# Patient Record
Sex: FEMALE | Race: WHITE | NOT HISPANIC OR LATINO | Employment: FULL TIME | URBAN - METROPOLITAN AREA
[De-identification: names, ages, dates, MRNs, and addresses within clinical notes are randomized per-mention and may not be internally consistent; named-entity substitution may affect disease eponyms.]

---

## 2017-02-08 ENCOUNTER — GENERIC CONVERSION - ENCOUNTER (OUTPATIENT)
Dept: OTHER | Facility: OTHER | Age: 77
End: 2017-02-08

## 2017-02-08 ENCOUNTER — LAB CONVERSION - ENCOUNTER (OUTPATIENT)
Dept: OTHER | Facility: OTHER | Age: 77
End: 2017-02-08

## 2017-02-08 LAB
A/G RATIO (HISTORICAL): 1.4 (CALC) (ref 1–2.5)
ALBUMIN SERPL BCP-MCNC: 4 G/DL (ref 3.6–5.1)
ALP SERPL-CCNC: 64 U/L (ref 33–130)
ALT SERPL W P-5'-P-CCNC: 16 U/L (ref 6–29)
AST SERPL W P-5'-P-CCNC: 16 U/L (ref 10–35)
BASOPHILS # BLD AUTO: 0.3 %
BASOPHILS # BLD AUTO: 22 CELLS/UL (ref 0–200)
BILIRUB SERPL-MCNC: 0.4 MG/DL (ref 0.2–1.2)
BUN SERPL-MCNC: 12 MG/DL (ref 7–25)
BUN/CREA RATIO (HISTORICAL): ABNORMAL (CALC) (ref 6–22)
CALCIUM SERPL-MCNC: 9.3 MG/DL (ref 8.6–10.4)
CHLORIDE SERPL-SCNC: 100 MMOL/L (ref 98–110)
CHOLEST SERPL-MCNC: 169 MG/DL (ref 125–200)
CHOLEST/HDLC SERPL: 2.7 (CALC)
CO2 SERPL-SCNC: 31 MMOL/L (ref 20–31)
CREAT SERPL-MCNC: 0.67 MG/DL (ref 0.6–0.93)
CREATININE, RANDOM URINE (HISTORICAL): 184 MG/DL (ref 20–320)
DEPRECATED RDW RBC AUTO: 13.3 % (ref 11–15)
EGFR AFRICAN AMERICAN (HISTORICAL): 99 ML/MIN/1.73M2
EGFR-AMERICAN CALC (HISTORICAL): 85 ML/MIN/1.73M2
EOSINOPHIL # BLD AUTO: 281 CELLS/UL (ref 15–500)
EOSINOPHIL # BLD AUTO: 3.8 %
GAMMA GLOBULIN (HISTORICAL): 2.8 G/DL (CALC) (ref 1.9–3.7)
GLUCOSE (HISTORICAL): 126 MG/DL (ref 65–99)
HBA1C MFR BLD HPLC: 6.4 % OF TOTAL HGB
HCT VFR BLD AUTO: 39.2 % (ref 35–45)
HDLC SERPL-MCNC: 63 MG/DL
HGB BLD-MCNC: 13.1 G/DL (ref 11.7–15.5)
LDL CHOLESTEROL (HISTORICAL): 85 MG/DL (CALC)
LYMPHOCYTES # BLD AUTO: 2368 CELLS/UL (ref 850–3900)
LYMPHOCYTES # BLD AUTO: 32 %
MAGNESIUM, UR (HISTORICAL): 1.7 MG/DL
MCH RBC QN AUTO: 31 PG (ref 27–33)
MCHC RBC AUTO-ENTMCNC: 33.3 G/DL (ref 32–36)
MCV RBC AUTO: 93.2 FL (ref 80–100)
MICROALBUMIN/CREATININE RATIO (HISTORICAL): 9 MCG/MG CREAT
MONOCYTES # BLD AUTO: 340 CELLS/UL (ref 200–950)
MONOCYTES (HISTORICAL): 4.6 %
NEUTROPHILS # BLD AUTO: 4388 CELLS/UL (ref 1500–7800)
NEUTROPHILS # BLD AUTO: 59.3 %
NON-HDL-CHOL (CHOL-HDL) (HISTORICAL): 106 MG/DL (CALC)
PLATELET # BLD AUTO: 259 THOUSAND/UL (ref 140–400)
PMV BLD AUTO: 8.4 FL (ref 7.5–12.5)
POTASSIUM SERPL-SCNC: 4.1 MMOL/L (ref 3.5–5.3)
RBC # BLD AUTO: 4.2 MILLION/UL (ref 3.8–5.1)
SODIUM SERPL-SCNC: 139 MMOL/L (ref 135–146)
T4 FREE SERPL-MCNC: 1.3 NG/DL (ref 0.8–1.8)
TOTAL PROTEIN (HISTORICAL): 6.8 G/DL (ref 6.1–8.1)
TRIGL SERPL-MCNC: 106 MG/DL
TSH SERPL DL<=0.05 MIU/L-ACNC: 0.07 MIU/L (ref 0.4–4.5)
WBC # BLD AUTO: 7.4 THOUSAND/UL (ref 3.8–10.8)

## 2017-02-21 ENCOUNTER — ALLSCRIPTS OFFICE VISIT (OUTPATIENT)
Dept: OTHER | Facility: OTHER | Age: 77
End: 2017-02-21

## 2017-02-21 DIAGNOSIS — Z12.39 ENCOUNTER FOR OTHER SCREENING FOR MALIGNANT NEOPLASM OF BREAST: ICD-10-CM

## 2017-04-26 ENCOUNTER — ALLSCRIPTS OFFICE VISIT (OUTPATIENT)
Dept: OTHER | Facility: OTHER | Age: 77
End: 2017-04-26

## 2017-08-23 ENCOUNTER — ALLSCRIPTS OFFICE VISIT (OUTPATIENT)
Dept: OTHER | Facility: OTHER | Age: 77
End: 2017-08-23

## 2017-12-20 ENCOUNTER — ALLSCRIPTS OFFICE VISIT (OUTPATIENT)
Dept: OTHER | Facility: OTHER | Age: 77
End: 2017-12-20

## 2017-12-21 NOTE — PROGRESS NOTES
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)    Plan   Acute URI    · Drink at least 6 glasses of water or juice a day ; Status:Complete;   Done: 46JKO7759   · Good hand washing is one of the best ways to control the spread of germs ;    Status:Complete;   Done: 46NQO8827   · The following home treatments may soothe a sore throat ; Status:Complete;   Done:    87XNA3764   · Use a cough medicine to help you get adequate rest ; Status:Complete;   Done:    61NHE9188    Discussion/Summary      Likely viral at this time improving currently  take OTC Mucinex prn PO fluids and rest nasal spray or Flonase if needed  Delysm prn for cough if symptoms worsen or persist    to keep routine f/u appointment as scheduled 1/3/18  Possible side effects of new medications were reviewed with the patient/guardian today  The treatment plan was reviewed with the patient/guardian  The patient/guardian understands and agrees with the treatment plan       Self Referrals: No      Chief Complaint   Patient has has a cough and nasal congestion since Friday and she is now starting to feel better      Advance Directives   Advance Directive [facility]:      NO - Patient does not have an advance health care directive  History of Present Illness   HPI: Pt presents by herself today for an acute visit    nasal and chest congestion for the past 5 days wheezing, mostly at night but no SOB feeling better today  cough- clear sputum  fevers or chills N/V/D around sick friends smoker  H/O asthma    type 2- currently diet controlled, doesn't check blood sugars at home, most recent A1C 2/2017 at 6 4%      Review of Systems        Constitutional: feeling tired, but-- no fever,-- not feeling poorly-- and-- no chills  ENT: as noted in HPI  Cardiovascular: no chest pain,-- no intermittent leg claudication,-- no palpitations-- and-- no lower extremity edema        Respiratory: cough-- and-- wheezing, but-- as noted in HPI,-- no shortness of breath-- and-- no shortness of breath during exertion  Gastrointestinal: no abdominal pain,-- no nausea,-- no constipation-- and-- no diarrhea  Musculoskeletal: no myalgias  Integumentary: no rashes  Neurological: no headache-- and-- no dizziness  ROS reviewed  Active Problems   1  Allergic rhinitis (477 9) (J30 9)   2  Cataract, right (366 9) (H26 9)   3  Colon cancer screening (V76 51) (Z12 11)   4  Depression with anxiety (300 4) (F41 8)   5  Encounter for other screening for malignant neoplasm of breast (V76 19) (Z12 39)   6  Encounter for screening colonoscopy (V76 51) (Z12 11)   7  Hyperlipidemia (272 4) (E78 5)   8  Hypertension (401 9) (I10)   9  Hypothyroidism (244 9) (E03 9)   10  Infected insect bite of forearm, right, initial encounter (913 5,E906 4)      ([id number]  XXXA)   11  Obesity (278 00) (E66 9)   12  Osteoarthritis (715 90) (M19 90)   13  Preoperative cardiovascular examination (V72 81) (Z01 810)   14  Screening for breast cancer (V76 10) (Z12 31)   15  Screening for diabetic retinopathy (V80 2) (Z13 5)   16  Screening for genitourinary condition (V81 6) (Z13 89)   17  Screening for neurological condition (V80 09) (Z13 89)   18  Skin rash (782 1) (R21)   19  Type 2 diabetes mellitus (250 00) (E11 9)   20  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,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)    Past Medical History   1  History of Abnormal Skin Sensitivity To Sunlight (Photosensitivity)   2  History of Acute serous otitis media, unspecified laterality   3  History of Bitten By Mites Or Midges   4  History of Fall at home (X259 4,E950 7) ([address]  XXXA,Y92 099)   5  History of Furuncle In The Axilla (680 3)   6  History of acute bronchitis (V12 69) (Z87 09)   7  History of bronchitis (V12 69) (Z87 09)   8  History of chest pain (V13 89) (Z87 898)   9  History of contact dermatitis (V13 3) (Z87 2)   10   History of eczema (V13 3) (Z87 2)   11  History of upper respiratory infection (V12 09) (Z87 09)   12  History of Impaired fasting glucose (790 21) (R73 01)   13  History of Shoulder pain (719 41) (M25 519)   14  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)  Active Problems And Past Medical History Reviewed: The active problems and past medical history were reviewed and updated today  Family History   Mother    1  Family history of Alzheimer Disease   2  Family history of Coronary Artery Disease (V17 49)   3  Family history of Dementia   4  Family history of Diabetes Mellitus (V18 0)  Father    5  Family history of Acute Myocardial Infarction (V17 3)   6  Family history of Sudden / Instantaneous Death  Sister    7  Family history of Coronary Artery Disease (V17 49)  Maternal Grandfather    8  Family history of Laryngeal Cancer (V16 2)  Maternal Aunt    9  Family history of Dementia   10  Family history of Diabetes Mellitus (V18 0)    Social History    · Denied: History of Alcohol   · Never A Smoker  The social history was reviewed and updated today  The social history was reviewed and is unchanged  Surgical History   1  History of Hemorrhoidectomy   2  History of Oophorectomy   3  History of Total Abdominal Hysterectomy    Current Meds    1  Levothyroxine Sodium 137 MCG Oral Tablet; take (1) tablet daily; Therapy: 43IRW1359 to (Last Jeff Morena)  Requested for: 47Cec7276 Ordered   2  Losartan Potassium-HCTZ 100-12 5 MG Oral Tablet; take 1 tablet by mouth daily; Therapy: 69VSE7290 to (Evaluate:14Crn0995)  Requested for: 63Dhe8977; Last     Rx:90Tmp9828 Ordered   3  Simvastatin 20 MG Oral Tablet; Take 1 tablet daily; Therapy: 49BJE5104 to ([id number])  Requested for: 08Itn9322; Last     Rx:93Fnt5678 Ordered   4  Venlafaxine HCl - 75 MG Oral Tablet; take 1 tablet every twelve hours;      Therapy: 82TLD8547 to (Evaluate:91Lqv0862)  Requested for: [phone]; Last     Jeff Morena; Status: ACTIVE - Renewal Denied Ordered     The medication list was reviewed and updated today  Allergies   1  Wellbutrin SR TBCR   2  Penicillins    Vitals    Recorded: 84Iag0818 01:54PM   Temperature 98 5 F   Heart Rate 76   Respiration 16   Systolic 581   Diastolic 68   Height 5 ft    Weight 181 lb    BMI Calculated 35 35   BSA Calculated 1 79   O2 Saturation 98     Physical Exam        Constitutional      General appearance: No acute distress, well appearing and well nourished  obese  Eyes      Conjunctiva and lids: No swelling, erythema or discharge  Pupils and irises: Equal, round and reactive to light  Ears, Nose, Mouth, and Throat      External inspection of ears and nose: Normal        Otoscopic examination: Tympanic membranes translucent with normal light reflex  Canals patent without erythema  Nasal mucosa, septum, and turbinates: Abnormal   The bilateral nasal mucosa was red  Oropharynx: Normal with no erythema, edema, exudate or lesions  Pulmonary      Respiratory effort: Abnormal   Respiratory Findings: dry cough  Auscultation of lungs: Clear to auscultation  Cardiovascular      Auscultation of heart: Abnormal   A grade 2 systolic murmur was heard at the RUSB  A grade 2 systolic murmur was heard at the LUSB  Examination of extremities for edema and/or varicosities: Normal        Abdomen      Abdomen: Non-tender, no masses  Liver and spleen: No hepatomegaly or splenomegaly  Lymphatic      Palpation of lymph nodes in neck: No lymphadenopathy  Musculoskeletal      Gait and station: Normal        Skin      Skin and subcutaneous tissue: Normal without rashes or lesions  Psychiatric      Orientation to person, place, and time: Normal        Mood and affect: Normal        Additional Exam:  Sinuses NTTP           Attending Note   Collaborating Physician Note: Collaborating Note: I did not interview and examine the patient-- and-- I agree with the Advanced Practitioner note  Future Appointments      Date/Time Provider Specialty Site   01/31/2018 01:00 PM [facility]     Signatures    Electronically signed by : Patrick Desir; Dec 20 2017  2:30PM EST                       (Author)     Electronically signed by :  Kat Luz MD; Dec 20 2017  3:09PM EST                       (Author)

## 2018-01-12 NOTE — RESULT NOTES
Message   ZHENG Hobart Cooks on 2/9/2017 OV  Verified Results  (1) LIPID PANEL, FASTING [id number] 09:29AM [facility]     Test Name Result Flag Reference   CHOLESTEROL, TOTAL 169 mg/dL  125-200   HDL CHOLESTEROL 63 mg/dL  > OR = 46   TRIGLICERIDES 593 mg/dL  <150   LDL-CHOLESTEROL 85 mg/dL (calc)  <130   Desirable range <100 mg/dL for patients with CHD or  diabetes and <70 mg/dL for diabetic patients with  known heart disease  CHOL/HDLC RATIO 2 7 (calc)  < OR = 5 0   NON HDL CHOLESTEROL 106 mg/dL (calc)     Target for non-HDL cholesterol is 30 mg/dL higher than   LDL cholesterol target  (Q) MICROALBUMIN, RANDOM URINE (W/CREATININE) 81OHM8812 09:29AM [facility]     Test Name Result Flag Reference   CREATININE, RANDOM URINE 184 mg/dL     MICROALBUMIN 1 7 mg/dL     Reference Range  Not established   MICROALBUMIN/CREATININE$RATIO, RANDOM URINE 9 mcg/mg creat  <30   The ADA defines abnormalities in albumin  excretion as follows:     Category         Result (mcg/mg creatinine)     Normal                    <30  Microalbuminuria            Clinical albuminuria   > OR = 300     The ADA recommends that at least two of three  specimens collected within a 3-6 month period be  abnormal before considering a patient to be  within a diagnostic category  (1) COMPREHENSIVE METABOLIC PANEL 70DPV1825 94:35DW [facility]     Test Name Result Flag Reference   GLUCOSE 126 mg/dL H 65-99   Fasting reference interval   UREA NITROGEN (BUN) 12 mg/dL  7-25   CREATININE 0 67 mg/dL  0 60-0 93   For patients >52years of age, the reference limit  for Creatinine is approximately 13% higher for people  identified as -American  eGFR NON-AFR   AMERICAN 85 mL/min/1 73m2  > OR = 60   eGFR AFRICAN AMERICAN 99 mL/min/1 73m2  > OR = 60   BUN/CREATININE RATIO   3-87   NOT APPLICABLE (calc)   SODIUM 139 mmol/L  135-146   POTASSIUM 4 1 mmol/L  3 5-5 3   CHLORIDE 100 mmol/L     CARBON DIOXIDE 31 mmol/L  20-31   CALCIUM 9 3 mg/dL  8 6-10 4   PROTEIN, TOTAL 6 8 g/dL  6 1-8 1   ALBUMIN 4 0 g/dL  3 6-5 1   GLOBULIN 2 8 g/dL (calc)  1 9-3 7   ALBUMIN/GLOBULIN RATIO 1 4 (calc)  1 0-2 5   BILIRUBIN, TOTAL 0 4 mg/dL  0 2-1 2   ALKALINE PHOSPHATASE 64 U/L     AST 16 U/L  10-35   ALT 16 U/L  6-29     (1) CBC/PLT/DIFF 34MGK7155 09:29AM [facility]     Test Name Result Flag Reference   WHITE BLOOD CELL COUNT 7 4 Thousand/uL  3 8-10 8   RED BLOOD CELL COUNT 4 20 Million/uL  3 80-5 10   HEMOGLOBIN 13 1 g/dL  11 7-15 5   HEMATOCRIT 39 2 %  35 0-45 0   MCV 93 2 fL  80 0-100 0   MCH 31 0 pg  27 0-33 0   MCHC 33 3 g/dL  32 0-36 0   RDW 13 3 %  11 0-15 0   PLATELET COUNT 033 Thousand/uL  140-400   MPV 8 4 fL  7 5-12 5   ABSOLUTE NEUTROPHILS 4388 cells/uL  6909-5834   ABSOLUTE LYMPHOCYTES 2368 cells/uL  850-3900   ABSOLUTE MONOCYTES 340 cells/uL  200-950   ABSOLUTE EOSINOPHILS 281 cells/uL     ABSOLUTE BASOPHILS 22 cells/uL  0-200   NEUTROPHILS 59 3 %     LYMPHOCYTES 32 0 %     MONOCYTES 4 6 %     EOSINOPHILS 3 8 %     BASOPHILS 0 3 %       (Q) TSH, 3RD GENERATION W/REFLEX TO FT4 [id number] 09:29AM [facility]     Test Name Result Flag Reference   T4, FREE 1 3 ng/dL  0 8-1 8   TSH W/REFLEX TO FT4 0 07 mIU/L L 0 40-4 50     (Q) HEMOGLOBIN A1c [id number] 09:29AM [facility]   REPORT COMMENT:  FASTING:YES     Test Name Result Flag Reference   HEMOGLOBIN A1c 6 4 % of total Hgb H <5 7   According to ADA guidelines, hemoglobin A1c <7 0%  represents optimal control in non-pregnant diabetic  patients  Different metrics may apply to specific  patient populations  Standards of Medical Care in  [phone]  Diabetes Care  2013;36:s11-s66     For the purpose of screening for the presence of  diabetes  <5 7%       Consistent with the absence of diabetes  5 7-6 4%    Consistent with increased risk for diabetes              (prediabetes)  >or=6 5%    Consistent with diabetes     This assay result is consistent with a higher risk  of diabetes       Currently, no consensus exists for use of hemoglobin  A1c for diagnosis of diabetes for children

## 2018-01-13 VITALS
BODY MASS INDEX: 36.32 KG/M2 | HEART RATE: 86 BPM | SYSTOLIC BLOOD PRESSURE: 144 MMHG | DIASTOLIC BLOOD PRESSURE: 72 MMHG | HEIGHT: 60 IN | WEIGHT: 185 LBS | RESPIRATION RATE: 16 BRPM | OXYGEN SATURATION: 96 % | TEMPERATURE: 98.5 F

## 2018-01-13 VITALS
HEIGHT: 60 IN | HEART RATE: 80 BPM | RESPIRATION RATE: 16 BRPM | BODY MASS INDEX: 36.57 KG/M2 | SYSTOLIC BLOOD PRESSURE: 150 MMHG | DIASTOLIC BLOOD PRESSURE: 82 MMHG | WEIGHT: 186.25 LBS | TEMPERATURE: 98.1 F

## 2018-01-14 VITALS
RESPIRATION RATE: 12 BRPM | WEIGHT: 187.97 LBS | HEART RATE: 72 BPM | TEMPERATURE: 98.1 F | BODY MASS INDEX: 36.9 KG/M2 | SYSTOLIC BLOOD PRESSURE: 142 MMHG | DIASTOLIC BLOOD PRESSURE: 76 MMHG | HEIGHT: 60 IN

## 2018-01-22 VITALS
BODY MASS INDEX: 35.53 KG/M2 | WEIGHT: 181 LBS | TEMPERATURE: 98.5 F | SYSTOLIC BLOOD PRESSURE: 140 MMHG | RESPIRATION RATE: 16 BRPM | DIASTOLIC BLOOD PRESSURE: 68 MMHG | HEIGHT: 60 IN | HEART RATE: 76 BPM | OXYGEN SATURATION: 98 %

## 2018-02-05 RX ORDER — LOSARTAN POTASSIUM AND HYDROCHLOROTHIAZIDE 12.5; 1 MG/1; MG/1
1 TABLET ORAL DAILY
COMMUNITY
Start: 2011-06-22 | End: 2018-02-06 | Stop reason: SDUPTHER

## 2018-02-05 RX ORDER — SIMVASTATIN 20 MG
1 TABLET ORAL DAILY
COMMUNITY
Start: 2012-03-13 | End: 2018-02-06 | Stop reason: SDUPTHER

## 2018-02-05 RX ORDER — VENLAFAXINE 75 MG/1
1 TABLET ORAL EVERY 12 HOURS
COMMUNITY
Start: 2011-05-12 | End: 2018-02-06 | Stop reason: SDUPTHER

## 2018-02-05 RX ORDER — LEVOTHYROXINE SODIUM 137 UG/1
1 TABLET ORAL DAILY
COMMUNITY
Start: 2011-06-22 | End: 2018-02-06 | Stop reason: SDUPTHER

## 2018-02-06 ENCOUNTER — OFFICE VISIT (OUTPATIENT)
Dept: FAMILY MEDICINE CLINIC | Facility: CLINIC | Age: 78
End: 2018-02-06
Payer: COMMERCIAL

## 2018-02-06 VITALS
TEMPERATURE: 97.1 F | OXYGEN SATURATION: 98 % | SYSTOLIC BLOOD PRESSURE: 142 MMHG | BODY MASS INDEX: 36.67 KG/M2 | WEIGHT: 186.8 LBS | HEART RATE: 67 BPM | RESPIRATION RATE: 16 BRPM | HEIGHT: 60 IN | DIASTOLIC BLOOD PRESSURE: 84 MMHG

## 2018-02-06 DIAGNOSIS — Z23 NEED FOR TD VACCINE: Primary | ICD-10-CM

## 2018-02-06 DIAGNOSIS — E11.36 TYPE 2 DIABETES MELLITUS WITH DIABETIC CATARACT, WITHOUT LONG-TERM CURRENT USE OF INSULIN (HCC): ICD-10-CM

## 2018-02-06 DIAGNOSIS — E78.2 MIXED HYPERLIPIDEMIA: ICD-10-CM

## 2018-02-06 DIAGNOSIS — F41.8 DEPRESSION WITH ANXIETY: ICD-10-CM

## 2018-02-06 DIAGNOSIS — I10 ESSENTIAL HYPERTENSION: ICD-10-CM

## 2018-02-06 DIAGNOSIS — E03.9 HYPOTHYROIDISM, UNSPECIFIED TYPE: ICD-10-CM

## 2018-02-06 DIAGNOSIS — Z23 NEED FOR PNEUMOCOCCAL VACCINATION: ICD-10-CM

## 2018-02-06 PROCEDURE — 99214 OFFICE O/P EST MOD 30 MIN: CPT | Performed by: NURSE PRACTITIONER

## 2018-02-06 PROCEDURE — 1036F TOBACCO NON-USER: CPT | Performed by: NURSE PRACTITIONER

## 2018-02-06 RX ORDER — LEVOTHYROXINE SODIUM 137 UG/1
137 TABLET ORAL DAILY
Qty: 30 TABLET | Refills: 5 | Status: SHIPPED | OUTPATIENT
Start: 2018-02-06 | End: 2018-10-22 | Stop reason: SDUPTHER

## 2018-02-06 RX ORDER — VENLAFAXINE 75 MG/1
75 TABLET ORAL EVERY 12 HOURS
Qty: 30 TABLET | Refills: 5 | Status: SHIPPED | OUTPATIENT
Start: 2018-02-06 | End: 2018-11-20 | Stop reason: SDUPTHER

## 2018-02-06 RX ORDER — SIMVASTATIN 20 MG
20 TABLET ORAL DAILY
Qty: 30 TABLET | Refills: 5 | Status: SHIPPED | OUTPATIENT
Start: 2018-02-06 | End: 2018-09-21 | Stop reason: SDUPTHER

## 2018-02-06 RX ORDER — LOSARTAN POTASSIUM AND HYDROCHLOROTHIAZIDE 12.5; 1 MG/1; MG/1
1 TABLET ORAL DAILY
Qty: 30 TABLET | Refills: 5 | Status: SHIPPED | OUTPATIENT
Start: 2018-02-06 | End: 2018-08-23 | Stop reason: SDUPTHER

## 2018-02-06 NOTE — PROGRESS NOTES
Chief Complaint   Patient presents with    Well Check     follow up visit     Vitals:    02/06/18 1610   BP: 142/84   Pulse: 67   Resp: 16   Temp: (!) 97 1 °F (36 2 °C)   SpO2: 98%       Assessment/Plan:    1  DM type 2- labs reordered and printed today  She is aware that she is overdue for an annual eye exam  She will contact [facility] to schedule an appointment  Follow a low carb diet, watch sweets     2  HTN- BP is acceptable today  Continue Losartan - HCTZ to 100- 12 5 daily  I asked that she start checking her BP daily and to drop off log in 2 weeks for review  Follow a low sodium diet     3  Hypothyroidism- labs reprinted to check TSH/ Free T4 as these have not been checked since dosage change with Levothyroxine     4  Hyperlipidemia- lipid panel as ordered  Continue Simvastatin  Follow a low fat/ low cholesterol diet  5  Depression with anxiety- stable  Continue Venlafaxine 75mg one tab BID     6  Obesity- encouraged to work on regular exercise, weight loss    Refuses Mammogram and Colonoscopy  Refuses Pneumonia vaccines, Zostavax, Adacel, flu vaccines     Labs to be done now- we will call with results  RTO 6 months      No problem-specific Assessment & Plan notes found for this encounter  Diagnoses and all orders for this visit:    Need for TD vaccine  -     Td vaccine greater than or equal to 8yo IM; Future    Need for pneumococcal vaccination  -     PNEUMOCOCCAL CONJUGATE VACCINE 13-VALENT GREATER THAN 6 MONTHS; Future          Subjective:      Patient ID: Carolyn Partida is a 68 y o  female  HPI     Pt presents by herself today for a 6 month follow up of chronic medical conditions    DM type 2- no current medications, diet controlled  Diet could be better, eats pasta frequently because it is cheap and easy  Unfortunately, she did not get blood work done for her appointment today  Last A1C at 6 4% in 2/2017   Last eye exam 8/2015, goes to [facility], she reports that she will make an eye exam appointment soon  Denies numbness or tingling in feet     HTN- taking Losartan- HCTZ 50-12 5mg daily  She does not check her BP at home but does have a cuff  Denies CP, palpitations, edema, SOB, tinnitus, facial flushing, HAs    Hyperlipidemia- last lipid panel 2/2017 with total cholesterol 169/ LDL 85/ / HDL 63  Taking Simvastatin 20mg daily     Obesity- no regular exercise currently, still working part time    Depression with Anxiety- taking Venlafaxine 75mg one tab in the morning and one tab in the evening  Has been on for several years and mood is stable     Hypothyroidism- Last TSH was at 0 07 with Free T4- we decreased her dosage of Levothyroxine to 137mcg but she has not gotten repeat labs done to recheck TSH     Feels well today, offers no acute complaints       The following portions of the patient's history were reviewed and updated as appropriate: allergies, current medications, past medical history, past social history and problem list     Review of Systems   Constitutional: Negative for chills, fatigue and fever  Respiratory: Negative for cough, shortness of breath and wheezing  Cardiovascular: Negative for chest pain, palpitations and leg swelling  Gastrointestinal: Negative for abdominal pain, blood in stool, constipation, diarrhea and nausea  Genitourinary: Negative for dysuria  No nocturia   Musculoskeletal: Negative for arthralgias and myalgias  Skin: Negative for rash and wound  Neurological: Negative for dizziness, weakness, numbness and headaches  Psychiatric/Behavioral: Negative for sleep disturbance and suicidal ideas  Anxiety and depression           Objective:     Physical Exam   Constitutional: She is oriented to person, place, and time  She appears well-developed and well-nourished  HENT:   Head: Normocephalic and atraumatic  Eyes: Pupils are equal, round, and reactive to light     Wearing eyeglasses   Neck: Normal range of motion  Cardiovascular: Normal rate and regular rhythm  Murmur heard  Systolic murmur is present with a grade of 2/6   Pulses:       Dorsalis pedis pulses are 2+ on the right side, and 2+ on the left side  No B/L LE edema    Pulmonary/Chest: Effort normal and breath sounds normal    Abdominal: Soft  Bowel sounds are normal  There is no tenderness  Musculoskeletal: Normal range of motion  Feet:   Right Foot:   Skin Integrity: Negative for ulcer, skin breakdown, erythema, warmth, callus or dry skin  Left Foot:   Skin Integrity: Negative for ulcer, skin breakdown, erythema, warmth, callus or dry skin  Neurological: She is alert and oriented to person, place, and time  Skin: Skin is warm and dry  Psychiatric: She has a normal mood and affect  Patient's shoes and socks removed  Right Foot/Ankle   Right Foot Inspection  Skin Exam: skin normal and skin intact no dry skin, no warmth, no callus, no erythema, no maceration, no abnormal color, no pre-ulcer, no ulcer and no callus                            Sensory       Monofilament testing: intact  Vascular  Capillary refills: < 3 seconds  The right DP pulse is 2+  Left Foot/Ankle  Left Foot Inspection  Skin Exam: skin normal and skin intactno dry skin, no warmth, no erythema, no maceration, normal color, no pre-ulcer, no ulcer and no callus                                         Sensory       Monofilament: intact  Vascular  Capillary refills: < 3 seconds  The left DP pulse is 2+

## 2018-02-06 NOTE — PROGRESS NOTES
I have reviewed the notes, assessments, and/or procedures performed by Dione Willams  , I concur with her/his documentation of Li Cox

## 2018-02-06 NOTE — PATIENT INSTRUCTIONS
Diabetes in the Older Adult   WHAT YOU NEED TO KNOW:   Older adults with diabetes are at risk for heart disease, stroke, kidney disease, blindness, and nerve damage  You may also be at risk for any of the following:  · Poor nutrition or low blood sugar levels    · Confusion or problems with memory, attention, or learning new things    · Trouble controlling urination or frequent urinary tract infections    · Trouble with coordination or balance    · Falls and injuries    · Pain    · Depression    · Open sores on your legs or feet  DISCHARGE INSTRUCTIONS:   Call 911 for any of the following:   · You have any of the following signs of a stroke:      ¨ Numbness or drooping on one side of your face     ¨ Weakness in an arm or leg    ¨ Confusion or difficulty speaking    ¨ Dizziness, a severe headache, or vision loss    · You have any of the following signs of a heart attack:      ¨ Squeezing, pressure, or pain in your chest that lasts longer than 5 minutes or returns    ¨ Discomfort or pain in your back, neck, jaw, stomach, or arm     ¨ Trouble breathing    ¨ Nausea or vomiting    ¨ Lightheadedness or a sudden cold sweat, especially with chest pain or trouble breathing  Return to the emergency department if:   · You have severe abdominal pain, or the pain spreads to your back  You may also be vomiting  · You have trouble staying awake or focusing  · You are shaking or sweating  · You have blurred or double vision  · Your breath has a fruity, sweet smell  · Your breathing is deep and labored, or rapid and shallow  · Your heartbeat is fast and weak  · You fall and get hurt  Contact your healthcare provider if:   · You are vomiting or have diarrhea  · You have an upset stomach and cannot eat the foods on your meal plan  · You feel weak or more tired than usual      · You feel dizzy, have headaches, or are easily irritated  · Your skin is red, warm, dry, or swollen       · You have a wound that does not heal      · You have numbness in your arms or legs  · You have trouble coping with your illness, or you feel anxious or depressed  · You have problems with your memory  · You have changes in your vision  · You have questions or concerns about your condition or care  Medicines  may be given to decrease the amount of sugar in your blood  You may also need medicine to lower your blood pressure or cholesterol, or medicine to prevent blood clots  Manage the ABCs and prevent problems caused by diabetes:   · Check your blood sugar levels as directed  Your healthcare provider will tell you when and how often to check during the day  Your healthcare provider will also tell you what your blood sugar levels should be before and after a meal  You may need to check for ketones in your urine or blood if your level is higher than directed  Write down your results and show them to your healthcare provider  Your provider may use the results to make changes to your medicine, food, or exercise schedules  Ask your healthcare provider for more information about how to treat a high or low blood sugar level  · Follow your meal plan as directed  A dietitian will help you make a meal plan to keep your blood sugar level steady and make sure you get enough nutrition  Do not skip meals  Your blood sugar level may drop too low if you have taken diabetes medicine and do not eat  Ask your healthcare provider about programs in your community that can deliver the meals to your home  · Try to be active for 30 to 60 minutes most days of the week  Exercise can help keep your blood sugar level steady, decrease your risk of heart disease, and help you lose weight  It can also help improve your balance and decrease your risk for falls  Work with your healthcare provider to create an exercise plan  Ask a family member or friend to exercise with you   Start slow and exercise for 5 to 10 minutes at a time  Examples of activities include walking or swimming  Include muscle strengthening activities 2 to 3 days each week  Include balance training 2 to 3 times each week  Activities that help increase balance include yoga and rigoberto chi      · Maintain a healthy weight  Ask your healthcare provider how much you should weigh  A healthy weight can help you control your diabetes and prevent heart disease  Ask your provider to help you create a weight loss plan if you are overweight  Together you can set manageable weight loss goals  · Do not smoke  Ask your healthcare provider for information if you currently smoke and need help to quit  Do not use e-cigarettes or smokeless tobacco in place of cigarettes or to help you quit  They still contain nicotine  · Manage stress  Stress may increase your blood sugar level  Deep breathing, muscle relaxation, and music may help you relax  Ask your healthcare provider for more information about these practices  Other ways to manage your diabetes:   · Check your feet every day for sores  Look at your whole foot, including the bottom, and between and under your toes  Check for wounds, corns, and calluses  Use a mirror to see the bottom of your feet  The skin on your feet may be shiny, tight, dry, or darker than normal  Your feet may also be cold and pale  Feel your feet by running your hands along the tops, bottoms, sides, and between your toes  Redness, swelling, and warmth are signs of blood flow problems that can lead to a foot ulcer  Do not try to remove corns or calluses yourself  · Wear medical alert identification  Wear medical alert jewelry or carry a card that says you have diabetes  Ask your healthcare provider where to get these items  · Ask about vaccines  You have a higher risk for serious illness if you get the flu, pneumonia, or hepatitis   Ask your healthcare provider if you should get a flu, pneumonia, shingles, or hepatitis B vaccine, and when to get the vaccine  · Keep all appointments  You may need to return to have your A1c checked every 3 months  You will need to return at least once each year to have your feet checked  You will need an eye exam once a year to check for retinopathy  You will also need urine tests every year to check for kidney problems  You may need tests to monitor for heart disease  Write down your questions so you remember to ask them during your visits  · Get help from family and friends  You may need help checking your blood sugar level, giving insulin injections, or preparing your meals  Ask your family and friends to help you with these tasks  Talk to your healthcare provider if you do not have someone at home to help you  A healthcare provider can come to your home to help you with these tasks  Follow up with your healthcare provider as directed: You may need to return to have your A1c checked every 3 months  You will need to return at least once each year to have your feet checked  You will need an eye exam once a year to check for retinopathy  You will also need urine tests every year to check for kidney problems  You may need tests to monitor for heart disease  Write down your questions so you remember to ask them during your visits  © 2017 [employer]  Information is for End User's use only and may not be sold, redistributed or otherwise used for commercial purposes  All illustrations and images included in CareNotes® are the copyrighted property of [employer]  or [employer]  The above information is an  only  It is not intended as medical advice for individual conditions or treatments  Talk to your doctor, nurse or pharmacist before following any medical regimen to see if it is safe and effective for you  Chronic Hypertension   WHAT YOU NEED TO KNOW:   Hypertension is high blood pressure (BP)   Your BP is the force of your blood moving against the walls of your arteries  Normal BP is less than 120/80  Prehypertension is between 120/80 and 139/89  Hypertension is 140/90 or higher  Hypertension causes your BP to get so high that your heart has to work much harder than normal  This can damage your heart  Chronic hypertension is a long-term condition that you can control with a healthy lifestyle or medicines  A controlled blood pressure helps protect your organs, such as your heart, lungs, brain, and kidneys  DISCHARGE INSTRUCTIONS:   Call 911 for any of the following:   · You have discomfort in your chest that feels like squeezing, pressure, fullness, or pain  · You become confused or have difficulty speaking  · You suddenly feel lightheaded or have trouble breathing  · You have pain or discomfort in your back, neck, jaw, stomach, or arm  Return to the emergency department if:   · You have a severe headache or vision loss  · You have weakness in an arm or leg  Contact your healthcare provider if:   · You feel faint, dizzy, confused, or drowsy  · You have been taking your BP medicine and your BP is still higher than your healthcare provider says it should be  · You have questions or concerns about your condition or care  Medicines: You may need any of the following:  · Medicine  may be used to help lower your BP  You may need more than one type of medicine  Take the medicine exactly as directed  · Diuretics  help decrease extra fluid that collects in your body  This will help lower your BP  You may urinate more often while you take this medicine  · Cholesterol medicine  helps lower your cholesterol level  A low cholesterol level helps prevent heart disease and makes it easier to control your blood pressure  · Take your medicine as directed  Contact your healthcare provider if you think your medicine is not helping or if you have side effects  Tell him or her if you are allergic to any medicine   Keep a list of the medicines, vitamins, and herbs you take  Include the amounts, and when and why you take them  Bring the list or the pill bottles to follow-up visits  Carry your medicine list with you in case of an emergency  Follow up with your healthcare provider as directed: You will need to return to have your blood pressure checked and to have other lab tests done  Write down your questions so you remember to ask them during your visits  Manage chronic hypertension:  Talk with your healthcare provider about these and other ways to manage hypertension:  · Take your BP at home  Sit and rest for 5 minutes before you take your BP  Extend your arm and support it on a flat surface  Your arm should be at the same level as your heart  Follow the directions that came with your BP monitor  If possible, take at least 2 BP readings each time  Take your BP at least twice a day at the same times each day, such as morning and evening  Keep a record of your BP readings and bring it to your follow-up visits  Ask your healthcare provider what your blood pressure should be  · Limit sodium (salt) as directed  Too much sodium can affect your fluid balance  Check labels to find low-sodium or no-salt-added foods  Some low-sodium foods use potassium salts for flavor  Too much potassium can also cause health problems  Your healthcare provider will tell you how much sodium and potassium are safe for you to have in a day  He or she may recommend that you limit sodium to 2,300 mg a day  · Follow the meal plan recommended by your healthcare provider  A dietitian or your provider can give you more information on low-sodium plans or the DASH (Dietary Approaches to Stop Hypertension) eating plan  The DASH plan is low in sodium, unhealthy fats, and total fat  It is high in potassium, calcium, and fiber  · Exercise to maintain a healthy weight  Exercise at least 30 minutes per day, on most days of the week   This will help decrease your blood pressure  Ask about the best exercise plan for you  · Decrease stress  This may help lower your BP  Learn ways to relax, such as deep breathing or listening to music  · Limit alcohol  Women should limit alcohol to 1 drink a day  Men should limit alcohol to 2 drinks a day  A drink of alcohol is 12 ounces of beer, 5 ounces of wine, or 1½ ounces of liquor  · Do not smoke  Nicotine and other chemicals in cigarettes and cigars can increase your BP and also cause lung damage  Ask your healthcare provider for information if you currently smoke and need help to quit  E-cigarettes or smokeless tobacco still contain nicotine  Talk to your healthcare provider before you use these products  © 2017 [employer]  Information is for End User's use only and may not be sold, redistributed or otherwise used for commercial purposes  All illustrations and images included in CareNotes® are the copyrighted property of [employer]  or [employer]  The above information is an  only  It is not intended as medical advice for individual conditions or treatments  Talk to your doctor, nurse or pharmacist before following any medical regimen to see if it is safe and effective for you

## 2018-08-23 DIAGNOSIS — I10 ESSENTIAL HYPERTENSION: ICD-10-CM

## 2018-08-24 RX ORDER — LOSARTAN POTASSIUM AND HYDROCHLOROTHIAZIDE 12.5; 1 MG/1; MG/1
1 TABLET ORAL DAILY
Qty: 30 TABLET | Refills: 6 | Status: SHIPPED | OUTPATIENT
Start: 2018-08-24 | End: 2019-03-20 | Stop reason: SDUPTHER

## 2018-09-12 ENCOUNTER — TELEPHONE (OUTPATIENT)
Dept: FAMILY MEDICINE CLINIC | Facility: CLINIC | Age: 78
End: 2018-09-12

## 2018-09-12 NOTE — TELEPHONE ENCOUNTER
Patient has bloodwork from February wants to know if they are still valid  If not she will need new slips   Can be reached at [phone]

## 2018-09-18 ENCOUNTER — OFFICE VISIT (OUTPATIENT)
Dept: FAMILY MEDICINE CLINIC | Facility: CLINIC | Age: 78
End: 2018-09-18
Payer: COMMERCIAL

## 2018-09-18 VITALS
SYSTOLIC BLOOD PRESSURE: 148 MMHG | OXYGEN SATURATION: 97 % | DIASTOLIC BLOOD PRESSURE: 70 MMHG | TEMPERATURE: 98.8 F | HEART RATE: 68 BPM | BODY MASS INDEX: 36.01 KG/M2 | RESPIRATION RATE: 16 BRPM | HEIGHT: 60 IN | WEIGHT: 183.4 LBS

## 2018-09-18 DIAGNOSIS — I10 ESSENTIAL HYPERTENSION: ICD-10-CM

## 2018-09-18 DIAGNOSIS — J20.9 ACUTE BRONCHITIS, UNSPECIFIED ORGANISM: Primary | ICD-10-CM

## 2018-09-18 PROCEDURE — 99214 OFFICE O/P EST MOD 30 MIN: CPT | Performed by: NURSE PRACTITIONER

## 2018-09-18 PROCEDURE — 3008F BODY MASS INDEX DOCD: CPT | Performed by: NURSE PRACTITIONER

## 2018-09-18 PROCEDURE — 1160F RVW MEDS BY RX/DR IN RCRD: CPT | Performed by: NURSE PRACTITIONER

## 2018-09-18 RX ORDER — AZITHROMYCIN 250 MG/1
TABLET, FILM COATED ORAL
Qty: 6 TABLET | Refills: 0 | Status: SHIPPED | OUTPATIENT
Start: 2018-09-18 | End: 2018-09-22

## 2018-09-18 RX ORDER — DIPHENOXYLATE HYDROCHLORIDE AND ATROPINE SULFATE 2.5; .025 MG/1; MG/1
1 TABLET ORAL DAILY
COMMUNITY
End: 2020-10-05

## 2018-09-18 RX ORDER — PREDNISONE 10 MG/1
TABLET ORAL
Qty: 20 TABLET | Refills: 0 | Status: SHIPPED | OUTPATIENT
Start: 2018-09-18 | End: 2019-03-20

## 2018-09-18 RX ORDER — MAGNESIUM 30 MG
30 TABLET ORAL 2 TIMES DAILY
COMMUNITY
End: 2019-07-11 | Stop reason: HOSPADM

## 2018-09-18 RX ORDER — MULTIVIT WITH MINERALS/LUTEIN
1000 TABLET ORAL 2 TIMES DAILY
COMMUNITY

## 2018-09-18 RX ORDER — DEXTROMETHORPHAN HYDROBROMIDE AND PROMETHAZINE HYDROCHLORIDE 15; 6.25 MG/5ML; MG/5ML
5 SYRUP ORAL 4 TIMES DAILY PRN
Qty: 118 ML | Refills: 0 | Status: SHIPPED | OUTPATIENT
Start: 2018-09-18 | End: 2019-03-20

## 2018-09-18 RX ORDER — PYRIDOXINE HCL (VITAMIN B6) 100 MG
100 TABLET ORAL DAILY
COMMUNITY
End: 2019-08-21 | Stop reason: ALTCHOICE

## 2018-09-18 RX ORDER — MELATONIN
1000 DAILY
COMMUNITY

## 2018-09-18 NOTE — ASSESSMENT & PLAN NOTE
BP better on retake  I did ask her to check her BP a few times per week and keep a log, goal is closer to 130/80  Continue Losartan-HCTZ 100-12 5 mg QD  Follow a low sodium diet, work on weight loss

## 2018-09-18 NOTE — PROGRESS NOTES
Chief Complaint   Patient presents with    Cough     patient thinks bronchitis      Assessment/Plan:    Acute bronchitis- to start PO Prednisone taper and Azithromycin  May take Promethazine DM prn for dry cough  Increase PO fluids and rest   Warm salt water gargles, throat lozenges  Declined a script for a rescue inhaler today  Call office if symptoms worsen or persist    Hypertension  BP better on retake  I did ask her to check her BP a few times per week and keep a log, goal is closer to 130/80  Continue Losartan-HCTZ 100-12 5 mg QD  Follow a low sodium diet, work on weight loss     Pt is overdue for her 6 month follow up with our office  I reprinted her labs for and she should have these done at least one week after completing the above medications for acute bronchitis  Schedule OV after lab work is complete      Diagnoses and all orders for this visit:    Acute bronchitis, unspecified organism  -     azithromycin (ZITHROMAX) 250 mg tablet; Take 2 tablets today then 1 tablet daily x 4 days  -     promethazine-dextromethorphan (PHENERGAN-DM) 6 25-15 mg/5 mL oral syrup; Take 5 mL by mouth 4 (four) times a day as needed for cough  -     predniSONE 10 mg tablet; Take 4 tabs for 2 days, 3 tabs for 2 days, 2 tabs for 2 days and 1 tab for 2 days    Essential hypertension    Other orders  -     multivitamin (THERAGRAN) TABS; Take 1 tablet by mouth daily  -     cyanocobalamin 100 MCG tablet; Take 100 mcg by mouth daily  -     pyridoxine (B-6) 100 MG tablet; Take 100 mg by mouth daily  -     Ascorbic Acid (VITAMIN C) 100 MG tablet; Take 100 mg by mouth daily  -     cholecalciferol (VITAMIN D3) 1,000 units tablet; Take 1,000 Units by mouth daily  -     magnesium 30 MG tablet; Take 30 mg by mouth 2 (two) times a day  -     fexofenadine (ALLEGRA) 30 MG tablet; Take 30 mg by mouth 2 (two) times a day          Subjective:      Patient ID: Vega Guzman is a 66 y o  female      HPI   Pt presents by herself today for an acute visit   C/o a dry cough for the past 2 weeks  Some wheezing but no SOB  Throat hurts intermittently   No nasal congestion, rhinorrhea, sinus pressure  No fevers, chills, malaise   No N/V/D, appetite is normal     No recent sick contacts   No recent travel     Non smoker   No H/O asthma or COPD     Pt currently taking Losartan- HCTZ 100-12 5 mg QD for HTN  Reports being complaint with this medication  She does not check her BP at home  Denies chest pain, palpitations, edema, dizziness, headaches, tinnitus     The following portions of the patient's history were reviewed and updated as appropriate: allergies, current medications, past medical history, past social history and problem list     Review of Systems   Constitutional: Positive for fatigue  Negative for activity change, appetite change, chills, diaphoresis, fever and unexpected weight change  HENT: Positive for sore throat  Negative for congestion, ear discharge, ear pain, hearing loss, postnasal drip, rhinorrhea, sinus pain, sinus pressure, tinnitus, trouble swallowing and voice change  Eyes: Negative for discharge, itching and visual disturbance  Respiratory: Positive for cough and wheezing  Negative for chest tightness and shortness of breath  Cardiovascular: Negative for chest pain, palpitations and leg swelling  Gastrointestinal: Positive for abdominal pain  Negative for constipation, diarrhea and nausea  Genitourinary: Negative for dysuria  Musculoskeletal: Negative for arthralgias and myalgias  Skin: Negative for rash  Neurological: Negative for dizziness and headaches  Hematological: Negative for adenopathy  Objective:      /70 (BP Location: Left arm, Patient Position: Sitting)   Pulse 68   Temp 98 8 °F (37 1 °C) (Tympanic)   Resp 16   Ht 5' (1 524 m)   Wt 83 2 kg (183 lb 6 4 oz)   SpO2 97%   BMI 35 82 kg/m²          Physical Exam   Constitutional: She is oriented to person, place, and time   She appears well-developed and well-nourished  No distress  HENT:   Head: Normocephalic and atraumatic  Right Ear: Hearing, tympanic membrane, external ear and ear canal normal    Left Ear: Hearing, tympanic membrane, external ear and ear canal normal    Nose: No mucosal edema or rhinorrhea  Right sinus exhibits no maxillary sinus tenderness and no frontal sinus tenderness  Left sinus exhibits no maxillary sinus tenderness and no frontal sinus tenderness  Mouth/Throat: Mucous membranes are normal  Posterior oropharyngeal erythema present  No oropharyngeal exudate  Eyes: Conjunctivae are normal    Neck: Normal range of motion  Neck supple  Cardiovascular: Normal rate, regular rhythm and normal heart sounds  No murmur heard  No LE edema   Pulmonary/Chest: Effort normal  No accessory muscle usage  No respiratory distress  She has no decreased breath sounds  She has wheezes (faint, scattered wheezes)  She has no rhonchi  She has no rales  She exhibits no tenderness  Abdominal: Soft  Bowel sounds are normal  She exhibits no distension  There is no tenderness  Musculoskeletal: Normal range of motion  Lymphadenopathy:     She has no cervical adenopathy  Neurological: She is alert and oriented to person, place, and time  Skin: Skin is warm  No rash noted  She is not diaphoretic  Psychiatric: She has a normal mood and affect

## 2018-09-21 DIAGNOSIS — E78.2 MIXED HYPERLIPIDEMIA: ICD-10-CM

## 2018-09-21 RX ORDER — SIMVASTATIN 20 MG
20 TABLET ORAL DAILY
Qty: 30 TABLET | Refills: 5 | Status: SHIPPED | OUTPATIENT
Start: 2018-09-21 | End: 2019-03-20 | Stop reason: SDUPTHER

## 2018-09-21 NOTE — TELEPHONE ENCOUNTER
Requesting  Simvastatin  20mg tablet,  Qty 30,  Refills 5      Take 1 tablet by mouth daily   Send to [address]

## 2018-10-22 DIAGNOSIS — E03.9 HYPOTHYROIDISM, UNSPECIFIED TYPE: ICD-10-CM

## 2018-10-22 RX ORDER — LEVOTHYROXINE SODIUM 137 UG/1
TABLET ORAL
Qty: 30 TABLET | Refills: 5 | Status: SHIPPED | OUTPATIENT
Start: 2018-10-22 | End: 2019-03-20 | Stop reason: SDUPTHER

## 2018-11-20 DIAGNOSIS — F41.8 DEPRESSION WITH ANXIETY: ICD-10-CM

## 2018-11-20 RX ORDER — VENLAFAXINE 75 MG/1
TABLET ORAL
Qty: 60 TABLET | Refills: 5 | Status: SHIPPED | OUTPATIENT
Start: 2018-11-20 | End: 2019-03-20 | Stop reason: SDUPTHER

## 2019-03-19 LAB
ALBUMIN SERPL-MCNC: 4.2 G/DL (ref 3.6–5.1)
ALBUMIN/CREAT UR: 9 MCG/MG CREAT
ALBUMIN/GLOB SERPL: 1.4 (CALC) (ref 1–2.5)
ALP SERPL-CCNC: 61 U/L (ref 33–130)
ALT SERPL-CCNC: 16 U/L (ref 6–29)
AST SERPL-CCNC: 17 U/L (ref 10–35)
BASOPHILS # BLD AUTO: 28 CELLS/UL (ref 0–200)
BASOPHILS NFR BLD AUTO: 0.5 %
BILIRUB SERPL-MCNC: 0.4 MG/DL (ref 0.2–1.2)
BUN SERPL-MCNC: 20 MG/DL (ref 7–25)
BUN/CREAT SERPL: ABNORMAL (CALC) (ref 6–22)
CALCIUM SERPL-MCNC: 9.2 MG/DL (ref 8.6–10.4)
CHLORIDE SERPL-SCNC: 103 MMOL/L (ref 98–110)
CHOLEST SERPL-MCNC: 177 MG/DL
CHOLEST/HDLC SERPL: 2.7 (CALC)
CO2 SERPL-SCNC: 31 MMOL/L (ref 20–32)
CREAT SERPL-MCNC: 0.78 MG/DL (ref 0.6–0.93)
CREAT UR-MCNC: 126 MG/DL (ref 20–275)
EOSINOPHIL # BLD AUTO: 269 CELLS/UL (ref 15–500)
EOSINOPHIL NFR BLD AUTO: 4.8 %
ERYTHROCYTE [DISTWIDTH] IN BLOOD BY AUTOMATED COUNT: 12.4 % (ref 11–15)
GLOBULIN SER CALC-MCNC: 2.9 G/DL (CALC) (ref 1.9–3.7)
GLUCOSE SERPL-MCNC: 123 MG/DL (ref 65–99)
HBA1C MFR BLD: 5.9 % OF TOTAL HGB
HCT VFR BLD AUTO: 39.2 % (ref 35–45)
HDLC SERPL-MCNC: 66 MG/DL
HGB BLD-MCNC: 13.1 G/DL (ref 11.7–15.5)
LDLC SERPL CALC-MCNC: 94 MG/DL (CALC)
LYMPHOCYTES # BLD AUTO: 1837 CELLS/UL (ref 850–3900)
LYMPHOCYTES NFR BLD AUTO: 32.8 %
MCH RBC QN AUTO: 31.8 PG (ref 27–33)
MCHC RBC AUTO-ENTMCNC: 33.4 G/DL (ref 32–36)
MCV RBC AUTO: 95.1 FL (ref 80–100)
MICROALBUMIN UR-MCNC: 1.1 MG/DL
MONOCYTES # BLD AUTO: 381 CELLS/UL (ref 200–950)
MONOCYTES NFR BLD AUTO: 6.8 %
NEUTROPHILS # BLD AUTO: 3086 CELLS/UL (ref 1500–7800)
NEUTROPHILS NFR BLD AUTO: 55.1 %
NONHDLC SERPL-MCNC: 111 MG/DL (CALC)
PLATELET # BLD AUTO: 283 THOUSAND/UL (ref 140–400)
PMV BLD REES-ECKER: 10.5 FL (ref 7.5–12.5)
POTASSIUM SERPL-SCNC: 4 MMOL/L (ref 3.5–5.3)
PROT SERPL-MCNC: 7.1 G/DL (ref 6.1–8.1)
RBC # BLD AUTO: 4.12 MILLION/UL (ref 3.8–5.1)
SL AMB EGFR AFRICAN AMERICAN: 84 ML/MIN/1.73M2
SL AMB EGFR NON AFRICAN AMERICAN: 73 ML/MIN/1.73M2
SODIUM SERPL-SCNC: 140 MMOL/L (ref 135–146)
TRIGL SERPL-MCNC: 82 MG/DL
TSH SERPL-ACNC: 0.62 MIU/L (ref 0.4–4.5)
WBC # BLD AUTO: 5.6 THOUSAND/UL (ref 3.8–10.8)

## 2019-03-20 ENCOUNTER — OFFICE VISIT (OUTPATIENT)
Dept: FAMILY MEDICINE CLINIC | Facility: CLINIC | Age: 79
End: 2019-03-20
Payer: COMMERCIAL

## 2019-03-20 VITALS
HEART RATE: 60 BPM | BODY MASS INDEX: 34.67 KG/M2 | WEIGHT: 176.6 LBS | SYSTOLIC BLOOD PRESSURE: 142 MMHG | HEIGHT: 60 IN | RESPIRATION RATE: 16 BRPM | TEMPERATURE: 98.5 F | DIASTOLIC BLOOD PRESSURE: 68 MMHG

## 2019-03-20 DIAGNOSIS — F41.8 DEPRESSION WITH ANXIETY: ICD-10-CM

## 2019-03-20 DIAGNOSIS — I10 ESSENTIAL HYPERTENSION: ICD-10-CM

## 2019-03-20 DIAGNOSIS — E78.2 MIXED HYPERLIPIDEMIA: ICD-10-CM

## 2019-03-20 DIAGNOSIS — H26.9 CATARACT OF LEFT EYE, UNSPECIFIED CATARACT TYPE: ICD-10-CM

## 2019-03-20 DIAGNOSIS — Z01.818 PREOPERATIVE CLEARANCE: Primary | ICD-10-CM

## 2019-03-20 DIAGNOSIS — E03.9 HYPOTHYROIDISM, UNSPECIFIED TYPE: ICD-10-CM

## 2019-03-20 PROCEDURE — 3725F SCREEN DEPRESSION PERFORMED: CPT | Performed by: NURSE PRACTITIONER

## 2019-03-20 PROCEDURE — 1160F RVW MEDS BY RX/DR IN RCRD: CPT | Performed by: NURSE PRACTITIONER

## 2019-03-20 PROCEDURE — 1036F TOBACCO NON-USER: CPT | Performed by: NURSE PRACTITIONER

## 2019-03-20 PROCEDURE — 99215 OFFICE O/P EST HI 40 MIN: CPT | Performed by: NURSE PRACTITIONER

## 2019-03-20 PROCEDURE — 1101F PT FALLS ASSESS-DOCD LE1/YR: CPT | Performed by: NURSE PRACTITIONER

## 2019-03-20 RX ORDER — CHLORAL HYDRATE 500 MG
1000 CAPSULE ORAL DAILY
COMMUNITY
End: 2019-08-21 | Stop reason: ALTCHOICE

## 2019-03-20 RX ORDER — MULTIVIT WITH MINERALS/LUTEIN
1000 TABLET ORAL DAILY
COMMUNITY
End: 2019-08-21 | Stop reason: ALTCHOICE

## 2019-03-20 RX ORDER — LEVOTHYROXINE SODIUM 137 UG/1
137 TABLET ORAL DAILY
Qty: 30 TABLET | Refills: 5 | Status: SHIPPED | OUTPATIENT
Start: 2019-03-20 | End: 2019-10-15 | Stop reason: SDUPTHER

## 2019-03-20 RX ORDER — SIMVASTATIN 20 MG
20 TABLET ORAL DAILY
Qty: 30 TABLET | Refills: 5 | Status: SHIPPED | OUTPATIENT
Start: 2019-03-20 | End: 2019-07-11 | Stop reason: HOSPADM

## 2019-03-20 RX ORDER — LOSARTAN POTASSIUM AND HYDROCHLOROTHIAZIDE 12.5; 1 MG/1; MG/1
1 TABLET ORAL DAILY
Qty: 30 TABLET | Refills: 5 | Status: SHIPPED | OUTPATIENT
Start: 2019-03-20 | End: 2019-07-11 | Stop reason: HOSPADM

## 2019-03-20 RX ORDER — VENLAFAXINE 75 MG/1
75 TABLET ORAL EVERY 12 HOURS
Qty: 60 TABLET | Refills: 5 | Status: SHIPPED | OUTPATIENT
Start: 2019-03-20 | End: 2019-10-21 | Stop reason: SDUPTHER

## 2019-03-20 NOTE — PROGRESS NOTES
Chief Complaint   Patient presents with    Pre-op Exam     Left eye surgery done by Sharad Rosales [facility] Maintenance   Topic Date Due    Depression Screening PHQ  1940    Medicare Annual Wellness Visit (AWV)  1940    CRC Screening: Colonoscopy  1940    BMI: Followup Plan  08/20/1958    HEPATITIS B VACCINES (1 of 3 - Risk 3-dose series) 08/20/1959    DTaP,Tdap,and Td Vaccines (1 - Tdap) 01/02/2000    Fall Risk  08/20/2005    Urinary Incontinence Screening  08/20/2005    Pneumococcal PPSV23/PCV13 65+ Years / High and Highest Risk (1 of 2 - PCV13) 08/20/2005    DM Eye Exam  08/12/2016    Diabetic Foot Exam  02/21/2018    INFLUENZA VACCINE  07/01/2018    BMI: Adult  09/18/2019    HEMOGLOBIN A1C  09/18/2019    URINE MICROALBUMIN  03/18/2020       Subjective: Jaskaran Welch is a 66 y o  female who presents to the office today for a preoperative consultation at the request of surgeon Dr Khurram Mina who plans on performing a Left Eye Cataract on April 12       DM type 2- Recent A1C at 5 9%, currently diet controlled  BUN 20, Cr 0 78    Hyperlipidemia- taking Simvastatin daily  Recent lipid panel with / TG 82/HDL 66/ LDL 94    HTN- taking Losartan-HCTZ 100- 25 mg daily   She does not check her BP at home  Denies chest pain, palpitations, edema, SOB, dizziness, facial flushing, change in vision, headaches, tinnitus    Recent TSH at 0 62, taking Levothyroxine 137 mcg daily     Feels well today, offers no acute complaints    The following portions of the patient's history were reviewed and updated as appropriate: allergies, current medications, past family history, past medical history, past social history, past surgical history and problem list     Review of Systems  Constitutional: negative  Eyes: negative  Ears, nose, mouth, throat, and face: negative  Respiratory: negative  Cardiovascular: negative  Gastrointestinal: negative  Genitourinary:negative  Integument/breast: negative  Hematologic/lymphatic: negative  Musculoskeletal:negative  Neurological: negative  Behavioral/Psych: negative  Endocrine: negative  Allergic/Immunologic: negative     Objective:     /68   Pulse 60   Temp 98 5 °F (36 9 °C) (Tympanic)   Resp 16   Ht 5' (1 524 m)   Wt 80 1 kg (176 lb 9 6 oz)   BMI 34 49 kg/m²   General appearance: alert and oriented, in no acute distress  Head: Normocephalic, without obvious abnormality, atraumatic  Eyes: conjunctivae/corneas clear  PERRL, EOM's intact  Fundi benign  Ears: normal TM's and external ear canals both ears  Nose: Nares normal  Septum midline  Mucosa normal  No drainage or sinus tenderness  Throat: lips, mucosa, and tongue normal; teeth and gums normal  Neck: no adenopathy, no carotid bruit, no JVD, supple, symmetrical, trachea midline and thyroid not enlarged, symmetric, no tenderness/mass/nodules  Back: symmetric, no curvature  ROM normal  No CVA tenderness  Lungs: clear to auscultation bilaterally  Heart: regular rate and rhythm, S1, S2 normal, no murmur, click, rub or gallop  Abdomen: soft, non-tender; bowel sounds normal; no masses,  no organomegaly  Extremities: extremities normal, warm and well-perfused; no cyanosis, clubbing, or edema  Pulses: 2+ and symmetric  Skin: Skin color, texture, turgor normal  No rashes or lesions  Lymph nodes: Cervical, supraclavicular, and axillary nodes normal   Neurologic: Grossly normal    Patient's shoes and socks removed  Right Foot/Ankle   Right Foot Inspection  Skin Exam: skin normal and skin intact no dry skin, no warmth, no callus, no erythema, no maceration, no abnormal color, no pre-ulcer, no ulcer and no callus                          Toe Exam: ROM and strength within normal limits  Sensory       Monofilament testing: intact  Vascular  Capillary refills: < 3 seconds  The right DP pulse is 2+       Left Foot/Ankle  Left Foot Inspection                           Toe Exam: ROM and strength within normal limits                   Sensory       Monofilament: intact  Vascular  Capillary refills: < 3 seconds  The left DP pulse is 2+  Assign Risk Category:  No deformity present; No loss of protective sensation; No weak pulses       Risk: 0      Predictors of intubation difficulty:   Morbid obesity? no   Anatomically abnormal facies? No    Lab Review   Orders Only on 03/18/2019   Component Date Value    Total Cholesterol 03/18/2019 177     HDL 03/18/2019 66     Triglycerides 03/18/2019 82     LDL Direct 03/18/2019 94     Chol HDLC Ratio 03/18/2019 2 7     Non-HDL Cholesterol 03/18/2019 111     Glucose, Random 03/18/2019 123*    BUN 03/18/2019 20     Creatinine 03/18/2019 0 78     eGFR Non  03/18/2019 73     eGFR  03/18/2019 84     SL AMB BUN/CREATININE RA* 11/90/0145 NOT APPLICABLE     Sodium 28/46/2683 140     Potassium 03/18/2019 4 0     Chloride 03/18/2019 103     CO2 03/18/2019 31     SL AMB CALCIUM 03/18/2019 9 2     Protein, Total 03/18/2019 7 1     Albumin 03/18/2019 4 2     Globulin 03/18/2019 2 9     Albumin/Globulin Ratio 03/18/2019 1 4     TOTAL BILIRUBIN 03/18/2019 0 4     Alkaline Phosphatase 03/18/2019 61     AST 03/18/2019 17     ALT 03/18/2019 16     Creatinine, Urine 03/18/2019 126     Microalbum  ,U,Random 03/18/2019 1 1     Microalb/Creat Ratio 03/18/2019 9     White Blood Cell Count 03/18/2019 5 6     Red Blood Cell Count 03/18/2019 4 12     Hemoglobin 03/18/2019 13 1     HCT 03/18/2019 39 2     MCV 03/18/2019 95 1     MCH 03/18/2019 31 8     MCHC 03/18/2019 33 4     RDW 03/18/2019 12 4     Platelet Count 65/17/3893 283     SL AMB MPV 03/18/2019 10 5     Neutrophils (Absolute) 03/18/2019 3,086     Lymphocytes (Absolute) 03/18/2019 1,837     Monocytes (Absolute) 03/18/2019 381     Eosinophils (Absolute) 03/18/2019 269     Basophils ABS 03/18/2019 28     Neutrophils 03/18/2019 55 1     Lymphocytes 03/18/2019 32 8     Monocytes 03/18/2019 6 8     Eosinophils 03/18/2019 4 8     Basophils PCT 03/18/2019 0 5     TSH W/RFX TO FREE T4 03/18/2019 0 62     Hemoglobin A1C 03/18/2019 5 9*        Assessment:     66 y o  female with is cleared for a LEFT EYE CATARACT with Dr Susan Brandon on 4/12/19   She is at a low risk for this procedure     Plan:     1  No PATs required per preop forms today  I did complete these forms and we will fax them back to Dr Susan Brandon    2  Recent lab results reviewed today and are acceptable for upcoming surgery     3  BP is okay today, better on retake at 142/68  Continue Losartan-HCTZ  I did ask her to either start checking her BP at home on a daily basis or go to a local pharmacy to have it checked via an automatic BP cuff  Follow a low sodium diet    4  DM type 2- A1C at 5 9%, diet controlled    5  Hyperlipidemia- lipids well controlled, continue Simvastatin  Diet modifications reviewed today    6  Hypothyroidism- TSH WNL, continue Levothyroxine 137 mcg daily     Pt continues to refuse all vaccinations, CRC screening, Mammogram      BMI Counseling: Body mass index is 34 49 kg/m²  Discussed the patient's BMI with her  The BMI is above average  BMI counseling and education was provided to the patient  Nutrition recommendations include reducing portion sizes, decreasing overall calorie intake, 3-5 servings of fruits/vegetables daily, reducing fast food intake, consuming healthier snacks, decreasing soda and/or juice intake, moderation in carbohydrate intake, increasing intake of lean protein and reducing intake of saturated fat and trans fat  Exercise recommendations include exercising 3-5 times per week

## 2019-06-18 ENCOUNTER — HOSPITAL ENCOUNTER (INPATIENT)
Facility: HOSPITAL | Age: 79
LOS: 23 days | Discharge: NURSING, REHAB OR LONG-TERM CARE | DRG: 291 | End: 2019-07-11
Attending: EMERGENCY MEDICINE | Admitting: INTERNAL MEDICINE
Payer: COMMERCIAL

## 2019-06-18 ENCOUNTER — APPOINTMENT (EMERGENCY)
Dept: RADIOLOGY | Facility: HOSPITAL | Age: 79
DRG: 291 | End: 2019-06-18
Payer: COMMERCIAL

## 2019-06-18 ENCOUNTER — OFFICE VISIT (OUTPATIENT)
Dept: FAMILY MEDICINE CLINIC | Facility: CLINIC | Age: 79
End: 2019-06-18
Payer: COMMERCIAL

## 2019-06-18 VITALS
WEIGHT: 178.6 LBS | OXYGEN SATURATION: 97 % | RESPIRATION RATE: 20 BRPM | SYSTOLIC BLOOD PRESSURE: 144 MMHG | DIASTOLIC BLOOD PRESSURE: 90 MMHG | HEIGHT: 60 IN | TEMPERATURE: 97.8 F | BODY MASS INDEX: 35.06 KG/M2 | HEART RATE: 136 BPM

## 2019-06-18 DIAGNOSIS — R57.0 CARDIOGENIC SHOCK (HCC): ICD-10-CM

## 2019-06-18 DIAGNOSIS — I50.9 CHF (CONGESTIVE HEART FAILURE) (HCC): ICD-10-CM

## 2019-06-18 DIAGNOSIS — I50.9 ACUTE CONGESTIVE HEART FAILURE, UNSPECIFIED HEART FAILURE TYPE (HCC): ICD-10-CM

## 2019-06-18 DIAGNOSIS — I48.91 NEW ONSET ATRIAL FIBRILLATION (HCC): ICD-10-CM

## 2019-06-18 DIAGNOSIS — R60.0 BILATERAL LOWER EXTREMITY EDEMA: ICD-10-CM

## 2019-06-18 DIAGNOSIS — I48.91 ATRIAL FIBRILLATION WITH RVR (HCC): ICD-10-CM

## 2019-06-18 DIAGNOSIS — E11.36 TYPE 2 DIABETES MELLITUS WITH DIABETIC CATARACT, WITHOUT LONG-TERM CURRENT USE OF INSULIN (HCC): ICD-10-CM

## 2019-06-18 DIAGNOSIS — R06.02 SHORTNESS OF BREATH: ICD-10-CM

## 2019-06-18 DIAGNOSIS — R60.0 BILATERAL LEG EDEMA: ICD-10-CM

## 2019-06-18 DIAGNOSIS — R06.02 SOB (SHORTNESS OF BREATH): ICD-10-CM

## 2019-06-18 DIAGNOSIS — R06.01 ORTHOPNEA: ICD-10-CM

## 2019-06-18 DIAGNOSIS — I49.9 IRREGULAR HEART RHYTHM: Primary | ICD-10-CM

## 2019-06-18 DIAGNOSIS — R11.10 DRY HEAVES: ICD-10-CM

## 2019-06-18 DIAGNOSIS — N17.9 ACUTE KIDNEY INJURY (HCC): ICD-10-CM

## 2019-06-18 DIAGNOSIS — I48.91 ATRIAL FIBRILLATION WITH RAPID VENTRICULAR RESPONSE (HCC): Primary | ICD-10-CM

## 2019-06-18 DIAGNOSIS — K59.00 CONSTIPATION: ICD-10-CM

## 2019-06-18 DIAGNOSIS — I50.43 ACUTE ON CHRONIC COMBINED SYSTOLIC AND DIASTOLIC CHF (CONGESTIVE HEART FAILURE) (HCC): ICD-10-CM

## 2019-06-18 DIAGNOSIS — G47.00 INSOMNIA: ICD-10-CM

## 2019-06-18 DIAGNOSIS — E87.2 LACTIC ACIDOSIS: ICD-10-CM

## 2019-06-18 DIAGNOSIS — E87.2 HIGH ANION GAP METABOLIC ACIDOSIS: ICD-10-CM

## 2019-06-18 DIAGNOSIS — I10 ESSENTIAL HYPERTENSION: ICD-10-CM

## 2019-06-18 DIAGNOSIS — E87.1 HYPONATREMIA: ICD-10-CM

## 2019-06-18 DIAGNOSIS — L30.4 INTERTRIGO: ICD-10-CM

## 2019-06-18 DIAGNOSIS — E87.5 HYPERKALEMIA: ICD-10-CM

## 2019-06-18 DIAGNOSIS — K72.00 SHOCK LIVER: ICD-10-CM

## 2019-06-18 LAB
ALBUMIN SERPL BCP-MCNC: 3.9 G/DL (ref 3.5–5)
ALP SERPL-CCNC: 72 U/L (ref 46–116)
ALT SERPL W P-5'-P-CCNC: 37 U/L (ref 12–78)
ANION GAP SERPL CALCULATED.3IONS-SCNC: 8 MMOL/L (ref 4–13)
APTT PPP: 26 SECONDS (ref 26–38)
AST SERPL W P-5'-P-CCNC: 30 U/L (ref 5–45)
ATRIAL RATE: 375 BPM
BASOPHILS # BLD AUTO: 0.05 THOUSANDS/ΜL (ref 0–0.1)
BASOPHILS NFR BLD AUTO: 1 % (ref 0–1)
BILIRUB SERPL-MCNC: 0.59 MG/DL (ref 0.2–1)
BUN SERPL-MCNC: 6 MG/DL (ref 5–25)
CALCIUM SERPL-MCNC: 9.2 MG/DL (ref 8.3–10.1)
CHLORIDE SERPL-SCNC: 100 MMOL/L (ref 100–108)
CO2 SERPL-SCNC: 28 MMOL/L (ref 21–32)
CREAT SERPL-MCNC: 0.81 MG/DL (ref 0.6–1.3)
DEPRECATED D DIMER PPP: 1206 NG/ML (FEU)
EOSINOPHIL # BLD AUTO: 0.09 THOUSAND/ΜL (ref 0–0.61)
EOSINOPHIL NFR BLD AUTO: 1 % (ref 0–6)
ERYTHROCYTE [DISTWIDTH] IN BLOOD BY AUTOMATED COUNT: 13.1 % (ref 11.6–15.1)
GFR SERPL CREATININE-BSD FRML MDRD: 70 ML/MIN/1.73SQ M
GLUCOSE SERPL-MCNC: 137 MG/DL (ref 65–140)
GLUCOSE SERPL-MCNC: 143 MG/DL (ref 65–140)
HCT VFR BLD AUTO: 42 % (ref 34.8–46.1)
HGB BLD-MCNC: 13.7 G/DL (ref 11.5–15.4)
IMM GRANULOCYTES # BLD AUTO: 0.01 THOUSAND/UL (ref 0–0.2)
IMM GRANULOCYTES NFR BLD AUTO: 0 % (ref 0–2)
INR PPP: 1.14 (ref 0.86–1.17)
LYMPHOCYTES # BLD AUTO: 2.6 THOUSANDS/ΜL (ref 0.6–4.47)
LYMPHOCYTES NFR BLD AUTO: 33 % (ref 14–44)
MAGNESIUM SERPL-MCNC: 2.1 MG/DL (ref 1.6–2.6)
MCH RBC QN AUTO: 32.3 PG (ref 26.8–34.3)
MCHC RBC AUTO-ENTMCNC: 32.6 G/DL (ref 31.4–37.4)
MCV RBC AUTO: 99 FL (ref 82–98)
MONOCYTES # BLD AUTO: 0.49 THOUSAND/ΜL (ref 0.17–1.22)
MONOCYTES NFR BLD AUTO: 6 % (ref 4–12)
NEUTROPHILS # BLD AUTO: 4.62 THOUSANDS/ΜL (ref 1.85–7.62)
NEUTS SEG NFR BLD AUTO: 59 % (ref 43–75)
NRBC BLD AUTO-RTO: 0 /100 WBCS
NT-PROBNP SERPL-MCNC: 1823 PG/ML
PLATELET # BLD AUTO: 245 THOUSANDS/UL (ref 149–390)
PMV BLD AUTO: 10.4 FL (ref 8.9–12.7)
POTASSIUM SERPL-SCNC: 3.5 MMOL/L (ref 3.5–5.3)
PROT SERPL-MCNC: 7.7 G/DL (ref 6.4–8.2)
PROTHROMBIN TIME: 14.7 SECONDS (ref 11.8–14.2)
QRS AXIS: 111 DEGREES
QRSD INTERVAL: 68 MS
QT INTERVAL: 252 MS
QTC INTERVAL: 417 MS
RBC # BLD AUTO: 4.24 MILLION/UL (ref 3.81–5.12)
SODIUM SERPL-SCNC: 136 MMOL/L (ref 136–145)
T WAVE AXIS: 197 DEGREES
T4 FREE SERPL-MCNC: 1.23 NG/DL (ref 0.76–1.46)
TROPONIN I SERPL-MCNC: 0.03 NG/ML
TSH SERPL DL<=0.05 MIU/L-ACNC: 9.17 UIU/ML (ref 0.36–3.74)
VENTRICULAR RATE: 165 BPM
WBC # BLD AUTO: 7.86 THOUSAND/UL (ref 4.31–10.16)

## 2019-06-18 PROCEDURE — 99285 EMERGENCY DEPT VISIT HI MDM: CPT

## 2019-06-18 PROCEDURE — 85379 FIBRIN DEGRADATION QUANT: CPT | Performed by: EMERGENCY MEDICINE

## 2019-06-18 PROCEDURE — 85025 COMPLETE CBC W/AUTO DIFF WBC: CPT | Performed by: EMERGENCY MEDICINE

## 2019-06-18 PROCEDURE — 71045 X-RAY EXAM CHEST 1 VIEW: CPT

## 2019-06-18 PROCEDURE — 83880 ASSAY OF NATRIURETIC PEPTIDE: CPT | Performed by: EMERGENCY MEDICINE

## 2019-06-18 PROCEDURE — 84443 ASSAY THYROID STIM HORMONE: CPT | Performed by: EMERGENCY MEDICINE

## 2019-06-18 PROCEDURE — 93000 ELECTROCARDIOGRAM COMPLETE: CPT | Performed by: NURSE PRACTITIONER

## 2019-06-18 PROCEDURE — 71275 CT ANGIOGRAPHY CHEST: CPT

## 2019-06-18 PROCEDURE — 96376 TX/PRO/DX INJ SAME DRUG ADON: CPT

## 2019-06-18 PROCEDURE — 93010 ELECTROCARDIOGRAM REPORT: CPT | Performed by: INTERNAL MEDICINE

## 2019-06-18 PROCEDURE — 85730 THROMBOPLASTIN TIME PARTIAL: CPT | Performed by: EMERGENCY MEDICINE

## 2019-06-18 PROCEDURE — 96365 THER/PROPH/DIAG IV INF INIT: CPT

## 2019-06-18 PROCEDURE — 84439 ASSAY OF FREE THYROXINE: CPT | Performed by: EMERGENCY MEDICINE

## 2019-06-18 PROCEDURE — 84484 ASSAY OF TROPONIN QUANT: CPT | Performed by: EMERGENCY MEDICINE

## 2019-06-18 PROCEDURE — 82948 REAGENT STRIP/BLOOD GLUCOSE: CPT

## 2019-06-18 PROCEDURE — 80053 COMPREHEN METABOLIC PANEL: CPT | Performed by: EMERGENCY MEDICINE

## 2019-06-18 PROCEDURE — 36415 COLL VENOUS BLD VENIPUNCTURE: CPT | Performed by: EMERGENCY MEDICINE

## 2019-06-18 PROCEDURE — 83735 ASSAY OF MAGNESIUM: CPT | Performed by: EMERGENCY MEDICINE

## 2019-06-18 PROCEDURE — 1160F RVW MEDS BY RX/DR IN RCRD: CPT | Performed by: NURSE PRACTITIONER

## 2019-06-18 PROCEDURE — 96374 THER/PROPH/DIAG INJ IV PUSH: CPT

## 2019-06-18 PROCEDURE — 85610 PROTHROMBIN TIME: CPT | Performed by: EMERGENCY MEDICINE

## 2019-06-18 PROCEDURE — 99215 OFFICE O/P EST HI 40 MIN: CPT | Performed by: NURSE PRACTITIONER

## 2019-06-18 PROCEDURE — 99223 1ST HOSP IP/OBS HIGH 75: CPT | Performed by: INTERNAL MEDICINE

## 2019-06-18 PROCEDURE — 93005 ELECTROCARDIOGRAM TRACING: CPT

## 2019-06-18 PROCEDURE — 96366 THER/PROPH/DIAG IV INF ADDON: CPT

## 2019-06-18 PROCEDURE — 99285 EMERGENCY DEPT VISIT HI MDM: CPT | Performed by: EMERGENCY MEDICINE

## 2019-06-18 PROCEDURE — 96361 HYDRATE IV INFUSION ADD-ON: CPT

## 2019-06-18 RX ORDER — LOSARTAN POTASSIUM 50 MG/1
100 TABLET ORAL DAILY
Status: DISCONTINUED | OUTPATIENT
Start: 2019-06-19 | End: 2019-06-26

## 2019-06-18 RX ORDER — ONDANSETRON 2 MG/ML
4 INJECTION INTRAMUSCULAR; INTRAVENOUS EVERY 6 HOURS PRN
Status: DISCONTINUED | OUTPATIENT
Start: 2019-06-18 | End: 2019-07-11 | Stop reason: HOSPADM

## 2019-06-18 RX ORDER — FUROSEMIDE 10 MG/ML
40 INJECTION INTRAMUSCULAR; INTRAVENOUS ONCE
Status: COMPLETED | OUTPATIENT
Start: 2019-06-18 | End: 2019-06-18

## 2019-06-18 RX ORDER — LORATADINE 10 MG/1
10 TABLET ORAL DAILY
Status: DISCONTINUED | OUTPATIENT
Start: 2019-06-19 | End: 2019-07-11 | Stop reason: HOSPADM

## 2019-06-18 RX ORDER — DILTIAZEM HYDROCHLORIDE 5 MG/ML
15 INJECTION INTRAVENOUS ONCE
Status: COMPLETED | OUTPATIENT
Start: 2019-06-18 | End: 2019-06-18

## 2019-06-18 RX ORDER — ALBUTEROL SULFATE 90 UG/1
2 AEROSOL, METERED RESPIRATORY (INHALATION) EVERY 4 HOURS PRN
Status: DISCONTINUED | OUTPATIENT
Start: 2019-06-18 | End: 2019-07-11 | Stop reason: HOSPADM

## 2019-06-18 RX ORDER — DILTIAZEM HYDROCHLORIDE 5 MG/ML
10 INJECTION INTRAVENOUS ONCE
Status: COMPLETED | OUTPATIENT
Start: 2019-06-18 | End: 2019-06-18

## 2019-06-18 RX ORDER — VENLAFAXINE 37.5 MG/1
75 TABLET ORAL EVERY 12 HOURS
Status: DISCONTINUED | OUTPATIENT
Start: 2019-06-18 | End: 2019-06-28

## 2019-06-18 RX ORDER — CHLORAL HYDRATE 500 MG
1000 CAPSULE ORAL DAILY
Status: DISCONTINUED | OUTPATIENT
Start: 2019-06-19 | End: 2019-06-24

## 2019-06-18 RX ORDER — PRAVASTATIN SODIUM 40 MG
40 TABLET ORAL
Status: DISCONTINUED | OUTPATIENT
Start: 2019-06-19 | End: 2019-06-28

## 2019-06-18 RX ORDER — FUROSEMIDE 10 MG/ML
40 INJECTION INTRAMUSCULAR; INTRAVENOUS 2 TIMES DAILY
Status: COMPLETED | OUTPATIENT
Start: 2019-06-19 | End: 2019-06-19

## 2019-06-18 RX ORDER — MULTIVIT WITH MINERALS/LUTEIN
125 TABLET ORAL DAILY
Status: DISCONTINUED | OUTPATIENT
Start: 2019-06-19 | End: 2019-06-26

## 2019-06-18 RX ORDER — UBIDECARENONE 75 MG
100 CAPSULE ORAL DAILY
Status: DISCONTINUED | OUTPATIENT
Start: 2019-06-19 | End: 2019-06-26

## 2019-06-18 RX ORDER — POTASSIUM CHLORIDE 20 MEQ/1
40 TABLET, EXTENDED RELEASE ORAL ONCE
Status: COMPLETED | OUTPATIENT
Start: 2019-06-18 | End: 2019-06-18

## 2019-06-18 RX ORDER — MULTIVITAMIN WITH IRON
100 TABLET ORAL DAILY
Status: DISCONTINUED | OUTPATIENT
Start: 2019-06-19 | End: 2019-06-26

## 2019-06-18 RX ORDER — MELATONIN
1000 DAILY
Status: DISCONTINUED | OUTPATIENT
Start: 2019-06-19 | End: 2019-06-26

## 2019-06-18 RX ORDER — ACETAMINOPHEN 325 MG/1
650 TABLET ORAL EVERY 4 HOURS PRN
Status: DISCONTINUED | OUTPATIENT
Start: 2019-06-18 | End: 2019-07-11 | Stop reason: HOSPADM

## 2019-06-18 RX ADMIN — DILTIAZEM HYDROCHLORIDE 2.5 MG/HR: 5 INJECTION INTRAVENOUS at 18:13

## 2019-06-18 RX ADMIN — FUROSEMIDE 40 MG: 10 INJECTION, SOLUTION INTRAMUSCULAR; INTRAVENOUS at 20:52

## 2019-06-18 RX ADMIN — ENOXAPARIN SODIUM 90 MG: 100 INJECTION SUBCUTANEOUS at 22:32

## 2019-06-18 RX ADMIN — DILTIAZEM HYDROCHLORIDE 15 MG: 5 INJECTION INTRAVENOUS at 19:41

## 2019-06-18 RX ADMIN — DILTIAZEM HYDROCHLORIDE 10 MG: 5 INJECTION INTRAVENOUS at 20:20

## 2019-06-18 RX ADMIN — SODIUM CHLORIDE 500 ML: 0.9 INJECTION, SOLUTION INTRAVENOUS at 17:16

## 2019-06-18 RX ADMIN — VENLAFAXINE 75 MG: 37.5 TABLET ORAL at 22:31

## 2019-06-18 RX ADMIN — DILTIAZEM HYDROCHLORIDE 10 MG: 5 INJECTION INTRAVENOUS at 17:11

## 2019-06-18 RX ADMIN — IOHEXOL 78 ML: 350 INJECTION, SOLUTION INTRAVENOUS at 19:04

## 2019-06-18 RX ADMIN — POTASSIUM CHLORIDE 40 MEQ: 1500 TABLET, EXTENDED RELEASE ORAL at 22:30

## 2019-06-18 NOTE — ED NOTES
Spoke to pharmacy who states that they will work on the medication now        Subha Segura RN  06/18/19 0605

## 2019-06-18 NOTE — ED PROVIDER NOTES
History  Chief Complaint   Patient presents with    Weakness - Generalized     Patient comes to ER with increase tiredness, SOB, leg swelling and weakness  Went to PCP which sent her to the ER  Patient is a 66year old female with a past medical history significant for hypertension, hyperlipidemia, hypothyroidism who presents with dyspnea, orthopnea, bilateral lower extremity edema, who presented to the primary care provider today for the aforementioned reasons and was found to be in new onset atrial fibrillation with RVR and was then sent to the emergency department  On Saturday, went to a party, ate a lot of "unhealthy" food, then noticed increased bilateral lower extremity edema, and since Saturday has been requiring more pillows to sleep, feeling more short of breath and tired  Denies palpitations, chest pain, nausea, vomiting, diarrhea, abdominal pain, blood in stool  No recent travel or immobility or surgery  No history of atrial fibrillation/CHF  Prior to Admission Medications   Prescriptions Last Dose Informant Patient Reported? Taking?    Ascorbic Acid (VITAMIN C) 100 MG tablet  Self Yes No   Sig: Take 100 mg by mouth daily   Garlic 10 MG CAPS  Self Yes No   Sig: Take by mouth   Misc Natural Products (TURMERIC CURCUMIN) CAPS  Self Yes No   Sig: Take by mouth   Omega-3 Fatty Acids (FISH OIL) 1,000 mg  Self Yes No   Sig: Take 1,000 mg by mouth daily   SUPER B COMPLEX/C CAPS  Self Yes No   Sig: Take by mouth   albuterol (VENTOLIN HFA) 90 mcg/act inhaler  Self Yes No   Sig: Inhale 1-2 puffs   cholecalciferol (VITAMIN D3) 1,000 units tablet  Self Yes No   Sig: Take 1,000 Units by mouth daily   cyanocobalamin 100 MCG tablet  Self Yes No   Sig: Take 100 mcg by mouth daily   fexofenadine (ALLEGRA) 30 MG tablet  Self Yes No   Sig: Take 30 mg by mouth 2 (two) times a day   levothyroxine 137 mcg tablet  Self No No   Sig: Take 1 tablet (137 mcg total) by mouth daily   losartan-hydrochlorothiazide (HYZAAR) 100-12 5 MG per tablet  Self No No   Sig: Take 1 tablet by mouth daily   magnesium 30 MG tablet  Self Yes No   Sig: Take 30 mg by mouth 2 (two) times a day   multivitamin (THERAGRAN) TABS  Self Yes No   Sig: Take 1 tablet by mouth daily   pyridoxine (B-6) 100 MG tablet  Self Yes No   Sig: Take 100 mg by mouth daily   simvastatin (ZOCOR) 20 mg tablet  Self No No   Sig: Take 1 tablet (20 mg total) by mouth daily   venlafaxine (EFFEXOR) 75 mg tablet  Self No No   Sig: Take 1 tablet (75 mg total) by mouth every 12 (twelve) hours   vitamin E, tocopherol, 1,000 units capsule  Self Yes No   Sig: Take 1,000 Units by mouth daily      Facility-Administered Medications: None       Past Medical History:   Diagnosis Date    Eczema     Photosensitivity     abnormal skin sensitivity to sunlight    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HCC)     staging       Past Surgical History:   Procedure Laterality Date    HEMORRHOID SURGERY      OOPHORECTOMY      unilateral    TOTAL ABDOMINAL HYSTERECTOMY         Family History   Problem Relation Age of Onset    Alzheimer's disease Mother     Coronary artery disease Mother     Dementia Mother     Diabetes Mother         mellitus    Other Father         acute myocardial infarction    Coronary artery disease Sister     Other Maternal Grandfather         laryngeal cancer    Dementia Maternal Aunt     Diabetes Maternal Aunt      I have reviewed and agree with the history as documented  Social History     Tobacco Use    Smoking status: Never Smoker    Smokeless tobacco: Never Used   Substance Use Topics    Alcohol use: No    Drug use: Never        Review of Systems   Constitutional: Positive for fatigue  Negative for chills and fever  HENT: Negative for congestion and rhinorrhea  Eyes: Negative for photophobia and visual disturbance  Respiratory: Positive for chest tightness and shortness of breath  Negative for wheezing and stridor  Cardiovascular: Positive for leg swelling   Negative for chest pain and palpitations  Gastrointestinal: Negative for abdominal pain, constipation, diarrhea, nausea and vomiting  Genitourinary: Negative for dysuria and hematuria  Musculoskeletal: Negative for back pain and neck pain  Skin: Negative for pallor and rash  Neurological: Negative for dizziness, syncope, weakness, light-headedness, numbness and headaches  Physical Exam  ED Triage Vitals [06/18/19 1644]   Temperature Pulse Respirations Blood Pressure SpO2   97 6 °F (36 4 °C) (!) 155 20 (!) 138/101 99 %      Temp Source Heart Rate Source Patient Position - Orthostatic VS BP Location FiO2 (%)   Oral Monitor Lying Left arm --      Pain Score       No Pain             Orthostatic Vital Signs  Vitals:    06/18/19 2023 06/18/19 2030 06/18/19 2052 06/18/19 2100   BP: 118/66 124/54 133/75 133/75   Pulse: (!) 133 (!) 136 (!) 150 (!) 142   Patient Position - Orthostatic VS: Lying Lying Lying Lying       Physical Exam   Constitutional: She is oriented to person, place, and time  She appears well-developed and well-nourished  No distress  HENT:   Head: Normocephalic and atraumatic  Right Ear: External ear normal    Left Ear: External ear normal    Nose: Nose normal    Mouth/Throat: Oropharynx is clear and moist    Eyes: Pupils are equal, round, and reactive to light  Conjunctivae and EOM are normal    Neck: Normal range of motion  Neck supple  Cardiovascular: Normal heart sounds and intact distal pulses  Exam reveals no gallop and no friction rub  No murmur heard  Tachycardic, irregularly irregular    Pulmonary/Chest: Effort normal and breath sounds normal  No stridor  No respiratory distress  She has no wheezes  She has no rales  She exhibits no tenderness  Abdominal: Soft  Bowel sounds are normal  She exhibits no distension  There is no tenderness  There is no rebound and no guarding  Musculoskeletal: Normal range of motion  She exhibits edema (2+ BL LE edema)     Neurological: She is alert and oriented to person, place, and time  Skin: Skin is warm and dry  No rash noted  She is not diaphoretic  No erythema  No pallor  Psychiatric: She has a normal mood and affect  Her behavior is normal    Nursing note and vitals reviewed        ED Medications  Medications   sodium chloride 0 9 % bolus 500 mL (0 mL Intravenous Stopped 6/18/19 1850)   diltiazem (CARDIZEM) injection 10 mg (10 mg Intravenous Given 6/18/19 1711)   diltiazem (CARDIZEM) 125 mg in sodium chloride 0 9 % 125 mL infusion (15 mg/hr Intravenous Rate/Dose Change 6/18/19 2023)   iohexol (OMNIPAQUE) 350 MG/ML injection (MULTI-DOSE) 78 mL (78 mL Intravenous Given 6/18/19 1904)   diltiazem (CARDIZEM) injection 15 mg (15 mg Intravenous Given 6/18/19 1941)   diltiazem (CARDIZEM) injection 10 mg (10 mg Intravenous Given 6/18/19 2020)   furosemide (LASIX) injection 40 mg (40 mg Intravenous Given 6/18/19 2052)       Diagnostic Studies  Results Reviewed     Procedure Component Value Units Date/Time    T4, free [[id number]]  (Normal) Collected:  06/18/19 1652    Lab Status:  Final result Specimen:  Blood from Arm, Left Updated:  06/18/19 1750     Free T4 1 23 ng/dL     Comprehensive metabolic panel [[id number]]  (Abnormal) Collected:  06/18/19 1652    Lab Status:  Final result Specimen:  Blood from Arm, Left Updated:  06/18/19 1733     Sodium 136 mmol/L      Potassium 3 5 mmol/L      Chloride 100 mmol/L      CO2 28 mmol/L      ANION GAP 8 mmol/L      BUN 6 mg/dL      Creatinine 0 81 mg/dL      Glucose 143 mg/dL      Calcium 9 2 mg/dL      AST 30 U/L      ALT 37 U/L      Alkaline Phosphatase 72 U/L      Total Protein 7 7 g/dL      Albumin 3 9 g/dL      Total Bilirubin 0 59 mg/dL      eGFR 70 ml/min/1 73sq m     Narrative:       Jayson guidelines for Chronic Kidney Disease (CKD):     Stage 1 with normal or high GFR (GFR > 90 mL/min/1 73 square meters)    Stage 2 Mild CKD (GFR = 60-89 mL/min/1 73 square meters)    Stage 3A Moderate CKD (GFR = 45-59 mL/min/1 73 square meters)    Stage 3B Moderate CKD (GFR = 30-44 mL/min/1 73 square meters)    Stage 4 Severe CKD (GFR = 15-29 mL/min/1 73 square meters)    Stage 5 End Stage CKD (GFR <15 mL/min/1 73 square meters)  Note: GFR calculation is accurate only with a steady state creatinine    TSH, 3rd generation with Free T4 reflex [[id number]]  (Abnormal) Collected:  06/18/19 1652    Lab Status:  Final result Specimen:  Blood from Arm, Left Updated:  06/18/19 1733     TSH 3RD Sharla Fitzpatrick 9 170 uIU/mL     Narrative:       Patients undergoing fluorescein dye angiography may retain small amounts of fluorescein in the body for 48-72 hours post procedure  Samples containing fluorescein can produce falsely depressed TSH values  If the patient had this procedure,a specimen should be resubmitted post fluorescein clearance        B-type natriuretic peptide [[id number]]  (Abnormal) Collected:  06/18/19 1652    Lab Status:  Final result Specimen:  Blood from Arm, Left Updated:  06/18/19 1733     NT-proBNP 1,823 pg/mL     Protime-INR [[id number]]  (Abnormal) Collected:  06/18/19 1652    Lab Status:  Final result Specimen:  Blood from Arm, Left Updated:  06/18/19 1728     Protime 14 7 seconds      INR 1 14    APTT [[id number]]  (Normal) Collected:  06/18/19 1652    Lab Status:  Final result Specimen:  Blood from Arm, Left Updated:  06/18/19 1728     PTT 26 seconds     D-Dimer [[id number]]  (Abnormal) Collected:  06/18/19 1652    Lab Status:  Final result Specimen:  Blood from Arm, Left Updated:  06/18/19 1728     D-Dimer, Quant 1,206 ng/ml (FEU)     Troponin I [[id number]]  (Normal) Collected:  06/18/19 1652    Lab Status:  Final result Specimen:  Blood from Arm, Left Updated:  06/18/19 1726     Troponin I 0 03 ng/mL     Magnesium [[id number]]  (Normal) Collected:  06/18/19 1652    Lab Status:  Final result Specimen:  Blood from Arm, Left Updated:  06/18/19 1726     Magnesium 2 1 mg/dL     CBC and differential [[id number]]  (Abnormal) Collected:  06/18/19 1652    Lab Status:  Final result Specimen:  Blood from Arm, Left Updated:  06/18/19 1708     WBC 7 86 Thousand/uL      RBC 4 24 Million/uL      Hemoglobin 13 7 g/dL      Hematocrit 42 0 %      MCV 99 fL      MCH 32 3 pg      MCHC 32 6 g/dL      RDW 13 1 %      MPV 10 4 fL      Platelets 321 Thousands/uL      nRBC 0 /100 WBCs      Neutrophils Relative 59 %      Immat GRANS % 0 %      Lymphocytes Relative 33 %      Monocytes Relative 6 %      Eosinophils Relative 1 %      Basophils Relative 1 %      Neutrophils Absolute 4 62 Thousands/µL      Immature Grans Absolute 0 01 Thousand/uL      Lymphocytes Absolute 2 60 Thousands/µL      Monocytes Absolute 0 49 Thousand/µL      Eosinophils Absolute 0 09 Thousand/µL      Basophils Absolute 0 05 Thousands/µL     POCT urinalysis dipstick [[id number]]     Lab Status:  No result Specimen:  Urine                  CTA ED chest PE Study   Final Result by Devang Fernandez MD (06/18 1920)         1  No evidence of acute pulmonary embolus or thoracic aortic aneurysm  2   Small bilateral pleural effusions and pulmonary vascular congestion without pulmonary interstitial edema, possibly indicating early or mild congestive heart failure                    Workstation performed: [id number]         XR chest 1 view portable   ED Interpretation by Maulik Mchugh DO (06/18 1831)   Abnormal   Moderate pulmonary vascular edema as interpreted by me independently             Procedures  ECG 12 Lead Documentation  Date/Time: 6/18/2019 5:25 PM  Performed by: Maulik Mchugh DO  Authorized by: Maulik Mchugh DO     Indications / Diagnosis:  Tachycardic   Patient location:  ED  Interpretation:     Interpretation: non-specific    Rate:     ECG rate:  165    ECG rate assessment: tachycardic    Rhythm:     Rhythm: atrial fibrillation    Ectopy:     Ectopy: none    QRS:     QRS axis:  Right    QRS intervals:  Normal  Conduction:     Conduction: normal ST segments:     ST segments:  Non-specific  T waves:     T waves: non-specific              ED Course  ED Course as of Jun 18 2122   Tue Jun 18, 2019   1733 Will get CTA PE study   D-DIMER QUANTITATIVE(!): 1,206 1733 Troponin I: 0 03   1733 Patient still tachycardic in the 150s to 160s  Will start Cardizem drip at this point  Troponin I: 0 03   1745 NT-proBNP(!): 1,823   1746 TSH 3RD GENERATON(!): 9 170   1955 Given another bolus of 15mg cardizem, infusion is at 7 5mg at this time  2004 Now HR ranging 110-130s- improved  Will continue infusion now at 15      2030 Given a third cardizem bolus of 10mg as based on patient's weight, initial 2 boluses were underdosed              Identification of Seniors at Risk      Most Recent Value   (ISAR) Identification of Seniors at Risk   Before the illness or injury that brought you to the Emergency, did you need someone to help you on a regular basis? 0 Filed at: 06/18/2019 1645   In the last 24 hours, have you needed more help than usual?  0 Filed at: 06/18/2019 1645   Have you been hospitalized for one or more nights during the past 6 months? 0 Filed at: 06/18/2019 1645   In general, do you see well?  0 Filed at: 06/18/2019 1645   In general, do you have serious problems with your memory? 0 Filed at: 06/18/2019 1645   Do you take more than three different medications every day? 1 Filed at: 06/18/2019 1645   ISAR Score  1 Filed at: 06/18/2019 1645                          MDM  Number of Diagnoses or Management Options  Atrial fibrillation with rapid ventricular response [facility]):   Bilateral lower extremity edema:   CHF (congestive heart failure) ([facility] [address] ):   New onset atrial fibrillation ([facility] [address] ):   Orthopnea:   Shortness of breath:   Diagnosis management comments: Assessment and plan:  New onset atrial fibrillation with rapid ventricular response in a patient clinically appearing to have new onset CHF as well    Patient has an irregularly irregular heart rate in the 170s upon arrival   Will check labs to evaluate for anemia, leukocytosis, electrolyte abnormalities, TSH as patient has hypothyroidism which could be contributing to tachycardia, BMP for CHF, chest x-ray to assess for pulmonary edema  Will treat with 10 mg IV Cardizem to start  Patient appears to have new onset CHF as well as new onset atrial fibrillation with rapid ventricular response  Patient will require admission for further evaluation and treatment of these conditions  Disposition  Final diagnoses:   Atrial fibrillation with rapid ventricular response ([address] )   New onset atrial fibrillation (HCC)   CHF (congestive heart failure) (HCC)   Bilateral lower extremity edema   Orthopnea   Shortness of breath     Time reflects when diagnosis was documented in both MDM as applicable and the Disposition within this note     Time User Action Codes Description Comment    6/18/2019  8:05 PM Tanya Bake [I48 91] Atrial fibrillation with rapid ventricular response ([facility] [address] )     6/18/2019  8:05 PM Migdalia Squibb Add [I48 91] New onset atrial fibrillation ([facility] [address] )     6/18/2019  8:06 PM Migdalia Squibb Add [I50 9] CHF (congestive heart failure) ([facility] [address] )     6/18/2019  8:06 PM Migdalia Squibb Add [R60 0] Bilateral lower extremity edema     6/18/2019  8:06 PM Migdalia Squibb Add [R06 01] Orthopnea     6/18/2019  8:06 PM Migdalia Squibb Add [R06 02] Shortness of breath       ED Disposition     ED Disposition Condition Date/Time Comment    Admit Stable Tue Jun 18, 2019  8:31 PM Case was discussed with Dr Frandy Long and the patient's admission status was agreed to be Admission Status: inpatient status to the service of Dr Frandy Long   Follow-up Information    None         Patient's Medications   Discharge Prescriptions    No medications on file     No discharge procedures on file  ED Provider  Attending physically available and evaluated [facility] [place]   I managed the patient along with the ED Attending      Electronically Signed by         Kate Luu DO  06/18/19 6162

## 2019-06-19 ENCOUNTER — APPOINTMENT (INPATIENT)
Dept: NON INVASIVE DIAGNOSTICS | Facility: HOSPITAL | Age: 79
DRG: 291 | End: 2019-06-19
Payer: COMMERCIAL

## 2019-06-19 LAB
ANION GAP SERPL CALCULATED.3IONS-SCNC: 7 MMOL/L (ref 4–13)
BASOPHILS # BLD AUTO: 0.05 THOUSANDS/ΜL (ref 0–0.1)
BASOPHILS NFR BLD AUTO: 1 % (ref 0–1)
BUN SERPL-MCNC: 6 MG/DL (ref 5–25)
CALCIUM SERPL-MCNC: 8.9 MG/DL (ref 8.3–10.1)
CHLORIDE SERPL-SCNC: 104 MMOL/L (ref 100–108)
CO2 SERPL-SCNC: 30 MMOL/L (ref 21–32)
CREAT SERPL-MCNC: 0.83 MG/DL (ref 0.6–1.3)
EOSINOPHIL # BLD AUTO: 0.07 THOUSAND/ΜL (ref 0–0.61)
EOSINOPHIL NFR BLD AUTO: 1 % (ref 0–6)
ERYTHROCYTE [DISTWIDTH] IN BLOOD BY AUTOMATED COUNT: 12.9 % (ref 11.6–15.1)
GFR SERPL CREATININE-BSD FRML MDRD: 68 ML/MIN/1.73SQ M
GLUCOSE SERPL-MCNC: 121 MG/DL (ref 65–140)
GLUCOSE SERPL-MCNC: 124 MG/DL (ref 65–140)
GLUCOSE SERPL-MCNC: 134 MG/DL (ref 65–140)
GLUCOSE SERPL-MCNC: 137 MG/DL (ref 65–140)
GLUCOSE SERPL-MCNC: 246 MG/DL (ref 65–140)
HCT VFR BLD AUTO: 40.1 % (ref 34.8–46.1)
HGB BLD-MCNC: 13.3 G/DL (ref 11.5–15.4)
IMM GRANULOCYTES # BLD AUTO: 0.02 THOUSAND/UL (ref 0–0.2)
IMM GRANULOCYTES NFR BLD AUTO: 0 % (ref 0–2)
LYMPHOCYTES # BLD AUTO: 2.6 THOUSANDS/ΜL (ref 0.6–4.47)
LYMPHOCYTES NFR BLD AUTO: 26 % (ref 14–44)
MAGNESIUM SERPL-MCNC: 2 MG/DL (ref 1.6–2.6)
MCH RBC QN AUTO: 32.5 PG (ref 26.8–34.3)
MCHC RBC AUTO-ENTMCNC: 33.2 G/DL (ref 31.4–37.4)
MCV RBC AUTO: 98 FL (ref 82–98)
MONOCYTES # BLD AUTO: 0.67 THOUSAND/ΜL (ref 0.17–1.22)
MONOCYTES NFR BLD AUTO: 7 % (ref 4–12)
NEUTROPHILS # BLD AUTO: 6.66 THOUSANDS/ΜL (ref 1.85–7.62)
NEUTS SEG NFR BLD AUTO: 65 % (ref 43–75)
NRBC BLD AUTO-RTO: 0 /100 WBCS
PLATELET # BLD AUTO: 251 THOUSANDS/UL (ref 149–390)
PMV BLD AUTO: 10.3 FL (ref 8.9–12.7)
POTASSIUM SERPL-SCNC: 4 MMOL/L (ref 3.5–5.3)
RBC # BLD AUTO: 4.09 MILLION/UL (ref 3.81–5.12)
SODIUM SERPL-SCNC: 141 MMOL/L (ref 136–145)
WBC # BLD AUTO: 10.07 THOUSAND/UL (ref 4.31–10.16)

## 2019-06-19 PROCEDURE — 99222 1ST HOSP IP/OBS MODERATE 55: CPT | Performed by: INTERNAL MEDICINE

## 2019-06-19 PROCEDURE — 85025 COMPLETE CBC W/AUTO DIFF WBC: CPT | Performed by: INTERNAL MEDICINE

## 2019-06-19 PROCEDURE — 82948 REAGENT STRIP/BLOOD GLUCOSE: CPT

## 2019-06-19 PROCEDURE — 80048 BASIC METABOLIC PNL TOTAL CA: CPT | Performed by: INTERNAL MEDICINE

## 2019-06-19 PROCEDURE — 83735 ASSAY OF MAGNESIUM: CPT | Performed by: INTERNAL MEDICINE

## 2019-06-19 PROCEDURE — 99232 SBSQ HOSP IP/OBS MODERATE 35: CPT | Performed by: INTERNAL MEDICINE

## 2019-06-19 RX ORDER — METOPROLOL TARTRATE 50 MG/1
50 TABLET, FILM COATED ORAL 3 TIMES DAILY
Status: DISCONTINUED | OUTPATIENT
Start: 2019-06-19 | End: 2019-06-21

## 2019-06-19 RX ORDER — METOPROLOL TARTRATE 5 MG/5ML
5 INJECTION INTRAVENOUS ONCE
Status: COMPLETED | OUTPATIENT
Start: 2019-06-19 | End: 2019-06-19

## 2019-06-19 RX ADMIN — METOPROLOL TARTRATE 50 MG: 50 TABLET, FILM COATED ORAL at 09:04

## 2019-06-19 RX ADMIN — VITAMIN D, TAB 1000IU (100/BT) 1000 UNITS: 25 TAB at 08:36

## 2019-06-19 RX ADMIN — ASCORBIC ACID TAB 250 MG 125 MG: 250 TAB at 08:40

## 2019-06-19 RX ADMIN — Medication 1000 UNITS: at 08:39

## 2019-06-19 RX ADMIN — FUROSEMIDE 40 MG: 10 INJECTION, SOLUTION INTRAMUSCULAR; INTRAVENOUS at 17:22

## 2019-06-19 RX ADMIN — LORATADINE 10 MG: 10 TABLET ORAL at 08:36

## 2019-06-19 RX ADMIN — VITAM B12 100 MCG: 100 TAB at 08:36

## 2019-06-19 RX ADMIN — DILTIAZEM HYDROCHLORIDE 5 MG/HR: 5 INJECTION INTRAVENOUS at 15:25

## 2019-06-19 RX ADMIN — METOPROLOL TARTRATE 5 MG: 5 INJECTION INTRAVENOUS at 02:40

## 2019-06-19 RX ADMIN — PRAVASTATIN SODIUM 40 MG: 40 TABLET ORAL at 16:11

## 2019-06-19 RX ADMIN — LEVOTHYROXINE SODIUM 137 MCG: 112 TABLET ORAL at 05:19

## 2019-06-19 RX ADMIN — Medication 100 MG: at 08:38

## 2019-06-19 RX ADMIN — Medication 1000 MG: at 08:36

## 2019-06-19 RX ADMIN — ONDANSETRON 4 MG: 2 INJECTION INTRAMUSCULAR; INTRAVENOUS at 10:53

## 2019-06-19 RX ADMIN — DILTIAZEM HYDROCHLORIDE 15 MG/HR: 5 INJECTION INTRAVENOUS at 03:27

## 2019-06-19 RX ADMIN — VENLAFAXINE 75 MG: 37.5 TABLET ORAL at 21:17

## 2019-06-19 RX ADMIN — METOPROLOL TARTRATE 50 MG: 50 TABLET, FILM COATED ORAL at 15:10

## 2019-06-19 RX ADMIN — FUROSEMIDE 40 MG: 10 INJECTION, SOLUTION INTRAMUSCULAR; INTRAVENOUS at 08:36

## 2019-06-19 RX ADMIN — ENOXAPARIN SODIUM 90 MG: 100 INJECTION SUBCUTANEOUS at 21:17

## 2019-06-19 RX ADMIN — VENLAFAXINE 75 MG: 37.5 TABLET ORAL at 09:12

## 2019-06-19 RX ADMIN — INSULIN LISPRO 3 UNITS: 100 INJECTION, SOLUTION INTRAVENOUS; SUBCUTANEOUS at 12:04

## 2019-06-19 RX ADMIN — ENOXAPARIN SODIUM 90 MG: 100 INJECTION SUBCUTANEOUS at 08:38

## 2019-06-19 RX ADMIN — Medication 1 TABLET: at 08:36

## 2019-06-19 RX ADMIN — ACETAMINOPHEN 650 MG: 325 TABLET ORAL at 07:31

## 2019-06-19 RX ADMIN — METOPROLOL TARTRATE 50 MG: 50 TABLET, FILM COATED ORAL at 20:08

## 2019-06-19 NOTE — H&P
H&P- Marie So 1940, 66 y o  female MRN: [id number]    Unit/Bed#: [facility] 839-95 Encounter: [id number]    Primary Care Provider: Brenton Christian MD   Date and time admitted to hospital: 6/18/2019  4:35 PM        * Atrial fibrillation with RVR ([facility] [address] )  Assessment & Plan  · Atrial fibrillation appears new onset  · Unfortunately remains in RVR despite multiple Cardizem boluses, Cardizem GTT  · Continue cardizem gtt, may need to consider amiodarone if persists  · Telemetry monitoring  · CHADS2-VASc 6 and would benefit from anticoagulation  Weight-based lovenox for now, to work with CM to determine anticoagulant on discharge  · Check Echocardiogram  · Cardiology consultation    Acute CHF (congestive heart failure) ([facility] )  Assessment & Plan  Wt Readings from Last 3 Encounters:   06/18/19 83 5 kg (184 lb 1 4 oz)   06/18/19 81 kg (178 lb 9 6 oz)   03/20/19 80 1 kg (176 lb 9 6 oz)     · Appears volume overloaded on examination, CXR and CTA PE study with pulmonary vascular congestion and elevated BNP noted  Suspect AFib with RVR is also playing role  · Give IV Lasix 40 mg x1 now, then 40 mg IV b i d    · Check echocardiogram (no prior echocardiogram available for review)  · Daily weights, I/O  · 2gm sodium diet, 1500cc fluid restriction        Type 2 diabetes mellitus with diabetic cataract [facility])  Assessment & Plan  Lab Results   Component Value Date    HGBA1C 5 9 (H) 03/18/2019       Recent Labs     06/18/19  2134   POCGLU 137       Blood Sugar Average: Last 72 hrs:  (P) 137   · Diet controlled at home  · Add SSI with Accu-Cheks while inpatient  · Carb controlled diet  · Avoid hypoglycemia    Hyperlipidemia  Assessment & Plan  · Continue statin therapy    Hypothyroidism  Assessment & Plan  · TSH and free T4 as below  · Continue Synthroid therapy    Hypertension  Assessment & Plan  · Continue losartan, hold HCTZ for now  · Blood pressure monitoring per protocol, avoid hypotension      VTE Prophylaxis: Enoxaparin (Lovenox)  / sequential compression device   Code Status: Level 1 - Full Code  POLST: POLST form is not discussed and not completed at this time  Anticipated Length of Stay:  Patient will be admitted on an Inpatient basis with an anticipated length of stay of  greater than 2 midnights  Justification for Hospital Stay: Please see detailed plans noted above  Chief Complaint:     Rapid heart rate, leg swelling  History of Present Illness:  Queta Beasley is a 66 y o  female who presents with a 3 day history of worsening dyspnea on exertion and bilateral lower extremity edema, found by PCP today to have AFib with RVR  Has a past medical history significant for hypertension, hyperlipidemia, type 2 diabetes, and hypothyroidism  Patient reports she was in her normal state of health until 06/15/2019 when she went to a party and a lot of unhealthy food including foods with high salt content  The next day, patient began to notice development of worsening lower extremity edema to where patient could not wear her shoes as normal, also with some orthopnea requiring additional pillow to sleep  Also with mild nonproductive cough  She is unsure if she gained any weight  Denied any fevers/chills, chest pain/pressure, palpitations, nausea/vomiting/diarrhea  States she has never been diagnosed with CHF or AFib in the past   Today, patient was seen by her primary care provider, who noted rapid, irregular rhythm on cardiovascular examination  Subsequent EKG revealed AFib with RVR, and patient advised to present to ED for evaluation  During ED course, AFib with RVR was confirmed, not responsive to multiple Cardizem bolus seize, and thus patient started on Cardizem GTT  Continues to deny palpitations or chest pain/pressure at this time      Review of Systems:    Constitutional:  Denies fever or chills   Eyes:  Denies change in visual acuity   HENT:  Denies nasal congestion or sore throat   Respiratory:  Endorsed cough and shortness of breath  Cardiovascular:  Denies chest pain but endorses edema  GI:  Denies abdominal pain, nausea, vomiting, bloody stools or diarrhea   :  Denies dysuria   Musculoskeletal:  Denies back pain or joint pain   Integument:  Denies rash   Neurologic:  Denies headache, focal weakness or sensory changes   Endocrine:  Denies polyuria or polydipsia   Lymphatic:  Denies swollen glands   Psychiatric:  Denies depression or anxiety     Past Medical and Surgical History:   Past Medical History:   Diagnosis Date    Eczema     Photosensitivity     abnormal skin sensitivity to sunlight    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HCC)     staging     Past Surgical History:   Procedure Laterality Date    HEMORRHOID SURGERY      OOPHORECTOMY      unilateral    TOTAL ABDOMINAL HYSTERECTOMY         Meds/Allergies:  Medications Prior to Admission   Medication    albuterol (VENTOLIN HFA) 90 mcg/act inhaler    Ascorbic Acid (VITAMIN C) 100 MG tablet    cholecalciferol (VITAMIN D3) 1,000 units tablet    cyanocobalamin 100 MCG tablet    fexofenadine (ALLEGRA) 30 MG tablet    Garlic 10 MG CAPS    levothyroxine 137 mcg tablet    losartan-hydrochlorothiazide (HYZAAR) 100-12 5 MG per tablet    magnesium 30 MG tablet    Misc Natural Products (TURMERIC CURCUMIN) CAPS    multivitamin (THERAGRAN) TABS    Omega-3 Fatty Acids (FISH OIL) 1,000 mg    pyridoxine (B-6) 100 MG tablet    simvastatin (ZOCOR) 20 mg tablet    SUPER B COMPLEX/C CAPS    venlafaxine (EFFEXOR) 75 mg tablet    vitamin E, tocopherol, 1,000 units capsule       Allergies: Allergies   Allergen Reactions    Penicillins     Wellbutrin Sr  [Bupropion]      Other reaction(s): Suicidal ideations  Category: Adverse Reaction;       History:  Marital Status: Single     Substance Use History:   Social History     Substance and Sexual Activity   Alcohol Use No     Social History     Tobacco Use   Smoking Status Never Smoker   Smokeless Tobacco Never Used     Social History     Substance and Sexual Activity   Drug Use Never       Family History:  Family History   Problem Relation Age of Onset    Alzheimer's disease Mother     Coronary artery disease Mother     Dementia Mother     Diabetes Mother         mellitus    Other Father         acute myocardial infarction    Coronary artery disease Sister     Other Maternal Grandfather         laryngeal cancer    Dementia Maternal Aunt     Diabetes Maternal Aunt        Physical Exam:     Vitals:   Blood Pressure: 137/90 (06/18/19 2125)  Pulse: (!) 150 (06/18/19 2125)  Temperature: 97 6 °F (36 4 °C) (06/18/19 1644)  Temp Source: Oral (06/18/19 1644)  Respirations: 20 (06/18/19 2125)  Height: 5' 1" (154 9 cm) (06/18/19 2125)  Weight - Scale: 83 5 kg (184 lb 1 4 oz) (06/18/19 2125)  SpO2: 94 % (06/18/19 2100)    Constitutional:  Well developed, well nourished, no acute distress, non-toxic appearance   Eyes:  PERRL, conjunctiva normal   HENT:  Atraumatic, external ears normal, nose normal, oropharynx moist, no pharyngeal exudates  Neck- normal range of motion, no tenderness, supple   Respiratory:  No respiratory distress, decreased breath sounds bilaterally with bilateral bibasilar rales no wheezing   Cardiovascular:  Tachycardic, irregularly irregular rhythm, no murmurs, no gallops, no rubs   GI:  Soft, nondistended, normal bowel sounds, nontender, no organomegaly, no mass, no rebound, no guarding   :  No costovertebral angle tenderness   Musculoskeletal:  1+ pitting LE edema, no tenderness, no deformities  Back- no tenderness  Integument:  Well hydrated, no rash   Lymphatic:  No lymphadenopathy noted   Neurologic:  Alert &awake, communicative, CN 2-12 normal, normal motor function, normal sensory function, no focal deficits noted   Psychiatric:  Speech and behavior appropriate       Lab Results: I have personally reviewed pertinent reports        Results from last 7 days   Lab Units 06/18/19  1652   WBC Thousand/uL 7 86 HEMOGLOBIN g/dL 13 7   HEMATOCRIT % 42 0   PLATELETS Thousands/uL 245   NEUTROS PCT % 59   LYMPHS PCT % 33   MONOS PCT % 6   EOS PCT % 1     Results from last 7 days   Lab Units 06/18/19  1652   POTASSIUM mmol/L 3 5   CHLORIDE mmol/L 100   CO2 mmol/L 28   BUN mg/dL 6   CREATININE mg/dL 0 81   CALCIUM mg/dL 9 2   ALK PHOS U/L 72   ALT U/L 37   AST U/L 30     Results from last 7 days   Lab Units 06/18/19  1652   INR  1 14       EKG: Afib with RVR    Imaging: I have personally reviewed pertinent reports  and I have personally reviewed pertinent films in PACS    Cta Ed Chest Pe Study    Result Date: 6/18/2019  Narrative: CTA - CHEST WITH IV CONTRAST - PULMONARY ANGIOGRAM INDICATION:   Weakness, shortness of breath and leg swelling  COMPARISON: 6/18/2019  TECHNIQUE: CTA examination of the chest was performed using angiographic technique according to a protocol specifically tailored to evaluate for pulmonary embolism  Axial, sagittal, and coronal 2D reformatted images were created from the source data and  submitted for interpretation  In addition, coronal 3D MIP postprocessing was performed on the acquisition scanner  Radiation dose length product (DLP) for this visit:  749 22 mGy-cm   This examination, like all CT scans performed in the [facility], was performed utilizing techniques to minimize radiation dose exposure, including the use of iterative  reconstruction and automated exposure control  IV Contrast:  78 mL of iohexol (OMNIPAQUE)  FINDINGS: PULMONARY ARTERIAL TREE:  No filling defect in the visualized pulmonary arteries to suggest acute embolus  LUNGS:  Pulmonary vascular congestion without interstitial edema  Mild relaxation atelectasis at the lung bases  There is no tracheal or endobronchial lesion  PLEURA:  Small bilateral pleural effusions  HEART/GREAT VESSELS:  Cardiomegaly  MEDIASTINUM AND OSMEL:  Unremarkable  CHEST WALL AND LOWER NECK:   Unremarkable   VISUALIZED STRUCTURES IN THE UPPER ABDOMEN:  Unremarkable  OSSEOUS STRUCTURES:  No acute fracture or destructive osseous lesion  Impression: 1  No evidence of acute pulmonary embolus or thoracic aortic aneurysm  2   Small bilateral pleural effusions and pulmonary vascular congestion without pulmonary interstitial edema, possibly indicating early or mild congestive heart failure  Workstation performed: [id number]     CXR: personally reviewed, pulmonary vascular congestion visualized  Await formal radiologist read  ** Please Note: Dragon 360 Dictation voice to text software was used in the creation of this document   **

## 2019-06-19 NOTE — PROGRESS NOTES
Progress Note Freddie Sos 1940, 66 y o  female MRN: [id number]    Unit/Bed#: [facility] 397-30 Encounter: [id number]    Primary Care Provider: Gutierrez Chakraborty MD   Date and time admitted to hospital: 6/18/2019  4:35 PM        * Atrial fibrillation with RVR ([facility] [address] )  Assessment & Plan  · Atrial fibrillation appears new onset  · Unfortunately remains in RVR despite multiple Cardizem boluses, Cardizem GTT  · Continue cardizem gtt, may need to consider amiodarone if persists  · Telemetry monitoring  · CHADS2-VASc 6 and would benefit from anticoagulation  Weight-based lovenox for now, to work with CM to determine anticoagulant on discharge  · Check Echocardiogram  · Cardiology consultation    Acute CHF (congestive heart failure) ([facility] )  Assessment & Plan  Wt Readings from Last 3 Encounters:   06/19/19 82 4 kg (181 lb 10 5 oz)   06/18/19 81 kg (178 lb 9 6 oz)   03/20/19 80 1 kg (176 lb 9 6 oz)     · Appears volume overloaded on examination, CXR and CTA PE study with pulmonary vascular congestion and elevated BNP noted    Suspect AFib with RVR is also playing role  · IV diuretics        Type 2 diabetes mellitus with diabetic cataract [facility])  Assessment & Plan  Lab Results   Component Value Date    HGBA1C 5 9 (H) 03/18/2019       Recent Labs     06/18/19  2134 06/19/19  0608   POCGLU 137 137       Blood Sugar Average: Last 72 hrs:  (P) 137   · Diet controlled at home  · Add SSI with Accu-Cheks while inpatient  · Carb controlled diet  · Avoid hypoglycemia    Hypothyroidism  Assessment & Plan  · TSH and free T4 as below  · Continue Synthroid therapy    Hypertension  Assessment & Plan  · Continue losartan, hold HCTZ for now  · Blood pressure monitoring per protocol, avoid hypotension    Hyperlipidemia  Assessment & Plan  · Continue statin therapy        VTE Pharmacologic Prophylaxis:   Pharmacologic: Enoxaparin (Lovenox)  Mechanical VTE Prophylaxis in Place: No    Patient Centered Rounds: I have performed bedside rounds with nursing staff today  Time Spent for Care: 15 minutes  More than 50% of total time spent on counseling and coordination of care as described above  Current Length of Stay: 1 day(s)    Current Patient Status: Inpatient   Certification Statement: The patient will continue to require additional inpatient hospital stay due to Continue monitor heart rate and volume status  Discharge Plan:  Need to monitor symptoms    Code Status: Level 1 - Full Code      Subjective:   Shortness of breath    Objective:     Vitals:   Temp (24hrs), Av 2 °F (36 8 °C), Min:97 6 °F (36 4 °C), Max:98 8 °F (37 1 °C)    Temp:  [97 6 °F (36 4 °C)-98 8 °F (37 1 °C)] 98 4 °F (36 9 °C)  HR:  [] 111  Resp:  [15-21] 20  BP: (110-151)/() 128/84  SpO2:  [91 %-99 %] 94 %  Body mass index is 34 32 kg/m²  Input and Output Summary (last 24 hours): Intake/Output Summary (Last 24 hours) at 2019 1031  Last data filed at 2019 0938  Gross per 24 hour   Intake 1380 ml   Output 1550 ml   Net -170 ml       Physical Exam:     Physical Exam    Additional Data:     Labs:    Results from last 7 days   Lab Units 19  0220   WBC Thousand/uL 10 07   HEMOGLOBIN g/dL 13 3   HEMATOCRIT % 40 1   PLATELETS Thousands/uL 251   NEUTROS PCT % 65   LYMPHS PCT % 26   MONOS PCT % 7   EOS PCT % 1     Results from last 7 days   Lab Units 19  0220 19  1652   POTASSIUM mmol/L 4 0 3 5   CHLORIDE mmol/L 104 100   CO2 mmol/L 30 28   BUN mg/dL 6 6   CREATININE mg/dL 0 83 0 81   CALCIUM mg/dL 8 9 9 2   ALK PHOS U/L  --  72   ALT U/L  --  37   AST U/L  --  30     Results from last 7 days   Lab Units 19  1652   INR  1 14       * I Have Reviewed All Lab Data Listed Above  * Additional Pertinent Lab Tests Reviewed:  All Labs Within Last 24 Hours Reviewed        Recent Cultures (last 7 days):           Last 24 Hours Medication List:     Current Facility-Administered Medications:  acetaminophen 650 mg Oral Q4H PRN Adelene Columbus Mason Pacheco MD    albuterol 2 puff Inhalation Q4H PRN Norm Nicely, MD    ascorbic acid 125 mg Oral Daily Norm Nicely, MD    cholecalciferol 1,000 Units Oral Daily Norm Nicely, MD    cyanocobalamin 100 mcg Oral Daily Norm Nicely, MD    diltiazem 1-15 mg/hr Intravenous Titrated Norm Nicely, MD Last Rate: 15 mg/hr (06/19/19 0327)   enoxaparin 1 mg/kg Subcutaneous Q12H [address] Norm Nicely, MD    fish oil 1,000 mg Oral Daily Norm Nicely, MD    furosemide 40 mg Intravenous BID Miguel Medrano MD    insulin lispro 1-5 Units Subcutaneous HS Norm Nicely, MD    insulin lispro 1-6 Units Subcutaneous TID AC Norm Nicely, MD    levothyroxine 137 mcg Oral Daily Norm Nicely, MD    loratadine 10 mg Oral Daily Norm Nicely, MD    losartan 100 mg Oral Daily Norm Nicely, MD    metoprolol tartrate 50 mg Oral TID Miguel Medrano MD    multivitamin-minerals 1 tablet Oral Daily Norm Nicely, MD    ondansetron 4 mg Intravenous Q6H PRN Norm Nicely, MD    pravastatin 40 mg Oral Daily With Drew Matt MD    pyridoxine 100 mg Oral Daily Norm Nicely, MD    venlafaxine 75 mg Oral Q12H Norm Nicely, MD    vitamin E (tocopherol) 1,000 Units Oral Daily Norm Nicely, MD         Today, Patient Was Seen By: Devora Shaw DO    ** Please Note: Dictation voice to text software may have been used in the creation of this document   **

## 2019-06-19 NOTE — PLAN OF CARE
Problem: PAIN - ADULT  Goal: Verbalizes/displays adequate comfort level or baseline comfort level  Description  Interventions:  - Encourage patient to monitor pain and request assistance  - Assess pain using appropriate pain scale  - Administer analgesics based on type and severity of pain and evaluate response  - Implement non-pharmacological measures as appropriate and evaluate response  - Consider cultural and social influences on pain and pain management  - Notify physician/advanced practitioner if interventions unsuccessful or patient reports new pain  Outcome: Progressing     Problem: INFECTION - ADULT  Goal: Absence or prevention of progression during hospitalization  Description  INTERVENTIONS:  - Assess and monitor for signs and symptoms of infection  - Monitor lab/diagnostic results  - Monitor all insertion sites, i e  indwelling lines, tubes, and drains  - Monitor endotracheal (as able) and nasal secretions for changes in amount and color  - [place] appropriate cooling/warming therapies per order  - Administer medications as ordered  - Instruct and encourage patient and family to use good hand hygiene technique  - Identify and instruct in appropriate isolation precautions for identified infection/condition  Outcome: Progressing  Goal: Absence of fever/infection during neutropenic period  Description  INTERVENTIONS:  - Monitor WBC  - Implement neutropenic guidelines  Outcome: Progressing     Problem: SAFETY ADULT  Goal: Patient will remain free of falls  Description  INTERVENTIONS:  - Assess patient frequently for physical needs  -  Identify cognitive and physical deficits and behaviors that affect risk of falls    -  [place] fall precautions as indicated by assessment   - Educate patient/family on patient safety including physical limitations  - Instruct patient to call for assistance with activity based on assessment  - Modify environment to reduce risk of injury  - Consider OT/PT consult to assist with strengthening/mobility  Outcome: Progressing  Goal: Maintain or return to baseline ADL function  Description  INTERVENTIONS:  -  Assess patient's ability to carry out ADLs; assess patient's baseline for ADL function and identify physical deficits which impact ability to perform ADLs (bathing, care of mouth/teeth, toileting, grooming, dressing, etc )  - Assess/evaluate cause of self-care deficits   - Assess range of motion  - Assess patient's mobility; develop plan if impaired  - Assess patient's need for assistive devices and provide as appropriate  - Encourage maximum independence but intervene and supervise when necessary  ¯ Involve family in performance of ADLs  ¯ Assess for home care needs following discharge   ¯ Request OT consult to assist with ADL evaluation and planning for discharge  ¯ Provide patient education as appropriate  Outcome: Progressing  Goal: Maintain or return mobility status to optimal level  Description  INTERVENTIONS:  - Assess patient's baseline mobility status (ambulation, transfers, stairs, etc )    - Identify cognitive and physical deficits and behaviors that affect mobility  - Identify mobility aids required to assist with transfers and/or ambulation (gait belt, sit-to-stand, lift, walker, cane, etc )  - [place] fall precautions as indicated by assessment  - Record patient progress and toleration of activity level on Mobility SBAR; progress patient to next Phase/Stage  - Instruct patient to call for assistance with activity based on assessment  - Request Rehabilitation consult to assist with strengthening/weightbearing, etc   Outcome: Progressing     Problem: DISCHARGE PLANNING  Goal: Discharge to home or other facility with appropriate resources  Description  INTERVENTIONS:  - Identify barriers to discharge w/patient and caregiver  - Arrange for needed discharge resources and transportation as appropriate  - Identify discharge learning needs (meds, wound care, etc )  - Arrange for interpretive services to assist at discharge as needed  - Refer to Case Management Department for coordinating discharge planning if the patient needs post-hospital services based on physician/advanced practitioner order or complex needs related to functional status, cognitive ability, or social support system  Outcome: Progressing     Problem: Knowledge Deficit  Goal: Patient/family/caregiver demonstrates understanding of disease process, treatment plan, medications, and discharge instructions  Description  Complete learning assessment and assess knowledge base    Interventions:  - Provide teaching at level of understanding  - Provide teaching via preferred learning methods  Outcome: Progressing

## 2019-06-19 NOTE — CONSULTS
Cardiology Consult  Temo Ba 66 y o  female MRN: [id number]  Unit/Bed#: [facility] 519-01 Encounter: [id number]      Reason for Consult / Principal Problem: AF RVR    Physician Requesting Consult: Rafiq Swartz DO    OP Cardiologist: n/a    Assessment:  # Newly-diagnosed AF with RVR  -duration unclear as patient is not overtly symptomatic while in AF; she did develop clinic HF from it and then experienced symptoms  -[id number] 5 currently on SC lovenox  -rates somewhat better on cardizem gtt but still 130s  # Acute CHF 2/2 above, improving  # HTN, controlled  # HLD  # Hypothyroidism, stable  # Prediabetes      Plan:  1  Start metoprolol 50mg TID for rate control  Overlap with Cardizem gtt and slowly wean off today  If rates remain uncontrolled with tenuous BP then will load with digoxin  Avoid antiarrhythmics such as amiodarone as this can convert the patient to sinus and increase risk of thromboembolism as she was not previously anticoagulated  2  Continue IV lasix for today  Will convert to PO regimen tomorrow morning  3  Transition Lovenox to Eliquis 5mg BID or Xarelto 20mg daily once CM evaluates for coverage  4  Agree with 2D echo  5  Monitor on telemetry while admitted  HPI: Temo Ba 66y o  year old female with a history of HTN, HLD, hypothyroidism who presented to the ED yesterday after being referred from PCP for new onset AF, dyspnea, orthopnea  She was evaluated by her PCP yesterday for generalized fatigue, tightness in her neck, dyspnea, and orthopnea for 2-3 days  She found herself using several pillows to sleep the previous 2 nights and also noticed LE edema which she has not had before  Denies dizziness, syncope, palpitations, CP, pleurisy, diaphoresis  On presentation she was AF with RVR 150s  She was started on cardizem gtt and received multiple doses, now at 15mg/hr with rates 130s  She was started on lasix 40mg IV BID with ~1400cc UOP recorded   Her dyspnea is significantly better  She was started on tx dose lovenox  No hx of MI, CMP, arrhythmia  Family History: non-contributory  Historical Information   Past Medical History:   Diagnosis Date    Eczema     Photosensitivity     abnormal skin sensitivity to sunlight    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HCC)     staging     Past Surgical History:   Procedure Laterality Date    HEMORRHOID SURGERY      OOPHORECTOMY      unilateral    TOTAL ABDOMINAL HYSTERECTOMY       Social History   Social History     Substance and Sexual Activity   Alcohol Use Never    Frequency: Never     Social History     Substance and Sexual Activity   Drug Use Never     Social History     Tobacco Use   Smoking Status Never Smoker   Smokeless Tobacco Never Used     Family History:   Family History   Problem Relation Age of Onset    Alzheimer's disease Mother     Coronary artery disease Mother     Dementia Mother     Diabetes Mother         mellitus    Other Father         acute myocardial infarction    Coronary artery disease Sister     Other Maternal Grandfather         laryngeal cancer    Dementia Maternal Aunt     Diabetes Maternal Aunt        Review of Systems:  Review of Systems  Except as noted in the HPI and above, a comprehensive 14 point review of systems was negative      Scheduled Meds:  Current Facility-Administered Medications:  acetaminophen 650 mg Oral Q4H PRN Anival Sung MD    albuterol 2 puff Inhalation Q4H PRN Anival Sung MD    ascorbic acid 125 mg Oral Daily Anival Sung MD    cholecalciferol 1,000 Units Oral Daily Anival Sung MD    cyanocobalamin 100 mcg Oral Daily Anival Sung MD    diltiazem 1-15 mg/hr Intravenous Titrated Anival Sung MD Last Rate: 15 mg/hr (06/19/19 0327)   enoxaparin 1 mg/kg Subcutaneous Q12H [address] Anival Sung MD    fish oil 1,000 mg Oral Daily Anival Sugn MD    furosemide 40 mg Intravenous BID Anival Sung MD    insulin lispro 1-5 Units Subcutaneous HS Anival Sung MD insulin lispro 1-6 Units Subcutaneous TID AC Efren Camilo MD    levothyroxine 137 mcg Oral Daily Efren Camilo MD    loratadine 10 mg Oral Daily Efren Camilo MD    losartan 100 mg Oral Daily Efren Camilo MD    multivitamin-minerals 1 tablet Oral Daily Efren Camilo MD    ondansetron 4 mg Intravenous Q6H PRN Efren Camilo MD    pravastatin 40 mg Oral Daily With Jose Guadalupe Chatman, MD    pyridoxine 100 mg Oral Daily Efren Camilo MD    venlafaxine 75 mg Oral Q12H Efren Camilo MD    vitamin E (tocopherol) 1,000 Units Oral Daily Efren Camilo MD      Continuous Infusions:  diltiazem 1-15 mg/hr Last Rate: 15 mg/hr (06/19/19 0327)     PRN Meds:   acetaminophen    albuterol    ondansetron  all current active meds have been reviewed    Allergies   Allergen Reactions    Penicillins     Wellbutrin Sr  [Bupropion]      Other reaction(s): Suicidal ideations  Category: Adverse Reaction;        Objective   Vitals: Blood pressure 128/84, pulse (!) 111, temperature 98 4 °F (36 9 °C), temperature source Oral, resp  rate 20, height 5' 1" (1 549 m), weight 83 5 kg (184 lb 1 4 oz), SpO2 94 %  , Body mass index is 34 78 kg/m² , Orthostatic Blood Pressures      Most Recent Value   Blood Pressure  128/84 filed at 06/19/2019 0731   Patient Position - Orthostatic VS  Sitting filed at 06/19/2019 0731            Intake/Output Summary (Last 24 hours) at 6/19/2019 0986  Last data filed at 6/19/2019 0327  Gross per 24 hour   Intake 1000 ml   Output 1400 ml   Net -400 ml       Invasive Devices     Peripheral Intravenous Line            Peripheral IV 06/18/19 Left Antecubital less than 1 day    Peripheral IV 06/18/19 Right Antecubital less than 1 day                Physical Exam:  Physical Exam   Constitutional: She is oriented to person, place, and time  She appears well-developed and well-nourished  No distress  HENT:   Head: Normocephalic and atraumatic  Eyes: Pupils are equal, round, and reactive to light  Conjunctivae and EOM are normal    Neck: Normal range of motion  Neck supple  No JVD present  Cardiovascular: Intact distal pulses  Exam reveals no gallop and no friction rub  No murmur heard  Irregularly irregular   Pulmonary/Chest: Effort normal  No respiratory distress  She has no wheezes  She has rales (bibasilar)  Abdominal: Soft  She exhibits no distension  There is no tenderness  There is no guarding  Musculoskeletal: Normal range of motion  Edema: 1+   Neurological: She is alert and oriented to person, place, and time  Skin: Skin is warm and dry  Capillary refill takes less than 2 seconds  She is not diaphoretic  No pallor  Psychiatric: She has a normal mood and affect  Lab Results: I have personally reviewed pertinent lab results  Imaging: I have personally reviewed pertinent reports  EKG: Personally reviewed    ECHO: n/a  Previous Stress/Cath/PCI: n/a  Code Status: Level 1 - Full Code  Advance Directive and Living Will:      Power of :    POLST:        Jesi Solomon MD  Cardiology Fellow

## 2019-06-19 NOTE — UTILIZATION REVIEW
Initial Clinical Review    Admission: Date/Time/Statement: 6/18/19 @ 2032 Inpatient Written     Orders Placed This Encounter   Procedures    Inpatient Admission     Standing Status:   Standing     Number of Occurrences:   1     Order Specific Question:   Admitting Physician     Answer:   Adolfo Best [[id number]]     Order Specific Question:   Level of Care     Answer:   Level 2 Stepdown / HOT [14]     Order Specific Question:   Estimated length of stay     Answer:   More than 2 Midnights     Order Specific Question:   Certification     Answer:   I certify that inpatient services are medically necessary for this patient for a duration of greater than two midnights  See H&P and MD Progress Notes for additional information about the patient's course of treatment  ED Arrival Information     Expected Arrival Acuity Means of Arrival Escorted By Service Admission Type    6/18/2019 16:08 6/18/2019 16:21 Emergent Walk-In Self Hospitalist Emergency    Arrival Complaint    -        Chief Complaint   Patient presents with    Weakness - Generalized     Patient comes to ER with increase tiredness, SOB, leg swelling and weakness  Went to PCP which sent her to the ER  Assessment/Plan: 65 y/o female to ED from home admitted as Inpatient due to AFib with RVR  Presented to PCP today due to worsening dyspnea on exertion and BL LE edema and nonproductive cough and found to be in Afib with RVR, new onset  Pt not responsive to multiple Cardizem boluses, Cardizem drip initiated  Continue Cardizem drip but may need to consider Amiodarone if Afib persists, Echo, cards consult, IV Lasix BID, low Na diet with fluid restriction, SSI, continue home meds    ED Triage Vitals [06/18/19 1644]   Temperature Pulse Respirations Blood Pressure SpO2   97 6 °F (36 4 °C) (!) 155 20 (!) 138/101 99 %      Temp Source Heart Rate Source Patient Position - Orthostatic VS BP Location FiO2 (%)   Oral Monitor Lying Left arm --      Pain Score       No Pain        Wt Readings from Last 1 Encounters:   06/18/19 83 5 kg (184 lb 1 4 oz)     Additional Vital Signs:  Date/Time  Temp  Pulse  Resp  BP  SpO2  O2 Device  Patient Position - Orthostatic VS   06/19/19 0454    118Abnormal     110/81         06/19/19 0251  98 8 °F (37 1 °C)  117Abnormal   20  121/81  91 %       06/18/19 2342    126Abnormal     138/78         06/18/19 2125    150Abnormal   20  137/90    None (Room air)     06/18/19 2100    142Abnormal   21  133/75  94 %  None (Room air)  Lying   06/18/19 2052    150Abnormal   16  133/75  95 %  None (Room air)  Lying   06/18/19 2030    136Abnormal   16  124/54  94 %  None (Room air)  Lying   06/18/19 2023    133Abnormal   18  118/66  94 %  None (Room air)  Lying   06/18/19 2008    129Abnormal   18  110/53  95 %  None (Room air)  Lying   06/18/19 1945    131Abnormal   16  134/73  97 %  None (Room air)  Lying   06/18/19 1930    150Abnormal   15  134/70  97 %  None (Room air)  Lying   06/18/19 1915    154Abnormal   16  151/82  98 %  None (Room air)  Lying   06/18/19 1830    152Abnormal   18  130/58  98 %  None (Room air)  Sitting   06/18/19 1815    160Abnormal   19  138/90  98 %  None (Room air)  Sitting   06/18/19 1715    160Abnormal   20  133/105Abnormal   95 %  None (Room air)  Lying   06/18/19 1644  97 6 °F (36 4 °C)  155Abnormal   20  138/101Abnormal   99 %  None (Room air)  Lying     Pertinent Labs/Diagnostic Test Results:   Results from last 7 days   Lab Units 06/19/19 0220 06/18/19  1652   WBC Thousand/uL 10 07 7 86   HEMOGLOBIN g/dL 13 3 13 7   HEMATOCRIT % 40 1 42 0   PLATELETS Thousands/uL 251 245   NEUTROS ABS Thousands/µL 6 66 4 62     Results from last 7 days   Lab Units 06/19/19 0220 06/18/19  1652   SODIUM mmol/L 141 136   POTASSIUM mmol/L 4 0 3 5   CHLORIDE mmol/L 104 100   CO2 mmol/L 30 28   ANION GAP mmol/L 7 8   BUN mg/dL 6 6   CREATININE mg/dL 0 83 0 81   EGFR ml/min/1 73sq m 68 70   CALCIUM mg/dL 8 9 9 2   MAGNESIUM mg/dL 2 0 2 1     Results from last 7 days   Lab Units 06/18/19  1652   AST U/L 30   ALT U/L 37   ALK PHOS U/L 72   TOTAL PROTEIN g/dL 7 7   ALBUMIN g/dL 3 9   TOTAL BILIRUBIN mg/dL 0 59     Results from last 7 days   Lab Units 06/19/19  0608 06/18/19  2134   POC GLUCOSE mg/dl 137 137     Results from last 7 days   Lab Units 06/19/19  0220 06/18/19  1652   GLUCOSE RANDOM mg/dL 121 143*     Results from last 7 days   Lab Units 06/18/19  1652   TROPONIN I ng/mL 0 03     Results from last 7 days   Lab Units 06/18/19  1652   D DIMER QUANT ng/ml (FEU) 1,206*     Results from last 7 days   Lab Units 06/18/19  1652   PROTIME seconds 14 7*   INR  1 14   PTT seconds 26     Results from last 7 days   Lab Units 06/18/19  1652   NT-PRO BNP pg/mL 1,823*     6/18 CXR:  Cardiomegaly and mild CHF  Possible small pleural effusions  6/18 CTA CHEST PE STUDY: 1  No evidence of acute pulmonary embolus or thoracic aortic aneurysm  2   Small bilateral pleural effusions and pulmonary vascular congestion without pulmonary interstitial edema, possibly indicating early or mild congestive heart failure    6/18 EKG:  AFIB WITH RVR, RATE 165  ED Treatment:   Medication Administration from 06/18/2019 1607 to 06/18/2019 2124       Date/Time Order Dose Route Action     06/18/2019 1716 sodium chloride 0 9 % bolus 500 mL 500 mL Intravenous New Bag     06/18/2019 1711 diltiazem (CARDIZEM) injection 10 mg 10 mg Intravenous Given     06/18/2019 1813 diltiazem (CARDIZEM) 125 mg in sodium chloride 0 9 % 125 mL infusion 2 5 mg/hr Intravenous New Bag     06/18/2019 1904 iohexol (OMNIPAQUE) 350 MG/ML injection (MULTI-DOSE) 78 mL 78 mL Intravenous Given     06/18/2019 1941 diltiazem (CARDIZEM) injection 15 mg 15 mg Intravenous Given     06/18/2019 2020 diltiazem (CARDIZEM) injection 10 mg 10 mg Intravenous Given     06/18/2019 2052 furosemide (LASIX) injection 40 mg 40 mg Intravenous Given        Past Medical History:   Diagnosis Date    Eczema     Photosensitivity     abnormal skin sensitivity to sunlight    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HCC)     staging     Present on Admission:   Type 2 diabetes mellitus with diabetic cataract ([facility] [address] )   Hypothyroidism   Hypertension   Hyperlipidemia   Atrial fibrillation with RVR (HCC)   Acute CHF (congestive heart failure) (HCC)      Admitting Diagnosis: Orthopnea [R06 01]  Shortness of breath [R06 02]  CHF (congestive heart failure) (HCC) [I50 9]  New onset atrial fibrillation (HCC) [I48 91]  Atrial fibrillation with rapid ventricular response (HCC) [I48 91]  Irregular heart rhythm [I49 9]  Bilateral lower extremity edema [R60 0]  Age/Sex: 66 y o  female  Admission Orders:  Telemetry   2gm Na diet, fluid restriction  Accuchecks QAC and QHS with coverage  Daily weights, I/O  Echo  SCD  Current Facility-Administered Medications:  acetaminophen 650 mg Oral Q4H PRN   albuterol 2 puff Inhalation Q4H PRN   ascorbic acid 125 mg Oral Daily   cholecalciferol 1,000 Units Oral Daily   cyanocobalamin 100 mcg Oral Daily   diltiazem 1-15 mg/hr Intravenous Titrated   enoxaparin 1 mg/kg Subcutaneous Q12H [facility]   fish oil 1,000 mg Oral Daily   furosemide 40 mg Intravenous BID   insulin lispro 1-5 Units Subcutaneous HS   insulin lispro 1-6 Units Subcutaneous TID AC   levothyroxine 137 mcg Oral Daily   loratadine 10 mg Oral Daily   losartan 100 mg Oral Daily   multivitamin-minerals 1 tablet Oral Daily   ondansetron 4 mg Intravenous Q6H PRN   pravastatin 40 mg Oral Daily With Dinner   pyridoxine 100 mg Oral Daily   venlafaxine 75 mg Oral Q12H   vitamin E (tocopherol) 1,000 Units Oral Daily       IP CONSULT TO CARDIOLOGY  IP CONSULT TO CASE MANAGEMENT    Network Utilization Review Department  Phone: [phone]; Fax [phone]  [email or web address]  ATTENTION: Please call with any questions or concerns to [phone]  and carefully listen to the prompts so that you are directed to the right person     Send all requests for admission clinical reviews, approved or denied determinations and any other requests to fax [phone]   All voicemails are confidential

## 2019-06-19 NOTE — ASSESSMENT & PLAN NOTE
· Atrial fibrillation appears new onset  · Unfortunately remains in RVR despite multiple Cardizem boluses, Cardizem GTT  · Continue cardizem gtt, may need to consider amiodarone if persists  · Telemetry monitoring  · CHADS2-VASc 6 and would benefit from anticoagulation    Weight-based lovenox for now, to work with CM to determine anticoagulant on discharge  · Check Echocardiogram  · Cardiology consultation

## 2019-06-19 NOTE — ASSESSMENT & PLAN NOTE
Wt Readings from Last 3 Encounters:   06/19/19 82 4 kg (181 lb 10 5 oz)   06/18/19 81 kg (178 lb 9 6 oz)   03/20/19 80 1 kg (176 lb 9 6 oz)     · Appears volume overloaded on examination, CXR and CTA PE study with pulmonary vascular congestion and elevated BNP noted    Suspect AFib with RVR is also playing role  · IV diuretics

## 2019-06-19 NOTE — ASSESSMENT & PLAN NOTE
Lab Results   Component Value Date    HGBA1C 5 9 (H) 03/18/2019       Recent Labs     06/18/19  2134   POCGLU 137       Blood Sugar Average: Last 72 hrs:  (P) 137   · Diet controlled at home  · Add SSI with Accu-Cheks while inpatient  · Carb controlled diet  · Avoid hypoglycemia

## 2019-06-19 NOTE — ASSESSMENT & PLAN NOTE
Lab Results   Component Value Date    HGBA1C 5 9 (H) 03/18/2019       Recent Labs     06/18/19  2134 06/19/19  0608   POCGLU 137 137       Blood Sugar Average: Last 72 hrs:  (P) 137   · Diet controlled at home  · Add SSI with Accu-Cheks while inpatient  · Carb controlled diet  · Avoid hypoglycemia

## 2019-06-19 NOTE — PROGRESS NOTES
Spoke with Dr Bertram Flores from [address] [place] [address] regarding 6 beat run of V-tach  Also discussed the pt rapid a-fib and HR of 130-155 while on Cardizem gtt  Morning Labs drawn  Metoprolol given  HR down between   Per slim continue Cardizem gtt

## 2019-06-19 NOTE — SOCIAL WORK
Met with the patient to complete assessment and explain role of CM  She lives alone in a one floor home with no VADIM  Prior to admission the patient was independent, drives and is employed PT 35 hours/week as a   She has no HHC or DME  Patient denies MH or D&A treatment  She has no Advance Directive and her primary contact is her sister, Sydnie Johnson, [phone]  PCP is Dr Jenniffer Ramirez  Patient has prescription coverage and uses [address]  CM reviewed d/c planning process including the following: identifying help at home, patient preference for d/c planning needs, Discharge Lounge, [facility] Meds to Bed program, availability of treatment team to discuss questions or concerns patient and/or family may have regarding understanding medications and recognizing signs and symptoms once discharged  CM also encouraged patient to follow up with all recommended appointments after discharge  Patient advised of importance for patient and family to participate in managing patients medical well being  Patient/caregiver received discharge checklist  Content reviewed  Patient/caregiver encouraged to participate in discharge plan of care prior to discharge home

## 2019-06-19 NOTE — ASSESSMENT & PLAN NOTE
Wt Readings from Last 3 Encounters:   06/18/19 83 5 kg (184 lb 1 4 oz)   06/18/19 81 kg (178 lb 9 6 oz)   03/20/19 80 1 kg (176 lb 9 6 oz)     · Appears volume overloaded on examination, CXR and CTA PE study with pulmonary vascular congestion and elevated BNP noted  Suspect AFib with RVR is also playing role  · Give IV Lasix 40 mg x1 now, then 40 mg IV b i d    · Check echocardiogram (no prior echocardiogram available for review)  · Daily weights, I/O  · 2gm sodium diet, 1500cc fluid restriction

## 2019-06-20 ENCOUNTER — APPOINTMENT (INPATIENT)
Dept: NON INVASIVE DIAGNOSTICS | Facility: HOSPITAL | Age: 79
DRG: 291 | End: 2019-06-20
Payer: COMMERCIAL

## 2019-06-20 DIAGNOSIS — Z71.89 COMPLEX CARE COORDINATION: Primary | ICD-10-CM

## 2019-06-20 LAB
ANION GAP SERPL CALCULATED.3IONS-SCNC: 3 MMOL/L (ref 4–13)
BUN SERPL-MCNC: 11 MG/DL (ref 5–25)
CALCIUM SERPL-MCNC: 8.9 MG/DL (ref 8.3–10.1)
CHLORIDE SERPL-SCNC: 104 MMOL/L (ref 100–108)
CO2 SERPL-SCNC: 31 MMOL/L (ref 21–32)
CREAT SERPL-MCNC: 0.9 MG/DL (ref 0.6–1.3)
GFR SERPL CREATININE-BSD FRML MDRD: 61 ML/MIN/1.73SQ M
GLUCOSE SERPL-MCNC: 118 MG/DL (ref 65–140)
GLUCOSE SERPL-MCNC: 119 MG/DL (ref 65–140)
GLUCOSE SERPL-MCNC: 137 MG/DL (ref 65–140)
GLUCOSE SERPL-MCNC: 151 MG/DL (ref 65–140)
GLUCOSE SERPL-MCNC: 159 MG/DL (ref 65–140)
MAGNESIUM SERPL-MCNC: 2.2 MG/DL (ref 1.6–2.6)
POTASSIUM SERPL-SCNC: 4.1 MMOL/L (ref 3.5–5.3)
SODIUM SERPL-SCNC: 138 MMOL/L (ref 136–145)

## 2019-06-20 PROCEDURE — C8929 TTE W OR WO FOL WCON,DOPPLER: HCPCS

## 2019-06-20 PROCEDURE — 80048 BASIC METABOLIC PNL TOTAL CA: CPT | Performed by: INTERNAL MEDICINE

## 2019-06-20 PROCEDURE — 82948 REAGENT STRIP/BLOOD GLUCOSE: CPT

## 2019-06-20 PROCEDURE — 99232 SBSQ HOSP IP/OBS MODERATE 35: CPT | Performed by: INTERNAL MEDICINE

## 2019-06-20 PROCEDURE — 83735 ASSAY OF MAGNESIUM: CPT | Performed by: INTERNAL MEDICINE

## 2019-06-20 PROCEDURE — 93306 TTE W/DOPPLER COMPLETE: CPT | Performed by: INTERNAL MEDICINE

## 2019-06-20 RX ADMIN — Medication 100 MG: at 08:51

## 2019-06-20 RX ADMIN — ASCORBIC ACID TAB 250 MG 125 MG: 250 TAB at 08:51

## 2019-06-20 RX ADMIN — ENOXAPARIN SODIUM 90 MG: 100 INJECTION SUBCUTANEOUS at 21:34

## 2019-06-20 RX ADMIN — ENOXAPARIN SODIUM 90 MG: 100 INJECTION SUBCUTANEOUS at 08:52

## 2019-06-20 RX ADMIN — Medication 1000 UNITS: at 08:51

## 2019-06-20 RX ADMIN — DILTIAZEM HYDROCHLORIDE 15 MG/HR: 5 INJECTION INTRAVENOUS at 03:45

## 2019-06-20 RX ADMIN — METOPROLOL TARTRATE 50 MG: 50 TABLET, FILM COATED ORAL at 08:51

## 2019-06-20 RX ADMIN — Medication 1 TABLET: at 08:45

## 2019-06-20 RX ADMIN — VENLAFAXINE 75 MG: 37.5 TABLET ORAL at 21:34

## 2019-06-20 RX ADMIN — VITAM B12 100 MCG: 100 TAB at 08:45

## 2019-06-20 RX ADMIN — LORATADINE 10 MG: 10 TABLET ORAL at 08:45

## 2019-06-20 RX ADMIN — METOPROLOL TARTRATE 50 MG: 50 TABLET, FILM COATED ORAL at 21:34

## 2019-06-20 RX ADMIN — Medication 1000 MG: at 08:45

## 2019-06-20 RX ADMIN — VENLAFAXINE 75 MG: 37.5 TABLET ORAL at 11:38

## 2019-06-20 RX ADMIN — PERFLUTREN 0.8 ML/MIN: 6.52 INJECTION, SUSPENSION INTRAVENOUS at 15:29

## 2019-06-20 RX ADMIN — PRAVASTATIN SODIUM 40 MG: 40 TABLET ORAL at 16:00

## 2019-06-20 RX ADMIN — VITAMIN D, TAB 1000IU (100/BT) 1000 UNITS: 25 TAB at 08:45

## 2019-06-20 RX ADMIN — INSULIN LISPRO 1 UNITS: 100 INJECTION, SOLUTION INTRAVENOUS; SUBCUTANEOUS at 11:40

## 2019-06-20 RX ADMIN — METOPROLOL TARTRATE 50 MG: 50 TABLET, FILM COATED ORAL at 15:59

## 2019-06-20 RX ADMIN — DILTIAZEM HYDROCHLORIDE 12.5 MG/HR: 5 INJECTION INTRAVENOUS at 20:35

## 2019-06-20 RX ADMIN — LEVOTHYROXINE SODIUM 137 MCG: 112 TABLET ORAL at 05:23

## 2019-06-20 NOTE — PROGRESS NOTES
S/w Cardiology [facility] [place] regarding patient's cardizem gtt  Cardizem gtt has been on hold since 855 due to hypotension-- BP-98/62  Per cardizem gtt order, titrate for HR <110  Patient's HR in 110-120s  Per [place], titrate gtt for HR <130  BP-110/73  No other orders at this time

## 2019-06-20 NOTE — PLAN OF CARE
Problem: PAIN - ADULT  Goal: Verbalizes/displays adequate comfort level or baseline comfort level  Description  Interventions:  - Encourage patient to monitor pain and request assistance  - Assess pain using appropriate pain scale  - Administer analgesics based on type and severity of pain and evaluate response  - Implement non-pharmacological measures as appropriate and evaluate response  - Consider cultural and social influences on pain and pain management  - Notify physician/advanced practitioner if interventions unsuccessful or patient reports new pain  Outcome: Progressing     Problem: INFECTION - ADULT  Goal: Absence or prevention of progression during hospitalization  Description  INTERVENTIONS:  - Assess and monitor for signs and symptoms of infection  - Monitor lab/diagnostic results  - Monitor all insertion sites, i e  indwelling lines, tubes, and drains  - Monitor endotracheal (as able) and nasal secretions for changes in amount and color  - [place] appropriate cooling/warming therapies per order  - Administer medications as ordered  - Instruct and encourage patient and family to use good hand hygiene technique  - Identify and instruct in appropriate isolation precautions for identified infection/condition  Outcome: Progressing  Goal: Absence of fever/infection during neutropenic period  Description  INTERVENTIONS:  - Monitor WBC  - Implement neutropenic guidelines  Outcome: Progressing     Problem: SAFETY ADULT  Goal: Patient will remain free of falls  Description  INTERVENTIONS:  - Assess patient frequently for physical needs  -  Identify cognitive and physical deficits and behaviors that affect risk of falls    -  [place] fall precautions as indicated by assessment   - Educate patient/family on patient safety including physical limitations  - Instruct patient to call for assistance with activity based on assessment  - Modify environment to reduce risk of injury  - Consider OT/PT consult to assist with strengthening/mobility  Outcome: Progressing  Goal: Maintain or return to baseline ADL function  Description  INTERVENTIONS:  -  Assess patient's ability to carry out ADLs; assess patient's baseline for ADL function and identify physical deficits which impact ability to perform ADLs (bathing, care of mouth/teeth, toileting, grooming, dressing, etc )  - Assess/evaluate cause of self-care deficits   - Assess range of motion  - Assess patient's mobility; develop plan if impaired  - Assess patient's need for assistive devices and provide as appropriate  - Encourage maximum independence but intervene and supervise when necessary  ¯ Involve family in performance of ADLs  ¯ Assess for home care needs following discharge   ¯ Request OT consult to assist with ADL evaluation and planning for discharge  ¯ Provide patient education as appropriate  Outcome: Progressing  Goal: Maintain or return mobility status to optimal level  Description  INTERVENTIONS:  - Assess patient's baseline mobility status (ambulation, transfers, stairs, etc )    - Identify cognitive and physical deficits and behaviors that affect mobility  - Identify mobility aids required to assist with transfers and/or ambulation (gait belt, sit-to-stand, lift, walker, cane, etc )  - [place] fall precautions as indicated by assessment  - Record patient progress and toleration of activity level on Mobility SBAR; progress patient to next Phase/Stage  - Instruct patient to call for assistance with activity based on assessment  - Request Rehabilitation consult to assist with strengthening/weightbearing, etc   Outcome: Progressing     Problem: DISCHARGE PLANNING  Goal: Discharge to home or other facility with appropriate resources  Description  INTERVENTIONS:  - Identify barriers to discharge w/patient and caregiver  - Arrange for needed discharge resources and transportation as appropriate  - Identify discharge learning needs (meds, wound care, etc )  - Arrange for interpretive services to assist at discharge as needed  - Refer to Case Management Department for coordinating discharge planning if the patient needs post-hospital services based on physician/advanced practitioner order or complex needs related to functional status, cognitive ability, or social support system  Outcome: Progressing     Problem: Knowledge Deficit  Goal: Patient/family/caregiver demonstrates understanding of disease process, treatment plan, medications, and discharge instructions  Description  Complete learning assessment and assess knowledge base    Interventions:  - Provide teaching at level of understanding  - Provide teaching via preferred learning methods  Outcome: Progressing

## 2019-06-20 NOTE — PROGRESS NOTES
Progress Note Sly Mendoza 1940, 66 y o  female MRN: [id number]    Unit/Bed#: [facility] 505-49 Encounter: [id number]    Primary Care Provider: Artemio Valdez MD   Date and time admitted to hospital: 6/18/2019  4:35 PM        * Atrial fibrillation with RVR ([address] )  Assessment & Plan  · Atrial fibrillation appears new onset  · Unfortunately remains in RVR despite multiple Cardizem boluses, Cardizem GTT  · Continue cardizem gtt, may need to consider amiodarone if persists  · Telemetry monitoring  · CHADS2-VASc 6 and would benefit from anticoagulation  Weight-based lovenox for now, to work with CM to determine anticoagulant on discharge  Will discuss options  · Check Echocardiogram  · Cardiology consultation noted  For intervention tomorrow  Acute CHF (congestive heart failure) (HCC)  Assessment & Plan  Wt Readings from Last 3 Encounters:   06/20/19 82 5 kg (181 lb 14 1 oz)   06/18/19 81 kg (178 lb 9 6 oz)   03/20/19 80 1 kg (176 lb 9 6 oz)     · Appears volume overloaded on examination, CXR and CTA PE study with pulmonary vascular congestion and elevated BNP noted    Suspect AFib with RVR is also playing role  · IV diuretics        Type 2 diabetes mellitus with diabetic cataract [facility])  Assessment & Plan  Lab Results   Component Value Date    HGBA1C 5 9 (H) 03/18/2019       Recent Labs     06/19/19  1139 06/19/19  1608 06/19/19  2112 06/20/19  0719   POCGLU 246* 134 124 119       Blood Sugar Average: Last 72 hrs:  (P) 149 5   · Diet controlled at home  · Add SSI with Accu-Cheks while inpatient  · Carb controlled diet  · Avoid hypoglycemia    Hypothyroidism  Assessment & Plan  · TSH and free T4 as below  · Continue Synthroid therapy    Hypertension  Assessment & Plan  · Continue losartan, hold HCTZ for now  · Blood pressure monitoring per protocol, avoid hypotension    Hyperlipidemia  Assessment & Plan  · Continue statin therapy      VTE Pharmacologic Prophylaxis:   Pharmacologic: Enoxaparin (Lovenox)  Mechanical VTE Prophylaxis in Place: No    Patient Centered Rounds: I have performed bedside rounds with nursing staff today  Time Spent for Care: 15 minutes  More than 50% of total time spent on counseling and coordination of care as described above  Current Length of Stay: 2 day(s)    Current Patient Status: Inpatient   Certification Statement: The patient will continue to require additional inpatient hospital stay due to Need to monitor symptoms        Code Status: Level 1 - Full Code      Subjective:   Patient comfortable    Objective:     Vitals:   Temp (24hrs), Av 7 °F (36 5 °C), Min:97 4 °F (36 3 °C), Max:97 9 °F (36 6 °C)    Temp:  [97 4 °F (36 3 °C)-97 9 °F (36 6 °C)] 97 4 °F (36 3 °C)  HR:  [109-142] 142  Resp:  [18-25] 25  BP: ()/(62-78) 98/62  SpO2:  [90 %-98 %] 97 %  Body mass index is 34 37 kg/m²  Input and Output Summary (last 24 hours): Intake/Output Summary (Last 24 hours) at 2019 1033  Last data filed at 2019 0836  Gross per 24 hour   Intake 691 77 ml   Output 825 ml   Net -133 23 ml       Physical Exam:     Physical Exam   Constitutional: She is oriented to person, place, and time  HENT:   Head: Normocephalic and atraumatic  Eyes: Pupils are equal, round, and reactive to light  Pulmonary/Chest: Effort normal    Abdominal: Soft  Bowel sounds are normal    Neurological: She is alert and oriented to person, place, and time  Skin: Skin is warm and dry         Additional Data:     Labs:    Results from last 7 days   Lab Units 19  0220   WBC Thousand/uL 10 07   HEMOGLOBIN g/dL 13 3   HEMATOCRIT % 40 1   PLATELETS Thousands/uL 251   NEUTROS PCT % 65   LYMPHS PCT % 26   MONOS PCT % 7   EOS PCT % 1     Results from last 7 days   Lab Units 19  1000  19  1652   POTASSIUM mmol/L 4 1   < > 3 5   CHLORIDE mmol/L 104   < > 100   CO2 mmol/L 31   < > 28   BUN mg/dL 11   < > 6   CREATININE mg/dL 0 90   < > 0 81   CALCIUM mg/dL 8 9   < > 9  2   ALK PHOS U/L  --   --  72   ALT U/L  --   --  37   AST U/L  --   --  30    < > = values in this interval not displayed  Results from last 7 days   Lab Units 06/18/19  1652   INR  1 14       * I Have Reviewed All Lab Data Listed Above  * Additional Pertinent Lab Tests Reviewed: All Labs Within Last 24 Hours Reviewed      Recent Cultures (last 7 days):           Last 24 Hours Medication List:     Current Facility-Administered Medications:  acetaminophen 650 mg Oral Q4H PRN Sung Riddhi, MD    albuterol 2 puff Inhalation Q4H PRN Sung Lis, MD    ascorbic acid 125 mg Oral Daily Sung Lis, MD    cholecalciferol 1,000 Units Oral Daily Sung Lis, MD    cyanocobalamin 100 mcg Oral Daily Sung Lis, MD    diltiazem 1-15 mg/hr Intravenous Titrated Chevy Hannon, MD Last Rate: Stopped (06/20/19 0855)   enoxaparin 1 mg/kg Subcutaneous Q12H [address] Sung Lis, MD    fish oil 1,000 mg Oral Daily Sung Lis, MD    insulin lispro 1-5 Units Subcutaneous HS Sung Lis, MD    insulin lispro 1-6 Units Subcutaneous TID AC Chevy Hannon, MD    levothyroxine 137 mcg Oral Daily Sung Lis, MD    loratadine 10 mg Oral Daily Sung Lis, MD    losartan 100 mg Oral Daily Sung Lis, MD    metoprolol tartrate 50 mg Oral TID Pham Wilder MD    multivitamin-minerals 1 tablet Oral Daily Sung Lis, MD    ondansetron 4 mg Intravenous Q6H PRN Sung Lis, MD    pravastatin 40 mg Oral Daily With Vaishali Farrar MD    pyridoxine 100 mg Oral Daily Sung Lis, MD    venlafaxine 75 mg Oral Q12H Sung Lis, MD    vitamin E (tocopherol) 1,000 Units Oral Daily Sung Riddhi, MD         Today, Patient Was Seen By: Ena Saunders DO    ** Please Note: Dictation voice to text software may have been used in the creation of this document   **

## 2019-06-20 NOTE — ASSESSMENT & PLAN NOTE
· Atrial fibrillation appears new onset  · Unfortunately remains in RVR despite multiple Cardizem boluses, Cardizem GTT  · Continue cardizem gtt, may need to consider amiodarone if persists  · Telemetry monitoring  · CHADS2-VASc 6 and would benefit from anticoagulation  Weight-based lovenox for now, to work with CM to determine anticoagulant on discharge  Will discuss options  · Check Echocardiogram  · Cardiology consultation noted  For intervention tomorrow

## 2019-06-20 NOTE — PROGRESS NOTES
Cardiology Progress Note - Vega Guzman 66 y o  female MRN: [id number]    Unit/Bed#: [facility] 755-41 Encounter: [id number]    Assessment and plan  1  Atrial fibrillation with RVR  2  Acute CHF suspect diastolic awaiting echo  3  Hypertension  4  Hyperlipidemia  5  Hypothyroidism  6  Prediabetes    Recommendations:  Continue metoprolol and Cardizem limited by blood pressure unable to up titrate  Continue IV Lasix for now, need to review blood work once drawn today  Will plan for a EBEN guided cardioversion tomorrow  Continue oral metoprolol  Hold losartan  Continue Lovenox once case management to terms on affordability of Eliquis versus Xarelto can transition to 1 of those medications  Subjective:    No significant events overnight  Feels palpitations this morning  Shortness of breath has improved  Lower extremity edema has improved  Remains in rapid atrial fibrillation on telemetry  ROS    Objective:   Vitals: Blood pressure 98/62, pulse (!) 142, temperature (!) 97 4 °F (36 3 °C), temperature source Oral, resp  rate (!) 25, height 5' 1" (1 549 m), weight 82 5 kg (181 lb 14 1 oz), SpO2 98 %  , Body mass index is 34 37 kg/m² , Orthostatic Blood Pressures      Most Recent Value   Blood Pressure  98/62 filed at 06/20/2019 0851   Patient Position - Orthostatic VS  Lying filed at 06/20/2019 5922         Systolic (65CLP), OQS:090 , Min:98 , MUB:238     Diastolic (17FCV), SKX:47, Min:62, Max:78      Intake/Output Summary (Last 24 hours) at 6/20/2019 0915  Last data filed at 6/20/2019 0836  Gross per 24 hour   Intake 891 77 ml   Output 775 ml   Net 116 77 ml     Weight (last 2 days)     Date/Time   Weight    06/20/19 0547   82 5 (181 88)    06/19/19 0938   82 4 (181 66)    06/18/19 2125   83 5 (184 08)    06/18/19 1644   85 5 (188 49)                Telemetry Review: No significant arrhythmias seen on telemetry review  EKG personally reviewed by Angie Hickey, DO       Physical Exam   Constitutional: She is oriented to person, place, and time  She appears well-nourished  No distress  HENT:   Head: Atraumatic  Eyes: Pupils are equal, round, and reactive to light  Conjunctivae are normal    Neck: Neck supple  Cardiovascular: Normal rate and normal heart sounds  An irregularly irregular rhythm present  Exam reveals no friction rub  No murmur heard  Pulmonary/Chest: Effort normal and breath sounds normal  No respiratory distress  She has no wheezes  She has no rales  Abdominal: Bowel sounds are normal  She exhibits no distension  There is no tenderness  There is no rebound  Musculoskeletal: She exhibits edema  Neurological: She is alert and oriented to person, place, and time  No cranial nerve deficit  Skin: Skin is warm and dry  No erythema  Nursing note and vitals reviewed  Laboratory Results:  Results from last 7 days   Lab Units 06/18/19  1652   TROPONIN I ng/mL 0 03       CBC with diff: Results from last 7 days   Lab Units 06/19/19 0220 06/18/19  1652   WBC Thousand/uL 10 07 7 86   HEMOGLOBIN g/dL 13 3 13 7   HEMATOCRIT % 40 1 42 0   MCV fL 98 99*   PLATELETS Thousands/uL 251 245   MCH pg 32 5 32 3   MCHC g/dL 33 2 32 6   RDW % 12 9 13 1   MPV fL 10 3 10 4   NRBC AUTO /100 WBCs 0 0         CMP:  Results from last 7 days   Lab Units 06/19/19 0220 06/18/19  1652   POTASSIUM mmol/L 4 0 3 5   CHLORIDE mmol/L 104 100   CO2 mmol/L 30 28   BUN mg/dL 6 6   CREATININE mg/dL 0 83 0 81   CALCIUM mg/dL 8 9 9 2   AST U/L  --  30   ALT U/L  --  37   ALK PHOS U/L  --  72   EGFR ml/min/1 73sq m 68 70         BMP:  Results from last 7 days   Lab Units 06/19/19 0220 06/18/19  1652   POTASSIUM mmol/L 4 0 3 5   CHLORIDE mmol/L 104 100   CO2 mmol/L 30 28   BUN mg/dL 6 6   CREATININE mg/dL 0 83 0 81   CALCIUM mg/dL 8 9 9 2       BNP: No results for input(s): BNP in the last 72 hours      Magnesium:   Results from last 7 days   Lab Units 06/19/19 0220 06/18/19  1652   MAGNESIUM mg/dL 2 0 2 1       Coags: Results from last 7 days   Lab Units 06/18/19  1652   PTT seconds 26   INR  1 14       TSH:        Hemoglobin A1C       Lipid Profile:       Cardiac testing:   No results found for this or any previous visit  No results found for this or any previous visit  No results found for this or any previous visit  No results found for this or any previous visit      Meds/Allergies   all current active meds have been reviewed  Medications Prior to Admission   Medication    albuterol (VENTOLIN HFA) 90 mcg/act inhaler    Ascorbic Acid (VITAMIN C) 100 MG tablet    cholecalciferol (VITAMIN D3) 1,000 units tablet    cyanocobalamin 100 MCG tablet    fexofenadine (ALLEGRA) 30 MG tablet    Garlic 10 MG CAPS    levothyroxine 137 mcg tablet    losartan-hydrochlorothiazide (HYZAAR) 100-12 5 MG per tablet    magnesium 30 MG tablet    Misc Natural Products (TURMERIC CURCUMIN) CAPS    multivitamin (THERAGRAN) TABS    Omega-3 Fatty Acids (FISH OIL) 1,000 mg    pyridoxine (B-6) 100 MG tablet    simvastatin (ZOCOR) 20 mg tablet    SUPER B COMPLEX/C CAPS    venlafaxine (EFFEXOR) 75 mg tablet    vitamin E, tocopherol, 1,000 units capsule         diltiazem 1-15 mg/hr Last Rate: Stopped (06/20/19 0855)     Assessment:  Principal Problem:    Atrial fibrillation with RVR ([facility])  Active Problems:    Hyperlipidemia    Hypertension    Hypothyroidism    Type 2 diabetes mellitus with diabetic cataract ([facility])    Acute CHF (congestive heart failure) ([facility] )

## 2019-06-20 NOTE — ASSESSMENT & PLAN NOTE
Wt Readings from Last 3 Encounters:   06/20/19 82 5 kg (181 lb 14 1 oz)   06/18/19 81 kg (178 lb 9 6 oz)   03/20/19 80 1 kg (176 lb 9 6 oz)     · Appears volume overloaded on examination, CXR and CTA PE study with pulmonary vascular congestion and elevated BNP noted    Suspect AFib with RVR is also playing role  · IV diuretics

## 2019-06-20 NOTE — ASSESSMENT & PLAN NOTE
Lab Results   Component Value Date    HGBA1C 5 9 (H) 03/18/2019       Recent Labs     06/19/19  1139 06/19/19  1608 06/19/19  2112 06/20/19  0719   POCGLU 246* 134 124 119       Blood Sugar Average: Last 72 hrs:  (P) 149 5   · Diet controlled at home  · Add SSI with Accu-Cheks while inpatient  · Carb controlled diet  · Avoid hypoglycemia

## 2019-06-21 ENCOUNTER — APPOINTMENT (INPATIENT)
Dept: NON INVASIVE DIAGNOSTICS | Facility: HOSPITAL | Age: 79
DRG: 291 | End: 2019-06-21
Payer: COMMERCIAL

## 2019-06-21 ENCOUNTER — ANESTHESIA (INPATIENT)
Dept: ANESTHESIOLOGY | Facility: HOSPITAL | Age: 79
DRG: 291 | End: 2019-06-21
Payer: COMMERCIAL

## 2019-06-21 ENCOUNTER — ANESTHESIA EVENT (INPATIENT)
Dept: ANESTHESIOLOGY | Facility: HOSPITAL | Age: 79
DRG: 291 | End: 2019-06-21
Payer: COMMERCIAL

## 2019-06-21 LAB
ANION GAP SERPL CALCULATED.3IONS-SCNC: 9 MMOL/L (ref 4–13)
ATRIAL RATE: 43 BPM
ATRIAL RATE: 68 BPM
BUN SERPL-MCNC: 17 MG/DL (ref 5–25)
CALCIUM SERPL-MCNC: 8.6 MG/DL (ref 8.3–10.1)
CHLORIDE SERPL-SCNC: 106 MMOL/L (ref 100–108)
CO2 SERPL-SCNC: 29 MMOL/L (ref 21–32)
CREAT SERPL-MCNC: 0.95 MG/DL (ref 0.6–1.3)
GFR SERPL CREATININE-BSD FRML MDRD: 58 ML/MIN/1.73SQ M
GLUCOSE SERPL-MCNC: 137 MG/DL (ref 65–140)
GLUCOSE SERPL-MCNC: 150 MG/DL (ref 65–140)
GLUCOSE SERPL-MCNC: 154 MG/DL (ref 65–140)
GLUCOSE SERPL-MCNC: 163 MG/DL (ref 65–140)
GLUCOSE SERPL-MCNC: 163 MG/DL (ref 65–140)
MAGNESIUM SERPL-MCNC: 2.3 MG/DL (ref 1.6–2.6)
POTASSIUM SERPL-SCNC: 3.6 MMOL/L (ref 3.5–5.3)
QRS AXIS: 102 DEGREES
QRS AXIS: 104 DEGREES
QRSD INTERVAL: 88 MS
QRSD INTERVAL: 90 MS
QT INTERVAL: 406 MS
QT INTERVAL: 426 MS
QTC INTERVAL: 429 MS
QTC INTERVAL: 450 MS
SODIUM SERPL-SCNC: 144 MMOL/L (ref 136–145)
T WAVE AXIS: 115 DEGREES
T WAVE AXIS: 164 DEGREES
VENTRICULAR RATE: 67 BPM
VENTRICULAR RATE: 67 BPM

## 2019-06-21 PROCEDURE — 93005 ELECTROCARDIOGRAM TRACING: CPT

## 2019-06-21 PROCEDURE — 93312 ECHO TRANSESOPHAGEAL: CPT | Performed by: INTERNAL MEDICINE

## 2019-06-21 PROCEDURE — 93325 DOPPLER ECHO COLOR FLOW MAPG: CPT | Performed by: INTERNAL MEDICINE

## 2019-06-21 PROCEDURE — 80048 BASIC METABOLIC PNL TOTAL CA: CPT | Performed by: INTERNAL MEDICINE

## 2019-06-21 PROCEDURE — 93320 DOPPLER ECHO COMPLETE: CPT | Performed by: INTERNAL MEDICINE

## 2019-06-21 PROCEDURE — 83735 ASSAY OF MAGNESIUM: CPT | Performed by: INTERNAL MEDICINE

## 2019-06-21 PROCEDURE — 92960 CARDIOVERSION ELECTRIC EXT: CPT | Performed by: INTERNAL MEDICINE

## 2019-06-21 PROCEDURE — 93010 ELECTROCARDIOGRAM REPORT: CPT | Performed by: INTERNAL MEDICINE

## 2019-06-21 PROCEDURE — 92960 CARDIOVERSION ELECTRIC EXT: CPT

## 2019-06-21 PROCEDURE — 5A2204Z RESTORATION OF CARDIAC RHYTHM, SINGLE: ICD-10-PCS | Performed by: INTERNAL MEDICINE

## 2019-06-21 PROCEDURE — 82948 REAGENT STRIP/BLOOD GLUCOSE: CPT

## 2019-06-21 PROCEDURE — 93312 ECHO TRANSESOPHAGEAL: CPT

## 2019-06-21 PROCEDURE — 99232 SBSQ HOSP IP/OBS MODERATE 35: CPT | Performed by: INTERNAL MEDICINE

## 2019-06-21 RX ORDER — PROPOFOL 10 MG/ML
INJECTION, EMULSION INTRAVENOUS CONTINUOUS PRN
Status: DISCONTINUED | OUTPATIENT
Start: 2019-06-21 | End: 2019-06-21 | Stop reason: SURG

## 2019-06-21 RX ORDER — PROPOFOL 10 MG/ML
INJECTION, EMULSION INTRAVENOUS AS NEEDED
Status: DISCONTINUED | OUTPATIENT
Start: 2019-06-21 | End: 2019-06-21 | Stop reason: SURG

## 2019-06-21 RX ORDER — KETAMINE HCL IN NACL, ISO-OSM 100MG/10ML
SYRINGE (ML) INJECTION AS NEEDED
Status: DISCONTINUED | OUTPATIENT
Start: 2019-06-21 | End: 2019-06-21 | Stop reason: SURG

## 2019-06-21 RX ORDER — METOPROLOL TARTRATE 50 MG/1
100 TABLET, FILM COATED ORAL EVERY 12 HOURS SCHEDULED
Status: DISCONTINUED | OUTPATIENT
Start: 2019-06-21 | End: 2019-06-21

## 2019-06-21 RX ORDER — POTASSIUM CHLORIDE 20 MEQ/1
40 TABLET, EXTENDED RELEASE ORAL ONCE
Status: COMPLETED | OUTPATIENT
Start: 2019-06-21 | End: 2019-06-21

## 2019-06-21 RX ORDER — EPHEDRINE SULFATE 50 MG/ML
INJECTION INTRAVENOUS AS NEEDED
Status: DISCONTINUED | OUTPATIENT
Start: 2019-06-21 | End: 2019-06-21 | Stop reason: SURG

## 2019-06-21 RX ORDER — SODIUM CHLORIDE 9 MG/ML
INJECTION, SOLUTION INTRAVENOUS CONTINUOUS PRN
Status: DISCONTINUED | OUTPATIENT
Start: 2019-06-21 | End: 2019-06-21 | Stop reason: SURG

## 2019-06-21 RX ORDER — LIDOCAINE HYDROCHLORIDE 10 MG/ML
INJECTION, SOLUTION INFILTRATION; PERINEURAL AS NEEDED
Status: DISCONTINUED | OUTPATIENT
Start: 2019-06-21 | End: 2019-06-21 | Stop reason: SURG

## 2019-06-21 RX ORDER — FUROSEMIDE 10 MG/ML
40 INJECTION INTRAMUSCULAR; INTRAVENOUS
Status: DISCONTINUED | OUTPATIENT
Start: 2019-06-21 | End: 2019-06-23

## 2019-06-21 RX ADMIN — PHENYLEPHRINE HYDROCHLORIDE 100 MCG: 10 INJECTION INTRAVENOUS at 13:35

## 2019-06-21 RX ADMIN — Medication 1000 MG: at 10:00

## 2019-06-21 RX ADMIN — EPHEDRINE SULFATE 5 MG: 50 INJECTION, SOLUTION INTRAVENOUS at 13:38

## 2019-06-21 RX ADMIN — POTASSIUM CHLORIDE 40 MEQ: 1500 TABLET, EXTENDED RELEASE ORAL at 17:08

## 2019-06-21 RX ADMIN — LEVOTHYROXINE SODIUM 137 MCG: 112 TABLET ORAL at 05:47

## 2019-06-21 RX ADMIN — PRAVASTATIN SODIUM 40 MG: 40 TABLET ORAL at 17:08

## 2019-06-21 RX ADMIN — LIDOCAINE HYDROCHLORIDE 50 MG: 10 INJECTION, SOLUTION INFILTRATION; PERINEURAL at 13:22

## 2019-06-21 RX ADMIN — VENLAFAXINE 75 MG: 37.5 TABLET ORAL at 21:10

## 2019-06-21 RX ADMIN — INSULIN LISPRO 1 UNITS: 100 INJECTION, SOLUTION INTRAVENOUS; SUBCUTANEOUS at 18:14

## 2019-06-21 RX ADMIN — SODIUM CHLORIDE: 9 INJECTION, SOLUTION INTRAVENOUS at 13:06

## 2019-06-21 RX ADMIN — METOPROLOL TARTRATE 50 MG: 50 TABLET, FILM COATED ORAL at 10:00

## 2019-06-21 RX ADMIN — Medication 20 MG: at 13:23

## 2019-06-21 RX ADMIN — Medication 20 MG: at 13:22

## 2019-06-21 RX ADMIN — ENOXAPARIN SODIUM 90 MG: 100 INJECTION SUBCUTANEOUS at 10:14

## 2019-06-21 RX ADMIN — PROPOFOL 50 MG: 10 INJECTION, EMULSION INTRAVENOUS at 13:22

## 2019-06-21 RX ADMIN — VENLAFAXINE 75 MG: 37.5 TABLET ORAL at 10:00

## 2019-06-21 RX ADMIN — INSULIN LISPRO 1 UNITS: 100 INJECTION, SOLUTION INTRAVENOUS; SUBCUTANEOUS at 21:10

## 2019-06-21 RX ADMIN — METOPROLOL TARTRATE 25 MG: 25 TABLET, FILM COATED ORAL at 21:10

## 2019-06-21 RX ADMIN — DILTIAZEM HYDROCHLORIDE 12.5 MG/HR: 5 INJECTION INTRAVENOUS at 07:21

## 2019-06-21 RX ADMIN — LORATADINE 10 MG: 10 TABLET ORAL at 10:00

## 2019-06-21 RX ADMIN — APIXABAN 5 MG: 5 TABLET, FILM COATED ORAL at 21:11

## 2019-06-21 RX ADMIN — PROPOFOL 80 MCG/KG/MIN: 10 INJECTION, EMULSION INTRAVENOUS at 13:37

## 2019-06-21 RX ADMIN — FUROSEMIDE 40 MG: 10 INJECTION, SOLUTION INTRAMUSCULAR; INTRAVENOUS at 17:08

## 2019-06-21 RX ADMIN — PROPOFOL 80 MCG/KG/MIN: 10 INJECTION, EMULSION INTRAVENOUS at 13:22

## 2019-06-21 RX ADMIN — EPHEDRINE SULFATE 5 MG: 50 INJECTION, SOLUTION INTRAVENOUS at 13:39

## 2019-06-21 NOTE — SOCIAL WORK
Scripts for [employer]	[place] and eliquis sent to [facility] for price check  Received call from Adam Lugo and co-pay for either medication would be $42/mth pt eligible for free 30 days  Pt made aware and agreeable to co-pay, Dr Loyd Stoner made aware

## 2019-06-21 NOTE — PROGRESS NOTES
Progress Note - Cardiology   Arben Helms 66 y o  female MRN: [id number]  Unit/Bed#: [facility] 519-01 Encounter: [id number]    Assessment:  Principal Problem:    Atrial fibrillation with RVR ([place] [address] )  Active Problems:    Hyperlipidemia    Hypertension    Hypothyroidism    Type 2 diabetes mellitus with diabetic cataract ([place] [address] )    Acute CHF (congestive heart failure) ([place] [address] )    # Newly-diagnosed AF with RVR s/p DCCV 6/21  -duration unclear as patient is not overtly symptomatic while in AF; she did develop clinic HF from it and then experienced symptoms  -[id number] 5 currently; on Eliquis  # Acute HFrEF ~30%, improving  -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; patient does have risk factors for CAD  -RV dilated with reduced function  # Moderate MR, moderate TR  # HTN, controlled  # HLD  # Hypothyroidism, stable  # Prediabetes        Plan:  1  Change metoprolol to 100mg BID (will be converted to Toprol XL before discharge)  Start Eliquis 5mg BID, stop Lovenox  2  Patient will need ischemic work up with either stress testing or LHC  Timing to be determined after achievement of euvolemia  3  She will be considered for a LifeVest before discharge  4  Continue IV diuresis with lasix 40mg BID for 1-2 more days before conversion to PO regimen  5  Continue Losartan 100mg, statin  Subjective/Objective     Subjective: No events overnight  Pt underwent EBEN/DCCV x3 times today  She is still a bit drowsy from anaesthesia  Denies CP, palpitations, dyspnea        Objective:  Vitals: /83   Pulse (!) 138   Temp 97 8 °F (36 6 °C) (Oral)   Resp 18   Ht 5' 1" (1 549 m)   Wt 82 8 kg (182 lb 8 7 oz)   SpO2 95%   BMI 34 49 kg/m²   Vitals:    06/20/19 0547 06/21/19 0552   Weight: 82 5 kg (181 lb 14 1 oz) 82 8 kg (182 lb 8 7 oz)     Orthostatic Blood Pressures      Most Recent Value   Blood Pressure  125/83 filed at 06/21/2019 1315   Patient Position - Orthostatic VS  Lying filed at 06/21/2019 1058            Intake/Output Summary (Last 24 hours) at 6/21/2019 1416  Last data filed at 6/21/2019 1349  Gross per 24 hour   Intake 1259 33 ml   Output 200 ml   Net 1059 33 ml       Physical Exam:   General appearance: alert and in no acute distress  Head: Normocephalic, without obvious abnormality, atraumatic  Neck: +ve JVD and supple, symmetrical, trachea midline  Lungs: globally diminished, +ve bibasilar rales  Heart: S1, S2 normal and no S3 or S4, No murmur, No gallop, No rub  Abdomen: soft, non-tender; no masses,  no organomegaly  Extremities: extremities normal, atraumatic, no cyanosis, 1+ edema  Pulses: 2+ and symmetric bilaterally  Skin: Skin color, texture, turgor normal; No rashes or lesions  Neurologic: Grossly normal, Alert and oriented      Medications:    Current Facility-Administered Medications:     acetaminophen (TYLENOL) tablet 650 mg, 650 mg, Oral, Q4H PRN, Crissy Reza MD, 650 mg at 06/19/19 0731    albuterol (PROVENTIL HFA,VENTOLIN HFA) inhaler 2 puff, 2 puff, Inhalation, Q4H PRN, Crissy Reza MD    ascorbic acid (VITAMIN C) tablet 125 mg, 125 mg, Oral, Daily, Crissy Reza MD, 125 mg at 06/20/19 8412    cholecalciferol (VITAMIN D3) tablet 1,000 Units, 1,000 Units, Oral, Daily, Crissy Reza MD, 1,000 Units at 06/20/19 0845    cyanocobalamin (VITAMIN B-12) tablet 100 mcg, 100 mcg, Oral, Daily, Crissy Reza MD, 100 mcg at 06/20/19 0845    diltiazem (CARDIZEM) 125 mg in sodium chloride 0 9 % 125 mL infusion, 1-15 mg/hr, Intravenous, Titrated, Crissy Reza MD, Last Rate: 12 5 mL/hr at 06/21/19 0721, 12 5 mg/hr at 06/21/19 0721    enoxaparin (LOVENOX) subcutaneous injection 90 mg, 1 mg/kg, Subcutaneous, Q12H [address], Crissy Reza MD, 90 mg at 06/21/19 1014    fish oil capsule 1,000 mg, 1,000 mg, Oral, Daily, Crissy Reza MD, 1,000 mg at 06/21/19 1000    insulin lispro (HumaLOG) 100 units/mL subcutaneous injection 1-5 Units, 1-5 Units, Subcutaneous, HS, Crissy Reza MD    insulin lispro (HumaLOG) 100 units/mL subcutaneous injection 1-6 Units, 1-6 Units, Subcutaneous, TID AC, 1 Units at 06/20/19 1140 **AND** Fingerstick Glucose (POCT), , , TID AC, Taylor Mendoza MD    levothyroxine tablet 137 mcg, 137 mcg, Oral, Daily, Taylor Mendoza MD, 137 mcg at 06/21/19 0547    loratadine (CLARITIN) tablet 10 mg, 10 mg, Oral, Daily, Taylor Mendoza MD, 10 mg at 06/21/19 1000    losartan (COZAAR) tablet 100 mg, 100 mg, Oral, Daily, Taylor Mendoza MD    metoprolol tartrate (LOPRESSOR) tablet 50 mg, 50 mg, Oral, TID, Natalya Hemphill MD, 50 mg at 06/21/19 1000    multivitamin-minerals (CENTRUM) tablet 1 tablet, 1 tablet, Oral, Daily, Taylor Mendoza MD, 1 tablet at 06/20/19 0845    ondansetron (ZOFRAN) injection 4 mg, 4 mg, Intravenous, Q6H PRN, Taylor Mendoza MD, 4 mg at 06/19/19 1053    pravastatin (PRAVACHOL) tablet 40 mg, 40 mg, Oral, Daily With Demian Avila MD, 40 mg at 06/20/19 1600    pyridoxine (VITAMIN B6) tablet 100 mg, 100 mg, Oral, Daily, Taylor Mendoza MD, 100 mg at 06/20/19 0851    venlafaxine (EFFEXOR) tablet 75 mg, 75 mg, Oral, Q12H, Taylor Mendoza MD, 75 mg at 06/21/19 1000    vitamin E (tocopherol) capsule 1,000 Units, 1,000 Units, Oral, Daily, Taylor Mendoza MD, 1,000 Units at 06/20/19 5934    Facility-Administered Medications Ordered in Other Encounters:     ePHEDrine injection, , , PRN, Chet Terrazas CRNA, 5 mg at 06/21/19 1339    Ketamine HCl, , Intravenous, PRN, Mau Terrazas CRNA, 20 mg at 06/21/19 1323    lidocaine (XYLOCAINE) 1 % injection, , , PRN, Chet Terrazas CRNA, 50 mg at 06/21/19 1322    phenylephrine bolus from bag, , , PRN, Chet Terrazas CRNA, 100 mcg at 06/21/19 1335    propofol (DIPRIVAN) 1000 mg in 100 mL infusion (premix), , , Continuous PRN, Mau Terrazas CRNA, Stopped at 06/21/19 1342    propofol (DIPRIVAN) 200 MG/20ML bolus injection, , Intravenous, PRN, Mau Terrazas CRNA, 50 mg at 06/21/19 1322    sodium chloride 0 9 % infusion, , Intravenous, Continuous PRN, Tigist Terrazas CRNA, Stopped at 06/21/19 1349    Lab Results:  Results from last 7 days   Lab Units 06/18/19  1652   TROPONIN I ng/mL 0 03     Results from last 7 days   Lab Units 06/19/19  0220 06/18/19  1652   WBC Thousand/uL 10 07 7 86   HEMOGLOBIN g/dL 13 3 13 7   HEMATOCRIT % 40 1 42 0   PLATELETS Thousands/uL 251 245         Results from last 7 days   Lab Units 06/20/19  1000 06/19/19  0220 06/18/19  1652   POTASSIUM mmol/L 4 1 4 0 3 5   CHLORIDE mmol/L 104 104 100   CO2 mmol/L 31 30 28   BUN mg/dL 11 6 6   CREATININE mg/dL 0 90 0 83 0 81   CALCIUM mg/dL 8 9 8 9 9 2   ALK PHOS U/L  --   --  72   ALT U/L  --   --  37   AST U/L  --   --  30     Results from last 7 days   Lab Units 06/18/19  1652   INR  1 14   PTT seconds 26     Results from last 7 days   Lab Units 06/20/19  1000 06/19/19  0220 06/18/19  1652   MAGNESIUM mg/dL 2 2 2 0 2 1       Telemetry: Personally reviewed  Regular narrow complex rhythm, 1 lead available only with baseline artifact, likely SR  Imaging: Personally reviewed  EKG: Personally reviewed       Yolande Saez MD  Cardiology Fellow

## 2019-06-21 NOTE — ASSESSMENT & PLAN NOTE
Lab Results   Component Value Date    HGBA1C 5 9 (H) 03/18/2019       Recent Labs     06/20/19  1122 06/20/19  1649 06/20/19 2048 06/21/19  0622   POCGLU 151* 118 137 137       Blood Sugar Average: Last 72 hrs:  (P) 144   · Diet controlled at home  · Add SSI with Accu-Cheks while inpatient  · Carb controlled diet  · Avoid hypoglycemia

## 2019-06-21 NOTE — ANESTHESIA PREPROCEDURE EVALUATION
Review of Systems/Medical History  Patient summary reviewed  Chart reviewed  No history of anesthetic complications     Cardiovascular  EKG reviewed, Hyperlipidemia, Hypertension , CHF ,   Comment: Atrial fibrillation with rapid ventricular response  Right axis deviation  Low voltage QRS  Septal infarct , age undetermined  Abnormal ECG  No previous ECGs available  Confirmed by Joo Herbert ([id number]) on 6/18/2019 5:09:20 PM      LEFT VENTRICLE: Size was normal  Systolic function was difficult to assess due to tachycardia during exam, overall appears severely reduced while tachycardic  Ejection fraction was estimated to be 25 - 30 %  There was severe diffuse  hypokinesis  Wall thickness was normal  DOPPLER: Normal sinus rhythm was absent      RIGHT VENTRICLE: The ventricle was dilated  Systolic function was reduced      LEFT ATRIUM: The atrium was dilated      RIGHT ATRIUM: The atrium was dilated      MITRAL VALVE: There was mild to moderate annular calcification  Valve structure was normal  There was normal leaflet separation  DOPPLER: There was no evidence for stenosis  There was moderate regurgitation      AORTIC VALVE: The valve was probably trileaflet  Leaflets exhibited normal cuspal separation  The valve was not well visualized  DOPPLER: There was no evidence for stenosis  There was no significant regurgitation      TRICUSPID VALVE: The valve structure was normal  There was normal leaflet separation  DOPPLER: There was no evidence for stenosis  There was moderate regurgitation  Estimated peak PA pressure was 35 mmHg      PULMONIC VALVE: Not well visualized  DOPPLER: There was no evidence for stenosis  There was no significant regurgitation      PERICARDIUM: There was no pericardial effusion  The pericardium was normal in appearance      AORTA: The root exhibited normal size      SYSTEMIC VEINS: IVC: The inferior vena cava was normal in size  The respirophasic change in diameter was less than 50%     , Pulmonary hypertension Pulmonary       GI/Hepatic            Endo/Other  Diabetes poorly controlled type 2 , History of thyroid disease , hypothyroidism,      GYN       Hematology   Musculoskeletal    Arthritis     Neurology   Psychology   Anxiety,              Physical Exam    Airway    Mallampati score: III  TM Distance: <3 FB  Neck ROM: full     Dental   No notable dental hx     Cardiovascular  Cardiovascular exam normal    Pulmonary  Pulmonary exam normal Breath sounds clear to auscultation,     Other Findings        Anesthesia Plan  ASA Score- 4     Anesthesia Type- IV sedation with anesthesia with ASA Monitors  Additional Monitors:   Airway Plan:         Plan Factors- Patient instructed to abstain from smoking on day of procedure       Induction- intravenous  Postoperative Plan-     Informed Consent- Anesthetic plan and risks discussed with patient  I personally reviewed this patient with the CRNA  Discussed and agreed on the Anesthesia Plan with the CRNA             Lab Results   Component Value Date    WBC 10 07 06/19/2019    HGB 13 3 06/19/2019    HCT 40 1 06/19/2019    MCV 98 06/19/2019     06/19/2019     Lab Results   Component Value Date    CALCIUM 8 9 06/20/2019     02/07/2017    K 4 1 06/20/2019    CO2 31 06/20/2019     06/20/2019    BUN 11 06/20/2019    CREATININE 0 90 06/20/2019     Lab Results   Component Value Date    INR 1 14 06/18/2019    PROTIME 14 7 (H) 06/18/2019     Lab Results   Component Value Date    PTT 26 06/18/2019     Type and Screen:        Discussed with pt the benefits/alternatives and risks or General Anesthesia including breathing tube remaining in place if not strong enough, PONV, damage to lips and teeth, sore throat, eye injury or blindness    I, Dr Rancho Gordon, the attending physician, have personally seen and evaluated the patient prior to anesthetic care   I have reviewed the pre-anesthetic record, and other medical records if appropriate to the anesthetic care  If a CRNA is involved in the case, I have reviewed the CRNA assessment, if present, and agree  The patient is in a suitable condition to proceed with my formulated anesthetic plan      Previous Surgeries      HEMORRHOID SURGERY OOPHORECTOMY   TOTAL ABDOMINAL HYSTERECTOMY

## 2019-06-21 NOTE — PROGRESS NOTES
Progress Note Sarwat Romero 1940, 66 y o  female MRN: [id number]    Unit/Bed#: [facility] 382-39 Encounter: [id number]    Primary Care Provider: Queenie Landrum MD   Date and time admitted to hospital: 6/18/2019  4:35 PM        * Atrial fibrillation with RVR ([address] )  Assessment & Plan  · EBEN cardioversion as per Cardiology  · Cardizem drip as per Cardiology  · Monitor heart rate closely  Acute CHF (congestive heart failure) ([facility])  Assessment & Plan  Wt Readings from Last 3 Encounters:   06/21/19 82 8 kg (182 lb 8 7 oz)   06/18/19 81 kg (178 lb 9 6 oz)   03/20/19 80 1 kg (176 lb 9 6 oz)     · Appears volume overloaded on examination, CXR and CTA PE study with pulmonary vascular congestion and elevated BNP noted  Suspect AFib with RVR is also playing role  · IV diuretics        Type 2 diabetes mellitus with diabetic cataract [facility])  Assessment & Plan  Lab Results   Component Value Date    HGBA1C 5 9 (H) 03/18/2019       Recent Labs     06/20/19  1122 06/20/19  1649 06/20/19  2048 06/21/19  0622   POCGLU 151* 118 137 137       Blood Sugar Average: Last 72 hrs:  (P) 144   · Diet controlled at home  · Add SSI with Accu-Cheks while inpatient  · Carb controlled diet  · Avoid hypoglycemia    Hypothyroidism  Assessment & Plan  · TSH and free T4 as below  · Continue Synthroid therapy    Hypertension  Assessment & Plan  · Continue losartan, hold HCTZ for now  · Blood pressure monitoring per protocol, avoid hypotension    Hyperlipidemia  Assessment & Plan  · Continue statin therapy      VTE Pharmacologic Prophylaxis:   Pharmacologic: Enoxaparin (Lovenox)  Mechanical VTE Prophylaxis in Place: No    Patient Centered Rounds: I have performed bedside rounds with nursing staff today  Time Spent for Care: 15 minutes  More than 50% of total time spent on counseling and coordination of care as described above      Current Length of Stay: 3 day(s)    Current Patient Status: Inpatient   Certification Statement: The patient will continue to require additional inpatient hospital stay due to Need to monitor symptoms        Code Status: Level 1 - Full Code      Subjective:   nad    Objective:     Vitals:   Temp (24hrs), Av 1 °F (36 7 °C), Min:97 8 °F (36 6 °C), Max:98 6 °F (37 °C)    Temp:  [97 8 °F (36 6 °C)-98 6 °F (37 °C)] 98 1 °F (36 7 °C)  HR:  [] 131  Resp:  [18-22] 18  BP: (110-130)/(67-98) 118/67  SpO2:  [94 %-98 %] 97 %  Body mass index is 34 49 kg/m²  Input and Output Summary (last 24 hours): Intake/Output Summary (Last 24 hours) at 2019 3480  Last data filed at 2019 0703  Gross per 24 hour   Intake 1059 33 ml   Output 200 ml   Net 859 33 ml       Physical Exam:     Physical Exam   Constitutional: She is oriented to person, place, and time  HENT:   Head: Normocephalic and atraumatic  Eyes: Pupils are equal, round, and reactive to light  EOM are normal    Neck: Normal range of motion  Neck supple  Cardiovascular: Exam reveals no friction rub  No murmur heard  Pulmonary/Chest: Effort normal  No respiratory distress  Abdominal: Soft  Bowel sounds are normal  She exhibits no distension  Musculoskeletal: Normal range of motion  She exhibits no edema  Neurological: She is alert and oriented to person, place, and time  Skin: Skin is warm and dry  Additional Data:     Labs:    Results from last 7 days   Lab Units 19  0220   WBC Thousand/uL 10 07   HEMOGLOBIN g/dL 13 3   HEMATOCRIT % 40 1   PLATELETS Thousands/uL 251   NEUTROS PCT % 65   LYMPHS PCT % 26   MONOS PCT % 7   EOS PCT % 1     Results from last 7 days   Lab Units 19  1000  19  1652   POTASSIUM mmol/L 4 1   < > 3 5   CHLORIDE mmol/L 104   < > 100   CO2 mmol/L 31   < > 28   BUN mg/dL 11   < > 6   CREATININE mg/dL 0 90   < > 0 81   CALCIUM mg/dL 8 9   < > 9 2   ALK PHOS U/L  --   --  72   ALT U/L  --   --  37   AST U/L  --   --  30    < > = values in this interval not displayed       Results from last 7 days Lab Units 06/18/19  1652   INR  1 14       * I Have Reviewed All Lab Data Listed Above  * Additional Pertinent Lab Tests Reviewed: All Labs Within Last 24 Hours Reviewed        Recent Cultures (last 7 days):           Last 24 Hours Medication List:     Current Facility-Administered Medications:  acetaminophen 650 mg Oral Q4H PRN Gayle Little MD    albuterol 2 puff Inhalation Q4H PRN Gayle Little MD    ascorbic acid 125 mg Oral Daily Gayle Little MD    cholecalciferol 1,000 Units Oral Daily Gayle Little MD    cyanocobalamin 100 mcg Oral Daily Gayle Little MD    diltiazem 1-15 mg/hr Intravenous Titrated Gayle Little MD Last Rate: 12 5 mg/hr (06/21/19 0721)   enoxaparin 1 mg/kg Subcutaneous Q12H [address] Gayle Little MD    fish oil 1,000 mg Oral Daily Gayle Little MD    insulin lispro 1-5 Units Subcutaneous HS Gayle Little MD    insulin lispro 1-6 Units Subcutaneous TID AC Gayle Little MD    levothyroxine 137 mcg Oral Daily Gayle Little MD    loratadine 10 mg Oral Daily Gayle Little MD    losartan 100 mg Oral Daily aGyle Little MD    metoprolol tartrate 50 mg Oral TID Hailey Davis MD    multivitamin-minerals 1 tablet Oral Daily Gayle Little MD    ondansetron 4 mg Intravenous Q6H PRN Gayle Little MD    pravastatin 40 mg Oral Daily With Jessa Bennett MD    pyridoxine 100 mg Oral Daily Gayle Little MD    venlafaxine 75 mg Oral Q12H Gayle Little MD    vitamin E (tocopherol) 1,000 Units Oral Daily Gayle Little MD         Today, Patient Was Seen By: Iron Krishna DO    ** Please Note: Dictation voice to text software may have been used in the creation of this document   **

## 2019-06-21 NOTE — ASSESSMENT & PLAN NOTE
Wt Readings from Last 3 Encounters:   06/21/19 82 8 kg (182 lb 8 7 oz)   06/18/19 81 kg (178 lb 9 6 oz)   03/20/19 80 1 kg (176 lb 9 6 oz)     · Appears volume overloaded on examination, CXR and CTA PE study with pulmonary vascular congestion and elevated BNP noted    Suspect AFib with RVR is also playing role  · IV diuretics

## 2019-06-21 NOTE — UTILIZATION REVIEW
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 HAS BEEN UNRESPONSIVE TO CARDIZEM DRIP TO CONTROL THE A FIB WITH RVR  SHE IS GOING TO HAVE A EBEN W/ CARDIOVERSION TODAY  SHE HAS GAINED 1 8 LB SINCE ADMISSION AND CXR AND PE STUDY SHOW PULMONARY VASCULAR CONGESTION  SUSPICION IS THAT RVR WITH A FIB MAY BE A CONTRIBUTING FACTOR  CONTINUE POC GLUCOSE TESTING WITH SSI COVER  HOLDING HCTZ FOR NOW BUT CONTINUING LOSARTAN  PT WILL D/C ON EITHER ELIQUIS OR Rachael Severe  CONTINUE CARDIZEM UNTIL RATE AND RHYTHM ARE UNDER CONTROL  Pertinent Labs/Diagnostic Results:     6/21 EBEN - PENDING     6/20 CARIDAC ECHO  LEFT VENTRICLE:   Systolic function was difficult to assess due to tachycardia during exam, overall appears severely reduced while tachycardic  Ejection fraction was estimated to be 25 - 30 %  There was severe diffuse hypokinesis  RIGHT VENTRICLE:  The ventricle was dilated  Systolic function was reduced  MITRAL VALVE:  There was moderate regurgitation  TRICUSPID VALVE:  There was moderate regurgitation  Estimated peak PA pressure was 35 mmHg      Results from last 7 days   Lab Units 06/19/19  0220 06/18/19  1652   WBC Thousand/uL 10 07 7 86   HEMOGLOBIN g/dL 13 3 13 7   HEMATOCRIT % 40 1 42 0   PLATELETS Thousands/uL 251 245   NEUTROS ABS Thousands/µL 6 66 4 62     Results from last 7 days   Lab Units 06/20/19  1000 06/19/19  0220 06/18/19  1652   SODIUM mmol/L 138 141 136   POTASSIUM mmol/L 4 1 4 0 3 5   CHLORIDE mmol/L 104 104 100   CO2 mmol/L 31 30 28   ANION GAP mmol/L 3* 7 8   BUN mg/dL 11 6 6   CREATININE mg/dL 0 90 0 83 0 81   EGFR ml/min/1 73sq m 61 68 70   CALCIUM mg/dL 8 9 8 9 9 2   MAGNESIUM mg/dL 2 2 2 0 2 1     Results from last 7 days   Lab Units 06/18/19  1652   AST U/L 30   ALT U/L 37   ALK PHOS U/L 72   TOTAL PROTEIN g/dL 7 7   ALBUMIN g/dL 3 9   TOTAL BILIRUBIN mg/dL 0 59     Results from last 7 days   Lab Units 06/21/19  1056 06/21/19  0622 06/20/19  2048 06/20/19  1649 06/20/19  1122 06/20/19  0719 06/19/19  2112 06/19/19  1608 06/19/19  1139 06/19/19  0608   POC GLUCOSE mg/dl 150* 137 137 118 151* 119 124 134 246* 137     Results from last 7 days   Lab Units 06/20/19  1000 06/19/19  0220 06/18/19  1652   GLUCOSE RANDOM mg/dL 159* 121 143*     Results from last 7 days   Lab Units 06/18/19  1652   TROPONIN I ng/mL 0 03     Results from last 7 days   Lab Units 06/18/19  1652   D DIMER QUANT ng/ml (FEU) 1,206*     Results from last 7 days   Lab Units 06/18/19  1652   PROTIME seconds 14 7*   INR  1 14   PTT seconds 26     Results from last 7 days   Lab Units 06/18/19  1652   NT-PRO BNP pg/mL 1,823*     Vital Signs:   06/21/19 1315    138Abnormal   18  125/83  95 %  None (Room air)   06/21/19 1058  97 8 °F (36 6 °C)  113Abnormal   18  146/74  95 %     06/21/19 1022          95 %  None (Room air)   06/21/19 0800            Nasal cannula   06/21/19 0703  98 1 °F (36 7 °C)  131Abnormal   18  118/67  97 %     06/21/19 0259        130/67       06/21/19 0258  98 °F (36 7 °C)  96  18    96 %     06/20/19 2302  97 9 °F (36 6 °C)  130Abnormal   18  115/98  94 %     06/20/19 2134    122Abnormal     118/85       06/20/19 2002  97 8 °F (36 6 °C)  116Abnormal   18  113/74  97 %  None (Room air)     Medications:   Scheduled Meds:   Current Facility-Administered Medications:  acetaminophen 650 mg Oral Q4H PRN    albuterol 2 puff Inhalation Q4H PRN    ascorbic acid 125 mg Oral Daily    cholecalciferol 1,000 Units Oral Daily    cyanocobalamin 100 mcg Oral Daily    diltiazem 1-15 mg/hr Intravenous Titrated Last Rate: 12 5 mg/hr (06/21/19 0721)   enoxaparin 1 mg/kg Subcutaneous Q12H [address]    fish oil 1,000 mg Oral Daily    insulin lispro 1-5 Units Subcutaneous HS    insulin lispro 1-6 Units Subcutaneous TID AC    levothyroxine 137 mcg Oral Daily    loratadine 10 mg Oral Daily    losartan 100 mg Oral Daily    metoprolol tartrate 50 mg Oral TID    multivitamin-minerals 1 tablet Oral Daily    ondansetron 4 mg Intravenous Q6H PRN    pravastatin 40 mg Oral Daily With Dinner    pyridoxine 100 mg Oral Daily    venlafaxine 75 mg Oral Q12H    vitamin E (tocopherol) 1,000 Units Oral Daily      Facility-Administered Medications Ordered in Other Encounters:  ePHEDrine   PRN    Ketamine HCl  Intravenous PRN    lidocaine   PRN    phenylephrine   PRN    propofol   Continuous PRN Last Rate: Stopped (06/21/19 1342)   propofol  Intravenous PRN    sodium chloride  Intravenous Continuous PRN Last Rate: Stopped (06/21/19 1349)     Discharge Plan: [address] [place] [address] Utilization Review Department  Phone: [phone]; Fax [phone]  [email or web address]  ATTENTION: Please call with any questions or concerns to [phone]  and carefully listen to the prompts so that you are directed to the right person  Send all requests for admission clinical reviews, approved or denied determinations and any other requests to fax [phone]   All voicemails are confidential

## 2019-06-21 NOTE — ASSESSMENT & PLAN NOTE
· EBEN cardioversion as per Cardiology  · Cardizem drip as per Cardiology  · Monitor heart rate closely

## 2019-06-22 LAB
ANION GAP SERPL CALCULATED.3IONS-SCNC: 6 MMOL/L (ref 4–13)
ANION GAP SERPL CALCULATED.3IONS-SCNC: 6 MMOL/L (ref 4–13)
ATRIAL RATE: 113 BPM
BUN SERPL-MCNC: 18 MG/DL (ref 5–25)
BUN SERPL-MCNC: 18 MG/DL (ref 5–25)
CALCIUM SERPL-MCNC: 8.3 MG/DL (ref 8.3–10.1)
CALCIUM SERPL-MCNC: 8.5 MG/DL (ref 8.3–10.1)
CHLORIDE SERPL-SCNC: 105 MMOL/L (ref 100–108)
CHLORIDE SERPL-SCNC: 106 MMOL/L (ref 100–108)
CO2 SERPL-SCNC: 29 MMOL/L (ref 21–32)
CO2 SERPL-SCNC: 31 MMOL/L (ref 21–32)
CREAT SERPL-MCNC: 0.77 MG/DL (ref 0.6–1.3)
CREAT SERPL-MCNC: 0.89 MG/DL (ref 0.6–1.3)
GFR SERPL CREATININE-BSD FRML MDRD: 62 ML/MIN/1.73SQ M
GFR SERPL CREATININE-BSD FRML MDRD: 74 ML/MIN/1.73SQ M
GLUCOSE SERPL-MCNC: 129 MG/DL (ref 65–140)
GLUCOSE SERPL-MCNC: 136 MG/DL (ref 65–140)
GLUCOSE SERPL-MCNC: 138 MG/DL (ref 65–140)
GLUCOSE SERPL-MCNC: 143 MG/DL (ref 65–140)
GLUCOSE SERPL-MCNC: 156 MG/DL (ref 65–140)
GLUCOSE SERPL-MCNC: 160 MG/DL (ref 65–140)
MAGNESIUM SERPL-MCNC: 2.2 MG/DL (ref 1.6–2.6)
MAGNESIUM SERPL-MCNC: 2.2 MG/DL (ref 1.6–2.6)
MAGNESIUM SERPL-MCNC: 2.3 MG/DL (ref 1.6–2.6)
POTASSIUM SERPL-SCNC: 3.3 MMOL/L (ref 3.5–5.3)
POTASSIUM SERPL-SCNC: 4 MMOL/L (ref 3.5–5.3)
QRS AXIS: 93 DEGREES
QRSD INTERVAL: 86 MS
QT INTERVAL: 266 MS
QTC INTERVAL: 402 MS
SODIUM SERPL-SCNC: 141 MMOL/L (ref 136–145)
SODIUM SERPL-SCNC: 142 MMOL/L (ref 136–145)
T WAVE AXIS: 210 DEGREES
VENTRICULAR RATE: 138 BPM

## 2019-06-22 PROCEDURE — 93005 ELECTROCARDIOGRAM TRACING: CPT

## 2019-06-22 PROCEDURE — 93010 ELECTROCARDIOGRAM REPORT: CPT | Performed by: INTERNAL MEDICINE

## 2019-06-22 PROCEDURE — 99232 SBSQ HOSP IP/OBS MODERATE 35: CPT | Performed by: INTERNAL MEDICINE

## 2019-06-22 PROCEDURE — 83735 ASSAY OF MAGNESIUM: CPT | Performed by: INTERNAL MEDICINE

## 2019-06-22 PROCEDURE — 80048 BASIC METABOLIC PNL TOTAL CA: CPT | Performed by: PHYSICIAN ASSISTANT

## 2019-06-22 PROCEDURE — 82948 REAGENT STRIP/BLOOD GLUCOSE: CPT

## 2019-06-22 PROCEDURE — 83735 ASSAY OF MAGNESIUM: CPT | Performed by: PHYSICIAN ASSISTANT

## 2019-06-22 PROCEDURE — 80048 BASIC METABOLIC PNL TOTAL CA: CPT | Performed by: INTERNAL MEDICINE

## 2019-06-22 RX ORDER — POTASSIUM CHLORIDE 20 MEQ/1
40 TABLET, EXTENDED RELEASE ORAL
Status: COMPLETED | OUTPATIENT
Start: 2019-06-22 | End: 2019-06-22

## 2019-06-22 RX ADMIN — INSULIN LISPRO 1 UNITS: 100 INJECTION, SOLUTION INTRAVENOUS; SUBCUTANEOUS at 12:13

## 2019-06-22 RX ADMIN — VITAMIN D, TAB 1000IU (100/BT) 1000 UNITS: 25 TAB at 08:10

## 2019-06-22 RX ADMIN — PRAVASTATIN SODIUM 40 MG: 40 TABLET ORAL at 16:19

## 2019-06-22 RX ADMIN — VENLAFAXINE 75 MG: 37.5 TABLET ORAL at 09:06

## 2019-06-22 RX ADMIN — Medication 100 MG: at 08:15

## 2019-06-22 RX ADMIN — LOSARTAN POTASSIUM 100 MG: 50 TABLET, FILM COATED ORAL at 08:09

## 2019-06-22 RX ADMIN — VENLAFAXINE 75 MG: 37.5 TABLET ORAL at 21:50

## 2019-06-22 RX ADMIN — AMIODARONE HYDROCHLORIDE 1 MG/MIN: 50 INJECTION, SOLUTION INTRAVENOUS at 12:21

## 2019-06-22 RX ADMIN — INSULIN LISPRO 1 UNITS: 100 INJECTION, SOLUTION INTRAVENOUS; SUBCUTANEOUS at 23:34

## 2019-06-22 RX ADMIN — Medication 1000 MG: at 08:10

## 2019-06-22 RX ADMIN — METOPROLOL TARTRATE 25 MG: 25 TABLET, FILM COATED ORAL at 08:10

## 2019-06-22 RX ADMIN — APIXABAN 5 MG: 5 TABLET, FILM COATED ORAL at 17:02

## 2019-06-22 RX ADMIN — POTASSIUM CHLORIDE 40 MEQ: 1500 TABLET, EXTENDED RELEASE ORAL at 09:06

## 2019-06-22 RX ADMIN — LORATADINE 10 MG: 10 TABLET ORAL at 08:10

## 2019-06-22 RX ADMIN — LEVOTHYROXINE SODIUM 137 MCG: 112 TABLET ORAL at 05:27

## 2019-06-22 RX ADMIN — AMIODARONE HYDROCHLORIDE 150 MG: 900 INJECTION, SOLUTION INTRAVENOUS at 11:56

## 2019-06-22 RX ADMIN — POTASSIUM CHLORIDE 40 MEQ: 1500 TABLET, EXTENDED RELEASE ORAL at 12:04

## 2019-06-22 RX ADMIN — POTASSIUM CHLORIDE 40 MEQ: 1500 TABLET, EXTENDED RELEASE ORAL at 16:19

## 2019-06-22 RX ADMIN — Medication 1000 UNITS: at 08:15

## 2019-06-22 RX ADMIN — METOPROLOL TARTRATE 25 MG: 25 TABLET, FILM COATED ORAL at 21:48

## 2019-06-22 RX ADMIN — APIXABAN 5 MG: 5 TABLET, FILM COATED ORAL at 08:10

## 2019-06-22 RX ADMIN — VITAM B12 100 MCG: 100 TAB at 08:10

## 2019-06-22 RX ADMIN — Medication 1 TABLET: at 08:09

## 2019-06-22 RX ADMIN — ASCORBIC ACID TAB 250 MG 125 MG: 250 TAB at 08:15

## 2019-06-22 RX ADMIN — FUROSEMIDE 40 MG: 10 INJECTION, SOLUTION INTRAMUSCULAR; INTRAVENOUS at 16:19

## 2019-06-22 RX ADMIN — FUROSEMIDE 40 MG: 10 INJECTION, SOLUTION INTRAMUSCULAR; INTRAVENOUS at 09:48

## 2019-06-22 NOTE — PROGRESS NOTES
Pt had two episodes of a tachyarrhythmia, pt asymptomatic and resting  SLIM PA-C [facility] notified no new orders at this time

## 2019-06-22 NOTE — ASSESSMENT & PLAN NOTE
Lab Results   Component Value Date    HGBA1C 5 9 (H) 03/18/2019       Recent Labs     06/21/19  1056 06/21/19  1603 06/21/19  2109 06/22/19  0601   POCGLU 150* 163* 154* 143*       Blood Sugar Average: Last 72 hrs:  (P) 807 [id number]   · Diet controlled at home  · Add SSI with Accu-Cheks while inpatient  · Carb controlled diet  · Avoid hypoglycemia

## 2019-06-22 NOTE — PROGRESS NOTES
Cardiology Progress Note - Luanne Kearney 66 y o  female MRN: [id number]    Unit/Bed#: [facility] 941-60 Encounter: [id number]    Assessment and plan  1  Atrial fibrillation with RVR  2  Acute CHF suspect diastolic awaiting echo  3  Hypertension  4  Hyperlipidemia  5  Hypothyroidism  6  Prediabetes  7  Cardiomyopathy suspect tachycardia induced ejection fraction 25-30%  Recommendations:  Patient underwent EBEN guided cardioversion yesterday  We found significant depressed LV systolic function likely tachycardia induced  She maintained sinus rhythm through the evening reverted back to rapid atrial fibrillation this morning  Will start IV amiodarone in attempt to convert  She had bradycardia yesterday metoprolol dose was decreased  She was junctional post conversion now showing signs of tachy-oniel syndrome will likely need pacing backup as we go forward  Given LV dysfunction would consider ICD  Continue with Eliquis  Continue IV Lasix  Subjective:    No significant events overnight  Patient reverted back to rapid atrial fibrillation this morning  Feels palpitations  Mild dyspnea  Was junctional post conversion yesterday  Ejection fraction around 25-30%  ROS    Objective:   Vitals: Blood pressure 134/73, pulse (!) 137, temperature 98 2 °F (36 8 °C), temperature source Oral, resp  rate 18, height 5' 1" (1 549 m), weight 82 3 kg (181 lb 7 oz), SpO2 98 %  , Body mass index is 34 28 kg/m² ,   Orthostatic Blood Pressures      Most Recent Value   Blood Pressure  134/73 filed at 06/22/2019 1128   Patient Position - Orthostatic VS  Lying filed at 06/22/2019 8023         Systolic (25BWS), JMH:809 , Min:114 , GRR:379     Diastolic (86BDQ), QZJ:78, Min:63, Max:90      Intake/Output Summary (Last 24 hours) at 6/22/2019 1150  Last data filed at 6/22/2019 1128  Gross per 24 hour   Intake 1300 ml   Output 1993 ml   Net -693 ml     Weight (last 2 days)     Date/Time   Weight    06/22/19 0530   82 3 (181 44)    06/21/19 0552   82 8 (182 54)    06/20/19 0547   82 5 (181 88)                Telemetry Review: No significant arrhythmias seen on telemetry review  EKG personally reviewed by Giles Calix DO  Physical Exam   Constitutional: She is oriented to person, place, and time  She appears well-nourished  No distress  HENT:   Head: Atraumatic  Eyes: Pupils are equal, round, and reactive to light  Conjunctivae are normal    Neck: Neck supple  Cardiovascular: Normal rate and normal heart sounds  An irregularly irregular rhythm present  Exam reveals no friction rub  No murmur heard  Pulmonary/Chest: Effort normal and breath sounds normal  No respiratory distress  She has no wheezes  She has no rales  Abdominal: Bowel sounds are normal  She exhibits no distension  There is no tenderness  There is no rebound  Musculoskeletal: She exhibits edema  Neurological: She is alert and oriented to person, place, and time  No cranial nerve deficit  Skin: Skin is warm and dry  No erythema  Nursing note and vitals reviewed          Laboratory Results:  Results from last 7 days   Lab Units 06/18/19  1652   TROPONIN I ng/mL 0 03       CBC with diff:   Results from last 7 days   Lab Units 06/19/19  0220 06/18/19  1652   WBC Thousand/uL 10 07 7 86   HEMOGLOBIN g/dL 13 3 13 7   HEMATOCRIT % 40 1 42 0   MCV fL 98 99*   PLATELETS Thousands/uL 251 245   MCH pg 32 5 32 3   MCHC g/dL 33 2 32 6   RDW % 12 9 13 1   MPV fL 10 3 10 4   NRBC AUTO /100 WBCs 0 0         CMP:  Results from last 7 days   Lab Units 06/22/19  0451 06/21/19  1519 06/20/19  1000 06/19/19  0220 06/18/19  1652   POTASSIUM mmol/L 3 3* 3 6 4 1 4 0 3 5   CHLORIDE mmol/L 105 106 104 104 100   CO2 mmol/L 31 29 31 30 28   BUN mg/dL 18 17 11 6 6   CREATININE mg/dL 0 77 0 95 0 90 0 83 0 81   CALCIUM mg/dL 8 3 8 6 8 9 8 9 9 2   AST U/L  --   --   --   --  30   ALT U/L  --   --   --   --  37   ALK PHOS U/L  --   --   --   --  72   EGFR ml/min/1 73sq m 74 58 61 68 70         BMP:  Results from last 7 days   Lab Units 06/22/19  0451 06/21/19  1519 06/20/19  1000 06/19/19  0220 06/18/19  1652   POTASSIUM mmol/L 3 3* 3 6 4 1 4 0 3 5   CHLORIDE mmol/L 105 106 104 104 100   CO2 mmol/L 31 29 31 30 28   BUN mg/dL 18 17 11 6 6   CREATININE mg/dL 0 77 0 95 0 90 0 83 0 81   CALCIUM mg/dL 8 3 8 6 8 9 8 9 9 2       BNP: No results for input(s): BNP in the last 72 hours  Magnesium:   Results from last 7 days   Lab Units 06/22/19  0944 06/22/19  0451 06/21/19  1519 06/20/19  1000 06/19/19  0220 06/18/19  1652   MAGNESIUM mg/dL 2 2 2 3 2 3 2 2 2 0 2 1       Coags:   Results from last 7 days   Lab Units 06/18/19  1652   PTT seconds 26   INR  1 14       TSH:        Hemoglobin A1C       Lipid Profile:       Cardiac testing:   No results found for this or any previous visit  No results found for this or any previous visit  No results found for this or any previous visit  No results found for this or any previous visit      Meds/Allergies   all current active meds have been reviewed  Medications Prior to Admission   Medication    albuterol (VENTOLIN HFA) 90 mcg/act inhaler    Ascorbic Acid (VITAMIN C) 100 MG tablet    cholecalciferol (VITAMIN D3) 1,000 units tablet    cyanocobalamin 100 MCG tablet    fexofenadine (ALLEGRA) 30 MG tablet    Garlic 10 MG CAPS    levothyroxine 137 mcg tablet    losartan-hydrochlorothiazide (HYZAAR) 100-12 5 MG per tablet    magnesium 30 MG tablet    Misc Natural Products (TURMERIC CURCUMIN) CAPS    multivitamin (THERAGRAN) TABS    Omega-3 Fatty Acids (FISH OIL) 1,000 mg    pyridoxine (B-6) 100 MG tablet    simvastatin (ZOCOR) 20 mg tablet    SUPER B COMPLEX/C CAPS    venlafaxine (EFFEXOR) 75 mg tablet    vitamin E, tocopherol, 1,000 units capsule         amiodarone 1 mg/min   Followed by    amiodarone 0 5 mg/min     Assessment:  Principal Problem:    Atrial fibrillation with RVR (HCC)  Active Problems:    Hyperlipidemia Hypertension    Hypothyroidism    Type 2 diabetes mellitus with diabetic cataract ([facility] )    Acute CHF (congestive heart failure) ([facility] )

## 2019-06-22 NOTE — ASSESSMENT & PLAN NOTE
Wt Readings from Last 3 Encounters:   06/22/19 82 3 kg (181 lb 7 oz)   06/18/19 81 kg (178 lb 9 6 oz)   03/20/19 80 1 kg (176 lb 9 6 oz)     · Appears volume overloaded on examination, CXR and CTA PE study with pulmonary vascular congestion and elevated BNP noted    Suspect AFib with RVR is also playing role  · IV diuretics

## 2019-06-22 NOTE — PROGRESS NOTES
Progress Note Tenzin Morelos 1940, 66 y o  female MRN: [id number]    Unit/Bed#: [facility] 726-85 Encounter: [id number]    Primary Care Provider: Gisele Casey MD   Date and time admitted to hospital: 6/18/2019  4:35 PM        * Atrial fibrillation with RVR ([address] )  Assessment & Plan  · EBEN cardioversion as per Cardiology  · Metoprolol  · Monitor heart rate closely  Acute CHF (congestive heart failure) ([facility])  Assessment & Plan  Wt Readings from Last 3 Encounters:   06/22/19 82 3 kg (181 lb 7 oz)   06/18/19 81 kg (178 lb 9 6 oz)   03/20/19 80 1 kg (176 lb 9 6 oz)     · Appears volume overloaded on examination, CXR and CTA PE study with pulmonary vascular congestion and elevated BNP noted  Suspect AFib with RVR is also playing role  · IV diuretics        Type 2 diabetes mellitus with diabetic cataract [facility])  Assessment & Plan  Lab Results   Component Value Date    HGBA1C 5 9 (H) 03/18/2019       Recent Labs     06/21/19  1056 06/21/19  1603 06/21/19  2109 06/22/19  0601   POCGLU 150* 163* 154* 143*       Blood Sugar Average: Last 72 hrs:  (P) 020 [id number]   · Diet controlled at home  · Add SSI with Accu-Cheks while inpatient  · Carb controlled diet  · Avoid hypoglycemia    Hypothyroidism  Assessment & Plan  · TSH and free T4 as below  · Continue Synthroid therapy    Hyperlipidemia  Assessment & Plan  · Continue statin therapy      VTE Pharmacologic Prophylaxis:   Pharmacologic: Apixaban (Eliquis)  Mechanical VTE Prophylaxis in Place: No    Patient Centered Rounds: I have performed bedside rounds with nursing staff today  Time Spent for Care: 15 minutes  More than 50% of total time spent on counseling and coordination of care as described above  Current Length of Stay: 4 day(s)    Current Patient Status: Inpatient   Certification Statement: The patient will continue to require additional inpatient hospital stay due to Need to monitor symptoms    Discharge Plan when cardiac status stable  Patient with atrial fibrillation with ventricular response today  Amiodarone drip started  Code Status: Level 1 - Full Code      Subjective:   No acute distress  Objective:     Vitals:   Temp (24hrs), Av 9 °F (36 6 °C), Min:97 4 °F (36 3 °C), Max:98 2 °F (36 8 °C)    Temp:  [97 4 °F (36 3 °C)-98 2 °F (36 8 °C)] 97 4 °F (36 3 °C)  HR:  [] 72  Resp:  [18] 18  BP: (114-147)/(63-90) 135/63  SpO2:  [92 %-97 %] 97 %  Body mass index is 34 28 kg/m²  Input and Output Summary (last 24 hours): Intake/Output Summary (Last 24 hours) at 2019 0814  Last data filed at 2019 0928  Gross per 24 hour   Intake 880 ml   Output 1093 ml   Net -213 ml       Physical Exam:     Physical Exam   HENT:   Head: Normocephalic and atraumatic  Eyes: Pupils are equal, round, and reactive to light  EOM are normal    Neck: Normal range of motion  Neck supple  Cardiovascular: Normal rate  Pulmonary/Chest: Effort normal  No stridor  No respiratory distress  Abdominal: Soft  She exhibits no distension  There is no tenderness  Musculoskeletal: She exhibits no edema  Neurological: No cranial nerve deficit  Skin: Skin is warm and dry  Additional Data:     Labs:    Results from last 7 days   Lab Units 19  0220   WBC Thousand/uL 10 07   HEMOGLOBIN g/dL 13 3   HEMATOCRIT % 40 1   PLATELETS Thousands/uL 251   NEUTROS PCT % 65   LYMPHS PCT % 26   MONOS PCT % 7   EOS PCT % 1     Results from last 7 days   Lab Units 19  0451  19  1652   POTASSIUM mmol/L 3 3*   < > 3 5   CHLORIDE mmol/L 105   < > 100   CO2 mmol/L 31   < > 28   BUN mg/dL 18   < > 6   CREATININE mg/dL 0 77   < > 0 81   CALCIUM mg/dL 8 3   < > 9 2   ALK PHOS U/L  --   --  72   ALT U/L  --   --  37   AST U/L  --   --  30    < > = values in this interval not displayed  Results from last 7 days   Lab Units 19  1652   INR  1 14       * I Have Reviewed All Lab Data Listed Above    * Additional Pertinent Lab Tests Reviewed: All Labs Within Last 24 Hours Reviewed      Recent Cultures (last 7 days):           Last 24 Hours Medication List:     Current Facility-Administered Medications:  acetaminophen 650 mg Oral Q4H PRN Genaro Miranda MD   albuterol 2 puff Inhalation Q4H PRN Genaro Miranda MD   apixaban 5 mg Oral BID Ken Heard MD   ascorbic acid 125 mg Oral Daily Genaro Miranda MD   cholecalciferol 1,000 Units Oral Daily Genaro Miranda MD   cyanocobalamin 100 mcg Oral Daily Genaro Miranda MD   fish oil 1,000 mg Oral Daily Genaro Miranad MD   furosemide 40 mg Intravenous BID (diuretic) Ken Heard MD   insulin lispro 1-5 Units Subcutaneous HS Genaro Miranda MD   insulin lispro 1-6 Units Subcutaneous TID AC Genaro Miranda MD   levothyroxine 137 mcg Oral Daily Genaro Miranda MD   loratadine 10 mg Oral Daily Genaro Miranda MD   losartan 100 mg Oral Daily Genaro Miranda MD   metoprolol tartrate 25 mg Oral Q12H Phoenix Crowell MD   multivitamin-minerals 1 tablet Oral Daily Genaro Miranda MD   ondansetron 4 mg Intravenous Q6H PRN Genaro Miranda MD   potassium chloride 40 mEq Oral TID With Meals Vasquez De Santiago DO   pravastatin 40 mg Oral Daily With MD Dang   pyridoxine 100 mg Oral Daily Genaro Miranda MD   venlafaxine 75 mg Oral Q12H Genaro Miranda MD   vitamin E (tocopherol) 1,000 Units Oral Daily Genaro Miranda MD        Today, Patient Was Seen By: Sincere Lorenzo DO    ** Please Note: Dictation voice to text software may have been used in the creation of this document   **

## 2019-06-22 NOTE — PLAN OF CARE
Problem: PAIN - ADULT  Goal: Verbalizes/displays adequate comfort level or baseline comfort level  Description  Interventions:  - Encourage patient to monitor pain and request assistance  - Assess pain using appropriate pain scale  - Administer analgesics based on type and severity of pain and evaluate response  - Implement non-pharmacological measures as appropriate and evaluate response  - Consider cultural and social influences on pain and pain management  - Notify physician/advanced practitioner if interventions unsuccessful or patient reports new pain  Outcome: Progressing     Problem: INFECTION - ADULT  Goal: Absence or prevention of progression during hospitalization  Description  INTERVENTIONS:  - Assess and monitor for signs and symptoms of infection  - Monitor lab/diagnostic results  - Monitor all insertion sites, i e  indwelling lines, tubes, and drains  - Monitor endotracheal (as able) and nasal secretions for changes in amount and color  - [place] appropriate cooling/warming therapies per order  - Administer medications as ordered  - Instruct and encourage patient and family to use good hand hygiene technique  - Identify and instruct in appropriate isolation precautions for identified infection/condition  Outcome: Progressing  Goal: Absence of fever/infection during neutropenic period  Description  INTERVENTIONS:  - Monitor WBC  - Implement neutropenic guidelines  Outcome: Progressing     Problem: SAFETY ADULT  Goal: Patient will remain free of falls  Description  INTERVENTIONS:  - Assess patient frequently for physical needs  -  Identify cognitive and physical deficits and behaviors that affect risk of falls    -  [place] fall precautions as indicated by assessment   - Educate patient/family on patient safety including physical limitations  - Instruct patient to call for assistance with activity based on assessment  - Modify environment to reduce risk of injury  - Consider OT/PT consult to assist with strengthening/mobility  Outcome: Progressing  Goal: Maintain or return to baseline ADL function  Description  INTERVENTIONS:  -  Assess patient's ability to carry out ADLs; assess patient's baseline for ADL function and identify physical deficits which impact ability to perform ADLs (bathing, care of mouth/teeth, toileting, grooming, dressing, etc )  - Assess/evaluate cause of self-care deficits   - Assess range of motion  - Assess patient's mobility; develop plan if impaired  - Assess patient's need for assistive devices and provide as appropriate  - Encourage maximum independence but intervene and supervise when necessary  ¯ Involve family in performance of ADLs  ¯ Assess for home care needs following discharge   ¯ Request OT consult to assist with ADL evaluation and planning for discharge  ¯ Provide patient education as appropriate  Outcome: Progressing  Goal: Maintain or return mobility status to optimal level  Description  INTERVENTIONS:  - Assess patient's baseline mobility status (ambulation, transfers, stairs, etc )    - Identify cognitive and physical deficits and behaviors that affect mobility  - Identify mobility aids required to assist with transfers and/or ambulation (gait belt, sit-to-stand, lift, walker, cane, etc )  - [place] fall precautions as indicated by assessment  - Record patient progress and toleration of activity level on Mobility SBAR; progress patient to next Phase/Stage  - Instruct patient to call for assistance with activity based on assessment  - Request Rehabilitation consult to assist with strengthening/weightbearing, etc   Outcome: Progressing     Problem: DISCHARGE PLANNING  Goal: Discharge to home or other facility with appropriate resources  Description  INTERVENTIONS:  - Identify barriers to discharge w/patient and caregiver  - Arrange for needed discharge resources and transportation as appropriate  - Identify discharge learning needs (meds, wound care, etc )  - Arrange for interpretive services to assist at discharge as needed  - Refer to Case Management Department for coordinating discharge planning if the patient needs post-hospital services based on physician/advanced practitioner order or complex needs related to functional status, cognitive ability, or social support system  Outcome: Progressing     Problem: Knowledge Deficit  Goal: Patient/family/caregiver demonstrates understanding of disease process, treatment plan, medications, and discharge instructions  Description  Complete learning assessment and assess knowledge base    Interventions:  - Provide teaching at level of understanding  - Provide teaching via preferred learning methods  Outcome: Progressing

## 2019-06-23 LAB
ANION GAP SERPL CALCULATED.3IONS-SCNC: 1 MMOL/L (ref 4–13)
BUN SERPL-MCNC: 17 MG/DL (ref 5–25)
CALCIUM SERPL-MCNC: 8.7 MG/DL (ref 8.3–10.1)
CHLORIDE SERPL-SCNC: 105 MMOL/L (ref 100–108)
CO2 SERPL-SCNC: 35 MMOL/L (ref 21–32)
CREAT SERPL-MCNC: 0.86 MG/DL (ref 0.6–1.3)
GFR SERPL CREATININE-BSD FRML MDRD: 65 ML/MIN/1.73SQ M
GLUCOSE SERPL-MCNC: 123 MG/DL (ref 65–140)
GLUCOSE SERPL-MCNC: 147 MG/DL (ref 65–140)
GLUCOSE SERPL-MCNC: 160 MG/DL (ref 65–140)
GLUCOSE SERPL-MCNC: 165 MG/DL (ref 65–140)
GLUCOSE SERPL-MCNC: 230 MG/DL (ref 65–140)
MAGNESIUM SERPL-MCNC: 2.1 MG/DL (ref 1.6–2.6)
POTASSIUM SERPL-SCNC: 4 MMOL/L (ref 3.5–5.3)
SODIUM SERPL-SCNC: 141 MMOL/L (ref 136–145)

## 2019-06-23 PROCEDURE — 99232 SBSQ HOSP IP/OBS MODERATE 35: CPT | Performed by: INTERNAL MEDICINE

## 2019-06-23 PROCEDURE — 82948 REAGENT STRIP/BLOOD GLUCOSE: CPT

## 2019-06-23 PROCEDURE — 80048 BASIC METABOLIC PNL TOTAL CA: CPT | Performed by: INTERNAL MEDICINE

## 2019-06-23 PROCEDURE — 83735 ASSAY OF MAGNESIUM: CPT | Performed by: INTERNAL MEDICINE

## 2019-06-23 RX ORDER — BENZONATATE 100 MG/1
200 CAPSULE ORAL 3 TIMES DAILY PRN
Status: DISCONTINUED | OUTPATIENT
Start: 2019-06-23 | End: 2019-06-28

## 2019-06-23 RX ORDER — BENZONATATE 100 MG/1
100 CAPSULE ORAL 3 TIMES DAILY PRN
Status: DISCONTINUED | OUTPATIENT
Start: 2019-06-23 | End: 2019-06-23

## 2019-06-23 RX ORDER — METOPROLOL TARTRATE 50 MG/1
50 TABLET, FILM COATED ORAL EVERY 12 HOURS SCHEDULED
Status: DISCONTINUED | OUTPATIENT
Start: 2019-06-23 | End: 2019-06-26

## 2019-06-23 RX ORDER — GUAIFENESIN 600 MG
600 TABLET, EXTENDED RELEASE 12 HR ORAL EVERY 12 HOURS SCHEDULED
Status: DISCONTINUED | OUTPATIENT
Start: 2019-06-23 | End: 2019-06-28

## 2019-06-23 RX ORDER — FUROSEMIDE 10 MG/ML
40 INJECTION INTRAMUSCULAR; INTRAVENOUS DAILY
Status: DISCONTINUED | OUTPATIENT
Start: 2019-06-24 | End: 2019-06-25

## 2019-06-23 RX ORDER — METOPROLOL TARTRATE 5 MG/5ML
5 INJECTION INTRAVENOUS EVERY 6 HOURS PRN
Status: DISCONTINUED | OUTPATIENT
Start: 2019-06-23 | End: 2019-06-26

## 2019-06-23 RX ADMIN — ASCORBIC ACID TAB 250 MG 125 MG: 250 TAB at 09:32

## 2019-06-23 RX ADMIN — APIXABAN 5 MG: 5 TABLET, FILM COATED ORAL at 17:03

## 2019-06-23 RX ADMIN — Medication 1000 MG: at 08:01

## 2019-06-23 RX ADMIN — METOPROLOL TARTRATE 5 MG: 5 INJECTION INTRAVENOUS at 13:34

## 2019-06-23 RX ADMIN — Medication 100 MG: at 09:32

## 2019-06-23 RX ADMIN — GUAIFENESIN 600 MG: 600 TABLET, EXTENDED RELEASE ORAL at 21:55

## 2019-06-23 RX ADMIN — LOSARTAN POTASSIUM 100 MG: 50 TABLET, FILM COATED ORAL at 08:01

## 2019-06-23 RX ADMIN — METOPROLOL TARTRATE 25 MG: 25 TABLET, FILM COATED ORAL at 12:35

## 2019-06-23 RX ADMIN — PRAVASTATIN SODIUM 40 MG: 40 TABLET ORAL at 17:03

## 2019-06-23 RX ADMIN — INSULIN LISPRO 3 UNITS: 100 INJECTION, SOLUTION INTRAVENOUS; SUBCUTANEOUS at 11:03

## 2019-06-23 RX ADMIN — VITAM B12 100 MCG: 100 TAB at 08:01

## 2019-06-23 RX ADMIN — BENZONATATE 100 MG: 100 CAPSULE ORAL at 12:35

## 2019-06-23 RX ADMIN — VENLAFAXINE 75 MG: 37.5 TABLET ORAL at 09:33

## 2019-06-23 RX ADMIN — Medication 1000 UNITS: at 09:32

## 2019-06-23 RX ADMIN — GUAIFENESIN 600 MG: 600 TABLET, EXTENDED RELEASE ORAL at 12:35

## 2019-06-23 RX ADMIN — Medication 1 TABLET: at 08:01

## 2019-06-23 RX ADMIN — METOPROLOL TARTRATE 25 MG: 25 TABLET, FILM COATED ORAL at 08:01

## 2019-06-23 RX ADMIN — INSULIN LISPRO 1 UNITS: 100 INJECTION, SOLUTION INTRAVENOUS; SUBCUTANEOUS at 21:55

## 2019-06-23 RX ADMIN — METOPROLOL TARTRATE 50 MG: 50 TABLET, FILM COATED ORAL at 21:55

## 2019-06-23 RX ADMIN — APIXABAN 5 MG: 5 TABLET, FILM COATED ORAL at 08:01

## 2019-06-23 RX ADMIN — LORATADINE 10 MG: 10 TABLET ORAL at 08:01

## 2019-06-23 RX ADMIN — VENLAFAXINE 75 MG: 37.5 TABLET ORAL at 21:55

## 2019-06-23 RX ADMIN — VITAMIN D, TAB 1000IU (100/BT) 1000 UNITS: 25 TAB at 08:01

## 2019-06-23 RX ADMIN — INSULIN LISPRO 1 UNITS: 100 INJECTION, SOLUTION INTRAVENOUS; SUBCUTANEOUS at 07:59

## 2019-06-23 RX ADMIN — METOPROLOL TARTRATE 5 MG: 5 INJECTION INTRAVENOUS at 22:38

## 2019-06-23 RX ADMIN — LEVOTHYROXINE SODIUM 137 MCG: 112 TABLET ORAL at 05:46

## 2019-06-23 RX ADMIN — BENZONATATE 200 MG: 100 CAPSULE ORAL at 22:38

## 2019-06-23 RX ADMIN — AMIODARONE HYDROCHLORIDE 0.5 MG/MIN: 50 INJECTION, SOLUTION INTRAVENOUS at 17:07

## 2019-06-23 RX ADMIN — FUROSEMIDE 40 MG: 10 INJECTION, SOLUTION INTRAMUSCULAR; INTRAVENOUS at 08:01

## 2019-06-23 NOTE — ASSESSMENT & PLAN NOTE
Wt Readings from Last 3 Encounters:   06/23/19 80 7 kg (177 lb 14 6 oz)   06/18/19 81 kg (178 lb 9 6 oz)   03/20/19 80 1 kg (176 lb 9 6 oz)     · Appears volume overloaded on examination, CXR and CTA PE study with pulmonary vascular congestion and elevated BNP noted    Suspect AFib with RVR is also playing role  · IV diuretics

## 2019-06-23 NOTE — ASSESSMENT & PLAN NOTE
Lab Results   Component Value Date    HGBA1C 5 9 (H) 03/18/2019       Recent Labs     06/22/19  1125 06/22/19  1615 06/22/19  2157 06/23/19  0625   POCGLU 156* 136 160* 165*       Blood Sugar Average: Last 72 hrs:  (P) 057 [id number]   · Diet controlled at home  · Add SSI with Accu-Cheks while inpatient  · Carb controlled diet  · Avoid hypoglycemia

## 2019-06-23 NOTE — PROGRESS NOTES
Patient persist in afib RVR in the 160's prn 5mg of IV lopressor given at 1334hr with little effect 's-150's  Next dose not due until 1934hr  Amiodarone 0 5mg/min running as ordered  VSS  Patient asymptomatic  Dr Rosanne Lui made aware, no new orders  Will continue to monitor  Call bell within reach

## 2019-06-23 NOTE — PROGRESS NOTES
Progress Note Haim Akins 1940, 66 y o  female MRN: [id number]    Unit/Bed#: [facility] 626-15 Encounter: [id number]    Primary Care Provider: Mohan Herrera MD   Date and time admitted to hospital: 6/18/2019  4:35 PM        * Atrial fibrillation with RVR ([address] )  Assessment & Plan  · EBEN cardioversion as per Cardiology  · Metoprolol  · Monitor heart rate closely  · Amiodarone drip   Patient still very tachycardic  Will discuss with Cardiology may need increase oral metoprolol  Note history of bradyarrhythmias  Acute CHF (congestive heart failure) ([facility])  Assessment & Plan  Wt Readings from Last 3 Encounters:   06/23/19 80 7 kg (177 lb 14 6 oz)   06/18/19 81 kg (178 lb 9 6 oz)   03/20/19 80 1 kg (176 lb 9 6 oz)     · Appears volume overloaded on examination, CXR and CTA PE study with pulmonary vascular congestion and elevated BNP noted  Suspect AFib with RVR is also playing role  · IV diuretics        Type 2 diabetes mellitus with diabetic cataract [facility])  Assessment & Plan  Lab Results   Component Value Date    HGBA1C 5 9 (H) 03/18/2019       Recent Labs     06/22/19  1125 06/22/19  1615 06/22/19  2157 06/23/19  0625   POCGLU 156* 136 160* 165*       Blood Sugar Average: Last 72 hrs:  (P) 856 [id number]   · Diet controlled at home  · Add SSI with Accu-Cheks while inpatient  · Carb controlled diet  · Avoid hypoglycemia    Hypothyroidism  Assessment & Plan  · TSH and free T4 as below  · Continue Synthroid therapy    Hypertension  Assessment & Plan  · Continue losartan, hold HCTZ for now  · Blood pressure monitoring per protocol, avoid hypotension    Hyperlipidemia  Assessment & Plan  · Continue statin therapy        VTE Pharmacologic Prophylaxis:   Pharmacologic: Apixaban (Eliquis)  Mechanical VTE Prophylaxis in Place: No    Patient Centered Rounds: I have performed bedside rounds with nursing staff today  Time Spent for Care: 15 minutes    More than 50% of total time spent on counseling and coordination of care as described above  Current Length of Stay: 5 day(s)    Current Patient Status: Inpatient   Certification Statement: The patient will continue to require additional inpatient hospital stay due to Need to monitor symptoms      Code Status: Level 1 - Full Code      Subjective:   No acute distress    Objective:     Vitals:   Temp (24hrs), Av 2 °F (36 8 °C), Min:97 8 °F (36 6 °C), Max:98 5 °F (36 9 °C)    Temp:  [97 8 °F (36 6 °C)-98 5 °F (36 9 °C)] 98 3 °F (36 8 °C)  HR:  [131-194] 146  Resp:  [18-22] 18  BP: (110-143)/(66-98) 129/98  SpO2:  [92 %-99 %] 96 %  Body mass index is 33 62 kg/m²  Input and Output Summary (last 24 hours): Intake/Output Summary (Last 24 hours) at 2019 2457  Last data filed at 2019 0830  Gross per 24 hour   Intake 1045 8 ml   Output 4150 ml   Net -3104 2 ml       Physical Exam:     Physical Exam   Constitutional: She appears well-developed and well-nourished  HENT:   Head: Normocephalic and atraumatic  Cardiovascular: Normal rate  Exam reveals no friction rub  No murmur heard  Pulmonary/Chest: Effort normal and breath sounds normal  No stridor  No respiratory distress  Abdominal: Soft  Bowel sounds are normal    Musculoskeletal: Normal range of motion  She exhibits no edema  Skin: Skin is warm and dry  Additional Data:     Labs:    Results from last 7 days   Lab Units 19  0220   WBC Thousand/uL 10 07   HEMOGLOBIN g/dL 13 3   HEMATOCRIT % 40 1   PLATELETS Thousands/uL 251   NEUTROS PCT % 65   LYMPHS PCT % 26   MONOS PCT % 7   EOS PCT % 1     Results from last 7 days   Lab Units 19  0442  19  1652   POTASSIUM mmol/L 4 0   < > 3 5   CHLORIDE mmol/L 105   < > 100   CO2 mmol/L 35*   < > 28   BUN mg/dL 17   < > 6   CREATININE mg/dL 0 86   < > 0 81   CALCIUM mg/dL 8 7   < > 9 2   ALK PHOS U/L  --   --  72   ALT U/L  --   --  37   AST U/L  --   --  30    < > = values in this interval not displayed       Results from last 7 days   Lab Units 06/18/19  1652   INR  1 14       * I Have Reviewed All Lab Data Listed Above  * Additional Pertinent Lab Tests Reviewed: All Labs Within Last 24 Hours Reviewed      Recent Cultures (last 7 days):           Last 24 Hours Medication List:     Current Facility-Administered Medications:  acetaminophen 650 mg Oral Q4H PRN Krissy Varghese MD    albuterol 2 puff Inhalation Q4H PRN Krissy Varghese MD    amiodarone 0 5 mg/min Intravenous Continuous Vasquez De Santiago DO Last Rate: 0 5 mg/min (06/22/19 1805)   apixaban 5 mg Oral BID Elliott Willingham MD    ascorbic acid 125 mg Oral Daily Krissy Varghese MD    cholecalciferol 1,000 Units Oral Daily Krissy Varghese MD    cyanocobalamin 100 mcg Oral Daily Krissy Varghese MD    fish oil 1,000 mg Oral Daily Krissy Varghese MD    furosemide 40 mg Intravenous BID (diuretic) Elliott Willingham MD    insulin lispro 1-5 Units Subcutaneous HS Krissy Varghese MD    insulin lispro 1-6 Units Subcutaneous TID AC Krissy Varghese MD    levothyroxine 137 mcg Oral Daily Krissy Varghese MD    loratadine 10 mg Oral Daily Krissy Varghese MD    losartan 100 mg Oral Daily Krissy Varghese MD    metoprolol tartrate 25 mg Oral Q12H [address] Elliott Willingham MD    multivitamin-minerals 1 tablet Oral Daily Krissy Varghese MD    ondansetron 4 mg Intravenous Q6H PRN Krissy Varghese MD    pravastatin 40 mg Oral Daily With Sonya Beyer MD    pyridoxine 100 mg Oral Daily Krissy Varghese MD    venlafaxine 75 mg Oral Q12H Krissy Varghese MD    vitamin E (tocopherol) 1,000 Units Oral Daily Krissy Varghese MD         Today, Patient Was Seen By: Liliana Caldera DO    ** Please Note: Dictation voice to text software may have been used in the creation of this document   **

## 2019-06-23 NOTE — PROGRESS NOTES
Cardiology Progress Note - Ritika Hash 66 y o  female MRN: [id number]    [facility] Encounter: [id number]    Assessment and plan  1  Atrial fibrillation with RVR  2  Acute CHF suspect diastolic awaiting echo  3  Hypertension  4  Hyperlipidemia  5  Hypothyroidism  6  Prediabetes  7  Cardiomyopathy suspect tachycardia induced ejection fraction 25-30%  Recommendations:  Patient underwent EBEN guided cardioversion Friday  We found significant depressed LV systolic function likely tachycardia induced  Amiodarone was started yesterday will continue loading and then will need another cardioversion  Will have electrophysiology see on Monday  Given her tachy-oniel episodes and junctional rhythm post cardioversion I suspect she will likely need pacemaker backup to allow amiodarone and AV arabella blocking agents  From a heart failure standpoint continue Lasix, however decrease IV dose to daily     Increase metoprolol dose  Continue anticoagulation with Eliquis  Subjective:    No significant events overnight  Shortness of breath has significantly improved  Remains tachycardic  Denies chest pain, lightheadedness, dizziness  ROS    Objective:   Vitals: Blood pressure 129/98, pulse (!) 128, temperature 98 3 °F (36 8 °C), temperature source Oral, resp  rate 18, height 5' 1" (1 549 m), weight 80 7 kg (177 lb 14 6 oz), SpO2 96 %  , Body mass index is 33 62 kg/m² ,   Orthostatic Blood Pressures      Most Recent Value   Blood Pressure  129/98 filed at 06/23/2019 0802   Patient Position - Orthostatic VS  Lying filed at 06/23/2019 5232         Systolic (91QWM), TSW:857 , Min:110 , VHY:700     Diastolic (37SSK), CGF:66, Min:66, Max:98      Intake/Output Summary (Last 24 hours) at 6/23/2019 1144  Last data filed at 6/23/2019 1123  Gross per 24 hour   Intake 999 28 ml   Output 3850 ml   Net -2850 72 ml     Weight (last 2 days)     Date/Time   Weight    06/23/19 0752   80 7 (177 91)    06/22/19 0530   82 3 (181 44)    06/21/19 0552   82 8 (182 54)                Telemetry Review: No significant arrhythmias seen on telemetry review  EKG personally reviewed by Nicole Queen DO  Physical Exam   Constitutional: She is oriented to person, place, and time  She appears well-nourished  No distress  HENT:   Head: Atraumatic  Eyes: Pupils are equal, round, and reactive to light  Conjunctivae are normal    Neck: Neck supple  Cardiovascular: Normal rate and normal heart sounds  An irregularly irregular rhythm present  Exam reveals no gallop and no friction rub  No murmur heard  Pulmonary/Chest: Effort normal and breath sounds normal  No respiratory distress  She has no wheezes  She has no rales  Abdominal: Bowel sounds are normal  She exhibits no distension  There is no tenderness  There is no rebound  Musculoskeletal: She exhibits edema  Neurological: She is alert and oriented to person, place, and time  No cranial nerve deficit  Skin: Skin is warm and dry  No erythema  Nursing note and vitals reviewed          Laboratory Results:  Results from last 7 days   Lab Units 06/18/19  1652   TROPONIN I ng/mL 0 03       CBC with diff:   Results from last 7 days   Lab Units 06/19/19  0220 06/18/19  1652   WBC Thousand/uL 10 07 7 86   HEMOGLOBIN g/dL 13 3 13 7   HEMATOCRIT % 40 1 42 0   MCV fL 98 99*   PLATELETS Thousands/uL 251 245   MCH pg 32 5 32 3   MCHC g/dL 33 2 32 6   RDW % 12 9 13 1   MPV fL 10 3 10 4   NRBC AUTO /100 WBCs 0 0         CMP:  Results from last 7 days   Lab Units 06/23/19  0442 06/22/19  2158 06/22/19  0451 06/21/19  1519 06/20/19  1000 06/19/19  0220 06/18/19  1652   POTASSIUM mmol/L 4 0 4 0 3 3* 3 6 4 1 4 0 3 5   CHLORIDE mmol/L 105 106 105 106 104 104 100   CO2 mmol/L 35* 29 31 29 31 30 28   BUN mg/dL 17 18 18 17 11 6 6   CREATININE mg/dL 0 86 0 89 0 77 0 95 0 90 0 83 0 81   CALCIUM mg/dL 8 7 8 5 8 3 8 6 8 9 8 9 9 2   AST U/L  --   --   --   --   --   --  30   ALT U/L  -- --   --   --   --   --  37   ALK PHOS U/L  --   --   --   --   --   --  72   EGFR ml/min/1 73sq m 65 62 74 58 61 68 70         BMP:  Results from last 7 days   Lab Units 06/23/19  0442 06/22/19  2158 06/22/19  0451 06/21/19  1519 06/20/19  1000 06/19/19  0220 06/18/19  1652   POTASSIUM mmol/L 4 0 4 0 3 3* 3 6 4 1 4 0 3 5   CHLORIDE mmol/L 105 106 105 106 104 104 100   CO2 mmol/L 35* 29 31 29 31 30 28   BUN mg/dL 17 18 18 17 11 6 6   CREATININE mg/dL 0 86 0 89 0 77 0 95 0 90 0 83 0 81   CALCIUM mg/dL 8 7 8 5 8 3 8 6 8 9 8 9 9 2       BNP: No results for input(s): BNP in the last 72 hours  Magnesium:   Results from last 7 days   Lab Units 06/23/19 0442 06/22/19 2158 06/22/19  0944 06/22/19  0451 06/21/19  1519 06/20/19  1000 06/19/19  0220   MAGNESIUM mg/dL 2 1 2 2 2 2 2 3 2 3 2 2 2 0       Coags:   Results from last 7 days   Lab Units 06/18/19  1652   PTT seconds 26   INR  1 14       TSH:        Hemoglobin A1C       Lipid Profile:       Cardiac testing:   No results found for this or any previous visit  No results found for this or any previous visit  No results found for this or any previous visit  No results found for this or any previous visit      Meds/Allergies   all current active meds have been reviewed  Medications Prior to Admission   Medication    albuterol (VENTOLIN HFA) 90 mcg/act inhaler    Ascorbic Acid (VITAMIN C) 100 MG tablet    cholecalciferol (VITAMIN D3) 1,000 units tablet    cyanocobalamin 100 MCG tablet    fexofenadine (ALLEGRA) 30 MG tablet    Garlic 10 MG CAPS    levothyroxine 137 mcg tablet    losartan-hydrochlorothiazide (HYZAAR) 100-12 5 MG per tablet    magnesium 30 MG tablet    Misc Natural Products (TURMERIC CURCUMIN) CAPS    multivitamin (THERAGRAN) TABS    Omega-3 Fatty Acids (FISH OIL) 1,000 mg    pyridoxine (B-6) 100 MG tablet    simvastatin (ZOCOR) 20 mg tablet    SUPER B COMPLEX/C CAPS    venlafaxine (EFFEXOR) 75 mg tablet    vitamin E, tocopherol, 1,000 units capsule         amiodarone 0 5 mg/min Last Rate: 0 5 mg/min (06/22/19 0179)     Assessment:  Principal Problem:    Atrial fibrillation with RVR ([facility])  Active Problems:    Hyperlipidemia    Hypertension    Hypothyroidism    Type 2 diabetes mellitus with diabetic cataract ([facility])    Acute CHF (congestive heart failure) ([facility] )

## 2019-06-24 LAB
ANION GAP SERPL CALCULATED.3IONS-SCNC: 6 MMOL/L (ref 4–13)
APTT PPP: 23 SECONDS (ref 23–37)
APTT PPP: 59 SECONDS (ref 23–37)
ATRIAL RATE: 133 BPM
ATRIAL RATE: 52 BPM
ATRIAL RATE: 52 BPM
ATRIAL RATE: 57 BPM
ATRIAL RATE: 67 BPM
ATRIAL RATE: 84 BPM
BUN SERPL-MCNC: 16 MG/DL (ref 5–25)
CALCIUM SERPL-MCNC: 8.4 MG/DL (ref 8.3–10.1)
CHLORIDE SERPL-SCNC: 101 MMOL/L (ref 100–108)
CO2 SERPL-SCNC: 30 MMOL/L (ref 21–32)
CREAT SERPL-MCNC: 0.82 MG/DL (ref 0.6–1.3)
ERYTHROCYTE [DISTWIDTH] IN BLOOD BY AUTOMATED COUNT: 12.9 % (ref 11.6–15.1)
GFR SERPL CREATININE-BSD FRML MDRD: 69 ML/MIN/1.73SQ M
GLUCOSE SERPL-MCNC: 113 MG/DL (ref 65–140)
GLUCOSE SERPL-MCNC: 142 MG/DL (ref 65–140)
GLUCOSE SERPL-MCNC: 173 MG/DL (ref 65–140)
GLUCOSE SERPL-MCNC: 179 MG/DL (ref 65–140)
GLUCOSE SERPL-MCNC: 219 MG/DL (ref 65–140)
HCT VFR BLD AUTO: 42.8 % (ref 34.8–46.1)
HGB BLD-MCNC: 14 G/DL (ref 11.5–15.4)
INR PPP: 1.54 (ref 0.84–1.19)
MCH RBC QN AUTO: 32.9 PG (ref 26.8–34.3)
MCHC RBC AUTO-ENTMCNC: 32.7 G/DL (ref 31.4–37.4)
MCV RBC AUTO: 101 FL (ref 82–98)
PLATELET # BLD AUTO: 229 THOUSANDS/UL (ref 149–390)
PMV BLD AUTO: 10.6 FL (ref 8.9–12.7)
POTASSIUM SERPL-SCNC: 3.8 MMOL/L (ref 3.5–5.3)
PROTHROMBIN TIME: 18 SECONDS (ref 11.6–14.5)
QRS AXIS: 104 DEGREES
QRS AXIS: 105 DEGREES
QRS AXIS: 105 DEGREES
QRS AXIS: 106 DEGREES
QRS AXIS: 115 DEGREES
QRS AXIS: 116 DEGREES
QRSD INTERVAL: 78 MS
QRSD INTERVAL: 80 MS
QRSD INTERVAL: 84 MS
QRSD INTERVAL: 86 MS
QRSD INTERVAL: 88 MS
QRSD INTERVAL: 88 MS
QT INTERVAL: 344 MS
QT INTERVAL: 346 MS
QT INTERVAL: 366 MS
QT INTERVAL: 368 MS
QT INTERVAL: 408 MS
QT INTERVAL: 414 MS
QTC INTERVAL: 375 MS
QTC INTERVAL: 403 MS
QTC INTERVAL: 406 MS
QTC INTERVAL: 406 MS
QTC INTERVAL: 420 MS
QTC INTERVAL: 429 MS
RBC # BLD AUTO: 4.26 MILLION/UL (ref 3.81–5.12)
SODIUM SERPL-SCNC: 137 MMOL/L (ref 136–145)
T WAVE AXIS: 122 DEGREES
T WAVE AXIS: 132 DEGREES
T WAVE AXIS: 138 DEGREES
T WAVE AXIS: 145 DEGREES
T WAVE AXIS: 149 DEGREES
T WAVE AXIS: 174 DEGREES
VENTRICULAR RATE: 59 BPM
VENTRICULAR RATE: 62 BPM
VENTRICULAR RATE: 71 BPM
VENTRICULAR RATE: 74 BPM
VENTRICULAR RATE: 82 BPM
VENTRICULAR RATE: 84 BPM
WBC # BLD AUTO: 11.31 THOUSAND/UL (ref 4.31–10.16)

## 2019-06-24 PROCEDURE — 93010 ELECTROCARDIOGRAM REPORT: CPT | Performed by: INTERNAL MEDICINE

## 2019-06-24 PROCEDURE — 85730 THROMBOPLASTIN TIME PARTIAL: CPT | Performed by: PHYSICIAN ASSISTANT

## 2019-06-24 PROCEDURE — 85730 THROMBOPLASTIN TIME PARTIAL: CPT | Performed by: INTERNAL MEDICINE

## 2019-06-24 PROCEDURE — 99232 SBSQ HOSP IP/OBS MODERATE 35: CPT | Performed by: INTERNAL MEDICINE

## 2019-06-24 PROCEDURE — 99223 1ST HOSP IP/OBS HIGH 75: CPT | Performed by: INTERNAL MEDICINE

## 2019-06-24 PROCEDURE — 85610 PROTHROMBIN TIME: CPT | Performed by: PHYSICIAN ASSISTANT

## 2019-06-24 PROCEDURE — 85027 COMPLETE CBC AUTOMATED: CPT | Performed by: PHYSICIAN ASSISTANT

## 2019-06-24 PROCEDURE — 80048 BASIC METABOLIC PNL TOTAL CA: CPT | Performed by: INTERNAL MEDICINE

## 2019-06-24 PROCEDURE — 82948 REAGENT STRIP/BLOOD GLUCOSE: CPT

## 2019-06-24 RX ORDER — AMIODARONE HYDROCHLORIDE 200 MG/1
200 TABLET ORAL
Status: DISCONTINUED | OUTPATIENT
Start: 2019-06-24 | End: 2019-06-28

## 2019-06-24 RX ORDER — VANCOMYCIN HYDROCHLORIDE 1 G/200ML
1000 INJECTION, SOLUTION INTRAVENOUS ONCE
Status: DISCONTINUED | OUTPATIENT
Start: 2019-06-25 | End: 2019-06-25

## 2019-06-24 RX ORDER — HEPARIN SODIUM 10000 [USP'U]/100ML
3-20 INJECTION, SOLUTION INTRAVENOUS
Status: DISPENSED | OUTPATIENT
Start: 2019-06-24 | End: 2019-06-25

## 2019-06-24 RX ORDER — HEPARIN SODIUM 1000 [USP'U]/ML
2000 INJECTION, SOLUTION INTRAVENOUS; SUBCUTANEOUS AS NEEDED
Status: DISCONTINUED | OUTPATIENT
Start: 2019-06-24 | End: 2019-06-25

## 2019-06-24 RX ORDER — HEPARIN SODIUM 1000 [USP'U]/ML
4000 INJECTION, SOLUTION INTRAVENOUS; SUBCUTANEOUS AS NEEDED
Status: DISCONTINUED | OUTPATIENT
Start: 2019-06-24 | End: 2019-06-25

## 2019-06-24 RX ADMIN — INSULIN LISPRO 1 UNITS: 100 INJECTION, SOLUTION INTRAVENOUS; SUBCUTANEOUS at 12:02

## 2019-06-24 RX ADMIN — GUAIFENESIN 600 MG: 600 TABLET, EXTENDED RELEASE ORAL at 20:23

## 2019-06-24 RX ADMIN — METOPROLOL TARTRATE 50 MG: 50 TABLET, FILM COATED ORAL at 09:40

## 2019-06-24 RX ADMIN — PRAVASTATIN SODIUM 40 MG: 40 TABLET ORAL at 16:31

## 2019-06-24 RX ADMIN — Medication 1000 UNITS: at 09:43

## 2019-06-24 RX ADMIN — VENLAFAXINE 75 MG: 37.5 TABLET ORAL at 21:23

## 2019-06-24 RX ADMIN — Medication 1000 MG: at 09:39

## 2019-06-24 RX ADMIN — LEVOTHYROXINE SODIUM 137 MCG: 112 TABLET ORAL at 05:04

## 2019-06-24 RX ADMIN — BENZONATATE 200 MG: 100 CAPSULE ORAL at 19:14

## 2019-06-24 RX ADMIN — APIXABAN 5 MG: 5 TABLET, FILM COATED ORAL at 09:39

## 2019-06-24 RX ADMIN — INSULIN LISPRO 1 UNITS: 100 INJECTION, SOLUTION INTRAVENOUS; SUBCUTANEOUS at 21:23

## 2019-06-24 RX ADMIN — HEPARIN SODIUM AND DEXTROSE 12 UNITS/KG/HR: 10000; 5 INJECTION INTRAVENOUS at 16:06

## 2019-06-24 RX ADMIN — AMIODARONE HYDROCHLORIDE 1 MG/MIN: 50 INJECTION, SOLUTION INTRAVENOUS at 19:09

## 2019-06-24 RX ADMIN — LORATADINE 10 MG: 10 TABLET ORAL at 09:39

## 2019-06-24 RX ADMIN — HEPARIN SODIUM 2000 UNITS: 1000 INJECTION INTRAVENOUS; SUBCUTANEOUS at 22:56

## 2019-06-24 RX ADMIN — Medication 100 MG: at 09:43

## 2019-06-24 RX ADMIN — GUAIFENESIN 600 MG: 600 TABLET, EXTENDED RELEASE ORAL at 09:40

## 2019-06-24 RX ADMIN — VENLAFAXINE 75 MG: 37.5 TABLET ORAL at 09:42

## 2019-06-24 RX ADMIN — LOSARTAN POTASSIUM 100 MG: 50 TABLET, FILM COATED ORAL at 09:39

## 2019-06-24 RX ADMIN — VITAM B12 100 MCG: 100 TAB at 09:39

## 2019-06-24 RX ADMIN — ASCORBIC ACID TAB 250 MG 125 MG: 250 TAB at 09:44

## 2019-06-24 RX ADMIN — METOPROLOL TARTRATE 5 MG: 5 INJECTION INTRAVENOUS at 06:34

## 2019-06-24 RX ADMIN — Medication 1 TABLET: at 09:39

## 2019-06-24 RX ADMIN — FUROSEMIDE 40 MG: 10 INJECTION, SOLUTION INTRAMUSCULAR; INTRAVENOUS at 09:40

## 2019-06-24 RX ADMIN — METOPROLOL TARTRATE 50 MG: 50 TABLET, FILM COATED ORAL at 20:23

## 2019-06-24 RX ADMIN — AMIODARONE HYDROCHLORIDE 200 MG: 200 TABLET ORAL at 16:31

## 2019-06-24 RX ADMIN — VITAMIN D, TAB 1000IU (100/BT) 1000 UNITS: 25 TAB at 09:38

## 2019-06-24 RX ADMIN — BENZONATATE 200 MG: 100 CAPSULE ORAL at 09:53

## 2019-06-24 NOTE — PROGRESS NOTES
Progress Note Delfina Dumont 1940, 66 y o  female MRN: [id number]    Unit/Bed#: [facility] 986-94 Encounter: [id number]    Primary Care Provider: Martha Yip MD   Date and time admitted to hospital: 6/18/2019  4:35 PM        * Atrial fibrillation with RVR ([address] )  Assessment & Plan  · EBEN cardioversion as per Cardiology  · Metoprolol  · Monitor heart rate closely  · Amiodarone drip   Patient still very tachycardic  Will discuss with Cardiology may need increase oral metoprolol  Note history of bradyarrhythmias  Acute CHF (congestive heart failure) ([facility])  Assessment & Plan  Wt Readings from Last 3 Encounters:   06/24/19 79 kg (174 lb 2 6 oz)   06/18/19 81 kg (178 lb 9 6 oz)   03/20/19 80 1 kg (176 lb 9 6 oz)     · Appears volume overloaded on examination, CXR and CTA PE study with pulmonary vascular congestion and elevated BNP noted  Suspect AFib with RVR is also playing role  · IV diuretics        Type 2 diabetes mellitus with diabetic cataract [facility])  Assessment & Plan  Lab Results   Component Value Date    HGBA1C 5 9 (H) 03/18/2019       Recent Labs     06/23/19  1101 06/23/19  1610 06/23/19  2103 06/24/19  0635   POCGLU 230* 123 160* 142*       Blood Sugar Average: Last 72 hrs:  (P) 191 [id number]   · Diet controlled at home  · Add SSI with Accu-Cheks while inpatient  · Carb controlled diet  · Avoid hypoglycemia    Hypothyroidism  Assessment & Plan  · TSH and free T4 as below  · Continue Synthroid therapy    Hypertension  Assessment & Plan  · Continue losartan, hold HCTZ for now  · Blood pressure monitoring per protocol, avoid hypotension    Hyperlipidemia  Assessment & Plan  · Continue statin therapy        VTE Pharmacologic Prophylaxis:   Pharmacologic: Apixaban (Eliquis)  Mechanical VTE Prophylaxis in Place: No    Patient Centered Rounds: I have performed bedside rounds with nursing staff today  Time Spent for Care: 15 minutes    More than 50% of total time spent on counseling and coordination of care as described above  Current Length of Stay: 6 day(s)    Current Patient Status: Inpatient   Certification Statement: The patient will continue to require additional inpatient hospital stay due to Need to monitor symptoms        Code Status: Level 1 - Full Code      Subjective:   Patient comfortable but still significantly tachycardic  EP evaluation pending  Objective:     Vitals:   Temp (24hrs), Av 1 °F (36 7 °C), Min:97 5 °F (36 4 °C), Max:98 5 °F (36 9 °C)    Temp:  [97 5 °F (36 4 °C)-98 5 °F (36 9 °C)] 98 °F (36 7 °C)  HR:  [125-162] 150  Resp:  [18-20] 18  BP: (101-137)/(64-90) 128/64  SpO2:  [95 %-98 %] 98 %  Body mass index is 32 91 kg/m²  Input and Output Summary (last 24 hours): Intake/Output Summary (Last 24 hours) at 2019 0842  Last data filed at 2019 0500  Gross per 24 hour   Intake 1090 98 ml   Output 1200 ml   Net -109 02 ml       Physical Exam:     Physical Exam   Constitutional: She is oriented to person, place, and time  HENT:   Head: Normocephalic and atraumatic  Eyes: Pupils are equal, round, and reactive to light  EOM are normal    Neck: Normal range of motion  Neck supple  Cardiovascular: Normal rate  No murmur heard  Pulmonary/Chest: Effort normal and breath sounds normal  No stridor  No respiratory distress  Abdominal: Soft  Bowel sounds are normal  She exhibits no distension  There is no tenderness  Musculoskeletal: Normal range of motion  She exhibits no edema  Neurological: She is alert and oriented to person, place, and time  Skin: Skin is warm and dry         Additional Data:     Labs:    Results from last 7 days   Lab Units 19  0220   WBC Thousand/uL 10 07   HEMOGLOBIN g/dL 13 3   HEMATOCRIT % 40 1   PLATELETS Thousands/uL 251   NEUTROS PCT % 65   LYMPHS PCT % 26   MONOS PCT % 7   EOS PCT % 1     Results from last 7 days   Lab Units 19  0442  19  1652   POTASSIUM mmol/L 4 0   < > 3 5   CHLORIDE mmol/L 105   < > 100   CO2 mmol/L 35*   < > 28   BUN mg/dL 17   < > 6   CREATININE mg/dL 0 86   < > 0 81   CALCIUM mg/dL 8 7   < > 9 2   ALK PHOS U/L  --   --  72   ALT U/L  --   --  37   AST U/L  --   --  30    < > = values in this interval not displayed  Results from last 7 days   Lab Units 06/18/19  1652   INR  1 14       * I Have Reviewed All Lab Data Listed Above  * Additional Pertinent Lab Tests Reviewed:  All Labs Within Last 24 Hours Reviewed        Recent Cultures (last 7 days):           Last 24 Hours Medication List:     Current Facility-Administered Medications:  acetaminophen 650 mg Oral Q4H PRN Kizzy Rodriguez MD    albuterol 2 puff Inhalation Q4H PRN Kizzy Rodriguez MD    amiodarone 0 5 mg/min Intravenous Continuous Hetul De SantiagoDO ashkan Last Rate: 0 5 mg/min (06/23/19 1707)   apixaban 5 mg Oral BID Carlyle Paula MD    ascorbic acid 125 mg Oral Daily Kizzy Rodriguez MD    benzonatate 200 mg Oral TID PRN Tiburcio Jaramillo PA-C    cholecalciferol 1,000 Units Oral Daily Kizzy Rodriguez MD    cyanocobalamin 100 mcg Oral Daily Kizzy Rodriguez MD    fish oil 1,000 mg Oral Daily Kizzy Rodriguez MD    furosemide 40 mg Intravenous Daily Gordon Moreno DO    guaiFENesin 600 mg Oral Q12H [address] Hetul De Santiago, DO    insulin lispro 1-5 Units Subcutaneous HS Kizzy Rodriguez MD    insulin lispro 1-6 Units Subcutaneous TID AC Kizzy Rodriguez MD    levothyroxine 137 mcg Oral Daily Kizzy Rodriguez MD    loratadine 10 mg Oral Daily Kizzy Rodriguez MD    losartan 100 mg Oral Daily Kizzy Rodriguez MD    metoprolol 5 mg Intravenous Q6H PRN Gordon Moreno DO    metoprolol tartrate 50 mg Oral Q12H [address] Hetul Patricia Macias, DO    multivitamin-minerals 1 tablet Oral Daily Kizzy Rodriguez MD    ondansetron 4 mg Intravenous Q6H PRN Kizzy Rodriguez MD    pravastatin 40 mg Oral Daily With Radha Murray MD    pyridoxine 100 mg Oral Daily MD aldo Carranzaxine 75 mg Oral Q12H Kizzy Rodriguez MD    vitamin E (tocopherol) 1,000 Units Oral Daily Crissy Reza MD         Today, Patient Was Seen By: Anne Christianson DO    ** Please Note: Dictation voice to text software may have been used in the creation of this document   **

## 2019-06-24 NOTE — ASSESSMENT & PLAN NOTE
· EBEN cardioversion as per Cardiology  · Metoprolol  · Monitor heart rate closely  · Amiodarone drip   Patient still very tachycardic  Will discuss with Cardiology may need increase oral metoprolol  Note history of bradyarrhythmias

## 2019-06-24 NOTE — CONSULTS
Consultation - Electrophysiology - Cardiology  Temo Deadimitri 66 y o  female MRN: [id number]  Unit/Bed#: [facility] 769-99 Encounter: [id number]      Inpatient consult to Electrophysiology  Consult performed by: Vijay Valverde PA-C  Consult ordered by: Kandy Lewis DO          History of Present Illness   Physician Requesting Consult: Mona Patel DO  Reason for Consult / Principal Problem: Atrial fibrillation with RVR    Assessment/Plan   Assessment:  1  New onset atrial fibrillation with episodes of RVR  - 1st diagnosed 6/2019  - antiarrhythmic therapy of amiodarone  - rate control of metoprolol currently  - anticoagulated with Eliquis   - status post unsuccessful DC cardioversion on 6/21/2019 - conversion from atrial fibrillation to junctional rhythm transiently  2  Newly diagnosed cardiomyopathy  - EF of 25-30% per echo 06/20/2019  - severe diffuse hypokinesis with dilation of atria and dilated right ventricle  3  Essential hypertension   - maintained on losartan-HCTZ at home  4  Hyperlipidemia  5  Pre diabetes  6  Hypothyroidism  - maintained on levothyroxine TSH of 9 170^/T4 of 1 23  7  CORAZON    Plan:  1  Device - First recommendation for this patient would be for pacemaker implantation  Given the fact that when we do not suspect her to always stay in atrial fibrillation, dual-chamber would be recommended  Due to the fact that her cardiomyopathy is most likely tachy mediated, this is a reversible cause which we will be treating  Therefore we would like to proceed with pacemaker rather than defibrillator even though her EF is depressed at this time  Patient has not had any radiation or surgery to her left upper chest   She is allergic to penicillins and we will therefore give her surgical prophylaxis with vancomycin  She has no allergy to contrast dye  She will be made NPO at midnight for possible device implantation tomorrow        2  Medication - Amiodarone would be medication of choice currently for her  However, I believe that this is more of a short-term medication given its long-term toxicity  We will check LFTs and TSH has already been checked along with chest x-ray done for this year  We will also add oral amiodarone 200 mg 3 times daily to help with loading to 10 g  If she still requires this to where the end of the week, we can cardiovert with full loading complete  We did discuss with the patient that we could use this for 3 months and at that time assess her ejection fraction to see if it is improved as we suspect it will with device implantation and up titration of medications  At that point, given the fact that she has no CKD, we could attempt to use either Tikosyn or sotalol to further manage her atrial fibrillation  For rate control, will continue with metoprolol at this time  However, tomorrow if her rates are still not well controlled, we will consider adding digoxin as well  3  Anticoagulation - With recent cardioversion, we would not like to just stop anticoagulation  Therefore, we will place patient on heparin drip until 8 hours prior to procedure tomorrow  HPI: Gilma Velasquez is a 66y o  year old female with a history of hypertension, hyperlipidemia, prediabetes, and hypothyroidism  Patient is visited her family Medicine office on 06/18/2019 after feeling short of breath and fatigue  It was found that she was in AFib with RVR and she was therefore sent to the emergency room  She was found to still have atrial fibrillation with RVR but also acute CHF exacerbation due to this rhythm issue  She was started on IV Lasix and was given Cardizem  However, atrial fibrillation did not respond to boluses or drip  Seen in consultation by Cardiology, metoprolol was also added with Cardizem for more rate control on this was unable to be up titrated due to blood pressure issues  She was also transition from Lovenox to Eliquis      Patient underwent cardioversion on 06/21 which was unfortunately unsuccessful  During that time she converted into a junctional rhythm  Please see strips below  She was started on amiodarone and was being given this intravenously, having already received 3 g so far  She is being diuresed with IV Lasix  Patient denies having ever seen a cardiologist before  Her family does have a cardiac history though with her brother had needing a pacemaker and mother having heart issues as well - patient did not know specifics  Patient did not smoke but reports secondhand smoking from her  for many years  She has also obtained from alcohol for greater than 20 years  She lives independently on her own with a 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   EKG:       TELE:               Review of Systems  ROS as noted above, otherwise 12 point review of systems was performed and is negative       Historical Information   Past Medical History:   Diagnosis Date    Eczema     Photosensitivity     abnormal skin sensitivity to sunlight    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HCC)     staging     Past Surgical History:   Procedure Laterality Date    HEMORRHOID SURGERY      OOPHORECTOMY      unilateral    TOTAL ABDOMINAL HYSTERECTOMY       Social History     Substance and Sexual Activity   Alcohol Use Never    Frequency: Never     Social History     Substance and Sexual Activity   Drug Use Never     Social History     Tobacco Use   Smoking Status Never Smoker   Smokeless Tobacco Never Used     Family History:   Family History   Problem Relation Age of Onset    Alzheimer's disease Mother     Coronary artery disease Mother     Dementia Mother     Diabetes Mother         mellitus    Other Father         acute myocardial infarction    Coronary artery disease Sister     Other Maternal Grandfather         laryngeal cancer    Dementia Maternal Aunt     Diabetes Maternal Aunt        Meds/Allergies   Hospital Medications:   Current Facility-Administered Medications   Medication Dose Route Frequency    acetaminophen (TYLENOL) tablet 650 mg  650 mg Oral Q4H PRN    albuterol (PROVENTIL HFA,VENTOLIN HFA) inhaler 2 puff  2 puff Inhalation Q4H PRN    amiodarone (CORDARONE) 900 mg in dextrose 5 % 500 mL infusion  0 5 mg/min Intravenous Continuous    apixaban (ELIQUIS) tablet 5 mg  5 mg Oral BID    ascorbic acid (VITAMIN C) tablet 125 mg  125 mg Oral Daily    benzonatate (TESSALON PERLES) capsule 200 mg  200 mg Oral TID PRN    cholecalciferol (VITAMIN D3) tablet 1,000 Units  1,000 Units Oral Daily    cyanocobalamin (VITAMIN B-12) tablet 100 mcg  100 mcg Oral Daily    fish oil capsule 1,000 mg  1,000 mg Oral Daily    furosemide (LASIX) injection 40 mg  40 mg Intravenous Daily    guaiFENesin (MUCINEX) 12 hr tablet 600 mg  600 mg Oral Q12H DAVE    insulin lispro (HumaLOG) 100 units/mL subcutaneous injection 1-5 Units  1-5 Units Subcutaneous HS    insulin lispro (HumaLOG) 100 units/mL subcutaneous injection 1-6 Units  1-6 Units Subcutaneous TID AC    levothyroxine tablet 137 mcg  137 mcg Oral Daily    loratadine (CLARITIN) tablet 10 mg  10 mg Oral Daily    losartan (COZAAR) tablet 100 mg  100 mg Oral Daily    metoprolol (LOPRESSOR) injection 5 mg  5 mg Intravenous Q6H PRN    metoprolol tartrate (LOPRESSOR) tablet 50 mg  50 mg Oral Q12H [address]    multivitamin-minerals (CENTRUM) tablet 1 tablet  1 tablet Oral Daily    ondansetron (ZOFRAN) injection 4 mg  4 mg Intravenous Q6H PRN    pravastatin (PRAVACHOL) tablet 40 mg  40 mg Oral Daily With Dinner    pyridoxine (VITAMIN B6) tablet 100 mg  100 mg Oral Daily    venlafaxine (EFFEXOR) tablet 75 mg  75 mg Oral Q12H    vitamin E (tocopherol) capsule 1,000 Units  1,000 Units Oral Daily     Home Medications:   Medications Prior to Admission   Medication    albuterol (VENTOLIN HFA) 90 mcg/act inhaler    Ascorbic Acid (VITAMIN C) 100 MG tablet    cholecalciferol (VITAMIN D3) 1,000 units tablet    cyanocobalamin 100 MCG tablet    fexofenadine (ALLEGRA) 30 MG tablet    Garlic 10 MG CAPS    levothyroxine 137 mcg tablet    losartan-hydrochlorothiazide (HYZAAR) 100-12 5 MG per tablet    magnesium 30 MG tablet    Misc Natural Products (TURMERIC CURCUMIN) CAPS    multivitamin (THERAGRAN) TABS    Omega-3 Fatty Acids (FISH OIL) 1,000 mg    pyridoxine (B-6) 100 MG tablet    simvastatin (ZOCOR) 20 mg tablet    SUPER B COMPLEX/C CAPS    venlafaxine (EFFEXOR) 75 mg tablet    vitamin E, tocopherol, 1,000 units capsule       Allergies   Allergen Reactions    Penicillins     Wellbutrin Sr  [Bupropion]      Other reaction(s): Suicidal ideations  Category: Adverse Reaction;        Objective   Vitals: Blood pressure 143/72, pulse (!) 122, temperature 98 °F (36 7 °C), resp  rate 16, height 5' 1" (1 549 m), weight 79 kg (174 lb 2 6 oz), SpO2 98 %  Orthostatic Blood Pressures      Most Recent Value   Blood Pressure  143/72 filed at 06/24/2019 1102   Patient Position - Orthostatic VS  Lying filed at 06/24/2019 0229            Intake/Output Summary (Last 24 hours) at 6/24/2019 1256  Last data filed at 6/24/2019 1200  Gross per 24 hour   Intake 1284 3 ml   Output 1237 ml   Net 47 3 ml       Invasive Devices     Peripheral Intravenous Line            Peripheral IV 06/22/19 Left Forearm 2 days    Peripheral IV 06/24/19 Right Antecubital less than 1 day          Drain            External Urinary Catheter 1 day                Physical Exam   GEN: NAD, alert and oriented, well appearing  SKIN: dry without significant lesions or rashes  HEENT: NCAT, PERRL, EOMs intact  CARDIOVASCULAR:  Tachycardic with regular rhythm, no murmurs noted  LUNGS:  Rales auscultated bilaterally  ABDOMEN: Soft, nontender, nondistended  EXTREMITIES/VASCULAR: perfused with nonpitting edema  PSYCH: Normal mood and affect  NEURO: CN ll-Xll grossly intact        Lab Results: I have personally reviewed pertinent lab results      Results from last 7 days   Lab Units 19  0220 19  1652   WBC Thousand/uL 10 07 7 86   HEMOGLOBIN g/dL 13 3 13 7   HEMATOCRIT % 40 1 42 0   PLATELETS Thousands/uL 251 245     Results from last 7 days   Lab Units 19  0851 19  0442 19  2158   POTASSIUM mmol/L 3 8 4 0 4 0   CHLORIDE mmol/L 101 105 106   CO2 mmol/L 30 35* 29   BUN mg/dL 16 17 18   CREATININE mg/dL 0 82 0 86 0 89   CALCIUM mg/dL 8 4 8 7 8 5     Results from last 7 days   Lab Units 19  1652   INR  1 14   PTT seconds 26     Results from last 7 days   Lab Units 19  0442 19  2158 19  0944   MAGNESIUM mg/dL 2 1 2 2 2 2       Imaging: I have personally reviewed pertinent reports  ECHO:   Results for orders placed during the hospital encounter of 19   Echo complete with contrast if indicated    Narrative [address]  [phone]    Transthoracic Echocardiogram  2D, M-mode, Doppler, and Color Doppler    Study date:  2019    Patient: Sarah Henriquez  MR number: [id number]  Account number: [de-identified]  : 1940  Age: 66 years  Gender: Female  Status: Inpatient  Location: Bedside  Height: 61 in  Weight: 184 lb  BP: 110/ 73 mmHg    Indications: Atrial fibrillation  Diagnoses: I48 0 - Atrial fibrillation    Sonographer:  Bina Mays RDCS  Primary Physician:  Vianey Wiley MD  Referring Physician:  Nathan Leon DO  Group:  [id number]  Cardiology Fellow:  Reji Israel MD  Interpreting Physician:  Mohit Arceo MD    SUMMARY    LEFT VENTRICLE:  Systolic function was difficult to assess due to tachycardia during exam, overall appears severely reduced while tachycardic  Ejection fraction was estimated to be 25 - 30 %  There was severe diffuse hypokinesis  RIGHT VENTRICLE:  The ventricle was dilated  Systolic function was reduced  MITRAL VALVE:  There was moderate regurgitation      TRICUSPID VALVE:  There was moderate regurgitation  Estimated peak PA pressure was 35 mmHg  HISTORY: PRIOR HISTORY: CHF  Atrial fibrillation  Hyperlipidemia  Hypertension  Hypothyroidism  Diabetes    PROCEDURE: The procedure was performed at the bedside  This was a routine study  The transthoracic approach was used  The study included complete 2D imaging, M-mode, complete spectral Doppler, and color Doppler  The heart rate was 110 bpm,  at the start of the study  Images were obtained from the parasternal, apical, subcostal, and suprasternal notch acoustic windows  Intravenous contrast ( 0 8 ml Definity in NSS) was administered to opacify the left ventricle  Echocardiographic views were limited due to decreased penetration  This was a technically difficult study  LEFT VENTRICLE: Size was normal  Systolic function was difficult to assess due to tachycardia during exam, overall appears severely reduced while tachycardic  Ejection fraction was estimated to be 25 - 30 %  There was severe diffuse  hypokinesis  Wall thickness was normal  DOPPLER: Normal sinus rhythm was absent  RIGHT VENTRICLE: The ventricle was dilated  Systolic function was reduced  LEFT ATRIUM: The atrium was dilated  RIGHT ATRIUM: The atrium was dilated  MITRAL VALVE: There was mild to moderate annular calcification  Valve structure was normal  There was normal leaflet separation  DOPPLER: There was no evidence for stenosis  There was moderate regurgitation  AORTIC VALVE: The valve was probably trileaflet  Leaflets exhibited normal cuspal separation  The valve was not well visualized  DOPPLER: There was no evidence for stenosis  There was no significant regurgitation  TRICUSPID VALVE: The valve structure was normal  There was normal leaflet separation  DOPPLER: There was no evidence for stenosis  There was moderate regurgitation  Estimated peak PA pressure was 35 mmHg  PULMONIC VALVE: Not well visualized  DOPPLER: There was no evidence for stenosis   There was no significant regurgitation  PERICARDIUM: There was no pericardial effusion  The pericardium was normal in appearance  AORTA: The root exhibited normal size  SYSTEMIC VEINS: IVC: The inferior vena cava was normal in size  The respirophasic change in diameter was less than 50%  SYSTEM MEASUREMENT TABLES    2D  %FS: 17 83 %  Ao Diam: 2 57 cm  EDV(Teich): 96 91 ml  EF(Teich): 37 16 %  ESV(Teich): 60 9 ml  IVSd: 0 91 cm  LA Area: 15 96 cm2  LA Diam: 4 71 cm  LVEDV MOD A4C: 88 54 ml  LVEF MOD A4C: 39 44 %  LVESV MOD A4C: 53 62 ml  LVIDd: 4 59 cm  LVIDs: 3 77 cm  LVLd A4C: 7 14 cm  LVLs A4C: 6 13 cm  LVPWd: 0 91 cm  RA Area: 19 65 cm2  RVIDd: 3 81 cm  SV MOD A4C: 34 92 ml  SV(Teich): 36 01 ml    CW  MR VTI: 116 86 cm  MR Vmax: 4 77 m/s  MR Vmean: 3 78 m/s  MR maxP 84 mmHg  MR meanP 94 mmHg  TR Vmax: 2 79 m/s  TR maxP 08 mmHg    MM  TAPSE: 0 87 cm    [facility] Accredited Echocardiography Laboratory    Prepared and electronically signed by    Muna Dozier MD  Signed 2019 16:47:51         ECHO (2019):   LEFT VENTRICLE:  Systolic function was difficult to assess due to tachycardia during exam, overall appears severely reduced while tachycardic  Ejection fraction was estimated to be 25 - 30 %  There was severe diffuse hypokinesis      RIGHT VENTRICLE:  The ventricle was dilated  Systolic function was reduced      MITRAL VALVE:  There was moderate regurgitation      TRICUSPID VALVE:  There was moderate regurgitation  Estimated peak PA pressure was 35 mmHg      HISTORY: PRIOR HISTORY: CHF  Atrial fibrillation  Hyperlipidemia  Hypertension  Hypothyroidism   Diabetes    VTE Prophylaxis: Heparin

## 2019-06-24 NOTE — PROGRESS NOTES
Cardiology Progress Note - Mi Cornell 66 y o  female MRN: [id number]    Unit/Bed#: [facility] 849-13 Encounter: [id number]      Assessment:  Principal Problem:    Atrial fibrillation with RVR ([place] [address] )  Active Problems:    Hyperlipidemia    Hypertension    Hypothyroidism    Type 2 diabetes mellitus with diabetic cataract ([place] [address] )    Acute CHF (congestive heart failure) ([place] [address] )      Plan:  Patient is comfortable this morning  She has no chest pain or significant dyspnea  She continues in atrial fibrillation with rapid ventricular response  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  In reference to atrial fibrillation patient is scheduled to see electrophysiology service today for advice in reference to management  She continues on anticoagulation with Eliquis  In reference to cardiomyopathy would continue present medical regimen  Continues on intravenous furosemide which we will likely change to oral in the next day or two  In reference to essential hypertension would continue present medical regimen  Subjective:   Patient seen and examined  No significant events overnight   negative  Objective:     Vitals: Blood pressure 128/64, pulse (!) 150, temperature 98 °F (36 7 °C), resp  rate 18, height 5' 1" (1 549 m), weight 79 kg (174 lb 2 6 oz), SpO2 98 %  , Body mass index is 32 91 kg/m² ,   Orthostatic Blood Pressures      Most Recent Value   Blood Pressure  128/64 filed at 06/24/2019 0700   Patient Position - Orthostatic VS  Lying filed at 06/24/2019 0229      ,      Intake/Output Summary (Last 24 hours) at 6/24/2019 0858  Last data filed at 6/24/2019 0500  Gross per 24 hour   Intake 1090 98 ml   Output 1200 ml   Net -109 02 ml       No significant arrhythmias seen on telemetry review    Atrial fibrillation with a rapid ventricular response      Physical Exam:    GEN: Mi Cornell appears well, alert and oriented x 3, pleasant and cooperative   NECK: supple, no carotid bruits, no JVD or HJR  HEART: normal rate, irregular rhythm, normal S1 and S2, no murmurs, clicks, gallops or rubs   LUNGS: clear to auscultation bilaterally; no wheezes, rales, or rhonchi   ABDOMEN: normal bowel sounds, soft, no tenderness, no distention  EXTREMITIES: peripheral pulses normal; no clubbing, cyanosis, or edema  SKIN: warm and well perfused, no suspicious lesions on exposed skin    Labs & Results:    Admission on 06/18/2019   Component Date Value    WBC 06/18/2019 7 86     RBC 06/18/2019 4 24     Hemoglobin 06/18/2019 13 7     Hematocrit 06/18/2019 42 0     MCV 06/18/2019 99*    MCH 06/18/2019 32 3     MCHC 06/18/2019 32 6     RDW 06/18/2019 13 1     MPV 06/18/2019 10 4     Platelets 89/12/1380 245     nRBC 06/18/2019 0     Neutrophils Relative 06/18/2019 59     Immat GRANS % 06/18/2019 0     Lymphocytes Relative 06/18/2019 33     Monocytes Relative 06/18/2019 6     Eosinophils Relative 06/18/2019 1     Basophils Relative 06/18/2019 1     Neutrophils Absolute 06/18/2019 4 62     Immature Grans Absolute 06/18/2019 0 01     Lymphocytes Absolute 06/18/2019 2 60     Monocytes Absolute 06/18/2019 0 49     Eosinophils Absolute 06/18/2019 0 09     Basophils Absolute 06/18/2019 0 05     Protime 06/18/2019 14 7*    INR 06/18/2019 1 14     PTT 06/18/2019 26     Sodium 06/18/2019 136     Potassium 06/18/2019 3 5     Chloride 06/18/2019 100     CO2 06/18/2019 28     ANION GAP 06/18/2019 8     BUN 06/18/2019 6     Creatinine 06/18/2019 0 81     Glucose 06/18/2019 143*    Calcium 06/18/2019 9 2     AST 06/18/2019 30     ALT 06/18/2019 37     Alkaline Phosphatase 06/18/2019 72     Total Protein 06/18/2019 7 7     Albumin 06/18/2019 3 9     Total Bilirubin 06/18/2019 0 59     eGFR 06/18/2019 70     TSH 3RD GENERATON 06/18/2019 9 170*    Troponin I 06/18/2019 0 03     NT-proBNP 06/18/2019 1,823*    D-Dimer, Quant 06/18/2019 1,206*    Magnesium 06/18/2019 2 1     Ventricular Rate 06/18/2019 165     Atrial Rate 06/18/2019 375     QRSD Interval 06/18/2019 68     QT Interval 06/18/2019 252     QTC Interval 06/18/2019 417     QRS Axis 06/18/2019 111     T Wave Axis 06/18/2019 197     Free T4 06/18/2019 1 23     POC Glucose 06/18/2019 137     Sodium 06/19/2019 141     Potassium 06/19/2019 4 0     Chloride 06/19/2019 104     CO2 06/19/2019 30     ANION GAP 06/19/2019 7     BUN 06/19/2019 6     Creatinine 06/19/2019 0 83     Glucose 06/19/2019 121     Calcium 06/19/2019 8 9     eGFR 06/19/2019 68     WBC 06/19/2019 10 07     RBC 06/19/2019 4 09     Hemoglobin 06/19/2019 13 3     Hematocrit 06/19/2019 40 1     MCV 06/19/2019 98     MCH 06/19/2019 32 5     MCHC 06/19/2019 33 2     RDW 06/19/2019 12 9     MPV 06/19/2019 10 3     Platelets 56/91/7984 251     nRBC 06/19/2019 0     Neutrophils Relative 06/19/2019 65     Immat GRANS % 06/19/2019 0     Lymphocytes Relative 06/19/2019 26     Monocytes Relative 06/19/2019 7     Eosinophils Relative 06/19/2019 1     Basophils Relative 06/19/2019 1     Neutrophils Absolute 06/19/2019 6 66     Immature Grans Absolute 06/19/2019 0 02     Lymphocytes Absolute 06/19/2019 2 60     Monocytes Absolute 06/19/2019 0 67     Eosinophils Absolute 06/19/2019 0 07     Basophils Absolute 06/19/2019 0 05     Magnesium 06/19/2019 2 0     POC Glucose 06/19/2019 137     POC Glucose 06/19/2019 246*    POC Glucose 06/19/2019 134     POC Glucose 06/19/2019 124     POC Glucose 06/20/2019 119     Sodium 06/20/2019 138     Potassium 06/20/2019 4 1     Chloride 06/20/2019 104     CO2 06/20/2019 31     ANION GAP 06/20/2019 3*    BUN 06/20/2019 11     Creatinine 06/20/2019 0 90     Glucose 06/20/2019 159*    Calcium 06/20/2019 8 9     eGFR 06/20/2019 61     Magnesium 06/20/2019 2 2     POC Glucose 06/20/2019 151*    POC Glucose 06/20/2019 118     POC Glucose 06/20/2019 137     POC Glucose 06/21/2019 137     POC Glucose 06/21/2019 150*    Sodium 06/21/2019 144     Potassium 06/21/2019 3 6     Chloride 06/21/2019 106     CO2 06/21/2019 29     ANION GAP 06/21/2019 9     BUN 06/21/2019 17     Creatinine 06/21/2019 0 95     Glucose 06/21/2019 163*    Calcium 06/21/2019 8 6     eGFR 06/21/2019 58     Magnesium 06/21/2019 2 3     POC Glucose 06/21/2019 163*    Ventricular Rate 06/21/2019 67     Atrial Rate 06/21/2019 43     QRSD Interval 06/21/2019 88     QT Interval 06/21/2019 426     QTC Interval 06/21/2019 450     QRS Axis 06/21/2019 102     T Wave Axis 06/21/2019 115     Ventricular Rate 06/21/2019 67     Atrial Rate 06/21/2019 68     QRSD Interval 06/21/2019 90     QT Interval 06/21/2019 406     QTC Interval 06/21/2019 429     QRS Axis 06/21/2019 104     T Wave Axis 06/21/2019 164     POC Glucose 06/21/2019 154*    Sodium 06/22/2019 142     Potassium 06/22/2019 3 3*    Chloride 06/22/2019 105     CO2 06/22/2019 31     ANION GAP 06/22/2019 6     BUN 06/22/2019 18     Creatinine 06/22/2019 0 77     Glucose 06/22/2019 129     Calcium 06/22/2019 8 3     eGFR 06/22/2019 74     Magnesium 06/22/2019 2 3     POC Glucose 06/22/2019 143*    Magnesium 06/22/2019 2 2     POC Glucose 06/22/2019 156*    Ventricular Rate 06/22/2019 138     Atrial Rate 06/22/2019 113     QRSD Interval 06/22/2019 86     QT Interval 06/22/2019 266     QTC Interval 06/22/2019 402     QRS Axis 06/22/2019 93     T Wave Axis 06/22/2019 210     POC Glucose 06/22/2019 136     Sodium 06/22/2019 141     Potassium 06/22/2019 4 0     Chloride 06/22/2019 106     CO2 06/22/2019 29     ANION GAP 06/22/2019 6     BUN 06/22/2019 18     Creatinine 06/22/2019 0 89     Glucose 06/22/2019 138     Calcium 06/22/2019 8 5     eGFR 06/22/2019 62     Magnesium 06/22/2019 2 2     POC Glucose 06/22/2019 160*    Sodium 06/23/2019 141     Potassium 06/23/2019 4 0     Chloride 06/23/2019 105     CO2 06/23/2019 35*    ANION GAP 06/23/2019 1*    BUN 06/23/2019 17     Creatinine 06/23/2019 0 86     Glucose 06/23/2019 147*    Calcium 06/23/2019 8 7     eGFR 06/23/2019 65     Magnesium 06/23/2019 2 1     POC Glucose 06/23/2019 165*    POC Glucose 06/23/2019 230*    POC Glucose 06/23/2019 123     POC Glucose 06/23/2019 160*    POC Glucose 06/24/2019 142*       Xr Chest 1 View Portable    Result Date: 6/19/2019  Narrative: CHEST INDICATION:   AFib w/ rvr  COMPARISON:  None EXAM PERFORMED/VIEWS:  XR CHEST PORTABLE One image FINDINGS: Cardiomediastinal silhouette appears enlarged  Patient is in mild CHF  The lungs are clear  No pneumothorax   The costophrenic angles are blunted which may represent small pleural effusions  Osseous structures appear within normal limits for patient age  Impression: Cardiomegaly and mild CHF  Possible small pleural effusions  Workstation performed: [id number]     Cta Ed Chest Pe Study    Result Date: 6/18/2019  Narrative: CTA - CHEST WITH IV CONTRAST - PULMONARY ANGIOGRAM INDICATION:   Weakness, shortness of breath and leg swelling  COMPARISON: 6/18/2019  TECHNIQUE: CTA examination of the chest was performed using angiographic technique according to a protocol specifically tailored to evaluate for pulmonary embolism  Axial, sagittal, and coronal 2D reformatted images were created from the source data and  submitted for interpretation  In addition, coronal 3D MIP postprocessing was performed on the acquisition scanner  Radiation dose length product (DLP) for this visit:  749 22 mGy-cm   This examination, like all CT scans performed in the [facility], was performed utilizing techniques to minimize radiation dose exposure, including the use of iterative  reconstruction and automated exposure control  IV Contrast:  78 mL of iohexol (OMNIPAQUE)  FINDINGS: PULMONARY ARTERIAL TREE:  No filling defect in the visualized pulmonary arteries to suggest acute embolus   LUNGS:  Pulmonary vascular congestion without interstitial edema  Mild relaxation atelectasis at the lung bases  There is no tracheal or endobronchial lesion  PLEURA:  Small bilateral pleural effusions  HEART/GREAT VESSELS:  Cardiomegaly  MEDIASTINUM AND OSMEL:  Unremarkable  CHEST WALL AND LOWER NECK:   Unremarkable  VISUALIZED STRUCTURES IN THE UPPER ABDOMEN:  Unremarkable  OSSEOUS STRUCTURES:  No acute fracture or destructive osseous lesion  Impression: 1  No evidence of acute pulmonary embolus or thoracic aortic aneurysm  2   Small bilateral pleural effusions and pulmonary vascular congestion without pulmonary interstitial edema, possibly indicating early or mild congestive heart failure  Workstation performed: [id number]       EKG personally reviewed by Sanjana Velasco MD      Counseling / Coordination of Care  Total floor / unit time spent today 30 minutes  Greater than 50% of total time was spent with the patient and / or family counseling and / or coordination of care

## 2019-06-24 NOTE — ASSESSMENT & PLAN NOTE
Wt Readings from Last 3 Encounters:   06/24/19 79 kg (174 lb 2 6 oz)   06/18/19 81 kg (178 lb 9 6 oz)   03/20/19 80 1 kg (176 lb 9 6 oz)     · Appears volume overloaded on examination, CXR and CTA PE study with pulmonary vascular congestion and elevated BNP noted    Suspect AFib with RVR is also playing role  · IV diuretics

## 2019-06-24 NOTE — PLAN OF CARE
Problem: PAIN - ADULT  Goal: Verbalizes/displays adequate comfort level or baseline comfort level  Description  Interventions:  - Encourage patient to monitor pain and request assistance  - Assess pain using appropriate pain scale  - Administer analgesics based on type and severity of pain and evaluate response  - Implement non-pharmacological measures as appropriate and evaluate response  - Consider cultural and social influences on pain and pain management  - Notify physician/advanced practitioner if interventions unsuccessful or patient reports new pain  Outcome: Progressing     Problem: INFECTION - ADULT  Goal: Absence or prevention of progression during hospitalization  Description  INTERVENTIONS:  - Assess and monitor for signs and symptoms of infection  - Monitor lab/diagnostic results  - Monitor all insertion sites, i e  indwelling lines, tubes, and drains  - Monitor endotracheal (as able) and nasal secretions for changes in amount and color  - [place] appropriate cooling/warming therapies per order  - Administer medications as ordered  - Instruct and encourage patient and family to use good hand hygiene technique  - Identify and instruct in appropriate isolation precautions for identified infection/condition  Outcome: Progressing  Goal: Absence of fever/infection during neutropenic period  Description  INTERVENTIONS:  - Monitor WBC  - Implement neutropenic guidelines  Outcome: Progressing     Problem: SAFETY ADULT  Goal: Patient will remain free of falls  Description  INTERVENTIONS:  - Assess patient frequently for physical needs  -  Identify cognitive and physical deficits and behaviors that affect risk of falls    -  [place] fall precautions as indicated by assessment   - Educate patient/family on patient safety including physical limitations  - Instruct patient to call for assistance with activity based on assessment  - Modify environment to reduce risk of injury  - Consider OT/PT consult to assist with strengthening/mobility  Outcome: Progressing  Goal: Maintain or return to baseline ADL function  Description  INTERVENTIONS:  -  Assess patient's ability to carry out ADLs; assess patient's baseline for ADL function and identify physical deficits which impact ability to perform ADLs (bathing, care of mouth/teeth, toileting, grooming, dressing, etc )  - Assess/evaluate cause of self-care deficits   - Assess range of motion  - Assess patient's mobility; develop plan if impaired  - Assess patient's need for assistive devices and provide as appropriate  - Encourage maximum independence but intervene and supervise when necessary  ¯ Involve family in performance of ADLs  ¯ Assess for home care needs following discharge   ¯ Request OT consult to assist with ADL evaluation and planning for discharge  ¯ Provide patient education as appropriate  Outcome: Progressing  Goal: Maintain or return mobility status to optimal level  Description  INTERVENTIONS:  - Assess patient's baseline mobility status (ambulation, transfers, stairs, etc )    - Identify cognitive and physical deficits and behaviors that affect mobility  - Identify mobility aids required to assist with transfers and/or ambulation (gait belt, sit-to-stand, lift, walker, cane, etc )  - [place] fall precautions as indicated by assessment  - Record patient progress and toleration of activity level on Mobility SBAR; progress patient to next Phase/Stage  - Instruct patient to call for assistance with activity based on assessment  - Request Rehabilitation consult to assist with strengthening/weightbearing, etc   Outcome: Progressing     Problem: DISCHARGE PLANNING  Goal: Discharge to home or other facility with appropriate resources  Description  INTERVENTIONS:  - Identify barriers to discharge w/patient and caregiver  - Arrange for needed discharge resources and transportation as appropriate  - Identify discharge learning needs (meds, wound care, etc )  - Arrange for interpretive services to assist at discharge as needed  - Refer to Case Management Department for coordinating discharge planning if the patient needs post-hospital services based on physician/advanced practitioner order or complex needs related to functional status, cognitive ability, or social support system  Outcome: Progressing     Problem: Knowledge Deficit  Goal: Patient/family/caregiver demonstrates understanding of disease process, treatment plan, medications, and discharge instructions  Description  Complete learning assessment and assess knowledge base  Interventions:  - Provide teaching at level of understanding  - Provide teaching via preferred learning methods  Outcome: Progressing     Problem: Potential for Falls  Goal: Patient will remain free of falls  Description  INTERVENTIONS:  - Assess patient frequently for physical needs  -  Identify cognitive and physical deficits and behaviors that affect risk of falls  -  [place] fall precautions as indicated by assessment   - Educate patient/family on patient safety including physical limitations  - Instruct patient to call for assistance with activity based on assessment  - Modify environment to reduce risk of injury  - Consider OT/PT consult to assist with strengthening/mobility  Outcome: Progressing     Problem: CARDIOVASCULAR - ADULT  Goal: Maintains optimal cardiac output and hemodynamic stability  Description  INTERVENTIONS:  - Monitor I/O, vital signs and rhythm  - Monitor for S/S and trends of decreased cardiac output i e  bleeding, hypotension  - Administer and titrate ordered vasoactive medications to optimize hemodynamic stability  - Assess quality of pulses, skin color and temperature  - Assess for signs of decreased coronary artery perfusion - ex   Angina  - Instruct patient to report change in severity of symptoms  Outcome: Progressing  Goal: Absence of cardiac dysrhythmias or at baseline rhythm  Description  INTERVENTIONS:  - Continuous cardiac monitoring, monitor vital signs, obtain 12 lead EKG if indicated  - Administer antiarrhythmic and heart rate control medications as ordered  - Monitor electrolytes and administer replacement therapy as ordered  Outcome: Progressing

## 2019-06-24 NOTE — SOCIAL WORK
Heart Failure Care Coordinator Note  Met with patient to provide HF education and identify barriers for HF management at home    HF Booklet: given and reviewed  Patient/Caregiver engagement: Good  Patient has Scale yes  Scale given:no

## 2019-06-24 NOTE — ASSESSMENT & PLAN NOTE
Lab Results   Component Value Date    HGBA1C 5 9 (H) 03/18/2019       Recent Labs     06/23/19  1101 06/23/19  1610 06/23/19  2103 06/24/19  0635   POCGLU 230* 123 160* 142*       Blood Sugar Average: Last 72 hrs:  (P) 418 [id number]   · Diet controlled at home  · Add SSI with Accu-Cheks while inpatient  · Carb controlled diet  · Avoid hypoglycemia

## 2019-06-24 NOTE — PROGRESS NOTES
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  -Scheduled to see EP today  -EBEN cardioversion as per cardiology  -Continue Metoprolol 50 mg Q12  -Anticoagulation with Eliquis 5 mg BID    Acute CHF:  -Continue IV furosemide 40 mg daily ->change to PO in next day or two  -Monitors I's and O's and daily weights    Type 2 DM:  -At home diet controlled  -Hba1c 5 9 on 3/18/2019  -Continue SSI with Accu-Cheks while inpatient    Hypothyroidism:  -Free T4 normal (6/18)  -Continue synthroid    Hyperlipidemia:  -Continue Pravastatin 40 mg daily    Hypertension:  -Continue Losartan 100 mg daily  -HCTZ on hold  -Continue to monitor BP    Subjective: Today the patient feels well  He only complaint is that she had been experiencing a mild nonproductive cough for the past week, which was improved by tessalon perles  She currently denies any chest pain, shortness of breath, palpitations, leg swelling, fevers or headaches  This morning she is tachycardic (pulse 140-150) but denies any symptoms  She has no other complaints  Objective:     Vitals: Blood pressure 128/64, pulse (!) 150, temperature 98 °F (36 7 °C), resp  rate 18, height 5' 1" (1 549 m), weight 79 kg (174 lb 2 6 oz), SpO2 98 %  ,Body mass index is 32 91 kg/m²  Intake/Output Summary (Last 24 hours) at 6/24/2019 1647  Last data filed at 6/24/2019 0500  Gross per 24 hour   Intake 1090 98 ml   Output 1200 ml   Net -109 02 ml       Physical Exam:     Physical Exam   Constitutional: She is oriented to person, place, and time  She appears well-developed and well-nourished  HENT:   Head: Normocephalic and atraumatic  Mouth/Throat: Oropharynx is clear and moist    Eyes: Conjunctivae and EOM are normal    Neck: No JVD present  Cardiovascular:  An irregularly irregular rhythm present  Tachycardia present  Pulmonary/Chest: Effort normal and breath sounds normal  No respiratory distress  She has no wheezes  She has no rales  She exhibits no tenderness  Abdominal: Soft  Bowel sounds are normal    Musculoskeletal: She exhibits no edema  Neurological: She is alert and oriented to person, place, and time  Skin: Skin is warm and dry  Psychiatric: She has a normal mood and affect   Her behavior is normal  Judgment and thought content normal        Invasive Devices     Peripheral Intravenous Line            Peripheral IV 06/22/19 Left Forearm 1 day    Peripheral IV 06/24/19 Right Antecubital less than 1 day          Drain            External Urinary Catheter 1 day                Lab, Imaging and other studies:     Results Reviewed     Procedure Component Value Units Date/Time    T4, free [[id number]]  (Normal) Collected:  06/18/19 1652    Lab Status:  Final result Specimen:  Blood from Arm, Left Updated:  06/18/19 1750     Free T4 1 23 ng/dL     Comprehensive metabolic panel [[id number]]  (Abnormal) Collected:  06/18/19 1652    Lab Status:  Final result Specimen:  Blood from Arm, Left Updated:  06/18/19 1733     Sodium 136 mmol/L      Potassium 3 5 mmol/L      Chloride 100 mmol/L      CO2 28 mmol/L      ANION GAP 8 mmol/L      BUN 6 mg/dL      Creatinine 0 81 mg/dL      Glucose 143 mg/dL      Calcium 9 2 mg/dL      AST 30 U/L      ALT 37 U/L      Alkaline Phosphatase 72 U/L      Total Protein 7 7 g/dL      Albumin 3 9 g/dL      Total Bilirubin 0 59 mg/dL      eGFR 70 ml/min/1 73sq m     Narrative:       [place] guidelines for Chronic Kidney Disease (CKD):     Stage 1 with normal or high GFR (GFR > 90 mL/min/1 73 square meters)    Stage 2 Mild CKD (GFR = 60-89 mL/min/1 73 square meters)    Stage 3A Moderate CKD (GFR = 45-59 mL/min/1 73 square meters)    Stage 3B Moderate CKD (GFR = 30-44 mL/min/1 73 square meters)    Stage 4 Severe CKD (GFR = 15-29 mL/min/1 73 square meters)    Stage 5 End Stage CKD (GFR <15 mL/min/1 73 square meters)  Note: GFR calculation is accurate only with a steady state creatinine    TSH, 3rd generation with Free T4 reflex [[id number]]  (Abnormal) Collected:  06/18/19 1652    Lab Status:  Final result Specimen:  Blood from Arm, Left Updated:  06/18/19 1733     TSH 3RD Gutierrez Epp 9 170 uIU/mL     Narrative:       Patients undergoing fluorescein dye angiography may retain small amounts of fluorescein in the body for 48-72 hours post procedure  Samples containing fluorescein can produce falsely depressed TSH values  If the patient had this procedure,a specimen should be resubmitted post fluorescein clearance        B-type natriuretic peptide [[id number]]  (Abnormal) Collected:  06/18/19 1652    Lab Status:  Final result Specimen:  Blood from Arm, Left Updated:  06/18/19 1733     NT-proBNP 1,823 pg/mL     Protime-INR [[id number]]  (Abnormal) Collected:  06/18/19 1652    Lab Status:  Final result Specimen:  Blood from Arm, Left Updated:  06/18/19 1728     Protime 14 7 seconds      INR 1 14    APTT [[id number]]  (Normal) Collected:  06/18/19 1652    Lab Status:  Final result Specimen:  Blood from Arm, Left Updated:  06/18/19 1728     PTT 26 seconds     D-Dimer [[id number]]  (Abnormal) Collected:  06/18/19 1652    Lab Status:  Final result Specimen:  Blood from Arm, Left Updated:  06/18/19 1728     D-Dimer, Quant 1,206 ng/ml (FEU)     Troponin I [[id number]]  (Normal) Collected:  06/18/19 1652    Lab Status:  Final result Specimen:  Blood from Arm, Left Updated:  06/18/19 1726     Troponin I 0 03 ng/mL     Magnesium [[id number]]  (Normal) Collected:  06/18/19 1652    Lab Status:  Final result Specimen:  Blood from Arm, Left Updated:  06/18/19 1726     Magnesium 2 1 mg/dL     CBC and differential [[id number]]  (Abnormal) Collected:  06/18/19 1652    Lab Status:  Final result Specimen:  Blood from Arm, Left Updated:  06/18/19 1708     WBC 7 86 Thousand/uL      RBC 4 24 Million/uL      Hemoglobin 13 7 g/dL      Hematocrit 42 0 %      MCV 99 fL      MCH 32 3 pg      MCHC 32 6 g/dL      RDW 13 1 %      MPV 10 4 fL      Platelets 867 Thousands/uL      nRBC 0 /100 WBCs      Neutrophils Relative 59 %      Immat GRANS % 0 %      Lymphocytes Relative 33 %      Monocytes Relative 6 %      Eosinophils Relative 1 %      Basophils Relative 1 %      Neutrophils Absolute 4 62 Thousands/µL      Immature Grans Absolute 0 01 Thousand/uL      Lymphocytes Absolute 2 60 Thousands/µL      Monocytes Absolute 0 49 Thousand/µL      Eosinophils Absolute 0 09 Thousand/µL      Basophils Absolute 0 05 Thousands/µL         CTA ED chest PE Study   Final Result by Olegario Homans, MD (06/18 1920)         1  No evidence of acute pulmonary embolus or thoracic aortic aneurysm  2   Small bilateral pleural effusions and pulmonary vascular congestion without pulmonary interstitial edema, possibly indicating early or mild congestive heart failure  Workstation performed: [id number]         XR chest 1 view portable   ED Interpretation by Ozzy Harris DO (06/18 1831)   Abnormal   Moderate pulmonary vascular edema as interpreted by me independently       Final Result by Anais Go MD (06/19 2902)      Cardiomegaly and mild CHF  Possible small pleural effusions  Workstation performed: [id number]           ECG (6/23):   Afib with RVR  Right axis deviation  Low voltage QRS  Septal infarct, age undetermined    Scheduled Meds:  Current Facility-Administered Medications:  acetaminophen 650 mg Oral Q4H PRN Davina Morning, MD    albuterol 2 puff Inhalation Q4H PRN Davina Morning, MD    amiodarone 0 5 mg/min Intravenous Continuous Vasquez De Santiago DO Last Rate: 0 5 mg/min (06/23/19 1707)   apixaban 5 mg Oral BID Amanda Sena MD    ascorbic acid 125 mg Oral Daily Davina Morning, MD    benzonatate 200 mg Oral TID PRN Patric Marc PA-C    cholecalciferol 1,000 Units Oral Daily Crissy Reza MD    cyanocobalamin 100 mcg Oral Daily Crissy Reza MD    fish oil 1,000 mg Oral Daily Crissy Reza MD    furosemide 40 mg Intravenous Daily Gordon Moreno, DO    guaiFENesin 600 mg Oral Q12H [facility] Vasquez De Santiago,     insulin lispro 1-5 Units Subcutaneous HS Crissy Reza MD    insulin lispro 1-6 Units Subcutaneous TID AC Crissy Reza MD    levothyroxine 137 mcg Oral Daily Crissy Reza MD    loratadine 10 mg Oral Daily Crissy Reza MD    losartan 100 mg Oral Daily Crissy Reza MD    metoprolol 5 mg Intravenous Q6H PRN Gordon Moreno, DO    metoprolol tartrate 50 mg Oral Q12H [facility] Rosanne Lui, DO    multivitamin-minerals 1 tablet Oral Daily Crissy Reza MD    ondansetron 4 mg Intravenous Q6H PRN Crissy Reza MD    pravastatin 40 mg Oral Daily With Candice Hutton MD    pyridoxine 100 mg Oral Daily Crissy Reza MD    venlafaxine 75 mg Oral Q12H Crissy Reza MD    vitamin E (tocopherol) 1,000 Units Oral Daily Crissy Reza MD      Continuous Infusions:  amiodarone 0 5 mg/min Last Rate: 0 5 mg/min (06/23/19 3948)     PRN Meds:   acetaminophen    albuterol    benzonatate    metoprolol    ondansetron    VTE Pharmacologic Prophylaxis: Eliquis

## 2019-06-25 ENCOUNTER — APPOINTMENT (INPATIENT)
Dept: NON INVASIVE DIAGNOSTICS | Facility: HOSPITAL | Age: 79
DRG: 291 | End: 2019-06-25
Payer: COMMERCIAL

## 2019-06-25 ENCOUNTER — APPOINTMENT (INPATIENT)
Dept: RADIOLOGY | Facility: HOSPITAL | Age: 79
DRG: 291 | End: 2019-06-25
Payer: COMMERCIAL

## 2019-06-25 LAB
ANION GAP SERPL CALCULATED.3IONS-SCNC: 6 MMOL/L (ref 4–13)
APTT PPP: 128 SECONDS (ref 23–37)
APTT PPP: 33 SECONDS (ref 23–37)
ATRIAL RATE: 108 BPM
BUN SERPL-MCNC: 16 MG/DL (ref 5–25)
CALCIUM SERPL-MCNC: 8.6 MG/DL (ref 8.3–10.1)
CHLORIDE SERPL-SCNC: 100 MMOL/L (ref 100–108)
CO2 SERPL-SCNC: 29 MMOL/L (ref 21–32)
CREAT SERPL-MCNC: 0.69 MG/DL (ref 0.6–1.3)
GFR SERPL CREATININE-BSD FRML MDRD: 84 ML/MIN/1.73SQ M
GLUCOSE SERPL-MCNC: 144 MG/DL (ref 65–140)
GLUCOSE SERPL-MCNC: 147 MG/DL (ref 65–140)
GLUCOSE SERPL-MCNC: 152 MG/DL (ref 65–140)
GLUCOSE SERPL-MCNC: 158 MG/DL (ref 65–140)
GLUCOSE SERPL-MCNC: 187 MG/DL (ref 65–140)
GLUCOSE SERPL-MCNC: 193 MG/DL (ref 65–140)
POTASSIUM SERPL-SCNC: 3.4 MMOL/L (ref 3.5–5.3)
QRS AXIS: 104 DEGREES
QRSD INTERVAL: 94 MS
QT INTERVAL: 286 MS
QTC INTERVAL: 394 MS
SODIUM SERPL-SCNC: 135 MMOL/L (ref 136–145)
T WAVE AXIS: 97 DEGREES
VENTRICULAR RATE: 114 BPM

## 2019-06-25 PROCEDURE — 99232 SBSQ HOSP IP/OBS MODERATE 35: CPT | Performed by: INTERNAL MEDICINE

## 2019-06-25 PROCEDURE — 71045 X-RAY EXAM CHEST 1 VIEW: CPT

## 2019-06-25 PROCEDURE — NC001 PR NO CHARGE: Performed by: INTERNAL MEDICINE

## 2019-06-25 PROCEDURE — 93010 ELECTROCARDIOGRAM REPORT: CPT | Performed by: INTERNAL MEDICINE

## 2019-06-25 PROCEDURE — 93005 ELECTROCARDIOGRAM TRACING: CPT

## 2019-06-25 PROCEDURE — 82948 REAGENT STRIP/BLOOD GLUCOSE: CPT

## 2019-06-25 PROCEDURE — 85730 THROMBOPLASTIN TIME PARTIAL: CPT | Performed by: INTERNAL MEDICINE

## 2019-06-25 PROCEDURE — 80048 BASIC METABOLIC PNL TOTAL CA: CPT | Performed by: INTERNAL MEDICINE

## 2019-06-25 PROCEDURE — 85730 THROMBOPLASTIN TIME PARTIAL: CPT | Performed by: NURSE PRACTITIONER

## 2019-06-25 PROCEDURE — 99233 SBSQ HOSP IP/OBS HIGH 50: CPT | Performed by: EMERGENCY MEDICINE

## 2019-06-25 RX ORDER — HEPARIN SODIUM 10000 [USP'U]/100ML
3-20 INJECTION, SOLUTION INTRAVENOUS
Status: DISCONTINUED | OUTPATIENT
Start: 2019-06-25 | End: 2019-06-25

## 2019-06-25 RX ORDER — FUROSEMIDE 10 MG/ML
40 INJECTION INTRAMUSCULAR; INTRAVENOUS EVERY 12 HOURS
Status: DISCONTINUED | OUTPATIENT
Start: 2019-06-25 | End: 2019-06-25

## 2019-06-25 RX ORDER — KETAMINE HYDROCHLORIDE 50 MG/ML
INJECTION, SOLUTION, CONCENTRATE INTRAMUSCULAR; INTRAVENOUS AS NEEDED
Status: DISCONTINUED | OUTPATIENT
Start: 2019-06-25 | End: 2019-06-25 | Stop reason: SURG

## 2019-06-25 RX ORDER — PROPOFOL 10 MG/ML
INJECTION, EMULSION INTRAVENOUS CONTINUOUS PRN
Status: DISCONTINUED | OUTPATIENT
Start: 2019-06-25 | End: 2019-06-25 | Stop reason: SURG

## 2019-06-25 RX ORDER — FUROSEMIDE 40 MG/1
40 TABLET ORAL DAILY
Status: DISCONTINUED | OUTPATIENT
Start: 2019-06-25 | End: 2019-06-25

## 2019-06-25 RX ORDER — MIDAZOLAM HYDROCHLORIDE 1 MG/ML
INJECTION INTRAMUSCULAR; INTRAVENOUS AS NEEDED
Status: DISCONTINUED | OUTPATIENT
Start: 2019-06-25 | End: 2019-06-25 | Stop reason: SURG

## 2019-06-25 RX ORDER — POTASSIUM CHLORIDE 20 MEQ/1
40 TABLET, EXTENDED RELEASE ORAL DAILY
Status: DISCONTINUED | OUTPATIENT
Start: 2019-06-25 | End: 2019-06-26

## 2019-06-25 RX ORDER — GLYCOPYRROLATE 0.2 MG/ML
INJECTION INTRAMUSCULAR; INTRAVENOUS AS NEEDED
Status: DISCONTINUED | OUTPATIENT
Start: 2019-06-25 | End: 2019-06-25 | Stop reason: SURG

## 2019-06-25 RX ORDER — FUROSEMIDE 10 MG/ML
20 INJECTION INTRAMUSCULAR; INTRAVENOUS EVERY 12 HOURS
Status: DISCONTINUED | OUTPATIENT
Start: 2019-06-25 | End: 2019-06-26

## 2019-06-25 RX ORDER — EPHEDRINE SULFATE 50 MG/ML
INJECTION INTRAVENOUS AS NEEDED
Status: DISCONTINUED | OUTPATIENT
Start: 2019-06-25 | End: 2019-06-25 | Stop reason: SURG

## 2019-06-25 RX ORDER — HEPARIN SODIUM 10000 [USP'U]/100ML
3-30 INJECTION, SOLUTION INTRAVENOUS
Status: DISCONTINUED | OUTPATIENT
Start: 2019-06-25 | End: 2019-06-28

## 2019-06-25 RX ORDER — SODIUM CHLORIDE 9 MG/ML
INJECTION, SOLUTION INTRAVENOUS CONTINUOUS PRN
Status: DISCONTINUED | OUTPATIENT
Start: 2019-06-25 | End: 2019-06-25 | Stop reason: SURG

## 2019-06-25 RX ADMIN — AMIODARONE HYDROCHLORIDE 200 MG: 200 TABLET ORAL at 11:35

## 2019-06-25 RX ADMIN — ASCORBIC ACID TAB 250 MG 125 MG: 250 TAB at 08:53

## 2019-06-25 RX ADMIN — GUAIFENESIN 600 MG: 600 TABLET, EXTENDED RELEASE ORAL at 21:16

## 2019-06-25 RX ADMIN — VENLAFAXINE 75 MG: 37.5 TABLET ORAL at 10:39

## 2019-06-25 RX ADMIN — LEVOTHYROXINE SODIUM 137 MCG: 112 TABLET ORAL at 06:02

## 2019-06-25 RX ADMIN — LOSARTAN POTASSIUM 100 MG: 50 TABLET, FILM COATED ORAL at 08:55

## 2019-06-25 RX ADMIN — PHENYLEPHRINE HYDROCHLORIDE 20 MCG/MIN: 10 INJECTION INTRAVENOUS at 14:55

## 2019-06-25 RX ADMIN — PHENYLEPHRINE HYDROCHLORIDE 200 MCG: 10 INJECTION INTRAVENOUS at 15:10

## 2019-06-25 RX ADMIN — Medication 1000 UNITS: at 08:54

## 2019-06-25 RX ADMIN — SODIUM CHLORIDE: 0.9 INJECTION, SOLUTION INTRAVENOUS at 14:33

## 2019-06-25 RX ADMIN — Medication 100 MG: at 08:54

## 2019-06-25 RX ADMIN — INSULIN LISPRO 1 UNITS: 100 INJECTION, SOLUTION INTRAVENOUS; SUBCUTANEOUS at 21:16

## 2019-06-25 RX ADMIN — EPHEDRINE SULFATE 10 MG: 50 INJECTION, SOLUTION INTRAVENOUS at 15:23

## 2019-06-25 RX ADMIN — POTASSIUM CHLORIDE 40 MEQ: 1500 TABLET, EXTENDED RELEASE ORAL at 08:54

## 2019-06-25 RX ADMIN — FUROSEMIDE 20 MG: 10 INJECTION, SOLUTION INTRAMUSCULAR; INTRAVENOUS at 19:25

## 2019-06-25 RX ADMIN — METOPROLOL TARTRATE 50 MG: 50 TABLET, FILM COATED ORAL at 21:16

## 2019-06-25 RX ADMIN — EPHEDRINE SULFATE 5 MG: 50 INJECTION, SOLUTION INTRAVENOUS at 15:04

## 2019-06-25 RX ADMIN — AMIODARONE HYDROCHLORIDE 1 MG/MIN: 50 INJECTION, SOLUTION INTRAVENOUS at 10:39

## 2019-06-25 RX ADMIN — VITAM B12 100 MCG: 100 TAB at 08:55

## 2019-06-25 RX ADMIN — PHENYLEPHRINE HYDROCHLORIDE 200 MCG: 10 INJECTION INTRAVENOUS at 15:04

## 2019-06-25 RX ADMIN — LIDOCAINE HYDROCHLORIDE 100 MG: 20 INJECTION, SOLUTION INTRAVENOUS at 14:50

## 2019-06-25 RX ADMIN — Medication 1 TABLET: at 08:55

## 2019-06-25 RX ADMIN — HEPARIN SODIUM AND DEXTROSE 18 UNITS/KG/HR: 10000; 5 INJECTION INTRAVENOUS at 19:44

## 2019-06-25 RX ADMIN — GLYCOPYRROLATE 0.1 MG: 0.2 INJECTION, SOLUTION INTRAMUSCULAR; INTRAVENOUS at 14:56

## 2019-06-25 RX ADMIN — KETAMINE HYDROCHLORIDE 20 MG: 50 INJECTION, SOLUTION INTRAMUSCULAR; INTRAVENOUS at 15:16

## 2019-06-25 RX ADMIN — GUAIFENESIN 600 MG: 600 TABLET, EXTENDED RELEASE ORAL at 08:55

## 2019-06-25 RX ADMIN — MIDAZOLAM 1 MG: 1 INJECTION INTRAMUSCULAR; INTRAVENOUS at 14:50

## 2019-06-25 RX ADMIN — BENZONATATE 200 MG: 100 CAPSULE ORAL at 02:45

## 2019-06-25 RX ADMIN — METOPROLOL TARTRATE 50 MG: 50 TABLET, FILM COATED ORAL at 08:55

## 2019-06-25 RX ADMIN — BENZONATATE 200 MG: 100 CAPSULE ORAL at 23:27

## 2019-06-25 RX ADMIN — VITAMIN D, TAB 1000IU (100/BT) 1000 UNITS: 25 TAB at 08:54

## 2019-06-25 RX ADMIN — EPHEDRINE SULFATE 10 MG: 50 INJECTION, SOLUTION INTRAVENOUS at 15:10

## 2019-06-25 RX ADMIN — LORATADINE 10 MG: 10 TABLET ORAL at 08:54

## 2019-06-25 RX ADMIN — PROPOFOL 20 MCG/KG/MIN: 10 INJECTION, EMULSION INTRAVENOUS at 14:50

## 2019-06-25 RX ADMIN — FUROSEMIDE 40 MG: 40 TABLET ORAL at 08:56

## 2019-06-25 RX ADMIN — AMIODARONE HYDROCHLORIDE 200 MG: 200 TABLET ORAL at 08:56

## 2019-06-25 RX ADMIN — VENLAFAXINE 75 MG: 37.5 TABLET ORAL at 21:16

## 2019-06-25 NOTE — PROGRESS NOTES
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)    Acute CHF (congestive heart failure) ([address] )  Resolved Problems:    * No resolved hospital problems   *    Plan:   · Neuro: AAOx4  · Analgesia: PRN tylenol  · Sedation: none   · Delirium PPX: CAM-ICU, sleep hygiene   · Depression: home effexor  · CV:   · Acute Decompensated Heart Failure: EF 25-30%, severe diffuse hypokinesis   · BiV ICD when able  · Lasix 20 BID  · BB as below  · Atrial Fibrillation with RVR: with tachy/oniel like features   · PO amio, amio infusion  · Lopressor 50 BID  · Heparin drip for AC  · Plan for BiV when able, BB down and pace  · HPTN: continue losartan, lopressor   · HLD: statin   · Lung:   · B/L pleural effusion R>L: repeat CXR in AM   · satting 100% on RA  · Continue BID lasix  · Pulmonary toillete  · Seasonal Allergies: continue home Claritin, Mucinex  · GI:   · Cardiac diet  · GI PPX: not indicated  · Bowel Reg: colace/senna  · FEN:   · Fluids: no maintenance  · Electrolytes - Monitor/trend - replete based on deficiencies   · Nutrition: as above   · :   · Cr stable @ baseline  · Making adequate urine  · Maintain purewick catheter  · ID:   · Afebrile  · No leukocytosis   · Heme:   · Hgb stable   · DVT PPX: heparin drip, SCDs  · Endo:   · DM: SSI  · Hypothyroidism: home levothyroxine  · Msk/Skin:   · OOB as able   · Disposition: SD2     ______________________________________________________________________    HPI/24hr events: 67 y/o female with PMHx significant for HPTN, HLD, and DM who presents with new acute decompensated heart failure and A-fib with RVR  The patient initially presented to her PCP with complaints of SOB and KAMARA and was found to be in A-fib with RVR  She was sent to the ED where she was placed on cardiazem with improvement in HR  She was placed on a beta blocker, anticoagulation and aggressively diuresed  She underwent EBEN guided cardioversion on 6/21 and was found to have significantly depressed LV function of 25-30%  Unfortunately this patient converted back to a-fib with RVR and was placed on an amiodarone infusion  She demonstrated signs of tachy-oniel syndrome and as a result was scheduled for BiV ICD placement  This AM the patient went to the cath lab for attempted BiV ICD placement, however; with anesthesia induction the patient became mottled and hypotensive  As a result, the procedure was abandoned and the patient was transferred to the ICU for further monitoring  Lines  PIVx3  Purewick    Infusions  Amiodarone  Heparin  ______________________________________________________________________  Physical Exam:     Physical Exam   Constitutional: She is oriented to person, place, and time  She appears well-developed and well-nourished  No distress  HENT:   Head: Normocephalic and atraumatic  Mouth/Throat: No oropharyngeal exudate  Eyes: Pupils are equal, round, and reactive to light  EOM are normal  No scleral icterus  Neck: Normal range of motion  Neck supple  JVD present  No tracheal deviation present  Cardiovascular:   No murmur heard  Irregularly irregular     Pulmonary/Chest: Effort normal and breath sounds normal  No stridor  No respiratory distress  She has no wheezes  Abdominal: Soft  Bowel sounds are normal  She exhibits distension  There is no tenderness  Musculoskeletal: Normal range of motion  She exhibits edema (1+)  Neurological: She is alert and oriented to person, place, and time  No cranial nerve deficit  Skin: Skin is warm and dry  Capillary refill takes less than 2 seconds  ______________________________________________________________________  Temperature:   Temp (24hrs), Av 7 °F (36 5 °C), Min:97 °F (36 1 °C), Max:98 3 °F (36 8 °C)    Current Temperature: 98 °F (36 7 °C)    Vitals:    19 1730 19 1753 19 1800 19 1900   BP: 92/65 112/66 102/68    BP Location:  Right arm     Pulse: 104 (!) 110 (!) 110    Resp: 20 (!) 27 (!) 32    Temp:  97 8 °F (36 6 °C)  98 °F (36 7 °C)   TempSrc:  Oral  Oral   SpO2: 97% 98% 99%    Weight:  79 8 kg (175 lb 14 8 oz)     Height:                 Weights:   IBW: 47 8 kg    Body mass index is 33 24 kg/m²  Weight (last 2 days)     Date/Time   Weight    19 1753   79 8 (175 93)    19 0605   81 2 (179 01)    19 0600   79 (174 16)    19 0503   79 (174 2)    19 0752   80 7 (177 91)            Height: 5' 1" (154 9 cm)  Hemodynamic Monitoring:  N/A       Ventilator Settings:                         No results found for: PHART, JPF1CXV, PO2ART, UKM2YCN, B8XVHQCV, BEART, SOURCE    Intake and Outputs:  I/O        07 -  0700  07 -  0700    P  O  380 0    I V  (mL/kg) 789 2 (9 7) 466 6 (5 8)    Total Intake(mL/kg) 1169 2 (14 4) 466 6 (5 8)    Urine (mL/kg/hr) 940 (0 5)     Total Output 940     Net +229 2 +466 6          Unmeasured Urine Occurrence 1 x         UOP: 0 6cc/kg/hour   Nutrition:        Diet Orders   (From admission, onward)            Start     Ordered    19 1753  Diet Cardiovascular; Sodium 2 GM; Consistent Carbohydrate Diet Level 2 (5 carb servings/75 grams CHO/meal)  Diet effective midnight     Question Answer Comment   Diet Type Cardiovascular    Cardiac Sodium 2 GM    Other Restriction(s): Consistent Carbohydrate Diet Level 2 (5 carb servings/75 grams CHO/meal)    RD to adjust diet per protocol?  No        19 175          Labs:   Results from last 7 days   Lab Units 19  1606 19  0220   WBC Thousand/uL 11 31* 10 07   HEMOGLOBIN g/dL 14 0 13 3 HEMATOCRIT % 42 8 40 1   PLATELETS Thousands/uL 229 251   NEUTROS PCT %  --  65   MONOS PCT %  --  7    Results from last 7 days   Lab Units 06/25/19  0511 06/24/19  0851 06/23/19  0442   POTASSIUM mmol/L 3 4* 3 8 4 0   CHLORIDE mmol/L 100 101 105   CO2 mmol/L 29 30 35*   BUN mg/dL 16 16 17   CREATININE mg/dL 0 69 0 82 0 86   CALCIUM mg/dL 8 6 8 4 8 7     Results from last 7 days   Lab Units 06/23/19  0442 06/22/19  2158 06/22/19  0944   MAGNESIUM mg/dL 2 1 2 2 2 2          Results from last 7 days   Lab Units 06/25/19  0511 06/24/19 2158 06/24/19  1625   INR   --   --  1 54*   PTT seconds 128* 59* 23         0   Lab Value Date/Time    TROPONINI 0 03 06/18/2019 1652     Imaging:   CXR: B/L pleural effusion R>L   I have personally reviewed pertinent reports  and I have personally reviewed pertinent films in PACS  EKG: A-fib  Micro:  Blood Culture: No results found for: BLOODCX  Urine Culture: No results found for: URINECX  Sputum Culture: No components found for: SPUTUMCX  Wound Culure: No results found for: WOUNDCULT    No results found for: HONORIO Painter  Allergies: Allergies   Allergen Reactions    Penicillins     Wellbutrin Sr  [Bupropion]      Other reaction(s): Suicidal ideations  Category:  Adverse Reaction;      Medications:   Scheduled Meds:  Current Facility-Administered Medications:  acetaminophen 650 mg Oral Q4H PRN Yeni Spironello V, CRNP    albuterol 2 puff Inhalation Q4H PRN Yeni Spironello V, CRNP    amiodarone 1 mg/min Intravenous Continuous Yeni Spironello V, CRNP Last Rate: 1 mg/min (06/25/19 1433)   amiodarone 200 mg Oral TID With Meals Yeni Spironello V, CRNP    ascorbic acid 125 mg Oral Daily Yeni Spironello V, CRNP    benzonatate 200 mg Oral TID PRN Alcus Villarreal V, CRNP    cholecalciferol 1,000 Units Oral Daily Yeni Spironello V, CRNP    cyanocobalamin 100 mcg Oral Daily Yeni Spironello V, CRNP    furosemide 20 mg Intravenous Q12H Erin Hess MD    guaiFENesin 600 mg Oral Q12H [facility] Yeni Spironello V, CRNP    heparin (porcine) 3-30 Units/kg/hr (Order-Specific) Intravenous Titrated Yeni Spironello V, CRNP    insulin lispro 1-5 Units Subcutaneous HS Yeni Spironello V, CRNP    insulin lispro 1-6 Units Subcutaneous TID AC Yeni Spironello V, CRNP    levothyroxine 137 mcg Oral Daily Yeni Spironello V, CRNP    loratadine 10 mg Oral Daily Yeni Spironello V, CRNP    losartan 100 mg Oral Daily Yeni Spironello V, CRNP    metoprolol 5 mg Intravenous Q6H PRN Yeni Spironello V, CRNP    metoprolol tartrate 50 mg Oral Q12H [facility] Yeni Spironello V, CRNP    multivitamin-minerals 1 tablet Oral Daily Yeni Spironello V, CRNP    ondansetron 4 mg Intravenous Q6H PRN Yeni Spironello V, CRNP    potassium chloride 40 mEq Oral Daily Yeni Spironello V, CRNP    pravastatin 40 mg Oral Daily With [employer] V, CRNP    pyridoxine 100 mg Oral Daily Yeni Spironello V, CRNP    venlafaxine 75 mg Oral Q12H Yeni Spironello V, CRNP    vitamin E (tocopherol) 1,000 Units Oral Daily Yeni Spironello V, CRNP      Continuous Infusions:  amiodarone 1 mg/min Last Rate: 1 mg/min (06/25/19 1433)   heparin (porcine) 3-30 Units/kg/hr (Order-Specific)      PRN Meds:    acetaminophen 650 mg Q4H PRN   albuterol 2 puff Q4H PRN   benzonatate 200 mg TID PRN   metoprolol 5 mg Q6H PRN   ondansetron 4 mg Q6H PRN     VTE Pharmacologic Prophylaxis: Heparin  VTE Mechanical Prophylaxis: sequential compression device  Invasive lines and devices:   Invasive Devices     Peripheral Intravenous Line            Peripheral IV 06/22/19 Left Forearm 3 days    Peripheral IV 06/24/19 Right Antecubital 1 day    Peripheral IV 06/25/19 Left Antecubital less than 1 day          Drain            External Urinary Catheter 2 days                   Code Status: Level 1 - Full Code    Portions of the record may have been created with voice recognition software  Occasional wrong word or "sound a like" substitutions may have occurred due to the inherent limitations of voice recognition software  Read the chart carefully and recognize, using context, where substitutions have occurred      Anival Graf PA-C

## 2019-06-25 NOTE — ASSESSMENT & PLAN NOTE
Lab Results   Component Value Date    HGBA1C 5 9 (H) 03/18/2019       Recent Labs     06/25/19  0657 06/25/19  1026 06/25/19  1407 06/25/19  1647   POCGLU 147* 152* 193* 158*       Blood Sugar Average: Last 72 hrs:  (P) 158 125   · Diet controlled at home  · Glucose is acceptable  · Continue SSI with Accu-Cheks while inpatient  · Carb controlled diet  · Avoid hypoglycemia

## 2019-06-25 NOTE — ASSESSMENT & PLAN NOTE
Wt Readings from Last 3 Encounters:   06/25/19 81 2 kg (179 lb 0 2 oz)   06/18/19 81 kg (178 lb 9 6 oz)   03/20/19 80 1 kg (176 lb 9 6 oz)     · Shortness of breath is improved  · Continue IV diuretics  · Low-sodium diet  · Fluid restriction  · Monitor intake and output

## 2019-06-25 NOTE — PROGRESS NOTES
Progress Note Delfina Dumont 1940, 66 y o  female MRN: [id number]    Unit/Bed#: 3372 [address] [address]le Encounter: [id number]    Primary Care Provider: Martha Yip MD   Date and time admitted to hospital: 6/18/2019  4:35 PM        * Atrial fibrillation with RVR ([address] )  Assessment & Plan  · For pacemaker placement today  · Continue Metoprolol  · Continue Amiodarone drip   Acute CHF (congestive heart failure) ([facility])  Assessment & Plan  Wt Readings from Last 3 Encounters:   06/25/19 81 2 kg (179 lb 0 2 oz)   06/18/19 81 kg (178 lb 9 6 oz)   03/20/19 80 1 kg (176 lb 9 6 oz)     · Shortness of breath is improved  · Continue IV diuretics  · Low-sodium diet  · Fluid restriction  · Monitor intake and output        Hyperlipidemia  Assessment & Plan  · Continue statin therapy    Hypertension  Assessment & Plan  · Continue losartan, hold HCTZ for now  · Blood pressure monitoring per protocol, avoid hypotension    Hypothyroidism  Assessment & Plan  · TSH and free T4 as below  · Continue Synthroid therapy    Type 2 diabetes mellitus with diabetic cataract [facility])  Assessment & Plan  Lab Results   Component Value Date    HGBA1C 5 9 (H) 03/18/2019       Recent Labs     06/25/19  0657 06/25/19  1026 06/25/19  1407 06/25/19  1647   POCGLU 147* 152* 193* 158*       Blood Sugar Average: Last 72 hrs:  (P) 158 125   · Diet controlled at home  · Glucose is acceptable  · Continue SSI with Accu-Cheks while inpatient  · Carb controlled diet  · Avoid hypoglycemia        VTE Pharmacologic Prophylaxis:   Pharmacologic: Heparin Drip  Mechanical VTE Prophylaxis in Place: Yes    Patient Centered Rounds: I have performed bedside rounds with nursing staff today  Education and Discussions with Family / Patient:  Patient, plan of care    Time Spent for Care: 20 minutes  More than 50% of total time spent on counseling and coordination of care as described above      Current Length of Stay: 7 day(s)    Current Patient Status: Inpatient Certification Statement: The patient will continue to require additional inpatient hospital stay due to Uncontrolled AFib    Discharge Plan: To be determined    Code Status: Level 1 - Full Code      Subjective:   Reports that shortness of breath is improved  Denies any palpitations or chest pain       Objective:     Vitals:   Temp (24hrs), Av 7 °F (36 5 °C), Min:97 °F (36 1 °C), Max:98 3 °F (36 8 °C)    Temp:  [97 °F (36 1 °C)-98 3 °F (36 8 °C)] 97 °F (36 1 °C)  HR:  [] 115  Resp:  [17-30] 20  BP: (113-136)/(56-91) 114/56  SpO2:  [96 %-100 %] 98 %  Body mass index is 33 82 kg/m²  Input and Output Summary (last 24 hours): Intake/Output Summary (Last 24 hours) at 2019 1652  Last data filed at 2019 1525  Gross per 24 hour   Intake 1055 38 ml   Output 253 ml   Net 802 38 ml       Physical Exam:     Physical Exam   Constitutional: She is oriented to person, place, and time  She appears well-developed and well-nourished  HENT:   Head: Normocephalic and atraumatic  Eyes: Pupils are equal, round, and reactive to light  EOM are normal  No scleral icterus  Neck: Neck supple  Cardiovascular:   Irregular irregular, tachycardic   Pulmonary/Chest: Effort normal  She has no wheezes  She has no rales  Abdominal: Soft  Neurological: She is alert and oriented to person, place, and time  Skin: Skin is warm and dry           Additional Data:     Labs:    Results from last 7 days   Lab Units 19  1606 19  0220   WBC Thousand/uL 11 31* 10 07   HEMOGLOBIN g/dL 14 0 13 3   HEMATOCRIT % 42 8 40 1   PLATELETS Thousands/uL 229 251   NEUTROS PCT %  --  65   LYMPHS PCT %  --  26   MONOS PCT %  --  7   EOS PCT %  --  1     Results from last 7 days   Lab Units 19  0511   SODIUM mmol/L 135*   POTASSIUM mmol/L 3 4*   CHLORIDE mmol/L 100   CO2 mmol/L 29   BUN mg/dL 16   CREATININE mg/dL 0 69   ANION GAP mmol/L 6   CALCIUM mg/dL 8 6   GLUCOSE RANDOM mg/dL 144*     Results from last 7 days Lab Units 06/24/19  1625   INR  1 54*     Results from last 7 days   Lab Units 06/25/19  1647 06/25/19  1407 06/25/19  1026 06/25/19  0657 06/24/19  2104 06/24/19  1605 06/24/19  1126 06/24/19  0635 06/23/19  2103 06/23/19  1610 06/23/19  1101 06/23/19  0625   POC GLUCOSE mg/dl 158* 193* 152* 147* 179* 113 173* 142* 160* 123 230* 165*                   * I Have Reviewed All Lab Data Listed Above  * Additional Pertinent Lab Tests Reviewed:  All Labs Within Last 24 Hours Reviewed        Recent Cultures (last 7 days):           Last 24 Hours Medication List:     Current Facility-Administered Medications:  [MAR Hold] acetaminophen 650 mg Oral Q4H PRN Rosanne Hidalgo MD    [MAR Hold] albuterol 2 puff Inhalation Q4H PRN Rosanne Hidalgo MD    amiodarone 1 mg/min Intravenous Continuous Cheryl Eugene PA-C Last Rate: 1 mg/min (06/25/19 1433)   [MAR Hold] amiodarone 200 mg Oral TID With Meals Cheryl Eugene PA-C    [MAR Hold] ascorbic acid 125 mg Oral Daily Rosanne Hidalgo MD    [facility] Hold] benzonatate 200 mg Oral TID PRN Bradley Wayne PA-C    [facility] Hold] cholecalciferol 1,000 Units Oral Daily Rosanne Hidalgo MD    [facility] Hold] cyanocobalamin 100 mcg Oral Daily Rosanne Hidalgo MD    [facility] Hold] furosemide 40 mg Oral Daily Jerrell Lara MD    [facility] Hold] guaiFENesin 600 mg Oral Q12H [address] Hetul DO Anyi    [facility] Hold] heparin (porcine) 2,000 Units Intravenous PRN Cheryl Sodrubin PA-C    [MAR Hold] heparin (porcine) 4,000 Units Intravenous PRN CODY HernandezC    [MAR Hold] insulin lispro 1-5 Units Subcutaneous HS Rosanne Hidalgo MD    [facility] Hold] insulin lispro 1-6 Units Subcutaneous TID [facility] Rosanne Hidalgo MD    [facility] Hold] levothyroxine 137 mcg Oral Daily Rosanne Hidalgo MD    [facility] Hold] loratadine 10 mg Oral Daily Rosanne Hidalgo MD    [facility] Hold] losartan 100 mg Oral Daily Rosanne Hidalgo MD    [facility] Hold] metoprolol 5 mg Intravenous Q6H PRN Gordon Moreno DO    [MAR Hold] metoprolol tartrate 50 mg Oral Q12H [address] Hetul Abraham Burns, DO    [MAR Hold] multivitamin-minerals 1 tablet Oral Daily Chevy Hannon MD    [facility] Hold] ondansetron 4 mg Intravenous Q6H PRN Chevy Hannon MD    [facility] Hold] potassium chloride 40 mEq Oral Daily Efra Ovalles MD    [facility] Hold] pravastatin 40 mg Oral Daily With Vaishali Farrar MD    [facility] Hold] pyridoxine 100 mg Oral Daily Chevy Hannon MD    [facility] Hold] vancomycin 1,000 mg Intravenous Once Brittany Rhodes PA-C    [MAR Hold] venlafaxine 75 mg Oral Q12H Chevy Hannon MD    [facility] Hold] vitamin E (tocopherol) 1,000 Units Oral Daily Chevy Hannon MD         Today, Patient Was Seen By: Marilyn Salas MD    ** Please Note: Dictation voice to text software may have been used in the creation of this document   **

## 2019-06-25 NOTE — UTILIZATION REVIEW
Continued Stay Review    Date: 06/25/2019                        Current Patient Class: INPATIENT  Current Level of Care: level 2 Stepdown    HPI:78 y o  female initially admitted on 06/18/2019    Assessment/Plan:   06/25/2019  Cardio Progress Note: atrial fibrillation:   with recurrence after biphasic cardioversion patient is now on amiodarone and the electrophysiology note is appreciated  Patient to have a device placed and thereafter undergo cardioversion in the setting of amiodarone antiarrhythmic therapy  cardiomyopathy:   will continue current medical regimen except discontinuing intravenous furosemide and starting oral therapy with a potassium supplement  06/24/2019  Consult EP:  Device - First recommendation for this patient would be for pacemaker implantation  Given the fact that when we do not suspect her to always stay in atrial fibrillation, dual-chamber would be recommended  Due to the fact that her cardiomyopathy is most likely tachy mediated, this is a reversible cause which we will be treating  Therefore we would like to proceed with pacemaker rather than defibrillator even though her EF is depressed at this time    Patient has not had any radiation or surgery to her left upper chest   She is allergic to penicillins and we will therefore give her surgical prophylaxis with vancomycin  She has no allergy to contrast dye  She will be made NPO at midnight for possible device implantation tomorrow  Medication - Amiodarone would be medication of choice currently for her  However, I believe that this is more of a short-term medication given its long-term toxicity  We will check LFTs and TSH has already been checked along with chest x-ray done for this year  We will also add oral amiodarone 200 mg 3 times daily to help with loading to 10 g   If she still requires this to where the end of the week, we can cardiovert with full loading complete      We did discuss with the patient that we could use this for 3 months and at that time assess her ejection fraction to see if it is improved as we suspect it will with device implantation and up titration of medications    At that point, given the fact that she has no CKD, we could attempt to use either Tikosyn or sotalol to further manage her atrial fibrillation    For rate control, will continue with metoprolol at this time  However, tomorrow if her rates are still not well controlled, we will consider adding digoxin as well    Anticoagulation - With recent cardioversion, we would not like to just stop anticoagulation  Therefore, we will place patient on heparin drip until 8 hours prior to procedure tomorrow      Pertinent Labs/Diagnostic Results:   Results from last 7 days   Lab Units 06/24/19  1606 06/19/19  0220 06/18/19  1652   WBC Thousand/uL 11 31* 10 07 7 86   HEMOGLOBIN g/dL 14 0 13 3 13 7   HEMATOCRIT % 42 8 40 1 42 0   PLATELETS Thousands/uL 229 251 245   NEUTROS ABS Thousands/µL  --  6 66 4 62     Results from last 7 days   Lab Units 06/25/19  0511 06/24/19  0851 06/23/19  0442 06/22/19  2158 06/22/19  0944 06/22/19  0451   SODIUM mmol/L 135* 137 141 141  --  142   POTASSIUM mmol/L 3 4* 3 8 4 0 4 0  --  3 3*   CHLORIDE mmol/L 100 101 105 106  --  105   CO2 mmol/L 29 30 35* 29  --  31   ANION GAP mmol/L 6 6 1* 6  --  6   BUN mg/dL 16 16 17 18  --  18   CREATININE mg/dL 0 69 0 82 0 86 0 89  --  0 77   EGFR ml/min/1 73sq m 84 69 65 62  --  74   CALCIUM mg/dL 8 6 8 4 8 7 8 5  --  8 3   MAGNESIUM mg/dL  --   --  2 1 2 2 2 2 2 3     Results from last 7 days   Lab Units 06/18/19  1652   AST U/L 30   ALT U/L 37   ALK PHOS U/L 72   TOTAL PROTEIN g/dL 7 7   ALBUMIN g/dL 3 9   TOTAL BILIRUBIN mg/dL 0 59     Results from last 7 days   Lab Units 06/25/19  1026 06/25/19  0657 06/24/19  2104 06/24/19  1605 06/24/19  1126 06/24/19  0635 06/23/19  2103 06/23/19  1610 06/23/19  1101 06/23/19  0625   POC GLUCOSE mg/dl 152* 147* 179* 113 173* 142* 160* 123 230* 165* Results from last 7 days   Lab Units 06/25/19  0511 06/24/19  0851 06/23/19  0442 06/22/19  2158 06/22/19  0451 06/21/19  1519 06/20/19  1000 06/19/19  0220 06/18/19  1652   GLUCOSE RANDOM mg/dL 144* 219* 147* 138 129 163* 159* 121 143*     Results from last 7 days   Lab Units 06/18/19  1652   TROPONIN I ng/mL 0 03     Results from last 7 days   Lab Units 06/18/19  1652   D DIMER QUANT ng/ml (FEU) 1,206*     Results from last 7 days   Lab Units 06/25/19  0511 06/24/19  2158 06/24/19  1625 06/18/19  1652   PROTIME seconds  --   --  18 0* 14 7*   INR   --   --  1 54* 1 14   PTT seconds 128* 59* 23 26     Results from last 7 days   Lab Units 06/18/19  1652   NT-PRO BNP pg/mL 1,823*     Vital Signs: /81 (BP Location: Right arm)   Pulse 101   Temp (!) 97 4 °F (36 3 °C) (Oral)   Resp 18   Ht 5' 1" (1 549 m)   Wt 81 2 kg (179 lb 0 2 oz)   SpO2 97%   BMI 33 82 kg/m²     Medications:   Scheduled Meds:   Current Facility-Administered Medications:  acetaminophen 650 mg Oral Q4H PRN   albuterol 2 puff Inhalation Q4H PRN   amiodarone 1 mg/min Intravenous Continuous   amiodarone 200 mg Oral TID With Meals   ascorbic acid 125 mg Oral Daily   benzonatate 200 mg Oral TID PRN   cholecalciferol 1,000 Units Oral Daily   cyanocobalamin 100 mcg Oral Daily   furosemide 40 mg Oral Daily   guaiFENesin 600 mg Oral Q12H [address]   heparin (porcine) 2,000 Units Intravenous PRN   heparin (porcine) 4,000 Units Intravenous PRN   insulin lispro 1-5 Units Subcutaneous HS   insulin lispro 1-6 Units Subcutaneous TID AC   levothyroxine 137 mcg Oral Daily   loratadine 10 mg Oral Daily   losartan 100 mg Oral Daily   metoprolol 5 mg Intravenous Q6H PRN   metoprolol tartrate 50 mg Oral Q12H DAVE   multivitamin-minerals 1 tablet Oral Daily   ondansetron 4 mg Intravenous Q6H PRN   potassium chloride 40 mEq Oral Daily   pravastatin 40 mg Oral Daily With Dinner   pyridoxine 100 mg Oral Daily   vancomycin 1,000 mg Intravenous Once   venlafaxine 75 mg Oral Q12H   vitamin E (tocopherol) 1,000 Units Oral Daily       Discharge Plan: TBD    Network Utilization Review Department  Phone: [phone]; Fax [phone]  [email or web address]  ATTENTION: Please call with any questions or concerns to [phone]  and carefully listen to the prompts so that you are directed to the right person  Send all requests for admission clinical reviews, approved or denied determinations and any other requests to fax [phone]   All voicemails are confidential

## 2019-06-25 NOTE — PROGRESS NOTES
Attempted to place dual-chamber defibrillator today but patient was too sick to undergo procedure  With attempted anesthesia induction she became hypotensive and appeared very short of breath  A decision was made to transfer the patient to ICU for management of her active heart failure and atrial fibrillation with rapid ventricular response  Consideration for EBEN cardioversion with placement of dual-chamber AICD in 2-3 days when she is more stable  Of note she did appear stable for attempt of procedure however she did not tolerate anesthesia induction likely secondary to her cardiomyopathy and rapid heart rate  Hopefully with diuresis and additional rate control she will tolerate anesthesia

## 2019-06-25 NOTE — PROGRESS NOTES
Cardiology Progress Note - Laura Hernandez 66 y o  female MRN: [id number]    Unit/Bed#: [facility] 621-89 Encounter: [id number]      Assessment:  Principal Problem:    Atrial fibrillation with RVR ([facility] )  Active Problems:    Hyperlipidemia    Hypertension    Hypothyroidism    Type 2 diabetes mellitus with diabetic cataract ([facility] )    Acute CHF (congestive heart failure) ([place] [address] )      Plan:  Patient is comfortable this morning  She has no chest pain or significant dyspnea  In reference to atrial fibrillation with recurrence after biphasic cardioversion patient is now on amiodarone and the electrophysiology note is appreciated  Patient to have a device placed and thereafter undergo cardioversion in the setting of amiodarone antiarrhythmic therapy  In reference to essential hypertension will continue present medical regimen  In reference to hyperlipidemia will continue statin  BMP today shows a potassium of 3 4 and I will supplement this  Her creatinine is 0 69  PTT is therapeutic on intravenous heparin  In reference to cardiomyopathy will continue current medical regimen except discontinuing intravenous furosemide and starting oral therapy with a potassium supplement  Subjective:   Patient seen and examined  No significant events overnight   negative  Objective:     Vitals: Blood pressure 135/91, pulse (!) 118, temperature 98 °F (36 7 °C), temperature source Oral, resp  rate 19, height 5' 1" (1 549 m), weight 81 2 kg (179 lb 0 2 oz), SpO2 96 %  , Body mass index is 33 82 kg/m² ,   Orthostatic Blood Pressures      Most Recent Value   Blood Pressure  135/91 filed at 06/25/2019 0700   Patient Position - Orthostatic VS  Lying filed at 06/24/2019 1931      ,      Intake/Output Summary (Last 24 hours) at 6/25/2019 0833  Last data filed at 6/25/2019 0800  Gross per 24 hour   Intake 922 38 ml   Output 940 ml   Net -17 62 ml       No significant arrhythmias seen on telemetry review    Atrial fibrillation with a rapid ventricular response      Physical Exam:    GEN: Antwan Stahl appears well, alert and oriented x 3, pleasant and cooperative   NECK: supple, no carotid bruits, no JVD or HJR  HEART: normal rate, irregular rhythm, normal S1 and S2, no murmurs, clicks, gallops or rubs   LUNGS: clear to auscultation bilaterally; no wheezes, rales, or rhonchi   ABDOMEN: normal bowel sounds, soft, no tenderness, no distention  EXTREMITIES: peripheral pulses normal; no clubbing, cyanosis, or edema  SKIN: warm and well perfused, no suspicious lesions on exposed skin    Labs & Results:    No results displayed because visit has over 200 results  Xr Chest 1 View Portable    Result Date: 6/19/2019  Narrative: CHEST INDICATION:   AFib w/ rvr  COMPARISON:  None EXAM PERFORMED/VIEWS:  XR CHEST PORTABLE One image FINDINGS: Cardiomediastinal silhouette appears enlarged  Patient is in mild CHF  The lungs are clear  No pneumothorax   The costophrenic angles are blunted which may represent small pleural effusions  Osseous structures appear within normal limits for patient age  Impression: Cardiomegaly and mild CHF  Possible small pleural effusions  Workstation performed: [id number]     Cta Ed Chest Pe Study    Result Date: 6/18/2019  Narrative: CTA - CHEST WITH IV CONTRAST - PULMONARY ANGIOGRAM INDICATION:   Weakness, shortness of breath and leg swelling  COMPARISON: 6/18/2019  TECHNIQUE: CTA examination of the chest was performed using angiographic technique according to a protocol specifically tailored to evaluate for pulmonary embolism  Axial, sagittal, and coronal 2D reformatted images were created from the source data and  submitted for interpretation  In addition, coronal 3D MIP postprocessing was performed on the acquisition scanner  Radiation dose length product (DLP) for this visit:  749 22 mGy-cm     This examination, like all CT scans performed in the [facility], was performed utilizing techniques to minimize radiation dose exposure, including the use of iterative  reconstruction and automated exposure control  IV Contrast:  78 mL of iohexol (OMNIPAQUE)  FINDINGS: PULMONARY ARTERIAL TREE:  No filling defect in the visualized pulmonary arteries to suggest acute embolus  LUNGS:  Pulmonary vascular congestion without interstitial edema  Mild relaxation atelectasis at the lung bases  There is no tracheal or endobronchial lesion  PLEURA:  Small bilateral pleural effusions  HEART/GREAT VESSELS:  Cardiomegaly  MEDIASTINUM AND OSMEL:  Unremarkable  CHEST WALL AND LOWER NECK:   Unremarkable  VISUALIZED STRUCTURES IN THE UPPER ABDOMEN:  Unremarkable  OSSEOUS STRUCTURES:  No acute fracture or destructive osseous lesion  Impression: 1  No evidence of acute pulmonary embolus or thoracic aortic aneurysm  2   Small bilateral pleural effusions and pulmonary vascular congestion without pulmonary interstitial edema, possibly indicating early or mild congestive heart failure  Workstation performed: [id number]       EKG personally reviewed by Bren Roach MD      Counseling / Coordination of Care  Total floor / unit time spent today 30 minutes  Greater than 50% of total time was spent with the patient and / or family counseling and / or coordination of care

## 2019-06-26 ENCOUNTER — APPOINTMENT (INPATIENT)
Dept: RADIOLOGY | Facility: HOSPITAL | Age: 79
DRG: 291 | End: 2019-06-26
Payer: COMMERCIAL

## 2019-06-26 ENCOUNTER — APPOINTMENT (INPATIENT)
Dept: NON INVASIVE DIAGNOSTICS | Facility: HOSPITAL | Age: 79
DRG: 291 | End: 2019-06-26
Payer: COMMERCIAL

## 2019-06-26 PROBLEM — E87.2 METABOLIC ACIDOSIS: Status: ACTIVE | Noted: 2019-06-26

## 2019-06-26 PROBLEM — E87.29 HIGH ANION GAP METABOLIC ACIDOSIS: Status: ACTIVE | Noted: 2019-06-26

## 2019-06-26 PROBLEM — R57.0 CARDIOGENIC SHOCK (HCC): Status: ACTIVE | Noted: 2019-06-26

## 2019-06-26 PROBLEM — E87.5 HYPERKALEMIA: Status: ACTIVE | Noted: 2019-06-26

## 2019-06-26 PROBLEM — E83.39 HYPERPHOSPHATEMIA: Status: ACTIVE | Noted: 2019-06-26

## 2019-06-26 PROBLEM — N17.9 ACUTE KIDNEY INJURY (HCC): Status: ACTIVE | Noted: 2019-06-26

## 2019-06-26 PROBLEM — E87.2 LACTIC ACIDOSIS: Status: ACTIVE | Noted: 2019-06-26

## 2019-06-26 PROBLEM — E87.1 HYPONATREMIA: Status: ACTIVE | Noted: 2019-06-26

## 2019-06-26 PROBLEM — E87.20 METABOLIC ACIDOSIS: Status: ACTIVE | Noted: 2019-06-26

## 2019-06-26 PROBLEM — E87.20 LACTIC ACIDOSIS: Status: ACTIVE | Noted: 2019-06-26

## 2019-06-26 PROBLEM — K72.00 SHOCK LIVER: Status: ACTIVE | Noted: 2019-06-26

## 2019-06-26 LAB
ALBUMIN SERPL BCP-MCNC: 2.9 G/DL (ref 3.5–5)
ALP SERPL-CCNC: 78 U/L (ref 46–116)
ALT SERPL W P-5'-P-CCNC: 476 U/L (ref 12–78)
ANION GAP SERPL CALCULATED.3IONS-SCNC: 15 MMOL/L (ref 4–13)
ANION GAP SERPL CALCULATED.3IONS-SCNC: 17 MMOL/L (ref 4–13)
ANION GAP SERPL CALCULATED.3IONS-SCNC: 17 MMOL/L (ref 4–13)
ANION GAP SERPL CALCULATED.3IONS-SCNC: 19 MMOL/L (ref 4–13)
APTT PPP: 107 SECONDS (ref 23–37)
APTT PPP: 77 SECONDS (ref 23–37)
APTT PPP: 93 SECONDS (ref 23–37)
ARTERIAL PATENCY WRIST A: NO
AST SERPL W P-5'-P-CCNC: 885 U/L (ref 5–45)
ATRIAL RATE: 90 BPM
BASE EX.OXY STD BLDV CALC-SCNC: 32.7 % (ref 60–80)
BASE EX.OXY STD BLDV CALC-SCNC: 68.3 % (ref 60–80)
BASE EX.OXY STD BLDV CALC-SCNC: 79.9 % (ref 60–80)
BASE EXCESS BLDA CALC-SCNC: -7.4 MMOL/L
BASE EXCESS BLDV CALC-SCNC: -4.6 MMOL/L
BASE EXCESS BLDV CALC-SCNC: -5 MMOL/L
BASE EXCESS BLDV CALC-SCNC: -6.1 MMOL/L
BASOPHILS # BLD AUTO: 0.04 THOUSANDS/ΜL (ref 0–0.1)
BASOPHILS NFR BLD AUTO: 0 % (ref 0–1)
BILIRUB SERPL-MCNC: 2 MG/DL (ref 0.2–1)
BODY TEMPERATURE: 97.9 DEGREES FEHRENHEIT
BODY TEMPERATURE: ABNORMAL DEGREES FEHRENHEIT
BUN SERPL-MCNC: 24 MG/DL (ref 5–25)
BUN SERPL-MCNC: 25 MG/DL (ref 5–25)
BUN SERPL-MCNC: 37 MG/DL (ref 5–25)
BUN SERPL-MCNC: 39 MG/DL (ref 5–25)
CALCIUM SERPL-MCNC: 7.9 MG/DL (ref 8.3–10.1)
CALCIUM SERPL-MCNC: 8 MG/DL (ref 8.3–10.1)
CALCIUM SERPL-MCNC: 8 MG/DL (ref 8.3–10.1)
CALCIUM SERPL-MCNC: 8.6 MG/DL (ref 8.3–10.1)
CHLORIDE SERPL-SCNC: 100 MMOL/L (ref 100–108)
CHLORIDE SERPL-SCNC: 94 MMOL/L (ref 100–108)
CHLORIDE SERPL-SCNC: 95 MMOL/L (ref 100–108)
CHLORIDE SERPL-SCNC: 98 MMOL/L (ref 100–108)
CO2 SERPL-SCNC: 16 MMOL/L (ref 21–32)
CO2 SERPL-SCNC: 17 MMOL/L (ref 21–32)
CO2 SERPL-SCNC: 20 MMOL/L (ref 21–32)
CO2 SERPL-SCNC: 21 MMOL/L (ref 21–32)
CREAT SERPL-MCNC: 1.64 MG/DL (ref 0.6–1.3)
CREAT SERPL-MCNC: 1.67 MG/DL (ref 0.6–1.3)
CREAT SERPL-MCNC: 2.39 MG/DL (ref 0.6–1.3)
CREAT SERPL-MCNC: 2.49 MG/DL (ref 0.6–1.3)
EOSINOPHIL # BLD AUTO: 0 THOUSAND/ΜL (ref 0–0.61)
EOSINOPHIL NFR BLD AUTO: 0 % (ref 0–6)
ERYTHROCYTE [DISTWIDTH] IN BLOOD BY AUTOMATED COUNT: 12.8 % (ref 11.6–15.1)
ERYTHROCYTE [DISTWIDTH] IN BLOOD BY AUTOMATED COUNT: 12.8 % (ref 11.6–15.1)
GFR SERPL CREATININE-BSD FRML MDRD: 18 ML/MIN/1.73SQ M
GFR SERPL CREATININE-BSD FRML MDRD: 19 ML/MIN/1.73SQ M
GFR SERPL CREATININE-BSD FRML MDRD: 29 ML/MIN/1.73SQ M
GFR SERPL CREATININE-BSD FRML MDRD: 30 ML/MIN/1.73SQ M
GLUCOSE SERPL-MCNC: 110 MG/DL (ref 65–140)
GLUCOSE SERPL-MCNC: 126 MG/DL (ref 65–140)
GLUCOSE SERPL-MCNC: 129 MG/DL (ref 65–140)
GLUCOSE SERPL-MCNC: 135 MG/DL (ref 65–140)
GLUCOSE SERPL-MCNC: 201 MG/DL (ref 65–140)
GLUCOSE SERPL-MCNC: 202 MG/DL (ref 65–140)
GLUCOSE SERPL-MCNC: 240 MG/DL (ref 65–140)
GLUCOSE SERPL-MCNC: 259 MG/DL (ref 65–140)
GLUCOSE SERPL-MCNC: 260 MG/DL (ref 65–140)
GLUCOSE SERPL-MCNC: 283 MG/DL (ref 65–140)
GLUCOSE SERPL-MCNC: 301 MG/DL (ref 65–140)
HCO3 BLDA-SCNC: 14.5 MMOL/L (ref 22–28)
HCO3 BLDV-SCNC: 18.9 MMOL/L (ref 24–30)
HCO3 BLDV-SCNC: 20.3 MMOL/L (ref 24–30)
HCO3 BLDV-SCNC: 21.2 MMOL/L (ref 24–30)
HCT VFR BLD AUTO: 43.2 % (ref 34.8–46.1)
HCT VFR BLD AUTO: 47.6 % (ref 34.8–46.1)
HGB BLD-MCNC: 14.6 G/DL (ref 11.5–15.4)
HGB BLD-MCNC: 15.4 G/DL (ref 11.5–15.4)
IMM GRANULOCYTES # BLD AUTO: 0.08 THOUSAND/UL (ref 0–0.2)
IMM GRANULOCYTES NFR BLD AUTO: 1 % (ref 0–2)
LACTATE SERPL-SCNC: 10.6 MMOL/L (ref 0.5–2)
LACTATE SERPL-SCNC: 3.7 MMOL/L (ref 0.5–2)
LACTATE SERPL-SCNC: 7.8 MMOL/L (ref 0.5–2)
LACTATE SERPL-SCNC: 8.2 MMOL/L (ref 0.5–2)
LACTATE SERPL-SCNC: 9.1 MMOL/L (ref 0.5–2)
LACTATE SERPL-SCNC: 9.3 MMOL/L (ref 0.5–2)
LYMPHOCYTES # BLD AUTO: 1.22 THOUSANDS/ΜL (ref 0.6–4.47)
LYMPHOCYTES NFR BLD AUTO: 12 % (ref 14–44)
MAGNESIUM SERPL-MCNC: 2.3 MG/DL (ref 1.6–2.6)
MAGNESIUM SERPL-MCNC: 2.5 MG/DL (ref 1.6–2.6)
MCH RBC QN AUTO: 32.1 PG (ref 26.8–34.3)
MCH RBC QN AUTO: 33.3 PG (ref 26.8–34.3)
MCHC RBC AUTO-ENTMCNC: 32.4 G/DL (ref 31.4–37.4)
MCHC RBC AUTO-ENTMCNC: 33.8 G/DL (ref 31.4–37.4)
MCV RBC AUTO: 99 FL (ref 82–98)
MCV RBC AUTO: 99 FL (ref 82–98)
MONOCYTES # BLD AUTO: 0.9 THOUSAND/ΜL (ref 0.17–1.22)
MONOCYTES NFR BLD AUTO: 9 % (ref 4–12)
NASAL CANNULA: 5
NEUTROPHILS # BLD AUTO: 8.16 THOUSANDS/ΜL (ref 1.85–7.62)
NEUTS SEG NFR BLD AUTO: 78 % (ref 43–75)
NON VENT ROOM AIR: ABNORMAL %
NRBC BLD AUTO-RTO: 0 /100 WBCS
O2 CT BLDA-SCNC: 22.2 ML/DL (ref 16–23)
O2 CT BLDV-SCNC: 14.2 ML/DL
O2 CT BLDV-SCNC: 14.8 ML/DL
O2 CT BLDV-SCNC: 7.4 ML/DL
OXYHGB MFR BLDA: 96.7 % (ref 94–97)
PCO2 BLD: 36.6 MM HG (ref 42–50)
PCO2 BLDA: 22.8 MM HG (ref 36–44)
PCO2 BLDV: 36 MM HG (ref 42–50)
PCO2 BLDV: 37.2 MM HG (ref 42–50)
PCO2 BLDV: 43.2 MM HG (ref 42–50)
PH BLD: 7.36 [PH] (ref 7.3–7.4)
PH BLDA: 7.42 [PH] (ref 7.35–7.45)
PH BLDV: 7.31 [PH] (ref 7.3–7.4)
PH BLDV: 7.34 [PH] (ref 7.3–7.4)
PH BLDV: 7.36 [PH] (ref 7.3–7.4)
PHOSPHATE SERPL-MCNC: 6.6 MG/DL (ref 2.3–4.1)
PHOSPHATE SERPL-MCNC: 7 MG/DL (ref 2.3–4.1)
PLATELET # BLD AUTO: 228 THOUSANDS/UL (ref 149–390)
PLATELET # BLD AUTO: 233 THOUSANDS/UL (ref 149–390)
PMV BLD AUTO: 10.9 FL (ref 8.9–12.7)
PMV BLD AUTO: 11.3 FL (ref 8.9–12.7)
PO2 BLDA: 104.7 MM HG (ref 75–129)
PO2 BLDV: 25.7 MM HG (ref 35–45)
PO2 BLDV: 41.9 MM HG (ref 35–45)
PO2 BLDV: 52.4 MM HG (ref 35–45)
PO2 VENOUS TEMP CORRECTED: 40.7 MM HG (ref 35–45)
POTASSIUM SERPL-SCNC: 4.1 MMOL/L (ref 3.5–5.3)
POTASSIUM SERPL-SCNC: 4.3 MMOL/L (ref 3.5–5.3)
POTASSIUM SERPL-SCNC: 5.7 MMOL/L (ref 3.5–5.3)
POTASSIUM SERPL-SCNC: 6.1 MMOL/L (ref 3.5–5.3)
PROT SERPL-MCNC: 6.6 G/DL (ref 6.4–8.2)
QRS AXIS: 140 DEGREES
QRSD INTERVAL: 92 MS
QT INTERVAL: 396 MS
QTC INTERVAL: 454 MS
RBC # BLD AUTO: 4.38 MILLION/UL (ref 3.81–5.12)
RBC # BLD AUTO: 4.8 MILLION/UL (ref 3.81–5.12)
SODIUM SERPL-SCNC: 130 MMOL/L (ref 136–145)
SODIUM SERPL-SCNC: 131 MMOL/L (ref 136–145)
SODIUM SERPL-SCNC: 133 MMOL/L (ref 136–145)
SODIUM SERPL-SCNC: 135 MMOL/L (ref 136–145)
SPECIMEN SOURCE: ABNORMAL
T WAVE AXIS: 94 DEGREES
TROPONIN I SERPL-MCNC: 0.03 NG/ML
VENTRICULAR RATE: 79 BPM
WBC # BLD AUTO: 10.4 THOUSAND/UL (ref 4.31–10.16)
WBC # BLD AUTO: 12.42 THOUSAND/UL (ref 4.31–10.16)

## 2019-06-26 PROCEDURE — 83605 ASSAY OF LACTIC ACID: CPT | Performed by: PHYSICIAN ASSISTANT

## 2019-06-26 PROCEDURE — 80048 BASIC METABOLIC PNL TOTAL CA: CPT | Performed by: PHYSICIAN ASSISTANT

## 2019-06-26 PROCEDURE — 03HY32Z INSERTION OF MONITORING DEVICE INTO UPPER ARTERY, PERCUTANEOUS APPROACH: ICD-10-PCS | Performed by: INTERNAL MEDICINE

## 2019-06-26 PROCEDURE — 4A133B1 MONITORING OF ARTERIAL PRESSURE, PERIPHERAL, PERCUTANEOUS APPROACH: ICD-10-PCS | Performed by: INTERNAL MEDICINE

## 2019-06-26 PROCEDURE — 93306 TTE W/DOPPLER COMPLETE: CPT

## 2019-06-26 PROCEDURE — 82948 REAGENT STRIP/BLOOD GLUCOSE: CPT

## 2019-06-26 PROCEDURE — 80053 COMPREHEN METABOLIC PANEL: CPT | Performed by: PHYSICIAN ASSISTANT

## 2019-06-26 PROCEDURE — 71045 X-RAY EXAM CHEST 1 VIEW: CPT

## 2019-06-26 PROCEDURE — 84100 ASSAY OF PHOSPHORUS: CPT | Performed by: PHYSICIAN ASSISTANT

## 2019-06-26 PROCEDURE — NC001 PR NO CHARGE: Performed by: PHYSICIAN ASSISTANT

## 2019-06-26 PROCEDURE — 83735 ASSAY OF MAGNESIUM: CPT | Performed by: PHYSICIAN ASSISTANT

## 2019-06-26 PROCEDURE — 85027 COMPLETE CBC AUTOMATED: CPT | Performed by: PHYSICIAN ASSISTANT

## 2019-06-26 PROCEDURE — 99292 CRITICAL CARE ADDL 30 MIN: CPT | Performed by: PHYSICIAN ASSISTANT

## 2019-06-26 PROCEDURE — 99222 1ST HOSP IP/OBS MODERATE 55: CPT | Performed by: INTERNAL MEDICINE

## 2019-06-26 PROCEDURE — 99291 CRITICAL CARE FIRST HOUR: CPT | Performed by: EMERGENCY MEDICINE

## 2019-06-26 PROCEDURE — NC001 PR NO CHARGE: Performed by: INTERNAL MEDICINE

## 2019-06-26 PROCEDURE — 80048 BASIC METABOLIC PNL TOTAL CA: CPT | Performed by: EMERGENCY MEDICINE

## 2019-06-26 PROCEDURE — 83605 ASSAY OF LACTIC ACID: CPT | Performed by: EMERGENCY MEDICINE

## 2019-06-26 PROCEDURE — 85730 THROMBOPLASTIN TIME PARTIAL: CPT | Performed by: INTERNAL MEDICINE

## 2019-06-26 PROCEDURE — 99232 SBSQ HOSP IP/OBS MODERATE 35: CPT | Performed by: INTERNAL MEDICINE

## 2019-06-26 PROCEDURE — 85025 COMPLETE CBC W/AUTO DIFF WBC: CPT | Performed by: EMERGENCY MEDICINE

## 2019-06-26 PROCEDURE — 93005 ELECTROCARDIOGRAM TRACING: CPT

## 2019-06-26 PROCEDURE — 83735 ASSAY OF MAGNESIUM: CPT | Performed by: EMERGENCY MEDICINE

## 2019-06-26 PROCEDURE — 82805 BLOOD GASES W/O2 SATURATION: CPT | Performed by: NURSE PRACTITIONER

## 2019-06-26 PROCEDURE — 82805 BLOOD GASES W/O2 SATURATION: CPT | Performed by: EMERGENCY MEDICINE

## 2019-06-26 PROCEDURE — 4A133J1 MONITORING OF ARTERIAL PULSE, PERIPHERAL, PERCUTANEOUS APPROACH: ICD-10-PCS | Performed by: INTERNAL MEDICINE

## 2019-06-26 PROCEDURE — 93010 ELECTROCARDIOGRAM REPORT: CPT | Performed by: INTERNAL MEDICINE

## 2019-06-26 PROCEDURE — 36620 INSERTION CATHETER ARTERY: CPT | Performed by: EMERGENCY MEDICINE

## 2019-06-26 PROCEDURE — 84100 ASSAY OF PHOSPHORUS: CPT | Performed by: EMERGENCY MEDICINE

## 2019-06-26 PROCEDURE — 84484 ASSAY OF TROPONIN QUANT: CPT | Performed by: EMERGENCY MEDICINE

## 2019-06-26 PROCEDURE — 82805 BLOOD GASES W/O2 SATURATION: CPT | Performed by: PHYSICIAN ASSISTANT

## 2019-06-26 PROCEDURE — 36556 INSERT NON-TUNNEL CV CATH: CPT | Performed by: PHYSICIAN ASSISTANT

## 2019-06-26 RX ORDER — MILRINONE LACTATE 0.2 MG/ML
0.12 INJECTION, SOLUTION INTRAVENOUS CONTINUOUS
Status: DISCONTINUED | OUTPATIENT
Start: 2019-06-26 | End: 2019-07-01

## 2019-06-26 RX ORDER — DIGOXIN 250 MCG
250 TABLET ORAL DAILY
Status: DISCONTINUED | OUTPATIENT
Start: 2019-06-26 | End: 2019-06-27

## 2019-06-26 RX ORDER — FUROSEMIDE 10 MG/ML
80 INJECTION INTRAMUSCULAR; INTRAVENOUS
Status: DISCONTINUED | OUTPATIENT
Start: 2019-06-26 | End: 2019-06-27

## 2019-06-26 RX ORDER — FUROSEMIDE 10 MG/ML
80 INJECTION INTRAMUSCULAR; INTRAVENOUS ONCE
Status: COMPLETED | OUTPATIENT
Start: 2019-06-26 | End: 2019-06-26

## 2019-06-26 RX ORDER — LIDOCAINE HYDROCHLORIDE 10 MG/ML
INJECTION, SOLUTION EPIDURAL; INFILTRATION; INTRACAUDAL; PERINEURAL
Status: COMPLETED
Start: 2019-06-26 | End: 2019-06-26

## 2019-06-26 RX ORDER — FUROSEMIDE 10 MG/ML
40 INJECTION INTRAMUSCULAR; INTRAVENOUS ONCE
Status: COMPLETED | OUTPATIENT
Start: 2019-06-26 | End: 2019-06-26

## 2019-06-26 RX ORDER — NOREPINEPHRINE BITARTRATE 1 MG/ML
INJECTION, SOLUTION INTRAVENOUS
Status: COMPLETED
Start: 2019-06-26 | End: 2019-06-26

## 2019-06-26 RX ADMIN — MILRINONE LACTATE IN DEXTROSE 0.25 MCG/KG/MIN: 200 INJECTION, SOLUTION INTRAVENOUS at 15:53

## 2019-06-26 RX ADMIN — AMIODARONE HYDROCHLORIDE 200 MG: 200 TABLET ORAL at 08:31

## 2019-06-26 RX ADMIN — Medication 1000 UNITS: at 08:33

## 2019-06-26 RX ADMIN — LIDOCAINE HYDROCHLORIDE 50 MG: 10 INJECTION, SOLUTION EPIDURAL; INFILTRATION; INTRACAUDAL; PERINEURAL at 02:00

## 2019-06-26 RX ADMIN — SODIUM BICARBONATE 50 MEQ: 84 INJECTION, SOLUTION INTRAVENOUS at 03:22

## 2019-06-26 RX ADMIN — AMIODARONE HYDROCHLORIDE 1 MG/MIN: 50 INJECTION, SOLUTION INTRAVENOUS at 03:15

## 2019-06-26 RX ADMIN — VENLAFAXINE 75 MG: 37.5 TABLET ORAL at 09:05

## 2019-06-26 RX ADMIN — SODIUM CHLORIDE 4 UNITS/HR: 9 INJECTION, SOLUTION INTRAVENOUS at 20:05

## 2019-06-26 RX ADMIN — DIGOXIN 250 MCG: 250 TABLET ORAL at 11:50

## 2019-06-26 RX ADMIN — GUAIFENESIN 600 MG: 600 TABLET, EXTENDED RELEASE ORAL at 21:26

## 2019-06-26 RX ADMIN — ASCORBIC ACID TAB 250 MG 125 MG: 250 TAB at 08:32

## 2019-06-26 RX ADMIN — Medication 100 MG: at 08:34

## 2019-06-26 RX ADMIN — HEPARIN SODIUM AND DEXTROSE 18 UNITS/KG/HR: 10000; 5 INJECTION INTRAVENOUS at 02:58

## 2019-06-26 RX ADMIN — NOREPINEPHRINE BITARTRATE 14 MCG/MIN: 1 INJECTION INTRAVENOUS at 18:51

## 2019-06-26 RX ADMIN — PRAVASTATIN SODIUM 40 MG: 40 TABLET ORAL at 16:03

## 2019-06-26 RX ADMIN — NOREPINEPHRINE BITARTRATE 16 MCG/MIN: 1 INJECTION INTRAVENOUS at 14:11

## 2019-06-26 RX ADMIN — FUROSEMIDE 80 MG: 10 INJECTION, SOLUTION INTRAMUSCULAR; INTRAVENOUS at 18:58

## 2019-06-26 RX ADMIN — VENLAFAXINE 75 MG: 37.5 TABLET ORAL at 21:26

## 2019-06-26 RX ADMIN — CALCIUM GLUCONATE 2 G: 98 INJECTION, SOLUTION INTRAVENOUS at 03:17

## 2019-06-26 RX ADMIN — INSULIN LISPRO 4 UNITS: 100 INJECTION, SOLUTION INTRAVENOUS; SUBCUTANEOUS at 17:17

## 2019-06-26 RX ADMIN — AMIODARONE HYDROCHLORIDE 200 MG: 200 TABLET ORAL at 16:03

## 2019-06-26 RX ADMIN — LORATADINE 10 MG: 10 TABLET ORAL at 08:31

## 2019-06-26 RX ADMIN — VASOPRESSIN 0.02 UNITS/MIN: 20 INJECTION INTRAVENOUS at 14:11

## 2019-06-26 RX ADMIN — NOREPINEPHRINE BITARTRATE 9 MCG/MIN: 1 INJECTION INTRAVENOUS at 23:41

## 2019-06-26 RX ADMIN — MILRINONE LACTATE IN DEXTROSE 0.25 MCG/KG/MIN: 200 INJECTION, SOLUTION INTRAVENOUS at 02:58

## 2019-06-26 RX ADMIN — Medication 1 TABLET: at 08:31

## 2019-06-26 RX ADMIN — HEPARIN SODIUM AND DEXTROSE 16 UNITS/KG/HR: 10000; 5 INJECTION INTRAVENOUS at 18:47

## 2019-06-26 RX ADMIN — NOREPINEPHRINE BITARTRATE 10 MCG/MIN: 1 INJECTION INTRAVENOUS at 08:28

## 2019-06-26 RX ADMIN — AMIODARONE HYDROCHLORIDE 1 MG/MIN: 50 INJECTION, SOLUTION INTRAVENOUS at 19:36

## 2019-06-26 RX ADMIN — VASOPRESSIN 0.04 UNITS/MIN: 20 INJECTION INTRAVENOUS at 06:21

## 2019-06-26 RX ADMIN — NOREPINEPHRINE BITARTRATE 4000 MCG: 1 INJECTION INTRAVENOUS at 01:46

## 2019-06-26 RX ADMIN — LEVOTHYROXINE SODIUM 137 MCG: 112 TABLET ORAL at 05:12

## 2019-06-26 RX ADMIN — FUROSEMIDE 40 MG: 10 INJECTION, SOLUTION INTRAMUSCULAR; INTRAVENOUS at 05:13

## 2019-06-26 RX ADMIN — VITAMIN D, TAB 1000IU (100/BT) 1000 UNITS: 25 TAB at 08:36

## 2019-06-26 RX ADMIN — FUROSEMIDE 80 MG: 10 INJECTION, SOLUTION INTRAMUSCULAR; INTRAVENOUS at 11:00

## 2019-06-26 RX ADMIN — NOREPINEPHRINE BITARTRATE 5 MCG/MIN: 1 INJECTION INTRAVENOUS at 01:46

## 2019-06-26 RX ADMIN — NOREPINEPHRINE BITARTRATE 10 MCG/MIN: 1 INJECTION INTRAVENOUS at 02:59

## 2019-06-26 RX ADMIN — VITAM B12 100 MCG: 100 TAB at 08:31

## 2019-06-26 RX ADMIN — AMIODARONE HYDROCHLORIDE 200 MG: 200 TABLET ORAL at 11:41

## 2019-06-26 RX ADMIN — INSULIN LISPRO 2 UNITS: 100 INJECTION, SOLUTION INTRAVENOUS; SUBCUTANEOUS at 11:51

## 2019-06-26 RX ADMIN — GUAIFENESIN 600 MG: 600 TABLET, EXTENDED RELEASE ORAL at 08:31

## 2019-06-26 NOTE — PROCEDURES
Central Line Insertion  Date/Time: 6/26/2019 2:48 AM  Performed by: Cj Shook PA-C  Authorized by: Cj Shook PA-C     Patient location:  Bedside  Consent:     Consent obtained:  Emergent situation    Consent given by:  Patient    Risks discussed:  Arterial puncture, incorrect placement, infection, bleeding and pneumothorax    Alternatives discussed:  No treatment and delayed treatment  Universal protocol:     Procedure explained and questions answered to patient or proxy's satisfaction: yes      Relevant documents present and verified: yes      Test results available and properly labeled: yes      Radiology Images displayed and confirmed  If images not available, report reviewed: yes      Required blood products, implants, devices, and special equipment available: yes      Site/side marked: yes      Immediately prior to procedure, a time out was called: yes      Patient identity confirmed:  Arm band, hospital-assigned identification number and verbally with patient  Pre-procedure details:     Hand hygiene: Hand hygiene performed prior to insertion      Sterile barrier technique: All elements of maximal sterile technique followed      Skin preparation:  ChloraPrep    Skin preparation agent: Skin preparation agent completely dried prior to procedure    Indications:     Central line indications: medications requiring central line and hemodynamic monitoring    Anesthesia (see MAR for exact dosages):      Anesthesia method:  Local infiltration    Local anesthetic:  Lidocaine 1% w/o epi  Procedure details:     Location:  Right internal jugular    Vessel type: vein      Laterality:  Right    Approach: percutaneous endoscopic technique used      Patient position:  Flat    Catheter type:  Triple lumen 16cm    Catheter size:  7 5 Fr    Landmarks identified: yes      Ultrasound guidance: yes      Sterile ultrasound techniques: Sterile gel and sterile probe covers were used      Number of attempts:  1 Successful placement: yes    Post-procedure details:     Post-procedure:  Line sutured and dressing applied    Assessment:  Blood return through all ports, free fluid flow, no pneumothorax on x-ray and placement verified by x-ray    Post-procedure complications: none      Patient tolerance of procedure:   Tolerated well, no immediate complications

## 2019-06-26 NOTE — PROGRESS NOTES
Patient evaluated and c/o dry heaves and not feeling well  HR 70 bpm, unable to obtain BP with cuff  Patient extremities cool and clammy  Placed on peripheral levophed  Plan to place a line and cvl  Reviewing patietn history ablation procedure aborted today due to hypoxia and hypotension with sedation  EBEN 6/21/19 with small pericardial effusion  Plan check bedside US, labs and ekg       Cc time 32min, cc time does not include procedures

## 2019-06-26 NOTE — PROGRESS NOTES
INTERVAL Progress Note - Critical Care    Antwan Stahl 66 y o  female MRN: [id number]  [address] [facility]   Unit/Bed#: [place] 691-91 Encounter: [id number]    Impression:  Principal Problem:    Atrial fibrillation with RVR ([facility] )  Active Problems:    Hyperlipidemia    Hypertension    Hypothyroidism    Type 2 diabetes mellitus with diabetic cataract ([facility] )    Acute CHF (congestive heart failure) ([facility] )    Acute kidney injury ([facility] )    Lactic acidosis    Hyperkalemia    Hyponatremia    Hyperphosphatemia    Metabolic acidosis      Plan:  1  Acute Decompensated Heart Failure: continue milrinone 0 25, uptitrate as necessary   -bedside ECHO w/o pericardial effusion    -Will trend ScVO2, 32%    -Levophed as needed to maintain MAP >65   -Hold AM lasix  2  Lactic Acidosis: trend Q4hrs to resolution, 2/2 above  3  Acute Kidney Injury: 2/2 decompensated heart failure   -Low flow state, continue primacor   -Place sims for accurate I/O  4  Hyperkalemia: no EKG changes   -2g calcium gluconate, 1 amp bicarb   -trend and treat as necessary   5  Atrial Fibrillation with moderate ventricular response: continue amio infusion, heparin drip   -hold PO lopressor   6  Pulmonary insufficiency: requiring 4L by NC  7  Hx HPTN: hold lopressor/losartan  8  Right Pleural Effusion   9  Hypothyroidism: home levothyroxine    ______________________________________________________________________    Recent Events / Nursing Concern: Called to the bedside by nursing as patient feeling dry heaving, feeling unwell, unable to obtain NIBP  Upon examination, patient found to be cool and clammy  Patient noted to be in atrial fibrillation with moderate ventricular response (70-80s had been 110s)  Bedside ECHO significant for diffuse hypokinesis consistent with prior exam, no pericardial effusion, no tamponade physiology  Peripheral norephinephrine started, arterial line placed  Central line placed   Labwork revealing for new acute kidney injury, lactic acidosis, low ScVo2  Placed on milrinone drip for acute decompensated heart failure  No ischemic changes on EKG, troponin negative  Vitals:   Vitals:    19 2300 19 2330 19 0130 19 0222   BP: (!) 89/59 110/70     BP Location:       Pulse: 102 96 76 80   Resp: 17  (!) 26    Temp:       TempSrc:       SpO2: 99%  99%    Weight:       Height:         Arterial Line BP: 76/54  Arterial Line MAP (mmHg): 62 mmHg    Tele Rhythm: Atrial Fibrillation     Temperature: Temp (24hrs), Av 6 °F (36 4 °C), Min:97 °F (36 1 °C), Max:98 3 °F (36 8 °C)  Current: Temperature: 98 3 °F (36 8 °C)    Hemodynamic Monitoring:  N/A       Respiratory:  SpO2: SpO2: 99 %  O2 Flow Rate (L/min): 2 L/min    Physical Exam:  Constitutional: Pale, cool, diaphoretic  HENT: Atraumatic  Eyes: No scleral icterus  Neck: Neck supple  JVD  Cardiovascular: irregularly irregular  no murmur heard  Pulmonary/Chest: Breath sounds clear  Abdominal: Soft  no distension  non-tender  Musculoskeletal: 1+ edema  Neurological: Patient is AAOx4  Skin: Skin is cool, diaphoretic   Allergies: Allergies   Allergen Reactions    Penicillins     Wellbutrin Sr  [Bupropion]      Other reaction(s): Suicidal ideations  Category:  Adverse Reaction;        Medications:   Scheduled Meds:  Current Facility-Administered Medications:  acetaminophen 650 mg Oral Q4H PRN Yeni Spironello V, CRNP    albuterol 2 puff Inhalation Q4H PRN Yeni Spironello V, CRNP    amiodarone 1 mg/min Intravenous Continuous Yeni Spironello V, CRNP Last Rate: 1 mg/min (19 1433)   amiodarone 200 mg Oral TID With Meals Yeni Spironello V, CRNP    ascorbic acid 125 mg Oral Daily Yeni Spironello V, CRNP    benzonatate 200 mg Oral TID PRN Yeni Spironello V, CRNP    calcium gluconate 2 g Intravenous Once Derryl Spore, PA-C    cholecalciferol 1,000 Units Oral Daily Yeni Spironello V, CRNP    cyanocobalamin 100 mcg Oral Daily Yeni Spironello V, CRNP    furosemide 20 mg Intravenous Q12H Bernie Waggoner MD    guaiFENesin 600 mg Oral Q12H [address] Yeni Spironello V, CRNP    heparin (porcine) 3-30 Units/kg/hr (Order-Specific) Intravenous Titrated Yeni Spironello V, CRNP Last Rate: 18 Units/kg/hr (06/26/19 0258)   insulin lispro 1-5 Units Subcutaneous HS Yeni Spironello V, CRNP    insulin lispro 1-6 Units Subcutaneous TID AC Yeni Spironello V, CRNP    levothyroxine 137 mcg Oral Daily Yeni Spironello V, CRNP    lidocaine (PF)        loratadine 10 mg Oral Daily Yeni Spironello V, CRNP    metoprolol 5 mg Intravenous Q6H PRN Yeni Spironello V, CRNP    milrinone (PRIMACOR) infusion 0 25 mcg/kg/min Intravenous Continuous Dong Dyson PA-C Last Rate: 0 25 mcg/kg/min (06/26/19 0258)   multivitamin-minerals 1 tablet Oral Daily Yeni Spironello V, CRNP    norepinephrine 1-30 mcg/min Intravenous Titrated Subha Licea DO Last Rate: 10 mcg/min (06/26/19 0259)   ondansetron 4 mg Intravenous Q6H PRN Yeni Spironello V, CRNP    potassium chloride 40 mEq Oral Daily Yeni Spironello V, CRNP    pravastatin 40 mg Oral Daily With [employer] V, CRNP    pyridoxine 100 mg Oral Daily Yeni Spironello V, CRNP    venlafaxine 75 mg Oral Q12H Yeni Spironello V, CRNP    vitamin E (tocopherol) 1,000 Units Oral Daily Yeni Spironello V, CRNP      Continuous Infusions:  amiodarone 1 mg/min Last Rate: 1 mg/min (06/25/19 1433)   heparin (porcine) 3-30 Units/kg/hr (Order-Specific) Last Rate: 18 Units/kg/hr (06/26/19 0258)   milrinone (PRIMACOR) infusion 0 25 mcg/kg/min Last Rate: 0 25 mcg/kg/min (06/26/19 0258)   norepinephrine 1-30 mcg/min Last Rate: 10 mcg/min (06/26/19 0259)     PRN Meds:    acetaminophen 650 mg Q4H PRN   albuterol 2 puff Q4H PRN   benzonatate 200 mg TID PRN   metoprolol 5 mg Q6H PRN   ondansetron 4 mg Q6H PRN       Labs:   Results from last 7 days   Lab Units 19  0216 19  1606   WBC Thousand/uL 10 40* 11 31*   HEMOGLOBIN g/dL 15 4 14 0   HEMATOCRIT % 47 6* 42 8   PLATELETS Thousands/uL 233 229   NEUTROS PCT % 78*  --    MONOS PCT % 9  --      Results from last 7 days   Lab Units 19  0216 19  0511 19  0851   SODIUM mmol/L 133* 135* 137   POTASSIUM mmol/L 6 1* 3 4* 3 8   CHLORIDE mmol/L 100 100 101   CO2 mmol/L 16* 29 30   BUN mg/dL 24 16 16   CREATININE mg/dL 1 64* 0 69 0 82   CALCIUM mg/dL 8 0* 8 6 8 4     Results from last 7 days   Lab Units 19  0216 19  0442 19  2158   MAGNESIUM mg/dL 2 5 2 1 2 2     Results from last 7 days   Lab Units 19  0216   PHOSPHORUS mg/dL 6 6*      Results from last 7 days   Lab Units 19  0216 19  1900 19  0511  19  1625   INR   --   --   --   --  1 54*   PTT seconds 107* 33 128*   < > 23    < > = values in this interval not displayed  Results from last 7 days   Lab Units 19  0216   LACTIC ACID mmol/L 9 1*     0   Lab Value Date/Time    TROPONINI 0 03 2019 0216    TROPONINI 0 03 2019 1652     Results from last 7 days   Lab Units 19  0216   PH ART  7 422   PCO2 ART mm Hg 22 8*   PO2 ART mm Hg 104 7   HCO3 ART mmol/L 14 5*   BASE EXC ART mmol/L -7 4   ABG SOURCE  Line, Arterial       Imagin19 CXR: RIJ CVC in place, R pleural effuison    I have personally reviewed pertinent reports  and I have personally reviewed pertinent films in PACS    Code Status: Level 1 - Full Code    Counseling / Coordination of Care: Total Critical Care time spent 85 minutes excluding procedures, teaching and family updates  Critical care time provided between 0150 and 0315      SIGNATURE: Kate Jeffrey PA-C  DATE: 2019  TIME: 3:00 AM

## 2019-06-26 NOTE — PROGRESS NOTES
Cardiology Progress Note - Bob Canas 66 y o  female MRN: [id number]    Unit/Bed#: [facility] 513-01 Encounter: [id number]      Assessment:  Principal Problem:    Cardiogenic shock ([place] [address] )  Active Problems:    Hyperlipidemia    Hypertension    Hypothyroidism    Type 2 diabetes mellitus with diabetic cataract ([place] [address] )    Atrial fibrillation with RVR ([place] [address] )    Acute CHF (congestive heart failure) ([facility])    Acute kidney injury ([place] [address] )    Lactic acidosis    Hyperkalemia    Hyponatremia    Hyperphosphatemia    High anion gap metabolic acidosis    Shock liver      Plan:  Events noted  Patient currently in the intensive care unit on intravenous pressor and Milrinone  Continues in atrial fibrillation  In reference to paroxysmal atrial fibrillation when stabilized patient will need a permanent pacemaker and cardioversion in the setting of amiodarone  In reference to cardiomyopathy I have asked the heart failure team to assess and follow the patient  In reference to hypotension will continue present medical regimen and hopefully be able to wean pressors  BMP this morning shows a potassium of 5 7 with a creatinine of 1 67  Would consider nephrology consultation as patient likely has ATN as a consequence of hypotension  Subjective:   Patient seen and examined  No significant events overnight   negative  Objective:     Vitals: Blood pressure (!) 95/46, pulse (!) 108, temperature 98 °F (36 7 °C), temperature source Oral, resp  rate (!) 35, height 5' 1" (1 549 m), weight 79 8 kg (175 lb 14 8 oz), SpO2 99 %  , Body mass index is 33 24 kg/m² ,   Orthostatic Blood Pressures      Most Recent Value   Blood Pressure  (!) 95/46 filed at 06/26/2019 0500   Patient Position - Orthostatic VS  Lying filed at 06/25/2019 1753      ,      Intake/Output Summary (Last 24 hours) at 6/26/2019 0916  Last data filed at 6/26/2019 0600  Gross per 24 hour   Intake 1799 16 ml   Output 400 ml   Net 1399 16 ml       No significant arrhythmias seen on telemetry review  Atrial fibrillation      Physical Exam:    GEN: Juliano Aguilar appears well, alert and oriented x 3, pleasant and cooperative   NECK: supple, no carotid bruits, no JVD or HJR  HEART: normal rate, irregular rhythm, normal S1 and S2, no murmurs, clicks, gallops or rubs   LUNGS: clear to auscultation bilaterally; no wheezes, rales, or rhonchi   ABDOMEN: normal bowel sounds, soft, no tenderness, no distention  EXTREMITIES:  edema  SKIN: warm and well perfused, no suspicious lesions on exposed skin    Labs & Results:    No results displayed because visit has over 200 results  Xr Chest Portable    Result Date: 6/26/2019  Narrative: CHEST INDICATION:   cvl  COMPARISON:  06/25/2019 EXAM PERFORMED/VIEWS:  XR CHEST PORTABLE Images: 2 FINDINGS: Cardiomediastinal silhouette appears enlarged  Pulmonary vessels are normal  A central line enters via the right internal jugular vein  The tip extends to the SVC  Persistent opacity at the right lung base may represent infiltrate and/or pleural fluid  The left lung is clear  No pneumothorax or pleural effusion  Osseous structures appear within normal limits for patient age  Impression: 1  Central line entering via the right internal jugular vein with its tip overlying the SVC  There is no pneumothorax  2   Persistent opacity at the right lung base which may represent a combination of infiltrate and pleural fluid  Workstation performed: [id number]     Xr Chest Portable    Result Date: 6/25/2019  Narrative: CHEST INDICATION:   hypoxic resp failure  COMPARISON:  June 18, 2019 EXAM PERFORMED/VIEWS:  XR CHEST PORTABLE FINDINGS: Mild cardiomegaly, stable  Calcified plaque within the aortic arch  Worsening opacity at the right lung base  Which favors increasing right pleural fluid  Lungs otherwise appear clear  No pneumothorax  Osseous structures appear within normal limits for patient age  Impression: Cardiomegaly   Increasing right basilar opacities suggesting pleural fluid  Workstation performed: [id number]     Xr Chest 1 View Portable    Result Date: 6/19/2019  Narrative: CHEST INDICATION:   AFib w/ rvr  COMPARISON:  None EXAM PERFORMED/VIEWS:  XR CHEST PORTABLE One image FINDINGS: Cardiomediastinal silhouette appears enlarged  Patient is in mild CHF  The lungs are clear  No pneumothorax   The costophrenic angles are blunted which may represent small pleural effusions  Osseous structures appear within normal limits for patient age  Impression: Cardiomegaly and mild CHF  Possible small pleural effusions  Workstation performed: [id number]     Cta Ed Chest Pe Study    Result Date: 6/18/2019  Narrative: CTA - CHEST WITH IV CONTRAST - PULMONARY ANGIOGRAM INDICATION:   Weakness, shortness of breath and leg swelling  COMPARISON: 6/18/2019  TECHNIQUE: CTA examination of the chest was performed using angiographic technique according to a protocol specifically tailored to evaluate for pulmonary embolism  Axial, sagittal, and coronal 2D reformatted images were created from the source data and  submitted for interpretation  In addition, coronal 3D MIP postprocessing was performed on the acquisition scanner  Radiation dose length product (DLP) for this visit:  749 22 mGy-cm   This examination, like all CT scans performed in the [facility], was performed utilizing techniques to minimize radiation dose exposure, including the use of iterative  reconstruction and automated exposure control  IV Contrast:  78 mL of iohexol (OMNIPAQUE)  FINDINGS: PULMONARY ARTERIAL TREE:  No filling defect in the visualized pulmonary arteries to suggest acute embolus  LUNGS:  Pulmonary vascular congestion without interstitial edema  Mild relaxation atelectasis at the lung bases  There is no tracheal or endobronchial lesion  PLEURA:  Small bilateral pleural effusions  HEART/GREAT VESSELS:  Cardiomegaly  MEDIASTINUM AND OSMEL:  Unremarkable   CHEST WALL AND LOWER NECK:   Unremarkable  VISUALIZED STRUCTURES IN THE UPPER ABDOMEN:  Unremarkable  OSSEOUS STRUCTURES:  No acute fracture or destructive osseous lesion  Impression: 1  No evidence of acute pulmonary embolus or thoracic aortic aneurysm  2   Small bilateral pleural effusions and pulmonary vascular congestion without pulmonary interstitial edema, possibly indicating early or mild congestive heart failure  Workstation performed: [id number]       EKG personally reviewed by Sal Kidd MD      Counseling / Coordination of Care  Total floor / unit time spent today 30 minutes  Greater than 50% of total time was spent with the patient and / or family counseling and / or coordination of care

## 2019-06-26 NOTE — CONSULTS
Consultation - Nephrology   Mi Cornell 66 y o  female MRN: [id number]  Unit/Bed#: [facility] 590-60 Encounter: [id number]      ASSESSMENT & PLAN:    60-year-old female with a past medical history hypertension, hypothyroidism hyperlipidemia, presented with new onset atrial fibrillation now with a cardiomyopathy EF of 73-76% complicated now by hypotension and ATN    1  Acute kidney injury likely secondary to hypotensive ischemic ATN  2  Hyperkalemia now improved to 4 3  3  Severe lactic metabolic acidosis  4  Hypotension  5  Cardiomyopathy new onset with EF 25-30%  6  New onset atrial fibrillation  7  Hyponatremia in the setting of hyperglycemia volume overload and SSRI use    -the acute kidney injury worsened overnight as the patient became hypotensive and likely has ATN  -potassium was in the 6 is this is now improved to 4 7  -lactic acid is now 10, bicarb is 17 anion gap is 19 pH is 7 4  -urine output slowly improving with 500 cc total after 80 cc of Lasix giving  -would keep on Lasix 80 mg IV b i d  To t i d   -given new onset of symptoms we did discuss renal replacement therapy, we discussed the potential for needing dialysis given hyperkalemia and acidosis the risks and benefits were explained she currently states she does not want to sign consent for dialysis and would not want these advanced therapies if needed long-term but she would like to think about this  -at this time will continue maximal conservative management  -continue to keep mean arterial pressure greater than 65  -goals of care discussion ongoing  -moderate glycemic control  -hold vitamin D for now, and consider medication reviewed hold all nonessential meds    HISTORY OF PRESENT ILLNESS:  Requesting Physician: Zia Bradshaw MD  Reason for Consult:  Acute kidney injury    Mi Cornell is a 66y o  year old female who was admitted to  [place] after presenting with new onset AFib   A renal consultation is requested today for assistance in the management of acute kidney injury  Patient was in her usual state held was feeling poor and was found to be tachycardic and had new onset atrial fibrillation with a cardiomyopathy and an EF of 30-35%  She was scheduled to have an ICD defibrillator placed by this was not done because patient BKA for her hypotensive overnight she was started on high inotropic support her creatinine did increase to 2 point she currently feels okay denies any chest pain or shortness of Breath fevers or chills no nausea vomiting diarrhea or constipation she had a urine output with Lasix 80 mg IV x1, her lactated increased to 10 her PTH was acceptable she currently has an A-line in her mean arterial pressures have been around 65 she denies any foamy urine or any bloody urine    PAST MEDICAL HISTORY:  Past Medical History:   Diagnosis Date    Eczema     Photosensitivity     abnormal skin sensitivity to sunlight    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       PAST SURGICAL HISTORY:  Past Surgical History:   Procedure Laterality Date    HEMORRHOID SURGERY      OOPHORECTOMY      unilateral    TOTAL ABDOMINAL HYSTERECTOMY         ALLERGIES:  Allergies   Allergen Reactions    Penicillins     Wellbutrin Sr  [Bupropion]      Other reaction(s): Suicidal ideations  Category:  Adverse Reaction;        SOCIAL HISTORY:  Social History     Substance and Sexual Activity   Alcohol Use Never    Frequency: Never     Social History     Substance and Sexual Activity   Drug Use Never     Social History     Tobacco Use   Smoking Status Never Smoker   Smokeless Tobacco Never Used       FAMILY HISTORY:  Family History   Problem Relation Age of Onset    Alzheimer's disease Mother     Coronary artery disease Mother     Dementia Mother     Diabetes Mother         mellitus    Other Father         acute myocardial infarction    Coronary artery disease Sister     Other Maternal Grandfather         laryngeal cancer    Dementia Maternal Aunt     Diabetes Maternal Aunt        MEDICATIONS:    Current Facility-Administered Medications:     acetaminophen (TYLENOL) tablet 650 mg, 650 mg, Oral, Q4H PRN, Yeni Spironello V, CRNP, 650 mg at 19 0731    albuterol (PROVENTIL HFA,VENTOLIN HFA) inhaler 2 puff, 2 puff, Inhalation, Q4H PRN, Yeni Spironello V, CRNP    [COMPLETED] amiodarone 150 mg in dextrose 5 % 100 mL IV bolus, 150 mg, Intravenous, Once, Stopped at 19 1220 **FOLLOWED BY** [] amiodarone (CORDARONE) 900 mg in dextrose 5 % 500 mL infusion, 1 mg/min, Intravenous, Continuous, Stopped at 19 1804 **FOLLOWED BY** amiodarone (CORDARONE) 900 mg in dextrose 5 % 500 mL infusion, 1 mg/min, Intravenous, Continuous, Yeni Spironello V, CRNP, Last Rate: 33 3 mL/hr at 19 0315, 1 mg/min at 19 0315    amiodarone tablet 200 mg, 200 mg, Oral, TID With Meals, Yeni Spironello V, CRNP, 200 mg at 19 1141    ascorbic acid (VITAMIN C) tablet 125 mg, 125 mg, Oral, Daily, Yeni Spironello V, CRNP, 125 mg at 19 7813    benzonatate (TESSALON PERLES) capsule 200 mg, 200 mg, Oral, TID PRN, Yeni Spironello V, CRNP, 200 mg at 19 2327    cholecalciferol (VITAMIN D3) tablet 1,000 Units, 1,000 Units, Oral, Daily, Yeni Spironello V, CRNP, 1,000 Units at 19 0836    cyanocobalamin (VITAMIN B-12) tablet 100 mcg, 100 mcg, Oral, Daily, Yeni Spironello V, CRNP, 100 mcg at 19 0831    digoxin (LANOXIN) tablet 250 mcg, 250 mcg, Oral, Daily, SOTO Junior, 250 mcg at 19 1150    guaiFENesin (MUCINEX) 12 hr tablet 600 mg, 600 mg, Oral, Q12H DAVE, Yeni Spironello V, CRNP, 600 mg at 19 0831    heparin (porcine) 25,000 units in 250 mL infusion (premix), 3-30 Units/kg/hr (Order-Specific), Intravenous, Titrated, Yeni Spironello V, CRNP, Last Rate: 12 8 mL/hr at 19 0300, 16 Units/kg/hr at 19 0300    insulin lispro (HumaLOG) 100 units/mL subcutaneous injection 1-5 Units, 1-5 Units, Subcutaneous, HS, Yeni Spironello V, CRNP, 1 Units at 06/25/19 2116    insulin lispro (HumaLOG) 100 units/mL subcutaneous injection 1-6 Units, 1-6 Units, Subcutaneous, TID AC, 2 Units at 06/26/19 1151 **AND** Fingerstick Glucose (POCT), , , TID AC, Yeni Spironello V, CRNP    levothyroxine tablet 137 mcg, 137 mcg, Oral, Daily, Yeni Spironello V, CRNP, 137 mcg at 06/26/19 0512    loratadine (CLARITIN) tablet 10 mg, 10 mg, Oral, Daily, Yeni Spironello V, CRNP, 10 mg at 06/26/19 0831    Milrinone Lactate in Dextrose (PRIMACOR) 20 MG/100 ML infusion (premix), 0 25 mcg/kg/min, Intravenous, Continuous, Kaleb Collins PA-C, Last Rate: 6 mL/hr at 06/26/19 0258, 0 25 mcg/kg/min at 06/26/19 0258    multivitamin-minerals (CENTRUM) tablet 1 tablet, 1 tablet, Oral, Daily, Yeni Spironello V, CRNP, 1 tablet at 06/26/19 0831    norepinephrine (LEVOPHED) 4 mg (STANDARD CONCENTRATION) IV in sodium chloride 0 9% 250 mL, 1-30 mcg/min, Intravenous, Titrated, Subha Licea DO, Last Rate: 52 5 mL/hr at 06/26/19 1449, 14 mcg/min at 06/26/19 1449    ondansetron (ZOFRAN) injection 4 mg, 4 mg, Intravenous, Q6H PRN, Yeni Spironello V, CRNP, 4 mg at 06/19/19 1053    pravastatin (PRAVACHOL) tablet 40 mg, 40 mg, Oral, Daily With Dinner, Pierce Partida Spironello V, CRNP, 40 mg at 06/24/19 1631    pyridoxine (VITAMIN B6) tablet 100 mg, 100 mg, Oral, Daily, Yeni Spironello V, CRNP, 100 mg at 06/26/19 0834    vasopressin (PITRESSIN) 20 Units in sodium chloride 0 9 % 100 mL infusion, 0 02 Units/min, Intravenous, Continuous, SOTO Degroot, Last Rate: 6 mL/hr at 06/26/19 1411, 0 02 Units/min at 06/26/19 1411    venlafaxine (EFFEXOR) tablet 75 mg, 75 mg, Oral, Q12H, Yeni Carranza V, BAYRONNP, 75 mg at 06/26/19 0905    vitamin E (tocopherol) capsule 1,000 Units, 1,000 Units, Oral, Daily, SOTO Soriano, 1,000 Units at 06/26/19 4006    REVIEW OF SYSTEMS:  All the systems were reviewed and were negative except as documented on the HPI        PHYSICAL EXAM:  Current Weight: Weight - Scale: 79 8 kg (175 lb 14 8 oz)  First Weight: Weight - Scale: 85 5 kg (188 lb 7 9 oz)  Vitals:    06/26/19 1200 06/26/19 1300 06/26/19 1400 06/26/19 1500   BP:       BP Location:       Pulse: (!) 108 (!) 112 (!) 106 (!) 108   Resp: (!) 36 (!) 32 (!) 25 (!) 28   Temp:    98 1 °F (36 7 °C)   TempSrc:    Oral   SpO2: 100% 100% 100% 99%   Weight:       Height:           Intake/Output Summary (Last 24 hours) at 6/26/2019 1553  Last data filed at 6/26/2019 1449  Gross per 24 hour   Intake 2518 25 ml   Output 900 ml   Net 1618 25 ml     General: conscious, cooperative, in not acute distress  Eyes: conjunctivae pink, anicteric sclerae  ENT: lips and mucous membranes moist  Neck: supple, no JVD  Chest:  Decreased breath sounds on the right side  CVS: Tachycardia  Abdomen: soft, non-tender, non-distended, normoactive bowel sounds  Extremities:  Positive lower extremity edema  Skin: no rash  Neuro: awake, alert, oriented       Lab Results:   Results from last 7 days   Lab Units 06/26/19  1300 06/26/19  0416 06/26/19  0415 06/26/19  0216 06/25/19  0511 06/24/19  1606 06/24/19  0851 06/23/19  0442 06/22/19  2158 06/22/19  0944   WBC Thousand/uL  --  12 42*  --  10 40*  --  11 31*  --   --   --   --    HEMOGLOBIN g/dL  --  14 6  --  15 4  --  14 0  --   --   --   --    HEMATOCRIT %  --  43 2  --  47 6*  --  42 8  --   --   --   --    PLATELETS Thousands/uL  --  228  --  233  --  229  --   --   --   --    POTASSIUM mmol/L 4 3  --  5 7* 6 1* 3 4*  --  3 8 4 0 4 0  --    CHLORIDE mmol/L 95*  --  98* 100 100  --  101 105 106  --    CO2 mmol/L 17*  --  20* 16* 29  --  30 35* 29  --    BUN mg/dL 37*  --  25 24 16  --  16 17 18  --    CREATININE mg/dL 2 39*  --  1 67* 1 64* 0 69  --  0 82 0 86 0 89  --    CALCIUM mg/dL 8 0*  --  8 6 8 0* 8 6  --  8 4 8 7 8 5  --    MAGNESIUM mg/dL  --   --  2 3 2 5 --   --   --  2 1 2 2 2 2   PHOSPHORUS mg/dL  --   --  7 0* 6 6*  --   --   --   --   --   --    ALK PHOS U/L  --   --  78  --   --   --   --   --   --   --    ALT U/L  --   --  476*  --   --   --   --   --   --   --    AST U/L  --   --  885*  --   --   --   --   --   --   --        Other Studies:  Chest x-ray shows right basilar opacity suggesting pleural fluid

## 2019-06-26 NOTE — CONSULTS
Advanced Heart Failure/Pulmonary Hypertension Consult Note - Tato Richey 66 y o  female MRN: [id number]    Unit/Bed#: [facility] 581-34 Encounter: [id number]      Assessment:    Principal Problem:    Cardiogenic shock ([facility] )  Active Problems:    Hyperlipidemia    Hypertension    Hypothyroidism    Type 2 diabetes mellitus with diabetic cataract ([facility] )    Atrial fibrillation with RVR ([facility] )    Acute CHF (congestive heart failure) (HCC)    Acute kidney injury ([facility] )    Lactic acidosis    Hyperkalemia    Hyponatremia    Hyperphosphatemia    High anion gap metabolic acidosis    Shock liver      Plan: 1  Acute on chronic systolic CHF, LVEF 35-91%, LVIDd 4 6 cm, NYHA Class III, ACC/AHA Stage C  Etiology: NICM and likely tachy-mediated with new onset AF with RVR of unknown duration, +/- ischemia given patient's risk factors for CAD and strong family history, less likely viral, toxin induced or infiltrative  Thyroid levels may have been a bit erratic in the past  TSH at present >9 but with normal FT4  Currently in low output state 2/2 to arrhythmia  May have been worsened in the setting of attempted cardioversion, use of Cardizem, anesthesia  Mildly volume overloaded on exam with CVP around 10  Would favor IV diuresis, had one time dose of lasix 40mg IV this AM  Continue to wean pressors first as MAPs allow,  followed by Milrinone with close monitoring of SVO2  Gtts :  Vaso- 0 04 u/min  Amio 1 mg/min  Heparin gtt  Norepi 10 mcg/min  Milrinone 0 25 mcg/kg/min    VBG 6/26/19 0400:  SVO2 68 3%  CO/CI 2 76/1 5    2  Atrial Fib with RVR  Currently with rates in the 110-120 range  On Amio gtt  3 HTN  Currently hypotensive on pressor support    4  HLD  On statin therapy  5 Hypothyroidism  History of radioablation in the past now on replacement therapy  TSH 9 with normal free T4  6 Type II DM       7 CORAZON  Noncompliant with CPAP  Will need repeat outpatient sleep study       HPI: 66year old female admitted to the ED on 6/18/19 with new onset Atrial fib with RVR and decompensated congestive heart failure  Has no prior cardiac history and incidentally found to have an irregular, rapid heart rate in her PCPs office  Patient also reporting new onset lower extremity edema, orthopnea, and nonproductive cough x 3 days  Initially was given several boluses of Cardizem and then started on Cardizem gtt but with minimal response  She was seen by the general cardiology team and had  echocardiogram which   demonstrated LVEf of 25-30% with LVIDd of 4 6 cm  Then on 6/22 underwent EBEN with guided cardioversion which was initially successful but then quickly reverted back to a rapid Afib the next morning  Amiodarone was then initiated  Did have junctional rhythm immediate  post conversion and showing signs of tachy oniel syndrome  Given these findings, PPM with possible AICD was suggested with cardioversion thereafter  Yesterday, 6/25 patient was taken to the cath lab for planned dual chamber AICD but apparently became hypotensive and markedly SOB upon induction  She was subsequently transferred to the ICU for further management of her heart failure  She continued to be in AF with RVR upon transfer  Through the night, patient's condition began to worsen and she became cool, clammy with N/V and loss of obtainable BP  Started on pressor support and lined  SVO2 at that point 32% with signs of lactic acidosis, WANG  Then started on Milrinone gtt at 0 25 mcg/kg/min  On my evaluation this morning, patient's condition appears to be improving on inotropic/pressor support  She is warm and perfusing  with MAP of 90  Lactic acid improved, SVO2 up to 68% from 33%  CVP around 10  Mild volume overload  In review of history, patient recalls having SOB with mild exertion up to 3 months ago, which she though seemed unusual  She denies any chest pain, palpitations, dizziness, or syncopal episodes   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  Never used any illicit drugs  Nonsmoker  No recent viral history  Has strong family history of CAD in her mother, father, and sister  Suffered with uterine cancer at age 28 but received no chemotherapy or radiation treatment  Had radioablation of her thyroid years ago and is currently on replacement therapy  Most recent TSH elevated over 9 but FT4 normal        Past Medical History:   Diagnosis Date    Eczema     Photosensitivity     abnormal skin sensitivity to sunlight    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HCC)     staging       12 point ROS negative other than that stated in HPI    Allergies   Allergen Reactions    Penicillins     Wellbutrin Sr  [Bupropion]      Other reaction(s): Suicidal ideations  Category:  Adverse Reaction;          Current Facility-Administered Medications:     acetaminophen (TYLENOL) tablet 650 mg, 650 mg, Oral, Q4H PRN, Yeni Spironello V, CRNP, 650 mg at 19 0731    albuterol (PROVENTIL HFA,VENTOLIN HFA) inhaler 2 puff, 2 puff, Inhalation, Q4H PRN, Yeni Spironello V, CRNP    [COMPLETED] amiodarone 150 mg in dextrose 5 % 100 mL IV bolus, 150 mg, Intravenous, Once, Stopped at 19 1220 **FOLLOWED BY** [] amiodarone (CORDARONE) 900 mg in dextrose 5 % 500 mL infusion, 1 mg/min, Intravenous, Continuous, Stopped at 19 1804 **FOLLOWED BY** amiodarone (CORDARONE) 900 mg in dextrose 5 % 500 mL infusion, 1 mg/min, Intravenous, Continuous, Yeni Spironello V, CRNP, Last Rate: 33 3 mL/hr at 19 0315, 1 mg/min at 19 0315    amiodarone tablet 200 mg, 200 mg, Oral, TID With Meals, Yeni Spironello V, CRNP, 200 mg at 19 0831    ascorbic acid (VITAMIN C) tablet 125 mg, 125 mg, Oral, Daily, Yeni Spironello V, CRNP, 125 mg at 19 6475    benzonatate (TESSALON PERLES) capsule 200 mg, 200 mg, Oral, TID PRN, Yeni Spironello V, CRNP, 200 mg at 19 0947    cholecalciferol (VITAMIN D3) tablet 1,000 Units, 1,000 Units, Oral, Daily, Ayesha Haines Spironello V, CRNP, 1,000 Units at 06/25/19 0854    cyanocobalamin (VITAMIN B-12) tablet 100 mcg, 100 mcg, Oral, Daily, Yeni Spironello V, CRNP, 100 mcg at 06/26/19 0831    guaiFENesin (MUCINEX) 12 hr tablet 600 mg, 600 mg, Oral, Q12H [facility], Yeni Spironello V, CRNP, 600 mg at 06/26/19 0831    heparin (porcine) 25,000 units in 250 mL infusion (premix), 3-30 Units/kg/hr (Order-Specific), Intravenous, Titrated, Yeni Spironello V, CRNP, Last Rate: 12 8 mL/hr at 06/26/19 0300, 16 Units/kg/hr at 06/26/19 0300    insulin lispro (HumaLOG) 100 units/mL subcutaneous injection 1-5 Units, 1-5 Units, Subcutaneous, HS, Yeni Spironello V, CRNP, 1 Units at 06/25/19 2116    insulin lispro (HumaLOG) 100 units/mL subcutaneous injection 1-6 Units, 1-6 Units, Subcutaneous, TID AC, 1 Units at 06/24/19 1202 **AND** Fingerstick Glucose (POCT), , , TID AC, Yeni Spironello V, CRNP    levothyroxine tablet 137 mcg, 137 mcg, Oral, Daily, Yeni Spironello V, CRNP, 137 mcg at 06/26/19 0512    loratadine (CLARITIN) tablet 10 mg, 10 mg, Oral, Daily, Yeni Spironello V, CRNP, 10 mg at 06/25/19 0854    Milrinone Lactate in Dextrose (PRIMACOR) 20 MG/100 ML infusion (premix), 0 25 mcg/kg/min, Intravenous, Continuous, Samia Collins PA-C, Last Rate: 6 mL/hr at 06/26/19 0258, 0 25 mcg/kg/min at 06/26/19 0258    multivitamin-minerals (CENTRUM) tablet 1 tablet, 1 tablet, Oral, Daily, Yeni Spironello V, CRNP, 1 tablet at 06/26/19 0831    norepinephrine (LEVOPHED) 4 mg (STANDARD CONCENTRATION) IV in sodium chloride 0 9% 250 mL, 1-30 mcg/min, Intravenous, Titrated, Subha Licea, DO, Last Rate: 37 5 mL/hr at 06/26/19 0828, 10 mcg/min at 06/26/19 0828    ondansetron (ZOFRAN) injection 4 mg, 4 mg, Intravenous, Q6H PRN, Yeni Spironello V, CRNP, 4 mg at 06/19/19 1053    potassium chloride (K-DUR,KLOR-CON) CR tablet 40 mEq, 40 mEq, Oral, Daily, Yeni Spironello V, CRNP, 40 mEq at 06/25/19 0854   pravastatin (PRAVACHOL) tablet 40 mg, 40 mg, Oral, Daily With Dinner, Ulrich Ulises Spironello V, CRNP, 40 mg at 06/24/19 1631    pyridoxine (VITAMIN B6) tablet 100 mg, 100 mg, Oral, Daily, Yeni Spironello V, CRNP, 100 mg at 06/25/19 0854    vasopressin (PITRESSIN) 20 Units in sodium chloride 0 9 % 100 mL infusion, 0 04 Units/min, Intravenous, Continuous, Destiny Lucas PA-C, Last Rate: 12 mL/hr at 06/26/19 0621, 0 04 Units/min at 06/26/19 0621    venlafaxine (EFFEXOR) tablet 75 mg, 75 mg, Oral, Q12H, Yeni Spironello V, CRNP, 75 mg at 06/25/19 2116    vitamin E (tocopherol) capsule 1,000 Units, 1,000 Units, Oral, Daily, Yeni Spironello V, CRNP, 1,000 Units at 06/25/19 4486    Social History     Socioeconomic History    Marital status: Single     Spouse name: Not on file    Number of children: Not on file    Years of education: Not on file    Highest education level: Not on file   Occupational History    Not on file   Social Needs    Financial resource strain: Not on file    Food insecurity:     Worry: Not on file     Inability: Not on file    Transportation needs:     Medical: Not on file     Non-medical: Not on file   Tobacco Use    Smoking status: Never Smoker    Smokeless tobacco: Never Used   Substance and Sexual Activity    Alcohol use: Never     Frequency: Never    Drug use: Never    Sexual activity: Not on file   Lifestyle    Physical activity:     Days per week: Not on file     Minutes per session: Not on file    Stress: Not on file   Relationships    Social connections:     Talks on phone: Not on file     Gets together: Not on file     Attends Taoist service: Not on file     Active member of club or organization: Not on file     Attends meetings of clubs or organizations: Not on file     Relationship status: Not on file    Intimate partner violence:     Fear of current or ex partner: Not on file     Emotionally abused: Not on file     Physically abused: Not on file Forced sexual activity: Not on file   Other Topics Concern    Not on file   Social History Narrative    Not on file       Family History   Problem Relation Age of Onset    Alzheimer's disease Mother     Coronary artery disease Mother     Dementia Mother     Diabetes Mother         mellitus    Other Father         acute myocardial infarction    Coronary artery disease Sister     Other Maternal Grandfather         laryngeal cancer    Dementia Maternal Aunt     Diabetes Maternal Aunt        Physical Exam:  [employer] (day, reason): Murray catheter (day, reason):    Vitals: Blood pressure (!) 95/46, pulse (!) 108, temperature 98 °F (36 7 °C), temperature source Oral, resp  rate (!) 35, height 5' 1" (1 549 m), weight 79 8 kg (175 lb 14 8 oz), SpO2 99 %  , Body mass index is 33 24 kg/m² , I/O last 3 completed shifts: In: 2454 5 [P O :360; I V :2094 5]  Out: 653 [Urine:653]  No intake/output data recorded  Wt Readings from Last 3 Encounters:   06/25/19 79 8 kg (175 lb 14 8 oz)   06/18/19 81 kg (178 lb 9 6 oz)   03/20/19 80 1 kg (176 lb 9 6 oz)       Intake/Output Summary (Last 24 hours) at 6/26/2019 0831  Last data filed at 6/26/2019 0600  Gross per 24 hour   Intake 1799 16 ml   Output 400 ml   Net 1399 16 ml     I/O last 3 completed shifts:   In: 2454 5 [P O :360; I V :2094 5]  Out: 653 [Urine:653]    12 point ROS negative other than that stated in HPI    Vitals:    06/26/19 0400 06/26/19 0500 06/26/19 0600 06/26/19 0700   BP:  (!) 95/46     BP Location:       Pulse: 80 94 104 (!) 108   Resp: (!) 40 18 (!) 33 (!) 35   Temp: 97 9 °F (36 6 °C)   98 °F (36 7 °C)   TempSrc: Oral   Oral   SpO2: 100% 99% 96% 99%   Weight:       Height:           GEN: Mayra Meadows appears well, alert and oriented x 3, pleasant and cooperative   HEENT: pupils equal, round, and reactive to light; extraocular muscles intact  NECK: supple, no carotid bruits   HEART: regular rhythm, normal S1 and S2, no murmurs, clicks, gallops or rubs, JVP is mildly elevated   LUNGS: coarse to auscultation bilaterally; no wheezes, mild bibasilar rales, or rhonchi   ABDOMEN: normal bowel sounds, soft, no tenderness, no distention  EXTREMITIES: peripheral pulses normal; no clubbing, cyanosis, trace BLLE edema  NEURO: no focal findings   SKIN: normal without suspicious lesions on exposed skin    Labs & Results:    Results from last 7 days   Lab Units 06/26/19  0216   TROPONIN I ng/mL 0 03     Results from last 7 days   Lab Units 06/26/19  0416 06/26/19  0216 06/24/19  1606   WBC Thousand/uL 12 42* 10 40* 11 31*   HEMOGLOBIN g/dL 14 6 15 4 14 0   HEMATOCRIT % 43 2 47 6* 42 8   PLATELETS Thousands/uL 228 233 229         Results from last 7 days   Lab Units 06/26/19  0415 06/26/19  0216 06/25/19  0511   POTASSIUM mmol/L 5 7* 6 1* 3 4*   CHLORIDE mmol/L 98* 100 100   CO2 mmol/L 20* 16* 29   BUN mg/dL 25 24 16   CREATININE mg/dL 1 67* 1 64* 0 69   CALCIUM mg/dL 8 6 8 0* 8 6   ALK PHOS U/L 78  --   --    ALT U/L 476*  --   --    AST U/L 885*  --   --      Results from last 7 days   Lab Units 06/24/19  1625   INR  1 54*       Chest X-Ray is obtained; result - Reviewed  EKG personally reviewed by SOTO Alvarado  Counseling / Coordination of Care  Total floor / unit time spent today 40 minutes  Greater than 50% of total time was spent with the patient and / or family counseling and / or coordination of care  A description of the counseling / coordination of care: 20  Thank you for the opportunity to participate in the care of this patient  Manisha Cutler

## 2019-06-26 NOTE — PROGRESS NOTES
Progress Note - Critical Care   Gurjit Lara 66 y o  female MRN: [id number]  Unit/Bed#: [facility] 531-08 Encounter: [id number]    Attending Physician: Alpa Barroso MD      ______________________________________________________________________  Assessment and Plan:   Principal Problem:    Atrial fibrillation with RVR ([place] [address] )  Active Problems:    Acute CHF (congestive heart failure) ([place] [address] )    Hyperlipidemia    Hypertension    Hypothyroidism    Type 2 diabetes mellitus with diabetic cataract ([place] [address] )    Acute kidney injury ([place] [address] )    Lactic acidosis    Hyperkalemia    Hyponatremia    Hyperphosphatemia    Metabolic acidosis  Resolved Problems:    * No resolved hospital problems  *        Neuro:   · Pain controlled with: PRN Tylenol  · Regulate sleep/wake cycle  · Sleep hygiene  · Delirium precautions  · CAM-ICU daily  · Trend neuro exam  · Continue home Effexor  CV:   · Cardiac infusions: Primacor, 0 25 mcg/kg/min, Levophed, 13 mcg/min, Vasopressin, 0 04 Units/min, Amiodarone 1 0 mg/min  · Wean levo, vaso, as able  · MAP goal > 65  · Holding home Hyzaar 100/12 5 QD  · Rhythm: Atrial Fibrillation  · Follow rhythm on telemetry  · Continue heparin gtt  · Cardiology following  · AICD placement aborted yesterday due to hypotension  Plan per cards to attempt in 2-3 days  · Lasix 40mg X1 overnight  No output at this time  Purewick in place  Will bladder scan   This am  May need additional Lasix  · Daily weight  · Continue home pravachol, PO amiodarone  · Transduce CVP for fluid balance monitoring  · Heart Failure consulted  Lung:   · Wean O2 as able  · SpO2 goal >90%  · Pulmonary toileting with Resp protocol, IS  · Continue PRN tesslon perles, albuterol, home  Claritin  GI:   · Stress ulcer prophylaxis: No prophylaxis needed  · Bowel regimen: PRN  · Zofran PRN for nausea  FEN:   · Fluid/Diuretic plan: had Lasix 40 x 1 overnighht  · Goal 24 hour fluid balance: net negative  · May need higher dose of lasix this am   · Nutrition/diet plan: Cardiac diet  · Replete electrolytes with goals: K >4 0, Mag >2 0, and Phos >3 0  · q6 BMPs  · Elevated Liver test, likely related to hypoperfusion  Will trend, repeat in am    :   · Indwelling Sims present: no   · Pt refused sims, purewick in place  · Bladder scan this am    · Trend UOP and BUN/creat  · Creatinine 1 67 this am  Baseline appears around 0 8  · Consider Nephrology consult given WANG, hyperphos and hyperkalemia  · Strict I and O  · Had Lasix 40mg this am with no response  · May need higher dose vs  Bumex  ID:   · No concerns  · Elevated Lactic likely due to cardiogenic shock/ hypoperfusion  · Continue to trend q4  · Trend temps and WBC count  · Maintain normothermia  Heme:   · Trend hgb and plts  · Transfuse as needed for goal hgb >7 0  · No issues  Endo:   · Glycemic control plan: No issues  · Continue SSI  · Continue home Sythroid  MSK/Skin:  · Mobility goal: Increase mobility to OOB  · PT consult: when medically stable  · OT consult: when medically stable  · Frequent turning and pressure off-loading  · Local wound care as needed  Lines:  · Central venous access: Assessed  Continued for the following reasons Hemodynamic medications  · Arterial line: Assessed  Continued for the following reasons Hemodynamic monitoring on multiple medications  VTE Prophylaxis:  · Pharmacologic Prophylaxis: Heparin Drip  · Mechanical Prophylaxis: sequential compression device    Code Status: Level 1 - Full Code    Counseling / Coordination of Care  Total time spent today 45 minutes  Greater than 50% of total time was spent with the patient and / or family counseling and / or coordination of care  A description of the counseling / coordination of care:    ______________________________________________________________________    Date of admission: 6/18/2019    HPI/24hr events: Aborted AICD placement due to hypotension and hypoxia  Upgraded to ICU   On heparin infusion, continued Afib w controlled rate  Overnight HR down to 70's Pt symptomatic w nausea/dry heaves, not feeling well  BP unobtainable  Central access obtained  Bedside echo showed diffuse hypokineses, no effusion or tamponade  Started on Milrinone gtt  Continued w hypotension, levo and vaso gtts were started  Lactic up to 9 3  WANG w hyperkalemia, treated w calcium, bicarb with improvement on am labs  Review of Systems   Constitutional: Positive for appetite change  Negative for chills and diaphoresis  HENT: Negative for congestion and sore throat  Respiratory: Negative for cough, chest tightness and shortness of breath  SOB considerably improved   Cardiovascular: Negative for chest pain and leg swelling  Swelling of BLE improved from admission   Gastrointestinal: Positive for nausea  Negative for abdominal pain and vomiting  Genitourinary: Negative for difficulty urinating  Neurological: Negative for dizziness, light-headedness, numbness and headaches      ______________________________________________________________________    Physical Exam:     Physical Exam   Constitutional: She is oriented to person, place, and time  She appears well-developed and well-nourished  No distress  HENT:   Head: Normocephalic and atraumatic  Nose: Nose normal    Mouth/Throat: Oropharynx is clear and moist    Eyes: Pupils are equal, round, and reactive to light  Conjunctivae and EOM are normal  Right eye exhibits no discharge  Left eye exhibits no discharge  No scleral icterus  Neck: Normal range of motion  Neck supple  No JVD present  Right CVC in place   Cardiovascular: Normal heart sounds and intact distal pulses  Exam reveals no friction rub  No murmur heard  Feet/hands cool but temp improves going proximally  Irregular rhythm  Rate controlled   Pulmonary/Chest: Effort normal and breath sounds normal  No respiratory distress  She has no wheezes  She has no rales  Abdominal: Soft   Bowel sounds are normal  She exhibits no distension  There is no tenderness  There is no guarding  Musculoskeletal: Normal range of motion  She exhibits no edema  Lymphadenopathy:     She has no cervical adenopathy  Neurological: She is alert and oriented to person, place, and time  No cranial nerve deficit  Skin: Skin is warm and dry  Capillary refill takes less than 2 seconds  She is not diaphoretic  Hands/feet cooler   Psychiatric: She has a normal mood and affect          ______________________________________________________________________  Vitals:    19 0400 19 0500 19 0600 19 0700   BP:  (!) 95/46     BP Location:       Pulse: 80 94 104 (!) 108   Resp: (!) 40 18 (!) 33 (!) 35   Temp: 97 9 °F (36 6 °C)   98 °F (36 7 °C)   TempSrc: Oral   Oral   SpO2: 100% 99% 96% 99%   Weight:       Height:           Temperature:   Temp (24hrs), Av 7 °F (36 5 °C), Min:97 °F (36 1 °C), Max:98 3 °F (36 8 °C)    Current Temperature: 98 °F (36 7 °C)    Weights:   IBW: 47 8 kg    Body mass index is 33 24 kg/m²  Weight (last 2 days)     Date/Time   Weight    19 1753   79 8 (175 93)    19 0605   81 2 (179 01)    19 0600   79 (174 16)    19 0503   79 (174 2)              Hemodynamic Monitoring:  N/A       Non-Invasive/Invasive Ventilation Settings:  Respiratory    Lab Data (Last 4 hours)    None         O2/Vent Data (Last 4 hours)    None              Lab Results   Component Value Date    PHART 7 422 2019    UQF4EHB 22 8 (LL) 2019    PO2ART 104 7 2019    CJP8PRM 14 5 (L) 2019    BEART -7 4 2019    SOURCE Line, Arterial 2019     SpO2: SpO2: 99 %, SpO2 Device: O2 Device: Nasal cannula    Invasive lines and devices:   Invasive Devices     Central Venous Catheter Line            CVC Central Lines 19 Triple 16cm less than 1 day          Peripheral Intravenous Line            Peripheral IV 19 Left Forearm 3 days    Peripheral IV 19 Right Antecubital 1 day    Peripheral IV 06/25/19 Left Antecubital less than 1 day          Arterial Line            Arterial Line 06/26/19 Radial less than 1 day          Drain            External Urinary Catheter 2 days                     Intake and Outputs:    Intake/Output Summary (Last 24 hours) at 6/26/2019 0714  Last data filed at 6/26/2019 0400  Gross per 24 hour   Intake 1679 9 ml   Output 400 ml   Net 1279 9 ml     I/O last 24 hours: In: 1679 9 [P O :360; I V :1319 9]  Out: 400 [Urine:400]    UOP: purewick in place  No void since 0300    Nutrition:        Diet Orders   (From admission, onward)            Start     Ordered    06/25/19 1753  Diet Cardiovascular; Sodium 2 GM; Consistent Carbohydrate Diet Level 2 (5 carb servings/75 grams CHO/meal)  Diet effective midnight     Question Answer Comment   Diet Type Cardiovascular    Cardiac Sodium 2 GM    Other Restriction(s): Consistent Carbohydrate Diet Level 2 (5 carb servings/75 grams CHO/meal)    RD to adjust diet per protocol?  No        06/25/19 1752            Labs:   Results from last 7 days   Lab Units 06/26/19  0416 06/26/19  0216 06/24/19  1606   WBC Thousand/uL 12 42* 10 40* 11 31*   HEMOGLOBIN g/dL 14 6 15 4 14 0   HEMATOCRIT % 43 2 47 6* 42 8   PLATELETS Thousands/uL 228 233 229   NEUTROS PCT %  --  78*  --    MONOS PCT %  --  9  --      Results from last 7 days   Lab Units 06/26/19  0415 06/26/19  0216 06/25/19  0511   SODIUM mmol/L 135* 133* 135*   POTASSIUM mmol/L 5 7* 6 1* 3 4*   CHLORIDE mmol/L 98* 100 100   CO2 mmol/L 20* 16* 29   BUN mg/dL 25 24 16   CREATININE mg/dL 1 67* 1 64* 0 69   CALCIUM mg/dL 8 6 8 0* 8 6   ALK PHOS U/L 78  --   --    ALT U/L 476*  --   --    AST U/L 885*  --   --      Results from last 7 days   Lab Units 06/26/19  0415 06/26/19  0216 06/23/19  0442 06/22/19  2158 06/22/19  0944 06/22/19  0451 06/21/19  1519   MAGNESIUM mg/dL 2 3 2 5 2 1 2 2 2 2 2 3 2 3   PHOSPHORUS mg/dL 7 0* 6 6*  --   --   --   --   --       Results from last 7 days   Lab Units 06/26/19  0216 06/25/19  1900 06/25/19  0511  06/24/19  1625   INR   --   --   --   --  1 54*   PTT seconds 107* 33 128*   < > 23    < > = values in this interval not displayed  Results from last 7 days   Lab Units 06/26/19  0415 06/26/19 0216   LACTIC ACID mmol/L 9 3* 9 1*     Results from last 7 days   Lab Units 06/26/19 0216   TROPONIN I ng/mL 0 03     Results from last 7 days   Lab Units 06/26/19 0216   PH ART  7 422   PCO2 ART mm Hg 22 8*   PO2 ART mm Hg 104 7   HCO3 ART mmol/L 14 5*   BASE EXC ART mmol/L -7 4   ABG SOURCE  Line, Arterial     VBG:  Results from last 7 days   Lab Units 06/26/19 0416  06/26/19 0216   PH JOSE  7 355   < >  --    PCO2 JOSE mm Hg 37 2*   < >  --    PO2 JOSE mm Hg 41 9   < >  --    HCO3 JOSE mmol/L 20 3*   < >  --    BASE EXC JOSE mmol/L -4 6   < >  --    ABG SOURCE   --   --  Line, Arterial    < > = values in this interval not displayed  No results found for: [facility]             Imaging: CXR Right pleural effusion, no PTX  I have personally reviewed pertinent reports  and I have personally reviewed pertinent films in PACS    EKG: Afib, rate more controlled     Micro: Allergies: Allergies   Allergen Reactions    Penicillins     Wellbutrin Sr  [Bupropion]      Other reaction(s): Suicidal ideations  Category:  Adverse Reaction;        Medications:   Scheduled Meds:  Current Facility-Administered Medications:  acetaminophen 650 mg Oral Q4H PRN Yeni Spironello V, CRNP    albuterol 2 puff Inhalation Q4H PRN Yeni Spironello V, CRNP    amiodarone 1 mg/min Intravenous Continuous Yeni Spironello V, CRNP Last Rate: 1 mg/min (06/26/19 0315)   amiodarone 200 mg Oral TID With Meals Yeni Spironello V, CRNP    ascorbic acid 125 mg Oral Daily Yeni Spironello V, CRNP    benzonatate 200 mg Oral TID PRN Ferol Pollen V, CRNP    cholecalciferol 1,000 Units Oral Daily Yeni Spironello V, CRNP    cyanocobalamin 100 mcg Oral Daily Yeni Spironello V, CRNP    guaiFENesin 600 mg Oral Q12H [address] Yeni Spironello V, CRNP    heparin (porcine) 3-30 Units/kg/hr (Order-Specific) Intravenous Titrated Yeni Spironello V, CRNP Last Rate: 16 Units/kg/hr (06/26/19 0300)   insulin lispro 1-5 Units Subcutaneous HS Yeni Spironello V, CRNP    insulin lispro 1-6 Units Subcutaneous TID AC Yeni Spironello V, CRNP    levothyroxine 137 mcg Oral Daily Yeni Spironello V, CRNP    loratadine 10 mg Oral Daily Yeni Spironello V, CRNP    milrinone (PRIMACOR) infusion 0 25 mcg/kg/min Intravenous Continuous Kathy Matt PA-C Last Rate: 0 25 mcg/kg/min (06/26/19 0258)   multivitamin-minerals 1 tablet Oral Daily Yeni Spironello V, CRNP    norepinephrine 1-30 mcg/min Intravenous Titrated Subha Licea DO Last Rate: 11 mcg/min (06/26/19 0711)   ondansetron 4 mg Intravenous Q6H PRN Yeni Spironello V, CRNP    potassium chloride 40 mEq Oral Daily Yeni Spironello V, CRNP    pravastatin 40 mg Oral Daily With [employer] V, CRNP    pyridoxine 100 mg Oral Daily Yeni Spironello V, CRNP    vasopressin (PITRESSIN) in 0 9 % sodium chloride 100 mL 0 04 Units/min Intravenous Continuous Kathy Matt PA-C Last Rate: 0 04 Units/min (06/26/19 4897)   venlafaxine 75 mg Oral Q12H Yeni Spironello V, CRNP    vitamin E (tocopherol) 1,000 Units Oral Daily Yeni Spironello V, CRNP      Continuous Infusions:  amiodarone 1 mg/min Last Rate: 1 mg/min (06/26/19 0315)   heparin (porcine) 3-30 Units/kg/hr (Order-Specific) Last Rate: 16 Units/kg/hr (06/26/19 0300)   milrinone (PRIMACOR) infusion 0 25 mcg/kg/min Last Rate: 0 25 mcg/kg/min (06/26/19 0258)   norepinephrine 1-30 mcg/min Last Rate: 11 mcg/min (06/26/19 0711)   vasopressin (PITRESSIN) in 0 9 % sodium chloride 100 mL 0 04 Units/min Last Rate: 0 04 Units/min (06/26/19 0621)     PRN Meds:    acetaminophen 650 mg Q4H PRN   albuterol 2 puff Q4H PRN   benzonatate 200 mg TID PRN   ondansetron 4 mg Q6H PRN             Portions of the record may have been created with voice recognition software  Occasional wrong word or "sound a like" substitutions may have occurred due to the inherent limitations of voice recognition software  Read the chart carefully and recognize, using context, where substitutions have occurred      [id number] [facility] Roberto Trevino

## 2019-06-26 NOTE — PROCEDURES
Arterial Line Insertion  Date/Time: 6/26/2019 5:20 AM  Performed by: Julissa Mccoy MD  Authorized by: Julissa Mccoy MD     Patient location:  Bedside  Consent:     Consent obtained:  Verbal    Consent given by:  Patient  Universal protocol:     Patient identity confirmed:  Verbally with patient and arm band  Indications:     Indications: hemodynamic monitoring    Pre-procedure details:     Skin preparation:  Chlorhexidine  Anesthesia (see MAR for exact dosages): Anesthesia method:  Local infiltration    Local anesthetic:  Lidocaine 1% w/o epi  Procedure details:     Location / Tip of Catheter:  Radial    Laterality:  Right    Needle gauge:  18 G    Placement technique:  Seldinger and ultrasound guided    Number of attempts:  2    Successful placement: yes      Transducer: waveform confirmed and dampened amplitude    Post-procedure details:     Post-procedure:  Sterile dressing applied, sutured and secured with tape    CMS:  Unchanged    Patient tolerance of procedure:   Tolerated well, no immediate complications

## 2019-06-26 NOTE — ACP (ADVANCE CARE PLANNING)
At bedside to discuss care, plan and code status with patient  She has declined sims cath and after discussion with Nephrology, was not willing to consent for CRRT at this time  She has verbalized w nursing staff that she would "not want to be a burden" and long term dialysis, ventilator would not be something she would want  I discussed at length her current status, including current infusions and their need, how the function of the heart, hypoperfusion is affecting kidneys and liver  She continues to decline the sims despite numerous discussion today about closer monitoring of her urine output in the setting of ATN/WANG and use of diuretics  Her urine output has not improved despite increasing doses of Lasix  I discussed that there is concern for worsening pulmonary status in relation to the worsening kidney function and persistent cardiac shock requiring multiple medications  She asked many questions regarding her current medication regimen (specifically infusions) and when these could be taken off  I discussed the medications and the reasons for use for each  She stated multiple times that she has heard many stories of people who started dialysis, ventilators and these interventions were long term  She would not want these interventions long term  She specifically stated that she would want to have CPR, including medications and defibrillation/ cardioversion if needed  However, she would not want intubation for any reason  She would agree to other supportive methods for her resp status, including Bipap/ high flow  I discussed that if she were in a resuscitation situation, intubation would be necessary to adequately resuscitate her  She did state that she has support system in place and her sister and niece would be decision makers in the event that she could not make her own decisions   I reiterated that intubation could be temporary while being stabilized but could also be removed if the decision was made to stop that treatment  She wants to consider all of her options as far as her care, but was adamant she wants everything except intubation, although she is aware that this would be challenging during a resuscitation scenario and make overall efforts suboptimal      She will consider all of her options  Her sister is planning to visit this evening and she will update her on her current state of health   have answered all of her questions and will have [facility] overnight follow up with her this evening  I will make her DNR #3 with her current wishes       Myah Greenwood

## 2019-06-26 NOTE — NURSING NOTE
Patient expressing that she probably would not want a pacemaker or any HD if "it comes to that"  Soren Forte notified

## 2019-06-27 PROBLEM — E87.5 HYPERKALEMIA: Status: RESOLVED | Noted: 2019-06-26 | Resolved: 2019-06-27

## 2019-06-27 PROBLEM — E87.2 LACTIC ACIDOSIS: Status: RESOLVED | Noted: 2019-06-26 | Resolved: 2019-06-27

## 2019-06-27 PROBLEM — E87.20 LACTIC ACIDOSIS: Status: RESOLVED | Noted: 2019-06-26 | Resolved: 2019-06-27

## 2019-06-27 LAB
ALBUMIN SERPL BCP-MCNC: 2.9 G/DL (ref 3.5–5)
ALP SERPL-CCNC: 93 U/L (ref 46–116)
ALT SERPL W P-5'-P-CCNC: 6164 U/L (ref 12–78)
AMMONIA PLAS-SCNC: 75 UMOL/L (ref 11–35)
ANION GAP SERPL CALCULATED.3IONS-SCNC: 10 MMOL/L (ref 4–13)
ANION GAP SERPL CALCULATED.3IONS-SCNC: 11 MMOL/L (ref 4–13)
ANION GAP SERPL CALCULATED.3IONS-SCNC: 11 MMOL/L (ref 4–13)
ANION GAP SERPL CALCULATED.3IONS-SCNC: 12 MMOL/L (ref 4–13)
APTT PPP: 84 SECONDS (ref 23–37)
APTT PPP: 92 SECONDS (ref 23–37)
APTT PPP: 98 SECONDS (ref 23–37)
ARTERIAL PATENCY WRIST A: NO
AST SERPL W P-5'-P-CCNC: ABNORMAL U/L (ref 5–45)
BASE EX.OXY STD BLDV CALC-SCNC: 77.1 % (ref 60–80)
BASE EXCESS BLDV CALC-SCNC: 0.5 MMOL/L
BILIRUB SERPL-MCNC: 1.72 MG/DL (ref 0.2–1)
BODY TEMPERATURE: 97.7 DEGREES FEHRENHEIT
BUN SERPL-MCNC: 45 MG/DL (ref 5–25)
BUN SERPL-MCNC: 53 MG/DL (ref 5–25)
BUN SERPL-MCNC: 57 MG/DL (ref 5–25)
BUN SERPL-MCNC: 61 MG/DL (ref 5–25)
CALCIUM SERPL-MCNC: 7.3 MG/DL (ref 8.3–10.1)
CALCIUM SERPL-MCNC: 7.4 MG/DL (ref 8.3–10.1)
CALCIUM SERPL-MCNC: 7.8 MG/DL (ref 8.3–10.1)
CALCIUM SERPL-MCNC: 7.8 MG/DL (ref 8.3–10.1)
CHLORIDE SERPL-SCNC: 91 MMOL/L (ref 100–108)
CHLORIDE SERPL-SCNC: 91 MMOL/L (ref 100–108)
CHLORIDE SERPL-SCNC: 93 MMOL/L (ref 100–108)
CHLORIDE SERPL-SCNC: 94 MMOL/L (ref 100–108)
CO2 SERPL-SCNC: 25 MMOL/L (ref 21–32)
CO2 SERPL-SCNC: 26 MMOL/L (ref 21–32)
CO2 SERPL-SCNC: 27 MMOL/L (ref 21–32)
CO2 SERPL-SCNC: 27 MMOL/L (ref 21–32)
CREAT SERPL-MCNC: 2.61 MG/DL (ref 0.6–1.3)
CREAT SERPL-MCNC: 2.83 MG/DL (ref 0.6–1.3)
CREAT SERPL-MCNC: 3.23 MG/DL (ref 0.6–1.3)
CREAT SERPL-MCNC: 3.48 MG/DL (ref 0.6–1.3)
ERYTHROCYTE [DISTWIDTH] IN BLOOD BY AUTOMATED COUNT: 12 % (ref 11.6–15.1)
GFR SERPL CREATININE-BSD FRML MDRD: 12 ML/MIN/1.73SQ M
GFR SERPL CREATININE-BSD FRML MDRD: 13 ML/MIN/1.73SQ M
GFR SERPL CREATININE-BSD FRML MDRD: 15 ML/MIN/1.73SQ M
GFR SERPL CREATININE-BSD FRML MDRD: 17 ML/MIN/1.73SQ M
GLUCOSE SERPL-MCNC: 114 MG/DL (ref 65–140)
GLUCOSE SERPL-MCNC: 119 MG/DL (ref 65–140)
GLUCOSE SERPL-MCNC: 120 MG/DL (ref 65–140)
GLUCOSE SERPL-MCNC: 120 MG/DL (ref 65–140)
GLUCOSE SERPL-MCNC: 129 MG/DL (ref 65–140)
GLUCOSE SERPL-MCNC: 131 MG/DL (ref 65–140)
GLUCOSE SERPL-MCNC: 138 MG/DL (ref 65–140)
GLUCOSE SERPL-MCNC: 145 MG/DL (ref 65–140)
GLUCOSE SERPL-MCNC: 149 MG/DL (ref 65–140)
GLUCOSE SERPL-MCNC: 150 MG/DL (ref 65–140)
GLUCOSE SERPL-MCNC: 153 MG/DL (ref 65–140)
GLUCOSE SERPL-MCNC: 156 MG/DL (ref 65–140)
GLUCOSE SERPL-MCNC: 163 MG/DL (ref 65–140)
GLUCOSE SERPL-MCNC: 168 MG/DL (ref 65–140)
GLUCOSE SERPL-MCNC: 178 MG/DL (ref 65–140)
HCO3 BLDV-SCNC: 25.6 MMOL/L (ref 24–30)
HCT VFR BLD AUTO: 32.6 % (ref 34.8–46.1)
HGB BLD-MCNC: 11.2 G/DL (ref 11.5–15.4)
LACTATE SERPL-SCNC: 2 MMOL/L (ref 0.5–2)
LACTATE SERPL-SCNC: 3.4 MMOL/L (ref 0.5–2)
MAGNESIUM SERPL-MCNC: 2 MG/DL (ref 1.6–2.6)
MCH RBC QN AUTO: 32.4 PG (ref 26.8–34.3)
MCHC RBC AUTO-ENTMCNC: 34.4 G/DL (ref 31.4–37.4)
MCV RBC AUTO: 94 FL (ref 82–98)
NASAL CANNULA: 4
O2 CT BLDV-SCNC: 13.1 ML/DL
PCO2 BLD: 42.3 MM HG (ref 42–50)
PCO2 BLDV: 43.2 MM HG (ref 42–50)
PH BLD: 7.4 [PH] (ref 7.3–7.4)
PH BLDV: 7.39 [PH] (ref 7.3–7.4)
PLATELET # BLD AUTO: 159 THOUSANDS/UL (ref 149–390)
PMV BLD AUTO: 11.4 FL (ref 8.9–12.7)
PO2 BLDV: 47 MM HG (ref 35–45)
PO2 VENOUS TEMP CORRECTED: 45.4 MM HG (ref 35–45)
POTASSIUM SERPL-SCNC: 3.8 MMOL/L (ref 3.5–5.3)
POTASSIUM SERPL-SCNC: 4.1 MMOL/L (ref 3.5–5.3)
POTASSIUM SERPL-SCNC: 4.2 MMOL/L (ref 3.5–5.3)
POTASSIUM SERPL-SCNC: 4.2 MMOL/L (ref 3.5–5.3)
PROT SERPL-MCNC: 5.8 G/DL (ref 6.4–8.2)
RBC # BLD AUTO: 3.46 MILLION/UL (ref 3.81–5.12)
SODIUM SERPL-SCNC: 128 MMOL/L (ref 136–145)
SODIUM SERPL-SCNC: 129 MMOL/L (ref 136–145)
SODIUM SERPL-SCNC: 129 MMOL/L (ref 136–145)
SODIUM SERPL-SCNC: 132 MMOL/L (ref 136–145)
WBC # BLD AUTO: 12.19 THOUSAND/UL (ref 4.31–10.16)

## 2019-06-27 PROCEDURE — 93306 TTE W/DOPPLER COMPLETE: CPT | Performed by: INTERNAL MEDICINE

## 2019-06-27 PROCEDURE — 82140 ASSAY OF AMMONIA: CPT | Performed by: NURSE PRACTITIONER

## 2019-06-27 PROCEDURE — 82948 REAGENT STRIP/BLOOD GLUCOSE: CPT

## 2019-06-27 PROCEDURE — NC001 PR NO CHARGE: Performed by: INTERNAL MEDICINE

## 2019-06-27 PROCEDURE — 85730 THROMBOPLASTIN TIME PARTIAL: CPT | Performed by: INTERNAL MEDICINE

## 2019-06-27 PROCEDURE — 83605 ASSAY OF LACTIC ACID: CPT | Performed by: PHYSICIAN ASSISTANT

## 2019-06-27 PROCEDURE — 80053 COMPREHEN METABOLIC PANEL: CPT | Performed by: PHYSICIAN ASSISTANT

## 2019-06-27 PROCEDURE — 99233 SBSQ HOSP IP/OBS HIGH 50: CPT | Performed by: INTERNAL MEDICINE

## 2019-06-27 PROCEDURE — 85027 COMPLETE CBC AUTOMATED: CPT | Performed by: PHYSICIAN ASSISTANT

## 2019-06-27 PROCEDURE — 85730 THROMBOPLASTIN TIME PARTIAL: CPT | Performed by: NURSE PRACTITIONER

## 2019-06-27 PROCEDURE — 80048 BASIC METABOLIC PNL TOTAL CA: CPT | Performed by: PHYSICIAN ASSISTANT

## 2019-06-27 PROCEDURE — 99231 SBSQ HOSP IP/OBS SF/LOW 25: CPT | Performed by: INTERNAL MEDICINE

## 2019-06-27 PROCEDURE — 80048 BASIC METABOLIC PNL TOTAL CA: CPT | Performed by: NURSE PRACTITIONER

## 2019-06-27 PROCEDURE — 83735 ASSAY OF MAGNESIUM: CPT | Performed by: PHYSICIAN ASSISTANT

## 2019-06-27 PROCEDURE — 82805 BLOOD GASES W/O2 SATURATION: CPT | Performed by: PHYSICIAN ASSISTANT

## 2019-06-27 RX ORDER — METOPROLOL SUCCINATE 25 MG/1
12.5 TABLET, EXTENDED RELEASE ORAL 2 TIMES DAILY
Status: DISCONTINUED | OUTPATIENT
Start: 2019-06-27 | End: 2019-06-27

## 2019-06-27 RX ORDER — FUROSEMIDE 10 MG/ML
INJECTION INTRAMUSCULAR; INTRAVENOUS
Status: COMPLETED
Start: 2019-06-27 | End: 2019-06-27

## 2019-06-27 RX ORDER — LANOLIN ALCOHOL/MO/W.PET/CERES
6 CREAM (GRAM) TOPICAL
Status: DISCONTINUED | OUTPATIENT
Start: 2019-06-27 | End: 2019-07-11 | Stop reason: HOSPADM

## 2019-06-27 RX ORDER — METOLAZONE 5 MG/1
10 TABLET ORAL ONCE
Status: COMPLETED | OUTPATIENT
Start: 2019-06-27 | End: 2019-06-27

## 2019-06-27 RX ORDER — FUROSEMIDE 10 MG/ML
10 SYRINGE (ML) INJECTION CONTINUOUS
Status: DISCONTINUED | OUTPATIENT
Start: 2019-06-27 | End: 2019-07-01

## 2019-06-27 RX ORDER — FUROSEMIDE 10 MG/ML
80 INJECTION INTRAMUSCULAR; INTRAVENOUS ONCE
Status: COMPLETED | OUTPATIENT
Start: 2019-06-27 | End: 2019-06-27

## 2019-06-27 RX ORDER — SENNOSIDES 8.6 MG
1 TABLET ORAL
Status: DISCONTINUED | OUTPATIENT
Start: 2019-06-27 | End: 2019-07-11 | Stop reason: HOSPADM

## 2019-06-27 RX ORDER — METOLAZONE 5 MG/1
10 TABLET ORAL DAILY
Status: DISCONTINUED | OUTPATIENT
Start: 2019-06-27 | End: 2019-06-27

## 2019-06-27 RX ADMIN — BENZONATATE 200 MG: 100 CAPSULE ORAL at 14:33

## 2019-06-27 RX ADMIN — PRAVASTATIN SODIUM 40 MG: 40 TABLET ORAL at 16:03

## 2019-06-27 RX ADMIN — SENNOSIDES 8.6 MG: 8.6 TABLET, FILM COATED ORAL at 21:25

## 2019-06-27 RX ADMIN — ONDANSETRON 4 MG: 2 INJECTION INTRAMUSCULAR; INTRAVENOUS at 21:24

## 2019-06-27 RX ADMIN — FUROSEMIDE 80 MG: 10 INJECTION INTRAMUSCULAR; INTRAVENOUS at 00:57

## 2019-06-27 RX ADMIN — ACETAMINOPHEN 650 MG: 325 TABLET ORAL at 21:24

## 2019-06-27 RX ADMIN — LORATADINE 10 MG: 10 TABLET ORAL at 08:32

## 2019-06-27 RX ADMIN — Medication 40 MG/HR: at 19:04

## 2019-06-27 RX ADMIN — SODIUM CHLORIDE 0.5 UNITS/HR: 9 INJECTION, SOLUTION INTRAVENOUS at 23:00

## 2019-06-27 RX ADMIN — METOLAZONE 10 MG: 5 TABLET ORAL at 04:48

## 2019-06-27 RX ADMIN — Medication 40 MG/HR: at 07:43

## 2019-06-27 RX ADMIN — AMIODARONE HYDROCHLORIDE 200 MG: 200 TABLET ORAL at 08:32

## 2019-06-27 RX ADMIN — Medication 1 SPRAY: at 00:28

## 2019-06-27 RX ADMIN — Medication 1 SPRAY: at 21:24

## 2019-06-27 RX ADMIN — LEVOTHYROXINE SODIUM 137 MCG: 112 TABLET ORAL at 05:50

## 2019-06-27 RX ADMIN — AMIODARONE HYDROCHLORIDE 200 MG: 200 TABLET ORAL at 16:03

## 2019-06-27 RX ADMIN — FUROSEMIDE 80 MG: 10 INJECTION, SOLUTION INTRAMUSCULAR; INTRAVENOUS at 00:57

## 2019-06-27 RX ADMIN — BENZONATATE 200 MG: 100 CAPSULE ORAL at 21:24

## 2019-06-27 RX ADMIN — GUAIFENESIN 600 MG: 600 TABLET, EXTENDED RELEASE ORAL at 08:32

## 2019-06-27 RX ADMIN — MELATONIN 6 MG: at 21:24

## 2019-06-27 RX ADMIN — METOPROLOL TARTRATE 12.5 MG: 25 TABLET ORAL at 09:31

## 2019-06-27 RX ADMIN — AMIODARONE HYDROCHLORIDE 200 MG: 200 TABLET ORAL at 11:35

## 2019-06-27 RX ADMIN — VENLAFAXINE 75 MG: 37.5 TABLET ORAL at 09:35

## 2019-06-27 RX ADMIN — MILRINONE LACTATE IN DEXTROSE 0.12 MCG/KG/MIN: 200 INJECTION, SOLUTION INTRAVENOUS at 23:08

## 2019-06-27 RX ADMIN — HEPARIN SODIUM AND DEXTROSE 10 UNITS/KG/HR: 10000; 5 INJECTION INTRAVENOUS at 18:38

## 2019-06-27 RX ADMIN — AMIODARONE HYDROCHLORIDE 1 MG/MIN: 50 INJECTION, SOLUTION INTRAVENOUS at 11:36

## 2019-06-27 RX ADMIN — VENLAFAXINE 75 MG: 37.5 TABLET ORAL at 21:23

## 2019-06-27 RX ADMIN — Medication 40 MG/HR: at 01:10

## 2019-06-27 RX ADMIN — GUAIFENESIN 600 MG: 600 TABLET, EXTENDED RELEASE ORAL at 21:25

## 2019-06-27 NOTE — QUICK NOTE
Called niece, Ryan Lea, to give update on patient  Aware of current infusions, reviewed current labs and plan for overnight/ next morning  Answered all of her questions       [address]

## 2019-06-27 NOTE — PROGRESS NOTES
Progress note- Nephrology   Lacy Hogan 66 y o  female MRN: [id number]  Unit/Bed#: [facility] 644-30 Encounter: [id number]      ASSESSMENT & PLAN:    72-year-old female with a past medical history hypertension, hypothyroidism hyperlipidemia, presented with new onset atrial fibrillation now with a cardiomyopathy EF of 04-18% complicated now by hypotension and ATN    1  Acute kidney injury likely secondary to hypotensive ischemic ATN  -electrolytes are stable  -lactate improved  -creatinine increasing-now on a furosemide drip with improved urine output  -obtained consent for renal replacement therapy if needed  -updated saravanan garcia on the phone  -continue furosemide drip as ordered, given a dose of metolazone  -net negative fluid balance  -I inotropic support    2  Hyperkalemia now improved to 4 3  -potassium now remained stable continue to monitor with urine output    3  Severe lactic metabolic acidosis  -lactate was up to 10 likely related to hypotension and now down to 2  -blood pressure acceptable    4  Hypotension  -currently on norepinephrine, milrinone  -trying to down titrate norepinephrine    5  Cardiomyopathy new onset with EF 25-30%  -Heart failure team monitoring  -on inotropes  -diurese with net negative fluid balance    6  New onset atrial fibrillation  -for pacemaker and ICD once hemodynamically stable    7   Hyponatremia in the setting of hyperglycemia volume overload and SSRI use  -slightly improved continue diuresis    Discussed with ICU team, updated saravanan Garcia on the phone      SUBJECTIVE:    Patient was seen today complains of increased fatigue unable to sleep while in the ICU urine a increased with Lasix drip currently denies any chest pain or shortness of breath    MEDICATIONS:    Current Facility-Administered Medications:     acetaminophen (TYLENOL) tablet 650 mg, 650 mg, Oral, Q4H PRN, Yeni Carranza V, BAYRONNP, 650 mg at 06/19/19 0731    albuterol (PROVENTIL HFA,VENTOLIN HFA) inhaler 2 puff, 2 puff, Inhalation, Q4H PRN, Yeni Spironello V, CRNP    [COMPLETED] amiodarone 150 mg in dextrose 5 % 100 mL IV bolus, 150 mg, Intravenous, Once, Stopped at 19 1220 **FOLLOWED BY** [] amiodarone (CORDARONE) 900 mg in dextrose 5 % 500 mL infusion, 1 mg/min, Intravenous, Continuous, Stopped at 19 1804 **FOLLOWED BY** amiodarone (CORDARONE) 900 mg in dextrose 5 % 500 mL infusion, 1 mg/min, Intravenous, Continuous, Yeni Spironello V, CRNP, Last Rate: 33 3 mL/hr at 19 1936, 1 mg/min at 19 1936    amiodarone tablet 200 mg, 200 mg, Oral, TID With Meals, Yeni Spironello V, CRNP, 200 mg at 19 4058    benzonatate (TESSALON PERLES) capsule 200 mg, 200 mg, Oral, TID PRN, Yeni Spironello V, CRNP, 200 mg at 19 2327    furosemide (LASIX) 500 mg infusion 50 mL, 40 mg/hr, Intravenous, Continuous, Samia Collins PA-C, Last Rate: 4 mL/hr at 19 0743, 40 mg/hr at 19 0743    guaiFENesin (MUCINEX) 12 hr tablet 600 mg, 600 mg, Oral, Q12H [facility], Yeni Elliotto V, CRNP, 600 mg at 19 0832    heparin (porcine) 25,000 units in 250 mL infusion (premix), 3-30 Units/kg/hr (Order-Specific), Intravenous, Titrated, Yeni Carranza V CRNP, Last Rate: 9 6 mL/hr at 19 0358, 12 Units/kg/hr at 19 0358    insulin regular (HumuLIN R,NovoLIN R) 1 Units/mL in sodium chloride 0 9 % 100 mL infusion, 0 3-21 Units/hr, Intravenous, Titrated, SOTO Degroot, Last Rate: 1 mL/hr at 19 0801, 1 Units/hr at 19 0801    levothyroxine tablet 137 mcg, 137 mcg, Oral, Daily, Yeni Spironello V, CRNP, 137 mcg at 19 0550    loratadine (CLARITIN) tablet 10 mg, 10 mg, Oral, Daily, Yeni Spironello V, CRNP, 10 mg at 19 3483    metoprolol tartrate (LOPRESSOR) partial tablet 12 5 mg, 12 5 mg, Oral, Q8H, Manisha Hdz, CRNP, 12 5 mg at 19 0931    Milrinone Lactate in Dextrose (PRIMACOR) 20 MG/100 ML infusion (premix), 0 125 mcg/kg/min, Intravenous, Continuous, SOTO Degroot, Last Rate: 3 mL/hr at 06/26/19 1717, 0 125 mcg/kg/min at 06/26/19 1717    ondansetron (ZOFRAN) injection 4 mg, 4 mg, Intravenous, Q6H PRN, Yeni Spironello V, CRNP, 4 mg at 06/19/19 1053    phenol (CHLORASEPTIC) 1 4 % mucosal liquid 1 spray, 1 spray, Mouth/Throat, Q2H PRN, Atul Collins PA-C, 1 spray at 06/27/19 0028    pravastatin (PRAVACHOL) tablet 40 mg, 40 mg, Oral, Daily With Dinner, Rinku Sheets Spironello V, CRNP, 40 mg at 06/26/19 1603    senna (SENOKOT) tablet 8 6 mg, 1 tablet, Oral, HS, Prisca Nicholas CRNP    venlafaxine [facility]) tablet 75 mg, 75 mg, Oral, Q12H, Yeni Spironello V, CRNP, 75 mg at 06/27/19 0935    REVIEW OF SYSTEMS:  All the systems were reviewed and were negative except as documented on the HPI        PHYSICAL EXAM:  Current Weight: Weight - Scale: 79 8 kg (175 lb 14 8 oz)  First Weight: Weight - Scale: 85 5 kg (188 lb 7 9 oz)  Vitals:    06/27/19 0800 06/27/19 0900 06/27/19 0931 06/27/19 1000   BP:   120/51    BP Location:       Pulse: (!) 116 (!) 116 (!) 114 (!) 114   Resp: 22 (!) 33  (!) 24   Temp: 97 9 °F (36 6 °C) 97 9 °F (36 6 °C)  97 9 °F (36 6 °C)   TempSrc:       SpO2: 100% 100%  100%   Weight:       Height:           Intake/Output Summary (Last 24 hours) at 6/27/2019 1016  Last data filed at 6/27/2019 1000  Gross per 24 hour   Intake 2820 3 ml   Output 1170 ml   Net 1650 3 ml     General:  Critically ill  Eyes:  Pallor  ENT:  Oral mucosa dry  Neck: supple, no JVD  Chest:  Decreased breath sounds  CVS: distinct S1 & S2, normal rate, regular rhythm  Abdomen: soft, non-tender, non-distended, normoactive bowel sounds  Extremities:  Positive edema  Skin: no rash  Neuro: awake, alert, oriented      Lab Results:   Results from last 7 days   Lab Units 06/27/19  0440 06/26/19  2332 06/26/19  1722 06/26/19  1300 06/26/19  0416 06/26/19  0415 06/26/19  0216  06/24/19  1606  06/23/19  0442 06/22/19  2158   WBC Thousand/uL 12 19*  --   --   --  12 42*  --  10 40*  --  11 31*  --   --   --    HEMOGLOBIN g/dL 11 2*  --   --   --  14 6  --  15 4  --  14 0  --   --   --    HEMATOCRIT % 32 6*  --   --   --  43 2  --  47 6*  --  42 8  --   --   --    PLATELETS Thousands/uL 159  --   --   --  228  --  233  --  229  --   --   --    POTASSIUM mmol/L 4 1 3 8 4 1 4 3  --  5 7* 6 1*   < >  --    < > 4 0 4 0   CHLORIDE mmol/L 93* 94* 94* 95*  --  98* 100   < >  --    < > 105 106   CO2 mmol/L 27 25 21 17*  --  20* 16*   < >  --    < > 35* 29   BUN mg/dL 53* 45* 39* 37*  --  25 24   < >  --    < > 17 18   CREATININE mg/dL 2 83* 2 61* 2 49* 2 39*  --  1 67* 1 64*   < >  --    < > 0 86 0 89   CALCIUM mg/dL 7 8* 7 8* 7 9* 8 0*  --  8 6 8 0*   < >  --    < > 8 7 8 5   MAGNESIUM mg/dL 2 0  --   --   --   --  2 3 2 5  --   --   --  2 1 2 2   PHOSPHORUS mg/dL  --   --   --   --   --  7 0* 6 6*  --   --   --   --   --    ALK PHOS U/L 93  --   --   --   --  78  --   --   --   --   --   --    ALT U/L 6,164*  --   --   --   --  476*  --   --   --   --   --   --    AST U/L 15,182*  --   --   --   --  885*  --   --   --   --   --   --     < > = values in this interval not displayed         Other Studies:  Chest x-ray shows right basilar opacity suggesting pleural fluid

## 2019-06-27 NOTE — PROGRESS NOTES
Progress Note - Critical Care   Verenice Grossman 66 y o  female MRN: [id number]  Unit/Bed#: [facility] 609-54 Encounter: [id number]    Attending Physician: Erin Hess MD      ______________________________________________________________________  Assessment and Plan:   Principal Problem:    Cardiogenic shock [facility])  Active Problems:    Acute CHF (congestive heart failure) ([facility] 75 )    Atrial fibrillation with RVR (HCC)    High anion gap metabolic acidosis    Acute kidney injury ([place] [address] )    Lactic acidosis    Hyperkalemia    Shock liver    Hyponatremia    Hyperphosphatemia    Type 2 diabetes mellitus with diabetic cataract ([place] [address] )    Hypothyroidism    Hyperlipidemia    Hypertension  Resolved Problems:    * No resolved hospital problems  *        Neuro:   · Pain controlled with: PRN Tylenol  · Regulate sleep/wake cycle  ? Sleep hygiene  ? Will start Melatonin 6mg @ hs tonight  · Delirium precautions  ? CAM-ICU daily  · Trend neuro exam  · Continue home Effexor  CV:   · Cardiac infusions: Primacor, 0 25 mcg/kg/min, Amiodarone 1 0 mg/min  ? Off levo and vaso  ? MAP goal > 65  ? Holding home Hyzaar 100/12 5 QD  · Rhythm: Atrial Fibrillation  ? Follow rhythm on telemetry  ?  Started on Metoprolol per Heart failure  · Continue heparin gtt  · Cardiology  And Heart failure team following, appreciate input  · Continue lasix infusion  · Daily weight  · Continue home pravachol, PO amiodarone  Lung:   · Wean O2 as able  · SpO2 goal >90%  · Pulmonary toileting with Resp protocol, IS  · Continue PRN tesslon perles, albuterol, home  Claritin, Chlorospetic spray  GI:   · Stress ulcer prophylaxis: No prophylaxis needed  · Bowel regimen: PRN  · Zofran PRN for nausea  · Nausea/ dry heaves improved  FEN:   · Fluid/Diuretic plan: Continue lasix infusion  · Nutrition/diet plan: Cardiac diabetic diet  · Replete electrolytes with goals: K >4 0, Mag >2 0, and Phos >3 0  · q6 BMPs, Potassium improved  · Elevated Liver test, likely related to hypoperfusion  Continue to trend; repeat in am    :   · Indwelling Sims present: yes  Continue for close monitoring  ? Urine output increased since starting lasix and zaroxolyn 10mg x1  · Trend UOP and BUN/creat  ? Creatinine continues to be elevated  ? Nephrology consulted  · Strict I and O  · Daily weight  ID:   · No concerns  ? Lactic cleared  Will stop trending  · Trend temps and WBC count  · Maintain normothermia  Heme:   · Trend hgb and plts  ? Transfuse as needed for goal hgb >7 0  · Continue heparin infusion  · Check coags in am due to shock liver  Endo:   · Glycemic control plan: hyperglycemic yesterday  · Continue insulin infusion  · Continue home Sythroid  MSK/Skin:  · Mobility goal: Increase mobility to OOB  ? PT consult: will consult  ? OT consult: will consult  · Frequent turning and pressure off-loading  · Local wound care as needed  Lines:  · Central venous access: Assessed  Continued for the following reasons Hemodynamic medications  · Arterial line: Assessed  Continued for the following reasons Hemodynamic monitoring on multiple medications  VTE Prophylaxis:  · Pharmacologic Prophylaxis: Heparin Drip  · Mechanical Prophylaxis: sequential compression device     Code Status: Level 1 - Full Code    Counseling / Coordination of Care  Total time spent today 46 minutes  Greater than 50% of total time was spent with the patient and / or family counseling and / or coordination of care  A description of the counseling / coordination of care:    ______________________________________________________________________    Date of admission: 6/18/2019      HPI/24hr events: Able to be weaned off vaso and levo  Improved SVO2 and Milrinone was decreased  After discussion, was agreeable to sims  Lactic cleared  Urine output improved after receiving lasix gtt and additional dose of zaroxolyn  Review of Systems   Constitutional: Positive for fatigue          Stated did not sleep well overnight and is tired this am   Eyes: Positive for discharge  Eye discharge per baseline  Pt states occurs daily   Respiratory: Positive for cough  Negative for chest tightness and shortness of breath  Dry cough, c/o sore throat and hoarse voice this am     Cardiovascular: Negative for chest pain and palpitations  Gastrointestinal: Negative for abdominal pain, nausea and vomiting  Neurological: Negative for dizziness, light-headedness, numbness and headaches      ______________________________________________________________________    Physical Exam   Constitutional: She is oriented to person, place, and time  She appears well-nourished  No distress  HENT:   Head: Normocephalic and atraumatic  Mouth/Throat: Oropharynx is clear and moist  No oropharyngeal exudate  Eyes: Pupils are equal, round, and reactive to light  EOM are normal  Right eye exhibits discharge  Left eye exhibits discharge  No scleral icterus  Thick yellow discharge from both eyes  Pt states this occurs on a daily basis for her  Does not use drop or warm compresses  Neck: Normal range of motion  Neck supple  No tracheal deviation present  Cardiovascular: Normal heart sounds and intact distal pulses  Exam reveals no friction rub  No murmur heard  Afib, rate controlled 's   Pulmonary/Chest: Effort normal and breath sounds normal  No stridor  No respiratory distress  She has no wheezes  She has no rales  Diminished in bases but clear  Voice more hoarse sounding today   Abdominal: Soft  Bowel sounds are normal  She exhibits no distension  There is no tenderness  Genitourinary:   Genitourinary Comments: Terri, ana maria clear yellow   Musculoskeletal:   No pedal edema  Mild BUE edema  Moves all extremities with ease  No isolated weakness to any extremity   Lymphadenopathy:     She has no cervical adenopathy  Neurological: She is alert and oriented to person, place, and time     PERRL  Speech clear and coherent, but hoarse voice  Face symmetric, tongue midline  SPENCE, age appropriate strength   Skin: Skin is warm and dry  Capillary refill takes less than 2 seconds  She is not diaphoretic  Hands and feet with increased warmth compared to yesterday's exam   Psychiatric: She has a normal mood and affect            ______________________________________________________________________  Vitals:    19 0400 19 0435 19 0500 19 0600   BP:       BP Location:       Pulse: (!) 110 (!) 108 (!) 112 (!) 108   Resp: 22  22 (!) 24   Temp: 97 5 °F (36 4 °C)  97 9 °F (36 6 °C) 97 9 °F (36 6 °C)   TempSrc:       SpO2: 100%  100% 100%   Weight:       Height:           Temperature:   Temp (24hrs), Av 9 °F (36 6 °C), Min:97 5 °F (36 4 °C), Max:98 5 °F (36 9 °C)    Current Temperature: 97 9 °F (36 6 °C)    Weights:   IBW: 47 8 kg    Body mass index is 33 24 kg/m²  Weight (last 2 days)     Date/Time   Weight    19 1753   79 8 (175 93)    19 0605   81 2 (179 01)              Hemodynamic Monitoring:  CVP: CVP (mean): 8 mmHg       Non-Invasive/Invasive Ventilation Settings:  Respiratory    Lab Data (Last 4 hours)    None         O2/Vent Data (Last 4 hours)    None              No results found for: PHART, UNJ4XMS, PO2ART, GEO4JEU, Y3UFAOPC, BEART, SOURCE  SpO2: SpO2: 99 %, SpO2 Activity: SpO2 Activity: At Rest, SpO2 Device: O2 Device: Nasal cannula    Invasive lines and devices:   Invasive Devices     Central Venous Catheter Line            CVC Central Lines 19 Triple 16cm 1 day          Peripheral Intravenous Line            Peripheral IV 19 Left Forearm 4 days    Peripheral IV 19 Right Antecubital 2 days    Peripheral IV 19 Left Antecubital 1 day          Arterial Line            Arterial Line 19 Radial 1 day          Drain            Urethral Catheter Temperature probe 16 Fr  less than 1 day                     Intake and Outputs:    Intake/Output Summary (Last 24 hours) at 2019 6165  Last data filed at 6/27/2019 0400  Gross per 24 hour   Intake 2473 23 ml   Output 860 ml   Net 1613 23 ml     I/O last 24 hours: In: 3872 4 [P O :600; I V :3272 4]  Out: 1260 [Urine:1260]    UOP: 75-95/ 2hour   BM: none over last 24 hours    Nutrition:        Diet Orders   (From admission, onward)            Start     Ordered    06/25/19 1753  Diet Cardiovascular; Sodium 2 GM; Consistent Carbohydrate Diet Level 2 (5 carb servings/75 grams CHO/meal)  Diet effective midnight     Question Answer Comment   Diet Type Cardiovascular    Cardiac Sodium 2 GM    Other Restriction(s): Consistent Carbohydrate Diet Level 2 (5 carb servings/75 grams CHO/meal)    RD to adjust diet per protocol? No        06/25/19 1752            Labs:   Results from last 7 days   Lab Units 06/27/19  0440 06/26/19  0416 06/26/19  0216   WBC Thousand/uL 12 19* 12 42* 10 40*   HEMOGLOBIN g/dL 11 2* 14 6 15 4   HEMATOCRIT % 32 6* 43 2 47 6*   PLATELETS Thousands/uL 159 228 233   NEUTROS PCT %  --   --  78*   MONOS PCT %  --   --  9     Results from last 7 days   Lab Units 06/27/19  0440 06/26/19  2332 06/26/19  1722  06/26/19  0415   SODIUM mmol/L 132* 129* 130*   < > 135*   POTASSIUM mmol/L 4 1 3 8 4 1   < > 5 7*   CHLORIDE mmol/L 93* 94* 94*   < > 98*   CO2 mmol/L 27 25 21   < > 20*   BUN mg/dL 53* 45* 39*   < > 25   CREATININE mg/dL 2 83* 2 61* 2 49*   < > 1 67*   CALCIUM mg/dL 7 8* 7 8* 7 9*   < > 8 6   ALK PHOS U/L 93  --   --   --  78   ALT U/L 6,164*  --   --   --  476*   AST U/L 15,182*  --   --   --  885*    < > = values in this interval not displayed       Results from last 7 days   Lab Units 06/27/19  0440 06/26/19  0415 06/26/19  0216 06/23/19  0442 06/22/19  2158 06/22/19  0944 06/22/19  0451   MAGNESIUM mg/dL 2 0 2 3 2 5 2 1 2 2 2 2 2 3   PHOSPHORUS mg/dL  --  7 0* 6 6*  --   --   --   --       Results from last 7 days   Lab Units 06/27/19  0347 06/26/19  1722 06/26/19  0823  06/24/19  1625   INR   --   --   --   --  1 54*   PTT seconds 98* 93* 77*   < > 23    < > = values in this interval not displayed  Results from last 7 days   Lab Units 06/27/19  0439 06/26/19  2332 06/26/19  2132 06/26/19  1720 06/26/19  1300   LACTIC ACID mmol/L 2 0 3 4* 3 7* 7 8* 10 6*     Results from last 7 days   Lab Units 06/26/19  0216   TROPONIN I ng/mL 0 03     Results from last 7 days   Lab Units 06/26/19  0216   PH ART  7 422   PCO2 ART mm Hg 22 8*   PO2 ART mm Hg 104 7   HCO3 ART mmol/L 14 5*   BASE EXC ART mmol/L -7 4   ABG SOURCE  Line, Arterial     VBG:  Results from last 7 days   Lab Units 06/27/19  0632  06/26/19  0216   PH JOSE  7 391   < >  --    PCO2 JOSE mm Hg 43 2   < >  --    PO2 JOSE mm Hg 47 0*   < >  --    HCO3 JOSE mmol/L 25 6   < >  --    BASE EXC JOSE mmol/L 0 5   < >  --    ABG SOURCE   --   --  Line, Arterial    < > = values in this interval not displayed  No results found for: [facility]             Imaging: No new imaging today    EKG: Afib, rate 's on tele    Micro: Allergies: Allergies   Allergen Reactions    Penicillins     Wellbutrin Sr  [Bupropion]      Other reaction(s): Suicidal ideations  Category:  Adverse Reaction;        Medications:   Scheduled Meds:  Current Facility-Administered Medications:  acetaminophen 650 mg Oral Q4H PRN Yeni Spironello V, CRNP    albuterol 2 puff Inhalation Q4H PRN Yeni Spironello V, CRNP    amiodarone 1 mg/min Intravenous Continuous Yeni Spironello V, CRNP Last Rate: 1 mg/min (06/26/19 1936)   amiodarone 200 mg Oral TID With Meals Yeni Spironello V, CRNP    benzonatate 200 mg Oral TID PRN Yeni Spironello V, CRNP    digoxin 250 mcg Oral Daily SOTO Junior    furosemide 40 mg/hr Intravenous Continuous [place] Saleem, PA-C Last Rate: 40 mg/hr (06/27/19 0110)   guaiFENesin 600 mg Oral Q12H [facility] SOTO Soriano    heparin (porcine) 3-30 Units/kg/hr (Order-Specific) Intravenous Titrated SOTO Soriano Last Rate: 12 Units/kg/hr (06/27/19 0358)   insulin regular (HumuLIN R,NovoLIN R) infusion 0 3-21 Units/hr Intravenous Titrated Vesta Challenger, CRNP Last Rate: 2 Units/hr (06/26/19 2331)   levothyroxine 137 mcg Oral Daily Yeni Spironello V, CRNP    loratadine 10 mg Oral Daily Yeni Spironello V, CRNP    milrinone (PRIMACOR) infusion 0 125 mcg/kg/min Intravenous Continuous Vesta Challenger, CRNP Last Rate: 0 125 mcg/kg/min (06/26/19 1717)   norepinephrine 1-30 mcg/min Intravenous Titrated Subha R Axhenrryband, DO Last Rate: Stopped (06/27/19 0435)   ondansetron 4 mg Intravenous Q6H PRN Yeni Spironello V, CRNP    phenol 1 spray Mouth/Throat Q2H PRN Robert Parrish PA-C    pravastatin 40 mg Oral Daily With localbacon V, CRNP    vasopressin (PITRESSIN) in 0 9 % sodium chloride 100 mL 0 02 Units/min Intravenous Continuous Vesta Challenger, CRNP Last Rate: Stopped (06/27/19 0550)   venlafaxine 75 mg Oral Q12H Yeni Spironello V, CRNP      Continuous Infusions:  amiodarone 1 mg/min Last Rate: 1 mg/min (06/26/19 1936)   furosemide 40 mg/hr Last Rate: 40 mg/hr (06/27/19 0110)   heparin (porcine) 3-30 Units/kg/hr (Order-Specific) Last Rate: 12 Units/kg/hr (06/27/19 0358)   insulin regular (HumuLIN R,NovoLIN R) infusion 0 3-21 Units/hr Last Rate: 2 Units/hr (06/26/19 2331)   milrinone (PRIMACOR) infusion 0 125 mcg/kg/min Last Rate: 0 125 mcg/kg/min (06/26/19 1717)   norepinephrine 1-30 mcg/min Last Rate: Stopped (06/27/19 0435)   vasopressin (PITRESSIN) in 0 9 % sodium chloride 100 mL 0 02 Units/min Last Rate: Stopped (06/27/19 0550)     PRN Meds:    acetaminophen 650 mg Q4H PRN   albuterol 2 puff Q4H PRN   benzonatate 200 mg TID PRN   ondansetron 4 mg Q6H PRN   phenol 1 spray Q2H PRN             Portions of the record may have been created with voice recognition software  Occasional wrong word or "sound a like" substitutions may have occurred due to the inherent limitations of voice recognition software  Read the chart carefully and recognize, using context, where substitutions have occurred      [address] [facility] Shabana Saleem

## 2019-06-27 NOTE — PROGRESS NOTES
Heart Failure Service Progress Note - George Stephenson 66 y o  female MRN: [id number]    Unit/Bed#: [facility] 513-01 Encounter: [id number]      Assessment:    Principal Problem:    Cardiogenic shock ([facility] 75 )  Active Problems:    Hyperlipidemia    Hypertension    Hypothyroidism    Type 2 diabetes mellitus with diabetic cataract ([facility] 75 )    Atrial fibrillation with RVR ([facility] 75 )    Acute CHF (congestive heart failure) (HCC)    Acute kidney injury ([facility] 75 )    Lactic acidosis    Hyperkalemia    Hyponatremia    Hyperphosphatemia    High anion gap metabolic acidosis    Shock liver      Subjective:   Patient seen and examined  No significant events overnight  Had rather involved discussion regarding treatment goals with critical care team yesterday and after much discussion has decided to remain a full code  Wishes to pursue all needed interventions necessary to achieve meaningful recovery  Hemodynamics significantly improved overnight  No longer requiring pressor support  Milrinone dose reduced around 1700 yesterday  with stable SVO2 this AM  Still net +2L fluid balance but urine output appears to be improving somewhat, though her creatinine is rising  CVPs running mid teens  Objective: Intake/ Output:3070/935/+2L  Weight: 175 lbs  Tele: Atrial fib with RVR  1 Acute on chronic systolic CHF, LVEF 48-32%, LVIDd 4 6 cm, NYHA Class III, ACC/AHA Stage C  Etiology: NICM and likely tachy-mediated with new onset AF with RVR of unknown duration, +/- ischemia given patient's risk factors for CAD and strong family history, less likely viral, toxin induced or infiltrative  Thyroid levels may have been a bit erratic in the past  TSH at present >9 but with normal FT4  Currently in low output state 2/2 to arrhythmia  May have been worsened in the setting of attempted cardioversion, use of Cardizem, anesthesia  Mildly volume overloaded on exam with CVP around mid teens  Continue IV diuresis for a goal CVP of 8-10  Lynita Ped MAPS stable off pressors  Continue to wean Milrinone as able  with close monitoring of SVO2       Gtts :  Vaso- off  Amio 1 mg/min  Heparin gtt  Norepi off  Milrinone 0 13 mcg/kg/min     VBG 6/27/19 at 0630:  SVO2 77 1%  CO/CI 5 6/3    VBG 6/26/19 0400:  SVO2 68 3%  CO/CI 2 76/1 5      Neurohormonal Blockade:  --Beta-Blocker: metoprolol tartrate 12 5 mg Q8 hours  --ACEi, ARB or ARNi: Likely start iso/hydral if MAPS remain stable  (or SVR reduction)  --Aldosterone Receptor Blocker:  --Diuretic: Lasix 80 mg IV with Metolazone 10 mg PO    Sudden Cardiac Death Risk Reduction:  --ICD: EF 35%   Interrogation:     Electrical Resynchronization:  --narrow QRS  Interrogation:     Advanced Therapies (If appropriate): --Inotrope: Milrinone 0 13 mcg/kg/min  --LVAD/Transplant Candidacy: 2  Atrial Fib with RVR  Currently with rates in the 110-120 range  On Amio gtt  Stopping dig  Starting low dose BB       3  HTN  Off pressor support  Starting low dose BB     4  HLD  On statin therapy      5  Hypothyroidism  History of radioablation in the past now on replacement therapy  TSH 9 with normal free T4      6  Type II DM        7  CORAZON  Noncompliant with CPAP  Will need repeat outpatient sleep study  8  WANG  Likely 2/2 to ATN from hypotension  2 8 today up from 2 6  Dig stopped  Appreciate nephro recs  9 Transaminitis  Values c/w shock liver  AST/ALT 15K/6K up from 885 and 476  [facility] (day, reason): Murray catheter (day, reason):    Vitals: Blood pressure 120/72, pulse (!) 116, temperature 97 9 °F (36 6 °C), resp  rate 22, height 5' 1" (1 549 m), weight 79 8 kg (175 lb 14 8 oz), SpO2 100 %  , Body mass index is 33 24 kg/m² , I/O last 3 completed shifts:   In: 4469 4 [P O :1080; I V :3389 4]  Out: 1335 [XOZKK:4166]  I/O this shift:  In: 103 7 [I V :103 7]  Out: 135 [Urine:135]  Wt Readings from Last 3 Encounters:   06/25/19 79 8 kg (175 lb 14 8 oz)   06/18/19 81 kg (178 lb 9 6 oz)   03/20/19 80 1 kg (176 lb 9 6 oz)       Intake/Output Summary (Last 24 hours) at 2019 0839  Last data filed at 2019 0800  Gross per 24 hour   Intake 2718 5 ml   Output 1070 ml   Net 1648 5 ml     I/O last 3 completed shifts:   In: 4469 4 [P O :1080; I V :3389 4]  Out: 1335 [Urine:1335]    Afib with RVR       Physical Exam:  Vitals:    19 0500 19 0600 19 0700 19 0800   BP:       BP Location:       Pulse: (!) 112 (!) 108 (!) 110 (!) 116   Resp: 22 (!) 24 (!) 35 22   Temp: 97 9 °F (36 6 °C) 97 9 °F (36 6 °C) 97 9 °F (36 6 °C) 97 9 °F (36 6 °C)   TempSrc:       SpO2: 100% 100% 100% 100%   Weight:       Height:           GEN: Mayra Meadows appears well, alert and oriented x 3, pleasant and cooperative   HEENT: pupils equal, round, and reactive to light; extraocular muscles intact  NECK: supple, no carotid bruits   HEART: regular rhythm, normal S1 and S2, no murmurs, clicks, gallops or rubs, JVP is    LUNGS: clear to auscultation bilaterally; no wheezes, rales, or rhonchi   ABDOMEN: normal bowel sounds, soft, no tenderness, no distention  EXTREMITIES: peripheral pulses normal; no clubbing, cyanosis, or edema  NEURO: no focal findings   SKIN: normal without suspicious lesions on exposed skin      Current Facility-Administered Medications:     acetaminophen (TYLENOL) tablet 650 mg, 650 mg, Oral, Q4H PRN, Yeni Spironello V, CRNP, 650 mg at 19 0731    albuterol (PROVENTIL HFA,VENTOLIN HFA) inhaler 2 puff, 2 puff, Inhalation, Q4H PRN, Yeni Spironello V, CRNP    [COMPLETED] amiodarone 150 mg in dextrose 5 % 100 mL IV bolus, 150 mg, Intravenous, Once, Stopped at 19 1220 **FOLLOWED BY** [] amiodarone (CORDARONE) 900 mg in dextrose 5 % 500 mL infusion, 1 mg/min, Intravenous, Continuous, Stopped at 19 180 **FOLLOWED BY** amiodarone (CORDARONE) 900 mg in dextrose 5 % 500 mL infusion, 1 mg/min, Intravenous, Continuous, SOTO Soriano, Last Rate: 33 3 mL/hr at 19 1936, 1 mg/min at 19 1936    amiodarone tablet 200 mg, 200 mg, Oral, TID With Meals, Yeni Spironello V, CRNP, 200 mg at 06/27/19 4422    benzonatate (TESSALON PERLES) capsule 200 mg, 200 mg, Oral, TID PRN, Yeni Spironello V, CRNP, 200 mg at 06/25/19 2327    digoxin (LANOXIN) tablet 250 mcg, 250 mcg, Oral, Daily, BAYRON JuniorNP, 250 mcg at 06/26/19 1150    furosemide (LASIX) 500 mg infusion 50 mL, 40 mg/hr, Intravenous, Continuous, Samia PRANAV Collins PA-C, Last Rate: 4 mL/hr at 06/27/19 0743, 40 mg/hr at 06/27/19 0743    guaiFENesin (MUCINEX) 12 hr tablet 600 mg, 600 mg, Oral, Q12H [address], Yeni Spironello V, CRNP, 600 mg at 06/27/19 0832    heparin (porcine) 25,000 units in 250 mL infusion (premix), 3-30 Units/kg/hr (Order-Specific), Intravenous, Titrated, Yeni Spironello V, CRNP, Last Rate: 9 6 mL/hr at 06/27/19 0358, 12 Units/kg/hr at 06/27/19 0358    insulin regular (HumuLIN R,NovoLIN R) 1 Units/mL in sodium chloride 0 9 % 100 mL infusion, 0 3-21 Units/hr, Intravenous, Titrated, SOTO Degroot, Last Rate: 1 mL/hr at 06/27/19 0801, 1 Units/hr at 06/27/19 0801    levothyroxine tablet 137 mcg, 137 mcg, Oral, Daily, Yeni Spironello V, CRNP, 137 mcg at 06/27/19 0550    loratadine (CLARITIN) tablet 10 mg, 10 mg, Oral, Daily, Yeni Spironello V, CRNP, 10 mg at 06/27/19 1629    Milrinone Lactate in Dextrose (PRIMACOR) 20 MG/100 ML infusion (premix), 0 125 mcg/kg/min, Intravenous, Continuous, SOTO Degroot, Last Rate: 3 mL/hr at 06/26/19 1717, 0 125 mcg/kg/min at 06/26/19 1717    ondansetron (ZOFRAN) injection 4 mg, 4 mg, Intravenous, Q6H PRN, Yeni Spironello V, CRNP, 4 mg at 06/19/19 1053    phenol (CHLORASEPTIC) 1 4 % mucosal liquid 1 spray, 1 spray, Mouth/Throat, Q2H PRN, Ryann Collins PA-C, 1 spray at 06/27/19 0028    pravastatin (PRAVACHOL) tablet 40 mg, 40 mg, Oral, Daily With Dinner, Yeni Spironello V, CRNP, 40 mg at 06/26/19 1603    venlafaxine (EFFEXOR) tablet 75 mg, 75 mg, Oral, Q12H, Yeni Figueroanello V, CRNP, 75 mg at 06/26/19 2126      Labs & Results:    Results from last 7 days   Lab Units 06/26/19  0216   TROPONIN I ng/mL 0 03     Results from last 7 days   Lab Units 06/27/19  0440 06/26/19  0416 06/26/19  0216   WBC Thousand/uL 12 19* 12 42* 10 40*   HEMOGLOBIN g/dL 11 2* 14 6 15 4   HEMATOCRIT % 32 6* 43 2 47 6*   PLATELETS Thousands/uL 159 228 233         Results from last 7 days   Lab Units 06/27/19  0440 06/26/19  2332 06/26/19  1722  06/26/19  0415   POTASSIUM mmol/L 4 1 3 8 4 1   < > 5 7*   CHLORIDE mmol/L 93* 94* 94*   < > 98*   CO2 mmol/L 27 25 21   < > 20*   BUN mg/dL 53* 45* 39*   < > 25   CREATININE mg/dL 2 83* 2 61* 2 49*   < > 1 67*   CALCIUM mg/dL 7 8* 7 8* 7 9*   < > 8 6   ALK PHOS U/L 93  --   --   --  78   ALT U/L 6,164*  --   --   --  476*   AST U/L 15,182*  --   --   --  885*    < > = values in this interval not displayed  Results from last 7 days   Lab Units 06/24/19  1625   INR  1 54*       Chest X-Ray is obtained; result - reviewed  Counseling / Coordination of Care  Total floor / unit time spent today 20 minutes  Greater than 50% of total time was spent with the patient and / or family counseling and / or coordination of care  A description of the counseling / coordination of care: 20  Manisha Bermudez    Thank you for the opportunity to participate in the care of this patient  Lidia ALDRIDGE    Director Heart Failure/ Medical Director [address] [facility]

## 2019-06-27 NOTE — ACP (ADVANCE CARE PLANNING)
Family Meeting/Goals of Care  Met with patient and family support system including sister and nieces at bedside and explained course of treatment thus far  Patient admits that she is hesitant to pursue more aggressive interventions as she would not want to be maintained on life support in the long term  Upon further investigation the patient expresses anxiety surrounding her acute decompensation and overall illness, although she is reassured by her family support  She wishes to proceed with all indicated aggressive medical interventions including sims catheter placement and dialysis if needed  She would also like to be a level 1 full code  She has expressed to her next of kin (her sister) that she would not want a tracheostomy if she were unable to be extubated; however would accept intubation if necessary  All patient's and family member's questions were expressed to the best of my ability  The family has elected the patient's niece Jennifer Mcgarry ([phone]) to be the spokesperson for her family and should be notified first in the event of an emergency or change in the patient's status

## 2019-06-27 NOTE — PROGRESS NOTES
Cardiology Progress Note - Geeta Ni 66 y o  female MRN: [id number]    Unit/Bed#: [facility] 513-01 Encounter: [id number]      Assessment:  Principal Problem:    Cardiogenic shock ([place] [address] )  Active Problems:    Hyperlipidemia    Hypertension    Hypothyroidism    Type 2 diabetes mellitus with diabetic cataract ([place] [address] )    Atrial fibrillation with RVR ([place] [address] )    Acute CHF (congestive heart failure) ([facility])    Acute kidney injury ([place] [address] )    Lactic acidosis    Hyperkalemia    Hyponatremia    Hyperphosphatemia    High anion gap metabolic acidosis    Shock liver      Plan:   Patient continues on intravenous  Milrinone  Continues in atrial fibrillation with moderate to rapid ventricular response     In reference to paroxysmal atrial fibrillation when stabilized patient will need a permanent pacemaker and cardioversion in the setting of IV amiodarone  Patient continues on intravenous heparin  In reference to cardiomyopathy I have asked the heart failure team to assess and follow the patient and their consultation yesterday is appreciated  BMP this morning shows a potassium of 4 1 with a creatinine of 2 83  In reference to acute on chronic renal insufficiency nephrology notes are appreciated  Patient is on diuretic therapy  Subjective:   Patient seen and examined  No significant events overnight   negative  Objective:     Vitals: Blood pressure 120/72, pulse (!) 116, temperature 97 9 °F (36 6 °C), resp  rate 22, height 5' 1" (1 549 m), weight 79 8 kg (175 lb 14 8 oz), SpO2 100 %  , Body mass index is 33 24 kg/m² ,   Orthostatic Blood Pressures      Most Recent Value   Blood Pressure  120/72 filed at 06/26/2019 2142   Patient Position - Orthostatic VS  Lying filed at 06/25/2019 1753      ,      Intake/Output Summary (Last 24 hours) at 6/27/2019 0905  Last data filed at 6/27/2019 0800  Gross per 24 hour   Intake 2718 5 ml   Output 1070 ml   Net 1648 5 ml       No significant arrhythmias seen on telemetry review    Atrial fibrillation      Physical Exam:    GEN: Marei Kuhn appears well, alert and oriented x 3, pleasant and cooperative   NECK: supple, no carotid bruits, no JVD or HJR  HEART: normal rate, regular rhythm, normal S1 and S2, no murmurs, clicks, gallops or rubs   LUNGS: clear to auscultation bilaterally; no wheezes, rales, or rhonchi   ABDOMEN: normal bowel sounds, soft, no tenderness, no distention  EXTREMITIES: peripheral pulses normal; no clubbing, cyanosis, or edema  SKIN: warm and well perfused, no suspicious lesions on exposed skin    Labs & Results:    No results displayed because visit has over 200 results  Xr Chest Portable    Result Date: 6/26/2019  Narrative: CHEST INDICATION:   cvl  COMPARISON:  06/25/2019 EXAM PERFORMED/VIEWS:  XR CHEST PORTABLE Images: 2 FINDINGS: Cardiomediastinal silhouette appears enlarged  Pulmonary vessels are normal  A central line enters via the right internal jugular vein  The tip extends to the SVC  Persistent opacity at the right lung base may represent infiltrate and/or pleural fluid  The left lung is clear  No pneumothorax or pleural effusion  Osseous structures appear within normal limits for patient age  Impression: 1  Central line entering via the right internal jugular vein with its tip overlying the SVC  There is no pneumothorax  2   Persistent opacity at the right lung base which may represent a combination of infiltrate and pleural fluid  Workstation performed: [id number]     Xr Chest Portable    Result Date: 6/25/2019  Narrative: CHEST INDICATION:   hypoxic resp failure  COMPARISON:  June 18, 2019 EXAM PERFORMED/VIEWS:  XR CHEST PORTABLE FINDINGS: Mild cardiomegaly, stable  Calcified plaque within the aortic arch  Worsening opacity at the right lung base  Which favors increasing right pleural fluid  Lungs otherwise appear clear  No pneumothorax  Osseous structures appear within normal limits for patient age  Impression: Cardiomegaly  Increasing right basilar opacities suggesting pleural fluid  Workstation performed: [id number]     Xr Chest 1 View Portable    Result Date: 6/19/2019  Narrative: CHEST INDICATION:   AFib w/ rvr  COMPARISON:  None EXAM PERFORMED/VIEWS:  XR CHEST PORTABLE One image FINDINGS: Cardiomediastinal silhouette appears enlarged  Patient is in mild CHF  The lungs are clear  No pneumothorax   The costophrenic angles are blunted which may represent small pleural effusions  Osseous structures appear within normal limits for patient age  Impression: Cardiomegaly and mild CHF  Possible small pleural effusions  Workstation performed: [id number]     Cta Ed Chest Pe Study    Result Date: 6/18/2019  Narrative: CTA - CHEST WITH IV CONTRAST - PULMONARY ANGIOGRAM INDICATION:   Weakness, shortness of breath and leg swelling  COMPARISON: 6/18/2019  TECHNIQUE: CTA examination of the chest was performed using angiographic technique according to a protocol specifically tailored to evaluate for pulmonary embolism  Axial, sagittal, and coronal 2D reformatted images were created from the source data and  submitted for interpretation  In addition, coronal 3D MIP postprocessing was performed on the acquisition scanner  Radiation dose length product (DLP) for this visit:  749 22 mGy-cm   This examination, like all CT scans performed in the [facility], was performed utilizing techniques to minimize radiation dose exposure, including the use of iterative  reconstruction and automated exposure control  IV Contrast:  78 mL of iohexol (OMNIPAQUE)  FINDINGS: PULMONARY ARTERIAL TREE:  No filling defect in the visualized pulmonary arteries to suggest acute embolus  LUNGS:  Pulmonary vascular congestion without interstitial edema  Mild relaxation atelectasis at the lung bases  There is no tracheal or endobronchial lesion  PLEURA:  Small bilateral pleural effusions  HEART/GREAT VESSELS:  Cardiomegaly   MEDIASTINUM AND OSMEL: Unremarkable  CHEST WALL AND LOWER NECK:   Unremarkable  VISUALIZED STRUCTURES IN THE UPPER ABDOMEN:  Unremarkable  OSSEOUS STRUCTURES:  No acute fracture or destructive osseous lesion  Impression: 1  No evidence of acute pulmonary embolus or thoracic aortic aneurysm  2   Small bilateral pleural effusions and pulmonary vascular congestion without pulmonary interstitial edema, possibly indicating early or mild congestive heart failure  Workstation performed: [id number]       EKG personally reviewed by Beau Porter MD      Counseling / Coordination of Care  Total floor / unit time spent today 30 minutes  Greater than 50% of total time was spent with the patient and / or family counseling and / or coordination of care

## 2019-06-28 ENCOUNTER — APPOINTMENT (INPATIENT)
Dept: RADIOLOGY | Facility: HOSPITAL | Age: 79
DRG: 291 | End: 2019-06-28
Payer: COMMERCIAL

## 2019-06-28 PROBLEM — H10.33 ACUTE BACTERIAL CONJUNCTIVITIS OF BOTH EYES: Status: ACTIVE | Noted: 2019-06-28

## 2019-06-28 PROBLEM — E87.29 HIGH ANION GAP METABOLIC ACIDOSIS: Status: RESOLVED | Noted: 2019-06-26 | Resolved: 2019-06-28

## 2019-06-28 PROBLEM — E87.2 HIGH ANION GAP METABOLIC ACIDOSIS: Status: RESOLVED | Noted: 2019-06-26 | Resolved: 2019-06-28

## 2019-06-28 LAB
ABO GROUP BLD: NORMAL
ALBUMIN SERPL BCP-MCNC: 2.5 G/DL (ref 3.5–5)
ALP SERPL-CCNC: 109 U/L (ref 46–116)
ALT SERPL W P-5'-P-CCNC: 4595 U/L (ref 12–78)
AMMONIA PLAS-SCNC: 57 UMOL/L (ref 11–35)
ANION GAP SERPL CALCULATED.3IONS-SCNC: 11 MMOL/L (ref 4–13)
ANION GAP SERPL CALCULATED.3IONS-SCNC: 12 MMOL/L (ref 4–13)
ANION GAP SERPL CALCULATED.3IONS-SCNC: 13 MMOL/L (ref 4–13)
ANION GAP SERPL CALCULATED.3IONS-SCNC: 9 MMOL/L (ref 4–13)
ANISOCYTOSIS BLD QL SMEAR: PRESENT
APTT PPP: 85 SECONDS (ref 23–37)
AST SERPL W P-5'-P-CCNC: 5482 U/L (ref 5–45)
BASE EX.OXY STD BLDV CALC-SCNC: 77.1 % (ref 60–80)
BASE EXCESS BLDV CALC-SCNC: -0.7 MMOL/L
BASOPHILS # BLD MANUAL: 0 THOUSAND/UL (ref 0–0.1)
BASOPHILS NFR MAR MANUAL: 0 % (ref 0–1)
BILIRUB SERPL-MCNC: 1.81 MG/DL (ref 0.2–1)
BLD GP AB SCN SERPL QL: NEGATIVE
BUN SERPL-MCNC: 64 MG/DL (ref 5–25)
BUN SERPL-MCNC: 65 MG/DL (ref 5–25)
BUN SERPL-MCNC: 71 MG/DL (ref 5–25)
BUN SERPL-MCNC: 71 MG/DL (ref 5–25)
CALCIUM SERPL-MCNC: 7.2 MG/DL (ref 8.3–10.1)
CALCIUM SERPL-MCNC: 7.3 MG/DL (ref 8.3–10.1)
CALCIUM SERPL-MCNC: 7.4 MG/DL (ref 8.3–10.1)
CALCIUM SERPL-MCNC: 7.5 MG/DL (ref 8.3–10.1)
CHLORIDE SERPL-SCNC: 86 MMOL/L (ref 100–108)
CHLORIDE SERPL-SCNC: 87 MMOL/L (ref 100–108)
CHLORIDE SERPL-SCNC: 87 MMOL/L (ref 100–108)
CHLORIDE SERPL-SCNC: 89 MMOL/L (ref 100–108)
CO2 SERPL-SCNC: 24 MMOL/L (ref 21–32)
CO2 SERPL-SCNC: 25 MMOL/L (ref 21–32)
CO2 SERPL-SCNC: 27 MMOL/L (ref 21–32)
CO2 SERPL-SCNC: 27 MMOL/L (ref 21–32)
CREAT SERPL-MCNC: 3.74 MG/DL (ref 0.6–1.3)
CREAT SERPL-MCNC: 3.94 MG/DL (ref 0.6–1.3)
CREAT SERPL-MCNC: 4.21 MG/DL (ref 0.6–1.3)
CREAT SERPL-MCNC: 4.35 MG/DL (ref 0.6–1.3)
EOSINOPHIL # BLD MANUAL: 0 THOUSAND/UL (ref 0–0.4)
EOSINOPHIL NFR BLD MANUAL: 0 % (ref 0–6)
ERYTHROCYTE [DISTWIDTH] IN BLOOD BY AUTOMATED COUNT: 11.7 % (ref 11.6–15.1)
GFR SERPL CREATININE-BSD FRML MDRD: 10 ML/MIN/1.73SQ M
GFR SERPL CREATININE-BSD FRML MDRD: 10 ML/MIN/1.73SQ M
GFR SERPL CREATININE-BSD FRML MDRD: 11 ML/MIN/1.73SQ M
GFR SERPL CREATININE-BSD FRML MDRD: 9 ML/MIN/1.73SQ M
GLUCOSE SERPL-MCNC: 102 MG/DL (ref 65–140)
GLUCOSE SERPL-MCNC: 112 MG/DL (ref 65–140)
GLUCOSE SERPL-MCNC: 113 MG/DL (ref 65–140)
GLUCOSE SERPL-MCNC: 117 MG/DL (ref 65–140)
GLUCOSE SERPL-MCNC: 120 MG/DL (ref 65–140)
GLUCOSE SERPL-MCNC: 121 MG/DL (ref 65–140)
GLUCOSE SERPL-MCNC: 126 MG/DL (ref 65–140)
GLUCOSE SERPL-MCNC: 132 MG/DL (ref 65–140)
GLUCOSE SERPL-MCNC: 135 MG/DL (ref 65–140)
GLUCOSE SERPL-MCNC: 141 MG/DL (ref 65–140)
GLUCOSE SERPL-MCNC: 147 MG/DL (ref 65–140)
GLUCOSE SERPL-MCNC: 147 MG/DL (ref 65–140)
GLUCOSE SERPL-MCNC: 153 MG/DL (ref 65–140)
HCO3 BLDV-SCNC: 23.5 MMOL/L (ref 24–30)
HCT VFR BLD AUTO: 33 % (ref 34.8–46.1)
HGB BLD-MCNC: 11.6 G/DL (ref 11.5–15.4)
INR PPP: 7.23 (ref 0.84–1.19)
LYMPHOCYTES # BLD AUTO: 0.79 THOUSAND/UL (ref 0.6–4.47)
LYMPHOCYTES # BLD AUTO: 7 % (ref 14–44)
MAGNESIUM SERPL-MCNC: 1.7 MG/DL (ref 1.6–2.6)
MCH RBC QN AUTO: 32.5 PG (ref 26.8–34.3)
MCHC RBC AUTO-ENTMCNC: 35.2 G/DL (ref 31.4–37.4)
MCV RBC AUTO: 92 FL (ref 82–98)
MONOCYTES # BLD AUTO: 0.45 THOUSAND/UL (ref 0–1.22)
MONOCYTES NFR BLD: 4 % (ref 4–12)
MYELOCYTES NFR BLD MANUAL: 2 % (ref 0–1)
NASAL CANNULA: 2
NEUTROPHILS # BLD MANUAL: 9.86 THOUSAND/UL (ref 1.85–7.62)
NEUTS BAND NFR BLD MANUAL: 6 % (ref 0–8)
NEUTS SEG NFR BLD AUTO: 81 % (ref 43–75)
NRBC BLD AUTO-RTO: 1 /100 WBCS
NRBC BLD AUTO-RTO: 2 /100 WBC (ref 0–2)
O2 CT BLDV-SCNC: 14.6 ML/DL
OSMOLALITY UR/SERPL-RTO: 284 MMOL/KG (ref 282–298)
OSMOLALITY UR: 237 MMOL/KG
PCO2 BLDV: 37.1 MM HG (ref 42–50)
PH BLDV: 7.42 [PH] (ref 7.3–7.4)
PHOSPHATE SERPL-MCNC: 4.2 MG/DL (ref 2.3–4.1)
PLATELET # BLD AUTO: 173 THOUSANDS/UL (ref 149–390)
PLATELET BLD QL SMEAR: ADEQUATE
PMV BLD AUTO: 11.6 FL (ref 8.9–12.7)
PO2 BLDV: 45.7 MM HG (ref 35–45)
POIKILOCYTOSIS BLD QL SMEAR: PRESENT
POTASSIUM SERPL-SCNC: 3.9 MMOL/L (ref 3.5–5.3)
POTASSIUM SERPL-SCNC: 4.1 MMOL/L (ref 3.5–5.3)
POTASSIUM SERPL-SCNC: 4.1 MMOL/L (ref 3.5–5.3)
POTASSIUM SERPL-SCNC: 4.3 MMOL/L (ref 3.5–5.3)
PROT SERPL-MCNC: 5.6 G/DL (ref 6.4–8.2)
PROTHROMBIN TIME: 61.5 SECONDS (ref 11.6–14.5)
RBC # BLD AUTO: 3.57 MILLION/UL (ref 3.81–5.12)
RBC MORPH BLD: PRESENT
RH BLD: POSITIVE
SODIUM 24H UR-SCNC: 88 MOL/L
SODIUM SERPL-SCNC: 122 MMOL/L (ref 136–145)
SODIUM SERPL-SCNC: 123 MMOL/L (ref 136–145)
SODIUM SERPL-SCNC: 125 MMOL/L (ref 136–145)
SODIUM SERPL-SCNC: 127 MMOL/L (ref 136–145)
SPECIMEN EXPIRATION DATE: NORMAL
URATE SERPL-MCNC: 10.5 MG/DL (ref 2–6.8)
URATE UR-MCNC: 16.6 MG/DL
WBC # BLD AUTO: 11.33 THOUSAND/UL (ref 4.31–10.16)

## 2019-06-28 PROCEDURE — G8988 SELF CARE GOAL STATUS: HCPCS

## 2019-06-28 PROCEDURE — G8987 SELF CARE CURRENT STATUS: HCPCS

## 2019-06-28 PROCEDURE — 82140 ASSAY OF AMMONIA: CPT | Performed by: NURSE PRACTITIONER

## 2019-06-28 PROCEDURE — 84100 ASSAY OF PHOSPHORUS: CPT | Performed by: NURSE PRACTITIONER

## 2019-06-28 PROCEDURE — 84300 ASSAY OF URINE SODIUM: CPT | Performed by: NURSE PRACTITIONER

## 2019-06-28 PROCEDURE — 85730 THROMBOPLASTIN TIME PARTIAL: CPT | Performed by: INTERNAL MEDICINE

## 2019-06-28 PROCEDURE — 82805 BLOOD GASES W/O2 SATURATION: CPT | Performed by: NURSE PRACTITIONER

## 2019-06-28 PROCEDURE — 99233 SBSQ HOSP IP/OBS HIGH 50: CPT | Performed by: INTERNAL MEDICINE

## 2019-06-28 PROCEDURE — 94640 AIRWAY INHALATION TREATMENT: CPT

## 2019-06-28 PROCEDURE — 80048 BASIC METABOLIC PNL TOTAL CA: CPT | Performed by: PHYSICIAN ASSISTANT

## 2019-06-28 PROCEDURE — 85027 COMPLETE CBC AUTOMATED: CPT | Performed by: NURSE PRACTITIONER

## 2019-06-28 PROCEDURE — 83935 ASSAY OF URINE OSMOLALITY: CPT | Performed by: NURSE PRACTITIONER

## 2019-06-28 PROCEDURE — 83735 ASSAY OF MAGNESIUM: CPT | Performed by: NURSE PRACTITIONER

## 2019-06-28 PROCEDURE — 86901 BLOOD TYPING SEROLOGIC RH(D): CPT | Performed by: NURSE PRACTITIONER

## 2019-06-28 PROCEDURE — 97167 OT EVAL HIGH COMPLEX 60 MIN: CPT

## 2019-06-28 PROCEDURE — 99232 SBSQ HOSP IP/OBS MODERATE 35: CPT | Performed by: INTERNAL MEDICINE

## 2019-06-28 PROCEDURE — 85007 BL SMEAR W/DIFF WBC COUNT: CPT | Performed by: NURSE PRACTITIONER

## 2019-06-28 PROCEDURE — 71045 X-RAY EXAM CHEST 1 VIEW: CPT

## 2019-06-28 PROCEDURE — 86850 RBC ANTIBODY SCREEN: CPT | Performed by: NURSE PRACTITIONER

## 2019-06-28 PROCEDURE — 83930 ASSAY OF BLOOD OSMOLALITY: CPT | Performed by: NURSE PRACTITIONER

## 2019-06-28 PROCEDURE — 84560 ASSAY OF URINE/URIC ACID: CPT | Performed by: NURSE PRACTITIONER

## 2019-06-28 PROCEDURE — 99291 CRITICAL CARE FIRST HOUR: CPT | Performed by: INTERNAL MEDICINE

## 2019-06-28 PROCEDURE — NC001 PR NO CHARGE: Performed by: INTERNAL MEDICINE

## 2019-06-28 PROCEDURE — 94668 MNPJ CHEST WALL SBSQ: CPT

## 2019-06-28 PROCEDURE — 84550 ASSAY OF BLOOD/URIC ACID: CPT | Performed by: INTERNAL MEDICINE

## 2019-06-28 PROCEDURE — 80053 COMPREHEN METABOLIC PANEL: CPT | Performed by: NURSE PRACTITIONER

## 2019-06-28 PROCEDURE — 85610 PROTHROMBIN TIME: CPT | Performed by: NURSE PRACTITIONER

## 2019-06-28 PROCEDURE — 94760 N-INVAS EAR/PLS OXIMETRY 1: CPT

## 2019-06-28 PROCEDURE — 86900 BLOOD TYPING SEROLOGIC ABO: CPT | Performed by: NURSE PRACTITIONER

## 2019-06-28 PROCEDURE — 82948 REAGENT STRIP/BLOOD GLUCOSE: CPT

## 2019-06-28 RX ORDER — OFLOXACIN 3 MG/ML
1 SOLUTION/ DROPS OPHTHALMIC 4 TIMES DAILY
Status: DISCONTINUED | OUTPATIENT
Start: 2019-06-28 | End: 2019-07-03

## 2019-06-28 RX ORDER — MAGNESIUM SULFATE HEPTAHYDRATE 40 MG/ML
2 INJECTION, SOLUTION INTRAVENOUS ONCE
Status: COMPLETED | OUTPATIENT
Start: 2019-06-28 | End: 2019-06-28

## 2019-06-28 RX ORDER — ACETYLCYSTEINE 200 MG/ML
3 SOLUTION ORAL; RESPIRATORY (INHALATION) ONCE
Status: COMPLETED | OUTPATIENT
Start: 2019-06-28 | End: 2019-06-28

## 2019-06-28 RX ORDER — LEVALBUTEROL 1.25 MG/.5ML
1.25 SOLUTION, CONCENTRATE RESPIRATORY (INHALATION) ONCE
Status: COMPLETED | OUTPATIENT
Start: 2019-06-28 | End: 2019-06-28

## 2019-06-28 RX ORDER — VENLAFAXINE 50 MG/1
50 TABLET ORAL EVERY 12 HOURS
Status: DISCONTINUED | OUTPATIENT
Start: 2019-06-28 | End: 2019-07-11 | Stop reason: HOSPADM

## 2019-06-28 RX ADMIN — OFLOXACIN 1 DROP: 3 SOLUTION/ DROPS OPHTHALMIC at 12:10

## 2019-06-28 RX ADMIN — LEVOTHYROXINE SODIUM 137 MCG: 112 TABLET ORAL at 05:01

## 2019-06-28 RX ADMIN — OFLOXACIN 1 DROP: 3 SOLUTION/ DROPS OPHTHALMIC at 22:10

## 2019-06-28 RX ADMIN — Medication 40 MG/HR: at 17:12

## 2019-06-28 RX ADMIN — MELATONIN 6 MG: at 22:10

## 2019-06-28 RX ADMIN — SENNOSIDES 8.6 MG: 8.6 TABLET, FILM COATED ORAL at 22:10

## 2019-06-28 RX ADMIN — NOREPINEPHRINE BITARTRATE 2 MCG/MIN: 1 INJECTION INTRAVENOUS at 05:20

## 2019-06-28 RX ADMIN — ACETYLCYSTEINE 600 MG: 200 SOLUTION ORAL; RESPIRATORY (INHALATION) at 16:12

## 2019-06-28 RX ADMIN — INSULIN LISPRO 1 UNITS: 100 INJECTION, SOLUTION INTRAVENOUS; SUBCUTANEOUS at 17:18

## 2019-06-28 RX ADMIN — TOPICAL ANESTHETIC 2 SPRAY: 200 SPRAY DENTAL; PERIODONTAL at 22:22

## 2019-06-28 RX ADMIN — VENLAFAXINE 50 MG: 50 TABLET ORAL at 22:09

## 2019-06-28 RX ADMIN — LEVALBUTEROL 1.25 MG: 1.25 SOLUTION, CONCENTRATE RESPIRATORY (INHALATION) at 16:12

## 2019-06-28 RX ADMIN — LORATADINE 10 MG: 10 TABLET ORAL at 08:44

## 2019-06-28 RX ADMIN — ACETAMINOPHEN 650 MG: 325 TABLET ORAL at 03:26

## 2019-06-28 RX ADMIN — BENZONATATE 200 MG: 100 CAPSULE ORAL at 03:26

## 2019-06-28 RX ADMIN — OFLOXACIN 1 DROP: 3 SOLUTION/ DROPS OPHTHALMIC at 18:02

## 2019-06-28 RX ADMIN — AMIODARONE HYDROCHLORIDE 200 MG: 200 TABLET ORAL at 08:44

## 2019-06-28 RX ADMIN — NOREPINEPHRINE BITARTRATE 4 MCG/MIN: 1 INJECTION INTRAVENOUS at 22:10

## 2019-06-28 RX ADMIN — MAGNESIUM SULFATE HEPTAHYDRATE 2 G: 40 INJECTION, SOLUTION INTRAVENOUS at 11:24

## 2019-06-28 RX ADMIN — BENZONATATE 200 MG: 100 CAPSULE ORAL at 12:10

## 2019-06-28 RX ADMIN — Medication 40 MG/HR: at 05:01

## 2019-06-28 RX ADMIN — AMIODARONE HYDROCHLORIDE 1 MG/MIN: 50 INJECTION, SOLUTION INTRAVENOUS at 05:07

## 2019-06-28 RX ADMIN — GUAIFENESIN 600 MG: 600 TABLET, EXTENDED RELEASE ORAL at 08:44

## 2019-06-28 RX ADMIN — ACETAMINOPHEN 650 MG: 325 TABLET ORAL at 12:10

## 2019-06-28 NOTE — PROGRESS NOTES
Cardiology Progress Note - Georeg Stephenson 66 y o  female MRN: [id number]    Unit/Bed#: [facility] 513-01 Encounter: [id number]      Assessment:  Principal Problem:    Cardiogenic shock ([facility] 75 )  Active Problems:    Hyperlipidemia    Hypertension    Hypothyroidism    Type 2 diabetes mellitus with diabetic cataract ([place] [address] )    Atrial fibrillation with RVR ([facility] )    Acute CHF (congestive heart failure) ([place] [address] )    Acute kidney injury ([place] [address] )    Hyponatremia    Hyperphosphatemia    High anion gap metabolic acidosis    Shock liver      Plan:  Patient continues on intravenous pressor and Milrinone  As well she is on intravenous furosemide and amiodarone   Continues in atrial fibrillation with moderate to rapid ventricular response  Norvel Comment reference to paroxysmal atrial fibrillation when stabilized patient will need a permanent pacemaker and cardioversion in the setting of IV amiodarone  Will DC oral amiodarone well patient on intravenous therapy  Patient continues on intravenous heparin    In reference to cardiomyopathy I have asked the heart failure team to assess and follow the patient and their consultation yesterday is appreciated  BMP this morning shows a potassium of 4 1 with a creatinine of 3 94  In reference to acute on chronic renal insufficiency nephrology notes are appreciated  Patient is on furosemide infusion  Liver enzymes grossly elevated in the setting of probable shock liver syndrome  Heart failure Service notes are also appreciated  Subjective:   Patient seen and examined  No significant events overnight   negative  Objective:     Vitals: Blood pressure 91/54, pulse (!) 112, temperature (!) 97 2 °F (36 2 °C), resp  rate 21, height 5' 1" (1 549 m), weight 79 8 kg (175 lb 14 8 oz), SpO2 98 %  , Body mass index is 33 24 kg/m² ,   Orthostatic Blood Pressures      Most Recent Value   Blood Pressure  91/54 filed at 06/28/2019 0125   Patient Position - Orthostatic VS  Lying filed at 06/25/2019 1755 ,      Intake/Output Summary (Last 24 hours) at 6/28/2019 0902  Last data filed at 6/28/2019 0800  Gross per 24 hour   Intake 1367 2 ml   Output 1545 ml   Net -177 8 ml       No significant arrhythmias seen on telemetry review  Atrial fibrillation      Physical Exam:    GEN: Randel Curling appears well, alert and oriented x 3, pleasant and cooperative   NECK: supple, no carotid bruits, no JVD or HJR  HEART: normal rate, irregular rhythm, normal S1 and S2, no murmurs, clicks, gallops or rubs   LUNGS: clear to auscultation bilaterally; no wheezes, rales, or rhonchi   ABDOMEN: normal bowel sounds, soft, no tenderness, no distention  EXTREMITIES:  edema      Labs & Results:    No results displayed because visit has over 200 results  Xr Chest Portable    Result Date: 6/26/2019  Narrative: CHEST INDICATION:   cvl  COMPARISON:  06/25/2019 EXAM PERFORMED/VIEWS:  XR CHEST PORTABLE Images: 2 FINDINGS: Cardiomediastinal silhouette appears enlarged  Pulmonary vessels are normal  A central line enters via the right internal jugular vein  The tip extends to the SVC  Persistent opacity at the right lung base may represent infiltrate and/or pleural fluid  The left lung is clear  No pneumothorax or pleural effusion  Osseous structures appear within normal limits for patient age  Impression: 1  Central line entering via the right internal jugular vein with its tip overlying the SVC  There is no pneumothorax  2   Persistent opacity at the right lung base which may represent a combination of infiltrate and pleural fluid  Workstation performed: [id number]     Xr Chest Portable    Result Date: 6/25/2019  Narrative: CHEST INDICATION:   hypoxic resp failure  COMPARISON:  June 18, 2019 EXAM PERFORMED/VIEWS:  XR CHEST PORTABLE FINDINGS: Mild cardiomegaly, stable  Calcified plaque within the aortic arch  Worsening opacity at the right lung base  Which favors increasing right pleural fluid    Lungs otherwise appear clear  No pneumothorax  Osseous structures appear within normal limits for patient age  Impression: Cardiomegaly  Increasing right basilar opacities suggesting pleural fluid  Workstation performed: [id number]     Xr Chest 1 View Portable    Result Date: 6/19/2019  Narrative: CHEST INDICATION:   AFib w/ rvr  COMPARISON:  None EXAM PERFORMED/VIEWS:  XR CHEST PORTABLE One image FINDINGS: Cardiomediastinal silhouette appears enlarged  Patient is in mild CHF  The lungs are clear  No pneumothorax   The costophrenic angles are blunted which may represent small pleural effusions  Osseous structures appear within normal limits for patient age  Impression: Cardiomegaly and mild CHF  Possible small pleural effusions  Workstation performed: [id number]     Cta Ed Chest Pe Study    Result Date: 6/18/2019  Narrative: CTA - CHEST WITH IV CONTRAST - PULMONARY ANGIOGRAM INDICATION:   Weakness, shortness of breath and leg swelling  COMPARISON: 6/18/2019  TECHNIQUE: CTA examination of the chest was performed using angiographic technique according to a protocol specifically tailored to evaluate for pulmonary embolism  Axial, sagittal, and coronal 2D reformatted images were created from the source data and  submitted for interpretation  In addition, coronal 3D MIP postprocessing was performed on the acquisition scanner  Radiation dose length product (DLP) for this visit:  749 22 mGy-cm   This examination, like all CT scans performed in the [facility], was performed utilizing techniques to minimize radiation dose exposure, including the use of iterative  reconstruction and automated exposure control  IV Contrast:  78 mL of iohexol (OMNIPAQUE)  FINDINGS: PULMONARY ARTERIAL TREE:  No filling defect in the visualized pulmonary arteries to suggest acute embolus  LUNGS:  Pulmonary vascular congestion without interstitial edema  Mild relaxation atelectasis at the lung bases    There is no tracheal or endobronchial lesion  PLEURA:  Small bilateral pleural effusions  HEART/GREAT VESSELS:  Cardiomegaly  MEDIASTINUM AND OSMEL:  Unremarkable  CHEST WALL AND LOWER NECK:   Unremarkable  VISUALIZED STRUCTURES IN THE UPPER ABDOMEN:  Unremarkable  OSSEOUS STRUCTURES:  No acute fracture or destructive osseous lesion  Impression: 1  No evidence of acute pulmonary embolus or thoracic aortic aneurysm  2   Small bilateral pleural effusions and pulmonary vascular congestion without pulmonary interstitial edema, possibly indicating early or mild congestive heart failure  Workstation performed: [id number]       EKG personally reviewed by Sal Kidd MD      Counseling / Coordination of Care  Total floor / unit time spent today 30 minutes  Greater than 50% of total time was spent with the patient and / or family counseling and / or coordination of care

## 2019-06-28 NOTE — OCCUPATIONAL THERAPY NOTE
Occupational Therapy Evaluation      Ag Damon    6/28/2019    Patient Active Problem List   Diagnosis    Depression with anxiety    Hyperlipidemia    Hypertension    Hypothyroidism    Obesity    Osteoarthritis    Type 2 diabetes mellitus with diabetic cataract ([facility] [address] [address] )    Atrial fibrillation with RVR (HCC)    Acute CHF (congestive heart failure) (HCC)    Acute kidney injury ([facility] )    Hyponatremia    Hyperphosphatemia    Cardiogenic shock (HCC)    Shock liver    Acute bacterial conjunctivitis of both eyes       Past Medical History:   Diagnosis Date    Eczema     Photosensitivity     abnormal skin sensitivity to sunlight    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HCC)     staging       Past Surgical History:   Procedure Laterality Date    HEMORRHOID SURGERY      OOPHORECTOMY      unilateral    TOTAL ABDOMINAL HYSTERECTOMY          06/28/19 1155   Note Type   Note type Eval/Treat   Restrictions/Precautions   Weight Bearing Precautions Per Order No   Other Precautions Multiple lines;Telemetry;O2;Fall Risk;Pain   Pain Assessment   Pain Assessment No/denies pain   Pain Score No Pain   Home Living   Type of [address] [place] [address] One level;Performs ADLs on one level  ([facility]; [facility])   Bathroom Shower/Tub Tub/shower unit   Bathroom Toilet Standard   Bathroom Equipment Other (Comment)  (Denies)   [address] Other (Comment)  (Denies)   Prior Function   Level of [place] Independent with ADLs and functional mobility   Lives With Alone   Receives Help From Family   ADL Assistance Independent   IADLs Independent   Falls in the last 6 months 0   Vocational Part time employment   Lifestyle   Autonomy Pt reports being I w/ all ADLS and IADLS and (+) drives PTA   Reciprocal Relationships Pt reports living alone  Has supportive friends and family that are able to A if needed      Service to Others Pt reports working PT as a    Intrinsic Gratification Pt reports enjoying playing with her cat   Psychosocial   Psychosocial (WDL) WDL   ADL   Where Assessed Chair   Eating Assistance 5  Supervision/Setup   Grooming Assistance 4  Minimal Assistance   UB Bathing Assistance 3  Moderate Assistance   LB Bathing Assistance 2  Maximal [address] 3  Moderate Assistance   LB Dressing Assistance 2  Maximal Assistance   Toileting Assistance  2  Maximal Assistance   Bed Mobility   Supine to Sit Unable to assess   Sit to Supine Unable to assess   Additional Comments Pt sitting OOB in chair upon OT arrival  Pt seated in chair w/ all needs in reach s/p OT session  Transfers   Sit to Stand 3  Moderate assistance   Additional items Assist x 1; Increased time required;Verbal cues;Armrests   Stand to Sit 3  Moderate assistance   Additional items Assist x 1; Increased time required;Verbal cues;Armrests   Additional Comments Transfers completed w/ RW  VC and TC required for safe/proper hand placement  Functional Mobility   Functional Mobility 3  Moderate assistance   Additional Comments Pt demonstrated ability to take ~5 steps forward/backward to chair w/ RW at Mod A level  Additional items Rolling walker   Balance   Static Sitting Fair   Dynamic Sitting Fair -   Static Standing Poor +   Dynamic Standing Poor   Ambulatory Poor   Activity Tolerance   Activity Tolerance Patient limited by fatigue;Patient limited by pain   Medical Staff [address]   Nurse Made Aware RN cleared Pt for OT eval   RUE Assessment   RUE Assessment X  (NT 2' fatigue)   LUE Assessment   LUE Assessment X  (NT 2' fatigue)   Hand Function   Gross Motor Coordination Functional   Sensation   Light Touch No apparent deficits   Vision-Basic Assessment   Current Vision Wears glasses all the time   Cognition   Overall Cognitive Status [facility]   Arousal/Participation Alert; Cooperative;Lethargic   Attention Within functional limits   Orientation Level Oriented X4   Memory Within functional limits   Following Commands Follows one step commands with increased time or repetition   Comments Pt is pleasant and cooperative to participate in therapy this day  Assessment   Limitation Decreased ADL status; Decreased UE strength;Decreased Safe judgement during ADL;Decreased endurance;Decreased high-level ADLs; Decreased fine motor control   Prognosis Good   Assessment Pt is a 67 yo female seen for OT eval s/p transfer from [facility] adm to [facility] w/ x3 days worsening dyspnea on exertion and B/L LE edema and A-fib w/ RVR dx'd w/ cardiogenic shock  Comorbidities include a h/o HLD, HTN, hypothyroidism, DM 2, A-fib w/ RVR, acute CHF, WANG, hyponatremia, hyperphosphatemia, shock liver, acute B/L conjunctivitis eye, depression w/ anxiety, obesity, OA  Pt with active OT orders and up with assistance  orders  Pt works PT and resides alone in [facility] w/ 0STE  Pt reports having family and friends that are able to A if needed  Pt was I w/  ADLS and IADLS, drove, & required no use of DME PTA  Pt is currently demonstrating the following occupational deficits: Mod A UB bathing/dressing, Max A LB bathing/dressing, Mod A transfers w/ RW  These deficits that are impacting pt's baseline areas of occupation are a result of the following impairments: pain, endurance, activity tolerance, functional mobility, balance, functional standing tolerance, unsupportive home environment, decreased I w/ ADLS/IADLS and strength  The following Occupational Performance Areas to address include: grooming, bathing/shower, toilet hygiene, dressing, health maintenance, functional mobility and clothing management  Pt scored overall 25/100 on the Barthel Index  Based on the aforementioned OT evaluation, functional performance deficits, and assessments, pt has been identified as a high complexity evaluation  Recommend STR upon D/C   Pt to continue to benefit from acute immediate OT services to address the following goals 3-5x/week to  w/in 7-10 days:    Goals   Patient Goals To return home   LTG Time Frame 7-10   Long Term Goal #1 Refer to goals below   Plan   Treatment Interventions ADL retraining;Functional transfer training;UE strengthening/ROM; Endurance training;Cognitive reorientation;Patient/family training;Equipment evaluation/education; Compensatory technique education; Activityengagement; Energy conservation   Goal Expiration Date 07/08/19   OT Frequency 3-5x/wk   Recommendation   OT Discharge Recommendation Short Term Rehab   OT - OK to Discharge Yes  (when medically cleared)   Barthel Index   Feeding 5   Bathing 0   Grooming Score 0   Dressing Score 0   Bladder Score 0   Bowels Score 10   Toilet Use Score 5   Transfers (Bed/Chair) Score 5   Mobility (Level Surface) Score 0   Stairs Score 0   Barthel Index Score 25   Modified [place] Scale   Modified Luigi Scale 4       GOALS    1) Pt will improve activity tolerance to G for min 30 min txment sessions for increase engagement in functional tasks    2) Pt will complete UB/LB dressing/self care w/ mod I using adaptive device and DME as needed    3) Pt will complete bathing w/ Mod I w/ use of AE and DME as needed    4) Pt will complete toileting w/ mod I w/ G hygiene/thoroughness using DME as needed    5) Pt will improve functional transfers to Mod I on/off all surfaces using DME as needed w/ G balance/safety     6) Pt will improve functional mobility during ADL/IADL/leisure tasks to Mod I using DME as needed w/ G balance/safety     7) Pt will participate in simulated IADL management task to increase independence to Mod I w/ G safety and endurance    8) Pt will be attentive 100% of the time during ongoing cognitive assessment w/ G participation to assist w/ safe d/c planning/recommendations    9) Pt will demonstrate G carryover of pt/caregiver education and training as appropriate w/o cues w/ good tolerance to increase safety during functional tasks    10) Pt will demonstrate 100% carryover of energy conservation techniques t/o functional I/ADL/leisure tasks w/o cues s/p skilled education to increase endurance during functional tasks         Steve Perez MS, OTR/L

## 2019-06-28 NOTE — PROGRESS NOTES
Advanced Heart Failure / Pulmonary Hypertension Progress Note    Tomas Pugh 66 y o  female   MRN: [id number]  Unit/Bed#: [facility] 513-01; Encounter: [id number]    Assessment:  Principal Problem:    Cardiogenic shock ([facility] )  Active Problems:    Hyperlipidemia    Hypertension    Hypothyroidism    Type 2 diabetes mellitus with diabetic cataract ([place] [address] )    Atrial fibrillation with RVR ([place] [address] )    Acute CHF (congestive heart failure) (HCC)    Acute kidney injury ([place] [address] )    Hyponatremia    Hyperphosphatemia    High anion gap metabolic acidosis    Shock liver    Subjective:   Patient seen and examined  MAPs down in high 40s overnight and was started back on Levophed  Patient only complains of sore throat and eye discharge  Denies fever, chills, lightheadedness, dizziness, chest pain, palpitations, SOB, PND, orthopnea, n/v/d  Objective: Intake/ Output: 1464 9 mL / 1460 mL (net positive 4 9 mL)  Weight: 175 lbs (down from 179 lbs)  Telemetry: Afib, rates in 100-110s  Plan:    Cardiogenic shock / 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; LVEF 25-30%, LVIDd 4 6 cm; NYHA III; ACC/AHA Stage C   Etiology: shock induced by cardizem and profolol being given while in borderline low output state  BiV HF likely tachy-induced, though will need ischemic evaluation in future  Also has history of radioablative iodine  Remains on milrinone 0 125 mcg/kg/min  VBG to be drawn at 1200  VBG from 6/27/19: SVO2 77 1%  CO/CI 5 6/3  Also on IV Lasix drip at 4 mL/hr  Received metolazone 10 mg yesterday  Placed on Levophed overnight, now on 3 mcg/min  Atrial fibrillation with rapid ventricular response   [id number] = 6 (age, sex, CHF, HTN, DM)  Anticoagulation held  INR of 7 23 this AM     Remains on amiodarone drip 0 5 mg/min (decreased from 1 mg/min)  PO amiodarone discontinued this AM    Amiodarone load total as of this AM is 9 47 mg     Will add on beta-blocker once no longer requiring Levophed      EP recommending EBEN cardioversion with dual-chamber AICD implantation once more stable  Acute kidney injury   Creatinine of 3 94 this AM (up from 3 74 on 06/28)  Remains on IV Lasix drip at 4 mL/hr  Repeat labs to be sent  Shock liver, improving   Due to hypotension / cardiogenic shock  Neurohormonal Blockade:  --Beta Blocker: metoprolol tartrate 12 5 mg Q8 hours  --ACEi, ARB or ARNi: N/A  --Aldosterone Receptor Blocker: N/A  --Diuretic: IV Lasix drip 4 mL/hr  Sudden Cardiac Death Risk Reduction:  --ICD: LVEF 35%    Electrical Resynchronization:  --Interrogation: Narrow QRS  Advanced Therapies (if appropriate): --Inotrope: milrinone 0 125 mcg/kg/min  --LVAD/Transplant Candidacy: Will continue to monitor  Diagnostic Testing:  Echocardiogram from 06/20/2019:  LVEF: 25%  LVIDd: 4 6 cm  RV: Dilated and hypokinetic  MR: Moderate  PASP: 35 mmHg  Vitals:   Blood pressure 91/54, pulse (!) 112, temperature (!) 97 2 °F (36 2 °C), resp  rate 21, height 5' 1" (1 549 m), weight 79 8 kg (175 lb 14 8 oz), SpO2 98 %  Body mass index is 33 24 kg/m²  I/O last 3 completed shifts: In: 3167 4 [P O :750; I V :2417 4]  Out: 1695 [Urine:1695]    Wt Readings from Last 3 Encounters:   06/25/19 79 8 kg (175 lb 14 8 oz)   06/18/19 81 kg (178 lb 9 6 oz)   03/20/19 80 1 kg (176 lb 9 6 oz)     Vitals:    06/28/19 0500 06/28/19 0600 06/28/19 0700 06/28/19 0800   BP:       Pulse: 102 102 (!) 108 (!) 112   Resp: (!) 24 21 20 21   Temp: 97 9 °F (36 6 °C)  (!) 97 2 °F (36 2 °C) (!) 97 2 °F (36 2 °C)   TempSrc:       SpO2: 99% 99% 99% 98%   Weight:       Height:         Physical Exam   Constitutional: She is oriented to person, place, and time  She appears well-developed and well-nourished  HENT:   Head: Normocephalic and atraumatic  Eyes: Pupils are equal, round, and reactive to light  Right eye exhibits discharge  Left eye exhibits discharge  Neck: Neck supple  No tracheal deviation present     Right IJ central line present  Cardiovascular: Intact distal pulses  An irregularly irregular rhythm present  Murmur heard  Pulmonary/Chest: Effort normal  She has rales  Abdominal: Soft  Bowel sounds are normal  She exhibits no distension  Genitourinary:   Genitourinary Comments: Murray catheter with clear yellow urine  Neurological: She is alert and oriented to person, place, and time  Skin: Skin is dry  Psychiatric: She has a normal mood and affect  Her behavior is normal      Central Line: right internal jugular (placed 2019)  Arterial line: right radial (placed 2019)  Murray catheter: placed on 2019      Current Facility-Administered Medications:     acetaminophen (TYLENOL) tablet 650 mg, 650 mg, Oral, Q4H PRN, Yeni Spironello V, CRNP, 650 mg at 19 0326    albuterol (PROVENTIL HFA,VENTOLIN HFA) inhaler 2 puff, 2 puff, Inhalation, Q4H PRN, Yeni Spironello V, CRNP    [COMPLETED] amiodarone 150 mg in dextrose 5 % 100 mL IV bolus, 150 mg, Intravenous, Once, Stopped at 19 1220 **FOLLOWED BY** [] amiodarone (CORDARONE) 900 mg in dextrose 5 % 500 mL infusion, 1 mg/min, Intravenous, Continuous, Stopped at 19 1804 **FOLLOWED BY** amiodarone (CORDARONE) 900 mg in dextrose 5 % 500 mL infusion, 1 mg/min, Intravenous, Continuous, Yeni Spironello V, CRNP, Last Rate: 33 3 mL/hr at 19 0507, 1 mg/min at 19 0507    benzonatate (TESSALON PERLES) capsule 200 mg, 200 mg, Oral, TID PRN, Yeni Spironello V, CRNP, 200 mg at 19 0326    furosemide (LASIX) 500 mg infusion 50 mL, 40 mg/hr, Intravenous, Continuous, Samia Collins PA-C, Last Rate: 4 mL/hr at 19 0501, 40 mg/hr at 19 0501    guaiFENesin (MUCINEX) 12 hr tablet 600 mg, 600 mg, Oral, Q12H [facility], SOTO Soriano, 600 mg at 19 0844    insulin regular (HumuLIN R,NovoLIN R) 1 Units/mL in sodium chloride 0 9 % 100 mL infusion, 0 3-21 Units/hr, Intravenous, Titrated, Prisca ESPINAL Monica Ann, Stopped at 06/28/19 1007    levothyroxine tablet 137 mcg, 137 mcg, Oral, Daily, Yeni Spironello V, CRNP, 137 mcg at 06/28/19 0501    loratadine (CLARITIN) tablet 10 mg, 10 mg, Oral, Daily, Yeni Spironello V, CRNP, 10 mg at 06/28/19 0844    melatonin tablet 6 mg, 6 mg, Oral, HS, SOTO Degroot, 6 mg at 06/27/19 2124    metoprolol tartrate (LOPRESSOR) partial tablet 12 5 mg, 12 5 mg, Oral, Q8H, SOTO Junior, 12 5 mg at 06/27/19 0931    Milrinone Lactate in Dextrose (PRIMACOR) 20 MG/100 ML infusion (premix), 0 125 mcg/kg/min, Intravenous, Continuous, SOTO Degroot, Last Rate: 3 mL/hr at 06/27/19 2308, 0 125 mcg/kg/min at 06/27/19 2308    norepinephrine (LEVOPHED) 4 mg (STANDARD CONCENTRATION) IV in sodium chloride 0 9% 250 mL, 1-30 mcg/min, Intravenous, Titrated, Suzanne Queen PA-C, Last Rate: 15 mL/hr at 06/28/19 1000, 4 mcg/min at 06/28/19 1000    ondansetron (ZOFRAN) injection 4 mg, 4 mg, Intravenous, Q6H PRN, Yeni Figueroanello V, CRNP, 4 mg at 06/27/19 2124    phenol (CHLORASEPTIC) 1 4 % mucosal liquid 1 spray, 1 spray, Mouth/Throat, Q2H PRN, Palmer Collins PA-C, 1 spray at 06/27/19 2124    pravastatin (PRAVACHOL) tablet 40 mg, 40 mg, Oral, Daily With Dinner, Yeni Carranza V, CRNP, 40 mg at 06/27/19 1603    senna (SENOKOT) tablet 8 6 mg, 1 tablet, Oral, HS, SOTO Degroot, 8 6 mg at 06/27/19 2125    venlafaxine (EFFEXOR) tablet 75 mg, 75 mg, Oral, Q12H, Yenireese Carranza V, CRNP, 75 mg at 06/27/19 2123    Labs & Results:  Results from last 7 days   Lab Units 06/26/19  0216   TROPONIN I ng/mL 0 03     Results from last 7 days   Lab Units 06/28/19  0500 06/27/19  0440 06/26/19  0416   WBC Thousand/uL 11 33* 12 19* 12 42*   HEMOGLOBIN g/dL 11 6 11 2* 14 6   HEMATOCRIT % 33 0* 32 6* 43 2   PLATELETS Thousands/uL 173 159 228         Results from last 7 days   Lab Units 06/28/19  0500 06/28/19  0003 06/27/19  1758  06/27/19  0440 06/26/19  0415   POTASSIUM mmol/L 4 1 4 3 4 2   < > 4 1   < > 5 7*   CHLORIDE mmol/L 87* 89* 91*   < > 93*   < > 98*   CO2 mmol/L 24 27 26   < > 27   < > 20*   BUN mg/dL 64* 65* 61*   < > 53*   < > 25   CREATININE mg/dL 3 94* 3 74* 3 48*   < > 2 83*   < > 1 67*   CALCIUM mg/dL 7 3* 7 2* 7 3*   < > 7 8*   < > 8 6   ALK PHOS U/L 109  --   --   --  93  --  78   ALT U/L 4,595*  --   --   --  6,164*  --  476*   AST U/L 5,482*  --   --   --  15,182*  --  885*    < > = values in this interval not displayed  Results from last 7 days   Lab Units 06/28/19  0500 06/24/19  1625   INR  7 23* 1 54*     Counseling / Coordination of Care: Total floor / unit time spent today 25 minutes  Greater than 50% of total time was spent with the patient and / or family counseling and / or coordination of care  A description of the counseling / coordination of care: 20  Thank you for the opportunity to participate in the care of this patient      Yazmin Hartman PA-C

## 2019-06-28 NOTE — RESPIRATORY THERAPY NOTE
RT Protocol Note  Wesley Alvares 66 y o  female MRN: [id number]  Unit/Bed#: [facility] 513-01 Encounter: [id number]    Assessment    Principal Problem:    Cardiogenic shock ([facility] )  Active Problems:    Hyperlipidemia    Hypertension    Hypothyroidism    Type 2 diabetes mellitus with diabetic cataract ([facility] )    Atrial fibrillation with RVR (HCC)    Acute CHF (congestive heart failure) (HCC)    Acute kidney injury ([facility] )    Hyponatremia    Hyperphosphatemia    Shock liver    Acute bacterial conjunctivitis of both eyes      Home Pulmonary Medications:         Past Medical History:   Diagnosis Date    Eczema     Photosensitivity     abnormal skin sensitivity to sunlight    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HCC)     staging     Social History     Socioeconomic History    Marital status: Single     Spouse name: None    Number of children: None    Years of education: None    Highest education level: None   Occupational History    None   Social Needs    Financial resource strain: None    Food insecurity:     Worry: None     Inability: None    Transportation needs:     Medical: None     Non-medical: None   Tobacco Use    Smoking status: Never Smoker    Smokeless tobacco: Never Used   Substance and Sexual Activity    Alcohol use: Never     Frequency: Never    Drug use: Never    Sexual activity: None   Lifestyle    Physical activity:     Days per week: None     Minutes per session: None    Stress: None   Relationships    Social connections:     Talks on phone: None     Gets together: None     Attends Yarsani service: None     Active member of club or organization: None     Attends meetings of clubs or organizations: None     Relationship status: None    Intimate partner violence:     Fear of current or ex partner: None     Emotionally abused: None     Physically abused: None     Forced sexual activity: None   Other Topics Concern    None   Social History Narrative    None       Subjective         Objective    Physical Exam: Assessment Type: Assess only  General Appearance: Awake  Respiratory Pattern: Normal  Chest Assessment: Chest expansion symmetrical  Bilateral Breath Sounds: Clear, Diminished  Cough: Dry    Vitals:  Blood pressure 110/55, pulse (!) 106, temperature (!) 97 2 °F (36 2 °C), resp  rate 20, height 5' 1" (1 549 m), weight 79 8 kg (175 lb 14 8 oz), SpO2 97 %  Results from last 7 days   Lab Units 06/27/19  0632 06/26/19  1458  06/26/19  0216   PH ART   --   --   --  7 422   PCO2 ART mm Hg  --   --   --  22 8*   PO2 ART mm Hg  --   --   --  104 7   HCO3 ART mmol/L  --   --   --  14 5*   BASE EXC ART mmol/L  --   --   --  -7 4   O2 CONTENT ART mL/dL  --   --   --  22 2   O2 HGB, ARTERIAL %  --   --   --  96 7   ABG SOURCE   --   --   --  Line, Arterial   JANIS TEST  No No   < >  --    NON VENT ROOM AIR %  --  RA 99%  --   --     < > = values in this interval not displayed  Imaging and other studies: I have personally reviewed pertinent reports  Plan    Respiratory Plan: Discontinue Protocol  Airway Clearance Plan: Discontinue Protocol     Resp Comments: Pt w/o hx COPD  Admit for DX:CHF, cardiogenic shock  Pt now with persisten coughin (moist)  BS clear/diminished  HR/RR/SvV9=631/16/98%  CXR (3/28/19)= worsening CHF  Pt to be given mucomyst UDN to mobilize any upper airway secretions  Plan to add Flutter valve Rx

## 2019-06-28 NOTE — PROGRESS NOTES
Progress Note - Critical Care   Queta Beasley 66 y o  female MRN: [id number]  Unit/Bed#: [facility] 957-03 Encounter: [id number]    Attending Physician: Immanuel Robertson MD      ______________________________________________________________________  Assessment and Plan:   Principal Problem:    Cardiogenic shock [facility])  Active Problems:    Acute CHF (congestive heart failure) ([facility] 75 )    Atrial fibrillation with RVR ([place] [address] )    Acute kidney injury ([place] [address] )    Shock liver    Hyponatremia    Hyperphosphatemia    Type 2 diabetes mellitus with diabetic cataract ([place] [address] )    Hypothyroidism    Hyperlipidemia    Hypertension    Acute bacterial conjunctivitis of both eyes  Resolved Problems:    High anion gap metabolic acidosis    Lactic acidosis    Hyperkalemia    Neuro:   · Pain controlled with: PRN Tylenol  · Regulate sleep/wake cycle  ? Sleep hygiene  ? Continue melatonin at HS  · Delirium precautions  ? CAM-ICU daily  · Trend neuro exam  · Decrease home dose of Effexor due to hyponatremia  CV:   · Cardiac infusions: Primacor, 0 125 mcg/kg/min, Amiodarone 1 0 mg/min, Levo at 1  ? MAP goal > 65  ? Wean Levo off  · Rhythm: Atrial Fibrillation  ? Follow rhythm on telemetry  ? Started on Metoprolol yesterday 12 5 Q 8  Last 2 doses held due to hypotension  · Continue heparin gtt  · Cardiology  And Heart failure team following, appreciate input  · Continue lasix infusion  · Daily weight  · Pravachol and amio discontinued this morning  · Daily VBG while on Milrinone  Lung:   · JUIM F9 CL able  · SpO2 goal >90%  · Add humidity due to continued throat soreness  · Pulmonary toileting with Resp protocol, IS  · Continue PRN tesslon perles, albuterol, home  Claritin, Chlorospetic spray  GI:   · Stress ulcer prophylaxis: No prophylaxis needed  · Bowel regimen: PRN  · Zofran PRN for nausea  ?  Nausea/ dry heaves improved, occurs with coughing spells  · Elevated Liver test  · Improved today  · Ammonia improved from last evening without treating  FEN:   · Fluid/Diuretic plan: Continue lasix infusion  · Nutrition/diet plan: Cardiac diabetic diet  · Will check urine and serum osmo, sodium due to hyponatremia  · Replete electrolytes with goals: K >4 0, Mag >2 0, and Phos >3 0  · Continue to trend q6 BMPs  :   · Indwelling Murray present: yes  Continue for close monitoring  ? Urine output increased since starting lasix and zaroxolyn 10mg x1  · Trend UOP and BUN/creat  ? Creatinine continues to be elevated  ? Nephrology consulted  · Strict I and O  · Daily weight  ID:   · No concerns  ? Lactic cleared  · Trend temps and WBC count  · Maintain normothermia  · Now concern for bilat conjunctivitis, appears more reddened than yesterday  ? Cont Claritin  ? Add Ofloxin gtt both eyes QID  Heme:   · Trend hgb and plts  ? Transfuse as needed for goal hgb >7 0  · Continue heparin infusion  · Check coags in am due to shock liver  Endo:   · Glycemic control plan: hypoglycemic, infusion held  ? D/c infusion  SSI  · Continue home Sythroid  MSK/Skin:  · Mobility goal: Increase mobility to OOB  ? PT consult: will consult  ? OT consult: will consult  · Frequent turning and pressure off-loading  · Local wound care as needed  Lines:  · Central venous access: Assessed  Continued for the following reasons Hemodynamic medications    · Arterial line: Assessed  Continued for the following reasons Hemodynamic monitoring on multiple medications    VTE Prophylaxis:  · Pharmacologic Prophylaxis: Heparin Drip  · Mechanical Prophylaxis: sequential compression device     Code Status: Level 1 - Full Code      Counseling / Coordination of Care  Total time spent today 32 minutes  Greater than 50% of total time was spent with the patient and / or family counseling and / or coordination of care  A description of the counseling / coordination of care:        ______________________________________________________________________    Date of admission: 6/18/2019    HPI/24hr events:  Started on Lasix drip yesterday with improvement in her urine output  Continues on Q 6 BMP with trending upward of creatinine  Became hypotensive overnight on to a counts and Levophed was restarted at 5:30 a m  This a m  Otherwise, no acute changes  Review of Systems   HENT: Positive for ear discharge, sore throat and voice change  Negative for postnasal drip, sinus pressure, sinus pain and trouble swallowing  Pain w swalloing   Eyes: Positive for discharge  Negative for pain, itching and visual disturbance  Respiratory: Positive for cough  Negative for chest tightness and shortness of breath  Cardiovascular: Negative for chest pain and palpitations  Gastrointestinal: Negative for abdominal pain, diarrhea, nausea and vomiting  Dry heaves only when she is coughing   Neurological: Negative for dizziness, light-headedness, numbness and headaches      ______________________________________________________________________    Physical Exam   Constitutional: She is oriented to person, place, and time  She appears well-developed and well-nourished  No distress  HENT:   Head: Normocephalic and atraumatic  Mouth/Throat: No oropharyngeal exudate  Throat slightly reddened   Eyes: Pupils are equal, round, and reactive to light  EOM are normal  Right eye exhibits discharge  Left eye exhibits discharge  No scleral icterus  Conjunctiva reddened  Thick yellow bilat drainage   Neck: Normal range of motion  Neck supple  No tracheal deviation present  Cardiovascular: Normal heart sounds and intact distal pulses  Exam reveals no friction rub  No murmur heard  afib rate 's   Pulmonary/Chest: Effort normal and breath sounds normal  No respiratory distress  She has no wheezes  Scattered faint rales bilat bases   Abdominal: Soft  Bowel sounds are normal  She exhibits no distension  There is no tenderness     Genitourinary:   Genitourinary Comments: Murray, draining clear yellow   Musculoskeletal: Moves all extremities equally  Mild generalized edema   Neurological: She is alert and oriented to person, place, and time  Skin: Skin is warm and dry  Capillary refill takes less than 2 seconds  She is not diaphoretic  No erythema  Psychiatric: She has a normal mood and affect          ______________________________________________________________________  Vitals:    19 0400 19 0500 19 0600 19 0700   BP:       Pulse: 100 102 102 (!) 108   Resp: 20 (!) 24 21 20   Temp: 97 9 °F (36 6 °C) 97 9 °F (36 6 °C)  (!) 97 2 °F (36 2 °C)   TempSrc:       SpO2: 98% 99% 99% 99%   Weight:       Height:           Temperature:   Temp (24hrs), Av 9 °F (36 6 °C), Min:97 2 °F (36 2 °C), Max:98 2 °F (36 8 °C)    Current Temperature: (!) 97 2 °F (36 2 °C)    Weights:   IBW: 47 8 kg    Body mass index is 33 24 kg/m²  Weight (last 2 days)     None          Hemodynamic Monitoring:  CVP: CVP (mean): 6 mmHg       Non-Invasive/Invasive Ventilation Settings:  Respiratory    Lab Data (Last 4 hours)    None         O2/Vent Data (Last 4 hours)    None              No results found for: PHART, IEN4VXB, PO2ART, KOQ1LXK, U4EJMVNW, BEART, SOURCE  SpO2: SpO2: 98 %, SpO2 Activity: SpO2 Activity: At Rest, SpO2 Device: O2 Device: Nasal cannula    Invasive lines and devices: Invasive Devices     Central Venous Catheter Line            CVC Central Lines 19 Triple 16cm 2 days          Peripheral Intravenous Line            Peripheral IV 19 Left Antecubital 2 days          Arterial Line            Arterial Line 19 Radial 2 days          Drain            Urethral Catheter Temperature probe 16 Fr  1 day                     Intake and Outputs:    Intake/Output Summary (Last 24 hours) at 2019 0720  Last data filed at 2019 0600  Gross per 24 hour   Intake 1464 93 ml   Output 1460 ml   Net 4 93 ml     I/O last 24 hours: In: 1464 9 [P O :270;  I V :1194 9]  Out: 1460 [Urine:1460]    UOP: 75/hour   BM: 0 over last 24 hours    Nutrition:        Diet Orders   (From admission, onward)            Start     Ordered    06/25/19 1753  Diet Cardiovascular; Sodium 2 GM; Consistent Carbohydrate Diet Level 2 (5 carb servings/75 grams CHO/meal)  Diet effective midnight     Question Answer Comment   Diet Type Cardiovascular    Cardiac Sodium 2 GM    Other Restriction(s): Consistent Carbohydrate Diet Level 2 (5 carb servings/75 grams CHO/meal)    RD to adjust diet per protocol? No        06/25/19 1752            Labs:   Results from last 7 days   Lab Units 06/28/19  0500 06/27/19  0440 06/26/19  0416 06/26/19  0216   WBC Thousand/uL 11 33* 12 19* 12 42* 10 40*   HEMOGLOBIN g/dL 11 6 11 2* 14 6 15 4   HEMATOCRIT % 33 0* 32 6* 43 2 47 6*   PLATELETS Thousands/uL 173 159 228 233   NEUTROS PCT %  --   --   --  78*   MONOS PCT %  --   --   --  9     Results from last 7 days   Lab Units 06/28/19  0003 06/27/19  1758 06/27/19  1135 06/27/19  0440  06/26/19  0415   SODIUM mmol/L 127* 128* 129* 132*   < > 135*   POTASSIUM mmol/L 4 3 4 2 4 2 4 1   < > 5 7*   CHLORIDE mmol/L 89* 91* 91* 93*   < > 98*   CO2 mmol/L 27 26 27 27   < > 20*   BUN mg/dL 65* 61* 57* 53*   < > 25   CREATININE mg/dL 3 74* 3 48* 3 23* 2 83*   < > 1 67*   CALCIUM mg/dL 7 2* 7 3* 7 4* 7 8*   < > 8 6   ALK PHOS U/L  --   --   --  93  --  78   ALT U/L  --   --   --  6,164*  --  476*   AST U/L  --   --   --  15,182*  --  885*    < > = values in this interval not displayed  Results from last 7 days   Lab Units 06/28/19  0500 06/27/19  0440 06/26/19  0415 06/26/19  0216 06/23/19  0442 06/22/19  2158 06/22/19  0944   MAGNESIUM mg/dL 1 7 2 0 2 3 2 5 2 1 2 2 2 2   PHOSPHORUS mg/dL  --   --  7 0* 6 6*  --   --   --       Results from last 7 days   Lab Units 06/28/19  0500 06/28/19  0003 06/27/19  1758 06/27/19  1005  06/24/19  1625   INR  7 23*  --   --   --   --  1 54*   PTT seconds  --  85* 84* 92*   < > 23    < > = values in this interval not displayed       Results from last 7 days   Lab Units 06/27/19  0439 06/26/19  2332 06/26/19  2132 06/26/19  1720 06/26/19  1300   LACTIC ACID mmol/L 2 0 3 4* 3 7* 7 8* 10 6*     Results from last 7 days   Lab Units 06/26/19  0216   TROPONIN I ng/mL 0 03     Results from last 7 days   Lab Units 06/26/19  0216   PH ART  7 422   PCO2 ART mm Hg 22 8*   PO2 ART mm Hg 104 7   HCO3 ART mmol/L 14 5*   BASE EXC ART mmol/L -7 4   ABG SOURCE  Line, Arterial     VBG:  Results from last 7 days   Lab Units 06/27/19  0632  06/26/19  0216   PH JOSE  7 391   < >  --    PCO2 JOSE mm Hg 43 2   < >  --    PO2 JOSE mm Hg 47 0*   < >  --    HCO3 JOSE mmol/L 25 6   < >  --    BASE EXC JOSE mmol/L 0 5   < >  --    ABG SOURCE   --   --  Line, Arterial    < > = values in this interval not displayed  No results found for: [facility]             Imaging: No new imaging this am     EKG: Afib, rate90's to 110's    Micro: Allergies: Allergies   Allergen Reactions    Penicillins     Wellbutrin Sr  [Bupropion]      Other reaction(s): Suicidal ideations  Category:  Adverse Reaction;        Medications:   Scheduled Meds:  Current Facility-Administered Medications:  acetaminophen 650 mg Oral Q4H PRN Yeni Spironello V, CRNP    albuterol 2 puff Inhalation Q4H PRN Yeni Spironello V, CRNP    amiodarone 1 mg/min Intravenous Continuous Yeni Spironello V, CRNP Last Rate: 1 mg/min (06/28/19 0507)   amiodarone 200 mg Oral TID With Meals Yeni Spironello V, CRNP    benzonatate 200 mg Oral TID PRN Yeni Spironello V, CRNP    furosemide 40 mg/hr Intravenous Continuous Tamela Wilson PA-C Last Rate: 40 mg/hr (06/28/19 0501)   guaiFENesin 600 mg Oral Q12H [address] Yeni Spironello V, CRNP    heparin (porcine) 3-30 Units/kg/hr (Order-Specific) Intravenous Titrated Yeni Spironello V, CRNP Last Rate: Stopped (06/28/19 0555)   insulin regular (HumuLIN R,NovoLIN R) infusion 0 3-21 Units/hr Intravenous Titrated SOTO Lim Last Rate: 0 5 Units/hr (06/28/19 3266)   levothyroxine 137 mcg Oral Daily Yeni Spiroferno SOTO HEAD    loratadine 10 mg Oral Daily Yeni SpiroSOTO Cha    melatonin 6 mg Oral HS ShashaSOTO Zimmer    metoprolol tartrate 12 5 mg Oral Q8H SOTO Junior    milrinone (PRIMACOR) infusion 0 125 mcg/kg/min Intravenous Continuous SOTO Su Last Rate: 0 125 mcg/kg/min (06/27/19 2308)   norepinephrine 1-30 mcg/min Intravenous Titrated Jamal Agustin PA-C Last Rate: 6 mcg/min (06/28/19 5592)   ondansetron 4 mg Intravenous Q6H PRN SOTO Soriano    phenol 1 spray Mouth/Throat Q2H PRN Alexandra Hobson PA-C    pravastatin 40 mg Oral Daily [facility] SOTO HEAD    senna 1 tablet Oral HS SOTO Su    venlafaxine 75 mg Oral Q12H SOTO Soriano      Continuous Infusions:  amiodarone 1 mg/min Last Rate: 1 mg/min (06/28/19 0507)   furosemide 40 mg/hr Last Rate: 40 mg/hr (06/28/19 0501)   heparin (porcine) 3-30 Units/kg/hr (Order-Specific) Last Rate: Stopped (06/28/19 0555)   insulin regular (HumuLIN R,NovoLIN R) infusion 0 3-21 Units/hr Last Rate: 0 5 Units/hr (06/28/19 0609)   milrinone (PRIMACOR) infusion 0 125 mcg/kg/min Last Rate: 0 125 mcg/kg/min (06/27/19 2308)   norepinephrine 1-30 mcg/min Last Rate: 6 mcg/min (06/28/19 0611)     PRN Meds:    acetaminophen 650 mg Q4H PRN   albuterol 2 puff Q4H PRN   benzonatate 200 mg TID PRN   ondansetron 4 mg Q6H PRN   phenol 1 spray Q2H PRN             Portions of the record may have been created with voice recognition software  Occasional wrong word or "sound a like" substitutions may have occurred due to the inherent limitations of voice recognition software  Read the chart carefully and recognize, using context, where substitutions have occurred      [id number] [facility] Kayla Liu

## 2019-06-28 NOTE — PLAN OF CARE
Problem: OCCUPATIONAL THERAPY ADULT  Goal: Performs self-care activities at highest level of function for planned discharge setting  See evaluation for individualized goals  Description  Treatment Interventions: ADL retraining, Functional transfer training, UE strengthening/ROM, Endurance training, Cognitive reorientation, Patient/family training, Equipment evaluation/education, Compensatory technique education, Activityengagement, Energy conservation          See flowsheet documentation for full assessment, interventions and recommendations  Outcome: Progressing  Note:   Limitation: Decreased ADL status, Decreased UE strength, Decreased Safe judgement during ADL, Decreased endurance, Decreased high-level ADLs, Decreased fine motor control  Prognosis: Good  Assessment: Pt is a 65 yo female seen for OT eval s/p transfer from PCP adm to B w/ x3 days worsening dyspnea on exertion and B/L LE edema and A-fib w/ RVR dx'd w/ cardiogenic shock  Comorbidities include a h/o HLD, HTN, hypothyroidism, DM 2, A-fib w/ RVR, acute CHF, WANG, hyponatremia, hyperphosphatemia, shock liver, acute B/L conjunctivitis eye, depression w/ anxiety, obesity, OA  Pt with active OT orders and up with assistance  orders  Pt works PT and resides alone in [facility] w/ 0STE  Pt reports having family and friends that are able to A if needed  Pt was I w/  ADLS and IADLS, drove, & required no use of DME PTA  Pt is currently demonstrating the following occupational deficits: Mod A UB bathing/dressing, Max A LB bathing/dressing, Mod A transfers w/ RW  These deficits that are impacting pt's baseline areas of occupation are a result of the following impairments: pain, endurance, activity tolerance, functional mobility, balance, functional standing tolerance, unsupportive home environment, decreased I w/ ADLS/IADLS and strength   The following Occupational Performance Areas to address include: grooming, bathing/shower, toilet hygiene, dressing, health maintenance, functional mobility and clothing management  Pt scored overall 25/100 on the Barthel Index  Based on the aforementioned OT evaluation, functional performance deficits, and assessments, pt has been identified as a high complexity evaluation  Recommend STR upon D/C   Pt to continue to benefit from acute immediate OT services to address the following goals 3-5x/week to  w/in 7-10 days:      OT Discharge Recommendation: Short Term Rehab  OT - OK to Discharge: Yes(when medically cleared)

## 2019-06-28 NOTE — PROGRESS NOTES
06/28/19 1500   Clinical Encounter Type   Visited With Patient   Routine Visit Follow-up   Continue Visiting Yes

## 2019-06-28 NOTE — PROGRESS NOTES
06/28/19 1500   Spiritual Beliefs/Perceptions   Support Systems Family members   Plan of Care   Comments Pt  doing well, provided a caring presence     Assessment Completed by: Unit visit

## 2019-06-28 NOTE — PROGRESS NOTES
Progress note- Nephrology   Ritika Hash 66 y o  female MRN: [id number]  Unit/Bed#: [facility] 597-17 Encounter: [id number]      ASSESSMENT & PLAN:    35-year-old female with a past medical history hypertension, hypothyroidism hyperlipidemia, presented with new onset atrial fibrillation now with a cardiomyopathy EF of 77-17% complicated now by hypotension and ATN    1  Acute kidney injury likely secondary to hypotensive ischemic ATN  -electrolytes are stable  -lactate improved  -creatinine increasing-urine output stable  -obtained consent for renal replacement therapy if needed  -continue furosemide drip as ordered, given a dose of metolazone  -net negative fluid balance  -I inotropic support    2  Hyperkalemia now improved to 4 3  -potassium now remained stable continue to monitor with urine output    3  Severe lactic metabolic acidosis  -lactate was up to 10 likely related to hypotension and now down to 2  -blood pressure acceptable    4  Hypotension  -currently on norepinephrine, milrinone  -trying to down titrate norepinephrine    5  Cardiomyopathy new onset with EF 25-30%  -Heart failure team monitoring  -on inotropes  -diurese with net negative fluid balance    6  New onset atrial fibrillation  -for pacemaker and ICD once hemodynamically stable    7   Hyponatremia in the setting of hyperglycemia volume overload and SSRI use  -now lower at 123  -ongoing workup ordered will check urine osmolality serum osmolality urine sodium  -decrease Effexor does  -net negative fluid balance    Discussed with ICU and heart failure      SUBJECTIVE:    Patient seen today denies any chest pain or shortness of Breath fevers chills nausea vomiting    MEDICATIONS:    Current Facility-Administered Medications:     acetaminophen (TYLENOL) tablet 650 mg, 650 mg, Oral, Q4H PRN, Yeni Spironello V, CRNP, 650 mg at 06/28/19 1210    albuterol (PROVENTIL HFA,VENTOLIN HFA) inhaler 2 puff, 2 puff, Inhalation, Q4H PRN, Leonarda Galeazzi SOTO HEAD    [COMPLETED] amiodarone 150 mg in dextrose 5 % 100 mL IV bolus, 150 mg, Intravenous, Once, Stopped at 19 1220 **FOLLOWED BY** [] amiodarone (CORDARONE) 900 mg in dextrose 5 % 500 mL infusion, 1 mg/min, Intravenous, Continuous, Stopped at 19 1804 **FOLLOWED BY** amiodarone (CORDARONE) 900 mg in dextrose 5 % 500 mL infusion, 0 5 mg/min, Intravenous, Continuous, SOTO Alarcon, Last Rate: 16 7 mL/hr at 19 1022, 0 5 mg/min at 19 1022    benzonatate (TESSALON PERLES) capsule 200 mg, 200 mg, Oral, TID PRN, SOTO Soriano, 200 mg at 19 1210    furosemide (LASIX) 500 mg infusion 50 mL, 40 mg/hr, Intravenous, Continuous, Samia Collins PA-C, Last Rate: 4 mL/hr at 19 0501, 40 mg/hr at 19 0501    insulin lispro (HumaLOG) 100 units/mL subcutaneous injection 1-5 Units, 1-5 Units, Subcutaneous, HS, SOTO Alarcon    insulin lispro (HumaLOG) 100 units/mL subcutaneous injection 1-6 Units, 1-6 Units, Subcutaneous, TID AC **AND** Fingerstick Glucose (POCT), , , TID AC, SOOT Alarcon    levothyroxine tablet 137 mcg, 137 mcg, Oral, Daily, SOTO Soriano, 137 mcg at 19 0501    loratadine (CLARITIN) tablet 10 mg, 10 mg, Oral, Daily, SOTO Soriano, 10 mg at 19 0844    magnesium sulfate 2 g/50 mL IVPB (premix) 2 g, 2 g, Intravenous, Once, SOTO Alarcon, 2 g at 19 1124    melatonin tablet 6 mg, 6 mg, Oral, HS, SOTO Degroot, 6 mg at 19 2124    Milrinone Lactate in Dextrose (PRIMACOR) 20 MG/100 ML infusion (premix), 0 125 mcg/kg/min, Intravenous, Continuous, SOTO Degroot, Last Rate: 3 mL/hr at 19 2308, 0 125 mcg/kg/min at 198    norepinephrine (LEVOPHED) 4 mg (STANDARD CONCENTRATION) IV in sodium chloride 0 9% 250 mL, 1-30 mcg/min, Intravenous, Titrated, Suzanne Queen PA-C, Last Rate: 7 5 mL/hr at 19 1206, 2 mcg/min at 19 1206   ofloxacin (OCUFLOX) 0 3 % ophthalmic solution 1 drop, 1 drop, Both Eyes, 4x Daily, SOTO Leon, 1 drop at 06/28/19 1210    ondansetron (ZOFRAN) injection 4 mg, 4 mg, Intravenous, Q6H PRN, SOTO Soriano, 4 mg at 06/27/19 2124    phenol (CHLORASEPTIC) 1 4 % mucosal liquid 1 spray, 1 spray, Mouth/Throat, Q2H PRN, Car Dailey PA-C, 1 spray at 06/27/19 2124    senna (SENOKOT) tablet 8 6 mg, 1 tablet, Oral, HS, SOTO Degroot, 8 6 mg at 06/27/19 2125    venlafaxine (EFFEXOR) tablet 50 mg, 50 mg, Oral, Q12H, SOTO Leon    REVIEW OF SYSTEMS:  All the systems were reviewed and were negative except as documented on the HPI        PHYSICAL EXAM:  Current Weight: Weight - Scale: 79 8 kg (175 lb 14 8 oz)  First Weight: Weight - Scale: 85 5 kg (188 lb 7 9 oz)  Vitals:    06/28/19 1000 06/28/19 1100 06/28/19 1200 06/28/19 1300   BP:       Pulse: (!) 106 (!) 114 (!) 108 102   Resp: 19 20 (!) 23 20   Temp: 97 5 °F (36 4 °C) 97 5 °F (36 4 °C) 97 5 °F (36 4 °C) (!) 97 2 °F (36 2 °C)   TempSrc:       SpO2: 98% 98% 98% 97%   Weight:       Height:           Intake/Output Summary (Last 24 hours) at 6/28/2019 1321  Last data filed at 6/28/2019 1200  Gross per 24 hour   Intake 1570 41 ml   Output 1865 ml   Net -294 59 ml     General: conscious, cooperative, in not acute distress  Eyes:  Now with conjunctivitis  ENT: lips and mucous membranes moist  Neck: supple, no JVD  Chest:  Coarse breath sounds  CVS: distinct S1 & S2, normal rate, regular rhythm  Abdomen: soft, non-tender, non-distended, normoactive bowel sounds  Extremities:  Trace edema  Skin: no rash  Neuro: awake, alert, oriented    Lab Results:   Results from last 7 days   Lab Units 06/28/19  0500 06/28/19  0003 06/27/19  1758 06/27/19  1135 06/27/19  0440  06/26/19  0416 06/26/19  0415 06/26/19  0216  06/24/19  1606  06/23/19  0442   WBC Thousand/uL 11 33*  --   --   --  12 19*  --  12 42*  --  10 40*  --  11 31*  --   -- HEMOGLOBIN g/dL 11 6  --   --   --  11 2*  --  14 6  --  15 4  --  14 0  --   --    HEMATOCRIT % 33 0*  --   --   --  32 6*  --  43 2  --  47 6*  --  42 8  --   --    PLATELETS Thousands/uL 173  --   --   --  159  --  228  --  233  --  229  --   --    POTASSIUM mmol/L 4 1 4 3 4 2 4 2 4 1   < >  --  5 7* 6 1*   < >  --    < > 4 0   CHLORIDE mmol/L 87* 89* 91* 91* 93*   < >  --  98* 100   < >  --    < > 105   CO2 mmol/L 24 27 26 27 27   < >  --  20* 16*   < >  --    < > 35*   BUN mg/dL 64* 65* 61* 57* 53*   < >  --  25 24   < >  --    < > 17   CREATININE mg/dL 3 94* 3 74* 3 48* 3 23* 2 83*   < >  --  1 67* 1 64*   < >  --    < > 0 86   CALCIUM mg/dL 7 3* 7 2* 7 3* 7 4* 7 8*   < >  --  8 6 8 0*   < >  --    < > 8 7   MAGNESIUM mg/dL 1 7  --   --   --  2 0  --   --  2 3 2 5  --   --   --  2 1   PHOSPHORUS mg/dL 4 2*  --   --   --   --   --   --  7 0* 6 6*  --   --   --   --    ALK PHOS U/L 109  --   --   --  93  --   --  78  --   --   --   --   --    ALT U/L 4,595*  --   --   --  1,044*  --   --  476*  --   --   --   --   --    AST U/L 5,482*  --   --   --  15,182*  --   --  885*  --   --   --   --   --     < > = values in this interval not displayed         Other Studies:  Chest x-ray shows right basilar opacity suggesting pleural fluid

## 2019-06-29 LAB
ALBUMIN SERPL BCP-MCNC: 2.6 G/DL (ref 3.5–5)
ALP SERPL-CCNC: 135 U/L (ref 46–116)
ALT SERPL W P-5'-P-CCNC: 3638 U/L (ref 12–78)
ANION GAP SERPL CALCULATED.3IONS-SCNC: 9 MMOL/L (ref 4–13)
APTT PPP: 42 SECONDS (ref 23–37)
ARTERIAL PATENCY WRIST A: NO
AST SERPL W P-5'-P-CCNC: 1989 U/L (ref 5–45)
BASE EX.OXY STD BLDV CALC-SCNC: 77.7 % (ref 60–80)
BASE EXCESS BLDV CALC-SCNC: 0.3 MMOL/L
BILIRUB DIRECT SERPL-MCNC: 1.53 MG/DL (ref 0–0.2)
BILIRUB SERPL-MCNC: 2.3 MG/DL (ref 0.2–1)
BODY TEMPERATURE: 97.5 DEGREES FEHRENHEIT
BUN SERPL-MCNC: 80 MG/DL (ref 5–25)
BUN SERPL-MCNC: 80 MG/DL (ref 5–25)
BUN SERPL-MCNC: 81 MG/DL (ref 5–25)
BUN SERPL-MCNC: 84 MG/DL (ref 5–25)
CALCIUM SERPL-MCNC: 7.4 MG/DL (ref 8.3–10.1)
CALCIUM SERPL-MCNC: 7.7 MG/DL (ref 8.3–10.1)
CALCIUM SERPL-MCNC: 8 MG/DL (ref 8.3–10.1)
CALCIUM SERPL-MCNC: 8 MG/DL (ref 8.3–10.1)
CHLORIDE SERPL-SCNC: 85 MMOL/L (ref 100–108)
CHLORIDE SERPL-SCNC: 86 MMOL/L (ref 100–108)
CHLORIDE SERPL-SCNC: 86 MMOL/L (ref 100–108)
CHLORIDE SERPL-SCNC: 87 MMOL/L (ref 100–108)
CO2 SERPL-SCNC: 28 MMOL/L (ref 21–32)
CO2 SERPL-SCNC: 29 MMOL/L (ref 21–32)
CO2 SERPL-SCNC: 30 MMOL/L (ref 21–32)
CO2 SERPL-SCNC: 30 MMOL/L (ref 21–32)
CORTIS SERPL-MCNC: 38.9 UG/DL
CREAT SERPL-MCNC: 4.5 MG/DL (ref 0.6–1.3)
CREAT SERPL-MCNC: 4.68 MG/DL (ref 0.6–1.3)
CREAT SERPL-MCNC: 4.7 MG/DL (ref 0.6–1.3)
CREAT SERPL-MCNC: 4.74 MG/DL (ref 0.6–1.3)
ERYTHROCYTE [DISTWIDTH] IN BLOOD BY AUTOMATED COUNT: 11.6 % (ref 11.6–15.1)
GFR SERPL CREATININE-BSD FRML MDRD: 8 ML/MIN/1.73SQ M
GFR SERPL CREATININE-BSD FRML MDRD: 9 ML/MIN/1.73SQ M
GLUCOSE SERPL-MCNC: 105 MG/DL (ref 65–140)
GLUCOSE SERPL-MCNC: 117 MG/DL (ref 65–140)
GLUCOSE SERPL-MCNC: 126 MG/DL (ref 65–140)
GLUCOSE SERPL-MCNC: 127 MG/DL (ref 65–140)
GLUCOSE SERPL-MCNC: 148 MG/DL (ref 65–140)
GLUCOSE SERPL-MCNC: 172 MG/DL (ref 65–140)
GLUCOSE SERPL-MCNC: 178 MG/DL (ref 65–140)
GLUCOSE SERPL-MCNC: 96 MG/DL (ref 65–140)
HCO3 BLDV-SCNC: 24.6 MMOL/L (ref 24–30)
HCT VFR BLD AUTO: 34.1 % (ref 34.8–46.1)
HGB BLD-MCNC: 12.2 G/DL (ref 11.5–15.4)
INR PPP: 5.34 (ref 0.84–1.19)
MCH RBC QN AUTO: 32.1 PG (ref 26.8–34.3)
MCHC RBC AUTO-ENTMCNC: 35.8 G/DL (ref 31.4–37.4)
MCV RBC AUTO: 90 FL (ref 82–98)
NASAL CANNULA: 2
O2 CT BLDV-SCNC: 14.4 ML/DL
PCO2 BLD: 37.6 MM HG (ref 42–50)
PCO2 BLDV: 38.6 MM HG (ref 42–50)
PH BLD: 7.43 [PH] (ref 7.3–7.4)
PH BLDV: 7.42 [PH] (ref 7.3–7.4)
PLATELET # BLD AUTO: 188 THOUSANDS/UL (ref 149–390)
PMV BLD AUTO: 11.4 FL (ref 8.9–12.7)
PO2 BLDV: 44.3 MM HG (ref 35–45)
PO2 VENOUS TEMP CORRECTED: 42.5 MM HG (ref 35–45)
POTASSIUM SERPL-SCNC: 3.7 MMOL/L (ref 3.5–5.3)
POTASSIUM SERPL-SCNC: 3.9 MMOL/L (ref 3.5–5.3)
POTASSIUM SERPL-SCNC: 3.9 MMOL/L (ref 3.5–5.3)
POTASSIUM SERPL-SCNC: 4 MMOL/L (ref 3.5–5.3)
PROT SERPL-MCNC: 5.5 G/DL (ref 6.4–8.2)
PROTHROMBIN TIME: 48.4 SECONDS (ref 11.6–14.5)
RBC # BLD AUTO: 3.8 MILLION/UL (ref 3.81–5.12)
SODIUM SERPL-SCNC: 123 MMOL/L (ref 136–145)
SODIUM SERPL-SCNC: 123 MMOL/L (ref 136–145)
SODIUM SERPL-SCNC: 125 MMOL/L (ref 136–145)
SODIUM SERPL-SCNC: 126 MMOL/L (ref 136–145)
WBC # BLD AUTO: 10.5 THOUSAND/UL (ref 4.31–10.16)

## 2019-06-29 PROCEDURE — 94760 N-INVAS EAR/PLS OXIMETRY 1: CPT

## 2019-06-29 PROCEDURE — 80048 BASIC METABOLIC PNL TOTAL CA: CPT | Performed by: PHYSICIAN ASSISTANT

## 2019-06-29 PROCEDURE — 85027 COMPLETE CBC AUTOMATED: CPT | Performed by: NURSE PRACTITIONER

## 2019-06-29 PROCEDURE — 82805 BLOOD GASES W/O2 SATURATION: CPT | Performed by: NURSE PRACTITIONER

## 2019-06-29 PROCEDURE — 82533 TOTAL CORTISOL: CPT | Performed by: NURSE PRACTITIONER

## 2019-06-29 PROCEDURE — 99233 SBSQ HOSP IP/OBS HIGH 50: CPT | Performed by: INTERNAL MEDICINE

## 2019-06-29 PROCEDURE — 82948 REAGENT STRIP/BLOOD GLUCOSE: CPT

## 2019-06-29 PROCEDURE — 80076 HEPATIC FUNCTION PANEL: CPT | Performed by: NURSE PRACTITIONER

## 2019-06-29 PROCEDURE — 85730 THROMBOPLASTIN TIME PARTIAL: CPT | Performed by: INTERNAL MEDICINE

## 2019-06-29 PROCEDURE — 94668 MNPJ CHEST WALL SBSQ: CPT

## 2019-06-29 PROCEDURE — 85610 PROTHROMBIN TIME: CPT | Performed by: NURSE PRACTITIONER

## 2019-06-29 PROCEDURE — 99291 CRITICAL CARE FIRST HOUR: CPT | Performed by: INTERNAL MEDICINE

## 2019-06-29 PROCEDURE — 99231 SBSQ HOSP IP/OBS SF/LOW 25: CPT | Performed by: INTERNAL MEDICINE

## 2019-06-29 RX ORDER — ALBUMIN, HUMAN INJ 5% 5 %
SOLUTION INTRAVENOUS
Status: COMPLETED
Start: 2019-06-29 | End: 2019-06-29

## 2019-06-29 RX ORDER — BISACODYL 10 MG
10 SUPPOSITORY, RECTAL RECTAL DAILY PRN
Status: DISCONTINUED | OUTPATIENT
Start: 2019-06-29 | End: 2019-07-11 | Stop reason: HOSPADM

## 2019-06-29 RX ORDER — POLYETHYLENE GLYCOL 3350 17 G/17G
17 POWDER, FOR SOLUTION ORAL DAILY
Status: DISCONTINUED | OUTPATIENT
Start: 2019-06-29 | End: 2019-07-11 | Stop reason: HOSPADM

## 2019-06-29 RX ORDER — ECHINACEA PURPUREA EXTRACT 125 MG
2 TABLET ORAL AS NEEDED
Status: DISCONTINUED | OUTPATIENT
Start: 2019-06-29 | End: 2019-07-11 | Stop reason: HOSPADM

## 2019-06-29 RX ORDER — DOXYCYCLINE HYCLATE 100 MG/1
100 CAPSULE ORAL EVERY 12 HOURS SCHEDULED
Status: COMPLETED | OUTPATIENT
Start: 2019-06-29 | End: 2019-07-03

## 2019-06-29 RX ORDER — ALBUMIN, HUMAN INJ 5% 5 %
25 SOLUTION INTRAVENOUS ONCE
Status: COMPLETED | OUTPATIENT
Start: 2019-06-29 | End: 2019-06-29

## 2019-06-29 RX ADMIN — AMIODARONE HYDROCHLORIDE 0.5 MG/MIN: 50 INJECTION, SOLUTION INTRAVENOUS at 03:00

## 2019-06-29 RX ADMIN — Medication 20 MG/HR: at 19:53

## 2019-06-29 RX ADMIN — VENLAFAXINE 50 MG: 50 TABLET ORAL at 10:57

## 2019-06-29 RX ADMIN — VENLAFAXINE 50 MG: 50 TABLET ORAL at 21:24

## 2019-06-29 RX ADMIN — LORATADINE 10 MG: 10 TABLET ORAL at 08:29

## 2019-06-29 RX ADMIN — INSULIN LISPRO 1 UNITS: 100 INJECTION, SOLUTION INTRAVENOUS; SUBCUTANEOUS at 18:18

## 2019-06-29 RX ADMIN — TOPICAL ANESTHETIC 2 SPRAY: 200 SPRAY DENTAL; PERIODONTAL at 10:54

## 2019-06-29 RX ADMIN — DOXYCYCLINE 100 MG: 100 CAPSULE ORAL at 21:24

## 2019-06-29 RX ADMIN — NOREPINEPHRINE BITARTRATE 10 MCG/MIN: 1 INJECTION INTRAVENOUS at 15:40

## 2019-06-29 RX ADMIN — POLYETHYLENE GLYCOL 3350 17 G: 17 POWDER, FOR SOLUTION ORAL at 12:28

## 2019-06-29 RX ADMIN — TOPICAL ANESTHETIC 2 SPRAY: 200 SPRAY DENTAL; PERIODONTAL at 21:25

## 2019-06-29 RX ADMIN — SENNOSIDES 8.6 MG: 8.6 TABLET, FILM COATED ORAL at 21:24

## 2019-06-29 RX ADMIN — ALBUMIN (HUMAN) 25 G: 12.5 SOLUTION INTRAVENOUS at 15:28

## 2019-06-29 RX ADMIN — TOPICAL ANESTHETIC 2 SPRAY: 200 SPRAY DENTAL; PERIODONTAL at 14:27

## 2019-06-29 RX ADMIN — MELATONIN 6 MG: at 21:24

## 2019-06-29 RX ADMIN — Medication 40 MG/HR: at 04:51

## 2019-06-29 RX ADMIN — ALBUMIN, HUMAN INJ 5% 25 G: 5 SOLUTION at 15:28

## 2019-06-29 RX ADMIN — DOXYCYCLINE 100 MG: 100 CAPSULE ORAL at 11:05

## 2019-06-29 RX ADMIN — OFLOXACIN 1 DROP: 3 SOLUTION/ DROPS OPHTHALMIC at 12:28

## 2019-06-29 RX ADMIN — OFLOXACIN 1 DROP: 3 SOLUTION/ DROPS OPHTHALMIC at 18:17

## 2019-06-29 RX ADMIN — OFLOXACIN 1 DROP: 3 SOLUTION/ DROPS OPHTHALMIC at 08:29

## 2019-06-29 RX ADMIN — MILRINONE LACTATE IN DEXTROSE 0.12 MCG/KG/MIN: 200 INJECTION, SOLUTION INTRAVENOUS at 04:53

## 2019-06-29 RX ADMIN — OFLOXACIN 1 DROP: 3 SOLUTION/ DROPS OPHTHALMIC at 21:25

## 2019-06-29 RX ADMIN — LEVOTHYROXINE SODIUM 137 MCG: 112 TABLET ORAL at 06:25

## 2019-06-29 NOTE — PROGRESS NOTES
Cardiology Progress Note - Gurjit Lara 66 y o  female MRN: [id number]    Unit/Bed#: [facility] 513-01 Encounter: [id number]      Assessment:  Principal Problem:    Cardiogenic shock ([facility] )  Active Problems:    Hyperlipidemia    Hypertension    Hypothyroidism    Type 2 diabetes mellitus with diabetic cataract ([facility] )    Atrial fibrillation with RVR ([facility] )    Acute CHF (congestive heart failure) ([facility] )    Acute kidney injury ([facility] )    Hyponatremia    Hyperphosphatemia    Shock liver    Acute bacterial conjunctivitis of both eyes      Plan:  Patient continues on epinephrine and Milrinone  She is well continues on intravenous amiodarone  She is in atrial fibrillation with a moderate ventricular response  Creatinine this morning is 4 68  Sodium is 125 with a potassium of 3 9  In reference to acute on chronic renal insufficiency nephrology notes are appreciated  Patient continues on intravenous furosemide drip  In reference to atrial fibrillation will continue present medical regimen  In reference to cardiomyopathy will continue present medical regimen  Will hopefully be able to eventually wean off pressor  Subjective:   Patient seen and examined  No significant events overnight   negative  Objective:     Vitals: Blood pressure 133/61, pulse (!) 108, temperature (!) 97 2 °F (36 2 °C), resp  rate (!) 25, height 5' 1" (1 549 m), weight 80 8 kg (178 lb 2 1 oz), SpO2 98 %  , Body mass index is 33 66 kg/m² ,   Orthostatic Blood Pressures      Most Recent Value   Blood Pressure  133/61 filed at 06/29/2019 0800   Patient Position - Orthostatic VS  Lying filed at 06/29/2019 0300      ,      Intake/Output Summary (Last 24 hours) at 6/29/2019 0851  Last data filed at 6/29/2019 0800  Gross per 24 hour   Intake 1646 53 ml   Output 3420 ml   Net -1773 47 ml       No significant arrhythmias seen on telemetry review    Atrial fibrillation      Physical Exam:    GEN: Gurjit Lara appears well, alert and oriented x 3, pleasant and cooperative   NECK: supple, no carotid bruits, no JVD or HJR  HEART: normal rate, regular rhythm, normal S1 and S2, no murmurs, clicks, gallops or rubs   LUNGS: clear to auscultation bilaterally; no wheezes, rales, or rhonchi   ABDOMEN: normal bowel sounds, soft, no tenderness, no distention  EXTREMITIES: peripheral pulses normal; no clubbing, cyanosis, or edema  SKIN: warm and well perfused, no suspicious lesions on exposed skin    Labs & Results:    No results displayed because visit has over 200 results  Xr Chest Portable    Result Date: 6/26/2019  Narrative: CHEST INDICATION:   cvl  COMPARISON:  06/25/2019 EXAM PERFORMED/VIEWS:  XR CHEST PORTABLE Images: 2 FINDINGS: Cardiomediastinal silhouette appears enlarged  Pulmonary vessels are normal  A central line enters via the right internal jugular vein  The tip extends to the SVC  Persistent opacity at the right lung base may represent infiltrate and/or pleural fluid  The left lung is clear  No pneumothorax or pleural effusion  Osseous structures appear within normal limits for patient age  Impression: 1  Central line entering via the right internal jugular vein with its tip overlying the SVC  There is no pneumothorax  2   Persistent opacity at the right lung base which may represent a combination of infiltrate and pleural fluid  Workstation performed: [id number]     Xr Chest Portable    Result Date: 6/25/2019  Narrative: CHEST INDICATION:   hypoxic resp failure  COMPARISON:  June 18, 2019 EXAM PERFORMED/VIEWS:  XR CHEST PORTABLE FINDINGS: Mild cardiomegaly, stable  Calcified plaque within the aortic arch  Worsening opacity at the right lung base  Which favors increasing right pleural fluid  Lungs otherwise appear clear  No pneumothorax  Osseous structures appear within normal limits for patient age  Impression: Cardiomegaly  Increasing right basilar opacities suggesting pleural fluid   Workstation performed: [id number] Xr Chest 1 View Portable    Result Date: 6/19/2019  Narrative: CHEST INDICATION:   AFib w/ rvr  COMPARISON:  None EXAM PERFORMED/VIEWS:  XR CHEST PORTABLE One image FINDINGS: Cardiomediastinal silhouette appears enlarged  Patient is in mild CHF  The lungs are clear  No pneumothorax   The costophrenic angles are blunted which may represent small pleural effusions  Osseous structures appear within normal limits for patient age  Impression: Cardiomegaly and mild CHF  Possible small pleural effusions  Workstation performed: [id number]     Xr Chest Portable Icu    Result Date: 6/28/2019  Narrative: CHEST INDICATION:   dyspnea  COMPARISON:  Chest radiographs June 26, 2019 EXAM PERFORMED/VIEWS:  XR CHEST PORTABLE ICU  AP semierect Images: 1 FINDINGS: The heart is enlarged  Atherosclerotic changes in the aorta  Right internal jugular central line with tip above cavoatrial junction  Lung volumes diminished  [place] and pulmonary vessels diffusely enlarged  Obscuration of the hemidiaphragms bilaterally as well as bilateral heart borders, secondary to worsening fluid and/or infiltrates  Osseous structures appear within normal limits for patient age  Impression: Worsening congestive heart failure  Workstation performed: [id number]     Cta Ed Chest Pe Study    Result Date: 6/18/2019  Narrative: CTA - CHEST WITH IV CONTRAST - PULMONARY ANGIOGRAM INDICATION:   Weakness, shortness of breath and leg swelling  COMPARISON: 6/18/2019  TECHNIQUE: CTA examination of the chest was performed using angiographic technique according to a protocol specifically tailored to evaluate for pulmonary embolism  Axial, sagittal, and coronal 2D reformatted images were created from the source data and  submitted for interpretation  In addition, coronal 3D MIP postprocessing was performed on the acquisition scanner  Radiation dose length product (DLP) for this visit:  749 22 mGy-cm     This examination, like all CT scans performed in the [facility], was performed utilizing techniques to minimize radiation dose exposure, including the use of iterative  reconstruction and automated exposure control  IV Contrast:  78 mL of iohexol (OMNIPAQUE)  FINDINGS: PULMONARY ARTERIAL TREE:  No filling defect in the visualized pulmonary arteries to suggest acute embolus  LUNGS:  Pulmonary vascular congestion without interstitial edema  Mild relaxation atelectasis at the lung bases  There is no tracheal or endobronchial lesion  PLEURA:  Small bilateral pleural effusions  HEART/GREAT VESSELS:  Cardiomegaly  MEDIASTINUM AND OSMEL:  Unremarkable  CHEST WALL AND LOWER NECK:   Unremarkable  VISUALIZED STRUCTURES IN THE UPPER ABDOMEN:  Unremarkable  OSSEOUS STRUCTURES:  No acute fracture or destructive osseous lesion  Impression: 1  No evidence of acute pulmonary embolus or thoracic aortic aneurysm  2   Small bilateral pleural effusions and pulmonary vascular congestion without pulmonary interstitial edema, possibly indicating early or mild congestive heart failure  Workstation performed: [id number]       EKG personally reviewed by Isac Alpers, MD      Counseling / Coordination of Care  Total floor / unit time spent today 30 minutes  Greater than 50% of total time was spent with the patient and / or family counseling and / or coordination of care

## 2019-06-29 NOTE — PROGRESS NOTES
06/29/19 1400   Clinical Encounter Type   Visited With Patient and family together   Referral From Nurse   Referral To    Patient Spiritual Encounters   Spiritual Assessment 3   Coping 3   Social Interaction 100% of the time   Spiritual Encounter Notes Cultivated a relationship of care and support to patient and her sister  Listened empathically  Facilitated story telling about hospital stay, family, support systems  Explored emotional needs and resources  Provided prayer     Family Spiritual Encounters   Family Coping Fearful;Open/discussion   Family Participation in Care 5   Family Support During Treatment 5

## 2019-06-29 NOTE — PLAN OF CARE
Problem: PAIN - ADULT  Goal: Verbalizes/displays adequate comfort level or baseline comfort level  Description  Interventions:  - Encourage patient to monitor pain and request assistance  - Assess pain using appropriate pain scale  - Administer analgesics based on type and severity of pain and evaluate response  - Implement non-pharmacological measures as appropriate and evaluate response  - Consider cultural and social influences on pain and pain management  - Notify physician/advanced practitioner if interventions unsuccessful or patient reports new pain  Outcome: Progressing     Problem: INFECTION - ADULT  Goal: Absence or prevention of progression during hospitalization  Description  INTERVENTIONS:  - Assess and monitor for signs and symptoms of infection  - Monitor lab/diagnostic results  - Monitor all insertion sites, i e  indwelling lines, tubes, and drains  - Monitor endotracheal (as able) and nasal secretions for changes in amount and color  - [place] appropriate cooling/warming therapies per order  - Administer medications as ordered  - Instruct and encourage patient and family to use good hand hygiene technique  - Identify and instruct in appropriate isolation precautions for identified infection/condition  Outcome: Progressing  Goal: Absence of fever/infection during neutropenic period  Description  INTERVENTIONS:  - Monitor WBC  - Implement neutropenic guidelines  Outcome: Progressing     Problem: SAFETY ADULT  Goal: Patient will remain free of falls  Description  INTERVENTIONS:  - Assess patient frequently for physical needs  -  Identify cognitive and physical deficits and behaviors that affect risk of falls    -  [place] fall precautions as indicated by assessment   - Educate patient/family on patient safety including physical limitations  - Instruct patient to call for assistance with activity based on assessment  - Modify environment to reduce risk of injury  - Consider OT/PT consult to assist with strengthening/mobility  Outcome: Progressing  Goal: Maintain or return to baseline ADL function  Description  INTERVENTIONS:  -  Assess patient's ability to carry out ADLs; assess patient's baseline for ADL function and identify physical deficits which impact ability to perform ADLs (bathing, care of mouth/teeth, toileting, grooming, dressing, etc )  - Assess/evaluate cause of self-care deficits   - Assess range of motion  - Assess patient's mobility; develop plan if impaired  - Assess patient's need for assistive devices and provide as appropriate  - Encourage maximum independence but intervene and supervise when necessary  ¯ Involve family in performance of ADLs  ¯ Assess for home care needs following discharge   ¯ Request OT consult to assist with ADL evaluation and planning for discharge  ¯ Provide patient education as appropriate  Outcome: Progressing  Goal: Maintain or return mobility status to optimal level  Description  INTERVENTIONS:  - Assess patient's baseline mobility status (ambulation, transfers, stairs, etc )    - Identify cognitive and physical deficits and behaviors that affect mobility  - Identify mobility aids required to assist with transfers and/or ambulation (gait belt, sit-to-stand, lift, walker, cane, etc )  - [place] fall precautions as indicated by assessment  - Record patient progress and toleration of activity level on Mobility SBAR; progress patient to next Phase/Stage  - Instruct patient to call for assistance with activity based on assessment  - Request Rehabilitation consult to assist with strengthening/weightbearing, etc   Outcome: Progressing     Problem: DISCHARGE PLANNING  Goal: Discharge to home or other facility with appropriate resources  Description  INTERVENTIONS:  - Identify barriers to discharge w/patient and caregiver  - Arrange for needed discharge resources and transportation as appropriate  - Identify discharge learning needs (meds, wound care, etc )  - Arrange for interpretive services to assist at discharge as needed  - Refer to Case Management Department for coordinating discharge planning if the patient needs post-hospital services based on physician/advanced practitioner order or complex needs related to functional status, cognitive ability, or social support system  Outcome: Progressing     Problem: Knowledge Deficit  Goal: Patient/family/caregiver demonstrates understanding of disease process, treatment plan, medications, and discharge instructions  Description  Complete learning assessment and assess knowledge base  Interventions:  - Provide teaching at level of understanding  - Provide teaching via preferred learning methods  Outcome: Progressing     Problem: Potential for Falls  Goal: Patient will remain free of falls  Description  INTERVENTIONS:  - Assess patient frequently for physical needs  -  Identify cognitive and physical deficits and behaviors that affect risk of falls  -  [place] fall precautions as indicated by assessment   - Educate patient/family on patient safety including physical limitations  - Instruct patient to call for assistance with activity based on assessment  - Modify environment to reduce risk of injury  - Consider OT/PT consult to assist with strengthening/mobility  Outcome: Progressing     Problem: CARDIOVASCULAR - ADULT  Goal: Maintains optimal cardiac output and hemodynamic stability  Description  INTERVENTIONS:  - Monitor I/O, vital signs and rhythm  - Monitor for S/S and trends of decreased cardiac output i e  bleeding, hypotension  - Administer and titrate ordered vasoactive medications to optimize hemodynamic stability  - Assess quality of pulses, skin color and temperature  - Assess for signs of decreased coronary artery perfusion - ex   Angina  - Instruct patient to report change in severity of symptoms  Outcome: Progressing  Goal: Absence of cardiac dysrhythmias or at baseline rhythm  Description  INTERVENTIONS:  - Continuous cardiac monitoring, monitor vital signs, obtain 12 lead EKG if indicated  - Administer antiarrhythmic and heart rate control medications as ordered  - Monitor electrolytes and administer replacement therapy as ordered  Outcome: Progressing     Problem: Prexisting or High Potential for Compromised Skin Integrity  Goal: Skin integrity is maintained or improved  Description  INTERVENTIONS:  - Identify patients at risk for skin breakdown  - Assess and monitor skin integrity  - Assess and monitor nutrition and hydration status  - Monitor labs (i e  albumin)  - Assess for incontinence   - Turn and reposition patient  - Assist with mobility/ambulation  - Relieve pressure over bony prominences  - Avoid friction and shearing  - Provide appropriate hygiene as needed including keeping skin clean and dry  - Evaluate need for skin moisturizer/barrier cream  - Collaborate with interdisciplinary team (i e  Nutrition, Rehabilitation, etc )   - Patient/family teaching  Outcome: Progressing     Problem: Nutrition/Hydration-ADULT  Goal: Nutrient/Hydration intake appropriate for improving, restoring or maintaining nutritional needs  Description  Monitor and assess patient's nutrition/hydration status for malnutrition (ex- brittle hair, bruises, dry skin, pale skin and conjunctiva, muscle wasting, smooth red tongue, and disorientation)  Collaborate with interdisciplinary team and initiate plan and interventions as ordered  Monitor patient's weight and dietary intake as ordered or per policy  Utilize nutrition screening tool and intervene per policy  Determine patient's food preferences and provide high-protein, high-caloric foods as appropriate       INTERVENTIONS:  - Monitor oral intake, urinary output, labs, and treatment plans  - Assess nutrition and hydration status and recommend course of action  - Evaluate amount of meals eaten  - Assist patient with eating if necessary   - Allow adequate time for meals  - Recommend/ encourage appropriate diets, oral nutritional supplements, and vitamin/mineral supplements  - Order, calculate, and assess calorie counts as needed  - Recommend, monitor, and adjust tube feedings and TPN/PPN based on assessed needs  - Assess need for intravenous fluids  - Provide specific nutrition/hydration education as appropriate  - Include patient/family/caregiver in decisions related to nutrition  Outcome: Progressing

## 2019-06-29 NOTE — PROGRESS NOTES
PT's MAP 43 with cuff pressure  CC medicine at bedside, decreased lasix to 20 mg/hr, 500 albumin given

## 2019-06-29 NOTE — PROGRESS NOTES
06/29/19 590 Kristine Terry Affiliation [facility] in [facility] Involvement Patient active with [employer]   Spiritual Leader [id number] [place] Leader Aware/Contacted Leader/[employer] aware of patient's status   Spiritual Beliefs/Perceptions   Concept of God Accepting   Relationship with God Acceptance of status   Support Systems Family members   Stress Factors   Patient Stress Factors Health changes   Family Stress Factors Health changes   Coping Responses   Patient Coping Open/discussion; Sadness   Family Coping Fearful;Open/discussion; Sadness   Patient Spiritual Assessment   Feelings of Discouragement Yes, because of long hospitalization     Plan of Care   Assessment Completed by: Unit visit

## 2019-06-29 NOTE — PROGRESS NOTES
Progress note- Nephrology   Sirisha Alaniz 66 y o  female MRN: [id number]  Unit/Bed#: [facility] 697-34 Encounter: [id number]      ASSESSMENT & PLAN:    71-year-old female with a past medical history hypertension, hypothyroidism hyperlipidemia, presented with new onset atrial fibrillation now with a cardiomyopathy EF of 09-30% complicated now by hypotension and ATN    1  Acute kidney injury likely secondary to hypotensive ischemic ATN  -electrolytes are stable  -lactate improved  -creatinine increasing-urine output stable  -obtained consent for renal replacement therapy if needed  -continue furosemide drip as ordered, given a dose of metolazone  -net negative fluid balance  -I inotropic support  -overall net negative fluid balance even though creatinine is worsening will monitor off dialysis    2  Hyperkalemia now improved  -potassium now remained stable continue to monitor with urine output    3  Severe lactic metabolic acidosis  -lactate was up to 10 likely related to hypotension and now down to 2  -blood pressure acceptable    4  Hypotension  -currently on norepinephrine, milrinone  -trying to down titrate norepinephrine    5  Cardiomyopathy new onset with EF 25-30%  -Heart failure team monitoring  -on inotropes  -diurese with net negative fluid balance    6  New onset atrial fibrillation  -for pacemaker and ICD once hemodynamically stable    7   Hyponatremia in the setting of hyperglycemia volume overload and SSRI use  -now at 125  -urine sodium 88, urine osmolality 237, not high, fractional excretion of uric acid calculated at 5 87 percent which it could indicate patient slightly pre renal/decreased effective arterial blood volume  -continue to decrease Effexor does  -difficult situation as chest x-ray shows worsening congestive heart failure-monitor serum sodium in the setting of net negative fluid balance when overall volume status has improved can consider giving isotonic fluid to improve serum sodium    Discussed with ICU       SUBJECTIVE:    Patient seen today states she is feeling somewhat better denies any chest pain or shortness of Breath still with a cough    MEDICATIONS:    Current Facility-Administered Medications:     acetaminophen (TYLENOL) tablet 650 mg, 650 mg, Oral, Q4H PRN, SOTO Soriano, 650 mg at 19 1210    albuterol (PROVENTIL HFA,VENTOLIN HFA) inhaler 2 puff, 2 puff, Inhalation, Q4H PRN, SOTO Soriano    [COMPLETED] amiodarone 150 mg in dextrose 5 % 100 mL IV bolus, 150 mg, Intravenous, Once, Stopped at 19 1220 **FOLLOWED BY** [] amiodarone (CORDARONE) 900 mg in dextrose 5 % 500 mL infusion, 1 mg/min, Intravenous, Continuous, Stopped at 19 1804 **FOLLOWED BY** amiodarone (CORDARONE) 900 mg in dextrose 5 % 500 mL infusion, 0 5 mg/min, Intravenous, Continuous, SOTO Garcia, Last Rate: 16 7 mL/hr at 19 1022, 0 5 mg/min at 19 1022    benzocaine (HURRICAINE) 20 % mucosal spray 2 spray, 2 spray, Mucosal, TID PRN, SOTO Nix, 2 spray at 19 2222    furosemide (LASIX) 500 mg infusion 50 mL, 40 mg/hr, Intravenous, Continuous, Samia Collins PA-C, Last Rate: 4 mL/hr at 19 0451, 40 mg/hr at 19 0451    insulin lispro (HumaLOG) 100 units/mL subcutaneous injection 1-5 Units, 1-5 Units, Subcutaneous, HS, SOTO Garcia    insulin lispro (HumaLOG) 100 units/mL subcutaneous injection 1-6 Units, 1-6 Units, Subcutaneous, TID AC, 1 Units at 19 1718 **AND** Fingerstick Glucose (POCT), , , TID AC, SOTO Garcia    levothyroxine tablet 137 mcg, 137 mcg, Oral, Daily, BAYRON SorianoNP, 137 mcg at 19 3595    loratadine (CLARITIN) tablet 10 mg, 10 mg, Oral, Daily, Yeni Carranza V, CRNP, 10 mg at 19 0829    melatonin tablet 6 mg, 6 mg, Oral, HS, Prisca Nicholas, CRNP, 6 mg at 19 2210    Milrinone Lactate in Dextrose (PRIMACOR) 20 MG/100 ML infusion (premix), 0 125 mcg/kg/min, Intravenous, Continuous, SOTO Degroot, Last Rate: 3 mL/hr at 06/29/19 0453, 0 125 mcg/kg/min at 06/29/19 0453    norepinephrine (LEVOPHED) 4 mg (STANDARD CONCENTRATION) IV in sodium chloride 0 9% 250 mL, 1-30 mcg/min, Intravenous, Titrated, Suzanne Queen PA-C, Last Rate: 11 3 mL/hr at 06/29/19 0251, 3 mcg/min at 06/29/19 0251    ofloxacin (OCUFLOX) 0 3 % ophthalmic solution 1 drop, 1 drop, Both Eyes, 4x Daily, SOTO Alarcon, 1 drop at 06/29/19 0829    ondansetron (ZOFRAN) injection 4 mg, 4 mg, Intravenous, Q6H PRN, BAYRON SorianoNP, 4 mg at 06/27/19 2124    senna (SENOKOT) tablet 8 6 mg, 1 tablet, Oral, HS, SOTO Degroot, 8 6 mg at 06/28/19 2210    venlafaxine (EFFEXOR) tablet 50 mg, 50 mg, Oral, Q12H, SOTO Alarcon, 50 mg at 06/28/19 2209    REVIEW OF SYSTEMS:  All the systems were reviewed and were negative except as documented on the HPI        PHYSICAL EXAM:  Current Weight: Weight - Scale: 80 8 kg (178 lb 2 1 oz)  First Weight: Weight - Scale: 85 5 kg (188 lb 7 9 oz)  Vitals:    06/29/19 0300 06/29/19 0400 06/29/19 0500 06/29/19 0600   BP: 106/57   (!) 129/48   BP Location: Left arm      Pulse: (!) 108 (!) 118 (!) 114 (!) 116   Resp: 16 20 18 17   Temp: (!) 97 2 °F (36 2 °C) 97 5 °F (36 4 °C) 97 5 °F (36 4 °C) 97 5 °F (36 4 °C)   TempSrc: Probe Probe Probe Probe   SpO2: 98% 97% 98% 97%   Weight:    80 8 kg (178 lb 2 1 oz)   Height:           Intake/Output Summary (Last 24 hours) at 6/29/2019 0831  Last data filed at 6/29/2019 0600  Gross per 24 hour   Intake 1545 93 ml   Output 3070 ml   Net -1524 07 ml     General: conscious, cooperative, in not acute distress  Eyes:  Conjunctivae  ENT:  Oral mucosa moist  Neck: supple, no JVD  Chest:  Coarse breath sounds  CVS: distinct S1 & S2, normal rate, regular rhythm  Abdomen: soft, non-tender, non-distended, normoactive bowel sounds  Extremities:  Trace edema  Skin: no rash  Neuro: awake, alert, oriented    Lab Results:   Results from last 7 days   Lab Units 06/29/19  0445 06/29/19  0031 06/28/19  1822 06/28/19  1201 06/28/19  0500  06/27/19  0440  06/26/19  0416 06/26/19 0415 06/26/19 0216 06/23/19 0442   WBC Thousand/uL 10 50*  --   --   --  11 33*  --  12 19*  --  12 42*  --  10 40*   < >  --    HEMOGLOBIN g/dL 12 2  --   --   --  11 6  --  11 2*  --  14 6  --  15 4   < >  --    HEMATOCRIT % 34 1*  --   --   --  33 0*  --  32 6*  --  43 2  --  47 6*   < >  --    PLATELETS Thousands/uL 188  --   --   --  173  --  159  --  228  --  233   < >  --    POTASSIUM mmol/L 3 9 3 9 3 9 4 1 4 1   < > 4 1   < >  --  5 7* 6 1*   < > 4 0   CHLORIDE mmol/L 86* 87* 86* 87* 87*   < > 93*   < >  --  98* 100   < > 105   CO2 mmol/L 30 30 27 25 24   < > 27   < >  --  20* 16*   < > 35*   BUN mg/dL 80* 81* 71* 71* 64*   < > 53*   < >  --  25 24   < > 17   CREATININE mg/dL 4 68* 4 50* 4 35* 4 21* 3 94*   < > 2 83*   < >  --  1 67* 1 64*   < > 0 86   CALCIUM mg/dL 7 7* 7 4* 7 4* 7 5* 7 3*   < > 7 8*   < >  --  8 6 8 0*   < > 8 7   MAGNESIUM mg/dL  --   --   --   --  1 7  --  2 0  --   --  2 3 2 5  --  2 1   PHOSPHORUS mg/dL  --   --   --   --  4 2*  --   --   --   --  7 0* 6 6*  --   --    ALK PHOS U/L 135*  --   --   --  109  --  93  --   --  78  --   --   --    ALT U/L 3,638*  --   --   --  4,595*  --  6,164*  --   --  476*  --   --   --    AST U/L 1,989*  --   --   --  5,482*  --  15,182*  --   --  885*  --   --   --     < > = values in this interval not displayed         Other Studies:  Chest x-ray shows right basilar opacity suggesting pleural fluid

## 2019-06-29 NOTE — PROGRESS NOTES
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)    Atrial fibrillation with RVR (HCC)    Acute CHF (congestive heart failure) (HCC)    Acute kidney injury (HCC)    Hyponatremia    Hyperphosphatemia    Shock liver    Acute bacterial conjunctivitis of both eyes  Resolved Problems:    Lactic acidosis    Hyperkalemia    High anion gap metabolic acidosis    Plan:   · Neuro: AAOx4  · Analgesia: PRN tylenol  · Sedation: none  · Delirium PPX: CAM-ICU, sleep hygiene   · Depression: continue effexor  · CV:   · Cardiogenic Shock: continue primacor 0 125  · AM Scvo2  · Wean levo to off as able  · Hold betablocker  · Acute Decompensated Heart Failure: as above  · Continue lasix infusion for goal negative 1L  · Atrial Fibrillation: continue amiodarone infusion    · Appreciate EP recs  · Possible ablation when more stable  · HLD: statin  · Lung:   · Pulmonary insufficiency: currently on room air  · IS/flutter valve  · GI:   · Regular diet, ensure supplementation  · Bowel Reg: colace/senna  · FI PPX: not indicated  · Constipation: will start daily miralax  · FEN:   · Fluids: no maintenance  · Electrolytes - Monitor/trend - replete based on deficiencies   · Nutrition: regular diet  · :   · WANG: cr continues to rise, appreciate nephrology, consented for dialysis   · Maintain sims, making adequate urine   · Continue lasix infusion, goal negative 1L  · ID:   · Bronchitis/Pharyngitis: as exhibited by cough/tonsilar exudate x7days  · Doxycycline 100 BID x5 days  · Conjunctivitis: ofloxacin  · Heme:   · Hgb stable  · Supratherapeutic INR: INR 5 4 this AM hold heparin drip  · DVT PPX: SCDs  · Endo:   · DM: SSI  · Hypothyroidism: levothyroxine  · Msk/Skin:   · OOB to chair  · PT/OT  · Disposition: ICU level care    ______________________________________________________________________    HPI/24hr events: Net negative with lasix infusion  No acute events overnight     Lines  [facility] CV  Murray    Infusions  Lasix 40  Primacor 0 125  Levo 4  ______________________________________________________________________  Physical Exam:     Physical Exam   Constitutional: She is oriented to person, place, and time  She appears well-developed and well-nourished  HENT:   Head: Normocephalic and atraumatic  Mouth/Throat: Oropharyngeal exudate present  Eyes: Pupils are equal, round, and reactive to light  EOM are normal  Right eye exhibits discharge  Left eye exhibits discharge  B/L scleral erythema     Neck: Normal range of motion  Neck supple  JVD present  [facility] site c/d/i     Cardiovascular:   No murmur heard  Irregularly irregular     Pulmonary/Chest: Effort normal and breath sounds normal  No respiratory distress  Abdominal: Soft  Bowel sounds are normal  She exhibits distension  There is no tenderness  There is no guarding  Musculoskeletal: Normal range of motion  She exhibits edema  Neurological: She is alert and oriented to person, place, and time  Skin: Skin is warm and dry  Capillary refill takes less than 2 seconds       ______________________________________________________________________  Temperature:   Temp (24hrs), Av 2 °F (36 2 °C), Min:96 8 °F (36 °C), Max:97 5 °F (36 4 °C)    Current Temperature: 97 5 °F (36 4 °C)    Vitals:    19 0700 19 0800 19 0804 19 1100   BP: 104/61 133/61     BP Location:       Pulse: (!) 110 (!) 116 (!) 108    Resp: 21 20 (!) 25    Temp: (!) 97 2 °F (36 2 °C) (!) 97 2 °F (36 2 °C) (!) 97 2 °F (36 2 °C) 97 5 °F (36 4 °C)   TempSrc:       SpO2: 98% 98% 98%    Weight:       Height:         Arterial Line BP: 156/48       Weights:   IBW: 47 8 kg    Body mass index is 33 66 kg/m²  Weight (last 2 days)     Date/Time   Weight    06/29/19 0600   80 8 (178 13)    06/28/19 1442   79 8 (175 93)            Height: 5' 1" (154 9 cm)  Hemodynamic Monitoring:  N/A       Ventilator Settings:                         No results found for: PHART, RXE5OFE, PO2ART, GVX3STW, N7FXRBBF, BEART, SOURCE    Intake and Outputs:  I/O       06/27 0701 - 06/28 0700 06/28 0701 - 06/29 0700 06/29 0701 - 06/30 0700    P  O  270 680     I V  (mL/kg) 1194 9 (15) 941 9 (11 7) 100 6 (1 2)    IV Piggyback  50     Total Intake(mL/kg) 1464 9 (18 4) 1671 9 (20 7) 100 6 (1 2)    Urine (mL/kg/hr) 1460 (0 8) 3290 (1 7) 510 (1 3)    Emesis/NG output 0      Stool       Total Output 1460 3290 510    Net +4 9 -1618 1 -409 4               UOP: 150cc/hour   Nutrition:        Diet Orders   (From admission, onward)            Start     Ordered    06/28/19 1327  Dietary nutrition supplements  Once     Question Answer Comment   Select Supplement: Glucerna-Chocolate    Frequency Breakfast, Lunch, Dinner        06/28/19 1326    06/25/19 1753  Diet Cardiovascular; Sodium 2 GM; Consistent Carbohydrate Diet Level 2 (5 carb servings/75 grams CHO/meal)  Diet effective midnight     Question Answer Comment   Diet Type Cardiovascular    Cardiac Sodium 2 GM    Other Restriction(s): Consistent Carbohydrate Diet Level 2 (5 carb servings/75 grams CHO/meal)    RD to adjust diet per protocol? No        06/25/19 1752          Labs:   Results from last 7 days   Lab Units 06/29/19  0445 06/28/19  0500 06/27/19  0440  06/26/19  0216   WBC Thousand/uL 10 50* 11 33* 12 19*   < > 10 40*   HEMOGLOBIN g/dL 12 2 11 6 11 2*   < > 15 4   HEMATOCRIT % 34 1* 33 0* 32 6*   < > 47 6*   PLATELETS Thousands/uL 188 173 159   < > 233   NEUTROS PCT %  --   --   --   --  78*   MONOS PCT %  --   --   --   --  9   MONO PCT %  --  4  --   --   --     < > = values in this interval not displayed      Results from last 7 days   Lab Units 06/29/19  0445 06/29/19  0031 06/28/19  1822  06/28/19  0500  06/27/19  0440   POTASSIUM mmol/L 3 9 3 9 3 9   < > 4 1   < > 4 1   CHLORIDE mmol/L 86* 87* 86*   < > 87*   < > 93*   CO2 mmol/L 30 30 27   < > 24   < > 27   BUN mg/dL 80* 81* 71*   < > 64*   < > 53*   CREATININE mg/dL 4 68* 4 50* 4 35*   < > 3 94*   < > 2 83*   CALCIUM mg/dL 7 7* 7 4* 7 4*   < > 7 3*   < > 7 8*   ALK PHOS U/L 135*  --   --   --  109  --  93   ALT U/L 3,638*  --   --   --  4,595*  --  6,164*   AST U/L 1,989*  --   --   --  5,482*  --  15,182*    < > = values in this interval not displayed  Results from last 7 days   Lab Units 06/28/19  0500 06/27/19  0440 06/26/19  0415   MAGNESIUM mg/dL 1 7 2 0 2 3     Results from last 7 days   Lab Units 06/28/19  0500 06/26/19  0415 06/26/19  0216   PHOSPHORUS mg/dL 4 2* 7 0* 6 6*      Results from last 7 days   Lab Units 06/29/19  0629 06/28/19  0500 06/28/19  0003 06/27/19  1758  06/24/19  1625   INR  5 34* 7 23*  --   --   --  1 54*   PTT seconds 42*  --  85* 84*   < > 23    < > = values in this interval not displayed  Results from last 7 days   Lab Units 06/27/19  0439   LACTIC ACID mmol/L 2 0     0   Lab Value Date/Time    TROPONINI 0 03 06/26/2019 0216    TROPONINI 0 03 06/18/2019 1652     Imaging:   CXR: Worsening congestive heart failure     I have personally reviewed pertinent reports  and I have personally reviewed pertinent films in PACS  EKG: Atrial Fibrillation  Micro:  Blood Culture: No results found for: BLOODCX  Urine Culture: No results found for: URINECX  Sputum Culture: No components found for: SPUTUMCX  Wound Culure: No results found for: WOUNDCULT    No results found for: Bright Caller, SPUTUMCULTUR  Allergies: Allergies   Allergen Reactions    Penicillins     Wellbutrin Sr  [Bupropion]      Other reaction(s): Suicidal ideations  Category:  Adverse Reaction;      Medications:   Scheduled Meds:  Current Facility-Administered Medications:  acetaminophen 650 mg Oral Q4H PRN Yeni Spironello V, SOTO    albuterol 2 puff Inhalation Q4H PRN Yeni Spironello V, SOTO    amiodarone 0 5 mg/min Intravenous Continuous Isabella Liter, CRNP Last Rate: 0 5 mg/min (06/28/19 1022)   benzocaine 2 spray Mucosal TID PRN Napolean SOTO Graves    doxycycline hyclate 100 mg Oral Q12H [facility] Skye Collins PA-C    furosemide 40 mg/hr Intravenous Continuous Oliver Almaraz PA-C Last Rate: 40 mg/hr (06/29/19 0451)   insulin lispro 1-5 Units Subcutaneous HS Isabella Liter, CRNP    insulin lispro 1-6 Units Subcutaneous TID AC Isabella Liter, SOTO    levothyroxine 137 mcg Oral Daily Yeni Spironello V, CRCHUCK    loratadine 10 mg Oral Daily Yeni Spironello V, CRNP    melatonin 6 mg Oral HS Napolean Ely, SOTO    milrinone [facility]) infusion 0 125 mcg/kg/min Intravenous Continuous Napolean SOTO Graves Last Rate: 0 125 mcg/kg/min (06/29/19 0453)   norepinephrine 1-30 mcg/min Intravenous Titrated Gelacio Mayes PA-C Last Rate: 3 mcg/min (06/29/19 1015)   ofloxacin 1 drop Both Eyes 4x Daily Isabella Liter, SOTO    ondansetron 4 mg Intravenous Q6H PRN Yeni Spiroferno V, SOTO    senna 1 tablet Oral HS Napolean SOTO Graves    sodium chloride 2 spray Each Nare PRN Tilmarilee Almaraz PA-C    venlafaxine 50 mg Oral Q12H Isabella LiterBAYRONNP      Continuous Infusions:  amiodarone 0 5 mg/min Last Rate: 0 5 mg/min (06/28/19 1022)   furosemide 40 mg/hr Last Rate: 40 mg/hr (06/29/19 0451)   milrinone (PRIMACOR) infusion 0 125 mcg/kg/min Last Rate: 0 125 mcg/kg/min (06/29/19 0453)   norepinephrine 1-30 mcg/min Last Rate: 3 mcg/min (06/29/19 1015)     PRN Meds:    acetaminophen 650 mg Q4H PRN   albuterol 2 puff Q4H PRN   benzocaine 2 spray TID PRN   ondansetron 4 mg Q6H PRN   sodium chloride 2 spray PRN     VTE Pharmacologic Prophylaxis: Reason for no pharmacologic prophylaxis supratherapeutic INR  VTE Mechanical Prophylaxis: sequential compression device  Invasive lines and devices: Invasive Devices     Central Venous Catheter Line            CVC Central Lines 06/26/19 Triple 16cm 3 days          Peripheral Intravenous Line            Peripheral IV 06/29/19 Left;Upper Arm less than 1 day          Arterial Line            Arterial Line 06/26/19 Radial 3 days          Drain            Urethral Catheter Temperature probe 16 Fr  2 days                   Code Status: Level 1 - Full Code    Portions of the record may have been created with voice recognition software  Occasional wrong word or "sound a like" substitutions may have occurred due to the inherent limitations of voice recognition software  Read the chart carefully and recognize, using context, where substitutions have occurred      Rob Hernández PA-C

## 2019-06-29 NOTE — PROGRESS NOTES
PT encouraged to use incentive spirometer/flutter valve, Showed techniques to use devices  PT demonstrated correct use of flutter valve, Left at bedside for patient use, when inquring if patient had been using the equipment Pt states "Can't I just rest " Unsure if patient is using incentive spirometer /flutter valve on her own  Will continue to encourage use of devices while in room

## 2019-06-30 ENCOUNTER — APPOINTMENT (INPATIENT)
Dept: RADIOLOGY | Facility: HOSPITAL | Age: 79
DRG: 291 | End: 2019-06-30
Payer: COMMERCIAL

## 2019-06-30 LAB
ALBUMIN SERPL BCP-MCNC: 3.1 G/DL (ref 3.5–5)
ALP SERPL-CCNC: 147 U/L (ref 46–116)
ALT SERPL W P-5'-P-CCNC: 2432 U/L (ref 12–78)
ANION GAP SERPL CALCULATED.3IONS-SCNC: 10 MMOL/L (ref 4–13)
ANION GAP SERPL CALCULATED.3IONS-SCNC: 10 MMOL/L (ref 4–13)
ANION GAP SERPL CALCULATED.3IONS-SCNC: 12 MMOL/L (ref 4–13)
ANION GAP SERPL CALCULATED.3IONS-SCNC: 8 MMOL/L (ref 4–13)
AST SERPL W P-5'-P-CCNC: 689 U/L (ref 5–45)
BASE EX.OXY STD BLDV CALC-SCNC: 77.1 % (ref 60–80)
BASE EXCESS BLDV CALC-SCNC: 2.6 MMOL/L
BILIRUB DIRECT SERPL-MCNC: 2 MG/DL (ref 0–0.2)
BILIRUB SERPL-MCNC: 2.83 MG/DL (ref 0.2–1)
BUN SERPL-MCNC: 85 MG/DL (ref 5–25)
BUN SERPL-MCNC: 86 MG/DL (ref 5–25)
BUN SERPL-MCNC: 86 MG/DL (ref 5–25)
BUN SERPL-MCNC: 87 MG/DL (ref 5–25)
CALCIUM SERPL-MCNC: 7.9 MG/DL (ref 8.3–10.1)
CALCIUM SERPL-MCNC: 8.2 MG/DL (ref 8.3–10.1)
CHLORIDE SERPL-SCNC: 84 MMOL/L (ref 100–108)
CHLORIDE SERPL-SCNC: 84 MMOL/L (ref 100–108)
CHLORIDE SERPL-SCNC: 86 MMOL/L (ref 100–108)
CHLORIDE SERPL-SCNC: 86 MMOL/L (ref 100–108)
CO2 SERPL-SCNC: 29 MMOL/L (ref 21–32)
CO2 SERPL-SCNC: 31 MMOL/L (ref 21–32)
CO2 SERPL-SCNC: 31 MMOL/L (ref 21–32)
CO2 SERPL-SCNC: 33 MMOL/L (ref 21–32)
CREAT SERPL-MCNC: 4.78 MG/DL (ref 0.6–1.3)
CREAT SERPL-MCNC: 4.84 MG/DL (ref 0.6–1.3)
CREAT SERPL-MCNC: 4.85 MG/DL (ref 0.6–1.3)
CREAT SERPL-MCNC: 4.86 MG/DL (ref 0.6–1.3)
ERYTHROCYTE [DISTWIDTH] IN BLOOD BY AUTOMATED COUNT: 12.3 % (ref 11.6–15.1)
GFR SERPL CREATININE-BSD FRML MDRD: 8 ML/MIN/1.73SQ M
GLUCOSE SERPL-MCNC: 100 MG/DL (ref 65–140)
GLUCOSE SERPL-MCNC: 111 MG/DL (ref 65–140)
GLUCOSE SERPL-MCNC: 115 MG/DL (ref 65–140)
GLUCOSE SERPL-MCNC: 126 MG/DL (ref 65–140)
GLUCOSE SERPL-MCNC: 138 MG/DL (ref 65–140)
GLUCOSE SERPL-MCNC: 138 MG/DL (ref 65–140)
GLUCOSE SERPL-MCNC: 159 MG/DL (ref 65–140)
GLUCOSE SERPL-MCNC: 97 MG/DL (ref 65–140)
HCO3 BLDV-SCNC: 27.6 MMOL/L (ref 24–30)
HCT VFR BLD AUTO: 34.4 % (ref 34.8–46.1)
HGB BLD-MCNC: 12.1 G/DL (ref 11.5–15.4)
INR PPP: 2.79 (ref 0.84–1.19)
MAGNESIUM SERPL-MCNC: 2.5 MG/DL (ref 1.6–2.6)
MCH RBC QN AUTO: 31.9 PG (ref 26.8–34.3)
MCHC RBC AUTO-ENTMCNC: 35.2 G/DL (ref 31.4–37.4)
MCV RBC AUTO: 91 FL (ref 82–98)
NON VENT ROOM AIR: 97 %
O2 CT BLDV-SCNC: 14.3 ML/DL
PCO2 BLDV: 44.1 MM HG (ref 42–50)
PH BLDV: 7.41 [PH] (ref 7.3–7.4)
PLATELET # BLD AUTO: 191 THOUSANDS/UL (ref 149–390)
PMV BLD AUTO: 11.1 FL (ref 8.9–12.7)
PO2 BLDV: 43 MM HG (ref 35–45)
POTASSIUM SERPL-SCNC: 3.5 MMOL/L (ref 3.5–5.3)
POTASSIUM SERPL-SCNC: 3.6 MMOL/L (ref 3.5–5.3)
POTASSIUM SERPL-SCNC: 3.7 MMOL/L (ref 3.5–5.3)
POTASSIUM SERPL-SCNC: 3.8 MMOL/L (ref 3.5–5.3)
PROT SERPL-MCNC: 6 G/DL (ref 6.4–8.2)
PROTHROMBIN TIME: 28.9 SECONDS (ref 11.6–14.5)
RBC # BLD AUTO: 3.79 MILLION/UL (ref 3.81–5.12)
SODIUM SERPL-SCNC: 123 MMOL/L (ref 136–145)
SODIUM SERPL-SCNC: 125 MMOL/L (ref 136–145)
SODIUM SERPL-SCNC: 127 MMOL/L (ref 136–145)
SODIUM SERPL-SCNC: 129 MMOL/L (ref 136–145)
WBC # BLD AUTO: 13.26 THOUSAND/UL (ref 4.31–10.16)

## 2019-06-30 PROCEDURE — 85027 COMPLETE CBC AUTOMATED: CPT | Performed by: PHYSICIAN ASSISTANT

## 2019-06-30 PROCEDURE — 82948 REAGENT STRIP/BLOOD GLUCOSE: CPT

## 2019-06-30 PROCEDURE — 82805 BLOOD GASES W/O2 SATURATION: CPT | Performed by: NURSE PRACTITIONER

## 2019-06-30 PROCEDURE — 80048 BASIC METABOLIC PNL TOTAL CA: CPT | Performed by: NURSE PRACTITIONER

## 2019-06-30 PROCEDURE — 94760 N-INVAS EAR/PLS OXIMETRY 1: CPT

## 2019-06-30 PROCEDURE — 99231 SBSQ HOSP IP/OBS SF/LOW 25: CPT | Performed by: INTERNAL MEDICINE

## 2019-06-30 PROCEDURE — 99233 SBSQ HOSP IP/OBS HIGH 50: CPT | Performed by: INTERNAL MEDICINE

## 2019-06-30 PROCEDURE — 85610 PROTHROMBIN TIME: CPT | Performed by: PHYSICIAN ASSISTANT

## 2019-06-30 PROCEDURE — 80048 BASIC METABOLIC PNL TOTAL CA: CPT | Performed by: PHYSICIAN ASSISTANT

## 2019-06-30 PROCEDURE — 94668 MNPJ CHEST WALL SBSQ: CPT

## 2019-06-30 PROCEDURE — 83735 ASSAY OF MAGNESIUM: CPT | Performed by: PHYSICIAN ASSISTANT

## 2019-06-30 PROCEDURE — 80076 HEPATIC FUNCTION PANEL: CPT | Performed by: PHYSICIAN ASSISTANT

## 2019-06-30 PROCEDURE — 71045 X-RAY EXAM CHEST 1 VIEW: CPT

## 2019-06-30 RX ORDER — AMIODARONE HYDROCHLORIDE 200 MG/1
200 TABLET ORAL
Status: DISCONTINUED | OUTPATIENT
Start: 2019-06-30 | End: 2019-07-11

## 2019-06-30 RX ORDER — POTASSIUM CHLORIDE 14.9 MG/ML
20 INJECTION INTRAVENOUS ONCE
Status: COMPLETED | OUTPATIENT
Start: 2019-06-30 | End: 2019-06-30

## 2019-06-30 RX ADMIN — AMIODARONE HYDROCHLORIDE 0.5 MG/MIN: 50 INJECTION, SOLUTION INTRAVENOUS at 06:15

## 2019-06-30 RX ADMIN — POTASSIUM CHLORIDE 20 MEQ: 200 INJECTION, SOLUTION INTRAVENOUS at 17:57

## 2019-06-30 RX ADMIN — DOXYCYCLINE 100 MG: 100 CAPSULE ORAL at 21:11

## 2019-06-30 RX ADMIN — VENLAFAXINE 50 MG: 50 TABLET ORAL at 21:11

## 2019-06-30 RX ADMIN — POTASSIUM CHLORIDE 20 MEQ: 200 INJECTION, SOLUTION INTRAVENOUS at 12:17

## 2019-06-30 RX ADMIN — POTASSIUM CHLORIDE 20 MEQ: 200 INJECTION, SOLUTION INTRAVENOUS at 01:32

## 2019-06-30 RX ADMIN — OFLOXACIN 1 DROP: 3 SOLUTION/ DROPS OPHTHALMIC at 17:17

## 2019-06-30 RX ADMIN — VENLAFAXINE 50 MG: 50 TABLET ORAL at 09:38

## 2019-06-30 RX ADMIN — MELATONIN 6 MG: at 21:11

## 2019-06-30 RX ADMIN — DOXYCYCLINE 100 MG: 100 CAPSULE ORAL at 08:19

## 2019-06-30 RX ADMIN — SODIUM CHLORIDE 250 ML: 0.9 INJECTION, SOLUTION INTRAVENOUS at 10:27

## 2019-06-30 RX ADMIN — LORATADINE 10 MG: 10 TABLET ORAL at 08:19

## 2019-06-30 RX ADMIN — Medication 10 MG/HR: at 20:40

## 2019-06-30 RX ADMIN — INSULIN LISPRO 1 UNITS: 100 INJECTION, SOLUTION INTRAVENOUS; SUBCUTANEOUS at 12:18

## 2019-06-30 RX ADMIN — AMIODARONE HYDROCHLORIDE 200 MG: 200 TABLET ORAL at 16:18

## 2019-06-30 RX ADMIN — AMIODARONE HYDROCHLORIDE 200 MG: 200 TABLET ORAL at 12:17

## 2019-06-30 RX ADMIN — OFLOXACIN 1 DROP: 3 SOLUTION/ DROPS OPHTHALMIC at 21:12

## 2019-06-30 RX ADMIN — OFLOXACIN 1 DROP: 3 SOLUTION/ DROPS OPHTHALMIC at 08:19

## 2019-06-30 RX ADMIN — SENNOSIDES 8.6 MG: 8.6 TABLET, FILM COATED ORAL at 21:11

## 2019-06-30 RX ADMIN — Medication 2 SPRAY: at 12:18

## 2019-06-30 RX ADMIN — OFLOXACIN 1 DROP: 3 SOLUTION/ DROPS OPHTHALMIC at 12:18

## 2019-06-30 RX ADMIN — MILRINONE LACTATE IN DEXTROSE 0.12 MCG/KG/MIN: 200 INJECTION, SOLUTION INTRAVENOUS at 06:18

## 2019-06-30 RX ADMIN — LEVOTHYROXINE SODIUM 137 MCG: 112 TABLET ORAL at 06:23

## 2019-06-30 RX ADMIN — POLYETHYLENE GLYCOL 3350 17 G: 17 POWDER, FOR SOLUTION ORAL at 08:19

## 2019-06-30 NOTE — PROGRESS NOTES
Progress Note - Critical Care   Randel Curling 66 y o  female MRN: [id number]  Unit/Bed#: [facility] 513-01 Encounter: [id number]    Assessment:   Principal Problem:    Cardiogenic shock ([facility] 75 )  Active Problems:    Acute CHF (congestive heart failure) ([facility] 75 )    Atrial fibrillation with RVR ([place] [address] )    Acute kidney injury ([place] [address] ) secondary to ischemic ATN    Shock liver secondary to low output state    Hyponatremia related to volume overload and medication induced    Hyperphosphatemia    Type 2 diabetes mellitus with diabetic cataract ([place] [address] )    Hypothyroidism    Hyperlipidemia    Hypertension    Acute bacterial conjunctivitis of both eyes  Odynophagia  Resolved Problems:    High anion gap metabolic acidosis    Lactic acidosis    Hyperkalemia      Plan  Neuro:   · PAD  · Pain controlled with PRN Tylenol  · Delirium Precautions  · CAM ICU per protocol  · Regulate sleep/wake cycle+  · Melatonin 6 mg HS  · Trend neuro exam  · Continue home Effexor dose  CV:   · Hemodynamic infusions: Primacor,0 13 mcg/kg/min, amio at 0 5 mg/min  · VBG pending  · MAP goal > 65  · Rhythm: Atrial Fibrillation  · Follow rhythm on telemetry  · Heart Failure following  · Check VBG this AM  · Start Heparin gtt when INR less than 2 5  Lung:   · Pt on RA  · Pulmonary hygiene  GI:   · Stress ulcer prophylaxis: Not Indicated  · Bowel regimen: Miralax and senna  FEN:   · Goal 24 hour fluid balance: net negative   Fluid/Diuretic plan: Lasix gtt at 20 mg/ hr, diureses as hemodynamics tolerated  · Nutrition/diet plan: PO diet  · Replete electrolytes with goals: K >4 0, Mag >2 0, and Phos >3 0  :   · Indwelling Murray present: yes   · Trend UOP and BUN/creat  · Strict I and O  ID:   · Doxycycline 100 mg Q12 hours x 5 days total  · Ofloxacin Opth  · Trend temps and WBC count  Heme:   · Trend hgb and plts  · Transfuse as needed for goal hgb >7  Endo:   · SSI, continue  · Levothyroxine  MSK/Skin:  · Mobility goal: OOB to chair  · PT consult: yes  · OT consult: yes  · Frequent turning and pressure off-loading  VTE Prophylaxis:  · Pharmacologic Prophylaxis:Pharmacologic VTE Prophylaxis contraindicated due to elevated INR  · Mechanical Prophylaxis: sequential compression device    Disposition: Continue ICU care      ______________________________________________________________________  Chief Complaint:   Pt states breathing, throat and eyes are all improved today      HPI/24hr events:   Off NE  No acute events    Review of Systems   Constitutional: Negative for chills  HENT: Positive for postnasal drip and sore throat  Eyes: Positive for discharge  Respiratory: Negative for shortness of breath  Cardiovascular: Negative for chest pain  Gastrointestinal: Negative for abdominal distention and abdominal pain  Endocrine: Negative  Genitourinary: Negative  Musculoskeletal: Negative  Skin: Negative  Allergic/Immunologic: Negative  Neurological: Negative  Hematological: Negative  Psychiatric/Behavioral: Negative  All other systems reviewed and are negative     ______________________________________________________________________  Temperature:   Temp (24hrs), Av 4 °F (36 3 °C), Min:97 °F (36 1 °C), Max:97 9 °F (36 6 °C)    Current Temperature: (!) 97 2 °F (36 2 °C)    Vitals:    19 0600 19 0730 19 0800 19 0811   BP: 122/60 104/59  114/55   BP Location: Right arm      Pulse: 104   (!) 106   Resp: 22   (!) 29   Temp: (!) 97 1 °F (36 2 °C)  (!) 97 2 °F (36 2 °C)    TempSrc: Probe      SpO2: 99%  98% 94%   Weight: 82 kg (180 lb 12 4 oz)      Height:         Arterial Line BP: 116/46  Arterial Line MAP (mmHg): 68 mmHg     Weights:   IBW: 47 8 kg    Body mass index is 34 16 kg/m²    Weight (last 2 days)     Date/Time   Weight    19 0600   82 (180 78)    19 0600   80 8 (178 13)    19 1442   79 8 (175 93)            Height: 5' 1" (154 9 cm)  SpO2: SpO2: 94 % on RA  ______________________________________________________________________  Physical Exam:     Physical Exam   Constitutional: She is oriented to person, place, and time  She appears well-developed and well-nourished  She is cooperative  No distress  HENT:   Head: Normocephalic and atraumatic  Eyes: Pupils are equal, round, and reactive to light  Right eye exhibits no discharge  Left eye exhibits no discharge  Right conjunctiva is injected  Left conjunctiva is injected  Cardiovascular: Normal heart sounds  An irregularly irregular rhythm present  Tachycardia present  Pulses:       Dorsalis pedis pulses are 1+ on the right side, and 1+ on the left side  Pulmonary/Chest: Effort normal  She has no decreased breath sounds  She has no wheezes  Abdominal: Soft  Bowel sounds are normal  There is no tenderness  Genitourinary:   Genitourinary Comments: Murray to gravity     Neurological: She is oriented to person, place, and time  Skin: Skin is warm and dry  Capillary refill takes less than 2 seconds  No cyanosis  Nails show no clubbing  1+ pitting LE     ______________________________________________________________________  Intake and Outputs:  I/O       06/28 0701 - 06/29 0700 06/29 0701 - 06/30 0700 06/30 0701 - 07/01 0700    P  O  680 340     I V  (mL/kg) 941 9 (11 7) 884 5 (10 8) 43 1 (0 5)    IV Piggyback 50 630     Total Intake(mL/kg) 1671 9 (20 7) 1854 5 (22 6) 43 1 (0 5)    Urine (mL/kg/hr) 3290 (1 7) 4180 (2 1) 325 (1 4)    Emesis/NG output       Total Output 3290 4180 325    Net -1618 1 -2325 5 -281 9               UOP: 2 1 ml/kg/hr  Nutrition:        Diet Orders   (From admission, onward)            Start     Ordered    06/28/19 1327  Dietary nutrition supplements  Once     Question Answer Comment   Select Supplement: Glucerna-Chocolate    Frequency Breakfast, Lunch, Dinner        06/28/19 1326    06/25/19 1753  Diet Cardiovascular;  Sodium 2 GM; Consistent Carbohydrate Diet Level 2 (5 carb servings/75 grams CHO/meal)  Diet effective midnight     Question Answer Comment   Diet Type Cardiovascular    Cardiac Sodium 2 GM    Other Restriction(s): Consistent Carbohydrate Diet Level 2 (5 carb servings/75 grams CHO/meal)    RD to adjust diet per protocol? No        06/25/19 2722          Labs:   Results from last 7 days   Lab Units 06/30/19  0516 06/29/19  0445 06/28/19  0500  06/26/19  0216   WBC Thousand/uL 13 26* 10 50* 11 33*   < > 10 40*   HEMOGLOBIN g/dL 12 1 12 2 11 6   < > 15 4   HEMATOCRIT % 34 4* 34 1* 33 0*   < > 47 6*   PLATELETS Thousands/uL 191 188 173   < > 233   NEUTROS PCT %  --   --   --   --  78*   MONOS PCT %  --   --   --   --  9   MONO PCT %  --   --  4  --   --     < > = values in this interval not displayed  Results from last 7 days   Lab Units 06/30/19  0516 06/30/19  0003 06/29/19  1839  06/29/19 0445  06/28/19  0500   SODIUM mmol/L 123* 127* 123*   < > 125*   < > 123*   POTASSIUM mmol/L 3 7 3 5 3 7   < > 3 9   < > 4 1   CHLORIDE mmol/L 84* 84* 85*   < > 86*   < > 87*   CO2 mmol/L 29 31 29   < > 30   < > 24   BUN mg/dL 85* 87* 84*   < > 80*   < > 64*   CREATININE mg/dL 4 78* 4 85* 4 74*   < > 4 68*   < > 3 94*   CALCIUM mg/dL 8 2* 7 9* 8 0*   < > 7 7*   < > 7 3*   ALK PHOS U/L 147*  --   --   --  135*  --  109   ALT U/L 2,432*  --   --   --  3,638*  --  4,595*   AST U/L 689*  --   --   --  1,989*  --  5,482*   GLUCOSE RANDOM mg/dL 115 126 172*   < > 105   < > 141*    < > = values in this interval not displayed       Results from last 7 days   Lab Units 06/30/19  0516 06/28/19  0500 06/27/19  0440   MAGNESIUM mg/dL 2 5 1 7 2 0     Results from last 7 days   Lab Units 06/30/19  0516 06/29/19  0629 06/28/19  0500 06/28/19  0003 06/27/19  1758   INR  2 79* 5 34* 7 23*  --   --    PTT seconds  --  42*  --  85* 84*     Imaging:  I have personally reviewed pertinent films in PACS, vascular congestion improved from prior xray  EKG: none today  Micro:  No results found for: BLOODCX, Mian Acosta, SPUTUMCULTUR  Allergies: Allergies   Allergen Reactions    Penicillins     Wellbutrin Sr  [Bupropion]      Other reaction(s): Suicidal ideations  Category:  Adverse Reaction;      Medications:   Scheduled Meds:  Current Facility-Administered Medications:  acetaminophen 650 mg Oral Q4H PRN Yeni Spironello VSOTO    albuterol 2 puff Inhalation Q4H PRN Yeni Spironello V, SOTO    amiodarone 0 5 mg/min Intravenous Continuous OSTO Eagle Last Rate: 0 5 mg/min (06/30/19 0615)   benzocaine 2 spray Mucosal TID PRN SOTO Lopez    bisacodyl 10 mg Rectal Daily PRN Maco Edwards PA-C    doxycycline hyclate 100 mg Oral Q12H [address] Steffany Shanna Collins PA-C    furosemide 20 mg/hr Intravenous Continuous Maco Edwards PA-C Last Rate: 20 mg/hr (06/29/19 1953)   insulin lispro 1-5 Units Subcutaneous HS SOTO Eagle    insulin lispro 1-6 Units Subcutaneous TID AC SOTO Eagle    levothyroxine 137 mcg Oral Daily Yeni Spironello VSOTO    loratadine 10 mg Oral Daily Yeni Spironello VSOTO    melatonin 6 mg Oral HS SOTO Lopez    milrinone [facility]) infusion 0 125 mcg/kg/min Intravenous Continuous SOTO Lopez Last Rate: 0 125 mcg/kg/min (06/30/19 0618)   norepinephrine 1-30 mcg/min Intravenous Titrated Ishan Herbert PA-C Last Rate: Stopped (06/30/19 2880)   ofloxacin 1 drop Both Eyes 4x Daily SOTO Eagle    ondansetron 4 mg Intravenous Q6H PRN SOTO Soriano    polyethylene glycol 17 g Oral Daily Steffany Shanna Collins PA-C    senna 1 tablet Oral HS SOTO Lopez    sodium chloride 2 spray Each Nare PRN Maco Edwards PA-C    venlafaxine 50 mg Oral Q12H SOTO Eagle      Continuous Infusions:  amiodarone 0 5 mg/min Last Rate: 0 5 mg/min (06/30/19 0615)   furosemide 20 mg/hr Last Rate: 20 mg/hr (06/29/19 1953)   milrinone (PRIMACOR) infusion 0 125 mcg/kg/min Last Rate: 0 125 mcg/kg/min (06/30/19 7830)   norepinephrine 1-30 mcg/min Last Rate: Stopped (06/30/19 0212)     PRN Meds:    acetaminophen 650 mg Q4H PRN   albuterol 2 puff Q4H PRN   benzocaine 2 spray TID PRN   bisacodyl 10 mg Daily PRN   ondansetron 4 mg Q6H PRN   sodium chloride 2 spray PRN       Invasive lines and devices: Invasive Devices     Central Venous Catheter Line            CVC Central Lines 06/26/19 Triple 16cm 4 days          Peripheral Intravenous Line            Peripheral IV 06/29/19 Left;Upper Arm 1 day          Arterial Line            Arterial Line 06/26/19 Radial 4 days          Drain            Urethral Catheter Temperature probe 16 Fr  3 days                   Counseling / Coordination of Care  0 mins of critical care time    Code Status: Level 1 - Full Code    Portions of the record may have been created with voice recognition software  Occasional wrong word or "sound a like" substitutions may have occurred due to the inherent limitations of voice recognition software  Read the chart carefully and recognize, using context, where substitutions have occurred      SOTO Paiz

## 2019-06-30 NOTE — PLAN OF CARE
Problem: PAIN - ADULT  Goal: Verbalizes/displays adequate comfort level or baseline comfort level  Description  Interventions:  - Encourage patient to monitor pain and request assistance  - Assess pain using appropriate pain scale  - Administer analgesics based on type and severity of pain and evaluate response  - Implement non-pharmacological measures as appropriate and evaluate response  - Consider cultural and social influences on pain and pain management  - Notify physician/advanced practitioner if interventions unsuccessful or patient reports new pain  Outcome: Progressing     Problem: INFECTION - ADULT  Goal: Absence or prevention of progression during hospitalization  Description  INTERVENTIONS:  - Assess and monitor for signs and symptoms of infection  - Monitor lab/diagnostic results  - Monitor all insertion sites, i e  indwelling lines, tubes, and drains  - Monitor endotracheal (as able) and nasal secretions for changes in amount and color  - [place] appropriate cooling/warming therapies per order  - Administer medications as ordered  - Instruct and encourage patient and family to use good hand hygiene technique  - Identify and instruct in appropriate isolation precautions for identified infection/condition  Outcome: Progressing  Goal: Absence of fever/infection during neutropenic period  Description  INTERVENTIONS:  - Monitor WBC  - Implement neutropenic guidelines  Outcome: Progressing     Problem: SAFETY ADULT  Goal: Patient will remain free of falls  Description  INTERVENTIONS:  - Assess patient frequently for physical needs  -  Identify cognitive and physical deficits and behaviors that affect risk of falls    -  [place] fall precautions as indicated by assessment   - Educate patient/family on patient safety including physical limitations  - Instruct patient to call for assistance with activity based on assessment  - Modify environment to reduce risk of injury  - Consider OT/PT consult to assist with strengthening/mobility  Outcome: Progressing  Goal: Maintain or return to baseline ADL function  Description  INTERVENTIONS:  -  Assess patient's ability to carry out ADLs; assess patient's baseline for ADL function and identify physical deficits which impact ability to perform ADLs (bathing, care of mouth/teeth, toileting, grooming, dressing, etc )  - Assess/evaluate cause of self-care deficits   - Assess range of motion  - Assess patient's mobility; develop plan if impaired  - Assess patient's need for assistive devices and provide as appropriate  - Encourage maximum independence but intervene and supervise when necessary  ¯ Involve family in performance of ADLs  ¯ Assess for home care needs following discharge   ¯ Request OT consult to assist with ADL evaluation and planning for discharge  ¯ Provide patient education as appropriate  Outcome: Progressing  Goal: Maintain or return mobility status to optimal level  Description  INTERVENTIONS:  - Assess patient's baseline mobility status (ambulation, transfers, stairs, etc )    - Identify cognitive and physical deficits and behaviors that affect mobility  - Identify mobility aids required to assist with transfers and/or ambulation (gait belt, sit-to-stand, lift, walker, cane, etc )  - [place] fall precautions as indicated by assessment  - Record patient progress and toleration of activity level on Mobility SBAR; progress patient to next Phase/Stage  - Instruct patient to call for assistance with activity based on assessment  - Request Rehabilitation consult to assist with strengthening/weightbearing, etc   Outcome: Progressing     Problem: DISCHARGE PLANNING  Goal: Discharge to home or other facility with appropriate resources  Description  INTERVENTIONS:  - Identify barriers to discharge w/patient and caregiver  - Arrange for needed discharge resources and transportation as appropriate  - Identify discharge learning needs (meds, wound care, etc )  - Arrange for interpretive services to assist at discharge as needed  - Refer to Case Management Department for coordinating discharge planning if the patient needs post-hospital services based on physician/advanced practitioner order or complex needs related to functional status, cognitive ability, or social support system  Outcome: Progressing     Problem: Knowledge Deficit  Goal: Patient/family/caregiver demonstrates understanding of disease process, treatment plan, medications, and discharge instructions  Description  Complete learning assessment and assess knowledge base  Interventions:  - Provide teaching at level of understanding  - Provide teaching via preferred learning methods  Outcome: Progressing     Problem: Potential for Falls  Goal: Patient will remain free of falls  Description  INTERVENTIONS:  - Assess patient frequently for physical needs  -  Identify cognitive and physical deficits and behaviors that affect risk of falls  -  [place] fall precautions as indicated by assessment   - Educate patient/family on patient safety including physical limitations  - Instruct patient to call for assistance with activity based on assessment  - Modify environment to reduce risk of injury  - Consider OT/PT consult to assist with strengthening/mobility  Outcome: Progressing     Problem: CARDIOVASCULAR - ADULT  Goal: Maintains optimal cardiac output and hemodynamic stability  Description  INTERVENTIONS:  - Monitor I/O, vital signs and rhythm  - Monitor for S/S and trends of decreased cardiac output i e  bleeding, hypotension  - Administer and titrate ordered vasoactive medications to optimize hemodynamic stability  - Assess quality of pulses, skin color and temperature  - Assess for signs of decreased coronary artery perfusion - ex   Angina  - Instruct patient to report change in severity of symptoms  Outcome: Progressing  Goal: Absence of cardiac dysrhythmias or at baseline rhythm  Description  INTERVENTIONS:  - Continuous cardiac monitoring, monitor vital signs, obtain 12 lead EKG if indicated  - Administer antiarrhythmic and heart rate control medications as ordered  - Monitor electrolytes and administer replacement therapy as ordered  Outcome: Progressing     Problem: Prexisting or High Potential for Compromised Skin Integrity  Goal: Skin integrity is maintained or improved  Description  INTERVENTIONS:  - Identify patients at risk for skin breakdown  - Assess and monitor skin integrity  - Assess and monitor nutrition and hydration status  - Monitor labs (i e  albumin)  - Assess for incontinence   - Turn and reposition patient  - Assist with mobility/ambulation  - Relieve pressure over bony prominences  - Avoid friction and shearing  - Provide appropriate hygiene as needed including keeping skin clean and dry  - Evaluate need for skin moisturizer/barrier cream  - Collaborate with interdisciplinary team (i e  Nutrition, Rehabilitation, etc )   - Patient/family teaching  Outcome: Progressing     Problem: Nutrition/Hydration-ADULT  Goal: Nutrient/Hydration intake appropriate for improving, restoring or maintaining nutritional needs  Description  Monitor and assess patient's nutrition/hydration status for malnutrition (ex- brittle hair, bruises, dry skin, pale skin and conjunctiva, muscle wasting, smooth red tongue, and disorientation)  Collaborate with interdisciplinary team and initiate plan and interventions as ordered  Monitor patient's weight and dietary intake as ordered or per policy  Utilize nutrition screening tool and intervene per policy  Determine patient's food preferences and provide high-protein, high-caloric foods as appropriate       INTERVENTIONS:  - Monitor oral intake, urinary output, labs, and treatment plans  - Assess nutrition and hydration status and recommend course of action  - Evaluate amount of meals eaten  - Assist patient with eating if necessary   - Allow adequate time for meals  - Recommend/ encourage appropriate diets, oral nutritional supplements, and vitamin/mineral supplements  - Order, calculate, and assess calorie counts as needed  - Recommend, monitor, and adjust tube feedings and TPN/PPN based on assessed needs  - Assess need for intravenous fluids  - Provide specific nutrition/hydration education as appropriate  - Include patient/family/caregiver in decisions related to nutrition  Outcome: Progressing

## 2019-06-30 NOTE — PROGRESS NOTES
Cardiology Progress Note - Tosin Nava 66 y o  female MRN: [id number]    Unit/Bed#: [facility] 513-01 Encounter: [id number]      Assessment:  Principal Problem:    Cardiogenic shock ([facility] )  Active Problems:    Hyperlipidemia    Hypertension    Hypothyroidism    Type 2 diabetes mellitus with diabetic cataract ([facility] )    Atrial fibrillation with RVR ([facility] )    Acute CHF (congestive heart failure) ([facility] )    Acute kidney injury ([facility] )    Hyponatremia    Hyperphosphatemia    Shock liver    Acute bacterial conjunctivitis of both eyes      Plan:  Patient continues on Milrinone  She as well continues on intravenous amiodarone and furosemide  She is off intravenous pressor  She is in atrial fibrillation with a moderate ventricular response  Creatinine this morning is 4 78  Sodium is 123 with a potassium of 3 7  In reference to acute on chronic renal insufficiency nephrology notes are appreciated  Patient continues on intravenous furosemide drip  In reference to atrial fibrillation will continue present medical regimen  In reference to cardiomyopathy will continue present medical regimen  Heart failure team is following  She continues on supplemental oxygen  Subjective:   Patient seen and examined  No significant events overnight   negative  Objective:     Vitals: Blood pressure 122/60, pulse 104, temperature (!) 97 1 °F (36 2 °C), temperature source Probe, resp  rate 22, height 5' 1" (1 549 m), weight 82 kg (180 lb 12 4 oz), SpO2 99 %  , Body mass index is 34 16 kg/m² ,   Orthostatic Blood Pressures      Most Recent Value   Blood Pressure  122/60 filed at 06/30/2019 0600   Patient Position - Orthostatic VS  Lying filed at 06/30/2019 0600      ,      Intake/Output Summary (Last 24 hours) at 6/30/2019 0800  Last data filed at 6/30/2019 0600  Gross per 24 hour   Intake 1753 9 ml   Output 3830 ml   Net -2076 1 ml       No significant arrhythmias seen on telemetry review    Atrial fibrillation with a moderate ventricular response      Physical Exam:    GEN: Mayra Fenstermacher  NECK: supple, no carotid bruits, no JVD or HJR  HEART: normal rate, regular rhythm, normal S1 and S2, no murmurs, clicks, gallops or rubs   LUNGS: clear to auscultation bilaterally; no wheezes, rales, or rhonchi   ABDOMEN: normal bowel sounds, soft, no tenderness, no distention  EXTREMITIES: peripheral pulses normal; no clubbing, cyanosis, or edema    Labs & Results:    No results displayed because visit has over 200 results  Xr Chest Portable    Result Date: 6/26/2019  Narrative: CHEST INDICATION:   cvl  COMPARISON:  06/25/2019 EXAM PERFORMED/VIEWS:  XR CHEST PORTABLE Images: 2 FINDINGS: Cardiomediastinal silhouette appears enlarged  Pulmonary vessels are normal  A central line enters via the right internal jugular vein  The tip extends to the SVC  Persistent opacity at the right lung base may represent infiltrate and/or pleural fluid  The left lung is clear  No pneumothorax or pleural effusion  Osseous structures appear within normal limits for patient age  Impression: 1  Central line entering via the right internal jugular vein with its tip overlying the SVC  There is no pneumothorax  2   Persistent opacity at the right lung base which may represent a combination of infiltrate and pleural fluid  Workstation performed: [id number]     Xr Chest Portable    Result Date: 6/25/2019  Narrative: CHEST INDICATION:   hypoxic resp failure  COMPARISON:  June 18, 2019 EXAM PERFORMED/VIEWS:  XR CHEST PORTABLE FINDINGS: Mild cardiomegaly, stable  Calcified plaque within the aortic arch  Worsening opacity at the right lung base  Which favors increasing right pleural fluid  Lungs otherwise appear clear  No pneumothorax  Osseous structures appear within normal limits for patient age  Impression: Cardiomegaly  Increasing right basilar opacities suggesting pleural fluid   Workstation performed: [id number]     Xr Chest 1 View Portable    Result Date: 6/19/2019  Narrative: CHEST INDICATION:   AFib w/ rvr  COMPARISON:  None EXAM PERFORMED/VIEWS:  XR CHEST PORTABLE One image FINDINGS: Cardiomediastinal silhouette appears enlarged  Patient is in mild CHF  The lungs are clear  No pneumothorax   The costophrenic angles are blunted which may represent small pleural effusions  Osseous structures appear within normal limits for patient age  Impression: Cardiomegaly and mild CHF  Possible small pleural effusions  Workstation performed: [id number]     Xr Chest Portable Icu    Result Date: 6/28/2019  Narrative: CHEST INDICATION:   dyspnea  COMPARISON:  Chest radiographs June 26, 2019 EXAM PERFORMED/VIEWS:  XR CHEST PORTABLE ICU  AP semierect Images: 1 FINDINGS: The heart is enlarged  Atherosclerotic changes in the aorta  Right internal jugular central line with tip above cavoatrial junction  Lung volumes diminished  Liliana and pulmonary vessels diffusely enlarged  Obscuration of the hemidiaphragms bilaterally as well as bilateral heart borders, secondary to worsening fluid and/or infiltrates  Osseous structures appear within normal limits for patient age  Impression: Worsening congestive heart failure  Workstation performed: [id number]     Cta Ed Chest Pe Study    Result Date: 6/18/2019  Narrative: CTA - CHEST WITH IV CONTRAST - PULMONARY ANGIOGRAM INDICATION:   Weakness, shortness of breath and leg swelling  COMPARISON: 6/18/2019  TECHNIQUE: CTA examination of the chest was performed using angiographic technique according to a protocol specifically tailored to evaluate for pulmonary embolism  Axial, sagittal, and coronal 2D reformatted images were created from the source data and  submitted for interpretation  In addition, coronal 3D MIP postprocessing was performed on the acquisition scanner  Radiation dose length product (DLP) for this visit:  749 22 mGy-cm     This examination, like all CT scans performed in the [facility], was performed utilizing techniques to minimize radiation dose exposure, including the use of iterative  reconstruction and automated exposure control  IV Contrast:  78 mL of iohexol (OMNIPAQUE)  FINDINGS: PULMONARY ARTERIAL TREE:  No filling defect in the visualized pulmonary arteries to suggest acute embolus  LUNGS:  Pulmonary vascular congestion without interstitial edema  Mild relaxation atelectasis at the lung bases  There is no tracheal or endobronchial lesion  PLEURA:  Small bilateral pleural effusions  HEART/GREAT VESSELS:  Cardiomegaly  MEDIASTINUM AND OSMEL:  Unremarkable  CHEST WALL AND LOWER NECK:   Unremarkable  VISUALIZED STRUCTURES IN THE UPPER ABDOMEN:  Unremarkable  OSSEOUS STRUCTURES:  No acute fracture or destructive osseous lesion  Impression: 1  No evidence of acute pulmonary embolus or thoracic aortic aneurysm  2   Small bilateral pleural effusions and pulmonary vascular congestion without pulmonary interstitial edema, possibly indicating early or mild congestive heart failure  Workstation performed: [id number]       EKG personally reviewed by Claudia Clements MD      Counseling / Coordination of Care  Total floor / unit time spent today 30 minutes  Greater than 50% of total time was spent with the patient and / or family counseling and / or coordination of care

## 2019-06-30 NOTE — PROGRESS NOTES
Progress note- Nephrology   Krupa Masters 66 y o  female MRN: [id number]  Unit/Bed#: [facility] 920-41 Encounter: [id number]      ASSESSMENT & PLAN:    43-year-old female with a past medical history hypertension, hypothyroidism hyperlipidemia, presented with new onset atrial fibrillation now with a cardiomyopathy EF of 31-99% complicated now by hypotension and ATN    1  Acute kidney injury likely secondary to hypotensive ischemic ATN  -electrolytes are stable  -lactate improved  -creatinine increasing-urine output stable  -obtained consent for renal replacement therapy if needed  -down titrate furosemide drip as tolerated if oxygenating improved  -net negative fluid balance  -I inotropic support  -creatinine now improving    2  Hyperkalemia now improved  -potassium now remained stable continue to monitor with urine output    3  Severe lactic metabolic acidosis  -lactate was up to 10 likely related to hypotension and now down to 2  -blood pressure acceptable    4  Hypotension  -currently on norepinephrine, milrinone  -trying to down titrate norepinephrine    5  Cardiomyopathy new onset with EF 25-30%  -Heart failure team monitoring  -on inotropes    6  New onset atrial fibrillation  -for pacemaker and ICD once hemodynamically stable    7   Hyponatremia in the setting of hyperglycemia volume overload and SSRI use  -now at 123  -urine sodium 88, urine osmolality 237, not high, fractional excretion of uric acid calculated at 5 87 percent which it could indicate patient slightly pre renal/decreased effective arterial blood volume  -continue to decrease Effexor does  -serum sodium now down to 123, fractional excretion of uric acid at around 5 7% which could indicate a pre renal cause of hyponatremia  -if oxygenating better, hemodynamics better consider down titration of furosemide drip and 250-500 cc of normal saline  -would 1st treat heart failure symptomatology however prior to correction of serum sodium  Discussed with ICU       SUBJECTIVE:    Patient states she is feeling somewhat better denies any acute chest pain or shortness of breath    MEDICATIONS:    Current Facility-Administered Medications:     acetaminophen (TYLENOL) tablet 650 mg, 650 mg, Oral, Q4H PRN, SOTO Soriano, 650 mg at 19 1210    albuterol (PROVENTIL HFA,VENTOLIN HFA) inhaler 2 puff, 2 puff, Inhalation, Q4H PRN, SOTO Soriano    [COMPLETED] amiodarone 150 mg in dextrose 5 % 100 mL IV bolus, 150 mg, Intravenous, Once, Stopped at 19 1220 **FOLLOWED BY** [] amiodarone (CORDARONE) 900 mg in dextrose 5 % 500 mL infusion, 1 mg/min, Intravenous, Continuous, Stopped at 19 1804 **FOLLOWED BY** amiodarone (CORDARONE) 900 mg in dextrose 5 % 500 mL infusion, 0 5 mg/min, Intravenous, Continuous, SOTO Ayers, Last Rate: 16 7 mL/hr at 19, 0 5 mg/min at 19    benzocaine (HURRICAINE) 20 % mucosal spray 2 spray, 2 spray, Mucosal, TID PRN, SOTO Falcon, 2 spray at 19    bisacodyl (DULCOLAX) rectal suppository 10 mg, 10 mg, Rectal, Daily PRN, Chip Najjar Rasmuson, PA-C    doxycycline hyclate (VIBRAMYCIN) capsule 100 mg, 100 mg, Oral, Q12H [address], Samia R STEPHEN Collins, 100 mg at 19 0819    furosemide (LASIX) 500 mg infusion 50 mL, 20 mg/hr, Intravenous, Continuous, Chip Najjar Rasmuson, PA-C, Last Rate: 2 mL/hr at 19, 20 mg/hr at 19    insulin lispro (HumaLOG) 100 units/mL subcutaneous injection 1-5 Units, 1-5 Units, Subcutaneous, HS, SOTO Ayers    insulin lispro (HumaLOG) 100 units/mL subcutaneous injection 1-6 Units, 1-6 Units, Subcutaneous, TID AC, 1 Units at 19 1818 **AND** Fingerstick Glucose (POCT), , , TID DELILAH, SOTO Ayers    levothyroxine tablet 137 mcg, 137 mcg, Oral, Daily, SOTO Soriano, 137 mcg at 19 0928    loratadine (CLARITIN) tablet 10 mg, 10 mg, Oral, Daily, SOTO Soriano, 10 mg at 06/30/19 0819    melatonin tablet 6 mg, 6 mg, Oral, HS, BAYRON DegrootNP, 6 mg at 06/29/19 2124    Milrinone Lactate in Dextrose (PRIMACOR) 20 MG/100 ML infusion (premix), 0 125 mcg/kg/min, Intravenous, Continuous, SOTO Degroot, Last Rate: 3 mL/hr at 06/30/19 0618, 0 125 mcg/kg/min at 06/30/19 0618    norepinephrine (LEVOPHED) 4 mg (STANDARD CONCENTRATION) IV in sodium chloride 0 9% 250 mL, 1-30 mcg/min, Intravenous, Titrated, Renetta Jimenez PA-C, Stopped at 06/30/19 0212    ofloxacin (OCUFLOX) 0 3 % ophthalmic solution 1 drop, 1 drop, Both Eyes, 4x Daily, Littie Leidy, CRNP, 1 drop at 06/30/19 0819    ondansetron (ZOFRAN) injection 4 mg, 4 mg, Intravenous, Q6H PRN, SOTO Soriano, 4 mg at 06/27/19 2124    polyethylene glycol (MIRALAX) packet 17 g, 17 g, Oral, Daily, Alexis Collins PA-C, 17 g at 06/30/19 6446    senna (SENOKOT) tablet 8 6 mg, 1 tablet, Oral, HS, BAYRON DegrootNP, 8 6 mg at 06/29/19 2124    sodium chloride (OCEAN) 0 65 % nasal spray 2 spray, 2 spray, Each Nare, PRN, Alexis Collins PA-C    venlafaxine [facility]) tablet 50 mg, 50 mg, Oral, Q12H, Littie Leidy, CRNP, 50 mg at 06/29/19 2124    REVIEW OF SYSTEMS:  All the systems were reviewed and were negative except as documented on the HPI        PHYSICAL EXAM:  Current Weight: Weight - Scale: 82 kg (180 lb 12 4 oz)  First Weight: Weight - Scale: 85 5 kg (188 lb 7 9 oz)  Vitals:    06/30/19 0600 06/30/19 0730 06/30/19 0800 06/30/19 0811   BP: 122/60 104/59  114/55   BP Location: Right arm      Pulse: 104   (!) 106   Resp: 22   (!) 29   Temp: (!) 97 1 °F (36 2 °C)  (!) 97 2 °F (36 2 °C)    TempSrc: Probe      SpO2: 99%  98% 94%   Weight: 82 kg (180 lb 12 4 oz)      Height:           Intake/Output Summary (Last 24 hours) at 6/30/2019 0912  Last data filed at 6/30/2019 0800  Gross per 24 hour   Intake 1796 97 ml   Output 4155 ml   Net -2358 03 ml     General:  No acute distress  Eyes: Conjunctivitis improving  ENT: lips and mucous membranes moist  Neck: supple, no JVD  Chest:  Decreased breath sounds at the bases  CVS: distinct S1 & S2, normal rate, regular rhythm  Abdomen: soft, non-tender, non-distended, normoactive bowel sounds  Extremities:  1+ edema  Skin: no rash  Neuro: awake, alert, oriented    Lab Results:   Results from last 7 days   Lab Units 06/30/19  0516 06/30/19  0003 06/29/19  1839 06/29/19  1231 06/29/19  0445  06/28/19  0500  06/27/19  0440  06/26/19  0416 06/26/19  0415 06/26/19  0216   WBC Thousand/uL 13 26*  --   --   --  10 50*  --  11 33*  --  12 19*  --  12 42*  --  10 40*   HEMOGLOBIN g/dL 12 1  --   --   --  12 2  --  11 6  --  11 2*  --  14 6  --  15 4   HEMATOCRIT % 34 4*  --   --   --  34 1*  --  33 0*  --  32 6*  --  43 2  --  47 6*   PLATELETS Thousands/uL 191  --   --   --  188  --  173  --  159  --  228  --  233   POTASSIUM mmol/L 3 7 3 5 3 7 4 0 3 9   < > 4 1   < > 4 1   < >  --  5 7* 6 1*   CHLORIDE mmol/L 84* 84* 85* 86* 86*   < > 87*   < > 93*   < >  --  98* 100   CO2 mmol/L 29 31 29 28 30   < > 24   < > 27   < >  --  20* 16*   BUN mg/dL 85* 87* 84* 80* 80*   < > 64*   < > 53*   < >  --  25 24   CREATININE mg/dL 4 78* 4 85* 4 74* 4 70* 4 68*   < > 3 94*   < > 2 83*   < >  --  1 67* 1 64*   CALCIUM mg/dL 8 2* 7 9* 8 0* 8 0* 7 7*   < > 7 3*   < > 7 8*   < >  --  8 6 8 0*   MAGNESIUM mg/dL 2 5  --   --   --   --   --  1 7  --  2 0  --   --  2 3 2 5   PHOSPHORUS mg/dL  --   --   --   --   --   --  4 2*  --   --   --   --  7 0* 6 6*   ALK PHOS U/L 147*  --   --   --  135*  --  109  --  93  --   --  78  --    ALT U/L 2,432*  --   --   --  3,638*  --  4,595*  --  6,164*  --   --  476*  --    AST U/L 689*  --   --   --  1,989*  --  5,482*  --  15,182*  --   --  885*  --     < > = values in this interval not displayed         Other Studies:  Chest x-ray shows right basilar opacity suggesting pleural fluid

## 2019-07-01 LAB
ALBUMIN SERPL BCP-MCNC: 2.8 G/DL (ref 3.5–5)
ALP SERPL-CCNC: 146 U/L (ref 46–116)
ALT SERPL W P-5'-P-CCNC: 1723 U/L (ref 12–78)
ANION GAP SERPL CALCULATED.3IONS-SCNC: 10 MMOL/L (ref 4–13)
ANION GAP SERPL CALCULATED.3IONS-SCNC: 12 MMOL/L (ref 4–13)
ANION GAP SERPL CALCULATED.3IONS-SCNC: 8 MMOL/L (ref 4–13)
APTT PPP: 102 SECONDS (ref 23–37)
APTT PPP: 35 SECONDS (ref 23–37)
ARTERIAL PATENCY WRIST A: YES
AST SERPL W P-5'-P-CCNC: 345 U/L (ref 5–45)
BASE EX.OXY STD BLDV CALC-SCNC: 70.5 % (ref 60–80)
BASE EX.OXY STD BLDV CALC-SCNC: 72.5 % (ref 60–80)
BASE EXCESS BLDV CALC-SCNC: 4.4 MMOL/L
BASE EXCESS BLDV CALC-SCNC: 7.2 MMOL/L
BILIRUB SERPL-MCNC: 2.08 MG/DL (ref 0.2–1)
BODY TEMPERATURE: 97.8 DEGREES FEHRENHEIT
BODY TEMPERATURE: 98.4 DEGREES FEHRENHEIT
BUN SERPL-MCNC: 88 MG/DL (ref 5–25)
BUN SERPL-MCNC: 93 MG/DL (ref 5–25)
BUN SERPL-MCNC: 97 MG/DL (ref 5–25)
CALCIUM SERPL-MCNC: 8.4 MG/DL (ref 8.3–10.1)
CALCIUM SERPL-MCNC: 8.4 MG/DL (ref 8.3–10.1)
CALCIUM SERPL-MCNC: 8.5 MG/DL (ref 8.3–10.1)
CHLORIDE SERPL-SCNC: 84 MMOL/L (ref 100–108)
CHLORIDE SERPL-SCNC: 84 MMOL/L (ref 100–108)
CHLORIDE SERPL-SCNC: 85 MMOL/L (ref 100–108)
CO2 SERPL-SCNC: 33 MMOL/L (ref 21–32)
CO2 SERPL-SCNC: 34 MMOL/L (ref 21–32)
CO2 SERPL-SCNC: 34 MMOL/L (ref 21–32)
CREAT SERPL-MCNC: 4.87 MG/DL (ref 0.6–1.3)
CREAT SERPL-MCNC: 4.94 MG/DL (ref 0.6–1.3)
CREAT SERPL-MCNC: 4.95 MG/DL (ref 0.6–1.3)
ERYTHROCYTE [DISTWIDTH] IN BLOOD BY AUTOMATED COUNT: 12.3 % (ref 11.6–15.1)
GFR SERPL CREATININE-BSD FRML MDRD: 8 ML/MIN/1.73SQ M
GLUCOSE SERPL-MCNC: 100 MG/DL (ref 65–140)
GLUCOSE SERPL-MCNC: 116 MG/DL (ref 65–140)
GLUCOSE SERPL-MCNC: 124 MG/DL (ref 65–140)
GLUCOSE SERPL-MCNC: 83 MG/DL (ref 65–140)
GLUCOSE SERPL-MCNC: 93 MG/DL (ref 65–140)
GLUCOSE SERPL-MCNC: 94 MG/DL (ref 65–140)
GLUCOSE SERPL-MCNC: 99 MG/DL (ref 65–140)
HCO3 BLDV-SCNC: 29 MMOL/L (ref 24–30)
HCO3 BLDV-SCNC: 32.1 MMOL/L (ref 24–30)
HCT VFR BLD AUTO: 34.9 % (ref 34.8–46.1)
HGB BLD-MCNC: 12.3 G/DL (ref 11.5–15.4)
INR PPP: 2.16 (ref 0.84–1.19)
MCH RBC QN AUTO: 32.2 PG (ref 26.8–34.3)
MCHC RBC AUTO-ENTMCNC: 35.2 G/DL (ref 31.4–37.4)
MCV RBC AUTO: 91 FL (ref 82–98)
O2 CT BLDV-SCNC: 13.4 ML/DL
O2 CT BLDV-SCNC: 13.7 ML/DL
PCO2 BLD: 45.6 MM HG (ref 42–50)
PCO2 BLDV: 43.2 MM HG (ref 42–50)
PCO2 BLDV: 46.4 MM HG (ref 42–50)
PH BLD: 7.46 [PH] (ref 7.3–7.4)
PH BLDV: 7.45 [PH] (ref 7.3–7.4)
PH BLDV: 7.46 [PH] (ref 7.3–7.4)
PLATELET # BLD AUTO: 178 THOUSANDS/UL (ref 149–390)
PMV BLD AUTO: 11.2 FL (ref 8.9–12.7)
PO2 BLDV: 38.9 MM HG (ref 35–45)
PO2 BLDV: 39.2 MM HG (ref 35–45)
PO2 VENOUS TEMP CORRECTED: 38.1 MM HG (ref 35–45)
POTASSIUM SERPL-SCNC: 3.5 MMOL/L (ref 3.5–5.3)
POTASSIUM SERPL-SCNC: 3.8 MMOL/L (ref 3.5–5.3)
POTASSIUM SERPL-SCNC: 3.8 MMOL/L (ref 3.5–5.3)
PROT SERPL-MCNC: 6.1 G/DL (ref 6.4–8.2)
PROTHROMBIN TIME: 23.6 SECONDS (ref 11.6–14.5)
RBC # BLD AUTO: 3.82 MILLION/UL (ref 3.81–5.12)
SODIUM SERPL-SCNC: 127 MMOL/L (ref 136–145)
SODIUM SERPL-SCNC: 127 MMOL/L (ref 136–145)
SODIUM SERPL-SCNC: 130 MMOL/L (ref 136–145)
WBC # BLD AUTO: 13.33 THOUSAND/UL (ref 4.31–10.16)

## 2019-07-01 PROCEDURE — G0515 COGNITIVE SKILLS DEVELOPMENT: HCPCS

## 2019-07-01 PROCEDURE — 82805 BLOOD GASES W/O2 SATURATION: CPT | Performed by: NURSE PRACTITIONER

## 2019-07-01 PROCEDURE — 99233 SBSQ HOSP IP/OBS HIGH 50: CPT | Performed by: INTERNAL MEDICINE

## 2019-07-01 PROCEDURE — 99233 SBSQ HOSP IP/OBS HIGH 50: CPT | Performed by: EMERGENCY MEDICINE

## 2019-07-01 PROCEDURE — 99232 SBSQ HOSP IP/OBS MODERATE 35: CPT | Performed by: INTERNAL MEDICINE

## 2019-07-01 PROCEDURE — 85730 THROMBOPLASTIN TIME PARTIAL: CPT | Performed by: PHYSICIAN ASSISTANT

## 2019-07-01 PROCEDURE — 97530 THERAPEUTIC ACTIVITIES: CPT

## 2019-07-01 PROCEDURE — 80048 BASIC METABOLIC PNL TOTAL CA: CPT | Performed by: NURSE PRACTITIONER

## 2019-07-01 PROCEDURE — 82948 REAGENT STRIP/BLOOD GLUCOSE: CPT

## 2019-07-01 PROCEDURE — 85730 THROMBOPLASTIN TIME PARTIAL: CPT | Performed by: INTERNAL MEDICINE

## 2019-07-01 PROCEDURE — 85610 PROTHROMBIN TIME: CPT | Performed by: NURSE PRACTITIONER

## 2019-07-01 PROCEDURE — 85027 COMPLETE CBC AUTOMATED: CPT | Performed by: NURSE PRACTITIONER

## 2019-07-01 PROCEDURE — 80053 COMPREHEN METABOLIC PANEL: CPT | Performed by: NURSE PRACTITIONER

## 2019-07-01 PROCEDURE — 94668 MNPJ CHEST WALL SBSQ: CPT

## 2019-07-01 RX ORDER — HYDRALAZINE HYDROCHLORIDE 10 MG/1
5 TABLET, FILM COATED ORAL EVERY 8 HOURS SCHEDULED
Status: DISCONTINUED | OUTPATIENT
Start: 2019-07-01 | End: 2019-07-02

## 2019-07-01 RX ORDER — ISOSORBIDE DINITRATE 5 MG/1
5 TABLET ORAL
Status: DISCONTINUED | OUTPATIENT
Start: 2019-07-01 | End: 2019-07-02

## 2019-07-01 RX ORDER — HEPARIN SODIUM 10000 [USP'U]/100ML
3-20 INJECTION, SOLUTION INTRAVENOUS
Status: DISCONTINUED | OUTPATIENT
Start: 2019-07-01 | End: 2019-07-04

## 2019-07-01 RX ORDER — POTASSIUM CHLORIDE 20 MEQ/1
20 TABLET, EXTENDED RELEASE ORAL ONCE
Status: COMPLETED | OUTPATIENT
Start: 2019-07-01 | End: 2019-07-01

## 2019-07-01 RX ADMIN — POLYETHYLENE GLYCOL 3350 17 G: 17 POWDER, FOR SOLUTION ORAL at 08:09

## 2019-07-01 RX ADMIN — MELATONIN 6 MG: at 21:05

## 2019-07-01 RX ADMIN — HYDRALAZINE HYDROCHLORIDE 5 MG: 10 TABLET, FILM COATED ORAL at 09:46

## 2019-07-01 RX ADMIN — SENNOSIDES 8.6 MG: 8.6 TABLET, FILM COATED ORAL at 21:05

## 2019-07-01 RX ADMIN — LORATADINE 10 MG: 10 TABLET ORAL at 08:09

## 2019-07-01 RX ADMIN — ISOSORBIDE DINITRATE 5 MG: 5 TABLET ORAL at 11:43

## 2019-07-01 RX ADMIN — DOXYCYCLINE 100 MG: 100 CAPSULE ORAL at 21:05

## 2019-07-01 RX ADMIN — OFLOXACIN 1 DROP: 3 SOLUTION/ DROPS OPHTHALMIC at 17:45

## 2019-07-01 RX ADMIN — HEPARIN SODIUM AND DEXTROSE 12 UNITS/KG/HR: 10000; 5 INJECTION INTRAVENOUS at 09:57

## 2019-07-01 RX ADMIN — LEVOTHYROXINE SODIUM 137 MCG: 112 TABLET ORAL at 05:30

## 2019-07-01 RX ADMIN — OFLOXACIN 1 DROP: 3 SOLUTION/ DROPS OPHTHALMIC at 08:10

## 2019-07-01 RX ADMIN — MILRINONE LACTATE IN DEXTROSE 0.12 MCG/KG/MIN: 200 INJECTION, SOLUTION INTRAVENOUS at 05:38

## 2019-07-01 RX ADMIN — HYDRALAZINE HYDROCHLORIDE 5 MG: 10 TABLET, FILM COATED ORAL at 21:06

## 2019-07-01 RX ADMIN — AMIODARONE HYDROCHLORIDE 200 MG: 200 TABLET ORAL at 11:42

## 2019-07-01 RX ADMIN — DOXYCYCLINE 100 MG: 100 CAPSULE ORAL at 08:09

## 2019-07-01 RX ADMIN — ISOSORBIDE DINITRATE 5 MG: 5 TABLET ORAL at 17:45

## 2019-07-01 RX ADMIN — VENLAFAXINE 50 MG: 50 TABLET ORAL at 21:06

## 2019-07-01 RX ADMIN — OFLOXACIN 1 DROP: 3 SOLUTION/ DROPS OPHTHALMIC at 21:06

## 2019-07-01 RX ADMIN — HYDRALAZINE HYDROCHLORIDE 5 MG: 10 TABLET, FILM COATED ORAL at 15:14

## 2019-07-01 RX ADMIN — POTASSIUM CHLORIDE 20 MEQ: 1500 TABLET, EXTENDED RELEASE ORAL at 17:44

## 2019-07-01 RX ADMIN — VENLAFAXINE 50 MG: 50 TABLET ORAL at 09:46

## 2019-07-01 RX ADMIN — OFLOXACIN 1 DROP: 3 SOLUTION/ DROPS OPHTHALMIC at 11:43

## 2019-07-01 RX ADMIN — AMIODARONE HYDROCHLORIDE 200 MG: 200 TABLET ORAL at 08:09

## 2019-07-01 RX ADMIN — AMIODARONE HYDROCHLORIDE 200 MG: 200 TABLET ORAL at 17:44

## 2019-07-01 NOTE — PROGRESS NOTES
Heart Failure Service Progress Note - Gail Nascimento 66 y o  female MRN: [id number]    Unit/Bed#: [facility] 513-01 Encounter: [id number]      Assessment:    Principal Problem:    Cardiogenic shock ([facility] 75 )  Active Problems:    Hyperlipidemia    Hypertension    Hypothyroidism    Type 2 diabetes mellitus with diabetic cataract ([facility] )    Atrial fibrillation with RVR ([facility] )    Acute CHF (congestive heart failure) ([facility] )    Acute kidney injury ([place] [address] )    Hyponatremia    Hyperphosphatemia    Shock liver    Acute bacterial conjunctivitis of both eyes      Subjective:   Patient seen and examined  No significant events overnight  Had rather involved discussion regarding treatment goals with critical care team and after much discussion has decided to remain a full code  Wishes to pursue all needed interventions necessary to achieve meaningful recovery  Hemodynamics improving  MAPs around 80 at present  No longer requiring pressor support but still remains poorly rate controlled    Milrinone dose at 0 13 mcg/kg/min with stable SVO2   had been on Lasix gtt over weekend with brisk U O  Gtt now d/c  Creat still around 4-5  Patient resistant to get OOB  Has sore throat, cough and not sleeping  Would prefer to hold off on any invasive procedures/interventions until tomorrow  AM      Objective: Intake/ Output:699/5050/-4 3L  Weight: 175 lbs  Tele: Atrial fib with RVR  1  Acute on chronic systolic CHF, LVEF 46-70%, LVIDd 4 6 cm, NYHA Class III, ACC/AHA Stage C with cardiogenic shock  Etiology: NICM and likely tachy-mediated with new onset AF with RVR of unknown duration, +/- ischemia given patient's risk factors for CAD and strong family history, less likely viral, toxin induced or infiltrative  Thyroid levels may have been a bit erratic in the past  TSH at present >9 but with normal FT4  Currently in mildly low output state 2/2 to arrhythmia   Cardiogenic occurring in the setting of attempted cardioversion, use of Cardizem and Propofol for induction pre procedure  [place] Appears euvolemic to dry on exam with CVP in the 6-7 range  Autodiuresing, IV Lasix on hold  [place] MAPS stable off pressors  D/C Milrinone, repeat VBG in 2 hours       Gtts :  Vaso- off  Amio-off, now on oral    Heparin gtt-on hold, INR of 2 today  Norepi off  Milrinone 0 13 mcg/kg/min, discontinue now  VBG 7/1/19: REPEAT in 2 HOURS OFF MILRINONE  SVO2 73%  CO/CI -5 7/3 05     VBG 6/27/19 at 0630:  SVO2 77 1%  CO/CI 5 6/3    VBG 6/26/19 0400:  SVO2 68 3%  CO/CI 2 76/1 5      Neurohormonal Blockade:  --Beta-Blocker: Could not tolerate Metoprolol 2/2 hypotension  --ACEi, ARB or ARNi: Iso/Hydral 5mg/5mg q 8 now    (or SVR reduction)  --Aldosterone Receptor Blocker:  --Diuretic: Lasix 80 mg IV with Metolazone 10 mg PO-on hold    Sudden Cardiac Death Risk Reduction:  --ICD: EF 35%   Interrogation:     Electrical Resynchronization:  --narrow QRS  Interrogation:     Advanced Therapies (If appropriate): --Inotrope: Milrinone 0 13 mcg/kg/min-D/C now  --LVAD/Transplant Candidacy: 2  Atrial Fib with RVR  Currently with rates in the 110-120 range  [place] No Dig given renal function  Became hypotensive on BB  Continue Amio load  Will re-attempt D/C cardioversion tomorrow AM with possible device implantation as originally planned  Will discuss with EP today        3  HTN  Off pressor support       4  HLD  On statin therapy      5  Hypothyroidism  History of radioablation in the past now on replacement therapy  TSH 9 with normal free T4      6  Type II DM        7  CORAZON  Noncompliant with CPAP  Will need repeat outpatient sleep study  8  WANG  Likely 2/2 to ATN from hypotension, low output state  Creat presently in 4-5 range  Appreciate nephro recs  9 Transaminitis  Values c/w shock liver  Improving  AST/ALT 15K/6K now down to 345/1723,    [employer] (day, reason):   Murray catheter (day, reason):    Vitals: Blood pressure 105/63, pulse (!) 114, temperature 97 5 °F (36 4 °C), temperature source Probe, resp  rate 17, height 5' 1" (1 549 m), weight 79 4 kg (175 lb 0 7 oz), SpO2 94 %  , Body mass index is 33 07 kg/m² , I/O last 3 completed shifts: In: 1353 [P O :400; I V :565; IV Piggyback:330]  Out: 9601 [Urine:7250]  I/O this shift:  In: 6 [I V :6]  Out: 350 [Urine:350]  Wt Readings from Last 3 Encounters:   07/01/19 79 4 kg (175 lb 0 7 oz)   06/18/19 81 kg (178 lb 9 6 oz)   03/20/19 80 1 kg (176 lb 9 6 oz)       Intake/Output Summary (Last 24 hours) at 7/1/2019 0904  Last data filed at 7/1/2019 0800  Gross per 24 hour   Intake 421 95 ml   Output 5075 ml   Net -4653 05 ml     I/O last 3 completed shifts: In: 6056 [P O :400;  I V :565; IV Piggyback:330]  Out: 3620 [Urine:7250]    Afib with RVR       Physical Exam:  Vitals:    07/01/19 0600 07/01/19 0615 07/01/19 0700 07/01/19 0800   BP:   (!) 94/48 105/63   BP Location:    Right arm   Pulse:  (!) 108 (!) 120 (!) 114   Resp:  17 17 17   Temp:  97 5 °F (36 4 °C) 97 5 °F (36 4 °C) 97 5 °F (36 4 °C)   TempSrc:    Probe   SpO2:  94% 94% 94%   Weight: 79 4 kg (175 lb 0 7 oz)      Height:           GEN: Mayra Meadows appears well, alert and oriented x 3, pleasant and cooperative   HEENT: pupils equal, round, and reactive to light; extraocular muscles intact  NECK: supple, no carotid bruits   HEART: regular rhythm, normal S1 and S2, no murmurs, clicks, gallops or rubs, JVP is  flat  LUNGS: diminished to auscultation bilaterally; no wheezes, rales, +BL rhonchi   ABDOMEN: normal bowel sounds, soft, no tenderness, no distention  EXTREMITIES: peripheral pulses normal; no clubbing, cyanosis, trace LLE edema  NEURO: no focal findings   SKIN: normal without suspicious lesions on exposed skin      Current Facility-Administered Medications:     acetaminophen (TYLENOL) tablet 650 mg, 650 mg, Oral, Q4H PRN, Yeni Spironello V, CRNP, 650 mg at 06/28/19 1210    albuterol (PROVENTIL HFA,VENTOLIN HFA) inhaler 2 puff, 2 puff, Inhalation, Q4H PRN, Caryl Vasquez Spironello V, CRNP    amiodarone tablet 200 mg, 200 mg, Oral, TID With Meals, Ty Ackerman, SOTO, 200 mg at 07/01/19 0809    bisacodyl (DULCOLAX) rectal suppository 10 mg, 10 mg, Rectal, Daily PRN, Mila Frias PA-C    doxycycline hyclate (VIBRAMYCIN) capsule 100 mg, 100 mg, Oral, Q12H [facility], Samia Collins PA-C, 100 mg at 07/01/19 0809    furosemide (LASIX) 500 mg infusion 50 mL, 10 mg/hr, Intravenous, Continuous, SOTO Monte, Stopped at 06/30/19 2345    insulin lispro (HumaLOG) 100 units/mL subcutaneous injection 1-5 Units, 1-5 Units, Subcutaneous, HS, BAYRON MonteNP    insulin lispro (HumaLOG) 100 units/mL subcutaneous injection 1-6 Units, 1-6 Units, Subcutaneous, TID AC, 1 Units at 06/30/19 1218 **AND** Fingerstick Glucose (POCT), , , TID AC, Ty Ackerman, SOTO    levothyroxine tablet 137 mcg, 137 mcg, Oral, Daily, Yeni Spironello V, CRNP, 137 mcg at 07/01/19 0530    loratadine (CLARITIN) tablet 10 mg, 10 mg, Oral, Daily, Yeni Spironello V, CRNP, 10 mg at 07/01/19 0809    melatonin tablet 6 mg, 6 mg, Oral, HS, SOTO Degroot, 6 mg at 06/30/19 2111    Milrinone Lactate in Dextrose (PRIMACOR) 20 MG/100 ML infusion (premix), 0 125 mcg/kg/min, Intravenous, Continuous, SOTO Degroot, Last Rate: 3 mL/hr at 07/01/19 0538, 0 125 mcg/kg/min at 07/01/19 0538    norepinephrine (LEVOPHED) 4 mg (STANDARD CONCENTRATION) IV in sodium chloride 0 9% 250 mL, 1-30 mcg/min, Intravenous, Titrated, Clem Suarez PA-C, Stopped at 06/30/19 0212    ofloxacin (OCUFLOX) 0 3 % ophthalmic solution 1 drop, 1 drop, Both Eyes, 4x Daily, SOTO Monte, 1 drop at 07/01/19 0810    ondansetron (ZOFRAN) injection 4 mg, 4 mg, Intravenous, Q6H PRN, Yeni Carranza V, BAYRONNP, 4 mg at 06/27/19 2124    phenol (CHLORASEPTIC) 1 4 % mucosal liquid 2 spray, 2 spray, Mouth/Throat, Q2H PRN, Chris Stewart PA-C    polyethylene glycol (MIRALAX) packet 17 g, 17 g, Oral, Daily, [facility] STEPHEN Collins, 17 g at 07/01/19 0809    senna (SENOKOT) tablet 8 6 mg, 1 tablet, Oral, HS, SOTO Degroot, 8 6 mg at 06/30/19 2111    sodium chloride (OCEAN) 0 65 % nasal spray 2 spray, 2 spray, Each Nare, PRN, Camella Kamala Collins PA-C, 2 spray at 06/30/19 1218    venlafaxine [facility]) tablet 50 mg, 50 mg, Oral, Q12H, Tawana SOTO Trevino, 50 mg at 06/30/19 2111      Labs & Results:    Results from last 7 days   Lab Units 06/26/19  0216   TROPONIN I ng/mL 0 03     Results from last 7 days   Lab Units 07/01/19  0524 06/30/19  0516 06/29/19  0445   WBC Thousand/uL 13 33* 13 26* 10 50*   HEMOGLOBIN g/dL 12 3 12 1 12 2   HEMATOCRIT % 34 9 34 4* 34 1*   PLATELETS Thousands/uL 178 191 188         Results from last 7 days   Lab Units 07/01/19  0524 06/30/19  2315 06/30/19  1617 06/30/19  0516  06/29/19  0445   POTASSIUM mmol/L 3 8 3 8 3 6 3 7   < > 3 9   CHLORIDE mmol/L 85* 86* 86* 84*   < > 86*   CO2 mmol/L 34* 33* 31 29   < > 30   BUN mg/dL 88* 86* 86* 85*   < > 80*   CREATININE mg/dL 4 95* 4 84* 4 86* 4 78*   < > 4 68*   CALCIUM mg/dL 8 4 7 9* 7 9* 8 2*   < > 7 7*   ALK PHOS U/L 146*  --   --  147*  --  135*   ALT U/L 1,723*  --   --  2,432*  --  3,638*   AST U/L 345*  --   --  689*  --  1,989*    < > = values in this interval not displayed  Results from last 7 days   Lab Units 07/01/19  0524 06/30/19  0516 06/29/19  0629   INR  2 16* 2 79* 5 34*       Chest X-Ray is obtained; result - reviewed  Counseling / Coordination of Care  Total floor / unit time spent today 20 minutes  Greater than 50% of total time was spent with the patient and / or family counseling and / or coordination of care  A description of the counseling / coordination of care: 20  Manisha Ramos

## 2019-07-01 NOTE — UTILIZATION REVIEW
Continued Stay Review    Date: 6/30/19                          Current Patient Class: IP  Current Level of Care: CRITICAL CARE     HPI:78 y o  female initially admitted on 6/18/19 WITH A FIB WITH RVR  Assessment/Plan: ATRIAL FIB WITH RVR WITH ASSOCIATED KAMARA, BLE EDEMA, PRODUCTIVE COUTH  PLACED ON CARDIZEM DRIP  HAD EBEN WITH CARDIOVERSION ON 6/21 AND REVERTED BACK INTO A FIB HARPAL 6/22  WAS PUT ON IV AMIODARONE DRIP  DECIDED TO PLACE AICD BUT HAVE NOT BEEN ABLE TO DO THAT DUE TO OVERALL MEDICAL INSTABILITY  WAS PLACED ON MILRINONE DRIP AND FUROSEMIDE DRIP  6/29 -  SHE HAS WANG R/T HYPOTENSIVE ISCHEMIC ATN  CONSIDERING CVVH IF NEEDED   6/30 AMIODARONE, FUROSEMIDE AND PRESSORS OFF TODAY  SHE REMAINS CRITICALLY ILL AT THIS TIME       Pertinent Labs/Diagnostic Results:   Results from last 7 days   Lab Units 06/30/19  0516 06/29/19 0445 06/28/19  0500   WBC Thousand/uL 13 26* 10 50* 11 33*   HEMOGLOBIN g/dL 12 1 12 2 11 6   HEMATOCRIT % 34 4* 34 1* 33 0*   PLATELETS Thousands/uL 191 188 173   NEUTROS ABS Thousands/µL  --   --   --    BANDS PCT %  --   --  6     Lab Units 06/30/19  2315 06/30/19  1617 06/30/19  0516   SODIUM mmol/L 129* 125* 123*   POTASSIUM mmol/L 3 8 3 6 3 7   CHLORIDE mmol/L 86* 86* 84*   CO2 mmol/L 33* 31 29   ANION GAP mmol/L 10 8 10   BUN mg/dL 86* 86* 85*   CREATININE mg/dL 4 84* 4 86* 4 78*   EGFR ml/min/1 73sq m 8 8 8   CALCIUM mg/dL 7 9* 7 9* 8 2*   MAGNESIUM mg/dL  --   --  2 5   PHOSPHORUS mg/dL  --   --   --      Results from last 7 days   Lab Units 06/30/19  0516 06/29/19  0445 06/28/19  0500   AST U/L 689* 1,989* 5,482*   ALT U/L 2,432* 3,638* 4,595*   ALK PHOS U/L 147* 135* 109   TOTAL PROTEIN g/dL 6 0* 5 5* 5 6*   ALBUMIN g/dL 3 1* 2 6* 2 5*   TOTAL BILIRUBIN mg/dL 2 83* 2 30* 1 81*   BILIRUBIN DIRECT mg/dL 2 00* 1 53*  --      Results from last 7 days   Lab Units 06/30/19 2122 06/30/19  1617 06/30/19  1207 06/30/19  0630 06/29/19 2121 06/29/19  1619 06/29/19  1132 06/29/19  0656   POC GLUCOSE mg/dl 138 97 159* 138 148* 178* 127 96     Results from last 7 days   Lab Units 06/30/19  2315 06/30/19  1617 06/30/19  0516 06/30/19  0003 06/29/19  1839 06/29/19  1231 06/29/19  0445 06/29/19  0031   GLUCOSE RANDOM mg/dL 111 100 115 126 172* 126 105 117     Results from last 7 days   Lab Units 06/30/19  1029 06/29/19  0447   PH JOSE  7 414* 7 422*   PCO2 JOSE mm Hg 44 1 38 6*   PO2 JOSE mm Hg 43 0 44 3   HCO3 JOSE mmol/L 27 6 24 6   BASE EXC JOSE mmol/L 2 6 0 3   O2 CONTENT JOSE ml/dL 14 3 14 4   O2 HGB, VENOUS % 77 1 77 7     Results from last 7 days   Lab Units 06/26/19  0216   TROPONIN I ng/mL 0 03     Results from last 7 days   Lab Units 06/30/19  0516 06/29/19  0629 06/28/19  0500 06/28/19  0003 06/27/19  1758   PROTIME seconds 28 9* 48 4* 61 5*  --   --    INR  2 79* 5 34* 7 23*  --   --    PTT seconds  --  42*  --  85* 84*     Results from last 7 days   Lab Units 06/27/19  0439 06/26/19  2332 06/26/19  2132 06/26/19  1720   LACTIC ACID mmol/L 2 0 3 4* 3 7* 7 8*     Vital Signs:   06/30/19 2300  97 5 °F (36 4 °C)  104  22  98/55  77  86/38  54 mmHg  94 %         06/30/19 2200  97 5 °F (36 4 °C)  104  13          94 %  None (Room air)       06/30/19 2100  97 5 °F (36 4 °C)  106Abnormal   16  96/48Abnormal   63  118/36  60 mmHg  97 %         06/30/19 2000  97 2 °F (36 2 °C)Abnormal   104  18      104/42  62 mmHg  97 %         06/30/19 1900    102  17      114/40  62 mmHg  96 %         06/30/19 1803        93/55  67               06/30/19 1723  96 8 °F (36 °C)Abnormal   104  17  115/63  94  122/46  70 mmHg  96 %      0 mmHg   06/30/19 1700  96 8 °F (36 °C)Abnormal   104  15      120/42  66 mmHg  95 %      1 mmHg   06/30/19 1600  97 2 °F (36 2 °C)Abnormal       104/55  68               06/30/19 1523        106/59  76               06/30/19 1508    104  20      102/48  68 mmHg  96 %      4 mmHg   06/30/19 1423        92/56  65              06/30/19 1400    104  19      120/50  72 mmHg  98 %      5 mmHg   06/30/19 1323        101/58  75               06/30/19 1300    102  17      108/46  66 mmHg  97 %         06/30/19 1223        85/57Abnormal   73               06/30/19 1200    102  15      132/48  74 mmHg  96 %      4 mmHg   06/30/19 1100  96 8 °F (36 °C)Abnormal   108Abnormal   18      98/36  54 mmHg  97 %      4 mmHg   06/30/19 1000  97 2 °F (36 2 °C)Abnormal   108Abnormal   16  100/38Abnormal   57  106/46  64 mmHg  97 %      8 mmHg   06/30/19 0811    106Abnormal   29Abnormal   114/55  68  116/46  68 mmHg  94 %      6 mmHg   06/30/19 0730        104/59  85               06/30/19 0600  97 1 °F (36 2 °C)Abnormal   104  22  122/60  78  122/44  68 mmHg  99 %  Nasal cannula  Lying  5 mmHg   06/30/19 0500  97 °F (36 1 °C)Abnormal   104  19  135/73  90  112/56  74 mmHg  98 %  Nasal cannula  Lying  12 mmHg   06/30/19 0400  97 1 °F (36 2 °C)Abnormal   104  16  103/55  76  120/46  68 mmHg  98 %  Nasal cannula  Lying  8 mmHg   06/30/19 0300  97 °F (36 1 °C)Abnormal   104  14  104/52  66  108/40  60 mmHg  99 %  Nasal cannula  Lying  5 mmHg   06/30/19 0200  97 2 °F (36 2 °C)Abnormal   108Abnormal   16  101/74  90  122/44  66 mmHg  99 %  Nasal cannula  Lying  5 mmHg   06/30/19 0100  97 2 °F (36 2 °C)Abnormal   110Abnormal   18  104/47Abnormal   60  100/40  58 mmHg  99 %  Nasal cannula  Lying  5 mmHg   06/30/19 0030        101/50  71               06/30/19 0000  97 5 °F (36 4 °C)  114Abnormal   20  94/55  69  104/40  58 mmHg  97 %  Nasal cannula  Lying  5 mmHg     Medications:   Scheduled Meds:   Current Facility-Administered Medications:  acetaminophen 650 mg Oral Q4H PRN     albuterol 2 puff Inhalation Q4H PRN     amiodarone 200 mg Oral TID With Meals     bisacodyl 10 mg Rectal Daily PRN     doxycycline hyclate 100 mg Oral Q12H DAVE     heparin (porcine) 3-20 Units/kg/hr (Order-Specific) Intravenous Titrated Last Rate: 12 Units/kg/hr (07/01/19 0957)    hydrALAZINE 5 mg Oral Q8H [address]     insulin lispro 1-5 Units Subcutaneous HS     insulin lispro 1-6 Units Subcutaneous TID AC     isosorbide dinitrate 5 mg Oral TID after meals     levothyroxine 137 mcg Oral Daily     loratadine 10 mg Oral Daily     MILRINONE DRIP 0 125 MCG / KG/ MIN IV  INFUSING 6/30/19    melatonin 6 mg Oral HS     ofloxacin 1 drop Both Eyes 4x Daily     ondansetron 4 mg Intravenous Q6H PRN     phenol 2 spray Mouth/Throat Q2H PRN     polyethylene glycol 17 g Oral Daily     senna 1 tablet Oral HS     sodium chloride 2 spray Each Nare PRN  X1 6/30   venlafaxine 50 mg Oral Q12H       NOREPINEPHRINE DRIP D/C 6/30   FUROSEMIDE DRIP D/C 6/30  AMIODARONE DRIP D/C 6/30    Discharge Plan: D     Network Utilization Review Department  Phone: [phone]; Fax [phone]  [email or web address]  ATTENTION: Please call with any questions or concerns to [phone]  and carefully listen to the prompts so that you are directed to the right person  Send all requests for admission clinical reviews, approved or denied determinations and any other requests to fax [phone]   All voicemails are confidential

## 2019-07-01 NOTE — PROGRESS NOTES
07/01/19 [address] Involvement Patient active with Denominational   Spiritual Beliefs/Perceptions   Concept of God Accepting   God's Role in Disease God's will   Support Systems Family members   Stress Factors   Patient Stress Factors Health changes   Coping Responses   Patient Coping Open/discussion   Family Coping Anxiety;Open/discussion   Patient Spiritual Assessment   Feelings of Discouragement discouraged with bad turn of health   Plan of Care   Comments Talked with pt  about discouragement with health changes, listened empathetically, offered prayer, pt  verbally and sometimes tearfully processed emotions, cultivated relationship of care and support   Assessment Completed by: Unit visit

## 2019-07-01 NOTE — PROGRESS NOTES
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 with new onset EF 25-30%, new onset AFib, heart failure team on board, milrinone stopped, tentative plan for DCCV tomorrow  3  Hyponatremia in the setting of volume overload, cardiogenic shock, acute renal failure inability to excrete free water  Will follow closely for now  Continue with fluid restriction  Diuretics on hold due to previous brisk diuresis  4  Severe lactic acidosis, resolved  5  Transaminitis secondary to shock liver in the setting of cardiogenic shock, LFTs trending down  Discussed with critical care team   Discussed with Heart failure team    SUBJECTIVE:  Patient seen and examined, complaining of same left foot pain, denies significant shortness of breath or chest pain, complaining of cough and sore throat  Lasix drip was stopped after brisk diuresis  OBJECTIVE:  Current Weight: Weight - Scale: 79 4 kg (175 lb 0 7 oz)  Vitals:    07/01/19 1000   BP: 123/57   Pulse: (!) 110   Resp: 16   Temp: 98 2 °F (36 8 °C)   SpO2: 94%       Intake/Output Summary (Last 24 hours) at 7/1/2019 1054  Last data filed at 7/1/2019 1000  Gross per 24 hour   Intake 667 65 ml   Output 5225 ml   Net -4557 35 ml     General: conscious, cooperative, in not acute distress  Eyes: conjunctivae pink, anicteric sclerae  ENT: lips and mucous membranes moist  Neck: supple, no JVD  Chest:  Decreased breath sounds at bases    CVS:  Tachycardic  Abdomen: soft, non-tender, non-distended, normoactive bowel sounds  Extremities:  1+ edema of both legs  Skin: no rash  Neuro: awake, alert, oriented        Medications:    Current Facility-Administered Medications:     acetaminophen (TYLENOL) tablet 650 mg, 650 mg, Oral, Q4H PRN, Yeni Elliotto V, CRNP, 650 mg at 06/28/19 1210    albuterol (PROVENTIL HFA,VENTOLIN HFA) inhaler 2 puff, 2 puff, Inhalation, Q4H PRN, Yeni Carranza V, SOTO    amiodarone tablet 200 mg, 200 mg, Oral, TID With Meals, SOTO Lion, 200 mg at 07/01/19 0809    bisacodyl (DULCOLAX) rectal suppository 10 mg, 10 mg, Rectal, Daily PRN, Atul Collins PA-C    doxycycline hyclate (VIBRAMYCIN) capsule 100 mg, 100 mg, Oral, Q12H [facility], Samia Collins PA-C, 100 mg at 07/01/19 0809    heparin (porcine) 25,000 units in 250 mL infusion (premix), 3-20 Units/kg/hr (Order-Specific), Intravenous, Titrated, Riley Aranda Jr, PA-C, Last Rate: 9 mL/hr at 07/01/19 0957, 12 Units/kg/hr at 07/01/19 0957    hydrALAZINE (APRESOLINE) tablet 5 mg, 5 mg, Oral, Q8H [facility], SOTO Junior, 5 mg at 07/01/19 0946    insulin lispro (HumaLOG) 100 units/mL subcutaneous injection 1-5 Units, 1-5 Units, Subcutaneous, HS, SOTO Lion    insulin lispro (HumaLOG) 100 units/mL subcutaneous injection 1-6 Units, 1-6 Units, Subcutaneous, TID AC, 1 Units at 06/30/19 1218 **AND** Fingerstick Glucose (POCT), , , TID AC, SOTO Lion    isosorbide dinitrate (ISORDIL) tablet 5 mg, 5 mg, Oral, TID after meals, SOTO Junior    levothyroxine tablet 137 mcg, 137 mcg, Oral, Daily, STOO Soriano, 137 mcg at 07/01/19 0530    loratadine (CLARITIN) tablet 10 mg, 10 mg, Oral, Daily, SOTO Soriano, 10 mg at 07/01/19 0809    melatonin tablet 6 mg, 6 mg, Oral, HS, SOTO Degroot, 6 mg at 06/30/19 2111    ofloxacin (OCUFLOX) 0 3 % ophthalmic solution 1 drop, 1 drop, Both Eyes, 4x Daily, SOTO Lion, 1 drop at 07/01/19 0810   ondansetron (ZOFRAN) injection 4 mg, 4 mg, Intravenous, Q6H PRN, SOTO Soriano, 4 mg at 06/27/19 2124    phenol (CHLORASEPTIC) 1 4 % mucosal liquid 2 spray, 2 spray, Mouth/Throat, Q2H PRN, Radha Diehl PA-C    polyethylene glycol (MIRALAX) packet 17 g, 17 g, Oral, Daily, Nataliia Susan Collins PA-C, 17 g at 07/01/19 0809    senna (SENOKOT) tablet 8 6 mg, 1 tablet, Oral, HS, SOTO Degroot, 8 6 mg at 06/30/19 2111    sodium chloride (OCEAN) 0 65 % nasal spray 2 spray, 2 spray, Each Nare, PRN, Nataliia Collins PA-C, 2 spray at 06/30/19 1218    venlafaxine (EFFEXOR) tablet 50 mg, 50 mg, Oral, Q12H, SOTO Arndt, 50 mg at 07/01/19 0946    Invasive Devices:   Urethral Catheter Temperature probe 16 Fr   (Active)   Amt returned on insertion(mL) 10 mL 6/27/2019 12:00 AM   Reasons to continue Urinary Catheter  Accurate I&O assessment in critically ill patients (48 hr  max) 6/30/2019 10:44 AM   Goal for Removal No longer needed- Will place order to discontinue 6/30/2019 10:44 AM   Site Assessment Clean;Skin intact 7/1/2019  8:00 AM   Collection Container Standard drainage bag 7/1/2019  8:00 AM   Securement Method Securing device (Describe) 7/1/2019  8:00 AM   Output (mL) 500 mL 7/1/2019  9:57 AM       Lab Results:   Results from last 7 days   Lab Units 07/01/19  0901 07/01/19  0524 06/30/19  2315  06/30/19  0516  06/29/19  0445  06/28/19  0500  06/27/19  0440  06/26/19  0415  06/26/19  0216   WBC Thousand/uL  --  13 33*  --   --  13 26*  --  10 50*  --  11 33*  --  12 19*   < >  --   --  10 40*   HEMOGLOBIN g/dL  --  12 3  --   --  12 1  --  12 2  --  11 6  --  11 2*   < >  --   --  15 4   HEMATOCRIT %  --  34 9  --   --  34 4*  --  34 1*  --  33 0*  --  32 6*   < >  --   --  47 6*   PLATELETS Thousands/uL  --  178  --   --  191  --  188  --  173  --  159   < >  --   --  233   SODIUM mmol/L 127* 127* 129*   < > 123*   < > 125*   < > 123*   < > 132*   < > 135*  --  133*   POTASSIUM mmol/L 3 8 3 8 3 8   < > 3 7   < > 3 9   < > 4 1   < > 4 1   < > 5 7*  --  6 1*   CHLORIDE mmol/L 84* 85* 86*   < > 84*   < > 86*   < > 87*   < > 93*   < > 98*  --  100   CO2 mmol/L 33* 34* 33*   < > 29   < > 30   < > 24   < > 27   < > 20*  --  16*   BUN mg/dL 93* 88* 86*   < > 85*   < > 80*   < > 64*   < > 53*   < > 25  --  24   CREATININE mg/dL 4 87* 4 95* 4 84*   < > 4 78*   < > 4 68*   < > 3 94*   < > 2 83*   < > 1 67*  --  1 64*   CALCIUM mg/dL 8 5 8 4 7 9*   < > 8 2*   < > 7 7*   < > 7 3*   < > 7 8*   < > 8 6  --  8 0*   MAGNESIUM mg/dL  --   --   --   --  2 5  --   --   --  1 7  --  2 0  --  2 3  --  2 5   PHOSPHORUS mg/dL  --   --   --   --   --   --   --   --  4 2*  --   --   --  7 0*  --  6 6*   ALK PHOS U/L  --  146*  --   --  147*  --  135*  --  109  --  93  --  78   < >  --    ALT U/L  --  1,723*  --   --  2,432*  --  3,638*  --  4,595*  --  6,164*  --  476*   < >  --    AST U/L  --  345*  --   --  689*  --  1,989*  --  5,482*  --  15,182*  --  885*   < >  --     < > = values in this interval not displayed  Previous work up:  Please see previous notes      Portions of the record may have been created with voice recognition software  Occasional wrong word or "sound a like" substitutions may have occurred due to the inherent limitations of voice recognition software  Read the chart carefully and recognize, using context, where substitutions have occurred  If you have any questions, please contact the dictating provider

## 2019-07-01 NOTE — PLAN OF CARE
Problem: OCCUPATIONAL THERAPY ADULT  Goal: Performs self-care activities at highest level of function for planned discharge setting  See evaluation for individualized goals  Description  Treatment Interventions: ADL retraining, Functional transfer training, UE strengthening/ROM, Endurance training, Cognitive reorientation, Patient/family training, Equipment evaluation/education, Compensatory technique education, Activityengagement, Energy conservation          See flowsheet documentation for full assessment, interventions and recommendations  Outcome: Progressing  Note:   Limitation: Decreased ADL status, Decreased UE strength, Decreased Safe judgement during ADL, Decreased endurance, Decreased high-level ADLs, Decreased fine motor control  Prognosis: Good  Assessment: Patient agreeable to engage in limited session of OT  Patient presents supine in bed demonstrating functional motion/activity with BUE's  Patient performed activity bringing item to mouth with right UE and able to reach to bedside table on left side to replace item  Patient grossly oriented to her immediate enviroment and verbalizes that she "just wants to get a good night's sleep"  Education provided on continued goals of OT and techniques for application of adaptive techniques for bathing and dressing  Patient would benefit from [address] with focus on increasing functional strength and endurance, increasing functional transfer and mobility skills, increase independence with self care/adl skillls for carryover into her daily routine       OT Discharge Recommendation: Short Term Rehab  OT - OK to Discharge: (when medically cleared)  Hayden Felix

## 2019-07-01 NOTE — PROGRESS NOTES
Progress Note - Critical Care   Marie Kuhn 66 y o  female MRN: [id number]  Unit/Bed#: [facility] 513-01 Encounter: [id number]    Attending Physician: Demetrius Xiong MD      ______________________________________________________________________  Assessment and Plan:   Principal Problem:    Cardiogenic shock [facility])  Active Problems:    Acute CHF (congestive heart failure) ([facility] )    Atrial fibrillation with RVR ([place] [address] )    Acute kidney injury ([place] [address] )    Shock liver    Hyponatremia    Hyperphosphatemia    Type 2 diabetes mellitus with diabetic cataract ([place] [address] )    Hypothyroidism    Hyperlipidemia    Hypertension    Acute bacterial conjunctivitis of both eyes  Resolved Problems:    High anion gap metabolic acidosis    Lactic acidosis    Hyperkalemia        Neuro:   · CAM ICU per protocol  · Melatonin 6 mg HS as sleep aid  Regulate sleep/wake cycle  · Neuro exams per protocol  · Depression - Effexor as home medication re-started  CV:   · Pressors held for 24 hours and discontinued today  · MAP goal > 65  · Telemetry for Afib  Heparin gt started  INR 2 16 this morning  Cardioversion planned for 7/2/2019 with possible device implantation  · Continue PO amiodarone  · Stop Milrinone  Start Iso/Hydral per Cardiology  · Holding diuretics  Autodiuresing - 350 over 2 hours  Pulm:  · SPO2 92-98% on room air  · Pulmonary hygiene  GI:   · GI prophylaxis not indicated at this time  · No BM since admission  Continue bowel regimen  :   · Indwelling sims catheter - keep in place until after planned cardioversion and possible device implantation  Re-eval for removal post procedure  · Continue to monitor I/O and trend renal function  · Nephrology following  Patient diuresing without diuretics at this time  Consent for HD obtained if needed  F/E/N:   · No IV fluids at this time  Holding diureses  Autodiuresing     · Monitor electrolytes and replete as necessary to maintain goals of: K >4 0, Mag >2 0, and Phos >3 0  ID:   · Doxycycline 100 mg Q12 hours x5 days  Day #3  · Ofloxacin ophthalmic  · Monitor WBC and temperatures  Heme:   · Transfuse as needed for Hgb less than 7   · Full anticoagulation in affect - heparin gtt in addition to SCDs  Endo:   · Continue SSI/SNRI - Effexor  · Continue Levothyroxine  Msk/Skin:   · Mobility goal: OOB to chair  · PT/OT consults: Yes    Disposition: Continue ICU care  Code Status: Level 1 - Full Code    Counseling / Coordination of Care  Total time spent today 30 minutes  Greater than 50% of total time was spent with the patient and / or family counseling and / or coordination of care  A description of the counseling / coordination of care: Coordination with Cardiology and discussed with patient the plan of care for today and tomorrow morning    ______________________________________________________________________    Chief Complaint: "I'm tired"      24 Hour Events:   Lasix gtt held due low blood pressures overnight  No other significant events reported  Review of Systems   Constitutional: Positive for appetite change  Negative for chills, diaphoresis and fever  HENT: Positive for postnasal drip and sore throat  Negative for drooling, ear pain and voice change  Eyes: Negative  Respiratory: Positive for cough  Negative for chest tightness, shortness of breath and stridor  Cardiovascular: Negative for chest pain and palpitations  Gastrointestinal: Negative  Endocrine: Negative  Genitourinary: Negative  Musculoskeletal: Negative  Skin: Negative  Allergic/Immunologic: Negative  Neurological: Negative  Hematological: Negative  Psychiatric/Behavioral: Negative for agitation, behavioral problems and self-injury  Depression     ______________________________________________________________________    Physical Exam:     Physical Exam   Constitutional: She is oriented to person, place, and time   She appears well-developed and well-nourished  No distress  HENT:   Head: Normocephalic and atraumatic  Right Ear: External ear normal    Left Ear: External ear normal    Nose: Nose normal    Eyes: Pupils are equal, round, and reactive to light  Conjunctivae and EOM are normal  Right eye exhibits discharge  Left eye exhibits discharge  No scleral icterus  Neck: Normal range of motion  Neck supple  No JVD present  No tracheal deviation present  Cardiovascular: Regular rhythm and intact distal pulses  Tachycardia present  Exam reveals no gallop and no friction rub  Pulmonary/Chest: Effort normal and breath sounds normal  No stridor  No respiratory distress  She has no wheezes  She exhibits no tenderness  Abdominal: Soft  Bowel sounds are normal  She exhibits no distension and no mass  There is no tenderness  There is no rebound and no guarding  Musculoskeletal: Normal range of motion  She exhibits no tenderness or deformity  Neurological: She is alert and oriented to person, place, and time  No cranial nerve deficit  Coordination normal    Skin: Skin is warm and dry  No rash noted  She is not diaphoretic  No erythema  No pallor  Psychiatric: Judgment and thought content normal    Depressed mood with flat affect     ______________________________________________________________________  Vitals:    19 0600 19 0615 19 0700 19 0800   BP:   (!) 94/48 105/63   BP Location:    Right arm   Pulse:  (!) 108 (!) 120 (!) 114   Resp:  17 17 17   Temp:  97 5 °F (36 4 °C) 97 5 °F (36 4 °C) 97 5 °F (36 4 °C)   TempSrc:    Probe   SpO2:  94% 94% 94%   Weight: 79 4 kg (175 lb 0 7 oz)      Height:           Temperature:   Temp (24hrs), Av 3 °F (36 3 °C), Min:96 8 °F (36 °C), Max:97 8 °F (36 6 °C)    Current Temperature: 97 5 °F (36 4 °C)  Weights:   IBW: 47 8 kg    Body mass index is 33 07 kg/m²    Weight (last 2 days)     Date/Time   Weight    19   79 4 (175 05)    19   82 (180 78)    06/29/19 0600   80 8 (178 13)            Hemodynamic Monitoring:       Non-Invasive/Invasive Ventilation Settings:  Respiratory    Lab Data (Last 4 hours)    None         O2/Vent Data (Last 4 hours)    None              No results found for: PHART, YDU5RSR, PO2ART, BFW8KQD, Z3ZMJDNM, BEART, SOURCE  SpO2: SpO2: 94 %  Intake and Outputs:  I/O       06/29 0701 - 06/30 0700 06/30 0701 - 07/01 0700 07/01 0701 - 07/02 0700    P  O  340 300     I V  (mL/kg) 884 5 (10 8) 199 (2 5)     IV Piggyback 630 200     Total Intake(mL/kg) 1854 5 (22 6) 699 (8 8)     Urine (mL/kg/hr) 4180 (2 1) 5050 (2 7)     Total Output 4180 5050     Net -2325 5 -4351                UOP: 350 ml/hr   Nutrition:        Diet Orders   (From admission, onward)            Start     Ordered    06/25/19 1753  Diet Cardiovascular; Sodium 2 GM; Consistent Carbohydrate Diet Level 2 (5 carb servings/75 grams CHO/meal)  Diet effective midnight     Question Answer Comment   Diet Type Cardiovascular    Cardiac Sodium 2 GM    Other Restriction(s): Consistent Carbohydrate Diet Level 2 (5 carb servings/75 grams CHO/meal)    RD to adjust diet per protocol?  No        06/25/19 1752          Labs:   Results from last 7 days   Lab Units 07/01/19  0524 06/30/19  0516 06/29/19  0445 06/28/19  0500 06/27/19  0440 06/26/19  0416 06/26/19  0216   WBC Thousand/uL 13 33* 13 26* 10 50* 11 33* 12 19* 12 42* 10 40*   HEMOGLOBIN g/dL 12 3 12 1 12 2 11 6 11 2* 14 6 15 4   HEMATOCRIT % 34 9 34 4* 34 1* 33 0* 32 6* 43 2 47 6*   PLATELETS Thousands/uL 178 191 188 173 159 228 233   NEUTROS PCT %  --   --   --   --   --   --  78*   BANDS PCT %  --   --   --  6  --   --   --    MONOS PCT %  --   --   --   --   --   --  9   MONO PCT %  --   --   --  4  --   --   --      Results from last 7 days   Lab Units 07/01/19  0901 07/01/19  0524 06/30/19  2315 06/30/19  1617 06/30/19  0516 06/30/19  0003 06/29/19  1839  06/29/19  0445  06/28/19  0500  06/27/19  0440  06/26/19  6047 SODIUM mmol/L 127* 127* 129* 125* 123* 127* 123*   < > 125*   < > 123*   < > 132*   < > 135*   POTASSIUM mmol/L 3 8 3 8 3 8 3 6 3 7 3 5 3 7   < > 3 9   < > 4 1   < > 4 1   < > 5 7*   CHLORIDE mmol/L 84* 85* 86* 86* 84* 84* 85*   < > 86*   < > 87*   < > 93*   < > 98*   CO2 mmol/L 33* 34* 33* 31 29 31 29   < > 30   < > 24   < > 27   < > 20*   ANION GAP mmol/L 10 8 10 8 10 12 9   < > 9   < > 12   < > 12   < > 17*   BUN mg/dL 93* 88* 86* 86* 85* 87* 84*   < > 80*   < > 64*   < > 53*   < > 25   CREATININE mg/dL 4 87* 4 95* 4 84* 4 86* 4 78* 4 85* 4 74*   < > 4 68*   < > 3 94*   < > 2 83*   < > 1 67*   CALCIUM mg/dL 8 5 8 4 7 9* 7 9* 8 2* 7 9* 8 0*   < > 7 7*   < > 7 3*   < > 7 8*   < > 8 6   ALT U/L  --  1,723*  --   --  2,432*  --   --   --  3,638*  --  4,595*  --  6,164*  --  476*   AST U/L  --  345*  --   --  689*  --   --   --  1,989*  --  5,482*  --  15,182*  --  885*   ALK PHOS U/L  --  146*  --   --  147*  --   --   --  135*  --  109  --  93  --  78   ALBUMIN g/dL  --  2 8*  --   --  3 1*  --   --   --  2 6*  --  2 5*  --  2 9*  --  2 9*   TOTAL BILIRUBIN mg/dL  --  2 08*  --   --  2 83*  --   --   --  2 30*  --  1 81*  --  1 72*  --  2 00*    < > = values in this interval not displayed  Results from last 7 days   Lab Units 06/30/19  0516 06/28/19  0500 06/27/19  0440 06/26/19  0415 06/26/19  0216   MAGNESIUM mg/dL 2 5 1 7 2 0 2 3 2 5   PHOSPHORUS mg/dL  --  4 2*  --  7 0* 6 6*      Results from last 7 days   Lab Units 07/01/19  0524 06/30/19  0516 06/29/19  0629 06/28/19  0500 06/28/19  0003 06/27/19  1758 06/27/19  1005 06/27/19  0347 06/26/19  1722 06/26/19  0823  06/24/19  1625   INR  2 16* 2 79* 5 34* 7 23*  --   --   --   --   --   --   --  1 54*   PTT seconds  --   --  42*  --  85* 84* 92* 98* 93* 77*   < > 23    < > = values in this interval not displayed       Results from last 7 days   Lab Units 06/26/19  0216   TROPONIN I ng/mL 0 03     Results from last 7 days   Lab Units 06/27/19  0439 06/26/19  2332 06/26/19  2132 06/26/19  1720 06/26/19  1300 06/26/19  0755 06/26/19  0415   LACTIC ACID mmol/L 2 0 3 4* 3 7* 7 8* 10 6* 8 2* 9 3*     ABG:  Lab Results   Component Value Date    PHART 7 422 06/26/2019    OQO0IQE 22 8 (LL) 06/26/2019    PO2ART 104 7 06/26/2019    YJC1VQT 14 5 (L) 06/26/2019    BEART -7 4 06/26/2019    SOURCE Line, Arterial 06/26/2019     VBG:  Results from last 7 days   Lab Units 07/01/19  0524  06/26/19  0216   PH JOSE  7 458*   < >  --    PCO2 JOSE mm Hg 46 4   < >  --    PO2 JOSE mm Hg 39 2   < >  --    HCO3 JOSE mmol/L 32 1*   < >  --    BASE EXC JOSE mmol/L 7 2   < >  --    ABG SOURCE   --   --  Line, Arterial    < > = values in this interval not displayed  No results found for: Hannah Mena   Imaging:  I have personally reviewed pertinent reports  Allergies: Allergies   Allergen Reactions    Penicillins     Wellbutrin Sr  [Bupropion]      Other reaction(s): Suicidal ideations  Category:  Adverse Reaction;      Medications:   Scheduled Meds:    Current Facility-Administered Medications:  acetaminophen 650 mg Oral Q4H PRN Yeni Spironello V, CRNP   albuterol 2 puff Inhalation Q4H PRN Yeni Spironello V, CRNP   amiodarone 200 mg Oral TID With Meals SOTO Pierre   bisacodyl 10 mg Rectal Daily PRN Kavitha Meek PA-C   doxycycline hyclate 100 mg Oral Q12H [facility] Samia Collins PA-C   heparin (porcine) 3-20 Units/kg/hr (Order-Specific) Intravenous Titrated Ale Aranda Jr, PA-C   hydrALAZINE 5 mg Oral Q8H [facility] SOTO Junior   insulin lispro 1-5 Units Subcutaneous HS Jose CroonSOTO   insulin lispro 1-6 Units Subcutaneous TID AC Jose CroonSOTO   isosorbide dinitrate 5 mg Oral TID after meals SOTO Junior   levothyroxine 137 mcg Oral Daily SOTO Soriano   loratadine 10 mg Oral Daily SOTO Soriano   melatonin 6 mg Oral HS SOTO Dahl   ofloxacin 1 drop Both Eyes 4x Daily [facility] SOTO Conteh   ondansetron 4 mg Intravenous Q6H PRN SOTO Soriano   phenol 2 spray Mouth/Throat Q2H PRN Ronnie Thomason PA-C   polyethylene glycol 17 g Oral Daily Kalyani Collins PA-C   senna 1 tablet Oral HS Verónica China, SOTO   sodium chloride 2 spray Each Nare PRN Cj Shook PA-C   venlafaxine 50 mg Oral Q12H SOTO Sutton     Continuous Infusions:    heparin (porcine) 3-20 Units/kg/hr (Order-Specific)     PRN Meds:    acetaminophen 650 mg Q4H PRN   albuterol 2 puff Q4H PRN   bisacodyl 10 mg Daily PRN   ondansetron 4 mg Q6H PRN   phenol 2 spray Q2H PRN   sodium chloride 2 spray PRN     VTE Pharmacologic Prophylaxis: Heparin Drip  VTE Mechanical Prophylaxis: sequential compression device  Invasive lines and devices: Invasive Devices     Central Venous Catheter Line            CVC Central Lines 06/26/19 Triple 16cm 5 days          Peripheral Intravenous Line            Peripheral IV 06/29/19 Left;Upper Arm 2 days          Arterial Line            Arterial Line 06/26/19 Radial 5 days          Drain            Urethral Catheter Temperature probe 16 Fr  4 days                     Portions of the record may have been created with voice recognition software  Occasional wrong word or "sound a like" substitutions may have occurred due to the inherent limitations of voice recognition software  Read the chart carefully and recognize, using context, where substitutions have occurred      Shameka Perez PA-C

## 2019-07-01 NOTE — PHYSICAL THERAPY NOTE
Physical Therapy Cancellation Note    PT attempted to see pt for PT evaluation  Pt refusing to get OOB and participate with PT at this time  Pt states "Im comfortable in bed and I am not moving!" Pt encouraged and educated on the importance of OOB mobility during hospital stay however continues to refuse  Nsg aware  PT will continue to follow      Raymundo Kamara PT

## 2019-07-01 NOTE — OCCUPATIONAL THERAPY NOTE
Occupational Therapy Treatment Note:         07/01/19 1614   Restrictions/Precautions   Other Precautions Multiple lines;Telemetry;O2;Fall Risk;Pain   Pain Assessment   Pain Assessment No/denies pain   Pain Score No Pain   ADL   Where Assessed Supine, bed   Grooming Assistance 3  Moderate Assistance   UB Bathing Assistance 3  Moderate Assistance   LB Bathing Assistance 2  Maximal Assistance   Assessment   Assessment Patient agreeable to engage in limited session of OT  Patient presents supine in bed demonstrating functional motion/activity with BUE's  Patient performed activity bringing item to mouth with right UE and able to reach to bedside table on left side to replace item  Patient grossly oriented to her immediate enviroment and verbalizes that she "just wants to get a good night's sleep"  Education provided on continued goals of OT and techniques for application of adaptive techniques for bathing and dressing  Patient would benefit from [address] with focus on increasing functional strength and endurance, increasing functional transfer and mobility skills, increase independence with self care/adl skillls for carryover into her daily routine     Plan   Treatment Interventions ADL retraining;Cognitive reorientation   Goal Expiration Date 07/08/19   Treatment Day 1   OT Frequency 3-5x/wk   Recommendation   OT Discharge Recommendation Short Term Rehab   OT - OK to Discharge   (when medically cleared)   Uyen Salazar

## 2019-07-02 PROBLEM — R57.0 CARDIOGENIC SHOCK (HCC): Status: RESOLVED | Noted: 2019-06-26 | Resolved: 2019-07-02

## 2019-07-02 PROBLEM — E83.39 HYPERPHOSPHATEMIA: Status: RESOLVED | Noted: 2019-06-26 | Resolved: 2019-07-02

## 2019-07-02 LAB
ANION GAP SERPL CALCULATED.3IONS-SCNC: 11 MMOL/L (ref 4–13)
APTT PPP: 108 SECONDS (ref 23–37)
APTT PPP: 50 SECONDS (ref 23–37)
APTT PPP: 52 SECONDS (ref 23–37)
APTT PPP: 95 SECONDS (ref 23–37)
ARTERIAL PATENCY WRIST A: YES
BASE EX.OXY STD BLDV CALC-SCNC: 70.6 % (ref 60–80)
BASE EXCESS BLDV CALC-SCNC: 11.7 MMOL/L
BODY TEMPERATURE: 97.9 DEGREES FEHRENHEIT
BUN SERPL-MCNC: 110 MG/DL (ref 5–25)
CALCIUM SERPL-MCNC: 8.8 MG/DL (ref 8.3–10.1)
CHLORIDE SERPL-SCNC: 83 MMOL/L (ref 100–108)
CO2 SERPL-SCNC: 36 MMOL/L (ref 21–32)
CREAT SERPL-MCNC: 4.82 MG/DL (ref 0.6–1.3)
ERYTHROCYTE [DISTWIDTH] IN BLOOD BY AUTOMATED COUNT: 12.9 % (ref 11.6–15.1)
GFR SERPL CREATININE-BSD FRML MDRD: 8 ML/MIN/1.73SQ M
GLUCOSE SERPL-MCNC: 101 MG/DL (ref 65–140)
GLUCOSE SERPL-MCNC: 120 MG/DL (ref 65–140)
GLUCOSE SERPL-MCNC: 141 MG/DL (ref 65–140)
GLUCOSE SERPL-MCNC: 274 MG/DL (ref 65–140)
GLUCOSE SERPL-MCNC: 99 MG/DL (ref 65–140)
HCO3 BLDV-SCNC: 37.6 MMOL/L (ref 24–30)
HCT VFR BLD AUTO: 39.3 % (ref 34.8–46.1)
HGB BLD-MCNC: 13.8 G/DL (ref 11.5–15.4)
MCH RBC QN AUTO: 32.4 PG (ref 26.8–34.3)
MCHC RBC AUTO-ENTMCNC: 35.1 G/DL (ref 31.4–37.4)
MCV RBC AUTO: 92 FL (ref 82–98)
O2 CT BLDV-SCNC: 15 ML/DL
PCO2 BLD: 51.7 MM HG (ref 42–50)
PCO2 BLDV: 52.6 MM HG (ref 42–50)
PH BLD: 7.48 [PH] (ref 7.3–7.4)
PH BLDV: 7.47 [PH] (ref 7.3–7.4)
PLATELET # BLD AUTO: 188 THOUSANDS/UL (ref 149–390)
PMV BLD AUTO: 11.2 FL (ref 8.9–12.7)
PO2 BLDV: 38.5 MM HG (ref 35–45)
PO2 VENOUS TEMP CORRECTED: 37.4 MM HG (ref 35–45)
POTASSIUM SERPL-SCNC: 3.4 MMOL/L (ref 3.5–5.3)
RBC # BLD AUTO: 4.26 MILLION/UL (ref 3.81–5.12)
SODIUM SERPL-SCNC: 130 MMOL/L (ref 136–145)
WBC # BLD AUTO: 13.67 THOUSAND/UL (ref 4.31–10.16)

## 2019-07-02 PROCEDURE — 97535 SELF CARE MNGMENT TRAINING: CPT

## 2019-07-02 PROCEDURE — 85730 THROMBOPLASTIN TIME PARTIAL: CPT | Performed by: INTERNAL MEDICINE

## 2019-07-02 PROCEDURE — 99233 SBSQ HOSP IP/OBS HIGH 50: CPT | Performed by: EMERGENCY MEDICINE

## 2019-07-02 PROCEDURE — 94760 N-INVAS EAR/PLS OXIMETRY 1: CPT | Performed by: SOCIAL WORKER

## 2019-07-02 PROCEDURE — 94668 MNPJ CHEST WALL SBSQ: CPT | Performed by: SOCIAL WORKER

## 2019-07-02 PROCEDURE — 97530 THERAPEUTIC ACTIVITIES: CPT

## 2019-07-02 PROCEDURE — 85027 COMPLETE CBC AUTOMATED: CPT | Performed by: PHYSICIAN ASSISTANT

## 2019-07-02 PROCEDURE — 80048 BASIC METABOLIC PNL TOTAL CA: CPT | Performed by: PHYSICIAN ASSISTANT

## 2019-07-02 PROCEDURE — 82805 BLOOD GASES W/O2 SATURATION: CPT | Performed by: PHYSICIAN ASSISTANT

## 2019-07-02 PROCEDURE — 99231 SBSQ HOSP IP/OBS SF/LOW 25: CPT | Performed by: INTERNAL MEDICINE

## 2019-07-02 PROCEDURE — 82948 REAGENT STRIP/BLOOD GLUCOSE: CPT

## 2019-07-02 PROCEDURE — 99233 SBSQ HOSP IP/OBS HIGH 50: CPT | Performed by: PHYSICIAN ASSISTANT

## 2019-07-02 PROCEDURE — NC001 PR NO CHARGE: Performed by: EMERGENCY MEDICINE

## 2019-07-02 PROCEDURE — 85730 THROMBOPLASTIN TIME PARTIAL: CPT | Performed by: PHYSICIAN ASSISTANT

## 2019-07-02 RX ORDER — SODIUM CHLORIDE 9 MG/ML
125 INJECTION, SOLUTION INTRAVENOUS CONTINUOUS
Status: DISCONTINUED | OUTPATIENT
Start: 2019-07-02 | End: 2019-07-03

## 2019-07-02 RX ORDER — POTASSIUM CHLORIDE 20 MEQ/1
40 TABLET, EXTENDED RELEASE ORAL 2 TIMES DAILY
Status: COMPLETED | OUTPATIENT
Start: 2019-07-02 | End: 2019-07-02

## 2019-07-02 RX ADMIN — AMIODARONE HYDROCHLORIDE 200 MG: 200 TABLET ORAL at 16:50

## 2019-07-02 RX ADMIN — MELATONIN 6 MG: at 21:40

## 2019-07-02 RX ADMIN — INSULIN LISPRO 2 UNITS: 100 INJECTION, SOLUTION INTRAVENOUS; SUBCUTANEOUS at 21:42

## 2019-07-02 RX ADMIN — VENLAFAXINE 50 MG: 50 TABLET ORAL at 22:44

## 2019-07-02 RX ADMIN — OFLOXACIN 1 DROP: 3 SOLUTION/ DROPS OPHTHALMIC at 08:42

## 2019-07-02 RX ADMIN — LEVOTHYROXINE SODIUM 137 MCG: 112 TABLET ORAL at 06:12

## 2019-07-02 RX ADMIN — HYDRALAZINE HYDROCHLORIDE 5 MG: 10 TABLET, FILM COATED ORAL at 06:12

## 2019-07-02 RX ADMIN — DOXYCYCLINE 100 MG: 100 CAPSULE ORAL at 08:41

## 2019-07-02 RX ADMIN — OFLOXACIN 1 DROP: 3 SOLUTION/ DROPS OPHTHALMIC at 22:44

## 2019-07-02 RX ADMIN — OFLOXACIN 1 DROP: 3 SOLUTION/ DROPS OPHTHALMIC at 17:17

## 2019-07-02 RX ADMIN — DOXYCYCLINE 100 MG: 100 CAPSULE ORAL at 21:40

## 2019-07-02 RX ADMIN — VENLAFAXINE 50 MG: 50 TABLET ORAL at 10:30

## 2019-07-02 RX ADMIN — HEPARIN SODIUM AND DEXTROSE 8 UNITS/KG/HR: 10000; 5 INJECTION INTRAVENOUS at 16:54

## 2019-07-02 RX ADMIN — ISOSORBIDE DINITRATE 5 MG: 5 TABLET ORAL at 08:41

## 2019-07-02 RX ADMIN — SODIUM CHLORIDE 250 ML: 0.9 INJECTION, SOLUTION INTRAVENOUS at 11:02

## 2019-07-02 RX ADMIN — OFLOXACIN 1 DROP: 3 SOLUTION/ DROPS OPHTHALMIC at 12:40

## 2019-07-02 RX ADMIN — AMIODARONE HYDROCHLORIDE 200 MG: 200 TABLET ORAL at 12:40

## 2019-07-02 RX ADMIN — POTASSIUM CHLORIDE 40 MEQ: 1500 TABLET, EXTENDED RELEASE ORAL at 17:16

## 2019-07-02 RX ADMIN — AMIODARONE HYDROCHLORIDE 200 MG: 200 TABLET ORAL at 08:41

## 2019-07-02 RX ADMIN — LORATADINE 10 MG: 10 TABLET ORAL at 08:41

## 2019-07-02 RX ADMIN — POTASSIUM CHLORIDE 40 MEQ: 1500 TABLET, EXTENDED RELEASE ORAL at 10:28

## 2019-07-02 RX ADMIN — SENNOSIDES 8.6 MG: 8.6 TABLET, FILM COATED ORAL at 21:41

## 2019-07-02 NOTE — ASSESSMENT & PLAN NOTE
· Pacemaker and cardioversion on hold due WANG  · Continue heparin drip  Monitor PTT per protocol  · Continue Amiodarone PO

## 2019-07-02 NOTE — PROGRESS NOTES
Progress Note - Electrophysiology-Cardiology (EP)   Marie Kuhn 66 y o  female MRN: [id number]  Unit/Bed#: [facility] 513-01 Encounter: [id number]      Assessment:  1  Persistent atrial fibrillation with rapid ventricular response, currently being loaded with amiodarone   A ) on heparin drip   B ) became hypotensive on beta-blocker, CCB unable to be used given recent cardiogenic shock, no digoxin given renal dysfunction   C ) moderate biatrial enlargement   2  Acute on chronic systolic congestive heart failure with recent cardiogenic shock, improving   A ) currently off pressors, Milrinone discontinued yesterday   B ) shock likely secondary to Cardizem and propofol use in the setting of already reduced ejection fraction/borderline low output state  3  Junctional bradycardia noted at the time of prior cardioversion attempt 6/21/2019   A ) device implantation recommended so that a more aggressive rhythm control strategy could be utilized   B ) initially attempted 6/25/2019, however after the administration of propofol she went into cardiogenic shock as noted above  4  Cardiomyopathy with LVEF 25-30% per echo 6/20/2019, improved to 42% per echo 6/26/2019   A ) suspect tachy mediated given rapid AFib, no prior ischemic eval  5  WANG likely secondary to ischemic ATN/cardiorenal syndrome, creatinine today 4 82  6  Transaminitis likely secondary to #2, LFTs improving  7  Hypertension  8  Hyperlipidemia  9  Diabetes mellitus type 2      Plan:  1  She continues to be in atrial fibrillation with rapid ventricular response  We thoroughly reviewed prior records, including EKGs and telemetry  While she did have junctional bradycardia in the immediate post cardioversion setting, at this time we would recommend the least invasive and lowest risk procedure possible given her renal dysfunction and other ongoing issues    Thus would recommend undergoing repeat cardioversion tomorrow now that she has been loaded with amiodarone - this should be done in the cath lab, so if she does have bradycardia she can receive a temporary pacing wire  2  This was discussed with the heart failure team, and they are in agreement with this plan  NPO at midnight  3  Otherwise, continue current regimen including amiodarone and IV heparin  4  Will continue to monitor her rhythm following her cardioversion to determine whether she needs a pacemaker versus ICD moving forward  Subjective/Objective   Chief Complaint: no acute complaints    Subjective:  She has been weaned off of all pressors, Milrinone was discontinued yesterday  MAPs have been stable  Her creatinine is still elevated, however it seems to have plateaued      Objective:     Vitals: /67   Pulse (!) 112   Temp 97 9 °F (36 6 °C)   Resp 16   Ht 5' 1" (1 549 m)   Wt 71 9 kg (158 lb 8 2 oz)   SpO2 93%   BMI 29 95 kg/m²   Vitals:    07/01/19 0600 07/02/19 0600   Weight: 79 4 kg (175 lb 0 7 oz) 71 9 kg (158 lb 8 2 oz)     Orthostatic Blood Pressures      Most Recent Value   Blood Pressure  114/67 filed at 07/02/2019 1100   Patient Position - Orthostatic VS  Lying filed at 07/02/2019 1000            Intake/Output Summary (Last 24 hours) at 7/2/2019 1208  Last data filed at 7/2/2019 1000  Gross per 24 hour   Intake 515 03 ml   Output 4435 ml   Net -3919 97 ml       Invasive Devices     Central Venous Catheter Line            CVC Central Lines 06/26/19 Triple 16cm 6 days          Peripheral Intravenous Line            Peripheral IV 06/29/19 Left;Upper Arm 3 days          Arterial Line            Arterial Line 06/26/19 Radial 6 days          Drain            Urethral Catheter Temperature probe 16 Fr  5 days                            Scheduled Meds:  Current Facility-Administered Medications:  acetaminophen 650 mg Oral Q4H PRN Yeni Spironello V, CRNP    albuterol 2 puff Inhalation Q4H PRN Yeni Spironello V, CRNP    amiodarone 200 mg Oral TID With Meals SOTO Rosas bisacodyl 10 mg Rectal Daily PRN Anival Graf PA-C    doxycycline hyclate 100 mg Oral Q12H [address] Samia R STEPHEN Collins    heparin (porcine) 3-20 Units/kg/hr (Order-Specific) Intravenous Titrated Donel Minus Rosi Handy PA-C Last Rate: 6 Units/kg/hr (07/02/19 0741)   insulin lispro 1-5 Units Subcutaneous HS Chata Shan, CRNP    insulin lispro 1-6 Units Subcutaneous TID AC Chata Shan, CRNP    levothyroxine 137 mcg Oral Daily Yeni Spironello V, CRNP    loratadine 10 mg Oral Daily Yeni Spironello V, CRNP    melatonin 6 mg Oral HS Pat Bellow, SOTO    ofloxacin 1 drop Both Eyes 4x Daily Chata Ruiz, SOTO    ondansetron 4 mg Intravenous Q6H PRN Yeni Spironello V, SOTO    phenol 2 spray Mouth/Throat Q2H PRN Keysha Zapien PA-C    polyethylene glycol 17 g Oral Daily Diana Collins PA-C    potassium chloride 40 mEq Oral BID Kalyan Minus Rosi Handy PA-C    senna 1 tablet Oral HS Pat SOTO Rosario    sodium chloride 2 spray Each Nare PRN Diana Collins PA-C    sodium chloride 250 mL Intravenous Once [employer] STEPHEN Last Rate: 250 mL (07/02/19 1102)   venlafaxine 50 mg Oral Q12H SOTO Westbrook      Continuous Infusions:  heparin (porcine) 3-20 Units/kg/hr (Order-Specific) Last Rate: 6 Units/kg/hr (07/02/19 0741)     PRN Meds:   acetaminophen    albuterol    bisacodyl    ondansetron    phenol    sodium chloride      Physical Exam:   GEN: NAD, alert and oriented  SKIN: dry without significant lesions or rashes  HEENT: NCAT, PERRL, EOMs intact  NECK: No JVD appreciated  CARDIOVASCULAR: RRR, normal S1, S2 without murmurs, rubs, or gallops appreciated  LUNGS:  Decreased breath sounds throughout, bilateral rhonchi, no wheezes or rales noted  ABDOMEN: Soft, nontender, nondistended  EXTREMITIES/VASCULAR: perfused without clubbing, cyanosis; mild edema b/l  PSYCH: Normal mood and affect  NEURO: CN ll-Xll grossly intact                Lab Results: I have personally reviewed pertinent lab results  Results from last 7 days   Lab Units 19  0600 19  0524 19  0516   WBC Thousand/uL 13 67* 13 33* 13 26*   HEMOGLOBIN g/dL 13 8 12 3 12 1   HEMATOCRIT % 39 3 34 9 34 4*   PLATELETS Thousands/uL 188 178 191     Results from last 7 days   Lab Units 19  0600 19  1609 19  0901   POTASSIUM mmol/L 3 4* 3 5 3 8   CHLORIDE mmol/L 83* 84* 84*   CO2 mmol/L 36* 34* 33*   BUN mg/dL 110* 97* 93*   CREATININE mg/dL 4 82* 4 94* 4 87*   CALCIUM mg/dL 8 8 8 4 8 5     Results from last 7 days   Lab Units 19  0600 19  2307 19  1609  19  0524 19  0516 19  0629   INR   --   --   --   --  2 16* 2 79* 5 34*   PTT seconds 95* 108* 102*   < >  --   --  42*    < > = values in this interval not displayed  Results from last 7 days   Lab Units 19  0516 19  0500 19  0440   MAGNESIUM mg/dL 2 5 1 7 2 0         Imaging: I have personally reviewed pertinent reports  Results for orders placed during the hospital encounter of 19   Echo complete with contrast if indicated    Narrative [facility] [address]  [phone]    Transthoracic Echocardiogram  2D, M-mode, Doppler, and Color Doppler    Study date:  2019    Patient: Justin Corado  MR number: [id number]  Account number: [de-identified]  : 1940  Age: 66 years  Gender: Female  Status: Inpatient  Location: Bedside  Height: 61 in  Weight: 175 lb  BP: 92/ 44 mmHg    Indications: Hypotension    Diagnoses: I95 9 - Hypotension, unspecified    Sonographer:  PEEWEE Thomas  Primary Physician:  Lila Michelle MD  Referring Physician:  SOTO Huffman  Group:  [facility]  Interpreting Physician:  Melissa Faith MD    SUMMARY    LEFT VENTRICLE:  Size was at the upper limits of normal   Systolic function was moderately reduced   Ejection fraction was estimated to be 42 %   There was moderate diffuse hypokinesis  There was no evidence of concentric hypertrophy  VENTRICULAR SEPTUM:  There was dyssynergic motion  RIGHT VENTRICLE:  The ventricle was mildly to moderately dilated  Systolic function was mildly to moderately reduced  Wall thickness was increased  LEFT ATRIUM:  The atrium was moderately dilated  RIGHT ATRIUM:  The atrium was moderately dilated  MITRAL VALVE:  There was mild annular calcification  There was moderate regurgitation  TRICUSPID VALVE:  There was moderate regurgitation  PULMONIC VALVE:  There was trace regurgitation  HISTORY: PRIOR HISTORY: DM2, HTN, HLD, Afib, CHF    PROCEDURE: The procedure was performed at the bedside  This was an urgent study  The transthoracic approach was used  The study included complete 2D imaging, M-mode, complete spectral Doppler, and color Doppler  The heart rate was 117 bpm,  at the start of the study  Images were obtained from the parasternal, apical, subcostal, and suprasternal notch acoustic windows  Echocardiographic views were limited due to restricted patient mobility  This was a technically difficult  study  LEFT VENTRICLE: Size was at the upper limits of normal  Systolic function was moderately reduced  Ejection fraction was estimated to be 42 %  There was moderate diffuse hypokinesis  Wall thickness was normal  There was no evidence of  concentric hypertrophy  DOPPLER: The study was not technically sufficient to allow evaluation of LV diastolic function  VENTRICULAR SEPTUM: There was dyssynergic motion  RIGHT VENTRICLE: The ventricle was mildly to moderately dilated  Systolic function was mildly to moderately reduced  Wall thickness was increased  LEFT ATRIUM: The atrium was moderately dilated  RIGHT ATRIUM: The atrium was moderately dilated  MITRAL VALVE: There was mild annular calcification  Valve structure was normal  There was normal leaflet separation   DOPPLER: The transmitral velocity was within the normal range  There was no evidence for stenosis  There was moderate  regurgitation  AORTIC VALVE: The valve was trileaflet  Leaflets exhibited normal thickness and normal cuspal separation  DOPPLER: Transaortic velocity was within the normal range  There was no evidence for stenosis  There was no regurgitation  TRICUSPID VALVE: The valve structure was normal  There was normal leaflet separation  DOPPLER: The transtricuspid velocity was within the normal range  There was no evidence for stenosis  There was moderate regurgitation  Pulmonary artery  systolic pressure was mildly increased  PULMONIC VALVE: Leaflets exhibited normal thickness, no calcification, and normal cuspal separation  DOPPLER: The transpulmonic velocity was within the normal range  There was trace regurgitation  PERICARDIUM: There was no pericardial effusion  The pericardium was normal in appearance  AORTA: The root exhibited normal size      SYSTEM MEASUREMENT TABLES    2D  LVEDV MOD A4C: 81 79 ml  LVEF MOD A4C: 64 57 %  LVESV MOD A4C: 28 98 ml  LVLd A4C: 7 08 cm  LVLs A4C: 5 89 cm  SV MOD A4C: 52 81 ml    CW  TR Vmax: 2 65 m/s  TR maxP 14 mmHg    [facility] Accredited Echocardiography Laboratory    Prepared and electronically signed by    Sohail Pierce MD  Signed 2019 07:10:27         EKG/telemetry:  Atrial fibrillation with rapid ventricular response    VTE Pharmacologic Prophylaxis: Heparin gtt

## 2019-07-02 NOTE — ASSESSMENT & PLAN NOTE
Wt Readings from Last 3 Encounters:   07/02/19 71 9 kg (158 lb 8 2 oz)   06/18/19 81 kg (178 lb 9 6 oz)   03/20/19 80 1 kg (176 lb 9 6 oz)     Acute on chronic systolic CHF, LVEF 97-51%, atrial fibrillation  · Auto diuresing  Hold diuretics  · Fluid restriction, low sodium, renal diet  · Strictly monitor I/O

## 2019-07-02 NOTE — OCCUPATIONAL THERAPY NOTE
Occupational Therapy Treatment Note:       07/02/19 1500   Restrictions/Precautions   Weight Bearing Precautions Per Order No   Other Precautions Multiple lines;Telemetry; Fall Risk   Pain Assessment   Pain Score No Pain   ADL   Where Assessed Chair   Grooming Assistance 5  Supervision/Setup   Grooming Deficit Brushing hair   Grooming Comments seated, increased time, VC   LB Dressing Assistance 2  Maximal Assistance   LB Dressing Deficit Don/doff R sock; Don/doff L sock   LB Dressing Comments seated   Functional Standing Tolerance   Time ~ 2 mins   Activity x2 seated rest   Comments static and dynamic standing   Bed Mobility   Additional Comments Seated OOB upon presentation and at end of session   Transfers   Sit to Stand 4  Minimal assistance   Additional items Assist x 1;Verbal cues   Stand to Sit 4  Minimal assistance   Additional items Assist x 1;Verbal cues; Increased time required   Additional Comments RW level and min A throughout static standing   Cognition   Overall Cognitive Status WFL   Arousal/Participation Alert; Cooperative;Lethargic   Attention Within functional limits   Orientation Level Oriented X4   Memory Within functional limits   Following Commands Follows one step commands with increased time or repetition   Comments Limited by lethargy this date requires increased vc trhgouhout for arousal and direction   Activity Tolerance   Activity Tolerance Patient limited by fatigue   Medical Staff Made Aware NSG aware   Assessment   Assessment Pt was seen this date for OT tx session focusing on self care, sit to stnad progression, stanidng tolerance and balance, weight shift, energy conservation, and overall activity toelrance  PT presents seated reclined in chair   Continutes to require assist for sit to stand progressions however with notable improvements since last session, see documneted asssit levels for same, pt continues t obe limited by fatigue, compeltes weight shifting in stance no LOB, support from RW  Pt c o   fatigue and reqests seated rest  completes standing activity x 2 with lengthy seated rest  Contnues to require asssit for self care tasks, Pt seated in upright position at end of sessio nwith all needs in reach and familiy present  WOuld benefit form continued OT tx to improve overall funcoitnal abilities      Plan   Treatment Interventions ADL retraining   Goal Expiration Date 07/08/19   Treatment Day 2   OT Frequency 3-5x/wk   Recommendation   OT Discharge Recommendation Short Term [facility], [place]

## 2019-07-02 NOTE — PLAN OF CARE
Problem: OCCUPATIONAL THERAPY ADULT  Goal: Performs self-care activities at highest level of function for planned discharge setting  See evaluation for individualized goals  Description  Treatment Interventions: ADL retraining, Functional transfer training, UE strengthening/ROM, Endurance training, Cognitive reorientation, Patient/family training, Equipment evaluation/education, Compensatory technique education, Activityengagement, Energy conservation          See flowsheet documentation for full assessment, interventions and recommendations  Outcome: Progressing  Note:   Limitation: Decreased ADL status, Decreased UE strength, Decreased Safe judgement during ADL, Decreased endurance, Decreased high-level ADLs, Decreased fine motor control  Prognosis: Good  Assessment: Pt was seen this date for OT tx session focusing on self care, sit to stnad progression, stanidng tolerance and balance, weight shift, energy conservation, and overall activity toelrance  PT presents seated reclined in chair  Continutes to require assist for sit to stand progressions however with notable improvements since last session, see documneted asssit levels for same, pt continues t obe limited by fatigue, compeltes weight shifting in stance no LOB, support from RW  Pt c o   fatigue and reqests seated rest  completes standing activity x 2 with lengthy seated rest  Contnues to require asssit for self care tasks, Pt seated in upright position at end of sessio nwith all needs in reach and familiy present  WOuld benefit form continued OT tx to improve overall funcoitnal abilities        OT Discharge Recommendation: Short Term Rehab  OT - OK to Discharge: (when medically cleared)

## 2019-07-02 NOTE — PROGRESS NOTES
07/02/19 1400   Clinical Encounter Type   Visited With Patient   Routine Visit Follow-up   Continue Visiting Yes

## 2019-07-02 NOTE — ASSESSMENT & PLAN NOTE
· Blood pressure lowering medications on hold at this time  Blood pressure is have been low  Continue blood pressure lowering medications when able    · Blood pressure monitoring per protocol, avoid hypotension

## 2019-07-02 NOTE — PROGRESS NOTES
07/02/19 1400   Spiritual Beliefs/Perceptions   Support Systems Family members   Stress Factors   Patient Stress Factors Health changes   Coping Responses   Patient Coping Sadness   Plan of Care   Comments Visited with pt  talked about health changes, nurse able to be with pt , short visit  Provided a listenting support and a caring presence     Assessment Completed by: Unit visit

## 2019-07-02 NOTE — PROGRESS NOTES
NEPHROLOGY PROGRESS NOTE   Sofia Bobo 66 y o  female MRN: [id number]  Unit/Bed#: [facility] 513-01 Encounter: [id number]      ASSESSMENT/PLAN:  1  Acute kidney injury:  Due to ischemic ATN/cardiorenal syndrome in the setting of cardiogenic shock  · Baseline creatinine is normal at 0 6-0 9  · Creatinine was normal on admission and then started to worsen has now peaked around 4 8-4 9  · BUN slightly worse today up to 110  · Suspect she may be a little intravascularly dry as she autodiureses about 4 L a day,  CVPs are low, BUN and bicarb worsening   · Discussed with heart failure team who is going to consider 500cc saline at 50cc/h   · Trend BMP   2  Cardiogenic shock with acute on chronic systolic heart failure with new onset ejection fraction 25-30% and new onset AFib:  Heart failure team is following  · Off milrinone  · Diuretics on hold but remains -4L a day   · Planning for possible device implantation but on hold for now   3  Hyponatremia:  Due to renal failure and CHF  · Sodium improving up to 130 today with negative balance and fluid restriction  · Currently NPO when diet is resumed would start fluid restriction again  4  Lactic acidosis:  Resolved  5  Transaminitis due to shock liver  6  Hypokalemia: K 3 4 today and being replaced     SUBJECTIVE:  No chest pain and shortness of breath a little better  Poor appetite      OBJECTIVE:  Current Weight: Weight - Scale: 71 9 kg (158 lb 8 2 oz)  Vitals:    07/02/19 0900   BP: (!) 83/47   Pulse: (!) 114   Resp: (!) 27   Temp: 97 5 °F (36 4 °C)   SpO2: 93%       Intake/Output Summary (Last 24 hours) at 7/2/2019 1008  Last data filed at 7/2/2019 0800  Gross per 24 hour   Intake 524 03 ml   Output 4435 ml   Net -3910 97 ml     General:  No acute distress  Skin:  No rash  Eyes:  Sclerae anicteric  ENT:  Dry oral mucosa  Neck:  Supple, symmetric  Chest:  Decreased breath sounds   CVS:  Tachycardic   Abdomen:  Soft, nontender, nondistended  Extremities:  No significant edema  Neuro:  Awake and alert  Psych:  Appropriate affect     Medications:  Scheduled Meds:  Current Facility-Administered Medications:  acetaminophen 650 mg Oral Q4H PRN Yeni Spironello V, CRNP    albuterol 2 puff Inhalation Q4H PRN Yeni Spironello V, CRNP    amiodarone 200 mg Oral TID With Meals Festus Cholo, CRCHUCK    bisacodyl 10 mg Rectal Daily PRN Marvin Dang PA-C    doxycycline hyclate 100 mg Oral Q12H [address] Samia Collins PA-C    heparin (porcine) 3-20 Units/kg/hr (Order-Specific) Intravenous Titrated Baljinder Aranda Jr, PA-C Last Rate: 6 Units/kg/hr (07/02/19 0741)   hydrALAZINE 5 mg Oral Q8H [address] SOTO Junior    insulin lispro 1-5 Units Subcutaneous HS Festus Cholo, BAYRONNP    insulin lispro 1-6 Units Subcutaneous TID AC Festus Cholo, SOTO    isosorbide dinitrate 5 mg Oral TID after meals SOTO Junior    levothyroxine 137 mcg Oral Daily Yeni Spironello V, CRNP    loratadine 10 mg Oral Daily Yeni Spironello V, CRNP    melatonin 6 mg Oral HS Rajan Gurrola, SOTO    ofloxacin 1 drop Both Eyes 4x Daily Festus Cholo, BAYRONNP    ondansetron 4 mg Intravenous Q6H PRN Yeni Spironello V, SOTO    phenol 2 spray Mouth/Throat Q2H PRN Marei Acosta PA-C    polyethylene glycol 17 g Oral Daily Angelica Collins PA-C    potassium chloride 40 mEq Oral BID Baljinder Aranda Jr, PA-C    senna 1 tablet Oral HS SOTO Qiu    sodium chloride 2 spray Each Nare PRN Marvin Dang PA-C    venlafaxine 50 mg Oral Q12H Festus Cholo, BAYRONNP        PRN Meds:   acetaminophen    albuterol    bisacodyl    ondansetron    phenol    sodium chloride    Continuous Infusions:  heparin (porcine) 3-20 Units/kg/hr (Order-Specific) Last Rate: 6 Units/kg/hr (07/02/19 0741)       Laboratory Results:  Results from last 7 days   Lab Units 07/02/19  0600 07/01/19  1609 07/01/19  0901 07/01/19  0524 06/30/19  2315 06/30/19  1617 06/30/19  0516  06/29/19  0445  06/28/19  0500 06/27/19 0440 06/26/19 0416 06/26/19 0415 06/26/19  0216   WBC Thousand/uL 13 67*  --   --  13 33*  --   --  13 26*  --  10 50*  --  11 33*  --  12 19*  --  12 42*  --  10 40*   HEMOGLOBIN g/dL 13 8  --   --  12 3  --   --  12 1  --  12 2  --  11 6  --  11 2*  --  14 6  --  15 4   HEMATOCRIT % 39 3  --   --  34 9  --   --  34 4*  --  34 1*  --  33 0*  --  32 6*  --  43 2  --  47 6*   PLATELETS Thousands/uL 188  --   --  178  --   --  191  --  188  --  173  --  159  --  228  --  233   SODIUM mmol/L 130* 130* 127* 127* 129* 125* 123*   < > 125*   < > 123*   < > 132*   < >  --  135* 133*   POTASSIUM mmol/L 3 4* 3 5 3 8 3 8 3 8 3 6 3 7   < > 3 9   < > 4 1   < > 4 1   < >  --  5 7* 6 1*   CHLORIDE mmol/L 83* 84* 84* 85* 86* 86* 84*   < > 86*   < > 87*   < > 93*   < >  --  98* 100   CO2 mmol/L 36* 34* 33* 34* 33* 31 29   < > 30   < > 24   < > 27   < >  --  20* 16*   BUN mg/dL 110* 97* 93* 88* 86* 86* 85*   < > 80*   < > 64*   < > 53*   < >  --  25 24   CREATININE mg/dL 4 82* 4 94* 4 87* 4 95* 4 84* 4 86* 4 78*   < > 4 68*   < > 3 94*   < > 2 83*   < >  --  1 67* 1 64*   CALCIUM mg/dL 8 8 8 4 8 5 8 4 7 9* 7 9* 8 2*   < > 7 7*   < > 7 3*   < > 7 8*   < >  --  8 6 8 0*   MAGNESIUM mg/dL  --   --   --   --   --   --  2 5  --   --   --  1 7  --  2 0  --   --  2 3 2 5   PHOSPHORUS mg/dL  --   --   --   --   --   --   --   --   --   --  4 2*  --   --   --   --  7 0* 6 6*    < > = values in this interval not displayed

## 2019-07-02 NOTE — RESPIRATORY THERAPY NOTE
resp care      07/02/19 0753   Respiratory Assessment   Cough None   Resp Comments pt offers no resp c/o  States she has not been coughing  ACP /flutter ordered for persistant cough  Pt has no secretions at this time,  CXR shows vascular congestion/pleural effusions, chf  Flutter not indicated at this time  Will d/c flutter/acp  Cont prn mdi for wheezing

## 2019-07-02 NOTE — PROGRESS NOTES
Progress Note Clementine Hernández 1940, 66 y o  female MRN: [id number]    Unit/Bed#: [address] 513-01 Encounter: [id number]    Primary Care Provider: Casandra Khan MD   Date and time admitted to hospital: 6/18/2019  4:35 PM        * Acute CHF (congestive heart failure) ([facility] )  Assessment & Plan  Wt Readings from Last 3 Encounters:   07/02/19 71 9 kg (158 lb 8 2 oz)   06/18/19 81 kg (178 lb 9 6 oz)   03/20/19 80 1 kg (176 lb 9 6 oz)     Acute on chronic systolic CHF, LVEF 87-50%, atrial fibrillation  · Auto diuresing  Hold diuretics  · Fluid restriction, low sodium, renal diet  · Strictly monitor I/O  Atrial fibrillation with RVR (formerly [facility])  Assessment & Plan  · Pacemaker and cardioversion on hold due WANG  · Continue heparin drip  Monitor PTT per protocol  · Continue Amiodarone PO  Acute kidney injury ([facility] )  Assessment & Plan  · Hyponatremia:  Gradually improving with diuresis  · hypokalemia, hypochloremia:  40 mEq K-Dur given on the morning of 07/02/2019  Additional dose will be given in the evening  Follow-up labs in the morning  · Indwelling Murray present:   discontinued  Urinary retention protocol  · Trend UOP and BUN/creat  · Strict I and O  · 250 cc normal saline given on 07/02/2019 for possible intracellular depletion per Nephrology  Shock liver  Assessment & Plan  · Transaminitis:  Labs slowly improving  · Continue to monitor  Hyponatremia  Assessment & Plan  · Improving with auto diuresis  · Continue to monitor on daily labs  Acute bacterial conjunctivitis of both eyes  Assessment & Plan  · Ofloxacin ophthalmic  Drainage from eyes improving      Type 2 diabetes mellitus with diabetic cataract [facility])  Assessment & Plan  Lab Results   Component Value Date    HGBA1C 5 9 (H) 03/18/2019       Recent Labs     07/01/19  1626 07/01/19  2111 07/02/19  0630 07/02/19  1106   POCGLU 99 100 101 120       Blood Sugar Average: Last 72 hrs:  (P) 122 [id number]   · Diet controlled at home  · Glucose is acceptable:Glycemic control plan - Blood glucose controlled on current regimen  · Continue SSI with Accu-Cheks while inpatient  · Carb controlled diet  · Avoid hypoglycemia    Hypothyroidism  Assessment & Plan  · Continue Synthroid therapy    Hypertension  Assessment & Plan  · Blood pressure lowering medications on hold at this time  Blood pressure is have been low  Continue blood pressure lowering medications when able  · Blood pressure monitoring per protocol, avoid hypotension        [place] [place]   Tomas Pugh 66 y o  female MRN: [id number]  [address] [facility]   [facility] Encounter: [id number]    Code Status: Level 1 - Full Code    Reason for ICU admission:  Cardiogenic shock, shock liver, acute CHF, atrial fibrillation with RVR    Active problems:   Principal Problem:    Acute CHF (congestive heart failure) ([facility] [address] )  Active Problems:    Atrial fibrillation with RVR ([facility] )    Acute kidney injury ([facility] )    Shock liver    Hyponatremia    Acute bacterial conjunctivitis of both eyes    Type 2 diabetes mellitus with diabetic cataract ([facility] )    Hypothyroidism    Hypertension  Resolved Problems:    Hyperkalemia      Consultants:   Heart failure, nephrology, cardiology, electrophysiology, PT/OT    History of Present Illness:  80-year-old female with a past medical history significant for hypertension, hyperlipidemia and diabetes mellitus who presented with new onset decompensated heart failure and AFib with RVR  The patient initially presented to her primary care physician with complaints of shortness of breath and dyspnea on exertion  She was found to be in AFib with RVR at that time  She was sent to the emergency department where she was placed on Cardizem with improvement in her rate  She was also placed on a beta-blocker, anticoagulation and aggressive diuresis        Summary of clinical course:   She underwent EBEN guided cardioversion on 06/21 and was found to have significantly depressed left ventricular function at 25-30%  She converted back to atrial fibrillation with RVR was placed on an amiodarone drip  On 06/25/2019 and a temp was made to place a Bi V ICD  With anesthesia induction the patient became mottled and hypotensive  The procedure was abandoned and the patient was transferred to intensive care unit for further monitoring  On 06/26 digoxin was started  An echocardiogram revealed an ejection fraction of 42%, diffuse hypokinesis, no valvular stenosis and dilated ventricles  On 06/27/2019 her AST was 15,182  Digoxin was stopped and metoprolol was started  On 06/28/2019 and metoprolol was stopped and the patient was placed on Levophed for hypotension  She was also on a Lasix drip  On 06/30/2019 lists extra was held due to marginal blood pressures  On 07/01/2019 the Lasix drip was discontinued due to auto diuresis (4-5 L per day)  Milrinone was also stopped  Heparin drip was started on 07/01/2019 once the INR was below 2 5  In coordination with Cardiology there is a plan to re-attempt cardioversion on the 07/02/2019  Cardiology coordinated with electrophysiology and the decision was made for the patient to have device implantation prior to cardioversion  Device implantation will not occur until kidney function improves  Nephrology recommended 250 cc of normal saline be given for suspected intracellular depletion  Recent or scheduled procedures:   Device implantation and cardioversion when acute kidney injury improved  Outstanding/pending diagnostics:   None  Cultures:   None at this time  Mobilization Plan:   Early mobilization protocol  Out of bed to chair  PT/OT following  Nutrition Plan:   Renal fluid restriction diet, low-sodium      VTE Pharmacologic Prophylaxis: Heparin Drip  VTE Mechanical Prophylaxis: sequential compression device    Discharge Plan:   Patient should be ready for discharge after device implantation and cardioversion  Initial Physical Therapy Recommendations: Following  Initial Occupational Therapy Recommendations: Following  Initial /Plan:  Following    Home medications that are not reordered and reason why:   Blood pressure lowering medications held due to marginal blood pressures  Simvastatin held due to transaminitis  Specific Diagnosis Plan:    Heart Failure:  Cardiology consult placed  Diuresis plan:  Hold diuretics  The patient also diuresing 4-5 L                         per day  Hyperglycemia:  Subcutaneous insulin:  Per correcting scale  Need for Endocrine consult:  Glucose control at this time  Spoke with Dr Irish Dubon regarding transfer  Please call [phone] with any questions or concerns  Portions of the record may have been created with voice recognition software  Occasional wrong word or "sound a like" substitutions may have occurred due to the inherent limitations of voice recognition software  Read the chart carefully and recognize, using context, where substitutions have occurred      Mendez Fuentes PA-C

## 2019-07-02 NOTE — PROGRESS NOTES
Heart Failure Service Progress Note - Verenice Grossman 66 y o  female MRN: [id number]    Unit/Bed#: [facility] 513-01 Encounter: [id number]      Assessment:    Principal Problem:    Cardiogenic shock ([facility] )  Active Problems:    Hyperlipidemia    Hypertension    Hypothyroidism    Type 2 diabetes mellitus with diabetic cataract ([facility] )    Atrial fibrillation with RVR ([facility] )    Acute CHF (congestive heart failure) ([facility] )    Acute kidney injury ([facility] )    Hyponatremia    Hyperphosphatemia    Shock liver    Acute bacterial conjunctivitis of both eyes      Subjective:   Patient seen and examined  No significant events overnight  Hemodynamics improving  MAPs around 80 at present  No longer requiring pressor support  Rates improved today    Milrinone discontinued yesterday with stable SVO2 two hours post Still auto-diuresing with net -4L overnight  Creat still around 4-5  Patient resistant to get OOB  Has sore throat, cough and not sleeping  Discussed possibility of device placement followed by cardioversion with EP  Recommended holding off for now until renal function improves  Objective: Intake/ Output:764/4860/-4L  Weight:   Tele: Atrial fib with RVR  1  Acute on chronic systolic CHF, LVEF 66-11%, LVIDd 4 6 cm, NYHA Class III, ACC/AHA Stage C with cardiogenic shock  Etiology: NICM and likely tachy-mediated with new onset AF with RVR of unknown duration, +/- ischemia given patient's risk factors for CAD and strong family history, less likely viral, toxin induced or infiltrative  Thyroid levels may have been a bit erratic in the past  TSH at present >9 but with normal FT4    Cardiogenic shock occurring in the setting of attempted cardioversion, use of Cardizem and Propofol for induction pre procedure  Rosella Goldmann Appears euvolemic to dry on exam with CVP in the  4-6     range  Autodiuresing, IV Lasix on hold  Leah Huimann MAPS stable off pressors   Milrinone d/c yesterday with stable SVO2       Gtts :  Vaso- off  Amio-off, now on oral  Heparin gtt-on hold, INR 2  Norepi off  Milrinone D/C     VBG 7/1/19 1200: Off Milrinone  SVO2 71%    VBG 7/1/19:  SVO2 73%  CO/CI -5 7/3 05     VBG 6/27/19 at 0630:  SVO2 77 1%  CO/CI 5 6/3    VBG 6/26/19 0400:  SVO2 68 3%  CO/CI 2 76/1 5      Neurohormonal Blockade:  --Beta-Blocker: Could not tolerate Metoprolol 2/2 hypotension  --ACEi, ARB or ARNi: Iso/Hydral 5mg/5mg q 8 now    (or SVR reduction)  --Aldosterone Receptor Blocker:  --Diuretic: Lasix 80 mg IV with Metolazone 10 mg PO-on hold    Sudden Cardiac Death Risk Reduction:  --ICD: EF 35%   Interrogation:     Electrical Resynchronization:  --narrow QRS  Interrogation:     Advanced Therapies (If appropriate): --Inotrope: Milrinone 0 13 mcg/kg/min-D/C now  --LVAD/Transplant Candidacy: 2  Atrial Fib with RVR  Rates improving, <110 bpm     No Dig given renal function  Became hypotensive on BB  Continue Amio  Discussed reattempting device implantation/cardioversion with EP but not recommended at this time given poor renal function      3  HTN  Off pressor support       4  HLD  On statin therapy      5  Hypothyroidism  History of radioablation in the past now on replacement therapy  TSH 9 with normal free T4      6  Type II DM        7  CORAZON  Noncompliant with CPAP  Will need repeat outpatient sleep study  8  WANG  Likely 2/2 to ATN from hypotension, low output state  Creat presently in 4-5 range  Appreciate nephro recs  9 Transaminitis  Values c/w shock liver  Improving  AST/ALT 15K/6K now down to 345/1723,    [employer] (day, reason): Murray catheter (day, reason):    Vitals: Blood pressure 97/60, pulse (!) 108, temperature 97 5 °F (36 4 °C), resp  rate 16, height 5' 1" (1 549 m), weight 71 9 kg (158 lb 8 2 oz), SpO2 93 %  , Body mass index is 29 95 kg/m² , I/O last 3 completed shifts: In: 905 9 [P O :600; I V :205 9; IV Piggyback:100]  Out: 7885 [Urine:7885]  No intake/output data recorded    Wt Readings from Last 3 Encounters:   07/02/19 71 9 kg (158 lb 8 2 oz)   06/18/19 81 kg (178 lb 9 6 oz)   03/20/19 80 1 kg (176 lb 9 6 oz)       Intake/Output Summary (Last 24 hours) at 7/2/2019 0853  Last data filed at 7/2/2019 0600  Gross per 24 hour   Intake 758 2 ml   Output 4510 ml   Net -3751 8 ml     I/O last 3 completed shifts: In: 905 9 [P O :600;  I V :205 9; IV Piggyback:100]  Out: 7885 [Urine:7885]    Afib with RVR       Physical Exam:  Vitals:    07/02/19 0400 07/02/19 0500 07/02/19 0600 07/02/19 0612   BP: 92/58 117/61 97/60 97/60   Pulse: (!) 106 100 (!) 108    Resp: 19 18 16    Temp: 97 9 °F (36 6 °C) 97 5 °F (36 4 °C) 97 5 °F (36 4 °C)    TempSrc:       SpO2: 91% (!) 87% 93%    Weight:   71 9 kg (158 lb 8 2 oz)    Height:           GEN: Mayra Meadows appears well, alert and oriented x 3, pleasant and cooperative   HEENT: pupils equal, round, and reactive to light; extraocular muscles intact  NECK: supple, no carotid bruits   HEART: regular rhythm, normal S1 and S2, no murmurs, clicks, gallops or rubs, JVP is  flat  LUNGS: diminished to auscultation bilaterally; no wheezes, rales, +BL rhonchi   ABDOMEN: normal bowel sounds, soft, no tenderness, no distention  EXTREMITIES: peripheral pulses normal; no clubbing, cyanosis, trace LLE edema  NEURO: no focal findings   SKIN: normal without suspicious lesions on exposed skin      Current Facility-Administered Medications:     acetaminophen (TYLENOL) tablet 650 mg, 650 mg, Oral, Q4H PRN, Yeni Spironello V, CRNP, 650 mg at 06/28/19 1210    albuterol (PROVENTIL HFA,VENTOLIN HFA) inhaler 2 puff, 2 puff, Inhalation, Q4H PRN, Yeni Spironello V, CRNP    amiodarone tablet 200 mg, 200 mg, Oral, TID With Meals, Chata Shan, CRNP, 200 mg at 07/02/19 0841    bisacodyl (DULCOLAX) rectal suppository 10 mg, 10 mg, Rectal, Daily PRN, Diana Collins PA-C    doxycycline hyclate (VIBRAMYCIN) capsule 100 mg, 100 mg, Oral, Q12H DAVE, Samia Collins PA-C, 100 mg at 07/02/19 0841    heparin (porcine) 25,000 units in 250 mL infusion (premix), 3-20 Units/kg/hr (Order-Specific), Intravenous, Titrated, Ari Aranda Jr, PA-C, Last Rate: 4 5 mL/hr at 07/02/19 0741, 6 Units/kg/hr at 07/02/19 0741    hydrALAZINE (APRESOLINE) tablet 5 mg, 5 mg, Oral, Q8H [facility], SOTO Junior, 5 mg at 07/02/19 0612    insulin lispro (HumaLOG) 100 units/mL subcutaneous injection 1-5 Units, 1-5 Units, Subcutaneous, HS, SOTO Alarcon    insulin lispro (HumaLOG) 100 units/mL subcutaneous injection 1-6 Units, 1-6 Units, Subcutaneous, TID AC, 1 Units at 06/30/19 1218 **AND** Fingerstick Glucose (POCT), , , TID AC, SOTO Alarcon    isosorbide dinitrate (ISORDIL) tablet 5 mg, 5 mg, Oral, TID after meals, SOTO Junior, 5 mg at 07/02/19 0841    levothyroxine tablet 137 mcg, 137 mcg, Oral, Daily, SOTO Soriano, 137 mcg at 07/02/19 0612    loratadine (CLARITIN) tablet 10 mg, 10 mg, Oral, Daily, SOTO Soriano, 10 mg at 07/02/19 0841    melatonin tablet 6 mg, 6 mg, Oral, HS, SOTO Degroot, 6 mg at 07/01/19 2105    ofloxacin (OCUFLOX) 0 3 % ophthalmic solution 1 drop, 1 drop, Both Eyes, 4x Daily, OSTO Alarcon, 1 drop at 07/02/19 0842    ondansetron (ZOFRAN) injection 4 mg, 4 mg, Intravenous, Q6H PRN, SOTO Soriano, 4 mg at 06/27/19 2124    phenol (CHLORASEPTIC) 1 4 % mucosal liquid 2 spray, 2 spray, Mouth/Throat, Q2H PRN, Juanjo Gram, PA-C    polyethylene glycol (MIRALAX) packet 17 g, 17 g, Oral, Daily, Paul Collins PA-C, 17 g at 07/01/19 0809    senna (SENOKOT) tablet 8 6 mg, 1 tablet, Oral, HS, SOTO Degroot, 8 6 mg at 07/01/19 2105    sodium chloride (OCEAN) 0 65 % nasal spray 2 spray, 2 spray, Each Nare, PRN, Paul Collins PA-C, 2 spray at 06/30/19 1218    venlafaxine (EFFEXOR) tablet 50 mg, 50 mg, Oral, Q12H, SOTO Alarcon, 50 mg at 07/01/19 2106      Labs & Results:    Results from last 7 days   Lab Units 06/26/19  0216   TROPONIN I ng/mL 0 03     Results from last 7 days   Lab Units 07/02/19  0600 07/01/19  0524 06/30/19  0516   WBC Thousand/uL 13 67* 13 33* 13 26*   HEMOGLOBIN g/dL 13 8 12 3 12 1   HEMATOCRIT % 39 3 34 9 34 4*   PLATELETS Thousands/uL 188 178 191         Results from last 7 days   Lab Units 07/02/19  0600 07/01/19  1609 07/01/19  0901 07/01/19  0524  06/30/19  0516  06/29/19  0445   POTASSIUM mmol/L 3 4* 3 5 3 8 3 8   < > 3 7   < > 3 9   CHLORIDE mmol/L 83* 84* 84* 85*   < > 84*   < > 86*   CO2 mmol/L 36* 34* 33* 34*   < > 29   < > 30   BUN mg/dL 110* 97* 93* 88*   < > 85*   < > 80*   CREATININE mg/dL 4 82* 4 94* 4 87* 4 95*   < > 4 78*   < > 4 68*   CALCIUM mg/dL 8 8 8 4 8 5 8 4   < > 8 2*   < > 7 7*   ALK PHOS U/L  --   --   --  146*  --  147*  --  135*   ALT U/L  --   --   --  1,723*  --  2,432*  --  3,638*   AST U/L  --   --   --  345*  --  689*  --  1,989*    < > = values in this interval not displayed  Results from last 7 days   Lab Units 07/01/19 0524 06/30/19  0516 06/29/19  0629   INR  2 16* 2 79* 5 34*       Chest X-Ray is obtained; result - reviewed  Counseling / Coordination of Care  Total floor / unit time spent today 20 minutes  Greater than 50% of total time was spent with the patient and / or family counseling and / or coordination of care  A description of the counseling / coordination of care: 20  Manisha Schwartz

## 2019-07-02 NOTE — ASSESSMENT & PLAN NOTE
Lab Results   Component Value Date    HGBA1C 5 9 (H) 03/18/2019       Recent Labs     07/01/19  1626 07/01/19  2111 07/02/19  0630 07/02/19  1106   POCGLU 99 100 101 120       Blood Sugar Average: Last 72 hrs:  (P) 122 [id number]   · Diet controlled at home  · Glucose is acceptable:Glycemic control plan - Blood glucose controlled on current regimen    · Continue SSI with Accu-Cheks while inpatient  · Carb controlled diet  · Avoid hypoglycemia

## 2019-07-02 NOTE — PROGRESS NOTES
Progress Note - Critical Care   Luanne Sat 66 y o  female MRN: [id number]  Unit/Bed#: [facility] 513-01 Encounter: [id number]    Assessment:   Principal Problem:    Cardiogenic shock ([facility] 75 )  Active Problems:    Acute CHF (congestive heart failure) ([facility] )    Atrial fibrillation with RVR ([facility] )    Acute kidney injury ([facility] )    Shock liver    Hyponatremia    Hyperphosphatemia    Acute bacterial conjunctivitis of both eyes    Type 2 diabetes mellitus with diabetic cataract ([place] [address] )    Hypothyroidism    Hyperlipidemia    Hypertension  Resolved Problems:    High anion gap metabolic acidosis    Lactic acidosis    Hyperkalemia      Plan  Neuro:   · PAD  · Pain controlled with:  Tylenol as needed  · Sedation plan/Daily sedation holiday: None at this time  · RASS goal: 0 Alert and Calm  · Delirium Precautions  · CAM ICU per protocol  · Regulate sleep/wake cycle+  · Continue melatonin 6 mg at bedtime  · Trend neuro exam  · Chronic depression:  Continue Effexor  CV:  Acute on chronic systolic CHF, LVEF 53-49%, atrial fibrillation  · Hemodynamic infusions: None  · VBG pending  · MAP goal > 65  · Rhythm: Atrial Fibrillation:  Rate controlled  · Follow rhythm on telemetry  · Continue heparin drip  Monitor PTT per protocol  Lung:   · Pulmonary hygiene  · Apparently on room air, oxygen saturation at 93%  GI:   · Stress ulcer prophylaxis: Not Indicated  · Bowel regimen: Sennakot and Miralax    FEN:   · Goal 24 hour fluid balance:  Net negative   Fluid/Diuretic plan:  Auto diuresing  Diuretics on hold  · Nutrition/diet plan:  Renal diet with fluid restriction  · Replete electrolytes with goals: K >4 0, Mag >2 0, and Phos >3 0    :  Acute kidney injury, hyponatremia, hypokalemia, hypochloremia  · Indwelling Murray present: yes   · Trend UOP and BUN/creat  · Strict I and O  · Replete potassium and chloride  Hyponatremia slowly improving with diuresis  · Considering fluids    Per nephrologist's recommendation, 500 cc of sodium chloride at 50 cc/hour  ID:   · Abx ordered:  Doxycycline 100 mg every 12 hours x5 days  Day 4    · Ofloxacillin ophthalmic  · Trend temps and WBC count    Heme:   · Trend hgb and plts  · Transfuse as needed for goal hgb > 7  · Full anticoagulation affect with heparin drip  Endo:   · Glycemic control plan: Blood glucose controlled on current regimen  · Continue levothyroxine  MSK/Skin:  · Mobility goal:  Early mobility protocol  Out of bed to chair  · PT consult: yes  · OT consult: yes  · Frequent turning and pressure off-loading    VTE Prophylaxis:  · Pharmacologic Prophylaxis:Heparin Drip  · Mechanical Prophylaxis: sequential compression device    Disposition: Continue ICU care      ______________________________________________________________________  Chief Complaint:  Feeling less tired than yesterday        HPI/24hr events:  Overnight, her heart rhythm remained in atrial fibrillation with rates   No additional medication or intervention was needed at that time  Currently pending Cardiology and Nephrology is input  This morning's labs showed hypokalemia and hypochloremia with improved hyponatremia with diuresis  Replacement electrolytes were ordered  Review of Systems   Constitutional: Positive for activity change, appetite change and fatigue  HENT: Positive for sore throat  Eyes: Negative  Respiratory: Positive for cough  Cardiovascular: Negative  Gastrointestinal: Negative  Genitourinary: Positive for frequency  Musculoskeletal: Negative  Neurological: Negative  Psychiatric/Behavioral:        Patient remains depressed     All other systems reviewed and are negative     ______________________________________________________________________  Temperature:   Temp (24hrs), Av °F (36 7 °C), Min:97 5 °F (36 4 °C), Max:98 6 °F (37 °C)    Current Temperature: 97 9 °F (36 6 °C)    Vitals:    19 0612 19 0800 19 0900 19 1000   BP: 97/60 107/56 (!) 83/47 98/59   BP Location:  Right arm     Pulse:  (!) 108 (!) 114 (!) 110   Resp:  16 (!) 27 21   Temp:  97 5 °F (36 4 °C) 97 5 °F (36 4 °C) 97 9 °F (36 6 °C)   TempSrc:  Probe     SpO2:  94% 93% 94%   Weight:       Height:         Arterial Line BP: 90/42  Arterial Line MAP (mmHg): 58 mmHg     Weights:   IBW: 47 8 kg    Body mass index is 29 95 kg/m²  Weight (last 2 days)     Date/Time   Weight    07/02/19 0600   71 9 (158 51)    07/01/19 0600   79 4 (175 05)    06/30/19 0600   82 (180 78)            Height: 5' 1" (154 9 cm)                       No results found for: PHART, AMR2EMH, PO2ART, NXF5YFB, H9ASEEMD, BEART, SOURCE  SpO2: SpO2: 94 %  ______________________________________________________________________  Physical Exam:     Physical Exam   Constitutional: She is oriented to person, place, and time  She appears well-developed and well-nourished  No distress  HENT:   Head: Normocephalic and atraumatic  Right Ear: External ear normal    Left Ear: External ear normal    Nose: Nose normal    Eyes: Pupils are equal, round, and reactive to light  Conjunctivae and EOM are normal  Right eye exhibits no discharge  Left eye exhibits no discharge  No scleral icterus  Neck: Normal range of motion  Neck supple  No JVD present  No tracheal deviation present  Cardiovascular: Intact distal pulses  An irregular rhythm present  Tachycardia present  Exam reveals no gallop and no friction rub  Pulmonary/Chest: Effort normal  No respiratory distress  She exhibits no tenderness  Abdominal: Soft  Bowel sounds are normal  She exhibits no distension and no mass  There is no tenderness  There is no guarding  Musculoskeletal: Normal range of motion  She exhibits no tenderness or deformity  Neurological: She is alert and oriented to person, place, and time  No cranial nerve deficit  Skin: Skin is warm and dry  Capillary refill takes less than 2 seconds  No rash noted  She is not diaphoretic   No erythema  No pallor  Psychiatric:   Mood and affect are flat  Patient seems depressed  No behavioral issues  ______________________________________________________________________  Intake and Outputs:  I/O       06/30 0701 - 07/01 0700 07/01 0701 - 07/02 0700 07/02 0701 - 07/03 0700    P  O  300 600     I V  (mL/kg) 199 (2 5) 164 2 (2 3) 11 5 (0 2)    IV Piggyback 200      Total Intake(mL/kg) 699 (8 8) 764 2 (10 6) 11 5 (0 2)    Urine (mL/kg/hr) 5050 (2 7) 4860 (2 8) 425 (1 9)    Stool  0     Total Output 5050 4860 425    Net -6949 -6588 8 -413 5           Unmeasured Stool Occurrence  3 x         UOP: 175 mL/hour   Nutrition:        Diet Orders   (From admission, onward)            Start     Ordered    07/02/19 1029  Diet Renal; Renal Restrictive; Yes; Fluid Restriction 1200 ML; No  Diet effective midnight     Question Answer Comment   Diet Type Renal    Renal Renal Restrictive    Should patient have a fluid restriction? Yes    Fluid Restriction Fluid Restriction 1200 ML    Should patient have a protein modifier? No    RD to adjust diet per protocol? Yes        07/02/19 1030          Labs:   Results from last 7 days   Lab Units 07/02/19  0600 07/01/19  0524 06/30/19  0516  06/28/19  0500  06/26/19  0216   WBC Thousand/uL 13 67* 13 33* 13 26*   < > 11 33*   < > 10 40*   HEMOGLOBIN g/dL 13 8 12 3 12 1   < > 11 6   < > 15 4   HEMATOCRIT % 39 3 34 9 34 4*   < > 33 0*   < > 47 6*   PLATELETS Thousands/uL 188 178 191   < > 173   < > 233   NEUTROS PCT %  --   --   --   --   --   --  78*   MONOS PCT %  --   --   --   --   --   --  9   MONO PCT %  --   --   --   --  4  --   --     < > = values in this interval not displayed        Results from last 7 days   Lab Units 07/02/19  0600 07/01/19  1609 07/01/19  0901 07/01/19  0524  06/30/19  0516  06/29/19  0445   SODIUM mmol/L 130* 130* 127* 127*   < > 123*   < > 125*   POTASSIUM mmol/L 3 4* 3 5 3 8 3 8   < > 3 7   < > 3 9   CHLORIDE mmol/L 83* 84* 84* 85*   < > 84*   < > 86*   CO2 mmol/L 36* 34* 33* 34*   < > 29   < > 30   BUN mg/dL 110* 97* 93* 88*   < > 85*   < > 80*   CREATININE mg/dL 4 82* 4 94* 4 87* 4 95*   < > 4 78*   < > 4 68*   CALCIUM mg/dL 8 8 8 4 8 5 8 4   < > 8 2*   < > 7 7*   ALK PHOS U/L  --   --   --  146*  --  147*  --  135*   ALT U/L  --   --   --  1,723*  --  2,432*  --  3,638*   AST U/L  --   --   --  345*  --  689*  --  1,989*   GLUCOSE RANDOM mg/dL 99 93 116 83   < > 115   < > 105    < > = values in this interval not displayed  Results from last 7 days   Lab Units 06/30/19  0516 06/28/19  0500 06/27/19  0440   MAGNESIUM mg/dL 2 5 1 7 2 0     Results from last 7 days   Lab Units 06/28/19  0500 06/26/19  0415 06/26/19  0216   PHOSPHORUS mg/dL 4 2* 7 0* 6 6*      Results from last 7 days   Lab Units 07/02/19  0600 07/01/19  2307 07/01/19  1609  07/01/19  0524 06/30/19  0516 06/29/19  0629   INR   --   --   --   --  2 16* 2 79* 5 34*   PTT seconds 95* 108* 102*   < >  --   --  42*    < > = values in this interval not displayed  Results from last 7 days   Lab Units 06/27/19  0439   LACTIC ACID mmol/L 2 0     0   Lab Value Date/Time    TROPONINI 0 03 06/26/2019 0216    TROPONINI 0 03 06/18/2019 1652     Imaging:  I have personally reviewed pertinent reports  Allergies: Allergies   Allergen Reactions    Penicillins     Wellbutrin Sr  [Bupropion]      Other reaction(s): Suicidal ideations  Category:  Adverse Reaction;      Medications:   Scheduled Meds:    Current Facility-Administered Medications:  acetaminophen 650 mg Oral Q4H PRN Yeni Spironello V, CRNP    albuterol 2 puff Inhalation Q4H PRN Yeni Spironello V, CRNP    amiodarone 200 mg Oral TID With Meals Tevin Massy, CRNP    bisacodyl 10 mg Rectal Daily PRN Isaias Petty PA-C    doxycycline hyclate 100 mg Oral Q12H [address] Samia R STEPHEN Collins    heparin (porcine) 3-20 Units/kg/hr (Order-Specific) Intravenous Titrated Jackieelydaylin Aranda Jr, PA-C Last Rate: 6 Units/kg/hr (07/02/19 0741)   hydrALAZINE 5 mg Oral Q8H [address] Manishaamy Hdz, CRNP    insulin lispro 1-5 Units Subcutaneous HS Elinore Julio, CRNP    insulin lispro 1-6 Units Subcutaneous TID AC Elinore Julio, CRNP    isosorbide dinitrate 5 mg Oral TID after meals Manisha Hdz, CRNP    levothyroxine 137 mcg Oral Daily Yeni Spironello V, CRNP    loratadine 10 mg Oral Daily Yeni Spironello V, CRNP    melatonin 6 mg Oral HS Christinia Lunch, CRNP    ofloxacin 1 drop Both Eyes 4x Daily Eliolindare Julio, CRNP    ondansetron 4 mg Intravenous Q6H PRN Yeni Spironello V, CRNP    phenol 2 spray Mouth/Throat Q2H PRN Bert Smith PA-C    polyethylene glycol 17 g Oral Daily Noble David STEPHEN Collins    potassium chloride 40 mEq Oral BID ucflorina Aranda Jr, PA-C    senna 1 tablet Oral HS Christinia Lunch, SOTO    sodium chloride 2 spray Each Nare PRN Alida Summers PA-C    venlafaxine 50 mg Oral Q12H SOTO Russo      Continuous Infusions:    heparin (porcine) 3-20 Units/kg/hr (Order-Specific) Last Rate: 6 Units/kg/hr (07/02/19 0741)     PRN Meds:    acetaminophen 650 mg Q4H PRN   albuterol 2 puff Q4H PRN   bisacodyl 10 mg Daily PRN   ondansetron 4 mg Q6H PRN   phenol 2 spray Q2H PRN   sodium chloride 2 spray PRN       Invasive lines and devices: Invasive Devices     Central Venous Catheter Line            CVC Central Lines 06/26/19 Triple 16cm 6 days          Peripheral Intravenous Line            Peripheral IV 06/29/19 Left;Upper Arm 3 days          Arterial Line            Arterial Line 06/26/19 Radial 6 days          Drain            Urethral Catheter Temperature probe 16 Fr  5 days                   Counseling / Coordination of Care  Total Critical Care time spent 0 minutes excluding procedures, teaching and family updates  Code Status: Level 1 - Full Code    Portions of the record may have been created with voice recognition software    Occasional wrong word or "sound a like" substitutions may have occurred due to the inherent limitations of voice recognition software  Read the chart carefully and recognize, using context, where substitutions have occurred      Selina Gonzalez PA-C

## 2019-07-02 NOTE — ASSESSMENT & PLAN NOTE
· Hyponatremia:  Gradually improving with diuresis  · hypokalemia, hypochloremia:  40 mEq K-Dur given on the morning of 07/02/2019  Additional dose will be given in the evening  Follow-up labs in the morning  · Indwelling Murray present:  discontinued  Urinary retention protocol  · Trend UOP and BUN/creat  · Strict I and O  · 250 cc normal saline given on 07/02/2019 for possible intracellular depletion per Nephrology

## 2019-07-03 ENCOUNTER — APPOINTMENT (INPATIENT)
Dept: NON INVASIVE DIAGNOSTICS | Facility: HOSPITAL | Age: 79
DRG: 291 | End: 2019-07-03
Payer: COMMERCIAL

## 2019-07-03 PROBLEM — I50.43 ACUTE ON CHRONIC COMBINED SYSTOLIC AND DIASTOLIC CHF (CONGESTIVE HEART FAILURE) (HCC): Status: ACTIVE | Noted: 2019-07-03

## 2019-07-03 PROBLEM — I50.43 ACUTE ON CHRONIC COMBINED SYSTOLIC AND DIASTOLIC CHF (CONGESTIVE HEART FAILURE) (HCC): Status: RESOLVED | Noted: 2019-07-03 | Resolved: 2019-07-03

## 2019-07-03 LAB
ANION GAP SERPL CALCULATED.3IONS-SCNC: 11 MMOL/L (ref 4–13)
ANION GAP SERPL CALCULATED.3IONS-SCNC: 8 MMOL/L (ref 4–13)
APTT PPP: 109 SECONDS (ref 23–37)
APTT PPP: 69 SECONDS (ref 23–37)
APTT PPP: 82 SECONDS (ref 23–37)
ATRIAL RATE: 65 BPM
BASE EX.OXY STD BLDV CALC-SCNC: 69.1 % (ref 60–80)
BASE EXCESS BLDV CALC-SCNC: 11.2 MMOL/L
BUN SERPL-MCNC: 108 MG/DL (ref 5–25)
BUN SERPL-MCNC: 98 MG/DL (ref 5–25)
CALCIUM SERPL-MCNC: 8.9 MG/DL (ref 8.3–10.1)
CALCIUM SERPL-MCNC: 9.2 MG/DL (ref 8.3–10.1)
CHLORIDE SERPL-SCNC: 87 MMOL/L (ref 100–108)
CHLORIDE SERPL-SCNC: 90 MMOL/L (ref 100–108)
CO2 SERPL-SCNC: 35 MMOL/L (ref 21–32)
CO2 SERPL-SCNC: 37 MMOL/L (ref 21–32)
CREAT SERPL-MCNC: 3.99 MG/DL (ref 0.6–1.3)
CREAT SERPL-MCNC: 4.48 MG/DL (ref 0.6–1.3)
GFR SERPL CREATININE-BSD FRML MDRD: 10 ML/MIN/1.73SQ M
GFR SERPL CREATININE-BSD FRML MDRD: 9 ML/MIN/1.73SQ M
GLUCOSE SERPL-MCNC: 102 MG/DL (ref 65–140)
GLUCOSE SERPL-MCNC: 113 MG/DL (ref 65–140)
GLUCOSE SERPL-MCNC: 133 MG/DL (ref 65–140)
GLUCOSE SERPL-MCNC: 139 MG/DL (ref 65–140)
GLUCOSE SERPL-MCNC: 160 MG/DL (ref 65–140)
GLUCOSE SERPL-MCNC: 239 MG/DL (ref 65–140)
GLUCOSE SERPL-MCNC: 88 MG/DL (ref 65–140)
HCO3 BLDV-SCNC: 37.4 MMOL/L (ref 24–30)
MAGNESIUM SERPL-MCNC: 1.9 MG/DL (ref 1.6–2.6)
O2 CT BLDV-SCNC: 15.6 ML/DL
P AXIS: 95 DEGREES
PCO2 BLDV: 53.4 MM HG (ref 42–50)
PH BLDV: 7.46 [PH] (ref 7.3–7.4)
PO2 BLDV: 38.5 MM HG (ref 35–45)
POTASSIUM SERPL-SCNC: 3.7 MMOL/L (ref 3.5–5.3)
POTASSIUM SERPL-SCNC: 4.1 MMOL/L (ref 3.5–5.3)
PR INTERVAL: 194 MS
QRS AXIS: 89 DEGREES
QRSD INTERVAL: 98 MS
QT INTERVAL: 354 MS
QTC INTERVAL: 368 MS
SODIUM SERPL-SCNC: 133 MMOL/L (ref 136–145)
SODIUM SERPL-SCNC: 135 MMOL/L (ref 136–145)
T WAVE AXIS: 102 DEGREES
VENTRICULAR RATE: 65 BPM

## 2019-07-03 PROCEDURE — 93005 ELECTROCARDIOGRAM TRACING: CPT

## 2019-07-03 PROCEDURE — 93010 ELECTROCARDIOGRAM REPORT: CPT | Performed by: INTERNAL MEDICINE

## 2019-07-03 PROCEDURE — 99233 SBSQ HOSP IP/OBS HIGH 50: CPT | Performed by: PHYSICIAN ASSISTANT

## 2019-07-03 PROCEDURE — 83735 ASSAY OF MAGNESIUM: CPT | Performed by: PHYSICIAN ASSISTANT

## 2019-07-03 PROCEDURE — 92960 CARDIOVERSION ELECTRIC EXT: CPT | Performed by: INTERNAL MEDICINE

## 2019-07-03 PROCEDURE — 80048 BASIC METABOLIC PNL TOTAL CA: CPT | Performed by: FAMILY MEDICINE

## 2019-07-03 PROCEDURE — 85730 THROMBOPLASTIN TIME PARTIAL: CPT | Performed by: FAMILY MEDICINE

## 2019-07-03 PROCEDURE — 82948 REAGENT STRIP/BLOOD GLUCOSE: CPT

## 2019-07-03 PROCEDURE — 5A2204Z RESTORATION OF CARDIAC RHYTHM, SINGLE: ICD-10-PCS | Performed by: INTERNAL MEDICINE

## 2019-07-03 PROCEDURE — 92960 CARDIOVERSION ELECTRIC EXT: CPT | Performed by: PHYSICIAN ASSISTANT

## 2019-07-03 PROCEDURE — 82805 BLOOD GASES W/O2 SATURATION: CPT | Performed by: NURSE PRACTITIONER

## 2019-07-03 PROCEDURE — 99232 SBSQ HOSP IP/OBS MODERATE 35: CPT | Performed by: INTERNAL MEDICINE

## 2019-07-03 PROCEDURE — 85730 THROMBOPLASTIN TIME PARTIAL: CPT | Performed by: INTERNAL MEDICINE

## 2019-07-03 PROCEDURE — 80048 BASIC METABOLIC PNL TOTAL CA: CPT | Performed by: PHYSICIAN ASSISTANT

## 2019-07-03 PROCEDURE — 85610 PROTHROMBIN TIME: CPT | Performed by: PHYSICIAN ASSISTANT

## 2019-07-03 RX ORDER — PROPOFOL 10 MG/ML
INJECTION, EMULSION INTRAVENOUS AS NEEDED
Status: DISCONTINUED | OUTPATIENT
Start: 2019-07-03 | End: 2019-07-03 | Stop reason: SURG

## 2019-07-03 RX ORDER — OFLOXACIN 3 MG/ML
1 SOLUTION/ DROPS OPHTHALMIC 4 TIMES DAILY
Status: COMPLETED | OUTPATIENT
Start: 2019-07-03 | End: 2019-07-04

## 2019-07-03 RX ORDER — SODIUM CHLORIDE 9 MG/ML
100 INJECTION, SOLUTION INTRAVENOUS CONTINUOUS
Status: DISPENSED | OUTPATIENT
Start: 2019-07-03 | End: 2019-07-03

## 2019-07-03 RX ORDER — HYDRALAZINE HYDROCHLORIDE 10 MG/1
5 TABLET, FILM COATED ORAL EVERY 8 HOURS SCHEDULED
Status: DISCONTINUED | OUTPATIENT
Start: 2019-07-03 | End: 2019-07-05

## 2019-07-03 RX ORDER — POTASSIUM CHLORIDE 20 MEQ/1
40 TABLET, EXTENDED RELEASE ORAL ONCE
Status: COMPLETED | OUTPATIENT
Start: 2019-07-03 | End: 2019-07-03

## 2019-07-03 RX ORDER — ISOSORBIDE DINITRATE 5 MG/1
5 TABLET ORAL
Status: DISCONTINUED | OUTPATIENT
Start: 2019-07-03 | End: 2019-07-05

## 2019-07-03 RX ADMIN — VENLAFAXINE 50 MG: 50 TABLET ORAL at 09:43

## 2019-07-03 RX ADMIN — DOXYCYCLINE 100 MG: 100 CAPSULE ORAL at 21:43

## 2019-07-03 RX ADMIN — INSULIN LISPRO 2 UNITS: 100 INJECTION, SOLUTION INTRAVENOUS; SUBCUTANEOUS at 21:44

## 2019-07-03 RX ADMIN — AMIODARONE HYDROCHLORIDE 200 MG: 200 TABLET ORAL at 08:04

## 2019-07-03 RX ADMIN — HYDRALAZINE HYDROCHLORIDE 5 MG: 10 TABLET, FILM COATED ORAL at 14:12

## 2019-07-03 RX ADMIN — LEVOTHYROXINE SODIUM 137 MCG: 112 TABLET ORAL at 05:10

## 2019-07-03 RX ADMIN — PROPOFOL 50 MG: 10 INJECTION, EMULSION INTRAVENOUS at 16:50

## 2019-07-03 RX ADMIN — OFLOXACIN 1 DROP: 3 SOLUTION/ DROPS OPHTHALMIC at 21:52

## 2019-07-03 RX ADMIN — PROPOFOL 40 MG: 10 INJECTION, EMULSION INTRAVENOUS at 16:51

## 2019-07-03 RX ADMIN — ISOSORBIDE DINITRATE 5 MG: 5 TABLET ORAL at 12:00

## 2019-07-03 RX ADMIN — LORATADINE 10 MG: 10 TABLET ORAL at 08:04

## 2019-07-03 RX ADMIN — SENNOSIDES 8.6 MG: 8.6 TABLET, FILM COATED ORAL at 21:44

## 2019-07-03 RX ADMIN — MELATONIN 6 MG: at 21:44

## 2019-07-03 RX ADMIN — AMIODARONE HYDROCHLORIDE 200 MG: 200 TABLET ORAL at 11:59

## 2019-07-03 RX ADMIN — DOXYCYCLINE 100 MG: 100 CAPSULE ORAL at 08:04

## 2019-07-03 RX ADMIN — HYDRALAZINE HYDROCHLORIDE 5 MG: 10 TABLET, FILM COATED ORAL at 21:44

## 2019-07-03 RX ADMIN — AMIODARONE HYDROCHLORIDE 200 MG: 200 TABLET ORAL at 17:30

## 2019-07-03 RX ADMIN — OFLOXACIN 1 DROP: 3 SOLUTION/ DROPS OPHTHALMIC at 17:30

## 2019-07-03 RX ADMIN — OFLOXACIN 1 DROP: 3 SOLUTION/ DROPS OPHTHALMIC at 12:07

## 2019-07-03 RX ADMIN — ISOSORBIDE DINITRATE 5 MG: 5 TABLET ORAL at 17:30

## 2019-07-03 RX ADMIN — POTASSIUM CHLORIDE 40 MEQ: 1500 TABLET, EXTENDED RELEASE ORAL at 23:34

## 2019-07-03 RX ADMIN — OFLOXACIN 1 DROP: 3 SOLUTION/ DROPS OPHTHALMIC at 08:05

## 2019-07-03 RX ADMIN — SODIUM CHLORIDE 100 ML/HR: 0.9 INJECTION, SOLUTION INTRAVENOUS at 09:48

## 2019-07-03 RX ADMIN — VENLAFAXINE 50 MG: 50 TABLET ORAL at 21:46

## 2019-07-03 RX ADMIN — HYDRALAZINE HYDROCHLORIDE 5 MG: 10 TABLET, FILM COATED ORAL at 09:43

## 2019-07-03 RX ADMIN — SODIUM CHLORIDE: 0.9 INJECTION, SOLUTION INTRAVENOUS at 16:39

## 2019-07-03 NOTE — ASSESSMENT & PLAN NOTE
· Secondary to ischemic ATN/cardiogenic syndrome /shock  · Diuretics on hold  · Nephrology is following  · IV fluid bolus today for intravascular depletion

## 2019-07-03 NOTE — PROGRESS NOTES
NEPHROLOGY PROGRESS NOTE   Sirisha Alaniz 66 y o  female MRN: [id number]  Unit/Bed#: [facility] 508-01 Encounter: [id number]      ASSESSMENT/PLAN:  1  Acute kidney injury:  Due to ischemic ATN + cardiorenal syndrome in the setting of cardiogenic shock  · Baseline creatinine is normal at 0 6-0 9  · Yesterday she was given 250 cc normal saline and today BUN is down to 108 and creatinine down to 4 4  · Still seems slightly dry and agree with another 500 cc IV fluid as ordered per Cardiology  · Trend BMP   2  Cardiogenic shock with acute on chronic systolic heart failure with new onset ejection fraction 25-30% and new onset AFib:  Heart failure team is following  · Off milrinone  · Diuretics on hold but continues to auto diurese  · Getting cardioversion today  3  Hyponatremia:  Due to renal failure and CHF  · Sodium improved to 135 today after fluids yesterday  · Currently NPO when diet is resumed would start fluid restriction again  4  Lactic acidosis:  Resolved  5  Transaminitis due to shock liver  6  Hypokalemia:  Resolved with replacement    SUBJECTIVE:  Weak and tired with poor appetite  No chest pain or shortness of Breath  OBJECTIVE:  Current Weight: Weight - Scale: 71 9 kg (158 lb 8 2 oz)  Vitals:    07/03/19 0941   BP: 140/84   Pulse:    Resp:    Temp:    SpO2:        Intake/Output Summary (Last 24 hours) at 7/3/2019 0946  Last data filed at 7/3/2019 0801  Gross per 24 hour   Intake 552 68 ml   Output 3075 ml   Net -2522 32 ml     Physical Exam   Constitutional: She is oriented to person, place, and time  She appears well-developed and well-nourished  HENT:   Head: Normocephalic and atraumatic  Eyes: Conjunctivae are normal    Neck: Neck supple  Cardiovascular:   Irregularly irregular    Pulmonary/Chest: Effort normal and breath sounds normal  She has no wheezes  She has no rales  Abdominal: Soft  She exhibits no distension  There is no tenderness  Musculoskeletal: She exhibits no edema  Neurological: She is alert and oriented to person, place, and time  Skin: Skin is warm and dry  No rash noted  Psychiatric: She has a normal mood and affect  Vitals reviewed          Medications:  Scheduled Meds:    Current Facility-Administered Medications:  acetaminophen 650 mg Oral Q4H PRN Prisca Aranda Jr, PA-C    albuterol 2 puff Inhalation Q4H PRN Prisca Aranda Jr, PA-C    amiodarone 200 mg Oral TID With Meals Prisca Tyalor PA-C    bisacodyl 10 mg Rectal Daily PRN Prisca Soni [facility] STEPHEN Handy    doxycycline hyclate 100 mg Oral Q12H [address] Prisca Aranda Jr, PA-C    heparin (porcine) 3-20 Units/kg/hr (Order-Specific) Intravenous Titrated Prisca Taylor PA-C Last Rate: 8 Units/kg/hr (07/03/19 0701)   hydrALAZINE 5 mg Oral Q8H [address] SOTO Junior    insulin lispro 1-5 Units Subcutaneous HS Prisca Aranda Jr, PA-C    insulin lispro 1-6 Units Subcutaneous TID AC Prisca Aranda Jr, PA-C    isosorbide dinitrate 5 mg Oral TID after meals SOTO Junior    levothyroxine 137 mcg Oral Daily Boni Jernigan PA-C    loratadine 10 mg Oral Daily Prisca Aranda Jr, PA-C    melatonin 6 mg Oral HS Prisca Aranda Jr, PA-C    ofloxacin 1 drop Both Eyes 4x Daily Prisca Soni [facility] STEPHEN Handy    ondansetron 4 mg Intravenous Q6H PRN Prisca Aranda Jr, PA-C    phenol 2 spray Mouth/Throat Q2H PRN Prisca Aranda Jr, PA-C    polyethylene glycol 17 g Oral Daily Prisca Taylor PA-C    senna 1 tablet Oral HS Prisca Aranda Jr, PA-C    sodium chloride 2 spray Each Nare PRN Prisca Aranda Jr, PA-C    sodium chloride 100 mL/hr Intravenous Continuous SOTO Junior    venlafaxine 50 mg Oral Q12H Prisca Aranda Jr, PA-C        PRN Meds:   acetaminophen    albuterol    bisacodyl    ondansetron    phenol    sodium chloride    Continuous Infusions:    heparin (porcine) 3-20 Units/kg/hr (Order-Specific) Last Rate: 8 Units/kg/hr (07/03/19 0701)   sodium chloride 100 mL/hr        Laboratory Results:  Results from last 7 days   Lab Units 07/03/19  0312 07/02/19  0600 07/01/19  1609 07/01/19  0901 07/01/19  0524 06/30/19  2315 06/30/19  1617 06/30/19  0516  06/29/19  0445  06/28/19  0500  06/27/19  0440   WBC Thousand/uL  --  13 67*  --   --  13 33*  --   --  13 26*  --  10 50*  --  11 33*  --  12 19*   HEMOGLOBIN g/dL  --  13 8  --   --  12 3  --   --  12 1  --  12 2  --  11 6  --  11 2*   HEMATOCRIT %  --  39 3  --   --  34 9  --   --  34 4*  --  34 1*  --  33 0*  --  32 6*   PLATELETS Thousands/uL  --  188  --   --  178  --   --  191  --  188  --  173  --  159   SODIUM mmol/L 135* 130* 130* 127* 127* 129* 125* 123*   < > 125*   < > 123*   < > 132*   POTASSIUM mmol/L 4 1 3 4* 3 5 3 8 3 8 3 8 3 6 3 7   < > 3 9   < > 4 1   < > 4 1   CHLORIDE mmol/L 87* 83* 84* 84* 85* 86* 86* 84*   < > 86*   < > 87*   < > 93*   CO2 mmol/L 37* 36* 34* 33* 34* 33* 31 29   < > 30   < > 24   < > 27   BUN mg/dL 108* 110* 97* 93* 88* 86* 86* 85*   < > 80*   < > 64*   < > 53*   CREATININE mg/dL 4 48* 4 82* 4 94* 4 87* 4 95* 4 84* 4 86* 4 78*   < > 4 68*   < > 3 94*   < > 2 83*   CALCIUM mg/dL 9 2 8 8 8 4 8 5 8 4 7 9* 7 9* 8 2*   < > 7 7*   < > 7 3*   < > 7 8*   MAGNESIUM mg/dL  --   --   --   --   --   --   --  2 5  --   --   --  1 7  --  2 0   PHOSPHORUS mg/dL  --   --   --   --   --   --   --   --   --   --   --  4 2*  --   --     < > = values in this interval not displayed

## 2019-07-03 NOTE — ANESTHESIA PREPROCEDURE EVALUATION
Review of Systems/Medical History  Patient summary reviewed  Chart reviewed  No history of anesthetic complications     Cardiovascular  Hyperlipidemia, Hypertension , Dysrhythmias (w/ RVR) , atrial fibrillation, CHF (Acute on chronic systolic HF, current LVEF 42%) ,    Pulmonary  Negative pulmonary ROS        GI/Hepatic    Liver disease (Shock liver) ,        Kidney disease ([place]) ,        Endo/Other  Diabetes well controlled , History of thyroid disease , hypothyroidism,      GYN       Hematology   Musculoskeletal    Arthritis     Neurology   Psychology   Anxiety,             SUMMARY     LEFT VENTRICLE:  Size was at the upper limits of normal   Systolic function was moderately reduced  Ejection fraction was estimated to be 42 %  There was moderate diffuse hypokinesis  There was no evidence of concentric hypertrophy      VENTRICULAR SEPTUM:  There was dyssynergic motion      RIGHT VENTRICLE:  The ventricle was mildly to moderately dilated  Systolic function was mildly to moderately reduced  Wall thickness was increased      LEFT ATRIUM:  The atrium was moderately dilated      RIGHT ATRIUM:  The atrium was moderately dilated      MITRAL VALVE:  There was mild annular calcification    There was moderate regurgitation      TRICUSPID VALVE:  There was moderate regurgitation      PULMONIC VALVE:  There was trace regurgitation      Lab Results   Component Value Date    WBC 13 67 (H) 07/02/2019    HGB 13 8 07/02/2019    HCT 39 3 07/02/2019    MCV 92 07/02/2019     07/02/2019     Lab Results   Component Value Date     02/07/2017    K 4 1 07/03/2019    CO2 37 (H) 07/03/2019    CL 87 (L) 07/03/2019     (H) 07/03/2019    CREATININE 4 48 (H) 07/03/2019     Lab Results   Component Value Date    INR 2 16 (H) 07/01/2019    INR 2 79 (H) 06/30/2019    INR 5 34 (HH) 06/29/2019    PROTIME 23 6 (H) 07/01/2019    PROTIME 28 9 (H) 06/30/2019    PROTIME 48 4 (H) 06/29/2019     Lab Results   Component Value Date PTT 82 (H) 07/03/2019       No results found for: GLUCOSE    Lab Results   Component Value Date    HGBA1C 5 9 (H) 03/18/2019       Type and Screen:  B      Physical Exam    Airway    Mallampati score: II  TM Distance: <3 FB  Neck ROM: full     Dental   upper dentures and lower dentures,     Cardiovascular  Rhythm: irregular, Rate: abnormal,     Pulmonary  Breath sounds clear to auscultation,     Other Findings        Anesthesia Plan  ASA Score- 3     Anesthesia Type- IV sedation with anesthesia with ASA Monitors  Additional Monitors:   Airway Plan:         Plan Factors-    Induction- intravenous  Postoperative Plan-     Informed Consent- Anesthetic plan and risks discussed with patient  I personally reviewed this patient with the CRNA  Discussed and agreed on the Anesthesia Plan with the CRNA  Amparo Apodaca

## 2019-07-03 NOTE — PROGRESS NOTES
Patient back from EP lab, R wrist noted to be oozing blood from previous A-line that was pulled 7/2  Pea-sized swollen area noted at site along with ecchymosis of R wrist  Pressure held and pressure dsg applied  VSS  SLIM Dia aware  No new orders at this time

## 2019-07-03 NOTE — ASSESSMENT & PLAN NOTE
Lab Results   Component Value Date    HGBA1C 5 9 (H) 03/18/2019       Recent Labs     07/02/19  1622 07/02/19  2141 07/03/19  0649 07/03/19  1042   POCGLU 141* 274* 102 113       Blood Sugar Average: Last 72 hrs:  (P) 128 [id number]   · Diet controlled at home  · Glucose is acceptable  · Continue insulin sliding scale

## 2019-07-03 NOTE — PROGRESS NOTES
[facility] Progress Note  Patient: Ag Damon 66 y o  female   MRN: [id number]  PCP: Aure Ledbetter MD  Unit/Bed#: [facility] 991-08 Encounter: [id number]  Date Of Visit: 07/03/19    Assessment/Plan:  1  Acute kidney injury  - Secondary to ischemic ATN and cardiorenal syndrome   - Baseline Cr: 0 6 - 0 9   - BUN: 108, Cr: 4 48 (improved today)  - S/p 250 mL IVF bolus (7/2), 500 mL IVF today (100 mL x 5 hr)  - Hyponatremia improved with IV fluids  - Hypokalemia improved (K: 3 5 today) with supplementation  - Hold diuretics due to auto-diuresis   - Continue to monitor I/Os    2  Atrial fibrillation with RVR  - Plan for DCCV today (with possible PPM implantation)  - Continue PO amiodarone 200 mg TID  - Continue telemetry      3  Acute CHF  - ECHO (6/24): EF 42%, moderate diffuse hypokinesis    - EF improved from ECHO on 6/20  - Hold diuretics due to auto-diuresis  - Restart isosorbide dinitrate 5 mg TID and hydralazine 5 mg q8h (MAP improved and stable)  - Continue to monitor I/Os, daily weight    4  Hypertension  - BP stable overnight  - Continue to hold anti-hypertensives due to hypotension    5  Type 2 diabetes mellitus  - HbA1c (3/18/19): 5 9  - Continue SSI and accu-checks     6  Hypothyroidism  - Continue levothyroxine 137 mcg daily     7  Transaminitis  - Secondary to shock liver  - LFT peak on 6/27 (AST: 23092, ALT: 6164), with subsequent down trend  - Continue to monitor     VTE Pharmacologic Prophylaxis:   Pharmacologic: Heparin Drip  Mechanical VTE Prophylaxis in Place: Yes    Patient Centered Rounds: I have performed bedside rounds with nursing staff today  Discussions with Specialists or Other Care Team Provider: assessment/plan discussed with Dr Estrada Both     Education and Discussions with Family / Patient: reviewed current symptoms with patient    Time Spent for Care: 20 minutes  More than 50% of total time spent on counseling and coordination of care as described above      Current Length of Stay: 15 day(s)    Current Patient Status: Inpatient   Certification Statement: The patient will continue to require additional inpatient hospital stay due to DCCV, continued CHF management    Discharge Plan / Estimated Discharge Date: pending clinical course    Code Status: Level 1 - Full Code      Subjective: The patient had no acute events overnight  This morning, she reports significant fatigue but otherwise has no complaints  She currently denies chest pain, shortness of breath, dizziness, headache, fever, chills, abdominal pain, nausea, vomiting, diarrhea, or lower extremity swelling  Objective:     Vitals:   Temp (24hrs), Av 1 °F (36 7 °C), Min:97 5 °F (36 4 °C), Max:98 6 °F (37 °C)    Temp:  [97 5 °F (36 4 °C)-98 6 °F (37 °C)] 98 3 °F (36 8 °C)  HR:  [104-136] 110  Resp:  [15-18] 16  BP: (103-162)/(56-89) 142/89  SpO2:  [93 %-97 %] 96 %  Body mass index is 29 95 kg/m²  Input and Output Summary (last 24 hours): Intake/Output Summary (Last 24 hours) at 7/3/2019 1137  Last data filed at 7/3/2019 1001  Gross per 24 hour   Intake 543 68 ml   Output 2950 ml   Net -2406 32 ml       Physical Exam:     Physical Exam   Constitutional: She is oriented to person, place, and time  She appears well-developed and well-nourished  No distress  HENT:   Head: Normocephalic and atraumatic  Mouth/Throat: Oropharynx is clear and moist  No oropharyngeal exudate  Eyes: Pupils are equal, round, and reactive to light  Conjunctivae and EOM are normal  Right eye exhibits no discharge  Left eye exhibits no discharge  Neck: Normal range of motion  Neck supple  Cardiovascular: Intact distal pulses  Tachycardic, irregular rhythm   Pulmonary/Chest: Effort normal and breath sounds normal  No respiratory distress  Abdominal: Soft  Bowel sounds are normal  She exhibits no distension  There is no tenderness  Musculoskeletal: Normal range of motion  She exhibits no edema or deformity     Lymphadenopathy: She has no cervical adenopathy  Neurological: She is alert and oriented to person, place, and time  Skin: Skin is warm and dry  Psychiatric: She has a normal mood and affect  Her behavior is normal        Additional Data:     Labs:    Results from last 7 days   Lab Units 07/02/19  0600  06/28/19  0500   WBC Thousand/uL 13 67*   < > 11 33*   HEMOGLOBIN g/dL 13 8   < > 11 6   HEMATOCRIT % 39 3   < > 33 0*   PLATELETS Thousands/uL 188   < > 173   LYMPHO PCT %  --   --  7*   MONO PCT %  --   --  4   EOS PCT %  --   --  0    < > = values in this interval not displayed  Results from last 7 days   Lab Units 07/03/19  0312  07/01/19  0524   POTASSIUM mmol/L 4 1   < > 3 8   CHLORIDE mmol/L 87*   < > 85*   CO2 mmol/L 37*   < > 34*   BUN mg/dL 108*   < > 88*   CREATININE mg/dL 4 48*   < > 4 95*   CALCIUM mg/dL 9 2   < > 8 4   ALK PHOS U/L  --   --  146*   ALT U/L  --   --  1,723*   AST U/L  --   --  345*    < > = values in this interval not displayed  Results from last 7 days   Lab Units 07/01/19  0524   INR  2 16*       * I Have Reviewed All Lab Data Listed Above  * Additional Pertinent Lab Tests Reviewed:  All Labs Within Last 24 Hours Reviewed    Imaging:    Imaging Reports Reviewed Today Include: -  Imaging Personally Reviewed by Myself Includes:  -    Recent Cultures (last 7 days):           Last 24 Hours Medication List:     Current Facility-Administered Medications:  acetaminophen 650 mg Oral Q4H PRN Vanessa Arnada Jr, PA-C    albuterol 2 puff Inhalation Q4H PRN Vanessa Aranda Jr, PA-C    amiodarone 200 mg Oral TID With Meals Vanessa Taylor PA-C    bisacodyl 10 mg Rectal Daily PRN Vanessa Aranda Jr, PA-C    doxycycline hyclate 100 mg Oral Q12H 1131 No  [place] , PA-C    heparin (porcine) 3-20 Units/kg/hr (Order-Specific) Intravenous Titrated Vanessa Aranda Jr, PA-C Last Rate: 8 Units/kg/hr (07/03/19 0701)   hydrALAZINE 5 mg Oral Q8H [address] SOTO Junior    insulin lispro 1-5 Units Subcutaneous HS Cathren Penpardeep Aranda Jr, PA-C    insulin lispro 1-6 Units Subcutaneous TID AC Sarah Aranda Jr, PA-C    isosorbide dinitrate 5 mg Oral TID after meals SOTO Junior    levothyroxine 137 mcg Oral Daily Cataren Penpardeep [place] STEPHEN Chan    loratadine 10 mg Oral Daily Cataren Pencil Eliazar Star STEPHEN    melatonin 6 mg Oral HS Cataren Penpardeep Chan PA-C    ofloxacin 1 drop Both Eyes 4x Daily Sarah Aranda Jr, PA-C    ondansetron 4 mg Intravenous Q6H PRN Sarah Aranda Jr, PA-C    phenol 2 spray Mouth/Throat Q2H PRN Sarah Aranda Jr, PA-C    polyethylene glycol 17 g Oral Daily Sarah Chan PA-C    senna 1 tablet Oral HS Sarah Aranda Jr, PA-C    sodium chloride 2 spray Each Nare PRN Imelda Lee PA-C    sodium chloride 100 mL/hr Intravenous Continuous Cherry Phlegm SOTO Hdz Last Rate: 100 mL/hr (07/03/19 0948)   venlafaxine 50 mg Oral Q12H Imelda Lee PA-C         Today, Patient Was Seen By: Philip Saravia    ** Please Note: This note has been constructed using a voice recognition system   **

## 2019-07-03 NOTE — ASSESSMENT & PLAN NOTE
Wt Readings from Last 3 Encounters:   07/03/19 71 9 kg (158 lb 8 2 oz)   06/18/19 81 kg (178 lb 9 6 oz)   03/20/19 80 1 kg (176 lb 9 6 oz)     Cardiogenic shock, currently off Milrinone drip  Initial EF 25 30% improving to 42%  Negative fluid balance  Auto diuresing  Diuretics on hold due to WANG  On hydralazine and Imdur  Continue Fluid restriction, low sodium, renal diet     Heart failure service managing

## 2019-07-03 NOTE — ANESTHESIA POSTPROCEDURE EVALUATION
Post-Op Assessment Note    CV Status:  Stable  Pain Score: 0    Pain management: adequate     Mental Status:  Alert and awake   Hydration Status:  Euvolemic and stable   PONV Controlled:  Controlled   Airway Patency:  Patent   Post Op Vitals Reviewed: Yes      Staff: Anesthesiologist, CRNA           /72 (07/03/19 1707)    Temp     Pulse 65 (07/03/19 1707)   Resp 16 (07/03/19 1707)    SpO2 97 % (07/03/19 1707)

## 2019-07-03 NOTE — PROGRESS NOTES
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)    Atrial fibrillation with RVR ([address] )    Acute kidney injury ([facility] [address] )    Hyponatremia    Shock liver    Acute bacterial conjunctivitis of both eyes      Subjective:   Patient seen and examined  No significant events overnight  Transferred to the floor yesterday  Seen by EP yesterday who is recommending DCCV with possible implantation of PPM if needed  Her clinical status is improving  MAPs are elevated today  Heart rates are still up  Off Milrinone almost 24 hours with repeat VBG pending  Still auto-diuresing with net -2 6L overnight  Appeared dry on exam yesterday with CVP in the 4-6 range, mildly hypotensive  SVR reducers placed on hold and given IVF yesterday  Creat still >4 but improving daily     Objective: Intake/ Output:564/3200/-2 6 L  Weight: 158 lbs  Tele: Atrial fib with RVR  1  Acute on chronic systolic CHF, LVEF 61-32%, LVIDd 4 6 cm, NYHA Class III, ACC/AHA Stage C with cardiogenic shock  Etiology: NICM and likely tachy-mediated with new onset AF with RVR of unknown duration, +/- ischemia given patient's risk factors for CAD and strong family history, less likely viral, toxin induced or infiltrative  Thyroid levels may have been a bit erratic in the past  TSH at present >9 but with normal FT4    Cardiogenic shock occurring in the setting of attempted cardioversion, use of Cardizem and Propofol for induction pre procedure    Still Autodiuresing, IV Lasix on hold  Ferol Christopher MAPS now elevated  Will restart Iso/Hydral 5/5 mg Q8  Still appears dry on exam  Will restart IVF at 100 cc/hr x 5 hours and reassess   Discussed with nephro who is in agreement       Gtts :  Vaso- off  Amio-off, now on oral load  Heparin gtt-on hold, INR 2  Norepi off  Milrinone D/C     Repeat VBG off Milrinone x 24 hours:      VBG 7/1/19 1200: Off Milrinone  SVO2 71%    VBG 7/1/19:  SVO2 73%  CO/CI -5 7/3 05     VBG 6/27/19 at 0630:  SVO2 77 1%  CO/CI 5 6/3    VBG 6/26/19 0400:  SVO2 68 3%  CO/CI 2 76/1 5      Neurohormonal Blockade:  --Beta-Blocker: Could not tolerate Metoprolol 2/2 hypotension  --ACEi, ARB or ARNi: Restart Iso/Hydral 5mg/5mg q 8 now    (or SVR reduction)  --Aldosterone Receptor Blocker:  --Diuretic: Lasix 80 mg IV with Metolazone 10 mg PO-on hold    Sudden Cardiac Death Risk Reduction:  --ICD: EF 35%   Interrogation:     Electrical Resynchronization:  --narrow QRS  Interrogation:     Advanced Therapies (If appropriate): --Inotrope: Milrinone 0 13 mcg/kg/min-D/C now  --LVAD/Transplant Candidacy: 2  Atrial Fib with RVR  Still poorly rate controlled  Anthony Castano No Dig given renal function  Became hypotensive on BB  Continue Amio load  NPO for DCCV today with possible PPM implantation if needed  May need ablation at some point       3  HTN  Off pressor support       4  HLD  On statin therapy      5  Hypothyroidism  History of radioablation in the past now on replacement therapy  TSH 9 with normal free T4      6  Type II DM        7  CORAZON  Noncompliant with CPAP  Will need repeat outpatient sleep study  8  WANG  Likely 2/2 to ATN from hypotension, low output state  Creat presently in 4-5 range but improving  Appreciate nephro recs  Gentle IV hydration x 5 hours pre procedure    9  Transaminitis  Values c/w shock liver  Improving  AST/ALT 15K/6K now down to 345/1723,    [employer] (day, reason): Murray catheter (day, reason):    Vitals: Blood pressure 162/89, pulse (!) 136, temperature 97 8 °F (36 6 °C), temperature source Oral, resp  rate 18, height 5' 1" (1 549 m), weight 71 9 kg (158 lb 8 2 oz), SpO2 97 %  , Body mass index is 29 95 kg/m² , I/O last 3 completed shifts: In: 645 6 [P O :220;  I V :175 6; IV Piggyback:250]  Out: 5800 [Urine:5800]  No intake/output data recorded  Wt Readings from Last 3 Encounters:   07/03/19 71 9 kg (158 lb 8 2 oz)   06/18/19 81 kg (178 lb 9 6 oz)   03/20/19 80 1 kg (176 lb 9 6 oz)       Intake/Output Summary (Last 24 hours) at 7/3/2019 0828  Last data filed at 7/3/2019 0700  Gross per 24 hour   Intake 552 68 ml   Output 2775 ml   Net -2222 32 ml     I/O last 3 completed shifts: In: 645 6 [P O :220;  I V :175 6; IV Piggyback:250]  Out: 5800 [Urine:5800]    Afib with RVR       Physical Exam:  Vitals:    07/02/19 1900 07/02/19 2301 07/03/19 0500 07/03/19 0700   BP: 136/65 137/86  162/89   BP Location: Right arm      Pulse: (!) 110 (!) 110  (!) 136   Resp: 18 18 18   Temp: 98 1 °F (36 7 °C) 98 6 °F (37 °C)  97 8 °F (36 6 °C)   TempSrc: Oral   Oral   SpO2: 97% 93%  97%   Weight:   71 9 kg (158 lb 8 2 oz)    Height:           GEN: Mayra Meadows appears well, alert and oriented x 3, pleasant and cooperative   HEENT: pupils equal, round, and reactive to light; extraocular muscles intact  NECK: supple, no carotid bruits   HEART: regular rhythm, normal S1 and S2, no murmurs, clicks, gallops or rubs, JVP is  flat  LUNGS: diminished to auscultation bilaterally; no wheezes, rales, +BL rhonchi   ABDOMEN: normal bowel sounds, soft, no tenderness, no distention  EXTREMITIES: peripheral pulses normal; no clubbing, cyanosis, trace LLE edema  NEURO: no focal findings   SKIN: normal without suspicious lesions on exposed skin      Current Facility-Administered Medications:     acetaminophen (TYLENOL) tablet 650 mg, 650 mg, Oral, Q4H PRN, Tiffany Aranda Jr, PA-C, 650 mg at 06/28/19 1210    albuterol (PROVENTIL HFA,VENTOLIN HFA) inhaler 2 puff, 2 puff, Inhalation, Q4H PRN, Bryant Oshea PA-C    amiodarone tablet 200 mg, 200 mg, Oral, TID With Meals, Tiffany Garcia [facility] STEPHEN Handy, 200 mg at 07/03/19 0804    bisacodyl (DULCOLAX) rectal suppository 10 mg, 10 mg, Rectal, Daily PRN, Tiffany Garcia Nara Crawford PA-C    doxycycline hyclate (VIBRAMYCIN) capsule 100 mg, 100 mg, Oral, Q12H [facility], Lucero Aranda Jr, PA-C, 100 mg at 07/03/19 0804    heparin (porcine) 25,000 units in 250 mL infusion (premix), 3-20 Units/kg/hr (Order-Specific), Intravenous, Titrated, Lucero Aranda Jr, PA-C, Last Rate: 6 mL/hr at 07/03/19 0701, 8 Units/kg/hr at 07/03/19 0701    insulin lispro (HumaLOG) 100 units/mL subcutaneous injection 1-5 Units, 1-5 Units, Subcutaneous, HS, Lucero Aranda Jr, PA-C, 2 Units at 07/02/19 2142    insulin lispro (HumaLOG) 100 units/mL subcutaneous injection 1-6 Units, 1-6 Units, Subcutaneous, TID AC, 1 Units at 06/30/19 1218 **AND** Fingerstick Glucose (POCT), , , TID AC, Gatito Mejia PA-C    levothyroxine tablet 137 mcg, 137 mcg, Oral, Daily, Lucero Crawford PA-C, 137 mcg at 07/03/19 0510    loratadine (CLARITIN) tablet 10 mg, 10 mg, Oral, Daily, Lucero Aranda Jr, PA-C, 10 mg at 07/03/19 0804    melatonin tablet 6 mg, 6 mg, Oral, HS, Lucero Aranda Jr, PA-C, 6 mg at 07/02/19 2140    ofloxacin (OCUFLOX) 0 3 % ophthalmic solution 1 drop, 1 drop, Both Eyes, 4x Daily, Lucero Aranda Jr, PA-C, 1 drop at 07/03/19 0805    ondansetron (ZOFRAN) injection 4 mg, 4 mg, Intravenous, Q6H PRN, Lucero Aranda Jr, PA-C, 4 mg at 06/27/19 2124    phenol (CHLORASEPTIC) 1 4 % mucosal liquid 2 spray, 2 spray, Mouth/Throat, Q2H PRN, Gatito Mejia PA-C    polyethylene glycol (MIRALAX) packet 17 g, 17 g, Oral, Daily, Lucero Aranda Jr, PA-C, Stopped at 07/03/19 0804    senna (SENOKOT) tablet 8 6 mg, 1 tablet, Oral, HS, Lucero Crawford PA-C, 8 6 mg at 07/02/19 2141    sodium chloride (OCEAN) 0 65 % nasal spray 2 spray, 2 spray, Each Nare, PRN, Gatito Mejia PA-C, 2 spray at 06/30/19 1218    sodium chloride 0 9 % infusion, 125 mL/hr, Intravenous, Continuous, Rahel Hernandez MD    venlafaxine [facility]) tablet 50 mg, 50 mg, Oral, Q12H, Gatito Mejia , PA-C, 50 mg at 07/02/19 2244      Labs & Results:        Results from last 7 days   Lab Units 07/02/19  0600 07/01/19  0524 06/30/19  0516   WBC Thousand/uL 13 67* 13 33* 13 26*   HEMOGLOBIN g/dL 13 8 12 3 12 1   HEMATOCRIT % 39 3 34 9 34 4*   PLATELETS Thousands/uL 188 178 191         Results from last 7 days   Lab Units 07/03/19  0312 07/02/19  0600 07/01/19  1609  07/01/19  0524  06/30/19  0516  06/29/19  0445   POTASSIUM mmol/L 4 1 3 4* 3 5   < > 3 8   < > 3 7   < > 3 9   CHLORIDE mmol/L 87* 83* 84*   < > 85*   < > 84*   < > 86*   CO2 mmol/L 37* 36* 34*   < > 34*   < > 29   < > 30   BUN mg/dL 108* 110* 97*   < > 88*   < > 85*   < > 80*   CREATININE mg/dL 4 48* 4 82* 4 94*   < > 4 95*   < > 4 78*   < > 4 68*   CALCIUM mg/dL 9 2 8 8 8 4   < > 8 4   < > 8 2*   < > 7 7*   ALK PHOS U/L  --   --   --   --  146*  --  147*  --  135*   ALT U/L  --   --   --   --  1,723*  --  2,432*  --  3,638*   AST U/L  --   --   --   --  345*  --  689*  --  1,989*    < > = values in this interval not displayed  Results from last 7 days   Lab Units 07/01/19  0524 06/30/19  0516 06/29/19  0629   INR  2 16* 2 79* 5 34*       Chest X-Ray is obtained; result - reviewed  Counseling / Coordination of Care  Total floor / unit time spent today 20 minutes  Greater than 50% of total time was spent with the patient and / or family counseling and / or coordination of care  A description of the counseling / coordination of care: 20  Manisha Garcia

## 2019-07-04 ENCOUNTER — APPOINTMENT (INPATIENT)
Dept: NON INVASIVE DIAGNOSTICS | Facility: HOSPITAL | Age: 79
DRG: 291 | End: 2019-07-04
Payer: COMMERCIAL

## 2019-07-04 LAB
ALBUMIN SERPL BCP-MCNC: 3.1 G/DL (ref 3.5–5)
ALP SERPL-CCNC: 136 U/L (ref 46–116)
ALT SERPL W P-5'-P-CCNC: 782 U/L (ref 12–78)
ANION GAP SERPL CALCULATED.3IONS-SCNC: 9 MMOL/L (ref 4–13)
APTT PPP: 63 SECONDS (ref 23–37)
AST SERPL W P-5'-P-CCNC: 87 U/L (ref 5–45)
BILIRUB SERPL-MCNC: 1.86 MG/DL (ref 0.2–1)
BUN SERPL-MCNC: 100 MG/DL (ref 5–25)
CALCIUM SERPL-MCNC: 8.9 MG/DL (ref 8.3–10.1)
CHLORIDE SERPL-SCNC: 91 MMOL/L (ref 100–108)
CO2 SERPL-SCNC: 34 MMOL/L (ref 21–32)
CREAT SERPL-MCNC: 3.85 MG/DL (ref 0.6–1.3)
GFR SERPL CREATININE-BSD FRML MDRD: 11 ML/MIN/1.73SQ M
GLUCOSE SERPL-MCNC: 102 MG/DL (ref 65–140)
GLUCOSE SERPL-MCNC: 104 MG/DL (ref 65–140)
GLUCOSE SERPL-MCNC: 112 MG/DL (ref 65–140)
GLUCOSE SERPL-MCNC: 138 MG/DL (ref 65–140)
GLUCOSE SERPL-MCNC: 198 MG/DL (ref 65–140)
INR PPP: 1.6 (ref 0.84–1.19)
MAGNESIUM SERPL-MCNC: 2 MG/DL (ref 1.6–2.6)
POTASSIUM SERPL-SCNC: 4 MMOL/L (ref 3.5–5.3)
PROT SERPL-MCNC: 7.9 G/DL (ref 6.4–8.2)
PROTHROMBIN TIME: 18.6 SECONDS (ref 11.6–14.5)
SODIUM SERPL-SCNC: 134 MMOL/L (ref 136–145)

## 2019-07-04 PROCEDURE — 93325 DOPPLER ECHO COLOR FLOW MAPG: CPT | Performed by: INTERNAL MEDICINE

## 2019-07-04 PROCEDURE — 99232 SBSQ HOSP IP/OBS MODERATE 35: CPT | Performed by: INTERNAL MEDICINE

## 2019-07-04 PROCEDURE — 93308 TTE F-UP OR LMTD: CPT | Performed by: INTERNAL MEDICINE

## 2019-07-04 PROCEDURE — 85730 THROMBOPLASTIN TIME PARTIAL: CPT | Performed by: INTERNAL MEDICINE

## 2019-07-04 PROCEDURE — 99233 SBSQ HOSP IP/OBS HIGH 50: CPT | Performed by: INTERNAL MEDICINE

## 2019-07-04 PROCEDURE — 82948 REAGENT STRIP/BLOOD GLUCOSE: CPT

## 2019-07-04 PROCEDURE — 93308 TTE F-UP OR LMTD: CPT

## 2019-07-04 PROCEDURE — 83735 ASSAY OF MAGNESIUM: CPT | Performed by: INTERNAL MEDICINE

## 2019-07-04 PROCEDURE — 80053 COMPREHEN METABOLIC PANEL: CPT | Performed by: INTERNAL MEDICINE

## 2019-07-04 PROCEDURE — 93321 DOPPLER ECHO F-UP/LMTD STD: CPT | Performed by: INTERNAL MEDICINE

## 2019-07-04 RX ORDER — CARVEDILOL 3.12 MG/1
3.12 TABLET ORAL 2 TIMES DAILY WITH MEALS
Status: DISCONTINUED | OUTPATIENT
Start: 2019-07-04 | End: 2019-07-08

## 2019-07-04 RX ADMIN — HYDRALAZINE HYDROCHLORIDE 5 MG: 10 TABLET, FILM COATED ORAL at 05:04

## 2019-07-04 RX ADMIN — ISOSORBIDE DINITRATE 5 MG: 5 TABLET ORAL at 08:07

## 2019-07-04 RX ADMIN — SENNOSIDES 8.6 MG: 8.6 TABLET, FILM COATED ORAL at 21:23

## 2019-07-04 RX ADMIN — ISOSORBIDE DINITRATE 5 MG: 5 TABLET ORAL at 17:12

## 2019-07-04 RX ADMIN — ISOSORBIDE DINITRATE 5 MG: 5 TABLET ORAL at 13:11

## 2019-07-04 RX ADMIN — APIXABAN 2.5 MG: 2.5 TABLET, FILM COATED ORAL at 11:54

## 2019-07-04 RX ADMIN — LORATADINE 10 MG: 10 TABLET ORAL at 08:06

## 2019-07-04 RX ADMIN — OFLOXACIN 1 DROP: 3 SOLUTION/ DROPS OPHTHALMIC at 08:06

## 2019-07-04 RX ADMIN — AMIODARONE HYDROCHLORIDE 200 MG: 200 TABLET ORAL at 13:09

## 2019-07-04 RX ADMIN — AMIODARONE HYDROCHLORIDE 200 MG: 200 TABLET ORAL at 08:06

## 2019-07-04 RX ADMIN — LEVOTHYROXINE SODIUM 137 MCG: 112 TABLET ORAL at 05:04

## 2019-07-04 RX ADMIN — APIXABAN 2.5 MG: 2.5 TABLET, FILM COATED ORAL at 17:12

## 2019-07-04 RX ADMIN — VENLAFAXINE 50 MG: 50 TABLET ORAL at 11:54

## 2019-07-04 RX ADMIN — HYDRALAZINE HYDROCHLORIDE 5 MG: 10 TABLET, FILM COATED ORAL at 21:24

## 2019-07-04 RX ADMIN — AMIODARONE HYDROCHLORIDE 200 MG: 200 TABLET ORAL at 17:12

## 2019-07-04 RX ADMIN — MELATONIN 6 MG: at 21:23

## 2019-07-04 RX ADMIN — HYDRALAZINE HYDROCHLORIDE 5 MG: 10 TABLET, FILM COATED ORAL at 14:29

## 2019-07-04 RX ADMIN — INSULIN LISPRO 2 UNITS: 100 INJECTION, SOLUTION INTRAVENOUS; SUBCUTANEOUS at 11:54

## 2019-07-04 RX ADMIN — VENLAFAXINE 50 MG: 50 TABLET ORAL at 21:25

## 2019-07-04 RX ADMIN — HEPARIN SODIUM AND DEXTROSE 8 UNITS/KG/HR: 10000; 5 INJECTION INTRAVENOUS at 05:06

## 2019-07-04 RX ADMIN — CARVEDILOL 3.12 MG: 3.12 TABLET, FILM COATED ORAL at 17:12

## 2019-07-04 RX ADMIN — CARVEDILOL 3.12 MG: 3.12 TABLET, FILM COATED ORAL at 11:54

## 2019-07-04 RX ADMIN — POLYETHYLENE GLYCOL 3350 17 G: 17 POWDER, FOR SOLUTION ORAL at 08:06

## 2019-07-04 NOTE — ASSESSMENT & PLAN NOTE
· Status post cardioversion 7/3, remain in NSR  · On amiodarone, started on Coreg and Eliquis  · Off heparin drip

## 2019-07-04 NOTE — PROGRESS NOTES
Cardiology Progress Note - Jomar Huber 66 y o  female MRN: [id number]    Unit/Bed#: [facility] 508-01 Encounter: [id number]        Subjective:   Feeling better  Fatigued  Review of Systems   Constitution: Positive for malaise/fatigue  Cardiovascular: Negative for chest pain, leg swelling and palpitations  Respiratory: Negative for shortness of breath  Objective:   Vitals: Blood pressure 149/71, pulse 69, temperature 98 1 °F (36 7 °C), temperature source Oral, resp  rate 18, height 5' 1" (1 549 m), weight 71 9 kg (158 lb 8 2 oz), SpO2 95 %  , Body mass index is 29 95 kg/m² , Orthostatic Blood Pressures      Most Recent Value   Blood Pressure  149/71 filed at 07/04/2019 0709   Patient Position - Orthostatic VS  Lying filed at 07/03/2019 8960         Systolic (16XWQ), AMX:159 , Min:117 , ACV:081     Diastolic (70ZAC), MUP:76, Min:55, Max:89      Intake/Output Summary (Last 24 hours) at 7/4/2019 0903  Last data filed at 7/4/2019 0754  Gross per 24 hour   Intake 2018 67 ml   Output 1575 ml   Net 443 67 ml     Weight (last 2 days)     Date/Time   Weight    07/03/19 0500   71 9 (158 51)    07/02/19 1706       Comment rows:    OBSERV: Rec'd pt from   Pt OOB to chair at this time  at 07/02/19 1706    07/02/19 0600   71 9 (158 51)                  Telemetry Review: NSR with brief PAF  Physical Exam   Cardiovascular: Normal rate, regular rhythm and normal heart sounds  Exam reveals no gallop and no friction rub  No murmur heard  Pulmonary/Chest: Breath sounds normal  She has no wheezes  She has no rales  Musculoskeletal: She exhibits no edema           Laboratory Results:        CBC with diff: Results from last 7 days   Lab Units 07/02/19  0600 07/01/19  0524 06/30/19  0516 06/29/19  0445 06/28/19  0500   WBC Thousand/uL 13 67* 13 33* 13 26* 10 50* 11 33*   HEMOGLOBIN g/dL 13 8 12 3 12 1 12 2 11 6   HEMATOCRIT % 39 3 34 9 34 4* 34 1* 33 0*   MCV fL 92 91 91 90 92   PLATELETS Thousands/uL 188 178 191 188 173   MCH pg 32 4 32 2 31 9 32 1 32 5   MCHC g/dL 35 1 35 2 35 2 35 8 35 2   RDW % 12 9 12 3 12 3 11 6 11 7   MPV fL 11 2 11 2 11 1 11 4 11 6   NRBC AUTO /100 WBCs  --   --   --   --  1   NRBC /100 WBC  --   --   --   --  2         CMP:  Results from last 7 days   Lab Units 07/04/19  0530 07/03/19  2155 07/03/19  0312 07/02/19  0600 07/01/19  1609 07/01/19  0901 07/01/19  0524  06/30/19  0516  06/29/19  0445  06/28/19  0500   POTASSIUM mmol/L 4 0 3 7 4 1 3 4* 3 5 3 8 3 8   < > 3 7   < > 3 9   < > 4 1   CHLORIDE mmol/L 91* 90* 87* 83* 84* 84* 85*   < > 84*   < > 86*   < > 87*   CO2 mmol/L 34* 35* 37* 36* 34* 33* 34*   < > 29   < > 30   < > 24   BUN mg/dL 100* 98* 108* 110* 97* 93* 88*   < > 85*   < > 80*   < > 64*   CREATININE mg/dL 3 85* 3 99* 4 48* 4 82* 4 94* 4 87* 4 95*   < > 4 78*   < > 4 68*   < > 3 94*   CALCIUM mg/dL 8 9 8 9 9 2 8 8 8 4 8 5 8 4   < > 8 2*   < > 7 7*   < > 7 3*   AST U/L 87*  --   --   --   --   --  345*  --  689*  --  1,989*  --  5,482*   ALT U/L 782*  --   --   --   --   --  1,723*  --  2,432*  --  3,638*  --  4,595*   ALK PHOS U/L 136*  --   --   --   --   --  146*  --  147*  --  135*  --  109   EGFR ml/min/1 73sq m 11 10 9 8 8 8 8   < > 8   < > 8   < > 10    < > = values in this interval not displayed           BMP:  Results from last 7 days   Lab Units 07/04/19  0530 07/03/19  2155 07/03/19  0312 07/02/19  0600 07/01/19  1609 07/01/19  0901 07/01/19  0524   POTASSIUM mmol/L 4 0 3 7 4 1 3 4* 3 5 3 8 3 8   CHLORIDE mmol/L 91* 90* 87* 83* 84* 84* 85*   CO2 mmol/L 34* 35* 37* 36* 34* 33* 34*   BUN mg/dL 100* 98* 108* 110* 97* 93* 88*   CREATININE mg/dL 3 85* 3 99* 4 48* 4 82* 4 94* 4 87* 4 95*   CALCIUM mg/dL 8 9 8 9 9 2 8 8 8 4 8 5 8 4       BNP:     Magnesium:   Results from last 7 days   Lab Units 07/04/19  0530 07/03/19  2155 06/30/19  0516 06/28/19  0500   MAGNESIUM mg/dL 2 0 1 9 2 5 1 7       Coags:   Results from last 7 days   Lab Units 07/04/19  0532 07/03/19  2344 07/03/19  1833 19  1121 19  0312 19  2033 19  1337 19  0600  19  0524 19  0516 19  0629 19  0500   PTT seconds 63*  --  69* 82* 109* 52* 50* 95*   < >  --   --  42*  --    INR   --  1 60*  --   --   --   --   --   --   --  2 16* 2 79* 5 34* 7 23*    < > = values in this interval not displayed  Cardiac testing:   Results for orders placed during the hospital encounter of 19   Echo complete with contrast if indicated    [facility]  [facility], [address] [facility]  [phone]    Transthoracic Echocardiogram  2D, M-mode, Doppler, and Color Doppler    Study date:  2019    Patient: Elvira Bolden  MR number: [id number]  Account number: [de-identified]  : 1940  Age: 66 years  Gender: Female  Status: Inpatient  Location: Bedside  Height: 61 in  Weight: 175 lb  BP: 92/ 44 mmHg    Indications: Hypotension    Diagnoses: I95 9 - Hypotension, unspecified    Sonographer:  PEEWEE Crowell  Primary Physician:  Gwyn Ventura MD  Referring Physician:  SOTO Izaguirre  Group:  [facility]  Interpreting Physician:  Kanika Avendano MD    SUMMARY    LEFT VENTRICLE:  Size was at the upper limits of normal   Systolic function was moderately reduced  Ejection fraction was estimated to be 42 %  There was moderate diffuse hypokinesis  There was no evidence of concentric hypertrophy  VENTRICULAR SEPTUM:  There was dyssynergic motion  RIGHT VENTRICLE:  The ventricle was mildly to moderately dilated  Systolic function was mildly to moderately reduced  Wall thickness was increased  LEFT ATRIUM:  The atrium was moderately dilated  RIGHT ATRIUM:  The atrium was moderately dilated  MITRAL VALVE:  There was mild annular calcification  There was moderate regurgitation  TRICUSPID VALVE:  There was moderate regurgitation  PULMONIC VALVE:  There was trace regurgitation      HISTORY: PRIOR HISTORY: DM2, HTN, HLD, Afib, CHF    PROCEDURE: The procedure was performed at the bedside  This was an urgent study  The transthoracic approach was used  The study included complete 2D imaging, M-mode, complete spectral Doppler, and color Doppler  The heart rate was 117 bpm,  at the start of the study  Images were obtained from the parasternal, apical, subcostal, and suprasternal notch acoustic windows  Echocardiographic views were limited due to restricted patient mobility  This was a technically difficult  study  LEFT VENTRICLE: Size was at the upper limits of normal  Systolic function was moderately reduced  Ejection fraction was estimated to be 42 %  There was moderate diffuse hypokinesis  Wall thickness was normal  There was no evidence of  concentric hypertrophy  DOPPLER: The study was not technically sufficient to allow evaluation of LV diastolic function  VENTRICULAR SEPTUM: There was dyssynergic motion  RIGHT VENTRICLE: The ventricle was mildly to moderately dilated  Systolic function was mildly to moderately reduced  Wall thickness was increased  LEFT ATRIUM: The atrium was moderately dilated  RIGHT ATRIUM: The atrium was moderately dilated  MITRAL VALVE: There was mild annular calcification  Valve structure was normal  There was normal leaflet separation  DOPPLER: The transmitral velocity was within the normal range  There was no evidence for stenosis  There was moderate  regurgitation  AORTIC VALVE: The valve was trileaflet  Leaflets exhibited normal thickness and normal cuspal separation  DOPPLER: Transaortic velocity was within the normal range  There was no evidence for stenosis  There was no regurgitation  TRICUSPID VALVE: The valve structure was normal  There was normal leaflet separation  DOPPLER: The transtricuspid velocity was within the normal range  There was no evidence for stenosis  There was moderate regurgitation   Pulmonary artery  systolic pressure was mildly increased  PULMONIC VALVE: Leaflets exhibited normal thickness, no calcification, and normal cuspal separation  DOPPLER: The transpulmonic velocity was within the normal range  There was trace regurgitation  PERICARDIUM: There was no pericardial effusion  The pericardium was normal in appearance  AORTA: The root exhibited normal size  SYSTEM MEASUREMENT TABLES    2D  LVEDV MOD A4C: 81 79 ml  LVEF MOD A4C: 64 57 %  LVESV MOD A4C: 28 98 ml  LVLd A4C: 7 08 cm  LVLs A4C: 5 89 cm  SV MOD A4C: 52 81 ml    CW  TR Vmax: 2 65 m/s  TR maxP 14 mmHg    [facility]    Prepared and electronically signed by    Anu Montes MD  Signed 2019 07:10:27       Results for orders placed during the hospital encounter of 19   EBEN    Narrative [address]  [address] [facility] [facility]  [phone]    Transesophageal Echocardiogram  2D, Doppler, and Color Doppler    Study date:  2019    Patient: Javier Reno  MR number: [id number]  Account number: [de-identified]  : 1940  Age: 66 years  Gender: Female  Status: Inpatient  Location: Echo lab  Height: 61 in  Weight: 181 lb  BP: 125/ 83 mmHg    Indications: Atrial-Fib; Cardioversion    Diagnoses: 427 31 - ATRIAL FIBRILLATION    Sonographer:  PEEWEE Cabezas  Interpreting Physician:  Pj Lugo MD  Primary Physician:  Gutierrez Chakraborty MD  Referring Physician:  SOTO Lieberman  Group:  [facility]  RN:  Stanford Barton RN    SUMMARY    LEFT VENTRICLE:  The ventricle was dilated  Systolic function was markedly reduced  Ejection fraction was estimated to be 25 %  There was severe diffuse hypokinesis  RIGHT VENTRICLE:  The ventricle was dilated  Systolic function was mildly to moderately reduced  LEFT ATRIUM:  The atrium was moderately dilated  No thrombus was identified      LEFT ATRIAL APPENDAGE:  The appendage was dilated  No thrombus was identified  There was mild intermittent spontaneous echo contrast ("smoke") in the appendage  ATRIAL SEPTUM:  No defect or patent foramen ovale was identified  RIGHT ATRIUM:  The atrium was dilated  MITRAL VALVE:  There was moderate regurgitation  TRICUSPID VALVE:  There was moderate regurgitation  PERICARDIUM:  A small, free-flowing pericardial effusion was identified  There was no evidence of hemodynamic compromise  HISTORY: PRIOR HISTORY: HTN; HLD; DM2; A-Fib; Acute Congestive Heart Failure    PROCEDURE: The procedure was performed in the echo lab  This was a routine study  The risks and alternatives of the procedure were explained to the patient and informed consent was obtained  The transesophageal approach was used  The study  included complete 2D imaging, complete spectral Doppler, and color Doppler  The heart rate was 125 bpm, at the start of the study  An adult omniplane probe was inserted by the attending cardiologist  Intubated with ease  Two intubation  attempt(s)  There was no blood detected on the probe  Image quality was good  There were no complications during the procedure  MEDICATIONS: Anesthesia administered by anesthesia team     LEFT VENTRICLE: The ventricle was dilated  Systolic function was markedly reduced  Ejection fraction was estimated to be 25 %  There was severe diffuse hypokinesis  Wall thickness was normal  No evidence of apical thrombus  RIGHT VENTRICLE: The ventricle was dilated  Systolic function was mildly to moderately reduced  Wall thickness was normal     LEFT ATRIUM: The atrium was moderately dilated  No thrombus was identified  APPENDAGE: The appendage was dilated  No thrombus was identified  There was mild intermittent spontaneous echo contrast ("smoke") in the appendage  ATRIAL SEPTUM: No defect or patent foramen ovale was identified  RIGHT ATRIUM: The atrium was dilated      MITRAL VALVE: Valve structure was normal  There was normal leaflet separation  DOPPLER: The transmitral velocity was within the normal range  There was no evidence for stenosis  There was moderate regurgitation  AORTIC VALVE: The valve was trileaflet  Leaflets exhibited normal thickness, normal cuspal separation, and sclerosis  DOPPLER: Transaortic velocity was within the normal range  There was no evidence for stenosis  There was no significant  regurgitation  TRICUSPID VALVE: The valve structure was normal  There was normal leaflet separation  DOPPLER: There was moderate regurgitation  PULMONIC VALVE: Leaflets exhibited normal thickness, no calcification, and normal cuspal separation  DOPPLER: The transpulmonic velocity was within the normal range  There was no significant regurgitation  PERICARDIUM: A small, free-flowing pericardial effusion was identified  There was no evidence of hemodynamic compromise  The pericardium was normal in appearance  AORTA: The root exhibited normal size  The ascending aorta was normal in size  SYSTEMIC VEINS: IVC: The inferior vena cava was normal in size      MEASUREMENT TABLES    DOPPLER MEASUREMENTS  Left atrium   (Reference normals)  ROHAN peak yaneth   20 cm/s   (--)    [facility]    Prepared and electronically signed by    Airam Lopez MD  Signed 21-Jun-2019 15:50:13           Meds/Allergies     Current Facility-Administered Medications:  acetaminophen 650 mg Oral Q4H PRN Nasreen Aranda Jr, PA-C    albuterol 2 puff Inhalation Q4H PRN Nasreen Aranda Jr, PA-C    amiodarone 200 mg Oral TID With Meals Nasreen Mendoza PA-C    bisacodyl 10 mg Rectal Daily PRN Nasreen Aranda Jr, PA-C    heparin (porcine) 3-20 Units/kg/hr (Order-Specific) Intravenous Titrated Nasreen Aranda Jr, PA-C Last Rate: 8 Units/kg/hr (07/04/19 1189)   hydrALAZINE 5 mg Oral Q8H [address] SOTO Junior    insulin lispro 1-5 Units Subcutaneous HS Nasreen Harrison Je Moran PA-C    insulin lispro 1-6 Units Subcutaneous TID AC Johan Aranda Jr, PA-C    isosorbide dinitrate 5 mg Oral TID after meals KristaSOTO Olivarez    levothyroxine 137 mcg Oral Daily Dixonrosetta GuptaSTEPHEN gee    loratadine 10 mg Oral Daily Dixonrosetta GuptaSTEPHEN gee    melatonin 6 mg Oral HS Dixon Mayda Aranda Jr, PA-C    ondansetron 4 mg Intravenous Q6H PRN [facility] , STPEHEN    phenol 2 spray Mouth/Throat Q2H PRN Dixon Aranda Jr, PA-C    polyethylene glycol 17 g Oral Daily Dixonrosetta GuptaSTEPHEN gee    senna 1 tablet Oral HS Dixon Aranda Jr, PA-C    sodium chloride 2 spray Each Nare PRN 17 [facility] , STEPHEN    venlafaxine 50 mg Oral Q12H Dixon Aranda Jr, PA-C        heparin (porcine) 3-20 Units/kg/hr (Order-Specific) Last Rate: 8 Units/kg/hr (07/04/19 9969)     Medications Prior to Admission   Medication    albuterol (VENTOLIN HFA) 90 mcg/act inhaler    Ascorbic Acid (VITAMIN C) 100 MG tablet    cholecalciferol (VITAMIN D3) 1,000 units tablet    cyanocobalamin 100 MCG tablet    fexofenadine (ALLEGRA) 30 MG tablet    Garlic 10 MG CAPS    levothyroxine 137 mcg tablet    losartan-hydrochlorothiazide (HYZAAR) 100-12 5 MG per tablet    magnesium 30 MG tablet    Misc Natural Products (TURMERIC CURCUMIN) CAPS    multivitamin (THERAGRAN) TABS    Omega-3 Fatty Acids (FISH OIL) 1,000 mg    pyridoxine (B-6) 100 MG tablet    simvastatin (ZOCOR) 20 mg tablet    SUPER B COMPLEX/C CAPS    venlafaxine (EFFEXOR) 75 mg tablet    vitamin E, tocopherol, 1,000 units capsule       Assessment:  Principal Problem:    Acute CHF (congestive heart failure) (HCC)  Active Problems:    Hypertension    Hypothyroidism    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

## 2019-07-04 NOTE — ASSESSMENT & PLAN NOTE
Wt Readings from Last 3 Encounters:   07/04/19 72 7 kg (160 lb 3 2 oz)   06/18/19 81 kg (178 lb 9 6 oz)   03/20/19 80 1 kg (176 lb 9 6 oz)     Cardiogenic shock, resolved  Initial EF 25 30% improving to 42%  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  Negative fluid balance, diuretics on hold due to WANG  On hydralazine and Imdur  Continue Fluid restriction, low sodium, renal diet     Cardiology is following

## 2019-07-04 NOTE — NURSING NOTE
Notified [place] Held,A-C about 13 beat run of SVT BP stable, pt asymptomatic, potassium level 3 7  PO potassium ordered, will continue to monitor

## 2019-07-04 NOTE — ASSESSMENT & PLAN NOTE
Lab Results   Component Value Date    HGBA1C 5 9 (H) 03/18/2019       Recent Labs     07/03/19  1729 07/03/19  2053 07/04/19  0616 07/04/19  1034   POCGLU 139 239* 104 198*       Blood Sugar Average: Last 72 hrs:  (P) 177 [id number]   · Diet controlled at home  · Glucose is acceptable  · Continue insulin sliding scale

## 2019-07-04 NOTE — PROGRESS NOTES
NEPHROLOGY PROGRESS NOTE   Molly Santos 66 y o  female MRN: [id number]  Unit/Bed#: [facility] 508-01 Encounter: [id number]      ASSESSMENT & PLAN:  1  Acute kidney injury secondary to hypotensive/ischemic ATN plus component of cardiorenal syndrome in the setting of cardiogenic shock with normal baseline creatinine around 0 6-0 9  Renal function slowly improving, diuretics on hold  Avoid hypotension  Monitor ins and outs, follow serial labs  Serum creatinine down to 3 85, serum creatinine peak at 4 95 on 07/01     2  Cardiogenic shock, acute on chronic systolic biventricular heart failure cardiomyopathy with new onset EF 25-30%, new onset AFib, status post cardioversion  Further management per cardiology team     3  Hyponatremia in the setting of volume overload, cardiogenic shock, acute renal failure inability to excrete free water  Serum sodium stable in the lower side at 134 today, continue fluid restriction     4  Severe lactic acidosis, resolved  5  Transaminitis secondary to shock liver in the setting of cardiogenic shock, LFTs trending down  SUBJECTIVE:  Patient seen and examined feeling tired, denies chest pain or shortness of Breath      OBJECTIVE:  Current Weight: Weight - Scale: 72 7 kg (160 lb 3 2 oz)  Vitals:    07/04/19 0709   BP: 149/71   Pulse: 69   Resp: 18   Temp: 98 1 °F (36 7 °C)   SpO2: 95%       Intake/Output Summary (Last 24 hours) at 7/4/2019 1031  Last data filed at 7/4/2019 5805  Gross per 24 hour   Intake 2138 67 ml   Output 1375 ml   Net 763 67 ml     General: conscious, cooperative, in not acute distress  Eyes: conjunctivae pink, anicteric sclerae  ENT: lips and mucous membranes moist  Neck: supple, no JVD  Chest: clear breath sounds bilateral, no crackles, ronchus or wheezings  CVS: distinct S1 & S2, normal rate, regular rhythm  Abdomen: soft, non-tender, non-distended, normoactive bowel sounds  Extremities: no edema of both legs  Skin: no rash  Neuro: awake, alert, oriented      Medications:    Current Facility-Administered Medications:     acetaminophen (TYLENOL) tablet 650 mg, 650 mg, Oral, Q4H PRN, James Aranda Jr, PA-C, 650 mg at 06/28/19 1210    albuterol (PROVENTIL HFA,VENTOLIN HFA) inhaler 2 puff, 2 puff, Inhalation, Q4H PRN, Jarrett Rivera PA-C    amiodarone tablet 200 mg, 200 mg, Oral, TID With Meals, Jarrett Rivera PA-C, 200 mg at 07/04/19 8354    apixaban (ELIQUIS) tablet 2 5 mg, 2 5 mg, Oral, BID, Kalani De León MD    bisacodyl (DULCOLAX) rectal suppository 10 mg, 10 mg, Rectal, Daily PRN, James Hayes PA-C    carvedilol (COREG) tablet 3 125 mg, 3 125 mg, Oral, BID With Meals, Kalani De León MD    hydrALAZINE (APRESOLINE) tablet 5 mg, 5 mg, Oral, Q8H DAVE, SOTO Junior, 5 mg at 07/04/19 0504    insulin lispro (HumaLOG) 100 units/mL subcutaneous injection 1-5 Units, 1-5 Units, Subcutaneous, HS, James Aranda Jr, PA-C, 2 Units at 07/03/19 2144    insulin lispro (HumaLOG) 100 units/mL subcutaneous injection 1-6 Units, 1-6 Units, Subcutaneous, TID AC, Stopped at 07/03/19 1145 **AND** Fingerstick Glucose (POCT), , , TID AC, Jarrett Rivera PA-C    isosorbide dinitrate (ISORDIL) tablet 5 mg, 5 mg, Oral, TID after meals, SOTO Junior, 5 mg at 07/04/19 8411    levothyroxine tablet 137 mcg, 137 mcg, Oral, Daily, James Aranda Jr, PA-C, 137 mcg at 07/04/19 0504    loratadine (CLARITIN) tablet 10 mg, 10 mg, Oral, Daily, James Aranda Jr, PA-C, 10 mg at 07/04/19 7963    melatonin tablet 6 mg, 6 mg, Oral, HS, James Aranda Jr, PA-C, 6 mg at 07/03/19 2144    ondansetron [facility]) injection 4 mg, 4 mg, Intravenous, Q6H PRN, James Aranda Jr, PA-C, 4 mg at 06/27/19 2124    phenol (CHLORASEPTIC) 1 4 % mucosal liquid 2 spray, 2 spray, Mouth/Throat, Q2H PRN, Jarrett Rivera PA-C    polyethylene glycol (MIRALAX) packet 17 g, 17 g, Oral, Daily, James Aranda Jr, PA-C, 17 g at 07/04/19 0806    senna (SENOKOT) tablet 8 6 mg, 1 tablet, Oral, HS, Luz Aranda Jr, PA-C, 8 6 mg at 07/03/19 2144    sodium chloride (OCEAN) 0 65 % nasal spray 2 spray, 2 spray, Each Nare, PRN, Kelsie Santiago PA-C, 2 spray at 06/30/19 1218    venlafaxine [facility]) tablet 50 mg, 50 mg, Oral, Q12H, Luz Aranda Jr, PA-C, 50 mg at 07/03/19 2146    Invasive Devices:   Urethral Catheter Temperature probe 16 Fr   (Active)   Amt returned on insertion(mL) 10 mL 6/27/2019 12:00 AM   Reasons to continue Urinary Catheter  Accurate I&O assessment in critically ill patients (48 hr  max) 6/30/2019 10:44 AM   Goal for Removal No longer needed- Will place order to discontinue 6/30/2019 10:44 AM   Site Assessment Clean;Skin intact 7/1/2019  8:00 AM   Collection Container Standard drainage bag 7/1/2019  8:00 AM   Securement Method Securing device (Describe) 7/1/2019  8:00 AM   Output (mL) 500 mL 7/1/2019  9:57 AM       Lab Results:   Results from last 7 days   Lab Units 07/04/19  0530 07/03/19  2155 07/03/19  0312 07/02/19  0600  07/01/19  0524  06/30/19  0516  06/28/19  0500   WBC Thousand/uL  --   --   --  13 67*  --  13 33*  --  13 26*   < > 11 33*   HEMOGLOBIN g/dL  --   --   --  13 8  --  12 3  --  12 1   < > 11 6   HEMATOCRIT %  --   --   --  39 3  --  34 9  --  34 4*   < > 33 0*   PLATELETS Thousands/uL  --   --   --  188  --  178  --  191   < > 173   SODIUM mmol/L 134* 133* 135* 130*   < > 127*   < > 123*   < > 123*   POTASSIUM mmol/L 4 0 3 7 4 1 3 4*   < > 3 8   < > 3 7   < > 4 1   CHLORIDE mmol/L 91* 90* 87* 83*   < > 85*   < > 84*   < > 87*   CO2 mmol/L 34* 35* 37* 36*   < > 34*   < > 29   < > 24   BUN mg/dL 100* 98* 108* 110*   < > 88*   < > 85*   < > 64*   CREATININE mg/dL 3 85* 3 99* 4 48* 4 82*   < > 4 95*   < > 4 78*   < > 3 94*   CALCIUM mg/dL 8 9 8 9 9 2 8 8   < > 8 4   < > 8 2*   < > 7 3*   MAGNESIUM mg/dL 2 0 1 9  --   --   --   --   --  2 5  --  1 7   PHOSPHORUS mg/dL  --   --   --   --   -- --   --   --   --  4 2*   ALK PHOS U/L 136*  --   --   --   --  146*  --  147*   < > 109   ALT U/L 782*  --   --   --   --  1,723*  --  2,432*   < > 4,595*   AST U/L 87*  --   --   --   --  345*  --  689*   < > 5,482*    < > = values in this interval not displayed  Previous work up:  Please see previous notes      Portions of the record may have been created with voice recognition software  Occasional wrong word or "sound a like" substitutions may have occurred due to the inherent limitations of voice recognition software  Read the chart carefully and recognize, using context, where substitutions have occurred  If you have any questions, please contact the dictating provider

## 2019-07-04 NOTE — NURSING NOTE
Pt old a-Line site Rt radial oozing blood, Estrella Held PA-C notified  Hep gt on hold for 30 mins until bleeding was controlled, +radil/ulnar/abreu arch pulses by doppler    Will continue to monitor

## 2019-07-04 NOTE — PROGRESS NOTES
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  Negative fluid balance, diuretics on hold due to WANG  On hydralazine and Imdur  Continue Fluid restriction, low sodium, renal diet  Cardiology is following          Atrial fibrillation with RVR ([facility] )  Assessment & Plan  · Status post cardioversion 7/3, remain in NSR  · On amiodarone, started on Coreg and Eliquis  · Off heparin drip      Acute kidney injury ([facility] )  Assessment & Plan  · Secondary to ischemic ATN/cardiogenic syndrome Saul Osiel  · Improving  · Diuretics on hold  · Nephrology is following      Shock liver  Assessment & Plan  · Transaminitis and coag study are slowly improving  · Continue to monitor      Type 2 diabetes mellitus with diabetic cataract [facility])  Assessment & Plan  Lab Results   Component Value Date    HGBA1C 5 9 (H) 03/18/2019       Recent Labs     07/03/19  1729 07/03/19  2053 07/04/19  0616 07/04/19  1034   POCGLU 139 239* 104 198*       Blood Sugar Average: Last 72 hrs:  (P) 211 [id number]   · Diet controlled at home  · Glucose is acceptable  · Continue insulin sliding scale    Hypothyroidism  Assessment & Plan  · Continue Synthroid therapy    Hypertension  Assessment & Plan  · Coreg started today  · Continue to monitor        Short-term rehab when stable  Discontinue central line    VTE Pharmacologic Prophylaxis:   Pharmacologic: Apixaban (Eliquis)  Mechanical VTE Prophylaxis in Place: Yes    Patient Centered Rounds: I have performed bedside rounds with nursing staff today  Discussions with Specialists or Other Care Team Provider:     Education and Discussions with Family / Patient:  Patient    Time Spent for Care: 30 minutes  More than 50% of total time spent on counseling and coordination of care as described above  Current Length of Stay: 16 day(s)    Current Patient Status: Inpatient   Certification Statement: The patient will continue to require additional inpatient hospital stay due to CHF    Discharge Plan / Estimated Discharge Date:  Short-term rehab when stable    Code Status: Level 1 - Full Code      Subjective:   Patient seen and examined  Comfortable in bed  Complaining of weakness and malaise  No nausea vomiting or diarrhea    Objective:     Vitals:   Temp (24hrs), Av 8 °F (36 6 °C), Min:97 4 °F (36 3 °C), Max:98 3 °F (36 8 °C)    Temp:  [97 4 °F (36 3 °C)-98 3 °F (36 8 °C)] 97 7 °F (36 5 °C)  HR:  [] 65  Resp:  [16-18] 18  BP: (117-166)/(55-78) 148/67  SpO2:  [92 %-98 %] 95 %  Body mass index is 30 27 kg/m²  Input and Output Summary (last 24 hours): Intake/Output Summary (Last 24 hours) at 2019 1133  Last data filed at 2019 0943  Gross per 24 hour   Intake 2138 67 ml   Output 1375 ml   Net 763 67 ml       Physical Exam:     Physical Exam  Patient is awake alert in no acute distress  Ill-appearing  Lung with decreased breath sounds bilateral  Heart positive S1-S2 no murmur  Abdomen soft nontender positive bowel sounds  Lower extremities no edema      Additional Data:     Labs:    Results from last 7 days   Lab Units 19  0600  19  0500   WBC Thousand/uL 13 67*   < > 11 33*   HEMOGLOBIN g/dL 13 8   < > 11 6   HEMATOCRIT % 39 3   < > 33 0*   PLATELETS Thousands/uL 188   < > 173   LYMPHO PCT %  --   --  7*   MONO PCT %  --   --  4   EOS PCT %  --   --  0    < > = values in this interval not displayed       Results from last 7 days   Lab Units 19  0530 POTASSIUM mmol/L 4 0   CHLORIDE mmol/L 91*   CO2 mmol/L 34*   BUN mg/dL 100*   CREATININE mg/dL 3 85*   CALCIUM mg/dL 8 9   ALK PHOS U/L 136*   ALT U/L 782*   AST U/L 87*     Results from last 7 days   Lab Units 07/03/19  2344   INR  1 60*       * I Have Reviewed All Lab Data Listed Above  * Additional Pertinent Lab Tests Reviewed: Emi 66 Admission Reviewed    Imaging:    Imaging Reports Reviewed Today Include:   Imaging Personally Reviewed by Myself Includes:     Recent Cultures (last 7 days):           Last 24 Hours Medication List:     Current Facility-Administered Medications:  acetaminophen 650 mg Oral Q4H PRN Rajendra Aranda Jr, PA-C   albuterol 2 puff Inhalation Q4H PRN Rajendra Aranda Jr, PA-C   amiodarone 200 mg Oral TID With Meals Rebecca Yeboah PA-C   apixaban 2 5 mg Oral BID Jose Gonzalez MD   bisacodyl 10 mg Rectal Daily PRN Rajendra Aranda Jr, PA-C   carvedilol 3 125 mg Oral BID With Meals Jose Gonzalez MD   hydrALAZINE 5 mg Oral Q8H [address] SOTO Junior   insulin lispro 1-5 Units Subcutaneous HS Rajendra Aranda Jr, PA-C   insulin lispro 1-6 Units Subcutaneous TID AC Rajendra Aranda Jr, PA-C   isosorbide dinitrate 5 mg Oral TID after meals SOTO Junior   levothyroxine 137 mcg Oral Daily Rajendra Aranda Jr, PA-C   loratadine 10 mg Oral Daily Rajendra Aranda Jr, PA-C   melatonin 6 mg Oral HS Rajendra Aranda Jr, PA-C   ondansetron 4 mg Intravenous Q6H PRN Rajendra Aranda Jr, PA-C   phenol 2 spray Mouth/Throat Q2H PRN Rajendra Aranda Jr, PA-C   polyethylene glycol 17 g Oral Daily Rajendra Barragan PA-C   senna 1 tablet Oral HS Rajendra Aranda Jr, PA-C   sodium chloride 2 spray Each Nare PRN Rajendra Hardenock Jr , PA-C   venlafaxine 50 mg Oral Q12H Rebecca Yeboah PA-C        Today, Patient Was Seen By: Santi Escamilla DO    ** Please Note: This note has been constructed using a voice recognition system  **

## 2019-07-04 NOTE — ASSESSMENT & PLAN NOTE
· Secondary to ischemic ATN/cardiogenic syndrome Jacob Ruvalcaba  · Improving  · Diuretics on hold  · Nephrology is following

## 2019-07-04 NOTE — PHYSICAL THERAPY NOTE
Physical Therapy Cancellation Note    PT ORDERS RECEIVED, CHART REVIEWED  ATTEMPTED TO COMPLETE EVALUATION THIS AFTERNOON, PATIENT REFUSING TO TRANSFER OUT OF BED  PROVIDED PATIENT EDUCATION REGARDING IMPORTANCE OF IMPROVING ACTIVITY TOLERANCE  PATIENT CONTINUED TO REFUSE DESPITE ENCOURAGEMENT FROM NIECE WHO WAS PRESENT  PATIENT IN AGREEMENT TO PARTICIPATE IN EVALUATION TOMORROW      Zenamiranda Isaac PT, DPT

## 2019-07-05 LAB
ANION GAP SERPL CALCULATED.3IONS-SCNC: 7 MMOL/L (ref 4–13)
BUN SERPL-MCNC: 99 MG/DL (ref 5–25)
CALCIUM SERPL-MCNC: 9.2 MG/DL (ref 8.3–10.1)
CHLORIDE SERPL-SCNC: 92 MMOL/L (ref 100–108)
CO2 SERPL-SCNC: 36 MMOL/L (ref 21–32)
CREAT SERPL-MCNC: 3.33 MG/DL (ref 0.6–1.3)
ERYTHROCYTE [DISTWIDTH] IN BLOOD BY AUTOMATED COUNT: 14 % (ref 11.6–15.1)
GFR SERPL CREATININE-BSD FRML MDRD: 13 ML/MIN/1.73SQ M
GLUCOSE SERPL-MCNC: 106 MG/DL (ref 65–140)
GLUCOSE SERPL-MCNC: 113 MG/DL (ref 65–140)
GLUCOSE SERPL-MCNC: 150 MG/DL (ref 65–140)
GLUCOSE SERPL-MCNC: 178 MG/DL (ref 65–140)
GLUCOSE SERPL-MCNC: 228 MG/DL (ref 65–140)
HCT VFR BLD AUTO: 44.7 % (ref 34.8–46.1)
HGB BLD-MCNC: 15.2 G/DL (ref 11.5–15.4)
MCH RBC QN AUTO: 32.8 PG (ref 26.8–34.3)
MCHC RBC AUTO-ENTMCNC: 34 G/DL (ref 31.4–37.4)
MCV RBC AUTO: 97 FL (ref 82–98)
PLATELET # BLD AUTO: 183 THOUSANDS/UL (ref 149–390)
PMV BLD AUTO: 10.6 FL (ref 8.9–12.7)
POTASSIUM SERPL-SCNC: 3.8 MMOL/L (ref 3.5–5.3)
RBC # BLD AUTO: 4.63 MILLION/UL (ref 3.81–5.12)
SODIUM SERPL-SCNC: 135 MMOL/L (ref 136–145)
WBC # BLD AUTO: 12.34 THOUSAND/UL (ref 4.31–10.16)

## 2019-07-05 PROCEDURE — 97535 SELF CARE MNGMENT TRAINING: CPT | Performed by: STUDENT IN AN ORGANIZED HEALTH CARE EDUCATION/TRAINING PROGRAM

## 2019-07-05 PROCEDURE — 80048 BASIC METABOLIC PNL TOTAL CA: CPT | Performed by: INTERNAL MEDICINE

## 2019-07-05 PROCEDURE — G0515 COGNITIVE SKILLS DEVELOPMENT: HCPCS | Performed by: STUDENT IN AN ORGANIZED HEALTH CARE EDUCATION/TRAINING PROGRAM

## 2019-07-05 PROCEDURE — 99233 SBSQ HOSP IP/OBS HIGH 50: CPT | Performed by: PHYSICIAN ASSISTANT

## 2019-07-05 PROCEDURE — 99232 SBSQ HOSP IP/OBS MODERATE 35: CPT | Performed by: INTERNAL MEDICINE

## 2019-07-05 PROCEDURE — 82948 REAGENT STRIP/BLOOD GLUCOSE: CPT

## 2019-07-05 PROCEDURE — 85027 COMPLETE CBC AUTOMATED: CPT | Performed by: INTERNAL MEDICINE

## 2019-07-05 PROCEDURE — G8978 MOBILITY CURRENT STATUS: HCPCS

## 2019-07-05 PROCEDURE — G8979 MOBILITY GOAL STATUS: HCPCS

## 2019-07-05 PROCEDURE — 97163 PT EVAL HIGH COMPLEX 45 MIN: CPT

## 2019-07-05 RX ORDER — HYDRALAZINE HYDROCHLORIDE 10 MG/1
10 TABLET, FILM COATED ORAL EVERY 8 HOURS SCHEDULED
Status: DISCONTINUED | OUTPATIENT
Start: 2019-07-05 | End: 2019-07-07

## 2019-07-05 RX ORDER — ISOSORBIDE DINITRATE 10 MG/1
10 TABLET ORAL
Status: DISCONTINUED | OUTPATIENT
Start: 2019-07-05 | End: 2019-07-07

## 2019-07-05 RX ADMIN — ISOSORBIDE DINITRATE 5 MG: 5 TABLET ORAL at 07:51

## 2019-07-05 RX ADMIN — POLYETHYLENE GLYCOL 3350 17 G: 17 POWDER, FOR SOLUTION ORAL at 09:54

## 2019-07-05 RX ADMIN — APIXABAN 2.5 MG: 2.5 TABLET, FILM COATED ORAL at 17:02

## 2019-07-05 RX ADMIN — ISOSORBIDE DINITRATE 10 MG: 10 TABLET ORAL at 11:55

## 2019-07-05 RX ADMIN — INSULIN LISPRO 1 UNITS: 100 INJECTION, SOLUTION INTRAVENOUS; SUBCUTANEOUS at 21:59

## 2019-07-05 RX ADMIN — AMIODARONE HYDROCHLORIDE 200 MG: 200 TABLET ORAL at 07:50

## 2019-07-05 RX ADMIN — AMIODARONE HYDROCHLORIDE 200 MG: 200 TABLET ORAL at 16:47

## 2019-07-05 RX ADMIN — ISOSORBIDE DINITRATE 10 MG: 10 TABLET ORAL at 16:48

## 2019-07-05 RX ADMIN — HYDRALAZINE HYDROCHLORIDE 10 MG: 10 TABLET, FILM COATED ORAL at 14:32

## 2019-07-05 RX ADMIN — HYDRALAZINE HYDROCHLORIDE 5 MG: 10 TABLET, FILM COATED ORAL at 05:10

## 2019-07-05 RX ADMIN — MELATONIN 6 MG: at 21:55

## 2019-07-05 RX ADMIN — ALBUTEROL SULFATE 2 PUFF: 90 AEROSOL, METERED RESPIRATORY (INHALATION) at 08:02

## 2019-07-05 RX ADMIN — INSULIN LISPRO 1 UNITS: 100 INJECTION, SOLUTION INTRAVENOUS; SUBCUTANEOUS at 16:47

## 2019-07-05 RX ADMIN — AMIODARONE HYDROCHLORIDE 200 MG: 200 TABLET ORAL at 11:53

## 2019-07-05 RX ADMIN — INSULIN LISPRO 2 UNITS: 100 INJECTION, SOLUTION INTRAVENOUS; SUBCUTANEOUS at 11:53

## 2019-07-05 RX ADMIN — APIXABAN 2.5 MG: 2.5 TABLET, FILM COATED ORAL at 09:54

## 2019-07-05 RX ADMIN — LEVOTHYROXINE SODIUM 137 MCG: 112 TABLET ORAL at 05:10

## 2019-07-05 RX ADMIN — SENNOSIDES 8.6 MG: 8.6 TABLET, FILM COATED ORAL at 21:55

## 2019-07-05 RX ADMIN — VENLAFAXINE 50 MG: 50 TABLET ORAL at 21:55

## 2019-07-05 RX ADMIN — CARVEDILOL 3.12 MG: 3.12 TABLET, FILM COATED ORAL at 07:50

## 2019-07-05 RX ADMIN — CARVEDILOL 3.12 MG: 3.12 TABLET, FILM COATED ORAL at 16:46

## 2019-07-05 RX ADMIN — HYDRALAZINE HYDROCHLORIDE 10 MG: 10 TABLET, FILM COATED ORAL at 21:55

## 2019-07-05 RX ADMIN — VENLAFAXINE 50 MG: 50 TABLET ORAL at 09:55

## 2019-07-05 RX ADMIN — LORATADINE 10 MG: 10 TABLET ORAL at 09:54

## 2019-07-05 NOTE — PLAN OF CARE
Problem: OCCUPATIONAL THERAPY ADULT  Goal: Performs self-care activities at highest level of function for planned discharge setting  See evaluation for individualized goals  Description  Treatment Interventions: ADL retraining, Functional transfer training, UE strengthening/ROM, Endurance training, Cognitive reorientation, Patient/family training, Equipment evaluation/education, Compensatory technique education, Activityengagement, Energy conservation          See flowsheet documentation for full assessment, interventions and recommendations  Outcome: Progressing  Note:   Limitation: Decreased ADL status, Decreased UE strength, Decreased Safe judgement during ADL, Decreased endurance, Decreased high-level ADLs, Decreased fine motor control  Prognosis: Good  Assessment: Pt participates in OT session with focus on cognitive reorientation, LB dressing, transfers, and activity tolerance to increase I for d/c  Pt scored 24/30 on MOCA  Please see below for details  Pt mod A LB dressing to don/doff pants while seated/standing from bedside chair  Pt requires rest breaks t/o session 2* decreased endurance  Pt CGA sit to stand with RW support and requires vc for safe hand placement during transfers  Pt exhibits bilateral release of RW during stance to pull up pants, but requires A to pull up all the way  Pt setup on bedpan while seated in chair with chair alarm on as pt states she may have a BM  Aide notified and session ends  Pt will continue to benefit from activity tolerance, adls, and transfers       OT Discharge Recommendation: Short Term Rehab  OT - OK to Discharge: (when medically cleared)

## 2019-07-05 NOTE — PHYSICAL THERAPY NOTE
Physical Therapy Evaluation    Patient's Name: Wesley Alvares    Admitting Diagnosis  Orthopnea [R06 01]  Shortness of breath [R06 02]  CHF (congestive heart failure) ([address] ) [I50 9]  New onset atrial fibrillation ([address] ) [I48 91]  Atrial fibrillation with rapid ventricular response ([address] ) [I48 91]  Irregular heart rhythm [I49 9]  Bilateral lower extremity edema [R60 0]    Problem List  Patient Active Problem List   Diagnosis    Depression with anxiety    Hyperlipidemia    Hypertension    Hypothyroidism    Obesity    Osteoarthritis    Type 2 diabetes mellitus with [facility] cataract [facility]    Atrial fibrillation with RVR [facility]    Acute CHF (congestive heart failure) ([facility] )    Acute kidney injury ([facility] )    Hyponatremia    Shock liver    Acute bacterial conjunctivitis of both eyes    Acute on chronic combined systolic and diastolic CHF (congestive heart failure) ([facility] )       Past Medical History  Past Medical History:   Diagnosis Date    Cardiogenic shock ([facility] ) 6/26/2019    Eczema     Photosensitivity     abnormal skin sensitivity to sunlight    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HCC)     staging       Past Surgical History  Past Surgical History:   Procedure Laterality Date    HEMORRHOID SURGERY      OOPHORECTOMY      unilateral    TOTAL ABDOMINAL HYSTERECTOMY            07/05/19 0930   Note Type   Note type Eval only   Pain Assessment   Pain Assessment 0-10   Pain Score 3   Pain Type Acute pain;Chronic pain   Pain Location Generalized   Patient's Stated Pain Goal No pain   Hospital Pain Intervention(s) Ambulation/increased activity   Response to Interventions unchanged   Home Living   Type of Home House   Additional Comments Resides alone in 1 story home  Normally indep self care, ambulates w/ out device       Prior Function   Level of [place] Independent with ADLs and functional mobility   Falls in the last 6 months 0   Restrictions/Precautions   Weight Bearing Precautions Per Order No   Other Precautions Cognitive; Chair Alarm; Bed Alarm;Multiple lines; Fall Risk;Pain   General   Family/Caregiver Present No   Cognition   Overall Cognitive Status Impaired   Arousal/Participation Responsive   Attention Attends with cues to redirect   Orientation Level Oriented X4   Memory Unable to assess   Following Commands Follows one step commands with increased time or repetition   RLE Assessment   RLE Assessment   (strength grossly 3+/5)   LLE Assessment   LLE Assessment   (strength grossly 3+/5)   Coordination   Movements are Fluid and Coordinated 0   Coordination and Movement Description B LE ataxia   Transfers   Sit to Stand 3  Moderate assistance   Additional items Assist x 1   Stand to Sit 4  Minimal assistance   Additional items Assist x 1   Ambulation/Elevation   Gait pattern   (slow, antalgic, short step length)   Gait Assistance 3  Moderate assist   Additional items Assist x 1   Assistive Device Rolling walker   Distance 15'x1, declined further distance due to fatigue   Balance   Static Sitting Good   Dynamic Sitting Fair -   Static Standing Poor +   Dynamic Standing Poor +   Ambulatory Poor +   Endurance Deficit   Endurance Deficit Yes   Endurance Deficit Description fatigue, weakness, cog, pain   Activity Tolerance   Activity Tolerance Patient limited by fatigue;Patient limited by pain;Treatment limited secondary to medical complications (Comment)   Nurse Made Aware yes   Assessment   Prognosis Fair   Problem List Decreased strength; Impaired balance;Decreased mobility; Decreased endurance;Decreased coordination;Decreased cognition; Impaired judgement;Decreased safety awareness;Pain   Assessment Pt seen for high complexity physical therapy evaluation  Pt is a 65 y/o female w/ history/comorbidities of depression/anxiety, HTN, HLD, hypothyroidism, obesity, CHF, DM II, a-fib w/ RVR who is now admitted w/ worsning SOB/KAMARA, N LE edema  Dx include CHF, a-fib w/ RVR, cardiogenic shock    Due to acute medical issues, pain, fall risk, note unstable clinical picture  PT consulted to assess mobility, d/c needs  Pt presents w/ decreased functional mob, standing balance, endurance, B LE strength, barriers at home  will benefit from skilled PT to correct for the above problems  Recommend rehab at d/c   Goals   Patient Goals to get stronger and go home   STG Expiration Date 07/19/19   Short Term Goal #1 1-2 wks: bed mob and transfers w/ indep, standing balance to good w/ device, ambulate 200-300 ft w/ RW and S, increase B LE strength by 1/2 -1 grade   Treatment Day 0   Plan   Treatment/Interventions Functional transfer training;LE strengthening/ROM; Therapeutic exercise;Elevations; Endurance training;Bed mobility;Gait training   PT Frequency   (3-5x/wk)   Recommendation   Recommendation   (recommend rehab at d/c)   Equipment Recommended Zoila Velasquez   PT - OK to Discharge Yes  (when stable to rehab)   Modified Randall Scale   Modified Randall Scale 4   Barthel Index   Feeding 10   Bathing 0   Grooming Score 5   Dressing Score 5   Bladder Score 5   Bowels Score 10   Toilet Use Score 5   Transfers (Bed/Chair) Score 5   Mobility (Level Surface) Score 0   Stairs Score 0   Barthel Index Score 45         Lori Horta PT, DPT, CSRS Car

## 2019-07-05 NOTE — PROGRESS NOTES
NEPHROLOGY PROGRESS NOTE   Tato Richey 66 y o  female MRN: [id number]  Unit/Bed#: [facility] 508-01 Encounter: [id number]      ASSESSMENT/PLAN:  1  Acute kidney injury:  Due to ischemic ATN + cardiorenal syndrome component in the setting of cardiogenic shock  · Baseline creatinine is normal at 0 6-0 9  · Diuretics on hold did get intermittent IVF a few days ago   · Creatinine improving down to 3 3 today from a peak of 4 95 on 7/1   · Trend BMP   2  Cardiogenic shock with acute on chronic systolic heart failure with new onset ejection fraction 25-30% and new onset AFib:  Heart failure team is following  · Off milrinone  · Diuretics on hold but weights trending down and remains in negative balance   · S/p cardioversion  3  Hyponatremia:  Due to renal failure and CHF  · Sodium trending up to 135  · Continue 1200cc oral fluid restriction   4  Lactic acidosis:  Resolved  5  Transaminitis due to shock liver  6  Hypokalemia:  Resolved with replacement    SUBJECTIVE:  Poor appetite  No chest pain or shortness of breath  No vomiting or diarrhea  OBJECTIVE:  Current Weight: Weight - Scale: 69 5 kg (153 lb 3 5 oz)  Vitals:    07/05/19 0744   BP: 142/58   Pulse: 62   Resp: 16   Temp: 97 7 °F (36 5 °C)   SpO2: 96%       Intake/Output Summary (Last 24 hours) at 7/5/2019 0855  Last data filed at 7/5/2019 3859  Gross per 24 hour   Intake 2170 ml   Output 2250 ml   Net -80 ml     Physical Exam   Constitutional: She is oriented to person, place, and time  She appears well-developed and well-nourished  HENT:   Head: Normocephalic and atraumatic  Eyes: Conjunctivae are normal    Neck: Neck supple  Cardiovascular: Regular rhythm  Pulmonary/Chest: Effort normal and breath sounds normal  She has no wheezes  She has no rales  Abdominal: Soft  She exhibits no distension  There is no tenderness  Musculoskeletal: She exhibits no edema  Neurological: She is alert and oriented to person, place, and time     Skin: Skin is warm and dry  No rash noted  Psychiatric: She has a normal mood and affect  Vitals reviewed          Medications:  Scheduled Meds:    Current Facility-Administered Medications:  acetaminophen 650 mg Oral Q4H PRN Jerry Aranda Jr, PA-C   albuterol 2 puff Inhalation Q4H PRN Sd Whitmore PA-C   amiodarone 200 mg Oral TID With Meals Jerry Taylor PA-C   apixaban 2 5 mg Oral BID Elisa LipMD tameka   bisacodyl 10 mg Rectal Daily PRN Jerry Taylor PA-C   carvedilol 3 125 mg Oral BID With Meals Elisa Martin MD   hydrALAZINE 5 mg Oral Q8H [address] SOTO Junior   insulin lispro 1-5 Units Subcutaneous HS Jerry Aranda Jr, PA-C   insulin lispro 1-6 Units Subcutaneous TID AC Jerry Aranda Jr, PA-C   isosorbide dinitrate 5 mg Oral TID after meals SOTO Junior   levothyroxine 137 mcg Oral Daily Sd Whitmore PA-C   loratadine 10 mg Oral Daily Jerry Aranda Jr, PA-C   melatonin 6 mg Oral HS Jerry Aranda Jr, PA-C   ondansetron 4 mg Intravenous Q6H PRN Jerry Aranda Jr, PA-C   phenol 2 spray Mouth/Throat Q2H PRN Jerry Aranda Jr, PA-C   polyethylene glycol 17 g Oral Daily Jerry Aranda Jr, PA-C   senna 1 tablet Oral HS Jerry Aranda Jr, PA-C   sodium chloride 2 spray Each Nare PRN Jerry Aranda Jr, PA-C   venlafaxine 50 mg Oral Q12H Jerry Aranda Jr, PA-C       PRN Meds:   acetaminophen    albuterol    bisacodyl    ondansetron    phenol    sodium chloride    Continuous Infusions:       Laboratory Results:  Results from last 7 days   Lab Units 07/05/19  0504 07/04/19  0530 07/03/19  2155 07/03/19  0312 07/02/19  0600 07/01/19  1609 07/01/19  0901 07/01/19  0524  06/30/19  0516  06/29/19  0445   WBC Thousand/uL 12 34*  --   --   --  13 67*  --   --  13 33*  --  13 26*  --  10 50*   HEMOGLOBIN g/dL 15 2  --   --   --  13 8  --   --  12 3  --  12 1  --  12 2   HEMATOCRIT % 44 7  --   --   --  39 3  --   --  34 9  --  34 4*  --  34 1* PLATELETS Thousands/uL 183  --   --   --  188  --   --  178  --  191  --  188   SODIUM mmol/L 135* 134* 133* 135* 130* 130* 127* 127*   < > 123*   < > 125*   POTASSIUM mmol/L 3 8 4 0 3 7 4 1 3 4* 3 5 3 8 3 8   < > 3 7   < > 3 9   CHLORIDE mmol/L 92* 91* 90* 87* 83* 84* 84* 85*   < > 84*   < > 86*   CO2 mmol/L 36* 34* 35* 37* 36* 34* 33* 34*   < > 29   < > 30   BUN mg/dL 99* 100* 98* 108* 110* 97* 93* 88*   < > 85*   < > 80*   CREATININE mg/dL 3 33* 3 85* 3 99* 4 48* 4 82* 4 94* 4 87* 4 95*   < > 4 78*   < > 4 68*   CALCIUM mg/dL 9 2 8 9 8 9 9 2 8 8 8 4 8 5 8 4   < > 8 2*   < > 7 7*   MAGNESIUM mg/dL  --  2 0 1 9  --   --   --   --   --   --  2 5  --   --     < > = values in this interval not displayed

## 2019-07-05 NOTE — PLAN OF CARE
Problem: PHYSICAL THERAPY ADULT  Goal: Performs mobility at highest level of function for planned discharge setting  See evaluation for individualized goals  Description  Treatment/Interventions: Functional transfer training, LE strengthening/ROM, Therapeutic exercise, Elevations, Endurance training, Bed mobility, Gait training  Equipment Recommended: Crystal Garduno       See flowsheet documentation for full assessment, interventions and recommendations  Note:   Prognosis: Fair  Problem List: Decreased strength, Impaired balance, Decreased mobility, Decreased endurance, Decreased coordination, Decreased cognition, Impaired judgement, Decreased safety awareness, Pain  Assessment: Pt seen for high complexity physical therapy evaluation  Pt is a 65 y/o female w/ history/comorbidities of depression/anxiety, HTN, HLD, hypothyroidism, obesity, CHF, DM II, a-fib w/ RVR who is now admitted w/ worsning SOB/KAMARA, N LE edema  Dx include CHF, a-fib w/ RVR, cardiogenic shock  Due to acute medical issues, pain, fall risk, note unstable clinical picture  PT consulted to assess mobility, d/c needs  Pt presents w/ decreased functional mob, standing balance, endurance, B LE strength, barriers at home  will benefit from skilled PT to correct for the above problems  Recommend rehab at d/c        Recommendation: (recommend rehab at d/c)     PT - OK to Discharge: (S) Yes(when stable to rehab)    See flowsheet documentation for full assessment

## 2019-07-05 NOTE — NUTRITION
07/05/19 1446   Recommendations/Interventions   Interventions Other (comment)  (offered ensure compact (while on fluid restriction) but pt denies supplements at this time  family brought ensure clear from outside but pt reports she does not want that either  RD liberalized cardiac diet to 2g Na to allow more meal options)   Nutrition Recommendations Continue diet as ordered; Other (specify)  (continue to encourage PO intake   RD to follow )

## 2019-07-05 NOTE — PROGRESS NOTES
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  Negative fluid balance, diuretics on hold due to WANG  On hydralazine, Coreg and Imdur  Continue Fluid restriction, low sodium diet  Cardiology is following          Atrial fibrillation with RVR ([facility])  Assessment & Plan  · Status post cardioversion 7/3, remain in NSR  · On amiodarone,  Coreg and Eliquis        Acute kidney injury ([facility] [address] )  Assessment & Plan  · Secondary to ischemic ATN/cardiogenic syndrome Jose Hunter  · Improving  · Diuretics on hold  · Nephrology is following      Shock liver  Assessment & Plan  · Transaminitis and coag study are slowly improving  · Continue to monitor      Type 2 diabetes mellitus with diabetic cataract [facility])  Assessment & Plan  Lab Results   Component Value Date    HGBA1C 5 9 (H) 03/18/2019       Recent Labs     07/04/19  1614 07/04/19  2053 07/05/19  0619 07/05/19  1040   POCGLU 138 112 113 228*       Blood Sugar Average: Last 72 hrs:  (P) 395 [id number]   · Diet controlled at home  · Glucose is acceptable  · Continue insulin sliding scale    Hypothyroidism  Assessment & Plan  · Continue Synthroid therapy    Hypertension  Assessment & Plan  Acceptable BP  · Continue to monitor      Short-term rehab when stable  Change diet to cardiac  Nutrition eval       VTE Pharmacologic Prophylaxis:   Pharmacologic: Apixaban (Eliquis)  Mechanical VTE Prophylaxis in Place: Yes    Patient Centered Rounds: I have performed bedside rounds with nursing staff today  Discussions with Specialists or Other Care Team Provider:     Education and Discussions with Family / Patient:  Discussed with patient's sister    Time Spent for Care: 30 minutes  More than 50% of total time spent on counseling and coordination of care as described above  Current Length of Stay: 17 day(s)    Current Patient Status: Inpatient   Certification Statement: The patient will continue to require additional inpatient hospital stay due to CHF    Discharge Plan / Estimated Discharge Date: not ready yet    Code Status: Level 1 - Full Code      Subjective:   Patient seen and examined  Comfortable sitting in chair  Still complaining of  fatigue and tired    Objective:     Vitals:   Temp (24hrs), Av 8 °F (36 6 °C), Min:97 4 °F (36 3 °C), Max:98 1 °F (36 7 °C)    Temp:  [97 4 °F (36 3 °C)-98 1 °F (36 7 °C)] 97 7 °F (36 5 °C)  HR:  [50-62] 55  Resp:  [16-18] 16  BP: (111-158)/(54-86) 133/61  SpO2:  [94 %-97 %] 97 %  Body mass index is 28 95 kg/m²  Input and Output Summary (last 24 hours):        Intake/Output Summary (Last 24 hours) at 2019 1157  Last data filed at 2019 3157  Gross per 24 hour   Intake 2170 ml   Output 1950 ml   Net 220 ml       Physical Exam:     Physical Exam  Patient is awake alert in no acute distress  Ill-appearing, week  Lung with decreased breath sounds bilateral  Heart positive S1-S2 no murmur  Abdomen soft nontender  Lower extremities no edema    Additional Data:     Labs:    Results from last 7 days   Lab Units 19  0504   WBC Thousand/uL 12 34*   HEMOGLOBIN g/dL 15 2   HEMATOCRIT % 44 7   PLATELETS Thousands/uL 183     Results from last 7 days   Lab Units 19  0504 19  0530   POTASSIUM mmol/L 3 8 4 0   CHLORIDE mmol/L 92* 91*   CO2 mmol/L 36* 34*   BUN mg/dL 99* 100*   CREATININE mg/dL 3 33* 3 85*   CALCIUM mg/dL 9 2 8 9   ALK PHOS U/L  --  136*   ALT U/L  --  782*   AST U/L  --  87*     Results from last 7 days   Lab Units 07/03/19  2344   INR  1 60*       * I Have Reviewed All Lab Data Listed Above  * Additional Pertinent Lab Tests Reviewed: Emi 66 Admission Reviewed    Imaging:    Imaging Reports Reviewed Today Include:   Imaging Personally Reviewed by Myself Includes:      Recent Cultures (last 7 days):           Last 24 Hours Medication List:     Current Facility-Administered Medications:  acetaminophen 650 mg Oral Q4H PRN ucilla Anika Aranda Jr, PA-C   albuterol 2 puff Inhalation Q4H PRN Sujata Aranda Jr, PA-C   amiodarone 200 mg Oral TID With Meals Alejandro Gates PA-C   apixaban 2 5 mg Oral BID Alejo Thorpe MD   bisacodyl 10 mg Rectal Daily PRN Sujata Aranda Jr, PA-C   carvedilol 3 125 mg Oral BID With Meals Alejo Thorpe MD   hydrALAZINE 10 mg Oral Q8H [address] Mili Corea PA-C   insulin lispro 1-5 Units Subcutaneous HS Drucflorina Aranda Jr, PA-C   insulin lispro 1-6 Units Subcutaneous TID AC Drjami Aranda Jr, PA-C   isosorbide dinitrate 10 mg Oral TID after meals Sabrina Catalan PA-C   levothyroxine 137 mcg Oral Daily Drjami Aranda Jr, PA-C   loratadine 10 mg Oral Daily [place] Anika Aranda Jr, PA-C   melatonin 6 mg Oral HS Sujata Aranda Jr, PA-C   ondansetron 4 mg Intravenous Q6H PRN Sujata Aranda Jr, PA-C   phenol 2 spray Mouth/Throat Q2H PRN Drucilla Anika Aranda Jr, PA-C   polyethylene glycol 17 g Oral Daily [place] Anika Barragan PA-C   senna 1 tablet Oral HS Sujata Aranda Jr, PA-C   sodium chloride 2 spray Each Nare PRN Sujata Aranda Jr, PA-C   venlafaxine 50 mg Oral Q12H Alejandro Gates PA-C        Today, Patient Was Seen By: Pippa Hernandez DO    ** Please Note: This note has been constructed using a voice recognition system   **

## 2019-07-05 NOTE — OCCUPATIONAL THERAPY NOTE
07/05/19 1128   Restrictions/Precautions   Weight Bearing Precautions Per Order No   Other Precautions Cognitive; Chair Alarm; Fall Risk;Telemetry   Pain Assessment   Pain Assessment No/denies pain   Pain Score No Pain   ADL   Where Assessed Chair   LB Dressing Assistance 3  Moderate Assistance   LB Dressing Deficit Setup; Thread RLE into pants;Pull up over hips   LB Dressing Comments seated/standing   Functional Standing Tolerance   Time 1 min   Activity static standing   Comments RW   Transfers   Sit to Stand 4  Minimal assistance   Additional items Assist x 1   Stand to Sit 4  Minimal assistance   Additional items Assist x 1   Cognition   Overall Cognitive Status WFL   Arousal/Participation Alert   Attention Within functional limits   Orientation Level Oriented X4   Memory Within functional limits   Following Commands Follows one step commands with increased time or repetition   Cognition Assessment Tools MOCA   Score 24   Activity Tolerance   Activity Tolerance Patient tolerated treatment well   Assessment   Assessment Pt participates in OT session with focus on cognitive reorientation, LB dressing, transfers, and activity tolerance to increase I for d/c  Pt scored 24/30 on MOCA  Please see below for details  Pt mod A LB dressing to don/doff pants while seated/standing from bedside chair  Pt requires rest breaks t/o session 2* decreased endurance  Pt CGA sit to stand with RW support and requires vc for safe hand placement during transfers  Pt exhibits bilateral release of RW during stance to pull up pants, but requires A to pull up all the way  Pt setup on bedpan while seated in chair with chair alarm on as pt states she may have a BM  Aide notified and session ends  Pt will continue to benefit from activity tolerance, adls, and transfers  Plan   Treatment Interventions ADL retraining;Functional transfer training; Endurance training;Cognitive reorientation; Activityengagement   Goal Expiration Date 07/08/19 Treatment Day 3   OT Frequency 3-5x/wk   Recommendation   OT Discharge Recommendation Short Term Rehab     Pt completed NIKI COGNITIVE ASSESSMENT ([address] [place] [address], [place])  Pt  scored   24/30 indicating mild cognitive impairment for pt age/education  Pt scores the following in these cognitive areas:    Visuospatial/Executive:    1/5  Pt only scores 1 point for numbers of clock  Naming:  3/3    Attention:   6/6    Language:   2/3  Pt lost point for not stating enough words that start with F     Abstraction:  1/2  Pt does not know similarity between watch and ruler      Delayed Recall:  5/5    Orientation:   6/6      TOTAL    24/30

## 2019-07-05 NOTE — PROGRESS NOTES
Advanced Heart Failure / Pulmonary Hypertension Progress Note    Lacy Hogan 66 y o  female   MRN: [id number]  Unit/Bed#: [facility] 508-01; Encounter: [id number]    Assessment:  Principal Problem:    Acute CHF (congestive heart failure) ([facility])  Active Problems:    Hypertension    Hypothyroidism    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)    Atrial fibrillation with RVR ([facility])    Acute kidney injury ([address] )    Hyponatremia    Shock liver    Acute bacterial conjunctivitis of both eyes    Acute on chronic combined systolic and diastolic CHF (congestive heart failure) ([facility])    Subjective:   Patient seen and examined  Patient's only complaint is fatigue which she believes is from a lack of sleep  Denies SOB or PND overnight  Answers to questions are slightly delayed  Also denies fever, chills, lightheadedness, dizziness, chest pain, palpitations, SOB, orthopnea, n/v/d  Objective: Intake/ Output: 970 mL / 2600 mL (net negative 1630 mL, erroneous tube feed of 1200 mL entered)  Weight: 153 lbs (down from 160 lbs on 07/04)  Telemetry: NSR  Plan:    Acute on chronic biventricular heart failure; LVEF 45%, LVIDd 2 8 cm; NYHA III; ACC/AHA Stage C   Etiology: shock induced by cardizem and profolol being given while in borderline low output state  BiV HF likely tachy-induced, though will need ischemic evaluation in future  Also has history of radioablative iodine  Continue on Coreg 3 12 mg BID, hydralazine 5 mg TID, and Isordil 5 mg TID  TTE from 07/04 with LVEF improved from 35% to now 45%  Goal for I/O to run even, per nephrology  Remains net negative today without diuretic regimen ordered  Atrial fibrillation with rapid ventricular response   [id number] = 6 (age, sex, CHF, HTN, DM)  Anticoagulation on Eliquis (renal dose)  S/p successful cardioversion on 07/03  Sedation with propofol  Continue on amiodarone 200 mg TID and Coreg 3 12 mg BID        Acute kidney injury, improving   Etiology: hypotension/low output  Baseline creatinine around 0 7-0 9   Creatinine of 3 33 this AM (down from 3 85 on 07/04)  Goal for I/O to run even, per nephrology  Shock liver, improving   Due to hypotension / cardiogenic shock  Acute bacterial conjunctivitis, bilateral   Management per primary team      Neurohormonal Blockade:  --Beta Blocker: Coreg 3 12 mg BID   --ACEi, ARB or ARNi: N/A  --SVR reduction: hydralazine 5 mg TID and Isordil 5 mg TID  --Aldosterone Receptor Blocker: N/A  --Diuretic: N/A    Sudden Cardiac Death Risk Reduction:  --ICD: LVEF 45%    Electrical Resynchronization:  --Interrogation: Narrow QRS  Advanced Therapies (if appropriate): --Inotrope: N/A  --LVAD/Transplant Candidacy: Will continue to monitor  Diagnostic Testing:  Echocardiogram (limited) from 07/04/2019:  LVEF: 45%; mild diffuse hypokinesis  LVIDd: 3 8 cm  MR: No MR  Moderate annular calcification  Other: Dilated LA  Echocardiogram from 06/20/2019:  LVEF: 25%  LVIDd: 4 6 cm  RV: Dilated and hypokinetic  MR: Moderate  PASP: 35 mmHg  Vitals:   Blood pressure 142/58, pulse 62, temperature 97 7 °F (36 5 °C), temperature source Oral, resp  rate 16, height 5' 1" (1 549 m), weight 69 5 kg (153 lb 3 5 oz), SpO2 96 %  Body mass index is 28 95 kg/m²  I/O last 3 completed shifts: In: 1840 [P O :1210; NG/GT:2400]  Out: 3000 [Urine:3000]    Wt Readings from Last 3 Encounters:   07/05/19 69 5 kg (153 lb 3 5 oz)   06/18/19 81 kg (178 lb 9 6 oz)   03/20/19 80 1 kg (176 lb 9 6 oz)     Vitals:    07/05/19 0202 07/05/19 0308 07/05/19 0510 07/05/19 0744   BP:  142/58 158/86 142/58   BP Location:  Left arm     Pulse:  (!) 53  62   Resp:  18  16   Temp:  98 1 °F (36 7 °C)  97 7 °F (36 5 °C)   TempSrc:  Oral  Oral   SpO2:  96%  96%   Weight: 69 5 kg (153 lb 3 5 oz)      Height:         Physical Exam   Constitutional: She is oriented to person, place, and time   She appears well-developed and well-nourished  HENT:   Head: Normocephalic and atraumatic  Eyes: Pupils are equal, round, and reactive to light  Right eye exhibits no discharge  Left eye exhibits no discharge  Neck: Neck supple  No tracheal deviation present  Dressing present on right neck  Cardiovascular: Normal rate, regular rhythm and intact distal pulses  Murmur heard  Pulmonary/Chest: Effort normal  No respiratory distress  She has no rales  Abdominal: Soft  Bowel sounds are normal  She exhibits no distension  Musculoskeletal: She exhibits no edema or tenderness  Cooler extremities  Neurological: She is alert and oriented to person, place, and time  Skin: Skin is dry  Psychiatric: She has a normal mood and affect   Her behavior is normal      Central Line: N/A  Murray catheter: N/A    Current Facility-Administered Medications:     acetaminophen (TYLENOL) tablet 650 mg, 650 mg, Oral, Q4H PRN, Riley Aranda Jr, PA-C, 650 mg at 06/28/19 1210    albuterol (PROVENTIL HFA,VENTOLIN HFA) inhaler 2 puff, 2 puff, Inhalation, Q4H PRN, Quintin Barth PA-C, 2 puff at 07/05/19 0802    amiodarone tablet 200 mg, 200 mg, Oral, TID With Meals, Quintin Barth PA-C, 200 mg at 07/05/19 0750    apixaban (ELIQUIS) tablet 2 5 mg, 2 5 mg, Oral, BID, Apple Beavers MD, 2 5 mg at 07/04/19 1712    bisacodyl (DULCOLAX) rectal suppository 10 mg, 10 mg, Rectal, Daily PRN, Riley Huerta PA-C    carvedilol (COREG) tablet 3 125 mg, 3 125 mg, Oral, BID With Meals, Apple Beavers MD, 3 125 mg at 07/05/19 0750    hydrALAZINE (APRESOLINE) tablet 5 mg, 5 mg, Oral, Q8H [address], SOTO Junior, 5 mg at 07/05/19 0510    insulin lispro (HumaLOG) 100 units/mL subcutaneous injection 1-5 Units, 1-5 Units, Subcutaneous, HS, Riley Aranda Jr, PA-C, 2 Units at 07/03/19 2144    insulin lispro (HumaLOG) 100 units/mL subcutaneous injection 1-6 Units, 1-6 Units, Subcutaneous, TID AC, 2 Units at 07/04/19 1154 **AND** Fingerstick Glucose (POCT), , , TID AC, Keenan Barragan PA-C    isosorbide dinitrate (ISORDIL) tablet 5 mg, 5 mg, Oral, TID after meals, Manisha Hdz, SOTO, 5 mg at 07/05/19 8459    levothyroxine tablet 137 mcg, 137 mcg, Oral, Daily, Keenan Aranda Jr, PA-C, 137 mcg at 07/05/19 0510    loratadine (CLARITIN) tablet 10 mg, 10 mg, Oral, Daily, Keenan Aranda Jr, PA-C, 10 mg at 07/04/19 2694    melatonin tablet 6 mg, 6 mg, Oral, HS, Keenan Aranda Jr, PA-C, 6 mg at 07/04/19 2123    ondansetron [facility]) injection 4 mg, 4 mg, Intravenous, Q6H PRN, Keenan Barragan PA-C, 4 mg at 06/27/19 2124    phenol (CHLORASEPTIC) 1 4 % mucosal liquid 2 spray, 2 spray, Mouth/Throat, Q2H PRN, Keenan Aranda Jr, PA-C    polyethylene glycol (MIRALAX) packet 17 g, 17 g, Oral, Daily, Keenan Barragan PA-C, 17 g at 07/04/19 4310    senna (SENOKOT) tablet 8 6 mg, 1 tablet, Oral, HS, Keenan Aranda Jr, PA-C, 8 6 mg at 07/04/19 2123    sodium chloride (OCEAN) 0 65 % nasal spray 2 spray, 2 spray, Each Nare, PRN, [employer] , PA-REJI, 2 spray at 06/30/19 1218    venlafaxine [facility]) tablet 50 mg, 50 mg, Oral, Q12H, Keenan Barragan PA-C, 50 mg at 07/04/19 2125    Labs & Results:      Results from last 7 days   Lab Units 07/05/19  0504 07/02/19  0600 07/01/19  0524   WBC Thousand/uL 12 34* 13 67* 13 33*   HEMOGLOBIN g/dL 15 2 13 8 12 3   HEMATOCRIT % 44 7 39 3 34 9   PLATELETS Thousands/uL 183 188 178         Results from last 7 days   Lab Units 07/05/19  0504 07/04/19  0530 07/03/19  2155  07/01/19  0524  06/30/19  0516   POTASSIUM mmol/L 3 8 4 0 3 7   < > 3 8   < > 3 7   CHLORIDE mmol/L 92* 91* 90*   < > 85*   < > 84*   CO2 mmol/L 36* 34* 35*   < > 34*   < > 29   BUN mg/dL 99* 100* 98*   < > 88*   < > 85*   CREATININE mg/dL 3 33* 3 85* 3 99*   < > 4 95*   < > 4 78*   CALCIUM mg/dL 9 2 8 9 8 9   < > 8 4   < > 8 2*   ALK PHOS U/L  --  136*  --   --  146*  --  147*   ALT U/L  --  782*  -- --  1,723*  --  2,432*   AST U/L  --  87*  --   --  345*  --  689*    < > = values in this interval not displayed  Results from last 7 days   Lab Units 07/03/19  2344 07/01/19  0524 06/30/19  0516   INR  1 60* 2 16* 2 79*     Counseling / Coordination of Care: Total floor / unit time spent today 25 minutes  Greater than 50% of total time was spent with the patient and / or family counseling and / or coordination of care  A description of the counseling / coordination of care: 20  Thank you for the opportunity to participate in the care of this patient      Sarah Eason PA-C

## 2019-07-05 NOTE — SOCIAL WORK
A post acute care recommendation was made by your care team for STR  Discussed Freedom of Choice with patient  List of facilities given to patient via in person  patient aware the list is custom filtered for them by zip code location and that [facility] post acute providers are designated  Cm to f/u for choices

## 2019-07-05 NOTE — ASSESSMENT & PLAN NOTE
· Secondary to ischemic ATN/cardiogenic syndrome Saul Osiel  · Improving  · Diuretics on hold  · Nephrology is following

## 2019-07-05 NOTE — ASSESSMENT & PLAN NOTE
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

## 2019-07-05 NOTE — ASSESSMENT & PLAN NOTE
Lab Results   Component Value Date    HGBA1C 5 9 (H) 03/18/2019       Recent Labs     07/04/19  1614 07/04/19  2053 07/05/19  0619 07/05/19  1040   POCGLU 138 112 113 228*       Blood Sugar Average: Last 72 hrs:  (P) 357 [id number]   · Diet controlled at home  · Glucose is acceptable  · Continue insulin sliding scale

## 2019-07-05 NOTE — PROGRESS NOTES
[facility] Progress Note  Patient: Queta Beasley 66 y o  female   MRN: [id number]  PCP: Zeinab Alejandra MD  Unit/Bed#: [facility] 201-13 Encounter: [id number]  Date Of Visit: 07/05/19    Assessment/Plan:  1  Acute kidney injury  - Secondary to ischemic ATN and cardiorenal syndrome   - Baseline Cr: 0 6 - 0 9   - BUN: 99, Cr: 3 33 (continuing to improve, peak Cr: 4 95)  - Continue to hold diuretics    - Goal to maintain intake equal to output  - Continue to monitor I/Os     2  Atrial fibrillation with RVR  - S/p DCCV on 7/3, continues to maintain NSR  - Continue PO amiodarone 200 mg TID and Eliquis 2 5 mg BID  - Continue telemetry      3  Acute CHF  - ECHO (7/4): EF 45%, mild diffuse hypokinesis    - EF improved from ECHO on 6/20 and 6/26  - Continue to hold diuretics  - Continue medications:   - Isosorbide dinitrate 10 mg TID    - Hydralazine 10 mg q8h    - Carvedilol 3 125 mg BID   - Continue to monitor I/Os, daily weight    4  Hypertension  - BP stable overnight  - Continue to hold anti-hypertensives due to hypotension    5  Type 2 diabetes mellitus  - HbA1c (3/18/19): 5 9  - Continue SSI and accu-checks     6  Hypothyroidism  - Continue levothyroxine 137 mcg daily     7  Transaminitis  - Secondary to shock liver  - LFT peak on 6/27 (AST: 62657, ALT: 6164), with subsequent down trend  - Continue to monitor     VTE Pharmacologic Prophylaxis:   Pharmacologic: Apixaban (Eliquis)  Mechanical VTE Prophylaxis in Place: Yes    Patient Centered Rounds: I have performed bedside rounds with nursing staff today  Discussions with Specialists or Other Care Team Provider: assessment/plan discussed with Dr Kriss Ernandez     Education and Discussions with Family / Patient: reviewed current symptoms with patient    Time Spent for Care: 20 minutes  More than 50% of total time spent on counseling and coordination of care as described above      Current Length of Stay: 17 day(s)    Current Patient Status: Inpatient Certification Statement: The patient will continue to require additional inpatient hospital stay due to continued monitoring / inpatient management    Discharge Plan / Estimated Discharge Date: pending clinical course    Code Status: Level 1 - Full Code      Subjective: The patient had no acute events overnight  This morning, she reports continued fatigue that has not changed  She currently denies chest pain, shortness of breath, dizziness, headache, fever, chills, abdominal pain, nausea, vomiting, diarrhea, or lower extremity swelling  Objective:     Vitals:   Temp (24hrs), Av 8 °F (36 6 °C), Min:97 4 °F (36 3 °C), Max:98 1 °F (36 7 °C)    Temp:  [97 4 °F (36 3 °C)-98 1 °F (36 7 °C)] 97 7 °F (36 5 °C)  HR:  [50-65] 62  Resp:  [16-18] 16  BP: (111-158)/(54-86) 142/58  SpO2:  [94 %-96 %] 96 %  Body mass index is 28 95 kg/m²  Input and Output Summary (last 24 hours): Intake/Output Summary (Last 24 hours) at 2019 0947  Last data filed at 2019 0792  Gross per 24 hour   Intake 2050 ml   Output 2050 ml   Net 0 ml       Physical Exam:     Physical Exam   Constitutional: She is oriented to person, place, and time  She appears well-developed and well-nourished  No distress  HENT:   Head: Normocephalic and atraumatic  Mouth/Throat: Oropharynx is clear and moist  No oropharyngeal exudate  Eyes: Pupils are equal, round, and reactive to light  Conjunctivae and EOM are normal  Right eye exhibits no discharge  Left eye exhibits no discharge  Neck: Normal range of motion  Neck supple  Surgical dressing on right neck   Cardiovascular: Normal rate, regular rhythm and intact distal pulses  Pulmonary/Chest: Effort normal and breath sounds normal  No respiratory distress  Abdominal: Soft  Bowel sounds are normal  She exhibits no distension  There is no tenderness  Musculoskeletal: Normal range of motion  She exhibits no edema or deformity     Lymphadenopathy:     She has no cervical adenopathy  Neurological: She is alert and oriented to person, place, and time  Skin: Skin is warm and dry  Psychiatric: She has a normal mood and affect  Her behavior is normal        Additional Data:     Labs:    Results from last 7 days   Lab Units 07/05/19  0504   WBC Thousand/uL 12 34*   HEMOGLOBIN g/dL 15 2   HEMATOCRIT % 44 7   PLATELETS Thousands/uL 183     Results from last 7 days   Lab Units 07/05/19  0504 07/04/19  0530   POTASSIUM mmol/L 3 8 4 0   CHLORIDE mmol/L 92* 91*   CO2 mmol/L 36* 34*   BUN mg/dL 99* 100*   CREATININE mg/dL 3 33* 3 85*   CALCIUM mg/dL 9 2 8 9   ALK PHOS U/L  --  136*   ALT U/L  --  782*   AST U/L  --  87*     Results from last 7 days   Lab Units 07/03/19  2344   INR  1 60*       * I Have Reviewed All Lab Data Listed Above  * Additional Pertinent Lab Tests Reviewed:  All Labs Within Last 24 Hours Reviewed    Imaging:    Imaging Reports Reviewed Today Include: -  Imaging Personally Reviewed by Myself Includes:  -    Recent Cultures (last 7 days):           Last 24 Hours Medication List:     Current Facility-Administered Medications:  acetaminophen 650 mg Oral Q4H PRN Storm Aranda Jr, PA-C   albuterol 2 puff Inhalation Q4H PRN Storm Aranda Jr, PA-C   amiodarone 200 mg Oral TID With Meals Storm Taylor PA-C   apixaban 2 5 mg Oral BID Arsen No MD   bisacodyl 10 mg Rectal Daily PRN Storm Aranda Jr, PA-C   carvedilol 3 125 mg Oral BID With Meals Arsen No MD   hydrALAZINE 10 mg Oral Q8H [address] Mili Charles PA-C   insulin lispro 1-5 Units Subcutaneous HS Storm Aranda Jr, PA-C   insulin lispro 1-6 Units Subcutaneous TID AC Storm Aranda Jr, PA-C   isosorbide dinitrate 10 mg Oral TID after meals Jennyfer Mckeon PA-C   levothyroxine 137 mcg Oral Daily Storm Aranda Jr, PA-C   loratadine 10 mg Oral Daily Storm Aranda Jr, PA-C   melatonin 6 mg Oral HS Storm Aranda Jr, PA-C   ondansetron 4 mg Intravenous Q6H PRN In[place] Bread Sherry Dye PA-C   phenol 2 spray Mouth/Throat Q2H PRN Jovanny Aranda Jr, PA-C   polyethylene glycol 17 g Oral Daily Jovanny Dye PA-C   senna 1 tablet Oral HS Jovanny Aranda Jr, PA-C   sodium chloride 2 spray Each Nare PRN Sloane Sanchez PA-C   venlafaxine 50 mg Oral Q12H Jovanny Dye PA-C        Today, Patient Was Seen By: Tiffany Chavez    ** Please Note: This note has been constructed using a voice recognition system   **

## 2019-07-06 LAB
ANION GAP SERPL CALCULATED.3IONS-SCNC: 5 MMOL/L (ref 4–13)
BUN SERPL-MCNC: 102 MG/DL (ref 5–25)
CALCIUM SERPL-MCNC: 9.2 MG/DL (ref 8.3–10.1)
CHLORIDE SERPL-SCNC: 92 MMOL/L (ref 100–108)
CO2 SERPL-SCNC: 37 MMOL/L (ref 21–32)
CREAT SERPL-MCNC: 3.07 MG/DL (ref 0.6–1.3)
GFR SERPL CREATININE-BSD FRML MDRD: 14 ML/MIN/1.73SQ M
GLUCOSE SERPL-MCNC: 127 MG/DL (ref 65–140)
GLUCOSE SERPL-MCNC: 165 MG/DL (ref 65–140)
GLUCOSE SERPL-MCNC: 182 MG/DL (ref 65–140)
GLUCOSE SERPL-MCNC: 209 MG/DL (ref 65–140)
GLUCOSE SERPL-MCNC: 98 MG/DL (ref 65–140)
POTASSIUM SERPL-SCNC: 3.5 MMOL/L (ref 3.5–5.3)
SODIUM SERPL-SCNC: 134 MMOL/L (ref 136–145)

## 2019-07-06 PROCEDURE — 99232 SBSQ HOSP IP/OBS MODERATE 35: CPT | Performed by: INTERNAL MEDICINE

## 2019-07-06 PROCEDURE — 82948 REAGENT STRIP/BLOOD GLUCOSE: CPT

## 2019-07-06 PROCEDURE — 80048 BASIC METABOLIC PNL TOTAL CA: CPT | Performed by: PHYSICIAN ASSISTANT

## 2019-07-06 PROCEDURE — 97116 GAIT TRAINING THERAPY: CPT

## 2019-07-06 PROCEDURE — 99233 SBSQ HOSP IP/OBS HIGH 50: CPT | Performed by: INTERNAL MEDICINE

## 2019-07-06 PROCEDURE — 97110 THERAPEUTIC EXERCISES: CPT

## 2019-07-06 RX ADMIN — INSULIN LISPRO 1 UNITS: 100 INJECTION, SOLUTION INTRAVENOUS; SUBCUTANEOUS at 21:49

## 2019-07-06 RX ADMIN — INSULIN LISPRO 1 UNITS: 100 INJECTION, SOLUTION INTRAVENOUS; SUBCUTANEOUS at 11:51

## 2019-07-06 RX ADMIN — ISOSORBIDE DINITRATE 10 MG: 10 TABLET ORAL at 08:43

## 2019-07-06 RX ADMIN — LEVOTHYROXINE SODIUM 137 MCG: 112 TABLET ORAL at 05:23

## 2019-07-06 RX ADMIN — VENLAFAXINE 50 MG: 50 TABLET ORAL at 10:14

## 2019-07-06 RX ADMIN — AMIODARONE HYDROCHLORIDE 200 MG: 200 TABLET ORAL at 11:51

## 2019-07-06 RX ADMIN — MELATONIN 6 MG: at 21:45

## 2019-07-06 RX ADMIN — ISOSORBIDE DINITRATE 10 MG: 10 TABLET ORAL at 13:19

## 2019-07-06 RX ADMIN — APIXABAN 2.5 MG: 2.5 TABLET, FILM COATED ORAL at 17:37

## 2019-07-06 RX ADMIN — HYDRALAZINE HYDROCHLORIDE 10 MG: 10 TABLET, FILM COATED ORAL at 21:45

## 2019-07-06 RX ADMIN — CARVEDILOL 3.12 MG: 3.12 TABLET, FILM COATED ORAL at 08:44

## 2019-07-06 RX ADMIN — AMIODARONE HYDROCHLORIDE 200 MG: 200 TABLET ORAL at 08:45

## 2019-07-06 RX ADMIN — INSULIN LISPRO 1 UNITS: 100 INJECTION, SOLUTION INTRAVENOUS; SUBCUTANEOUS at 17:35

## 2019-07-06 RX ADMIN — VENLAFAXINE 50 MG: 50 TABLET ORAL at 21:47

## 2019-07-06 RX ADMIN — APIXABAN 2.5 MG: 2.5 TABLET, FILM COATED ORAL at 08:43

## 2019-07-06 RX ADMIN — CARVEDILOL 3.12 MG: 3.12 TABLET, FILM COATED ORAL at 17:37

## 2019-07-06 RX ADMIN — HYDRALAZINE HYDROCHLORIDE 10 MG: 10 TABLET, FILM COATED ORAL at 05:26

## 2019-07-06 RX ADMIN — HYDRALAZINE HYDROCHLORIDE 10 MG: 10 TABLET, FILM COATED ORAL at 13:19

## 2019-07-06 RX ADMIN — AMIODARONE HYDROCHLORIDE 200 MG: 200 TABLET ORAL at 17:37

## 2019-07-06 RX ADMIN — ISOSORBIDE DINITRATE 10 MG: 10 TABLET ORAL at 17:37

## 2019-07-06 RX ADMIN — LORATADINE 10 MG: 10 TABLET ORAL at 08:44

## 2019-07-06 NOTE — ASSESSMENT & PLAN NOTE
Wt Readings from Last 3 Encounters:   07/06/19 68 5 kg (151 lb)   06/18/19 81 kg (178 lb 9 6 oz)   03/20/19 80 1 kg (176 lb 9 6 oz)     Cardiogenic shock, resolved  Initial EF 25- 30% improving to 45%  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  Negative fluid balance, diuretics on hold due to WANG  On hydralazine, Coreg and Imdur  Continue Fluid restriction, low sodium diet  Cardiology is following

## 2019-07-06 NOTE — PROGRESS NOTES
Cardiology Progress Note - Ritika Hash 66 y o  female MRN: [id number]    Unit/Bed#: [facility] 508-01 Encounter: [id number]        Subjective:   Feeling better  Fatigued  Review of Systems   Constitution: Positive for malaise/fatigue  Cardiovascular: Negative for chest pain, leg swelling and palpitations  Respiratory: Negative for shortness of breath  Objective:   Vitals: Blood pressure 128/56, pulse 56, temperature 98 3 °F (36 8 °C), temperature source Oral, resp  rate 18, height 5' 1" (1 549 m), weight 68 5 kg (151 lb), SpO2 98 %  , Body mass index is 28 53 kg/m² ,   Orthostatic Blood Pressures      Most Recent Value   Blood Pressure  128/56 filed at 07/06/2019 0719   Patient Position - Orthostatic VS  Lying filed at 07/05/2019 4394         Systolic (83VTW), AGX:489 , Min:98 , LJM:459     Diastolic (25UAU), CFS:78, Min:56, Max:61      Intake/Output Summary (Last 24 hours) at 7/6/2019 0923  Last data filed at 7/6/2019 0758  Gross per 24 hour   Intake 320 ml   Output 1589 ml   Net -1269 ml     Weight (last 2 days)     Date/Time   Weight    07/06/19 0523   68 5 (151)    07/05/19 0202   69 5 (153 22)    07/04/19 0900   72 7 (160 2)                  Telemetry Review: NSR   Physical Exam   Cardiovascular: Normal rate, regular rhythm and normal heart sounds  Exam reveals no gallop and no friction rub  No murmur heard  Pulmonary/Chest: Breath sounds normal  She has no wheezes  She has no rales  Musculoskeletal: She exhibits no edema           Laboratory Results:        CBC with diff:   Results from last 7 days   Lab Units 07/05/19  0504 07/02/19  0600 07/01/19  0524 06/30/19  0516   WBC Thousand/uL 12 34* 13 67* 13 33* 13 26*   HEMOGLOBIN g/dL 15 2 13 8 12 3 12 1   HEMATOCRIT % 44 7 39 3 34 9 34 4*   MCV fL 97 92 91 91   PLATELETS Thousands/uL 183 188 178 191   MCH pg 32 8 32 4 32 2 31 9   MCHC g/dL 34 0 35 1 35 2 35 2   RDW % 14 0 12 9 12 3 12 3   MPV fL 10 6 11 2 11 2 11 1         CMP:  Results from last 7 days   Lab Units 07/06/19  0545 07/05/19  0504 07/04/19  0530 07/03/19  2155 07/03/19  0312 07/02/19  0600 07/01/19  1609  07/01/19  0524  06/30/19  0516   POTASSIUM mmol/L 3 5 3 8 4 0 3 7 4 1 3 4* 3 5   < > 3 8   < > 3 7   CHLORIDE mmol/L 92* 92* 91* 90* 87* 83* 84*   < > 85*   < > 84*   CO2 mmol/L 37* 36* 34* 35* 37* 36* 34*   < > 34*   < > 29   BUN mg/dL 102* 99* 100* 98* 108* 110* 97*   < > 88*   < > 85*   CREATININE mg/dL 3 07* 3 33* 3 85* 3 99* 4 48* 4 82* 4 94*   < > 4 95*   < > 4 78*   CALCIUM mg/dL 9 2 9 2 8 9 8 9 9 2 8 8 8 4   < > 8 4   < > 8 2*   AST U/L  --   --  87*  --   --   --   --   --  345*  --  689*   ALT U/L  --   --  782*  --   --   --   --   --  1,723*  --  2,432*   ALK PHOS U/L  --   --  136*  --   --   --   --   --  146*  --  147*   EGFR ml/min/1 73sq m 14 13 11 10 9 8 8   < > 8   < > 8    < > = values in this interval not displayed  BMP:  Results from last 7 days   Lab Units 07/06/19  0545 07/05/19  0504 07/04/19  0530 07/03/19 2155 07/03/19 0312 07/02/19  0600 07/01/19  1609   POTASSIUM mmol/L 3 5 3 8 4 0 3 7 4 1 3 4* 3 5   CHLORIDE mmol/L 92* 92* 91* 90* 87* 83* 84*   CO2 mmol/L 37* 36* 34* 35* 37* 36* 34*   BUN mg/dL 102* 99* 100* 98* 108* 110* 97*   CREATININE mg/dL 3 07* 3 33* 3 85* 3 99* 4 48* 4 82* 4 94*   CALCIUM mg/dL 9 2 9 2 8 9 8 9 9 2 8 8 8 4       BNP:     Magnesium:   Results from last 7 days   Lab Units 07/04/19  0530 07/03/19  2155 06/30/19  0516   MAGNESIUM mg/dL 2 0 1 9 2 5       Coags:   Results from last 7 days   Lab Units 07/04/19  0532 07/03/19  2344 07/03/19  1833 07/03/19  1121 07/03/19  0312 07/02/19  2033 07/02/19  1337 07/02/19  0600  07/01/19  0524 06/30/19  0516   PTT seconds 63*  --  69* 82* 109* 52* 50* 95*   < >  --   --    INR   --  1 60*  --   --   --   --   --   --   --  2 16* 2 79*    < > = values in this interval not displayed           Cardiac testing:   Results for orders placed during the hospital encounter of 06/18/19   Echo complete with contrast if indicated    Narrative [facility]  [facility] [place], [address] [facility]  [phone]    Transthoracic Echocardiogram  2D, M-mode, Doppler, and Color Doppler    Study date:  2019    Patient: Gaby Walton  MR number: [id number]  Account number: [de-identified]  : 1940  Age: 66 years  Gender: Female  Status: Inpatient  Location: Bedside  Height: 61 in  Weight: 175 lb  BP: 92/ 44 mmHg    Indications: Hypotension    Diagnoses: I95 9 - Hypotension, unspecified    Sonographer:  PEEWEE Fuentes  Primary Physician:  Joanne Mcnair MD  Referring Physician:  SOTO Martin  Group:  [facility]  Interpreting Physician:  Alisson Walsh MD    SUMMARY    LEFT VENTRICLE:  Size was at the upper limits of normal   Systolic function was moderately reduced  Ejection fraction was estimated to be 42 %  There was moderate diffuse hypokinesis  There was no evidence of concentric hypertrophy  VENTRICULAR SEPTUM:  There was dyssynergic motion  RIGHT VENTRICLE:  The ventricle was mildly to moderately dilated  Systolic function was mildly to moderately reduced  Wall thickness was increased  LEFT ATRIUM:  The atrium was moderately dilated  RIGHT ATRIUM:  The atrium was moderately dilated  MITRAL VALVE:  There was mild annular calcification  There was moderate regurgitation  TRICUSPID VALVE:  There was moderate regurgitation  PULMONIC VALVE:  There was trace regurgitation  HISTORY: PRIOR HISTORY: DM2, HTN, HLD, Afib, CHF    PROCEDURE: The procedure was performed at the bedside  This was an urgent study  The transthoracic approach was used  The study included complete 2D imaging, M-mode, complete spectral Doppler, and color Doppler  The heart rate was 117 bpm,  at the start of the study  Images were obtained from the parasternal, apical, subcostal, and suprasternal notch acoustic windows   Echocardiographic views were limited due to restricted patient mobility  This was a technically difficult  study  LEFT VENTRICLE: Size was at the upper limits of normal  Systolic function was moderately reduced  Ejection fraction was estimated to be 42 %  There was moderate diffuse hypokinesis  Wall thickness was normal  There was no evidence of  concentric hypertrophy  DOPPLER: The study was not technically sufficient to allow evaluation of LV diastolic function  VENTRICULAR SEPTUM: There was dyssynergic motion  RIGHT VENTRICLE: The ventricle was mildly to moderately dilated  Systolic function was mildly to moderately reduced  Wall thickness was increased  LEFT ATRIUM: The atrium was moderately dilated  RIGHT ATRIUM: The atrium was moderately dilated  MITRAL VALVE: There was mild annular calcification  Valve structure was normal  There was normal leaflet separation  DOPPLER: The transmitral velocity was within the normal range  There was no evidence for stenosis  There was moderate  regurgitation  AORTIC VALVE: The valve was trileaflet  Leaflets exhibited normal thickness and normal cuspal separation  DOPPLER: Transaortic velocity was within the normal range  There was no evidence for stenosis  There was no regurgitation  TRICUSPID VALVE: The valve structure was normal  There was normal leaflet separation  DOPPLER: The transtricuspid velocity was within the normal range  There was no evidence for stenosis  There was moderate regurgitation  Pulmonary artery  systolic pressure was mildly increased  PULMONIC VALVE: Leaflets exhibited normal thickness, no calcification, and normal cuspal separation  DOPPLER: The transpulmonic velocity was within the normal range  There was trace regurgitation  PERICARDIUM: There was no pericardial effusion  The pericardium was normal in appearance  AORTA: The root exhibited normal size      SYSTEM MEASUREMENT TABLES    2D  LVEDV MOD A4C: 81 79 ml  LVEF MOD A4C: 64 57 %  LVESV MOD A4C: 28 98 ml  LVLd A4C: 7 08 cm  LVLs A4C: 5 89 cm  SV MOD A4C: 52 81 ml    CW  TR Vmax: 2 65 m/s  TR maxP 14 mmHg    [facility]    Prepared and electronically signed by    Hernando Barton MD  Signed 2019 07:10:27       Results for orders placed during the hospital encounter of 19   EBEN    Narrative [address]  [phone]    Transesophageal Echocardiogram  2D, Doppler, and Color Doppler    Study date:  2019    Patient: Maddy Sarkar  MR number: [id number]  Account number: [de-identified]  : 1940  Age: 66 years  Gender: Female  Status: Inpatient  Location: Echo lab  Height: 61 in  Weight: 181 lb  BP: 125/ 83 mmHg    Indications: Atrial-Fib; Cardioversion    Diagnoses: 427 31 - ATRIAL FIBRILLATION    Sonographer:  PEEWEE Amezquita  Interpreting Physician:  Yudith Laureano MD  Primary Physician:  Richie Long MD  Referring Physician:  SOTO Moreno  Group:  [facility]  RN:  Jair Fletcher RN    SUMMARY    LEFT VENTRICLE:  The ventricle was dilated  Systolic function was markedly reduced  Ejection fraction was estimated to be 25 %  There was severe diffuse hypokinesis  RIGHT VENTRICLE:  The ventricle was dilated  Systolic function was mildly to moderately reduced  LEFT ATRIUM:  The atrium was moderately dilated  No thrombus was identified  LEFT ATRIAL APPENDAGE:  The appendage was dilated  No thrombus was identified  There was mild intermittent spontaneous echo contrast ("smoke") in the appendage  ATRIAL SEPTUM:  No defect or patent foramen ovale was identified  RIGHT ATRIUM:  The atrium was dilated  MITRAL VALVE:  There was moderate regurgitation  TRICUSPID VALVE:  There was moderate regurgitation  PERICARDIUM:  A small, free-flowing pericardial effusion was identified   There was no evidence of hemodynamic compromise  HISTORY: PRIOR HISTORY: HTN; HLD; DM2; A-Fib; Acute Congestive Heart Failure    PROCEDURE: The procedure was performed in the echo lab  This was a routine study  The risks and alternatives of the procedure were explained to the patient and informed consent was obtained  The transesophageal approach was used  The study  included complete 2D imaging, complete spectral Doppler, and color Doppler  The heart rate was 125 bpm, at the start of the study  An adult omniplane probe was inserted by the attending cardiologist  Intubated with ease  Two intubation  attempt(s)  There was no blood detected on the probe  Image quality was good  There were no complications during the procedure  MEDICATIONS: Anesthesia administered by anesthesia team     LEFT VENTRICLE: The ventricle was dilated  Systolic function was markedly reduced  Ejection fraction was estimated to be 25 %  There was severe diffuse hypokinesis  Wall thickness was normal  No evidence of apical thrombus  RIGHT VENTRICLE: The ventricle was dilated  Systolic function was mildly to moderately reduced  Wall thickness was normal     LEFT ATRIUM: The atrium was moderately dilated  No thrombus was identified  APPENDAGE: The appendage was dilated  No thrombus was identified  There was mild intermittent spontaneous echo contrast ("smoke") in the appendage  ATRIAL SEPTUM: No defect or patent foramen ovale was identified  RIGHT ATRIUM: The atrium was dilated  MITRAL VALVE: Valve structure was normal  There was normal leaflet separation  DOPPLER: The transmitral velocity was within the normal range  There was no evidence for stenosis  There was moderate regurgitation  AORTIC VALVE: The valve was trileaflet  Leaflets exhibited normal thickness, normal cuspal separation, and sclerosis  DOPPLER: Transaortic velocity was within the normal range  There was no evidence for stenosis   There was no significant  regurgitation  TRICUSPID VALVE: The valve structure was normal  There was normal leaflet separation  DOPPLER: There was moderate regurgitation  PULMONIC VALVE: Leaflets exhibited normal thickness, no calcification, and normal cuspal separation  DOPPLER: The transpulmonic velocity was within the normal range  There was no significant regurgitation  PERICARDIUM: A small, free-flowing pericardial effusion was identified  There was no evidence of hemodynamic compromise  The pericardium was normal in appearance  AORTA: The root exhibited normal size  The ascending aorta was normal in size  SYSTEMIC VEINS: IVC: The inferior vena cava was normal in size      MEASUREMENT TABLES    DOPPLER MEASUREMENTS  Left atrium   (Reference normals)  ROHAN peak yaneth   20 cm/s   (--)    [facility] Accredited [facility]chocardiography Laboratory    Prepared and electronically signed by    Paramjit Hamilton MD  Signed 21-Jun-2019 15:50:13           Meds/Allergies     Current Facility-Administered Medications:  acetaminophen 650 mg Oral Q4H PRN Christine Aranda Jr, PA-C   albuterol 2 puff Inhalation Q4H PRN Christine Taylor PA-C   amiodarone 200 mg Oral TID With Meals Christine Tyalor PA-C   apixaban 2 5 mg Oral BID Tk Solis MD   bisacodyl 10 mg Rectal Daily PRN Zandra Mckeon PA-C   carvedilol 3 125 mg Oral BID With Meals Tk Solis MD   hydrALAZINE 10 mg Oral Q8H [facility] Mlii Richards PA-C   insulin lispro 1-5 Units Subcutaneous HS Christine Aranda Jr, PA-C   insulin lispro 1-6 Units Subcutaneous TID AC Christine Aranda Jr, PA-C   isosorbide dinitrate 10 mg Oral TID after meals Deya Baptism, STEPHEN   levothyroxine 137 mcg Oral Daily Christine Aranda Jr, PA-C   loratadine 10 mg Oral Daily Christine Aranda Jr, PA-C   melatonin 6 mg Oral HS Christine Aranda Jr, PA-C   ondansetron 4 mg Intravenous Q6H PRN Christine Aranda Jr, PA-C   phenol 2 spray Mouth/Throat Q2H PRN Rebecca Yeboah PA-C   polyethylene glycol 17 g Oral Daily Rajendra Barragan PA-C   senna 1 tablet Oral HS Rajendra Lockett [facility] STEPHEN Handy   sodium chloride 2 spray Each Nare PRN Rajendra Barragan PA-C   venlafaxine 50 mg Oral Q12H Rajendra Barragan PA-C        Medications Prior to Admission   Medication    albuterol (VENTOLIN HFA) 90 mcg/act inhaler    Ascorbic Acid (VITAMIN C) 100 MG tablet    cholecalciferol (VITAMIN D3) 1,000 units tablet    cyanocobalamin 100 MCG tablet    fexofenadine (ALLEGRA) 30 MG tablet    Garlic 10 MG CAPS    levothyroxine 137 mcg tablet    losartan-hydrochlorothiazide (HYZAAR) 100-12 5 MG per tablet    magnesium 30 MG tablet    Misc Natural Products (TURMERIC CURCUMIN) CAPS    multivitamin (THERAGRAN) TABS    Omega-3 Fatty Acids (FISH OIL) 1,000 mg    pyridoxine (B-6) 100 MG tablet    simvastatin (ZOCOR) 20 mg tablet    SUPER B COMPLEX/C CAPS    venlafaxine (EFFEXOR) 75 mg tablet    vitamin E, tocopherol, 1,000 units capsule       Assessment:  Principal Problem:    Acute CHF (congestive heart failure) ([facility])  Active Problems:    Hypertension    Hypothyroidism    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

## 2019-07-06 NOTE — PROGRESS NOTES
NEPHROLOGY PROGRESS NOTE   Gilma Velasquez 66 y o  female MRN: [id number]  Unit/Bed#: [facility] 508-01 Encounter: [id number]      ASSESSMENT & PLAN:  1  Acute kidney injury secondary to hypotensive/ischemic ATN plus component of cardiorenal syndrome in the setting of cardiogenic shock with normal baseline creatinine around 0 6-0 9  Renal function continues to improve, creatinine peak at 4 95 on 7/1 down to 3 07 today  Good urine output off diuretics  Avoid hypotension, follow serial labs, follow daily with    2  Cardiogenic shock, acute on chronic systolic biventricular heart failure cardiomyopathy with new onset EF 25-30%, new onset AFib, status post cardioversion  Further management per cardiology team     3  Hyponatremia in the setting of volume overload, cardiogenic shock, acute renal failure inability to excrete free water  Serum sodium stable at 134 , continue fluid restriction     4  Transaminitis secondary to shock liver in the setting of cardiogenic shock, LFTs are trending down  SUBJECTIVE:  Patient seen and examined, denies any chest pain or shortness of breath, feeling tired in general improving      OBJECTIVE:  Current Weight: Weight - Scale: 68 5 kg (151 lb)  Vitals:    07/06/19 0719   BP: 128/56   Pulse: 56   Resp: 18   Temp: 98 3 °F (36 8 °C)   SpO2: 98%       Intake/Output Summary (Last 24 hours) at 7/6/2019 8460  Last data filed at 7/6/2019 3139  Gross per 24 hour   Intake 320 ml   Output 1589 ml   Net -1269 ml     General: conscious, cooperative, in not acute distress  Eyes: conjunctivae pink, anicteric sclerae  ENT: lips and mucous membranes moist  Neck: supple, no JVD  Chest: clear breath sounds bilateral, no crackles, ronchus or wheezings  CVS: distinct S1 & S2, normal rate, regular rhythm  Abdomen: soft, non-tender, non-distended, normoactive bowel sounds  Extremities: no edema of both legs  Skin: no rash  Neuro: awake, alert, oriented      Medications:    Current Facility-Administered Medications:     acetaminophen (TYLENOL) tablet 650 mg, 650 mg, Oral, Q4H PRN, Alessandro Aranda Jr, PA-C, 650 mg at 06/28/19 1210    albuterol (PROVENTIL HFA,VENTOLIN HFA) inhaler 2 puff, 2 puff, Inhalation, Q4H PRN, Alessandro Barragan PA-C, 2 puff at 07/05/19 0802    amiodarone tablet 200 mg, 200 mg, Oral, TID With Meals, Lynda Liu PA-C, 200 mg at 07/06/19 0845    apixaban (ELIQUIS) tablet 2 5 mg, 2 5 mg, Oral, BID, Kelly Rice MD, 2 5 mg at 07/06/19 2840    bisacodyl (DULCOLAX) rectal suppository 10 mg, 10 mg, Rectal, Daily PRN, Alessandro Barragan PA-C    carvedilol (COREG) tablet 3 125 mg, 3 125 mg, Oral, BID With Meals, Kelly Rice MD, 3 125 mg at 07/06/19 0844    hydrALAZINE (APRESOLINE) tablet 10 mg, 10 mg, Oral, Q8H [address], Mili Alejandra PA-C, 10 mg at 07/06/19 0526    insulin lispro (HumaLOG) 100 units/mL subcutaneous injection 1-5 Units, 1-5 Units, Subcutaneous, HS, Alessandro Aranda Jr, PA-C, 1 Units at 07/05/19 2159    insulin lispro (HumaLOG) 100 units/mL subcutaneous injection 1-6 Units, 1-6 Units, Subcutaneous, TID AC, 1 Units at 07/05/19 1647 **AND** Fingerstick Glucose (POCT), , , TID AC, Lynda Liu PA-C    isosorbide dinitrate (ISORDIL) tablet 10 mg, 10 mg, Oral, TID after meals, Dasha Gunderson PA-C, 10 mg at 07/06/19 0400    levothyroxine tablet 137 mcg, 137 mcg, Oral, Daily, Alessandro Aranda Jr, PA-C, 137 mcg at 07/06/19 0523    loratadine (CLARITIN) tablet 10 mg, 10 mg, Oral, Daily, Alessandro Aranda Jr, PA-C, 10 mg at 07/06/19 0844    melatonin tablet 6 mg, 6 mg, Oral, HS, Alessandro Aranda Jr, PA-C, 6 mg at 07/05/19 2155    ondansetron [facility]) injection 4 mg, 4 mg, Intravenous, Q6H PRN, Alessandro Aranda Jr, PA-C, 4 mg at 06/27/19 2124    phenol (CHLORASEPTIC) 1 4 % mucosal liquid 2 spray, 2 spray, Mouth/Throat, Q2H PRN, Lynda Liu PA-C    polyethylene glycol (MIRALAX) packet 17 g, 17 g, Oral, Daily, Alessandro Aranda Edgardo Casey PA-C, 17 g at 07/05/19 0954    senna (SENOKOT) tablet 8 6 mg, 1 tablet, Oral, HS, Jack Drytra Aranda Jr, PA-C, 8 6 mg at 07/05/19 2155    sodium chloride (OCEAN) 0 65 % nasal spray 2 spray, 2 spray, Each Nare, PRN, Barbra Lee PA-C, 2 spray at 06/30/19 1218    venlafaxine [facility]) tablet 50 mg, 50 mg, Oral, Q12H, Christianan Dryer Rosi Handy PA-C, 50 mg at 07/05/19 2155    Invasive Devices:   Urethral Catheter Temperature probe 16 Fr   (Active)   Amt returned on insertion(mL) 10 mL 6/27/2019 12:00 AM   Reasons to continue Urinary Catheter  Accurate I&O assessment in critically ill patients (48 hr  max) 6/30/2019 10:44 AM   Goal for Removal No longer needed- Will place order to discontinue 6/30/2019 10:44 AM   Site Assessment Clean;Skin intact 7/1/2019  8:00 AM   Collection Container Standard drainage bag 7/1/2019  8:00 AM   Securement Method Securing device (Describe) 7/1/2019  8:00 AM   Output (mL) 500 mL 7/1/2019  9:57 AM       Lab Results:   Results from last 7 days   Lab Units 07/06/19  0545 07/05/19  0504 07/04/19  0530 07/03/19  2155  07/02/19  0600  07/01/19  0524  06/30/19  0516   WBC Thousand/uL  --  12 34*  --   --   --  13 67*  --  13 33*  --  13 26*   HEMOGLOBIN g/dL  --  15 2  --   --   --  13 8  --  12 3  --  12 1   HEMATOCRIT %  --  44 7  --   --   --  39 3  --  34 9  --  34 4*   PLATELETS Thousands/uL  --  183  --   --   --  188  --  178  --  191   SODIUM mmol/L 134* 135* 134* 133*   < > 130*   < > 127*   < > 123*   POTASSIUM mmol/L 3 5 3 8 4 0 3 7   < > 3 4*   < > 3 8   < > 3 7   CHLORIDE mmol/L 92* 92* 91* 90*   < > 83*   < > 85*   < > 84*   CO2 mmol/L 37* 36* 34* 35*   < > 36*   < > 34*   < > 29   BUN mg/dL 102* 99* 100* 98*   < > 110*   < > 88*   < > 85*   CREATININE mg/dL 3 07* 3 33* 3 85* 3 99*   < > 4 82*   < > 4 95*   < > 4 78*   CALCIUM mg/dL 9 2 9 2 8 9 8 9   < > 8 8   < > 8 4   < > 8 2*   MAGNESIUM mg/dL  --   --  2 0 1 9  --   --   --   --   --  2 5   ALK PHOS U/L  --   --  136* --   --   --   --  146*  --  147*   ALT U/L  --   --  782*  --   --   --   --  1,723*  --  2,432*   AST U/L  --   --  87*  --   --   --   --  345*  --  689*    < > = values in this interval not displayed  Previous work up:  Please see previous notes      Portions of the record may have been created with voice recognition software  Occasional wrong word or "sound a like" substitutions may have occurred due to the inherent limitations of voice recognition software  Read the chart carefully and recognize, using context, where substitutions have occurred  If you have any questions, please contact the dictating provider

## 2019-07-06 NOTE — PLAN OF CARE
Problem: PAIN - ADULT  Goal: Verbalizes/displays adequate comfort level or baseline comfort level  Description  Interventions:  - Encourage patient to monitor pain and request assistance  - Assess pain using appropriate pain scale  - Administer analgesics based on type and severity of pain and evaluate response  - Implement non-pharmacological measures as appropriate and evaluate response  - Consider cultural and social influences on pain and pain management  - Notify physician/advanced practitioner if interventions unsuccessful or patient reports new pain  Outcome: Progressing     Problem: INFECTION - ADULT  Goal: Absence or prevention of progression during hospitalization  Description  INTERVENTIONS:  - Assess and monitor for signs and symptoms of infection  - Monitor lab/diagnostic results  - Monitor all insertion sites, i e  indwelling lines, tubes, and drains  - Monitor endotracheal (as able) and nasal secretions for changes in amount and color  - [place] appropriate cooling/warming therapies per order  - Administer medications as ordered  - Instruct and encourage patient and family to use good hand hygiene technique  - Identify and instruct in appropriate isolation precautions for identified infection/condition  Outcome: Progressing  Goal: Absence of fever/infection during neutropenic period  Description  INTERVENTIONS:  - Monitor WBC  - Implement neutropenic guidelines  Outcome: Progressing     Problem: SAFETY ADULT  Goal: Patient will remain free of falls  Description  INTERVENTIONS:  - Assess patient frequently for physical needs  -  Identify cognitive and physical deficits and behaviors that affect risk of falls    -  [place] fall precautions as indicated by assessment   - Educate patient/family on patient safety including physical limitations  - Instruct patient to call for assistance with activity based on assessment  - Modify environment to reduce risk of injury  - Consider OT/PT consult to assist with strengthening/mobility  Outcome: Progressing  Goal: Maintain or return to baseline ADL function  Description  INTERVENTIONS:  -  Assess patient's ability to carry out ADLs; assess patient's baseline for ADL function and identify physical deficits which impact ability to perform ADLs (bathing, care of mouth/teeth, toileting, grooming, dressing, etc )  - Assess/evaluate cause of self-care deficits   - Assess range of motion  - Assess patient's mobility; develop plan if impaired  - Assess patient's need for assistive devices and provide as appropriate  - Encourage maximum independence but intervene and supervise when necessary  ¯ Involve family in performance of ADLs  ¯ Assess for home care needs following discharge   ¯ Request OT consult to assist with ADL evaluation and planning for discharge  ¯ Provide patient education as appropriate  Outcome: Progressing  Goal: Maintain or return mobility status to optimal level  Description  INTERVENTIONS:  - Assess patient's baseline mobility status (ambulation, transfers, stairs, etc )    - Identify cognitive and physical deficits and behaviors that affect mobility  - Identify mobility aids required to assist with transfers and/or ambulation (gait belt, sit-to-stand, lift, walker, cane, etc )  - [place] fall precautions as indicated by assessment  - Record patient progress and toleration of activity level on Mobility SBAR; progress patient to next Phase/Stage  - Instruct patient to call for assistance with activity based on assessment  - Request Rehabilitation consult to assist with strengthening/weightbearing, etc   Outcome: Progressing     Problem: DISCHARGE PLANNING  Goal: Discharge to home or other facility with appropriate resources  Description  INTERVENTIONS:  - Identify barriers to discharge w/patient and caregiver  - Arrange for needed discharge resources and transportation as appropriate  - Identify discharge learning needs (meds, wound care, etc )  - Arrange for interpretive services to assist at discharge as needed  - Refer to Case Management Department for coordinating discharge planning if the patient needs post-hospital services based on physician/advanced practitioner order or complex needs related to functional status, cognitive ability, or social support system  Outcome: Progressing     Problem: Knowledge Deficit  Goal: Patient/family/caregiver demonstrates understanding of disease process, treatment plan, medications, and discharge instructions  Description  Complete learning assessment and assess knowledge base  Interventions:  - Provide teaching at level of understanding  - Provide teaching via preferred learning methods  Outcome: Progressing     Problem: Potential for Falls  Goal: Patient will remain free of falls  Description  INTERVENTIONS:  - Assess patient frequently for physical needs  -  Identify cognitive and physical deficits and behaviors that affect risk of falls  -  [place] fall precautions as indicated by assessment   - Educate patient/family on patient safety including physical limitations  - Instruct patient to call for assistance with activity based on assessment  - Modify environment to reduce risk of injury  - Consider OT/PT consult to assist with strengthening/mobility  Outcome: Progressing     Problem: CARDIOVASCULAR - ADULT  Goal: Maintains optimal cardiac output and hemodynamic stability  Description  INTERVENTIONS:  - Monitor I/O, vital signs and rhythm  - Monitor for S/S and trends of decreased cardiac output i e  bleeding, hypotension  - Administer and titrate ordered vasoactive medications to optimize hemodynamic stability  - Assess quality of pulses, skin color and temperature  - Assess for signs of decreased coronary artery perfusion - ex   Angina  - Instruct patient to report change in severity of symptoms  Outcome: Progressing  Goal: Absence of cardiac dysrhythmias or at baseline rhythm  Description  INTERVENTIONS:  - Continuous cardiac monitoring, monitor vital signs, obtain 12 lead EKG if indicated  - Administer antiarrhythmic and heart rate control medications as ordered  - Monitor electrolytes and administer replacement therapy as ordered  Outcome: Progressing     Problem: Prexisting or High Potential for Compromised Skin Integrity  Goal: Skin integrity is maintained or improved  Description  INTERVENTIONS:  - Identify patients at risk for skin breakdown  - Assess and monitor skin integrity  - Assess and monitor nutrition and hydration status  - Monitor labs (i e  albumin)  - Assess for incontinence   - Turn and reposition patient  - Assist with mobility/ambulation  - Relieve pressure over bony prominences  - Avoid friction and shearing  - Provide appropriate hygiene as needed including keeping skin clean and dry  - Evaluate need for skin moisturizer/barrier cream  - Collaborate with interdisciplinary team (i e  Nutrition, Rehabilitation, etc )   - Patient/family teaching  Outcome: Progressing     Problem: Nutrition/Hydration-ADULT  Goal: Nutrient/Hydration intake appropriate for improving, restoring or maintaining nutritional needs  Description  Monitor and assess patient's nutrition/hydration status for malnutrition (ex- brittle hair, bruises, dry skin, pale skin and conjunctiva, muscle wasting, smooth red tongue, and disorientation)  Collaborate with interdisciplinary team and initiate plan and interventions as ordered  Monitor patient's weight and dietary intake as ordered or per policy  Utilize nutrition screening tool and intervene per policy  Determine patient's food preferences and provide high-protein, high-caloric foods as appropriate       INTERVENTIONS:  - Monitor oral intake, urinary output, labs, and treatment plans  - Assess nutrition and hydration status and recommend course of action  - Evaluate amount of meals eaten  - Assist patient with eating if necessary   - Allow adequate time for meals  - Recommend/ encourage appropriate diets, oral nutritional supplements, and vitamin/mineral supplements  - Order, calculate, and assess calorie counts as needed  - Recommend, monitor, and adjust tube feedings and TPN/PPN based on assessed needs  - Assess need for intravenous fluids  - Provide specific nutrition/hydration education as appropriate  - Include patient/family/caregiver in decisions related to nutrition  Outcome: Progressing

## 2019-07-06 NOTE — ASSESSMENT & PLAN NOTE
· Secondary to ischemic ATN/cardiogenic syndrome [place]  · Improving  · Diuretics on hold  · Nephrology is following

## 2019-07-06 NOTE — PROGRESS NOTES
Progress Note - Rosmeryrupali Masters 1940, 66 y o  female MRN: [id number]    Unit/Bed#: [facility] [place] 508-01 Encounter: [id number]    Primary Care Provider: Mohan Herrera MD   Date and time admitted to hospital: 6/18/2019  4:35 PM        Acute on chronic combined systolic and diastolic CHF (congestive heart failure) (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 [address] )  Assessment & Plan  · Secondary to ischemic ATN/cardiogenic syndrome Vernida Actis  · Improving  · Diuretics on hold  · Nephrology is following      Shock liver  Assessment & Plan  · Transaminitis and coag study are slowly improving  · Continue to monitor  Type 2 diabetes mellitus with diabetic cataract [facility])  Assessment & Plan  Lab Results   Component Value Date    HGBA1C 5 9 (H) 03/18/2019       Recent Labs     07/05/19  1040 07/05/19  1538 07/05/19  2140 07/06/19  0710   POCGLU 228* 178* 150* 127       Blood Sugar Average: Last 72 hrs:  (P) 148 [id number]   · Diet controlled at home  · Glucose is acceptable  · Continue insulin sliding scale    Hypothyroidism  Assessment & Plan  · Continue Synthroid therapy    Hypertension  Assessment & Plan  Acceptable BP  · Continue to monitor              VTE Pharmacologic Prophylaxis:   Pharmacologic: Enoxaparin (Lovenox)  Mechanical VTE Prophylaxis in Place: Yes    Patient Centered Rounds: I have performed bedside rounds with nursing staff today      Discussions with Specialists or Other Care Team Provider:  Cardiology    Education and Discussions with Family / Patient:  Patient    Time Spent for Care: 30 minutes  More than 50% of total time spent on counseling and coordination of care as described above  Current Length of Stay: 18 day(s)    Current Patient Status: Inpatient   Certification Statement: The patient will continue to require additional inpatient hospital stay due to Management of CHF    Discharge Plan / Estimated Discharge Date:  Rehab when stable    Code Status: Level 1 - Full Code      Subjective:   Patient seen and examined  Comfortable in bed  Clinically improving  No event overnight    Objective:     Vitals:   Temp (24hrs), Av 7 °F (36 5 °C), Min:97 3 °F (36 3 °C), Max:98 3 °F (36 8 °C)    Temp:  [97 3 °F (36 3 °C)-98 3 °F (36 8 °C)] 98 3 °F (36 8 °C)  HR:  [53-65] 56  Resp:  [16-18] 18  BP: ()/(56-61) 128/56  SpO2:  [97 %-99 %] 98 %  Body mass index is 28 53 kg/m²  Input and Output Summary (last 24 hours): Intake/Output Summary (Last 24 hours) at 2019 1023  Last data filed at 2019 0758  Gross per 24 hour   Intake 320 ml   Output 1489 ml   Net -1169 ml       Physical Exam:     Physical Exam  Patient is awake alert in no acute distress  Lung with decreased breath sounds bilateral  Heart positive S1-S2 no murmur  Abdomen soft nontender  Lower extremities no edema  Additional Data:     Labs:    Results from last 7 days   Lab Units 19  0504   WBC Thousand/uL 12 34*   HEMOGLOBIN g/dL 15 2   HEMATOCRIT % 44 7   PLATELETS Thousands/uL 183     Results from last 7 days   Lab Units 19  0545  19  0530   POTASSIUM mmol/L 3 5   < > 4 0   CHLORIDE mmol/L 92*   < > 91*   CO2 mmol/L 37*   < > 34*   BUN mg/dL 102*   < > 100*   CREATININE mg/dL 3 07*   < > 3 85*   CALCIUM mg/dL 9 2   < > 8 9   ALK PHOS U/L  --   --  136*   ALT U/L  --   --  782*   AST U/L  --   --  87*    < > = values in this interval not displayed       Results from last 7 days   Lab Units 07/03/19  2344   INR  1 60*       * I Have Reviewed All Lab Data Listed Above  * Additional Pertinent Lab Tests Reviewed: Emi 66 Admission Reviewed    Imaging:    Imaging Reports Reviewed Today Include:   Imaging Personally Reviewed by Myself Includes:     Recent Cultures (last 7 days):           Last 24 Hours Medication List:     Current Facility-Administered Medications:  acetaminophen 650 mg Oral Q4H PRN Michelle Aranda Jr, PA-C   albuterol 2 puff Inhalation Q4H PRN Michelle Aranda Jr, PA-C   amiodarone 200 mg Oral TID With Meals Annie Alcantar PA-C   apixaban 2 5 mg Oral BID Nancy Corley MD   bisacodyl 10 mg Rectal Daily PRN Michelle Aranda Jr, PA-C   carvedilol 3 125 mg Oral BID With Meals Nancy Corley MD   hydrALAZINE 10 mg Oral Q8H [facility] cintron   insulin lispro 1-5 Units Subcutaneous HS Michelle Aranda Jr, PA-C   insulin lispro 1-6 Units Subcutaneous TID AC Michelle Aranda Jr, PA-C   isosorbide dinitrate 10 mg Oral TID after meals Ivelisse Munoz PA-C   levothyroxine 137 mcg Oral Daily Michelle Aranda Jr, PA-C   loratadine 10 mg Oral Daily Michelle Aranda Jr, PA-C   melatonin 6 mg Oral HS Michelle Aranda Jr, PA-C   ondansetron 4 mg Intravenous Q6H PRN Michelle Aranda Jr, PA-C   phenol 2 spray Mouth/Throat Q2H PRN Michelle Aranda Jr, PA-C   polyethylene glycol 17 g Oral Daily Michelle Barragan PA-C   senna 1 tablet Oral HS Michelle Aranda Jr, PA-C   sodium chloride 2 spray Each Nare PRN Michelle Aranda Jr, PA-C   venlafaxine 50 mg Oral Q12H Annie Alcantar PA-C        Today, Patient Was Seen By: Kori Heller DO    ** Please Note: This note has been constructed using a voice recognition system   **

## 2019-07-06 NOTE — ASSESSMENT & PLAN NOTE
Lab Results   Component Value Date    HGBA1C 5 9 (H) 03/18/2019       Recent Labs     07/05/19  1040 07/05/19  1538 07/05/19  2140 07/06/19  0710   POCGLU 228* 178* 150* 127       Blood Sugar Average: Last 72 hrs:  (P) 148 [id number]   · Diet controlled at home  · Glucose is acceptable  · Continue insulin sliding scale

## 2019-07-06 NOTE — PLAN OF CARE
Problem: PHYSICAL THERAPY ADULT  Goal: Performs mobility at highest level of function for planned discharge setting  See evaluation for individualized goals  Description  Treatment/Interventions: Functional transfer training, LE strengthening/ROM, Therapeutic exercise, Elevations, Endurance training, Bed mobility, Gait training  Equipment Recommended: Linard Bernheim       See flowsheet documentation for full assessment, interventions and recommendations  Outcome: Progressing  Note:   Prognosis: Fair  Problem List: Decreased strength, Decreased endurance, Impaired balance, Decreased mobility, Decreased coordination, Decreased cognition, Impaired judgement, Decreased safety awareness  Assessment: Patient seated in bed side recliner, agreeable to particiapte in therapy  She was able to perform transfers with mod A and use of RW  She ambualted 8 feet and transferred to bed side commode  Patient requires increase time for all activity and redirection to task  She would continue to benefit from skilled PT to maximize fucntional independence  Recommendation: (rehab)     PT - OK to Discharge: Yes(to rehab when medically stable)    See flowsheet documentation for full assessment

## 2019-07-06 NOTE — PHYSICAL THERAPY NOTE
Physical Therapy Progress Note      07/06/19 1125   Pain Assessment   Pain Assessment No/denies pain   Pain Score No Pain   Hospital Pain Intervention(s) Ambulation/increased activity;Repositioned   Response to Interventions Tolerated   Restrictions/Precautions   Weight Bearing Precautions Per Order No   Other Precautions Cognitive; Chair Alarm; Fall Risk   General   Chart Reviewed Yes   Response to Previous Treatment Patient with no complaints from previous session  Family/Caregiver Present No   Cognition   Arousal/Participation Alert   Comments Patient requires redirection to task   Transfers   Sit to Stand 4  Minimal assistance   Additional items Assist x 1; Armrests; Increased time required   Stand to Sit 4  Minimal assistance   Additional items Assist x 1; Armrests; Increased time required   Toilet transfer 3  Moderate assistance   Additional items Assist x 1; Armrests; Increased time required;Commode   Ambulation/Elevation   Gait pattern Excessively slow; Antalgic; Forward Flexion   Gait Assistance 4  Minimal assist   Additional items Assist x 1;Verbal cues; Tactile cues   Assistive Device Rolling walker   Distance 8 feet    Balance   Static Sitting Good   Dynamic Sitting Fair -   Static Standing Poor +   Endurance Deficit   Endurance Deficit Yes   Endurance Deficit Description Fatigue, weakness, cog   Activity Tolerance   Activity Tolerance Patient limited by fatigue   Nurse Made Aware Appropriate to see per RN   Exercises   Hip Flexion Sitting;10 reps;AROM; Bilateral   Hip Abduction Sitting;10 reps;AROM; Bilateral   Knee AROM Long Arc Quad Sitting;10 reps;AROM; Bilateral   Assessment   Prognosis Fair   Problem List Decreased strength;Decreased endurance; Impaired balance;Decreased mobility; Decreased coordination;Decreased cognition; Impaired judgement;Decreased safety awareness   Assessment Patient seated in bed side recliner, agreeable to particiapte in therapy  She was able to perform transfers with mod A and use of RW  She ambualted 8 feet and transferred to bed side commode  Patient requires increase time for all activity and redirection to task  She would continue to benefit from skilled PT to maximize fucntional independence  Goals   Patient Goals To get better   STG Expiration Date 07/19/19   Treatment Day 1   Plan   Treatment/Interventions Functional transfer training;LE strengthening/ROM; Therapeutic exercise; Endurance training;Gait training;Spoke to nursing   Progress Slow progress, decreased activity tolerance   PT Frequency   (3-5x a week)   Recommendation   Recommendation   (rehab)   Equipment Recommended Walker  (RW)   PT - OK to Discharge Yes  (to rehab when medically stable)       Keara Dillard, PTA

## 2019-07-07 PROBLEM — I50.9 ACUTE CHF (CONGESTIVE HEART FAILURE) (HCC): Status: RESOLVED | Noted: 2019-06-18 | Resolved: 2019-07-07

## 2019-07-07 LAB
ALBUMIN SERPL BCP-MCNC: 3 G/DL (ref 3.5–5)
ALP SERPL-CCNC: 117 U/L (ref 46–116)
ALT SERPL W P-5'-P-CCNC: 378 U/L (ref 12–78)
ANION GAP SERPL CALCULATED.3IONS-SCNC: 4 MMOL/L (ref 4–13)
AST SERPL W P-5'-P-CCNC: 87 U/L (ref 5–45)
BILIRUB SERPL-MCNC: 2.15 MG/DL (ref 0.2–1)
BUN SERPL-MCNC: 99 MG/DL (ref 5–25)
CALCIUM SERPL-MCNC: 9.4 MG/DL (ref 8.3–10.1)
CHLORIDE SERPL-SCNC: 90 MMOL/L (ref 100–108)
CO2 SERPL-SCNC: 37 MMOL/L (ref 21–32)
CREAT SERPL-MCNC: 2.94 MG/DL (ref 0.6–1.3)
GFR SERPL CREATININE-BSD FRML MDRD: 15 ML/MIN/1.73SQ M
GLUCOSE SERPL-MCNC: 106 MG/DL (ref 65–140)
GLUCOSE SERPL-MCNC: 150 MG/DL (ref 65–140)
GLUCOSE SERPL-MCNC: 180 MG/DL (ref 65–140)
GLUCOSE SERPL-MCNC: 211 MG/DL (ref 65–140)
GLUCOSE SERPL-MCNC: 97 MG/DL (ref 65–140)
POTASSIUM SERPL-SCNC: 3.8 MMOL/L (ref 3.5–5.3)
PROT SERPL-MCNC: 8.8 G/DL (ref 6.4–8.2)
SODIUM SERPL-SCNC: 131 MMOL/L (ref 136–145)

## 2019-07-07 PROCEDURE — 99232 SBSQ HOSP IP/OBS MODERATE 35: CPT | Performed by: INTERNAL MEDICINE

## 2019-07-07 PROCEDURE — 99233 SBSQ HOSP IP/OBS HIGH 50: CPT | Performed by: INTERNAL MEDICINE

## 2019-07-07 PROCEDURE — 99231 SBSQ HOSP IP/OBS SF/LOW 25: CPT | Performed by: INTERNAL MEDICINE

## 2019-07-07 PROCEDURE — 80053 COMPREHEN METABOLIC PANEL: CPT | Performed by: INTERNAL MEDICINE

## 2019-07-07 PROCEDURE — 82948 REAGENT STRIP/BLOOD GLUCOSE: CPT

## 2019-07-07 RX ORDER — INSULIN GLARGINE 100 [IU]/ML
10 INJECTION, SOLUTION SUBCUTANEOUS EVERY MORNING
Status: DISCONTINUED | OUTPATIENT
Start: 2019-07-07 | End: 2019-07-11 | Stop reason: HOSPADM

## 2019-07-07 RX ORDER — HYDRALAZINE HYDROCHLORIDE 25 MG/1
25 TABLET, FILM COATED ORAL EVERY 8 HOURS SCHEDULED
Status: DISCONTINUED | OUTPATIENT
Start: 2019-07-07 | End: 2019-07-11 | Stop reason: HOSPADM

## 2019-07-07 RX ORDER — ISOSORBIDE DINITRATE 20 MG/1
20 TABLET ORAL
Status: DISCONTINUED | OUTPATIENT
Start: 2019-07-07 | End: 2019-07-09

## 2019-07-07 RX ADMIN — INSULIN LISPRO 1 UNITS: 100 INJECTION, SOLUTION INTRAVENOUS; SUBCUTANEOUS at 21:09

## 2019-07-07 RX ADMIN — HYDRALAZINE HYDROCHLORIDE 25 MG: 25 TABLET ORAL at 13:21

## 2019-07-07 RX ADMIN — AMIODARONE HYDROCHLORIDE 200 MG: 200 TABLET ORAL at 11:22

## 2019-07-07 RX ADMIN — AMIODARONE HYDROCHLORIDE 200 MG: 200 TABLET ORAL at 17:07

## 2019-07-07 RX ADMIN — ISOSORBIDE DINITRATE 20 MG: 10 TABLET ORAL at 11:30

## 2019-07-07 RX ADMIN — AMIODARONE HYDROCHLORIDE 200 MG: 200 TABLET ORAL at 08:30

## 2019-07-07 RX ADMIN — INSULIN GLARGINE 10 UNITS: 100 INJECTION, SOLUTION SUBCUTANEOUS at 13:21

## 2019-07-07 RX ADMIN — INSULIN LISPRO 2 UNITS: 100 INJECTION, SOLUTION INTRAVENOUS; SUBCUTANEOUS at 11:24

## 2019-07-07 RX ADMIN — APIXABAN 2.5 MG: 2.5 TABLET, FILM COATED ORAL at 08:30

## 2019-07-07 RX ADMIN — VENLAFAXINE 50 MG: 50 TABLET ORAL at 21:09

## 2019-07-07 RX ADMIN — APIXABAN 2.5 MG: 2.5 TABLET, FILM COATED ORAL at 17:07

## 2019-07-07 RX ADMIN — HYDRALAZINE HYDROCHLORIDE 25 MG: 25 TABLET ORAL at 21:10

## 2019-07-07 RX ADMIN — VENLAFAXINE 50 MG: 50 TABLET ORAL at 11:00

## 2019-07-07 RX ADMIN — CARVEDILOL 3.12 MG: 3.12 TABLET, FILM COATED ORAL at 17:07

## 2019-07-07 RX ADMIN — INSULIN LISPRO 1 UNITS: 100 INJECTION, SOLUTION INTRAVENOUS; SUBCUTANEOUS at 17:05

## 2019-07-07 RX ADMIN — CARVEDILOL 3.12 MG: 3.12 TABLET, FILM COATED ORAL at 08:30

## 2019-07-07 RX ADMIN — LEVOTHYROXINE SODIUM 137 MCG: 112 TABLET ORAL at 05:49

## 2019-07-07 RX ADMIN — HYDRALAZINE HYDROCHLORIDE 10 MG: 10 TABLET, FILM COATED ORAL at 05:49

## 2019-07-07 RX ADMIN — LORATADINE 10 MG: 10 TABLET ORAL at 08:30

## 2019-07-07 RX ADMIN — MELATONIN 6 MG: at 21:09

## 2019-07-07 RX ADMIN — ISOSORBIDE DINITRATE 20 MG: 10 TABLET ORAL at 17:06

## 2019-07-07 RX ADMIN — ISOSORBIDE DINITRATE 10 MG: 10 TABLET ORAL at 08:30

## 2019-07-07 NOTE — PROGRESS NOTES
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 cardiomyopathy with new onset EF 25-30%, new onset AFib, status post cardioversion  Further management per cardiology team     3  Hyponatremia in the setting of volume overload, cardiogenic shock, acute renal failure inability to excrete free water  Serum sodium low at 131 from 01/30 4, decrease fluid restriction to 1 2 L daily  4  Transaminitis secondary to shock liver in the setting of cardiogenic shock, LFTs are trending down  SUBJECTIVE:  Patient seen and examined, feeling tired but in generally improving, denies any chest pain or shortness of Breath  No overnight issues as per nurse      OBJECTIVE:  Current Weight: Weight - Scale: 69 9 kg (154 lb)  Vitals:    07/07/19 0711   BP: 165/69   Pulse: 61   Resp: 18   Temp: 98 °F (36 7 °C)   SpO2: 98%       Intake/Output Summary (Last 24 hours) at 7/7/2019 0905  Last data filed at 7/7/2019 0816  Gross per 24 hour   Intake 760 ml   Output 1000 ml   Net -240 ml     General: conscious, cooperative, in not acute distress  Eyes: conjunctivae pink, anicteric sclerae  ENT: lips and mucous membranes moist  Neck: supple, no JVD  Chest: clear breath sounds bilateral, no crackles, ronchus or wheezings  CVS: distinct S1 & S2, normal rate, regular rhythm  Abdomen: soft, non-tender, non-distended, normoactive bowel sounds  Extremities: no edema of both legs  Skin: no rash  Neuro: awake, alert, oriented        Medications:    Current Facility-Administered Medications:     acetaminophen (TYLENOL) tablet 650 mg, 650 mg, Oral, Q4H PRN, Chandra Aranda Jr, PA-C, 650 mg at 06/28/19 1210    albuterol (PROVENTIL HFA,VENTOLIN HFA) inhaler 2 puff, 2 puff, Inhalation, Q4H PRN, Eh Boyd PA-C, 2 puff at 07/05/19 0802    amiodarone tablet 200 mg, 200 mg, Oral, TID With Meals, Eh Boyd PA-C, 200 mg at 07/07/19 0830    apixaban (ELIQUIS) tablet 2 5 mg, 2 5 mg, Oral, BID, Darius House MD, 2 5 mg at 07/07/19 0830    bisacodyl (DULCOLAX) rectal suppository 10 mg, 10 mg, Rectal, Daily PRN, Chandra Rice PA-C    carvedilol (COREG) tablet 3 125 mg, 3 125 mg, Oral, BID With Meals, Darius House MD, 3 125 mg at 07/07/19 0830    hydrALAZINE (APRESOLINE) tablet 25 mg, 25 mg, Oral, Q8H [facility], Darius House MD    insulin lispro (HumaLOG) 100 units/mL subcutaneous injection 1-5 Units, 1-5 Units, Subcutaneous, HS, Chandra Rice PA-C, 1 Units at 07/06/19 2149    insulin lispro (HumaLOG) 100 units/mL subcutaneous injection 1-6 Units, 1-6 Units, Subcutaneous, TID AC, 1 Units at 07/06/19 1735 **AND** Fingerstick Glucose (POCT), , , TID AC, Eh Boyd PA-C    isosorbide dinitrate (ISORDIL) tablet 20 mg, 20 mg, Oral, TID after meals, Darius House MD    levothyroxine tablet 137 mcg, 137 mcg, Oral, Daily, Chandra Rice PA-C, 137 mcg at 07/07/19 0549    loratadine (CLARITIN) tablet 10 mg, 10 mg, Oral, Daily, Chandra Aranda Jr, PA-C, 10 mg at 07/07/19 0830    melatonin tablet 6 mg, 6 mg, Oral, HS, Chandra Aranda Jr, PA-C, 6 mg at 07/06/19 2145    ondansetron (ZOFRAN) injection 4 mg, 4 mg, Intravenous, Q6H PRN, Chandra Aranda Jr, PA-C, 4 mg at 06/27/19 2124    phenol (CHLORASEPTIC) 1 4 % mucosal liquid 2 spray, 2 spray, Mouth/Throat, Q2H PRN, Eh Boyd PA-C    polyethylene glycol (MIRALAX) packet 17 g, 17 g, Oral, Daily, Camryn Denny PA-C, 17 g at 07/05/19 6730    senna (SENOKOT) tablet 8 6 mg, 1 tablet, Oral, HS, Servando Aranda Jr, PA-C, 8 6 mg at 07/05/19 2155    sodium chloride (OCEAN) 0 65 % nasal spray 2 spray, 2 spray, Each Nare, PRN, Camryn Denny PA-C, 2 spray at 06/30/19 1218    venlafaxine [facility]) tablet 50 mg, 50 mg, Oral, Q12H, Servando Aranda Jr, PA-C, 50 mg at 07/06/19 2147    Invasive Devices:   Urethral Catheter Temperature probe 16 Fr   (Active)   Amt returned on insertion(mL) 10 mL 6/27/2019 12:00 AM   Reasons to continue Urinary Catheter  Accurate I&O assessment in critically ill patients (48 hr  max) 6/30/2019 10:44 AM   Goal for Removal No longer needed- Will place order to discontinue 6/30/2019 10:44 AM   Site Assessment Clean;Skin intact 7/1/2019  8:00 AM   Collection Container Standard drainage bag 7/1/2019  8:00 AM   Securement Method Securing device (Describe) 7/1/2019  8:00 AM   Output (mL) 500 mL 7/1/2019  9:57 AM       Lab Results:   Results from last 7 days   Lab Units 07/07/19  0535 07/06/19  0545 07/05/19  0504 07/04/19  0530 07/03/19  2155  07/02/19  0600  07/01/19  0524   WBC Thousand/uL  --   --  12 34*  --   --   --  13 67*  --  13 33*   HEMOGLOBIN g/dL  --   --  15 2  --   --   --  13 8  --  12 3   HEMATOCRIT %  --   --  44 7  --   --   --  39 3  --  34 9   PLATELETS Thousands/uL  --   --  183  --   --   --  188  --  178   SODIUM mmol/L 131* 134* 135* 134* 133*   < > 130*   < > 127*   POTASSIUM mmol/L 3 8 3 5 3 8 4 0 3 7   < > 3 4*   < > 3 8   CHLORIDE mmol/L 90* 92* 92* 91* 90*   < > 83*   < > 85*   CO2 mmol/L 37* 37* 36* 34* 35*   < > 36*   < > 34*   BUN mg/dL 99* 102* 99* 100* 98*   < > 110*   < > 88*   CREATININE mg/dL 2 94* 3 07* 3 33* 3 85* 3 99*   < > 4 82*   < > 4 95*   CALCIUM mg/dL 9 4 9 2 9 2 8 9 8 9   < > 8 8   < > 8 4   MAGNESIUM mg/dL  --   --   --  2 0 1 9  --   --   --   --    ALK PHOS U/L 117*  --   --  136*  --   --   --   --  146*   ALT U/L 378*  -- --  782*  --   --   --   --  1,723*   AST U/L 87*  --   --  87*  --   --   --   --  345*    < > = values in this interval not displayed  Previous work up:  Please see previous notes      Portions of the record may have been created with voice recognition software  Occasional wrong word or "sound a like" substitutions may have occurred due to the inherent limitations of voice recognition software  Read the chart carefully and recognize, using context, where substitutions have occurred  If you have any questions, please contact the dictating provider

## 2019-07-07 NOTE — ASSESSMENT & PLAN NOTE
Lab Results   Component Value Date    HGBA1C 5 9 (H) 03/18/2019       Recent Labs     07/06/19  1618 07/06/19  2047 07/07/19  0617 07/07/19  1051   POCGLU 182* 209* 97 211*       Blood Sugar Average: Last 72 hrs:  (P) 158   · Diet controlled at home  · Add Lantus  · Continue insulin sliding scale

## 2019-07-07 NOTE — PROGRESS NOTES
Progress Note - Alirio Thomson 1940, 66 y o  female MRN: [id number]    Unit/Bed#: [facility] 508-01 Encounter: [id number]    Primary Care Provider: Austin Alejandro MD   Date and time admitted to hospital: 6/18/2019  4:35 PM        * Acute on chronic combined systolic and diastolic CHF (congestive heart failure) ([facility] [address] )  Assessment & Plan  Wt Readings from Last 3 Encounters:   07/07/19 69 9 kg (154 lb)   06/18/19 81 kg (178 lb 9 6 oz)   03/20/19 80 1 kg (176 lb 9 6 oz)     Cardiogenic shock, resolved  Initial EF 25- 30% improving to 45%  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  Negative fluid balance, diuretics on hold due to WANG  On hydralazine, Coreg and Imdur  Continue Fluid restriction, low sodium diet  Cardiology is following          Atrial fibrillation with RVR ([facility])  Assessment & Plan  · S/p cardioversion 7/3, remain in NSR  · On amiodarone,  Coreg and Eliquis        Acute kidney injury ([facility] [address] )  Assessment & Plan  · Secondary to ischemic ATN/cardiogenic syndrome Marie Granados  · Improving  · Diuretics on hold  · Nephrology is following      Shock liver  Assessment & Plan  · Transaminitis and coag study are slowly improving  · Continue to monitor      Hyponatremia  Assessment & Plan  · Innocent of volume overload  · Nephrology is following  · Continue oral fluid restriction    Type 2 diabetes mellitus with diabetic cataract [facility])  Assessment & Plan  Lab Results   Component Value Date    HGBA1C 5 9 (H) 03/18/2019       Recent Labs     07/06/19  1618 07/06/19  2047 07/07/19  0617 07/07/19  1051   POCGLU 182* 209* 97 211*       Blood Sugar Average: Last 72 hrs:  (P) 158   · Diet controlled at home  · Add Lantus  · Continue insulin sliding scale    Hypothyroidism  Assessment & Plan  · Continue Synthroid therapy    Hypertension  Assessment & Plan  Acceptable BP  · Continue to monitor         VTE Pharmacologic Prophylaxis:   Pharmacologic: Apixaban (Eliquis)  Mechanical VTE Prophylaxis in Place: Yes    Patient Centered Rounds: I have performed bedside rounds with nursing staff today  Discussions with Specialists or Other Care Team Provider:     Education and Discussions with Family / Patient:  Discussed with patient's sister    Time Spent for Care: 30 minutes  More than 50% of total time spent on counseling and coordination of care as described above  Current Length of Stay: 19 day(s)    Current Patient Status: Inpatient   Certification Statement: The patient will continue to require additional inpatient hospital stay due to CHF and acute renal failure    Discharge Plan / Estimated Discharge Date: not ready yet     Code Status: Level 1 - Full Code      Subjective:   Patient seen and examined  Comfortable sitting in chair  Clinically improving  No nausea vomiting or diarrhea    Objective:     Vitals:   Temp (24hrs), Av 8 °F (36 6 °C), Min:97 5 °F (36 4 °C), Max:98 °F (36 7 °C)    Temp:  [97 5 °F (36 4 °C)-98 °F (36 7 °C)] 97 5 °F (36 4 °C)  HR:  [53-61] 61  Resp:  [16-18] 18  BP: ()/(46-69) 118/57  SpO2:  [95 %-99 %] 95 %  Body mass index is 29 1 kg/m²  Input and Output Summary (last 24 hours):        Intake/Output Summary (Last 24 hours) at 2019 1142  Last data filed at 2019 1101  Gross per 24 hour   Intake 880 ml   Output 1200 ml   Net -320 ml       Physical Exam:     Physical Exam  Patient is awake alert in no acute distress  Lung with decreased breath sounds bilateral  Heart positive S1-S2 no murmur  Abdomen soft nontender  Lower extremities no edema  Additional Data:     Labs:    Results from last 7 days   Lab Units 19  0504   WBC Thousand/uL 12 34*   HEMOGLOBIN g/dL 15 2   HEMATOCRIT % 44 7   PLATELETS Thousands/uL 183     Results from last 7 days   Lab Units 19  0535   POTASSIUM mmol/L 3 8   CHLORIDE mmol/L 90*   CO2 mmol/L 37*   BUN mg/dL 99*   CREATININE mg/dL 2 94*   CALCIUM mg/dL 9 4   ALK PHOS U/L 117*   ALT U/L 378*   AST U/L 87*     Results from last 7 days   Lab Units 07/03/19  2344   INR  1 60*       * I Have Reviewed All Lab Data Listed Above  * Additional Pertinent Lab Tests Reviewed: [address] 66 Admission Reviewed    Imaging:    Imaging Reports Reviewed Today Include:   Imaging Personally Reviewed by Myself Includes:     Recent Cultures (last 7 days):           Last 24 Hours Medication List:     Current Facility-Administered Medications:  acetaminophen 650 mg Oral Q4H PRN Baljinder Tisha Aranda Jr , PA-C   albuterol 2 puff Inhalation Q4H PRN Baljinder Tisha Aranda Jr , PA-C   amiodarone 200 mg Oral TID With Meals Author Jenaro PA-C   apixaban 2 5 mg Oral BID Nathan Moscoso MD   bisacodyl 10 mg Rectal Daily PRN Baljinder Aranda Jr , STEPHEN   carvedilol 3 125 mg Oral BID With Meals Nathan Moscoso MD   hydrALAZINE 25 mg Oral Q8H Malini Wilkins MD   insulin glargine 10 Units Subcutaneous QAM Tk Armijo DO   insulin lispro 1-5 Units Subcutaneous HS Baljinder Tisha Aranda Jr , PA-C   insulin lispro 1-6 Units Subcutaneous TID AC Baljinder Tisha Aranda Jr , PA-C   isosorbide dinitrate 20 mg Oral TID after meals Nathan Moscoso MD   levothyroxine 137 mcg Oral Daily Baljinder Tisha Aranda Jr PATannerC   loratadine 10 mg Oral Daily Baljinder Tisha Aranda Jr , PA-C   melatonin 6 mg Oral HS Baljinder Tisha Aranda Jr , PA-C   ondansetron 4 mg Intravenous Q6H PRN Baljinder Tisha Aranda Jr , PA-C   phenol 2 spray Mouth/Throat Q2H PRN Baljinder Tisha Aranda Jr , PA-C   polyethylene glycol 17 g Oral Daily Baljinder Tisha Barragan PA-C   senna 1 tablet Oral HS Baljinder Tisha Aranda Jr PA-C   sodium chloride 2 spray Each Nare PRN Baljinder Tisha Aranda Jr , PA-C   venlafaxine 50 mg Oral Q12H Author STEPHEN Eason        Today, Patient Was Seen By: Tk Armijo DO    ** Please Note: This note has been constructed using a voice recognition system   **

## 2019-07-07 NOTE — PLAN OF CARE
Problem: PAIN - ADULT  Goal: Verbalizes/displays adequate comfort level or baseline comfort level  Description  Interventions:  - Encourage patient to monitor pain and request assistance  - Assess pain using appropriate pain scale  - Administer analgesics based on type and severity of pain and evaluate response  - Implement non-pharmacological measures as appropriate and evaluate response  - Consider cultural and social influences on pain and pain management  - Notify physician/advanced practitioner if interventions unsuccessful or patient reports new pain  Outcome: Progressing     Problem: INFECTION - ADULT  Goal: Absence or prevention of progression during hospitalization  Description  INTERVENTIONS:  - Assess and monitor for signs and symptoms of infection  - Monitor lab/diagnostic results  - Monitor all insertion sites, i e  indwelling lines, tubes, and drains  - Monitor endotracheal (as able) and nasal secretions for changes in amount and color  - [place] appropriate cooling/warming therapies per order  - Administer medications as ordered  - Instruct and encourage patient and family to use good hand hygiene technique  - Identify and instruct in appropriate isolation precautions for identified infection/condition  Outcome: Progressing  Goal: Absence of fever/infection during neutropenic period  Description  INTERVENTIONS:  - Monitor WBC  - Implement neutropenic guidelines  Outcome: Progressing     Problem: SAFETY ADULT  Goal: Patient will remain free of falls  Description  INTERVENTIONS:  - Assess patient frequently for physical needs  -  Identify cognitive and physical deficits and behaviors that affect risk of falls    -  [place] fall precautions as indicated by assessment   - Educate patient/family on patient safety including physical limitations  - Instruct patient to call for assistance with activity based on assessment  - Modify environment to reduce risk of injury  - Consider OT/PT consult to assist with strengthening/mobility  Outcome: Progressing  Goal: Maintain or return to baseline ADL function  Description  INTERVENTIONS:  -  Assess patient's ability to carry out ADLs; assess patient's baseline for ADL function and identify physical deficits which impact ability to perform ADLs (bathing, care of mouth/teeth, toileting, grooming, dressing, etc )  - Assess/evaluate cause of self-care deficits   - Assess range of motion  - Assess patient's mobility; develop plan if impaired  - Assess patient's need for assistive devices and provide as appropriate  - Encourage maximum independence but intervene and supervise when necessary  ¯ Involve family in performance of ADLs  ¯ Assess for home care needs following discharge   ¯ Request OT consult to assist with ADL evaluation and planning for discharge  ¯ Provide patient education as appropriate  Outcome: Progressing  Goal: Maintain or return mobility status to optimal level  Description  INTERVENTIONS:  - Assess patient's baseline mobility status (ambulation, transfers, stairs, etc )    - Identify cognitive and physical deficits and behaviors that affect mobility  - Identify mobility aids required to assist with transfers and/or ambulation (gait belt, sit-to-stand, lift, walker, cane, etc )  - [place] fall precautions as indicated by assessment  - Record patient progress and toleration of activity level on Mobility SBAR; progress patient to next Phase/Stage  - Instruct patient to call for assistance with activity based on assessment  - Request Rehabilitation consult to assist with strengthening/weightbearing, etc   Outcome: Progressing     Problem: DISCHARGE PLANNING  Goal: Discharge to home or other facility with appropriate resources  Description  INTERVENTIONS:  - Identify barriers to discharge w/patient and caregiver  - Arrange for needed discharge resources and transportation as appropriate  - Identify discharge learning needs (meds, wound care, etc )  - Arrange for interpretive services to assist at discharge as needed  - Refer to Case Management Department for coordinating discharge planning if the patient needs post-hospital services based on physician/advanced practitioner order or complex needs related to functional status, cognitive ability, or social support system  Outcome: Progressing     Problem: Knowledge Deficit  Goal: Patient/family/caregiver demonstrates understanding of disease process, treatment plan, medications, and discharge instructions  Description  Complete learning assessment and assess knowledge base  Interventions:  - Provide teaching at level of understanding  - Provide teaching via preferred learning methods  Outcome: Progressing     Problem: Potential for Falls  Goal: Patient will remain free of falls  Description  INTERVENTIONS:  - Assess patient frequently for physical needs  -  Identify cognitive and physical deficits and behaviors that affect risk of falls  -  [place] fall precautions as indicated by assessment   - Educate patient/family on patient safety including physical limitations  - Instruct patient to call for assistance with activity based on assessment  - Modify environment to reduce risk of injury  - Consider OT/PT consult to assist with strengthening/mobility  Outcome: Progressing     Problem: CARDIOVASCULAR - ADULT  Goal: Maintains optimal cardiac output and hemodynamic stability  Description  INTERVENTIONS:  - Monitor I/O, vital signs and rhythm  - Monitor for S/S and trends of decreased cardiac output i e  bleeding, hypotension  - Administer and titrate ordered vasoactive medications to optimize hemodynamic stability  - Assess quality of pulses, skin color and temperature  - Assess for signs of decreased coronary artery perfusion - ex   Angina  - Instruct patient to report change in severity of symptoms  Outcome: Progressing  Goal: Absence of cardiac dysrhythmias or at baseline rhythm  Description  INTERVENTIONS:  - Continuous cardiac monitoring, monitor vital signs, obtain 12 lead EKG if indicated  - Administer antiarrhythmic and heart rate control medications as ordered  - Monitor electrolytes and administer replacement therapy as ordered  Outcome: Progressing     Problem: Prexisting or High Potential for Compromised Skin Integrity  Goal: Skin integrity is maintained or improved  Description  INTERVENTIONS:  - Identify patients at risk for skin breakdown  - Assess and monitor skin integrity  - Assess and monitor nutrition and hydration status  - Monitor labs (i e  albumin)  - Assess for incontinence   - Turn and reposition patient  - Assist with mobility/ambulation  - Relieve pressure over bony prominences  - Avoid friction and shearing  - Provide appropriate hygiene as needed including keeping skin clean and dry  - Evaluate need for skin moisturizer/barrier cream  - Collaborate with interdisciplinary team (i e  Nutrition, Rehabilitation, etc )   - Patient/family teaching  Outcome: Progressing     Problem: Nutrition/Hydration-ADULT  Goal: Nutrient/Hydration intake appropriate for improving, restoring or maintaining nutritional needs  Description  Monitor and assess patient's nutrition/hydration status for malnutrition (ex- brittle hair, bruises, dry skin, pale skin and conjunctiva, muscle wasting, smooth red tongue, and disorientation)  Collaborate with interdisciplinary team and initiate plan and interventions as ordered  Monitor patient's weight and dietary intake as ordered or per policy  Utilize nutrition screening tool and intervene per policy  Determine patient's food preferences and provide high-protein, high-caloric foods as appropriate       INTERVENTIONS:  - Monitor oral intake, urinary output, labs, and treatment plans  - Assess nutrition and hydration status and recommend course of action  - Evaluate amount of meals eaten  - Assist patient with eating if necessary   - Allow adequate time for meals  - Recommend/ encourage appropriate diets, oral nutritional supplements, and vitamin/mineral supplements  - Order, calculate, and assess calorie counts as needed  - Recommend, monitor, and adjust tube feedings and TPN/PPN based on assessed needs  - Assess need for intravenous fluids  - Provide specific nutrition/hydration education as appropriate  - Include patient/family/caregiver in decisions related to nutrition  Outcome: Progressing

## 2019-07-07 NOTE — ASSESSMENT & PLAN NOTE
Wt Readings from Last 3 Encounters:   07/07/19 69 9 kg (154 lb)   06/18/19 81 kg (178 lb 9 6 oz)   03/20/19 80 1 kg (176 lb 9 6 oz)     Cardiogenic shock, resolved  Initial EF 25- 30% improving to 45%  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  Negative fluid balance, diuretics on hold due to WANG  On hydralazine, Coreg and Imdur  Continue Fluid restriction, low sodium diet  Cardiology is following

## 2019-07-07 NOTE — ASSESSMENT & PLAN NOTE
· Secondary to ischemic ATN/cardiogenic syndrome Karie Justice  · Improving  · Diuretics on hold  · Nephrology is following

## 2019-07-07 NOTE — PROGRESS NOTES
Cardiology Progress Note - Ramin Ni 66 y o  female MRN: [id number]    Unit/Bed#: [facility] 508-01 Encounter: [id number]        Subjective:   Feeling better  Fatigued  Review of Systems   Constitution: Positive for malaise/fatigue  Cardiovascular: Negative for chest pain, leg swelling and palpitations  Respiratory: Negative for shortness of breath  Objective:   Vitals: Blood pressure 165/69, pulse 61, temperature 98 °F (36 7 °C), resp  rate 18, height 5' 1" (1 549 m), weight 69 9 kg (154 lb), SpO2 98 %  , Body mass index is 29 1 kg/m² ,   Orthostatic Blood Pressures      Most Recent Value   Blood Pressure  165/69 filed at 07/07/2019 0711   Patient Position - Orthostatic VS  Lying filed at 07/07/2019 6643         Systolic (28QZQ), PLX:913 , Min:98 , NTZ:780     Diastolic (29CZR), KJP:95, Min:46, Max:69      Intake/Output Summary (Last 24 hours) at 7/7/2019 0900  Last data filed at 7/7/2019 0816  Gross per 24 hour   Intake 760 ml   Output 1000 ml   Net -240 ml     Weight (last 2 days)     Date/Time   Weight    07/07/19 0020   69 9 (154)    07/06/19 0523   68 5 (151)    07/05/19 0202   69 5 (153 22)                  Telemetry Review: NSR   Physical Exam   Cardiovascular: Normal rate, regular rhythm and normal heart sounds  Exam reveals no gallop and no friction rub  No murmur heard  Pulmonary/Chest: Breath sounds normal  She has no wheezes  She has no rales  Musculoskeletal: She exhibits no edema           Laboratory Results:        CBC with diff:   Results from last 7 days   Lab Units 07/05/19  0504 07/02/19  0600 07/01/19  0524   WBC Thousand/uL 12 34* 13 67* 13 33*   HEMOGLOBIN g/dL 15 2 13 8 12 3   HEMATOCRIT % 44 7 39 3 34 9   MCV fL 97 92 91   PLATELETS Thousands/uL 183 188 178   MCH pg 32 8 32 4 32 2   MCHC g/dL 34 0 35 1 35 2   RDW % 14 0 12 9 12 3   MPV fL 10 6 11 2 11 2         CMP:  Results from last 7 days   Lab Units 07/07/19  0535 07/06/19  0545 07/05/19  0504 07/04/19  0530 07/03/19  2155 07/03/19  0312 07/02/19  0600  07/01/19  0524   POTASSIUM mmol/L 3 8 3 5 3 8 4 0 3 7 4 1 3 4*   < > 3 8   CHLORIDE mmol/L 90* 92* 92* 91* 90* 87* 83*   < > 85*   CO2 mmol/L 37* 37* 36* 34* 35* 37* 36*   < > 34*   BUN mg/dL 99* 102* 99* 100* 98* 108* 110*   < > 88*   CREATININE mg/dL 2 94* 3 07* 3 33* 3 85* 3 99* 4 48* 4 82*   < > 4 95*   CALCIUM mg/dL 9 4 9 2 9 2 8 9 8 9 9 2 8 8   < > 8 4   AST U/L 87*  --   --  87*  --   --   --   --  345*   ALT U/L 378*  --   --  782*  --   --   --   --  1,723*   ALK PHOS U/L 117*  --   --  136*  --   --   --   --  146*   EGFR ml/min/1 73sq m 15 14 13 11 10 9 8   < > 8    < > = values in this interval not displayed  BMP:  Results from last 7 days   Lab Units 07/07/19  0535 07/06/19  0545 07/05/19  0504 07/04/19  0530 07/03/19 2155 07/03/19 0312 07/02/19  0600   POTASSIUM mmol/L 3 8 3 5 3 8 4 0 3 7 4 1 3 4*   CHLORIDE mmol/L 90* 92* 92* 91* 90* 87* 83*   CO2 mmol/L 37* 37* 36* 34* 35* 37* 36*   BUN mg/dL 99* 102* 99* 100* 98* 108* 110*   CREATININE mg/dL 2 94* 3 07* 3 33* 3 85* 3 99* 4 48* 4 82*   CALCIUM mg/dL 9 4 9 2 9 2 8 9 8 9 9 2 8 8       BNP:     Magnesium:   Results from last 7 days   Lab Units 07/04/19  0530 07/03/19  2155   MAGNESIUM mg/dL 2 0 1 9       Coags:   Results from last 7 days   Lab Units 07/04/19  0532 07/03/19  2344 07/03/19  1833 07/03/19  1121 07/03/19  0312 07/02/19  2033 07/02/19  1337 07/02/19  0600  07/01/19  0524   PTT seconds 63*  --  69* 82* 109* 52* 50* 95*   < >  --    INR   --  1 60*  --   --   --   --   --   --   --  2 16*    < > = values in this interval not displayed           Cardiac testing:   Results for orders placed during the hospital encounter of 06/18/19   Echo complete with contrast if indicated    Danii Mckeon [address]  [address]  [phone]    Transthoracic Echocardiogram  2D, M-mode, Doppler, and Color Doppler    Study date:  26-Jun-2019    Patient: Javier Reno  MR number: [id number]  Account number: [de-identified]  : 1940  Age: 66 years  Gender: Female  Status: Inpatient  Location: Bedside  Height: 61 in  Weight: 175 lb  BP: 92/ 44 mmHg    Indications: Hypotension    Diagnoses: I95 9 - Hypotension, unspecified    Sonographer:  PEEWEE Fuentes  Primary Physician:  Gutierrez Chakraborty MD  Referring Physician:  SOTO Lieberman  Group:  [facility]  Interpreting Physician:  Anu Montes MD    SUMMARY    LEFT VENTRICLE:  Size was at the upper limits of normal   Systolic function was moderately reduced  Ejection fraction was estimated to be 42 %  There was moderate diffuse hypokinesis  There was no evidence of concentric hypertrophy  VENTRICULAR SEPTUM:  There was dyssynergic motion  RIGHT VENTRICLE:  The ventricle was mildly to moderately dilated  Systolic function was mildly to moderately reduced  Wall thickness was increased  LEFT ATRIUM:  The atrium was moderately dilated  RIGHT ATRIUM:  The atrium was moderately dilated  MITRAL VALVE:  There was mild annular calcification  There was moderate regurgitation  TRICUSPID VALVE:  There was moderate regurgitation  PULMONIC VALVE:  There was trace regurgitation  HISTORY: PRIOR HISTORY: DM2, HTN, HLD, Afib, CHF    PROCEDURE: The procedure was performed at the bedside  This was an urgent study  The transthoracic approach was used  The study included complete 2D imaging, M-mode, complete spectral Doppler, and color Doppler  The heart rate was 117 bpm,  at the start of the study  Images were obtained from the parasternal, apical, subcostal, and suprasternal notch acoustic windows  Echocardiographic views were limited due to restricted patient mobility  This was a technically difficult  study  LEFT VENTRICLE: Size was at the upper limits of normal  Systolic function was moderately reduced  Ejection fraction was estimated to be 42 %   There was moderate diffuse hypokinesis  Wall thickness was normal  There was no evidence of  concentric hypertrophy  DOPPLER: The study was not technically sufficient to allow evaluation of LV diastolic function  VENTRICULAR SEPTUM: There was dyssynergic motion  RIGHT VENTRICLE: The ventricle was mildly to moderately dilated  Systolic function was mildly to moderately reduced  Wall thickness was increased  LEFT ATRIUM: The atrium was moderately dilated  RIGHT ATRIUM: The atrium was moderately dilated  MITRAL VALVE: There was mild annular calcification  Valve structure was normal  There was normal leaflet separation  DOPPLER: The transmitral velocity was within the normal range  There was no evidence for stenosis  There was moderate  regurgitation  AORTIC VALVE: The valve was trileaflet  Leaflets exhibited normal thickness and normal cuspal separation  DOPPLER: Transaortic velocity was within the normal range  There was no evidence for stenosis  There was no regurgitation  TRICUSPID VALVE: The valve structure was normal  There was normal leaflet separation  DOPPLER: The transtricuspid velocity was within the normal range  There was no evidence for stenosis  There was moderate regurgitation  Pulmonary artery  systolic pressure was mildly increased  PULMONIC VALVE: Leaflets exhibited normal thickness, no calcification, and normal cuspal separation  DOPPLER: The transpulmonic velocity was within the normal range  There was trace regurgitation  PERICARDIUM: There was no pericardial effusion  The pericardium was normal in appearance  AORTA: The root exhibited normal size      SYSTEM MEASUREMENT TABLES    2D  LVEDV MOD A4C: 81 79 ml  LVEF MOD A4C: 64 57 %  LVESV MOD A4C: 28 98 ml  LVLd A4C: 7 08 cm  LVLs A4C: 5 89 cm  SV MOD A4C: 52 81 ml    CW  TR Vmax: 2 65 m/s  TR maxP 14 mmHg    [facility] Accredited Echocardiography Laboratory    Prepared and electronically signed by    Jayson Puga MD  Signed 2019 07:10:27       Results for orders placed during the hospital encounter of 19   EBEN    Narrative Linda [address]  [facility] [place] [address] [facility]  [phone]    Transesophageal Echocardiogram  2D, Doppler, and Color Doppler    Study date:  2019    Patient: Rosalio Murphy  MR number: [id number]  Account number: [de-identified]  : 1940  Age: 66 years  Gender: Female  Status: Inpatient  Location: Echo lab  Height: 61 in  Weight: 181 lb  BP: 125/ 83 mmHg    Indications: Atrial-Fib; Cardioversion    Diagnoses: 427 31 - ATRIAL FIBRILLATION    Sonographer:  PEEWEE Gonzalez  Interpreting Physician:  Candelario Jose MD  Primary Physician:  Carey Bose MD  Referring Physician:  SOTO Sun  Group:  [facility]  RN:  Nereyda Herrera RN    SUMMARY    LEFT VENTRICLE:  The ventricle was dilated  Systolic function was markedly reduced  Ejection fraction was estimated to be 25 %  There was severe diffuse hypokinesis  RIGHT VENTRICLE:  The ventricle was dilated  Systolic function was mildly to moderately reduced  LEFT ATRIUM:  The atrium was moderately dilated  No thrombus was identified  LEFT ATRIAL APPENDAGE:  The appendage was dilated  No thrombus was identified  There was mild intermittent spontaneous echo contrast ("smoke") in the appendage  ATRIAL SEPTUM:  No defect or patent foramen ovale was identified  RIGHT ATRIUM:  The atrium was dilated  MITRAL VALVE:  There was moderate regurgitation  TRICUSPID VALVE:  There was moderate regurgitation  PERICARDIUM:  A small, free-flowing pericardial effusion was identified  There was no evidence of hemodynamic compromise  HISTORY: PRIOR HISTORY: HTN; HLD; DM2; A-Fib; Acute Congestive Heart Failure    PROCEDURE: The procedure was performed in the echo lab  This was a routine study   The risks and alternatives of the procedure were explained to the patient and informed consent was obtained  The transesophageal approach was used  The study  included complete 2D imaging, complete spectral Doppler, and color Doppler  The heart rate was 125 bpm, at the start of the study  An adult omniplane probe was inserted by the attending cardiologist  Intubated with ease  Two intubation  attempt(s)  There was no blood detected on the probe  Image quality was good  There were no complications during the procedure  MEDICATIONS: Anesthesia administered by anesthesia team     LEFT VENTRICLE: The ventricle was dilated  Systolic function was markedly reduced  Ejection fraction was estimated to be 25 %  There was severe diffuse hypokinesis  Wall thickness was normal  No evidence of apical thrombus  RIGHT VENTRICLE: The ventricle was dilated  Systolic function was mildly to moderately reduced  Wall thickness was normal     LEFT ATRIUM: The atrium was moderately dilated  No thrombus was identified  APPENDAGE: The appendage was dilated  No thrombus was identified  There was mild intermittent spontaneous echo contrast ("smoke") in the appendage  ATRIAL SEPTUM: No defect or patent foramen ovale was identified  RIGHT ATRIUM: The atrium was dilated  MITRAL VALVE: Valve structure was normal  There was normal leaflet separation  DOPPLER: The transmitral velocity was within the normal range  There was no evidence for stenosis  There was moderate regurgitation  AORTIC VALVE: The valve was trileaflet  Leaflets exhibited normal thickness, normal cuspal separation, and sclerosis  DOPPLER: Transaortic velocity was within the normal range  There was no evidence for stenosis  There was no significant  regurgitation  TRICUSPID VALVE: The valve structure was normal  There was normal leaflet separation  DOPPLER: There was moderate regurgitation  PULMONIC VALVE: Leaflets exhibited normal thickness, no calcification, and normal cuspal separation   DOPPLER: The transpulmonic velocity was within the normal range  There was no significant regurgitation  PERICARDIUM: A small, free-flowing pericardial effusion was identified  There was no evidence of hemodynamic compromise  The pericardium was normal in appearance  AORTA: The root exhibited normal size  The ascending aorta was normal in size  SYSTEMIC VEINS: IVC: The inferior vena cava was normal in size      MEASUREMENT TABLES    DOPPLER MEASUREMENTS  Left atrium   (Reference normals)  ROHAN peak yaneth   20 cm/s   (--)    [facility] Accredited [facility]ography Laboratory    Prepared and electronically signed by    Kush Jimenez MD  Signed 21-Jun-2019 15:50:13           Meds/Allergies     Current Facility-Administered Medications:  acetaminophen 650 mg Oral Q4H PRN Michelle Aranda Jr, PA-C   albuterol 2 puff Inhalation Q4H PRN Michelle Crespo PA-C   amiodarone 200 mg Oral TID With Meals Michelle Crespo PA-C   apixaban 2 5 mg Oral BID Nancy Corley MD   bisacodyl 10 mg Rectal Daily PRN Annie Alcantar PA-C   carvedilol 3 125 mg Oral BID With Meals Nancy Corley MD   hydrALAZINE 10 mg Oral Q8H [facility]   insulin lispro 1-5 Units Subcutaneous HS Michelle Crespo PA-C   insulin lispro 1-6 Units Subcutaneous TID AC Michelle Aranda Jr, PA-C   isosorbide dinitrate 10 mg Oral TID after meals Ivelisse Munoz PA-C   levothyroxine 137 mcg Oral Daily Michelle Crespo PA-C   loratadine 10 mg Oral Daily Annie Alcantar PA-C   melatonin 6 mg Oral HS Michelle Crespo PA-C   ondansetron 4 mg Intravenous Q6H PRN Michelle Aranda Jr, PA-C   phenol 2 spray Mouth/Throat Q2H PRN Michelle Aranda Jr, PA-C   polyethylene glycol 17 g Oral Daily Michelle Crespo PA-C   senna 1 tablet Oral HS Annie Alcantar PA-C   sodium chloride 2 spray Each NarWander Aranda Jr, PA-C   venlafaxine 50 mg Oral Q12H Annie Alcantar PA-C Medications Prior to Admission   Medication    albuterol (VENTOLIN HFA) 90 mcg/act inhaler    Ascorbic Acid (VITAMIN C) 100 MG tablet    cholecalciferol (VITAMIN D3) 1,000 units tablet    cyanocobalamin 100 MCG tablet    fexofenadine (ALLEGRA) 30 MG tablet    Garlic 10 MG CAPS    levothyroxine 137 mcg tablet    losartan-hydrochlorothiazide (HYZAAR) 100-12 5 MG per tablet    magnesium 30 MG tablet    Misc Natural Products (TURMERIC CURCUMIN) CAPS    multivitamin (THERAGRAN) TABS    Omega-3 Fatty Acids (FISH OIL) 1,000 mg    pyridoxine (B-6) 100 MG tablet    simvastatin (ZOCOR) 20 mg tablet    SUPER B COMPLEX/C CAPS    venlafaxine (EFFEXOR) 75 mg tablet    vitamin E, tocopherol, 1,000 units capsule       Assessment:  Principal Problem:    Acute CHF (congestive heart failure) ([facility])  Active Problems:    Hypertension    Hypothyroidism    Type 2 diabetes mellitus with diabetic cataract ([facility])    Atrial fibrillation with RVR ([facility])    Acute kidney injury ([facility])    Hyponatremia    Shock liver    Acute bacterial conjunctivitis of both eyes    Acute on chronic combined systolic and diastolic CHF (congestive heart failure) ([facility])      Impression:  1  Cardiomyopathy - likely tachycardia mediated  2  PAF - s/p cardioversion  Remains in NSR on amiodarone  On anticoagulation  3  WANG - slowly improving  4  HTN - not adequate control  Plan:  1  Increase Isordil/Hydralazine  2  Continue remainder of medications  3  Monitor renal function

## 2019-07-08 PROBLEM — R11.10 DRY HEAVES: Status: ACTIVE | Noted: 2019-07-08

## 2019-07-08 LAB
ANION GAP SERPL CALCULATED.3IONS-SCNC: 11 MMOL/L (ref 4–13)
BUN SERPL-MCNC: 107 MG/DL (ref 5–25)
CALCIUM SERPL-MCNC: 9 MG/DL (ref 8.3–10.1)
CHLORIDE SERPL-SCNC: 90 MMOL/L (ref 100–108)
CO2 SERPL-SCNC: 32 MMOL/L (ref 21–32)
CREAT SERPL-MCNC: 2.94 MG/DL (ref 0.6–1.3)
GFR SERPL CREATININE-BSD FRML MDRD: 15 ML/MIN/1.73SQ M
GLUCOSE SERPL-MCNC: 102 MG/DL (ref 65–140)
GLUCOSE SERPL-MCNC: 135 MG/DL (ref 65–140)
GLUCOSE SERPL-MCNC: 148 MG/DL (ref 65–140)
GLUCOSE SERPL-MCNC: 164 MG/DL (ref 65–140)
GLUCOSE SERPL-MCNC: 223 MG/DL (ref 65–140)
POTASSIUM SERPL-SCNC: 3.5 MMOL/L (ref 3.5–5.3)
SODIUM SERPL-SCNC: 133 MMOL/L (ref 136–145)

## 2019-07-08 PROCEDURE — 97110 THERAPEUTIC EXERCISES: CPT

## 2019-07-08 PROCEDURE — 97530 THERAPEUTIC ACTIVITIES: CPT | Performed by: STUDENT IN AN ORGANIZED HEALTH CARE EDUCATION/TRAINING PROGRAM

## 2019-07-08 PROCEDURE — 80048 BASIC METABOLIC PNL TOTAL CA: CPT | Performed by: INTERNAL MEDICINE

## 2019-07-08 PROCEDURE — 99232 SBSQ HOSP IP/OBS MODERATE 35: CPT | Performed by: INTERNAL MEDICINE

## 2019-07-08 PROCEDURE — 97535 SELF CARE MNGMENT TRAINING: CPT | Performed by: STUDENT IN AN ORGANIZED HEALTH CARE EDUCATION/TRAINING PROGRAM

## 2019-07-08 PROCEDURE — 82948 REAGENT STRIP/BLOOD GLUCOSE: CPT

## 2019-07-08 RX ORDER — METOPROLOL TARTRATE 5 MG/5ML
2.5 INJECTION INTRAVENOUS EVERY 6 HOURS PRN
Status: DISCONTINUED | OUTPATIENT
Start: 2019-07-08 | End: 2019-07-11 | Stop reason: HOSPADM

## 2019-07-08 RX ORDER — MAGNESIUM HYDROXIDE/ALUMINUM HYDROXICE/SIMETHICONE 120; 1200; 1200 MG/30ML; MG/30ML; MG/30ML
15 SUSPENSION ORAL EVERY 4 HOURS PRN
Status: DISCONTINUED | OUTPATIENT
Start: 2019-07-08 | End: 2019-07-11 | Stop reason: HOSPADM

## 2019-07-08 RX ORDER — PANTOPRAZOLE SODIUM 40 MG/1
40 TABLET, DELAYED RELEASE ORAL
Status: DISCONTINUED | OUTPATIENT
Start: 2019-07-08 | End: 2019-07-11 | Stop reason: HOSPADM

## 2019-07-08 RX ADMIN — AMIODARONE HYDROCHLORIDE 200 MG: 200 TABLET ORAL at 08:34

## 2019-07-08 RX ADMIN — HYDRALAZINE HYDROCHLORIDE 25 MG: 25 TABLET ORAL at 21:16

## 2019-07-08 RX ADMIN — PANTOPRAZOLE SODIUM 40 MG: 40 TABLET, DELAYED RELEASE ORAL at 11:53

## 2019-07-08 RX ADMIN — INSULIN LISPRO 1 UNITS: 100 INJECTION, SOLUTION INTRAVENOUS; SUBCUTANEOUS at 17:23

## 2019-07-08 RX ADMIN — ISOSORBIDE DINITRATE 20 MG: 10 TABLET ORAL at 08:36

## 2019-07-08 RX ADMIN — VENLAFAXINE 50 MG: 50 TABLET ORAL at 09:38

## 2019-07-08 RX ADMIN — METOPROLOL TARTRATE 25 MG: 25 TABLET, FILM COATED ORAL at 21:17

## 2019-07-08 RX ADMIN — ISOSORBIDE DINITRATE 20 MG: 10 TABLET ORAL at 11:53

## 2019-07-08 RX ADMIN — VENLAFAXINE 50 MG: 50 TABLET ORAL at 21:18

## 2019-07-08 RX ADMIN — ISOSORBIDE DINITRATE 20 MG: 10 TABLET ORAL at 17:24

## 2019-07-08 RX ADMIN — CARVEDILOL 3.12 MG: 3.12 TABLET, FILM COATED ORAL at 08:34

## 2019-07-08 RX ADMIN — APIXABAN 2.5 MG: 2.5 TABLET, FILM COATED ORAL at 08:34

## 2019-07-08 RX ADMIN — LORATADINE 10 MG: 10 TABLET ORAL at 08:33

## 2019-07-08 RX ADMIN — AMIODARONE HYDROCHLORIDE 200 MG: 200 TABLET ORAL at 17:24

## 2019-07-08 RX ADMIN — MELATONIN 6 MG: at 21:16

## 2019-07-08 RX ADMIN — INSULIN LISPRO 1 UNITS: 100 INJECTION, SOLUTION INTRAVENOUS; SUBCUTANEOUS at 21:22

## 2019-07-08 RX ADMIN — ALUMINUM HYDROXIDE, MAGNESIUM HYDROXIDE, AND SIMETHICONE 15 ML: 200; 200; 20 SUSPENSION ORAL at 21:15

## 2019-07-08 RX ADMIN — METOPROLOL TARTRATE 25 MG: 25 TABLET, FILM COATED ORAL at 13:50

## 2019-07-08 RX ADMIN — AMIODARONE HYDROCHLORIDE 200 MG: 200 TABLET ORAL at 11:53

## 2019-07-08 RX ADMIN — LEVOTHYROXINE SODIUM 137 MCG: 112 TABLET ORAL at 05:01

## 2019-07-08 RX ADMIN — HYDRALAZINE HYDROCHLORIDE 25 MG: 25 TABLET ORAL at 13:50

## 2019-07-08 RX ADMIN — APIXABAN 2.5 MG: 2.5 TABLET, FILM COATED ORAL at 17:24

## 2019-07-08 RX ADMIN — INSULIN GLARGINE 10 UNITS: 100 INJECTION, SOLUTION SUBCUTANEOUS at 08:35

## 2019-07-08 RX ADMIN — HYDRALAZINE HYDROCHLORIDE 25 MG: 25 TABLET ORAL at 05:01

## 2019-07-08 NOTE — ASSESSMENT & PLAN NOTE
· S/p cardioversion 7/3, now back in AFib after being in NSR  · On amiodarone, Coreg and Eliquis  · Cardiology is following

## 2019-07-08 NOTE — PROGRESS NOTES
Progress Note - Tosin Nava 1940, 66 y o  female MRN: [id number]    Unit/Bed#: [facility] 508-01 Encounter: [id number]    Primary Care Provider: Keegan Martino MD   Date and time admitted to hospital: 6/18/2019  4:35 PM        * Acute on chronic combined systolic and diastolic CHF (congestive heart failure) ([address] )  Assessment & Plan  Wt Readings from Last 3 Encounters:   07/08/19 68 6 kg (151 lb 3 8 oz)   06/18/19 81 kg (178 lb 9 6 oz)   03/20/19 80 1 kg (176 lb 9 6 oz)     Cardiogenic shock, resolved  Initial EF 25- 30% improving to 45%  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  Negative fluid balance, diuretics on hold due to WANG  On hydralazine, Coreg and Imdur  Continue Fluid restriction, low sodium diet  Cardiology is following          Atrial fibrillation with RVR ([facility])  Assessment & Plan  · S/p cardioversion 7/3, now back in AFib after being in NSR  · On amiodarone, Coreg and Eliquis  · Cardiology is following        Acute kidney injury [facility])  Assessment & Plan  · Secondary to ischemic ATN/cardiogenic syndrome [place]  · Improving  · Diuretics on hold  · Nephrology is following  · Follow on BMP      Dry heaves  Assessment & Plan  Rule out secondary to GERD  Add  Protonix  Monitor    Shock liver  Assessment & Plan  · Transaminitis and coag study are slowly improving  · Continue to monitor      Hyponatremia  Assessment & Plan  · In the setting of volume overload   · Nephrology is following  · Continue oral fluid restriction    Type 2 diabetes mellitus with diabetic cataract [facility])  Assessment & Plan  Lab Results   Component Value Date    HGBA1C 5 9 (H) 03/18/2019       Recent Labs     07/07/19  1051 07/07/19  1642 07/07/19  2050 07/08/19  0618   POCGLU 211* 150* 180* 102       Blood Sugar Average: Last 72 hrs:  (P) 961 [id number]   · Diet controlled at home  · Continue Lantus and insulin sliding scale      Hypothyroidism  Assessment & Plan  · Continue Synthroid therapy    Hypertension  Assessment & Plan  Acceptable BP  · Continue to monitor          VTE Pharmacologic Prophylaxis:   Pharmacologic: Apixaban (Eliquis)  Mechanical VTE Prophylaxis in Place: Yes    Patient Centered Rounds: I have performed bedside rounds with nursing staff today  Discussions with Specialists or Other Care Team Provider:     Education and Discussions with Family / Patient:  Patient    Time Spent for Care: 30 minutes  More than 50% of total time spent on counseling and coordination of care as described above  Current Length of Stay: 20 day(s)    Current Patient Status: Inpatient   Certification Statement: The patient will continue to require additional inpatient hospital stay due to Management of CHF and acute renal failure    Discharge Plan / Estimated Discharge Date: not ready yet    Code Status: Level 1 - Full Code      Subjective:   Patient seen and examined  Complaining of dry heaves and nausea  No chest pain or shortness of breath    Objective:     Vitals:   Temp (24hrs), Av 5 °F (36 4 °C), Min:97 3 °F (36 3 °C), Max:97 7 °F (36 5 °C)    Temp:  [97 3 °F (36 3 °C)-97 7 °F (36 5 °C)] 97 5 °F (36 4 °C)  HR:  [] 100  Resp:  [16-17] 17  BP: ()/(47-58) 115/58  SpO2:  [95 %-97 %] 95 %  Body mass index is 28 58 kg/m²  Input and Output Summary (last 24 hours):        Intake/Output Summary (Last 24 hours) at 2019 1103  Last data filed at 2019 0735  Gross per 24 hour   Intake 1005 ml   Output 950 ml   Net 55 ml       Physical Exam:     Physical Exam    Patient is awake alert in no acute distress  Lung with decreased breath sounds bilateral  Heart positive S1-S2 no murmur  Abdomen soft nontender  Lower extremities no edema    Additional Data:     Labs:    Results from last 7 days   Lab Units 19  0504   WBC Thousand/uL 12 34*   HEMOGLOBIN g/dL 15 2   HEMATOCRIT % 44 7   PLATELETS Thousands/uL 183     Results from last 7 days   Lab Units 19  0535   POTASSIUM mmol/L 3 8   CHLORIDE mmol/L 90*   CO2 mmol/L 37*   BUN mg/dL 99*   CREATININE mg/dL 2 94*   CALCIUM mg/dL 9 4   ALK PHOS U/L 117*   ALT U/L 378*   AST U/L 87*     Results from last 7 days   Lab Units 07/03/19  2344   INR  1 60*       * I Have Reviewed All Lab Data Listed Above  * Additional Pertinent Lab Tests Reviewed:  Emi Fagan Admission Reviewed    Imaging:    Imaging Reports Reviewed Today Include:   Imaging Personally Reviewed by Myself Includes:      Recent Cultures (last 7 days):           Last 24 Hours Medication List:     Current Facility-Administered Medications:  acetaminophen 650 mg Oral Q4H PRN Keenan Aranda Jr, PA-C   albuterol 2 puff Inhalation Q4H PRN Keenan Aranda Jr, PA-C   aluminum-magnesium hydroxide-simethicone 15 mL Oral Q4H PRN Anna Liang,    amiodarone 200 mg Oral TID With Meals Keenan Aranda Jr, PA-C   apixaban 2 5 mg Oral BID Katie Dillard MD   bisacodyl 10 mg Rectal Daily PRN Keenan Aranda Jr, PA-C   carvedilol 3 125 mg Oral BID With Meals Katie Dillard MD   hydrALAZINE 25 mg Oral Q8H Amisha Borrego MD   insulin glargine 10 Units Subcutaneous QAM Anna Liang,    insulin lispro 1-5 Units Subcutaneous HS Keenan Aranda Jr, PA-C   insulin lispro 1-6 Units Subcutaneous TID AC Keenan Aranda Jr, PA-C   isosorbide dinitrate 20 mg Oral TID after meals Katie Dillard MD   levothyroxine 137 mcg Oral Daily Keenan Aranda Jr, PA-C   loratadine 10 mg Oral Daily Keenan Aranda Jr, PA-C   melatonin 6 mg Oral HS Keenan Aranda Jr, PA-C   metoprolol 2 5 mg Intravenous Q6H PRN Estrella Arevalo PA-C   ondansetron 4 mg Intravenous Q6H PRN Keenan Aranda Jr, PA-C   pantoprazole 40 mg Oral Early Morning Anna Liang, DO   phenol 2 spray Mouth/Throat Q2H PRN Keenan Aranda Jr, PA-C   polyethylene glycol 17 g Oral Daily Keenan Aranda Jr, PA-C   senna 1 tablet Oral HS Keenan Aranda Jr, PA-C   sodium chloride 2 spray Each Nare PRN Jeffrey Alcantara PA-C venlafaxine 50 mg Oral Q12H Rebecca Yeboah PA-C        Today, Patient Was Seen By: Santi Escamilla DO    ** Please Note: This note has been constructed using a voice recognition system   **

## 2019-07-08 NOTE — PROGRESS NOTES
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)    Atrial fibrillation with RVR (HCC)    Acute kidney injury ([address] )    Hyponatremia    Shock liver    Acute bacterial conjunctivitis of both eyes    Subjective:   Patient seen and examined  Reports having some dry heaving this AM  Has not vomited this AM  Does reports vomiting once yesterday  Has sensation of phlegm in her throat and believes dry heaving is more from her allergies  Denies SOB or PND overnight  Answers to questions are slightly delayed  Also denies fever, chills, lightheadedness, dizziness, chest pain, palpitations, SOB, and orthopnea  Has only been up and walked to the door and commode, denies any symptoms doing these activities  Converted to Afib around 0110 this AM, patient denies any new symptoms since converting  Objective: Intake/ Output: 1525 mL / 900 mL (net positive 625 mL, 1 unmeasured occurence)  Weight: 151 lbs (down from 154 lbs on 07/07)  Telemetry: Converted back into Afib this AM at 0109  Now with NSVT of 3-4 beats  Plan:    Acute on chronic biventricular heart failure; LVEF 45%, LVIDd 2 8 cm; NYHA III; ACC/AHA Stage C   Etiology: shock induced by cardizem and profolol being given while in borderline low output state  BiV HF likely tachy-induced, though will need ischemic evaluation in future  Also has history of radioablative iodine  Continue on Coreg 3 125 mg BID, hydralazine 25 mg TID, and Isordil 20 mg TID  Cooler extremities on exam     TTE from 07/04 with LVEF improved from 35% to now 45%  Goal for I/O to run even, per nephrology  Net positive today  Atrial fibrillation with rapid ventricular response   [id number] = 6 (age, sex, CHF, HTN, DM)  Anticoagulation on Eliquis (renal dose)  S/p successful cardioversion on 07/03  Sedation with propofol  Continue on amiodarone 200 mg TID and Coreg 3 125 mg BID  Converted to Afib this AM around 0110  Can consider increasing Coreg dose  Will discuss with EP, prior notes state she may need ablation if recurrence occurred  Acute kidney injury, improving   Etiology: hypotension/low output  Baseline creatinine around 0 7-0 9   Creatinine of 2 94 from 07/07 (down from 3 07 on 07/06)  Continue to monitor  Goal for I/O to run even, per nephrology  Shock liver, improving   Due to hypotension / cardiogenic shock  Neurohormonal Blockade:  --Beta Blocker: Coreg 3 125 mg BID   --ACEi, ARB or ARNi: N/A  --SVR reduction: hydralazine 25 mg TID and Isordil 20 mg TID  --Aldosterone Receptor Blocker: N/A  --Diuretic: N/A    Sudden Cardiac Death Risk Reduction:  --ICD: LVEF 45%  Electrical Resynchronization:  --Interrogation: Narrow QRS  Advanced Therapies (if appropriate): --Inotrope: N/A  --LVAD/Transplant Candidacy: Will continue to monitor  Diagnostic Testing:  Echocardiogram (limited) from 07/04/2019:  LVEF: 45%; mild diffuse hypokinesis  LVIDd: 3 8 cm  MR: No MR  Moderate annular calcification  Other: Dilated LA  Echocardiogram from 06/20/2019:  LVEF: 25%  LVIDd: 4 6 cm  RV: Dilated and hypokinetic  MR: Moderate  PASP: 35 mmHg  Vitals:   Blood pressure 115/58, pulse 100, temperature 97 5 °F (36 4 °C), resp  rate 17, height 5' 1" (1 549 m), weight 68 6 kg (151 lb 3 8 oz), SpO2 95 %  Body mass index is 28 58 kg/m²  I/O last 3 completed shifts:   In: 1525 [P O :1525]  Out: 1600 [Urine:1600]    Wt Readings from Last 3 Encounters:   07/08/19 68 6 kg (151 lb 3 8 oz)   06/18/19 81 kg (178 lb 9 6 oz)   03/20/19 80 1 kg (176 lb 9 6 oz)     Vitals:    07/07/19 2328 07/08/19 0300 07/08/19 0541 07/08/19 0735   BP: (!) 100/49 106/52  115/58   BP Location: Left arm Right arm     Pulse: 64 92  100   Resp: 16 17     Temp: 97 5 °F (36 4 °C) 97 7 °F (36 5 °C)  97 5 °F (36 4 °C)   TempSrc: Oral Oral     SpO2: 97% 96%  95%   Weight:   68 6 kg (151 lb 3 8 oz)    Height:         Physical Exam   Constitutional: She is oriented to person, place, and time  She appears well-developed and well-nourished  HENT:   Head: Normocephalic and atraumatic  Eyes: Pupils are equal, round, and reactive to light  Right eye exhibits no discharge  Left eye exhibits no discharge  Neck: Neck supple  No tracheal deviation present  Dressing present on right neck  Cardiovascular: Normal rate, regular rhythm and intact distal pulses  Murmur heard  Pulmonary/Chest: Effort normal  No respiratory distress  She has no wheezes  Abdominal: Soft  Bowel sounds are normal  She exhibits no distension  Musculoskeletal: She exhibits no edema or tenderness  Cooler extremities  Neurological: She is alert and oriented to person, place, and time  Skin: Skin is dry  Facial flushing   Psychiatric: She has a normal mood and affect       Central Line: N/A  Murray catheter: N/A    Current Facility-Administered Medications:     acetaminophen (TYLENOL) tablet 650 mg, 650 mg, Oral, Q4H PRN, Maura Aranda Jr, PA-C, 650 mg at 06/28/19 1210    albuterol (PROVENTIL HFA,VENTOLIN HFA) inhaler 2 puff, 2 puff, Inhalation, Q4H PRN, Maura Aranda Jr, PA-C, 2 puff at 07/05/19 0802    amiodarone tablet 200 mg, 200 mg, Oral, TID With Meals, Thiago Rodriguez PA-C, 200 mg at 07/08/19 9180    apixaban (ELIQUIS) tablet 2 5 mg, 2 5 mg, Oral, BID, Adriel Boggs MD, 2 5 mg at 07/08/19 7636    bisacodyl (DULCOLAX) rectal suppository 10 mg, 10 mg, Rectal, Daily PRN, Maura Keyes PA-C    carvedilol (COREG) tablet 3 125 mg, 3 125 mg, Oral, BID With Meals, Adriel Boggs MD, 3 125 mg at 07/08/19 0861    hydrALAZINE (APRESOLINE) tablet 25 mg, 25 mg, Oral, Q8H [address], Jose Francisco Mcclellan MD, 25 mg at 07/08/19 0501    insulin glargine (LANTUS) subcutaneous injection 10 Units 0 1 mL, 10 Units, Subcutaneous, QAM, Mervat Venegas DO, 10 Units at 07/08/19 0835    insulin lispro (HumaLOG) 100 units/mL subcutaneous injection 1-5 Units, 1-5 Units, Subcutaneous, HS, Luz Aranda Jr, PA-C, 1 Units at 07/07/19 2109    insulin lispro (HumaLOG) 100 units/mL subcutaneous injection 1-6 Units, 1-6 Units, Subcutaneous, TID AC, 1 Units at 07/07/19 1705 **AND** Fingerstick Glucose (POCT), , , TID AC, Kelsie Santiago PA-C    isosorbide dinitrate (ISORDIL) tablet 20 mg, 20 mg, Oral, TID after meals, Jose Francisco Mcclellan MD, 20 mg at 07/08/19 0359    levothyroxine tablet 137 mcg, 137 mcg, Oral, Daily, Luz Aranda Jr, PA-C, 137 mcg at 07/08/19 0501    loratadine (CLARITIN) tablet 10 mg, 10 mg, Oral, Daily, Luz Aranda Jr, PA-C, 10 mg at 07/08/19 5738    melatonin tablet 6 mg, 6 mg, Oral, HS, Luz Aranda Jr, PA-C, 6 mg at 07/07/19 2109    metoprolol (LOPRESSOR) injection 2 5 mg, 2 5 mg, Intravenous, Q6H PRN, Estrella Arevalo PA-C    ondansetron [facility]) injection 4 mg, 4 mg, Intravenous, Q6H PRN, Luz Aranda Jr, PA-C, 4 mg at 06/27/19 2124    phenol (CHLORASEPTIC) 1 4 % mucosal liquid 2 spray, 2 spray, Mouth/Throat, Q2H PRN, Kelsie Santiago PA-C    polyethylene glycol (MIRALAX) packet 17 g, 17 g, Oral, Daily, Luz Aranda Jr, PA-C, 17 g at 07/05/19 1521    senna (SENOKOT) tablet 8 6 mg, 1 tablet, Oral, HS, Luz Aranda Jr, PA-C, 8 6 mg at 07/05/19 2155    sodium chloride (OCEAN) 0 65 % nasal spray 2 spray, 2 spray, Each Nare, PRN, Kelsie Santiago PA-C, 2 spray at 06/30/19 1218    venlafaxine [facility]) tablet 50 mg, 50 mg, Oral, Q12H, Luz Aranda Jr, PA-C, 50 mg at 07/07/19 2109    Labs & Results:      Results from last 7 days   Lab Units 07/05/19  0504 07/02/19  0600   WBC Thousand/uL 12 34* 13 67*   HEMOGLOBIN g/dL 15 2 13 8   HEMATOCRIT % 44 7 39 3   PLATELETS Thousands/uL 183 188         Results from last 7 days   Lab Units 07/07/19  0535 07/06/19  0545 07/05/19  0504 07/04/19  0530   POTASSIUM mmol/L 3 8 3 5 3 8 4 0   CHLORIDE mmol/L 90* 92* 92* 91*   CO2 mmol/L 37* 37* 36* 34*   BUN mg/dL 99* 102* 99* 100*   CREATININE mg/dL 2 94* 3 07* 3 33* 3 85*   CALCIUM mg/dL 9 4 9 2 9 2 8 9   ALK PHOS U/L 117*  --   --  136*   ALT U/L 378*  --   --  782*   AST U/L 87*  --   --  87*     Results from last 7 days   Lab Units 07/03/19  2344   INR  1 60*     Counseling / Coordination of Care: Total floor / unit time spent today 25 minutes  Greater than 50% of total time was spent with the patient and / or family counseling and / or coordination of care  A description of the counseling / coordination of care: 20  Thank you for the opportunity to participate in the care of this patient      Irene Gimenez PA-C

## 2019-07-08 NOTE — PROGRESS NOTES
NEPHROLOGY PROGRESS NOTE   Nate Richards 66 y o  female MRN: [id number]  Unit/Bed#: [facility] 508-01 Encounter: [id number]      ASSESSMENT & PLAN:    1  Acute kidney injury secondary to hypotensive ischemic ATN  2  Cardiogenic shock with an EF of 25-30%  3  Hyponatremia in the setting of volume overload  4   Transaminitis secondary to shock liver improving    -overall creatinine now has remained stable at 2 9  -even inputs and outputs  -electrolytes remained stable  -back in atrial fibrillation  -will continue to hold diuretics  -keep mean arterial pressure greater than 65    SUBJECTIVE:    Patient was seen today blood pressure stable denies any chest pain or shortness of Breath    OBJECTIVE:  Current Weight: Weight - Scale: 68 6 kg (151 lb 3 8 oz)  Vitals:    07/08/19 0735   BP: 115/58   Pulse: 100   Resp:    Temp: 97 5 °F (36 4 °C)   SpO2: 95%       Intake/Output Summary (Last 24 hours) at 7/8/2019 1303  Last data filed at 7/8/2019 0735  Gross per 24 hour   Intake 975 ml   Output 950 ml   Net 25 ml       General: conscious, cooperative, in not acute distress  Eyes: conjunctivae pink, anicteric sclerae  ENT: lips and mucous membranes moist  Neck: supple, no JVD  Chest: clear breath sounds bilateral, no crackles, ronchus or wheezings  CVS: distinct S1 & S2, normal rate, irregular rhythm  Abdomen: soft, non-tender, non-distended, normoactive bowel sounds  Extremities:  Trace edema  Skin: no rash  Neuro: awake, alert, oriented    Medications:    Current Facility-Administered Medications:     acetaminophen (TYLENOL) tablet 650 mg, 650 mg, Oral, Q4H PRN, Maura Aranda Jr, PA-C, 650 mg at 06/28/19 1210    albuterol (PROVENTIL HFA,VENTOLIN HFA) inhaler 2 puff, 2 puff, Inhalation, Q4H PRN, Maura Aranda Jr, PA-C, 2 puff at 07/05/19 0802    aluminum-magnesium hydroxide-simethicone (MYLANTA) 200-200-20 mg/5 mL oral suspension 15 mL, 15 mL, Oral, Q4H PRN, Chong Tarango DO    amiodarone tablet 200 mg, 200 mg, Oral, TID With Meals, Monique Rey PA-C, 200 mg at 07/08/19 1153    apixaban (ELIQUIS) tablet 2 5 mg, 2 5 mg, Oral, BID, Shasha Shelton MD, 2 5 mg at 07/08/19 7315    bisacodyl (DULCOLAX) rectal suppository 10 mg, 10 mg, Rectal, Daily PRN, Dixon Moran PA-C    hydrALAZINE (APRESOLINE) tablet 25 mg, 25 mg, Oral, Q8H [facility], Shasha Shelton MD, 25 mg at 07/08/19 0501    insulin glargine (LANTUS) subcutaneous injection 10 Units 0 1 mL, 10 Units, Subcutaneous, QAM, Karely Sanford, DO, 10 Units at 07/08/19 0835    insulin lispro (HumaLOG) 100 units/mL subcutaneous injection 1-5 Units, 1-5 Units, Subcutaneous, HS, Dioxn Aranda Jr, PA-C, 1 Units at 07/07/19 2109    insulin lispro (HumaLOG) 100 units/mL subcutaneous injection 1-6 Units, 1-6 Units, Subcutaneous, TID AC, 1 Units at 07/07/19 1705 **AND** Fingerstick Glucose (POCT), , , TID AC, Monique Rey PA-C    isosorbide dinitrate (ISORDIL) tablet 20 mg, 20 mg, Oral, TID after meals, Shasha Shelton MD, 20 mg at 07/08/19 1153    levothyroxine tablet 137 mcg, 137 mcg, Oral, Daily, Dixon Aranda Jr, PA-C, 137 mcg at 07/08/19 0501    loratadine (CLARITIN) tablet 10 mg, 10 mg, Oral, Daily, Dixon Aranda Jr, PA-C, 10 mg at 07/08/19 1274    melatonin tablet 6 mg, 6 mg, Oral, HS, Dixon Aranda Jr, PA-C, 6 mg at 07/07/19 2109    metoprolol (LOPRESSOR) injection 2 5 mg, 2 5 mg, Intravenous, Q6H PRN, Estrella Arevalo PA-C    metoprolol tartrate (LOPRESSOR) tablet 25 mg, 25 mg, Oral, Q12H [facility], Reva Dubois DO    ondansetron [facility]) injection 4 mg, 4 mg, Intravenous, Q6H PRN, Dixon Aranda Jr, PA-C, 4 mg at 06/27/19 2124    pantoprazole (PROTONIX) EC tablet 40 mg, 40 mg, Oral, Early Morning, Karely Sanford DO, 40 mg at 07/08/19 1153    phenol (CHLORASEPTIC) 1 4 % mucosal liquid 2 spray, 2 spray, Mouth/Throat, Q2H PRN, Monique Rey PA-C    polyethylene glycol (MIRALAX) packet 17 g, 17 g, Oral, Daily, Monique Rey , STEPHEN, 17 g at 07/05/19 0954    senna (SENOKOT) tablet 8 6 mg, 1 tablet, Oral, HS, Baljinder Aranda Jr, PA-C, 8 6 mg at 07/05/19 2155    sodium chloride (OCEAN) 0 65 % nasal spray 2 spray, 2 spray, Each Nare, PRN, Author STEPHEN Eason, 2 spray at 06/30/19 1218    venlafaxine [facility]) tablet 50 mg, 50 mg, Oral, Q12H, Baljinder Amin PA-C, 50 mg at 07/08/19 3780    Invasive Devices:      Lab Results:   Results from last 7 days   Lab Units 07/08/19  1130 07/07/19  0535 07/06/19  0545 07/05/19  0504 07/04/19  0530 07/03/19  2155  07/02/19  0600   WBC Thousand/uL  --   --   --  12 34*  --   --   --  13 67*   HEMOGLOBIN g/dL  --   --   --  15 2  --   --   --  13 8   HEMATOCRIT %  --   --   --  44 7  --   --   --  39 3   PLATELETS Thousands/uL  --   --   --  183  --   --   --  188   POTASSIUM mmol/L 3 5 3 8 3 5 3 8 4 0 3 7   < > 3 4*   CHLORIDE mmol/L 90* 90* 92* 92* 91* 90*   < > 83*   CO2 mmol/L 32 37* 37* 36* 34* 35*   < > 36*   BUN mg/dL 107* 99* 102* 99* 100* 98*   < > 110*   CREATININE mg/dL 2 94* 2 94* 3 07* 3 33* 3 85* 3 99*   < > 4 82*   CALCIUM mg/dL 9 0 9 4 9 2 9 2 8 9 8 9   < > 8 8   MAGNESIUM mg/dL  --   --   --   --  2 0 1 9  --   --    ALK PHOS U/L  --  117*  --   --  136*  --   --   --    ALT U/L  --  378*  --   --  782*  --   --   --    AST U/L  --  87*  --   --  87*  --   --   --     < > = values in this interval not displayed         Previous work up:  See previous notes

## 2019-07-08 NOTE — PHYSICAL THERAPY NOTE
Physical Therapy Progress Note     07/08/19 1150   Pain Assessment   Pain Assessment No/denies pain   Pain Score No Pain   Restrictions/Precautions   Other Precautions Cognitive; Chair Alarm   Subjective   Subjective The pt  states that she is not feeling well, and that she is back in a-fib  She declined ambulation or other activiites, but she was agreeable to trial therapeutic exercise for a bit  Balance   Static Sitting Good   Dynamic Sitting Fair   Activity Tolerance   Activity Tolerance Patient limited by fatigue   Exercises   TKR Sitting;Bilateral;10 reps;5 reps;AROM  (x2 sets )   Assessment   Prognosis Fair   Problem List Decreased strength;Decreased endurance; Impaired balance;Decreased mobility; Decreased coordination;Decreased cognition; Impaired judgement;Decreased safety awareness   Assessment The pt  required notable motivation to participate today, and she declined ambulation  She was able to complete therapeutic exercise with instruction  Encouraged the pt  to continue to mobilize in order to maximize her functional mobility and independence  Barriers to Discharge Decreased caregiver support   Goals   Patient Goals To feel better  STG Expiration Date 07/19/19   Treatment Day 2   Plan   Treatment/Interventions Functional transfer training;LE strengthening/ROM; Therapeutic exercise; Endurance training;Patient/family training;Bed mobility;Gait training   Progress Slow progress, decreased activity tolerance   PT Frequency   (3-5x a week )   Recommendation   Recommendation Post acute IP rehab   Equipment Recommended Amber Mason PTA

## 2019-07-08 NOTE — PLAN OF CARE
Problem: PHYSICAL THERAPY ADULT  Goal: Performs mobility at highest level of function for planned discharge setting  See evaluation for individualized goals  Description  Treatment/Interventions: Functional transfer training, LE strengthening/ROM, Therapeutic exercise, Elevations, Endurance training, Bed mobility, Gait training  Equipment Recommended: Sarika Young       See flowsheet documentation for full assessment, interventions and recommendations  Outcome: Progressing  Note:   Prognosis: Fair  Problem List: Decreased strength, Decreased endurance, Impaired balance, Decreased mobility, Decreased coordination, Decreased cognition, Impaired judgement, Decreased safety awareness  Assessment: The pt  required notable motivation to participate today, and she declined ambulation  She was able to complete therapeutic exercise with instruction  Encouraged the pt  to continue to mobilize in order to maximize her functional mobility and independence  Barriers to Discharge: Decreased caregiver support     Recommendation: Post acute IP rehab     PT - OK to Discharge: Yes(to rehab when medically stable)    See flowsheet documentation for full assessment

## 2019-07-08 NOTE — PROGRESS NOTES
Dustin De La Rosa Held PC-A, with SLIM, made her aware that patient heart rhythm changed from NSR to a-fib  Patient is asymptomatic  No new orders at this time

## 2019-07-08 NOTE — PLAN OF CARE
Problem: PAIN - ADULT  Goal: Verbalizes/displays adequate comfort level or baseline comfort level  Description  Interventions:  - Encourage patient to monitor pain and request assistance  - Assess pain using appropriate pain scale  - Administer analgesics based on type and severity of pain and evaluate response  - Implement non-pharmacological measures as appropriate and evaluate response  - Consider cultural and social influences on pain and pain management  - Notify physician/advanced practitioner if interventions unsuccessful or patient reports new pain  Outcome: Progressing     Problem: INFECTION - ADULT  Goal: Absence or prevention of progression during hospitalization  Description  INTERVENTIONS:  - Assess and monitor for signs and symptoms of infection  - Monitor lab/diagnostic results  - Monitor all insertion sites, i e  indwelling lines, tubes, and drains  - Monitor endotracheal (as able) and nasal secretions for changes in amount and color  - [place] appropriate cooling/warming therapies per order  - Administer medications as ordered  - Instruct and encourage patient and family to use good hand hygiene technique  - Identify and instruct in appropriate isolation precautions for identified infection/condition  Outcome: Progressing  Goal: Absence of fever/infection during neutropenic period  Description  INTERVENTIONS:  - Monitor WBC  - Implement neutropenic guidelines  Outcome: Progressing     Problem: SAFETY ADULT  Goal: Patient will remain free of falls  Description  INTERVENTIONS:  - Assess patient frequently for physical needs  -  Identify cognitive and physical deficits and behaviors that affect risk of falls    -  [place] fall precautions as indicated by assessment   - Educate patient/family on patient safety including physical limitations  - Instruct patient to call for assistance with activity based on assessment  - Modify environment to reduce risk of injury  - Consider OT/PT consult to assist with strengthening/mobility  Outcome: Progressing  Goal: Maintain or return to baseline ADL function  Description  INTERVENTIONS:  -  Assess patient's ability to carry out ADLs; assess patient's baseline for ADL function and identify physical deficits which impact ability to perform ADLs (bathing, care of mouth/teeth, toileting, grooming, dressing, etc )  - Assess/evaluate cause of self-care deficits   - Assess range of motion  - Assess patient's mobility; develop plan if impaired  - Assess patient's need for assistive devices and provide as appropriate  - Encourage maximum independence but intervene and supervise when necessary  ¯ Involve family in performance of ADLs  ¯ Assess for home care needs following discharge   ¯ Request OT consult to assist with ADL evaluation and planning for discharge  ¯ Provide patient education as appropriate  Outcome: Progressing  Goal: Maintain or return mobility status to optimal level  Description  INTERVENTIONS:  - Assess patient's baseline mobility status (ambulation, transfers, stairs, etc )    - Identify cognitive and physical deficits and behaviors that affect mobility  - Identify mobility aids required to assist with transfers and/or ambulation (gait belt, sit-to-stand, lift, walker, cane, etc )  - [place] fall precautions as indicated by assessment  - Record patient progress and toleration of activity level on Mobility SBAR; progress patient to next Phase/Stage  - Instruct patient to call for assistance with activity based on assessment  - Request Rehabilitation consult to assist with strengthening/weightbearing, etc   Outcome: Progressing     Problem: DISCHARGE PLANNING  Goal: Discharge to home or other facility with appropriate resources  Description  INTERVENTIONS:  - Identify barriers to discharge w/patient and caregiver  - Arrange for needed discharge resources and transportation as appropriate  - Identify discharge learning needs (meds, wound care, etc )  - Arrange for interpretive services to assist at discharge as needed  - Refer to Case Management Department for coordinating discharge planning if the patient needs post-hospital services based on physician/advanced practitioner order or complex needs related to functional status, cognitive ability, or social support system  Outcome: Progressing     Problem: Knowledge Deficit  Goal: Patient/family/caregiver demonstrates understanding of disease process, treatment plan, medications, and discharge instructions  Description  Complete learning assessment and assess knowledge base  Interventions:  - Provide teaching at level of understanding  - Provide teaching via preferred learning methods  Outcome: Progressing     Problem: Potential for Falls  Goal: Patient will remain free of falls  Description  INTERVENTIONS:  - Assess patient frequently for physical needs  -  Identify cognitive and physical deficits and behaviors that affect risk of falls  -  [place] fall precautions as indicated by assessment   - Educate patient/family on patient safety including physical limitations  - Instruct patient to call for assistance with activity based on assessment  - Modify environment to reduce risk of injury  - Consider OT/PT consult to assist with strengthening/mobility  Outcome: Progressing     Problem: CARDIOVASCULAR - ADULT  Goal: Maintains optimal cardiac output and hemodynamic stability  Description  INTERVENTIONS:  - Monitor I/O, vital signs and rhythm  - Monitor for S/S and trends of decreased cardiac output i e  bleeding, hypotension  - Administer and titrate ordered vasoactive medications to optimize hemodynamic stability  - Assess quality of pulses, skin color and temperature  - Assess for signs of decreased coronary artery perfusion - ex   Angina  - Instruct patient to report change in severity of symptoms  Outcome: Progressing  Goal: Absence of cardiac dysrhythmias or at baseline rhythm  Description  INTERVENTIONS:  - Continuous cardiac monitoring, monitor vital signs, obtain 12 lead EKG if indicated  - Administer antiarrhythmic and heart rate control medications as ordered  - Monitor electrolytes and administer replacement therapy as ordered  Outcome: Progressing     Problem: Prexisting or High Potential for Compromised Skin Integrity  Goal: Skin integrity is maintained or improved  Description  INTERVENTIONS:  - Identify patients at risk for skin breakdown  - Assess and monitor skin integrity  - Assess and monitor nutrition and hydration status  - Monitor labs (i e  albumin)  - Assess for incontinence   - Turn and reposition patient  - Assist with mobility/ambulation  - Relieve pressure over bony prominences  - Avoid friction and shearing  - Provide appropriate hygiene as needed including keeping skin clean and dry  - Evaluate need for skin moisturizer/barrier cream  - Collaborate with interdisciplinary team (i e  Nutrition, Rehabilitation, etc )   - Patient/family teaching  Outcome: Progressing     Problem: Nutrition/Hydration-ADULT  Goal: Nutrient/Hydration intake appropriate for improving, restoring or maintaining nutritional needs  Description  Monitor and assess patient's nutrition/hydration status for malnutrition (ex- brittle hair, bruises, dry skin, pale skin and conjunctiva, muscle wasting, smooth red tongue, and disorientation)  Collaborate with interdisciplinary team and initiate plan and interventions as ordered  Monitor patient's weight and dietary intake as ordered or per policy  Utilize nutrition screening tool and intervene per policy  Determine patient's food preferences and provide high-protein, high-caloric foods as appropriate       INTERVENTIONS:  - Monitor oral intake, urinary output, labs, and treatment plans  - Assess nutrition and hydration status and recommend course of action  - Evaluate amount of meals eaten  - Assist patient with eating if necessary   - Allow adequate time for meals  - Recommend/ encourage appropriate diets, oral nutritional supplements, and vitamin/mineral supplements  - Order, calculate, and assess calorie counts as needed  - Recommend, monitor, and adjust tube feedings and TPN/PPN based on assessed needs  - Assess need for intravenous fluids  - Provide specific nutrition/hydration education as appropriate  - Include patient/family/caregiver in decisions related to nutrition  Outcome: Progressing

## 2019-07-08 NOTE — SOCIAL WORK
CM follow with pt for STR choices  Pt choices were as follow: 1) [facility] 3) [facility] in [place]  Referral sent to facilities  Awaiting response  CM will follow up

## 2019-07-08 NOTE — ASSESSMENT & PLAN NOTE
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

## 2019-07-08 NOTE — OCCUPATIONAL THERAPY NOTE
07/08/19 1240   Restrictions/Precautions   Weight Bearing Precautions Per Order No   Other Precautions Cognitive; Chair Alarm; Fall Risk;Pain   Pain Assessment   Pain Assessment No/denies pain   Pain Score No Pain   ADL   Where Assessed Edge of bed   LB Dressing Assistance 3  Moderate Assistance   LB Dressing Deficit Setup;Don/doff R sock; Don/doff L sock   Transfers   Sit to Stand 4  Minimal assistance   Additional items Assist x 1   Stand to Sit 4  Minimal assistance   Additional items Assist x 1   Stand pivot 4  Minimal assistance   Additional items Assist x 1   Cognition   Overall Cognitive Status WFL   Arousal/Participation Lethargic   Attention Within functional limits   Orientation Level Oriented X4   Memory Decreased recall of precautions   Following Commands Follows one step commands with increased time or repetition   Activity Tolerance   Activity Tolerance Patient limited by fatigue   Assessment   Assessment Pt participates in OT session with focus on transfers, activity tolerance, and LB dressing to increase I for d/c  Pt requires encouragement to participate in therapy 2* decreased motivation  Pt CGA sit to stand with RW support  Pt CGA SPT from chair to EOB  Pt requires rest breaks t/o session 2* decreased endurance  Pt mod A LB dressing to don/doff socks while seated EOB  Pt reviewed/educated on energy conservation techniques  Chair alarm turned on post session  Pt will continue to benefit from activity tolerance, adls, and transfers  Plan   Treatment Interventions ADL retraining;Functional transfer training; Endurance training; Activityengagement   Goal Expiration Date 07/08/19   Treatment Day 4   OT Frequency 3-5x/wk   Recommendation   OT Discharge Recommendation Short Term Rehab

## 2019-07-08 NOTE — PLAN OF CARE
Problem: OCCUPATIONAL THERAPY ADULT  Goal: Performs self-care activities at highest level of function for planned discharge setting  See evaluation for individualized goals  Description  Treatment Interventions: ADL retraining, Functional transfer training, UE strengthening/ROM, Endurance training, Cognitive reorientation, Patient/family training, Equipment evaluation/education, Compensatory technique education, Activityengagement, Energy conservation          See flowsheet documentation for full assessment, interventions and recommendations  Outcome: Progressing  Note:   Limitation: Decreased ADL status, Decreased UE strength, Decreased Safe judgement during ADL, Decreased endurance, Decreased high-level ADLs, Decreased fine motor control  Prognosis: Good  Assessment: Pt participates in OT session with focus on transfers, activity tolerance, and LB dressing to increase I for d/c  Pt requires encouragement to participate in therapy 2* decreased motivation  Pt CGA sit to stand with RW support  Pt CGA SPT from chair to EOB  Pt requires rest breaks t/o session 2* decreased endurance  Pt mod A LB dressing to don/doff socks while seated EOB  Pt reviewed/educated on energy conservation techniques  Chair alarm turned on post session  Pt will continue to benefit from activity tolerance, adls, and transfers       OT Discharge Recommendation: Short Term Rehab  OT - OK to Discharge: (when medically cleared)

## 2019-07-08 NOTE — ASSESSMENT & PLAN NOTE
· Secondary to ischemic ATN/cardiogenic syndrome Christiano Son  · Improving  · Diuretics on hold  · Nephrology is following  · Follow on BMP

## 2019-07-08 NOTE — ASSESSMENT & PLAN NOTE
Lab Results   Component Value Date    HGBA1C 5 9 (H) 03/18/2019       Recent Labs     07/07/19  1051 07/07/19  1642 07/07/19 2050 07/08/19  0618   POCGLU 211* 150* 180* 102       Blood Sugar Average: Last 72 hrs:  (P) 987 [id number]   · Diet controlled at home  · Continue Lantus and insulin sliding scale

## 2019-07-09 ENCOUNTER — APPOINTMENT (INPATIENT)
Dept: RADIOLOGY | Facility: HOSPITAL | Age: 79
DRG: 291 | End: 2019-07-09
Attending: INTERNAL MEDICINE
Payer: COMMERCIAL

## 2019-07-09 LAB
ALBUMIN SERPL BCP-MCNC: 2.6 G/DL (ref 3.5–5)
ALP SERPL-CCNC: 95 U/L (ref 46–116)
ALT SERPL W P-5'-P-CCNC: 208 U/L (ref 12–78)
ANION GAP SERPL CALCULATED.3IONS-SCNC: 11 MMOL/L (ref 4–13)
AST SERPL W P-5'-P-CCNC: 43 U/L (ref 5–45)
BILIRUB SERPL-MCNC: 1.45 MG/DL (ref 0.2–1)
BUN SERPL-MCNC: 108 MG/DL (ref 5–25)
CALCIUM SERPL-MCNC: 8.9 MG/DL (ref 8.3–10.1)
CHLORIDE SERPL-SCNC: 89 MMOL/L (ref 100–108)
CO2 SERPL-SCNC: 29 MMOL/L (ref 21–32)
CREAT SERPL-MCNC: 3.12 MG/DL (ref 0.6–1.3)
ERYTHROCYTE [DISTWIDTH] IN BLOOD BY AUTOMATED COUNT: 13.5 % (ref 11.6–15.1)
GFR SERPL CREATININE-BSD FRML MDRD: 14 ML/MIN/1.73SQ M
GLUCOSE SERPL-MCNC: 110 MG/DL (ref 65–140)
GLUCOSE SERPL-MCNC: 131 MG/DL (ref 65–140)
GLUCOSE SERPL-MCNC: 158 MG/DL (ref 65–140)
GLUCOSE SERPL-MCNC: 185 MG/DL (ref 65–140)
GLUCOSE SERPL-MCNC: 191 MG/DL (ref 65–140)
HCT VFR BLD AUTO: 42 % (ref 34.8–46.1)
HGB BLD-MCNC: 14 G/DL (ref 11.5–15.4)
MCH RBC QN AUTO: 32 PG (ref 26.8–34.3)
MCHC RBC AUTO-ENTMCNC: 33.3 G/DL (ref 31.4–37.4)
MCV RBC AUTO: 96 FL (ref 82–98)
PLATELET # BLD AUTO: 196 THOUSANDS/UL (ref 149–390)
PMV BLD AUTO: 11.5 FL (ref 8.9–12.7)
POTASSIUM SERPL-SCNC: 3.2 MMOL/L (ref 3.5–5.3)
PROT SERPL-MCNC: 7.8 G/DL (ref 6.4–8.2)
RBC # BLD AUTO: 4.37 MILLION/UL (ref 3.81–5.12)
SODIUM SERPL-SCNC: 129 MMOL/L (ref 136–145)
WBC # BLD AUTO: 10.57 THOUSAND/UL (ref 4.31–10.16)

## 2019-07-09 PROCEDURE — 80053 COMPREHEN METABOLIC PANEL: CPT | Performed by: INTERNAL MEDICINE

## 2019-07-09 PROCEDURE — 77001 FLUOROGUIDE FOR VEIN DEVICE: CPT | Performed by: RADIOLOGY

## 2019-07-09 PROCEDURE — C1751 CATH, INF, PER/CENT/MIDLINE: HCPCS

## 2019-07-09 PROCEDURE — 99232 SBSQ HOSP IP/OBS MODERATE 35: CPT | Performed by: INTERNAL MEDICINE

## 2019-07-09 PROCEDURE — 82948 REAGENT STRIP/BLOOD GLUCOSE: CPT

## 2019-07-09 PROCEDURE — 36556 INSERT NON-TUNNEL CV CATH: CPT | Performed by: RADIOLOGY

## 2019-07-09 PROCEDURE — 85027 COMPLETE CBC AUTOMATED: CPT | Performed by: INTERNAL MEDICINE

## 2019-07-09 PROCEDURE — 76937 US GUIDE VASCULAR ACCESS: CPT | Performed by: RADIOLOGY

## 2019-07-09 PROCEDURE — 36573 INSJ PICC RS&I 5 YR+: CPT

## 2019-07-09 PROCEDURE — 97112 NEUROMUSCULAR REEDUCATION: CPT

## 2019-07-09 PROCEDURE — 02H633Z INSERTION OF INFUSION DEVICE INTO RIGHT ATRIUM, PERCUTANEOUS APPROACH: ICD-10-PCS | Performed by: RADIOLOGY

## 2019-07-09 PROCEDURE — 97530 THERAPEUTIC ACTIVITIES: CPT

## 2019-07-09 PROCEDURE — B514ZZA FLUOROSCOPY OF LEFT JUGULAR VEINS, GUIDANCE: ICD-10-PCS | Performed by: RADIOLOGY

## 2019-07-09 RX ORDER — PANTOPRAZOLE SODIUM 40 MG/1
TABLET, DELAYED RELEASE ORAL
Status: COMPLETED
Start: 2019-07-09 | End: 2019-07-09

## 2019-07-09 RX ORDER — ISOSORBIDE DINITRATE 10 MG/1
10 TABLET ORAL
Status: DISCONTINUED | OUTPATIENT
Start: 2019-07-09 | End: 2019-07-11 | Stop reason: HOSPADM

## 2019-07-09 RX ORDER — LEVOTHYROXINE SODIUM 112 UG/1
TABLET ORAL
Status: DISPENSED
Start: 2019-07-09 | End: 2019-07-09

## 2019-07-09 RX ORDER — ALBUMIN (HUMAN) 12.5 G/50ML
25 SOLUTION INTRAVENOUS EVERY 12 HOURS
Status: COMPLETED | OUTPATIENT
Start: 2019-07-09 | End: 2019-07-10

## 2019-07-09 RX ORDER — LEVOTHYROXINE SODIUM 0.03 MG/1
TABLET ORAL
Status: DISPENSED
Start: 2019-07-09 | End: 2019-07-09

## 2019-07-09 RX ORDER — METOPROLOL SUCCINATE 25 MG/1
25 TABLET, EXTENDED RELEASE ORAL 2 TIMES DAILY
Status: DISCONTINUED | OUTPATIENT
Start: 2019-07-09 | End: 2019-07-11 | Stop reason: HOSPADM

## 2019-07-09 RX ORDER — HYDRALAZINE HYDROCHLORIDE 25 MG/1
TABLET, FILM COATED ORAL
Status: COMPLETED
Start: 2019-07-09 | End: 2019-07-09

## 2019-07-09 RX ADMIN — HYDRALAZINE HYDROCHLORIDE 25 MG: 25 TABLET ORAL at 21:19

## 2019-07-09 RX ADMIN — AMIODARONE HYDROCHLORIDE 200 MG: 200 TABLET ORAL at 18:03

## 2019-07-09 RX ADMIN — ALBUMIN (HUMAN) 25 G: 12.5 SOLUTION INTRAVENOUS at 13:24

## 2019-07-09 RX ADMIN — AMIODARONE HYDROCHLORIDE 200 MG: 200 TABLET ORAL at 08:28

## 2019-07-09 RX ADMIN — HYDRALAZINE HYDROCHLORIDE 25 MG: 25 TABLET ORAL at 13:23

## 2019-07-09 RX ADMIN — ALUMINUM HYDROXIDE, MAGNESIUM HYDROXIDE, AND SIMETHICONE 15 ML: 200; 200; 20 SUSPENSION ORAL at 12:00

## 2019-07-09 RX ADMIN — AMIODARONE HYDROCHLORIDE 200 MG: 200 TABLET ORAL at 12:00

## 2019-07-09 RX ADMIN — APIXABAN 2.5 MG: 2.5 TABLET, FILM COATED ORAL at 18:03

## 2019-07-09 RX ADMIN — LORATADINE 10 MG: 10 TABLET ORAL at 08:27

## 2019-07-09 RX ADMIN — METOPROLOL SUCCINATE 25 MG: 25 TABLET, EXTENDED RELEASE ORAL at 18:03

## 2019-07-09 RX ADMIN — ISOSORBIDE DINITRATE 10 MG: 10 TABLET ORAL at 11:57

## 2019-07-09 RX ADMIN — MELATONIN 6 MG: at 21:19

## 2019-07-09 RX ADMIN — PANTOPRAZOLE SODIUM 40 MG: 40 TABLET, DELAYED RELEASE ORAL at 05:22

## 2019-07-09 RX ADMIN — HYDRALAZINE HYDROCHLORIDE 25 MG: 25 TABLET ORAL at 05:22

## 2019-07-09 RX ADMIN — SENNOSIDES 8.6 MG: 8.6 TABLET, FILM COATED ORAL at 21:20

## 2019-07-09 RX ADMIN — INSULIN LISPRO 1 UNITS: 100 INJECTION, SOLUTION INTRAVENOUS; SUBCUTANEOUS at 21:20

## 2019-07-09 RX ADMIN — VENLAFAXINE 50 MG: 50 TABLET ORAL at 21:19

## 2019-07-09 RX ADMIN — ISOSORBIDE DINITRATE 20 MG: 10 TABLET ORAL at 08:28

## 2019-07-09 RX ADMIN — VENLAFAXINE 50 MG: 50 TABLET ORAL at 09:19

## 2019-07-09 RX ADMIN — LEVOTHYROXINE SODIUM 137 MCG: 112 TABLET ORAL at 05:21

## 2019-07-09 RX ADMIN — APIXABAN 2.5 MG: 2.5 TABLET, FILM COATED ORAL at 08:27

## 2019-07-09 RX ADMIN — INSULIN LISPRO 2 UNITS: 100 INJECTION, SOLUTION INTRAVENOUS; SUBCUTANEOUS at 11:57

## 2019-07-09 RX ADMIN — ISOSORBIDE DINITRATE 10 MG: 10 TABLET ORAL at 18:04

## 2019-07-09 RX ADMIN — METOPROLOL TARTRATE 25 MG: 25 TABLET, FILM COATED ORAL at 08:27

## 2019-07-09 RX ADMIN — INSULIN GLARGINE 10 UNITS: 100 INJECTION, SOLUTION SUBCUTANEOUS at 08:27

## 2019-07-09 NOTE — ASSESSMENT & PLAN NOTE
· S/p cardioversion 7/3, now back in AFib after being in NSR  · Metoprolol changed to succinate today  · Continue Eliquis  · Continue Telemetry

## 2019-07-09 NOTE — UTILIZATION REVIEW
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, Stage C- s/p Acute Cardiogenic shock   Cardiogenic shock, resolved  Initial EF 25- 30% improving to 45%  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  Negative fluid balance, diuretics on hold due to WANG  On hydralazine, Coreg and Imdur    Diagnostic Testing:  Echocardiogram (limited) from 07/04/2019:  LVEF: 35%; mild diffuse hypokinesis  LVIDd: 3 8 cm  MR: No MR  Moderate annular calcification  Other: Dilated LA      Echocardiogram from 06/20/2019:  LVEF: 25%  LVIDd: 4 6 cm  RV: Dilated and hypokinetic  MR: Moderate  PASP: 35 mmHg      Neurohormonal Blockade:  --Beta Blocker: metoprolol tartrate 25 mg Q12  --ACEi, ARB or ARNi: N/A  --SVR reduction: hydralazine 25 mg TID and Isordil 20 mg TID  --  Advanced Therapies (if appropriate): --Inotrope: N/A  --LVAD/Transplant Candidacy: Will continue to monitor       # Afib with RVR   S/p successful cardioversion on 07/03 ( after amio load) Sedation with propofol     Continue on amiodarone 200 mg TID and change coreg to Toprol  Now in Afib this am    [id number]: 6  AC with Eliquis     # WANG  Baseline around 0 9  Cr today 2 94  # shock liver- improving  # DM2  # HLD    Shock liver  Assessment & Plan  · Transaminitis and coag study are slowly improving    · Continue to monitor      Hyponatremia  Assessment & Plan  · In the setting of volume overload   · Nephrology is following  · Continue oral fluid restriction     Plan:  Decrease isordil to 10 mg TID, change metoprolol tartrate to succinate 25 mg BID    Pertinent Labs/Diagnostic Results:   Results from last 7 days   Lab Units 07/09/19  1014 07/05/19  0504   WBC Thousand/uL 10 57* 12 34*   HEMOGLOBIN g/dL 14 0 15 2 HEMATOCRIT % 42 0 44 7   PLATELETS Thousands/uL 196 183         Results from last 7 days   Lab Units 07/09/19  1014 07/08/19  1130 07/07/19  0535 07/06/19  0545  07/04/19  0530 07/03/19  2155   SODIUM mmol/L 129* 133* 131* 134*   < > 134* 133*   POTASSIUM mmol/L 3 2* 3 5 3 8 3 5   < > 4 0 3 7   CHLORIDE mmol/L 89* 90* 90* 92*   < > 91* 90*   CO2 mmol/L 29 32 37* 37*   < > 34* 35*   ANION GAP mmol/L 11 11 4 5   < > 9 8   BUN mg/dL 108* 107* 99* 102*   < > 100* 98*   CREATININE mg/dL 3 12* 2 94* 2 94* 3 07*   < > 3 85* 3 99*   EGFR ml/min/1 73sq m 14 15 15 14   < > 11 10   CALCIUM mg/dL 8 9 9 0 9 4 9 2   < > 8 9 8 9   MAGNESIUM mg/dL  --   --   --   --   --  2 0 1 9     Results from last 7 days   Lab Units 07/09/19  1014 07/07/19  0535 07/04/19  0530   AST U/L 43 87* 87*   ALT U/L 208* 378* 782*   ALK PHOS U/L 95 117* 136*   TOTAL PROTEIN g/dL 7 8 8 8* 7 9   ALBUMIN g/dL 2 6* 3 0* 3 1*   TOTAL BILIRUBIN mg/dL 1 45* 2 15* 1 86*     Results from last 7 days   Lab Units 07/09/19  1127 07/09/19  0712 07/08/19  2105 07/08/19  1552 07/08/19  1147 07/08/19  0618 07/07/19  2050 07/07/19  1642 07/07/19  1051 07/07/19  0617   POC GLUCOSE mg/dl 191* 110 223* 164* 148* 102 180* 150* 211* 97     Results from last 7 days   Lab Units 07/03/19  0836   PH JOSE  7 463*   PCO2 JOSE mm Hg 53 4*   PO2 JOSE mm Hg 38 5   HCO3 JOSE mmol/L 37 4*   BASE EXC JOSE mmol/L 11 2   O2 CONTENT JOSE ml/dL 15 6   O2 HGB, VENOUS % 69 1     Results from last 7 days   Lab Units 07/04/19  0532 07/03/19  2344 07/03/19  1833 07/03/19  1121 07/03/19  0312   PROTIME seconds  --  18 6*  --   --   --    INR   --  1 60*  --   --   --    PTT seconds 63*  --  69* 82* 109*     Vital Signs:   Blood pressure 107/61, pulse 96, temperature (!) 97 3 °F (36 3 °C), temperature source Oral, resp  rate 15, height 5' 1" (1 549 m), weight 68 4 kg (150 lb 12 7 oz), SpO2 100 %  , Body mass index is 28 49 kg/m² , I/O last 3 completed shifts:   In: 8814 [P O :1035]  Out: 2200 [MHRWX:5029]  I/O this shift: In: [de-identified] [P O :80]  Out: 160 [Urine:160]    Wt Readings from Last 3 Encounters:   07/09/19 68 4 kg (150 lb 12 7 oz)   06/18/19 81 kg (178 lb 9 6 oz)   03/20/19 80 1 kg (176 lb 9 6 oz)       Intake/Output Summary (Last 24 hours) at 7/9/2019 0939  Gross per 24 hour   Intake 240 ml   Output 1410 ml   Net -1170 ml      I/O last 3 completed shifts: In: 1035 [P O :1035]  Out: 2200 [Urine:2200]    Medications:   Scheduled Meds:   Current Facility-Administered Medications:  acetaminophen 650 mg Oral Q4H PRN   albuterol 2 puff Inhalation Q4H PRN   aluminum-magnesium hydroxide-simethicone 15 mL Oral Q4H PRN   GIVEN X 2/ 24 HRS   amiodarone 200 mg Oral TID With Meals   apixaban 2 5 mg Oral BID   bisacodyl 10 mg Rectal Daily PRN   hydrALAZINE 25 mg Oral Q8H [address]   insulin glargine 10 Units Subcutaneous QAM   insulin lispro 1-5 Units Subcutaneous HS   insulin lispro 1-6 Units Subcutaneous TID AC   isosorbide dinitrate 10 mg Oral TID after meals   levothyroxine      levothyroxine      levothyroxine 137 mcg Oral Daily   loratadine 10 mg Oral Daily   melatonin 6 mg Oral HS   metoprolol 2 5 mg Intravenous Q6H PRN   metoprolol succinate 25 mg Oral BID   ondansetron 4 mg Intravenous Q6H PRN   pantoprazole 40 mg Oral Early Morning   phenol 2 spray Mouth/Throat Q2H PRN   polyethylene glycol 17 g Oral Daily   senna 1 tablet Oral HS   sodium chloride 2 spray Each Nare PRN   venlafaxine 50 mg Oral Q12H     Discharge Plan:   ANTICIPATE DISCHARGE TO SKILLED INPATIENT STR - WHEN MEDICALLY CLEARED  CASE MANAGEMENT FOLLOWING CLOSELY FOR ALL DISCHARGE NEEDS    Network Utilization Review Department  Phone: [phone]; Fax [phone]  [email or web address]  ATTENTION: Please call with any questions or concerns to [phone]  and carefully listen to the prompts so that you are directed to the right person     Send all requests for admission clinical reviews, approved or denied determinations and any other requests to fax [phone]   All voicemails are confidential

## 2019-07-09 NOTE — PHYSICAL THERAPY NOTE
Physical Therapy Progress Note     07/09/19 1155   Pain Assessment   Pain Assessment No/denies pain   Pain Score No Pain   Restrictions/Precautions   Other Precautions Cognitive; Fall Risk   Subjective   Subjective The pt  continues to be expressive about her situation  She declined ambulation, but she was agreeable to get to the chair  Bed Mobility   Supine to Sit 4  Minimal assistance   Additional items Assist x 1; Increased time required   Sit to Supine 4  Minimal assistance   Additional items Assist x 1; Increased time required   Transfers   Sit to Stand 4  Minimal assistance   Additional items Assist x 1; Increased time required   Stand to Sit 4  Minimal assistance   Additional items Assist x 1; Increased time required;Verbal cues   Stand pivot 4  Minimal assistance   Additional items Assist x 1   Ambulation/Elevation   Gait pattern Excessively slow; Step to;Short stride; Inconsistent viktoriya;Decreased foot clearance   Gait Assistance 4  Minimal assist   Additional items Assist x 1;Verbal cues   Assistive Device Rolling walker   Distance 3 feet  Balance   Static Sitting Fair +   Dynamic Sitting Fair   Static Standing Poor +   Ambulatory Poor +   Activity Tolerance   Activity Tolerance Patient limited by fatigue   Exercises   Knee AROM Long Arc Quad Sitting;Bilateral;AROM;10 reps   Ankle Pumps Sitting;Bilateral;AROM;10 reps   Assessment   Prognosis Fair   Problem List Decreased strength;Decreased endurance; Impaired balance;Decreased mobility; Decreased coordination;Decreased cognition; Impaired judgement;Decreased safety awareness   Assessment The pt  continues to demonstrate poor motivation for activity, and she is very expressive about her situation  She was able to transfer to the chair, but she deferred any further ambulation  She remains limited with decreased strength, activity tolerance, and balance which continued physical therapy will assist with     Barriers to Discharge Inaccessible home environment;Decreased caregiver support   Goals   Patient Goals To get better  STG Expiration Date 07/19/19   Treatment Day 3   Plan   Treatment/Interventions Functional transfer training;LE strengthening/ROM; Therapeutic exercise; Endurance training;Patient/family training;Bed mobility;Gait training   Progress Slow progress, decreased activity tolerance   PT Frequency   (3-5x a week )   Recommendation   Recommendation Post acute IP rehab   Equipment Recommended Cynthia Trinidad PTA

## 2019-07-09 NOTE — ASSESSMENT & PLAN NOTE
Wt Readings from Last 3 Encounters:   07/09/19 68 4 kg (150 lb 12 7 oz)   06/18/19 81 kg (178 lb 9 6 oz)   03/20/19 80 1 kg (176 lb 9 6 oz)     Cardiogenic shock, resolved  Initial EF 25- 30% improving to 45%  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  diuretics on hold due to WANG  On hydralazine, metoprolol and isosorbide  Continue Fluid restriction, low sodium diet  Cardiology is following

## 2019-07-09 NOTE — BRIEF OP NOTE (RAD/CATH)
Left IJ venogram and 5 [place] double lumen left IJ central line placement: Procedure Note    PATIENT NAME: Wesley Alvares  : 1940  MRN: [id number]     Pre-op Diagnosis:   1  Atrial fibrillation with rapid ventricular response ([address] )    2  New onset atrial fibrillation ([address] )    3  CHF (congestive heart failure) ([address] )    4  Bilateral lower extremity edema    5  Orthopnea    6  Shortness of breath    7  Atrial fibrillation with RVR ([address] )    8  Acute congestive heart failure, unspecified heart failure type ([address] )    9  Acute kidney injury ([address] )    10  Lactic acidosis    11  Hyperkalemia    12  Hyponatremia    13  Cardiogenic shock ([address] )    14  Shock liver    15  High anion gap metabolic acidosis    16  Acute on chronic combined systolic and diastolic CHF (congestive heart failure) (HCC)      Post-op Diagnosis:   1  Atrial fibrillation with rapid ventricular response ([address] )    2  New onset atrial fibrillation ([address] )    3  CHF (congestive heart failure) ([address] )    4  Bilateral lower extremity edema    5  Orthopnea    6  Shortness of breath    7  Atrial fibrillation with RVR ([address] )    8  Acute congestive heart failure, unspecified heart failure type ([address] )    9  Acute kidney injury ([address] )    10  Lactic acidosis    11  Hyperkalemia    12  Hyponatremia    13  Cardiogenic shock ([address] )    14  Shock liver    15  High anion gap metabolic acidosis    16  Acute on chronic combined systolic and diastolic CHF (congestive heart failure) ([address] )        Surgeon:   Ronald Benavides MD  Assistants:     No qualified resident was available, Resident is only observing    Estimated Blood Loss: less than 15 ml     Findings:     Left IJ with stenosis more centrally on the venogram, but still patent  5 [place] double lumen left IJ central line placed       Specimens: none    Complications:  none    Anesthesia: Brayden Chávez MD     Date: 2019  Time: 4:51 PM

## 2019-07-09 NOTE — PLAN OF CARE
Problem: PHYSICAL THERAPY ADULT  Goal: Performs mobility at highest level of function for planned discharge setting  See evaluation for individualized goals  Description  Treatment/Interventions: Functional transfer training, LE strengthening/ROM, Therapeutic exercise, Elevations, Endurance training, Bed mobility, Gait training  Equipment Recommended: Mikal March       See flowsheet documentation for full assessment, interventions and recommendations  Outcome: Progressing  Note:   Prognosis: Fair  Problem List: Decreased strength, Decreased endurance, Impaired balance, Decreased mobility, Decreased coordination, Decreased cognition, Impaired judgement, Decreased safety awareness  Assessment: The pt  continues to demonstrate poor motivation for activity, and she is very expressive about her situation  She was able to transfer to the chair, but she deferred any further ambulation  She remains limited with decreased strength, activity tolerance, and balance which continued physical therapy will assist with  Barriers to Discharge: Inaccessible home environment, Decreased caregiver support     Recommendation: Post acute IP rehab     PT - OK to Discharge: Yes(to rehab when medically stable)    See flowsheet documentation for full assessment

## 2019-07-09 NOTE — ASSESSMENT & PLAN NOTE
· Secondary to ischemic ATN/cardiogenic syndrome /shock  · Renal function is stable  · Diuretics on hold  · Albumin given for BP support  · Continue to monitor BMPs daily

## 2019-07-09 NOTE — PROGRESS NOTES
NEPHROLOGY PROGRESS NOTE   Gurjit Lara 66 y o  female MRN: [id number]  Unit/Bed#: [facility] 508-01 Encounter: [id number]      ASSESSMENT & PLAN:    1  Acute kidney injury secondary to hypotensive ischemic ATN   2  Cardiogenic shock with an EF of 25-30%  3  Hyponatremia in the setting of volume overload  4   Transaminitis secondary to shock liver improving    -creatinine seems to have plateaued around 3 1  -does have a poor appetite  -given her comorbidities, hemodynamics unclear whether she would tolerate intermittent hemodialysis long-term  -will give albumin 25 g x2 today  -Isordil decreased to metoprolol increased  -will continue to hold diuretics  -keep mean arterial pressure greater than 65    SUBJECTIVE:    Patient seen today states she has no appetite no chest pain or shortness of breast    OBJECTIVE:  Current Weight: Weight - Scale: 68 4 kg (150 lb 12 7 oz)  Vitals:    07/09/19 1129   BP: 97/54   Pulse: (!) 112   Resp: 14   Temp: 98 2 °F (36 8 °C)   SpO2: 97%       Intake/Output Summary (Last 24 hours) at 7/9/2019 1230  Last data filed at 7/9/2019 1203  Gross per 24 hour   Intake 440 ml   Output 1410 ml   Net -970 ml       General: conscious, cooperative, in not acute distress  Eyes: conjunctivae pink, anicteric sclerae  ENT: lips and mucous membranes moist  Neck: supple, no JVD  Chest: clear breath sounds bilateral, no crackles, ronchus or wheezings  CVS:  Irregular heart rate  Abdomen: soft, non-tender, non-distended, normoactive bowel sounds  Extremities:  Trace edema  Skin: no rash  Neuro: awake, alert, oriented      Medications:    Current Facility-Administered Medications:     acetaminophen (TYLENOL) tablet 650 mg, 650 mg, Oral, Q4H PRN, Vanessa Aranda Jr, PA-C, 650 mg at 06/28/19 1210    albuterol (PROVENTIL HFA,VENTOLIN HFA) inhaler 2 puff, 2 puff, Inhalation, Q4H PRN, Vanessa Aranda Jr, PA-C, 2 puff at 07/05/19 0802    aluminum-magnesium hydroxide-simethicone (MYLANTA) 200-200-20 mg/5 mL oral suspension 15 mL, 15 mL, Oral, Q4H PRN, Leopoldo Conquest, DO, 15 mL at 07/09/19 1200    amiodarone tablet 200 mg, 200 mg, Oral, TID With Meals, Luecro Crawford PA-C, 200 mg at 07/09/19 1200    apixaban (ELIQUIS) tablet 2 5 mg, 2 5 mg, Oral, BID, Chantel Clifford MD, 2 5 mg at 07/09/19 0827    bisacodyl (DULCOLAX) rectal suppository 10 mg, 10 mg, Rectal, Daily PRN, Lucero Crawford PA-C    hydrALAZINE (APRESOLINE) tablet 25 mg, 25 mg, Oral, Q8H [address], Chantel Clifford MD, 25 mg at 07/09/19 0522    insulin glargine (LANTUS) subcutaneous injection 10 Units 0 1 mL, 10 Units, Subcutaneous, QAM, Leopoldo Conquest, DO, 10 Units at 07/09/19 0827    insulin lispro (HumaLOG) 100 units/mL subcutaneous injection 1-5 Units, 1-5 Units, Subcutaneous, HS, Lucero Aranda Jr, PA-C, 1 Units at 07/08/19 2122    insulin lispro (HumaLOG) 100 units/mL subcutaneous injection 1-6 Units, 1-6 Units, Subcutaneous, TID AC, 2 Units at 07/09/19 1157 **AND** Fingerstick Glucose (POCT), , , TID AC, Gatito Mejia PA-C    isosorbide dinitrate (ISORDIL) tablet 10 mg, 10 mg, Oral, TID after meals, Zoroastrian Lowe, DO, 10 mg at 07/09/19 1157    levothyroxine 112 mcg tablet **ADS Override Pull**, , , ,     levothyroxine 25 mcg tablet **ADS Override Pull**, , , ,     levothyroxine tablet 137 mcg, 137 mcg, Oral, Daily, Lucero Aranda Jr, PA-C, 137 mcg at 07/09/19 2579    loratadine (CLARITIN) tablet 10 mg, 10 mg, Oral, Daily, Lucero Aranda Jr, PA-C, 10 mg at 07/09/19 0827    melatonin tablet 6 mg, 6 mg, Oral, HS, Lucero Olivia Aranda Jr, PA-C, 6 mg at 07/08/19 2116    metoprolol (LOPRESSOR) injection 2 5 mg, 2 5 mg, Intravenous, Q6H PRN, Estrella Arevalo PA-C    metoprolol succinate (TOPROL-XL) 24 hr tablet 25 mg, 25 mg, Oral, BID, Aniceto Skinner DO    ondansetron [facility]) injection 4 mg, 4 mg, Intravenous, Q6H PRN, Lucero Aranda Jr, PA-C, 4 mg at 06/27/19 2124    pantoprazole (PROTONIX) EC tablet 40 mg, 40 mg, Oral, Early Morning, Laveda Ratel, DO, 40 mg at 07/09/19 0522    phenol (CHLORASEPTIC) 1 4 % mucosal liquid 2 spray, 2 spray, Mouth/Throat, Q2H PRN, Darleen Aranda Jr, PA-C    polyethylene glycol (MIRALAX) packet 17 g, 17 g, Oral, Daily, Darleen Taylor PA-C, 17 g at 07/05/19 8353    senna (SENOKOT) tablet 8 6 mg, 1 tablet, Oral, HS, Darleen Aranda Jr, PA-C, 8 6 mg at 07/05/19 2155    sodium chloride (OCEAN) 0 65 % nasal spray 2 spray, 2 spray, Each Nare, PRN, Morenita Hazel PA-C 2 spray at 06/30/19 1218    venlafaxine [facility]) tablet 50 mg, 50 mg, Oral, Q12H, Darleen Taylor PA-C, 50 mg at 07/09/19 0919    Invasive Devices:      Lab Results:   Results from last 7 days   Lab Units 07/09/19  1014 07/08/19  1130 07/07/19  0535 07/06/19  0545 07/05/19  0504 07/04/19  0530 07/03/19  2155   WBC Thousand/uL 10 57*  --   --   --  12 34*  --   --    HEMOGLOBIN g/dL 14 0  --   --   --  15 2  --   --    HEMATOCRIT % 42 0  --   --   --  44 7  --   --    PLATELETS Thousands/uL 196  --   --   --  183  --   --    POTASSIUM mmol/L 3 2* 3 5 3 8 3 5 3 8 4 0 3 7   CHLORIDE mmol/L 89* 90* 90* 92* 92* 91* 90*   CO2 mmol/L 29 32 37* 37* 36* 34* 35*   BUN mg/dL 108* 107* 99* 102* 99* 100* 98*   CREATININE mg/dL 3 12* 2 94* 2 94* 3 07* 3 33* 3 85* 3 99*   CALCIUM mg/dL 8 9 9 0 9 4 9 2 9 2 8 9 8 9   MAGNESIUM mg/dL  --   --   --   --   --  2 0 1 9   ALK PHOS U/L 95  --  117*  --   --  136*  --    ALT U/L 208*  --  378*  --   --  782*  --    AST U/L 43  --  87*  --   --  87*  --        Previous work up:  See previous notes

## 2019-07-09 NOTE — ASSESSMENT & PLAN NOTE
· Blood pressures were low today  · Albumin ordered by Nephrology    · Continue to monitor BP closely

## 2019-07-09 NOTE — PLAN OF CARE
Problem: PAIN - ADULT  Goal: Verbalizes/displays adequate comfort level or baseline comfort level  Description  Interventions:  - Encourage patient to monitor pain and request assistance  - Assess pain using appropriate pain scale  - Administer analgesics based on type and severity of pain and evaluate response  - Implement non-pharmacological measures as appropriate and evaluate response  - Consider cultural and social influences on pain and pain management  - Notify physician/advanced practitioner if interventions unsuccessful or patient reports new pain  Outcome: Progressing     Problem: INFECTION - ADULT  Goal: Absence or prevention of progression during hospitalization  Description  INTERVENTIONS:  - Assess and monitor for signs and symptoms of infection  - Monitor lab/diagnostic results  - Monitor all insertion sites, i e  indwelling lines, tubes, and drains  - Monitor endotracheal (as able) and nasal secretions for changes in amount and color  - [place] appropriate cooling/warming therapies per order  - Administer medications as ordered  - Instruct and encourage patient and family to use good hand hygiene technique  - Identify and instruct in appropriate isolation precautions for identified infection/condition  Outcome: Progressing  Goal: Absence of fever/infection during neutropenic period  Description  INTERVENTIONS:  - Monitor WBC  - Implement neutropenic guidelines  Outcome: Progressing     Problem: SAFETY ADULT  Goal: Patient will remain free of falls  Description  INTERVENTIONS:  - Assess patient frequently for physical needs  -  Identify cognitive and physical deficits and behaviors that affect risk of falls    -  [place] fall precautions as indicated by assessment   - Educate patient/family on patient safety including physical limitations  - Instruct patient to call for assistance with activity based on assessment  - Modify environment to reduce risk of injury  - Consider OT/PT consult to assist with strengthening/mobility  Outcome: Progressing  Goal: Maintain or return to baseline ADL function  Description  INTERVENTIONS:  -  Assess patient's ability to carry out ADLs; assess patient's baseline for ADL function and identify physical deficits which impact ability to perform ADLs (bathing, care of mouth/teeth, toileting, grooming, dressing, etc )  - Assess/evaluate cause of self-care deficits   - Assess range of motion  - Assess patient's mobility; develop plan if impaired  - Assess patient's need for assistive devices and provide as appropriate  - Encourage maximum independence but intervene and supervise when necessary  ¯ Involve family in performance of ADLs  ¯ Assess for home care needs following discharge   ¯ Request OT consult to assist with ADL evaluation and planning for discharge  ¯ Provide patient education as appropriate  Outcome: Progressing  Goal: Maintain or return mobility status to optimal level  Description  INTERVENTIONS:  - Assess patient's baseline mobility status (ambulation, transfers, stairs, etc )    - Identify cognitive and physical deficits and behaviors that affect mobility  - Identify mobility aids required to assist with transfers and/or ambulation (gait belt, sit-to-stand, lift, walker, cane, etc )  - [place] fall precautions as indicated by assessment  - Record patient progress and toleration of activity level on Mobility SBAR; progress patient to next Phase/Stage  - Instruct patient to call for assistance with activity based on assessment  - Request Rehabilitation consult to assist with strengthening/weightbearing, etc   Outcome: Progressing     Problem: DISCHARGE PLANNING  Goal: Discharge to home or other facility with appropriate resources  Description  INTERVENTIONS:  - Identify barriers to discharge w/patient and caregiver  - Arrange for needed discharge resources and transportation as appropriate  - Identify discharge learning needs (meds, wound care, etc )  - Arrange for interpretive services to assist at discharge as needed  - Refer to Case Management Department for coordinating discharge planning if the patient needs post-hospital services based on physician/advanced practitioner order or complex needs related to functional status, cognitive ability, or social support system  Outcome: Progressing     Problem: Knowledge Deficit  Goal: Patient/family/caregiver demonstrates understanding of disease process, treatment plan, medications, and discharge instructions  Description  Complete learning assessment and assess knowledge base  Interventions:  - Provide teaching at level of understanding  - Provide teaching via preferred learning methods  Outcome: Progressing     Problem: Potential for Falls  Goal: Patient will remain free of falls  Description  INTERVENTIONS:  - Assess patient frequently for physical needs  -  Identify cognitive and physical deficits and behaviors that affect risk of falls  -  [place] fall precautions as indicated by assessment   - Educate patient/family on patient safety including physical limitations  - Instruct patient to call for assistance with activity based on assessment  - Modify environment to reduce risk of injury  - Consider OT/PT consult to assist with strengthening/mobility  Outcome: Progressing     Problem: CARDIOVASCULAR - ADULT  Goal: Maintains optimal cardiac output and hemodynamic stability  Description  INTERVENTIONS:  - Monitor I/O, vital signs and rhythm  - Monitor for S/S and trends of decreased cardiac output i e  bleeding, hypotension  - Administer and titrate ordered vasoactive medications to optimize hemodynamic stability  - Assess quality of pulses, skin color and temperature  - Assess for signs of decreased coronary artery perfusion - ex   Angina  - Instruct patient to report change in severity of symptoms  Outcome: Progressing  Goal: Absence of cardiac dysrhythmias or at baseline rhythm  Description  INTERVENTIONS:  - Continuous cardiac monitoring, monitor vital signs, obtain 12 lead EKG if indicated  - Administer antiarrhythmic and heart rate control medications as ordered  - Monitor electrolytes and administer replacement therapy as ordered  Outcome: Progressing     Problem: Prexisting or High Potential for Compromised Skin Integrity  Goal: Skin integrity is maintained or improved  Description  INTERVENTIONS:  - Identify patients at risk for skin breakdown  - Assess and monitor skin integrity  - Assess and monitor nutrition and hydration status  - Monitor labs (i e  albumin)  - Assess for incontinence   - Turn and reposition patient  - Assist with mobility/ambulation  - Relieve pressure over bony prominences  - Avoid friction and shearing  - Provide appropriate hygiene as needed including keeping skin clean and dry  - Evaluate need for skin moisturizer/barrier cream  - Collaborate with interdisciplinary team (i e  Nutrition, Rehabilitation, etc )   - Patient/family teaching  Outcome: Progressing     Problem: Nutrition/Hydration-ADULT  Goal: Nutrient/Hydration intake appropriate for improving, restoring or maintaining nutritional needs  Description  Monitor and assess patient's nutrition/hydration status for malnutrition (ex- brittle hair, bruises, dry skin, pale skin and conjunctiva, muscle wasting, smooth red tongue, and disorientation)  Collaborate with interdisciplinary team and initiate plan and interventions as ordered  Monitor patient's weight and dietary intake as ordered or per policy  Utilize nutrition screening tool and intervene per policy  Determine patient's food preferences and provide high-protein, high-caloric foods as appropriate       INTERVENTIONS:  - Monitor oral intake, urinary output, labs, and treatment plans  - Assess nutrition and hydration status and recommend course of action  - Evaluate amount of meals eaten  - Assist patient with eating if necessary   - Allow adequate time for meals  - Recommend/ encourage appropriate diets, oral nutritional supplements, and vitamin/mineral supplements  - Order, calculate, and assess calorie counts as needed  - Recommend, monitor, and adjust tube feedings and TPN/PPN based on assessed needs  - Assess need for intravenous fluids  - Provide specific nutrition/hydration education as appropriate  - Include patient/family/caregiver in decisions related to nutrition  Outcome: Progressing

## 2019-07-09 NOTE — PROGRESS NOTES
Progress Note Dimas Ordonez 1940, 66 y o  female MRN: [id number]    Unit/Bed#: [facility] 508-01 Encounter: [id number]    Primary Care Provider: Richie Long MD   Date and time admitted to hospital: 6/18/2019  4:35 PM        Atrial fibrillation with RVR ([facility] [address] )  Assessment & Plan  · S/p cardioversion 7/3, now back in AFib after being in NSR  · Metoprolol changed to succinate today  · Continue Eliquis  · Continue Telemetry        Hypertension  Assessment & Plan  · Blood pressures were low today  · Albumin ordered by Nephrology  · Continue to monitor BP closely    Hypothyroidism  Assessment & Plan  · Continue Synthroid    Type 2 diabetes mellitus with diabetic cataract [facility])  Assessment & Plan  Lab Results   Component Value Date    HGBA1C 5 9 (H) 03/18/2019       Recent Labs     07/08/19  1552 07/08/19  2105 07/09/19  0712 07/09/19  1127   POCGLU 164* 223* 110 191*       Blood Sugar Average: Last 72 hrs:  (P) 161 [id number]   · Diet controlled at home  · Continue Lantus and insulin sliding scale      Dry heaves  Assessment & Plan  Likely secondary to gastroesophageal reflux  Improving  Continue Protonix    Acute bacterial conjunctivitis of both eyes  Assessment & Plan  · Ofloxacin ophthalmic  Drainage from eyes improving  Shock liver  Assessment & Plan  · Transaminitis and coag study are slowly improving  · Continue to monitor      Hyponatremia  Assessment & Plan  · In the setting of volume overload   · Nephrology is following  · Continue fluid and sodium restriction  · Restart diuretics when renal function stabilizes    Acute kidney injury ([facility] )  Assessment & Plan  · Secondary to ischemic ATN/cardiogenic syndrome /shock  · Renal function is stable  · Diuretics on hold  · Albumin given for BP support  · Continue to monitor BMPs daily      * Acute on chronic combined systolic and diastolic CHF (congestive heart failure) ([facility])  Assessment & Plan  Wt Readings from Last 3 Encounters:   07/09/19 68 4 kg (150 lb 12 7 oz)   19 81 kg (178 lb 9 6 oz)   19 80 1 kg (176 lb 9 6 oz)     Cardiogenic shock, resolved  Initial EF 25- 30% improving to 45%  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  diuretics on hold due to WANG  On hydralazine, metoprolol and isosorbide  Continue Fluid restriction, low sodium diet  Cardiology is following              VTE Pharmacologic Prophylaxis:   Pharmacologic: Heparin  Mechanical VTE Prophylaxis in Place: Yes    Patient Centered Rounds: I have performed bedside rounds with nursing staff today  Discussions with Specialists or Other Care Team Provider:  Nephrology    Education and Discussions with Family / Patient:  Patient and sister, plan of care    Time Spent for Care: 20 minutes  More than 50% of total time spent on counseling and coordination of care as described above  Current Length of Stay: 21 day(s)    Current Patient Status: Inpatient   Certification Statement: The patient will continue to require additional inpatient hospital stay due to Monitor renal function, heart rate control  Discharge Plan: To be determined    Code Status: Level 1 - Full Code      Subjective:   Reports feeling tired, poor sleep  Denies palpitations, lightheadedness, dizziness  Reports that shortness of breath is gradually improved  Objective:     Vitals:   Temp (24hrs), Av 7 °F (36 5 °C), Min:97 3 °F (36 3 °C), Max:98 2 °F (36 8 °C)    Temp:  [97 3 °F (36 3 °C)-98 2 °F (36 8 °C)] 98 2 °F (36 8 °C)  HR:  [] 85  Resp:  [14-20] 14  BP: ()/(50-65) 97/54  SpO2:  [97 %-100 %] 97 %  Body mass index is 28 49 kg/m²  Input and Output Summary (last 24 hours): Intake/Output Summary (Last 24 hours) at 2019 1619  Last data filed at 2019 1203  Gross per 24 hour   Intake 400 ml   Output 1410 ml   Net -1010 ml       Physical Exam:     Physical Exam   Constitutional: She is oriented to person, place, and time     Obese elderly female, sitting in chair   HENT: Head: Atraumatic  Eyes: EOM are normal    Neck: Neck supple  Cardiovascular: Normal rate and regular rhythm  Pulmonary/Chest: She has rales  Abdominal: Soft  There is no tenderness  There is no guarding  Musculoskeletal: Normal range of motion  She exhibits edema  Neurological: She is alert and oriented to person, place, and time  Skin: Skin is warm and dry  Additional Data:     Labs:    Results from last 7 days   Lab Units 07/09/19  1014   WBC Thousand/uL 10 57*   HEMOGLOBIN g/dL 14 0   HEMATOCRIT % 42 0   PLATELETS Thousands/uL 196     Results from last 7 days   Lab Units 07/09/19  1014   SODIUM mmol/L 129*   POTASSIUM mmol/L 3 2*   CHLORIDE mmol/L 89*   CO2 mmol/L 29   BUN mg/dL 108*   CREATININE mg/dL 3 12*   ANION GAP mmol/L 11   CALCIUM mg/dL 8 9   ALBUMIN g/dL 2 6*   TOTAL BILIRUBIN mg/dL 1 45*   ALK PHOS U/L 95   ALT U/L 208*   AST U/L 43   GLUCOSE RANDOM mg/dL 158*     Results from last 7 days   Lab Units 07/03/19  2344   INR  1 60*     Results from last 7 days   Lab Units 07/09/19  1127 07/09/19  0712 07/08/19  2105 07/08/19  1552 07/08/19  1147 07/08/19  0618 07/07/19  2050 07/07/19  1642 07/07/19  1051 07/07/19  0617 07/06/19  2047 07/06/19  1618   POC GLUCOSE mg/dl 191* 110 223* 164* 148* 102 180* 150* 211* 97 209* 182*                   * I Have Reviewed All Lab Data Listed Above  * Additional Pertinent Lab Tests Reviewed:  All Labs Within Last 24 Hours Reviewed        Recent Cultures (last 7 days):           Last 24 Hours Medication List:     Current Facility-Administered Medications:  acetaminophen 650 mg Oral Q4H PRN Sarah Aranda Jr, PA-C   albumin human 25 g Intravenous Q12H Ashlee Son DO   albuterol 2 puff Inhalation Q4H PRN Sarah Aranda Jr, PA-C   aluminum-magnesium hydroxide-simethicone 15 mL Oral Q4H PRN Axel Renteria, DO   amiodarone 200 mg Oral TID With Meals Sarah Aranda Jr, PA-C   apixaban 2 5 mg Oral BID Anahi Herring MD   bisacodyl 10 mg Rectal Daily PRN Lynda Liu PA-C   hydrALAZINE 25 mg Oral formerly [facility] Kelly Rice MD   insulin glargine 10 Units Subcutaneous QAM Kristen Willams, DO   insulin lispro 1-5 Units Subcutaneous HS Alessandro Aranda Jr , STEPHEN   insulin lispro 1-6 Units Subcutaneous TID AC Alessandro Aranda Jr , STEPHEN   isosorbide dinitrate 10 mg Oral TID after meals Gin Ryan DO   levothyroxine       levothyroxine       levothyroxine 137 mcg Oral Daily Alessandro Quiñones [facility] STEPHEN Handy   loratadine 10 mg Oral Daily Alessandro Aranda Jr , STEPHEN   melatonin 6 mg Oral HS Alessandro Aranda Jr , STEPHEN   metoprolol 2 5 mg Intravenous Q6H PRN Estrella Arevalo PA-C   metoprolol succinate 25 mg Oral BID Gin Ryan,    ondansetron 4 mg Intravenous Q6H PRN Alessandro Aranda Jr , STEPHEN   pantoprazole 40 mg Oral Early Morning Kristen Willams,    phenol 2 spray Mouth/Throat Q2H PRN Alessandro Aranda Jr , STEPHEN   polyethylene glycol 17 g Oral Daily Alessandro Barragan PA-C   senna 1 tablet Oral HS Alessandro Aranda Jr, PA-C   sodium chloride 2 spray Each Nare PRN Lynda Liu PA-C   venlafaxine 50 mg Oral Q12H Lynda Liu PA-C        Today, Patient Was Seen By: Chela Kang MD    ** Please Note: Dictation voice to text software may have been used in the creation of this document   **

## 2019-07-09 NOTE — PROGRESS NOTES
Heart Failure Service Progress Note - Nataliia Mail 66 y o  female MRN: [id number]    Unit/Bed#: [facility] 508-01 Encounter: [id number]      Assessment:    Principal Problem:    Acute on chronic combined systolic and diastolic CHF (congestive heart failure) (HCC)  Active Problems:    Hypertension    Hypothyroidism    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)    Atrial fibrillation with RVR (HCC)    Acute kidney injury ([address] )    Hyponatremia    Shock liver    Acute bacterial conjunctivitis of both eyes    Dry heaves    Pt admitted with new onset systolic heart failure with biventricular dysfunction due likely to afib with RVR as she did not know she was in afib  Failed CV already, sent for PPM and CV and unfortunately received cardizem and propofol in a borderline low output heart which caused cardiogenic shock decompensation       Subjective:   Patient seen and examined  Dry heaves this am, no SOB  In Afib now     Objective:   Intake/ Output: 280/1500: 1220 mL  Weight: 150 lbs (down from 154 lbs on 07/07)  Telemetry:     # Acute systolic CHF, Stage C- s/p Acute Cardiogenic shock   Shock likely from cardizem and propofol  Likely tachy mediated though has risk factors for CAD and this will need ischemic eval as has septal Q waves and severe septal hypokinesis on echo  Hx of radioablative iodine for thyroid on replacement (this has been associated with CM in case reports)        Diagnostic Testing:  Echocardiogram (limited) from 07/04/2019:  LVEF: 35%; mild diffuse hypokinesis  LVIDd: 3 8 cm  MR: No MR  Moderate annular calcification  Other: Dilated LA      Echocardiogram from 06/20/2019:  LVEF: 25%  LVIDd: 4 6 cm  RV: Dilated and hypokinetic  MR: Moderate  PASP: 35 mmHg      Neurohormonal Blockade:  --Beta Blocker: metoprolol tartrate 25 mg Q12  --ACEi, ARB or ARNi: N/A  --SVR reduction: hydralazine 25 mg TID and Isordil 20 mg TID     --Aldosterone Receptor Blocker: N/A  --Diuretic: N/A     Sudden Cardiac Death Risk Reduction:  --ICD: LVEF 45%      Electrical Resynchronization:  --Interrogation: Narrow QRS     Advanced Therapies (if appropriate): --Inotrope: N/A  --LVAD/Transplant Candidacy: Will continue to monitor       # Afib with RVR   S/p successful cardioversion on 07/03 ( after amio load) Sedation with propofol     Continue on amiodarone 200 mg TID and change coreg to Toprol  Now in Afib this am    [id number]: 6  AC with Eliquis     # WANG  Baseline around 0 9  Cr today 2 94  # shock liver- improving  # DM2  # HLD     Plan:  Decrease isordil to 10 mg TID, change metoprolol tartrate to succinate 25 mg BID    Central Line (day, reason): Murray catheter (day, reason):    Vitals: Blood pressure 107/61, pulse 96, temperature (!) 97 3 °F (36 3 °C), temperature source Oral, resp  rate 15, height 5' 1" (1 549 m), weight 68 4 kg (150 lb 12 7 oz), SpO2 100 %  , Body mass index is 28 49 kg/m² , I/O last 3 completed shifts: In: 9488 [P O :1035]  Out: 2200 [Urine:2200]  I/O this shift: In: [de-identified] [P O :80]  Out: 160 [Urine:160]  Wt Readings from Last 3 Encounters:   07/09/19 68 4 kg (150 lb 12 7 oz)   06/18/19 81 kg (178 lb 9 6 oz)   03/20/19 80 1 kg (176 lb 9 6 oz)       Intake/Output Summary (Last 24 hours) at 7/9/2019 0939  Last data filed at 7/9/2019 0901  Gross per 24 hour   Intake 240 ml   Output 1410 ml   Net -1170 ml     I/O last 3 completed shifts: In: 5966 [P O :1035]  Out: 2200 [Urine:2200]    No significant arrhythmias seen on telemetry review         Physical Exam:  Vitals:    07/08/19 2300 07/09/19 0300 07/09/19 0536 07/09/19 0700   BP: 93/54 115/65  107/61   BP Location: Right arm Right arm     Pulse: 76 90  96   Resp: 18 18  15   Temp: 98 °F (36 7 °C) 97 5 °F (36 4 °C)  (!) 97 3 °F (36 3 °C)   TempSrc: Oral Oral  Oral   SpO2: 97% 98%  100%   Weight:   68 4 kg (150 lb 12 7 oz)    Height:           GEN: Mayra Sanchezachemarixa appears well, alert and oriented x 3, pleasant and cooperative HEENT: pupils equal, round, and reactive to light; extraocular muscles intact  NECK: supple, no carotid bruits   HEART: regular rhythm, normal S1 and S2, no murmurs, clicks, gallops or rubs, JVP is    LUNGS: clear to auscultation bilaterally; no wheezes, rales, or rhonchi   ABDOMEN: normal bowel sounds, soft, no tenderness, no distention  EXTREMITIES: peripheral pulses normal; no clubbing, cyanosis, or edema  NEURO: no focal findings   SKIN: normal without suspicious lesions on exposed skin      Current Facility-Administered Medications:     acetaminophen (TYLENOL) tablet 650 mg, 650 mg, Oral, Q4H PRN, Jerry Aranda Jr, PA-C, 650 mg at 06/28/19 1210    albuterol (PROVENTIL HFA,VENTOLIN HFA) inhaler 2 puff, 2 puff, Inhalation, Q4H PRN, Sd Whitmore PA-C, 2 puff at 07/05/19 0802    aluminum-magnesium hydroxide-simethicone (MYLANTA) 200-200-20 mg/5 mL oral suspension 15 mL, 15 mL, Oral, Q4H PRN, Zandra Alcazar DO, 15 mL at 07/08/19 2115    amiodarone tablet 200 mg, 200 mg, Oral, TID With Meals, Jerry Toth PA-C, 200 mg at 07/09/19 8214    apixaban (ELIQUIS) tablet 2 5 mg, 2 5 mg, Oral, BID, Elisa Martin MD, 2 5 mg at 07/09/19 0827    bisacodyl (DULCOLAX) rectal suppository 10 mg, 10 mg, Rectal, Daily PRN, Jerry Toth PA-C    hydrALAZINE (APRESOLINE) tablet 25 mg, 25 mg, Oral, Q8H [facility], Elisa Martin MD, 25 mg at 07/09/19 0522    insulin glargine (LANTUS) subcutaneous injection 10 Units 0 1 mL, 10 Units, Subcutaneous, QAM, Zandra Alcazar DO, 10 Units at 07/09/19 0827    insulin lispro (HumaLOG) 100 units/mL subcutaneous injection 1-5 Units, 1-5 Units, Subcutaneous, HS, Jerry Aranda Jr , STEPHEN, 1 Units at 07/08/19 2122    insulin lispro (HumaLOG) 100 units/mL subcutaneous injection 1-6 Units, 1-6 Units, Subcutaneous, TID AC, 1 Units at 07/08/19 1723 **AND** Fingerstick Glucose (POCT), , , TID AC, Sd Whitmore PA-C    isosorbide dinitrate (ISORDIL) tablet 20 mg, 20 mg, Oral, TID after meals, Jose Gonzalez MD, 20 mg at 07/09/19 4522    levothyroxine 112 mcg tablet **ADS Override Pull**, , , ,     levothyroxine 25 mcg tablet **ADS Override Pull**, , , ,     levothyroxine tablet 137 mcg, 137 mcg, Oral, Daily, Rajendra Aranda Jr, PA-C, 137 mcg at 07/09/19 7701    loratadine (CLARITIN) tablet 10 mg, 10 mg, Oral, Daily, CODY Larose JrC, 10 mg at 07/09/19 0827    melatonin tablet 6 mg, 6 mg, Oral, HS, Rajendra Aranda Jr, PA-C, 6 mg at 07/08/19 2116    metoprolol (LOPRESSOR) injection 2 5 mg, 2 5 mg, Intravenous, Q6H PRN, Estrella Arevalo PA-C    metoprolol tartrate (LOPRESSOR) tablet 25 mg, 25 mg, Oral, Q12H [facility], 25 mg at 07/09/19 0827    ondansetron [facility]) injection 4 mg, 4 mg, Intravenous, Q6H PRN, Rajendra Aarnda Jr, PA-C, 4 mg at 06/27/19 2124    pantoprazole (PROTONIX) EC tablet 40 mg, 40 mg, Oral, Early Morning, [facility], 40 mg at 07/09/19 0522    phenol (CHLORASEPTIC) 1 4 % mucosal liquid 2 spray, 2 spray, Mouth/Throat, Q2H PRN, Rajendra Aranda Jr, PA-C    polyethylene glycol (MIRALAX) packet 17 g, 17 g, Oral, Daily, Rajendra Aranda Jr, PA-C, 17 g at 07/05/19 2179    senna (SENOKOT) tablet 8 6 mg, 1 tablet, Oral, HS, Rajendra Aranda Jr, PA-C, 8 6 mg at 07/05/19 2155    sodium chloride (OCEAN) 0 65 % nasal spray 2 spray, 2 spray, Each Nare, PRN, [employer] , PA-C, 2 spray at 06/30/19 1218    venlafaxine [facility]) tablet 50 mg, 50 mg, Oral, Q12H, Rajendra Aranda Jr , STEPHEN, 50 mg at 07/09/19 0919      Labs & Results:        Results from last 7 days   Lab Units 07/05/19  0504   WBC Thousand/uL 12 34*   HEMOGLOBIN g/dL 15 2   HEMATOCRIT % 44 7   PLATELETS Thousands/uL 183         Results from last 7 days   Lab Units 07/08/19  1130 07/07/19  0535 07/06/19  0545  07/04/19  0530   POTASSIUM mmol/L 3 5 3 8 3 5   < > 4 0   CHLORIDE mmol/L 90* 90* 92*   < > 91*   CO2 mmol/L 32 37* 37*   < > 34*   BUN mg/dL 107* 99* 102*   < > 100*   CREATININE mg/dL 2 94* 2 94* 3 07*   < > 3 85*   CALCIUM mg/dL 9 0 9 4 9 2   < > 8 9   ALK PHOS U/L  --  117*  --   --  136*   ALT U/L  --  378*  --   --  782*   AST U/L  --  87*  --   --  87*    < > = values in this interval not displayed  Results from last 7 days   Lab Units 07/03/19  2344   INR  1 60*       Counseling / Coordination of Care  Total floor / unit time spent today 25 minutes  Greater than 50% of total time was spent with the patient and / or family counseling and / or coordination of care  A description of the counseling / coordination of care: 15  Thank you for the opportunity to participate in the care of this patient  Rona ALDRIDGE    Director Heart Failure/ Medical Director [address] [place] [address]

## 2019-07-09 NOTE — ASSESSMENT & PLAN NOTE
Lab Results   Component Value Date    HGBA1C 5 9 (H) 03/18/2019       Recent Labs     07/08/19  1552 07/08/19  2105 07/09/19  0712 07/09/19  1127   POCGLU 164* 223* 110 191*       Blood Sugar Average: Last 72 hrs:  (P) 161 [id number]   · Diet controlled at home  · Continue Lantus and insulin sliding scale

## 2019-07-10 LAB
ANION GAP SERPL CALCULATED.3IONS-SCNC: 5 MMOL/L (ref 4–13)
ANION GAP SERPL CALCULATED.3IONS-SCNC: 8 MMOL/L (ref 4–13)
BUN SERPL-MCNC: 102 MG/DL (ref 5–25)
BUN SERPL-MCNC: 99 MG/DL (ref 5–25)
CALCIUM SERPL-MCNC: 9 MG/DL (ref 8.3–10.1)
CALCIUM SERPL-MCNC: 9.1 MG/DL (ref 8.3–10.1)
CHLORIDE SERPL-SCNC: 90 MMOL/L (ref 100–108)
CHLORIDE SERPL-SCNC: 94 MMOL/L (ref 100–108)
CO2 SERPL-SCNC: 35 MMOL/L (ref 21–32)
CO2 SERPL-SCNC: 35 MMOL/L (ref 21–32)
CREAT SERPL-MCNC: 2.65 MG/DL (ref 0.6–1.3)
CREAT SERPL-MCNC: 2.89 MG/DL (ref 0.6–1.3)
GFR SERPL CREATININE-BSD FRML MDRD: 15 ML/MIN/1.73SQ M
GFR SERPL CREATININE-BSD FRML MDRD: 17 ML/MIN/1.73SQ M
GLUCOSE SERPL-MCNC: 108 MG/DL (ref 65–140)
GLUCOSE SERPL-MCNC: 119 MG/DL (ref 65–140)
GLUCOSE SERPL-MCNC: 127 MG/DL (ref 65–140)
GLUCOSE SERPL-MCNC: 142 MG/DL (ref 65–140)
GLUCOSE SERPL-MCNC: 161 MG/DL (ref 65–140)
GLUCOSE SERPL-MCNC: 99 MG/DL (ref 65–140)
POTASSIUM SERPL-SCNC: 2.8 MMOL/L (ref 3.5–5.3)
POTASSIUM SERPL-SCNC: 3.7 MMOL/L (ref 3.5–5.3)
SODIUM SERPL-SCNC: 133 MMOL/L (ref 136–145)
SODIUM SERPL-SCNC: 134 MMOL/L (ref 136–145)

## 2019-07-10 PROCEDURE — 82948 REAGENT STRIP/BLOOD GLUCOSE: CPT

## 2019-07-10 PROCEDURE — 99232 SBSQ HOSP IP/OBS MODERATE 35: CPT | Performed by: INTERNAL MEDICINE

## 2019-07-10 PROCEDURE — 97168 OT RE-EVAL EST PLAN CARE: CPT

## 2019-07-10 PROCEDURE — G8987 SELF CARE CURRENT STATUS: HCPCS

## 2019-07-10 PROCEDURE — G8988 SELF CARE GOAL STATUS: HCPCS

## 2019-07-10 PROCEDURE — 80048 BASIC METABOLIC PNL TOTAL CA: CPT | Performed by: INTERNAL MEDICINE

## 2019-07-10 RX ORDER — POTASSIUM CHLORIDE 20 MEQ/1
40 TABLET, EXTENDED RELEASE ORAL
Status: COMPLETED | OUTPATIENT
Start: 2019-07-10 | End: 2019-07-11

## 2019-07-10 RX ORDER — POTASSIUM CHLORIDE 14.9 MG/ML
20 INJECTION INTRAVENOUS ONCE
Status: COMPLETED | OUTPATIENT
Start: 2019-07-10 | End: 2019-07-10

## 2019-07-10 RX ADMIN — VENLAFAXINE 50 MG: 50 TABLET ORAL at 21:45

## 2019-07-10 RX ADMIN — ISOSORBIDE DINITRATE 10 MG: 10 TABLET ORAL at 11:38

## 2019-07-10 RX ADMIN — AMIODARONE HYDROCHLORIDE 200 MG: 200 TABLET ORAL at 09:15

## 2019-07-10 RX ADMIN — LEVOTHYROXINE SODIUM 137 MCG: 112 TABLET ORAL at 05:51

## 2019-07-10 RX ADMIN — APIXABAN 2.5 MG: 2.5 TABLET, FILM COATED ORAL at 17:31

## 2019-07-10 RX ADMIN — LORATADINE 10 MG: 10 TABLET ORAL at 09:15

## 2019-07-10 RX ADMIN — SENNOSIDES 8.6 MG: 8.6 TABLET, FILM COATED ORAL at 21:45

## 2019-07-10 RX ADMIN — AMIODARONE HYDROCHLORIDE 200 MG: 200 TABLET ORAL at 16:11

## 2019-07-10 RX ADMIN — ALBUMIN (HUMAN) 25 G: 12.5 SOLUTION INTRAVENOUS at 05:56

## 2019-07-10 RX ADMIN — INSULIN GLARGINE 10 UNITS: 100 INJECTION, SOLUTION SUBCUTANEOUS at 09:15

## 2019-07-10 RX ADMIN — ISOSORBIDE DINITRATE 10 MG: 10 TABLET ORAL at 09:16

## 2019-07-10 RX ADMIN — ISOSORBIDE DINITRATE 10 MG: 10 TABLET ORAL at 17:31

## 2019-07-10 RX ADMIN — POTASSIUM CHLORIDE 40 MEQ: 1500 TABLET, EXTENDED RELEASE ORAL at 16:11

## 2019-07-10 RX ADMIN — HYDRALAZINE HYDROCHLORIDE 25 MG: 25 TABLET ORAL at 14:18

## 2019-07-10 RX ADMIN — HYDRALAZINE HYDROCHLORIDE 25 MG: 25 TABLET ORAL at 05:51

## 2019-07-10 RX ADMIN — VENLAFAXINE 50 MG: 50 TABLET ORAL at 09:16

## 2019-07-10 RX ADMIN — MELATONIN 6 MG: at 21:45

## 2019-07-10 RX ADMIN — POTASSIUM CHLORIDE 20 MEQ: 200 INJECTION, SOLUTION INTRAVENOUS at 16:11

## 2019-07-10 RX ADMIN — POLYETHYLENE GLYCOL 3350 17 G: 17 POWDER, FOR SOLUTION ORAL at 09:15

## 2019-07-10 RX ADMIN — PANTOPRAZOLE SODIUM 40 MG: 40 TABLET, DELAYED RELEASE ORAL at 05:51

## 2019-07-10 RX ADMIN — APIXABAN 2.5 MG: 2.5 TABLET, FILM COATED ORAL at 09:15

## 2019-07-10 RX ADMIN — AMIODARONE HYDROCHLORIDE 200 MG: 200 TABLET ORAL at 11:38

## 2019-07-10 RX ADMIN — HYDRALAZINE HYDROCHLORIDE 25 MG: 25 TABLET ORAL at 21:45

## 2019-07-10 RX ADMIN — METOPROLOL SUCCINATE 25 MG: 25 TABLET, EXTENDED RELEASE ORAL at 09:15

## 2019-07-10 NOTE — PROGRESS NOTES
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)    Atrial fibrillation with RVR (HCC)    Acute kidney injury ([address] )    Hyponatremia    Shock liver    Acute bacterial conjunctivitis of both eyes    Dry heaves    Subjective:   Patient seen and examined  No significant events overnight  Left IJ central line placed yesterday due to lack of IV access  Denies fever, chills, lightheadedness, dizziness, chest pain, palpitations, SOB, PND, and orthopnea  Per nursing, patient has reported feeling down/sad  Objective: Intake/ Output: 280 mL / 1085 mL (net negative 805 mL, 1 unmeasured occurence)  Weight: 150 lbs (same as on 07/09)  Telemetry: Afib with occasional NSVT  Plan:    Acute on chronic biventricular heart failure; LVEF 45%, LVIDd 2 8 cm; NYHA III; ACC/AHA Stage C   Etiology: shock induced by cardizem and profolol being given while in borderline low output state  BiV HF likely tachy-induced, though will need ischemic evaluation in future  Also has history of radioablative iodine  TTE from 07/04 with LVEF improved from 35% to now 45%  Continue on metoprolol succinate 25 mg BID, hydralazine 25 mg TID, and Isordil 10 mg TID  Goal for I/O to run even, per nephrology  Atrial fibrillation with rapid ventricular response   [id number] = 6 (age, sex, CHF, HTN, DM)  Anticoagulation on Eliquis (renal dose)  S/p unsuccessful cardioversion on 06/21/2019  Was planning for EBEN cardioversion and dual-chamber PPM implantation on 06/25   Received propofol and cardizem which likely induced cardiogenic shock due to her borderline low output state at that time     S/p successful cardioversion on 07/03 with amio load  Converted back into Afib on 07/08  Continue on amiodarone 200 mg TID and metoprolol succinate 25 mg BID  Prior notes from EP state she may need ablation if recurrence occurred  Plan for rate control now  Acute kidney injury   Etiology: hypotension/low output  Previous baseline creatinine around 0 7-0 9  Creatinine now plateaued at 0 2-5 4, per nephrology  Creatinine of 3 12 from 07/09 (up from 2 84 on 07/08)  Continue to monitor  AM labs in process  Goal for I/O to run even, per nephrology  Net negative today off diuretics  Shock liver, improving   Due to hypotension / cardiogenic shock  ALT remains elevated (208 today); AST and Alk Phos WNL  Neurohormonal Blockade:  --Beta Blocker: metoprolol succinate 25 mg BID   --ACEi, ARB or ARNi: N/A  --SVR reduction: hydralazine 25 mg TID and Isordil 10 mg TID  --Aldosterone Receptor Blocker: N/A  --Diuretic: N/A    Sudden Cardiac Death Risk Reduction:  --ICD: LVEF 45%  Electrical Resynchronization:  --Interrogation: Narrow QRS  Advanced Therapies (if appropriate): --Inotrope: N/A  --LVAD/Transplant Candidacy: Will continue to monitor  Diagnostic Testing:  Echocardiogram (limited) from 07/04/2019:  LVEF: 45%; mild diffuse hypokinesis  LVIDd: 3 8 cm  MR: No MR  Moderate annular calcification  Other: Dilated LA  Echocardiogram from 06/20/2019:  LVEF: 25%  LVIDd: 4 6 cm  RV: Dilated and hypokinetic  MR: Moderate  PASP: 35 mmHg  Vitals:   Blood pressure 105/60, pulse 96, temperature 97 5 °F (36 4 °C), temperature source Oral, resp  rate 16, height 5' 1" (1 549 m), weight 68 4 kg (150 lb 12 7 oz), SpO2 96 %  Body mass index is 28 49 kg/m²  I/O last 3 completed shifts:   In: 280 [P O :280]  Out: 1985 [[id number]]    Wt Readings from Last 3 Encounters:   07/10/19 68 4 kg (150 lb 12 7 oz)   06/18/19 81 kg (178 lb 9 6 oz)   03/20/19 80 1 kg (176 lb 9 6 oz)     Vitals:    07/10/19 0338 07/10/19 0549 07/10/19 0600 07/10/19 0821   BP: 108/54 112/62  105/60   BP Location: Left arm Left arm  Left arm   Pulse: 88   96   Resp: 17   16   Temp: 98 3 °F (36 8 °C)   97 5 °F (36 4 °C)   TempSrc: Axillary   Oral   SpO2: 98%   96%   Weight:   68 4 kg (150 lb 12 7 oz)    Height:         Physical Exam   Constitutional: She is oriented to person, place, and time  She appears well-developed and well-nourished  HENT:   Head: Normocephalic and atraumatic  Eyes: Pupils are equal, round, and reactive to light  Right eye exhibits no discharge  Neck: Neck supple  No tracheal deviation present  Left IJ central line present  Cardiovascular: Normal rate, regular rhythm and intact distal pulses  Murmur heard  Pulmonary/Chest: Effort normal  No respiratory distress  She has no wheezes  Abdominal: Soft  Bowel sounds are normal  She exhibits no distension  Musculoskeletal: She exhibits no edema or tenderness  Neurological: She is alert and oriented to person, place, and time  Skin: Skin is dry  Psychiatric: She has a normal mood and affect  Central Line: left internal jugular (no IV access, placed 07/09/2019)    Murray Catheter: N/A    Current Facility-Administered Medications:     acetaminophen (TYLENOL) tablet 650 mg, 650 mg, Oral, Q4H PRN, Elizabeth Aranda Jr, PA-C, 650 mg at 06/28/19 1210    albuterol (PROVENTIL HFA,VENTOLIN HFA) inhaler 2 puff, 2 puff, Inhalation, Q4H PRN, Elizabeth Aranad Jr, PA-C, 2 puff at 07/05/19 0802    aluminum-magnesium hydroxide-simethicone (MYLANTA) 200-200-20 mg/5 mL oral suspension 15 mL, 15 mL, Oral, Q4H PRN, Rhianna Martines DO, 15 mL at 07/09/19 1200    amiodarone tablet 200 mg, 200 mg, Oral, TID With Meals, Nu Fallon PA-C, 200 mg at 07/09/19 1803    apixaban (ELIQUIS) tablet 2 5 mg, 2 5 mg, Oral, BID, Leslie Membreno MD, 2 5 mg at 07/09/19 1801    bisacodyl (DULCOLAX) rectal suppository 10 mg, 10 mg, Rectal, Daily PRN, Merlin Chan PA-C    hydrALAZINE (APRESOLINE) tablet 25 mg, 25 mg, Oral, Q8H [facility], An Joyce MD, 25 mg at 07/10/19 0551    insulin glargine (LANTUS) subcutaneous injection 10 Units 0 1 mL, 10 Units, Subcutaneous, QAM, Yahir Garcia, DO, 10 Units at 07/09/19 0827    insulin lispro (HumaLOG) 100 units/mL subcutaneous injection 1-5 Units, 1-5 Units, Subcutaneous, HS, Merlin Aranda Jr, PA-C, 1 Units at 07/09/19 2120    insulin lispro (HumaLOG) 100 units/mL subcutaneous injection 1-6 Units, 1-6 Units, Subcutaneous, TID AC, 2 Units at 07/09/19 1157 **AND** Fingerstick Glucose (POCT), , , TID AC, Kasi Mireles PA-C    isosorbide dinitrate (ISORDIL) tablet 10 mg, 10 mg, Oral, TID after meals, Jyoti Ferreira DO, 10 mg at 07/09/19 1804    levothyroxine tablet 137 mcg, 137 mcg, Oral, Daily, Merlin Aranda Jr, PA-C, 137 mcg at 07/10/19 0551    loratadine (CLARITIN) tablet 10 mg, 10 mg, Oral, Daily, Merlin Aranda Jr, PA-C, 10 mg at 07/09/19 0827    melatonin tablet 6 mg, 6 mg, Oral, HS, Merlin Aranda Jr, PA-C, 6 mg at 07/09/19 2119    metoprolol (LOPRESSOR) injection 2 5 mg, 2 5 mg, Intravenous, Q6H PRN, Estrella Arevalo PA-C    metoprolol succinate (TOPROL-XL) 24 hr tablet 25 mg, 25 mg, Oral, BID, Almjan Ferreira DO, 25 mg at 07/09/19 1803    ondansetron (ZOFRAN) injection 4 mg, 4 mg, Intravenous, Q6H PRN, Merlin Aranda Jr, PA-C, 4 mg at 06/27/19 2124    pantoprazole (PROTONIX) EC tablet 40 mg, 40 mg, Oral, Early Morning, Yahir Garcia, DO, 40 mg at 07/10/19 0551    phenol (CHLORASEPTIC) 1 4 % mucosal liquid 2 spray, 2 spray, Mouth/Throat, Q2H PRN, Kasi Mireles PA-C    polyethylene glycol (MIRALAX) packet 17 g, 17 g, Oral, Daily, Merlin Aranda Jr, PA-C, 17 g at 07/05/19 8607    senna (SENOKOT) tablet 8 6 mg, 1 tablet, Oral, HS, Merlin Aranda Jr, PA-C, 8 6 mg at 07/09/19 2120    sodium chloride (OCEAN) 0 65 % nasal spray 2 spray, 2 spray, Each Nare, PRN, Barbra Lee PA-C, 2 spray at 06/30/19 1218    venlafaxine [facility]) tablet 50 mg, 50 mg, Oral, Q12H, aJck Willingham [facility] STEPHEN, 50 mg at 07/09/19 2119    Labs & Results:      Results from last 7 days   Lab Units 07/09/19  1014 07/05/19  0504   WBC Thousand/uL 10 57* 12 34*   HEMOGLOBIN g/dL 14 0 15 2   HEMATOCRIT % 42 0 44 7   PLATELETS Thousands/uL 196 183         Results from last 7 days   Lab Units 07/09/19  1014 07/08/19  1130 07/07/19  0535  07/04/19  0530   POTASSIUM mmol/L 3 2* 3 5 3 8   < > 4 0   CHLORIDE mmol/L 89* 90* 90*   < > 91*   CO2 mmol/L 29 32 37*   < > 34*   BUN mg/dL 108* 107* 99*   < > 100*   CREATININE mg/dL 3 12* 2 94* 2 94*   < > 3 85*   CALCIUM mg/dL 8 9 9 0 9 4   < > 8 9   ALK PHOS U/L 95  --  117*  --  136*   ALT U/L 208*  --  378*  --  782*   AST U/L 43  --  87*  --  87*    < > = values in this interval not displayed  Results from last 7 days   Lab Units 07/03/19  2344   INR  1 60*     Counseling / Coordination of Care: Total floor / unit time spent today 20 minutes  Greater than 50% of total time was spent with the patient and / or family counseling and / or coordination of care  A description of the counseling / coordination of care: 20  Thank you for the opportunity to participate in the care of this patient      Erica Cross PA-C

## 2019-07-10 NOTE — PROGRESS NOTES
Progress Note - Mi Cornell 1940, 66 y o  female MRN: [id number]    Unit/Bed#: [facility] 508-01 Encounter: [id number]    Primary Care Provider: Mare Monae MD   Date and time admitted to hospital: 6/18/2019  4:35 PM        Atrial fibrillation with RVR ([facility] [address] )  Assessment & Plan  · S/p cardioversion 7/3, now back in AFib after being in NSR  · Continue metoprolol  · Continue Eliquis  · Continue Telemetry  · Noted to have some PVCs, and NSVT today likely due to hypokalemia, supplementation as below        * Acute on chronic combined systolic and diastolic CHF (congestive heart failure) ([facility])  Assessment & Plan  Wt Readings from Last 3 Encounters:   07/10/19 68 4 kg (150 lb 12 7 oz)   06/18/19 81 kg (178 lb 9 6 oz)   03/20/19 80 1 kg (176 lb 9 6 oz)     Cardiogenic shock, resolved  Initial EF 25- 30% improving to 45%  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  Diuresing with albumin  On hydralazine, metoprolol and isosorbide  Continue Fluid restriction, low sodium diet  Cardiology is following          Hypertension  Assessment & Plan  · Blood pressure is borderline  · Continue albumin assisted diuresis  · Home BP med on hold for now    Type 2 diabetes mellitus with diabetic cataract [facility])  Assessment & Plan  Lab Results   Component Value Date    HGBA1C 5 9 (H) 03/18/2019       Recent Labs     07/09/19  1802 07/09/19  2118 07/10/19  0639 07/10/19  1111   POCGLU 131 185* 99 108       Blood Sugar Average: Last 72 hrs:  (P) 149 [id number]   · Diet controlled at home  · Glucose is stable  · Continue Lantus and insulin sliding scale      Acute kidney injury ([facility] )  Assessment & Plan  · Secondary to ischemic ATN/cardiogenic syndrome Beula Shield  · Renal function has slightly improved today  · Diuretics as above  · Albumin given for BP support  · Continue to monitor BMPs daily          VTE Pharmacologic Prophylaxis:   Pharmacologic: Heparin  Mechanical VTE Prophylaxis in Place: Yes    Patient Centered Rounds: I have performed bedside rounds with nursing staff today  Discussions with Specialists or Other Care Team Provider:  Cardiology, Nephrology plan of care    Education and Discussions with Family / Patient:  Patient, plan of care  Spoke with daughter via telephone    Time Spent for Care: 20 minutes  More than 50% of total time spent on counseling and coordination of care as described above  Current Length of Stay: 22 day(s)    Current Patient Status: Inpatient   Certification Statement: The patient will continue to require additional inpatient hospital stay due to Arrhythmia management    Discharge Plan:  SNF when stable    Code Status: Level 1 - Full Code      Subjective:   Denies any acute complaints at this time  Reports that breathing is stable  Objective:     Vitals:   Temp (24hrs), Av 8 °F (36 6 °C), Min:97 3 °F (36 3 °C), Max:98 5 °F (36 9 °C)    Temp:  [97 3 °F (36 3 °C)-98 5 °F (36 9 °C)] 97 6 °F (36 4 °C)  HR:  [] 73  Resp:  [14-17] 14  BP: ()/(52-78) 94/52  SpO2:  [96 %-99 %] 96 %  Body mass index is 28 49 kg/m²  Input and Output Summary (last 24 hours): Intake/Output Summary (Last 24 hours) at 7/10/2019 1555  Last data filed at 7/10/2019 1140  Gross per 24 hour   Intake 1680 ml   Output 1125 ml   Net 555 ml       Physical Exam:     Physical Exam   Constitutional: She is oriented to person, place, and time  She appears well-developed and well-nourished  HENT:   Head: Normocephalic and atraumatic  Eyes: Pupils are equal, round, and reactive to light  EOM are normal    Neck: Neck supple  Cardiovascular: Normal rate and regular rhythm  Pulmonary/Chest: Effort normal    Abdominal: Soft  Musculoskeletal: She exhibits edema  Neurological: She is alert and oriented to person, place, and time  Skin: Skin is warm and dry           Additional Data:     Labs:    Results from last 7 days   Lab Units 19  1014   WBC Thousand/uL 10 57*   HEMOGLOBIN g/dL 14 0   HEMATOCRIT % 42 0   PLATELETS Thousands/uL 196     Results from last 7 days   Lab Units 07/10/19  0914 07/09/19  1014   SODIUM mmol/L 133* 129*   POTASSIUM mmol/L 2 8* 3 2*   CHLORIDE mmol/L 90* 89*   CO2 mmol/L 35* 29   BUN mg/dL 102* 108*   CREATININE mg/dL 2 89* 3 12*   ANION GAP mmol/L 8 11   CALCIUM mg/dL 9 0 8 9   ALBUMIN g/dL  --  2 6*   TOTAL BILIRUBIN mg/dL  --  1 45*   ALK PHOS U/L  --  95   ALT U/L  --  208*   AST U/L  --  43   GLUCOSE RANDOM mg/dL 161* 158*     Results from last 7 days   Lab Units 07/03/19  2344   INR  1 60*     Results from last 7 days   Lab Units 07/10/19  1111 07/10/19  0639 07/09/19  2118 07/09/19  1802 07/09/19  1127 07/09/19  0712 07/08/19  2105 07/08/19  1552 07/08/19  1147 07/08/19  0618 07/07/19  2050 07/07/19  1642   POC GLUCOSE mg/dl 108 99 185* 131 191* 110 223* 164* 148* 102 180* 150*                   * I Have Reviewed All Lab Data Listed Above  * Additional Pertinent Lab Tests Reviewed:  All Labs Within Last 24 Hours Reviewed        Recent Cultures (last 7 days):           Last 24 Hours Medication List:     Current Facility-Administered Medications:  acetaminophen 650 mg Oral Q4H PRN Ari Aranda Jr, PA-C   albuterol 2 puff Inhalation Q4H PRN Ari Aranda Jr, PA-C   aluminum-magnesium hydroxide-simethicone 15 mL Oral Q4H PRN Tamika Finley DO   amiodarone 200 mg Oral TID With Meals Ari Aranda Jr, PA-C   apixaban 2 5 mg Oral BID Kalyn Gross MD   bisacodyl 10 mg Rectal Daily PRN Ari Aranda Jr, PA-C   hydrALAZINE 25 mg Oral [facility] Kalyn Gross MD   insulin glargine 10 Units Subcutaneous QAM Tamika Finley DO   insulin lispro 1-5 Units Subcutaneous HS Ari Aranda Jr, PA-C   insulin lispro 1-6 Units Subcutaneous TID AC Ari Aranda Jr, PA-C   isosorbide dinitrate 10 mg Oral TID after meals Aby Navas DO   levothyroxine 137 mcg Oral Daily Ari Aranda Jr, PA-C   loratadine 10 mg Oral Daily Ari Aranda Jr, PA-C   melatonin 6 mg Oral HS Diana Silverio PA-C   metoprolol 2 5 mg Intravenous Q6H PRN Estrella Arevalo PA-C   metoprolol succinate 25 mg Oral BID Darwin Gurrola,    ondansetron 4 mg Intravenous Q6H PRN Siddhartha Aranda Jr, PA-C   pantoprazole 40 mg Oral Early Morning Towgerri Lactra, DO   phenol 2 spray Mouth/Throat Q2H PRN Siddhartha Aranda Jr, PA-C   polyethylene glycol 17 g Oral Daily Siddhartha Muñoz [facility] STEPHEN Handy   potassium chloride 40 mEq Oral TID With Meals Aditi Tello DO   potassium chloride 20 mEq Intravenous Once Aditi Tello DO   senna 1 tablet Oral HS Siddhartha Aranda Jr, PA-C   sodium chloride 2 spray Each Nare PRN Diana Silverio PA-C   venlafaxine 50 mg Oral Q12H Diana Silverio PA-C        Today, Patient Was Seen By: Juni Dudley MD    ** Please Note: Dictation voice to text software may have been used in the creation of this document   **

## 2019-07-10 NOTE — OCCUPATIONAL THERAPY NOTE
Occupational Therapy Evaluation      Mi Cornlel    7/10/2019    Patient Active Problem List   Diagnosis    Depression with anxiety    Hyperlipidemia    Hypertension    Hypothyroidism    Obesity    Osteoarthritis    Type 2 diabetes mellitus with diabetic cataract ([facility] )    Atrial fibrillation with RVR ([facility] )    Acute kidney injury ([facility] )    Hyponatremia    Shock liver    Acute bacterial conjunctivitis of both eyes    Acute on chronic combined systolic and diastolic CHF (congestive heart failure) (HCC)    Dry heaves       Past Medical History:   Diagnosis Date    Cardiogenic shock ([facility] ) 6/26/2019    Eczema     Photosensitivity     abnormal skin sensitivity to sunlight    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HCC)     staging       Past Surgical History:   Procedure Laterality Date    HEMORRHOID SURGERY      OOPHORECTOMY      unilateral    TOTAL ABDOMINAL HYSTERECTOMY          07/10/19 1025   Note Type   Note type Re-eval   Restrictions/Precautions   Weight Bearing Precautions Per Order No   Other Precautions Cognitive; Chair Alarm; Bed Alarm;Multiple lines;Telemetry; Fall Risk;Pain   Pain Assessment   Pain Assessment No/denies pain   Pain Score No Pain   Home Living   Type of [facility] One level;Performs ADLs on one level  ([facility]; 0STE)   Bathroom Shower/Tub Tub/shower unit   Bathroom Toilet Standard   Bathroom Equipment Other (Comment)  (Denies DME)   [address] Other (Comment)  (Denies DME)   Prior Function   Level of [place] Independent with ADLs and functional mobility   Lives With Alone   Receives Help From Family   ADL Assistance Independent   IADLs Independent   Falls in the last 6 months 0   Vocational Part time employment   Lifestyle   Autonomy Pt reports being I w/ all ADLS and IADLS and (+) drives PTA   Reciprocal Relationships Pt reports living alone  Has supportive friends and family that are able to A if needed      Service to Others Pt reports working PT as a    Intrinsic Gratification Pt reports enjoying playing with her cat   Psychosocial   Psychosocial (WDL) X   Patient Behaviors/Mood Flat affect;Sad;Tearful   Subjective   Subjective "I just want to go home"   ADL   Where Assessed Chair   Eating Assistance 5  Supervision/Setup   Grooming Assistance 4  Minimal Assistance   UB Bathing Assistance 3  Moderate Assistance   LB Bathing Assistance 2  Maximal Assistance   [address] 3  Moderate Assistance   LB Dressing Assistance 2  Maximal [address]  2  Maximal Assistance   Bed Mobility   Supine to Sit Unable to assess   Sit to Supine Unable to assess   Additional Comments Pt seated OOB in chair upon OT arrival  Pt seated in chair w/ all needs in reach s/p OT session  Transfers   Sit to Stand 4  Minimal assistance   Additional items Assist x 1; Increased time required;Verbal cues;Armrests   Stand to Sit 4  Minimal assistance   Additional items Assist x 1; Increased time required;Verbal cues;Armrests   Additional Comments Transfers completed w/ RW  VC and TC required for safe/proper hand placement  Functional Mobility   Functional Mobility 4  Minimal assistance   Additional Comments Pt demonstrated short distance household mobility in hallway w/ RW at [address] level  Denies SOB or pain  C/O mild dizziness, however subsided w/ diaphragmatic breathing techniques      Additional items Rolling walker   Balance   Static Sitting Fair   Dynamic Sitting Fair -   Static Standing Poor +   Dynamic Standing Poor +   Ambulatory Poor +   Activity Tolerance   Activity Tolerance Patient limited by fatigue   Nurse Made Aware RN cleared Pt for OT re-eval   RUE Assessment   RUE Assessment X  (AROM WFL; Gross strength 4-/5)   LUE Assessment   LUE Assessment X  (AROM WFL; Gross strength 4-/5)   Hand Function   Gross Motor Coordination Functional   Fine Motor Coordination Functional   Sensation   Light Touch No apparent deficits Vision-Basic Assessment   Current Vision Wears glasses all the time   Cognition   Overall Cognitive Status WFL   Arousal/Participation Alert;Lethargic;Cooperative   Attention Within functional limits   Orientation Level Oriented X4   Memory Decreased recall of precautions   Following Commands Follows one step commands with increased time or repetition   Comments Pt is pleasant and cooperative to participate in therapy this day  Required redirection to task and VC for safety awareness during functional tasks  Assessment   Limitation Decreased ADL status; Decreased UE strength;Decreased Safe judgement during ADL;Decreased cognition;Decreased endurance;Decreased high-level ADLs   Prognosis Good   Assessment Pt is a 65 yo female seen for OT re-eval s/p goals expiring  Pt initially adm to [facility] s/p transfer from PCP adm to [facility] w/ x3 days worsening dyspnea on exertion and B/L LE edema and A-fib w/ RVR dx'd w/ cardiogenic shock  Comorbidities include a h/o HLD, HTN, hypothyroidism, DM 2, A-fib w/ RVR, acute CHF, WANG, hyponatremia, hyperphosphatemia, shock liver, acute B/L conjunctivitis eye, depression w/ anxiety, obesity, OA  Pt with active OT orders and up with assistance  orders  Pt works PT and resides alone in [facility] w/ 0STE  Pt reports having family and friends that are able to A if needed  Pt was I w/  ADLS and IADLS, drove, & required no use of DME PTA  Pt is currently demonstrating the following occupational deficits: Mod A UB bathing/dressing, Max A LB bathing/dressing, Min A transfers and mobility w/ RW  These deficits that are impacting pt's baseline areas of occupation are a result of the following impairments: pain, endurance, activity tolerance, functional mobility, balance, functional standing tolerance, unsupportive home environment, decreased I w/ ADLS/IADLS and strength   The following Occupational Performance Areas to address include: grooming, bathing/shower, toilet hygiene, dressing, health maintenance, functional mobility and clothing management  Pt scored overall 25/100 on the Barthel Index  Based on the aforementioned OT evaluation, functional performance deficits, and assessments, pt has been identified as a high complexity evaluation  Recommend STR upon D/C  Pt to continue to benefit from acute immediate OT services to address the following goals 3-5x/week to  w/in 7-10 days:    Goals   Patient Goals To feel better   LTG Time Frame 7-10   Long Term Goal #1 Refer to goals below   Plan   Treatment Interventions ADL retraining;Functional transfer training;UE strengthening/ROM; Endurance training;Cognitive reorientation;Patient/family training;Equipment evaluation/education; Compensatory technique education; Activityengagement; Energy conservation   Goal Expiration Date 19   Treatment Day 5   OT Frequency 3-5x/wk   Recommendation   OT Discharge Recommendation Short Term Rehab   OT - OK to Discharge Yes  (when medically cleared)   Barthel Index   Feeding 10   Bathing 0   Grooming Score 0   Dressing Score 5   Bladder Score 5   Bowels Score 10   Toilet Use Score 5   Transfers (Bed/Chair) Score 5   Mobility (Level Surface) Score 0   Stairs Score 0   Barthel Index Score 40   Modified Boyle Scale   Modified Boyle Scale 4       GOALS    1) Pt will improve activity tolerance to G for min 30 min txment sessions for increase engagement in functional tasks    2) Pt will complete UB/LB dressing/self care w/ mod I using adaptive device and DME as needed    3) Pt will complete bathing w/ Mod I w/ use of AE and DME as needed    4) Pt will complete toileting w/ mod I w/ G hygiene/thoroughness using DME as needed    5) Pt will improve functional transfers to Mod I on/off all surfaces using DME as needed w/ G balance/safety     6) Pt will improve functional mobility during ADL/IADL/leisure tasks to Mod I using DME as needed w/ G balance/safety     7) Pt will participate in simulated IADL management task to increase independence to Mod I w/ G safety and endurance    8) Pt will be attentive 100% of the time during ongoing cognitive assessment w/ G participation to assist w/ safe d/c planning/recommendations    9) Pt will demonstrate G carryover of pt/caregiver education and training as appropriate w/o cues w/ good tolerance to increase safety during functional tasks    10) Pt will demonstrate 100% carryover of energy conservation techniques t/o functional I/ADL/leisure tasks w/o cues s/p skilled education to increase endurance during functional tasks         Rajesh Lay MS, OTR/L

## 2019-07-10 NOTE — PLAN OF CARE
Problem: OCCUPATIONAL THERAPY ADULT  Goal: Performs self-care activities at highest level of function for planned discharge setting  See evaluation for individualized goals  Description  Treatment Interventions: ADL retraining, Functional transfer training, UE strengthening/ROM, Endurance training, Cognitive reorientation, Patient/family training, Equipment evaluation/education, Compensatory technique education, Activityengagement, Energy conservation          See flowsheet documentation for full assessment, interventions and recommendations  Note:   Limitation: Decreased ADL status, Decreased UE strength, Decreased Safe judgement during ADL, Decreased cognition, Decreased endurance, Decreased high-level ADLs  Prognosis: Good  Assessment: Pt is a 67 yo female seen for OT re-eval s/p goals expiring  Pt initially adm to [facility]"" s/p transfer from PCP adm to \[facility]\"" w/ x3 days worsening dyspnea on exertion and B/L LE edema and A-fib w/ RVR dx'd w/ cardiogenic shock  Comorbidities include a h/o HLD, HTN, hypothyroidism, DM 2, A-fib w/ RVR, acute CHF, WANG, hyponatremia, hyperphosphatemia, shock liver, acute B/L conjunctivitis eye, depression w/ anxiety, obesity, OA  Pt with active OT orders and up with assistance  orders  Pt works PT and resides alone in [facility] w/ 0STE  Pt reports having family and friends that are able to A if needed  Pt was I w/  ADLS and IADLS, drove, & required no use of DME PTA  Pt is currently demonstrating the following occupational deficits: Mod A UB bathing/dressing, Max A LB bathing/dressing, Min A transfers and mobility w/ RW  These deficits that are impacting pt's baseline areas of occupation are a result of the following impairments: pain, endurance, activity tolerance, functional mobility, balance, functional standing tolerance, unsupportive home environment, decreased I w/ ADLS/IADLS and strength   The following Occupational Performance Areas to address include: grooming, bathing/shower, toilet hygiene, dressing, health maintenance, functional mobility and clothing management  Pt scored overall 25/100 on the Barthel Index  Based on the aforementioned OT evaluation, functional performance deficits, and assessments, pt has been identified as a high complexity evaluation  Recommend STR upon D/C   Pt to continue to benefit from acute immediate OT services to address the following goals 3-5x/week to  w/in 7-10 days:      OT Discharge Recommendation: Short Term Rehab  OT - OK to Discharge: Yes(when medically cleared)

## 2019-07-10 NOTE — ASSESSMENT & PLAN NOTE
· S/p cardioversion 7/3, now back in AFib after being in NSR  · Continue metoprolol  · Continue Eliquis  · Continue Telemetry  · Noted to have some PVCs, and NSVT today likely due to hypokalemia, supplementation as below

## 2019-07-10 NOTE — ASSESSMENT & PLAN NOTE
· Secondary to ischemic ATN/cardiogenic syndrome /shock  · Renal function has slightly improved today  · Diuretics as above  · Albumin given for BP support  · Continue to monitor BMPs daily

## 2019-07-10 NOTE — ASSESSMENT & PLAN NOTE
Lab Results   Component Value Date    HGBA1C 5 9 (H) 03/18/2019       Recent Labs     07/09/19  1802 07/09/19  2118 07/10/19  0639 07/10/19  1111   POCGLU 131 185* 99 108       Blood Sugar Average: Last 72 hrs:  (P) 149 [id number]   · Diet controlled at home  · Glucose is stable  · Continue Lantus and insulin sliding scale

## 2019-07-10 NOTE — PROGRESS NOTES
NEPHROLOGY PROGRESS NOTE   Ritika Giraldo 66 y o  female MRN: [id number]  Unit/Bed#: [facility] 508-01 Encounter: [id number]      ASSESSMENT & PLAN:    1  Acute kidney injury secondary to hypotensive ischemic ATN   2  Cardiogenic shock with an EF of 25-30%  3  Hyponatremia in the setting of volume overload  4  Transaminitis secondary to shock liver improving    -creatinine now improved with albumin to 2 9  -potassium now low at 2 8  -will give potassium chloride 40 mEq t i d   And give 20 mEq IV x1  -this should help serum sodium as well  -repeat BMP tonight to make sure potassium correcting appropriately    SUBJECTIVE:    Patient seen today with no acute complaints no chest pain or shortness of Breath    OBJECTIVE:  Current Weight: Weight - Scale: 68 4 kg (150 lb 12 7 oz)  Vitals:    07/10/19 1140   BP: 108/58   Pulse: 92   Resp: 14   Temp: 97 5 °F (36 4 °C)   SpO2: 99%       Intake/Output Summary (Last 24 hours) at 7/10/2019 1428  Last data filed at 7/10/2019 1140  Gross per 24 hour   Intake 1680 ml   Output 1125 ml   Net 555 ml       General: conscious, cooperative, in not acute distress  Eyes: conjunctivae pink, anicteric sclerae  ENT: lips and mucous membranes moist  Neck: supple, no JVD  Chest: clear breath sounds bilateral, no crackles, ronchus or wheezings  CVS: distinct S1 & S2, normal rate, irregular rhythm  Abdomen: soft, non-tender, non-distended, normoactive bowel sounds  Extremities: no edema of both legs  Skin: no rash  Neuro: awake, alert, oriented          Medications:    Current Facility-Administered Medications:     acetaminophen (TYLENOL) tablet 650 mg, 650 mg, Oral, Q4H PRN, Baljinder Aranda Jr, PA-C, 650 mg at 06/28/19 1210    albuterol (PROVENTIL HFA,VENTOLIN HFA) inhaler 2 puff, 2 puff, Inhalation, Q4H PRN, Baljinder Aranda Jr, PA-C, 2 puff at 07/05/19 0802    aluminum-magnesium hydroxide-simethicone (MYLANTA) 200-200-20 mg/5 mL oral suspension 15 mL, 15 mL, Oral, Q4H PRN, Tk Aliment, DO, 15 mL at 07/09/19 1200    amiodarone tablet 200 mg, 200 mg, Oral, TID With Meals, Elizabeth Yu PA-C, 200 mg at 07/10/19 1138    apixaban (ELIQUIS) tablet 2 5 mg, 2 5 mg, Oral, BID, Leslie Membreno MD, 2 5 mg at 07/10/19 0915    bisacodyl (DULCOLAX) rectal suppository 10 mg, 10 mg, Rectal, Daily PRN, Elizabeth Yu PA-C    hydrALAZINE (APRESOLINE) tablet 25 mg, 25 mg, Oral, Q8H [facility], Leslie Membreno MD, 25 mg at 07/10/19 1418    insulin glargine (LANTUS) subcutaneous injection 10 Units 0 1 mL, 10 Units, Subcutaneous, QAM, Rhianna Martines, DO, 10 Units at 07/10/19 0915    insulin lispro (HumaLOG) 100 units/mL subcutaneous injection 1-5 Units, 1-5 Units, Subcutaneous, HS, Elizabeth Aranda Jr, PA-C, 1 Units at 07/09/19 2120    insulin lispro (HumaLOG) 100 units/mL subcutaneous injection 1-6 Units, 1-6 Units, Subcutaneous, TID AC, 2 Units at 07/09/19 1157 **AND** Fingerstick Glucose (POCT), , , TID AC, Nu Fallon PA-C    isosorbide dinitrate (ISORDIL) tablet 10 mg, 10 mg, Oral, TID after meals, Solo Lagos, DO, 10 mg at 07/10/19 1138    levothyroxine tablet 137 mcg, 137 mcg, Oral, Daily, Elizabeth Aranda Jr, PA-C, 137 mcg at 07/10/19 0551    loratadine (CLARITIN) tablet 10 mg, 10 mg, Oral, Daily, Elizabeth Aranda Jr, PA-C, 10 mg at 07/10/19 0915    melatonin tablet 6 mg, 6 mg, Oral, HS, Elizabeth Aranda Jr, PA-C, 6 mg at 07/09/19 2119    metoprolol (LOPRESSOR) injection 2 5 mg, 2 5 mg, Intravenous, Q6H PRN, Estrella Arevalo PA-C    metoprolol succinate (TOPROL-XL) 24 hr tablet 25 mg, 25 mg, Oral, BID, Othelia Crease, DO, 25 mg at 07/10/19 0915    ondansetron (ZOFRAN) injection 4 mg, 4 mg, Intravenous, Q6H PRN, Elizabeth Aranda Jr, PA-C, 4 mg at 06/27/19 2124    pantoprazole (PROTONIX) EC tablet 40 mg, 40 mg, Oral, Early Morning, Rhianna Duffel, DO, 40 mg at 07/10/19 0551    phenol (CHLORASEPTIC) 1 4 % mucosal liquid 2 spray, 2 spray, Mouth/Throat, Q2H PRN, Nu Fallon , PA-REJI    polyethylene glycol (MIRALAX) packet 17 g, 17 g, Oral, Daily, [facility] , PA-REJI, 17 g at 07/10/19 0915    senna (SENOKOT) tablet 8 6 mg, 1 tablet, Oral, HS, [facility] , PA-C, 8 6 mg at 07/09/19 2120    sodium chloride (OCEAN) 0 65 % nasal spray 2 spray, 2 spray, Each Nare, PRN, Thiago Rodriguez , STEPHEN, 2 spray at 06/30/19 1218    venlafaxine [facility]) tablet 50 mg, 50 mg, Oral, Q12H, [facility] , PA-REJI, 50 mg at 07/10/19 6325    Invasive Devices:      Lab Results:   Results from last 7 days   Lab Units 07/10/19  0914 07/09/19  1014 07/08/19  1130 07/07/19  0535 07/06/19  0545 07/05/19  0504 07/04/19  0530 07/03/19  2155   WBC Thousand/uL  --  10 57*  --   --   --  12 34*  --   --    HEMOGLOBIN g/dL  --  14 0  --   --   --  15 2  --   --    HEMATOCRIT %  --  42 0  --   --   --  44 7  --   --    PLATELETS Thousands/uL  --  196  --   --   --  183  --   --    POTASSIUM mmol/L 2 8* 3 2* 3 5 3 8 3 5 3 8 4 0 3 7   CHLORIDE mmol/L 90* 89* 90* 90* 92* 92* 91* 90*   CO2 mmol/L 35* 29 32 37* 37* 36* 34* 35*   BUN mg/dL 102* 108* 107* 99* 102* 99* 100* 98*   CREATININE mg/dL 2 89* 3 12* 2 94* 2 94* 3 07* 3 33* 3 85* 3 99*   CALCIUM mg/dL 9 0 8 9 9 0 9 4 9 2 9 2 8 9 8 9   MAGNESIUM mg/dL  --   --   --   --   --   --  2 0 1 9   ALK PHOS U/L  --  95  --  117*  --   --  136*  --    ALT U/L  --  208*  --  378*  --   --  782*  --    AST U/L  --  43  --  87*  --   --  87*  --        Previous work up:  See previous notes

## 2019-07-10 NOTE — SOCIAL WORK
[facility] accepted referral for Pt  Pre-Auth required  CM faxed Pre-Auth to [employer]  Awaiting response

## 2019-07-10 NOTE — ASSESSMENT & PLAN NOTE
Wt Readings from Last 3 Encounters:   07/10/19 68 4 kg (150 lb 12 7 oz)   06/18/19 81 kg (178 lb 9 6 oz)   03/20/19 80 1 kg (176 lb 9 6 oz)     Cardiogenic shock, resolved  Initial EF 25- 30% improving to 45%  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  Diuresing with albumin  On hydralazine, metoprolol and isosorbide  Continue Fluid restriction, low sodium diet  Cardiology is following

## 2019-07-11 VITALS
HEIGHT: 61 IN | OXYGEN SATURATION: 97 % | DIASTOLIC BLOOD PRESSURE: 69 MMHG | WEIGHT: 150.79 LBS | SYSTOLIC BLOOD PRESSURE: 133 MMHG | TEMPERATURE: 97.5 F | RESPIRATION RATE: 18 BRPM | HEART RATE: 78 BPM | BODY MASS INDEX: 28.47 KG/M2

## 2019-07-11 PROBLEM — K72.00 SHOCK LIVER: Status: RESOLVED | Noted: 2019-06-26 | Resolved: 2019-07-11

## 2019-07-11 PROBLEM — H10.33 ACUTE BACTERIAL CONJUNCTIVITIS OF BOTH EYES: Status: RESOLVED | Noted: 2019-06-28 | Resolved: 2019-07-11

## 2019-07-11 PROBLEM — R11.10 DRY HEAVES: Status: RESOLVED | Noted: 2019-07-08 | Resolved: 2019-07-11

## 2019-07-11 LAB
ANION GAP SERPL CALCULATED.3IONS-SCNC: 6 MMOL/L (ref 4–13)
BUN SERPL-MCNC: 91 MG/DL (ref 5–25)
CALCIUM SERPL-MCNC: 9.1 MG/DL (ref 8.3–10.1)
CHLORIDE SERPL-SCNC: 94 MMOL/L (ref 100–108)
CO2 SERPL-SCNC: 32 MMOL/L (ref 21–32)
CREAT SERPL-MCNC: 2.54 MG/DL (ref 0.6–1.3)
GFR SERPL CREATININE-BSD FRML MDRD: 18 ML/MIN/1.73SQ M
GLUCOSE SERPL-MCNC: 139 MG/DL (ref 65–140)
GLUCOSE SERPL-MCNC: 235 MG/DL (ref 65–140)
GLUCOSE SERPL-MCNC: 89 MG/DL (ref 65–140)
POTASSIUM SERPL-SCNC: 3.9 MMOL/L (ref 3.5–5.3)
SODIUM SERPL-SCNC: 132 MMOL/L (ref 136–145)

## 2019-07-11 PROCEDURE — 82948 REAGENT STRIP/BLOOD GLUCOSE: CPT

## 2019-07-11 PROCEDURE — 99232 SBSQ HOSP IP/OBS MODERATE 35: CPT | Performed by: INTERNAL MEDICINE

## 2019-07-11 PROCEDURE — 99239 HOSP IP/OBS DSCHRG MGMT >30: CPT | Performed by: INTERNAL MEDICINE

## 2019-07-11 PROCEDURE — 80048 BASIC METABOLIC PNL TOTAL CA: CPT | Performed by: INTERNAL MEDICINE

## 2019-07-11 RX ORDER — AMIODARONE HYDROCHLORIDE 200 MG/1
200 TABLET ORAL
Status: DISCONTINUED | OUTPATIENT
Start: 2019-07-12 | End: 2019-07-11 | Stop reason: HOSPADM

## 2019-07-11 RX ORDER — NYSTATIN 100000 [USP'U]/G
POWDER TOPICAL 2 TIMES DAILY
Qty: 15 G | Refills: 0 | Status: SHIPPED | OUTPATIENT
Start: 2019-07-11 | End: 2019-08-21 | Stop reason: ALTCHOICE

## 2019-07-11 RX ORDER — NYSTATIN 100000 [USP'U]/G
POWDER TOPICAL 2 TIMES DAILY
Status: DISCONTINUED | OUTPATIENT
Start: 2019-07-11 | End: 2019-07-11 | Stop reason: HOSPADM

## 2019-07-11 RX ORDER — TORSEMIDE 20 MG/1
20 TABLET ORAL DAILY
Refills: 0
Start: 2019-07-11 | End: 2019-07-29

## 2019-07-11 RX ORDER — AMIODARONE HYDROCHLORIDE 200 MG/1
200 TABLET ORAL
Refills: 0
Start: 2019-07-12 | End: 2019-07-29 | Stop reason: ALTCHOICE

## 2019-07-11 RX ORDER — METOPROLOL SUCCINATE 25 MG/1
25 TABLET, EXTENDED RELEASE ORAL 2 TIMES DAILY
Refills: 0
Start: 2019-07-11 | End: 2019-07-15

## 2019-07-11 RX ORDER — LANOLIN ALCOHOL/MO/W.PET/CERES
6 CREAM (GRAM) TOPICAL
Refills: 0
Start: 2019-07-11 | End: 2019-08-21 | Stop reason: ALTCHOICE

## 2019-07-11 RX ORDER — PANTOPRAZOLE SODIUM 40 MG/1
40 TABLET, DELAYED RELEASE ORAL
Refills: 0
Start: 2019-07-12 | End: 2019-08-21 | Stop reason: ALTCHOICE

## 2019-07-11 RX ORDER — ISOSORBIDE DINITRATE 10 MG/1
10 TABLET ORAL
Refills: 0
Start: 2019-07-11 | End: 2019-08-14 | Stop reason: SDUPTHER

## 2019-07-11 RX ORDER — POLYETHYLENE GLYCOL 3350 17 G/17G
17 POWDER, FOR SOLUTION ORAL DAILY
Qty: 14 EACH | Refills: 0 | Status: SHIPPED | OUTPATIENT
Start: 2019-07-12 | End: 2019-10-25

## 2019-07-11 RX ORDER — ALBUTEROL SULFATE 90 UG/1
1-2 AEROSOL, METERED RESPIRATORY (INHALATION) EVERY 4 HOURS PRN
Refills: 0
Start: 2019-07-11 | End: 2019-08-21 | Stop reason: ALTCHOICE

## 2019-07-11 RX ORDER — HYDRALAZINE HYDROCHLORIDE 25 MG/1
25 TABLET, FILM COATED ORAL EVERY 8 HOURS SCHEDULED
Refills: 0
Start: 2019-07-11 | End: 2019-08-14 | Stop reason: SDUPTHER

## 2019-07-11 RX ORDER — INSULIN GLARGINE 100 [IU]/ML
10 INJECTION, SOLUTION SUBCUTANEOUS EVERY MORNING
Refills: 0
Start: 2019-07-12 | End: 2019-07-23 | Stop reason: ALTCHOICE

## 2019-07-11 RX ORDER — SENNOSIDES 8.6 MG
1 TABLET ORAL
Qty: 120 EACH | Refills: 0 | Status: SHIPPED | OUTPATIENT
Start: 2019-07-11 | End: 2019-10-25

## 2019-07-11 RX ADMIN — POTASSIUM CHLORIDE 40 MEQ: 1500 TABLET, EXTENDED RELEASE ORAL at 08:03

## 2019-07-11 RX ADMIN — ISOSORBIDE DINITRATE 10 MG: 10 TABLET ORAL at 08:04

## 2019-07-11 RX ADMIN — POTASSIUM CHLORIDE 40 MEQ: 1500 TABLET, EXTENDED RELEASE ORAL at 11:51

## 2019-07-11 RX ADMIN — INSULIN GLARGINE 10 UNITS: 100 INJECTION, SOLUTION SUBCUTANEOUS at 08:19

## 2019-07-11 RX ADMIN — AMIODARONE HYDROCHLORIDE 200 MG: 200 TABLET ORAL at 08:04

## 2019-07-11 RX ADMIN — APIXABAN 2.5 MG: 2.5 TABLET, FILM COATED ORAL at 08:04

## 2019-07-11 RX ADMIN — LORATADINE 10 MG: 10 TABLET ORAL at 08:05

## 2019-07-11 RX ADMIN — LEVOTHYROXINE SODIUM 137 MCG: 112 TABLET ORAL at 05:25

## 2019-07-11 RX ADMIN — HYDRALAZINE HYDROCHLORIDE 25 MG: 25 TABLET ORAL at 05:31

## 2019-07-11 RX ADMIN — ISOSORBIDE DINITRATE 10 MG: 10 TABLET ORAL at 11:45

## 2019-07-11 RX ADMIN — METOPROLOL SUCCINATE 25 MG: 25 TABLET, EXTENDED RELEASE ORAL at 08:05

## 2019-07-11 RX ADMIN — NYSTATIN: 100000 POWDER TOPICAL at 10:50

## 2019-07-11 RX ADMIN — VENLAFAXINE 50 MG: 50 TABLET ORAL at 09:54

## 2019-07-11 RX ADMIN — PANTOPRAZOLE SODIUM 40 MG: 40 TABLET, DELAYED RELEASE ORAL at 05:31

## 2019-07-11 NOTE — PROGRESS NOTES
NEPHROLOGY PROGRESS NOTE   Laura Handler 66 y o  female MRN: [id number]  Unit/Bed#: [facility] 508-01 Encounter: [id number]      ASSESSMENT & PLAN:    1  Acute kidney injury secondary to hypotensive ischemic ATN   2  Cardiogenic shock with an EF of 25-30%  3  Hyponatremia in the setting of volume overload  4   Transaminitis secondary to shock liver improving    -creatinine now improved with albumin to 2 5  -potassium repleted and now acceptable  -potassium now improved would send home with 20 meq KCL with torsemide 20 mg daily  -this should help serum sodium as well    SUBJECTIVE:    No CP, SOB, Fevers, chills, scheduled for D/C    OBJECTIVE:  Current Weight: Weight - Scale: 68 4 kg (150 lb 12 7 oz)  Vitals:    07/11/19 1100   BP: 133/69   Pulse: 78   Resp: 18   Temp: 97 5 °F (36 4 °C)   SpO2:        Intake/Output Summary (Last 24 hours) at 7/11/2019 1326  Last data filed at 7/11/2019 1200  Gross per 24 hour   Intake 420 ml   Output 1175 ml   Net -755 ml       General: conscious, cooperative, in not acute distress  Eyes: conjunctivae pink, anicteric sclerae  ENT: lips and mucous membranes moist  Neck: supple, no JVD  Chest: clear breath sounds bilateral, no crackles, ronchus or wheezings  CVS: distinct S1 & S2, normal rate,   Abdomen: soft, non-tender, non-distended, normoactive bowel sounds  Extremities: trace edema  Skin: no rash  Neuro: awake, alert, oriented    Medications:    Current Facility-Administered Medications:     acetaminophen (TYLENOL) tablet 650 mg, 650 mg, Oral, Q4H PRN, Servando Aranda Jr, PA-C, 650 mg at 06/28/19 1210    albuterol (PROVENTIL HFA,VENTOLIN HFA) inhaler 2 puff, 2 puff, Inhalation, Q4H PRN, Servando Aranda Jr, PA-C, 2 puff at 07/05/19 0802    aluminum-magnesium hydroxide-simethicone (MYLANTA) 200-200-20 mg/5 mL oral suspension 15 mL, 15 mL, Oral, Q4H PRN, Nika Ye DO, 15 mL at 07/09/19 1200    [START ON 7/12/2019] amiodarone tablet 200 mg, 200 mg, Oral, Daily With Breakfast, Anthony Elliott DO    apixaban Jesika Sears) tablet 2 5 mg, 2 5 mg, Oral, BID, Linda Vargas MD, 2 5 mg at 07/11/19 0804    bisacodyl (DULCOLAX) rectal suppository 10 mg, 10 mg, Rectal, Daily PRN, Alli Sampson PA-C    hydrALAZINE (APRESOLINE) tablet 25 mg, 25 mg, Oral, Q8H [facility], Linda Vargas MD, 25 mg at 07/11/19 0531    insulin glargine (LANTUS) subcutaneous injection 10 Units 0 1 mL, 10 Units, Subcutaneous, QAM, Rafiq Swartz, DO, 10 Units at 07/11/19 0819    insulin lispro (HumaLOG) 100 units/mL subcutaneous injection 1-5 Units, 1-5 Units, Subcutaneous, HS, Alli Aranda Jr, PA-C, 1 Units at 07/09/19 2120    insulin lispro (HumaLOG) 100 units/mL subcutaneous injection 1-6 Units, 1-6 Units, Subcutaneous, TID AC, 2 Units at 07/09/19 1157 **AND** Fingerstick Glucose (POCT), , , TID AC, Shameka Perez PA-C    isosorbide dinitrate (ISORDIL) tablet 10 mg, 10 mg, Oral, TID after meals, Anthnoy Elliott DO, 10 mg at 07/11/19 1145    levothyroxine tablet 137 mcg, 137 mcg, Oral, Daily, Alli Aranda Jr, PA-C, 137 mcg at 07/11/19 0525    loratadine (CLARITIN) tablet 10 mg, 10 mg, Oral, Daily, Alli Aranda Jr, PA-C, 10 mg at 07/11/19 0805    melatonin tablet 6 mg, 6 mg, Oral, HS, Alli Aranda Jr, PA-C, 6 mg at 07/10/19 2145    metoprolol (LOPRESSOR) injection 2 5 mg, 2 5 mg, Intravenous, Q6H PRN, Estrella Arevalo PA-C    metoprolol succinate (TOPROL-XL) 24 hr tablet 25 mg, 25 mg, Oral, BID, Anthony Elliott DO, 25 mg at 07/11/19 0805    nystatin (MYCOSTATIN) powder, , Topical, BID, Wendy Donohue MD    ondansetron [facility]) injection 4 mg, 4 mg, Intravenous, Q6H PRN, Alli Aranda Jr, PA-C, 4 mg at 06/27/19 2124    pantoprazole (PROTONIX) EC tablet 40 mg, 40 mg, Oral, Early Morning, Rafiq Swartz DO, 40 mg at 07/11/19 0531    phenol (CHLORASEPTIC) 1 4 % mucosal liquid 2 spray, 2 spray, Mouth/Throat, Q2H PRN, Shameka Perez PA-C    polyethylene glycol [facility] packet 17 g, 17 g, Oral, Daily, [facility] , PA-REJI, 17 g at 07/10/19 0915    senna (SENOKOT) tablet 8 6 mg, 1 tablet, Oral, HS, [facility]  , PA-REJI, 8 6 mg at 07/10/19 2145    sodium chloride (OCEAN) 0 65 % nasal spray 2 spray, 2 spray, Each Nare, PRN, Diana Silverio PA-C, 2 spray at 06/30/19 1218    venlafaxine [facility]) tablet 50 mg, 50 mg, Oral, Q12H, [facility] , STEPHEN, 50 mg at 07/11/19 4919    Invasive Devices:      Lab Results:   Results from last 7 days   Lab Units 07/11/19  0848 07/10/19  2001 07/10/19  0914 07/09/19  1014 07/08/19  1130 07/07/19  0535  07/05/19  0504   WBC Thousand/uL  --   --   --  10 57*  --   --   --  12 34*   HEMOGLOBIN g/dL  --   --   --  14 0  --   --   --  15 2   HEMATOCRIT %  --   --   --  42 0  --   --   --  44 7   PLATELETS Thousands/uL  --   --   --  196  --   --   --  183   POTASSIUM mmol/L 3 9 3 7 2 8* 3 2* 3 5 3 8   < > 3 8   CHLORIDE mmol/L 94* 94* 90* 89* 90* 90*   < > 92*   CO2 mmol/L 32 35* 35* 29 32 37*   < > 36*   BUN mg/dL 91* 99* 102* 108* 107* 99*   < > 99*   CREATININE mg/dL 2 54* 2 65* 2 89* 3 12* 2 94* 2 94*   < > 3 33*   CALCIUM mg/dL 9 1 9 1 9 0 8 9 9 0 9 4   < > 9 2   ALK PHOS U/L  --   --   --  95  --  117*  --   --    ALT U/L  --   --   --  208*  --  378*  --   --    AST U/L  --   --   --  43  --  87*  --   --     < > = values in this interval not displayed         Previous work up:  See previous notes

## 2019-07-11 NOTE — SOCIAL WORK
CM received Pre-Auth approval from [facility] from [employer]  Pt is approved for 7 days  IP level 1 NRD 7/17  [id number]  Information sent to [facility] facility via [facility] scheduled with Amol for 1:45  to [place] [address] Ph: [phone], Fax: [phone]

## 2019-07-11 NOTE — NURSING NOTE
Called report to [facility]  Faxed discharge information  Patient picked up at 13:45 via stretcher with transport

## 2019-07-11 NOTE — PROGRESS NOTES
Advanced Heart Failure / Pulmonary Hypertension Progress Note    Sofia Bobo 66 y o  female   MRN: [id number]  Unit/Bed#: [facility] 508-01; Encounter: [id number]    Assessment:  Principal Problem:    Acute on chronic combined systolic and diastolic CHF (congestive heart failure) (HCC)  Active Problems:    Hypertension    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)    Atrial fibrillation with RVR (HCC)    Acute kidney injury ([address] )    Hyponatremia    Shock liver    Acute bacterial conjunctivitis of both eyes    Dry heaves    Subjective:   Patient seen and examined  No significant events overnight  Feels well, no current complaints  Denies fever, chills, lightheadedness, dizziness, chest pain, palpitations, SOB, PND, and orthopnea  Has been up to walking to bathroom, denies any symptoms with ambulation  Objective: Intake/ Output: 1860 mL / 1175 mL (net positive 685 mL, 1 unmeasured occurence)  Weight: 150 lbs (same as on 07/09 and 07/10)  Telemetry: Afib (rates in 90-110s) with occasional NSVT  Plan:    Acute on chronic biventricular heart failure; LVEF 45%, LVIDd 2 8 cm; NYHA III; ACC/AHA Stage C   Etiology: shock induced by cardizem and profolol being given while in borderline low output state  BiV HF likely tachy-induced, though will need ischemic evaluation in future  Also has history of radioablative iodine  TTE from 07/04 with LVEF improved from 35% to now 45%  Continue on metoprolol succinate 25 mg BID, hydralazine 25 mg TID, and Isordil 10 mg TID  Goal for I/O to run even, per nephrology  Net positive today off diuretics  Hospital follow-up appointment has been scheduled  Atrial fibrillation with rapid ventricular response   [id number] = 6 (age, sex, CHF, HTN, DM)  Anticoagulation on Eliquis (renal dose)  S/p unsuccessful cardioversion on 06/21/2019  Was planning for EBEN cardioversion and dual-chamber PPM implantation on 06/25   Received propofol and cardizem which likely induced cardiogenic shock due to her borderline low output state at that time  S/p successful cardioversion on 07/03 with amio load  Converted back into Afib on 07/08  Continue on amiodarone 200 mg TID and metoprolol succinate 25 mg BID  Rates slightly elevated with NSVT, can consider increasing metoprolol dose   Prior notes from EP state she may need ablation if recurrence occurred  Plan for rate control now  Acute kidney injury   Etiology: hypotension/low output  Previous baseline creatinine around 0 7-0 9  Creatinine now plateaued at 0 3-6 3, per nephrology  Creatinine of 2 54 today (down from 2 65 on 07/10)  Continue to monitor  Potassium of 3 9 this AM     Goal for I/O to run even, per nephrology  Net positive today off diuretics  Shock liver, improving   Due to hypotension / cardiogenic shock  ALT remains elevated (208 on 07/09); AST and Alk Phos WNL  Neurohormonal Blockade:  --Beta Blocker: metoprolol succinate 25 mg BID   --ACEi, ARB or ARNi: N/A  --SVR reduction: hydralazine 25 mg TID and Isordil 10 mg TID  --Aldosterone Receptor Blocker: N/A  --Diuretic: N/A    Sudden Cardiac Death Risk Reduction:  --ICD: LVEF 45%  Electrical Resynchronization:  --Interrogation: Narrow QRS  Advanced Therapies (if appropriate): --Inotrope: N/A  --LVAD/Transplant Candidacy: Will continue to monitor  Diagnostic Testing:  Echocardiogram (limited) from 07/04/2019:  LVEF: 45%; mild diffuse hypokinesis  LVIDd: 3 8 cm  MR: No MR  Moderate annular calcification  Other: Dilated LA  Echocardiogram from 06/20/2019:  LVEF: 25%  LVIDd: 4 6 cm  RV: Dilated and hypokinetic  MR: Moderate  PASP: 35 mmHg  Vitals:   Blood pressure 112/60, pulse 96, temperature 98 2 °F (36 8 °C), temperature source Oral, resp  rate 16, height 5' 1" (1 549 m), weight 68 4 kg (150 lb 12 7 oz), SpO2 97 %  Body mass index is 28 49 kg/m²  I/O last 3 completed shifts:   In: 1860 [P O :660; NG/GT:1200]  Out: 1950 [Urine:1950]    Wt Readings from Last 3 Encounters:   07/11/19 68 4 kg (150 lb 12 7 oz)   06/18/19 81 kg (178 lb 9 6 oz)   03/20/19 80 1 kg (176 lb 9 6 oz)     Vitals:    07/11/19 0455 07/11/19 0556 07/11/19 0700 07/11/19 0747   BP: 112/63  112/60    BP Location:       Pulse: 92  96    Resp: 18  16    Temp:   98 2 °F (36 8 °C)    TempSrc:   Oral    SpO2:   97%    Weight:  68 9 kg (151 lb 14 4 oz)  68 4 kg (150 lb 12 7 oz)   Height:         Physical Exam   Constitutional: She is oriented to person, place, and time  She appears well-developed and well-nourished  HENT:   Head: Normocephalic and atraumatic  Eyes: Pupils are equal, round, and reactive to light  Right eye exhibits no discharge  Neck: Neck supple  No tracheal deviation present  Left IJ central line present  Cardiovascular: Normal rate, regular rhythm and intact distal pulses  Murmur heard  Pulmonary/Chest: Effort normal  No respiratory distress  She has no wheezes  Abdominal: Soft  Bowel sounds are normal  She exhibits no distension  Musculoskeletal: She exhibits no edema or tenderness  Neurological: She is alert and oriented to person, place, and time  Skin: Skin is dry  Psychiatric: She has a normal mood and affect  Her behavior is normal      Central Line: left internal jugular (no IV access, placed 07/09/2019)    Murray Catheter: N/A    Current Facility-Administered Medications:     acetaminophen (TYLENOL) tablet 650 mg, 650 mg, Oral, Q4H PRN, Brendan Route Rosi Handy PA-C, 650 mg at 06/28/19 1210    albuterol (PROVENTIL HFA,VENTOLIN HFA) inhaler 2 puff, 2 puff, Inhalation, Q4H PRN, Brendan Route Rosi Handy PA-C, 2 puff at 07/05/19 0802    aluminum-magnesium hydroxide-simethicone (MYLANTA) 200-200-20 mg/5 mL oral suspension 15 mL, 15 mL, Oral, Q4H PRN, Pooja Mt, DO, 15 mL at 07/09/19 1200    amiodarone tablet 200 mg, 200 mg, Oral, TID With Meals, Brendan Route Rosi Handy PA-C, 200 mg at 07/11/19 0804    apixaban (ELIQUIS) tablet 2 5 mg, 2 5 mg, Oral, BID, Last Stuart MD, 2 5 mg at 07/11/19 0804    bisacodyl (DULCOLAX) rectal suppository 10 mg, 10 mg, Rectal, Daily PRN, Chris French PA-C    hydrALAZINE (APRESOLINE) tablet 25 mg, 25 mg, Oral, Q8H [facility], Last Sutart MD, 25 mg at 07/11/19 0531    insulin glargine (LANTUS) subcutaneous injection 10 Units 0 1 mL, 10 Units, Subcutaneous, QAM, Baljinder Jerez, DO, 10 Units at 07/11/19 0819    insulin lispro (HumaLOG) 100 units/mL subcutaneous injection 1-5 Units, 1-5 Units, Subcutaneous, HS, Chris Aranda Jr, PA-C, 1 Units at 07/09/19 2120    insulin lispro (HumaLOG) 100 units/mL subcutaneous injection 1-6 Units, 1-6 Units, Subcutaneous, TID AC, 2 Units at 07/09/19 1157 **AND** Fingerstick Glucose (POCT), , , TID AC, Marianna Galindo PA-C    isosorbide dinitrate (ISORDIL) tablet 10 mg, 10 mg, Oral, TID after meals, Nelli Dan DO, 10 mg at 07/11/19 7253    levothyroxine tablet 137 mcg, 137 mcg, Oral, Daily, Chris Aranda Jr, PA-C, 137 mcg at 07/11/19 0525    loratadine (CLARITIN) tablet 10 mg, 10 mg, Oral, Daily, Chris Aranda Jr, PA-C, 10 mg at 07/11/19 0805    melatonin tablet 6 mg, 6 mg, Oral, HS, Chris Aranda Jr, PA-C, 6 mg at 07/10/19 2145    metoprolol (LOPRESSOR) injection 2 5 mg, 2 5 mg, Intravenous, Q6H PRN, Estrella Arevalo PA-C    metoprolol succinate (TOPROL-XL) 24 hr tablet 25 mg, 25 mg, Oral, BID, Nelli Dan DO, 25 mg at 07/11/19 0805    nystatin (MYCOSTATIN) powder, , Topical, BID, Christopher Coffey MD    ondansetron [facility]) injection 4 mg, 4 mg, Intravenous, Q6H PRN, Chris Aranda Jr, PA-C, 4 mg at 06/27/19 2124    pantoprazole (PROTONIX) EC tablet 40 mg, 40 mg, Oral, Early Morning, Baljinder Jerez DO, 40 mg at 07/11/19 0531    phenol (CHLORASEPTIC) 1 4 % mucosal liquid 2 spray, 2 spray, Mouth/Throat, Q2H PRN, Chris Aranda Jr, PA-C    polyethylene glycol (MIRALAX) packet 17 g, 17 g, Oral, Daily, Chandra Cota [facility] , STEPHEN, 17 g at 07/10/19 0915    potassium chloride (K-DUR,KLOR-CON) CR tablet 40 mEq, 40 mEq, Oral, TID With Meals, Antonietta [place] , 40 mEq at 07/11/19 0803    senna (SENOKOT) tablet 8 6 mg, 1 tablet, Oral, HS, Chandra Aranda Jr, PA-C, 8 6 mg at 07/10/19 2145    sodium chloride (OCEAN) 0 65 % nasal spray 2 spray, 2 spray, Each Nare, PRN, Eh Boyd PA-C, 2 spray at 06/30/19 1218    venlafaxine [facility]) tablet 50 mg, 50 mg, Oral, Q12H, Chandra Cota [facility] STEPHEN Handy, 50 mg at 07/11/19 0954    Labs & Results:      Results from last 7 days   Lab Units 07/09/19  1014 07/05/19  0504   WBC Thousand/uL 10 57* 12 34*   HEMOGLOBIN g/dL 14 0 15 2   HEMATOCRIT % 42 0 44 7   PLATELETS Thousands/uL 196 183         Results from last 7 days   Lab Units 07/11/19  0848 07/10/19  2001 07/10/19  0914 07/09/19  1014  07/07/19  0535   POTASSIUM mmol/L 3 9 3 7 2 8* 3 2*   < > 3 8   CHLORIDE mmol/L 94* 94* 90* 89*   < > 90*   CO2 mmol/L 32 35* 35* 29   < > 37*   BUN mg/dL 91* 99* 102* 108*   < > 99*   CREATININE mg/dL 2 54* 2 65* 2 89* 3 12*   < > 2 94*   CALCIUM mg/dL 9 1 9 1 9 0 8 9   < > 9 4   ALK PHOS U/L  --   --   --  95  --  117*   ALT U/L  --   --   --  208*  --  378*   AST U/L  --   --   --  43  --  87*    < > = values in this interval not displayed  Counseling / Coordination of Care: Total floor / unit time spent today 20 minutes  Greater than 50% of total time was spent with the patient and / or family counseling and / or coordination of care  A description of the counseling / coordination of care: 20  Thank you for the opportunity to participate in the care of this patient      Autumn Baldwin PA-C

## 2019-07-11 NOTE — DISCHARGE SUMMARY
Transition of Care Discharge Summary - [address] [facility]    Patient Information: Nataliia Sharma 66 y o  female MRN: [id number]  [facility] En[facility]: [id number]    Discharging Physician / Practitioner: Shahida Castillo MD  PCP: Ac Montiel MD  Admission Date: 6/18/2019  Discharge Date: 07/11/19    Disposition:      Short Term Rehab or SNF at [place] [address]    For Discharges to [facility] SNF:   · [address] [place] [address] Texted SNF Physician    Reason for Admission:  Atrial fibrillation, congestive heart failure    Discharge Diagnoses:     Principal Problem:    Acute on chronic combined systolic and diastolic CHF (congestive heart failure) ([facility] )  Active Problems:    At[facility] fibrillation with RVR ([facility] )    Hypertension    Type 2 diabetes mellitus with diabetic cataract ([facility] )    Acute kidney injury ([facility] )    Hyponatremia  Resolved Problems:    Acute CHF (congestive heart failure) ([facility] )    Hyperkalemia    Shock liver    Acute bacterial conjunctivitis of both eyes    Dry heaves      Consultations During Hospital Stay:  · Cardiology  · Electrophysiology  · Nephrology  · Critical care    Procedures Performed:     · Cardioversion  · Central line placement  · Echocardiogram ejection fraction 42%    Medication Adjustments and Discharge Medications:  · Summary of Medication Adjustments made as a result of this hospitalization:  Amiodarone added metoprolol added, Eliquis added  · Medication Dosing Tapers - Please refer to Discharge Medication List for details on any medication dosing tapers (if applicable to patient)  · Medications being temporarily held (include recommended restart time):  None  · Discharge Medication List: See after visit summary for reconciled discharge medications  Wound Care Recommendations:  When applicable, please see wound care section of After Visit Summary      Diet Recommendations at Discharge:  Diet -   Fluid Restriction - No Fluid Restriction at Discharge  Significant Findings / Test Results:     · Echocardiogram results as above    Incidental Findings:   · None    Test Results Pending at Discharge (will require follow up): · None     Outpatient Tests Requested:  · Monitor BMP    Complications:  Cardiogenic shock necessitating ICU transfer, vasopressors, cardioversion    Hospital Course: Marie Kuhn is a 66 y o  female patient who originally presented to the hospital on 6/18/2019 due to shortness of breath and atrial fibrillation  She was admitted for rate control and treatment  Of her heart failure during hospital course she failed to respond to antiarrhythmic therapy  She developed cardiogenic shock and was transferred to the ICU this is stating vasopressors  She was cardioverted during her hospital course  Hospital course was complicated with shock liver and acute renal failure  After cardioversion the patient's symptoms improved and she was placed on amiodarone for rhythm control  After heart rate was adequately controlled and stabilized she was discharged to inpatient rehabilitation  Condition at Discharge: stable     Discharge Day Visit / Exam:     Subjective:  "I feel little better"  Vitals: Blood Pressure: 133/69 (07/11/19 1100)  Pulse: 78 (07/11/19 1100)  Temperature: 97 5 °F (36 4 °C) (07/11/19 1100)  Temp Source: Oral (07/11/19 1100)  Respirations: 18 (07/11/19 1100)  Height: 5' 1" (154 9 cm) (06/28/19 1445)  Weight - Scale: 68 4 kg (150 lb 12 7 oz) (07/11/19 0747)  SpO2: 97 % (07/11/19 0700)  Exam:   Physical Exam   Constitutional: She is oriented to person, place, and time  She appears well-developed and well-nourished  HENT:   Head: Normocephalic and atraumatic  Eyes: Pupils are equal, round, and reactive to light  EOM are normal    Neck: Neck supple  Cardiovascular: Normal rate and regular rhythm  Pulmonary/Chest: Effort normal    Abdominal: Soft     Neurological: She is alert and oriented to person, place, and time  Skin: Skin is warm and dry  Discussion with Family:  Patient and daughter, plan of care    Goals of Care Discussions:  · Code Status at Discharge: Prior  · Were there any Goals of Care Discussions during Hospitalization?: No  · Results of any General Goals of Care Discussions: N/A  · POLST Completed: No   · If POLST Completed, Summary of POLST Agreement Provided Here: N/A   · OK to Rehospitalize if Needed? Yes    Discharge instructions/Information to patient and family:   See after visit summary section titled Discharge Instructions for information provided to patient and family  Planned Readmission: None      Discharge Statement:  I spent 40 minutes discharging the patient  This time was spent on the day of discharge  I had direct contact with the patient on the day of discharge  Greater than 50% of the total time was spent examining patient, answering all patient questions, arranging and discussing plan of care with patient as well as directly providing post-discharge instructions  Additional time then spent on discharge activities      ** Please Note: This note has been constructed using a voice recognition system **

## 2019-07-12 ENCOUNTER — NURSING HOME VISIT (OUTPATIENT)
Dept: GERIATRICS | Facility: OTHER | Age: 79
End: 2019-07-12
Payer: COMMERCIAL

## 2019-07-12 ENCOUNTER — TRANSITIONAL CARE MANAGEMENT (OUTPATIENT)
Dept: FAMILY MEDICINE CLINIC | Facility: CLINIC | Age: 79
End: 2019-07-12

## 2019-07-12 DIAGNOSIS — I48.91 ATRIAL FIBRILLATION WITH RVR (HCC): ICD-10-CM

## 2019-07-12 DIAGNOSIS — N17.9 ACUTE KIDNEY INJURY (HCC): ICD-10-CM

## 2019-07-12 DIAGNOSIS — K72.00 SHOCK LIVER: ICD-10-CM

## 2019-07-12 DIAGNOSIS — I10 ESSENTIAL HYPERTENSION: ICD-10-CM

## 2019-07-12 DIAGNOSIS — I50.43 ACUTE ON CHRONIC COMBINED SYSTOLIC AND DIASTOLIC CHF (CONGESTIVE HEART FAILURE) (HCC): ICD-10-CM

## 2019-07-12 DIAGNOSIS — E03.9 ACQUIRED HYPOTHYROIDISM: ICD-10-CM

## 2019-07-12 DIAGNOSIS — F41.8 DEPRESSION WITH ANXIETY: ICD-10-CM

## 2019-07-12 DIAGNOSIS — R26.2 AMBULATORY DYSFUNCTION: Primary | ICD-10-CM

## 2019-07-12 PROCEDURE — 99305 1ST NF CARE MODERATE MDM 35: CPT | Performed by: FAMILY MEDICINE

## 2019-07-12 NOTE — ASSESSMENT & PLAN NOTE
With recent hospitalization and generalized weakness, for STR, needs NH care at this time, PT/OT to improve gait, endurence, ADLs, transfer, and strength

## 2019-07-12 NOTE — ASSESSMENT & PLAN NOTE
Will monitor wt closely, stable for now, on Torsemide, will need close monitoring specially with WANG

## 2019-07-12 NOTE — ASSESSMENT & PLAN NOTE
Base Cr:0 9 but on discharge from the hospital was 2 54, will check labs on 07/15/19, avoid nephrotoxic meds, close monitoring

## 2019-07-12 NOTE — PROGRESS NOTES
[facility]  [address] [place] [address]  [facility]ility: [facility]    NAME: Temo Ba  AGE: 66 y o  SEX: female    DATE OF ENCOUNTER: 7/12/2019    Code status:  CPR    Assessment and Plan   Ambulatory dysfunction  With recent hospitalization and generalized weakness, for STR, needs NH care at this time, PT/OT to improve gait, endurence, ADLs, transfer, and strength  Atrial fibrillation with RVR ([facility] )  Had successful cardioversion, on Eliquis, Amiodarone and Metoprolol,rate is controlled, will monitor closely, would need close TSH monitoring  F/u with cardiology  [facility]  Base Cr:0 9 but on discharge from the hospital was 2 54, will check labs on 07/15/19, avoid nephrotoxic meds, close monitoring  Acute on chronic combined systolic and diastolic CHF (congestive heart failure) ([facility] )  Will monitor wt closely, stable for now, on Torsemide, will need close monitoring specially with WANG  Depression with anxiety  On Venlafaxine, will monitor E-lytes closely, stable   Hypothyroidism  Will monitor TSH specially with being on Amiodarone now  On levothyroxine  Hypertension  BP is stable with Hydrazine, and Metoprolol, will monitor E-lytes an kidney function closely  Pt is off Losartan/HCTz due to WANG  Shock liver  2nd to cardiogenic shock, last lab on 07/09 ALT at 209, will monitor labs  All medications and routine orders were reviewed and updated as needed  Plan discussed with: Patient    Chief Complaint     Seen for admission at [address] [place] [address], pt reports fatigue    History of Present Illness     Pt with [address] [place] [address] and medical problem as per this note isaías Ba is a 66 y o  female patient who originally presented to the hospital on 6/18/2019 due to shortness of breath and atrial fibrillation  She was admitted for rate control and treatment    Of her heart failure during hospital course she failed to respond to antiarrhythmic therapy  She developed cardiogenic shock and was transferred to the ICU this is stating vasopressors  She was cardioverted during her hospital course  Hospital course was complicated with shock liver and acute renal failure  After cardioversion the patient's symptoms improved and she was placed on amiodarone for rhythm control  After heart rate was adequately controlled and stabilized she was discharged to [facility] yesterday 7/11/19 for STR  Pt reports that she feels tired otherwise she has no complaint of SOB, KAMARA, CP, palpitation  I asked pt why she is on lots of vitamin supplements and she said because she's heard they are good for her and she agreed to DC them all and just continue with her MVT for now  Pt denies any pain  Pt's BP is stable, off HCTZ, and Losartan due to WANG  No specific reports or concerns at this time        HISTORY:  Past Medical History:   Diagnosis Date    Cardiogenic shock ([facility] ) 6/26/2019    Eczema     Photosensitivity     abnormal skin sensitivity to sunlight    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HCC)     staging     Family History   Problem Relation Age of Onset    Alzheimer's disease Mother     Coronary artery disease Mother     Dementia Mother     Diabetes Mother         mellitus    Other Father         acute myocardial infarction    Coronary artery disease Sister     Other Maternal Grandfather         laryngeal cancer    Dementia Maternal Aunt     Diabetes Maternal Aunt      Social History     Socioeconomic History    Marital status: Single     Spouse name: None    Number of children: None    Years of education: None    Highest education level: None   Occupational History    None   Social Needs    Financial resource strain: None    Food insecurity:     Worry: None     Inability: None    Transportation needs:     Medical: None     Non-medical: None   Tobacco Use    Smoking status: Never Smoker    Smokeless tobacco: Never Used   Substance and Sexual Activity    Alcohol use: Not Currently    Drug use: Never    Sexual activity: Not Currently   Lifestyle    Physical activity:     Days per week: None     Minutes per session: None    Stress: None   Relationships    Social connections:     Talks on phone: None     Gets together: None     Attends Congregation service: None     Active member of club or organization: None     Attends meetings of clubs or organizations: None     Relationship status: None    Intimate partner violence:     Fear of current or ex partner: None     Emotionally abused: None     Physically abused: None     Forced sexual activity: None   Other Topics Concern    None   Social History Narrative    None       Allergies: Allergies   Allergen Reactions    Penicillins     Wellbutrin Sr  [Bupropion]      Other reaction(s): Suicidal ideations  Category: Adverse Reaction; Review of Systems     Review of Systems   Constitutional: Positive for fatigue  Negative for activity change, appetite change, chills, diaphoresis, fever and unexpected weight change  HENT: Negative  Eyes: Negative  Respiratory: Negative  Cardiovascular: Negative  Gastrointestinal: Negative  Endocrine: Negative  Genitourinary: Negative  Musculoskeletal: Negative  Skin: Negative  Allergic/Immunologic: Negative  Neurological: Negative  Hematological: Negative  Psychiatric/Behavioral: Negative  All other systems reviewed and are negative  Medications and orders     All medications reviewed and updated in Nursing Home EMR  Objective     Vitals:WT:151 1Ibs,   BP:134/68    Physical Exam   Constitutional: She is oriented to person, place, and time  She appears well-developed and well-nourished  No distress  HENT:   Head: Normocephalic and atraumatic  Right Ear: External ear normal    Left Ear: External ear normal    Nose: Nose normal    Mouth/Throat: Oropharynx is clear and moist  No oropharyngeal exudate     Eyes: Pupils are equal, round, and reactive to light  Conjunctivae and EOM are normal  Right eye exhibits no discharge  Left eye exhibits no discharge  No scleral icterus  Eyeglasses on  Neck: Normal range of motion  Neck supple  Cardiovascular: Normal rate, regular rhythm, normal heart sounds and intact distal pulses  Exam reveals no gallop and no friction rub  No murmur heard  Regular currently  Pulmonary/Chest: Effort normal and breath sounds normal  No stridor  No respiratory distress  She has no wheezes  She has no rales  She exhibits no tenderness  Abdominal: Soft  Bowel sounds are normal  She exhibits no distension and no mass  There is no tenderness  There is no rebound and no guarding  No hernia  Genitourinary:   Genitourinary Comments: Deferred  Musculoskeletal: Normal range of motion  She exhibits no edema, tenderness or deformity  Moves UE and LEs, sitting in a chair,no asymmetry  Lymphadenopathy:     She has no cervical adenopathy  Neurological: She is alert and oriented to person, place, and time  No cranial nerve deficit  Skin: Skin is warm and dry  No rash noted  She is not diaphoretic  No erythema  No pallor  Ecchymotic UES skin area  Nursing note and vitals reviewed  Pertinent Laboratory/Diagnostic Studies: The following labs/studies were reviewed please see chart or hospital paperwork for details  I have reviewed all the lab results  19: WBC:10 57, HB/HCT: 14/42, PLt: 196, NA:139, K:3 9, HBA1c:5 9, BU:91 Cr:2 54  ALT:208, AST:43    2DECHO: LVEF:35%            Rayna Velasquez MD  2019 3:13 PM      Name: Gurjit Lara  : 1940  MRN: [id number]  DOS: 2019  BILLING CODE: [id number]  Diagnoses:   Diagnosis ICD-10-CM Associated Orders   1  Ambulatory dysfunction R26 2    2  Atrial fibrillation with RVR ([facility]) I48 91    3  Acute kidney injury ([facility] ) N17 9    4  Acute on chronic combined systolic and diastolic CHF (congestive heart failure) ([facility]) I50 43    5  Depression with anxiety F41 8    6  Acquired hypothyroidism E03 9    7  Essential hypertension I10    8   Shock liver K72 00

## 2019-07-12 NOTE — PROGRESS NOTES
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 with new onset AF with RVR of unknown duration, +/- ischemia given patient's risk factors for CAD, strong family history, and septal Q waves on EKG with severe septal hypokinesis on echo, less likely viral, toxin induced or infiltrative  Thyroid levels may have been a bit erratic in the past  TSH at present >9 but with normal FT4  S/P cardiogenic shock 2/2 to arrhythmia  worsened in the setting of attempted cardioversion, use of Cardizem, anesthesia-use of Propofol  Clinically, well compensated on exam  Stable hemodynamics  She remains in atrial fib at a rate of 100 bpm     Weight 150 lbs at discharge, 151 lbs at facility today, 155 lbs today- in office  Continue 2 g sodium diet, <2L fluid restriction  Follow up EP  Repeat CMP in two weeks  Nuclear stress  Sleep study  Increase Metoprolol to 37 5 mg BID  Echocardiogram (limited) from 07/04/2019:  LVEF: 45%; mild diffuse hypokinesis  LVIDd: 3 8 cm  MR: No MR  Moderate annular calcification  Other: Dilated LA      Echocardiogram from 06/20/2019:  LVEF: 25%  LVIDd: 4 6 cm  RV: Dilated and hypokinetic  MR: Moderate  PASP: 35 mmHg  VBG 7/2/19:  SVO2 71%  VBG 6/26/19 0400:  SVO2 68 3%  CO/CI 2 76/1 5    Neurohormonal Blockade:  --Beta Blocker: metoprolol succinate 25 mg Q12, increase to 37 5 mg BID for better rate control  --ACEi, ARB or ARNi: Renal function precludes  --SVR reduction: hydralazine 25 mg TID and Isordil 10 mg TID     --Aldosterone Receptor Blocker: Renal function precludes  --Diuretic: Torsemide 20 mg once daily     Sudden Cardiac Death Risk Reduction:  --ICD: LVEF 45%     Electrical Resynchronization:  --Interrogation: Narrow QRS     Advanced Therapies (if appropriate): --Inotrope: N/A  --LVAD/Transplant Candidacy: Will continue to monitor  2 Atrial Fib with RVR  S/P successful DCCV on 7/3/19 following Amio load and then went back into Afib on 7/8/19    Now on maintenance dose of 200 mg QD and Metoprolol as above-dose increased today   Plan for likely ablation per EP  On Eliquis for oral AC  Rate control strategy for now with referral to EP  3 HTN COntrolled  4 HLD  On statin therapy  5 Hypothyroidism  History of radioablation in the past now on replacement therapy  TSH 9 with normal free T4  6 Type II DM       7 CORAZON  Noncompliant with CPAP in the past but now willing to pursue repeat sleep study  8 WANG  Likely driven by poor perfusion  Drawn this AM at NH>  Creat Currently 1 5 and consistently improving  Baseline 0 9  Recheck CMP again in two weeks  9  Transaminitis  From shock liver  Improved  10  Abnormal EKG  Q waves noted in septal leads concerning for possible old infarct  Will order Lexiscan stress to eval for underlying ischemia  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  Given development of junctional rhythm post first DCCV attempt  No further incidents since  Will continue to monitor     RTO 8 weeks following sleep study, stress test, EP eval      HPI:     Heart Failure Consultation note 1026/19:  66year old female admitted to the ED on 6/18/19 with new onset Atrial fib with RVR and decompensated congestive heart failure  Has no prior cardiac history and incidentally found to have an irregular, rapid heart rate in her PCPs office  Patient also reporting new onset lower extremity edema, orthopnea, and nonproductive cough x 3 days  Initially was given several boluses of Cardizem and then started on Cardizem gtt but with minimal response   She was seen by the general cardiology team and had  echocardiogram which demonstrated LVEf of 25-30% with LVIDd of 4 6 cm  Then on 6/22 underwent EBEN with guided cardioversion which was initially successful but then quickly reverted back to a rapid Afib the next morning  Amiodarone was then initiated  Did have junctional rhythm immediate  post conversion and showing signs of tachy oniel syndrome  Given these findings, PPM with possible AICD was suggested with cardioversion thereafter  Yesterday, 6/25 patient was taken to the cath lab for planned dual chamber AICD but apparently became hypotensive and markedly SOB upon induction  She was subsequently transferred to the ICU for further management of her heart failure  She continued to be in AF with RVR upon transfer  Through the night, patient's condition began to worsen and she became cool, clammy with N/V and loss of obtainable BP  Started on pressor support and lined  SVO2 at that point 32% with signs of lactic acidosis, WANG  Then started on Milrinone gtt at 0 25 mcg/kg/min  On my evaluation this morning, patient's condition appears to be improving on inotropic/pressor support  She is warm and perfusing  with MAP of 90  Lactic acid improved, SVO2 up to 68% from 33%  CVP around 10  Mild volume overload  In review of history, patient recalls having SOB with mild exertion up to 3 months ago, which she though seemed unusual  She denies any chest pain, palpitations, dizziness, or syncopal episodes  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  Never used any illicit drugs  Nonsmoker  No recent viral history  Has strong family history of CAD in her mother, father, and sister  Suffered with uterine cancer at age 28 but received no chemotherapy or radiation treatment  Had radioablation of her thyroid years ago and is currently on replacement therapy  Most recent TSH elevated over 9 but FT4 normal      Today,7/12/19,  patient presents for post hospital follow up  Recent prolonged hospital stay due to the events noted above from 6/18-7/11/19  Her cardiogenic shock improved and she was able to come off pressor and inotropic support  She was started on HF GDMT and loaded with Amiodarone  She was then successfully cardioverted on 7/3/19 but then went back into Afib on 7/8,  with plan for possible repeat CV or ablation in the future  WANG and transaminitis continued to improve  She was discharged on 20 mg of Torsemide at a weight of 150 lbs  She is now at [address]  States she is feeling well overall  Weights are stable  Denies any chest discomfort, SOB, orthopnea or PND  No palpitations, dizziness or syncopal episodes  Tolerating her therapy sessions without difficulty  Her labs are improved  She is anxious to get home  Past Medical History:   Diagnosis Date    Cardiogenic shock ([facility] [address] ) 6/26/2019    Eczema     Photosensitivity     abnormal skin sensitivity to sunlight    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HCC)     staging       12 point ROS negative other than that stated in HPI    Allergies   Allergen Reactions    Penicillins     Wellbutrin Sr  [Bupropion]      Other reaction(s): Suicidal ideations  Category:  Adverse Reaction;          Current Outpatient Medications:     albuterol (VENTOLIN HFA) 90 mcg/act inhaler, Inhale 1-2 puffs every 4 (four) hours as needed for wheezing, Disp: , Rfl: 0    amiodarone 200 mg tablet, Take 1 tablet (200 mg total) by mouth daily with breakfast, Disp: , Rfl: 0    apixaban (ELIQUIS) 2 5 mg, Take 1 tablet (2 5 mg total) by mouth 2 (two) times a day, Disp: , Rfl: 0    Ascorbic Acid (VITAMIN C) 100 MG tablet, Take 100 mg by mouth daily, Disp: , Rfl:     cholecalciferol (VITAMIN D3) 1,000 units tablet, Take 1,000 Units by mouth daily, Disp: , Rfl:     cyanocobalamin 100 MCG tablet, Take 100 mcg by mouth daily, Disp: , Rfl:     fexofenadine (ALLEGRA) 30 MG tablet, Take 30 mg by mouth 2 (two) times a day, Disp: , Rfl:     hydrALAZINE (APRESOLINE) 25 mg tablet, Take 1 tablet (25 mg total) by mouth every 8 (eight) hours, Disp: , Rfl: 0    insulin glargine (LANTUS) 100 units/mL subcutaneous injection, Inject 10 Units under the skin every morning, Disp: , Rfl: 0    isosorbide dinitrate (ISORDIL) 10 mg tablet, Take 1 tablet (10 mg total) by mouth 3 (three) times daily after meals, Disp: , Rfl: 0    levothyroxine 137 mcg tablet, Take 1 tablet (137 mcg total) by mouth daily, Disp: 30 tablet, Rfl: 5    melatonin 3 mg, Take 2 tablets (6 mg total) by mouth daily at bedtime, Disp: , Rfl: 0    metoprolol succinate (TOPROL-XL) 25 mg 24 hr tablet, Take 1 tablet (25 mg total) by mouth 2 (two) times a day, Disp: , Rfl: 0    multivitamin (THERAGRAN) TABS, Take 1 tablet by mouth daily, Disp: , Rfl:     nystatin (MYCOSTATIN) powder, Apply topically 2 (two) times a day, Disp: 15 g, Rfl: 0    Omega-3 Fatty Acids (FISH OIL) 1,000 mg, Take 1,000 mg by mouth daily, Disp: , Rfl:     pantoprazole (PROTONIX) 40 mg tablet, Take 1 tablet (40 mg total) by mouth daily in the early morning, Disp: , Rfl: 0    polyethylene glycol (MIRALAX) 17 g packet, Take 17 g by mouth daily, Disp: 14 each, Rfl: 0    pyridoxine (B-6) 100 MG tablet, Take 100 mg by mouth daily, Disp: , Rfl:     senna (SENOKOT) 8 6 mg, Take 1 tablet (8 6 mg total) by mouth daily at bedtime, Disp: 120 each, Rfl: 0    SUPER B COMPLEX/C CAPS, Take by mouth, Disp: , Rfl:     torsemide (DEMADEX) 20 mg tablet, Take 1 tablet (20 mg total) by mouth daily, Disp: , Rfl: 0    venlafaxine (EFFEXOR) 75 mg tablet, Take 1 tablet (75 mg total) by mouth every 12 (twelve) hours, Disp: 60 tablet, Rfl: 5    vitamin E, tocopherol, 1,000 units capsule, Take 1,000 Units by mouth daily, Disp: , Rfl:   No current facility-administered medications for this visit       Social History     Socioeconomic History    Marital status: Single     Spouse name: Not on file    Number of children: Not on file    Years of education: Not on file    Highest education level: Not on file   Occupational History    Not on file   Social Needs    Financial resource strain: Not on file    Food insecurity:     Worry: Not on file     Inability: Not on file    Transportation needs:     Medical: Not on file     Non-medical: Not on file   Tobacco Use    Smoking status: Never Smoker    Smokeless tobacco: Never Used   Substance and Sexual Activity    Alcohol use: Never     Frequency: Never    Drug use: Never    Sexual activity: Not on file   Lifestyle    Physical activity:     Days per week: Not on file     Minutes per session: Not on file    Stress: Not on file   Relationships    Social connections:     Talks on phone: Not on file     Gets together: Not on file     Attends [employer] service: Not on file     Active member of club or organization: Not on file     Attends meetings of clubs or organizations: Not on file     Relationship status: Not on file    Intimate partner violence:     Fear of current or ex partner: Not on file     Emotionally abused: Not on file     Physically abused: Not on file     Forced sexual activity: Not on file   Other Topics Concern    Not on file   Social History Narrative    Not on file       Family History   Problem Relation Age of Onset    Alzheimer's disease Mother     Coronary artery disease Mother     Dementia Mother     Diabetes Mother         mellitus    Other Father         acute myocardial infarction    Coronary artery disease Sister     Other Maternal Grandfather         laryngeal cancer    Dementia Maternal Aunt     Diabetes Maternal Aunt        Physical Exam:  Central Line (day, reason): Murray catheter (day, reason):    Vitals: There were no vitals taken for this visit  , There is no height or weight on file to calculate BMI , I/O last 3 completed shifts: In: 2454 5 [P O :360; I V :2094 5]  Out: 653 [Urine:653]  No intake/output data recorded    Wt Readings from Last 3 Encounters:   07/11/19 68 4 kg (150 lb 12 7 oz)   06/18/19 81 kg (178 lb 9 6 oz)   03/20/19 80 1 kg (176 lb 9 6 oz)         12 point ROS negative other than that stated in HPI    There were no vitals filed for this visit  GEN: Lacy Hogan appears well, alert and oriented x 3, pleasant and cooperative   HEENT: pupils equal, round, and reactive to light; extraocular muscles intact  NECK: supple, no carotid bruits   HEART: regular rhythm, normal S1 and S2, no murmurs, clicks, gallops or rubs, no JVD  LUNGS: coarse to auscultation bilaterally; no wheezes, mild bibasilar rales, or rhonchi   ABDOMEN: normal bowel sounds, soft, no tenderness, no distention  EXTREMITIES: peripheral pulses normal; no clubbing, cyanosis, no BLLE edema  NEURO: no focal findings   SKIN: normal without suspicious lesions on exposed skin    Labs & Results:        Results from last 7 days   Lab Units 07/09/19  1014   WBC Thousand/uL 10 57*   HEMOGLOBIN g/dL 14 0   HEMATOCRIT % 42 0   PLATELETS Thousands/uL 196         Results from last 7 days   Lab Units 07/11/19  0848 07/10/19  2001 07/10/19  0914 07/09/19  1014  07/07/19  0535   POTASSIUM mmol/L 3 9 3 7 2 8* 3 2*   < > 3 8   CHLORIDE mmol/L 94* 94* 90* 89*   < > 90*   CO2 mmol/L 32 35* 35* 29   < > 37*   BUN mg/dL 91* 99* 102* 108*   < > 99*   CREATININE mg/dL 2 54* 2 65* 2 89* 3 12*   < > 2 94*   CALCIUM mg/dL 9 1 9 1 9 0 8 9   < > 9 4   ALK PHOS U/L  --   --   --  95  --  117*   ALT U/L  --   --   --  208*  --  378*   AST U/L  --   --   --  43  --  87*    < > = values in this interval not displayed  EKG personally reviewed by SOTO Felix  Counseling / Coordination of Care  Total floor / unit time spent today 40 minutes  Greater than 50% of total time was spent with the patient and / or family counseling and / or coordination of care  A description of the counseling / coordination of care: 20  Thank you for the opportunity to participate in the care of this patient  Manisha Heath

## 2019-07-12 NOTE — ASSESSMENT & PLAN NOTE
BP is stable with Hydrazine, and Metoprolol, will monitor E-lytes an kidney function closely  Pt is off Losartan/HCTz due to WANG

## 2019-07-12 NOTE — ASSESSMENT & PLAN NOTE
Had successful cardioversion, on Eliquis, Amiodarone and Metoprolol,rate is controlled, will monitor closely, would need close TSH monitoring  F/u with cardiology

## 2019-07-15 ENCOUNTER — NURSING HOME VISIT (OUTPATIENT)
Dept: GERIATRICS | Facility: OTHER | Age: 79
End: 2019-07-15
Payer: COMMERCIAL

## 2019-07-15 ENCOUNTER — OFFICE VISIT (OUTPATIENT)
Dept: CARDIOLOGY CLINIC | Facility: CLINIC | Age: 79
End: 2019-07-15
Payer: COMMERCIAL

## 2019-07-15 VITALS
HEIGHT: 61 IN | BODY MASS INDEX: 29.42 KG/M2 | SYSTOLIC BLOOD PRESSURE: 108 MMHG | WEIGHT: 155.8 LBS | OXYGEN SATURATION: 98 % | DIASTOLIC BLOOD PRESSURE: 60 MMHG | HEART RATE: 99 BPM

## 2019-07-15 DIAGNOSIS — E11.36 TYPE 2 DIABETES MELLITUS WITH DIABETIC CATARACT, WITHOUT LONG-TERM CURRENT USE OF INSULIN (HCC): ICD-10-CM

## 2019-07-15 DIAGNOSIS — N17.9 ACUTE KIDNEY INJURY (HCC): ICD-10-CM

## 2019-07-15 DIAGNOSIS — I50.40 COMBINED SYSTOLIC AND DIASTOLIC CONGESTIVE HEART FAILURE, UNSPECIFIED HF CHRONICITY (HCC): Primary | ICD-10-CM

## 2019-07-15 DIAGNOSIS — F41.8 DEPRESSION WITH ANXIETY: ICD-10-CM

## 2019-07-15 DIAGNOSIS — I48.91 ATRIAL FIBRILLATION WITH RAPID VENTRICULAR RESPONSE (HCC): ICD-10-CM

## 2019-07-15 DIAGNOSIS — I50.42 CHRONIC COMBINED SYSTOLIC (CONGESTIVE) AND DIASTOLIC (CONGESTIVE) HEART FAILURE (HCC): Primary | ICD-10-CM

## 2019-07-15 DIAGNOSIS — E87.1 HYPONATREMIA: ICD-10-CM

## 2019-07-15 DIAGNOSIS — I48.91 ATRIAL FIBRILLATION WITH RVR (HCC): ICD-10-CM

## 2019-07-15 PROCEDURE — 99309 SBSQ NF CARE MODERATE MDM 30: CPT | Performed by: INTERNAL MEDICINE

## 2019-07-15 PROCEDURE — 99215 OFFICE O/P EST HI 40 MIN: CPT | Performed by: NURSE PRACTITIONER

## 2019-07-15 PROCEDURE — 93000 ELECTROCARDIOGRAM COMPLETE: CPT | Performed by: NURSE PRACTITIONER

## 2019-07-15 RX ORDER — METOPROLOL SUCCINATE 25 MG/1
37.5 TABLET, EXTENDED RELEASE ORAL 2 TIMES DAILY
Refills: 3
Start: 2019-07-15 | End: 2019-07-29

## 2019-07-15 NOTE — PROGRESS NOTES
[address]  [address]  Facility: [facility]  [address]      NAME: Bob Canas  AGE: 66 y o  SEX: female    DATE OF ENCOUNTER: 7/15/2019    Code status:  CPR    Assessment and Plan     Problem List Items Addressed This Visit        Endocrine    Type 2 diabetes mellitus with diabetic cataract ([facility] [address] )       Cardiovascular and Mediastinum    Atrial fibrillation with RVR (HCC)    Chronic combined systolic (congestive) and diastolic (congestive) heart failure ([facility] [address] ) - Primary       Genitourinary    Acute kidney injury ([facility] [address] )       Other    Depression with anxiety    Hyponatremia      1  Debility/ambulatory dysfunction-doing well with rehabilitation and nursing services  Continue with ADL assistance and rehabilitation services at skilled nursing facility  Continue monitoring  2  Chronic combined diastolic and systolic congestive heart failure-doing very well with torsemide/isosorbide dinitrate/hydralazine  Will continue with monitoring  3  Atrial fibrillation with rapid ventricular response-doing well with amiodarone/metoprolol succinate ER/Eliquis  As discussed with Cardiology CRNP, will continue with current medication except to increase Eliquis to 5 milligrams q 12 hours  Continue with monitoring  4  Hyponatremia-resolved with treatment of her congestive heart failure  Continue monitoring    5  Type 2 diabetes mellitus-currently on Lantus and doing well  Will continue to monitor  Reports not being on insulin therapy long-term, historically  6  Hypertension-doing well with the metoprolol succinate ER  Continue monitoring  7  Acute kidney injury-resolving with treatment of her congestive heart failure  Continue with monitoring  8  Depression with anxiety-she reports doing well with venlafaxine  She wishes to continue with the medication and monitor      9  Supplement care-she agrees to discontinuing supplements during her rehabilitation stay  She agrees to trial without Allegra/melatonin/Senokot/Protonix  Continue with monitoring  10  Hypothyroidism-she is doing well with levothyroxine  She will need repeat blood work in 4-6 weeks, as her TSH was slightly elevated during her hospital stay  All medications and routine orders were reviewed and updated as needed  Plan discussed with:  Patient, cardiology CRNP, and nursing  Chief Complaint     She is seen with nursing for a follow-up visit to update the care and treatment of her debility/ambulatory dysfunction secondary to her medical conditions and recent illness with hospital stay, atrial fibrillation with rapid ventricular response, combined systolic/diastolic congestive heart failure, and insulin-requiring type 2 diabetes mellitus  History of Present Illness     She is a pleasant 77-year-old woman seen with nursing for a follow-up visit to update the care and treatment of her debility/ambulatory dysfunction secondary to her chronic medical conditions recent illness with hospital stay-continuing with ADL assistance and rehabilitation services at [facility], atrial fibrillation with rapid ventricular response-doing well with amiodarone/metoprolol succinate ER/Eliquis, combined systolic/diastolic congestive heart failure-doing well with isosorbide/hydralazine/metoprolol succinate ER/torsemide, and insulin-requiring type 2 diabetes mellitus-doing well with Lantus  She denies chest pain/palpitations  She notes palpitations on and off for many years  She denies dyspnea on exertion/orthopnea/PND/shortness of breath  She denies constipation  The following portions of the patient's history were reviewed and updated as appropriate: current medications, past family history, past medical history, past social history, past surgical history and problem list     Allergies:   Allergies   Allergen Reactions    Penicillins     Wellbutrin Sr  [Bupropion]      Other reaction(s): Suicidal ideations  Category: Adverse Reaction; Review of Systems     Review of Systems   Constitutional:        She is feeling well and better than she has for some time  Respiratory: Negative for shortness of breath  Cardiovascular: Negative for chest pain, palpitations and leg swelling  Gastrointestinal: Negative for constipation  Medications and orders     All medications reviewed and updated in Nursing Home EMR  Objective     Vitals:  Weight 151 7 pounds, temperature 96 7 degrees Fahrenheit, pulse 72 and irregular, respiratory rate 20, pulse oximetry 97 percent on room air  Physical Exam   Constitutional: Vital signs are normal  She appears well-developed and well-nourished  She is cooperative  Non-toxic appearance  She does not have a sickly appearance  She does not appear ill  No distress  She appears comfortable sitting in her chair, younger than her stated age, and healthy  Eyes: No scleral icterus  Cardiovascular: Normal rate and normal heart sounds  Exam reveals no gallop and no friction rub  No murmur heard  There is no pretibial edema, bilaterally  Heart rhythm is irregularly irregular  Pulmonary/Chest: Effort normal and breath sounds normal  No stridor  No respiratory distress  She has no wheezes  She has no rales  Abdominal: Soft  She exhibits no distension and no mass  There is no tenderness  There is no guarding  Neurological: She is alert  Skin: Skin is dry  Psychiatric: She has a normal mood and affect  Her behavior is normal        Pertinent Laboratory/Diagnostic Studies: The following labs were reviewed please see chart or hospital paperwork for details  July 11, 2019: BMP-sodium 132, potassium 3 9, BUN 91, creatinine 2 54, eGFR 18     July 15, 2019: BMP-sodium 138, potassium 3 7, BUN 45, creatinine 1 49, fasting blood sugar 80, eGFR 33    CBC with diff-WBC count 7 9, hemoglobin 12 2, hematocrit 37 1, platelet count [id number]  - Treatment plan reviewed with patient and nursing      TEE Calvo   7/15/2019 2:21 PM

## 2019-07-19 ENCOUNTER — NURSING HOME VISIT (OUTPATIENT)
Dept: GERIATRICS | Facility: OTHER | Age: 79
End: 2019-07-19
Payer: COMMERCIAL

## 2019-07-19 DIAGNOSIS — R26.2 AMBULATORY DYSFUNCTION: ICD-10-CM

## 2019-07-19 DIAGNOSIS — I10 ESSENTIAL HYPERTENSION: ICD-10-CM

## 2019-07-19 DIAGNOSIS — I50.42 CHRONIC COMBINED SYSTOLIC (CONGESTIVE) AND DIASTOLIC (CONGESTIVE) HEART FAILURE (HCC): ICD-10-CM

## 2019-07-19 DIAGNOSIS — R53.81 DEBILITY: Primary | ICD-10-CM

## 2019-07-19 DIAGNOSIS — E87.1 HYPONATREMIA: ICD-10-CM

## 2019-07-19 DIAGNOSIS — I48.91 ATRIAL FIBRILLATION WITH RVR (HCC): ICD-10-CM

## 2019-07-19 DIAGNOSIS — N17.9 ACUTE KIDNEY INJURY (HCC): ICD-10-CM

## 2019-07-19 DIAGNOSIS — E03.9 HYPOTHYROIDISM, UNSPECIFIED TYPE: ICD-10-CM

## 2019-07-19 PROCEDURE — 99316 NF DSCHRG MGMT 30 MIN+: CPT | Performed by: INTERNAL MEDICINE

## 2019-07-19 NOTE — PROGRESS NOTES
[address]  [address] [place] [address]  Facility: [facility]  Craig/31      DISCHARGE NOTE        NAME: Alirio Thomson  AGE: 66 y o  SEX: female    DATE OF ENCOUNTER: 7/19/2019    Code status:  CPR    Assessment and Plan     Problem List Items Addressed This Visit        Endocrine    Hypothyroidism       Cardiovascular and Mediastinum    Atrial fibrillation with RVR (HCC)    Chronic combined systolic (congestive) and diastolic (congestive) heart failure (HCC)    Hypertension       Genitourinary    Acute kidney injury ([facility] [place] [address] )       Other    Ambulatory dysfunction    Debility - Primary    Hyponatremia      1  Ambulatory dysfunction/debility secondary to her chronic medical conditions and recent hospital stay-she has been doing well with rehabilitation services and is ready to transition to home with VNA services  The following services are medically necessary:  1  Nursing-disease education/management, medication education/management, congestive heart failure care, diabetic care  2  Physical therapy-endurance, gait training, and balance  3  Occupational therapy-ADL retraining, self care, home management  2  Combined systolic and diastolic congestive heart failure-she has been doing well with torsemide, hydralazine, and isosorbide dinitrate  She has gained some weight, but there is no evidence of volume overload  After speaking with her Cardiology team, she was advised to take an extra dose of torsemide if she were to feel as though she is retaining fluid and to follow up with her cardiologist   Continue monitoring  3  Atrial fibrillation with rapid ventricular response-she has been doing well with amiodarone, metoprolol succinate ER, and Eliquis  She is to follow up with her cardiologist team on Monday, July 22, 2019  Continue monitoring  4  Hyponatremia-resolved with treatment of congestive heart failure    Continue monitoring  5  Hypertension-review of her BP log shows that she is doing well with a Toprol succinate ER  Continue monitoring    6  Acute kidney injury-resolving with treatment of her congestive heart failure  Continue clinical and periodic laboratory monitoring  7  Depression with anxiety-doing well with venlafaxine  Continue monitoring  8  Hypothyroidism-doing well with levothyroxine  9  Insulin-requiring type 2 diabetes mellitus-she reports not being on insulin prior to her hospitalization  Her hemoglobin A1c was elevated  She has been instructed by nursing on glucometer use and insulin administration  She will continue monitoring and VNA nursing  She is medically stable to be discharged to home  All medications and routine orders were reviewed and updated as needed  Plan discussed with:  Patient, sister, and nursing  Billing based on time  Time spent on unit-45 min  Time spent on counseling/coordination of discharge planning/congestive heart failure care/diabetic care-35 min  Copy of note to be sent to PCP via [employer] and fax by social work department  Chief Complaint     She is seen for a follow-up visit with nursing to update the care and treatment of her ambulatory dysfunction/debility secondary to her chronic medical conditions and recent hospital stay, atrial fibrillation, hypertension, and type 2 diabetes mellitus  History of Present Illness     She is a pleasant 80-year-old woman seen for a follow-up visit with nursing to update the care and treatment of her ambulatory dysfunction/debility secondary to her chronic medical conditions and recent hospital stay-she has been doing well with rehabilitation services, atrial fibrillation-doing well with amiodarone/metoprolol succinate ER/Eliquis, hypertension-doing well with metoprolol succinate ER, and type 2 diabetes mellitus-doing well with Lantus      She denies chest pain, palpitations, shortness of breath, and leg swelling  The following portions of the patient's history were reviewed and updated as appropriate: current medications, past family history, past medical history, past social history, past surgical history and problem list     Allergies: Allergies   Allergen Reactions    Penicillins     Wellbutrin Sr  [Bupropion]      Other reaction(s): Suicidal ideations  Category: Adverse Reaction; Review of Systems     Review of Systems   Respiratory: Negative for shortness of breath  Cardiovascular: Negative for chest pain, palpitations and leg swelling  Medications and orders     All medications reviewed and updated in Nursing Home EMR  Objective     Vitals:  Temperature 97 4° F, pulse 60, respiratory 12, blood pressure 130/80, weight 160 lb  Physical Exam   Constitutional: Vital signs are normal  She appears well-developed and well-nourished  She is cooperative  Non-toxic appearance  She does not have a sickly appearance  She does not appear ill  No distress  She appears comfortable ambulating in her room, her stated age, and healthy  Eyes: No scleral icterus  Cardiovascular: Normal rate, regular rhythm and normal heart sounds  Exam reveals no gallop and no friction rub  No murmur heard  There is no pedal or pretibial edema in her bilateral lower extremities  Pulmonary/Chest: Effort normal and breath sounds normal  No stridor  No respiratory distress  She has no wheezes  She has no rales  Neurological: She is alert  Psychiatric: She has a normal mood and affect  Her behavior is normal  Judgment and thought content normal        Pertinent Laboratory/Diagnostic Studies: The following labs were reviewed please see chart or hospital paperwork for details  July 15, 2019: BMP-sodium 138, potassium 3 7, BUN 45, creatinine 1 49, eGFR 33  CBC with diff-WBC count 7 9, hemoglobin 12 2, hematocrit 37 1, platelet count 101318       - Treatment plan reviewed with patient, sister, and nursing      TEE Paulson   7/19/2019 3:43 PM

## 2019-07-21 ENCOUNTER — TELEPHONE (OUTPATIENT)
Dept: OTHER | Facility: OTHER | Age: 79
End: 2019-07-21

## 2019-07-21 NOTE — TELEPHONE ENCOUNTER
Paged Dr Ludy Fontanez to [phone]  Pt released to [facility] w/out any insulin orders

## 2019-07-22 ENCOUNTER — PATIENT OUTREACH (OUTPATIENT)
Dept: FAMILY MEDICINE CLINIC | Facility: CLINIC | Age: 79
End: 2019-07-22

## 2019-07-22 ENCOUNTER — OFFICE VISIT (OUTPATIENT)
Dept: CARDIOLOGY CLINIC | Facility: CLINIC | Age: 79
End: 2019-07-22
Payer: COMMERCIAL

## 2019-07-22 VITALS
BODY MASS INDEX: 30.96 KG/M2 | DIASTOLIC BLOOD PRESSURE: 62 MMHG | HEART RATE: 111 BPM | SYSTOLIC BLOOD PRESSURE: 116 MMHG | WEIGHT: 164 LBS | HEIGHT: 61 IN

## 2019-07-22 DIAGNOSIS — I50.40 COMBINED SYSTOLIC AND DIASTOLIC CONGESTIVE HEART FAILURE, UNSPECIFIED HF CHRONICITY (HCC): ICD-10-CM

## 2019-07-22 DIAGNOSIS — I50.43 ACUTE ON CHRONIC COMBINED SYSTOLIC AND DIASTOLIC CHF (CONGESTIVE HEART FAILURE) (HCC): ICD-10-CM

## 2019-07-22 DIAGNOSIS — I10 ESSENTIAL HYPERTENSION: ICD-10-CM

## 2019-07-22 DIAGNOSIS — I48.91 ATRIAL FIBRILLATION WITH RVR (HCC): ICD-10-CM

## 2019-07-22 DIAGNOSIS — I48.91 ATRIAL FIBRILLATION WITH RAPID VENTRICULAR RESPONSE (HCC): ICD-10-CM

## 2019-07-22 DIAGNOSIS — I50.42 CHRONIC COMBINED SYSTOLIC (CONGESTIVE) AND DIASTOLIC (CONGESTIVE) HEART FAILURE (HCC): Primary | ICD-10-CM

## 2019-07-22 PROCEDURE — 93000 ELECTROCARDIOGRAM COMPLETE: CPT | Performed by: INTERNAL MEDICINE

## 2019-07-22 PROCEDURE — 99214 OFFICE O/P EST MOD 30 MIN: CPT | Performed by: INTERNAL MEDICINE

## 2019-07-22 NOTE — TELEPHONE ENCOUNTER
Patient canceled her appt for today and r/s for tomorrow  Left message for patient to bring her list of blood sugars per Dr Sherron Talavera

## 2019-07-22 NOTE — TELEPHONE ENCOUNTER
I spoke with [employer] last night  Blood sugar was 143  Recommended to hold Lantus Insulin till today  Patient has appointment today for TCMT visit, will address her Insulin therapy

## 2019-07-22 NOTE — TELEPHONE ENCOUNTER
Spoke with patient, blood sugar was 134 after she ate  Informed Dr Beau Reyes, will discuss at visit tomorrow

## 2019-07-22 NOTE — PROGRESS NOTES
[facility]  [address] Cardiology [facility]  [address] [facility] [place], [address]    Name: Nataliia Sharma  : 1940  MRN: [id number]    Assessment/Plan     1  Chronic combined systolic (congestive) and diastolic (congestive) heart failure ([address] )     2  Atrial fibrillation with RVR ([facility] [address] )     3  Essential hypertension         ASSESSMENT:  1  Persistent atrial fibrillation  -  -  2  Chronic combined systolic and diastolic heart failure  - EF of 25-30% per echo ***  - status post cardiogenic shock with inotrope support during prior hospitalizations 2019  3  Nonischemic cardiomyopathy  4  Essential hypertension  5  Hyperlipidemia  6  Pre diabetic  7  Hypothyroidism  8  [place]  -  -       DISCUSSION/SUMMARY:  ***    History of Present Illness     HPI/INTERVAL HISTORY: Nataliia Sharma is a 66 y o  female with a history of ***      Review of Systems  ***  ROS as noted above, otherwise 12 point review of systems was performed and is negative       Historical Information   Social History     Socioeconomic History    Marital status: Single     Spouse name: Not on file    Number of children: Not on file    Years of education: Not on file    Highest education level: Not on file   Occupational History    Not on file   Social Needs    Financial resource strain: Not on file    Food insecurity:     Worry: Not on file     Inability: Not on file    Transportation needs:     Medical: Not on file     Non-medical: Not on file   Tobacco Use    Smoking status: Never Smoker    Smokeless tobacco: Never Used   Substance and Sexual Activity    Alcohol use: Not Currently    Drug use: Never    Sexual activity: Not Currently   Lifestyle    Physical activity:     Days per week: Not on file     Minutes per session: Not on file    Stress: Not on file   Relationships    Social connections:     Talks on phone: Not on file     Gets together: Not on file Attends Synagogue service: Not on file     Active member of club or organization: Not on file     Attends meetings of clubs or organizations: Not on file     Relationship status: Not on file    Intimate partner violence:     Fear of current or ex partner: Not on file     Emotionally abused: Not on file     Physically abused: Not on file     Forced sexual activity: Not on file   Other Topics Concern    Not on file   Social History Narrative    Not on file     Past Medical History:   Diagnosis Date    Cardiogenic shock ([facility] [address] ) 6/26/2019    Eczema     Photosensitivity     abnormal skin sensitivity to sunlight    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     Past Surgical History:   Procedure Laterality Date    CATARACT EXTRACTION Left     HEMORRHOID SURGERY      IR CENTRAL LINE PLACEMENT  7/9/2019    OOPHORECTOMY      unilateral    TOTAL ABDOMINAL HYSTERECTOMY       Social History     Substance and Sexual Activity   Alcohol Use Not Currently     Social History     Substance and Sexual Activity   Drug Use Never     Social History     Tobacco Use   Smoking Status Never Smoker   Smokeless Tobacco Never Used     Family History   Problem Relation Age of Onset    Alzheimer's disease Mother     Coronary artery disease Mother     Dementia Mother     Diabetes Mother         mellitus    Other Father         acute myocardial infarction    Coronary artery disease Sister     Other Maternal Grandfather         laryngeal cancer    Dementia Maternal Aunt     Diabetes Maternal Aunt        Meds/Allergies       Current Outpatient Medications:     albuterol (VENTOLIN HFA) 90 mcg/act inhaler, Inhale 1-2 puffs every 4 (four) hours as needed for wheezing (Patient not taking: Reported on 7/15/2019), Disp: , Rfl: 0    amiodarone 200 mg tablet, Take 1 tablet (200 mg total) by mouth daily with breakfast, Disp: , Rfl: 0    apixaban (ELIQUIS) 2 5 mg, Take 2 tablets (5 mg total) by mouth 2 (two) times a day, Disp: , Rfl: 0   Ascorbic Acid (VITAMIN C) 100 MG tablet, Take 100 mg by mouth daily, Disp: , Rfl:     cholecalciferol (VITAMIN D3) 1,000 units tablet, Take 1,000 Units by mouth daily, Disp: , Rfl:     cyanocobalamin 100 MCG tablet, Take 100 mcg by mouth daily, Disp: , Rfl:     fexofenadine (ALLEGRA) 30 MG tablet, Take 30 mg by mouth 2 (two) times a day, Disp: , Rfl:     hydrALAZINE (APRESOLINE) 25 mg tablet, Take 1 tablet (25 mg total) by mouth every 8 (eight) hours, Disp: , Rfl: 0    insulin glargine (LANTUS) 100 units/mL subcutaneous injection, Inject 10 Units under the skin every morning, Disp: , Rfl: 0    isosorbide dinitrate (ISORDIL) 10 mg tablet, Take 1 tablet (10 mg total) by mouth 3 (three) times daily after meals, Disp: , Rfl: 0    levothyroxine 137 mcg tablet, Take 1 tablet (137 mcg total) by mouth daily, Disp: 30 tablet, Rfl: 5    melatonin 3 mg, Take 2 tablets (6 mg total) by mouth daily at bedtime, Disp: , Rfl: 0    metoprolol succinate (TOPROL-XL) 25 mg 24 hr tablet, Take 1 5 tablets (37 5 mg total) by mouth 2 (two) times a day, Disp: , Rfl: 3    multivitamin (THERAGRAN) TABS, Take 1 tablet by mouth daily, Disp: , Rfl:     nystatin (MYCOSTATIN) powder, Apply topically 2 (two) times a day, Disp: 15 g, Rfl: 0    Omega-3 Fatty Acids (FISH OIL) 1,000 mg, Take 1,000 mg by mouth daily, Disp: , Rfl:     pantoprazole (PROTONIX) 40 mg tablet, Take 1 tablet (40 mg total) by mouth daily in the early morning, Disp: , Rfl: 0    polyethylene glycol (MIRALAX) 17 g packet, Take 17 g by mouth daily, Disp: 14 each, Rfl: 0    pyridoxine (B-6) 100 MG tablet, Take 100 mg by mouth daily, Disp: , Rfl:     senna (SENOKOT) 8 6 mg, Take 1 tablet (8 6 mg total) by mouth daily at bedtime, Disp: 120 each, Rfl: 0    SUPER B COMPLEX/C CAPS, Take by mouth, Disp: , Rfl:     torsemide (DEMADEX) 20 mg tablet, Take 1 tablet (20 mg total) by mouth daily, Disp: , Rfl: 0    venlafaxine (EFFEXOR) 75 mg tablet, Take 1 tablet (75 mg total) by mouth every 12 (twelve) hours, Disp: 60 tablet, Rfl: 5    vitamin E, tocopherol, 1,000 units capsule, Take 1,000 Units by mouth daily, Disp: , Rfl:     Allergies   Allergen Reactions    Penicillins     Wellbutrin Sr  [Bupropion]      Other reaction(s): Suicidal ideations  Category: Adverse Reaction;        Objective   Vitals: There were no vitals taken for this visit  ***      Physical Exam  ***    Labs:{Recent NBW:01627::"ZCS applicable"}    Imaging: {Results Review Statement:48632}    ECHO:   Results for orders placed during the hospital encounter of 19   Echo complete with contrast if indicated    Narrative [facility] [address]  [facility], [address] [facility]  [phone]    Transthoracic Echocardiogram  2D, M-mode, Doppler, and Color Doppler    Study date:  2019    Patient: Justyn Garcia  MR number: [id number]  Account number: [de-identified]  : 1940  Age: 66 years  Gender: Female  Status: Inpatient  Location: Bedside  Height: 61 in  Weight: 175 lb  BP: 92/ 44 mmHg    Indications: Hypotension    Diagnoses: I95 9 - Hypotension, unspecified    Sonographer:  PEEWEE Mclaughlin  Primary Physician:  Martha Yip MD  Referring Physician:  SOTO Torres  Group:  [facility]  Interpreting Physician:  Claudia Botello MD    SUMMARY    LEFT VENTRICLE:  Size was at the upper limits of normal   Systolic function was moderately reduced  Ejection fraction was estimated to be 42 %  There was moderate diffuse hypokinesis  There was no evidence of concentric hypertrophy  VENTRICULAR SEPTUM:  There was dyssynergic motion  RIGHT VENTRICLE:  The ventricle was mildly to moderately dilated  Systolic function was mildly to moderately reduced  Wall thickness was increased  LEFT ATRIUM:  The atrium was moderately dilated  RIGHT ATRIUM:  The atrium was moderately dilated  MITRAL VALVE:  There was mild annular calcification    There was moderate regurgitation  TRICUSPID VALVE:  There was moderate regurgitation  PULMONIC VALVE:  There was trace regurgitation  HISTORY: PRIOR HISTORY: DM2, HTN, HLD, Afib, CHF    PROCEDURE: The procedure was performed at the bedside  This was an urgent study  The transthoracic approach was used  The study included complete 2D imaging, M-mode, complete spectral Doppler, and color Doppler  The heart rate was 117 bpm,  at the start of the study  Images were obtained from the parasternal, apical, subcostal, and suprasternal notch acoustic windows  Echocardiographic views were limited due to restricted patient mobility  This was a technically difficult  study  LEFT VENTRICLE: Size was at the upper limits of normal  Systolic function was moderately reduced  Ejection fraction was estimated to be 42 %  There was moderate diffuse hypokinesis  Wall thickness was normal  There was no evidence of  concentric hypertrophy  DOPPLER: The study was not technically sufficient to allow evaluation of LV diastolic function  VENTRICULAR SEPTUM: There was dyssynergic motion  RIGHT VENTRICLE: The ventricle was mildly to moderately dilated  Systolic function was mildly to moderately reduced  Wall thickness was increased  LEFT ATRIUM: The atrium was moderately dilated  RIGHT ATRIUM: The atrium was moderately dilated  MITRAL VALVE: There was mild annular calcification  Valve structure was normal  There was normal leaflet separation  DOPPLER: The transmitral velocity was within the normal range  There was no evidence for stenosis  There was moderate  regurgitation  AORTIC VALVE: The valve was trileaflet  Leaflets exhibited normal thickness and normal cuspal separation  DOPPLER: Transaortic velocity was within the normal range  There was no evidence for stenosis  There was no regurgitation  TRICUSPID VALVE: The valve structure was normal  There was normal leaflet separation   DOPPLER: The transtricuspid velocity was within the normal range  There was no evidence for stenosis  There was moderate regurgitation  Pulmonary artery  systolic pressure was mildly increased  PULMONIC VALVE: Leaflets exhibited normal thickness, no calcification, and normal cuspal separation  DOPPLER: The transpulmonic velocity was within the normal range  There was trace regurgitation  PERICARDIUM: There was no pericardial effusion  The pericardium was normal in appearance  AORTA: The root exhibited normal size      SYSTEM MEASUREMENT TABLES    2D  LVEDV MOD A4C: 81 79 ml  LVEF MOD A4C: 64 57 %  LVESV MOD A4C: 28 98 ml  LVLd A4C: 7 08 cm  LVLs A4C: 5 89 cm  SV MOD A4C: 52 81 ml    CW  TR Vmax: 2 65 m/s  TR maxP 14 mmHg    [facility] Accredited Echocardiography Laboratory    Prepared and electronically signed by    Alisson Walsh MD  Signed 2019 07:10:27         CATH (***):    HOLTER (***):    STRESS TEST (***):     ECHO (***):    EKG: ***

## 2019-07-22 NOTE — PROGRESS NOTES
EPS Progress Note - Gurjit Lara 66 y o  female MRN: [id number]           ASSESSMENT:  1  Chronic combined systolic (congestive) and diastolic (congestive) heart failure (HCC)  POCT ECG   2  Atrial fibrillation with RVR ([facility])  POCT ECG   3  Essential hypertension  POCT ECG   4  Combined systolic and diastolic congestive heart failure, unspecified HF chronicity ([facility] [address] )  Ambulatory referral to Cardiac Electrophysiology   5  Atrial fibrillation with rapid ventricular response [facility])  Ambulatory referral to Cardiac Electrophysiology           PLAN:  1  Atrial fibrillation with RVR  Rate is still not adequately controlled  Amiodarone not working so will discontinue it and will plan for a simple rate control tragedy    Rate is ranging from 108-130 in office today     2  Acute systolic heart failure  LVEF 42% from echo in June    Possibly mildly volume overloaded   Patient's scale at home is not calibrated and has not been monitoring it closely  She has gained about 10 lbs in one week      3  Anticoagulation  Dose adjusted   On Eliquis 5 mg twice daily     4  CORAZON  Sleep study appointment scheduled on 8/6/19        Recommendations:  Discontinue Amiodarone  Take an extra dose of torsemide tomorrow  Increase metoprolol to 75 mg twice daily   Would like patient to see Heart Failure team next week  She will follow-up with Dr Chuck Avalos her cardiologist thereafter        HPI:   Gurjit Lara is a 66 y o  female who presents to the office today for s/p hospital follow up  Patient has a history of atrial fibrillation with RVR and systolic heart failure  Patient states she was feeling better and has a home care nurse  She has gained about 10 lbs in one week  Patient states she has been eating tacos recently which have been left over food from a party taco bar  She has not been able to monitor her weight at home because her scale needs to be calibrated  Admitted to weight gain            ROS:   Negative for palpitations, shortness of breath, chest discomfort, presyncope or syncope  All other 12 point ROS negative      Objective:     Vitals: Blood pressure 116/62, pulse (!) 111, height 5' 1" (1 549 m), weight 74 4 kg (164 lb)  , Body mass index is 30 99 kg/m²  ,        Physical Exam:    GEN: Sofia Bobo appears well, alert and oriented x 3, pleasant and cooperative   HEENT: pupils equal, round, and reactive to light; extraocular muscles intact  NECK: supple, no carotid bruits   HEART: irregular rhythm, normal S1 and S2, no murmurs, clicks, gallops or rubs   LUNGS: clear to auscultation bilaterally; no wheezes, rales, or rhonchi   ABDOMEN: normal bowel sounds, soft, no tenderness, no distention  EXTREMITIES: peripheral pulses normal; no clubbing, cyanosis, or edema  NEURO: no focal findings   SKIN: normal without suspicious lesions on exposed skin    Medications:      Current Outpatient Medications:     amiodarone 200 mg tablet, Take 1 tablet (200 mg total) by mouth daily with breakfast, Disp: , Rfl: 0    apixaban (ELIQUIS) 2 5 mg, Take 2 tablets (5 mg total) by mouth 2 (two) times a day, Disp: , Rfl: 0    hydrALAZINE (APRESOLINE) 25 mg tablet, Take 1 tablet (25 mg total) by mouth every 8 (eight) hours, Disp: , Rfl: 0    isosorbide dinitrate (ISORDIL) 10 mg tablet, Take 1 tablet (10 mg total) by mouth 3 (three) times daily after meals, Disp: , Rfl: 0    levothyroxine 137 mcg tablet, Take 1 tablet (137 mcg total) by mouth daily, Disp: 30 tablet, Rfl: 5    metoprolol succinate (TOPROL-XL) 25 mg 24 hr tablet, Take 1 5 tablets (37 5 mg total) by mouth 2 (two) times a day, Disp: , Rfl: 3    torsemide (DEMADEX) 20 mg tablet, Take 1 tablet (20 mg total) by mouth daily, Disp: , Rfl: 0    venlafaxine (EFFEXOR) 75 mg tablet, Take 1 tablet (75 mg total) by mouth every 12 (twelve) hours, Disp: 60 tablet, Rfl: 5    albuterol (VENTOLIN HFA) 90 mcg/act inhaler, Inhale 1-2 puffs every 4 (four) hours as needed for wheezing (Patient not taking: Reported on 7/15/2019), Disp: , Rfl: 0    Ascorbic Acid (VITAMIN C) 100 MG tablet, Take 100 mg by mouth daily, Disp: , Rfl:     cholecalciferol (VITAMIN D3) 1,000 units tablet, Take 1,000 Units by mouth daily, Disp: , Rfl:     cyanocobalamin 100 MCG tablet, Take 100 mcg by mouth daily, Disp: , Rfl:     fexofenadine (ALLEGRA) 30 MG tablet, Take 30 mg by mouth 2 (two) times a day, Disp: , Rfl:     insulin glargine (LANTUS) 100 units/mL subcutaneous injection, Inject 10 Units under the skin every morning, Disp: , Rfl: 0    melatonin 3 mg, Take 2 tablets (6 mg total) by mouth daily at bedtime (Patient not taking: Reported on 7/22/2019), Disp: , Rfl: 0    multivitamin (THERAGRAN) TABS, Take 1 tablet by mouth daily, Disp: , Rfl:     nystatin (MYCOSTATIN) powder, Apply topically 2 (two) times a day (Patient not taking: Reported on 7/22/2019), Disp: 15 g, Rfl: 0    Omega-3 Fatty Acids (FISH OIL) 1,000 mg, Take 1,000 mg by mouth daily, Disp: , Rfl:     pantoprazole (PROTONIX) 40 mg tablet, Take 1 tablet (40 mg total) by mouth daily in the early morning (Patient not taking: Reported on 7/22/2019), Disp: , Rfl: 0    polyethylene glycol (MIRALAX) 17 g packet, Take 17 g by mouth daily (Patient not taking: Reported on 7/22/2019), Disp: 14 each, Rfl: 0    pyridoxine (B-6) 100 MG tablet, Take 100 mg by mouth daily, Disp: , Rfl:     senna (SENOKOT) 8 6 mg, Take 1 tablet (8 6 mg total) by mouth daily at bedtime (Patient not taking: Reported on 7/22/2019), Disp: 120 each, Rfl: 0    SUPER B COMPLEX/C CAPS, Take by mouth, Disp: , Rfl:     vitamin E, tocopherol, 1,000 units capsule, Take 1,000 Units by mouth daily, Disp: , Rfl:      Family History   Problem Relation Age of Onset    Alzheimer's disease Mother     Coronary artery disease Mother     Dementia Mother     Diabetes Mother         mellitus    Other Father         acute myocardial infarction    Coronary artery disease Sister     Other Maternal Grandfather         laryngeal cancer    Dementia Maternal Aunt     Diabetes Maternal Aunt      Social History     Socioeconomic History    Marital status: Single     Spouse name: Not on file    Number of children: Not on file    Years of education: Not on file    Highest education level: Not on file   Occupational History    Not on file   Social Needs    Financial resource strain: Not on file    Food insecurity:     Worry: Not on file     Inability: Not on file    Transportation needs:     Medical: Not on file     Non-medical: Not on file   Tobacco Use    Smoking status: Never Smoker    Smokeless tobacco: Never Used   Substance and Sexual Activity    Alcohol use: Not Currently    Drug use: Never    Sexual activity: Not Currently   Lifestyle    Physical activity:     Days per week: Not on file     Minutes per session: Not on file    Stress: Not on file   Relationships    Social connections:     Talks on phone: Not on file     Gets together: Not on file     Attends [employer] service: Not on file     Active member of club or organization: Not on file     Attends meetings of clubs or organizations: Not on file     Relationship status: Not on file    Intimate partner violence:     Fear of current or ex partner: Not on file     Emotionally abused: Not on file     Physically abused: Not on file     Forced sexual activity: Not on file   Other Topics Concern    Not on file   Social History Narrative    Not on file     Social History     Tobacco Use   Smoking Status Never Smoker   Smokeless Tobacco Never Used     Social History     Substance and Sexual Activity   Alcohol Use Not Currently       Labs & Results:  Below is the patient's most recent value for Albumin, ALT, AST, BUN, Calcium, Chloride, Cholesterol, CO2, Creatinine, GFR, Glucose, HDL, Hematocrit, Hemoglobin, Hemoglobin A1C, LDL, Magnesium, Phosphorus, Platelets, Potassium, PSA, Sodium, Triglycerides, and WBC     Lab Results   Component Value Date     (H) 2019    AST 43 2019    BUN 91 (H) 2019    CALCIUM 9 1 2019    CL 94 (L) 2019    CHOL 169 2017    CO2 32 2019    CREATININE 2 54 (H) 2019    HDL 66 2019    HCT 42 0 2019    HGB 14 0 2019    HGBA1C 5 9 (H) 2019    MG 2 0 2019    PHOS 4 2 (H) 2019     2019    K 3 9 2019     2017    TRIG 82 2019    WBC 10 57 (H) 2019     Note: for a comprehensive list of the patient's lab results, access the Results Review activity  Cardiac testing:   Results for orders placed during the hospital encounter of 19   Echo complete with contrast if indicated    Narrative [address]  [phone]    Transthoracic Echocardiogram  2D, M-mode, Doppler, and Color Doppler    Study date:  2019    Patient: Justyn Garcia  MR number: [id number]  Account number: [de-identified]  : 1940  Age: 66 years  Gender: Female  Status: Inpatient  Location: Bedside  Height: 61 in  Weight: 175 lb  BP: 92/ 44 mmHg    Indications: Hypotension    Diagnoses: I95 9 - Hypotension, unspecified    Sonographer:  PEEWEE Mclaughlin  Primary Physician:  Martha Yip MD  Referring Physician:  SOTO Torres  Group:  [facility]  Interpreting Physician:  Claudia Botello MD    SUMMARY    LEFT VENTRICLE:  Size was at the upper limits of normal   Systolic function was moderately reduced  Ejection fraction was estimated to be 42 %  There was moderate diffuse hypokinesis  There was no evidence of concentric hypertrophy  VENTRICULAR SEPTUM:  There was dyssynergic motion  RIGHT VENTRICLE:  The ventricle was mildly to moderately dilated  Systolic function was mildly to moderately reduced  Wall thickness was increased  LEFT ATRIUM:  The atrium was moderately dilated      RIGHT ATRIUM:  The atrium was moderately dilated  MITRAL VALVE:  There was mild annular calcification  There was moderate regurgitation  TRICUSPID VALVE:  There was moderate regurgitation  PULMONIC VALVE:  There was trace regurgitation  HISTORY: PRIOR HISTORY: DM2, HTN, HLD, Afib, CHF    PROCEDURE: The procedure was performed at the bedside  This was an urgent study  The transthoracic approach was used  The study included complete 2D imaging, M-mode, complete spectral Doppler, and color Doppler  The heart rate was 117 bpm,  at the start of the study  Images were obtained from the parasternal, apical, subcostal, and suprasternal notch acoustic windows  Echocardiographic views were limited due to restricted patient mobility  This was a technically difficult  study  LEFT VENTRICLE: Size was at the upper limits of normal  Systolic function was moderately reduced  Ejection fraction was estimated to be 42 %  There was moderate diffuse hypokinesis  Wall thickness was normal  There was no evidence of  concentric hypertrophy  DOPPLER: The study was not technically sufficient to allow evaluation of LV diastolic function  VENTRICULAR SEPTUM: There was dyssynergic motion  RIGHT VENTRICLE: The ventricle was mildly to moderately dilated  Systolic function was mildly to moderately reduced  Wall thickness was increased  LEFT ATRIUM: The atrium was moderately dilated  RIGHT ATRIUM: The atrium was moderately dilated  MITRAL VALVE: There was mild annular calcification  Valve structure was normal  There was normal leaflet separation  DOPPLER: The transmitral velocity was within the normal range  There was no evidence for stenosis  There was moderate  regurgitation  AORTIC VALVE: The valve was trileaflet  Leaflets exhibited normal thickness and normal cuspal separation  DOPPLER: Transaortic velocity was within the normal range  There was no evidence for stenosis  There was no regurgitation      TRICUSPID VALVE: The valve structure was normal  There was normal leaflet separation  DOPPLER: The transtricuspid velocity was within the normal range  There was no evidence for stenosis  There was moderate regurgitation  Pulmonary artery  systolic pressure was mildly increased  PULMONIC VALVE: Leaflets exhibited normal thickness, no calcification, and normal cuspal separation  DOPPLER: The transpulmonic velocity was within the normal range  There was trace regurgitation  PERICARDIUM: There was no pericardial effusion  The pericardium was normal in appearance  AORTA: The root exhibited normal size  SYSTEM MEASUREMENT TABLES    2D  LVEDV MOD A4C: 81 79 ml  LVEF MOD A4C: 64 57 %  LVESV MOD A4C: 28 98 ml  LVLd A4C: 7 08 cm  LVLs A4C: 5 89 cm  SV MOD A4C: 52 81 ml    CW  TR Vmax: 2 65 m/s  TR maxP 14 mmHg    [facility] Accredited Echocardiography Laboratory    Prepared and electronically signed by    Nazia Little MD  Signed 2019 07:10:27       Results for orders placed during the hospital encounter of 19   EBEN    Narrative [address] [place] [address], [address] [place] [address]  [phone]    Transesophageal Echocardiogram  2D, Doppler, and Color Doppler    Study date:  2019    Patient: Jersey Valencia  MR number: [id number]  Account number: [de-identified]  : 1940  Age: 66 years  Gender: Female  Status: Inpatient  Location: Echo lab  Height: 61 in  Weight: 181 lb  BP: 125/ 83 mmHg    Indications: Atrial-Fib; Cardioversion    Diagnoses: 427 31 - ATRIAL FIBRILLATION    Sonographer:  PEEWEE Tello  Interpreting Physician:  Nik Beltre MD  Primary Physician:  Keegan Martino MD  Referring Physician:  SOTO Mccoy  Group:  [facility]  RN:  Mariajose Flores RN    SUMMARY    LEFT VENTRICLE:  The ventricle was dilated  Systolic function was markedly reduced  Ejection fraction was estimated to be 25 %    There was severe diffuse hypokinesis  RIGHT VENTRICLE:  The ventricle was dilated  Systolic function was mildly to moderately reduced  LEFT ATRIUM:  The atrium was moderately dilated  No thrombus was identified  LEFT ATRIAL APPENDAGE:  The appendage was dilated  No thrombus was identified  There was mild intermittent spontaneous echo contrast ("smoke") in the appendage  ATRIAL SEPTUM:  No defect or patent foramen ovale was identified  RIGHT ATRIUM:  The atrium was dilated  MITRAL VALVE:  There was moderate regurgitation  TRICUSPID VALVE:  There was moderate regurgitation  PERICARDIUM:  A small, free-flowing pericardial effusion was identified  There was no evidence of hemodynamic compromise  HISTORY: PRIOR HISTORY: HTN; HLD; DM2; A-Fib; Acute Congestive Heart Failure    PROCEDURE: The procedure was performed in the echo lab  This was a routine study  The risks and alternatives of the procedure were explained to the patient and informed consent was obtained  The transesophageal approach was used  The study  included complete 2D imaging, complete spectral Doppler, and color Doppler  The heart rate was 125 bpm, at the start of the study  An adult omniplane probe was inserted by the attending cardiologist  Intubated with ease  Two intubation  attempt(s)  There was no blood detected on the probe  Image quality was good  There were no complications during the procedure  MEDICATIONS: Anesthesia administered by anesthesia team     LEFT VENTRICLE: The ventricle was dilated  Systolic function was markedly reduced  Ejection fraction was estimated to be 25 %  There was severe diffuse hypokinesis  Wall thickness was normal  No evidence of apical thrombus  RIGHT VENTRICLE: The ventricle was dilated  Systolic function was mildly to moderately reduced  Wall thickness was normal     LEFT ATRIUM: The atrium was moderately dilated  No thrombus was identified  APPENDAGE: The appendage was dilated   No thrombus was identified  There was mild intermittent spontaneous echo contrast ("smoke") in the appendage  ATRIAL SEPTUM: No defect or patent foramen ovale was identified  RIGHT ATRIUM: The atrium was dilated  MITRAL VALVE: Valve structure was normal  There was normal leaflet separation  DOPPLER: The transmitral velocity was within the normal range  There was no evidence for stenosis  There was moderate regurgitation  AORTIC VALVE: The valve was trileaflet  Leaflets exhibited normal thickness, normal cuspal separation, and sclerosis  DOPPLER: Transaortic velocity was within the normal range  There was no evidence for stenosis  There was no significant  regurgitation  TRICUSPID VALVE: The valve structure was normal  There was normal leaflet separation  DOPPLER: There was moderate regurgitation  PULMONIC VALVE: Leaflets exhibited normal thickness, no calcification, and normal cuspal separation  DOPPLER: The transpulmonic velocity was within the normal range  There was no significant regurgitation  PERICARDIUM: A small, free-flowing pericardial effusion was identified  There was no evidence of hemodynamic compromise  The pericardium was normal in appearance  AORTA: The root exhibited normal size  The ascending aorta was normal in size  SYSTEMIC VEINS: IVC: The inferior vena cava was normal in size  MEASUREMENT TABLES    DOPPLER MEASUREMENTS  Left atrium   (Reference normals)  ROHAN peak yaneth   20 cm/s   (--)    [employer] Accredited Echocardiography Laboratory    Prepared and electronically signed by    Axel Cormier MD  Signed 21-Jun-2019 15:50:13       No results found for this or any previous visit  No results found for this or any previous visit        Scribe Attestation    I,:    am acting as a scribe while in the presence of the attending physician :        I,:    personally performed the services described in this documentation    as scribed in my presence :

## 2019-07-22 NOTE — LETTER
July 22, 2019     Yg Prasad, [address]    Patient: Sofia Bobo   YOB: 1940   Date of Visit: 7/22/2019       Dear Dr Harleen Moraes: Thank you for referring Sofia Bobo to me for evaluation  Below are my notes for this consultation  If you have questions, please do not hesitate to call me  I look forward to following your patient along with you  Sincerely,        Jaja Nunez DO        CC: MD Jaja Christianson DO  7/22/2019  3:25 PM  Incomplete  EPS Progress Note - Sofia Bobo 66 y o  female MRN: [id number]           ASSESSMENT:  1  Chronic combined systolic (congestive) and diastolic (congestive) heart failure (HCC)  POCT ECG   2  Atrial fibrillation with RVR (HCC)  POCT ECG   3  Essential hypertension  POCT ECG   4  Combined systolic and diastolic congestive heart failure, unspecified HF chronicity ([facility] [address] )  Ambulatory referral to Cardiac Electrophysiology   5  Atrial fibrillation with rapid ventricular response [facility])  Ambulatory referral to Cardiac Electrophysiology           PLAN:  1  Atrial fibrillation with RVR  Rate is still not adequately controlled  Amiodarone not working so will discontinue it and will plan for a simple rate control tragedy    Rate is ranging from 108-130 in office today     2  Acute systolic heart failure  LVEF 42% from echo in June    Possibly mildly volume overloaded   Patient's scale at home is not calibrated and has not been monitoring it closely  She has gained about 10 lbs in one week      3  Anticoagulation  Dose adjusted   On Eliquis 5 mg twice daily     4   CORAZON  Sleep study appointment scheduled on 8/6/19        Recommendations:  Discontinue Amiodarone  Take an extra dose of torsemide tomorrow  Increase metoprolol to 75 mg twice daily   Would like patient to see Heart Failure team next week  She will follow-up with Dr Mirella Mckinney her cardiologist thereafter        HPI:   Yomi Duran Dori is a 66 y o  female who presents to the office today for s/p hospital follow up  Patient has a history of atrial fibrillation with RVR and systolic heart failure  Patient states she was feeling better and has a home care nurse  She has gained about 10 lbs in one week  Patient states she has been eating tacos recently which have been left over food from a party taco bar  She has not been able to monitor her weight at home because her scale needs to be calibrated  Admitted to weight gain  ROS:   Negative for palpitations, shortness of breath, chest discomfort, presyncope or syncope  All other 12 point ROS negative      Objective:     Vitals: Blood pressure 116/62, pulse (!) 111, height 5' 1" (1 549 m), weight 74 4 kg (164 lb)  , Body mass index is 30 99 kg/m²  ,        Physical Exam:    GEN: Ramin Ni appears well, alert and oriented x 3, pleasant and cooperative   HEENT: pupils equal, round, and reactive to light; extraocular muscles intact  NECK: supple, no carotid bruits   HEART: irregular rhythm, normal S1 and S2, no murmurs, clicks, gallops or rubs   LUNGS: clear to auscultation bilaterally; no wheezes, rales, or rhonchi   ABDOMEN: normal bowel sounds, soft, no tenderness, no distention  EXTREMITIES: peripheral pulses normal; no clubbing, cyanosis, or edema  NEURO: no focal findings   SKIN: normal without suspicious lesions on exposed skin    Medications:      Current Outpatient Medications:     amiodarone 200 mg tablet, Take 1 tablet (200 mg total) by mouth daily with breakfast, Disp: , Rfl: 0    apixaban (ELIQUIS) 2 5 mg, Take 2 tablets (5 mg total) by mouth 2 (two) times a day, Disp: , Rfl: 0    hydrALAZINE (APRESOLINE) 25 mg tablet, Take 1 tablet (25 mg total) by mouth every 8 (eight) hours, Disp: , Rfl: 0    isosorbide dinitrate (ISORDIL) 10 mg tablet, Take 1 tablet (10 mg total) by mouth 3 (three) times daily after meals, Disp: , Rfl: 0    levothyroxine 137 mcg tablet, Take 1 tablet (137 mcg total) by mouth daily, Disp: 30 tablet, Rfl: 5    metoprolol succinate (TOPROL-XL) 25 mg 24 hr tablet, Take 1 5 tablets (37 5 mg total) by mouth 2 (two) times a day, Disp: , Rfl: 3    torsemide (DEMADEX) 20 mg tablet, Take 1 tablet (20 mg total) by mouth daily, Disp: , Rfl: 0    venlafaxine (EFFEXOR) 75 mg tablet, Take 1 tablet (75 mg total) by mouth every 12 (twelve) hours, Disp: 60 tablet, Rfl: 5    albuterol (VENTOLIN HFA) 90 mcg/act inhaler, Inhale 1-2 puffs every 4 (four) hours as needed for wheezing (Patient not taking: Reported on 7/15/2019), Disp: , Rfl: 0    Ascorbic Acid (VITAMIN C) 100 MG tablet, Take 100 mg by mouth daily, Disp: , Rfl:     cholecalciferol (VITAMIN D3) 1,000 units tablet, Take 1,000 Units by mouth daily, Disp: , Rfl:     cyanocobalamin 100 MCG tablet, Take 100 mcg by mouth daily, Disp: , Rfl:     fexofenadine (ALLEGRA) 30 MG tablet, Take 30 mg by mouth 2 (two) times a day, Disp: , Rfl:     insulin glargine (LANTUS) 100 units/mL subcutaneous injection, Inject 10 Units under the skin every morning, Disp: , Rfl: 0    melatonin 3 mg, Take 2 tablets (6 mg total) by mouth daily at bedtime (Patient not taking: Reported on 7/22/2019), Disp: , Rfl: 0    multivitamin (THERAGRAN) TABS, Take 1 tablet by mouth daily, Disp: , Rfl:     nystatin (MYCOSTATIN) powder, Apply topically 2 (two) times a day (Patient not taking: Reported on 7/22/2019), Disp: 15 g, Rfl: 0    Omega-3 Fatty Acids (FISH OIL) 1,000 mg, Take 1,000 mg by mouth daily, Disp: , Rfl:     pantoprazole (PROTONIX) 40 mg tablet, Take 1 tablet (40 mg total) by mouth daily in the early morning (Patient not taking: Reported on 7/22/2019), Disp: , Rfl: 0    polyethylene glycol (MIRALAX) 17 g packet, Take 17 g by mouth daily (Patient not taking: Reported on 7/22/2019), Disp: 14 each, Rfl: 0    pyridoxine (B-6) 100 MG tablet, Take 100 mg by mouth daily, Disp: , Rfl:     senna (SENOKOT) 8 6 mg, Take 1 tablet (8 6 mg total) by mouth daily at bedtime (Patient not taking: Reported on 7/22/2019), Disp: 120 each, Rfl: 0    SUPER B COMPLEX/C CAPS, Take by mouth, Disp: , Rfl:     vitamin E, tocopherol, 1,000 units capsule, Take 1,000 Units by mouth daily, Disp: , Rfl:      Family History   Problem Relation Age of Onset    Alzheimer's disease Mother     Coronary artery disease Mother     Dementia Mother     Diabetes Mother         mellitus    Other Father         acute myocardial infarction    Coronary artery disease Sister     Other Maternal Grandfather         laryngeal cancer    Dementia Maternal Aunt     Diabetes Maternal Aunt      Social History     Socioeconomic History    Marital status: Single     Spouse name: Not on file    Number of children: Not on file    Years of education: Not on file    Highest education level: Not on file   Occupational History    Not on file   Social Needs    Financial resource strain: Not on file    Food insecurity:     Worry: Not on file     Inability: Not on file    Transportation needs:     Medical: Not on file     Non-medical: Not on file   Tobacco Use    Smoking status: Never Smoker    Smokeless tobacco: Never Used   Substance and Sexual Activity    Alcohol use: Not Currently    Drug use: Never    Sexual activity: Not Currently   Lifestyle    Physical activity:     Days per week: Not on file     Minutes per session: Not on file    Stress: Not on file   Relationships    Social connections:     Talks on phone: Not on file     Gets together: Not on file     Attends Buddhist service: Not on file     Active member of club or organization: Not on file     Attends meetings of clubs or organizations: Not on file     Relationship status: Not on file    Intimate partner violence:     Fear of current or ex partner: Not on file     Emotionally abused: Not on file     Physically abused: Not on file     Forced sexual activity: Not on file   Other Topics Concern    Not on file   Social History Narrative    Not on file     Social History     Tobacco Use   Smoking Status Never Smoker   Smokeless Tobacco Never Used     Social History     Substance and Sexual Activity   Alcohol Use Not Currently       Labs & Results:  Below is the patient's most recent value for Albumin, ALT, AST, BUN, Calcium, Chloride, Cholesterol, CO2, Creatinine, GFR, Glucose, HDL, Hematocrit, Hemoglobin, Hemoglobin A1C, LDL, Magnesium, Phosphorus, Platelets, Potassium, PSA, Sodium, Triglycerides, and WBC  Lab Results   Component Value Date     (H) 2019    AST 43 2019    BUN 91 (H) 2019    CALCIUM 9 1 2019    CL 94 (L) 2019    CHOL 169 2017    CO2 32 2019    CREATININE 2 54 (H) 2019    HDL 66 2019    HCT 42 0 2019    HGB 14 0 2019    HGBA1C 5 9 (H) 2019    MG 2 0 2019    PHOS 4 2 (H) 2019     2019    K 3 9 2019     2017    TRIG 82 2019    WBC 10 57 (H) 2019     Note: for a comprehensive list of the patient's lab results, access the Results Review activity              Cardiac testing:   Results for orders placed during the hospital encounter of 19   Echo complete with contrast if indicated    Narrative [address]  [facility] [address] [facility]  [phone]    Transthoracic Echocardiogram  2D, M-mode, Doppler, and Color Doppler    Study date:  2019    Patient: Sarah Henriquez  MR number: [id number]  Account number: [de-identified]  : 1940  Age: 66 years  Gender: Female  Status: Inpatient  Location: Bedside  Height: 61 in  Weight: 175 lb  BP: 92/ 44 mmHg    Indications: Hypotension    Diagnoses: I95 9 - Hypotension, unspecified    Sonographer:  PEEWEE Joel  Primary Physician:  Vianey Wiley MD  Referring Physician:  SOTO Pro  Group:  [facility]  Interpreting Physician:  Ynes Hernandez Pauline Estevez MD    SUMMARY    LEFT VENTRICLE:  Size was at the upper limits of normal   Systolic function was moderately reduced  Ejection fraction was estimated to be 42 %  There was moderate diffuse hypokinesis  There was no evidence of concentric hypertrophy  VENTRICULAR SEPTUM:  There was dyssynergic motion  RIGHT VENTRICLE:  The ventricle was mildly to moderately dilated  Systolic function was mildly to moderately reduced  Wall thickness was increased  LEFT ATRIUM:  The atrium was moderately dilated  RIGHT ATRIUM:  The atrium was moderately dilated  MITRAL VALVE:  There was mild annular calcification  There was moderate regurgitation  TRICUSPID VALVE:  There was moderate regurgitation  PULMONIC VALVE:  There was trace regurgitation  HISTORY: PRIOR HISTORY: DM2, HTN, HLD, Afib, CHF    PROCEDURE: The procedure was performed at the bedside  This was an urgent study  The transthoracic approach was used  The study included complete 2D imaging, M-mode, complete spectral Doppler, and color Doppler  The heart rate was 117 bpm,  at the start of the study  Images were obtained from the parasternal, apical, subcostal, and suprasternal notch acoustic windows  Echocardiographic views were limited due to restricted patient mobility  This was a technically difficult  study  LEFT VENTRICLE: Size was at the upper limits of normal  Systolic function was moderately reduced  Ejection fraction was estimated to be 42 %  There was moderate diffuse hypokinesis  Wall thickness was normal  There was no evidence of  concentric hypertrophy  DOPPLER: The study was not technically sufficient to allow evaluation of LV diastolic function  VENTRICULAR SEPTUM: There was dyssynergic motion  RIGHT VENTRICLE: The ventricle was mildly to moderately dilated  Systolic function was mildly to moderately reduced  Wall thickness was increased  LEFT ATRIUM: The atrium was moderately dilated      RIGHT ATRIUM: The atrium was moderately dilated  MITRAL VALVE: There was mild annular calcification  Valve structure was normal  There was normal leaflet separation  DOPPLER: The transmitral velocity was within the normal range  There was no evidence for stenosis  There was moderate  regurgitation  AORTIC VALVE: The valve was trileaflet  Leaflets exhibited normal thickness and normal cuspal separation  DOPPLER: Transaortic velocity was within the normal range  There was no evidence for stenosis  There was no regurgitation  TRICUSPID VALVE: The valve structure was normal  There was normal leaflet separation  DOPPLER: The transtricuspid velocity was within the normal range  There was no evidence for stenosis  There was moderate regurgitation  Pulmonary artery  systolic pressure was mildly increased  PULMONIC VALVE: Leaflets exhibited normal thickness, no calcification, and normal cuspal separation  DOPPLER: The transpulmonic velocity was within the normal range  There was trace regurgitation  PERICARDIUM: There was no pericardial effusion  The pericardium was normal in appearance  AORTA: The root exhibited normal size      SYSTEM MEASUREMENT TABLES    2D  LVEDV MOD A4C: 81 79 ml  LVEF MOD A4C: 64 57 %  LVESV MOD A4C: 28 98 ml  LVLd A4C: 7 08 cm  LVLs A4C: 5 89 cm  SV MOD A4C: 52 81 ml    CW  TR Vmax: 2 65 m/s  TR maxP 14 mmHg    [facility] Accredited Echocardiography Laboratory    Prepared and electronically signed by    Charleen Fu MD  Signed 2019 07:10:27       Results for orders placed during the hospital encounter of 19   EBEN    N[facility]ative [facility]  [facility] [address] [facility]  [phone]    Transesophageal Echocardiogram  2D, Doppler, and Color Doppler    Study date:  2019    Patient: Sarah Henriquez  MR number: [id number]  Account number: [de-identified]  : 1940  Age: 66 years  Gender: Female  Status: Inpatient  Location: Echo lab  Height: 61 in  Weight: 181 lb  BP: 125/ 83 mmHg    Indications: Atrial-Fib; Cardioversion    Diagnoses: 427 31 - ATRIAL FIBRILLATION    Sonographer:  PEEWEE Gonzalez  Interpreting Physician:  Blanka Ramsey MD  Primary Physician:  Jayda Friend MD  Referring Physician:  SOTO Leal  Group:  [facility]  RN:  Darryn Melendez RN    SUMMARY    LEFT VENTRICLE:  The ventricle was dilated  Systolic function was markedly reduced  Ejection fraction was estimated to be 25 %  There was severe diffuse hypokinesis  RIGHT VENTRICLE:  The ventricle was dilated  Systolic function was mildly to moderately reduced  LEFT ATRIUM:  The atrium was moderately dilated  No thrombus was identified  LEFT ATRIAL APPENDAGE:  The appendage was dilated  No thrombus was identified  There was mild intermittent spontaneous echo contrast ("smoke") in the appendage  ATRIAL SEPTUM:  No defect or patent foramen ovale was identified  RIGHT ATRIUM:  The atrium was dilated  MITRAL VALVE:  There was moderate regurgitation  TRICUSPID VALVE:  There was moderate regurgitation  PERICARDIUM:  A small, free-flowing pericardial effusion was identified  There was no evidence of hemodynamic compromise  HISTORY: PRIOR HISTORY: HTN; HLD; DM2; A-Fib; Acute Congestive Heart Failure    PROCEDURE: The procedure was performed in the echo lab  This was a routine study  The risks and alternatives of the procedure were explained to the patient and informed consent was obtained  The transesophageal approach was used  The study  included complete 2D imaging, complete spectral Doppler, and color Doppler  The heart rate was 125 bpm, at the start of the study  An adult omniplane probe was inserted by the attending cardiologist  Intubated with ease  Two intubation  attempt(s)  There was no blood detected on the probe  Image quality was good  There were no complications during the procedure  MEDICATIONS: Anesthesia administered by anesthesia team     LEFT VENTRICLE: The ventricle was dilated  Systolic function was markedly reduced  Ejection fraction was estimated to be 25 %  There was severe diffuse hypokinesis  Wall thickness was normal  No evidence of apical thrombus  RIGHT VENTRICLE: The ventricle was dilated  Systolic function was mildly to moderately reduced  Wall thickness was normal     LEFT ATRIUM: The atrium was moderately dilated  No thrombus was identified  APPENDAGE: The appendage was dilated  No thrombus was identified  There was mild intermittent spontaneous echo contrast ("smoke") in the appendage  ATRIAL SEPTUM: No defect or patent foramen ovale was identified  RIGHT ATRIUM: The atrium was dilated  MITRAL VALVE: Valve structure was normal  There was normal leaflet separation  DOPPLER: The transmitral velocity was within the normal range  There was no evidence for stenosis  There was moderate regurgitation  AORTIC VALVE: The valve was trileaflet  Leaflets exhibited normal thickness, normal cuspal separation, and sclerosis  DOPPLER: Transaortic velocity was within the normal range  There was no evidence for stenosis  There was no significant  regurgitation  TRICUSPID VALVE: The valve structure was normal  There was normal leaflet separation  DOPPLER: There was moderate regurgitation  PULMONIC VALVE: Leaflets exhibited normal thickness, no calcification, and normal cuspal separation  DOPPLER: The transpulmonic velocity was within the normal range  There was no significant regurgitation  PERICARDIUM: A small, free-flowing pericardial effusion was identified  There was no evidence of hemodynamic compromise  The pericardium was normal in appearance  AORTA: The root exhibited normal size  The ascending aorta was normal in size  SYSTEMIC VEINS: IVC: The inferior vena cava was normal in size      MEASUREMENT TABLES    DOPPLER MEASUREMENTS  Left atrium   (Reference normals)  ROHAN peak yaneth   20 cm/s   (--)    [employer] Accredited Echocardiography Laboratory    Prepared and electronically signed by    Ricardo Miller MD  Signed 21-Jun-2019 15:50:13       No results found for this or any previous visit  No results found for this or any previous visit        Scribe Attestation    I,:    am acting as a scribe while in the presence of the attending physician :        I,:    personally performed the services described in this documentation    as scribed in my presence :

## 2019-07-23 ENCOUNTER — APPOINTMENT (OUTPATIENT)
Dept: LAB | Facility: CLINIC | Age: 79
End: 2019-07-23
Payer: COMMERCIAL

## 2019-07-23 ENCOUNTER — OFFICE VISIT (OUTPATIENT)
Dept: FAMILY MEDICINE CLINIC | Facility: CLINIC | Age: 79
End: 2019-07-23
Payer: COMMERCIAL

## 2019-07-23 VITALS
HEIGHT: 61 IN | OXYGEN SATURATION: 96 % | SYSTOLIC BLOOD PRESSURE: 116 MMHG | TEMPERATURE: 99 F | RESPIRATION RATE: 18 BRPM | DIASTOLIC BLOOD PRESSURE: 80 MMHG | BODY MASS INDEX: 30.96 KG/M2 | WEIGHT: 164 LBS | HEART RATE: 84 BPM

## 2019-07-23 DIAGNOSIS — I50.42 CHRONIC COMBINED SYSTOLIC (CONGESTIVE) AND DIASTOLIC (CONGESTIVE) HEART FAILURE (HCC): Primary | ICD-10-CM

## 2019-07-23 DIAGNOSIS — I48.91 ATRIAL FIBRILLATION WITH RVR (HCC): ICD-10-CM

## 2019-07-23 DIAGNOSIS — I50.40 COMBINED SYSTOLIC AND DIASTOLIC CONGESTIVE HEART FAILURE, UNSPECIFIED HF CHRONICITY (HCC): ICD-10-CM

## 2019-07-23 DIAGNOSIS — E03.9 HYPOTHYROIDISM, UNSPECIFIED TYPE: ICD-10-CM

## 2019-07-23 DIAGNOSIS — E11.36 TYPE 2 DIABETES MELLITUS WITH DIABETIC CATARACT, WITHOUT LONG-TERM CURRENT USE OF INSULIN (HCC): ICD-10-CM

## 2019-07-23 DIAGNOSIS — N17.9 ACUTE KIDNEY INJURY (HCC): ICD-10-CM

## 2019-07-23 DIAGNOSIS — I10 ESSENTIAL HYPERTENSION: ICD-10-CM

## 2019-07-23 DIAGNOSIS — R26.2 AMBULATORY DYSFUNCTION: ICD-10-CM

## 2019-07-23 LAB
ALBUMIN SERPL BCP-MCNC: 2.9 G/DL (ref 3.5–5)
ALP SERPL-CCNC: 83 U/L (ref 46–116)
ALT SERPL W P-5'-P-CCNC: 47 U/L (ref 12–78)
ANION GAP SERPL CALCULATED.3IONS-SCNC: 5 MMOL/L (ref 4–13)
AST SERPL W P-5'-P-CCNC: 45 U/L (ref 5–45)
BILIRUB SERPL-MCNC: 0.58 MG/DL (ref 0.2–1)
BUN SERPL-MCNC: 20 MG/DL (ref 5–25)
CALCIUM SERPL-MCNC: 8.9 MG/DL (ref 8.3–10.1)
CHLORIDE SERPL-SCNC: 99 MMOL/L (ref 100–108)
CO2 SERPL-SCNC: 36 MMOL/L (ref 21–32)
CREAT SERPL-MCNC: 1 MG/DL (ref 0.6–1.3)
GFR SERPL CREATININE-BSD FRML MDRD: 54 ML/MIN/1.73SQ M
GLUCOSE P FAST SERPL-MCNC: 109 MG/DL (ref 65–99)
POTASSIUM SERPL-SCNC: 4.1 MMOL/L (ref 3.5–5.3)
PROT SERPL-MCNC: 6.8 G/DL (ref 6.4–8.2)
SODIUM SERPL-SCNC: 140 MMOL/L (ref 136–145)

## 2019-07-23 PROCEDURE — 80053 COMPREHEN METABOLIC PANEL: CPT

## 2019-07-23 PROCEDURE — 1111F DSCHRG MED/CURRENT MED MERGE: CPT | Performed by: FAMILY MEDICINE

## 2019-07-23 PROCEDURE — 36415 COLL VENOUS BLD VENIPUNCTURE: CPT

## 2019-07-23 PROCEDURE — 99496 TRANSJ CARE MGMT HIGH F2F 7D: CPT | Performed by: FAMILY MEDICINE

## 2019-07-23 NOTE — PROGRESS NOTES
Chief Complaint   Patient presents with    Transition of Care Management     A-fib and then dishcarged from [facility]   Topic Date Due   [facility] Annual Wellness Visit (AWV)  1940    CRC Screening: Colonoscopy  1940    HEPATITIS B VACCINES (1 of 3 - Risk 3-dose series) 08/20/1959    DTaP,Tdap,and Td Vaccines (1 - Tdap) 01/02/2000    Pneumococcal Vaccine: 65+ Years (1 of 2 - PCV13) 08/20/2005    DM Eye Exam  08/12/2016    INFLUENZA VACCINE  09/19/2019 (Originally 7/1/2019)    HEMOGLOBIN A1C  09/18/2019    URINE MICROALBUMIN  03/18/2020    Fall Risk  03/20/2020    Depression Screening PHQ  03/20/2020    Urinary Incontinence Screening  03/20/2020    BMI: Followup Plan  03/20/2020    Diabetic Foot Exam  03/20/2020    BMI: Adult  07/23/2020    Pneumococcal Vaccine: Pediatrics (0 to 5 Years) and At-Risk Patients (6 to 59 Years)  Aged Out       Assessment/Plan:    Patient presents for TCM visit  Reviewed hospital records and records from [facility]  Chronic combined systolic (congestive) and diastolic (congestive) heart failure (HCC)  Wt Readings from Last 3 Encounters:   07/23/19 74 4 kg (164 lb)   07/22/19 74 4 kg (164 lb)   07/15/19 70 7 kg (155 lb 12 8 oz)     Patient was seen by cardiologist Dr Bryant Henry yesterday  Continue taking Torsemide 20 mg one tablet twice daily  Recommended to follow a low-sodium diet, check daily weights  Follow-up with Heart Failure team next week  Acute kidney injury ([address] )  Resolved  Creatinine level checked this morning was 1 00  Patient was recommended to avoid NSAID's, nephrotoxic drugs  Atrial fibrillation with RVR ([address] )  Patient is S/P cardioversion  Continue anticoagulation with Eliquis  Dose of Metoprolol ER was increased yesterday by cardiology to 75 mg twice daily  Follow- up with cardiology Dr Kimi Sanderson in September  Hypertension  Blood pressure is stable    Continue current medical regimen  Ambulatory dysfunction  Patient started PT, OT at home  Discussed fall precautions with patient  Continue to ambulate with a walker  Hypothyroidism  Continue replacement with Levothyroxine  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)  Lab Results   Component Value Date    HGBA1C 5 9 (H) 03/18/2019       No results for input(s): POCGLU in the last 72 hours  Blood Sugar Average: Last 72 hrs:    Blood sugar at home ranges 130's-140  Blood work done this morning: fasting blood sugar 109  Recommended to hold off on starting Lantus   Follow a low carb diet, avoid concentrated sweets  Monitor blood sugar at home 3 times daily before breakfast, before dinner and bedtime  Send in blood sugar log for review next week  Schedule follow-up office visit with PCP Dr Virginia Andersen in 4 weeks  Diagnoses and all orders for this visit:    Chronic combined systolic (congestive) and diastolic (congestive) heart failure (HCC)    Acute kidney injury ([address] )    Atrial fibrillation with RVR ([facility] [address] )    Essential hypertension    Ambulatory dysfunction    Hypothyroidism, unspecified type    Type 2 diabetes mellitus with diabetic cataract, without long-term current use of insulin (HCC)         Subjective:     Patient ID: Gilma Velasquez is a 66 y o  female  HPI     Patient presents for TCM visit accompanied by her sister  She was hospitalized at [address] [place] [address] 6/18/19-7/11/19 for acute on chronic CHF, A Fib with rapid ventricular response, WANG  Patient failed to respond to antiarrhythmic therapy  She developed cardiogenic shock and was transferred to the ICU, started on vasopressors  After cardioversion the patient symptoms improved  Shewas placed on Amiodarone for rhythm control  Patient was discharged to [facility] for rehab  She was discharged home on 7/19/19 with VNA services        Reviewed hospital records, test results, discharge medications with patient and her sister  Echocardiogram from July 4, 2019 showed EF 07%, grade 2 diastolic dysfunction     Blood test done on July 15, 2019 showed Hb 12 2, creatinine 1 49, potassium 3 7, AST 35, ALT 69  She had blood work done this morning  Fasting blood sugar 109, creatinine 1 00, potassium 4 1  Patient started physical therapy and occupational therapy at home  She reports some improvement in strength  Ambulates with a walker  Patient was seen by cardiologist Dr Zoran Escobar yesterday  Dr Zoran Escobar recommended to d/c  Amiodarone  Dose of Metoprolol ER was increased to 75 mg twice daily  Patient was instructed to take Torsemide 20 mg twice daily  She will follow up with Heart Failure team next week  Appointment is scheduled with cardiologist Dr Mariama Shook in 9/19  Patient is on anticoagulation with Eliquis  C/o a few episodes of nose bleeds  Patient denies chest pain, shortness of breath has improved  Type 2 DM - patient  was discharged home on Lantus 10 units at bedtime  She was not provided with needles and did not start Insulin therapy  After discharge from [address] [place] [address] blood sugar at home ranges in 130' s-140 and patient was recommended by our office to hold off on starting Lantus Insulin  Review of Systems   Constitutional: Positive for fatigue (mild)  Negative for activity change, appetite change, chills and fever  HENT: Positive for nosebleeds  Negative for congestion, ear pain, hearing loss, mouth sores, postnasal drip, sore throat, tinnitus and trouble swallowing  Eyes: Negative for pain, discharge, redness, itching and visual disturbance  Respiratory: Positive for shortness of breath (improved)  Negative for cough, chest tightness and wheezing  Cardiovascular: Positive for palpitations (occasional) and leg swelling (mild)  Negative for chest pain     Gastrointestinal: Negative for abdominal pain, blood in stool, constipation, diarrhea, nausea and vomiting  Genitourinary: Positive for frequency  Negative for difficulty urinating, dysuria, flank pain and hematuria  Musculoskeletal: Positive for arthralgias and gait problem (ambulates with a walker)  Negative for joint swelling and neck pain  Skin: Negative for rash and wound  Neurological: Negative for dizziness, syncope and headaches  Hematological: Negative for adenopathy  Bruises/bleeds easily  Psychiatric/Behavioral: Positive for sleep disturbance  Negative for agitation and confusion  The patient is not nervous/anxious  Objective:     Physical Exam   Constitutional: She appears well-developed and well-nourished  No distress  HENT:   Head: Normocephalic and atraumatic  Right Ear: External ear normal    Left Ear: External ear normal    Mouth/Throat: Oropharynx is clear and moist    Eyes: Pupils are equal, round, and reactive to light  Conjunctivae are normal    Neck: Normal range of motion  Neck supple  No JVD present  Cardiovascular: Normal rate and normal heart sounds  No murmur heard  Irregularly irregular heart rthytm  BL LE edema 1+   Pulmonary/Chest: Effort normal and breath sounds normal  She has no rales  Abdominal: Soft  Bowel sounds are normal  There is no tenderness  Musculoskeletal:   Patient ambulates with a walker   Skin: Skin is warm and dry  No rash noted  Psychiatric: She has a normal mood and affect  Her behavior is normal    Nursing note and vitals reviewed  Vitals:    07/23/19 1259 07/23/19 1341   BP: 110/86 116/80   BP Location:  Left arm   Patient Position:  Sitting   Cuff Size:  Standard   Pulse: 72 84   Resp: 16 18   Temp: 99 °F (37 2 °C)    TempSrc: Tympanic    SpO2: 96%    Weight: 74 4 kg (164 lb)    Height: 5' 1" (1 549 m)        Transitional Care Management Review: Nate Richards is a 66 y o  female here for TCM follow up       During the TCM phone call patient stated:    TCM Call (since 6/22/2019)     Hospital care reviewed  Records reviewed    Patient was hospitialized at  [facility]    Date of Admission  06/18/19    Date of discharge  07/11/19    Diagnosis  Acute on chronic combined systolic and diastolic CHF (congestive heart failure) [facility])    Disposition  Rehabilitation center Short Term Rehab or SNF at [address] [place] (since 6/22/2019)     Scheduled for follow up?   Not Clinically Warranted    Not clinically warranted  Discharged to [address] or SNF at [facility]

## 2019-07-24 ENCOUNTER — PATIENT OUTREACH (OUTPATIENT)
Dept: FAMILY MEDICINE CLINIC | Facility: CLINIC | Age: 79
End: 2019-07-24

## 2019-07-24 NOTE — ASSESSMENT & PLAN NOTE
Resolved  Creatinine level checked this morning was 1 00  Patient was recommended to avoid NSAID's, nephrotoxic drugs

## 2019-07-24 NOTE — ASSESSMENT & PLAN NOTE
Patient is S/P cardioversion  Continue anticoagulation with Eliquis  Dose of Metoprolol ER was increased yesterday by cardiology to 75 mg twice daily  Follow- up with cardiology Dr Olivia John in September

## 2019-07-24 NOTE — ASSESSMENT & PLAN NOTE
"Subjective:       Patient ID: Kye Lewis Jr. is a 50 y.o. male.    Chief Complaint: ADHD    HPI         CHIEF :COMPLAINT: ADHD Adult:    HPI:     ONSET:     5  years ago. By psych  QUALITY/COURSE:. unchanged  INTENSITY/SEVERITY: #   2  /10 (on 1 to 10 scale)    CONTEXT/WHEN:     MODIFIERS/TREATMENTS: Taking Medications ?  yes      Compliant with Taking Prescribed Medications ?  yes  . , Medication not Effective ? yes  SYMPTOMS/RELATED:     Have difficulty sustaining attention in tasks or fun activities? Yes  Don't follow through on instructions and fail to finish work? Yes  Have difficulty organizing tasks and activities? Yes  Avoid, dislike, or are reluctant to engage in work thar requires sustained mental effort? Yes  Easily distracted? Yes  Forgetful in daily activities? Yes  Fidget with hands or feet, or squirm in seat? Yes  Have difficulty engaging in leisure activities or doing fun things quietly?.Yes  Feel "on the go" or "driven by a motor"? Yes  Blurt out answers before questions have been completed? Yes  Have difficulty waiting your turn, are impatientInterrupt or intrude on others ? Yes      CHIEF COMPLAINT:  Poor concentration    Has ADD.  focalin has improved his   concentration. .       The following symptoms/statements  are positive if BOLD, negative otherwise.      dependency, nausea, stomach pain, palpitations, facial ticks, insomnia, elevated heart rate or blood pressure, headaches, anxiety/agitation, weight loss,  worsening of underlying psychiatric conditions.          Review of Systems      Objective:      Vitals:    02/11/19 1719   BP: 126/88   Pulse: 66   Resp: 16   SpO2: 98%   Weight: 104.6 kg (230 lb 9.6 oz)   Height: 5' 6" (1.676 m)   PainSc: 0-No pain     Physical Exam   Constitutional: He appears well-developed and well-nourished.   Cardiovascular: Normal rate, regular rhythm and normal heart sounds.   Pulmonary/Chest: Effort normal and breath sounds normal.   Abdominal: Soft. There " Wt Readings from Last 3 Encounters:   07/23/19 74 4 kg (164 lb)   07/22/19 74 4 kg (164 lb)   07/15/19 70 7 kg (155 lb 12 8 oz)     Patient was seen by cardiologist Dr Placido Morales yesterday  Continue taking Torsemide 20 mg one tablet twice daily  Recommended to follow a low-sodium diet, check daily weights  Follow-up with Heart Failure team next week  is no tenderness.   Neurological: He is alert.   Psychiatric: He has a normal mood and affect. His behavior is normal. Thought content normal.   Nursing note and vitals reviewed.        Assessment:       1. Attention deficit disorder of adult with hyperactivity    2. Herpes labialis          Plan:       Attention deficit disorder of adult with hyperactivity  -     dextroamphetamine-amphetamine (ADDERALL) 20 mg tablet; 1.5 po bid  Dispense: 90 tablet; Refill: 0  -     dextroamphetamine-amphetamine (ADDERALL) 20 mg tablet; 1.5 po bid  Dispense: 90 tablet; Refill: 0  -     dextroamphetamine-amphetamine (ADDERALL) 20 mg tablet; 1.5 po bid  Dispense: 90 tablet; Refill: 0    Herpes labialis  -     valACYclovir (VALTREX) 500 MG tablet; TK 1 T PO  qd  Dispense: 90 tablet; Refill: 1      Follow-up in about 3 months (around 5/11/2019) for if you are not better return in one month.

## 2019-07-24 NOTE — ASSESSMENT & PLAN NOTE
Lab Results   Component Value Date    HGBA1C 5 9 (H) 03/18/2019       No results for input(s): POCGLU in the last 72 hours  Blood Sugar Average: Last 72 hrs:    Blood sugar at home ranges 130's-140  Blood work done this morning: fasting blood sugar 109  Recommended to hold off on starting Lantus   Follow a low carb diet, avoid concentrated sweets  Monitor blood sugar at home 3 times daily before breakfast, before dinner and bedtime  Send in blood sugar log for review next week

## 2019-07-24 NOTE — ASSESSMENT & PLAN NOTE
Patient started PT, OT at home  Discussed fall precautions with patient  Continue to ambulate with a walker

## 2019-07-26 ENCOUNTER — TELEPHONE (OUTPATIENT)
Dept: CARDIOLOGY CLINIC | Facility: CLINIC | Age: 79
End: 2019-07-26

## 2019-07-26 NOTE — TELEPHONE ENCOUNTER
S/w Anna Murray staying w/ niece  Weight today 160 lbs  Weight 7/22 @ o/v w/ Dr Melissa Cagle 164 lbs  Did take extra Torsemide Dr Melissa Cagle ordered as well as increased Toprol XL  C/o on/off palpitations  Denies any SOB, CP or LE edema    States she is following a 2g Na diet as well as a 2L fluid restriction       Follow up on Monday w/ Janeane Linker

## 2019-07-29 ENCOUNTER — OFFICE VISIT (OUTPATIENT)
Dept: CARDIOLOGY CLINIC | Facility: CLINIC | Age: 79
End: 2019-07-29
Payer: COMMERCIAL

## 2019-07-29 VITALS
WEIGHT: 164.8 LBS | DIASTOLIC BLOOD PRESSURE: 62 MMHG | HEART RATE: 99 BPM | BODY MASS INDEX: 31.11 KG/M2 | OXYGEN SATURATION: 99 % | SYSTOLIC BLOOD PRESSURE: 122 MMHG | HEIGHT: 61 IN

## 2019-07-29 DIAGNOSIS — I50.42 CHRONIC COMBINED SYSTOLIC AND DIASTOLIC CONGESTIVE HEART FAILURE (HCC): Primary | ICD-10-CM

## 2019-07-29 DIAGNOSIS — I48.91 ATRIAL FIBRILLATION, UNSPECIFIED TYPE (HCC): ICD-10-CM

## 2019-07-29 PROCEDURE — 99214 OFFICE O/P EST MOD 30 MIN: CPT | Performed by: PHYSICIAN ASSISTANT

## 2019-07-29 RX ORDER — POTASSIUM CHLORIDE 20 MEQ/1
20 TABLET, EXTENDED RELEASE ORAL DAILY
Qty: 90 TABLET | Refills: 2 | Status: SHIPPED | OUTPATIENT
Start: 2019-07-29 | End: 2020-02-19

## 2019-07-29 RX ORDER — TORSEMIDE 20 MG/1
40 TABLET ORAL DAILY
Qty: 60 TABLET | Refills: 2
Start: 2019-07-29 | End: 2020-03-24 | Stop reason: HOSPADM

## 2019-07-29 RX ORDER — METOPROLOL SUCCINATE 25 MG/1
75 TABLET, EXTENDED RELEASE ORAL 2 TIMES DAILY
Qty: 60 TABLET | Refills: 1
Start: 2019-07-29 | End: 2019-08-02 | Stop reason: SDUPTHER

## 2019-07-29 RX ORDER — POTASSIUM CHLORIDE 20 MEQ/1
20 TABLET, EXTENDED RELEASE ORAL DAILY
Qty: 90 TABLET | Refills: 2 | Status: SHIPPED | OUTPATIENT
Start: 2019-07-29 | End: 2019-07-29

## 2019-07-29 NOTE — PATIENT INSTRUCTIONS
1) Continue eating healthy and watching sodium intake  2) Continue fluid restriction  3) Continue on torsemide (water pill) 40 mg daily  Take an extra dose if weight increases more than 3 lbs in 1 day or 5 lbs in 5-7 days  Call the office with any questions  4)  potassium pills from pharmacy  Take one dose tomorrow  5) Get blood work completed by the end of week     6) Complete sleep study and stress test

## 2019-07-29 NOTE — PROGRESS NOTES
Advanced Heart Failure / Pulmonary Hypertension Outpatient Progress Note    Queta Beasley 66 y o  female   MRN: [id number]  Encounter: [id number]    Assessment/Plan:  Patient Active Problem List    Diagnosis Date Noted    Acute on chronic combined systolic and diastolic CHF (congestive heart failure) ([place] [address] ) 07/03/2019     Priority: Low    Acute kidney injury ([place] [address] ) 06/26/2019     Priority: Low    Hyponatremia 06/26/2019     Priority: Low    Shock liver 06/26/2019     Priority: Low    Atrial fibrillation with RVR ([place] [address] ) 06/18/2019     Priority: Low    Type 2 diabetes mellitus with diabetic cataract ([place] [address] ) 10/07/2015     Priority: Low    Depression with anxiety 08/15/2012     Priority: Low    Hyperlipidemia 08/15/2012     Priority: Low    Hypertension 08/15/2012     Priority: Low    Hypothyroidism 08/15/2012     Priority: Low    Obesity 08/15/2012     Priority: Low    Osteoarthritis 08/15/2012     Priority: Low    Debility 07/19/2019    Chronic combined systolic (congestive) and diastolic (congestive) heart failure ([place] [address] ) 07/15/2019    Ambulatory dysfunction 07/12/2019     Chronic biventricular heart failure; LVEF 45%, LVIDd 2 8 cm; NYHA III; ACC/AHA Stage C              Etiology: shock induced by cardizem and profolol being given while in borderline low output state  BiV HF likely tachy-induced, though will need ischemic evaluation  Also has history of radioablative iodine  TTE from 07/04 with LVEF improved from 35% to now 45%  Continue on metoprolol succinate 75 mg BID, hydralazine 25 mg TID, and Isordil 10 mg TID  Continue torsemide 40 mg daily  Ordered potassium replacement  Instructed to take one dose and have BMP drawn in a few days  Will adjust dosing of K-dur after BMP resulted   Stress test ordered on 07/15/2019; encouraged patient to schedule and complete before her next cardiology visit  Weight of 164 8 lbs today; 164 lbs on 07/22   Weighed 155 lbs in office on 07/15  In depth education on diet, fluid restriction, daily weights, and medication compliance  Neurohormonal Blockade:  --Beta Blocker: metoprolol succinate 75 mg BID   --ACEi, ARB or ARNi: N/A  --SVR reduction: hydralazine 25 mg TID and Isordil 10 mg TID  --Aldosterone Receptor Blocker: N/A  --Diuretic: torsemide 40 mg daily      Sudden Cardiac Death Risk Reduction:  --ICD: LVEF 45%      Electrical Resynchronization:  --Interrogation: Narrow QRS     Advanced Therapies (if appropriate): Will continue to monitor  Atrial fibrillation with rapid ventricular response              [id number] = 6 (age, sex, CHF, HTN, DM)  Anticoagulation on Eliquis (renal dose)  S/p unsuccessful cardioversion on 06/21/2019  Initial plan was for EBEN cardioversion and dual-chamber PPM implantation on 06/25  Received propofol and cardizem which likely induced cardiogenic shock due to her borderline low output state at that time  S/p successful cardioversion on 07/03 with amio load  Converted back into Afib on 07/08  Amiodarone discontinued by EP on 07/22/2019, current plan is rate control  Continue on metoprolol succinate 75 mg BID      Hyperlipidemia   Continue on statin  Obstructive sleep apnea   Noncompliant with CPAP in the past    Repeat sleep study scheduled  Type II DM   Currently managed by diet  Though did, require insulin as inpatient  Most recent hemoglobin A1c from 03/18/2019 of 5 9  Acute kidney injury, resolved              Etiology: hypotension/low output state               Previously baseline creatinine around 0 7-0 9  Creatinine of 1 00 on 07/22/2019  Hypothyroidism   History of radioablation in the past; now on replacement therapy  Most recent TSH of 9 170 with normal free T4 from 06/18/2019      Transaminitis, resolved              Due to hypotension / cardiogenic shock                ALT, AST, and Alk Phos all WNL from [facility] on 07/22/2019  Hypertension, controlled    HPI:   Gail Nascimento is a 77-year-old female with a PMH of chronic BiV heart failure, atrial fibrillation (AC on renal-dosed Eliquis), HTN, HLD, hypothyroidism, DM2, CORAZON, and history of uterine cancer who presents to the office for follow-up  From office visit with Kush Villalba on 07/15/2019: "77-year-old female admitted to the ED on 6/18/19 with new onset Atrial fib with RVR and decompensated congestive heart failure  Has no prior cardiac history and incidentally found to have an irregular, rapid heart rate in her PCPs office  Patient also reporting new onset lower extremity edema, orthopnea, and nonproductive cough x 3 days  Initially was given several boluses of Cardizem and then started on Cardizem gtt but with minimal response  She was seen by the general cardiology team and had echocardiogram which demonstrated LVEF of 25-30% with LVIDd of 4 6 cm  Then on 6/22 underwent EBEN with guided cardioversion which was initially successful but then reverted back to a rapid Afib the next morning  Amiodarone loading was then initiated  Did have junctional rhythm immediate post conversion and showing signs of tachy oniel syndrome  Given these findings, PPM with possible AICD was suggested with cardioversion thereafter       On 6/25 patient was taken to the cath lab for planned dual chamber AICD but apparently became hypotensive and markedly SOB upon induction with propofol  She was subsequently transferred to the ICU for further management of her heart failure  She continued to be in AFib with RVR upon transfer  Through the night, patient's condition began to worsen and she became cool, clammy with N/V and loss of obtainable BP  Started on pressor support and lined  SVO2 at that point 32% with signs of lactic acidosis, WANG  Then started on milrinone gtt at 0 25 mcg/kg/min          She began to improve on inotropic/pressor support   She is warm and perfusing with MAP of 90  Lactic acid improved, SVO2 up to 68% from 33%  In review of history, patient recalls having SOB with mild exertion up to 3 months ago  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  Never used any illicit drugs  Nonsmoker  Has strong family history of CAD in her mother, father, and sister       Today, 7/15/19, patient presents for post-hospital follow-up  Recent prolonged hospital stay due to the events noted above from 6/18-7/11/19  Her cardiogenic shock improved and she was able to come off pressor and inotropic support  She was started on HF GDMT and loaded with amiodarone  She was then successfully cardioverted on 7/3/19 but then went back into Afib on 7/8,  with plan for possible repeat CV or ablation in the future  WANG and transaminitis continued to improve  She was discharged on 20 mg of torsemide at a weight of 150 lbs  [address]  States she is feeling well overall  Weights are stable  Denies any chest discomfort, SOB, orthopnea or PND  No palpitations, dizziness or syncopal episodes  Tolerating her therapy sessions without difficulty  Her labs are improved  She is anxious to get home " [facility] on 07[facility]  Was seen by Dr Renee Weir on 07/22/2019 for follow-up; patient's HR was noted to remain elevated, 108-130 bpm  Her amiodarone was discontinued and plan at this time is for simple rate control (Toprol increased to 75 mg BID)  Additionally, during this visit patient admitted to dietary indiscretion (eating leftovers from a taco bar) and noted that she had gained 10 lbs in about 1 week (weight of 164 lbs in office)  Patient stated she was unable to weigh herself at home due to her scale not being calibrated  Patient was advised to take an extra dose of torsemide the following day  One week follow-up appointment was made with HF team      On 07/26/2019, Mickie Baez RN talked with patient   Patient did take the extra torsemide dose and increased her Toprol dosage, as advised  Reported weight of 160 lbs and adherence to low sodium diet with 2L fluid restriction  Today, 07/29/2019, patient feels well  Presents with her sister  Has discontinued amiodarone and increased metoprolol dose  She has continued to take 40 mg torsemide daily since seeing Dr Pérez Zavala  Has been eating more fresh fruit and staying away from processed foods and high salt foods  Is eager to return to work  Family is hesitant to have her start driving again  Is planning to put in application for an assisted living apartment  Currently staying with family  PT has been seeing her at home, able to walk on various terrain (hills, stairs, etc ) without stopping; reports talking with therapist during walk also  Has been weighing herself daily  Weight of 164 8 lbs today; 164 lbs on 07/22  Weighed 155 lbs in office on 07/15  Appears slightly overloaded on exam  No current complaints  No PND, chest pain, lightheadedness, dizziness, or palpitations  RTC in 8 weeks with Dr Luca Rockwell  To have stress test and sleep study completed prior to this appointment  Past Medical History:   Diagnosis Date    Cardiogenic shock ([facility] [address] ) 6/26/2019    Eczema     Photosensitivity     abnormal skin sensitivity to sunlight    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HCC)     staging     Review of Systems   Constitutional: Negative for chills, diaphoresis, fatigue and unexpected weight change  HENT: Negative for congestion, postnasal drip, rhinorrhea and sore throat  Respiratory: Negative for cough and shortness of breath  Cardiovascular: Negative for chest pain, palpitations and leg swelling  Gastrointestinal: Negative for abdominal distention, diarrhea, nausea and vomiting  Genitourinary: Negative for difficulty urinating, frequency and urgency  Neurological: Negative for dizziness, syncope and light-headedness  Psychiatric/Behavioral: Negative for agitation and behavioral problems  12-point ROS completed and negative except as stated above and/or in the HPI  Allergies   Allergen Reactions    Penicillins     Wellbutrin Sr  [Bupropion]      Other reaction(s): Suicidal ideations  Category:  Adverse Reaction;        Current Outpatient Medications:     albuterol (VENTOLIN HFA) 90 mcg/act inhaler, Inhale 1-2 puffs every 4 (four) hours as needed for wheezing, Disp: , Rfl: 0    apixaban (ELIQUIS) 2 5 mg, Take 2 tablets (5 mg total) by mouth 2 (two) times a day, Disp: , Rfl: 0    Ascorbic Acid (VITAMIN C) 100 MG tablet, Take 100 mg by mouth daily, Disp: , Rfl:     cholecalciferol (VITAMIN D3) 1,000 units tablet, Take 1,000 Units by mouth daily, Disp: , Rfl:     cyanocobalamin 100 MCG tablet, Take 100 mcg by mouth daily, Disp: , Rfl:     fexofenadine (ALLEGRA) 30 MG tablet, Take 30 mg by mouth 2 (two) times a day, Disp: , Rfl:     hydrALAZINE (APRESOLINE) 25 mg tablet, Take 1 tablet (25 mg total) by mouth every 8 (eight) hours, Disp: , Rfl: 0    isosorbide dinitrate (ISORDIL) 10 mg tablet, Take 1 tablet (10 mg total) by mouth 3 (three) times daily after meals, Disp: , Rfl: 0    levothyroxine 137 mcg tablet, Take 1 tablet (137 mcg total) by mouth daily, Disp: 30 tablet, Rfl: 5    melatonin 3 mg, Take 2 tablets (6 mg total) by mouth daily at bedtime, Disp: , Rfl: 0    metoprolol succinate (TOPROL-XL) 25 mg 24 hr tablet, Take 3 tablets (75 mg total) by mouth 2 (two) times a day, Disp: 60 tablet, Rfl: 1    multivitamin (THERAGRAN) TABS, Take 1 tablet by mouth daily, Disp: , Rfl:     nystatin (MYCOSTATIN) powder, Apply topically 2 (two) times a day, Disp: 15 g, Rfl: 0    Omega-3 Fatty Acids (FISH OIL) 1,000 mg, Take 1,000 mg by mouth daily, Disp: , Rfl:     pantoprazole (PROTONIX) 40 mg tablet, Take 1 tablet (40 mg total) by mouth daily in the early morning, Disp: , Rfl: 0    polyethylene glycol (MIRALAX) 17 g packet, Take 17 g by mouth daily, Disp: 14 each, Rfl: 0    pyridoxine (B-6) 100 MG tablet, Take 100 mg by mouth daily, Disp: , Rfl:     senna (SENOKOT) 8 6 mg, Take 1 tablet (8 6 mg total) by mouth daily at bedtime, Disp: 120 each, Rfl: 0    SUPER B COMPLEX/C CAPS, Take by mouth, Disp: , Rfl:     torsemide (DEMADEX) 20 mg tablet, Take 2 tablets (40 mg total) by mouth daily, Disp: 60 tablet, Rfl: 2    venlafaxine (EFFEXOR) 75 mg tablet, Take 1 tablet (75 mg total) by mouth every 12 (twelve) hours, Disp: 60 tablet, Rfl: 5    vitamin E, tocopherol, 1,000 units capsule, Take 1,000 Units by mouth daily, Disp: , Rfl:     potassium chloride (K-DUR,KLOR-CON) 20 mEq tablet, Take 1 tablet (20 mEq total) by mouth daily, Disp: 90 tablet, Rfl: 2     Social History     Socioeconomic History    Marital status: Single     Spouse name: Not on file    Number of children: Not on file    Years of education: Not on file    Highest education level: Not on file   Occupational History    Not on file   Social Needs    Financial resource strain: Not on file    Food insecurity:     Worry: Not on file     Inability: Not on file    Transportation needs:     Medical: Not on file     Non-medical: Not on file   Tobacco Use    Smoking status: Never Smoker    Smokeless tobacco: Never Used   Substance and Sexual Activity    Alcohol use: Not Currently    Drug use: Never    Sexual activity: Not Currently   Lifestyle    Physical activity:     Days per week: Not on file     Minutes per session: Not on file    Stress: Not on file   Relationships    Social connections:     Talks on phone: Not on file     Gets together: Not on file     Attends [employer]ism service: Not on file     Active member of club or organization: Not on file     Attends meetings of clubs or organizations: Not on file     Relationship status: Not on file    Intimate partner violence:     Fear of current or ex partner: Not on file     Emotionally abused: Not on file     Physically abused: Not on file     Forced sexual activity: Not on file Other Topics Concern    Not on file   Social History Narrative    Not on file     Family History   Problem Relation Age of Onset    Alzheimer's disease Mother     Coronary artery disease Mother     Dementia Mother     Diabetes Mother         mellitus    Other Father         acute myocardial infarction    Coronary artery disease Sister     Other Maternal Grandfather         laryngeal cancer    Dementia Maternal Aunt     Diabetes Maternal Aunt      Vitals:   Blood pressure 122/62, pulse 99, height 5' 1" (1 549 m), weight 74 8 kg (164 lb 12 8 oz), SpO2 99 %  Body mass index is 31 14 kg/m²  Wt Readings from Last 3 Encounters:   07/29/19 74 8 kg (164 lb 12 8 oz)   07/23/19 74 4 kg (164 lb)   07/22/19 74 4 kg (164 lb)     Vitals:    07/29/19 1246   BP: 122/62   BP Location: Left arm   Patient Position: Sitting   Cuff Size: Standard   Pulse: 99   SpO2: 99%   Weight: 74 8 kg (164 lb 12 8 oz)   Height: 5' 1" (1 549 m)     Physical Exam   Constitutional: She is oriented to person, place, and time  She appears well-developed and well-nourished  HENT:   Head: Normocephalic and atraumatic  Eyes: Pupils are equal, round, and reactive to light  EOM are normal    Neck: No JVD present  No tracheal deviation present  Cardiovascular: Normal rate and intact distal pulses  An irregularly irregular rhythm present  Pulmonary/Chest: Effort normal and breath sounds normal  No respiratory distress  She has no wheezes  Abdominal: Soft  Bowel sounds are normal  She exhibits no distension  Musculoskeletal: She exhibits edema (trace LE)  Neurological: She is alert and oriented to person, place, and time  Skin: Skin is warm and dry  Psychiatric: She has a normal mood and affect  Her behavior is normal      Diagnostic Testing:  Echocardiogram (limited) from 07/04/2019:  LVEF: 45%; mild diffuse hypokinesis  LVIDd: 3 8 cm  MR: No MR  Moderate annular calcification    Other: Dilated LA      Echocardiogram from 06/20/2019:  LVEF: 25%  LVIDd: 4 6 cm  RV: Dilated and hypokinetic  MR: Moderate  PASP: 35 mmHg  Labs & Results:  Lab Results   Component Value Date    WBC 10 57 (H) 07/09/2019    HGB 14 0 07/09/2019    HCT 42 0 07/09/2019    MCV 96 07/09/2019     07/09/2019     Lab Results   Component Value Date    SODIUM 140 07/23/2019    K 4 1 07/23/2019    CL 99 (L) 07/23/2019    CO2 36 (H) 07/23/2019    BUN 20 07/23/2019    CREATININE 1 00 07/23/2019    GLUC 235 (H) 07/11/2019    CALCIUM 8 9 07/23/2019     Lab Results   Component Value Date    INR 1 60 (H) 07/03/2019    INR 2 16 (H) 07/01/2019    INR 2 79 (H) 06/30/2019    PROTIME 18 6 (H) 07/03/2019    PROTIME 23 6 (H) 07/01/2019    PROTIME 28 9 (H) 06/30/2019     Lab Results   Component Value Date    NTBNP 1,823 (H) 06/18/2019      EKG personally reviewed by Roxie Aschoff, PA-C  Counseling / Coordination of Care: Total floor / unit time spent today 35minutes  Greater than 50% of total time was spent with the patient and / or family counseling and / or coordination of care  A description of the counseling / coordination of care: 20  Thank you for the opportunity to participate in the care of this patient      Leora Abbasi PA-C

## 2019-07-31 ENCOUNTER — PATIENT OUTREACH (OUTPATIENT)
Dept: FAMILY MEDICINE CLINIC | Facility: CLINIC | Age: 79
End: 2019-07-31

## 2019-07-31 NOTE — LETTER
Date: 07/31/19    Dear Tato Richey,   My name is Maurizio Donaldson ; I am a registered nurse care manager working with [facility]  I have not been able to reach you and would like to set a time that I can talk with you over the phone  My work is to help patients that have complex medical conditions get the care they need  This includes patients who may have been in the hospital or emergency room  I have enclosed information for you  Please call me with any questions you may have  I look forward to meeting with you    Sincerely,  Maurizio Donaldson  [phone]

## 2019-08-01 ENCOUNTER — TELEPHONE (OUTPATIENT)
Dept: FAMILY MEDICINE CLINIC | Facility: CLINIC | Age: 79
End: 2019-08-01

## 2019-08-01 ENCOUNTER — APPOINTMENT (OUTPATIENT)
Dept: LAB | Facility: CLINIC | Age: 79
End: 2019-08-01
Payer: COMMERCIAL

## 2019-08-01 ENCOUNTER — TRANSCRIBE ORDERS (OUTPATIENT)
Dept: LAB | Facility: CLINIC | Age: 79
End: 2019-08-01

## 2019-08-01 ENCOUNTER — TELEPHONE (OUTPATIENT)
Dept: SLEEP CENTER | Facility: CLINIC | Age: 79
End: 2019-08-01

## 2019-08-01 DIAGNOSIS — I50.42 CHRONIC COMBINED SYSTOLIC AND DIASTOLIC CONGESTIVE HEART FAILURE (HCC): ICD-10-CM

## 2019-08-01 DIAGNOSIS — I48.91 ATRIAL FIBRILLATION, UNSPECIFIED TYPE (HCC): ICD-10-CM

## 2019-08-01 LAB
ANION GAP SERPL CALCULATED.3IONS-SCNC: 5 MMOL/L (ref 4–13)
BUN SERPL-MCNC: 17 MG/DL (ref 5–25)
CALCIUM SERPL-MCNC: 9 MG/DL (ref 8.3–10.1)
CHLORIDE SERPL-SCNC: 98 MMOL/L (ref 100–108)
CO2 SERPL-SCNC: 35 MMOL/L (ref 21–32)
CREAT SERPL-MCNC: 0.93 MG/DL (ref 0.6–1.3)
GFR SERPL CREATININE-BSD FRML MDRD: 59 ML/MIN/1.73SQ M
GLUCOSE P FAST SERPL-MCNC: 117 MG/DL (ref 65–99)
POTASSIUM SERPL-SCNC: 3.7 MMOL/L (ref 3.5–5.3)
SODIUM SERPL-SCNC: 138 MMOL/L (ref 136–145)

## 2019-08-01 PROCEDURE — 80048 BASIC METABOLIC PNL TOTAL CA: CPT

## 2019-08-01 PROCEDURE — 36415 COLL VENOUS BLD VENIPUNCTURE: CPT

## 2019-08-01 RX ORDER — METOPROLOL SUCCINATE 25 MG/1
25 TABLET, EXTENDED RELEASE ORAL 2 TIMES DAILY
Qty: 60 TABLET | Refills: 1 | Status: CANCELLED | OUTPATIENT
Start: 2019-08-01

## 2019-08-01 NOTE — TELEPHONE ENCOUNTER
----- Message from Anderson Cooley MD sent at 7/31/2019  5:46 PM EDT -----  Approved  ----- Message -----  From: Kenny Shelley: 7/22/2019   9:49 AM EDT  To: Sleep Medicine Stevie Turner, #    PLEASE REVIEW FOR APPROVAL OR DENIAL AND WHY

## 2019-08-01 NOTE — TELEPHONE ENCOUNTER
Patient wants a refill on Toprol XL 25 mg tablets called to [facility]  Araceli Colon can be reached at [phone] any questions

## 2019-08-02 DIAGNOSIS — I48.91 ATRIAL FIBRILLATION, UNSPECIFIED TYPE (HCC): ICD-10-CM

## 2019-08-02 RX ORDER — METOPROLOL SUCCINATE 25 MG/1
75 TABLET, EXTENDED RELEASE ORAL 2 TIMES DAILY
Qty: 180 TABLET | Refills: 5 | Status: SHIPPED | OUTPATIENT
Start: 2019-08-02 | End: 2019-12-30 | Stop reason: SDUPTHER

## 2019-08-13 LAB
LEFT EYE DIABETIC RETINOPATHY: NORMAL
RIGHT EYE DIABETIC RETINOPATHY: NORMAL

## 2019-08-14 DIAGNOSIS — I48.91 ATRIAL FIBRILLATION WITH RAPID VENTRICULAR RESPONSE (HCC): ICD-10-CM

## 2019-08-14 DIAGNOSIS — I50.9 ACUTE CONGESTIVE HEART FAILURE, UNSPECIFIED HEART FAILURE TYPE (HCC): ICD-10-CM

## 2019-08-14 DIAGNOSIS — I10 ESSENTIAL HYPERTENSION: ICD-10-CM

## 2019-08-14 RX ORDER — HYDRALAZINE HYDROCHLORIDE 25 MG/1
25 TABLET, FILM COATED ORAL EVERY 8 HOURS SCHEDULED
Qty: 90 TABLET | Refills: 11 | Status: SHIPPED | OUTPATIENT
Start: 2019-08-14 | End: 2020-03-24 | Stop reason: HOSPADM

## 2019-08-14 RX ORDER — ISOSORBIDE DINITRATE 10 MG/1
10 TABLET ORAL
Qty: 90 TABLET | Refills: 11 | Status: SHIPPED | OUTPATIENT
Start: 2019-08-14 | End: 2020-03-24 | Stop reason: HOSPADM

## 2019-08-21 ENCOUNTER — OFFICE VISIT (OUTPATIENT)
Dept: FAMILY MEDICINE CLINIC | Facility: CLINIC | Age: 79
End: 2019-08-21
Payer: COMMERCIAL

## 2019-08-21 VITALS
TEMPERATURE: 97.9 F | WEIGHT: 161.8 LBS | OXYGEN SATURATION: 97 % | RESPIRATION RATE: 16 BRPM | HEART RATE: 92 BPM | BODY MASS INDEX: 30.55 KG/M2 | HEIGHT: 61 IN | DIASTOLIC BLOOD PRESSURE: 82 MMHG | SYSTOLIC BLOOD PRESSURE: 130 MMHG

## 2019-08-21 DIAGNOSIS — F41.8 DEPRESSION WITH ANXIETY: ICD-10-CM

## 2019-08-21 DIAGNOSIS — E03.9 HYPOTHYROIDISM, UNSPECIFIED TYPE: Primary | ICD-10-CM

## 2019-08-21 DIAGNOSIS — I48.91 ATRIAL FIBRILLATION WITH RVR (HCC): ICD-10-CM

## 2019-08-21 DIAGNOSIS — E11.36 TYPE 2 DIABETES MELLITUS WITH DIABETIC CATARACT, WITHOUT LONG-TERM CURRENT USE OF INSULIN (HCC): ICD-10-CM

## 2019-08-21 DIAGNOSIS — I50.42 CHRONIC COMBINED SYSTOLIC (CONGESTIVE) AND DIASTOLIC (CONGESTIVE) HEART FAILURE (HCC): ICD-10-CM

## 2019-08-21 DIAGNOSIS — I10 ESSENTIAL HYPERTENSION: ICD-10-CM

## 2019-08-21 DIAGNOSIS — E66.9 OBESITY (BMI 30-39.9): ICD-10-CM

## 2019-08-21 PROBLEM — K72.00 SHOCK LIVER: Status: RESOLVED | Noted: 2019-06-26 | Resolved: 2019-08-21

## 2019-08-21 PROBLEM — N17.9 ACUTE KIDNEY INJURY (HCC): Status: RESOLVED | Noted: 2019-06-26 | Resolved: 2019-08-21

## 2019-08-21 PROBLEM — E87.1 HYPONATREMIA: Status: RESOLVED | Noted: 2019-06-26 | Resolved: 2019-08-21

## 2019-08-21 PROCEDURE — 1036F TOBACCO NON-USER: CPT | Performed by: FAMILY MEDICINE

## 2019-08-21 PROCEDURE — 99214 OFFICE O/P EST MOD 30 MIN: CPT | Performed by: FAMILY MEDICINE

## 2019-08-21 PROCEDURE — 1160F RVW MEDS BY RX/DR IN RCRD: CPT | Performed by: FAMILY MEDICINE

## 2019-08-21 PROCEDURE — 3075F SYST BP GE 130 - 139MM HG: CPT | Performed by: FAMILY MEDICINE

## 2019-08-21 NOTE — PROGRESS NOTES
Chief Complaint   Patient presents with    Follow-up     4 weeks  Health Maintenance   Topic Date Due    Medicare Annual Wellness Visit (AWV)  1940    CRC Screening: Colonoscopy  1940    HEPATITIS B VACCINES (1 of 3 - Risk 3-dose series) 08/20/1959    DTaP,Tdap,and Td Vaccines (1 - Tdap) 01/02/2000    Pneumococcal Vaccine: 65+ Years (1 of 2 - PCV13) 08/20/2005    INFLUENZA VACCINE  09/19/2019 (Originally 7/1/2019)    HEMOGLOBIN A1C  09/18/2019    URINE MICROALBUMIN  03/18/2020    Fall Risk  03/20/2020    Depression Screening PHQ  03/20/2020    Urinary Incontinence Screening  03/20/2020    BMI: Followup Plan  03/20/2020    Diabetic Foot Exam  03/20/2020    BMI: Adult  08/21/2020    DM Eye Exam  08/13/2021    Pneumococcal Vaccine: Pediatrics (0 to 5 Years) and At-Risk Patients (6 to 59 Years)  Aged Out     BMI Counseling: Body mass index is 30 57 kg/m²  Discussed the patient's BMI with her  The BMI is above average  BMI counseling and education was provided to the patient  Nutrition recommendations include reducing portion sizes, decreasing overall calorie intake and 3-5 servings of fruits/vegetables daily  Exercise recommendations include moderate aerobic physical activity for 150 minutes/week  Assessment/Plan:       Refused flu shot  She states she got very sick before when she got flu shot before  Refused pneumonia shots or Tdap  Refuses Mammogram and Colonoscopy  RTO as scheduled with Dilcia Partida  Diagnoses and all orders for this visit:    Hypothyroidism, unspecified type  Comments:  Continue levothyroxine 137mcg daily  Will check labs  Orders:  -     TSH, 3rd generation with Free T4 reflex; Future    Type 2 diabetes mellitus with diabetic cataract, without long-term current use of insulin (HCC)  Comments:  Continue diet control  Will check labs  Orders:  -     Hemoglobin A1C; Future    Atrial fibrillation with RVR (HCC)  Comments:  Continue eliquis, metoprolol   FU cardiology  Chronic combined systolic (congestive) and diastolic (congestive) heart failure (HCC)  Comments:  Continue torsemide, potassium  Wt is stable  FU cardiology  Essential hypertension  Comments:  Controlled  continue hydralazine and metoprolol  FU cardiology  Depression with anxiety  Comments:  Controlled  Continue venlafaxine 75mg bid  Obesity (BMI 30-39  9)    Other orders  -     Cancel: Ambulatory referral to Gastroenterology; Future  -     MAGNESIUM PO; Take by mouth  -     Loratadine (CLARITIN PO); Take by mouth          Subjective:      Patient ID: Queta Beasley is a 78 y o  female  HPI    Pt is here by herself  Hypothyroidism----She is on Levothyroxine 137mcg daily  6/2019 TSH 9 17 Free T4 1 23 ok  DM type 2--- no current medications, diet controlled  3/2019 hgA1C 5 9 good  Denies neuropathy  Denies hypoglycemia recently  FU ophthalmology in 8/2019, no diabetic change  No podiatry  Afib---She is on eliquis 5mg bid  Rate control with metoprolol 75mg bid  FU cardiology  CHF---She is on isordil 10mg tid, torsemide 40mg QD and potassium  Wt is stable      HTN---She is on Hydralazine 25mg tid  She does not check her BP at home but does have a cuff  Denies CP, palpitations, edema, SOB      Obesity- no regular exercise currently, still working part time 12 hours/week now       Depression with Anxiety----She is on Venlafaxine 75mg bid for years  Mood is stable  Lives with her sister now  Plan to go back to her own home soon  Does all ADL's  Walk with cane  Still drive  Work part time  Denies recent falls              The following portions of the patient's history were reviewed and updated as appropriate: allergies, current medications, past family history, past medical history, past social history, past surgical history and problem list     Review of Systems   Constitutional: Negative for appetite change, chills and fever     HENT: Negative for congestion, ear pain, sinus pain and sore throat  Eyes: Negative for discharge and itching  Respiratory: Negative for apnea, cough, chest tightness, shortness of breath and wheezing  Cardiovascular: Negative for chest pain, palpitations and leg swelling  Gastrointestinal: Negative for abdominal pain, anal bleeding, constipation, diarrhea, nausea and vomiting  Endocrine: Negative for cold intolerance, heat intolerance and polyuria  Genitourinary: Negative for difficulty urinating and dysuria  Musculoskeletal: Negative for arthralgias, back pain and myalgias  Skin: Negative for rash  Neurological: Negative for dizziness and headaches  Psychiatric/Behavioral: Negative for agitation  Objective:      /82 (BP Location: Left arm, Patient Position: Sitting, Cuff Size: Adult)   Pulse 92   Temp 97 9 °F (36 6 °C) (Tympanic)   Resp 16   Ht 5' 1" (1 549 m)   Wt 73 4 kg (161 lb 12 8 oz)   SpO2 97%   BMI 30 57 kg/m²          Physical Exam   Constitutional: She appears well-developed  No distress  HENT:   Head: Normocephalic  Eyes: Pupils are equal, round, and reactive to light  Conjunctivae are normal  Right eye exhibits no discharge  Left eye exhibits no discharge  Neck: Normal range of motion  No thyromegaly present  Cardiovascular: Normal rate, regular rhythm and normal heart sounds  Exam reveals no gallop and no friction rub  Pulses are weak pulses  No murmur heard  Pulses:       Dorsalis pedis pulses are 1+ on the right side, and 1+ on the left side  Pulmonary/Chest: Effort normal and breath sounds normal  No respiratory distress  She has no wheezes  She has no rales  She exhibits no tenderness  Abdominal: Soft  Bowel sounds are normal    Musculoskeletal: Normal range of motion  She exhibits no edema  Feet:   Right Foot:   Skin Integrity: Negative for ulcer, skin breakdown, erythema, warmth, callus or dry skin     Left Foot:   Skin Integrity: Negative for ulcer, skin breakdown, erythema, warmth, callus or dry skin  Lymphadenopathy:     She has no cervical adenopathy  Neurological: She is alert  Psychiatric: She has a normal mood and affect  Patient's shoes and socks removed  Right Foot/Ankle   Right Foot Inspection  Skin Exam: skin normal and skin intact no dry skin, no warmth, no callus, no erythema, no maceration, no abnormal color, no pre-ulcer, no ulcer and no callus                            Sensory       Monofilament testing: intact  Vascular    The right DP pulse is 1+  Left Foot/Ankle  Left Foot Inspection  Skin Exam: skin normal and skin intactno dry skin, no warmth, no erythema, no maceration, normal color, no pre-ulcer, no ulcer and no callus                                         Sensory       Monofilament: intact  Vascular    The left DP pulse is 1+  Assign Risk Category:  No deformity present;  No loss of protective sensation; Weak pulses       Risk: 1

## 2019-10-15 DIAGNOSIS — E03.9 HYPOTHYROIDISM, UNSPECIFIED TYPE: ICD-10-CM

## 2019-10-15 RX ORDER — LEVOTHYROXINE SODIUM 137 UG/1
137 TABLET ORAL DAILY
Qty: 30 TABLET | Refills: 5 | Status: SHIPPED | OUTPATIENT
Start: 2019-10-15 | End: 2020-05-01

## 2019-10-20 NOTE — PROGRESS NOTES
Cardiology Follow Up    Akash Edmond  1940  [id number]  [facility]  [address]  [address]  [phone]    1  Atrial fibrillation with rapid ventricular response (HCC)  POCT ECG   2  Essential hypertension     3  Chronic combined systolic and diastolic congestive heart failure ([address] )     4  Pure hypercholesterolemia         Interval History:  Patient is here for a follow-up visit  She was most recently seen by me during her hospitalization  She was treated for biventricular heart failure  She was noted to have atrial fibrillation and ultimately had two cardioversions the last of which was July 3rd with an amiodarone load  She converted back to atrial fibrillation and amiodarone was discontinued July 22nd by Dr Lila Mcleod, electrophysiology service because of return to atrial fibrillation  She continues on rate control and anticoagulation  EKG at that time demonstrated atrial fibrillation  Most recent echocardiogram done July 4, 2019 demonstrated a mild reduction in overall LV systolic function in the setting of mild diffuse hypokinesis with an ejection fraction of 45%  A prior complete echo done June 26th demonstrated moderate mitral and tricuspid regurgitation with moderate biatrial cavity enlargement  The initial EKG June 20, 2019 demonstrated an ejection fraction of 25-30% in the setting of severe diffuse hypokinesis and moderate mitral regurgitation  Her blood pressure is stable today  EKG demonstrates atrial fibrillation with rapid ventricular response but the patient has not taken her medications today  I urged her to do so when she goes home  She is on hydralazine and isosorbide dinitrate as per the heart failure service  Creatinine was 2 54 July 11th but as of August 1st is now 0 93  She has untreated sleep apnea and is reluctant to pursue treatment for this    I did tell her this would be a disadvantage in reference to managing her cardiac situation  She will review this with her family physician  As well as stress test was ordered and she has not had this done yet  I asked her to do so prior to her next visit with me  Overall she has been well with no chest pain or significant dyspnea  Patient Active Problem List   Diagnosis    Depression with anxiety    Hyperlipidemia    Hypertension    Hypothyroidism    Obesity (BMI 30-39  9)    Osteoarthritis    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)    Atrial fibrillation with RVR (HCC)    Acute on chronic combined systolic and diastolic CHF (congestive heart failure) (HCC)    Ambulatory dysfunction    Chronic combined systolic (congestive) and diastolic (congestive) heart failure (HCC)    Debility     Past Medical History:   Diagnosis Date    Cardiogenic shock ([facility] [address] ) 6/26/2019    Eczema     Photosensitivity     abnormal skin sensitivity to sunlight    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HCC)     staging     Social History     Socioeconomic History    Marital status: Single     Spouse name: Not on file    Number of children: Not on file    Years of education: Not on file    Highest education level: Not on file   Occupational History    Not on file   Social Needs    Financial resource strain: Not on file    Food insecurity:     Worry: Not on file     Inability: Not on file    Transportation needs:     Medical: Not on file     Non-medical: Not on file   Tobacco Use    Smoking status: Never Smoker    Smokeless tobacco: Never Used   Substance and Sexual Activity    Alcohol use: Not Currently    Drug use: Never    Sexual activity: Not Currently   Lifestyle    Physical activity:     Days per week: Not on file     Minutes per session: Not on file    Stress: Not on file   Relationships    Social connections:     Talks on phone: Not on file     Gets together: Not on file     Attends Confucianist service: Not on file     Active member of club or organization: Not on file     Attends meetings of clubs or organizations: Not on file     Relationship status: Not on file    Intimate partner violence:     Fear of current or ex partner: Not on file     Emotionally abused: Not on file     Physically abused: Not on file     Forced sexual activity: Not on file   Other Topics Concern    Not on file   Social History Narrative    Not on file      Family History   Problem Relation Age of Onset    Alzheimer's disease Mother     Coronary artery disease Mother     Dementia Mother     Diabetes Mother         mellitus    Other Father         acute myocardial infarction    Coronary artery disease Sister     Other Maternal Grandfather         laryngeal cancer    Dementia Maternal Aunt     Diabetes Maternal Aunt      Past Surgical History:   Procedure Laterality Date    CATARACT EXTRACTION Left     HEMORRHOID SURGERY      IR CENTRAL LINE PLACEMENT  7/9/2019    OOPHORECTOMY      unilateral    TOTAL ABDOMINAL HYSTERECTOMY         Current Outpatient Medications:     apixaban (ELIQUIS) 5 mg, Take 1 tablet (5 mg total) by mouth 2 (two) times a day, Disp: 60 tablet, Rfl: 11    Ascorbic Acid (VITAMIN C) 100 MG tablet, Take 100 mg by mouth daily, Disp: , Rfl:     cholecalciferol (VITAMIN D3) 1,000 units tablet, Take 1,000 Units by mouth daily, Disp: , Rfl:     hydrALAZINE (APRESOLINE) 25 mg tablet, Take 1 tablet (25 mg total) by mouth every 8 (eight) hours, Disp: 90 tablet, Rfl: 11    isosorbide dinitrate (ISORDIL) 10 mg tablet, Take 1 tablet (10 mg total) by mouth 3 (three) times daily after meals, Disp: 90 tablet, Rfl: 11    levothyroxine 137 mcg tablet, TAKE 1 TABLET (137 MCG TOTAL) BY MOUTH DAILY, Disp: 30 tablet, Rfl: 5    Loratadine (CLARITIN PO), Take by mouth, Disp: , Rfl:     MAGNESIUM PO, Take by mouth, Disp: , Rfl:     metoprolol succinate (TOPROL-XL) 25 mg 24 hr tablet, Take 3 tablets (75 mg total) by mouth 2 (two) times a day, Disp: 180 tablet, Rfl: 5    multivitamin (THERAGRAN) TABS, Take 1 tablet by mouth daily, Disp: , Rfl:     potassium chloride (K-DUR,KLOR-CON) 20 mEq tablet, Take 1 tablet (20 mEq total) by mouth daily, Disp: 90 tablet, Rfl: 2    torsemide (DEMADEX) 20 mg tablet, Take 2 tablets (40 mg total) by mouth daily, Disp: 60 tablet, Rfl: 2    venlafaxine (EFFEXOR) 75 mg tablet, TAKE 1 TABLET (75 MG TOTAL) BY MOUTH EVERY 12 (TWELVE) HOURS, Disp: 60 tablet, Rfl: 5  Allergies   Allergen Reactions    Penicillins     Wellbutrin Sr  [Bupropion]      Other reaction(s): Suicidal ideations  Category: Adverse Reaction;        Labs:not applicable  Imaging: No results found  Review of Systems:  Review of Systems   All other systems reviewed and are negative  Physical Exam:  /70 (BP Location: Left arm, Patient Position: Sitting, Cuff Size: Standard)   Pulse (!) 119   Ht 5' 1" (1 549 m)   Wt 73 6 kg (162 lb 4 8 oz)   SpO2 99%   BMI 30 67 kg/m²   Physical Exam   Constitutional: She is oriented to person, place, and time  She appears well-developed and well-nourished  HENT:   Head: Normocephalic and atraumatic  Eyes: Pupils are equal, round, and reactive to light  Conjunctivae and EOM are normal    Neck: Normal range of motion  Neck supple  Cardiovascular: Normal rate and normal heart sounds  Pulmonary/Chest: Effort normal and breath sounds normal    Neurological: She is alert and oriented to person, place, and time  Skin: Skin is warm and dry  Psychiatric: She has a normal mood and affect  Vitals reviewed  Discussion/Summary:I will continue the patient's current medical regimen  The patient appears well compensated  I have asked the patient to call if there is a problem in the interim otherwise I will see the patient in six months time  The patient is due for an echo prior to the next visit to assess LV wall thickness and systolic function

## 2019-10-21 DIAGNOSIS — F41.8 DEPRESSION WITH ANXIETY: ICD-10-CM

## 2019-10-21 RX ORDER — VENLAFAXINE 75 MG/1
75 TABLET ORAL EVERY 12 HOURS
Qty: 60 TABLET | Refills: 5 | Status: SHIPPED | OUTPATIENT
Start: 2019-10-21 | End: 2020-05-01

## 2019-10-25 ENCOUNTER — OFFICE VISIT (OUTPATIENT)
Dept: CARDIOLOGY CLINIC | Facility: CLINIC | Age: 79
End: 2019-10-25
Payer: COMMERCIAL

## 2019-10-25 VITALS
DIASTOLIC BLOOD PRESSURE: 70 MMHG | OXYGEN SATURATION: 99 % | WEIGHT: 162.3 LBS | HEART RATE: 119 BPM | HEIGHT: 61 IN | BODY MASS INDEX: 30.64 KG/M2 | SYSTOLIC BLOOD PRESSURE: 130 MMHG

## 2019-10-25 DIAGNOSIS — I50.42 CHRONIC COMBINED SYSTOLIC AND DIASTOLIC CONGESTIVE HEART FAILURE (HCC): ICD-10-CM

## 2019-10-25 DIAGNOSIS — I10 ESSENTIAL HYPERTENSION: ICD-10-CM

## 2019-10-25 DIAGNOSIS — E78.00 PURE HYPERCHOLESTEROLEMIA: ICD-10-CM

## 2019-10-25 DIAGNOSIS — I48.91 ATRIAL FIBRILLATION WITH RAPID VENTRICULAR RESPONSE (HCC): Primary | ICD-10-CM

## 2019-10-25 PROCEDURE — 3075F SYST BP GE 130 - 139MM HG: CPT | Performed by: INTERNAL MEDICINE

## 2019-10-25 PROCEDURE — 93000 ELECTROCARDIOGRAM COMPLETE: CPT | Performed by: INTERNAL MEDICINE

## 2019-10-25 PROCEDURE — 99214 OFFICE O/P EST MOD 30 MIN: CPT | Performed by: INTERNAL MEDICINE

## 2019-10-25 PROCEDURE — 3078F DIAST BP <80 MM HG: CPT | Performed by: INTERNAL MEDICINE

## 2019-12-03 ENCOUNTER — OFFICE VISIT (OUTPATIENT)
Dept: FAMILY MEDICINE CLINIC | Facility: CLINIC | Age: 79
End: 2019-12-03
Payer: COMMERCIAL

## 2019-12-03 VITALS
TEMPERATURE: 98.3 F | DIASTOLIC BLOOD PRESSURE: 80 MMHG | HEART RATE: 93 BPM | WEIGHT: 169 LBS | HEIGHT: 61 IN | BODY MASS INDEX: 31.91 KG/M2 | SYSTOLIC BLOOD PRESSURE: 120 MMHG

## 2019-12-03 DIAGNOSIS — E78.2 MIXED HYPERLIPIDEMIA: ICD-10-CM

## 2019-12-03 DIAGNOSIS — E11.36 TYPE 2 DIABETES MELLITUS WITH DIABETIC CATARACT, WITHOUT LONG-TERM CURRENT USE OF INSULIN (HCC): Primary | ICD-10-CM

## 2019-12-03 DIAGNOSIS — E03.9 HYPOTHYROIDISM, UNSPECIFIED TYPE: ICD-10-CM

## 2019-12-03 DIAGNOSIS — F41.8 DEPRESSION WITH ANXIETY: ICD-10-CM

## 2019-12-03 DIAGNOSIS — I10 ESSENTIAL HYPERTENSION: ICD-10-CM

## 2019-12-03 DIAGNOSIS — I48.91 ATRIAL FIBRILLATION WITH RVR (HCC): ICD-10-CM

## 2019-12-03 DIAGNOSIS — Z00.00 ENCOUNTER FOR MEDICARE ANNUAL WELLNESS EXAM: ICD-10-CM

## 2019-12-03 LAB — SL AMB POCT HEMOGLOBIN AIC: 5.9 (ref ?–6.5)

## 2019-12-03 PROCEDURE — 1036F TOBACCO NON-USER: CPT | Performed by: NURSE PRACTITIONER

## 2019-12-03 PROCEDURE — 3725F SCREEN DEPRESSION PERFORMED: CPT | Performed by: NURSE PRACTITIONER

## 2019-12-03 PROCEDURE — 3079F DIAST BP 80-89 MM HG: CPT | Performed by: NURSE PRACTITIONER

## 2019-12-03 PROCEDURE — 3074F SYST BP LT 130 MM HG: CPT | Performed by: NURSE PRACTITIONER

## 2019-12-03 PROCEDURE — 1160F RVW MEDS BY RX/DR IN RCRD: CPT | Performed by: NURSE PRACTITIONER

## 2019-12-03 PROCEDURE — 83036 HEMOGLOBIN GLYCOSYLATED A1C: CPT | Performed by: NURSE PRACTITIONER

## 2019-12-03 PROCEDURE — 1170F FXNL STATUS ASSESSED: CPT | Performed by: NURSE PRACTITIONER

## 2019-12-03 PROCEDURE — 1125F AMNT PAIN NOTED PAIN PRSNT: CPT | Performed by: NURSE PRACTITIONER

## 2019-12-03 PROCEDURE — G0439 PPPS, SUBSEQ VISIT: HCPCS | Performed by: NURSE PRACTITIONER

## 2019-12-03 PROCEDURE — 99214 OFFICE O/P EST MOD 30 MIN: CPT | Performed by: NURSE PRACTITIONER

## 2019-12-03 NOTE — ASSESSMENT & PLAN NOTE
BP well controlled today  This is managed by cardiology  Continue current medication regimen  Limit sodium intake  Stress test and echo due in 6 months prior to next cardio appt

## 2019-12-03 NOTE — PATIENT INSTRUCTIONS
Please schedule stress test as ordered by Cardiology   Schedule Echo (ultrasound of the heart) in 6 months prior to next Cardiology appointment  Don't forget to schedule your 6 month follow up with   We'll see you in 6 months as well

## 2019-12-03 NOTE — ASSESSMENT & PLAN NOTE
Stable s/p cardioversion   Follow up with Cardio as scheduled in 6 months  Continue current medications

## 2019-12-03 NOTE — PROGRESS NOTES
Assessment/Plan:    Pt refused CRC screening  We did review all available options today   Refuses Mammogram   Pt also refuses any vaccinations including influenza and prevnar/ pneumovax   RTO 6 months     Type 2 diabetes mellitus with diabetic cataract ([facility] [address] )    Lab Results   Component Value Date    HGBA1C 5 9 12/03/2019   A1C well controlled via diet  Work on weight loss  Schedule eye exam    Hypothyroidism  TSH ordered today  Continue Levothyroxine    Hypertension  BP well controlled today  This is managed by cardiology  Continue current medication regimen  Limit sodium intake  Stress test and echo due in 6 months prior to next cardio appt    Chronic combined systolic (congestive) and diastolic (congestive) heart failure (HCC)  Wt Readings from Last 3 Encounters:   12/03/19 76 7 kg (169 lb)   10/25/19 73 6 kg (162 lb 4 8 oz)   08/21/19 73 4 kg (161 lb 12 8 oz)         Stable, managed by Cardiology  Appears euvolemic today  Continue current medication regimen  Follow a low sodium diet  Daily weights     Atrial fibrillation with RVR ([facility] [address] )  Stable s/p cardioversion   Follow up with Cardio as scheduled in 6 months  Continue current medications    Depression with anxiety  Stable, continue Effexor    Hyperlipidemia  Stable, managed by cardio         Diagnoses and all orders for this visit:    Type 2 diabetes mellitus with diabetic cataract, without long-term current use of insulin (HCC)  -     POCT hemoglobin A1c  -     CBC and differential; Future  -     Comprehensive metabolic panel; Future  -     TSH, 3rd generation with Free T4 reflex; Future    Atrial fibrillation with RVR (HCC)  -     CBC and differential; Future  -     Comprehensive metabolic panel; Future  -     TSH, 3rd generation with Free T4 reflex; Future    Essential hypertension  -     CBC and differential; Future  -     Comprehensive metabolic panel; Future  -     TSH, 3rd generation with Free T4 reflex;  Future    Hypothyroidism, unspecified type  - CBC and differential; Future  -     Comprehensive metabolic panel; Future  -     TSH, 3rd generation with Free T4 reflex; Future    Mixed hyperlipidemia    Depression with anxiety    Encounter for Medicare annual wellness exam          Subjective:      Patient ID: Tay Donohue is a 78 y o  female  HPI  Pt presents by herself today for a routine follow up and AWV    Pt is following with Cardiology for A fib, HTN, Hyperlipidemia and chronic combined systolic and diastolic CHF  Last seen 14/72/13, due for an echo in 6 months  She is also due for a stress test per Dr Ana Juárez   Currently taking Eliquis, Hydralazine, Isosorbide, Toprol XL, Torsemide  She is monitoring her weights daily at home which have been stable  Denies SOB, wheezing, chest pain, edema, dizziness  She has been trying to watch her salt intake- does have chips for a snack occasionally     DM type 2- POC A1C today in the office at 5 9  Diet controlled currently   Last eye exam performed about one year ago- she knows she is due for a follow up   Follows with [facility] Sight     Hypothyroidism- taking Levothyroxine  TSH at 9 17 from 6/2019    Depression and Anxiety- currently taking Effexor 75 mg BID  Feels her mood has been very stable on this  Family is supportive  Still working part time which she enjoys    She does not want the flu vaccine or any PNA vaccines  Still refusing mammogram and any CRC screening     The following portions of the patient's history were reviewed and updated as appropriate: allergies, current medications, past medical history, past social history and problem list     Review of Systems   Constitutional: Negative for chills, fatigue and fever  Respiratory: Negative for cough, shortness of breath and wheezing  Cardiovascular: Negative for chest pain, palpitations and leg swelling  Gastrointestinal: Negative for abdominal pain, blood in stool, constipation, diarrhea and nausea     Genitourinary: Negative for dysuria  Musculoskeletal: Negative for arthralgias and myalgias  Skin: Negative for rash and wound  Neurological: Negative for dizziness, weakness, numbness and headaches  Psychiatric/Behavioral: Negative for sleep disturbance and suicidal ideas  Objective:      /80   Pulse 93   Temp 98 3 °F (36 8 °C) (Tympanic)   Ht 5' 1" (1 549 m)   Wt 76 7 kg (169 lb)   BMI 31 93 kg/m²          Physical Exam   Constitutional: She is oriented to person, place, and time  She appears well-developed and well-nourished  No distress  HENT:   Head: Normocephalic and atraumatic  Eyes: Pupils are equal, round, and reactive to light  Conjunctivae are normal    Neck: Normal range of motion  Neck supple  No thyromegaly present  Cardiovascular: Normal rate, regular rhythm and normal heart sounds  No murmur heard  Trace B/L LE edema  No carotid bruits   Pulmonary/Chest: Effort normal and breath sounds normal  No respiratory distress  She has no wheezes  Abdominal: Soft  Bowel sounds are normal  She exhibits no distension  There is no tenderness  Musculoskeletal: Normal range of motion  Lymphadenopathy:     She has no cervical adenopathy  Neurological: She is alert and oriented to person, place, and time  Skin: Skin is warm and dry  She is not diaphoretic  Psychiatric: She has a normal mood and affect

## 2019-12-03 NOTE — ASSESSMENT & PLAN NOTE
Wt Readings from Last 3 Encounters:   12/03/19 76 7 kg (169 lb)   10/25/19 73 6 kg (162 lb 4 8 oz)   08/21/19 73 4 kg (161 lb 12 8 oz)         Stable, managed by Cardiology  Appears euvolemic today  Continue current medication regimen  Follow a low sodium diet  Daily weights

## 2019-12-03 NOTE — PROGRESS NOTES
Assessment and Plan:     Problem List Items Addressed This Visit        Endocrine    Hypothyroidism     TSH ordered today  Continue Levothyroxine         Relevant Orders    CBC and differential    Comprehensive metabolic panel    TSH, 3rd generation with Free T4 reflex    Type 2 diabetes mellitus with diabetic cataract ([facility] [address] ) - Primary       Lab Results   Component Value Date    HGBA1C 5 9 12/03/2019   A1C well controlled via diet  Work on weight loss  Schedule eye exam         Relevant Orders    POCT hemoglobin A1c (Completed)    CBC and differential    Comprehensive metabolic panel    TSH, 3rd generation with Free T4 reflex       Cardiovascular and Mediastinum    Hypertension     BP well controlled today  This is managed by cardiology  Continue current medication regimen  Limit sodium intake  Stress test and echo due in 6 months prior to next cardio appt         Relevant Orders    CBC and differential    Comprehensive metabolic panel    TSH, 3rd generation with Free T4 reflex    Atrial fibrillation with RVR (HCC)     Stable s/p cardioversion   Follow up with Cardio as scheduled in 6 months  Continue current medications         Relevant Orders    CBC and differential    Comprehensive metabolic panel    TSH, 3rd generation with Free T4 reflex       Other    Depression with anxiety     Stable, continue Effexor         Hyperlipidemia     Stable, managed by cardio           Other Visit Diagnoses     Encounter for Medicare annual wellness exam               Preventive health issues were discussed with patient, and age appropriate screening tests were ordered as noted in patient's After Visit Summary  Personalized health advice and appropriate referrals for health education or preventive services given if needed, as noted in patient's After Visit Summary       History of Present Illness:     Patient presents for Medicare Annual Wellness visit    Patient Care Team:  SOTO Greene as PCP - General (Family Medicine)     Problem List:     Patient Active Problem List   Diagnosis    Depression with anxiety    Hyperlipidemia    Hypertension    Hypothyroidism    Obesity (BMI 30-39  9)    Osteoarthritis    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)    Atrial fibrillation with RVR (HCC)    Acute on chronic combined systolic and diastolic CHF (congestive heart failure) (HCC)    Ambulatory dysfunction    Chronic combined systolic (congestive) and diastolic (congestive) heart failure (HCC)    Debility      Past Medical and Surgical History:     Past Medical History:   Diagnosis Date    Cardiogenic shock ([facility] [address] ) 6/26/2019    Eczema     Photosensitivity     abnormal skin sensitivity to sunlight    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HCC)     staging     Past Surgical History:   Procedure Laterality Date    CATARACT EXTRACTION Left     HEMORRHOID SURGERY      IR CENTRAL LINE PLACEMENT  7/9/2019    OOPHORECTOMY      unilateral    TOTAL ABDOMINAL HYSTERECTOMY        Family History:     Family History   Problem Relation Age of Onset    Alzheimer's disease Mother     Coronary artery disease Mother     Dementia Mother     Diabetes Mother         mellitus    Other Father         acute myocardial infarction    Coronary artery disease Sister     Other Maternal Grandfather         laryngeal cancer    Dementia Maternal Aunt     Diabetes Maternal Aunt       Social History:     Social History     Socioeconomic History    Marital status: Single     Spouse name: None    Number of children: None    Years of education: None    Highest education level: None   Occupational History    None   Social Needs    Financial resource strain: None    Food insecurity:     Worry: None     Inability: None    Transportation needs:     Medical: None     Non-medical: None   Tobacco Use    Smoking status: Never Smoker    Smokeless tobacco: Never Used   Substance and Sexual Activity    Alcohol use: Not Currently    Drug use: Never    Sexual activity: Not Currently   Lifestyle    Physical activity:     Days per week: None     Minutes per session: None    Stress: None   Relationships    Social connections:     Talks on phone: None     Gets together: None     Attends Worship service: None     Active member of club or organization: None     Attends meetings of clubs or organizations: None     Relationship status: None    Intimate partner violence:     Fear of current or ex partner: None     Emotionally abused: None     Physically abused: None     Forced sexual activity: None   Other Topics Concern    None   Social History Narrative    None       Medications and Allergies:     Current Outpatient Medications   Medication Sig Dispense Refill    apixaban (ELIQUIS) 5 mg Take 1 tablet (5 mg total) by mouth 2 (two) times a day 60 tablet 11    hydrALAZINE (APRESOLINE) 25 mg tablet Take 1 tablet (25 mg total) by mouth every 8 (eight) hours 90 tablet 11    isosorbide dinitrate (ISORDIL) 10 mg tablet Take 1 tablet (10 mg total) by mouth 3 (three) times daily after meals 90 tablet 11    levothyroxine 137 mcg tablet TAKE 1 TABLET (137 MCG TOTAL) BY MOUTH DAILY 30 tablet 5    Loratadine (CLARITIN PO) Take by mouth      metoprolol succinate (TOPROL-XL) 25 mg 24 hr tablet Take 3 tablets (75 mg total) by mouth 2 (two) times a day 180 tablet 5    multivitamin (THERAGRAN) TABS Take 1 tablet by mouth daily      potassium chloride (K-DUR,KLOR-CON) 20 mEq tablet Take 1 tablet (20 mEq total) by mouth daily 90 tablet 2    torsemide (DEMADEX) 20 mg tablet Take 2 tablets (40 mg total) by mouth daily 60 tablet 2    venlafaxine (EFFEXOR) 75 mg tablet TAKE 1 TABLET (75 MG TOTAL) BY MOUTH EVERY 12 (TWELVE) HOURS 60 tablet 5    Ascorbic Acid (VITAMIN C) 100 MG tablet Take 100 mg by mouth daily      cholecalciferol (VITAMIN D3) 1,000 units tablet Take 1,000 Units by mouth daily      MAGNESIUM PO Take by mouth       No current facility-administered medications for this visit  Allergies   Allergen Reactions    Penicillins     Wellbutrin Sr  [Bupropion]      Other reaction(s): Suicidal ideations  Category: Adverse Reaction;       Immunizations:     Immunization History   Administered Date(s) Administered    Td (adult), adsorbed 01/01/2000      Health Maintenance:         Topic Date Due    CRC Screening: Colonoscopy  1940         Topic Date Due    DTaP,Tdap,and Td Vaccines (1 - Tdap) 08/20/1951    Pneumococcal Vaccine: 65+ Years (1 of 2 - PCV13) 08/20/2005    Influenza Vaccine  07/01/2019      Medicare Health Risk Assessment:     /80   Pulse 93   Temp 98 3 °F (36 8 °C) (Tympanic)   Ht 5' 1" (1 549 m)   Wt 76 7 kg (169 lb)   BMI 31 93 kg/m²          Health Risk Assessment:   Patient rates overall health as good  Patient feels that their physical health rating is slightly better  Eyesight was rated as same  Hearing was rated as same  Patient feels that their emotional and mental health rating is much better  Pain experienced in the last 7 days has been none  Depression Screening:   PHQ-2 Score: 0  PHQ-9 Score: 0      Fall Risk Screening: In the past year, patient has experienced: no history of falling in past year      Urinary Incontinence Screening:   Patient has not leaked urine accidently in the last six months  Home Safety:  Patient does not have trouble with stairs inside or outside of their home  Patient has working smoke alarms and has working carbon monoxide detector  Home safety hazards include: none  Nutrition:   Current diet is Regular and No Added Salt  Medications:   Patient is currently taking over-the-counter supplements  OTC medications include: see medication list  Patient is able to manage medications       Activities of Daily Living (ADLs)/Instrumental Activities of Daily Living (IADLs):   Walk and transfer into and out of bed and chair?: Yes  Dress and groom yourself?: Yes    Bathe or shower yourself?: Yes Feed yourself? Yes  Do your laundry/housekeeping?: Yes  Manage your money, pay your bills and track your expenses?: Yes  Make your own meals?: Yes    Do your own shopping?: Yes    Previous Hospitalizations:   Any hospitalizations or ED visits within the last 12 months?: Yes    How many hospitalizations have you had in the last year?: 1-2    Hospitalization Comments: A-fib    Advance Care Planning:   Living will: No    Durable POA for healthcare: No    Advanced directive: Yes      Cognitive Screening:   Provider or family/friend/caregiver concerned regarding cognition?: No    PREVENTIVE SCREENINGS      Cardiovascular Screening:    General: Screening Not Indicated and History Lipid Disorder      Diabetes Screening:     General: Screening Not Indicated and History Diabetes      Colorectal Cancer Screening:     General: Risks and Benefits Discussed and Patient Declines      Breast Cancer Screening:     General: Risks and Benefits Discussed and Patient Declines      Cervical Cancer Screening:    General: Screening Not Indicated      Osteoporosis Screening:    General: Risks and Benefits Discussed and Patient Declines      Abdominal Aortic Aneurysm (AAA) Screening:        General: Risks and Benefits Discussed and Screening Not Indicated      Lung Cancer Screening:     General: Screening Not Indicated      Hepatitis C Screening:    General: Screening Not Indicated    Other Counseling Topics:   Skin self-exam, sunscreen and calcium and vitamin D intake and regular weightbearing exercise         SOTO Fontenot

## 2019-12-03 NOTE — ASSESSMENT & PLAN NOTE
Lab Results   Component Value Date    HGBA1C 5 9 12/03/2019   A1C well controlled via diet  Work on weight loss  Schedule eye exam

## 2019-12-30 DIAGNOSIS — I48.91 ATRIAL FIBRILLATION, UNSPECIFIED TYPE (HCC): ICD-10-CM

## 2019-12-30 RX ORDER — METOPROLOL SUCCINATE 25 MG/1
75 TABLET, EXTENDED RELEASE ORAL 2 TIMES DAILY
Qty: 180 TABLET | Refills: 0 | Status: SHIPPED | OUTPATIENT
Start: 2019-12-30 | End: 2020-01-30

## 2020-01-27 ENCOUNTER — OFFICE VISIT (OUTPATIENT)
Dept: FAMILY MEDICINE CLINIC | Facility: CLINIC | Age: 80
End: 2020-01-27
Payer: COMMERCIAL

## 2020-01-27 ENCOUNTER — APPOINTMENT (OUTPATIENT)
Dept: LAB | Facility: CLINIC | Age: 80
End: 2020-01-27
Payer: COMMERCIAL

## 2020-01-27 VITALS
DIASTOLIC BLOOD PRESSURE: 80 MMHG | HEART RATE: 72 BPM | TEMPERATURE: 97.6 F | BODY MASS INDEX: 33.42 KG/M2 | SYSTOLIC BLOOD PRESSURE: 138 MMHG | OXYGEN SATURATION: 97 % | RESPIRATION RATE: 16 BRPM | WEIGHT: 177 LBS | HEIGHT: 61 IN

## 2020-01-27 DIAGNOSIS — E11.36 TYPE 2 DIABETES MELLITUS WITH DIABETIC CATARACT, WITHOUT LONG-TERM CURRENT USE OF INSULIN (HCC): ICD-10-CM

## 2020-01-27 DIAGNOSIS — I10 ESSENTIAL HYPERTENSION: ICD-10-CM

## 2020-01-27 DIAGNOSIS — I48.91 ATRIAL FIBRILLATION WITH RVR (HCC): ICD-10-CM

## 2020-01-27 DIAGNOSIS — M79.606 ACHING LEG SYNDROME, UNSPECIFIED LATERALITY: Primary | ICD-10-CM

## 2020-01-27 DIAGNOSIS — E03.9 HYPOTHYROIDISM, UNSPECIFIED TYPE: ICD-10-CM

## 2020-01-27 DIAGNOSIS — I50.42 CHRONIC COMBINED SYSTOLIC (CONGESTIVE) AND DIASTOLIC (CONGESTIVE) HEART FAILURE (HCC): ICD-10-CM

## 2020-01-27 DIAGNOSIS — G25.81 RESTLESS LEG SYNDROME: ICD-10-CM

## 2020-01-27 LAB
ALBUMIN SERPL BCP-MCNC: 3.4 G/DL (ref 3.5–5)
ALP SERPL-CCNC: 68 U/L (ref 46–116)
ALT SERPL W P-5'-P-CCNC: 22 U/L (ref 12–78)
ANION GAP SERPL CALCULATED.3IONS-SCNC: 5 MMOL/L (ref 4–13)
AST SERPL W P-5'-P-CCNC: 23 U/L (ref 5–45)
BASOPHILS # BLD AUTO: 0.07 THOUSANDS/ΜL (ref 0–0.1)
BASOPHILS NFR BLD AUTO: 1 % (ref 0–1)
BILIRUB SERPL-MCNC: 0.65 MG/DL (ref 0.2–1)
BUN SERPL-MCNC: 17 MG/DL (ref 5–25)
CALCIUM SERPL-MCNC: 9.1 MG/DL (ref 8.3–10.1)
CHLORIDE SERPL-SCNC: 100 MMOL/L (ref 100–108)
CO2 SERPL-SCNC: 33 MMOL/L (ref 21–32)
CREAT SERPL-MCNC: 1.18 MG/DL (ref 0.6–1.3)
CREAT UR-MCNC: 159 MG/DL
EOSINOPHIL # BLD AUTO: 0.1 THOUSAND/ΜL (ref 0–0.61)
EOSINOPHIL NFR BLD AUTO: 1 % (ref 0–6)
ERYTHROCYTE [DISTWIDTH] IN BLOOD BY AUTOMATED COUNT: 13.3 % (ref 11.6–15.1)
EST. AVERAGE GLUCOSE BLD GHB EST-MCNC: 151 MG/DL
GFR SERPL CREATININE-BSD FRML MDRD: 44 ML/MIN/1.73SQ M
GLUCOSE P FAST SERPL-MCNC: 151 MG/DL (ref 65–99)
HBA1C MFR BLD: 6.9 % (ref 4.2–6.3)
HCT VFR BLD AUTO: 37.7 % (ref 34.8–46.1)
HGB BLD-MCNC: 11.6 G/DL (ref 11.5–15.4)
IMM GRANULOCYTES # BLD AUTO: 0.03 THOUSAND/UL (ref 0–0.2)
IMM GRANULOCYTES NFR BLD AUTO: 0 % (ref 0–2)
LYMPHOCYTES # BLD AUTO: 2.25 THOUSANDS/ΜL (ref 0.6–4.47)
LYMPHOCYTES NFR BLD AUTO: 28 % (ref 14–44)
MCH RBC QN AUTO: 29.4 PG (ref 26.8–34.3)
MCHC RBC AUTO-ENTMCNC: 30.8 G/DL (ref 31.4–37.4)
MCV RBC AUTO: 95 FL (ref 82–98)
MICROALBUMIN UR-MCNC: 41.7 MG/L (ref 0–20)
MICROALBUMIN/CREAT 24H UR: 26 MG/G CREATININE (ref 0–30)
MONOCYTES # BLD AUTO: 0.52 THOUSAND/ΜL (ref 0.17–1.22)
MONOCYTES NFR BLD AUTO: 6 % (ref 4–12)
NEUTROPHILS # BLD AUTO: 5.18 THOUSANDS/ΜL (ref 1.85–7.62)
NEUTS SEG NFR BLD AUTO: 64 % (ref 43–75)
NRBC BLD AUTO-RTO: 0 /100 WBCS
PLATELET # BLD AUTO: 262 THOUSANDS/UL (ref 149–390)
PMV BLD AUTO: 10.6 FL (ref 8.9–12.7)
POTASSIUM SERPL-SCNC: 3.5 MMOL/L (ref 3.5–5.3)
PROT SERPL-MCNC: 7.7 G/DL (ref 6.4–8.2)
RBC # BLD AUTO: 3.95 MILLION/UL (ref 3.81–5.12)
SODIUM SERPL-SCNC: 138 MMOL/L (ref 136–145)
T4 FREE SERPL-MCNC: 1.44 NG/DL (ref 0.76–1.46)
TSH SERPL DL<=0.05 MIU/L-ACNC: 4.34 UIU/ML (ref 0.36–3.74)
WBC # BLD AUTO: 8.15 THOUSAND/UL (ref 4.31–10.16)

## 2020-01-27 PROCEDURE — 1036F TOBACCO NON-USER: CPT | Performed by: NURSE PRACTITIONER

## 2020-01-27 PROCEDURE — 99214 OFFICE O/P EST MOD 30 MIN: CPT | Performed by: NURSE PRACTITIONER

## 2020-01-27 PROCEDURE — 83036 HEMOGLOBIN GLYCOSYLATED A1C: CPT

## 2020-01-27 PROCEDURE — 82570 ASSAY OF URINE CREATININE: CPT | Performed by: NURSE PRACTITIONER

## 2020-01-27 PROCEDURE — 84439 ASSAY OF FREE THYROXINE: CPT

## 2020-01-27 PROCEDURE — 85025 COMPLETE CBC W/AUTO DIFF WBC: CPT

## 2020-01-27 PROCEDURE — 80053 COMPREHEN METABOLIC PANEL: CPT

## 2020-01-27 PROCEDURE — 1160F RVW MEDS BY RX/DR IN RCRD: CPT | Performed by: NURSE PRACTITIONER

## 2020-01-27 PROCEDURE — 36415 COLL VENOUS BLD VENIPUNCTURE: CPT

## 2020-01-27 PROCEDURE — 82043 UR ALBUMIN QUANTITATIVE: CPT | Performed by: NURSE PRACTITIONER

## 2020-01-27 PROCEDURE — 84443 ASSAY THYROID STIM HORMONE: CPT

## 2020-01-27 RX ORDER — ROPINIROLE 2 MG/1
2 TABLET, FILM COATED, EXTENDED RELEASE ORAL
Qty: 30 TABLET | Refills: 5 | Status: SHIPPED | OUTPATIENT
Start: 2020-01-27 | End: 2020-07-05

## 2020-01-27 NOTE — ASSESSMENT & PLAN NOTE
Lab Results   Component Value Date    HGBA1C 6 9 (H) 01/27/2020   A1C at 5 9 on 12/3/19, now at 6 9 (unclear why this was checked so soon)  She has been diet controlled up until now  Diet has been more poor over the holidays  We'll recheck this in 90 days and if it remains elevated, initiate treatment

## 2020-01-27 NOTE — ASSESSMENT & PLAN NOTE
TSH 4 340 with free T4 at 1 44   currently taking Levothyroxine 137 mcg daily   Repeat labs as ordered

## 2020-01-27 NOTE — PROGRESS NOTES
Chief Complaint   Patient presents with    Leg Pain     extreme ache in both legs, walking help moving arounds helps     Health Maintenance   Topic Date Due    CRC Screening: Colonoscopy  1940    DTaP,Tdap,and Td Vaccines (1 - Tdap) 08/20/1951    Hepatitis B Vaccine (1 of 3 - Risk 3-dose series) 08/20/1959    Pneumococcal Vaccine: 65+ Years (1 of 2 - PCV13) 08/20/2005    Influenza Vaccine  07/01/2019    URINE MICROALBUMIN  03/18/2020    HEMOGLOBIN A1C  06/03/2020    BMI: Followup Plan  08/21/2020    Diabetic Foot Exam  08/21/2020    Fall Risk  12/03/2020    Medicare Annual Wellness Visit (AWV)  12/03/2020    BMI: Adult  01/27/2021    DM Eye Exam  08/13/2021    Pneumococcal Vaccine: Pediatrics (0 to 5 Years) and At-Risk Patients (6 to 59 Years)  Aged Out    HIB Vaccine  Aged Out    IPV Vaccine  Aged Out    Hepatitis A Vaccine  Aged Out    Meningococcal ACWY Vaccine  Aged Out    HPV Vaccine  Aged Out     Assessment/Plan:    Hypothyroidism  TSH 4 340 with free T4 at 1 44   currently taking Levothyroxine 137 mcg daily   Repeat labs as ordered    Type 2 diabetes mellitus with diabetic cataract ([facility] [address] )    Lab Results   Component Value Date    HGBA1C 6 9 (H) 01/27/2020   A1C at 5 9 on 12/3/19, now at 6 9 (unclear why this was checked so soon)  She has been diet controlled up until now  Diet has been more poor over the holidays  We'll recheck this in 90 days and if it remains elevated, initiate treatment     Atrial fibrillation with RVR ([facility] [address] )  In Afib today on ausculation but seems to be rate controlled  Continue current medications per cardiology     Chronic combined systolic (congestive) and diastolic (congestive) heart failure ([address] )  Wt Readings from Last 3 Encounters:   01/27/20 80 3 kg (177 lb)   12/03/19 76 7 kg (169 lb)   10/25/19 73 6 kg (162 lb 4 8 oz)     Her weight seems to be up today compared to her last OV back in December but she reports no significant changes to her daily weights recently  She does have some LE edema but skipped her Torsemide due to this appointment  I asked her to pay close attention to her legs and weights  If increased edema or more than a 3 lb weight gain in one day or 5 lb in 3 days, she needs to contact the heart failure team (she does have their phone number)  Limit sodium intake         Restless leg syndrome  To start Requip as ordered, SE reviewed   May try a weighted blanket  Avoid caffeine  Compression stockings ordered for during the day     Aching leg syndrome- will check an arterial duplex to r/o any arterial vascular disease  Her lipid panel and ASCVD risk are quite elevated but she refuses statin therapy and refuses any further lipid panel checks      Diagnoses and all orders for this visit:    Aching leg syndrome, unspecified laterality  -     VAS lower limb arterial duplex, complete bilateral; Future  -     Compression Stocking    Restless leg syndrome  -     Compression Stocking  -     rOPINIRole (REQUIP XL) 2 MG 24 hr tablet; Take 1 tablet (2 mg total) by mouth daily at bedtime    Chronic combined systolic (congestive) and diastolic (congestive) heart failure (HCC)  -     Comprehensive metabolic panel; Future    Type 2 diabetes mellitus with diabetic cataract, without long-term current use of insulin (HCC)  -     Hemoglobin A1C; Future  -     Comprehensive metabolic panel; Future  -     TSH, 3rd generation with Free T4 reflex; Future    Hypothyroidism, unspecified type  -     Comprehensive metabolic panel; Future  -     TSH, 3rd generation with Free T4 reflex; Future    Atrial fibrillation with RVR ([facility] [address] )    Other orders  -     Cancel: Ambulatory referral to Gastroenterology; Future          Subjective:      Patient ID: Carlos Galvan is a 78 y o  female  HPI   Patient presents by herself today for an acute visit    C/O bilateral leg achiness for the past several weeks  She notes her legs can feel quite heavy at times as well    Worse with prolonged standing  Typically, walking actually alleviates the aching but if walking for a prolonged period, can also exacerbate symptoms  She also notes feeling like she needs to get up and walk around in the evening when trying to fall asleep  She denies a H/O restless leg syndrome  She denies any recent skin color changes or temperature changes to her lower legs    Pt does follow with Cardiology, Dr Saira Jalloh for Afib, CHF, HTN  She does have some ankle swelling today and notes she didn't take her Torsemide 40 mg today as she had this appointment   Also taking Metoprolol succinate 25 mg 3 tabs BID, Isosorbide 10 mg one tab TID, Hydralazine 25 mg Q 8 hours, Eliquis 5 mg BID  She notes she is checking her weights on a daily basis with no significant changes  Of note, she weighed 169 lb on 12/3/19 and today, she weighs 177 lb   She denies SOB, orthopnea, wheezing, chest pain  She does report palpitations which have been stable  Denies dizziness or lightheadedness with palpitations   She notes that she rarely has edema of her lower legs      Pt also had blood work performed this morning:  , A1C up to 6 9 (this was also checked on 12/3/19 so unsure why it was checked this early, however, on 12/3/19, A1C was at 5 9)  She does have known diabetes which has been diet controlled    TSH 4 340 with free T4 at 1 44   currently taking Levothyroxine 137 mcg daily     The following portions of the patient's history were reviewed and updated as appropriate: allergies, current medications, past family history, past medical history, past social history, past surgical history and problem list     Review of Systems   Constitutional: Negative for chills, fatigue and fever  Respiratory: Negative for cough, chest tightness, shortness of breath and wheezing  Cardiovascular: Positive for palpitations and leg swelling  Negative for chest pain     Gastrointestinal: Negative for abdominal pain, blood in stool, constipation, diarrhea and nausea  Genitourinary: Negative for dysuria  Musculoskeletal: Positive for myalgias  Negative for arthralgias  As noted in HPI   Skin: Negative for rash and wound  Neurological: Negative for dizziness, weakness, numbness and headaches  Hematological: Negative for adenopathy  Bruises/bleeds easily  Psychiatric/Behavioral: Negative for sleep disturbance and suicidal ideas  Objective:      /80   Pulse 72   Temp 97 6 °F (36 4 °C) (Tympanic)   Resp 16   Ht 5' 1" (1 549 m)   Wt 80 3 kg (177 lb)   SpO2 97%   BMI 33 44 kg/m²          Physical Exam   Constitutional: She is oriented to person, place, and time  She appears well-developed and well-nourished  No distress  HENT:   Head: Normocephalic and atraumatic  Eyes: Pupils are equal, round, and reactive to light  Conjunctivae are normal    Neck: Normal range of motion  Neck supple  No thyromegaly present  Cardiovascular: Normal rate and normal heart sounds  An irregularly irregular rhythm present  No murmur heard   +1 LE edema B/L  No venous stasis skin color changes to lower extremities   B/L calves with no edema, warmth, erythema or tenderness    Pulmonary/Chest: Effort normal and breath sounds normal  No stridor  No respiratory distress  She has no wheezes  She has no rales  She exhibits no tenderness  Abdominal: Soft  Bowel sounds are normal  She exhibits no distension  There is no tenderness  Musculoskeletal: Normal range of motion  Lymphadenopathy:     She has no cervical adenopathy  Neurological: She is alert and oriented to person, place, and time  Skin: Skin is warm and dry  She is not diaphoretic  Psychiatric: She has a normal mood and affect

## 2020-01-27 NOTE — ASSESSMENT & PLAN NOTE
Wt Readings from Last 3 Encounters:   01/27/20 80 3 kg (177 lb)   12/03/19 76 7 kg (169 lb)   10/25/19 73 6 kg (162 lb 4 8 oz)     Her weight seems to be up today compared to her last OV back in December but she reports no significant changes to her daily weights recently  She does have some LE edema but skipped her Torsemide due to this appointment  I asked her to pay close attention to her legs and weights    If increased edema or more than a 3 lb weight gain in one day or 5 lb in 3 days, she needs to contact the heart failure team (she does have their phone number)  Limit sodium intake

## 2020-01-27 NOTE — ASSESSMENT & PLAN NOTE
To start Requip as ordered, SE reviewed   May try a weighted blanket  Avoid caffeine  Compression stockings ordered for during the day

## 2020-01-27 NOTE — ASSESSMENT & PLAN NOTE
In Afib today on ausculation but seems to be rate controlled  Continue current medications per cardiology

## 2020-01-30 DIAGNOSIS — I48.91 ATRIAL FIBRILLATION, UNSPECIFIED TYPE (HCC): ICD-10-CM

## 2020-01-30 RX ORDER — METOPROLOL SUCCINATE 25 MG/1
75 TABLET, EXTENDED RELEASE ORAL 2 TIMES DAILY
Qty: 180 TABLET | Refills: 0 | Status: SHIPPED | OUTPATIENT
Start: 2020-01-30 | End: 2020-02-28

## 2020-02-19 DIAGNOSIS — I50.42 CHRONIC COMBINED SYSTOLIC AND DIASTOLIC CONGESTIVE HEART FAILURE (HCC): ICD-10-CM

## 2020-02-19 RX ORDER — POTASSIUM CHLORIDE 20 MEQ/1
20 TABLET, EXTENDED RELEASE ORAL DAILY
Qty: 90 TABLET | Refills: 1 | Status: SHIPPED | OUTPATIENT
Start: 2020-02-19 | End: 2020-03-24 | Stop reason: HOSPADM

## 2020-02-21 ENCOUNTER — OFFICE VISIT (OUTPATIENT)
Dept: FAMILY MEDICINE CLINIC | Facility: CLINIC | Age: 80
End: 2020-02-21
Payer: COMMERCIAL

## 2020-02-21 VITALS
RESPIRATION RATE: 16 BRPM | HEART RATE: 88 BPM | DIASTOLIC BLOOD PRESSURE: 60 MMHG | TEMPERATURE: 99.2 F | BODY MASS INDEX: 35.5 KG/M2 | HEIGHT: 61 IN | WEIGHT: 188 LBS | SYSTOLIC BLOOD PRESSURE: 120 MMHG

## 2020-02-21 DIAGNOSIS — R60.0 EDEMA OF BOTH LOWER EXTREMITIES: Primary | ICD-10-CM

## 2020-02-21 DIAGNOSIS — I50.42 CHRONIC COMBINED SYSTOLIC (CONGESTIVE) AND DIASTOLIC (CONGESTIVE) HEART FAILURE (HCC): ICD-10-CM

## 2020-02-21 DIAGNOSIS — L03.119 CELLULITIS OF LOWER EXTREMITY, UNSPECIFIED LATERALITY: ICD-10-CM

## 2020-02-21 PROCEDURE — 3078F DIAST BP <80 MM HG: CPT | Performed by: NURSE PRACTITIONER

## 2020-02-21 PROCEDURE — 99214 OFFICE O/P EST MOD 30 MIN: CPT | Performed by: NURSE PRACTITIONER

## 2020-02-21 PROCEDURE — 1160F RVW MEDS BY RX/DR IN RCRD: CPT | Performed by: NURSE PRACTITIONER

## 2020-02-21 PROCEDURE — 2022F DILAT RTA XM EVC RTNOPTHY: CPT | Performed by: NURSE PRACTITIONER

## 2020-02-21 PROCEDURE — 3060F POS MICROALBUMINURIA REV: CPT | Performed by: NURSE PRACTITIONER

## 2020-02-21 PROCEDURE — 3044F HG A1C LEVEL LT 7.0%: CPT | Performed by: NURSE PRACTITIONER

## 2020-02-21 PROCEDURE — 3074F SYST BP LT 130 MM HG: CPT | Performed by: NURSE PRACTITIONER

## 2020-02-21 PROCEDURE — 1036F TOBACCO NON-USER: CPT | Performed by: NURSE PRACTITIONER

## 2020-02-21 PROCEDURE — 3008F BODY MASS INDEX DOCD: CPT | Performed by: NURSE PRACTITIONER

## 2020-02-21 RX ORDER — CEPHALEXIN 500 MG/1
500 CAPSULE ORAL EVERY 8 HOURS SCHEDULED
Qty: 21 CAPSULE | Refills: 0 | Status: SHIPPED | OUTPATIENT
Start: 2020-02-21 | End: 2020-02-27

## 2020-02-21 NOTE — PROGRESS NOTES
Chief Complaint   Patient presents with    Edema     patient's legs are "oozing"     Health Maintenance   Topic Date Due    CRC Screening: Colonoscopy  1940    DTaP,Tdap,and Td Vaccines (1 - Tdap) 08[birth year]20/1951    Pneumococcal Vaccine: 65+ Years (1 of 2 - PCV13) 08/20/2005    Influenza Vaccine  07/01/2019    HEMOGLOBIN A1C  07/27/2020    BMI: Followup Plan  08/21/2020    Diabetic Foot Exam  08/21/2020    Fall Risk  12/03/2020    Medicare Annual Wellness Visit (AWV)  12/03/2020    BMI: Adult  01/27/2021    URINE MICROALBUMIN  01/27/2021    DM Eye Exam  08/13/2021    Pneumococcal Vaccine: Pediatrics (0 to 5 Years) and At-Risk Patients (6 to 59 Years)  Aged Out    HIB Vaccine  Aged Out    Hepatitis B Vaccine  Aged Out    IPV Vaccine  Aged Out    Hepatitis A Vaccine  Aged Out    Meningococcal ACWY Vaccine  Aged Out    HPV Vaccine  Aged Out     Assessment/Plan:    Chronic combined systolic (congestive) and diastolic (congestive) heart failure (HCC)  Wt Readings from Last 3 Encounters:   02/21/20 85 3 kg (188 lb)   01/27/20 80 3 kg (177 lb)   12/03/19 76 7 kg (169 lb)     Her weight appears to be increasing although she maintains no significant changes to her daily weights  Reviewed that a red flag would be a 3 lb weight gain in one day or 5 lb weight gain in 3 days  She knows to limit her salt  She does have the Heart Failure phone number if weight is increasing, worsening edema, etc  Continue current medications with changes to Torsemide as noted under edema diagnosis     Edema of both lower extremities  Worsening edema with cellulitis at this time  For the edema, I asked her to take an extra Torsemide tablet 20mg daily (in additional to her regular dosage of 40 mg daily) x 5 days  She was also instructed to take an extra Potassium chloride 20 mEq x 5 days    BMP due in 5 days to check lytes/ kidney function  Start wearing compression stockings that were ordered last month, elevate legs, limit sodium intake  If edema continues to be an ongoing issue, I'll have her follow up with Cardiology sooner than her planned follow up for April     Cellulitis LEs- to start Keflex 500 mg one tab TID x 7 days  Elevate legs, compression stockings  Extra dose of Torsemide and Potassium as note above    RTO one week for recheck  I did ask her to call our office in the interim if her symptoms are worsening       Diagnoses and all orders for this visit:    Edema of both lower extremities    Cellulitis of lower extremity, unspecified laterality  -     cephalexin (KEFLEX) 500 mg capsule; Take 1 capsule (500 mg total) by mouth every 8 (eight) hours for 7 days    Chronic combined systolic (congestive) and diastolic (congestive) heart failure (HCC)  -     Basic metabolic panel; Future          Subjective:      Patient ID: Cornelio Mccord is a 78 y o  female  HPI     Pt presents by herself today for an acute visit   C/o increased swelling and "oozing" from her B/L lower legs, worse on the left leg  Pt does have CHF, Afib, follows with Cardiology, Dr Checo Killian  She is taking Torsemide 40 mg daily and Potassium chloride 20 mEq daily  She is still working (part time at [employer]) and on days that she works, she takes the [employer] in the late afternoon after work  She does stand on her feet while at work  Today, she states that the swelling worsening about 1-2 weeks ago, redness started about one week ago and the oozing started yesterday  Lower legs are painful with the increased swelling  She has been weighing herself daily and notes no significant changes-- however, her weights seem to be steadily increasing over the past few months:  12/3/19: #169  1/27/2020: #177  Today: #188    At her last office visit with myself on 1/27/2020, I ordered compression stockings but she has not picked these up yet    She reports she does watch her sodium intake and follows a low sodium diet     She denies chest pain, palpitations, SOB, orthopnea, dizziness, fatigue  Reports she feels "good" overall     CMP from 1/27/2020:  Na 138, K 3 5  BUN 18, Cr 1 18, eGFR 44    The following portions of the patient's history were reviewed and updated as appropriate: allergies, current medications, past family history, past medical history, past social history and problem list     Review of Systems   Constitutional: Negative for chills, fatigue, fever and unexpected weight change  HENT: Negative for congestion, ear pain, hearing loss, postnasal drip, rhinorrhea, sinus pressure and sore throat  Respiratory: Negative for cough, shortness of breath and wheezing  Cardiovascular: Positive for leg swelling  Negative for chest pain and palpitations  Gastrointestinal: Negative for abdominal pain, blood in stool, constipation, diarrhea, nausea and vomiting  Genitourinary: Negative for dysuria  Musculoskeletal: Negative for arthralgias and myalgias  Skin: Positive for rash and wound  Neurological: Negative for dizziness, weakness, numbness and headaches  Hematological: Bruises/bleeds easily  Psychiatric/Behavioral: Negative for sleep disturbance and suicidal ideas  Objective:      /60   Pulse 88   Temp 99 2 °F (37 3 °C) (Tympanic)   Resp 16   Ht 5' 1" (1 549 m)   Wt 85 3 kg (188 lb)   BMI 35 52 kg/m²          Physical Exam   Constitutional: She is oriented to person, place, and time  She appears well-developed and well-nourished  No distress  Obese    HENT:   Head: Normocephalic and atraumatic  Eyes: Pupils are equal, round, and reactive to light  Conjunctivae are normal    Neck: Normal range of motion  Neck supple  No thyromegaly present  Cardiovascular: Normal rate and normal heart sounds  An irregularly irregular rhythm present  No murmur heard   +2 LE edema B/L   Pulmonary/Chest: Effort normal and breath sounds normal  No stridor  No respiratory distress  She has no wheezes  She has no rales   She exhibits no tenderness  Abdominal: Soft  Bowel sounds are normal  She exhibits no distension  There is no tenderness  Musculoskeletal: Normal range of motion  Lymphadenopathy:     She has no cervical adenopathy  Neurological: She is alert and oriented to person, place, and time  Skin: Skin is warm and dry  She is not diaphoretic  Diffuse erythema and warmth to B/L LEs, worse on the left leg  +2 edema B/L  The left leg has a scant amount of oozing toward the ankle, medial aspect  There are no current opened wounds or ulcerations    Psychiatric: She has a normal mood and affect

## 2020-02-21 NOTE — ASSESSMENT & PLAN NOTE
Wt Readings from Last 3 Encounters:   02/21/20 85 3 kg (188 lb)   01/27/20 80 3 kg (177 lb)   12/03/19 76 7 kg (169 lb)     Her weight appears to be increasing although she maintains no significant changes to her daily weights  Reviewed that a red flag would be a 3 lb weight gain in one day or 5 lb weight gain in 3 days  She knows to limit her salt    She does have the Heart Failure phone number if weight is increasing, worsening edema, etc  Continue current medications with changes to Torsemide as noted under edema diagnosis

## 2020-02-21 NOTE — ASSESSMENT & PLAN NOTE
Worsening edema with cellulitis at this time  For the edema, I asked her to take an extra Torsemide tablet 20mg daily (in additional to her regular dosage of 40 mg daily) x 5 days  She was also instructed to take an extra Potassium chloride 20 mEq x 5 days    BMP due in 5 days to check lytes/ kidney function  Start wearing compression stockings that were ordered last month, elevate legs, limit sodium intake  If edema continues to be an ongoing issue, I'll have her follow up with Cardiology sooner than her planned follow up for April

## 2020-02-21 NOTE — PATIENT INSTRUCTIONS
Start antibiotic (Keflex)   Elevate legs, wear compression stockings  Take an additional Torsemide (20mg tablet) daily in addition to your regular dosage of 40 mg for FIVE DAYS   Also take one extra potassium pill daily for the next 5 days   Have blood work done in 5 days to check kidney function and electrolytes  I'll see you next week

## 2020-02-26 ENCOUNTER — APPOINTMENT (OUTPATIENT)
Dept: LAB | Facility: CLINIC | Age: 80
End: 2020-02-26
Payer: COMMERCIAL

## 2020-02-26 DIAGNOSIS — I50.42 CHRONIC COMBINED SYSTOLIC (CONGESTIVE) AND DIASTOLIC (CONGESTIVE) HEART FAILURE (HCC): ICD-10-CM

## 2020-02-26 LAB
ANION GAP SERPL CALCULATED.3IONS-SCNC: 7 MMOL/L (ref 4–13)
BUN SERPL-MCNC: 22 MG/DL (ref 5–25)
CALCIUM SERPL-MCNC: 9.2 MG/DL (ref 8.3–10.1)
CHLORIDE SERPL-SCNC: 97 MMOL/L (ref 100–108)
CO2 SERPL-SCNC: 31 MMOL/L (ref 21–32)
CREAT SERPL-MCNC: 1.23 MG/DL (ref 0.6–1.3)
GFR SERPL CREATININE-BSD FRML MDRD: 42 ML/MIN/1.73SQ M
GLUCOSE SERPL-MCNC: 141 MG/DL (ref 65–140)
POTASSIUM SERPL-SCNC: 3.7 MMOL/L (ref 3.5–5.3)
SODIUM SERPL-SCNC: 135 MMOL/L (ref 136–145)

## 2020-02-26 PROCEDURE — 80048 BASIC METABOLIC PNL TOTAL CA: CPT

## 2020-02-26 PROCEDURE — 36415 COLL VENOUS BLD VENIPUNCTURE: CPT

## 2020-02-27 ENCOUNTER — OFFICE VISIT (OUTPATIENT)
Dept: FAMILY MEDICINE CLINIC | Facility: CLINIC | Age: 80
End: 2020-02-27
Payer: COMMERCIAL

## 2020-02-27 VITALS
BODY MASS INDEX: 36.44 KG/M2 | WEIGHT: 193 LBS | RESPIRATION RATE: 16 BRPM | HEART RATE: 100 BPM | TEMPERATURE: 98.1 F | HEIGHT: 61 IN | SYSTOLIC BLOOD PRESSURE: 102 MMHG | DIASTOLIC BLOOD PRESSURE: 60 MMHG

## 2020-02-27 DIAGNOSIS — L03.119 CELLULITIS OF LOWER EXTREMITY, UNSPECIFIED LATERALITY: Primary | ICD-10-CM

## 2020-02-27 DIAGNOSIS — I50.42 CHRONIC COMBINED SYSTOLIC (CONGESTIVE) AND DIASTOLIC (CONGESTIVE) HEART FAILURE (HCC): ICD-10-CM

## 2020-02-27 DIAGNOSIS — E11.36 TYPE 2 DIABETES MELLITUS WITH DIABETIC CATARACT, WITHOUT LONG-TERM CURRENT USE OF INSULIN (HCC): ICD-10-CM

## 2020-02-27 PROCEDURE — 3060F POS MICROALBUMINURIA REV: CPT | Performed by: NURSE PRACTITIONER

## 2020-02-27 PROCEDURE — 3074F SYST BP LT 130 MM HG: CPT | Performed by: NURSE PRACTITIONER

## 2020-02-27 PROCEDURE — 2022F DILAT RTA XM EVC RTNOPTHY: CPT | Performed by: NURSE PRACTITIONER

## 2020-02-27 PROCEDURE — 3008F BODY MASS INDEX DOCD: CPT | Performed by: NURSE PRACTITIONER

## 2020-02-27 PROCEDURE — 99214 OFFICE O/P EST MOD 30 MIN: CPT | Performed by: NURSE PRACTITIONER

## 2020-02-27 PROCEDURE — 3044F HG A1C LEVEL LT 7.0%: CPT | Performed by: NURSE PRACTITIONER

## 2020-02-27 PROCEDURE — 3078F DIAST BP <80 MM HG: CPT | Performed by: NURSE PRACTITIONER

## 2020-02-27 PROCEDURE — 1160F RVW MEDS BY RX/DR IN RCRD: CPT | Performed by: NURSE PRACTITIONER

## 2020-02-27 PROCEDURE — 1036F TOBACCO NON-USER: CPT | Performed by: NURSE PRACTITIONER

## 2020-02-27 RX ORDER — DOXYCYCLINE HYCLATE 100 MG/1
100 CAPSULE ORAL EVERY 12 HOURS SCHEDULED
Qty: 20 CAPSULE | Refills: 0 | Status: SHIPPED | OUTPATIENT
Start: 2020-02-27 | End: 2020-03-09 | Stop reason: ALTCHOICE

## 2020-02-27 NOTE — ASSESSMENT & PLAN NOTE
Wt Readings from Last 3 Encounters:   02/27/20 87 5 kg (193 lb)   02/21/20 85 3 kg (188 lb)   01/27/20 80 3 kg (177 lb)     Weight is increasing although she maintains her weights are stable at home  She does have +2 LE edema  No SOB or crackles on exam  She just completed 5 days of an extra Torsemide with no significant improvement in her edema on my exam  I advised her to call Cardiology for an evaluation    I did offer to have our office do this for her but she declined and noted she will call Dr Adia Carlisle office this morning   Continue Torsemide, potassium, Toprol, Hydralazine, isosorbide   I recommended having a BNP checked but she prefers to speak with Dr Aamir Seth

## 2020-02-27 NOTE — PROGRESS NOTES
Chief Complaint   Patient presents with    Follow-up     legs do not seem to be getting better     Health Maintenance   Topic Date Due    CRC Screening: Colonoscopy  1940    DTaP,Tdap,and Td Vaccines (1 - Tdap) 08/20/1951    Pneumococcal Vaccine: 65+ Years (1 of 2 - PCV13) 08/20/2005    Influenza Vaccine  07/01/2019    HEMOGLOBIN A1C  07/27/2020    BMI: Followup Plan  08/21/2020    Diabetic Foot Exam  08/21/2020    Fall Risk  12/03/2020    Medicare Annual Wellness Visit (AWV)  12/03/2020    URINE MICROALBUMIN  01/27/2021    BMI: Adult  02/21/2021    DM Eye Exam  08/13/2021    Pneumococcal Vaccine: Pediatrics (0 to 5 Years) and At-Risk Patients (6 to 59 Years)  Aged Out    HIB Vaccine  Aged Out    Hepatitis B Vaccine  Aged Out    IPV Vaccine  Aged Out    Hepatitis A Vaccine  Aged Out    Meningococcal ACWY Vaccine  Aged Out    HPV Vaccine  Aged Out     Assessment/Plan:    Cellulitis B/L LEs- worsening overall  Stop Keflex and start Doxycycline as ordered  Recommended an OTC Probiotic while on this  Our office was able to schedule her with Wound Care tomorrow, 2/28/2020 at 1pm   I did wrap her LEs with [employer] and a gauze dressing today  She was instructed to elevate her legs as much as possible     Chronic combined systolic (congestive) and diastolic (congestive) heart failure (HCC)  Wt Readings from Last 3 Encounters:   02/27/20 87 5 kg (193 lb)   02/21/20 85 3 kg (188 lb)   01/27/20 80 3 kg (177 lb)     Weight is increasing although she maintains her weights are stable at home  She does have +2 LE edema  No SOB or crackles on exam  She just completed 5 days of an extra Torsemide with no significant improvement in her edema on my exam  I advised her to call Cardiology for an evaluation    I did offer to have our office do this for her but she declined and noted she will call Dr James Becerra office this morning   Continue Torsemide, potassium, Toprol, Hydralazine, isosorbide   I recommended having a BNP checked but she prefers to speak with Dr Gerard Caputo     Type 2 diabetes mellitus with diabetic cataract [facility])    Lab Results   Component Value Date    HGBA1C 6 9 (H) 01/27/2020     Diet controlled currently        Diagnoses and all orders for this visit:    Cellulitis of lower extremity, unspecified laterality  -     doxycycline hyclate (VIBRAMYCIN) 100 mg capsule; Take 1 capsule (100 mg total) by mouth every 12 (twelve) hours for 10 days  -     Ambulatory referral to Wound Care; Future    Chronic combined systolic (congestive) and diastolic (congestive) heart failure (HCC)    Type 2 diabetes mellitus with diabetic cataract, without long-term current use of insulin ([address] )  -     Ambulatory referral to Wound Care; Future          Subjective:      Patient ID: Heidi Rawls is a 78 y o  female  HPI     Pt presents by herself today for a one week follow up of LE edema  I saw Junior Mancia on 2/21/2020 for edema of B/L LEs and cellulitis- she was instructed to take an extra Torsemide 20 mg in addition to her regular dose of Torsemide 40 mg daily (also instructed to take an additional Potassium 20 mEq while on increased Torsemide)  She was also started on Keflex 500mg one tab TID x 7 days  She had a repeat BMP done yesterday which showed Na 135, K 3 7, Bun 22, cr 1 23  eGFR 42  She reports her edema did improve somewhat but her cellulitis has worsened  Her LEs are weeping with increased redness  She has been trying to elevate her legs but works part time (10a-3p) and stands while at work  She denies fevers, chills, fatigue, N/V/D  She also denies any SOB, wheezing, chest pain  She does have Afib but denies any palpitations  She denies any increase in weights, although today, she is 193 lb which is a 5 lb increase in the past 6 days    Pt is diabetic, diet controlled    A1C from 1/27/2020 at 6 9    The following portions of the patient's history were reviewed and updated as appropriate: allergies, current medications, past family history, past medical history, past social history, past surgical history and problem list     Review of Systems   Constitutional: Negative for chills, fatigue and fever  Respiratory: Negative for cough, shortness of breath and wheezing  Cardiovascular: Positive for leg swelling  Negative for chest pain and palpitations  Gastrointestinal: Negative for abdominal pain, blood in stool, constipation, diarrhea and nausea  Genitourinary: Negative for dysuria  Musculoskeletal: Negative for arthralgias and myalgias  Skin: Positive for rash and wound  Neurological: Negative for dizziness, weakness, numbness and headaches  Hematological: Bruises/bleeds easily  Psychiatric/Behavioral: Negative for sleep disturbance and suicidal ideas  Objective:      /60   Pulse 100   Temp 98 1 °F (36 7 °C) (Tympanic)   Resp 16   Ht 5' 1" (1 549 m)   Wt 87 5 kg (193 lb)   BMI 36 47 kg/m²          Physical Exam   Constitutional: She is oriented to person, place, and time  She appears well-developed and well-nourished  No distress  HENT:   Head: Normocephalic and atraumatic  Eyes: Pupils are equal, round, and reactive to light  Conjunctivae are normal    Neck: Normal range of motion  Neck supple  No thyromegaly present  Cardiovascular: Normal rate and normal heart sounds  An irregularly irregular rhythm present  No murmur heard   +2 LE edema bilaterally    Pulmonary/Chest: Effort normal and breath sounds normal  No respiratory distress  She has no wheezes  Abdominal: Soft  Bowel sounds are normal  She exhibits no distension  There is no tenderness  Musculoskeletal: Normal range of motion  Lymphadenopathy:     She has no cervical adenopathy  Neurological: She is alert and oriented to person, place, and time  Skin: Skin is warm and dry  She is not diaphoretic  B/L LE with significant erythema and warmth   +oozing from both lower legs, L>R    There are no opened wounds but there appears to be a healing wound to the upper, left shin  +dry and scabbed  Psychiatric: She has a normal mood and affect

## 2020-02-28 ENCOUNTER — APPOINTMENT (OUTPATIENT)
Dept: WOUND CARE | Facility: HOSPITAL | Age: 80
End: 2020-02-28
Payer: COMMERCIAL

## 2020-02-28 DIAGNOSIS — I48.91 ATRIAL FIBRILLATION, UNSPECIFIED TYPE (HCC): ICD-10-CM

## 2020-02-28 PROCEDURE — G0463 HOSPITAL OUTPT CLINIC VISIT: HCPCS

## 2020-02-28 PROCEDURE — 99213 OFFICE O/P EST LOW 20 MIN: CPT

## 2020-02-28 RX ORDER — METOPROLOL SUCCINATE 25 MG/1
75 TABLET, EXTENDED RELEASE ORAL 2 TIMES DAILY
Qty: 180 TABLET | Refills: 0 | Status: SHIPPED | OUTPATIENT
Start: 2020-02-28 | End: 2020-03-24 | Stop reason: HOSPADM

## 2020-03-06 ENCOUNTER — APPOINTMENT (OUTPATIENT)
Dept: WOUND CARE | Facility: HOSPITAL | Age: 80
End: 2020-03-06
Payer: COMMERCIAL

## 2020-03-06 PROCEDURE — G0463 HOSPITAL OUTPT CLINIC VISIT: HCPCS

## 2020-03-06 PROCEDURE — 99213 OFFICE O/P EST LOW 20 MIN: CPT

## 2020-03-06 NOTE — PROGRESS NOTES
Cardiology Follow Up    Carmen Hutson  1940  [id number]  [facility]  [facility]  [address]  [address]  [phone]    1  Essential hypertension  Echo complete with contrast if indicated   2  Pure hypercholesterolemia  Echo complete with contrast if indicated   3  Atrial fibrillation with rapid ventricular response (HCC)  Echo complete with contrast if indicated   4  Combined systolic and diastolic congestive heart failure, unspecified HF chronicity ([address] )  Echo complete with contrast if indicated   5  Leg edema  Echo complete with contrast if indicated       Interval History: Patient is here for a follow-up visit  During her last hospitalization, July 2019, she was treated for biventricular heart failure  She was noted to have atrial fibrillation and ultimately had two cardioversions the last of which was July 3rd with an amiodarone load  She converted back to atrial fibrillation and amiodarone was discontinued July 22nd by Dr Denney Leyden because of return to atrial fibrillation  She continues on rate control and anticoagulation  Most recent echocardiogram done July 4, 2019 demonstrated a mild reduction in overall LV systolic function in the setting of mild diffuse hypokinesis with an ejection fraction of 45%  A prior complete echo done June 26, 2019 demonstrated moderate mitral and tricuspid regurgitation with moderate biatrial cavity enlargement  The initial Echo, June 20, 2019 demonstrated an ejection fraction of 25-30% in the setting of severe diffuse hypokinesis and moderate mitral regurgitation  She is on hydralazine and isosorbide dinitrate as per the HF service  Creatinine was 2 54 July 11, 2019  Most recent BMP done February 26, 2020 demonstrated potassium of 3 7 and creatinine of 1 23  She has untreated sleep apnea and is reluctant to pursue treatment for this    I did tell her this would be a disadvantage in reference to managing her cardiac situation  As well a stress test was ordered and she has not had this done yet  Of late she has had issues with lower extremity edema and cellulitis  She has been on antibiotic  She was told by the wound center that she should go to the emergency room to be assessed and treated  She is reluctant to do this  I myself again today and courage her to go to the hospital as she needs diuresis and possible treatment for cellulitis  She has gained about 12 lb in the past month  It is possible given her atrial fibrillation that she has recurrent cardiomyopathy        Patient Active Problem List   Diagnosis    Depression with anxiety    Hyperlipidemia    Hypertension    Hypothyroidism    Obesity (BMI 30-39  9)    Osteoarthritis    Type 2 diabetes mellitus with diabetic cataract ([facility])    Atrial fibrillation with RVR ([facility])    Acute on chronic combined systolic and diastolic CHF (congestive heart failure) ([facility])    Ambulatory dysfunction    Chronic combined systolic (congestive) and diastolic (congestive) heart failure ([facility])    Debility    Restless leg syndrome    Edema of both lower extremities     Past Medical History:   Diagnosis Date    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     Social History     Socioeconomic History    Marital status: Single     Spouse name: Not on file    Number of children: Not on file    Years of education: Not on file    Highest education level: Not on file   Occupational History    Not on file   Social Needs    Financial resource strain: Not on file    Food insecurity:     Worry: Not on file     Inability: Not on file    Transportation needs:     Medical: Not on file     Non-medical: Not on file   Tobacco Use    Smoking status: Never Smoker    Smokeless tobacco: Never Used   Substance and Sexual Activity    Alcohol use: Not Currently    Drug use: Never    Sexual activity: Not Currently   Lifestyle    Physical activity:     Days per week: Not on file     Minutes per session: Not on file    Stress: Not on file   Relationships    Social connections:     Talks on phone: Not on file     Gets together: Not on file     Attends Orthodoxy service: Not on file     Active member of club or organization: Not on file     Attends meetings of clubs or organizations: Not on file     Relationship status: Not on file    Intimate partner violence:     Fear of current or ex partner: Not on file     Emotionally abused: Not on file     Physically abused: Not on file     Forced sexual activity: Not on file   Other Topics Concern    Not on file   Social History Narrative    Not on file      Family History   Problem Relation Age of Onset    Alzheimer's disease Mother     Coronary artery disease Mother     Dementia Mother     Diabetes Mother         mellitus    Other Father         acute myocardial infarction    Coronary artery disease Sister     Other Maternal Grandfather         laryngeal cancer    Dementia Maternal Aunt     Diabetes Maternal Aunt      Past Surgical History:   Procedure Laterality Date    CATARACT EXTRACTION Left     HEMORRHOID SURGERY      IR CENTRAL LINE PLACEMENT  7/9/2019    OOPHORECTOMY      unilateral    TOTAL ABDOMINAL HYSTERECTOMY         Current Outpatient Medications:     apixaban (ELIQUIS) 5 mg, Take 1 tablet (5 mg total) by mouth 2 (two) times a day, Disp: 60 tablet, Rfl: 11    Ascorbic Acid (VITAMIN C) 100 MG tablet, Take 100 mg by mouth daily, Disp: , Rfl:     cholecalciferol (VITAMIN D3) 1,000 units tablet, Take 1,000 Units by mouth daily, Disp: , Rfl:     hydrALAZINE (APRESOLINE) 25 mg tablet, Take 1 tablet (25 mg total) by mouth every 8 (eight) hours (Patient taking differently: Take 25 mg by mouth 2 (two) times a day ), Disp: 90 tablet, Rfl: 11    isosorbide dinitrate (ISORDIL) 10 mg tablet, Take 1 tablet (10 mg total) by mouth 3 (three) times daily after meals (Patient taking differently: Take 10 mg by mouth 2 (two) times a day ), Disp: 90 tablet, Rfl: 11    levothyroxine 137 mcg tablet, TAKE 1 TABLET (137 MCG TOTAL) BY MOUTH DAILY, Disp: 30 tablet, Rfl: 5    MAGNESIUM PO, Take by mouth, Disp: , Rfl:     metoprolol succinate (TOPROL-XL) 25 mg 24 hr tablet, TAKE 3 TABLETS (75 MG TOTAL) BY MOUTH 2 (TWO) TIMES A DAY, Disp: 180 tablet, Rfl: 0    multivitamin (THERAGRAN) TABS, Take 1 tablet by mouth daily, Disp: , Rfl:     potassium chloride (K-DUR,KLOR-CON) 20 mEq tablet, Take 1 tablet (20 mEq total) by mouth daily, Disp: 90 tablet, Rfl: 1    rOPINIRole (REQUIP XL) 2 MG 24 hr tablet, Take 1 tablet (2 mg total) by mouth daily at bedtime, Disp: 30 tablet, Rfl: 5    torsemide (DEMADEX) 20 mg tablet, Take 2 tablets (40 mg total) by mouth daily, Disp: 60 tablet, Rfl: 2    venlafaxine (EFFEXOR) 75 mg tablet, TAKE 1 TABLET (75 MG TOTAL) BY MOUTH EVERY 12 (TWELVE) HOURS, Disp: 60 tablet, Rfl: 5    Loratadine (CLARITIN PO), Take by mouth, Disp: , Rfl:   Allergies   Allergen Reactions    Penicillins     Wellbutrin Sr  [Bupropion]      Other reaction(s): Suicidal ideations  Category: Adverse Reaction;        Labs:not applicable  Imaging: No results found  Review of Systems:  Review of Systems   Cardiovascular:        Lower extremity edema   All other systems reviewed and are negative  Physical Exam:  /80 (BP Location: Left arm, Cuff Size: Standard)   Pulse 88   Ht 5' 1" (1 549 m)   Wt 90 3 kg (199 lb)   BMI 37 60 kg/m²   Physical Exam   Constitutional: She is oriented to person, place, and time  She appears well-developed and well-nourished  HENT:   Head: Normocephalic and atraumatic  Eyes: Pupils are equal, round, and reactive to light  Conjunctivae and EOM are normal    Neck: Normal range of motion  Neck supple  Cardiovascular: Normal rate and normal heart sounds  Lower extremity edema   Pulmonary/Chest: Effort normal and breath sounds normal    Neurological: She is alert and oriented to person, place, and time  Skin: Skin is warm and dry  Psychiatric: She has a normal mood and affect  Vitals reviewed  Discussion/Summary:  Did advise patient to consider emergency room visit towards an eye for inpatient admission and stabilization of her leg edema and cardiac status  In the interim will increase her torsemide to 40 mg twice a day with potassium twice a day  I did tell her the intravenous diuretic would be more effective  Will check an echocardiogram which she did not have done previously as I instructed  I have asked her to call in general if there is a problem in the interim otherwise I will see her in follow-up in hopefully she will follow through with recommendation towards inpatient hospitalization

## 2020-03-09 ENCOUNTER — OFFICE VISIT (OUTPATIENT)
Dept: CARDIOLOGY CLINIC | Facility: CLINIC | Age: 80
End: 2020-03-09
Payer: COMMERCIAL

## 2020-03-09 VITALS
SYSTOLIC BLOOD PRESSURE: 124 MMHG | DIASTOLIC BLOOD PRESSURE: 80 MMHG | HEIGHT: 61 IN | WEIGHT: 199 LBS | HEART RATE: 88 BPM | BODY MASS INDEX: 37.57 KG/M2

## 2020-03-09 DIAGNOSIS — R60.0 LEG EDEMA: ICD-10-CM

## 2020-03-09 DIAGNOSIS — I50.40 COMBINED SYSTOLIC AND DIASTOLIC CONGESTIVE HEART FAILURE, UNSPECIFIED HF CHRONICITY (HCC): ICD-10-CM

## 2020-03-09 DIAGNOSIS — I48.91 ATRIAL FIBRILLATION WITH RAPID VENTRICULAR RESPONSE (HCC): ICD-10-CM

## 2020-03-09 DIAGNOSIS — E78.00 PURE HYPERCHOLESTEROLEMIA: ICD-10-CM

## 2020-03-09 DIAGNOSIS — I10 ESSENTIAL HYPERTENSION: Primary | ICD-10-CM

## 2020-03-09 PROCEDURE — 3074F SYST BP LT 130 MM HG: CPT | Performed by: INTERNAL MEDICINE

## 2020-03-09 PROCEDURE — 3060F POS MICROALBUMINURIA REV: CPT | Performed by: INTERNAL MEDICINE

## 2020-03-09 PROCEDURE — 3079F DIAST BP 80-89 MM HG: CPT | Performed by: INTERNAL MEDICINE

## 2020-03-09 PROCEDURE — 99214 OFFICE O/P EST MOD 30 MIN: CPT | Performed by: INTERNAL MEDICINE

## 2020-03-09 PROCEDURE — 3008F BODY MASS INDEX DOCD: CPT | Performed by: INTERNAL MEDICINE

## 2020-03-09 PROCEDURE — 1036F TOBACCO NON-USER: CPT | Performed by: INTERNAL MEDICINE

## 2020-03-09 PROCEDURE — 3044F HG A1C LEVEL LT 7.0%: CPT | Performed by: INTERNAL MEDICINE

## 2020-03-09 PROCEDURE — 1160F RVW MEDS BY RX/DR IN RCRD: CPT | Performed by: INTERNAL MEDICINE

## 2020-03-09 PROCEDURE — 2022F DILAT RTA XM EVC RTNOPTHY: CPT | Performed by: INTERNAL MEDICINE

## 2020-03-09 NOTE — PATIENT INSTRUCTIONS
Please reconsider option of inpatient hospitalization towards an eye for stabilization  Will order an echocardiogram   Please increase torsemide to 40 mg twice a day with potassium twice a day    Please call if there is a problem in the interim period

## 2020-03-11 ENCOUNTER — HOSPITAL ENCOUNTER (INPATIENT)
Facility: HOSPITAL | Age: 80
LOS: 13 days | Discharge: HOME | DRG: 226 | End: 2020-03-24
Attending: EMERGENCY MEDICINE | Admitting: INTERNAL MEDICINE
Payer: COMMERCIAL

## 2020-03-11 ENCOUNTER — APPOINTMENT (EMERGENCY)
Dept: RADIOLOGY | Facility: HOSPITAL | Age: 80
DRG: 226 | End: 2020-03-11
Payer: COMMERCIAL

## 2020-03-11 DIAGNOSIS — I50.22 CHRONIC HFREF (HEART FAILURE WITH REDUCED EJECTION FRACTION) (HCC): ICD-10-CM

## 2020-03-11 DIAGNOSIS — I50.9 CHF (CONGESTIVE HEART FAILURE) (HCC): Primary | ICD-10-CM

## 2020-03-11 DIAGNOSIS — E11.36 TYPE 2 DIABETES MELLITUS WITH DIABETIC CATARACT, WITHOUT LONG-TERM CURRENT USE OF INSULIN (HCC): ICD-10-CM

## 2020-03-11 DIAGNOSIS — I10 ESSENTIAL HYPERTENSION: ICD-10-CM

## 2020-03-11 DIAGNOSIS — R60.0 BILATERAL LOWER EXTREMITY EDEMA: ICD-10-CM

## 2020-03-11 DIAGNOSIS — I48.91 ATRIAL FIBRILLATION WITH RVR (HCC): ICD-10-CM

## 2020-03-11 LAB
ALBUMIN SERPL BCP-MCNC: 3.1 G/DL (ref 3.5–5)
ALP SERPL-CCNC: 86 U/L (ref 46–116)
ALT SERPL W P-5'-P-CCNC: 28 U/L (ref 12–78)
ANION GAP SERPL CALCULATED.3IONS-SCNC: 8 MMOL/L (ref 4–13)
AST SERPL W P-5'-P-CCNC: 43 U/L (ref 5–45)
ATRIAL RATE: 258 BPM
BASOPHILS # BLD AUTO: 0.03 THOUSANDS/ΜL (ref 0–0.1)
BASOPHILS NFR BLD AUTO: 0 % (ref 0–1)
BILIRUB SERPL-MCNC: 1.13 MG/DL (ref 0.2–1)
BUN SERPL-MCNC: 25 MG/DL (ref 5–25)
CALCIUM SERPL-MCNC: 8.9 MG/DL (ref 8.3–10.1)
CHLORIDE SERPL-SCNC: 99 MMOL/L (ref 100–108)
CO2 SERPL-SCNC: 29 MMOL/L (ref 21–32)
CREAT SERPL-MCNC: 1.22 MG/DL (ref 0.6–1.3)
EOSINOPHIL # BLD AUTO: 0.01 THOUSAND/ΜL (ref 0–0.61)
EOSINOPHIL NFR BLD AUTO: 0 % (ref 0–6)
ERYTHROCYTE [DISTWIDTH] IN BLOOD BY AUTOMATED COUNT: 15.7 % (ref 11.6–15.1)
GFR SERPL CREATININE-BSD FRML MDRD: 42 ML/MIN/1.73SQ M
GLUCOSE SERPL-MCNC: 128 MG/DL (ref 65–140)
HCT VFR BLD AUTO: 34.7 % (ref 34.8–46.1)
HGB BLD-MCNC: 11 G/DL (ref 11.5–15.4)
IMM GRANULOCYTES # BLD AUTO: 0.02 THOUSAND/UL (ref 0–0.2)
IMM GRANULOCYTES NFR BLD AUTO: 0 % (ref 0–2)
LYMPHOCYTES # BLD AUTO: 1.33 THOUSANDS/ΜL (ref 0.6–4.47)
LYMPHOCYTES NFR BLD AUTO: 18 % (ref 14–44)
MCH RBC QN AUTO: 27.8 PG (ref 26.8–34.3)
MCHC RBC AUTO-ENTMCNC: 31.7 G/DL (ref 31.4–37.4)
MCV RBC AUTO: 88 FL (ref 82–98)
MONOCYTES # BLD AUTO: 0.72 THOUSAND/ΜL (ref 0.17–1.22)
MONOCYTES NFR BLD AUTO: 10 % (ref 4–12)
NEUTROPHILS # BLD AUTO: 5.18 THOUSANDS/ΜL (ref 1.85–7.62)
NEUTS SEG NFR BLD AUTO: 72 % (ref 43–75)
NRBC BLD AUTO-RTO: 0 /100 WBCS
NT-PROBNP SERPL-MCNC: 4620 PG/ML
PLATELET # BLD AUTO: 215 THOUSANDS/UL (ref 149–390)
PMV BLD AUTO: 11.7 FL (ref 8.9–12.7)
POTASSIUM SERPL-SCNC: 4.2 MMOL/L (ref 3.5–5.3)
PROT SERPL-MCNC: 6.8 G/DL (ref 6.4–8.2)
QRS AXIS: 124 DEGREES
QRSD INTERVAL: 74 MS
QT INTERVAL: 258 MS
QTC INTERVAL: 410 MS
RBC # BLD AUTO: 3.95 MILLION/UL (ref 3.81–5.12)
SODIUM SERPL-SCNC: 136 MMOL/L (ref 136–145)
T WAVE AXIS: -75 DEGREES
TROPONIN I SERPL-MCNC: 0.03 NG/ML
TSH SERPL DL<=0.05 MIU/L-ACNC: 3.2 UIU/ML (ref 0.36–3.74)
VENTRICULAR RATE: 152 BPM
WBC # BLD AUTO: 7.29 THOUSAND/UL (ref 4.31–10.16)

## 2020-03-11 PROCEDURE — 83880 ASSAY OF NATRIURETIC PEPTIDE: CPT | Performed by: EMERGENCY MEDICINE

## 2020-03-11 PROCEDURE — 93005 ELECTROCARDIOGRAM TRACING: CPT

## 2020-03-11 PROCEDURE — 36415 COLL VENOUS BLD VENIPUNCTURE: CPT | Performed by: EMERGENCY MEDICINE

## 2020-03-11 PROCEDURE — 71046 X-RAY EXAM CHEST 2 VIEWS: CPT

## 2020-03-11 PROCEDURE — 80053 COMPREHEN METABOLIC PANEL: CPT | Performed by: EMERGENCY MEDICINE

## 2020-03-11 PROCEDURE — 84484 ASSAY OF TROPONIN QUANT: CPT | Performed by: EMERGENCY MEDICINE

## 2020-03-11 PROCEDURE — 99223 1ST HOSP IP/OBS HIGH 75: CPT | Performed by: INTERNAL MEDICINE

## 2020-03-11 PROCEDURE — 96365 THER/PROPH/DIAG IV INF INIT: CPT

## 2020-03-11 PROCEDURE — 96376 TX/PRO/DX INJ SAME DRUG ADON: CPT

## 2020-03-11 PROCEDURE — 85025 COMPLETE CBC W/AUTO DIFF WBC: CPT | Performed by: EMERGENCY MEDICINE

## 2020-03-11 PROCEDURE — 84443 ASSAY THYROID STIM HORMONE: CPT | Performed by: EMERGENCY MEDICINE

## 2020-03-11 PROCEDURE — 96374 THER/PROPH/DIAG INJ IV PUSH: CPT

## 2020-03-11 PROCEDURE — 93010 ELECTROCARDIOGRAM REPORT: CPT | Performed by: INTERNAL MEDICINE

## 2020-03-11 PROCEDURE — 99291 CRITICAL CARE FIRST HOUR: CPT | Performed by: EMERGENCY MEDICINE

## 2020-03-11 PROCEDURE — 99285 EMERGENCY DEPT VISIT HI MDM: CPT

## 2020-03-11 RX ORDER — METOPROLOL TARTRATE 5 MG/5ML
2.5 INJECTION INTRAVENOUS EVERY 6 HOURS PRN
Status: DISCONTINUED | OUTPATIENT
Start: 2020-03-11 | End: 2020-03-11

## 2020-03-11 RX ORDER — ROPINIROLE 2 MG/1
2 TABLET, FILM COATED ORAL
Status: DISCONTINUED | OUTPATIENT
Start: 2020-03-11 | End: 2020-03-24 | Stop reason: HOSPADM

## 2020-03-11 RX ORDER — DILTIAZEM HYDROCHLORIDE 5 MG/ML
20 INJECTION INTRAVENOUS ONCE
Status: COMPLETED | OUTPATIENT
Start: 2020-03-11 | End: 2020-03-11

## 2020-03-11 RX ORDER — ROPINIROLE 2 MG/1
2 TABLET, FILM COATED ORAL
Status: DISCONTINUED | OUTPATIENT
Start: 2020-03-12 | End: 2020-03-11

## 2020-03-11 RX ORDER — MELATONIN
1000 DAILY
Status: DISCONTINUED | OUTPATIENT
Start: 2020-03-12 | End: 2020-03-24 | Stop reason: HOSPADM

## 2020-03-11 RX ORDER — HYDRALAZINE HYDROCHLORIDE 25 MG/1
25 TABLET, FILM COATED ORAL EVERY 8 HOURS SCHEDULED
Status: DISCONTINUED | OUTPATIENT
Start: 2020-03-11 | End: 2020-03-12

## 2020-03-11 RX ORDER — ACETAMINOPHEN 325 MG/1
650 TABLET ORAL EVERY 4 HOURS PRN
Status: DISCONTINUED | OUTPATIENT
Start: 2020-03-11 | End: 2020-03-24 | Stop reason: HOSPADM

## 2020-03-11 RX ORDER — FUROSEMIDE 10 MG/ML
40 INJECTION INTRAMUSCULAR; INTRAVENOUS ONCE
Status: COMPLETED | OUTPATIENT
Start: 2020-03-11 | End: 2020-03-11

## 2020-03-11 RX ORDER — METOPROLOL TARTRATE 5 MG/5ML
5 INJECTION INTRAVENOUS EVERY 6 HOURS PRN
Status: COMPLETED | OUTPATIENT
Start: 2020-03-11 | End: 2020-03-13

## 2020-03-11 RX ORDER — MULTIVIT WITH MINERALS/LUTEIN
125 TABLET ORAL DAILY
Status: DISCONTINUED | OUTPATIENT
Start: 2020-03-12 | End: 2020-03-24 | Stop reason: HOSPADM

## 2020-03-11 RX ORDER — VENLAFAXINE 37.5 MG/1
75 TABLET ORAL EVERY 12 HOURS SCHEDULED
Status: DISCONTINUED | OUTPATIENT
Start: 2020-03-11 | End: 2020-03-24 | Stop reason: HOSPADM

## 2020-03-11 RX ORDER — POTASSIUM CHLORIDE 20 MEQ/1
20 TABLET, EXTENDED RELEASE ORAL DAILY
Status: DISCONTINUED | OUTPATIENT
Start: 2020-03-12 | End: 2020-03-17

## 2020-03-11 RX ORDER — FUROSEMIDE 10 MG/ML
40 INJECTION INTRAMUSCULAR; INTRAVENOUS
Status: DISCONTINUED | OUTPATIENT
Start: 2020-03-11 | End: 2020-03-12

## 2020-03-11 RX ADMIN — ROPINIROLE HYDROCHLORIDE 2 MG: 2 TABLET, FILM COATED ORAL at 23:43

## 2020-03-11 RX ADMIN — METOPROLOL SUCCINATE 75 MG: 50 TABLET, EXTENDED RELEASE ORAL at 22:06

## 2020-03-11 RX ADMIN — METOPROLOL TARTRATE 5 MG: 5 INJECTION INTRAVENOUS at 23:45

## 2020-03-11 RX ADMIN — FUROSEMIDE 40 MG: 10 INJECTION, SOLUTION INTRAMUSCULAR; INTRAVENOUS at 14:39

## 2020-03-11 RX ADMIN — DILTIAZEM HYDROCHLORIDE 20 MG: 5 INJECTION INTRAVENOUS at 13:18

## 2020-03-11 RX ADMIN — VENLAFAXINE 75 MG: 37.5 TABLET ORAL at 22:08

## 2020-03-11 RX ADMIN — APIXABAN 5 MG: 5 TABLET, FILM COATED ORAL at 17:54

## 2020-03-11 RX ADMIN — DILTIAZEM HYDROCHLORIDE 5 MG/HR: 5 INJECTION INTRAVENOUS at 13:43

## 2020-03-11 RX ADMIN — HYDRALAZINE HYDROCHLORIDE 25 MG: 25 TABLET, FILM COATED ORAL at 22:06

## 2020-03-11 NOTE — ED PROCEDURE NOTE
PROCEDURE  CriticalCare Time  Performed by: Shruthi Rivers DO  Authorized by:  Shruthi Rivers DO     Critical care provider statement:     Critical care time (minutes):  33    Critical care time was exclusive of:  Separately billable procedures and treating other patients and teaching time    Critical care was necessary to treat or prevent imminent or life-threatening deterioration of the following conditions:  Cardiac failure    Critical care was time spent personally by me on the following activities:  Obtaining history from patient or surrogate, development of treatment plan with patient or surrogate, evaluation of patient's response to treatment, examination of patient, ordering and performing treatments and interventions, ordering and review of laboratory studies, ordering and review of radiographic studies and re-evaluation of patient's condition    I assumed direction of critical care for this patient from another provider in my specialty: olinda Rivers DO  03/11/20 1440

## 2020-03-11 NOTE — ED NOTES
Report called to [place], [facility]  Pt  Transported via Viji Mehta RN to [phone]        Jolie Iqbal RN  03/11/20 4556
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
## 2020-03-11 NOTE — ED PROVIDER NOTES
History  Chief Complaint   Patient presents with    Leg Swelling     coming in for CHF as per pt  She stated her physicians told her to come to the ED to "get the fluid off " Pt reports increased leg swelling, denies SOB     51-year-old female history of CHF with last ejection fraction 45%, atrial fibrillation being rate controlled, hypertension, and hypothyroidism presents the ED for evaluation of dyspnea on exertion as well as leg swelling  Patient states she was seen at her cardiologist's office a few days prior who recommended she proceed to the emergency department for further evaluation however patient states that she procrastinates  The patient admits to increased bilateral leg swelling over the past few weeks as well as approximately a 12 lb weight gain over the same duration  Patient states that sometimes she can feel when her heart is in an abnormal rhythm, but currently denies that sensation  She denies any chest pain at this time  She denies any recent illnesses  No headache, fever, chills, cough, nausea, vomiting, or diarrhea  Patient's torsemide dose was recently increased  Per note, cardiologist recommended IV diuresis and admission  Patient follows up with wound care for chronic erythema of the bilateral lower extremities  On 2 separate occasions she was treated with 2 different antibiotics for suspected cellulitis however this erythema did not improve, but has not worsened  Prior to Admission Medications   Prescriptions Last Dose Informant Patient Reported? Taking?    Ascorbic Acid (VITAMIN C) 100 MG tablet 3/11/2020 at Unknown time Self Yes Yes   Sig: Take 100 mg by mouth daily   Loratadine (CLARITIN PO) 3/11/2020 at Unknown time Self Yes Yes   Sig: Take by mouth   MAGNESIUM PO 3/11/2020 at Unknown time Self Yes Yes   Sig: Take by mouth   apixaban (ELIQUIS) 5 mg 3/11/2020 at Unknown time Self No Yes   Sig: Take 1 tablet (5 mg total) by mouth 2 (two) times a day cholecalciferol (VITAMIN D3) 1,000 units tablet 3/11/2020 at Unknown time Self Yes Yes   Sig: Take 1,000 Units by mouth daily   hydrALAZINE (APRESOLINE) 25 mg tablet 3/11/2020 at Unknown time Self No Yes   Sig: Take 1 tablet (25 mg total) by mouth every 8 (eight) hours   Patient taking differently: Take 25 mg by mouth 2 (two) times a day    isosorbide dinitrate (ISORDIL) 10 mg tablet 3/11/2020 at Unknown time Self No Yes   Sig: Take 1 tablet (10 mg total) by mouth 3 (three) times daily after meals   Patient taking differently: Take 10 mg by mouth 2 (two) times a day    levothyroxine 137 mcg tablet 3/11/2020 at Unknown time Self No Yes   Sig: TAKE 1 TABLET (137 MCG TOTAL) BY MOUTH DAILY   metoprolol succinate (TOPROL-XL) 25 mg 24 hr tablet 3/11/2020 at Unknown time Self No Yes   Sig: TAKE 3 TABLETS (75 MG TOTAL) BY MOUTH 2 (TWO) TIMES A DAY   multivitamin (THERAGRAN) TABS 3/11/2020 at Unknown time Self Yes Yes   Sig: Take 1 tablet by mouth daily   potassium chloride (K-DUR,KLOR-CON) 20 mEq tablet 3/11/2020 at Unknown time Self No Yes   Sig: Take 1 tablet (20 mEq total) by mouth daily   rOPINIRole (REQUIP XL) 2 MG 24 hr tablet 3/11/2020 at Unknown time Self No Yes   Sig: Take 1 tablet (2 mg total) by mouth daily at bedtime   torsemide (DEMADEX) 20 mg tablet 3/10/2020 at Unknown time Self No Yes   Sig: Take 2 tablets (40 mg total) by mouth daily   venlafaxine (EFFEXOR) 75 mg tablet 3/11/2020 at Unknown time Self No Yes   Sig: TAKE 1 TABLET (75 MG TOTAL) BY MOUTH EVERY 12 (TWELVE) HOURS      Facility-Administered Medications: None       Past Medical History:   Diagnosis Date    Cardiogenic shock ([address] ) 6/26/2019    CHF (congestive heart failure) (HCC)     Eczema     Photosensitivity     abnormal skin sensitivity to sunlight    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       Past Surgical History:   Procedure Laterality Date    CATARACT EXTRACTION Left     HEMORRHOID SURGERY      IR CENTRAL LINE PLACEMENT  7/9/2019    OOPHORECTOMY      unilateral    TOTAL ABDOMINAL HYSTERECTOMY         Family History   Problem Relation Age of Onset    Alzheimer's disease Mother     Coronary artery disease Mother     Dementia Mother     Diabetes Mother         mellitus    Other Father         acute myocardial infarction    Coronary artery disease Sister     Other Maternal Grandfather         laryngeal cancer    Dementia Maternal Aunt     Diabetes Maternal Aunt      I have reviewed and agree with the history as documented  E-Cigarette/Vaping    E-Cigarette Use Never User      E-Cigarette/Vaping Substances     Social History     Tobacco Use    Smoking status: Never Smoker    Smokeless tobacco: Never Used   Substance Use Topics    Alcohol use: Not Currently    Drug use: Never        Review of Systems   Constitutional: Positive for unexpected weight change ( weight gain)  Negative for activity change, chills, diaphoresis and fever  HENT: Negative for congestion, rhinorrhea, sore throat and tinnitus  Eyes: Negative for photophobia, pain and redness  Respiratory: Positive for shortness of breath  Negative for cough, chest tightness and wheezing  Cardiovascular: Negative for chest pain, palpitations and leg swelling  Gastrointestinal: Negative for abdominal distention, abdominal pain, blood in stool, constipation, diarrhea, nausea and vomiting  Genitourinary: Negative for dysuria, flank pain and hematuria  Musculoskeletal: Negative for neck pain and neck stiffness  Skin: Positive for wound (Venous stasis)  Negative for rash  Neurological: Negative for dizziness, seizures, syncope, light-headedness and headaches  Psychiatric/Behavioral: Negative          Physical Exam  ED Triage Vitals   Temperature Pulse Respirations Blood Pressure SpO2   03/11/20 1147 03/11/20 1147 03/11/20 1147 03/11/20 1147 03/11/20 1147   97 5 °F (36 4 °C) (!) 150 20 105/75 100 %      Temp Source Heart Rate Source Patient Position - Orthostatic VS BP Location FiO2 (%)   03/11/20 1527 03/11/20 1147 03/11/20 1326 03/11/20 1326 --   Oral Monitor Lying Left arm       Pain Score       03/11/20 1527       No Pain             Orthostatic Vital Signs  Vitals:    03/11/20 1346 03/11/20 1400 03/11/20 1439 03/11/20 1527   BP: 109/73  110/71 115/66   Pulse: (!) 132 (!) 136 (!) 130 (!) 133   Patient Position - Orthostatic VS: Lying  Sitting Sitting       Physical Exam   Constitutional: She is oriented to person, place, and time  She appears well-developed and well-nourished  No distress  HENT:   Head: Normocephalic and atraumatic  Right Ear: External ear normal    Left Ear: External ear normal    Nose: Nose normal    Mouth/Throat: Oropharynx is clear and moist  No oropharyngeal exudate  Eyes: Pupils are equal, round, and reactive to light  Conjunctivae and EOM are normal    Neck: Normal range of motion  Neck supple  Cardiovascular: Normal rate, regular rhythm, normal heart sounds and intact distal pulses  Exam reveals no gallop and no friction rub  No murmur heard  Pulmonary/Chest: Effort normal and breath sounds normal  No stridor  No respiratory distress  She has no wheezes  Diminished breath sounds at the bases bilaterally   Abdominal: Soft  Bowel sounds are normal  She exhibits no distension  There is no tenderness  There is no rebound and no guarding  Musculoskeletal: Normal range of motion  She exhibits no tenderness  Neurological: She is alert and oriented to person, place, and time  No cranial nerve deficit or sensory deficit  She exhibits normal muscle tone  Skin: Skin is warm and dry  Capillary refill takes less than 2 seconds  She is not diaphoretic  Pitting edema up to the level of the tibial tuberosities bilaterally, bilateral venous stasis changes, weeping wounds with overlying erythema  Psychiatric: She has a normal mood and affect  Her behavior is normal    Nursing note and vitals reviewed        ED Medications  Medications metoprolol (LOPRESSOR) injection 2 5 mg (has no administration in time range)   acetaminophen (TYLENOL) tablet 650 mg (has no administration in time range)   furosemide (LASIX) injection 40 mg (has no administration in time range)   apixaban (ELIQUIS) tablet 5 mg (5 mg Oral Given 3/11/20 1758)   ascorbic acid (VITAMIN C) tablet 125 mg (has no administration in time range)   cholecalciferol (VITAMIN D3) tablet 1,000 Units (has no administration in time range)   hydrALAZINE (APRESOLINE) tablet 25 mg (has no administration in time range)   levothyroxine tablet 137 mcg (has no administration in time range)   metoprolol succinate (TOPROL-XL) 24 hr tablet 75 mg (has no administration in time range)   potassium chloride (K-DUR,KLOR-CON) CR tablet 20 mEq (has no administration in time range)   rOPINIRole (REQUIP) tablet 2 mg (has no administration in time range)   venlafaxine (EFFEXOR) tablet 75 mg (has no administration in time range)   diltiazem (CARDIZEM) 125 mg in sodium chloride 0 9 % 125 mL infusion (15 mg/hr Intravenous Rate/Dose Change 3/11/20 1417)   diltiazem (CARDIZEM) injection 20 mg (20 mg Intravenous Given 3/11/20 1318)   furosemide (LASIX) injection 40 mg (40 mg Intravenous Given 3/11/20 1439)       Diagnostic Studies  Results Reviewed     Procedure Component Value Units Date/Time    TSH, 3rd generation with Free T4 reflex [[id number]]  (Normal) Collected:  03/11/20 1317    Lab Status:  Final result Specimen:  Blood from Arm, Right Updated:  03/11/20 1354     TSH 3RD GENERATON 3 200 uIU/mL     Narrative:       Patients undergoing fluorescein dye angiography may retain small amounts of fluorescein in the body for 48-72 hours post procedure  Samples containing fluorescein can produce falsely depressed TSH values  If the patient had this procedure,a specimen should be resubmitted post fluorescein clearance        NT-BNP PRO [[id number]]  (Abnormal) Collected:  03/11/20 1317    Lab Status:  Final result Specimen: Blood from Arm, Right Updated:  03/11/20 1350     NT-proBNP 4,620 pg/mL     Comprehensive metabolic panel [[id number]]  (Abnormal) Collected:  03/11/20 1317    Lab Status:  Final result Specimen:  Blood from Arm, Right Updated:  03/11/20 1349     Sodium 136 mmol/L      Potassium 4 2 mmol/L      Chloride 99 mmol/L      CO2 29 mmol/L      ANION GAP 8 mmol/L      BUN 25 mg/dL      Creatinine 1 22 mg/dL      Glucose 128 mg/dL      Calcium 8 9 mg/dL      AST 43 U/L      ALT 28 U/L      Alkaline Phosphatase 86 U/L      Total Protein 6 8 g/dL      Albumin 3 1 g/dL      Total Bilirubin 1 13 mg/dL      eGFR 42 ml/min/1 73sq m     Narrative:       National Kid[employer] guidelines for Chronic Kidney Disease (CKD):     Stage 1 with normal or high GFR (GFR > 90 mL/min/1 73 square meters)    Stage 2 Mild CKD (GFR = 60-89 mL/min/1 73 square meters)    Stage 3A Moderate CKD (GFR = 45-59 mL/min/1 73 square meters)    Stage 3B Moderate CKD (GFR = 30-44 mL/min/1 73 square meters)    Stage 4 Severe CKD (GFR = 15-29 mL/min/1 73 square meters)    Stage 5 End Stage CKD (GFR <15 mL/min/1 73 square meters)  Note: GFR calculation is accurate only with a steady state creatinine    Troponin I [[id number]]  (Normal) Collected:  03/11/20 1317    Lab Status:  Final result Specimen:  Blood from Arm, Right Updated:  03/11/20 1348     Troponin I 0 03 ng/mL     CBC and differential [[id number]]  (Abnormal) Collected:  03/11/20 1317    Lab Status:  Final result Specimen:  Blood from Arm, Right Updated:  03/11/20 1335     WBC 7 29 Thousand/uL      RBC 3 95 Million/uL      Hemoglobin 11 0 g/dL      Hematocrit 34 7 %      MCV 88 fL      MCH 27 8 pg      MCHC 31 7 g/dL      RDW 15 7 %      MPV 11 7 fL      Platelets 837 Thousands/uL      nRBC 0 /100 WBCs      Neutrophils Relative 72 %      Immat GRANS % 0 %      Lymphocytes Relative 18 %      Monocytes Relative 10 %      Eosinophils Relative 0 %      Basophils Relative 0 %      Neutrophils Absolute 5 18 Thousands/µL      Immature Grans Absolute 0 02 Thousand/uL      Lymphocytes Absolute 1 33 Thousands/µL      Monocytes Absolute 0 72 Thousand/µL      Eosinophils Absolute 0 01 Thousand/µL      Basophils Absolute 0 03 Thousands/µL                  XR chest 2 views   Final Result by Jovana Yusuf MD (03/11 1444)      Small bilateral pleural effusions with stable cardiomegaly  Workstation performed: [id number]               Procedures  ECG 12 Lead Documentation Only  Date/Time: 3/11/2020 2:07 PM  Performed by: Chris Ontiveros MD  Authorized by: Chris Ontiveros MD     Patient location:  ED  Previous ECG:     Previous ECG:  Compared to current    Similarity:  Changes noted  Interpretation:     Interpretation: abnormal    Rate:     ECG rate:  152    ECG rate assessment: tachycardic    Rhythm:     Rhythm: atrial fibrillation    Ectopy:     Ectopy: none    QRS:     QRS axis:  Right  Conduction:     Conduction: normal    ST segments:     ST segments:  Normal  T waves:     T waves: non-specific            ED Course  ED Course as of Mar 11 1901   Wed Mar 11, 2020   1232 Blood Pressure: 105/75   1232 Temperature: 97 5 °F (36 4 °C)   1232 Pulse(!): 150   1232 Respirations: 20   1232 SpO2: 100 %   1328 Blood Pressure: 110/75   1328 Pulse(!): 136   1328 Respirations: 18   1328 SpO2: 98 %   1354 Blood Pressure: 109/73   1354 Pulse(!): 132   1354 Respirations: 22   1354 SpO2: 98 %           Identification of Seniors at Risk      Most Recent Value   (ISAR) Identification of Seniors at Risk   Before the illness or injury that brought you to the Emergency, did you need someone to help you on a regular basis? 0 Filed at: 03/11/2020 1148   In the last 24 hours, have you needed more help than usual?  0 Filed at: 03/11/2020 1148   Have you been hospitalized for one or more nights during the past 6 months?   1 Filed at: 03/11/2020 1148   In general, do you see well?  0 Filed at: 03/11/2020 1148   In general, do you have serious problems with your memory? 0 Filed at: 03/11/2020 1148   Do you take more than three different medications every day? 1 Filed at: 03/11/2020 1148   ISAR Score  2 Filed at: 03/11/2020 1148                            MDM  Number of Diagnoses or Management Options  Atrial fibrillation with RVR ([facility] [address] ):   Bilateral lower extremity edema:   CHF (congestive heart failure) [facility]):   Diagnosis management comments: 28-year-old female history of atrial fibrillation and CHF presents for evaluation of worsening bilateral lower extremity swelling as well as dyspnea on exertion  On arrival, patient was noted to be in atrial fibrillation with a rate of 150, but was otherwise stable  Patient is overall well-appearing in no acute distress  She has pitting edema bilaterally up to the level of the tibial tuberosities  The erythema in her lower extremities is more likely secondary to chronic venous stasis changes instead of an infectious process  She also has faint crackles and diminished breath sounds at the bases  EKG demonstrated atrial fibrillation without ischemic changes  Will check cardiac workup including BNP  Will start patient on Cardizem for better rate control  Patient's labs demonstrated elevated BNP, but were otherwise grossly unremarkable  Chest x-ray demonstrated small bilateral pleural effusions with stable cardiomegaly  Patient's rate improved with Cardizem  She will be admitted to [facility] for further evaluation and management  She remained hemodynamically stable throughout ED course          Disposition  Final diagnoses:   Bilateral lower extremity edema   CHF (congestive heart failure) (HCC)   Atrial fibrillation with RVR ([facility] [address] )     Time reflects when diagnosis was documented in both MDM as applicable and the Disposition within this note     Time User Action Codes Description Comment    3/11/2020  2:39 PM Check, Pily Rockwell Add [R60 0] Bilateral lower extremity edema     3/11/2020  2:39 PM Check, Rajwinder Jamison [I50 9] [facility] )     3/11/2020  2:39 PM Check, Carolyn Acosta Modify [R60 0] Bilateral lower extremity edema     3/11/2020  2:39 PM Check, Carolyn Acosta Modify [I50 9] [facility]congest[facility] )     3/11/2020  2:43 PM Check, Carolyn Acosta Add [I48 91] Atrial fibrillation with RVR ([place] [address] )     3/11/2020  5:42 PM Erika Bordens L Add [E11 36] Type 2 diabetes mellitus with diabetic cataract, without long-term current use of insulin [facility])       ED Disposition     ED Disposition Condition Date/Time Comment    Admit Stable Wed Mar 11, 2020  2:41 PM Case was discussed with DAMIEN and the patient's admission status was agreed to be Admission Status: inpatient status to the service of Dr Cedric Bhatt           Follow-up Information    None         Current Discharge Medication List      CONTINUE these medications which have NOT CHANGED    Details   apixaban (ELIQUIS) 5 mg Take 1 tablet (5 mg total) by mouth 2 (two) times a day  Qty: 60 tablet, Refills: 11    Associated Diagnoses: Atrial fibrillation with rapid ventricular response (HCC)      Ascorbic Acid (VITAMIN C) 100 MG tablet Take 100 mg by mouth daily      cholecalciferol (VITAMIN D3) 1,000 units tablet Take 1,000 Units by mouth daily      hydrALAZINE (APRESOLINE) 25 mg tablet Take 1 tablet (25 mg total) by mouth every 8 (eight) hours  Qty: 90 tablet, Refills: 11    Associated Diagnoses: Essential hypertension      isosorbide dinitrate (ISORDIL) 10 mg tablet Take 1 tablet (10 mg total) by mouth 3 (three) times daily after meals  Qty: 90 tablet, Refills: 11    Associated Diagnoses: Acute congestive heart failure, unspecified heart failure type (HCC)      levothyroxine 137 mcg tablet TAKE 1 TABLET (137 MCG TOTAL) BY MOUTH DAILY  Qty: 30 tablet, Refills: 5    Associated Diagnoses: Hypothyroidism, unspecified type      Loratadine (CLARITIN PO) Take by mouth      MAGNESIUM PO Take by mouth      metoprolol succinate (TOPROL-XL) 25 mg 24 hr tablet TAKE 3 TABLETS (75 MG TOTAL) BY MOUTH 2 (TWO) TIMES A DAY  Qty: 180 tablet, Refills: 0    Associated Diagnoses: Atrial fibrillation, unspecified type (HCC)      multivitamin (THERAGRAN) TABS Take 1 tablet by mouth daily      potassium chloride (K-DUR,KLOR-CON) 20 mEq tablet Take 1 tablet (20 mEq total) by mouth daily  Qty: 90 tablet, Refills: 1    Associated Diagnoses: Chronic combined systolic and diastolic congestive heart failure (HCC)      rOPINIRole (REQUIP XL) 2 MG 24 hr tablet Take 1 tablet (2 mg total) by mouth daily at bedtime  Qty: 30 tablet, Refills: 5    Associated Diagnoses: Restless leg syndrome      torsemide (DEMADEX) 20 mg tablet Take 2 tablets (40 mg total) by mouth daily  Qty: 60 tablet, Refills: 2    Associated Diagnoses: Chronic combined systolic and diastolic congestive heart failure (HCC)      venlafaxine (EFFEXOR) 75 mg tablet TAKE 1 TABLET (75 MG TOTAL) BY MOUTH EVERY 12 (TWELVE) HOURS  Qty: 60 tablet, Refills: 5    Associated Diagnoses: Depression with anxiety           No discharge procedures on file  PDMP Review     None           ED Provider  Attending physically available and evaluated [facility]  I managed the patient along with the ED Attending      Electronically Signed by         Michelle Granado MD  03/11/20 5886

## 2020-03-11 NOTE — ED ATTENDING ATTESTATION
3/11/2020  Ryder COOK [id number], DO, saw and evaluated the patient  I have discussed the patient with the resident/non-physician practitioner and agree with the resident's/non-physician practitioner's findings, Plan of Care, and MDM as documented in the resident's/non-physician practitioner's note, except where noted  All available labs and Radiology studies were reviewed  I was present for key portions of any procedure(s) performed by the resident/non-physician practitioner and I was immediately available to provide assistance  At this point I agree with the current assessment done in the Emergency Department  I have conducted an independent evaluation of this patient a history and physical is as follows:    Patient is a 59-year-old female with a history of atrial fibrillation, with a rate control strategy, anticoagulated on Eliquis and known congestive heart failure  She says the last 2 weeks she has had some increasing lower extremity edema, slight orthopnea, and about a 12 lb weight gain  She has had chronic erythema of her bilateral legs, follows with Wound Care, they prescribed 2 different courses of antibiotics, however the redness persists  She has no fever, no chills, but does have some chronic serous drainage from the legs especially when her edema gets worse  Two days ago she was seen by her cardiologist who recommended she come to the emerged department for admission for diuresis and further care  Patient denies any prolonged travel history, no recent long travel, no immobilizations, or hospitalizations    Sometime she can tell that her heart is beating irregular but right now she says she can't tell that is beating very fast    General:  Patient is well-appearing  Head:  Atraumatic  Eyes:  Conjunctiva pink  ENT:  Mucous membranes are moist  Neck:  Supple  Cardiac:  S1-S2, tachycardic irregularly irregular rhythm  Lungs:  Clear to auscultation bilaterally  Abdomen:  Soft, nontender, normal bowel sounds, no CVA tenderness, no tympany, no rigidity, no guarding  Extremities:  Normal range of motion, she has bilateral pitting pedal edema of both legs up to the tibial tuberosities  There is some chronic venous stasis changes and slight serous weeping from the bilateral legs  There is no petechia or purpura or sloughing of the skin  Neurologic:  Awake, fluent speech, normal comprehension, AAOx3  Skin:  Pink warm and dry  Psychiatric:  Alert, pleasant, cooperative      ED Course     EKG interpreted by me, atrial fibrillation, rapid ventricular response, rate of 150, prior old septal infarction, previous EKG from July 3, 2019 shows normal sinus rhythm    Patient presented in atrial fibrillation with a rapid ventricular response on the bedside monitor  Because of the AFib with rapid ventricular response, she was given a 20 mg Cardizem bolus over 10 minutes, started on a drip at 5 mg an hour    Reassessed the patient after the Cardizem bolus and her heart rate was decreased and she was feeling a little better  Chest x-ray interpreted by me shows trace pulmonary vascular congestion, slight exacerbation of chronic right-sided pleural effusion    No acute pneumonia    Labs Reviewed   CBC AND DIFFERENTIAL - Abnormal       Result Value Ref Range Status    WBC 7 29  4 31 - 10 16 Thousand/uL Final    RBC 3 95  3 81 - 5 12 Million/uL Final    Hemoglobin 11 0 (*) 11 5 - 15 4 g/dL Final    Hematocrit 34 7 (*) 34 8 - 46 1 % Final    MCV 88  82 - 98 fL Final    MCH 27 8  26 8 - 34 3 pg Final    MCHC 31 7  31 4 - 37 4 g/dL Final    RDW 15 7 (*) 11 6 - 15 1 % Final    MPV 11 7  8 9 - 12 7 fL Final    Platelets 439  447 - 390 Thousands/uL Final    nRBC 0  /100 WBCs Final    Neutrophils Relative 72  43 - 75 % Final    Immat GRANS % 0  0 - 2 % Final    Lymphocytes Relative 18  14 - 44 % Final    Monocytes Relative 10  4 - 12 % Final    Eosinophils Relative 0  0 - 6 % Final    Basophils Relative 0  0 - 1 % Final    Neutrophils Absolute 5 18  1 85 - 7 62 Thousands/µL Final    Immature Grans Absolute 0 02  0 00 - 0 20 Thousand/uL Final    Lymphocytes Absolute 1 33  0 60 - 4 47 Thousands/µL Final    Monocytes Absolute 0 72  0 17 - 1 22 Thousand/µL Final    Eosinophils Absolute 0 01  0 00 - 0 61 Thousand/µL Final    Basophils Absolute 0 03  0 00 - 0 10 Thousands/µL Final   COMPREHENSIVE METABOLIC PANEL - Abnormal    Sodium 136  136 - 145 mmol/L Final    Potassium 4 2  3 5 - 5 3 mmol/L Final    Comment: Slightly Hemolyzed; Results May be Affected    Chloride 99 (*) 100 - 108 mmol/L Final    CO2 29  21 - 32 mmol/L Final    ANION GAP 8  4 - 13 mmol/L Final    BUN 25  5 - 25 mg/dL Final    Creatinine 1 22  0 60 - 1 30 mg/dL Final    Comment: Standardized to IDMS reference method    Glucose 128  65 - 140 mg/dL Final    Comment:   If the patient is fasting, the ADA then defines impaired fasting glucose as > 100 mg/dL and diabetes as > or equal to 123 mg/dL  Specimen collection should occur prior to Sulfasalazine administration due to the potential for falsely depressed results  Specimen collection should occur prior to Sulfapyridine administration due to the potential for falsely elevated results  Calcium 8 9  8 3 - 10 1 mg/dL Final    AST 43  5 - 45 U/L Final    Comment: Slightly Hemolyzed; Results May be Affected  Specimen collection should occur prior to Sulfasalazine administration due to the potential for falsely depressed results  ALT 28  12 - 78 U/L Final    Comment:   Specimen collection should occur prior to Sulfasalazine and/or Sulfapyridine administration due to the potential for falsely depressed results       Alkaline Phosphatase 86  46 - 116 U/L Final    Total Protein 6 8  6 4 - 8 2 g/dL Final    Albumin 3 1 (*) 3 5 - 5 0 g/dL Final    Total Bilirubin 1 13 (*) 0 20 - 1 00 mg/dL Final    eGFR 42  ml/min/1 73sq m Final    Narrative:     [place] guidelines for Chronic Kidney Disease (CKD):     Stage 1 with normal or high GFR (GFR > 90 mL/min/1 73 square meters)    Stage 2 Mild CKD (GFR = 60-89 mL/min/1 73 square meters)    Stage 3A Moderate CKD (GFR = 45-59 mL/min/1 73 square meters)    Stage 3B Moderate CKD (GFR = 30-44 mL/min/1 73 square meters)    Stage 4 Severe CKD (GFR = 15-29 mL/min/1 73 square meters)    Stage 5 End Stage CKD (GFR <15 mL/min/1 73 square meters)  Note: GFR calculation is accurate only with a steady state creatinine   NT-BNP PRO (BRAIN NATRIURETIC PEPTIDE) - Abnormal    NT-proBNP 4,620 (*) <450 pg/mL Final   TROPONIN I - Normal    Troponin I 0 03  <=0 04 ng/mL Final    Comment:   [employer] Chemistry analyzer 99% cutoff is > 0 04 ng/mL in network labs     o cTnI 99% cutoff is useful only when applied to patients in the clinical setting of myocardial ischemia   o cTnI 99% cutoff should be interpreted in the context of clinical history, ECG findings and possibly cardiac imaging to establish correct diagnosis  o cTnI 99% cutoff may be suggestive but clearly not indicative of a coronary event without the clinical setting of myocardial ischemia  TSH, 3RD GENERATION WITH FREE T4 REFLEX - Normal    TSH 3RD GENERATON 3 200  0 358 - 3 740 uIU/mL Final    Comment:   The recommended reference ranges for TSH during pregnancy are as follows:   First trimester 0 1 to 2 5 uIU/mL   Second trimester  0 2 to 3 0 uIU/mL   Third trimester 0 3 to 3 0 uIU/m    Note: Normal ranges may not apply to patients who are transgender, non-binary, or whose legal sex, sex at birth, and gender identity differ  Using supplements with high doses of biotin 20 to more than 300 times greater than the adequate daily intake for adults of 30 mcg/day as established by the [employer], can cause falsely depress results  Narrative:     Patients undergoing fluorescein dye angiography may retain small amounts of fluorescein in the body for 48-72 hours post procedure   Samples containing fluorescein can produce falsely depressed TSH values  If the patient had this procedure,a specimen should be resubmitted post fluorescein clearance           Patient will be admitted for further management of her AFib with rapid ventricular response and CHF    Critical Care Time  Procedures

## 2020-03-12 LAB
ANION GAP SERPL CALCULATED.3IONS-SCNC: 8 MMOL/L (ref 4–13)
BUN SERPL-MCNC: 25 MG/DL (ref 5–25)
CALCIUM SERPL-MCNC: 8.7 MG/DL (ref 8.3–10.1)
CHLORIDE SERPL-SCNC: 102 MMOL/L (ref 100–108)
CHOLEST SERPL-MCNC: 112 MG/DL (ref 50–200)
CO2 SERPL-SCNC: 25 MMOL/L (ref 21–32)
CREAT SERPL-MCNC: 1.17 MG/DL (ref 0.6–1.3)
ERYTHROCYTE [DISTWIDTH] IN BLOOD BY AUTOMATED COUNT: 16 % (ref 11.6–15.1)
EST. AVERAGE GLUCOSE BLD GHB EST-MCNC: 151 MG/DL
GFR SERPL CREATININE-BSD FRML MDRD: 44 ML/MIN/1.73SQ M
GLUCOSE SERPL-MCNC: 137 MG/DL (ref 65–140)
HBA1C MFR BLD: 6.9 %
HCT VFR BLD AUTO: 37 % (ref 34.8–46.1)
HDLC SERPL-MCNC: 38 MG/DL
HGB BLD-MCNC: 11.5 G/DL (ref 11.5–15.4)
LDLC SERPL CALC-MCNC: 64 MG/DL (ref 0–100)
MCH RBC QN AUTO: 27.9 PG (ref 26.8–34.3)
MCHC RBC AUTO-ENTMCNC: 31.1 G/DL (ref 31.4–37.4)
MCV RBC AUTO: 90 FL (ref 82–98)
NONHDLC SERPL-MCNC: 74 MG/DL
PLATELET # BLD AUTO: 223 THOUSANDS/UL (ref 149–390)
PMV BLD AUTO: 11.7 FL (ref 8.9–12.7)
POTASSIUM SERPL-SCNC: 3.9 MMOL/L (ref 3.5–5.3)
RBC # BLD AUTO: 4.12 MILLION/UL (ref 3.81–5.12)
SODIUM SERPL-SCNC: 135 MMOL/L (ref 136–145)
TRIGL SERPL-MCNC: 48 MG/DL
WBC # BLD AUTO: 7.5 THOUSAND/UL (ref 4.31–10.16)

## 2020-03-12 PROCEDURE — 80061 LIPID PANEL: CPT | Performed by: INTERNAL MEDICINE

## 2020-03-12 PROCEDURE — 99222 1ST HOSP IP/OBS MODERATE 55: CPT | Performed by: INTERNAL MEDICINE

## 2020-03-12 PROCEDURE — 83036 HEMOGLOBIN GLYCOSYLATED A1C: CPT | Performed by: INTERNAL MEDICINE

## 2020-03-12 PROCEDURE — 85027 COMPLETE CBC AUTOMATED: CPT | Performed by: INTERNAL MEDICINE

## 2020-03-12 PROCEDURE — 80048 BASIC METABOLIC PNL TOTAL CA: CPT | Performed by: INTERNAL MEDICINE

## 2020-03-12 PROCEDURE — 99233 SBSQ HOSP IP/OBS HIGH 50: CPT | Performed by: INTERNAL MEDICINE

## 2020-03-12 RX ORDER — HYDRALAZINE HYDROCHLORIDE 25 MG/1
25 TABLET, FILM COATED ORAL EVERY 8 HOURS SCHEDULED
Status: DISCONTINUED | OUTPATIENT
Start: 2020-03-12 | End: 2020-03-20

## 2020-03-12 RX ORDER — FUROSEMIDE 10 MG/ML
40 INJECTION INTRAMUSCULAR; INTRAVENOUS
Status: DISCONTINUED | OUTPATIENT
Start: 2020-03-12 | End: 2020-03-14

## 2020-03-12 RX ADMIN — DILTIAZEM HYDROCHLORIDE 2.5 MG/HR: 5 INJECTION INTRAVENOUS at 08:54

## 2020-03-12 RX ADMIN — FUROSEMIDE 40 MG: 10 INJECTION, SOLUTION INTRAMUSCULAR; INTRAVENOUS at 08:45

## 2020-03-12 RX ADMIN — FUROSEMIDE 40 MG: 10 INJECTION, SOLUTION INTRAMUSCULAR; INTRAVENOUS at 17:17

## 2020-03-12 RX ADMIN — METOPROLOL TARTRATE 5 MG: 5 INJECTION INTRAVENOUS at 05:24

## 2020-03-12 RX ADMIN — VENLAFAXINE 75 MG: 37.5 TABLET ORAL at 21:36

## 2020-03-12 RX ADMIN — LEVOTHYROXINE SODIUM 137 MCG: 112 TABLET ORAL at 05:24

## 2020-03-12 RX ADMIN — AMIODARONE HYDROCHLORIDE 150 MG: 50 INJECTION, SOLUTION INTRAVENOUS at 11:25

## 2020-03-12 RX ADMIN — VENLAFAXINE 75 MG: 37.5 TABLET ORAL at 08:43

## 2020-03-12 RX ADMIN — APIXABAN 5 MG: 5 TABLET, FILM COATED ORAL at 08:38

## 2020-03-12 RX ADMIN — APIXABAN 5 MG: 5 TABLET, FILM COATED ORAL at 17:16

## 2020-03-12 RX ADMIN — ROPINIROLE HYDROCHLORIDE 2 MG: 2 TABLET, FILM COATED ORAL at 21:35

## 2020-03-12 RX ADMIN — METOPROLOL SUCCINATE 75 MG: 50 TABLET, EXTENDED RELEASE ORAL at 21:37

## 2020-03-12 RX ADMIN — MELATONIN 1000 UNITS: at 08:41

## 2020-03-12 RX ADMIN — HYDRALAZINE HYDROCHLORIDE 25 MG: 25 TABLET, FILM COATED ORAL at 05:26

## 2020-03-12 RX ADMIN — HYDRALAZINE HYDROCHLORIDE 25 MG: 25 TABLET, FILM COATED ORAL at 21:37

## 2020-03-12 RX ADMIN — HYDRALAZINE HYDROCHLORIDE 25 MG: 25 TABLET, FILM COATED ORAL at 14:39

## 2020-03-12 RX ADMIN — AMIODARONE HYDROCHLORIDE 1 MG/MIN: 50 INJECTION, SOLUTION INTRAVENOUS at 11:47

## 2020-03-12 RX ADMIN — METOPROLOL SUCCINATE 75 MG: 50 TABLET, EXTENDED RELEASE ORAL at 08:39

## 2020-03-12 RX ADMIN — ASCORBIC ACID TAB 250 MG 125 MG: 250 TAB at 08:43

## 2020-03-12 RX ADMIN — POTASSIUM CHLORIDE 20 MEQ: 1500 TABLET, EXTENDED RELEASE ORAL at 08:40

## 2020-03-12 NOTE — ASSESSMENT & PLAN NOTE
Wt Readings from Last 3 Encounters:   03/12/20 88 3 kg (194 lb 10 7 oz)   03/09/20 90 3 kg (199 lb)   02/27/20 87 5 kg (193 lb)     Patient with evidence for acute on chronic combined diastolic heart failure secondary to AFib with RVR      Echo 7/2019 EF 45%, will repeat echo this admission when rates better controlled  Appreciate heart failure input  Continue with Lasix 40 mg b i d  IV and Toprol  Daily weights, I&O's, fluid restrict, 2g Na restricted diet

## 2020-03-12 NOTE — ASSESSMENT & PLAN NOTE
Lab Results   Component Value Date    HGBA1C 6 9 (H) 01/27/2020       No results for input(s): POCGLU in the last 72 hours  Blood Sugar Average: Last 72 hrs:   patient reports she is not a diabetic  Noted blood hemoglobin A1c has been 6 9 is slightly higher  Patient on no agents at this time  Will place on a carbohydrate restricted diet  Will not ask her to do fingersticks    She denies diabetes

## 2020-03-12 NOTE — ASSESSMENT & PLAN NOTE
Patient with a known history of atrial fibrillation status post 2 attempts at cardioversion that have failed  Patient presents with increasing lower extremity edema found to be in AFib with RVR      · Appreciate heart failure input- have consulted EP   · Continue diuresis  · Transitioned from cardizem to amiodarone gtt after amio bolus  · Continue Toprol 75mg BID  · Continue Eliquis  · Will order 2D echo when rates better controlled

## 2020-03-12 NOTE — PLAN OF CARE
Problem: Potential for Falls  Goal: Patient will remain free of falls  Description  INTERVENTIONS:  - Assess patient frequently for physical needs  -  Identify cognitive and physical deficits and behaviors that affect risk of falls    -  [place] fall precautions as indicated by assessment   - Educate patient/family on patient safety including physical limitations  - Instruct patient to call for assistance with activity based on assessment  - Modify environment to reduce risk of injury  - Consider OT/PT consult to assist with strengthening/mobility  Outcome: Progressing     Problem: PAIN - ADULT  Goal: Verbalizes/displays adequate comfort level or baseline comfort level  Description  Interventions:  - Encourage patient to monitor pain and request assistance  - Assess pain using appropriate pain scale  - Administer analgesics based on type and severity of pain and evaluate response  - Implement non-pharmacological measures as appropriate and evaluate response  - Consider cultural and social influences on pain and pain management  - Notify physician/advanced practitioner if interventions unsuccessful or patient reports new pain  Outcome: Progressing     Problem: INFECTION - ADULT  Goal: Absence or prevention of progression during hospitalization  Description  INTERVENTIONS:  - Assess and monitor for signs and symptoms of infection  - Monitor lab/diagnostic results  - Monitor all insertion sites, i e  indwelling lines, tubes, and drains  - Monitor endotracheal if appropriate and nasal secretions for changes in amount and color  - [place] appropriate cooling/warming therapies per order  - Administer medications as ordered  - Instruct and encourage patient and family to use good hand hygiene technique  - Identify and instruct in appropriate isolation precautions for identified infection/condition  Outcome: Progressing  Goal: Absence of fever/infection during neutropenic period  Description  INTERVENTIONS:  - Monitor WBC    Outcome: Progressing     Problem: SAFETY ADULT  Goal: Maintain or return to baseline ADL function  Description  INTERVENTIONS:  -  Assess patient's ability to carry out ADLs; assess patient's baseline for ADL function and identify physical deficits which impact ability to perform ADLs (bathing, care of mouth/teeth, toileting, grooming, dressing, etc )  - Assess/evaluate cause of self-care deficits   - Assess range of motion  - Assess patient's mobility; develop plan if impaired  - Assess patient's need for assistive devices and provide as appropriate  - Encourage maximum independence but intervene and supervise when necessary  - Involve family in performance of ADLs  - Assess for home care needs following discharge   - Consider OT consult to assist with ADL evaluation and planning for discharge  - Provide patient education as appropriate  Outcome: Progressing  Goal: Maintain or return mobility status to optimal level  Description  INTERVENTIONS:  - Assess patient's baseline mobility status (ambulation, transfers, stairs, etc )    - Identify cognitive and physical deficits and behaviors that affect mobility  - Identify mobility aids required to assist with transfers and/or ambulation (gait belt, sit-to-stand, lift, walker, cane, etc )  - [place] fall precautions as indicated by assessment  - Record patient progress and toleration of activity level on Mobility SBAR; progress patient to next Phase/Stage  - Instruct patient to call for assistance with activity based on assessment  - Consider rehabilitation consult to assist with strengthening/weightbearing, etc   Outcome: Progressing     Problem: DISCHARGE PLANNING  Goal: Discharge to home or other facility with appropriate resources  Description  INTERVENTIONS:  - Identify barriers to discharge w/patient and caregiver  - Arrange for needed discharge resources and transportation as appropriate  - Identify discharge learning needs (meds, wound care, etc )  - Arrange for interpretive services to assist at discharge as needed  - Refer to Case Management Department for coordinating discharge planning if the patient needs post-hospital services based on physician/advanced practitioner order or complex needs related to functional status, cognitive ability, or social support system  Outcome: Progressing     Problem: Knowledge Deficit  Goal: Patient/family/caregiver demonstrates understanding of disease process, treatment plan, medications, and discharge instructions  Description  Complete learning assessment and assess knowledge base    Interventions:  - Provide teaching at level of understanding  - Provide teaching via preferred learning methods  Outcome: Progressing

## 2020-03-12 NOTE — ASSESSMENT & PLAN NOTE
Wt Readings from Last 3 Encounters:   03/11/20 89 9 kg (198 lb 3 1 oz)   03/09/20 90 3 kg (199 lb)   02/27/20 87 5 kg (193 lb)     Patient with evidence for acute on chronic combined diastolic heart failure secondary to AFib with RVR  Will continue with Lasix 40 mg b i d   Continue hydralazine Q 8 hours, discuss the addition of nitrates with heart failure service  Continue metoprolol XL  Continue Cardizem drip until rate control improved    Continue apixaban
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
## 2020-03-12 NOTE — SOCIAL WORK
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  She has hx of [facility] for rehab and IP rehab at [facility]  Patient denies MH or D&A treatment  She has no POA or LW and her primary contact is her sister, Mel Horton, [phone]  PCP is CHUCK Ackerman and uses [address] for prescription  Her niece Noah Hannibal [phone] will transport her upon dc  CM reviewed d/c planning process including the following: identifying help at home, patient preference for d/c planning needs, Discharge Lounge, [facility] Meds to Bed program, availability of treatment team to discuss questions or concerns patient and/or family may have regarding understanding medications and recognizing signs and symptoms once discharged  CM also encouraged patient to follow up with all recommended appointments after discharge  Patient advised of importance for patient and family to participate in managing patients medical well being
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
## 2020-03-12 NOTE — ASSESSMENT & PLAN NOTE
Patient with a known history of atrial fibrillation status post 2 attempts at cardioversion that have failed  Patient presents with increasing lower extremity edema found to be in AFib with RVR  Patient currently on Cardizem drip with little impact  Have decided to continue her oral metoprolol and add p r n  Boluses of metoprolol while on maximum Cardizem  · Consult heart failure in a m  · Continue diuresis  · Continue Cardizem drip at 15 mg per hour and add oral metoprolol with IV p r n  Medications  · Continue Eliquis  · Recent 2D echo and nucleolar study were completed    Will leave to the discretion of the heart failure service whether she needs a limited echo
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
## 2020-03-12 NOTE — UTILIZATION REVIEW
Initial Clinical Review    Admission: Date/Time/Statement: Admission Orders (From admission, onward)     Ordered        03/11/20 1441  Inpatient Admission  Once                Orders Placed This Encounter   Procedures    Inpatient Admission     Standing Status:   Standing     Number of Occurrences:   1     Order Specific Question:   Admitting Physician     Answer:   Jose Woodson [[id number]]     Order Specific Question:   Level of Care     Answer:   Level 2 Stepdown / HOT [14]     Order Specific Question:   Estimated length of stay     Answer:   More than 2 Midnights     Order Specific Question:   Certification     Answer:   I certify that inpatient services are medically necessary for this patient for a duration of greater than two midnights  See H&P and MD Progress Notes for additional information about the patient's course of treatment  ED Arrival Information     Expected Arrival Acuity Means of Arrival Escorted By Service Admission Type    - 3/11/2020 11:43 Urgent Walk-In Self General Medicine Urgent    Arrival Complaint    chf     Chief Complaint   Patient presents with    Leg Swelling     coming in for CHF as per pt  She stated her physicians told her to come to the ED to "get the fluid off " Pt reports increased leg swelling, denies SOB     Assessment/Plan: 78year old female with PMHx HTN, HLD, NICM/ Biventricular heart failure 2nd atrial fib on eliquis - s/p unsuccessful ablation attempts x 2, estimated LVEFx recovery from 25 % to 45 %, DM2, CKD, CORAZON, Hypothyroid, uterine cancer, alcohol abuse  Presented urgently to [facility] ED 3/11/20 per Cardiology 2nd few day history of iIncreased lower extremity edema with KAMARA and weight gain  Has  approximately a 12 lb weight gain recently despite recent increase in torsemide dose  In ED:  EKG - Atrial fib at 150/min    Pitting edema up to the level of the tibial tuberosities bilaterally, bilateral venous stasis changes, weeping wounds with overlying erythema  LABS:  BNP 4,620  Ch X Ray - bilateral pleural effusions with cardiomegaly  ED Tx;  IV Lasix, IV Cardizem bolus and  Drip  Admitted inpatient 3/11/20 at 1441 2nd Acute on chronic partially recovered systolic CHF, LVEF 47-83%, 45% on most recent TTE LVIDd 4 6 cm, NYHA Class III, ACC/AHA Stage C, uncontrolled atrial fib  Tx:  IV Cardizem drip, IV Loressor prn given x 2  Per 3/12 Cardilogy Consult - IV Cardizem d/c and Amiodarone started    3/12 Cardiology Consult:  Assessment/Plan: 1  Acute on chronic partially recovered systolic CHF, LVEF 44-65%, 45% on most recent TTE LVIDd 4 6 cm, NYHA Class III, ACC/AHA Stage C,   Etiology: NICM and likely tachy-mediated with ongoing AF with RVR , +/- ischemia given patient's risk factors for CAD, strong family history, and septal Q waves on EKG with severe septal hypokinesis on echo, less likely viral, toxin induced or infiltrative   S/P cardiogenic shock in past requiring inotropic support 2/2 to arrhythmia  worsened in the setting of attempted cardioversion, use of Cardizem, anesthesia-use of Propofol  Presents this admission volume overloaded and back in an uncontrolled AF  Not in low output state  Cardizem not the best option for rate control as her LV function is likely down again  Can try to rate control with AMio gtt for now, Dig intermittently PRN  Continue Lasix 40 mg IV BID for diuresis  Can consider completing nuclear stress while inpatient and discuss ablation when closer to euvolemia      Neurohormonal Blockade:  --Beta Blocker: metoprolol succinate 75 mg Q12  --ACEi, ARB or ARNi: Renal function precludes  --SVR reduction: hydralazine 25 mg TID and Isordil 10 mg TID- not currently on  --Aldosterone Receptor Blocker: Renal function precludes  --Diuretic: Torsemide 40 mg once daily  --Inpatient diuretic: Lasix 40 mg IV BID     Advanced Therapies (if appropriate): --Inotrope: N/A  --LVAD/Transplant Candidacy: Will continue to monitor       2  Atrial Fib with RVR  Currently with rates in the 140-150s  On Cardizem gtt but would use alternative rate control agent, likely AMio   S/P successful DCCV on 7/3/19 following Amio load and then went back into Afib on 7/8/19  Amio eventually d/c by EP  Was maintained on rate control strategy with her beta blocker  Plan for possible AF versus AVN ablation had been mentioned previously  WIll discuss with EP  On Eliquis for oral AC  Thyroid function stable  ED Triage Vitals   Temperature Pulse Respirations Blood Pressure SpO2   03/11/20 1147 03/11/20 1147 03/11/20 1147 03/11/20 1147 03/11/20 1147   97 5 °F (36 4 °C) (!) 150 20 105/75 100 %      Temp Source Heart Rate Source Patient Position - Orthostatic VS BP Location FiO2 (%)   03/11/20 1527 03/11/20 1147 03/11/20 1326 03/11/20 1326 --   Oral Monitor Lying Left arm     Wt Readings from Last 1 Encounters:   03/12/20 88 3 kg (194 lb 10 7 oz)     Additional Vital Signs:   Vitals: Blood pressure 112/68, pulse (!) 145, temperature 98 1 °F (36 7 °C), temperature source Oral, resp  rate 18,  height 5' 1" (1 549 m), weight 88 3 kg (194 lb 10 7 oz),   SpO2 98 %  ,   Body mass index is 36 78 kg/m² ,     I/O last 3 completed shifts: In: 540 [P O :540]  Out: 950 [Urine:950]    Wt Readings from Last 3 Encounters:   03/12/20 88 3 kg (194 lb 10 7 oz)   03/11/20 89 9 kg (197 8 lb)   02/27/20 87 5 kg (193 lb)         Intake/Output Summary (Last 24 hours) at 3/12/2020 0944  Gross per 24 hour   Intake 540 ml   Output 950 ml   Net -410 ml      I/O last 3 completed shifts: In: 5 [P O :540]  Out: 950 [Urine:950]     EKG (Per Card):  Atrial fibrillation with rapid ventricular response heart rates in the 130s-140s       Pertinent Labs/Diagnostic Test Results:   Results from last 7 days   Lab Units 03/12/20  0559 03/11/20  1317   WBC Thousand/uL 7 50 7 29   HEMOGLOBIN g/dL 11 5 11 0*   HEMATOCRIT % 37 0 34 7*   PLATELETS Thousands/uL 223 215   NEUTROS ABS Thousands/µL  --  5 18     Results from last 7 days   Lab Units 03/12/20  0559 03/11/20  1317   SODIUM mmol/L 135* 136   POTASSIUM mmol/L 3 9 4 2   CHLORIDE mmol/L 102 99*   CO2 mmol/L 25 29   ANION GAP mmol/L 8 8   BUN mg/dL 25 25   CREATININE mg/dL 1 17 1 22   EGFR ml/min/1 73sq m 44 42   CALCIUM mg/dL 8 7 8 9     Results from last 7 days   Lab Units 03/11/20  1317   AST U/L 43   ALT U/L 28   ALK PHOS U/L 86   TOTAL PROTEIN g/dL 6 8   ALBUMIN g/dL 3 1*   TOTAL BILIRUBIN mg/dL 1 13*     Results from last 7 days   Lab Units 03/12/20  0559 03/11/20  1317   GLUCOSE RANDOM mg/dL 137 128     Results from last 7 days   Lab Units 03/12/20  0559   HEMOGLOBIN A1C % 6 9*   EAG mg/dl 151     Results from last 7 days   Lab Units 03/11/20  1317   TROPONIN I ng/mL 0 03     Results from last 7 days   Lab Units 03/11/20  1317   TSH 3RD GENERATON uIU/mL 3 200     Results from last 7 days   Lab Units 03/11/20  1317   NT-PRO BNP pg/mL 4,620*     CHEST X RAY   bilateral pleural effusions with cardiomegaly  EKG -  * Age and gender specific ECG analysis *  Atrial fibrillation with rapid ventricular response  Low voltage QRS  Septal infarct (cited on or before 18-JUN-2019)  Lateral infarct , age undetermined  Abnormal ECG  When compared with ECG of 03-JUL-2019 16:54,  Atrial fibrillation has replaced Sinus rhythm  Vent   rate has increased BY  87 BPM  QRS duration has decreased  Lateral infarct is now Present      ED Treatment:   Medication Administration from 03/11/2020 1142 to 03/11/2020 1513       Date/Time Order Dose Route Action     03/11/2020 1417 diltiazem (CARDIZEM) 125 mg in sodium chloride 0 9 % 125 mL infusion 15 mg/hr Intravenous Rate/Dose Change     03/11/2020 1343 diltiazem (CARDIZEM) 125 mg in sodium chloride 0 9 % 125 mL infusion 5 mg/hr Intravenous New Bag     03/11/2020 1318 diltiazem (CARDIZEM) injection 20 mg 20 mg Intravenous Given     03/11/2020 1439 furosemide (LASIX) injection 40 mg 40 mg Intravenous Given     Past Medical History:   Diagnosis Date  Cardiogenic shock ([facility] [address] ) 6/26/2019    CHF (congestive heart failure) (HCC)     Eczema     Photosensitivity     abnormal skin sensitivity to sunlight    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HCC)     staging     Present on Admission:   Acute on chronic combined systolic and diastolic CHF (congestive heart failure) (HCC)   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)   Atrial fibrillation with RVR (HCC)   Restless leg syndrome   Hypertension   Hyperlipidemia   Depression with anxiety   Obesity (BMI 30-39  9)   Hypothyroidism    Admitting Diagnosis: CHF (congestive heart failure) (HCC) [I50 9]  Atrial fibrillation with RVR (HCC) [I48 91]  Bilateral lower extremity edema [R60 0]    Age/Sex: 78 y o  female    Admission Orders:  Telemetry  VS q4hrs  Daily weight   I+O  Diet Cardiovascular; Sodium 4 Gm (OLIVA); Fluid Restriction 1500 ML, Consistent Carbohydrate Diet Level 2 (5 carb servings/75 grams CHO/meal)    Scheduled Medications:  apixaban 5 mg Oral BID   ascorbic acid 125 mg Oral Daily   cholecalciferol 1,000 Units Oral Daily   furosemide 40 mg Intravenous BID (diuretic)   hydrALAZINE 25 mg Oral Q8H DAVE   levothyroxine 137 mcg Oral Early Morning   metoprolol succinate 75 mg Oral Q12H [facility]   potassium chloride 20 mEq Oral Daily   rOPINIRole 2 mg Oral HS   venlafaxine 75 mg Oral Q12H [facility]     Continuous IV Infusions:  IVF diltiazem (CARDIZEM) 125 mg in sodium chloride 0 9 % 125 mL infusion   Ordered Dose: 1-15 mg/hr Route: Intravenous Frequency: Titrated @ 1-15 mL/hr   Scheduled Start Date/Time: 03/12/20 0730 End Date/Time: 03/12/20 1033       amiodarone 1 mg/min Intravenous Continuous  START 3/12/20 AT 1033     PRN Meds:  acetaminophen 650 mg Oral Q4H PRN   metoprolol 5 mg Intravenous Q6H PRN  HR > 120 GIVEN X 2     IP CONSULT TO NUTRITION SERVICES  IP CONSULT TO HEART FAILURE SERVICE  IP CONSULT TO WOUND CARE    Network Utilization Review Department  [email or web address]  ATTENTION: Please call with any questions or concerns to [phone] and carefully listen to the prompts so that you are directed to the right person  All voicemails are confidential   Dougie Hallmark all requests for admission clinical reviews, approved or denied determinations and any other requests to dedicated fax number below belonging to the campus where the patient is receiving treatment   List of dedicated fax numbers for the Facilities:  [address] DENIALS (Administrative/Medical Necessity) [phone]   [address] (Maternity/NICU/Pediatrics) [phone]   Matheus Randhawa [phone]   Lilliana Porter [phone]   [employer] [phone]   Андрей Fontanez [phone]   [address] [place] [address] [address] [place] [address] [phone]   [facility]  [phone]   [address] [facility]  [address] [facility] [address] [facility] [phone]

## 2020-03-12 NOTE — ASSESSMENT & PLAN NOTE
Lab Results   Component Value Date    HGBA1C 6 9 (H) 03/12/2020       No results for input(s): POCGLU in the last 72 hours  Blood Sugar Average: Last 72 hrs:   patient reports she is not a diabetic  Noted blood hemoglobin A1c has been 6 9 is slightly higher  Patient on no agents at this time  Continue diabetic diet   Patient refuses accuchecks

## 2020-03-12 NOTE — CONSULTS
Advanced Heart Failure/Pulmonary Hypertension Consult Note - Brit Suazo 78 y o  female MRN: [id number]    Unit/Bed#: [facility] 529-01 Encounter: [id number]      Assessment:    Principal Problem:    Atrial fibrillation with RVR ([facility] 75 )  Active Problems:    Depression with anxiety    Hyperlipidemia    Hypertension    Hypothyroidism    Obesity (BMI 30-39  9)    Type 2 diabetes mellitus with diabetic cataract ([facility] )    Acute on chronic combined systolic and diastolic CHF (congestive heart failure) (HCC)    Restless leg syndrome      Assessment/Plan: 1  Acute on chronic partially recovered systolic CHF, LVEF 56-28%, 45% on most recent TTE LVIDd 4 6 cm, NYHA Class III, ACC/AHA Stage C,   Etiology: NICM and likely tachy-mediated with ongoing AF with RVR , +/- ischemia given patient's risk factors for CAD, strong family history, and septal Q waves on EKG with severe septal hypokinesis on echo, less likely viral, toxin induced or infiltrative  S/P cardiogenic shock in past requiring inotropic support 2/2 to arrhythmia  worsened in the setting of attempted cardioversion, use of Cardizem, anesthesia-use of Propofol  Presents this admission volume overloaded and back in an uncontrolled AF  Not in low output state  Cardizem not the best option for rate control as her LV function is likely down again  Can try to rate control with AMio gtt for now, Dig intermittently PRN  Continue Lasix 40 mg IV BID for diuresis  Can consider completing nuclear stress while inpatient and discuss ablation when closer to euvolemia  Echocardiogram (limited) from 07/04/2019:  LVEF: 45%; mild diffuse hypokinesis  LVIDd: 3 8 cm  MR: No MR  Moderate annular calcification  Other: Dilated LA      Echocardiogram from 06/20/2019:  LVEF: 25%  LVIDd: 4 6 cm  RV: Dilated and hypokinetic  MR: Moderate      Neurohormonal Blockade:  --Beta Blocker: metoprolol succinate 75 mg Q12  --ACEi, ARB or ARNi: Renal function precludes  --SVR reduction: hydralazine 25 mg TID and Isordil 10 mg TID- not currently on  --Aldosterone Receptor Blocker: Renal function precludes  --Diuretic: Torsemide 40 mg once daily  --Inpatient diuretic: Lasix 40 mg IV BID     Sudden Cardiac Death Risk Reduction:  Since been DC    --ICD: LVEF 45%     Electrical Resynchronization:  --Interrogation: Narrow QRS     Advanced Therapies (if appropriate): --Inotrope: N/A  --LVAD/Transplant Candidacy: Will continue to monitor       2  Atrial Fib with RVR  Currently with rates in the 140-150s  On Cardizem gtt but would use alternative rate control agent, likely AMio  S/P successful DCCV on 7/3/19 following Amio load and then went back into Afib on 7/8/19  Amio eventually d/c by EP  Now maintained on rate control strategy with her beta blocker  Plan for possible AF versus AVN ablation had been mentioned previously  WIll discuss with EP  On Eliquis for oral AC  Thyroid function stable       3  HTN COntrolled       4  HLD  On statin therapy      5  Hypothyroidism  History of radioablation in the past now on replacement therapy  TSH stable     6  Type II DM        7  CORAZON  Noncompliant with CPAP in the past    Still has not complete sleep study     8  CKD  Renal function stable this admit     9  History of transaminitis in setting of cardiogenic shock     10  Abnormal EKG  Q waves noted in septal leads concerning for possible old infarct  Lexiscan stress to eval for underlying ischemia still not completed  Can likely perform this admission       11  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  Hstory of of junctional rhythm post DCCV attempt in setting of amiodarone load during last admission  Original plan for BiV AICD implant during last admit but aborted 2/2 patient going into cardiogenic shock  12  History of alcohol abuse    Has been abstinent for years       HPI:     66-year-old female known to our service with past medical history of chronic biventricular heart failure with partial recovery of her LV function from an EF of 25% to 45%, atrial fibrillation on Eliquis, hypertension, hyperlipidemia, hypothyroidism type 2 diabetes obstructive sleep apnea, and history of uterine cancer  [place] [facility] she was admitted with atrial fib with rapid ventricular response and new onset heart failure with cardiogenic shock  During that admission were also signs of tachy-oniel syndrome post cardioversion and plan was for implant of dual chamber AICD  However during induction for this procedure with propofol became extremely hypotensive with shortness of breath  She was subsequently transferred to the ICU and required initiation of inotropic and pressor support  She was ultimately successfully weaned off these medications and continued with amiodarone load for her atrial fibrillation  Unfortunately her cardioversion attempts were unsuccessful  Her failure specific medical therapy was optimized during admission and she was discharged to skilled nursing facility  Then seen by Dr Faizan Bocanegra about a week later  At that time had remained in atrial fibrillation with rapid ventricular response despite amiodarone  This was stopped and a simple rate control strategy was initiated with increase in her beta-blocker  She was having issues monitoring her weight at home and having a lot of dietary indiscretions  Patient was last seen by Isrrael Mcmahan on 03/09/2020  At that time recommendations were made to pursue treatment for her sleep apnea however she was reluctant to do so  Stress test have been ordered months prior but she still had not schedule that  She did have complaints of worsening lower extremity edema and possible cellulitis infection of the lower extremities  She was actually encouraged to report to the ER for further evaluation likely IV diuresis  Seems she has gained about 12 lb within a month  Her daily diuretic was increased to twice daily    BP and heart rate were well controlled during that visit  Patient did finally report to the ER yesterday  Chest x-ray demonstrates small bilateral effusions  NT proBNP 4600 which is elevated from prior value of 1800  TSH is normal   Her renal function is actually stable  Came in in atrial fibrillation with a heart rate of 150 with a stable BP  Was started on Cardizem drip given IV diuretic with Lasix  Past Medical History:   Diagnosis Date    Cardiogenic shock ([facility] ) 6/26/2019    CHF (congestive heart failure) (HCC)     Eczema     Photosensitivity     abnormal skin sensitivity to sunlight    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HCC)     staging       Twelve point review of systems negative other than that stated in HPI    Allergies   Allergen Reactions    Penicillins     Wellbutrin Sr  [Bupropion]      Other reaction(s): Suicidal ideations  Category:  Adverse Reaction;          Current Facility-Administered Medications:     acetaminophen (TYLENOL) tablet 650 mg, 650 mg, Oral, Q4H PRN, Kavitha Velasquez MD    apixaban (ELIQUIS) tablet 5 mg, 5 mg, Oral, BID, Kavitha Velasquez MD, 5 mg at 03/12/20 0799    ascorbic acid (VITAMIN C) tablet 125 mg, 125 mg, Oral, Daily, Kavitha Velasquez MD, 125 mg at 03/12/20 0843    cholecalciferol (VITAMIN D3) tablet 1,000 Units, 1,000 Units, Oral, Daily, Kavitha Velasquez MD, 1,000 Units at 03/12/20 0841    diltiazem (CARDIZEM) 125 mg in sodium chloride 0 9 % 125 mL infusion, 1-15 mg/hr, Intravenous, Titrated, Trisha Jenkins PA-C, Last Rate: 2 5 mL/hr at 03/12/20 0854, 2 5 mg/hr at 03/12/20 0854    furosemide (LASIX) injection 40 mg, 40 mg, Intravenous, BID (diuretic), Kavitha Velasquez MD, 40 mg at 03/12/20 0845    hydrALAZINE (APRESOLINE) tablet 25 mg, 25 mg, Oral, Q8H [facility], Kavitha Velasquez MD, 25 mg at 03/12/20 0526    levothyroxine tablet 137 mcg, 137 mcg, Oral, Early Morning, Kavitha Velasquez MD, 137 mcg at 03/12/20 0524    metoprolol (LOPRESSOR) injection 5 mg, 5 mg, Intravenous, Q6H PRN, Trisha Jenkins PATannerC, 5 mg at 03/12/20 0524    metoprolol succinate (TOPROL-XL) 24 hr tablet 75 mg, 75 mg, Oral, Q12H [address], Thea Stahl MD, 75 mg at 03/12/20 0839    potassium chloride (K-DUR,KLOR-CON) CR tablet 20 mEq, 20 mEq, Oral, Daily, Thea Stahl MD, 20 mEq at 03/12/20 0840    rOPINIRole (REQUIP) tablet 2 mg, 2 mg, Oral, HS, Bladimir Vázquez PA-C, 2 mg at 03/11/20 2343    venlafaxine [facility]) tablet 75 mg, 75 mg, Oral, Q12H [address], Thea Stahl MD, 75 mg at 03/12/20 6504    Social History     Socioeconomic History    Marital status: Single     Spouse name: Not on file    Number of children: Not on file    Years of education: Not on file    Highest education level: Not on file   Occupational History    Not on file   Social Needs    Financial resource strain: Not on file    Food insecurity:     Worry: Not on file     Inability: Not on file    Transportation needs:     Medical: Not on file     Non-medical: Not on file   Tobacco Use    Smoking status: Never Smoker    Smokeless tobacco: Never Used   Substance and Sexual Activity    Alcohol use: Not Currently    Drug use: Never    Sexual activity: Not Currently   Lifestyle    Physical activity:     Days per week: Not on file     Minutes per session: Not on file    Stress: Not on file   Relationships    Social connections:     Talks on phone: Not on file     Gets together: Not on file     Attends Evangelical service: Not on file     Active member of club or organization: Not on file     Attends meetings of clubs or organizations: Not on file     Relationship status: Not on file    Intimate partner violence:     Fear of current or ex partner: Not on file     Emotionally abused: Not on file     Physically abused: Not on file     Forced sexual activity: Not on file   Other Topics Concern    Not on file   Social History Narrative    Not on file       Family History   Problem Relation Age of Onset    Alzheimer's disease Mother     Coronary artery disease Mother     Dementia Mother     Diabetes Mother         mellitus    Other Father         acute myocardial infarction    Coronary artery disease Sister     Other Maternal Grandfather         laryngeal cancer    Dementia Maternal Aunt     Diabetes Maternal Aunt        Physical Exam:  [employer] (day, reason): Murray catheter (day, reason):    Vitals: Blood pressure 112/68, pulse (!) 145, temperature 98 1 °F (36 7 °C), temperature source Oral, resp  rate 18, height 5' 1" (1 549 m), weight 88 3 kg (194 lb 10 7 oz), SpO2 98 %  , Body mass index is 36 78 kg/m² , I/O last 3 completed shifts: In: 540 [P O :540]  Out: 950 [Urine:950]  No intake/output data recorded  Wt Readings from Last 3 Encounters:   03/12/20 88 3 kg (194 lb 10 7 oz)   03/09/20 90 3 kg (199 lb)   02/27/20 87 5 kg (193 lb)       Intake/Output Summary (Last 24 hours) at 3/12/2020 0944  Last data filed at 3/12/2020 0600  Gross per 24 hour   Intake 540 ml   Output 950 ml   Net -410 ml     I/O last 3 completed shifts: In: 540 [P O :540]  Out: 950 [Urine:950]    Atrial fibrillation with rapid ventricular response heart rates in the 130s-140s       Vitals:    03/12/20 0256 03/12/20 0526 03/12/20 0600 03/12/20 0700   BP: 113/73 128/78  112/68   BP Location: Right arm      Pulse: (!) 150   (!) 145   Resp: 18   18   Temp: 98 °F (36 7 °C)   98 1 °F (36 7 °C)   TempSrc: Oral   Oral   SpO2: 96%   98%   Weight:   88 3 kg (194 lb 10 7 oz)    Height:           GEN: Mayra Meadows appears well, alert and oriented x 3, pleasant and cooperative   HEENT: pupils equal, round, and reactive to light; extraocular muscles intact  NECK: supple, no carotid bruits   HEART: irregular rhythm, tachy, normal S1 and S2, no murmurs, clicks, gallops or rubs, JVP is  up  LUNGS: diminished,  to auscultation bilaterally; no wheezes, + bibasilar rales, or rhonchi   ABDOMEN: normal bowel sounds, mild firmness, no tenderness, + distention  EXTREMITIES: warm, peripheral pulses normal; no clubbing, cyanosis, +2 BLLE edema to the upper thigh and sacrum  NEURO: no focal findings   SKIN: normal without suspicious lesions on exposed skin    Labs & Results:    Results from last 7 days   Lab Units 03/11/20  1317   TROPONIN I ng/mL 0 03     Results from last 7 days   Lab Units 03/12/20  0559 03/11/20  1317   WBC Thousand/uL 7 50 7 29   HEMOGLOBIN g/dL 11 5 11 0*   HEMATOCRIT % 37 0 34 7*   PLATELETS Thousands/uL 223 215         Results from last 7 days   Lab Units 03/12/20  0559 03/11/20  1317   POTASSIUM mmol/L 3 9 4 2   CHLORIDE mmol/L 102 99*   CO2 mmol/L 25 29   BUN mg/dL 25 25   CREATININE mg/dL 1 17 1 22   CALCIUM mg/dL 8 7 8 9   ALK PHOS U/L  --  86   ALT U/L  --  28   AST U/L  --  43           EKG personally reviewed by SOTO Liang  Counseling / Coordination of Care  Total floor / unit time spent today 40 minutes  Greater than 50% of total time was spent with the patient and / or family counseling and / or coordination of care  A description of the counseling / coordination of care: 20  Thank you for the opportunity to participate in the care of this patient  Manisha Abarca MD, PhD   [facility] [place]

## 2020-03-13 LAB
ANION GAP SERPL CALCULATED.3IONS-SCNC: 8 MMOL/L (ref 4–13)
BUN SERPL-MCNC: 27 MG/DL (ref 5–25)
CALCIUM SERPL-MCNC: 9 MG/DL (ref 8.3–10.1)
CHLORIDE SERPL-SCNC: 98 MMOL/L (ref 100–108)
CO2 SERPL-SCNC: 29 MMOL/L (ref 21–32)
CREAT SERPL-MCNC: 1.2 MG/DL (ref 0.6–1.3)
GFR SERPL CREATININE-BSD FRML MDRD: 43 ML/MIN/1.73SQ M
GLUCOSE SERPL-MCNC: 129 MG/DL (ref 65–140)
POTASSIUM SERPL-SCNC: 3.9 MMOL/L (ref 3.5–5.3)
SODIUM SERPL-SCNC: 135 MMOL/L (ref 136–145)

## 2020-03-13 PROCEDURE — 80048 BASIC METABOLIC PNL TOTAL CA: CPT | Performed by: NURSE PRACTITIONER

## 2020-03-13 PROCEDURE — 99222 1ST HOSP IP/OBS MODERATE 55: CPT | Performed by: INTERNAL MEDICINE

## 2020-03-13 PROCEDURE — 99232 SBSQ HOSP IP/OBS MODERATE 35: CPT | Performed by: INTERNAL MEDICINE

## 2020-03-13 PROCEDURE — 99233 SBSQ HOSP IP/OBS HIGH 50: CPT | Performed by: INTERNAL MEDICINE

## 2020-03-13 RX ORDER — DOCUSATE SODIUM 100 MG/1
100 CAPSULE, LIQUID FILLED ORAL 2 TIMES DAILY
Status: DISCONTINUED | OUTPATIENT
Start: 2020-03-13 | End: 2020-03-24 | Stop reason: HOSPADM

## 2020-03-13 RX ADMIN — ROPINIROLE HYDROCHLORIDE 2 MG: 2 TABLET, FILM COATED ORAL at 21:08

## 2020-03-13 RX ADMIN — HYDRALAZINE HYDROCHLORIDE 25 MG: 25 TABLET, FILM COATED ORAL at 21:06

## 2020-03-13 RX ADMIN — HYDRALAZINE HYDROCHLORIDE 25 MG: 25 TABLET, FILM COATED ORAL at 05:08

## 2020-03-13 RX ADMIN — POTASSIUM CHLORIDE 20 MEQ: 1500 TABLET, EXTENDED RELEASE ORAL at 08:36

## 2020-03-13 RX ADMIN — FUROSEMIDE 40 MG: 10 INJECTION, SOLUTION INTRAMUSCULAR; INTRAVENOUS at 15:27

## 2020-03-13 RX ADMIN — MELATONIN 1000 UNITS: at 08:35

## 2020-03-13 RX ADMIN — AMIODARONE HYDROCHLORIDE 0.5 MG/MIN: 50 INJECTION, SOLUTION INTRAVENOUS at 11:07

## 2020-03-13 RX ADMIN — HYDRALAZINE HYDROCHLORIDE 25 MG: 25 TABLET, FILM COATED ORAL at 15:27

## 2020-03-13 RX ADMIN — DOCUSATE SODIUM 100 MG: 100 CAPSULE, LIQUID FILLED ORAL at 21:06

## 2020-03-13 RX ADMIN — FUROSEMIDE 40 MG: 10 INJECTION, SOLUTION INTRAMUSCULAR; INTRAVENOUS at 08:36

## 2020-03-13 RX ADMIN — DOCUSATE SODIUM 100 MG: 100 CAPSULE, LIQUID FILLED ORAL at 15:53

## 2020-03-13 RX ADMIN — METOPROLOL TARTRATE 5 MG: 5 INJECTION INTRAVENOUS at 22:30

## 2020-03-13 RX ADMIN — METOPROLOL SUCCINATE 75 MG: 50 TABLET, EXTENDED RELEASE ORAL at 08:35

## 2020-03-13 RX ADMIN — ASCORBIC ACID TAB 250 MG 125 MG: 250 TAB at 08:34

## 2020-03-13 RX ADMIN — VENLAFAXINE 75 MG: 37.5 TABLET ORAL at 08:35

## 2020-03-13 RX ADMIN — METOPROLOL SUCCINATE 75 MG: 50 TABLET, EXTENDED RELEASE ORAL at 21:06

## 2020-03-13 RX ADMIN — APIXABAN 5 MG: 5 TABLET, FILM COATED ORAL at 08:34

## 2020-03-13 RX ADMIN — LEVOTHYROXINE SODIUM 137 MCG: 112 TABLET ORAL at 05:08

## 2020-03-13 RX ADMIN — VENLAFAXINE 75 MG: 37.5 TABLET ORAL at 21:08

## 2020-03-13 RX ADMIN — APIXABAN 5 MG: 5 TABLET, FILM COATED ORAL at 17:27

## 2020-03-13 NOTE — ASSESSMENT & PLAN NOTE
Patient with a known history of atrial fibrillation status post 2 attempts at cardioversion that have failed  Patient presents with increasing lower extremity edema found to be in AFib with RVR      · Appreciate heart failure input- have consulted EP   · Continue diuresis  · Continue amiodarone drip  · Continue Toprol 75mg BID  · Continue Eliquis  · Will order 2D echo when rates better controlled

## 2020-03-13 NOTE — PROGRESS NOTES
Advanced Heart Failure / Pulmonary Hypertension Service Progress Note    Sunny Dial 78 y o  female   MRN: [id number]  Unit/Bed#: [facility] 529-01; Encounter: [id number]    Assessment:  Principal Problem:    Atrial fibrillation with RVR ([facility] )  Active Problems:    Depression with anxiety    Hyperlipidemia    Hypertension    Hypothyroidism    Obesity (BMI 30-39  9)    Type 2 diabetes mellitus with diabetic cataract ([facility] )    Acute on chronic combined systolic and diastolic CHF (congestive heart failure) (HCC)    Restless leg syndrome    Subjective: Sunny Dial is a 77-year-old female with chronic BiV heart failure, atrial fibrillation (AC on renal-dosed Eliquis), HTN, HLD, hypothyroidism, DM2, CORAZON, and history of uterine cancer who presented on 03/11/2020 to [facility] with weight gain and worsening LE edema  From HF consultation on 03/12: "Patient was last seen by Dr Jesika Hogan on 03/09/2020  At that time recommendations were made to pursue treatment for her sleep apnea however she was reluctant to do so  Stress test have been ordered months prior but she still had not schedule that  She did have complaints of worsening lower extremity edema and possible cellulitis infection of the lower extremities  She was actually encouraged to report to the ER for further evaluation likely IV diuresis  Seems she has gained about 12 lbs within a month  Her daily diuretic was increased to twice daily  BP and heart rate were well controlled during that visit       Patient did finally report to the ER yesterday  Chest x-ray demonstrates small bilateral effusions  NT proBNP 4600 which is elevated from prior value of 1800  TSH is normal  Her renal function is stable  Came in in atrial fibrillation with a heart rate of 150 with a stable BP   Was started on Cardizem drip given IV diuretic with Lasix "    When asked why she does not call her PCP or cardiologist with rapid weight gain,  she blames being a "procrastinator" for her inaction  Patient also admits to purposely not scheduling her outpatient sleep study as she she reports she has previously been diagnosed with sleep apnea and is not interested in trying out different mask/cannulas for this  Patient seen and examined  No significant events overnight  Patient reports occasionally noticing fluttering sensation  Reports good urinary response to IV Lasix  Reviewed IV Lasix clinic indications with her  Also discussed upcoming EP consultation; patient is hesitant to receive device for unspecified reasons but is willing to be assessed by EP team     Objective: Intake/ Output: 1823 2 mL / 1250 mL (net positive 573 2 mL)  Weight: 196 lbs (up from 194 lbs on 03/12)  Telemetry: atrial fibrillation; rates in 110-120s with occasional PVCs  Plan:  Acute on chronic biventricular heart failure; LVEF 45%, LVIDd 3 8 cm; NYHA III; ACC/AHA Stage C              Etiology: BiV HF likely tachy-induced, +/- ischemic  Also has history of radioablative iodine  Etiology of exacerbation: Unclear if from AFib with RVR or vice versa  TTE from 06/20/2019: LVEF 25-30%, LVIDd 4 6 cm, mod reduced RVSF  Mod MR and TR  TTE from 06/26/2019: LVEF ~40%, mildly reduced RVSF  Mod MR, otherwise, no significant valvular disease                TTE (limited) from 07/04/2019: LVEF 45%  LVIDd 3 8 cm  Grade 2 DD  Nuclear stress test ordered in 10/2019; never completed  Strict I/Os with daily weights  CV diet with fluid restriction  Potassium of 3 9 this AM     Plan for TTE once euvolemic  Can consider completing nuclear stress test while inpatient       Neurohormonal Blockade:   --Beta Blocker: metoprolol succinate 75 mg q12 hours  --ACEi, ARB or ARNi: N/A    --SVR reduction: hydralazine 25 mg q8 hours  (home dose of Isordil held)  --Aldosterone Receptor Blocker: N/A  --Diuretic: torsemide 40 mg daily     --Inpatient Diuretic: Lasix 40 mg BID with potassium 20 mEq daily        Sudden Cardiac Death Risk Reduction:   --ICD: LVEF 45%       Electrical Resynchronization:   --Interrogation: Narrow QRS      Advanced Therapies (if appropriate): Will continue to monitor       Atrial fibrillation with rapid ventricular response              [id number] = 6 (age, sex, CHF, HTN, DM)              Anticoagulation on Eliquis              S/p unsuccessful cardioversion on 06/21/2019  Initial plan was for EBEN cardioversion and dual-chamber PPM implantation on 06/25  Received propofol and cardizem which likely induced cardiogenic shock due to her borderline low output state at that time                S/p successful cardioversion on 07/03 with amio load  Converted back into Afib on 07/08       Previously on amiodarone as outpatient; was discontinued in July 2019 by EP              Continue on metoprolol succinate 75 mg q12 hours for rate control  Remains on amiodarone drip at 0 5 mg/min  Electrophysiology consulted; input appreciated  Chronic kidney disease, stage III   Baseline creatinine of 0 9-1 2  Today, creatinine of 1 20 (up from 1 17 on 03/12)  Will continue to monitor  Obstructive sleep apnea              Noncompliant with CPAP in the past               Repeat outpatient sleep study was ordered; however, never scheduled      Diabetes mellitus, type 2              Most recent hemoglobin A1c from 03/12/2020 of 6 9  Management per primary team    Hypothyroidism              History of radioablation in the past; now on replacement therapy  Most recent TSH of 9 170 with normal free T4 from 06/18/2019      Hypertension, controlled: continue medications as above  Hyperlipidemia    Diagnostic Testing:  Echocardiogram (limited) from 07/04/2019:  LVEF: 45%; mild diffuse hypokinesis  LVIDd: 3 8 cm  MR: No MR  Moderate annular calcification  Other: Dilated LA      Echocardiogram from 06/20/2019:  LVEF: 25%  LVIDd: 4 6 cm  RV: Dilated and hypokinetic  MR: Moderate    PASP: 35 mmHg     Vitals:   Blood pressure 102/70, pulse (!) 124, temperature 97 8 °F (36 6 °C), temperature source Oral, resp  rate 20, height 5' 1" (1 549 m), weight 89 kg (196 lb 3 4 oz), SpO2 96 %  Body mass index is 37 07 kg/m²  I/O last 3 completed shifts: In: 2063 2 [P O :1620; I V :443 2]  Out: 1800 [Urine:1800]     Wt Readings from Last 3 Encounters:   20 89 kg (196 lb 3 4 oz)   20 90 3 kg (199 lb)   20 87 5 kg (193 lb)     Vitals:    20 0500 20 0508 20 0700 20 0900   BP:  119/78 102/70    BP Location:       Pulse:   (!) 124    Resp:   20    Temp:   97 8 °F (36 6 °C)    TempSrc:   Oral    SpO2:   96% 96%   Weight: 89 kg (196 lb 3 4 oz)      Height:         Physical Exam   Constitutional: She is oriented to person, place, and time  She appears well-developed and well-nourished  HENT:   Head: Normocephalic and atraumatic  Eyes: Pupils are equal, round, and reactive to light  EOM are normal    Neck: Neck supple  JVD present  No tracheal deviation present  Cardiovascular: Intact distal pulses  An irregularly irregular rhythm present  Tachycardia present  Murmur heard  Pulmonary/Chest: Effort normal  No respiratory distress  She has rales (at bases)  Abdominal: Soft  Bowel sounds are normal  She exhibits distension (mild)  Musculoskeletal: She exhibits edema (3-4+ LE and sacral)  Neurological: She is alert and oriented to person, place, and time  Skin: Skin is warm and dry  Psychiatric: She has a normal mood and affect  Her behavior is normal    Vitals reviewed  \[facility]\"" Financial (day, reason): N/A  Murray Catheter (day, reason): N/A      Current Facility-Administered Medications:     acetaminophen (TYLENOL) tablet 650 mg, 650 mg, Oral, Q4H PRN, Merrily Jeans, MD    [] amiodarone (CORDARONE) 900 mg in dextrose 5 % 500 mL infusion, 1 mg/min, Intravenous, Continuous, Stopped at 20 1718 **FOLLOWED BY** amiodarone (CORDARONE) 900 mg in dextrose 5 % 500 mL infusion, 0 5 mg/min, Intravenous, Continuous, SOTO Junior, Last Rate: 16 7 mL/hr at 03/13/20 1107, 0 5 mg/min at 03/13/20 1107    apixaban (ELIQUIS) tablet 5 mg, 5 mg, Oral, BID, Judy Duque MD, 5 mg at 03/13/20 7384    ascorbic acid (VITAMIN C) tablet 125 mg, 125 mg, Oral, Daily, Judy Duque MD, 125 mg at 03/13/20 2119    cholecalciferol (VITAMIN D3) tablet 1,000 Units, 1,000 Units, Oral, Daily, Judy Duque MD, 1,000 Units at 03/13/20 0835    furosemide (LASIX) injection 40 mg, 40 mg, Intravenous, BID (diuretic), SOTO Us, 40 mg at 03/13/20 0836    hydrALAZINE (APRESOLINE) tablet 25 mg, 25 mg, Oral, Q8H [facility], SOTO Junior, 25 mg at 03/13/20 0508    levothyroxine tablet 137 mcg, 137 mcg, Oral, Early Morning, Judy Duque MD, 137 mcg at 03/13/20 0508    metoprolol (LOPRESSOR) injection 5 mg, 5 mg, Intravenous, Q6H PRN, Leidy Santiago PA-C, 5 mg at 03/12/20 0524    metoprolol succinate (TOPROL-XL) 24 hr tablet 75 mg, 75 mg, Oral, Q12H [facility], SOTO Junior, 75 mg at 03/13/20 0835    potassium chloride (K-DUR,KLOR-CON) CR tablet 20 mEq, 20 mEq, Oral, Daily, Judy Duque MD, 20 mEq at 03/13/20 0836    rOPINIRole (REQUIP) tablet 2 mg, 2 mg, Oral, HS, Leidy Santiago PA-C, 2 mg at 03/12/20 2135    venlafaxine (EFFEXOR) tablet 75 mg, 75 mg, Oral, Q12H [facility], Judy Duque MD, 75 mg at 03/13/20 0835    Labs & Results:  Results from last 7 days   Lab Units 03/11/20  1317   TROPONIN I ng/mL 0 03     Results from last 7 days   Lab Units 03/12/20  0559 03/11/20  1317   WBC Thousand/uL 7 50 7 29   HEMOGLOBIN g/dL 11 5 11 0*   HEMATOCRIT % 37 0 34 7*   PLATELETS Thousands/uL 223 215         Results from last 7 days   Lab Units 03/13/20  0513 03/12/20  0559 03/11/20  1317   POTASSIUM mmol/L 3 9 3 9 4 2   CHLORIDE mmol/L 98* 102 99*   CO2 mmol/L 29 25 29   BUN mg/dL 27* 25 25   CREATININE mg/dL 1 20 1 17 1 22   CALCIUM mg/dL 9 0 8 7 8 9   ALK PHOS U/L  --   --  86   ALT U/L  --   --  28   AST U/L  --   --  43         Counseling / Coordination of Care: Total floor / unit time spent today 35 minutes  Greater than 50% of total time was spent with the patient and / or family counseling and / or coordination of care  A description of the counseling / coordination of care: 20 minutes (low sodium diet, fluid restriction, daily weights, and importance of medication compliance)  Thank you for the opportunity to participate in the care of this patient      Ajit Bui PA-C

## 2020-03-13 NOTE — ASSESSMENT & PLAN NOTE
Wt Readings from Last 3 Encounters:   03/13/20 89 kg (196 lb 3 4 oz)   03/09/20 90 3 kg (199 lb)   02/27/20 87 5 kg (193 lb)     Patient with evidence for acute on chronic combined diastolic heart failure secondary to AFib with RVR      Echo 7/2019 EF 45%, will repeat echo this admission when rates better controlled  Appreciate heart failure input  Continue with Lasix 40 mg b i d  IV and Toprol  Daily weights, I&O's, fluid restrict, 2g Na restricted diet

## 2020-03-13 NOTE — CONSULTS
Consultation - Electrophysiology  Kashif Reynoso 78 y o  female MRN: [id number]  Unit/Bed#: [facility] 529-01 Encounter: [id number]      Inpatient consult to Electrophysiology  Consult performed by: Rosalia Esparza PA-C  Consult ordered by: SOTO Valentin          History of Present Illness   Physician Requesting Consult: Winter Cervantes MD  Reason for Consult / Principal Problem:  Atrial fibrillation with RVR    Assessment/Plan   Assessment:  1  Persistent atrial fibrillation with episodes of RVR  - anticoagulated with Eliquis / [id number] score of 6 (age, sex, HTN, CHF, DM)  - EF of 45% per echo 7/4/2019 / LA diameter of 3 6 cm  - rate control: metoprolol succinate 75mg twice daily  - antiarrhythmic therapy: amiodarone used in the past with breakthrough  - prior cardioversion:  6/21/2019 (unsuccessful with junctional rhythm briefly afterwards), 7/4/2019 (held for 5 days with amio on board)  - prior ablation: none  - prior attempt at pacemaker implantation aborted due to cardiogenic shock with propofol  2  Acute on chronic biventricular heart failure  - diuretic regimen of torsemide 40 mg daily as an outpatient / current regimen per Heart Failure team  3  Nonischemic cardiomyopathy   - prior tachy mediated cardiomyopathy with EF of 25 to 30% in June of 2019  4  Essential hypertension  5  Hyperlipidemia  6  Diabetes mellitus  7  Hypothyroidism  - history of radioablation now maintained on levothyroxine  8  Obesity with BMI of 37  9  CORAZON  - CPAP noncompliance in the past  10  CKD stage 3  11  History of alcohol abuse    Plan:  1  Patient has had persistent atrial fibrillation since June of last year  She is not well rate controlled and has had breakthrough on amiodarone  Recommendation at this time would be for CRT-D therapy along with AV node ablation  It was discussed with her that this would make her dependent on her device    Given her history, patient is nervous but willing to proceed with this procedure  She she has an allergy to penicillins and will require vancomycin  She has had no radiation or surgery to left upper chest   She has no allergy to contrast dye  Patient will continue to be optimized from a heart failure standpoint over the weekend and be made NPO on Monday for possible device  Over the weekend, would continue amiodarone intravenously for rate control  He    HPI: Layla Carmona is a 78y o  year old female with persistent atrial fibrillation anticoagulated with Eliquis, chronic systolic congestive heart failure, essential hypertension, hyperlipidemia, diabetes mellitus, hypothyroidism, obesity with BMI of 37, CORAZON, CKD stage 3, and history of alcohol abuse  Patient first became known to Cardiology back in June of 2019  She had presented her PCPs office with complaints of shortness of breath when she was found to be in atrial fibrillation with RVR and acute heart failure with an EF of 25 to 30%  She was attempted to be rate controlled in cardioverted which was unsuccessful  She was seen by electrophysiology and myself at which point amiodarone was started and recommendation for pacemaker implantation was made  Unfortunately, when she was given propofol fall for this procedure she became hypotensive and went into cardiogenic shock requiring pressors and Milrinone  She continue to be loaded on amiodarone and then going with a less invasive approach, cardioversion was recommended  She underwent this on 07/04 but was unfortunately back in atrial fibrillation by 7/8  She continue to be loaded with amiodarone and patient was eventually discharged  In follow-up as an outpatient with electrophysiology in July, her amiodarone was discontinued as she was still in atrial fibrillation and rate control was opted for  Repeat echo did show an improvement to at least 45%    She was last seen by her general cardiologist in March of 2020 at which point it was felt that she should undergo sleep study again to treat the risk factors for atrial fibrillation  She then presented yesterday to the emergency room in atrial fibrillation with RVR and acute heart failure  She was started on Cardizem drip  This was switched to amiodarone drip and EP consultation was also placed  EKG:         Review of Systems  ROS as noted above, otherwise 12 point review of systems was performed and is negative       Historical Information   Past Medical History:   Diagnosis Date    Cardiogenic shock ([facility] [address] ) 6/26/2019    CHF (congestive heart failure) (HCC)     Eczema     Photosensitivity     abnormal skin sensitivity to sunlight    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HCC)     staging     Past Surgical History:   Procedure Laterality Date    CATARACT EXTRACTION Left     HEMORRHOID SURGERY      IR CENTRAL LINE PLACEMENT  7/9/2019    OOPHORECTOMY      unilateral    TOTAL ABDOMINAL HYSTERECTOMY       Social History     Substance and Sexual Activity   Alcohol Use Not Currently     Social History     Substance and Sexual Activity   Drug Use Never     Social History     Tobacco Use   Smoking Status Never Smoker   Smokeless Tobacco Never Used     Family History:   Family History   Problem Relation Age of Onset    Alzheimer's disease Mother     Coronary artery disease Mother     Dementia Mother     Diabetes Mother         mellitus    Other Father         acute myocardial infarction    Coronary artery disease Sister     Other Maternal Grandfather         laryngeal cancer    Dementia Maternal Aunt     Diabetes Maternal Aunt        Meds/Allergies   Hospital Medications:   Current Facility-Administered Medications   Medication Dose Route Frequency    acetaminophen (TYLENOL) tablet 650 mg  650 mg Oral Q4H PRN    amiodarone (CORDARONE) 900 mg in dextrose 5 % 500 mL infusion  0 5 mg/min Intravenous Continuous    apixaban (ELIQUIS) tablet 5 mg  5 mg Oral BID    ascorbic acid (VITAMIN C) tablet 125 mg  125 mg Oral Daily    cholecalciferol (VITAMIN D3) tablet 1,000 Units  1,000 Units Oral Daily    furosemide (LASIX) injection 40 mg  40 mg Intravenous BID (diuretic)    hydrALAZINE (APRESOLINE) tablet 25 mg  25 mg Oral Q8H [address]    levothyroxine tablet 137 mcg  137 mcg Oral Early Morning    metoprolol (LOPRESSOR) injection 5 mg  5 mg Intravenous Q6H PRN    metoprolol succinate (TOPROL-XL) 24 hr tablet 75 mg  75 mg Oral Q12H [address]    potassium chloride (K-DUR,KLOR-CON) CR tablet 20 mEq  20 mEq Oral Daily    rOPINIRole (REQUIP) tablet 2 mg  2 mg Oral HS    venlafaxine (EFFEXOR) tablet 75 mg  75 mg Oral Q12H [address]     Home Medications:   Medications Prior to Admission   Medication    apixaban (ELIQUIS) 5 mg    Ascorbic Acid (VITAMIN C) 100 MG tablet    cholecalciferol (VITAMIN D3) 1,000 units tablet    hydrALAZINE (APRESOLINE) 25 mg tablet    isosorbide dinitrate (ISORDIL) 10 mg tablet    levothyroxine 137 mcg tablet    Loratadine (CLARITIN PO)    MAGNESIUM PO    metoprolol succinate (TOPROL-XL) 25 mg 24 hr tablet    multivitamin (THERAGRAN) TABS    potassium chloride (K-DUR,KLOR-CON) 20 mEq tablet    rOPINIRole (REQUIP XL) 2 MG 24 hr tablet    torsemide (DEMADEX) 20 mg tablet    venlafaxine (EFFEXOR) 75 mg tablet       Allergies   Allergen Reactions    Penicillins     Wellbutrin Sr  [Bupropion]      Other reaction(s): Suicidal ideations  Category: Adverse Reaction;        Objective   Vitals: Blood pressure 102/70, pulse (!) 124, temperature 97 8 °F (36 6 °C), temperature source Oral, resp  rate 20, height 5' 1" (1 549 m), weight 89 kg (196 lb 3 4 oz), SpO2 96 %    Orthostatic Blood Pressures      Most Recent Value   Blood Pressure  102/70 filed at 03/13/2020 0700   Patient Position - Orthostatic VS  Lying filed at 03/13/2020 0300            Intake/Output Summary (Last 24 hours) at 3/13/2020 1412  Last data filed at 3/13/2020 1301  Gross per 24 hour   Intake 1879 58 ml   Output 1050 ml   Net 829 58 ml       Invasive Devices Peripheral Intravenous Line            Peripheral IV 20 Right Forearm 2 days    Peripheral IV 20 Left Antecubital 1 day                Physical Exam   GEN: NAD, alert and oriented, well appearing  SKIN: dry without significant lesions or rashes  HEENT: NCAT, PERRL, EOMs intact  NECK: JVD present  CARDIOVASCULAR:  Irregularly irregular, tachycardic  LUNGS: Clear to auscultation bilaterally without wheezes, rhonchi, or rales  ABDOMEN: Soft, nontender, nondistended  EXTREMITIES/VASCULAR:  Chronic stasis changes and pitting edema bilaterally  PSYCH: Normal mood and affect  NEURO: CN ll-Xll grossly intact    Lab Results: I have personally reviewed pertinent lab results  Results from last 7 days   Lab Units 20  0559 20  1317   WBC Thousand/uL 7 50 7 29   HEMOGLOBIN g/dL 11 5 11 0*   HEMATOCRIT % 37 0 34 7*   PLATELETS Thousands/uL 223 215     Results from last 7 days   Lab Units 20  0513 20  0559 20  1317   POTASSIUM mmol/L 3 9 3 9 4 2   CHLORIDE mmol/L 98* 102 99*   CO2 mmol/L 29 25 29   BUN mg/dL 27* 25 25   CREATININE mg/dL 1 20 1 17 1 22   CALCIUM mg/dL 9 0 8 7 8 9               Imaging: I have personally reviewed pertinent reports      ECHO:   Results for orders placed during the hospital encounter of 19   Echo complete with contrast if indicated    Narrative [address]  [phone]    Transthoracic Echocardiogram  2D, M-mode, Doppler, and Color Doppler    Study date:  2019    Patient: Dasha Gibbs  MR number: [id number]  Account number: [de-identified]  : 1940  Age: 66 years  Gender: Female  Status: Inpatient  Location: Bedside  Height: 61 in  Weight: 175 lb  BP: 92/ 44 mmHg    Indications: Hypotension    Diagnoses: I95 9 - Hypotension, unspecified    Sonographer:  PEEWEE Terrazas  Primary Physician:  Delio Arzate MD  Referring Physician:  SOTO Burns  Group: [facility]  Interpreting Physician:  Mirna Boss MD    SUMMARY    LEFT VENTRICLE:  Size was at the upper limits of normal   Systolic function was moderately reduced  Ejection fraction was estimated to be 42 %  There was moderate diffuse hypokinesis  There was no evidence of concentric hypertrophy  VENTRICULAR SEPTUM:  There was dyssynergic motion  RIGHT VENTRICLE:  The ventricle was mildly to moderately dilated  Systolic function was mildly to moderately reduced  Wall thickness was increased  LEFT ATRIUM:  The atrium was moderately dilated  RIGHT ATRIUM:  The atrium was moderately dilated  MITRAL VALVE:  There was mild annular calcification  There was moderate regurgitation  TRICUSPID VALVE:  There was moderate regurgitation  PULMONIC VALVE:  There was trace regurgitation  HISTORY: PRIOR HISTORY: DM2, HTN, HLD, Afib, CHF    PROCEDURE: The procedure was performed at the bedside  This was an urgent study  The transthoracic approach was used  The study included complete 2D imaging, M-mode, complete spectral Doppler, and color Doppler  The heart rate was 117 bpm,  at the start of the study  Images were obtained from the parasternal, apical, subcostal, and suprasternal notch acoustic windows  Echocardiographic views were limited due to restricted patient mobility  This was a technically difficult  study  LEFT VENTRICLE: Size was at the upper limits of normal  Systolic function was moderately reduced  Ejection fraction was estimated to be 42 %  There was moderate diffuse hypokinesis  Wall thickness was normal  There was no evidence of  concentric hypertrophy  DOPPLER: The study was not technically sufficient to allow evaluation of LV diastolic function  VENTRICULAR SEPTUM: There was dyssynergic motion  RIGHT VENTRICLE: The ventricle was mildly to moderately dilated  Systolic function was mildly to moderately reduced  Wall thickness was increased      LEFT ATRIUM: The atrium was moderately dilated  RIGHT ATRIUM: The atrium was moderately dilated  MITRAL VALVE: There was mild annular calcification  Valve structure was normal  There was normal leaflet separation  DOPPLER: The transmitral velocity was within the normal range  There was no evidence for stenosis  There was moderate  regurgitation  AORTIC VALVE: The valve was trileaflet  Leaflets exhibited normal thickness and normal cuspal separation  DOPPLER: Transaortic velocity was within the normal range  There was no evidence for stenosis  There was no regurgitation  TRICUSPID VALVE: The valve structure was normal  There was normal leaflet separation  DOPPLER: The transtricuspid velocity was within the normal range  There was no evidence for stenosis  There was moderate regurgitation  Pulmonary artery  systolic pressure was mildly increased  PULMONIC VALVE: Leaflets exhibited normal thickness, no calcification, and normal cuspal separation  DOPPLER: The transpulmonic velocity was within the normal range  There was trace regurgitation  PERICARDIUM: There was no pericardial effusion  The pericardium was normal in appearance  AORTA: The root exhibited normal size      SYSTEM MEASUREMENT TABLES    2D  LVEDV MOD A4C: 81 79 ml  LVEF MOD A4C: 64 57 %  LVESV MOD A4C: 28 98 ml  LVLd A4C: 7 08 cm  LVLs A4C: 5 89 cm  SV MOD A4C: 52 81 ml    CW  TR Vmax: 2 65 m/s  TR maxP 14 mmHg    [employer] Accredited Echocardiography Laboratory    Prepared and electronically signed by    La Oneal MD  Signed 2019 07:10:27          Cardiac testing:   ECHO:   Results for orders placed during the hospital encounter of 19   Echo complete with contrast if indicated    Narrative [address]  [address] [place] [address]  [phone]    Transthoracic Echocardiogram  2D, M-mode, Doppler, and Color Doppler    Study date:  2019    Patient: [facility] Ria Renteria  MR number: [id number]  Account number: [de-identified]  : 1940  Age: 66 years  Gender: Female  Status: Inpatient  Location: Bedside  Height: 61 in  Weight: 175 lb  BP: 92/ 44 mmHg    Indications: Hypotension    Diagnoses: I95 9 - Hypotension, unspecified    Sonographer:  PEEWEE Suarez  Primary Physician:  Daniella Edwards MD  Referring Physician:  SOTO Patricia  Group:  [facility]  Interpreting Physician:  Emiliano Hartmann MD    SUMMARY    LEFT VENTRICLE:  Size was at the upper limits of normal   Systolic function was moderately reduced  Ejection fraction was estimated to be 42 %  There was moderate diffuse hypokinesis  There was no evidence of concentric hypertrophy  VENTRICULAR SEPTUM:  There was dyssynergic motion  RIGHT VENTRICLE:  The ventricle was mildly to moderately dilated  Systolic function was mildly to moderately reduced  Wall thickness was increased  LEFT ATRIUM:  The atrium was moderately dilated  RIGHT ATRIUM:  The atrium was moderately dilated  MITRAL VALVE:  There was mild annular calcification  There was moderate regurgitation  TRICUSPID VALVE:  There was moderate regurgitation  PULMONIC VALVE:  There was trace regurgitation  HISTORY: PRIOR HISTORY: DM2, HTN, HLD, Afib, CHF    PROCEDURE: The procedure was performed at the bedside  This was an urgent study  The transthoracic approach was used  The study included complete 2D imaging, M-mode, complete spectral Doppler, and color Doppler  The heart rate was 117 bpm,  at the start of the study  Images were obtained from the parasternal, apical, subcostal, and suprasternal notch acoustic windows  Echocardiographic views were limited due to restricted patient mobility  This was a technically difficult  study  LEFT VENTRICLE: Size was at the upper limits of normal  Systolic function was moderately reduced  Ejection fraction was estimated to be 42 %   There was moderate diffuse hypokinesis  Wall thickness was normal  There was no evidence of  concentric hypertrophy  DOPPLER: The study was not technically sufficient to allow evaluation of LV diastolic function  VENTRICULAR SEPTUM: There was dyssynergic motion  RIGHT VENTRICLE: The ventricle was mildly to moderately dilated  Systolic function was mildly to moderately reduced  Wall thickness was increased  LEFT ATRIUM: The atrium was moderately dilated  RIGHT ATRIUM: The atrium was moderately dilated  MITRAL VALVE: There was mild annular calcification  Valve structure was normal  There was normal leaflet separation  DOPPLER: The transmitral velocity was within the normal range  There was no evidence for stenosis  There was moderate  regurgitation  AORTIC VALVE: The valve was trileaflet  Leaflets exhibited normal thickness and normal cuspal separation  DOPPLER: Transaortic velocity was within the normal range  There was no evidence for stenosis  There was no regurgitation  TRICUSPID VALVE: The valve structure was normal  There was normal leaflet separation  DOPPLER: The transtricuspid velocity was within the normal range  There was no evidence for stenosis  There was moderate regurgitation  Pulmonary artery  systolic pressure was mildly increased  PULMONIC VALVE: Leaflets exhibited normal thickness, no calcification, and normal cuspal separation  DOPPLER: The transpulmonic velocity was within the normal range  There was trace regurgitation  PERICARDIUM: There was no pericardial effusion  The pericardium was normal in appearance  AORTA: The root exhibited normal size      SYSTEM MEASUREMENT TABLES    2D  LVEDV MOD A4C: 81 79 ml  LVEF MOD A4C: 64 57 %  LVESV MOD A4C: 28 98 ml  LVLd A4C: 7 08 cm  LVLs A4C: 5 89 cm  SV MOD A4C: 52 81 ml    CW  TR Vmax: 2 65 m/s  TR maxP 14 mmHg    [facility] Accredited Echocardiography Laboratory    Prepared and electronically signed by    Dima Mendez MD  Signed 2019 07:10:27       Results for orders placed during the hospital encounter of 19   EBEN    Narrative [address]  [facility], [address] [facility]  [phone]    Transesophageal Echocardiogram  2D, Doppler, and Color Doppler    Study date:  2019    Patient: Yuly Lay  MR number: [id number]  Account number: [de-identified]  : 1940  Age: 66 years  Gender: Female  Status: Inpatient  Location: Echo lab  Height: 61 in  Weight: 181 lb  BP: 125/ 83 mmHg    Indications: Atrial-Fib; Cardioversion    Diagnoses: 427 31 - ATRIAL FIBRILLATION    Sonographer:  PEEWEE Leigh  Interpreting Physician:  Khurram Barfield MD  Primary Physician:  Zaire Constantino MD  Referring Physician:  SOTO Welsh  Group:  [facility]  RN:  Jesús Mcneil RN    SUMMARY    LEFT VENTRICLE:  The ventricle was dilated  Systolic function was markedly reduced  Ejection fraction was estimated to be 25 %  There was severe diffuse hypokinesis  RIGHT VENTRICLE:  The ventricle was dilated  Systolic function was mildly to moderately reduced  LEFT ATRIUM:  The atrium was moderately dilated  No thrombus was identified  LEFT ATRIAL APPENDAGE:  The appendage was dilated  No thrombus was identified  There was mild intermittent spontaneous echo contrast ("smoke") in the appendage  ATRIAL SEPTUM:  No defect or patent foramen ovale was identified  RIGHT ATRIUM:  The atrium was dilated  MITRAL VALVE:  There was moderate regurgitation  TRICUSPID VALVE:  There was moderate regurgitation  PERICARDIUM:  A small, free-flowing pericardial effusion was identified  There was no evidence of hemodynamic compromise  HISTORY: PRIOR HISTORY: HTN; HLD; DM2; A-Fib; Acute Congestive Heart Failure    PROCEDURE: The procedure was performed in the echo lab  This was a routine study   The risks and alternatives of the procedure were explained to the patient and informed consent was obtained  The transesophageal approach was used  The study  included complete 2D imaging, complete spectral Doppler, and color Doppler  The heart rate was 125 bpm, at the start of the study  An adult omniplane probe was inserted by the attending cardiologist  Intubated with ease  Two intubation  attempt(s)  There was no blood detected on the probe  Image quality was good  There were no complications during the procedure  MEDICATIONS: Anesthesia administered by anesthesia team     LEFT VENTRICLE: The ventricle was dilated  Systolic function was markedly reduced  Ejection fraction was estimated to be 25 %  There was severe diffuse hypokinesis  Wall thickness was normal  No evidence of apical thrombus  RIGHT VENTRICLE: The ventricle was dilated  Systolic function was mildly to moderately reduced  Wall thickness was normal     LEFT ATRIUM: The atrium was moderately dilated  No thrombus was identified  APPENDAGE: The appendage was dilated  No thrombus was identified  There was mild intermittent spontaneous echo contrast ("smoke") in the appendage  ATRIAL SEPTUM: No defect or patent foramen ovale was identified  RIGHT ATRIUM: The atrium was dilated  MITRAL VALVE: Valve structure was normal  There was normal leaflet separation  DOPPLER: The transmitral velocity was within the normal range  There was no evidence for stenosis  There was moderate regurgitation  AORTIC VALVE: The valve was trileaflet  Leaflets exhibited normal thickness, normal cuspal separation, and sclerosis  DOPPLER: Transaortic velocity was within the normal range  There was no evidence for stenosis  There was no significant  regurgitation  TRICUSPID VALVE: The valve structure was normal  There was normal leaflet separation  DOPPLER: There was moderate regurgitation  PULMONIC VALVE: Leaflets exhibited normal thickness, no calcification, and normal cuspal separation   DOPPLER: The transpulmonic velocity was within the normal range  There was no significant regurgitation  PERICARDIUM: A small, free-flowing pericardial effusion was identified  There was no evidence of hemodynamic compromise  The pericardium was normal in appearance  AORTA: The root exhibited normal size  The ascending aorta was normal in size  SYSTEMIC VEINS: IVC: The inferior vena cava was normal in size  MEASUREMENT TABLES    DOPPLER MEASUREMENTS  Left atrium   (Reference normals)  ROHAN peak yaneth   20 cm/s   (--)    [facility] Accredited [facility]ography Laboratory    Prepared and electronically signed by    Tato Posada MD  Signed 21-Jun-2019 15:50:13         CATH:  No results found for this or any previous visit  STRESS TEST:  No results found for this or any previous visit      VTE Prophylaxis: Sequential compression device (Venodyne)
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
## 2020-03-13 NOTE — CONSULTS
Consult Note - Wound   Megan [place] Line 78 y o  female MRN: [id number]  Unit/Bed#: [facility] 529-01 Encounter: [id number]      History and Present Illness: Patient is seen for wound care consult of the bilateral lower legs   Patient is a 78year old female that has been admitted with AFIB with RVR   Patient with acute and chronic combined diastolic heart failure secondary to the AFIB with RVR   Patient has volume overload as per the notes and noted with the edema of the legs   Patient with noted edema to the thigh area   Assessment Findings:   1  Bilateral heels dry and intact   2  Right lower leg - small partial thickness wound with serous drainage and noted susan wound area with weeping serous drainage related to the edema   3  Left lower leg - partial thickness wound with scant serous drainage   Patient with edema of the lower leg   Less susan wound weeping on the left lower leg   4  Patient reports sacral / buttocks is fine   Skin care plans:  1-Hydraguard to bilateral sacrum, buttock and heels BID and PRN  2-Elevate heels to offload pressure  3-Ehob cushion in chair when out of bed  4-Moisturize skin daily with skin nourishing cream   5-Turn/reposition q2h or when medically stable for pressure re-distribution on skin  6  Right and left lower leg - cleanse with soap and water then apply adaptic then top with maxorb and ABD pad and luis antonio change every other day or if damp from drainage   Elevate legs to decrease the edema       Vitals: Blood pressure 102/70, pulse (!) 124, temperature 97 8 °F (36 6 °C), temperature source Oral, resp  rate 20, height 5' 1" (1 549 m), weight 89 kg (196 lb 3 4 oz), SpO2 96 %  ,Body mass index is 37 07 kg/m²  Wound 03/13/20 Pretibial Left;Proximal (Active)   Wound Image   3/13/2020 12:34 PM   Wound Description Clean;Fragile;Pink 3/13/2020  1:00 PM   Susan-wound Assessment Clean;Dry; Intact;Edema 3/13/2020  1:00 PM   Wound Length (cm) 0 5 cm 3/13/2020  1:00 PM Wound Width (cm) 0 5 cm 3/13/2020  1:00 PM   Wound Depth (cm) 0 1 3/13/2020  1:00 PM   Calculated Wound Area (cm^2) 0 25 cm^2 3/13/2020  1:00 PM   Calculated Wound Volume (cm^3) 0 02 cm^3 3/13/2020  1:00 PM   Drainage Amount Scant 3/13/2020  1:00 PM   Drainage Description Serous 3/13/2020  1:00 PM   Non-staged Wound Description Partial thickness 3/13/2020  1:00 PM   Treatments Cleansed;Site care 3/13/2020  1:00 PM   Dressing Calcium Alginate;Vaseline gauze 3/13/2020  1:00 PM   Patient Tolerance Tolerated well 3/13/2020  1:00 PM       Wound 03/13/20 Pretibial Proximal;Right (Active)   Wound Image   3/13/2020 12:35 PM   Wound Description Clean;Fragile;Pink 3/13/2020 12:35 PM   Milagros-wound Assessment Clean;Dry;Edema;Fragile 3/13/2020 12:35 PM   Wound Length (cm) 0 4 cm 3/13/2020 12:35 PM   Wound Width (cm) 0 4 cm 3/13/2020 12:35 PM   Wound Depth (cm) 0 1 3/13/2020 12:35 PM   Calculated Wound Area (cm^2) 0 16 cm^2 3/13/2020 12:35 PM   Calculated Wound Volume (cm^3) 0 02 cm^3 3/13/2020 12:35 PM   Drainage Amount Small 3/13/2020 12:35 PM   Drainage Description Serous 3/13/2020 12:35 PM   Non-staged Wound Description Partial thickness 3/13/2020 12:35 PM   Treatments Cleansed;Site care 3/13/2020 12:35 PM   Dressing Calcium Alginate;Vaseline gauze 3/13/2020 12:35 PM   Patient Tolerance Tolerated well 3/13/2020 12:35 PM       [facility] [address] [facility] [facility]

## 2020-03-13 NOTE — DISCHARGE INSTR - OTHER ORDERS
Skin care plans:  1-Hydraguard to bilateral sacrum, buttock and heels 2 times a day and if needed   2-Elevate heels to offload pressure  3-Ehob cushion in chair when out of bed    4-Moisturize skin daily with skin nourishing cream   5-Turn/reposition every 2 hours  or when medically stable for pressure re-distribution on skin         Upon discharge follow at the [address] [place] [address] if wounds remain open on the lower legs

## 2020-03-14 LAB
ANION GAP SERPL CALCULATED.3IONS-SCNC: 8 MMOL/L (ref 4–13)
BUN SERPL-MCNC: 25 MG/DL (ref 5–25)
CALCIUM SERPL-MCNC: 9.1 MG/DL (ref 8.3–10.1)
CHLORIDE SERPL-SCNC: 99 MMOL/L (ref 100–108)
CO2 SERPL-SCNC: 29 MMOL/L (ref 21–32)
CREAT SERPL-MCNC: 1.09 MG/DL (ref 0.6–1.3)
GFR SERPL CREATININE-BSD FRML MDRD: 48 ML/MIN/1.73SQ M
GLUCOSE SERPL-MCNC: 125 MG/DL (ref 65–140)
POTASSIUM SERPL-SCNC: 3.4 MMOL/L (ref 3.5–5.3)
SODIUM SERPL-SCNC: 136 MMOL/L (ref 136–145)

## 2020-03-14 PROCEDURE — 99233 SBSQ HOSP IP/OBS HIGH 50: CPT | Performed by: INTERNAL MEDICINE

## 2020-03-14 PROCEDURE — 99232 SBSQ HOSP IP/OBS MODERATE 35: CPT | Performed by: INTERNAL MEDICINE

## 2020-03-14 PROCEDURE — 80048 BASIC METABOLIC PNL TOTAL CA: CPT | Performed by: INTERNAL MEDICINE

## 2020-03-14 RX ORDER — BUMETANIDE 0.25 MG/ML
2 INJECTION, SOLUTION INTRAMUSCULAR; INTRAVENOUS 2 TIMES DAILY
Status: DISCONTINUED | OUTPATIENT
Start: 2020-03-14 | End: 2020-03-16

## 2020-03-14 RX ORDER — METOPROLOL SUCCINATE 100 MG/1
100 TABLET, EXTENDED RELEASE ORAL EVERY 12 HOURS SCHEDULED
Status: DISCONTINUED | OUTPATIENT
Start: 2020-03-14 | End: 2020-03-24 | Stop reason: HOSPADM

## 2020-03-14 RX ORDER — POTASSIUM CHLORIDE 20 MEQ/1
40 TABLET, EXTENDED RELEASE ORAL ONCE
Status: COMPLETED | OUTPATIENT
Start: 2020-03-14 | End: 2020-03-14

## 2020-03-14 RX ORDER — METOPROLOL SUCCINATE 100 MG/1
100 TABLET, EXTENDED RELEASE ORAL EVERY 12 HOURS SCHEDULED
Status: DISCONTINUED | OUTPATIENT
Start: 2020-03-14 | End: 2020-03-14

## 2020-03-14 RX ADMIN — HYDRALAZINE HYDROCHLORIDE 25 MG: 25 TABLET, FILM COATED ORAL at 05:15

## 2020-03-14 RX ADMIN — METOPROLOL SUCCINATE 100 MG: 100 TABLET, EXTENDED RELEASE ORAL at 21:13

## 2020-03-14 RX ADMIN — POTASSIUM CHLORIDE 40 MEQ: 1500 TABLET, EXTENDED RELEASE ORAL at 09:34

## 2020-03-14 RX ADMIN — ASCORBIC ACID TAB 250 MG 125 MG: 250 TAB at 09:33

## 2020-03-14 RX ADMIN — VENLAFAXINE 75 MG: 37.5 TABLET ORAL at 21:13

## 2020-03-14 RX ADMIN — AMIODARONE HYDROCHLORIDE 0.5 MG/MIN: 50 INJECTION, SOLUTION INTRAVENOUS at 22:37

## 2020-03-14 RX ADMIN — METOPROLOL SUCCINATE 100 MG: 100 TABLET, EXTENDED RELEASE ORAL at 10:28

## 2020-03-14 RX ADMIN — POTASSIUM CHLORIDE 20 MEQ: 1500 TABLET, EXTENDED RELEASE ORAL at 09:34

## 2020-03-14 RX ADMIN — BUMETANIDE 2 MG: 0.25 INJECTION INTRAMUSCULAR; INTRAVENOUS at 17:15

## 2020-03-14 RX ADMIN — VENLAFAXINE 75 MG: 37.5 TABLET ORAL at 09:33

## 2020-03-14 RX ADMIN — APIXABAN 5 MG: 5 TABLET, FILM COATED ORAL at 17:14

## 2020-03-14 RX ADMIN — BUMETANIDE 2 MG: 0.25 INJECTION INTRAMUSCULAR; INTRAVENOUS at 09:35

## 2020-03-14 RX ADMIN — LEVOTHYROXINE SODIUM 137 MCG: 112 TABLET ORAL at 05:14

## 2020-03-14 RX ADMIN — DOCUSATE SODIUM 100 MG: 100 CAPSULE, LIQUID FILLED ORAL at 17:15

## 2020-03-14 RX ADMIN — DOCUSATE SODIUM 100 MG: 100 CAPSULE, LIQUID FILLED ORAL at 09:34

## 2020-03-14 RX ADMIN — HYDRALAZINE HYDROCHLORIDE 25 MG: 25 TABLET, FILM COATED ORAL at 21:13

## 2020-03-14 RX ADMIN — APIXABAN 5 MG: 5 TABLET, FILM COATED ORAL at 09:34

## 2020-03-14 RX ADMIN — ROPINIROLE HYDROCHLORIDE 2 MG: 2 TABLET, FILM COATED ORAL at 21:13

## 2020-03-14 RX ADMIN — HYDRALAZINE HYDROCHLORIDE 25 MG: 25 TABLET, FILM COATED ORAL at 14:42

## 2020-03-14 RX ADMIN — MELATONIN 1000 UNITS: at 09:34

## 2020-03-14 NOTE — PROGRESS NOTES
Cardiology Progress Note - Heather Pryor 78 y o  female MRN: [id number]    Unit/Bed#: [facility] 529-01 Encounter: [id number]        Subjective:    No significant events overnight  No chest pain or dyspnea at rest      Review of Systems   Constitution: Positive for malaise/fatigue  Cardiovascular: Positive for leg swelling  Respiratory: Positive for shortness of breath  Objective:   Vitals: Blood pressure 115/75, pulse (!) 109, temperature 97 6 °F (36 4 °C), temperature source Oral, resp  rate 14, height 5' 1" (1 549 m), weight 89 6 kg (197 lb 8 5 oz), SpO2 97 %  , Body mass index is 37 32 kg/m² , Orthostatic Blood Pressures      Most Recent Value   Blood Pressure  115/75 filed at 03/14/2020 0714   Patient Position - Orthostatic VS  Lying filed at 03/14/2020 4395         Systolic (56XIQ), KVW:650 , Min:102 , EBONY:090     Diastolic (69TAA), YUA:51, Min:72, Max:87      Intake/Output Summary (Last 24 hours) at 3/14/2020 0900  Last data filed at 3/14/2020 0400  Gross per 24 hour   Intake 600 24 ml   Output 1000 ml   Net -399 76 ml     Weight (last 2 days)     Date/Time   Weight    03/14/20 0533   89 6 (197 53)    03/13/20 0500   89 (196 21)    03/12/20 0600   88 3 (194 67)                Telemetry Review: No significant arrhythmias seen on telemetry review  afib      Physical Exam   Constitutional: She is oriented to person, place, and time  No distress  HENT:   Mouth/Throat: No oropharyngeal exudate  Eyes: No scleral icterus  Neck: JVD present  Cardiovascular: An irregularly irregular rhythm present  Tachycardia present  Pulmonary/Chest: Effort normal  She has rales  Abdominal: Soft  Bowel sounds are normal  She exhibits no distension  There is no guarding  Musculoskeletal: She exhibits edema  Neurological: She is alert and oriented to person, place, and time  Skin: Skin is warm  She is not diaphoretic  Psychiatric: She has a normal mood and affect           Laboratory Results:  Results from last 7 days   Lab Units 20  1317   TROPONIN I ng/mL 0 03       CBC with diff: Results from last 7 days   Lab Units 20  0559 20  1317   WBC Thousand/uL 7 50 7 29   HEMOGLOBIN g/dL 11 5 11 0*   HEMATOCRIT % 37 0 34 7*   MCV fL 90 88   PLATELETS Thousands/uL 223 215   MCH pg 27 9 27 8   MCHC g/dL 31 1* 31 7   RDW % 16 0* 15 7*   MPV fL 11 7 11 7   NRBC AUTO /100 WBCs  --  0         CMP:  Results from last 7 days   Lab Units 20  0506 20  0513 20  0559 20  1317   POTASSIUM mmol/L 3 4* 3 9 3 9 4 2   CHLORIDE mmol/L 99* 98* 102 99*   CO2 mmol/L 29 29 25 29   BUN mg/dL 25 27* 25 25   CREATININE mg/dL 1 09 1 20 1 17 1 22   CALCIUM mg/dL 9 1 9 0 8 7 8 9   AST U/L  --   --   --  43   ALT U/L  --   --   --  28   ALK PHOS U/L  --   --   --  86   EGFR ml/min/1 73sq m 48 43 44 42         BMP:  Results from last 7 days   Lab Units 20  0506 20  0513 20  0559 20  1317   POTASSIUM mmol/L 3 4* 3 9 3 9 4 2   CHLORIDE mmol/L 99* 98* 102 99*   CO2 mmol/L 29 29 25 29   BUN mg/dL 25 27* 25 25   CREATININE mg/dL 1 09 1 20 1 17 1 22   CALCIUM mg/dL 9 1 9 0 8 7 8 9       BNP: No results for input(s): BNP in the last 72 hours      Magnesium:       Coags:       TSH: No results found for: TSH    Hemoglobin A1C   Results from last 7 days   Lab Units 20  0559   HEMOGLOBIN A1C % 6 9*       Lipid Profile: Results from last 7 days   Lab Units 20  0559   TRIGLYCERIDES mg/dL 48   HDL mg/dL 38*       Cardiac testing:   Results for orders placed during the hospital encounter of 19   Echo complete with contrast if indicated    [address]  [facility], [address] [facility]  [phone]    Transthoracic Echocardiogram  2D, M-mode, Doppler, and Color Doppler    Study date:  2019    Patient: Malka Tinajero  MR number: [id number]  Account number: [de-identified]  : 1940  Age: 66 years  Gender: Female  Status: Inpatient  Location: Bedside  Height: 61 in  Weight: 175 lb  BP: 92/ 44 mmHg    Indications: Hypotension    Diagnoses: I95 9 - Hypotension, unspecified    Sonographer:  PEEWEE Thurman  Primary Physician:  Caroline Patterson MD  Referring Physician:  SOTO Greene  Group:  [facility]  Interpreting Physician:  Thiago Campos MD    SUMMARY    LEFT VENTRICLE:  Size was at the upper limits of normal   Systolic function was moderately reduced  Ejection fraction was estimated to be 42 %  There was moderate diffuse hypokinesis  There was no evidence of concentric hypertrophy  VENTRICULAR SEPTUM:  There was dyssynergic motion  RIGHT VENTRICLE:  The ventricle was mildly to moderately dilated  Systolic function was mildly to moderately reduced  Wall thickness was increased  LEFT ATRIUM:  The atrium was moderately dilated  RIGHT ATRIUM:  The atrium was moderately dilated  MITRAL VALVE:  There was mild annular calcification  There was moderate regurgitation  TRICUSPID VALVE:  There was moderate regurgitation  PULMONIC VALVE:  There was trace regurgitation  HISTORY: PRIOR HISTORY: DM2, HTN, HLD, Afib, CHF    PROCEDURE: The procedure was performed at the bedside  This was an urgent study  The transthoracic approach was used  The study included complete 2D imaging, M-mode, complete spectral Doppler, and color Doppler  The heart rate was 117 bpm,  at the start of the study  Images were obtained from the parasternal, apical, subcostal, and suprasternal notch acoustic windows  Echocardiographic views were limited due to restricted patient mobility  This was a technically difficult  study  LEFT VENTRICLE: Size was at the upper limits of normal  Systolic function was moderately reduced  Ejection fraction was estimated to be 42 %  There was moderate diffuse hypokinesis  Wall thickness was normal  There was no evidence of  concentric hypertrophy   DOPPLER: The study was not technically sufficient to allow evaluation of LV diastolic function  VENTRICULAR SEPTUM: There was dyssynergic motion  RIGHT VENTRICLE: The ventricle was mildly to moderately dilated  Systolic function was mildly to moderately reduced  Wall thickness was increased  LEFT ATRIUM: The atrium was moderately dilated  RIGHT ATRIUM: The atrium was moderately dilated  MITRAL VALVE: There was mild annular calcification  Valve structure was normal  There was normal leaflet separation  DOPPLER: The transmitral velocity was within the normal range  There was no evidence for stenosis  There was moderate  regurgitation  AORTIC VALVE: The valve was trileaflet  Leaflets exhibited normal thickness and normal cuspal separation  DOPPLER: Transaortic velocity was within the normal range  There was no evidence for stenosis  There was no regurgitation  TRICUSPID VALVE: The valve structure was normal  There was normal leaflet separation  DOPPLER: The transtricuspid velocity was within the normal range  There was no evidence for stenosis  There was moderate regurgitation  Pulmonary artery  systolic pressure was mildly increased  PULMONIC VALVE: Leaflets exhibited normal thickness, no calcification, and normal cuspal separation  DOPPLER: The transpulmonic velocity was within the normal range  There was trace regurgitation  PERICARDIUM: There was no pericardial effusion  The pericardium was normal in appearance  AORTA: The root exhibited normal size      SYSTEM MEASUREMENT TABLES    2D  LVEDV MOD A4C: 81 79 ml  LVEF MOD A4C: 64 57 %  LVESV MOD A4C: 28 98 ml  LVLd A4C: 7 08 cm  LVLs A4C: 5 89 cm  SV MOD A4C: 52 81 ml    CW  TR Vmax: 2 65 m/s  TR maxP 14 mmHg    [employer] Accredited Echocardiography Laboratory    Prepared and electronically signed by    Lyndsay Vigil MD  Signed 2019 07:10:27       Results for orders placed during the hospital encounter of 19   EBEN    Narrative [facility] [address]  [facility], [address] [facility]  [phone]    Transesophageal Echocardiogram  2D, Doppler, and Color Doppler    Study date:  2019    Patient: Octaviano Kelly  MR number: [id number]  Account number: [de-identified]  : 1940  Age: 66 years  Gender: Female  Status: Inpatient  Location: Echo lab  Height: 61 in  Weight: 181 lb  BP: 125/ 83 mmHg    Indications: Atrial-Fib; Cardioversion    Diagnoses: 427 31 - ATRIAL FIBRILLATION    Sonographer:  PEEWEE Martinez  Interpreting Physician:  Tato Posada MD  Primary Physician:  Maikol Ley MD  Referring Physician:  SOTO Martin  Group:  [facility]  RN:  Henry Price RN    SUMMARY    LEFT VENTRICLE:  The ventricle was dilated  Systolic function was markedly reduced  Ejection fraction was estimated to be 25 %  There was severe diffuse hypokinesis  RIGHT VENTRICLE:  The ventricle was dilated  Systolic function was mildly to moderately reduced  LEFT ATRIUM:  The atrium was moderately dilated  No thrombus was identified  LEFT ATRIAL APPENDAGE:  The appendage was dilated  No thrombus was identified  There was mild intermittent spontaneous echo contrast ("smoke") in the appendage  ATRIAL SEPTUM:  No defect or patent foramen ovale was identified  RIGHT ATRIUM:  The atrium was dilated  MITRAL VALVE:  There was moderate regurgitation  TRICUSPID VALVE:  There was moderate regurgitation  PERICARDIUM:  A small, free-flowing pericardial effusion was identified  There was no evidence of hemodynamic compromise  HISTORY: PRIOR HISTORY: HTN; HLD; DM2; A-Fib; Acute Congestive Heart Failure    PROCEDURE: The procedure was performed in the echo lab  This was a routine study  The risks and alternatives of the procedure were explained to the patient and informed consent was obtained  The transesophageal approach was used   The study  included complete 2D imaging, complete spectral Doppler, and color Doppler  The heart rate was 125 bpm, at the start of the study  An adult omniplane probe was inserted by the attending cardiologist  Intubated with ease  Two intubation  attempt(s)  There was no blood detected on the probe  Image quality was good  There were no complications during the procedure  MEDICATIONS: Anesthesia administered by anesthesia team     LEFT VENTRICLE: The ventricle was dilated  Systolic function was markedly reduced  Ejection fraction was estimated to be 25 %  There was severe diffuse hypokinesis  Wall thickness was normal  No evidence of apical thrombus  RIGHT VENTRICLE: The ventricle was dilated  Systolic function was mildly to moderately reduced  Wall thickness was normal     LEFT ATRIUM: The atrium was moderately dilated  No thrombus was identified  APPENDAGE: The appendage was dilated  No thrombus was identified  There was mild intermittent spontaneous echo contrast ("smoke") in the appendage  ATRIAL SEPTUM: No defect or patent foramen ovale was identified  RIGHT ATRIUM: The atrium was dilated  MITRAL VALVE: Valve structure was normal  There was normal leaflet separation  DOPPLER: The transmitral velocity was within the normal range  There was no evidence for stenosis  There was moderate regurgitation  AORTIC VALVE: The valve was trileaflet  Leaflets exhibited normal thickness, normal cuspal separation, and sclerosis  DOPPLER: Transaortic velocity was within the normal range  There was no evidence for stenosis  There was no significant  regurgitation  TRICUSPID VALVE: The valve structure was normal  There was normal leaflet separation  DOPPLER: There was moderate regurgitation  PULMONIC VALVE: Leaflets exhibited normal thickness, no calcification, and normal cuspal separation  DOPPLER: The transpulmonic velocity was within the normal range  There was no significant regurgitation      PERICARDIUM: A small, free-flowing pericardial effusion was identified  There was no evidence of hemodynamic compromise  The pericardium was normal in appearance  AORTA: The root exhibited normal size  The ascending aorta was normal in size  SYSTEMIC VEINS: IVC: The inferior vena cava was normal in size  MEASUREMENT TABLES    DOPPLER MEASUREMENTS  Left atrium   (Reference normals)  ROHAN peak yaneth   20 cm/s   (--)    [facility]    Prepared and electronically signed by    Liz Garcia MD  Signed 21-Jun-2019 15:50:13       No results found for this or any previous visit  No results found for this or any previous visit      Meds/Allergies   current meds:   Current Facility-Administered Medications   Medication Dose Route Frequency    acetaminophen (TYLENOL) tablet 650 mg  650 mg Oral Q4H PRN    amiodarone (CORDARONE) 900 mg in dextrose 5 % 500 mL infusion  0 5 mg/min Intravenous Continuous    apixaban (ELIQUIS) tablet 5 mg  5 mg Oral BID    ascorbic acid (VITAMIN C) tablet 125 mg  125 mg Oral Daily    bumetanide (BUMEX) injection 2 mg  2 mg Intravenous BID    cholecalciferol (VITAMIN D3) tablet 1,000 Units  1,000 Units Oral Daily    docusate sodium (COLACE) capsule 100 mg  100 mg Oral BID    hydrALAZINE (APRESOLINE) tablet 25 mg  25 mg Oral Q8H [facility]    levothyroxine tablet 137 mcg  137 mcg Oral Early Morning    metoprolol succinate (TOPROL-XL) 24 hr tablet 100 mg  100 mg Oral Q12H DAVE    potassium chloride (K-DUR,KLOR-CON) CR tablet 20 mEq  20 mEq Oral Daily    rOPINIRole (REQUIP) tablet 2 mg  2 mg Oral HS    venlafaxine (EFFEXOR) tablet 75 mg  75 mg Oral Q12H [facility]     Medications Prior to Admission   Medication    apixaban (ELIQUIS) 5 mg    Ascorbic Acid (VITAMIN C) 100 MG tablet    cholecalciferol (VITAMIN D3) 1,000 units tablet    hydrALAZINE (APRESOLINE) 25 mg tablet    isosorbide dinitrate (ISORDIL) 10 mg tablet    levothyroxine 137 mcg tablet    Loratadine (CLARITIN PO)    MAGNESIUM PO    metoprolol succinate (TOPROL-XL) 25 mg 24 hr tablet    multivitamin (THERAGRAN) TABS    potassium chloride (K-DUR,KLOR-CON) 20 mEq tablet    rOPINIRole (REQUIP XL) 2 MG 24 hr tablet    torsemide (DEMADEX) 20 mg tablet    venlafaxine (EFFEXOR) 75 mg tablet         amiodarone 0 5 mg/min Last Rate: 0 5 mg/min (03/13/20 1107)     Assessment:  Principal Problem:    Atrial fibrillation with RVR ([facility])  Active Problems:    Depression with anxiety    Hyperlipidemia    Hypertension    Hypothyroidism    Obesity (BMI 30-39  9)    Type 2 diabetes mellitus with diabetic cataract ([address] )    Acute on chronic combined systolic and diastolic CHF (congestive heart failure) ([facility])    Restless leg syndrome    Assessment:  # Acute on chronic partially recovered BiV HFrEF, LVEF ~40%, ACC/AHA Stage C:   Diag:  --TTE 6/20/19: LVEF 25-30%, LVIDd 4 6 cm, mod reduced RVSF  Mod MR and TR    --TTE 6/26/19: LVEF ~40%, mildly reduced RVSF  Mod MR, otherwise, no significant valvular disease  --Meds as above     Impression: Volume overloaded currently  Unclear if 2/2 to AFib w/ RVR or vice versa  Given recurrent AFib w/ RVR will consult EP for further consideration for AAD therapy, but may benefit more from consideration for AFib ablation vs AVN ablation w/ PPM implantation       Marginal diuresis and weight is going up  Change lasix to bumex and diurese as tolerated  Increase metoprolol to 100mg twice a day  Continue with remainder of medical therapy  Counseling / Coordination of Care  Total floor / unit time spent today 25 minutes  Greater than 50% of total time was spent with the patient and / or family counseling and / or coordination of care  A description of the counseling / coordination of care
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
## 2020-03-14 NOTE — PLAN OF CARE
Problem: Potential for Falls  Goal: Patient will remain free of falls  Description  INTERVENTIONS:  - Assess patient frequently for physical needs  -  Identify cognitive and physical deficits and behaviors that affect risk of falls    -  [place] fall precautions as indicated by assessment   - Educate patient/family on patient safety including physical limitations  - Instruct patient to call for assistance with activity based on assessment  - Modify environment to reduce risk of injury  - Consider OT/PT consult to assist with strengthening/mobility  Outcome: Progressing     Problem: PAIN - ADULT  Goal: Verbalizes/displays adequate comfort level or baseline comfort level  Description  Interventions:  - Encourage patient to monitor pain and request assistance  - Assess pain using appropriate pain scale  - Administer analgesics based on type and severity of pain and evaluate response  - Implement non-pharmacological measures as appropriate and evaluate response  - Consider cultural and social influences on pain and pain management  - Notify physician/advanced practitioner if interventions unsuccessful or patient reports new pain  Outcome: Progressing     Problem: INFECTION - ADULT  Goal: Absence or prevention of progression during hospitalization  Description  INTERVENTIONS:  - Assess and monitor for signs and symptoms of infection  - Monitor lab/diagnostic results  - Monitor all insertion sites, i e  indwelling lines, tubes, and drains  - Monitor endotracheal if appropriate and nasal secretions for changes in amount and color  - [place] appropriate cooling/warming therapies per order  - Administer medications as ordered  - Instruct and encourage patient and family to use good hand hygiene technique  - Identify and instruct in appropriate isolation precautions for identified infection/condition  Outcome: Progressing  Goal: Absence of fever/infection during neutropenic period  Description  INTERVENTIONS:  - Monitor WBC    Outcome: Progressing     Problem: SAFETY ADULT  Goal: Maintain or return to baseline ADL function  Description  INTERVENTIONS:  -  Assess patient's ability to carry out ADLs; assess patient's baseline for ADL function and identify physical deficits which impact ability to perform ADLs (bathing, care of mouth/teeth, toileting, grooming, dressing, etc )  - Assess/evaluate cause of self-care deficits   - Assess range of motion  - Assess patient's mobility; develop plan if impaired  - Assess patient's need for assistive devices and provide as appropriate  - Encourage maximum independence but intervene and supervise when necessary  - Involve family in performance of ADLs  - Assess for home care needs following discharge   - Consider OT consult to assist with ADL evaluation and planning for discharge  - Provide patient education as appropriate  Outcome: Progressing  Goal: Maintain or return mobility status to optimal level  Description  INTERVENTIONS:  - Assess patient's baseline mobility status (ambulation, transfers, stairs, etc )    - Identify cognitive and physical deficits and behaviors that affect mobility  - Identify mobility aids required to assist with transfers and/or ambulation (gait belt, sit-to-stand, lift, walker, cane, etc )  - [place] fall precautions as indicated by assessment  - Record patient progress and toleration of activity level on Mobility SBAR; progress patient to next Phase/Stage  - Instruct patient to call for assistance with activity based on assessment  - Consider rehabilitation consult to assist with strengthening/weightbearing, etc   Outcome: Progressing     Problem: DISCHARGE PLANNING  Goal: Discharge to home or other facility with appropriate resources  Description  INTERVENTIONS:  - Identify barriers to discharge w/patient and caregiver  - Arrange for needed discharge resources and transportation as appropriate  - Identify discharge learning needs (meds, wound care, etc )  - Arrange for interpretive services to assist at discharge as needed  - Refer to Case Management Department for coordinating discharge planning if the patient needs post-hospital services based on physician/advanced practitioner order or complex needs related to functional status, cognitive ability, or social support system  Outcome: Progressing     Problem: Knowledge Deficit  Goal: Patient/family/caregiver demonstrates understanding of disease process, treatment plan, medications, and discharge instructions  Description  Complete learning assessment and assess knowledge base    Interventions:  - Provide teaching at level of understanding  - Provide teaching via preferred learning methods  Outcome: Progressing     Problem: CARDIOVASCULAR - ADULT  Goal: Maintains optimal cardiac output and hemodynamic stability  Description  INTERVENTIONS:  - Monitor I/O, vital signs and rhythm  - Monitor for S/S and trends of decreased cardiac output  - Administer and titrate ordered vasoactive medications to optimize hemodynamic stability  - Assess quality of pulses, skin color and temperature  - Assess for signs of decreased coronary artery perfusion  - Instruct patient to report change in severity of symptoms  Outcome: Progressing  Goal: Absence of cardiac dysrhythmias or at baseline rhythm  Description  INTERVENTIONS:  - Continuous cardiac monitoring, vital signs, obtain 12 lead EKG if ordered  - Administer antiarrhythmic and heart rate control medications as ordered  - Monitor electrolytes and administer replacement therapy as ordered  Outcome: Progressing     Problem: GASTROINTESTINAL - ADULT  Goal: Maintains or returns to baseline bowel function  Description  INTERVENTIONS:  - Assess bowel function  - Encourage oral fluids to ensure adequate hydration  - Administer IV fluids if ordered to ensure adequate hydration  - Administer ordered medications as needed  - Encourage mobilization and activity  - Consider nutritional services referral to assist patient with adequate nutrition and appropriate food choices  Outcome: Progressing     Problem: SKIN/TISSUE INTEGRITY - ADULT  Goal: Skin integrity remains intact  Description  INTERVENTIONS  - Identify patients at risk for skin breakdown  - Assess and monitor skin integrity  - Assess and monitor nutrition and hydration status  - Monitor labs (i e  albumin)  - Assess for incontinence   - Turn and reposition patient  - Assist with mobility/ambulation  - Relieve pressure over bony prominences  - Avoid friction and shearing  - Provide appropriate hygiene as needed including keeping skin clean and dry  - Evaluate need for skin moisturizer/barrier cream  - Collaborate with interdisciplinary team (i e  Nutrition, Rehabilitation, etc )   - Patient/family teaching  Outcome: Progressing  Goal: Incision(s), wounds(s) or drain site(s) healing without S/S of infection  Description  INTERVENTIONS  - Assess and document risk factors for skin impairment   - Assess and document dressing, incision, wound bed, drain sites and surrounding tissue  - Consider nutrition services referral as needed  - Oral mucous membranes remain intact  - Provide patient/ family education  Outcome: Progressing  Goal: Oral mucous membranes remain intact  Description  INTERVENTIONS  - Assess oral mucosa and hygiene practices  - Implement preventative oral hygiene regimen  - Implement oral medicated treatments as ordered  - Initiate Nutrition services referral as needed  Outcome: Progressing

## 2020-03-14 NOTE — ASSESSMENT & PLAN NOTE
Wt Readings from Last 3 Encounters:   03/14/20 89 6 kg (197 lb 8 5 oz)   03/09/20 90 3 kg (199 lb)   02/27/20 87 5 kg (193 lb)     Patient with evidence for acute on chronic combined diastolic heart failure secondary to AFib with RVR      Echo 7/2019 EF 45%, will repeat echo this admission when rates better controlled  Appreciate heart failure input  Daily weights, I&O's, fluid restrict, 2g Na restricted diet  Patient has had little urine output, Cardiology has changed Lasix to Bumex and increase Toprol

## 2020-03-15 ENCOUNTER — APPOINTMENT (INPATIENT)
Dept: NON INVASIVE DIAGNOSTICS | Facility: HOSPITAL | Age: 80
DRG: 226 | End: 2020-03-15
Payer: COMMERCIAL

## 2020-03-15 LAB
ANION GAP SERPL CALCULATED.3IONS-SCNC: 6 MMOL/L (ref 4–13)
BUN SERPL-MCNC: 26 MG/DL (ref 5–25)
CALCIUM SERPL-MCNC: 9 MG/DL (ref 8.3–10.1)
CHLORIDE SERPL-SCNC: 99 MMOL/L (ref 100–108)
CO2 SERPL-SCNC: 27 MMOL/L (ref 21–32)
CREAT SERPL-MCNC: 1.18 MG/DL (ref 0.6–1.3)
GFR SERPL CREATININE-BSD FRML MDRD: 44 ML/MIN/1.73SQ M
GLUCOSE SERPL-MCNC: 152 MG/DL (ref 65–140)
POTASSIUM SERPL-SCNC: 5.8 MMOL/L (ref 3.5–5.3)
SODIUM SERPL-SCNC: 132 MMOL/L (ref 136–145)

## 2020-03-15 PROCEDURE — 99232 SBSQ HOSP IP/OBS MODERATE 35: CPT | Performed by: INTERNAL MEDICINE

## 2020-03-15 PROCEDURE — 93306 TTE W/DOPPLER COMPLETE: CPT

## 2020-03-15 PROCEDURE — 80048 BASIC METABOLIC PNL TOTAL CA: CPT | Performed by: INTERNAL MEDICINE

## 2020-03-15 PROCEDURE — 99233 SBSQ HOSP IP/OBS HIGH 50: CPT | Performed by: INTERNAL MEDICINE

## 2020-03-15 PROCEDURE — 93306 TTE W/DOPPLER COMPLETE: CPT | Performed by: INTERNAL MEDICINE

## 2020-03-15 RX ADMIN — APIXABAN 5 MG: 5 TABLET, FILM COATED ORAL at 17:39

## 2020-03-15 RX ADMIN — METOPROLOL SUCCINATE 100 MG: 100 TABLET, EXTENDED RELEASE ORAL at 21:18

## 2020-03-15 RX ADMIN — VENLAFAXINE 75 MG: 37.5 TABLET ORAL at 21:18

## 2020-03-15 RX ADMIN — BUMETANIDE 2 MG: 0.25 INJECTION INTRAMUSCULAR; INTRAVENOUS at 08:16

## 2020-03-15 RX ADMIN — MELATONIN 1000 UNITS: at 08:16

## 2020-03-15 RX ADMIN — METOPROLOL SUCCINATE 100 MG: 100 TABLET, EXTENDED RELEASE ORAL at 08:16

## 2020-03-15 RX ADMIN — HYDRALAZINE HYDROCHLORIDE 25 MG: 25 TABLET, FILM COATED ORAL at 05:05

## 2020-03-15 RX ADMIN — DOCUSATE SODIUM 100 MG: 100 CAPSULE, LIQUID FILLED ORAL at 17:38

## 2020-03-15 RX ADMIN — LEVOTHYROXINE SODIUM 137 MCG: 112 TABLET ORAL at 05:05

## 2020-03-15 RX ADMIN — HYDRALAZINE HYDROCHLORIDE 25 MG: 25 TABLET, FILM COATED ORAL at 21:18

## 2020-03-15 RX ADMIN — VENLAFAXINE 75 MG: 37.5 TABLET ORAL at 08:22

## 2020-03-15 RX ADMIN — ASCORBIC ACID TAB 250 MG 125 MG: 250 TAB at 08:22

## 2020-03-15 RX ADMIN — DOCUSATE SODIUM 100 MG: 100 CAPSULE, LIQUID FILLED ORAL at 08:16

## 2020-03-15 RX ADMIN — APIXABAN 5 MG: 5 TABLET, FILM COATED ORAL at 08:16

## 2020-03-15 RX ADMIN — HYDRALAZINE HYDROCHLORIDE 25 MG: 25 TABLET, FILM COATED ORAL at 14:50

## 2020-03-15 RX ADMIN — POTASSIUM CHLORIDE 20 MEQ: 1500 TABLET, EXTENDED RELEASE ORAL at 08:16

## 2020-03-15 RX ADMIN — BUMETANIDE 2 MG: 0.25 INJECTION INTRAMUSCULAR; INTRAVENOUS at 17:38

## 2020-03-15 RX ADMIN — ROPINIROLE HYDROCHLORIDE 2 MG: 2 TABLET, FILM COATED ORAL at 21:18

## 2020-03-15 NOTE — PROGRESS NOTES
Cardiology Progress Note - Desiree Macias 78 y o  female MRN: [id number]    Unit/Bed#: [facility] 529-01 Encounter: [id number]        Subjective:    No significant events overnight  Switched to bumex yesterday  Weight up a pound  Will see how it goes today  No dyspnea at rest      Review of Systems   Constitution: Positive for malaise/fatigue  Cardiovascular: Positive for leg swelling  Negative for chest pain  Respiratory: Positive for shortness of breath  Objective:   Vitals: Blood pressure 120/79, pulse 103, temperature (!) 97 4 °F (36 3 °C), temperature source Oral, resp  rate (!) 31, height 5' 1" (1 549 m), weight 89 9 kg (198 lb 3 1 oz), SpO2 97 %  , Body mass index is 37 45 kg/m² , Orthostatic Blood Pressures      Most Recent Value   Blood Pressure  120/79 filed at 03/15/2020 1055   Patient Position - Orthostatic VS  Lying filed at 03/15/2020 7311         Systolic (25WAR), VHE:020 , Min:99 , RWV:809     Diastolic (32DCD), PSI:78, Min:63, Max:79      Intake/Output Summary (Last 24 hours) at 3/15/2020 1126  Last data filed at 3/15/2020 0953  Gross per 24 hour   Intake 1598 52 ml   Output 725 ml   Net 873 52 ml     Weight (last 2 days)     Date/Time   Weight    03/15/20 0331   89 9 (198 19)    03/14/20 0533   89 6 (197 53)    03/13/20 0500   89 (196 21)                Telemetry Review: No significant arrhythmias seen on telemetry review  afib      Physical Exam   Constitutional: She is oriented to person, place, and time  No distress  HENT:   Mouth/Throat: No oropharyngeal exudate  Neck: JVD present  Cardiovascular: Normal rate  An irregularly irregular rhythm present  Pulmonary/Chest: Effort normal  She has rales  Abdominal: Soft  Bowel sounds are normal  She exhibits no distension  There is no tenderness  There is no guarding  Musculoskeletal: She exhibits edema  Neurological: She is alert and oriented to person, place, and time  Skin: Skin is warm and dry  She is not diaphoretic  Laboratory Results:  Results from last 7 days   Lab Units 03/11/20  1317   TROPONIN I ng/mL 0 03       CBC with diff: Results from last 7 days   Lab Units 03/12/20  0559 03/11/20  1317   WBC Thousand/uL 7 50 7 29   HEMOGLOBIN g/dL 11 5 11 0*   HEMATOCRIT % 37 0 34 7*   MCV fL 90 88   PLATELETS Thousands/uL 223 215   MCH pg 27 9 27 8   MCHC g/dL 31 1* 31 7   RDW % 16 0* 15 7*   MPV fL 11 7 11 7   NRBC AUTO /100 WBCs  --  0         CMP:  Results from last 7 days   Lab Units 03/15/20  0525 03/14/20  0506 03/13/20  0513 03/12/20  0559 03/11/20  1317   POTASSIUM mmol/L 5 8* 3 4* 3 9 3 9 4 2   CHLORIDE mmol/L 99* 99* 98* 102 99*   CO2 mmol/L 27 29 29 25 29   BUN mg/dL 26* 25 27* 25 25   CREATININE mg/dL 1 18 1 09 1 20 1 17 1 22   CALCIUM mg/dL 9 0 9 1 9 0 8 7 8 9   AST U/L  --   --   --   --  43   ALT U/L  --   --   --   --  28   ALK PHOS U/L  --   --   --   --  86   EGFR ml/min/1 73sq m 44 48 43 44 42         BMP:  Results from last 7 days   Lab Units 03/15/20  0525 03/14/20  0506 03/13/20  0513 03/12/20  0559 03/11/20  1317   POTASSIUM mmol/L 5 8* 3 4* 3 9 3 9 4 2   CHLORIDE mmol/L 99* 99* 98* 102 99*   CO2 mmol/L 27 29 29 25 29   BUN mg/dL 26* 25 27* 25 25   CREATININE mg/dL 1 18 1 09 1 20 1 17 1 22   CALCIUM mg/dL 9 0 9 1 9 0 8 7 8 9       BNP: No results for input(s): BNP in the last 72 hours      Magnesium:       Coags:       TSH: No results found for: TSH    Hemoglobin A1C   Results from last 7 days   Lab Units 03/12/20  0559   HEMOGLOBIN A1C % 6 9*       Lipid Profile: Results from last 7 days   Lab Units 03/12/20  0559   TRIGLYCERIDES mg/dL 48   HDL mg/dL 38*       Cardiac testing:   Results for orders placed during the hospital encounter of 06/18/19   Echo complete with contrast if indicated    Narrative Linda 175  [facility], [address] [place] [address]  [phone]    Transthoracic Echocardiogram  2D, M-mode, Doppler, and Color Doppler    Study date: 2019    Patient: Yuly Lay  MR number: [id number]  Account number: [de-identified]  : 1940  Age: 66 years  Gender: Female  Status: Inpatient  Location: Bedside  Height: 61 in  Weight: 175 lb  BP: 92/ 44 mmHg    Indications: Hypotension    Diagnoses: I95 9 - Hypotension, unspecified    Sonographer:  PEEWEE Chopra  Primary Physician:  Zaire Constantino MD  Referring Physician:  SOTO Welsh  Group:  [facility]  Interpreting Physician:  Radha Juárez MD    SUMMARY    LEFT VENTRICLE:  Size was at the upper limits of normal   Systolic function was moderately reduced  Ejection fraction was estimated to be 42 %  There was moderate diffuse hypokinesis  There was no evidence of concentric hypertrophy  VENTRICULAR SEPTUM:  There was dyssynergic motion  RIGHT VENTRICLE:  The ventricle was mildly to moderately dilated  Systolic function was mildly to moderately reduced  Wall thickness was increased  LEFT ATRIUM:  The atrium was moderately dilated  RIGHT ATRIUM:  The atrium was moderately dilated  MITRAL VALVE:  There was mild annular calcification  There was moderate regurgitation  TRICUSPID VALVE:  There was moderate regurgitation  PULMONIC VALVE:  There was trace regurgitation  HISTORY: PRIOR HISTORY: DM2, HTN, HLD, Afib, CHF    PROCEDURE: The procedure was performed at the bedside  This was an urgent study  The transthoracic approach was used  The study included complete 2D imaging, M-mode, complete spectral Doppler, and color Doppler  The heart rate was 117 bpm,  at the start of the study  Images were obtained from the parasternal, apical, subcostal, and suprasternal notch acoustic windows  Echocardiographic views were limited due to restricted patient mobility  This was a technically difficult  study  LEFT VENTRICLE: Size was at the upper limits of normal  Systolic function was moderately reduced   Ejection fraction was estimated to be 42 %  There was moderate diffuse hypokinesis  Wall thickness was normal  There was no evidence of  concentric hypertrophy  DOPPLER: The study was not technically sufficient to allow evaluation of LV diastolic function  VENTRICULAR SEPTUM: There was dyssynergic motion  RIGHT VENTRICLE: The ventricle was mildly to moderately dilated  Systolic function was mildly to moderately reduced  Wall thickness was increased  LEFT ATRIUM: The atrium was moderately dilated  RIGHT ATRIUM: The atrium was moderately dilated  MITRAL VALVE: There was mild annular calcification  Valve structure was normal  There was normal leaflet separation  DOPPLER: The transmitral velocity was within the normal range  There was no evidence for stenosis  There was moderate  regurgitation  AORTIC VALVE: The valve was trileaflet  Leaflets exhibited normal thickness and normal cuspal separation  DOPPLER: Transaortic velocity was within the normal range  There was no evidence for stenosis  There was no regurgitation  TRICUSPID VALVE: The valve structure was normal  There was normal leaflet separation  DOPPLER: The transtricuspid velocity was within the normal range  There was no evidence for stenosis  There was moderate regurgitation  Pulmonary artery  systolic pressure was mildly increased  PULMONIC VALVE: Leaflets exhibited normal thickness, no calcification, and normal cuspal separation  DOPPLER: The transpulmonic velocity was within the normal range  There was trace regurgitation  PERICARDIUM: There was no pericardial effusion  The pericardium was normal in appearance  AORTA: The root exhibited normal size      SYSTEM MEASUREMENT TABLES    2D  LVEDV MOD A4C: 81 79 ml  LVEF MOD A4C: 64 57 %  LVESV MOD A4C: 28 98 ml  LVLd A4C: 7 08 cm  LVLs A4C: 5 89 cm  SV MOD A4C: 52 81 ml    CW  TR Vmax: 2 65 m/s  TR maxP 14 mmHg    [facility]    Prepared and electronically signed by    Radha Juárez MD  Signed 2019 07:10:27       Results for orders placed during the hospital encounter of 19   EBEN    Narrative Linda 175  [facility], [address] [facility]  [phone]    Transesophageal Echocardiogram  2D, Doppler, and Color Doppler    Study date:  2019    Patient: Yuly Lay  MR number: [id number]  Account number: [de-identified]  : 1940  Age: 66 years  Gender: Female  Status: Inpatient  Location: Echo lab  Height: 61 in  Weight: 181 lb  BP: 125/ 83 mmHg    Indications: Atrial-Fib; Cardioversion    Diagnoses: 427 31 - ATRIAL FIBRILLATION    Sonographer:  PEEWEE Leigh  Interpreting Physician:  Khurram Barfield MD  Primary Physician:  Zaire Constantino MD  Referring Physician:  SOTO Welsh  Group:  [facility]  RN:  Jesús Mcneil RN    SUMMARY    LEFT VENTRICLE:  The ventricle was dilated  Systolic function was markedly reduced  Ejection fraction was estimated to be 25 %  There was severe diffuse hypokinesis  RIGHT VENTRICLE:  The ventricle was dilated  Systolic function was mildly to moderately reduced  LEFT ATRIUM:  The atrium was moderately dilated  No thrombus was identified  LEFT ATRIAL APPENDAGE:  The appendage was dilated  No thrombus was identified  There was mild intermittent spontaneous echo contrast ("smoke") in the appendage  ATRIAL SEPTUM:  No defect or patent foramen ovale was identified  RIGHT ATRIUM:  The atrium was dilated  MITRAL VALVE:  There was moderate regurgitation  TRICUSPID VALVE:  There was moderate regurgitation  PERICARDIUM:  A small, free-flowing pericardial effusion was identified  There was no evidence of hemodynamic compromise  HISTORY: PRIOR HISTORY: HTN; HLD; DM2; A-Fib; Acute Congestive Heart Failure    PROCEDURE: The procedure was performed in the echo lab  This was a routine study   The risks and alternatives of the procedure were explained to the patient and informed consent was obtained  The transesophageal approach was used  The study  included complete 2D imaging, complete spectral Doppler, and color Doppler  The heart rate was 125 bpm, at the start of the study  An adult omniplane probe was inserted by the attending cardiologist  Intubated with ease  Two intubation  attempt(s)  There was no blood detected on the probe  Image quality was good  There were no complications during the procedure  MEDICATIONS: Anesthesia administered by anesthesia team     LEFT VENTRICLE: The ventricle was dilated  Systolic function was markedly reduced  Ejection fraction was estimated to be 25 %  There was severe diffuse hypokinesis  Wall thickness was normal  No evidence of apical thrombus  RIGHT VENTRICLE: The ventricle was dilated  Systolic function was mildly to moderately reduced  Wall thickness was normal     LEFT ATRIUM: The atrium was moderately dilated  No thrombus was identified  APPENDAGE: The appendage was dilated  No thrombus was identified  There was mild intermittent spontaneous echo contrast ("smoke") in the appendage  ATRIAL SEPTUM: No defect or patent foramen ovale was identified  RIGHT ATRIUM: The atrium was dilated  MITRAL VALVE: Valve structure was normal  There was normal leaflet separation  DOPPLER: The transmitral velocity was within the normal range  There was no evidence for stenosis  There was moderate regurgitation  AORTIC VALVE: The valve was trileaflet  Leaflets exhibited normal thickness, normal cuspal separation, and sclerosis  DOPPLER: Transaortic velocity was within the normal range  There was no evidence for stenosis  There was no significant  regurgitation  TRICUSPID VALVE: The valve structure was normal  There was normal leaflet separation  DOPPLER: There was moderate regurgitation  PULMONIC VALVE: Leaflets exhibited normal thickness, no calcification, and normal cuspal separation  DOPPLER: The transpulmonic velocity was within the normal range  There was no significant regurgitation  PERICARDIUM: A small, free-flowing pericardial effusion was identified  There was no evidence of hemodynamic compromise  The pericardium was normal in appearance  AORTA: The root exhibited normal size  The ascending aorta was normal in size  SYSTEMIC VEINS: IVC: The inferior vena cava was normal in size  MEASUREMENT TABLES    DOPPLER MEASUREMENTS  Left atrium   (Reference normals)  ROHAN peak yaneth   20 cm/s   (--)    [facility]    Prepared and electronically signed by    Santosh Bartholomew MD  Signed 21-Jun-2019 15:50:13       No results found for this or any previous visit  No results found for this or any previous visit      Meds/Allergies   current meds:   Current Facility-Administered Medications   Medication Dose Route Frequency    acetaminophen (TYLENOL) tablet 650 mg  650 mg Oral Q4H PRN    amiodarone (CORDARONE) 900 mg in dextrose 5 % 500 mL infusion  0 5 mg/min Intravenous Continuous    apixaban (ELIQUIS) tablet 5 mg  5 mg Oral BID    ascorbic acid (VITAMIN C) tablet 125 mg  125 mg Oral Daily    bumetanide (BUMEX) injection 2 mg  2 mg Intravenous BID    cholecalciferol (VITAMIN D3) tablet 1,000 Units  1,000 Units Oral Daily    docusate sodium (COLACE) capsule 100 mg  100 mg Oral BID    hydrALAZINE (APRESOLINE) tablet 25 mg  25 mg Oral Q8H [address]    levothyroxine tablet 137 mcg  137 mcg Oral Early Morning    metoprolol succinate (TOPROL-XL) 24 hr tablet 100 mg  100 mg Oral Q12H DAVE    potassium chloride (K-DUR,KLOR-CON) CR tablet 20 mEq  20 mEq Oral Daily    rOPINIRole (REQUIP) tablet 2 mg  2 mg Oral HS    venlafaxine (EFFEXOR) tablet 75 mg  75 mg Oral Q12H [address]     Medications Prior to Admission   Medication    apixaban (ELIQUIS) 5 mg    Ascorbic Acid (VITAMIN C) 100 MG tablet    cholecalciferol (VITAMIN D3) 1,000 units tablet    hydrALAZINE (APRESOLINE) 25 mg tablet    isosorbide dinitrate (ISORDIL) 10 mg tablet    levothyroxine 137 mcg tablet    Loratadine (CLARITIN PO)    MAGNESIUM PO    metoprolol succinate (TOPROL-XL) 25 mg 24 hr tablet    multivitamin (THERAGRAN) TABS    potassium chloride (K-DUR,KLOR-CON) 20 mEq tablet    rOPINIRole (REQUIP XL) 2 MG 24 hr tablet    torsemide (DEMADEX) 20 mg tablet    venlafaxine (EFFEXOR) 75 mg tablet         amiodarone 0 5 mg/min Last Rate: 0 5 mg/min (03/14/20 3643)     Assessment:  Principal Problem:    Atrial fibrillation with RVR ([facility])  Active Problems:    Depression with anxiety    Hyperlipidemia    Hypertension    Hypothyroidism    Obesity (BMI 30-39  9)    Type 2 diabetes mellitus with diabetic cataract ([facility] [address] )    Acute on chronic combined systolic and diastolic CHF (congestive heart failure) ([facility])    Restless leg syndrome    # Acute on chronic partially recovered BiV HFrEF, LVEF ~40%, ACC/AHA Stage C:   Diag:  --TTE 6/20/19: LVEF 25-30%, LVIDd 4 6 cm, mod reduced RVSF  Mod MR and TR    --TTE 6/26/19: LVEF ~40%, mildly reduced RVSF  Mod MR, otherwise, no significant valvular disease  --Meds as above     Impression: Volume overloaded currently  Unclear if 2/2 to AFib w/ RVR or vice versa  Given recurrent AFib w/ RVR will consult EP for further consideration for AAD therapy, but may benefit more from consideration for AFib ablation vs AVN ablation w/ PPM implantation       Continue IV bumex today       Increase metoprolol to 100mg twice a day  Continue with remainder of medical therapy  EP to see tomorrow to finalize their plans  Will review her echocardigoram             Counseling / Coordination of Care  Total floor / unit time spent today 25 minutes  Greater than 50% of total time was spent with the patient and / or family counseling and / or coordination of care  A description of the counseling / coordination of care
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
## 2020-03-15 NOTE — ASSESSMENT & PLAN NOTE
Patient with a known history of atrial fibrillation status post 2 attempts at cardioversion that have failed  Patient presents with increasing lower extremity edema found to be in AFib with RVR  · Appreciate heart failure input- have consulted EP   · Continue diuresis  · Continue amiodarone drip  · Continue Toprol 100mg BID  · Continue Eliquis  · 2D echo The ventricle was mildly dilated  Systolic function was markedly reduced by visual assessment  Ejection fraction was estimated to be 30 %  There was severe diffuse hypokinesis with regional variations

## 2020-03-15 NOTE — ASSESSMENT & PLAN NOTE
Wt Readings from Last 3 Encounters:   03/15/20 89 9 kg (198 lb 3 1 oz)   03/09/20 90 3 kg (199 lb)   02/27/20 87 5 kg (193 lb)     Patient with evidence for acute on chronic combined diastolic heart failure secondary to AFib with RVR      Echo 7/2019 EF 45%, will repeat echo this admission when rates better controlled  Appreciate heart failure input  Daily weights, I&O's, fluid restrict, 2g Na restricted diet  Continue diuresis as per Cardiology

## 2020-03-15 NOTE — PLAN OF CARE
Problem: Potential for Falls  Goal: Patient will remain free of falls  Description  INTERVENTIONS:  - Assess patient frequently for physical needs  -  Identify cognitive and physical deficits and behaviors that affect risk of falls    -  [place] fall precautions as indicated by assessment   - Educate patient/family on patient safety including physical limitations  - Instruct patient to call for assistance with activity based on assessment  - Modify environment to reduce risk of injury  - Consider OT/PT consult to assist with strengthening/mobility  Outcome: Progressing     Problem: PAIN - ADULT  Goal: Verbalizes/displays adequate comfort level or baseline comfort level  Description  Interventions:  - Encourage patient to monitor pain and request assistance  - Assess pain using appropriate pain scale  - Administer analgesics based on type and severity of pain and evaluate response  - Implement non-pharmacological measures as appropriate and evaluate response  - Consider cultural and social influences on pain and pain management  - Notify physician/advanced practitioner if interventions unsuccessful or patient reports new pain  Outcome: Progressing     Problem: INFECTION - ADULT  Goal: Absence or prevention of progression during hospitalization  Description  INTERVENTIONS:  - Assess and monitor for signs and symptoms of infection  - Monitor lab/diagnostic results  - Monitor all insertion sites, i e  indwelling lines, tubes, and drains  - Monitor endotracheal if appropriate and nasal secretions for changes in amount and color  - [place] appropriate cooling/warming therapies per order  - Administer medications as ordered  - Instruct and encourage patient and family to use good hand hygiene technique  - Identify and instruct in appropriate isolation precautions for identified infection/condition  Outcome: Progressing  Goal: Absence of fever/infection during neutropenic period  Description  INTERVENTIONS:  - Monitor WBC    Outcome: Progressing     Problem: SAFETY ADULT  Goal: Maintain or return to baseline ADL function  Description  INTERVENTIONS:  -  Assess patient's ability to carry out ADLs; assess patient's baseline for ADL function and identify physical deficits which impact ability to perform ADLs (bathing, care of mouth/teeth, toileting, grooming, dressing, etc )  - Assess/evaluate cause of self-care deficits   - Assess range of motion  - Assess patient's mobility; develop plan if impaired  - Assess patient's need for assistive devices and provide as appropriate  - Encourage maximum independence but intervene and supervise when necessary  - Involve family in performance of ADLs  - Assess for home care needs following discharge   - Consider OT consult to assist with ADL evaluation and planning for discharge  - Provide patient education as appropriate  Outcome: Progressing  Goal: Maintain or return mobility status to optimal level  Description  INTERVENTIONS:  - Assess patient's baseline mobility status (ambulation, transfers, stairs, etc )    - Identify cognitive and physical deficits and behaviors that affect mobility  - Identify mobility aids required to assist with transfers and/or ambulation (gait belt, sit-to-stand, lift, walker, cane, etc )  - [place] fall precautions as indicated by assessment  - Record patient progress and toleration of activity level on Mobility SBAR; progress patient to next Phase/Stage  - Instruct patient to call for assistance with activity based on assessment  - Consider rehabilitation consult to assist with strengthening/weightbearing, etc   Outcome: Progressing     Problem: DISCHARGE PLANNING  Goal: Discharge to home or other facility with appropriate resources  Description  INTERVENTIONS:  - Identify barriers to discharge w/patient and caregiver  - Arrange for needed discharge resources and transportation as appropriate  - Identify discharge learning needs (meds, wound care, etc )  - Arrange for interpretive services to assist at discharge as needed  - Refer to Case Management Department for coordinating discharge planning if the patient needs post-hospital services based on physician/advanced practitioner order or complex needs related to functional status, cognitive ability, or social support system  Outcome: Progressing     Problem: Knowledge Deficit  Goal: Patient/family/caregiver demonstrates understanding of disease process, treatment plan, medications, and discharge instructions  Description  Complete learning assessment and assess knowledge base    Interventions:  - Provide teaching at level of understanding  - Provide teaching via preferred learning methods  Outcome: Progressing     Problem: CARDIOVASCULAR - ADULT  Goal: Maintains optimal cardiac output and hemodynamic stability  Description  INTERVENTIONS:  - Monitor I/O, vital signs and rhythm  - Monitor for S/S and trends of decreased cardiac output  - Administer and titrate ordered vasoactive medications to optimize hemodynamic stability  - Assess quality of pulses, skin color and temperature  - Assess for signs of decreased coronary artery perfusion  - Instruct patient to report change in severity of symptoms  Outcome: Progressing  Goal: Absence of cardiac dysrhythmias or at baseline rhythm  Description  INTERVENTIONS:  - Continuous cardiac monitoring, vital signs, obtain 12 lead EKG if ordered  - Administer antiarrhythmic and heart rate control medications as ordered  - Monitor electrolytes and administer replacement therapy as ordered  Outcome: Progressing     Problem: GASTROINTESTINAL - ADULT  Goal: Maintains or returns to baseline bowel function  Description  INTERVENTIONS:  - Assess bowel function  - Encourage oral fluids to ensure adequate hydration  - Administer IV fluids if ordered to ensure adequate hydration  - Administer ordered medications as needed  - Encourage mobilization and activity  - Consider nutritional services referral to assist patient with adequate nutrition and appropriate food choices  Outcome: Progressing     Problem: SKIN/TISSUE INTEGRITY - ADULT  Goal: Skin integrity remains intact  Description  INTERVENTIONS  - Identify patients at risk for skin breakdown  - Assess and monitor skin integrity  - Assess and monitor nutrition and hydration status  - Monitor labs (i e  albumin)  - Assess for incontinence   - Turn and reposition patient  - Assist with mobility/ambulation  - Relieve pressure over bony prominences  - Avoid friction and shearing  - Provide appropriate hygiene as needed including keeping skin clean and dry  - Evaluate need for skin moisturizer/barrier cream  - Collaborate with interdisciplinary team (i e  Nutrition, Rehabilitation, etc )   - Patient/family teaching  Outcome: Progressing  Goal: Incision(s), wounds(s) or drain site(s) healing without S/S of infection  Description  INTERVENTIONS  - Assess and document risk factors for skin impairment   - Assess and document dressing, incision, wound bed, drain sites and surrounding tissue  - Consider nutrition services referral as needed  - Oral mucous membranes remain intact  - Provide patient/ family education  Outcome: Progressing  Goal: Oral mucous membranes remain intact  Description  INTERVENTIONS  - Assess oral mucosa and hygiene practices  - Implement preventative oral hygiene regimen  - Implement oral medicated treatments as ordered  - Initiate Nutrition services referral as needed  Outcome: Progressing

## 2020-03-16 LAB
ANION GAP SERPL CALCULATED.3IONS-SCNC: 11 MMOL/L (ref 4–13)
BUN SERPL-MCNC: 26 MG/DL (ref 5–25)
CALCIUM SERPL-MCNC: 9 MG/DL (ref 8.3–10.1)
CHLORIDE SERPL-SCNC: 99 MMOL/L (ref 100–108)
CO2 SERPL-SCNC: 26 MMOL/L (ref 21–32)
CREAT SERPL-MCNC: 1.19 MG/DL (ref 0.6–1.3)
GFR SERPL CREATININE-BSD FRML MDRD: 44 ML/MIN/1.73SQ M
GLUCOSE SERPL-MCNC: 106 MG/DL (ref 65–140)
POTASSIUM SERPL-SCNC: 4.3 MMOL/L (ref 3.5–5.3)
SODIUM SERPL-SCNC: 136 MMOL/L (ref 136–145)

## 2020-03-16 PROCEDURE — 99232 SBSQ HOSP IP/OBS MODERATE 35: CPT | Performed by: INTERNAL MEDICINE

## 2020-03-16 PROCEDURE — 99233 SBSQ HOSP IP/OBS HIGH 50: CPT | Performed by: INTERNAL MEDICINE

## 2020-03-16 PROCEDURE — 80048 BASIC METABOLIC PNL TOTAL CA: CPT | Performed by: INTERNAL MEDICINE

## 2020-03-16 RX ORDER — DIGOXIN 0.25 MG/ML
250 INJECTION INTRAMUSCULAR; INTRAVENOUS ONCE
Status: COMPLETED | OUTPATIENT
Start: 2020-03-16 | End: 2020-03-16

## 2020-03-16 RX ORDER — BUMETANIDE 0.25 MG/ML
3 INJECTION, SOLUTION INTRAMUSCULAR; INTRAVENOUS 2 TIMES DAILY
Status: DISCONTINUED | OUTPATIENT
Start: 2020-03-16 | End: 2020-03-17

## 2020-03-16 RX ORDER — DIGOXIN 125 MCG
125 TABLET ORAL DAILY
Status: DISCONTINUED | OUTPATIENT
Start: 2020-03-17 | End: 2020-03-19

## 2020-03-16 RX ORDER — BUMETANIDE 0.25 MG/ML
2 INJECTION, SOLUTION INTRAMUSCULAR; INTRAVENOUS 2 TIMES DAILY
Status: DISCONTINUED | OUTPATIENT
Start: 2020-03-16 | End: 2020-03-16

## 2020-03-16 RX ADMIN — APIXABAN 5 MG: 5 TABLET, FILM COATED ORAL at 08:01

## 2020-03-16 RX ADMIN — LEVOTHYROXINE SODIUM 137 MCG: 112 TABLET ORAL at 05:29

## 2020-03-16 RX ADMIN — METOPROLOL SUCCINATE 100 MG: 100 TABLET, EXTENDED RELEASE ORAL at 21:22

## 2020-03-16 RX ADMIN — BUMETANIDE 3 MG: 0.25 INJECTION INTRAMUSCULAR; INTRAVENOUS at 17:23

## 2020-03-16 RX ADMIN — HYDRALAZINE HYDROCHLORIDE 25 MG: 25 TABLET, FILM COATED ORAL at 14:44

## 2020-03-16 RX ADMIN — HYDRALAZINE HYDROCHLORIDE 25 MG: 25 TABLET, FILM COATED ORAL at 05:29

## 2020-03-16 RX ADMIN — METOPROLOL SUCCINATE 100 MG: 100 TABLET, EXTENDED RELEASE ORAL at 08:01

## 2020-03-16 RX ADMIN — VENLAFAXINE 75 MG: 37.5 TABLET ORAL at 21:21

## 2020-03-16 RX ADMIN — DIGOXIN 250 MCG: 0.25 INJECTION INTRAMUSCULAR; INTRAVENOUS at 11:40

## 2020-03-16 RX ADMIN — DOCUSATE SODIUM 100 MG: 100 CAPSULE, LIQUID FILLED ORAL at 17:23

## 2020-03-16 RX ADMIN — APIXABAN 5 MG: 5 TABLET, FILM COATED ORAL at 17:23

## 2020-03-16 RX ADMIN — MELATONIN 1000 UNITS: at 08:01

## 2020-03-16 RX ADMIN — BUMETANIDE 2 MG: 0.25 INJECTION INTRAMUSCULAR; INTRAVENOUS at 08:01

## 2020-03-16 RX ADMIN — ROPINIROLE HYDROCHLORIDE 2 MG: 2 TABLET, FILM COATED ORAL at 21:21

## 2020-03-16 RX ADMIN — AMIODARONE HYDROCHLORIDE 0.5 MG/MIN: 50 INJECTION, SOLUTION INTRAVENOUS at 07:26

## 2020-03-16 RX ADMIN — VENLAFAXINE 75 MG: 37.5 TABLET ORAL at 08:05

## 2020-03-16 RX ADMIN — DOCUSATE SODIUM 100 MG: 100 CAPSULE, LIQUID FILLED ORAL at 08:01

## 2020-03-16 RX ADMIN — ASCORBIC ACID TAB 250 MG 125 MG: 250 TAB at 08:05

## 2020-03-16 RX ADMIN — HYDRALAZINE HYDROCHLORIDE 25 MG: 25 TABLET, FILM COATED ORAL at 21:21

## 2020-03-16 RX ADMIN — POTASSIUM CHLORIDE 20 MEQ: 1500 TABLET, EXTENDED RELEASE ORAL at 08:01

## 2020-03-16 NOTE — PROGRESS NOTES
Progress Note - Electrophysiology-Cardiology (EP)   Megan Aaronsburg 78 y o  female MRN: [id number]  Unit/Bed#: [facility] 529-01 Encounter: [id number]      Assessment:  1  Persistent atrial fibrillation with periods of rapid ventricular response   A ) on Eliquis anticoagulation (CHADS2 Vasc = 6)   B ) per reports had prior breakthrough on amiodarone   C ) prior cardioversions 6/21/2019 (unsuccessful with junctional rhythm briefly afterwards) and 7/4/2019 (held for 5 days with amio on board)  2  Nonischemic cardiomyopathy with LVEF 30% per echo 3/2020  3  Acute on chronic biventricular heart failure, currently on IV bumex  4  Hypertension  5  Diabetes mellitus type II  6  Hypothyroidism  7  Obesity with BMI 37  8  CORAZON, reportedly noncompliant with CPAP in the past  9  History of alcohol abuse      Plan:  1  Last week, a biventricular device with AV node ablation was recommended  Dr Annie Cleveland discussed biventricular pacemaker versus ICD with the patient, and after all questions were answered the patient would prefer moving forward with a defibrillator  We also explained AV node ablation in detail, and the risks versus benefit of being dependent on the device moving forward  She understands, and agrees with this plan  2  We would prefer to postpone our procedure until she is better optimized, especially since a prior device implantation was aborted due to complications with anesthesia  We will re-evaulate tomorrow, with potential procedure on Wednesday  3  Continue diuresis per heart failure team      4  Add digoxin for better rate control until her procedure can be performed  Subjective/Objective   Chief Complaint: no acute complaints, anxious to know the plan moving forward regarding device implantaiton     Subjective: Patient currently resting comfortably, denies chest pain, worsening dyspnea, or palpitations       Objective:     Vitals: /77 (BP Location: Right arm)   Pulse (!) 109   Temp 97 8 °F (36 6 °C) (Oral)   Resp 17   Ht 5' 1" (1 549 m)   Wt 90 4 kg (199 lb 4 7 oz)   SpO2 98%   BMI 37 66 kg/m²   Vitals:    03/15/20 0331 03/16/20 0535   Weight: 89 9 kg (198 lb 3 1 oz) 90 4 kg (199 lb 4 7 oz)     Orthostatic Blood Pressures      Most Recent Value   Blood Pressure  115/77 filed at 03/16/2020 0168   Patient Position - Orthostatic VS  Sitting filed at 03/16/2020 0044            Intake/Output Summary (Last 24 hours) at 3/16/2020 0845  Last data filed at 3/16/2020 4045  Gross per 24 hour   Intake 1019 08 ml   Output 2000 ml   Net -980 92 ml       Invasive Devices     Peripheral Intravenous Line            Peripheral IV 03/15/20 Left Wrist 1 day    Peripheral IV 03/16/20 Right;Dorsal (posterior) Hand less than 1 day                            Scheduled Meds:  Current Facility-Administered Medications:  acetaminophen 650 mg Oral Q4H PRN Lidia Killian MD    amiodarone 0 5 mg/min Intravenous Continuous SOTO Junior Last Rate: 0 5 mg/min (03/16/20 0726)   apixaban 5 mg Oral BID Lidia Killian MD    ascorbic acid 125 mg Oral Daily Lidia Killian MD    bumetanide 2 mg Intravenous BID Betzy Velazquez MD    cholecalciferol 1,000 Units Oral Daily Lidia Killian MD    docusate sodium 100 mg Oral BID Terrell Seay MD    hydrALAZINE 25 mg Oral Q8H [facility] SOTO Junior    levothyroxine 137 mcg Oral Early Morning Lidia Killian MD    metoprolol succinate 100 mg Oral Q12H [facility] Betzy Velazquez MD    potassium chloride 20 mEq Oral Daily Lidia Killian MD    rOPINIRole 2 mg Oral HS Teto Serrano PA-C    venlafaxine 75 mg Oral Q12H [facility] Lidia Killian MD      Continuous Infusions:  amiodarone 0 5 mg/min Last Rate: 0 5 mg/min (03/16/20 0726)     PRN Meds:   acetaminophen    Review of Systems: Negative    Physical Exam:   GEN: NAD, alert and oriented, well appearing  SKIN: dry without significant lesions or rashes  HEENT: NCAT, PERRL, EOMs intact  NECK: No JVD able to be appreciated due to body habitus  CARDIOVASCULAR: irregularly irregular, tachycardic, normal S1, S2 without rubs or gallops appreciated; +systolic murmur  LUNGS: Clear to auscultation bilaterally without wheezes, rhonchi, or rales  ABDOMEN: Soft, nontender, nondistended  EXTREMITIES/VASCULAR: perfused without clubbing, cyanosis b/l; lower extremity dressing notes with skin erythema over shins, 2-3+ lower extremity edema b/l  PSYCH: Normal mood and affect  NEURO: CN ll-Xll grossly intact                Lab Results: I have personally reviewed pertinent lab results  Results from last 7 days   Lab Units 20  0559 20  1317   WBC Thousand/uL 7 50 7 29   HEMOGLOBIN g/dL 11 5 11 0*   HEMATOCRIT % 37 0 34 7*   PLATELETS Thousands/uL 223 215     Results from last 7 days   Lab Units 20  0559 03/15/20  0525 20  0506   POTASSIUM mmol/L 4 3 5 8* 3 4*   CHLORIDE mmol/L 99* 99* 99*   CO2 mmol/L 26 27 29   BUN mg/dL 26* 26* 25   CREATININE mg/dL 1 19 1 18 1 09   CALCIUM mg/dL 9 0 9 0 9 1                 Imaging: I have personally reviewed pertinent reports  Results for orders placed during the hospital encounter of 20   Echo complete with contrast if indicated    Danii Mckeon 175  [address]  [phone]    Transthoracic Echocardiogram  2D, M-mode, Doppler, and Color Doppler    Study date:  15-Mar-2020    Patient: Bing Zavala  MR number: [id number]  Account number: [de-identified]  : 1940  Age: 78 years  Gender: Female  Status: Inpatient  Location: Bedside  Height: 61 in  Weight: 197 6 lb  BP: 120/ 79 mmHg    Indications: A-fib      Diagnoses: I48 0 - Atrial fibrillation    Sonographer:  Kirstie Taveras  Primary Physician:  SOTO Espana  Referring Physician:  Patrick Kirkland PA-C  Group:  [facility]  Interpreting Physician:  Brooke Mo MD    SUMMARY    PROCEDURE INFORMATION:  This was a technically difficult study     LEFT VENTRICLE:  The ventricle was mildly dilated  Systolic function was markedly reduced by visual assessment  Ejection fraction was estimated to be 30 %  There was severe diffuse hypokinesis with regional variations  RIGHT VENTRICLE:  The ventricle was moderately dilated  Systolic function was moderately to markedly reduced  LEFT ATRIUM:  The atrium was moderately to markedly dilated  RIGHT ATRIUM:  The atrium was moderately dilated  MITRAL VALVE:  There was mild annular calcification  There was moderate regurgitation  TRICUSPID VALVE:  There was mild to moderate regurgitation  IVC, HEPATIC VEINS:  The inferior vena cava was dilated  HISTORY: PRIOR HISTORY: A-fib;DM2;HTN;HLD;Hypothyroid:Obesity  PROCEDURE: The procedure was performed at the bedside  This was a routine study  The transthoracic approach was used  The study included complete 2D imaging, M-mode, complete spectral Doppler, and color Doppler  The heart rate was 97 bpm,  at the start of the study  Images were obtained from the parasternal, apical, subcostal, and suprasternal notch acoustic windows  This was a technically difficult study  LEFT VENTRICLE: The ventricle was mildly dilated  Systolic function was markedly reduced by visual assessment  Ejection fraction was estimated to be 30 %  There was severe diffuse hypokinesis with regional variations  Wall thickness was  normal  DOPPLER: Transmitral flow pattern: atrial fibrillation  RIGHT VENTRICLE: The ventricle was moderately dilated  Systolic function was moderately to markedly reduced  DOPPLER: Estimated peak pressure was at least 40 mmHg  LEFT ATRIUM: The atrium was moderately to markedly dilated  RIGHT ATRIUM: The atrium was moderately dilated  MITRAL VALVE: There was mild annular calcification  Valve structure was normal  There was normal leaflet separation  DOPPLER: The transmitral velocity was within the normal range   There was no evidence for stenosis  There was moderate  regurgitation  AORTIC VALVE: The valve was trileaflet  Leaflets exhibited normal thickness, mild calcification, and normal cuspal separation  DOPPLER: Transaortic velocity was within the normal range  There was no evidence for stenosis  There was no  regurgitation  TRICUSPID VALVE: The valve structure was normal  There was normal leaflet separation  DOPPLER: The transtricuspid velocity was within the normal range  There was no evidence for stenosis  There was mild to moderate regurgitation  PULMONIC VALVE: Leaflets exhibited normal thickness, no calcification, and normal cuspal separation  DOPPLER: The transpulmonic velocity was within the normal range  There was no regurgitation  PERICARDIUM: There was no pericardial effusion  AORTA: The root exhibited normal size  SYSTEMIC VEINS: IVC: The inferior vena cava was dilated      SYSTEM MEASUREMENT TABLES    2D  %FS: 34 58 %  Ao Diam: 2 77 cm  EDV(Teich): 143 59 ml  EF(Teich): 63 17 %  ESV(Teich): 52 89 ml  IVSd: 0 8 cm  LA Area: 23 29 cm2  LA Diam: 4 24 cm  LVEDV MOD A4C: 91 87 ml  LVEF MOD A4C: 33 04 %  LVESV MOD A4C: 61 52 ml  LVIDd: 5 44 cm  LVIDs: 3 56 cm  LVLd A4C: 6 58 cm  LVLs A4C: 5 96 cm  LVPWd: 0 77 cm  RA Area: 22 49 cm2  RVIDd: 4 38 cm  SV MOD A4C: 30 35 ml  SV(Teich): 90 7 ml    CW  MR VTI: 135 53 cm  MR Vmax: 4 28 m/s  MR Vmean: 3 35 m/s  MR maxP 14 mmHg  MR meanP 63 mmHg  TR Vmax: 2 43 m/s  TR maxP 58 mmHg    MM  TAPSE: 1 02 cm    PW  E': 0 05 m/s    [employer] Accredited Echocardiography Laboratory    Prepared and electronically signed by    Cecil Long MD  Signed 15-Mar-2020 12:52:11         EKG/telemetry: atrial fibrillation with rates generally 100-110 beats per minute    VTE Pharmacologic Prophylaxis: Eliquis  VTE Mechanical Prophylaxis: sequential compression device

## 2020-03-16 NOTE — ASSESSMENT & PLAN NOTE
Wt Readings from Last 3 Encounters:   03/16/20 90 4 kg (199 lb 4 7 oz)   03/09/20 90 3 kg (199 lb)   02/27/20 87 5 kg (193 lb)     Patient with evidence for acute on chronic combined diastolic heart failure secondary to AFib with RVR      Echo 7/2019 EF 45%, will repeat echo this admission when rates better controlled  Appreciate heart failure input  Daily weights, I&O's, fluid restrict, 2g Na restricted diet  Continue diuresis as per Cardiology

## 2020-03-16 NOTE — UTILIZATION REVIEW
Continued Stay Review    Date:   3/16                          Current Patient Class: inpt   Current Level of Care: level 2 stepdown     HPI:79 y o  female initially admitted on  3/11/2020 for volume overload (acute on chronic Systolic HF)   and back in uncontrolled AF  H/o  ACC/AHA Stage C  Heart failure  Admitted for IV Lasix bid diuresis  Amiodarone gtt initiated HR control  (dc'ed initial cardizem gtt)       3/13   EPS consult:  Previous attempt was made to implant a Bi V ICD followed by AVJ ablation however she became hypotensive     limited options in terms of medications and unlikely to keep her heart rate in normal range  She would benefit from CRT device and AVJ ablation  3/14  Initiated Bumex 2 mg IV BID     3/16   Increasing Bumex to  3 mg IV BID daily  Give digoxin 250 mcg per IV today and start po 125 mcg daily on 3/17  currently tachycardic and irregular  LE show pitting edema bilaterally and she has decreased BS  Echo = EF is 30% and there is large pleural effusion      Remains on amiodarone drip at 0 5 mg/min     In 2-3 days likely will be stable enough for conscious sedation (she had issues with antesthesia in past hemodymamic compromise) and we can proceed with BIV ICD and AV node abaltion    Pertinent Labs/Diagnostic Results:   Results from last 7 days   Lab Units 03/12/20  0559 03/11/20  1317   WBC Thousand/uL 7 50 7 29   HEMOGLOBIN g/dL 11 5 11 0*   HEMATOCRIT % 37 0 34 7*   PLATELETS Thousands/uL 223 215   NEUTROS ABS Thousands/µL  --  5 18         Results from last 7 days   Lab Units 03/16/20  0559 03/15/20  0525 03/14/20  0506 03/13/20  0513 03/12/20  0559   SODIUM mmol/L 136 132* 136 135* 135*   POTASSIUM mmol/L 4 3 5 8* 3 4* 3 9 3 9   CHLORIDE mmol/L 99* 99* 99* 98* 102   CO2 mmol/L 26 27 29 29 25   ANION GAP mmol/L 11 6 8 8 8   BUN mg/dL 26* 26* 25 27* 25   CREATININE mg/dL 1 19 1 18 1 09 1 20 1 17   EGFR ml/min/1 73sq m 44 44 48 43 44   CALCIUM mg/dL 9 0 9 0 9 1 9 0 8 7 Results from last 7 days   Lab Units 03/11/20  1317   AST U/L 43   ALT U/L 28   ALK PHOS U/L 86   TOTAL PROTEIN g/dL 6 8   ALBUMIN g/dL 3 1*   TOTAL BILIRUBIN mg/dL 1 13*         Results from last 7 days   Lab Units 03/16/20  0559 03/15/20  0525 03/14/20  0506 03/13/20  0513 03/12/20  0559 03/11/20  1317   GLUCOSE RANDOM mg/dL 106 152* 125 129 137 128         Results from last 7 days   Lab Units 03/12/20  0559   HEMOGLOBIN A1C % 6 9*   EAG mg/dl 151     Results from last 7 days   Lab Units 03/11/20  1317   TROPONIN I ng/mL 0 03     Results from last 7 days   Lab Units 03/11/20  1317   TSH 3RD GENERATON uIU/mL 3 200     Results from last 7 days   Lab Units 03/11/20  1317   NT-PRO BNP pg/mL 4,620*     Vital Signs:   03/16/20 1038  98 °F   106Abnormal   16  116/74    97 %    Lying   03/16/20 0726  97 8 °F   109Abnormal   17  115/77             Medications:   Scheduled Medications:    Medications:  apixaban 5 mg Oral BID   ascorbic acid 125 mg Oral Daily   bumetanide 3 mg Intravenous BID   cholecalciferol 1,000 Units Oral Daily   [START ON 3/17/2020] digoxin 125 mcg Oral Daily   docusate sodium 100 mg Oral BID   hydrALAZINE 25 mg Oral Q8H DAVE   levothyroxine 137 mcg Oral Early Morning   metoprolol succinate 100 mg Oral Q12H DAVE   potassium chloride 20 mEq Oral Daily   rOPINIRole 2 mg Oral HS   venlafaxine 75 mg Oral Q12H [address]     Continuous IV Infusions:    amiodarone 0 5 mg/min Intravenous Continuous     PRN Meds:    acetaminophen 650 mg Oral Q4H PRN       Discharge Plan: tbd    Network Utilization Review Department  [email or web address]  ATTENTION: Please call with any questions or concerns to [phone] and carefully listen to the prompts so that you are directed to the right person   All voicemails are confidential   Butch Owens all requests for admission clinical reviews, approved or denied determinations and any other requests to dedicated fax number below belonging to the campus where the patient is receiving treatment   List of dedicated fax numbers for the Facilities:  [address] DENIALS (Administrative/Medical Necessity) [phone]   [address] (Maternity/NICU/Pediatrics) [phone]   Akin Iman [phone]   [facility] [phone]   William Braswell [phone]   [facility] [phone]   [address] [place] [address] [address] [place] [address] [phone]   [facility]  [phone]   [address] [facility]  [address] [facility] [address] [facility] [phone]

## 2020-03-16 NOTE — PLAN OF CARE
Problem: Potential for Falls  Goal: Patient will remain free of falls  Description  INTERVENTIONS:  - Assess patient frequently for physical needs  -  Identify cognitive and physical deficits and behaviors that affect risk of falls    -  [place] fall precautions as indicated by assessment   - Educate patient/family on patient safety including physical limitations  - Instruct patient to call for assistance with activity based on assessment  - Modify environment to reduce risk of injury  - Consider OT/PT consult to assist with strengthening/mobility  Outcome: Progressing     Problem: PAIN - ADULT  Goal: Verbalizes/displays adequate comfort level or baseline comfort level  Description  Interventions:  - Encourage patient to monitor pain and request assistance  - Assess pain using appropriate pain scale  - Administer analgesics based on type and severity of pain and evaluate response  - Implement non-pharmacological measures as appropriate and evaluate response  - Consider cultural and social influences on pain and pain management  - Notify physician/advanced practitioner if interventions unsuccessful or patient reports new pain  Outcome: Progressing     Problem: INFECTION - ADULT  Goal: Absence or prevention of progression during hospitalization  Description  INTERVENTIONS:  - Assess and monitor for signs and symptoms of infection  - Monitor lab/diagnostic results  - Monitor all insertion sites, i e  indwelling lines, tubes, and drains  - Monitor endotracheal if appropriate and nasal secretions for changes in amount and color  - [place] appropriate cooling/warming therapies per order  - Administer medications as ordered  - Instruct and encourage patient and family to use good hand hygiene technique  - Identify and instruct in appropriate isolation precautions for identified infection/condition  Outcome: Progressing  Goal: Absence of fever/infection during neutropenic period  Description  INTERVENTIONS:  - Monitor WBC    Outcome: Progressing     Problem: SAFETY ADULT  Goal: Maintain or return to baseline ADL function  Description  INTERVENTIONS:  -  Assess patient's ability to carry out ADLs; assess patient's baseline for ADL function and identify physical deficits which impact ability to perform ADLs (bathing, care of mouth/teeth, toileting, grooming, dressing, etc )  - Assess/evaluate cause of self-care deficits   - Assess range of motion  - Assess patient's mobility; develop plan if impaired  - Assess patient's need for assistive devices and provide as appropriate  - Encourage maximum independence but intervene and supervise when necessary  - Involve family in performance of ADLs  - Assess for home care needs following discharge   - Consider OT consult to assist with ADL evaluation and planning for discharge  - Provide patient education as appropriate  Outcome: Progressing     Problem: SAFETY ADULT  Goal: Maintain or return mobility status to optimal level  Description  INTERVENTIONS:  - Assess patient's baseline mobility status (ambulation, transfers, stairs, etc )    - Identify cognitive and physical deficits and behaviors that affect mobility  - Identify mobility aids required to assist with transfers and/or ambulation (gait belt, sit-to-stand, lift, walker, cane, etc )  - [place] fall precautions as indicated by assessment  - Record patient progress and toleration of activity level on Mobility SBAR; progress patient to next Phase/Stage  - Instruct patient to call for assistance with activity based on assessment  - Consider rehabilitation consult to assist with strengthening/weightbearing, etc   Outcome: Progressing     Problem: DISCHARGE PLANNING  Goal: Discharge to home or other facility with appropriate resources  Description  INTERVENTIONS:  - Identify barriers to discharge w/patient and caregiver  - Arrange for needed discharge resources and transportation as appropriate  - Identify discharge learning needs (meds, wound care, etc )  - Arrange for interpretive services to assist at discharge as needed  - Refer to Case Management Department for coordinating discharge planning if the patient needs post-hospital services based on physician/advanced practitioner order or complex needs related to functional status, cognitive ability, or social support system  Outcome: Progressing     Problem: Knowledge Deficit  Goal: Patient/family/caregiver demonstrates understanding of disease process, treatment plan, medications, and discharge instructions  Description  Complete learning assessment and assess knowledge base    Interventions:  - Provide teaching at level of understanding  - Provide teaching via preferred learning methods  Outcome: Progressing     Problem: CARDIOVASCULAR - ADULT  Goal: Maintains optimal cardiac output and hemodynamic stability  Description  INTERVENTIONS:  - Monitor I/O, vital signs and rhythm  - Monitor for S/S and trends of decreased cardiac output  - Administer and titrate ordered vasoactive medications to optimize hemodynamic stability  - Assess quality of pulses, skin color and temperature  - Assess for signs of decreased coronary artery perfusion  - Instruct patient to report change in severity of symptoms  Outcome: Progressing  Goal: Absence of cardiac dysrhythmias or at baseline rhythm  Description  INTERVENTIONS:  - Continuous cardiac monitoring, vital signs, obtain 12 lead EKG if ordered  - Administer antiarrhythmic and heart rate control medications as ordered  - Monitor electrolytes and administer replacement therapy as ordered  Outcome: Progressing     Problem: GASTROINTESTINAL - ADULT  Goal: Maintains or returns to baseline bowel function  Description  INTERVENTIONS:  - Assess bowel function  - Encourage oral fluids to ensure adequate hydration  - Administer IV fluids if ordered to ensure adequate hydration  - Administer ordered medications as needed  - Encourage mobilization and activity  - Consider nutritional services referral to assist patient with adequate nutrition and appropriate food choices  Outcome: Progressing     Problem: SKIN/TISSUE INTEGRITY - ADULT  Goal: Skin integrity remains intact  Description  INTERVENTIONS  - Identify patients at risk for skin breakdown  - Assess and monitor skin integrity  - Assess and monitor nutrition and hydration status  - Monitor labs (i e  albumin)  - Assess for incontinence   - Turn and reposition patient  - Assist with mobility/ambulation  - Relieve pressure over bony prominences  - Avoid friction and shearing  - Provide appropriate hygiene as needed including keeping skin clean and dry  - Evaluate need for skin moisturizer/barrier cream  - Collaborate with interdisciplinary team (i e  Nutrition, Rehabilitation, etc )   - Patient/family teaching  Outcome: Progressing  Goal: Incision(s), wounds(s) or drain site(s) healing without S/S of infection  Description  INTERVENTIONS  - Assess and document risk factors for skin impairment   - Assess and document dressing, incision, wound bed, drain sites and surrounding tissue  - Consider nutrition services referral as needed  - Oral mucous membranes remain intact  - Provide patient/ family education  Outcome: Progressing  Goal: Oral mucous membranes remain intact  Description  INTERVENTIONS  - Assess oral mucosa and hygiene practices  - Implement preventative oral hygiene regimen  - Implement oral medicated treatments as ordered  - Initiate Nutrition services referral as needed  Outcome: Progressing
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
## 2020-03-16 NOTE — RESTORATIVE TECHNICIAN NOTE
Restorative Specialist Mobility Note       Activity: Bathroom privileges     Assistive Device: None        Repositioned: Pillow support, [employer]

## 2020-03-16 NOTE — PROGRESS NOTES
Advanced Heart Failure / Pulmonary Hypertension Service Progress Note    Sunny Dial 78 y o  female   MRN: [id number]  Unit/Bed#: [facility] 529-01; Encounter: [id number]    Assessment:  Principal Problem:    Atrial fibrillation with RVR ([facility] )  Active Problems:    Depression with anxiety    Hyperlipidemia    Hypertension    Hypothyroidism    Obesity (BMI 30-39  9)    Type 2 diabetes mellitus with diabetic cataract ([facility] )    Acute on chronic combined systolic and diastolic CHF (congestive heart failure) (H[facility])    Restless leg syndrome    Subjective: Sunny Dial is a 43-year-old female with chronic BiV heart failure, atrial fibrillation (AC on renal-dosed Eliquis), HTN, HLD, hypothyroidism, DM2, CORAZON, and history of uterine cancer who presented on 03/11/2020 to [facility] with weight gain and worsening LE edema  Patient seen and examined  No significant events overnight  No current complaints; wishes to know what plan is regarding timing of ablation/device  Reports slight response to diuretic; still feels overloaded  [place] to get home  Objective: Intake/ Output: 1019 1 mL / 2000 mL (net negative 980 9 mL)  Weight: 199 lbs (up from 198 lbs on 03/15)  Telemetry: atrial fibrillation; rates in 90-120s with occasional PVCs  Plan:  Acute on chronic biventricular heart failure; LVEF 30%, LVIDd 5 44 cm; NYHA III; ACC/AHA Stage C              Etiology: BiV HF likely tachy-induced, +/- ischemic  Also has history of radioablative iodine  Etiology of exacerbation: Unclear if from AFib with RVR or vice versa  TTE from 06/20/2019: LVEF 25-30%, LVIDd 4 6 cm, mod reduced RVSF  Mod MR and TR  TTE from 06/26/2019: LVEF ~40%, mildly reduced RVSF  Mod MR, otherwise, no significant valvular disease                TTE (limited) from 07/04/2019: LVEF 45%  LVIDd 3 8 cm  Grade 2 DD  TTE completed on 03/16/2020 (not euvolemic): LVEF 30%  LVIDd 5 44 cm   Moderately dilated RV with moderately to markedly reduced RVSF  NAN  Moderate MR  Mild to moderate TR  Dilated IVC  Nuclear stress test ordered in 10/2019; never completed  Strict I/Os with daily weights  CV diet with fluid restriction  Potassium of 4 3 this AM     Notably volume overloaded on exam; will increase diuretic        Neurohormonal Blockade:   --Beta Blocker: metoprolol succinate 100 mg q12 hours  --ACEi, ARB or ARNi: N/A  --Cardiac glycoside: digoxin IV loading today; 125 mcg daily starting tomorrow  --SVR reduction: hydralazine 25 mg q8 hours  (home dose of Isordil held)  --Aldosterone Receptor Blocker: N/A  --Diuretic: torsemide 40 mg daily  --Inpatient Diuretic: IV Bumex 2 mg BID with potassium 20 mEq daily        Sudden Cardiac Death Risk Reduction:   --LVEF 30%  Plan as below       Electrical Resynchronization:   --Interrogation: Narrow QRS      Advanced Therapies (if appropriate): Will continue to monitor       Atrial fibrillation with rapid ventricular response              [id number] = 6 (age, sex, CHF, HTN, DM)              Anticoagulation on Eliquis              S/p unsuccessful cardioversion on 06/21/2019  Initial plan was for EBEN cardioversion and dual-chamber PPM implantation on 06/25  Received propofol and cardizem which likely induced cardiogenic shock due to her borderline low output state at that time                S/p successful cardioversion on 07/03 with amio load  Converted back into Afib on 07/08       Previously on amiodarone as outpatient; was discontinued in July 2019 by EP              Continue on metoprolol succinate 100 mg q12 hours for rate control  Remains on amiodarone drip at 0 5 mg/min  Digoxin added per EP  Electrophysiology consulted; input appreciated  Plan for AVJ ablation with CRT-D this week  Chronic kidney disease, stage III   Baseline creatinine of 0 9-1 2  Today, creatinine of 1 19 (up from 1 18 on 03/15)  Will continue to monitor      Obstructive sleep apnea Noncompliant with CPAP in the past               Repeat outpatient sleep study was ordered; however, never scheduled      Diabetes mellitus, type 2              Most recent hemoglobin A1c from 03/12/2020 of 6 9  Management per primary team    Hypothyroidism              History of radioablation in the past; now on replacement therapy  Most recent TSH of 9 170 with normal free T4 from 06/18/2019      Hypertension, controlled: continue medications as above  Hyperlipidemia    Diagnostic Testing:  Echocardiogram (limited) from 07/04/2019:  LVEF: 45%; mild diffuse hypokinesis  LVIDd: 3 8 cm  MR: No MR  Moderate annular calcification  Other: Dilated LA      Echocardiogram from 06/20/2019:  LVEF: 25%  LVIDd: 4 6 cm  RV: Dilated and hypokinetic  MR: Moderate  PASP: 35 mmHg  Vitals:   Blood pressure 115/77, pulse (!) 109, temperature 97 8 °F (36 6 °C), temperature source Oral, resp  rate 17, height 5' 1" (1 549 m), weight 90 4 kg (199 lb 4 7 oz), SpO2 98 %  Body mass index is 37 66 kg/m²  I/O last 3 completed shifts: In: 1605 5 [P O :1038; I V :567 5]  Out: 2225 [Urine:2225]     Wt Readings from Last 3 Encounters:   03/16/20 90 4 kg (199 lb 4 7 oz)   03/09/20 90 3 kg (199 lb)   02/27/20 87 5 kg (193 lb)     Vitals:    03/16/20 0258 03/16/20 0530 03/16/20 0535 03/16/20 0726   BP: 117/66 118/72  115/77   BP Location:    Right arm   Pulse: 97 (!) 113  (!) 109   Resp: 17   17   Temp: 97 6 °F (36 4 °C)   97 8 °F (36 6 °C)   TempSrc:    Oral   SpO2: 98%   98%   Weight:   90 4 kg (199 lb 4 7 oz)    Height:         Physical Exam   Constitutional: She is oriented to person, place, and time  She appears well-developed and well-nourished  HENT:   Head: Normocephalic  Eyes: Pupils are equal, round, and reactive to light  EOM are normal    Neck: Neck supple  JVD present  No tracheal deviation present  Cardiovascular: Intact distal pulses  An irregularly irregular rhythm present   Tachycardia present  Murmur heard  Pulmonary/Chest: Effort normal  No respiratory distress  She has decreased breath sounds in the right lower field and the left lower field  She has no wheezes  Abdominal: Soft  Bowel sounds are normal  She exhibits distension  There is no tenderness  Musculoskeletal: She exhibits edema (3+ up to mid thigh bilaterally)  Dressing present on right LE   Neurological: She is alert and oriented to person, place, and time  Skin: Skin is warm and dry  Psychiatric: She has a normal mood and affect  Her behavior is normal    Vitals reviewed  [facility] (day, reason): N/A  Murray Catheter (day, reason): N/A      Current Facility-Administered Medications:     acetaminophen (TYLENOL) tablet 650 mg, 650 mg, Oral, Q4H PRN, Bren Kothari MD    [] amiodarone (CORDARONE) 900 mg in dextrose 5 % 500 mL infusion, 1 mg/min, Intravenous, Continuous, Stopped at 20 1718 **FOLLOWED BY** amiodarone (CORDARONE) 900 mg in dextrose 5 % 500 mL infusion, 0 5 mg/min, Intravenous, Continuous, SOTO Junior, Last Rate: 16 7 mL/hr at 20 0726, 0 5 mg/min at 20 0726    apixaban (ELIQUIS) tablet 5 mg, 5 mg, Oral, BID, Bren Kothari MD, 5 mg at 20 08    ascorbic acid (VITAMIN C) tablet 125 mg, 125 mg, Oral, Daily, Bren Kothari MD, 125 mg at 20 0805    bumetanide (BUMEX) injection 2 mg, 2 mg, Intravenous, BID, Katya Ashton PA-C    cholecalciferol (VITAMIN D3) tablet 1,000 Units, 1,000 Units, Oral, Daily, Bren Kothari MD, 1,000 Units at 20 0801    digoxin (LANOXIN) injection 250 mcg, 250 mcg, Intravenous, Once, Angelika Wilkerson MD  [employer]  [START ON 3/17/2020] digoxin (LANOXIN) tablet 125 mcg, 125 mcg, Oral, Daily, Angelika Wilkerson MD    docusate sodium (COLACE) capsule 100 mg, 100 mg, Oral, BID, Gerardo Caldera MD, 100 mg at 20 0801    hydrALAZINE (APRESOLINE) tablet 25 mg, 25 mg, Oral, Q8H DAVE, SOTO Junior, 25 mg at 03/16/20 0529    levothyroxine tablet 137 mcg, 137 mcg, Oral, Early Morning, Juan Holder MD, 137 mcg at 03/16/20 0529    metoprolol succinate (TOPROL-XL) 24 hr tablet 100 mg, 100 mg, Oral, Q12H [facility], Joann Worrell MD, 100 mg at 03/16/20 0801    potassium chloride (K-DUR,KLOR-CON) CR tablet 20 mEq, 20 mEq, Oral, Daily, Juan Holder MD, 20 mEq at 03/16/20 0801    rOPINIRole (REQUIP) tablet 2 mg, 2 mg, Oral, HS, Patel Negrete PA-C, 2 mg at 03/15/20 2118    venlafaxine (EFFEXOR) tablet 75 mg, 75 mg, Oral, Q12H [facility], Juan Holder MD, 75 mg at 03/16/20 0805    Labs & Results:  Results from last 7 days   Lab Units 03/11/20  1317   TROPONIN I ng/mL 0 03     Results from last 7 days   Lab Units 03/12/20  0559 03/11/20  1317   WBC Thousand/uL 7 50 7 29   HEMOGLOBIN g/dL 11 5 11 0*   HEMATOCRIT % 37 0 34 7*   PLATELETS Thousands/uL 223 215         Results from last 7 days   Lab Units 03/16/20  0559 03/15/20  0525 03/14/20  0506  03/11/20  1317   POTASSIUM mmol/L 4 3 5 8* 3 4*   < > 4 2   CHLORIDE mmol/L 99* 99* 99*   < > 99*   CO2 mmol/L 26 27 29   < > 29   BUN mg/dL 26* 26* 25   < > 25   CREATININE mg/dL 1 19 1 18 1 09   < > 1 22   CALCIUM mg/dL 9 0 9 0 9 1   < > 8 9   ALK PHOS U/L  --   --   --   --  86   ALT U/L  --   --   --   --  28   AST U/L  --   --   --   --  43    < > = values in this interval not displayed  Counseling / Coordination of Care: Total floor / unit time spent today 35 minutes  Greater than 50% of total time was spent with the patient and / or family counseling and / or coordination of care  A description of the counseling / coordination of care: 20 minutes (low sodium diet, fluid restriction, daily weights, and importance of medication compliance)  Thank you for the opportunity to participate in the care of this patient      Javad Cruz PA-C

## 2020-03-17 LAB
ANION GAP SERPL CALCULATED.3IONS-SCNC: 7 MMOL/L (ref 4–13)
BUN SERPL-MCNC: 26 MG/DL (ref 5–25)
CALCIUM SERPL-MCNC: 8.6 MG/DL (ref 8.3–10.1)
CHLORIDE SERPL-SCNC: 102 MMOL/L (ref 100–108)
CO2 SERPL-SCNC: 26 MMOL/L (ref 21–32)
CREAT SERPL-MCNC: 1.08 MG/DL (ref 0.6–1.3)
GFR SERPL CREATININE-BSD FRML MDRD: 49 ML/MIN/1.73SQ M
GLUCOSE SERPL-MCNC: 111 MG/DL (ref 65–140)
POTASSIUM SERPL-SCNC: 4.1 MMOL/L (ref 3.5–5.3)
SODIUM SERPL-SCNC: 135 MMOL/L (ref 136–145)

## 2020-03-17 PROCEDURE — 99233 SBSQ HOSP IP/OBS HIGH 50: CPT | Performed by: INTERNAL MEDICINE

## 2020-03-17 PROCEDURE — 80048 BASIC METABOLIC PNL TOTAL CA: CPT | Performed by: INTERNAL MEDICINE

## 2020-03-17 PROCEDURE — 99232 SBSQ HOSP IP/OBS MODERATE 35: CPT | Performed by: INTERNAL MEDICINE

## 2020-03-17 RX ORDER — BUMETANIDE 0.25 MG/ML
1 INJECTION, SOLUTION INTRAMUSCULAR; INTRAVENOUS ONCE
Status: COMPLETED | OUTPATIENT
Start: 2020-03-17 | End: 2020-03-17

## 2020-03-17 RX ORDER — VANCOMYCIN HYDROCHLORIDE 1 G/200ML
1000 INJECTION, SOLUTION INTRAVENOUS ONCE
Status: COMPLETED | OUTPATIENT
Start: 2020-03-18 | End: 2020-03-18

## 2020-03-17 RX ORDER — BUMETANIDE 0.25 MG/ML
4 INJECTION, SOLUTION INTRAMUSCULAR; INTRAVENOUS
Status: DISCONTINUED | OUTPATIENT
Start: 2020-03-17 | End: 2020-03-23

## 2020-03-17 RX ORDER — POTASSIUM CHLORIDE 20 MEQ/1
20 TABLET, EXTENDED RELEASE ORAL 2 TIMES DAILY
Status: DISCONTINUED | OUTPATIENT
Start: 2020-03-17 | End: 2020-03-20

## 2020-03-17 RX ADMIN — VENLAFAXINE 75 MG: 37.5 TABLET ORAL at 21:27

## 2020-03-17 RX ADMIN — BUMETANIDE 3 MG: 0.25 INJECTION INTRAMUSCULAR; INTRAVENOUS at 08:38

## 2020-03-17 RX ADMIN — HYDRALAZINE HYDROCHLORIDE 25 MG: 25 TABLET, FILM COATED ORAL at 14:27

## 2020-03-17 RX ADMIN — DOCUSATE SODIUM 100 MG: 100 CAPSULE, LIQUID FILLED ORAL at 17:19

## 2020-03-17 RX ADMIN — DIGOXIN 125 MCG: 125 TABLET ORAL at 08:38

## 2020-03-17 RX ADMIN — DOCUSATE SODIUM 100 MG: 100 CAPSULE, LIQUID FILLED ORAL at 08:38

## 2020-03-17 RX ADMIN — ASCORBIC ACID TAB 250 MG 125 MG: 250 TAB at 08:39

## 2020-03-17 RX ADMIN — POTASSIUM CHLORIDE 20 MEQ: 1500 TABLET, EXTENDED RELEASE ORAL at 17:19

## 2020-03-17 RX ADMIN — ROPINIROLE HYDROCHLORIDE 2 MG: 2 TABLET, FILM COATED ORAL at 21:27

## 2020-03-17 RX ADMIN — APIXABAN 5 MG: 5 TABLET, FILM COATED ORAL at 08:38

## 2020-03-17 RX ADMIN — METOPROLOL SUCCINATE 100 MG: 100 TABLET, EXTENDED RELEASE ORAL at 21:27

## 2020-03-17 RX ADMIN — MELATONIN 1000 UNITS: at 08:37

## 2020-03-17 RX ADMIN — VENLAFAXINE 75 MG: 37.5 TABLET ORAL at 12:16

## 2020-03-17 RX ADMIN — HYDRALAZINE HYDROCHLORIDE 25 MG: 25 TABLET, FILM COATED ORAL at 05:35

## 2020-03-17 RX ADMIN — BUMETANIDE 1 MG: 0.25 INJECTION INTRAMUSCULAR; INTRAVENOUS at 12:16

## 2020-03-17 RX ADMIN — METOPROLOL SUCCINATE 100 MG: 100 TABLET, EXTENDED RELEASE ORAL at 08:42

## 2020-03-17 RX ADMIN — BUMETANIDE 4 MG: 0.25 INJECTION INTRAMUSCULAR; INTRAVENOUS at 15:40

## 2020-03-17 RX ADMIN — HYDRALAZINE HYDROCHLORIDE 25 MG: 25 TABLET, FILM COATED ORAL at 23:04

## 2020-03-17 RX ADMIN — POTASSIUM CHLORIDE 20 MEQ: 1500 TABLET, EXTENDED RELEASE ORAL at 08:38

## 2020-03-17 RX ADMIN — LEVOTHYROXINE SODIUM 137 MCG: 112 TABLET ORAL at 05:35

## 2020-03-17 RX ADMIN — APIXABAN 5 MG: 5 TABLET, FILM COATED ORAL at 17:19

## 2020-03-17 NOTE — PROGRESS NOTES
Progress Note - Electrophysiology-Cardiology (EP)   Deonte Noble 78 y o  female MRN: [id number]  Unit/Bed#: [facility] 529-01 Encounter: [id number]      Assessment:  1  Persistent atrial fibrillation with periods of rapid ventricular response              A ) on Eliquis anticoagulation (CHADS2 Vasc = 6)              B ) per reports had prior breakthrough on amiodarone              C ) prior cardioversions 6/21/2019 (unsuccessful with junctional rhythm briefly afterwards) and 7/4/2019 (held for 5 days with amio on board)  2  Nonischemic cardiomyopathy with LVEF 30% per echo 3/2020  3  Acute on chronic biventricular heart failure, currently on IV bumex  4  Hypertension  5  Diabetes mellitus type II  6  Hypothyroidism  7  Obesity with BMI 37  8  CORAZON, reportedly noncompliant with CPAP in the past  9  History of alcohol abuse      Plan:  She continues to be tachycardic, with rates up to 130 beats per minutes, despite the addition of digoxin yesterday  While we would like her to be optimized prior to her procedure, she likely will not make much progress with her rates being consistently elevated  Thus, she will be scheduled for biventricular ICD/AV node ablation tomorrow 3/18/2020  Will make NPO at midnight, check AM labs, left sided IV in place, IV Vanco on call  Her IV amiodarone has infiltrated, and per nursing she is a difficult stick  Thus, her IV amio can be discontinued at this time  We will make further medication adjustments following device implant tomorrow  OK to give Eliquis dose tonight, hold tomorrow morning's dose  Subjective/Objective   Chief Complaint: "Can we take out this IV?"    Subjective: Her main complaint today is issues with her IV, and her amiodarone infiltrating  She feels that her breathing has improved overall, but she admits to ongoing lower extremity edema  She would prefer to undergo device implantation tomorrow if possible       Objective:     Vitals: /84   Pulse (!) 109   Temp 97 5 °F (36 4 °C) (Oral)   Resp 18   Ht 5' 1" (1 549 m)   Wt 89 3 kg (196 lb 13 9 oz)   SpO2 96%   BMI 37 20 kg/m²   Vitals:    03/16/20 0535 03/17/20 0500   Weight: 90 4 kg (199 lb 4 7 oz) 89 3 kg (196 lb 13 9 oz)     Orthostatic Blood Pressures      Most Recent Value   Blood Pressure  140/84 filed at 03/17/2020 1100   Patient Position - Orthostatic VS  Lying filed at 03/17/2020 9468            Intake/Output Summary (Last 24 hours) at 3/17/2020 1316  Last data filed at 3/17/2020 1159  Gross per 24 hour   Intake 1129 7 ml   Output 2375 ml   Net -1245 3 ml       Invasive Devices     Peripheral Intravenous Line            Peripheral IV 03/15/20 Left Wrist 2 days    Peripheral IV 03/16/20 Right;Dorsal (posterior) Hand 1 day                            Scheduled Meds:  Current Facility-Administered Medications:  acetaminophen 650 mg Oral Q4H PRN Alejandra Liu MD    amiodarone 0 5 mg/min Intravenous Continuous SOTO Junior Last Rate: 0 5 mg/min (03/16/20 0726)   apixaban 5 mg Oral BID Alejandra Liu MD    ascorbic acid 125 mg Oral Daily Alejandra Liu MD    bumetanide 4 mg Intravenous BID (diuretic) French Mercado PA-C    cholecalciferol 1,000 Units Oral Daily Alejandra Liu MD    digoxin 125 mcg Oral Daily Terrell Hammond MD    docusate sodium 100 mg Oral BID Panfilo Ruvalcaba MD    hydrALAZINE 25 mg Oral Q8H [address] SOTO Junior    levothyroxine 137 mcg Oral Early Morning Alejandra Liu MD    metoprolol succinate 100 mg Oral Q12H [address] Rigo Singh MD    potassium chloride 20 mEq Oral BID French Mercado PA-C    rOPINIRole 2 mg Oral HS Cristhian Hameed PA-C    venlafaxine 75 mg Oral Q12H [address] Alejandra Liu MD      Continuous Infusions:  amiodarone 0 5 mg/min Last Rate: 0 5 mg/min (03/16/20 0726)     PRN Meds:   acetaminophen    Review of Systems: Negative except pain at IV site, lower extremity edema    Physical Exam:   GEN: NAD, alert and oriented, well appearing  SKIN: dry without significant lesions or rashes  HEENT: NCAT, PERRL, EOMs intact  NECK: + JVD   CARDIOVASCULAR: irregularly irregular, tachycardic, normal S1, S2 without murmurs, rubs, or gallops appreciated  LUNGS: Clear to auscultation bilaterally without wheezes, rhonchi, or rales  ABDOMEN: Soft, nontender, nondistended  EXTREMITIES/VASCULAR: perfused without clubbing, cyanosis; 2-3+ LE edema b/l, right lower extremity erythema  PSYCH: Normal mood and affect  NEURO: CN ll-Xll grossly intact                Lab Results: I have personally reviewed pertinent lab results  Results from last 7 days   Lab Units 20  0559 20  1317   WBC Thousand/uL 7 50 7 29   HEMOGLOBIN g/dL 11 5 11 0*   HEMATOCRIT % 37 0 34 7*   PLATELETS Thousands/uL 223 215     Results from last 7 days   Lab Units 20  0508 20  0559 03/15/20  0525   POTASSIUM mmol/L 4 1 4 3 5 8*   CHLORIDE mmol/L 102 99* 99*   CO2 mmol/L 26 26 27   BUN mg/dL 26* 26* 26*   CREATININE mg/dL 1 08 1 19 1 18   CALCIUM mg/dL 8 6 9 0 9 0                 Imaging: I have personally reviewed pertinent reports  Results for orders placed during the hospital encounter of 20   Echo complete with contrast if indicated    Narrative [facility] [address]  [address]  [phone]    Transthoracic Echocardiogram  2D, M-mode, Doppler, and Color Doppler    Study date:  15-Mar-2020    Patient: Deepali Myrick  MR number: [id number]  Account number: [de-identified]  : 1940  Age: 78 years  Gender: Female  Status: Inpatient  Location: Bedside  Height: 61 in  Weight: 197 6 lb  BP: 120/ 79 mmHg    Indications: A-fib      Diagnoses: I48 0 - Atrial fibrillation    Sonographer:  Kirstie Taveras  Primary Physician:  SOTO Luther  Referring Physician:  Eileen Rodgers PA-C  Group:  [facility]  Interpreting Physician:  Julianne Carty MD    SUMMARY    PROCEDURE INFORMATION:  This was a technically difficult study  LEFT VENTRICLE:  The ventricle was mildly dilated  Systolic function was markedly reduced by visual assessment  Ejection fraction was estimated to be 30 %  There was severe diffuse hypokinesis with regional variations  RIGHT VENTRICLE:  The ventricle was moderately dilated  Systolic function was moderately to markedly reduced  LEFT ATRIUM:  The atrium was moderately to markedly dilated  RIGHT ATRIUM:  The atrium was moderately dilated  MITRAL VALVE:  There was mild annular calcification  There was moderate regurgitation  TRICUSPID VALVE:  There was mild to moderate regurgitation  IVC, HEPATIC VEINS:  The inferior vena cava was dilated  HISTORY: PRIOR HISTORY: A-fib;DM2;HTN;HLD;Hypothyroid:Obesity  PROCEDURE: The procedure was performed at the bedside  This was a routine study  The transthoracic approach was used  The study included complete 2D imaging, M-mode, complete spectral Doppler, and color Doppler  The heart rate was 97 bpm,  at the start of the study  Images were obtained from the parasternal, apical, subcostal, and suprasternal notch acoustic windows  This was a technically difficult study  LEFT VENTRICLE: The ventricle was mildly dilated  Systolic function was markedly reduced by visual assessment  Ejection fraction was estimated to be 30 %  There was severe diffuse hypokinesis with regional variations  Wall thickness was  normal  DOPPLER: Transmitral flow pattern: atrial fibrillation  RIGHT VENTRICLE: The ventricle was moderately dilated  Systolic function was moderately to markedly reduced  DOPPLER: Estimated peak pressure was at least 40 mmHg  LEFT ATRIUM: The atrium was moderately to markedly dilated  RIGHT ATRIUM: The atrium was moderately dilated  MITRAL VALVE: There was mild annular calcification  Valve structure was normal  There was normal leaflet separation   DOPPLER: The transmitral velocity was within the normal range  There was no evidence for stenosis  There was moderate  regurgitation  AORTIC VALVE: The valve was trileaflet  Leaflets exhibited normal thickness, mild calcification, and normal cuspal separation  DOPPLER: Transaortic velocity was within the normal range  There was no evidence for stenosis  There was no  regurgitation  TRICUSPID VALVE: The valve structure was normal  There was normal leaflet separation  DOPPLER: The transtricuspid velocity was within the normal range  There was no evidence for stenosis  There was mild to moderate regurgitation  PULMONIC VALVE: Leaflets exhibited normal thickness, no calcification, and normal cuspal separation  DOPPLER: The transpulmonic velocity was within the normal range  There was no regurgitation  PERICARDIUM: There was no pericardial effusion  AORTA: The root exhibited normal size  SYSTEMIC VEINS: IVC: The inferior vena cava was dilated      SYSTEM MEASUREMENT TABLES    2D  %FS: 34 58 %  Ao Diam: 2 77 cm  EDV(Teich): 143 59 ml  EF(Teich): 63 17 %  ESV(Teich): 52 89 ml  IVSd: 0 8 cm  LA Area: 23 29 cm2  LA Diam: 4 24 cm  LVEDV MOD A4C: 91 87 ml  LVEF MOD A4C: 33 04 %  LVESV MOD A4C: 61 52 ml  LVIDd: 5 44 cm  LVIDs: 3 56 cm  LVLd A4C: 6 58 cm  LVLs A4C: 5 96 cm  LVPWd: 0 77 cm  RA Area: 22 49 cm2  RVIDd: 4 38 cm  SV MOD A4C: 30 35 ml  SV(Teich): 90 7 ml    CW  MR VTI: 135 53 cm  MR Vmax: 4 28 m/s  MR Vmean: 3 35 m/s  MR maxP 14 mmHg  MR meanP 63 mmHg  TR Vmax: 2 43 m/s  TR maxP 58 mmHg    MM  TAPSE: 1 02 cm    PW  E': 0 05 m/s    [employer] Accredited Echocardiography Laboratory    Prepared and electronically signed by    Becky Evans MD  Signed 15-Mar-2020 12:52:11         EKG/telemetry: atrial fibrillation with rapid ventricular response    VTE Pharmacologic Prophylaxis: Eliquis

## 2020-03-17 NOTE — PROGRESS NOTES
Advanced Heart Failure / Pulmonary Hypertension Service Progress Note    Isai Gates 78 y o  female   MRN: [id number]  Unit/Bed#: [facility] 529-01; Encounter: [id number]    Assessment:  Principal Problem:    Atrial fibrillation with RVR ([facility] [address] )  Active Problems:    Depression with anxiety    Hyperlipidemia    Hypertension    Hypothyroidism    Obesity (BMI 30-39  9)    Type 2 diabetes mellitus with diabetic cataract ([facility] [address] )    Acute on chronic combined systolic and diastolic CHF (congestive heart failure) (HCC)    Restless leg syndrome    Subjective: Isai Gates is a 35-year-old female with chronic BiV heart failure, atrial fibrillation (AC on Eliquis), HTN, HLD, hypothyroidism, DM2, CORAZON, and history of uterine cancer who presented on 03/11/2020 to [facility] with weight gain and worsening LE edema  Patient seen and examined  No significant events overnight  No current complaints  Reports good urinary response to higher Bumex dose  Denies PND; does report chronically mild orthopnea  Has not been ambulating in the halls; has no issues with ambulation to bathroom  Occasionally will feel SOB with sudden standing  Continues to have serous fluid weeping from bilateral lower extremities  Objective: Intake/ Output: 1110 5 mL / 1825 mL (net negative 714 5 mL)  Weight: 196 lbs (down from 199 lbs on 03/16)  Telemetry: atrial fibrillation; rates in 110-130s with occasional PVCs  4-beat run of NSVT this AM     Plan:  Acute on chronic biventricular heart failure; LVEF 30%, LVIDd 5 44 cm; NYHA III; ACC/AHA Stage C              Etiology: likely tachy-induced, +/- ischemic (nuclear stress was ordered, patient never completed)  Also has history of radioablative iodine  Etiology of exacerbation: Unclear if from AFib with RVR or vice versa  TTE from 06/20/2019: LVEF 25-30%, LVIDd 4 6 cm, mod reduced RVSF  Mod MR and TR  TTE from 06/26/2019: LVEF ~40%, mildly reduced RVSF   Mod MR, otherwise, no significant valvular disease                TTE (limited) from 07/04/2019: LVEF 45%  LVIDd 3 8 cm  Grade 2 DD  TTE completed on 03/16/2020 (not euvolemic): LVEF 30%  LVIDd 5 44 cm  Moderately dilated RV with moderately to markedly reduced RVSF  NAN  Moderate MR  Mild to moderate TR  Dilated IVC  Nuclear stress test ordered in 10/2019; never completed  Strict I/Os with daily weights  CV diet with fluid restriction  Potassium of 4 1 this AM        Neurohormonal Blockade:   --Beta Blocker: metoprolol succinate 100 mg q12 hours  --ACEi, ARB or ARNi: N/A  --Cardiac glycoside: digoxin 125 mcg daily  --SVR Reducing Agents: hydralazine 25 mg q8 hours  (home dose of Isordil held)  --Aldosterone Receptor Blocker: N/A  --Home Diuretic: torsemide 40 mg daily  --Inpatient Diuretic: IV Bumex 3 mg BID with potassium 20 mEq daily        Sudden Cardiac Death Risk Reduction:   --LVEF 30%  Plan for device as below       Electrical Resynchronization:   --Interrogation: Narrow QRS      Advanced Therapies (if appropriate): Will continue to monitor       Atrial fibrillation with rapid ventricular response              [id number] = 6 (age, sex, CHF, HTN, DM)              Anticoagulation on Eliquis              S/p unsuccessful cardioversion on 06/21/2019  Initial plan was for EBEN cardioversion and dual-chamber PPM implantation on 06/25  Received propofol and cardizem which likely induced cardiogenic shock due to her borderline low output state at that time                S/p successful cardioversion on 07/03 with amio load  Converted back into Afib on 07/08       Previously on amiodarone as outpatient; was discontinued in July 2019 by EP              Continue on metoprolol succinate 100 mg q12 hours for rate control  Remains on amiodarone drip at 0 5 mg/min  Digoxin added per EP  Electrophysiology consulted; input appreciated  Plan for AVJ ablation with CRT-D on 03/18/2020        Chronic kidney disease, stage III   Baseline creatinine of 0 9-1 2  Today, creatinine of 1 08 (down from 1 19 on 03/15)  Will continue to monitor  Obstructive sleep apnea              Noncompliant with CPAP in the past               Repeat outpatient sleep study was ordered; however, never scheduled      Diabetes mellitus, type 2              Most recent hemoglobin A1c from 03/12/2020 of 6 9  Management per primary team    Hypothyroidism              History of radioablation in the past; now on replacement therapy  Most recent TSH of 9 170 with normal free T4 from 06/18/2019      Hypertension, controlled: continue medications as above  Hyperlipidemia    Diagnostic Testing:  Echocardiogram (limited) from 07/04/2019:  LVEF: 45%; mild diffuse hypokinesis  LVIDd: 3 8 cm  MR: No MR  Moderate annular calcification  Other: Dilated LA      Echocardiogram from 06/20/2019:  LVEF: 25%  LVIDd: 4 6 cm  RV: Dilated and hypokinetic  MR: Moderate  PASP: 35 mmHg  Vitals:   Blood pressure 91/51, pulse (!) 126, temperature 97 5 °F (36 4 °C), temperature source Oral, resp  rate 18, height 5' 1" (1 549 m), weight 89 3 kg (196 lb 13 9 oz), SpO2 100 %  Body mass index is 37 2 kg/m²  I/O last 3 completed shifts: In: 1477 5 [P O :910; I V :567 5]  Out: 2625 [Urine:2625]     Wt Readings from Last 3 Encounters:   03/17/20 89 3 kg (196 lb 13 9 oz)   03/09/20 90 3 kg (199 lb)   02/27/20 87 5 kg (193 lb)     Vitals:    03/17/20 0535 03/17/20 0728 03/17/20 0837 03/17/20 0842   BP: 117/79 116/71  91/51   BP Location:  Right arm     Pulse:  98 (!) 124 (!) 126   Resp:  18     Temp:  97 5 °F (36 4 °C)     TempSrc:  Oral     SpO2:  100%     Weight:       Height:         Physical Exam   Constitutional: She is oriented to person, place, and time  She appears well-developed and well-nourished  Obese   HENT:   Head: Normocephalic and atraumatic  Eyes: Pupils are equal, round, and reactive to light   Conjunctivae and EOM are normal    Neck: Neck supple  JVD present  No tracheal deviation present  Cardiovascular: Intact distal pulses  An irregularly irregular rhythm present  Tachycardia present  Pulmonary/Chest: Effort normal  No respiratory distress  She has decreased breath sounds  She has no rales  Abdominal: Soft  Bowel sounds are normal  She exhibits distension  Musculoskeletal: She exhibits edema (3+ LE, hit at mid thigh)  She exhibits no tenderness  Neurological: She is alert and oriented to person, place, and time  Skin: Skin is warm and dry  Serous weeping from bilateral lower extremities   Psychiatric: She has a normal mood and affect  Her behavior is normal    Vitals reviewed  [facility] (day, reason): N/A  Murray Catheter (day, reason): N/A      Current Facility-Administered Medications:     acetaminophen (TYLENOL) tablet 650 mg, 650 mg, Oral, Q4H PRN, Rudolph Cornell MD    [] amiodarone (CORDARONE) 900 mg in dextrose 5 % 500 mL infusion, 1 mg/min, Intravenous, Continuous, Stopped at 20 1718 **FOLLOWED BY** amiodarone (CORDARONE) 900 mg in dextrose 5 % 500 mL infusion, 0 5 mg/min, Intravenous, Continuous, SOTO Junior, Last Rate: 16 7 mL/hr at 20 0726, 0 5 mg/min at 20 0726    apixaban (ELIQUIS) tablet 5 mg, 5 mg, Oral, BID, Rudolph Cornell MD, 5 mg at 20 5995    ascorbic acid (VITAMIN C) tablet 125 mg, 125 mg, Oral, Daily, Rudolph Cornell MD, 125 mg at 20 0839    bumetanide (BUMEX) injection 3 mg, 3 mg, Intravenous, BID, Eliseo Campbell PA-C, 3 mg at 20    cholecalciferol (VITAMIN D3) tablet 1,000 Units, 1,000 Units, Oral, Daily, Rudolph Cornell MD, 1,000 Units at 20 549    digoxin (LANOXIN) tablet 125 mcg, 125 mcg, Oral, Daily, Oriana Adrian MD, 125 mcg at 20 3153    docusate sodium (COLACE) capsule 100 mg, 100 mg, Oral, BID, Chelsea Padilla MD, 100 mg at 20 0838    hydrALAZINE (APRESOLINE) tablet 25 mg, 25 mg, Oral, Q8H [address], SOTO Junior, 25 mg at 03/17/20 0535    levothyroxine tablet 137 mcg, 137 mcg, Oral, Early Morning, Agnes Hansen MD, 137 mcg at 03/17/20 0535    metoprolol succinate (TOPROL-XL) 24 hr tablet 100 mg, 100 mg, Oral, Q12H [address], Chun Velez MD, 100 mg at 03/17/20 0842    potassium chloride (K-DUR,KLOR-CON) CR tablet 20 mEq, 20 mEq, Oral, Daily, Agnes Hansen MD, 20 mEq at 03/17/20 5994    rOPINIRole (REQUIP) tablet 2 mg, 2 mg, Oral, HS, Noa Huerta PA-C, 2 mg at 03/16/20 2121    venlafaxine (EFFEXOR) tablet 75 mg, 75 mg, Oral, Q12H [address], Agnes Hansen MD, 75 mg at 03/16/20 2121    Labs & Results:  Results from last 7 days   Lab Units 03/11/20  1317   TROPONIN I ng/mL 0 03     Results from last 7 days   Lab Units 03/12/20  0559 03/11/20  1317   WBC Thousand/uL 7 50 7 29   HEMOGLOBIN g/dL 11 5 11 0*   HEMATOCRIT % 37 0 34 7*   PLATELETS Thousands/uL 223 215         Results from last 7 days   Lab Units 03/17/20  0508 03/16/20  0559 03/15/20  0525  03/11/20  1317   POTASSIUM mmol/L 4 1 4 3 5 8*   < > 4 2   CHLORIDE mmol/L 102 99* 99*   < > 99*   CO2 mmol/L 26 26 27   < > 29   BUN mg/dL 26* 26* 26*   < > 25   CREATININE mg/dL 1 08 1 19 1 18   < > 1 22   CALCIUM mg/dL 8 6 9 0 9 0   < > 8 9   ALK PHOS U/L  --   --   --   --  86   ALT U/L  --   --   --   --  28   AST U/L  --   --   --   --  43    < > = values in this interval not displayed  Counseling / Coordination of Care: Total floor / unit time spent today 35 minutes  Greater than 50% of total time was spent with the patient and / or family counseling and / or coordination of care  A description of the counseling / coordination of care: 20 minutes (low sodium diet, fluid restriction, daily weights, and importance of medication compliance)  Thank you for the opportunity to participate in the care of this patient      Helen Renner PA-C

## 2020-03-17 NOTE — PLAN OF CARE
Problem: Potential for Falls  Goal: Patient will remain free of falls  Description  INTERVENTIONS:  - Assess patient frequently for physical needs  -  Identify cognitive and physical deficits and behaviors that affect risk of falls    -  [place] fall precautions as indicated by assessment   - Educate patient/family on patient safety including physical limitations  - Instruct patient to call for assistance with activity based on assessment  - Modify environment to reduce risk of injury  - Consider OT/PT consult to assist with strengthening/mobility  Outcome: Progressing     Problem: PAIN - ADULT  Goal: Verbalizes/displays adequate comfort level or baseline comfort level  Description  Interventions:  - Encourage patient to monitor pain and request assistance  - Assess pain using appropriate pain scale  - Administer analgesics based on type and severity of pain and evaluate response  - Implement non-pharmacological measures as appropriate and evaluate response  - Consider cultural and social influences on pain and pain management  - Notify physician/advanced practitioner if interventions unsuccessful or patient reports new pain  Outcome: Progressing     Problem: INFECTION - ADULT  Goal: Absence or prevention of progression during hospitalization  Description  INTERVENTIONS:  - Assess and monitor for signs and symptoms of infection  - Monitor lab/diagnostic results  - Monitor all insertion sites, i e  indwelling lines, tubes, and drains  - Monitor endotracheal if appropriate and nasal secretions for changes in amount and color  - [place] appropriate cooling/warming therapies per order  - Administer medications as ordered  - Instruct and encourage patient and family to use good hand hygiene technique  - Identify and instruct in appropriate isolation precautions for identified infection/condition  Outcome: Progressing  Goal: Absence of fever/infection during neutropenic period  Description  INTERVENTIONS:  - Monitor WBC    Outcome: Progressing     Problem: SAFETY ADULT  Goal: Maintain or return to baseline ADL function  Description  INTERVENTIONS:  -  Assess patient's ability to carry out ADLs; assess patient's baseline for ADL function and identify physical deficits which impact ability to perform ADLs (bathing, care of mouth/teeth, toileting, grooming, dressing, etc )  - Assess/evaluate cause of self-care deficits   - Assess range of motion  - Assess patient's mobility; develop plan if impaired  - Assess patient's need for assistive devices and provide as appropriate  - Encourage maximum independence but intervene and supervise when necessary  - Involve family in performance of ADLs  - Assess for home care needs following discharge   - Consider OT consult to assist with ADL evaluation and planning for discharge  - Provide patient education as appropriate  Outcome: Progressing  Goal: Maintain or return mobility status to optimal level  Description  INTERVENTIONS:  - Assess patient's baseline mobility status (ambulation, transfers, stairs, etc )    - Identify cognitive and physical deficits and behaviors that affect mobility  - Identify mobility aids required to assist with transfers and/or ambulation (gait belt, sit-to-stand, lift, walker, cane, etc )  - [place] fall precautions as indicated by assessment  - Record patient progress and toleration of activity level on Mobility SBAR; progress patient to next Phase/Stage  - Instruct patient to call for assistance with activity based on assessment  - Consider rehabilitation consult to assist with strengthening/weightbearing, etc   Outcome: Progressing     Problem: DISCHARGE PLANNING  Goal: Discharge to home or other facility with appropriate resources  Description  INTERVENTIONS:  - Identify barriers to discharge w/patient and caregiver  - Arrange for needed discharge resources and transportation as appropriate  - Identify discharge learning needs (meds, wound care, etc )  - Arrange for interpretive services to assist at discharge as needed  - Refer to Case Management Department for coordinating discharge planning if the patient needs post-hospital services based on physician/advanced practitioner order or complex needs related to functional status, cognitive ability, or social support system  Outcome: Progressing     Problem: Knowledge Deficit  Goal: Patient/family/caregiver demonstrates understanding of disease process, treatment plan, medications, and discharge instructions  Description  Complete learning assessment and assess knowledge base    Interventions:  - Provide teaching at level of understanding  - Provide teaching via preferred learning methods  Outcome: Progressing     Problem: CARDIOVASCULAR - ADULT  Goal: Maintains optimal cardiac output and hemodynamic stability  Description  INTERVENTIONS:  - Monitor I/O, vital signs and rhythm  - Monitor for S/S and trends of decreased cardiac output  - Administer and titrate ordered vasoactive medications to optimize hemodynamic stability  - Assess quality of pulses, skin color and temperature  - Assess for signs of decreased coronary artery perfusion  - Instruct patient to report change in severity of symptoms  Outcome: Progressing  Goal: Absence of cardiac dysrhythmias or at baseline rhythm  Description  INTERVENTIONS:  - Continuous cardiac monitoring, vital signs, obtain 12 lead EKG if ordered  - Administer antiarrhythmic and heart rate control medications as ordered  - Monitor electrolytes and administer replacement therapy as ordered  Outcome: Progressing     Problem: GASTROINTESTINAL - ADULT  Goal: Maintains or returns to baseline bowel function  Description  INTERVENTIONS:  - Assess bowel function  - Encourage oral fluids to ensure adequate hydration  - Administer IV fluids if ordered to ensure adequate hydration  - Administer ordered medications as needed  - Encourage mobilization and activity  - Consider nutritional services referral to assist patient with adequate nutrition and appropriate food choices  Outcome: Progressing     Problem: SKIN/TISSUE INTEGRITY - ADULT  Goal: Skin integrity remains intact  Description  INTERVENTIONS  - Identify patients at risk for skin breakdown  - Assess and monitor skin integrity  - Assess and monitor nutrition and hydration status  - Monitor labs (i e  albumin)  - Assess for incontinence   - Turn and reposition patient  - Assist with mobility/ambulation  - Relieve pressure over bony prominences  - Avoid friction and shearing  - Provide appropriate hygiene as needed including keeping skin clean and dry  - Evaluate need for skin moisturizer/barrier cream  - Collaborate with interdisciplinary team (i e  Nutrition, Rehabilitation, etc )   - Patient/family teaching  Outcome: Progressing  Goal: Incision(s), wounds(s) or drain site(s) healing without S/S of infection  Description  INTERVENTIONS  - Assess and document risk factors for skin impairment   - Assess and document dressing, incision, wound bed, drain sites and surrounding tissue  - Consider nutrition services referral as needed  - Oral mucous membranes remain intact  - Provide patient/ family education  Outcome: Progressing  Goal: Oral mucous membranes remain intact  Description  INTERVENTIONS  - Assess oral mucosa and hygiene practices  - Implement preventative oral hygiene regimen  - Implement oral medicated treatments as ordered  - Initiate Nutrition services referral as needed  Outcome: Progressing

## 2020-03-18 ENCOUNTER — APPOINTMENT (INPATIENT)
Dept: NON INVASIVE DIAGNOSTICS | Facility: HOSPITAL | Age: 80
DRG: 226 | End: 2020-03-18
Payer: COMMERCIAL

## 2020-03-18 ENCOUNTER — APPOINTMENT (INPATIENT)
Dept: RADIOLOGY | Facility: HOSPITAL | Age: 80
DRG: 226 | End: 2020-03-18
Payer: COMMERCIAL

## 2020-03-18 LAB
ANION GAP SERPL CALCULATED.3IONS-SCNC: 5 MMOL/L (ref 4–13)
ATRIAL RATE: 241 BPM
BUN SERPL-MCNC: 22 MG/DL (ref 5–25)
CALCIUM SERPL-MCNC: 8.5 MG/DL (ref 8.3–10.1)
CHLORIDE SERPL-SCNC: 104 MMOL/L (ref 100–108)
CO2 SERPL-SCNC: 33 MMOL/L (ref 21–32)
CREAT SERPL-MCNC: 1 MG/DL (ref 0.6–1.3)
ERYTHROCYTE [DISTWIDTH] IN BLOOD BY AUTOMATED COUNT: 16.9 % (ref 11.6–15.1)
GFR SERPL CREATININE-BSD FRML MDRD: 54 ML/MIN/1.73SQ M
GLUCOSE SERPL-MCNC: 100 MG/DL (ref 65–140)
HCT VFR BLD AUTO: 33.8 % (ref 34.8–46.1)
HGB BLD-MCNC: 10.3 G/DL (ref 11.5–15.4)
INR PPP: 1.7 (ref 0.84–1.19)
MCH RBC QN AUTO: 26.5 PG (ref 26.8–34.3)
MCHC RBC AUTO-ENTMCNC: 30.5 G/DL (ref 31.4–37.4)
MCV RBC AUTO: 87 FL (ref 82–98)
PLATELET # BLD AUTO: 208 THOUSANDS/UL (ref 149–390)
PMV BLD AUTO: 11.1 FL (ref 8.9–12.7)
POTASSIUM SERPL-SCNC: 3.7 MMOL/L (ref 3.5–5.3)
PROTHROMBIN TIME: 19.5 SECONDS (ref 11.6–14.5)
QRS AXIS: -58 DEGREES
QRSD INTERVAL: 128 MS
QT INTERVAL: 438 MS
QTC INTERVAL: 505 MS
RBC # BLD AUTO: 3.89 MILLION/UL (ref 3.81–5.12)
SODIUM SERPL-SCNC: 142 MMOL/L (ref 136–145)
T WAVE AXIS: 134 DEGREES
VENTRICULAR RATE: 80 BPM
WBC # BLD AUTO: 7.55 THOUSAND/UL (ref 4.31–10.16)

## 2020-03-18 PROCEDURE — 93650 ICAR CATH ABLTJ AV NODE FUNC: CPT | Performed by: PHYSICIAN ASSISTANT

## 2020-03-18 PROCEDURE — C1730 CATH, EP, 19 OR FEW ELECT: HCPCS | Performed by: PHYSICIAN ASSISTANT

## 2020-03-18 PROCEDURE — 33225 L VENTRIC PACING LEAD ADD-ON: CPT | Performed by: INTERNAL MEDICINE

## 2020-03-18 PROCEDURE — 02H63KZ INSERTION OF DEFIBRILLATOR LEAD INTO RIGHT ATRIUM, PERCUTANEOUS APPROACH: ICD-10-PCS | Performed by: INTERNAL MEDICINE

## 2020-03-18 PROCEDURE — C1892 INTRO/SHEATH,FIXED,PEEL-AWAY: HCPCS | Performed by: PHYSICIAN ASSISTANT

## 2020-03-18 PROCEDURE — C1887 CATHETER, GUIDING: HCPCS | Performed by: PHYSICIAN ASSISTANT

## 2020-03-18 PROCEDURE — C1900 LEAD, CORONARY VENOUS: HCPCS

## 2020-03-18 PROCEDURE — 02H43KZ INSERTION OF DEFIBRILLATOR LEAD INTO CORONARY VEIN, PERCUTANEOUS APPROACH: ICD-10-PCS | Performed by: INTERNAL MEDICINE

## 2020-03-18 PROCEDURE — C1769 GUIDE WIRE: HCPCS | Performed by: PHYSICIAN ASSISTANT

## 2020-03-18 PROCEDURE — 93650 ICAR CATH ABLTJ AV NODE FUNC: CPT | Performed by: INTERNAL MEDICINE

## 2020-03-18 PROCEDURE — C1733 CATH, EP, OTHR THAN COOL-TIP: HCPCS | Performed by: PHYSICIAN ASSISTANT

## 2020-03-18 PROCEDURE — 02583ZZ DESTRUCTION OF CONDUCTION MECHANISM, PERCUTANEOUS APPROACH: ICD-10-PCS | Performed by: INTERNAL MEDICINE

## 2020-03-18 PROCEDURE — C1898 LEAD, PMKR, OTHER THAN TRANS: HCPCS

## 2020-03-18 PROCEDURE — C1893 INTRO/SHEATH, FIXED,NON-PEEL: HCPCS | Performed by: PHYSICIAN ASSISTANT

## 2020-03-18 PROCEDURE — 33225 L VENTRIC PACING LEAD ADD-ON: CPT | Performed by: PHYSICIAN ASSISTANT

## 2020-03-18 PROCEDURE — 3E0132A INTRODUCTION OF ANTI-INFECTIVE ENVELOPE INTO SUBCUTANEOUS TISSUE, PERCUTANEOUS APPROACH: ICD-10-PCS | Performed by: INTERNAL MEDICINE

## 2020-03-18 PROCEDURE — 0JH609Z INSERTION OF CARDIAC RESYNCHRONIZATION DEFIBRILLATOR PULSE GENERATOR INTO CHEST SUBCUTANEOUS TISSUE AND FASCIA, OPEN APPROACH: ICD-10-PCS | Performed by: INTERNAL MEDICINE

## 2020-03-18 PROCEDURE — 99232 SBSQ HOSP IP/OBS MODERATE 35: CPT | Performed by: INTERNAL MEDICINE

## 2020-03-18 PROCEDURE — 33249 INSJ/RPLCMT DEFIB W/LEAD(S): CPT | Performed by: INTERNAL MEDICINE

## 2020-03-18 PROCEDURE — 71045 X-RAY EXAM CHEST 1 VIEW: CPT

## 2020-03-18 PROCEDURE — C1777 LEAD, AICD, ENDO SINGLE COIL: HCPCS

## 2020-03-18 PROCEDURE — 85027 COMPLETE CBC AUTOMATED: CPT | Performed by: PHYSICIAN ASSISTANT

## 2020-03-18 PROCEDURE — 80048 BASIC METABOLIC PNL TOTAL CA: CPT | Performed by: PHYSICIAN ASSISTANT

## 2020-03-18 PROCEDURE — 33249 INSJ/RPLCMT DEFIB W/LEAD(S): CPT | Performed by: PHYSICIAN ASSISTANT

## 2020-03-18 PROCEDURE — 93010 ELECTROCARDIOGRAM REPORT: CPT | Performed by: INTERNAL MEDICINE

## 2020-03-18 PROCEDURE — 02HK3KZ INSERTION OF DEFIBRILLATOR LEAD INTO RIGHT VENTRICLE, PERCUTANEOUS APPROACH: ICD-10-PCS | Performed by: INTERNAL MEDICINE

## 2020-03-18 PROCEDURE — 93005 ELECTROCARDIOGRAM TRACING: CPT

## 2020-03-18 PROCEDURE — 85610 PROTHROMBIN TIME: CPT | Performed by: PHYSICIAN ASSISTANT

## 2020-03-18 PROCEDURE — C1882 AICD, OTHER THAN SING/DUAL: HCPCS

## 2020-03-18 RX ORDER — LIDOCAINE HYDROCHLORIDE 10 MG/ML
INJECTION, SOLUTION EPIDURAL; INFILTRATION; INTRACAUDAL; PERINEURAL CODE/TRAUMA/SEDATION MEDICATION
Status: COMPLETED | OUTPATIENT
Start: 2020-03-18 | End: 2020-03-18

## 2020-03-18 RX ORDER — SODIUM CHLORIDE 9 MG/ML
INJECTION, SOLUTION INTRAVENOUS CONTINUOUS PRN
Status: DISCONTINUED | OUTPATIENT
Start: 2020-03-18 | End: 2020-03-18 | Stop reason: SURG

## 2020-03-18 RX ORDER — PROPOFOL 10 MG/ML
INJECTION, EMULSION INTRAVENOUS AS NEEDED
Status: DISCONTINUED | OUTPATIENT
Start: 2020-03-18 | End: 2020-03-18 | Stop reason: SURG

## 2020-03-18 RX ORDER — PROPOFOL 10 MG/ML
INJECTION, EMULSION INTRAVENOUS CONTINUOUS PRN
Status: DISCONTINUED | OUTPATIENT
Start: 2020-03-18 | End: 2020-03-18 | Stop reason: SURG

## 2020-03-18 RX ORDER — GENTAMICIN SULFATE 40 MG/ML
INJECTION, SOLUTION INTRAMUSCULAR; INTRAVENOUS CODE/TRAUMA/SEDATION MEDICATION
Status: COMPLETED | OUTPATIENT
Start: 2020-03-18 | End: 2020-03-18

## 2020-03-18 RX ORDER — FENTANYL CITRATE 50 UG/ML
INJECTION, SOLUTION INTRAMUSCULAR; INTRAVENOUS AS NEEDED
Status: DISCONTINUED | OUTPATIENT
Start: 2020-03-18 | End: 2020-03-18 | Stop reason: SURG

## 2020-03-18 RX ORDER — LIDOCAINE HYDROCHLORIDE AND EPINEPHRINE 10; 10 MG/ML; UG/ML
INJECTION, SOLUTION INFILTRATION; PERINEURAL CODE/TRAUMA/SEDATION MEDICATION
Status: COMPLETED | OUTPATIENT
Start: 2020-03-18 | End: 2020-03-18

## 2020-03-18 RX ADMIN — IOHEXOL 10 ML: 350 INJECTION, SOLUTION INTRAVENOUS at 15:11

## 2020-03-18 RX ADMIN — FENTANYL CITRATE 12.5 MCG: 50 INJECTION, SOLUTION INTRAMUSCULAR; INTRAVENOUS at 14:10

## 2020-03-18 RX ADMIN — IOHEXOL 10 ML: 350 INJECTION, SOLUTION INTRAVENOUS at 15:14

## 2020-03-18 RX ADMIN — VANCOMYCIN HYDROCHLORIDE 1000 MG: 1 INJECTION, SOLUTION INTRAVENOUS at 13:02

## 2020-03-18 RX ADMIN — FENTANYL CITRATE 12.5 MCG: 50 INJECTION, SOLUTION INTRAMUSCULAR; INTRAVENOUS at 14:25

## 2020-03-18 RX ADMIN — APIXABAN 5 MG: 5 TABLET, FILM COATED ORAL at 21:29

## 2020-03-18 RX ADMIN — LIDOCAINE HYDROCHLORIDE 29 ML: 10 INJECTION, SOLUTION EPIDURAL; INFILTRATION; INTRACAUDAL; PERINEURAL at 14:32

## 2020-03-18 RX ADMIN — PROPOFOL 10 MG: 10 INJECTION, EMULSION INTRAVENOUS at 14:37

## 2020-03-18 RX ADMIN — FENTANYL CITRATE 25 MCG: 50 INJECTION, SOLUTION INTRAMUSCULAR; INTRAVENOUS at 15:50

## 2020-03-18 RX ADMIN — POTASSIUM CHLORIDE 20 MEQ: 1500 TABLET, EXTENDED RELEASE ORAL at 18:03

## 2020-03-18 RX ADMIN — LIDOCAINE HYDROCHLORIDE 5 ML: 10 INJECTION, SOLUTION EPIDURAL; INFILTRATION; INTRACAUDAL; PERINEURAL at 14:43

## 2020-03-18 RX ADMIN — SODIUM CHLORIDE: 0.9 INJECTION, SOLUTION INTRAVENOUS at 13:39

## 2020-03-18 RX ADMIN — METOPROLOL SUCCINATE 100 MG: 100 TABLET, EXTENDED RELEASE ORAL at 21:30

## 2020-03-18 RX ADMIN — ROPINIROLE HYDROCHLORIDE 2 MG: 2 TABLET, FILM COATED ORAL at 22:28

## 2020-03-18 RX ADMIN — HYDRALAZINE HYDROCHLORIDE 25 MG: 25 TABLET, FILM COATED ORAL at 06:06

## 2020-03-18 RX ADMIN — VENLAFAXINE 75 MG: 37.5 TABLET ORAL at 08:45

## 2020-03-18 RX ADMIN — PROPOFOL 10 MCG/KG/MIN: 10 INJECTION, EMULSION INTRAVENOUS at 13:52

## 2020-03-18 RX ADMIN — LIDOCAINE HYDROCHLORIDE,EPINEPHRINE BITARTRATE 8 ML: 10; .01 INJECTION, SOLUTION INFILTRATION; PERINEURAL at 16:13

## 2020-03-18 RX ADMIN — HYDRALAZINE HYDROCHLORIDE 25 MG: 25 TABLET, FILM COATED ORAL at 18:02

## 2020-03-18 RX ADMIN — PHENYLEPHRINE HYDROCHLORIDE 100 MCG: 10 INJECTION INTRAVENOUS at 16:05

## 2020-03-18 RX ADMIN — PHENYLEPHRINE HYDROCHLORIDE 100 MCG: 10 INJECTION INTRAVENOUS at 16:02

## 2020-03-18 RX ADMIN — ASCORBIC ACID TAB 250 MG 125 MG: 250 TAB at 08:43

## 2020-03-18 RX ADMIN — IOHEXOL 5 ML: 350 INJECTION, SOLUTION INTRAVENOUS at 15:31

## 2020-03-18 RX ADMIN — DOCUSATE SODIUM 100 MG: 100 CAPSULE, LIQUID FILLED ORAL at 08:45

## 2020-03-18 RX ADMIN — IOHEXOL 8 ML: 350 INJECTION, SOLUTION INTRAVENOUS at 14:59

## 2020-03-18 RX ADMIN — PHENYLEPHRINE HYDROCHLORIDE 100 MCG: 10 INJECTION INTRAVENOUS at 15:00

## 2020-03-18 RX ADMIN — PHENYLEPHRINE HYDROCHLORIDE 100 MCG: 10 INJECTION INTRAVENOUS at 16:08

## 2020-03-18 RX ADMIN — METOPROLOL SUCCINATE 100 MG: 100 TABLET, EXTENDED RELEASE ORAL at 08:42

## 2020-03-18 RX ADMIN — FENTANYL CITRATE 12.5 MCG: 50 INJECTION, SOLUTION INTRAMUSCULAR; INTRAVENOUS at 14:37

## 2020-03-18 RX ADMIN — VANCOMYCIN HYDROCHLORIDE 1000 MG: 1 INJECTION, SOLUTION INTRAVENOUS at 13:39

## 2020-03-18 RX ADMIN — FENTANYL CITRATE 25 MCG: 50 INJECTION, SOLUTION INTRAMUSCULAR; INTRAVENOUS at 15:48

## 2020-03-18 RX ADMIN — BUMETANIDE 4 MG: 0.25 INJECTION INTRAMUSCULAR; INTRAVENOUS at 17:56

## 2020-03-18 RX ADMIN — VENLAFAXINE 75 MG: 37.5 TABLET ORAL at 22:28

## 2020-03-18 RX ADMIN — IOHEXOL 10 ML: 350 INJECTION, SOLUTION INTRAVENOUS at 14:08

## 2020-03-18 RX ADMIN — DOCUSATE SODIUM 100 MG: 100 CAPSULE, LIQUID FILLED ORAL at 18:02

## 2020-03-18 RX ADMIN — MELATONIN 1000 UNITS: at 08:44

## 2020-03-18 RX ADMIN — PHENYLEPHRINE HYDROCHLORIDE 20 MCG/MIN: 10 INJECTION INTRAVENOUS at 14:56

## 2020-03-18 RX ADMIN — HYDRALAZINE HYDROCHLORIDE 25 MG: 25 TABLET, FILM COATED ORAL at 21:29

## 2020-03-18 RX ADMIN — DIGOXIN 125 MCG: 125 TABLET ORAL at 08:44

## 2020-03-18 RX ADMIN — LEVOTHYROXINE SODIUM 137 MCG: 112 TABLET ORAL at 06:04

## 2020-03-18 RX ADMIN — GENTAMICIN SULFATE 80 MG: 40 INJECTION, SOLUTION INTRAMUSCULAR; INTRAVENOUS at 16:10

## 2020-03-18 RX ADMIN — PROPOFOL 10 MG: 10 INJECTION, EMULSION INTRAVENOUS at 13:52

## 2020-03-18 RX ADMIN — IOHEXOL 10 ML: 350 INJECTION, SOLUTION INTRAVENOUS at 14:58

## 2020-03-18 RX ADMIN — BUMETANIDE 4 MG: 0.25 INJECTION INTRAMUSCULAR; INTRAVENOUS at 08:43

## 2020-03-18 RX ADMIN — FENTANYL CITRATE 12.5 MCG: 50 INJECTION, SOLUTION INTRAMUSCULAR; INTRAVENOUS at 13:52

## 2020-03-18 RX ADMIN — POTASSIUM CHLORIDE 20 MEQ: 1500 TABLET, EXTENDED RELEASE ORAL at 08:45

## 2020-03-18 NOTE — PROGRESS NOTES
Advanced Heart Failure / Pulmonary Hypertension Service Progress Note    Tay Donohue 78 y o  female   MRN: [id number]  Unit/Bed#: [facility] 529-01; Encounter: [id number]    Assessment:  Principal Problem:    [facility]  Active Problems:    Depression with anxiety    Hyperlipidemia    Hypertension    Hypothyroidism    Obesity (BMI 30-39  9)    Type 2 diabetes mellitus with diabetic cataract ([facility] )    Acute on chronic combined systolic and diastolic CHF (congestive heart failure) (HCC)    Restless leg syndrome    Edema of both lower extremities    Subjective: Tay Donohue is a 22-year-old female with chronic BiV heart failure, atrial fibrillation (AC on Eliquis), HTN, HLD, hypothyroidism, DM2, CORAZON, and history of uterine cancer who presented on 03/11/2020 to [facility] with weight gain and worsening LE edema  Patient seen and examined  No significant events overnight  No current complaints  Reports good urinary response to IV Bumex  Plan for AVJ ablation with CRT-D today  Edra Abimael to get home  Objective: Intake/ Output: 660 mL / 4150 mL (net negative 3490 mL, accurate intake?)  Weight: 190 lbs (down from 196 lbs on 03/17)  Telemetry: atrial fibrillation; rates in 110-130s with occasional PVCs  Plan:  Acute on chronic biventricular heart failure; LVEF 30%, LVIDd 5 44 cm; NYHA III; ACC/AHA Stage C              Etiology: likely tachy-induced, +/- ischemic (nuclear stress was ordered, patient never completed)  Also has history of radioablative iodine  Etiology of exacerbation: Unclear if from AFib with RVR or vice versa  TTE from 06/20/2019: LVEF 25-30%, LVIDd 4 6 cm, mod reduced RVSF  Mod MR and TR  TTE from 06/26/2019: LVEF ~40%, mildly reduced RVSF  Mod MR, otherwise, no significant valvular disease                TTE (limited) from 07/04/2019: LVEF 45%  LVIDd 3 8 cm  Grade 2 DD  TTE completed on 03/16/2020 (not euvolemic): LVEF 30%  LVIDd 5 44 cm   Moderately dilated RV with moderately to markedly reduced RVSF  NAN  Moderate MR  Mild to moderate TR  Dilated IVC  Nuclear stress test ordered in 10/2019; never completed  Strict I/Os with daily weights  CV diet with fluid restriction  Potassium of 3 7 this AM        Neurohormonal Blockade:   --Beta Blocker: metoprolol succinate 100 mg q12 hours  --ACEi, ARB or ARNi: N/A  --Cardiac glycoside: digoxin 125 mcg daily  --SVR Reducing Agents: hydralazine 25 mg q8 hours  (home dose of Isordil held)  --Aldosterone Receptor Blocker: N/A  --Home Diuretic: torsemide 40 mg daily  --Inpatient Diuretic: IV Bumex 4 mg BID with potassium 20 mEq BID        Sudden Cardiac Death Risk Reduction:   --LVEF 30%  Plan for device as below       Electrical Resynchronization:   --Interrogation: Narrow QRS      Advanced Therapies (if appropriate): Will continue to monitor       Atrial fibrillation with rapid ventricular response              [id number] = 6 (age, sex, CHF, HTN, DM)              Anticoagulation on Eliquis              S/p unsuccessful cardioversion on 06/21/2019  Initial plan was for EBEN cardioversion and dual-chamber PPM implantation on 06/25  Received propofol and cardizem which likely induced cardiogenic shock due to her borderline low output state at that time                S/p successful cardioversion on 07/03 with amio load  Converted back into Afib on 07/08       Previously on amiodarone as outpatient; was discontinued in July 2019 by EP              Continue on metoprolol succinate 100 mg q12 hours for rate control  Remains on amiodarone drip at 0 5 mg/min  Digoxin added per EP  Electrophysiology consulted; input appreciated  Plan for AVJ ablation with CRT-D on 03/18/2020  Chronic kidney disease, stage III   Baseline creatinine of 0 9-1 2  Today, creatinine of 1 00 (down from 1 08 on 03/17)  Will continue to monitor      Obstructive sleep apnea              Noncompliant with CPAP in the past               Repeat outpatient sleep study was ordered; however, never scheduled      Diabetes mellitus, type 2              Most recent hemoglobin A1c from 03/12/2020 of 6 9  Management per primary team    Hypothyroidism              History of radioablation in the past; now on replacement therapy  Most recent TSH of 9 170 with normal free T4 from 06/18/2019      Hypertension, controlled: continue medications as above  Hyperlipidemia    Diagnostic Testing:  Echocardiogram (limited) from 07/04/2019:  LVEF: 45%; mild diffuse hypokinesis  LVIDd: 3 8 cm  MR: No MR  Moderate annular calcification  Other: Dilated LA      Echocardiogram from 06/20/2019:  LVEF: 25%  LVIDd: 4 6 cm  RV: Dilated and hypokinetic  MR: Moderate  PASP: 35 mmHg  Vitals:   Blood pressure 117/62, pulse (!) 122, temperature 98 2 °F (36 8 °C), temperature source Oral, resp  rate 18, height 5' 1" (1 549 m), weight 86 5 kg (190 lb 11 2 oz), SpO2 98 %  Body mass index is 36 03 kg/m²  I/O last 3 completed shifts: In: 1083 4 [P O :900; I V :183 4]  Out: 5025 [Urine:5025]     Wt Readings from Last 3 Encounters:   03/18/20 86 5 kg (190 lb 11 2 oz)   03/09/20 90 3 kg (199 lb)   02/27/20 87 5 kg (193 lb)     Vitals:    03/17/20 2330 03/18/20 0600 03/18/20 0604 03/18/20 0719   BP: 105/62  112/68 117/62   BP Location:    Right arm   Pulse: (!) 114   (!) 122   Resp: 18   18   Temp: 97 5 °F (36 4 °C)   98 2 °F (36 8 °C)   TempSrc: Oral   Oral   SpO2: 97%   98%   Weight:  86 5 kg (190 lb 11 2 oz)     Height:         Physical Exam   Constitutional: She is oriented to person, place, and time  She appears well-developed and well-nourished  Obese   HENT:   Head: Normocephalic and atraumatic  Eyes: Conjunctivae and EOM are normal    Neck: Neck supple  JVD present  No tracheal deviation present  Cardiovascular: Intact distal pulses  An irregularly irregular rhythm present  Tachycardia present     Murmur heard   Pulmonary/Chest: Effort normal and breath sounds normal  No respiratory distress  She has no wheezes  Abdominal: Soft  Bowel sounds are normal  She exhibits distension  There is no tenderness  Musculoskeletal: She exhibits edema (2-3+ LE, hitting mid thigh)  Neurological: She is alert and oriented to person, place, and time  Skin: Skin is warm and dry  Psychiatric: She has a normal mood and affect  Her behavior is normal    Vitals reviewed  [facility] (day, reason): N/A  Murray Catheter (day, reason): N/A      Current Facility-Administered Medications:     acetaminophen (TYLENOL) tablet 650 mg, 650 mg, Oral, Q4H PRN, Simba Pena MD    ascorbic acid (VITAMIN C) tablet 125 mg, 125 mg, Oral, Daily, Simba Pena MD, 125 mg at 03/18/20 0843    bumetanide (BUMEX) injection 4 mg, 4 mg, Intravenous, BID (diuretic), Becky Melissa PA-C, 4 mg at 03/18/20 7558    cholecalciferol (VITAMIN D3) tablet 1,000 Units, 1,000 Units, Oral, Daily, Simba Pena MD, 1,000 Units at 03/18/20 0844    digoxin (LANOXIN) tablet 125 mcg, 125 mcg, Oral, Daily, Pauline Hatfield MD, 125 mcg at 03/18/20 0844    docusate sodium (COLACE) capsule 100 mg, 100 mg, Oral, BID, Army Cora MD, 100 mg at 03/18/20 0845    hydrALAZINE (APRESOLINE) tablet 25 mg, 25 mg, Oral, Q8H [facility], [employer], 25 mg at 03/18/20 0606    levothyroxine tablet 137 mcg, 137 mcg, Oral, Early Morning, Simba Pena MD, 137 mcg at 03/18/20 0604    metoprolol succinate (TOPROL-XL) 24 hr tablet 100 mg, 100 mg, Oral, Q12H [facility], Fatimah Cody MD, 100 mg at 03/18/20 0842    potassium chloride (K-DUR,KLOR-CON) CR tablet 20 mEq, 20 mEq, Oral, BID, Becky Melissa PA-C, 20 mEq at 03/18/20 0845    rOPINIRole (REQUIP) tablet 2 mg, 2 mg, Oral, HS, Kevin Marie PA-C, 2 mg at 03/17/20 2127    vancomycin (VANCOCIN) IVPB (premix) 1,000 mg, 1,000 mg, Intravenous, Once, Hammad Alvarenga PA-C    [facility]) tablet 75 mg, 75 mg, Oral, Q12H [facility], Rob Oshea MD, 75 mg at 03/18/20 0845    Labs & Results:  Results from last 7 days   Lab Units 03/11/20  1317   TROPONIN I ng/mL 0 03     Results from last 7 days   Lab Units 03/18/20  0531 03/12/20  0559 03/11/20  1317   WBC Thousand/uL 7 55 7 50 7 29   HEMOGLOBIN g/dL 10 3* 11 5 11 0*   HEMATOCRIT % 33 8* 37 0 34 7*   PLATELETS Thousands/uL 208 223 215         Results from last 7 days   Lab Units 03/18/20  0531 03/17/20  0508 03/16/20  0559  03/11/20  1317   POTASSIUM mmol/L 3 7 4 1 4 3   < > 4 2   CHLORIDE mmol/L 104 102 99*   < > 99*   CO2 mmol/L 33* 26 26   < > 29   BUN mg/dL 22 26* 26*   < > 25   CREATININE mg/dL 1 00 1 08 1 19   < > 1 22   CALCIUM mg/dL 8 5 8 6 9 0   < > 8 9   ALK PHOS U/L  --   --   --   --  86   ALT U/L  --   --   --   --  28   AST U/L  --   --   --   --  43    < > = values in this interval not displayed  Results from last 7 days   Lab Units 03/18/20  0531   INR  1 70*     Counseling / Coordination of Care: Total floor / unit time spent today 20 minutes  Greater than 50% of total time was spent with the patient and / or family counseling and / or coordination of care  A description of the counseling / coordination of care: 10 minutes (low sodium diet, fluid restriction, daily weights, and importance of medication compliance)  Thank you for the opportunity to participate in the care of this patient      Deya Grider PA-C

## 2020-03-18 NOTE — ANESTHESIA POSTPROCEDURE EVALUATION
Post-Op Assessment Note    CV Status:  Stable  Pain Score: 0    Pain management: adequate     Mental Status:  Alert and awake   Hydration Status:  Euvolemic   PONV Controlled:  Controlled   Airway Patency:  Patent   Post Op Vitals Reviewed: Yes      Staff: CRNA, Anesthesiologist           BP   128/67   Temp      Pulse 80   Resp      SpO2   98%

## 2020-03-18 NOTE — ASSESSMENT & PLAN NOTE
Wt Readings from Last 3 Encounters:   03/17/20 89 3 kg (196 lb 13 9 oz)   03/09/20 90 3 kg (199 lb)   02/27/20 87 5 kg (193 lb)     Patient with evidence for acute on chronic combined diastolic heart failure secondary to AFib with RVR      Echo 7/2019 EF 45%, will repeat echo this admission when rates better controlled  Appreciate heart failure input  Daily weights, I&O's, fluid restrict, 2g Na restricted diet  Continue diuresis as per Cardiology

## 2020-03-18 NOTE — PLAN OF CARE
Problem: Potential for Falls  Goal: Patient will remain free of falls  Description  INTERVENTIONS:  - Assess patient frequently for physical needs  -  Identify cognitive and physical deficits and behaviors that affect risk of falls    -  [place] fall precautions as indicated by assessment   - Educate patient/family on patient safety including physical limitations  - Instruct patient to call for assistance with activity based on assessment  - Modify environment to reduce risk of injury  - Consider OT/PT consult to assist with strengthening/mobility  Outcome: Progressing     Problem: PAIN - ADULT  Goal: Verbalizes/displays adequate comfort level or baseline comfort level  Description  Interventions:  - Encourage patient to monitor pain and request assistance  - Assess pain using appropriate pain scale  - Administer analgesics based on type and severity of pain and evaluate response  - Implement non-pharmacological measures as appropriate and evaluate response  - Consider cultural and social influences on pain and pain management  - Notify physician/advanced practitioner if interventions unsuccessful or patient reports new pain  Outcome: Progressing     Problem: INFECTION - ADULT  Goal: Absence or prevention of progression during hospitalization  Description  INTERVENTIONS:  - Assess and monitor for signs and symptoms of infection  - Monitor lab/diagnostic results  - Monitor all insertion sites, i e  indwelling lines, tubes, and drains  - Monitor endotracheal if appropriate and nasal secretions for changes in amount and color  - [place] appropriate cooling/warming therapies per order  - Administer medications as ordered  - Instruct and encourage patient and family to use good hand hygiene technique  - Identify and instruct in appropriate isolation precautions for identified infection/condition  Outcome: Progressing  Goal: Absence of fever/infection during neutropenic period  Description  INTERVENTIONS:  - Monitor WBC    Outcome: Progressing     Problem: SAFETY ADULT  Goal: Maintain or return to baseline ADL function  Description  INTERVENTIONS:  -  Assess patient's ability to carry out ADLs; assess patient's baseline for ADL function and identify physical deficits which impact ability to perform ADLs (bathing, care of mouth/teeth, toileting, grooming, dressing, etc )  - Assess/evaluate cause of self-care deficits   - Assess range of motion  - Assess patient's mobility; develop plan if impaired  - Assess patient's need for assistive devices and provide as appropriate  - Encourage maximum independence but intervene and supervise when necessary  - Involve family in performance of ADLs  - Assess for home care needs following discharge   - Consider OT consult to assist with ADL evaluation and planning for discharge  - Provide patient education as appropriate  Outcome: Progressing  Goal: Maintain or return mobility status to optimal level  Description  INTERVENTIONS:  - Assess patient's baseline mobility status (ambulation, transfers, stairs, etc )    - Identify cognitive and physical deficits and behaviors that affect mobility  - Identify mobility aids required to assist with transfers and/or ambulation (gait belt, sit-to-stand, lift, walker, cane, etc )  - [place] fall precautions as indicated by assessment  - Record patient progress and toleration of activity level on Mobility SBAR; progress patient to next Phase/Stage  - Instruct patient to call for assistance with activity based on assessment  - Consider rehabilitation consult to assist with strengthening/weightbearing, etc   Outcome: Progressing     Problem: DISCHARGE PLANNING  Goal: Discharge to home or other facility with appropriate resources  Description  INTERVENTIONS:  - Identify barriers to discharge w/patient and caregiver  - Arrange for needed discharge resources and transportation as appropriate  - Identify discharge learning needs (meds, wound care, etc )  - Arrange for interpretive services to assist at discharge as needed  - Refer to Case Management Department for coordinating discharge planning if the patient needs post-hospital services based on physician/advanced practitioner order or complex needs related to functional status, cognitive ability, or social support system  Outcome: Progressing     Problem: Knowledge Deficit  Goal: Patient/family/caregiver demonstrates understanding of disease process, treatment plan, medications, and discharge instructions  Description  Complete learning assessment and assess knowledge base    Interventions:  - Provide teaching at level of understanding  - Provide teaching via preferred learning methods  Outcome: Progressing     Problem: CARDIOVASCULAR - ADULT  Goal: Maintains optimal cardiac output and hemodynamic stability  Description  INTERVENTIONS:  - Monitor I/O, vital signs and rhythm  - Monitor for S/S and trends of decreased cardiac output  - Administer and titrate ordered vasoactive medications to optimize hemodynamic stability  - Assess quality of pulses, skin color and temperature  - Assess for signs of decreased coronary artery perfusion  - Instruct patient to report change in severity of symptoms  Outcome: Progressing  Goal: Absence of cardiac dysrhythmias or at baseline rhythm  Description  INTERVENTIONS:  - Continuous cardiac monitoring, vital signs, obtain 12 lead EKG if ordered  - Administer antiarrhythmic and heart rate control medications as ordered  - Monitor electrolytes and administer replacement therapy as ordered  Outcome: Progressing     Problem: GASTROINTESTINAL - ADULT  Goal: Maintains or returns to baseline bowel function  Description  INTERVENTIONS:  - Assess bowel function  - Encourage oral fluids to ensure adequate hydration  - Administer IV fluids if ordered to ensure adequate hydration  - Administer ordered medications as needed  - Encourage mobilization and activity  - Consider nutritional services referral to assist patient with adequate nutrition and appropriate food choices  Outcome: Progressing     Problem: SKIN/TISSUE INTEGRITY - ADULT  Goal: Skin integrity remains intact  Description  INTERVENTIONS  - Identify patients at risk for skin breakdown  - Assess and monitor skin integrity  - Assess and monitor nutrition and hydration status  - Monitor labs (i e  albumin)  - Assess for incontinence   - Turn and reposition patient  - Assist with mobility/ambulation  - Relieve pressure over bony prominences  - Avoid friction and shearing  - Provide appropriate hygiene as needed including keeping skin clean and dry  - Evaluate need for skin moisturizer/barrier cream  - Collaborate with interdisciplinary team (i e  Nutrition, Rehabilitation, etc )   - Patient/family teaching  Outcome: Progressing  Goal: Incision(s), wounds(s) or drain site(s) healing without S/S of infection  Description  INTERVENTIONS  - Assess and document risk factors for skin impairment   - Assess and document dressing, incision, wound bed, drain sites and surrounding tissue  - Consider nutrition services referral as needed  - Oral mucous membranes remain intact  - Provide patient/ family education  Outcome: Progressing  Goal: Oral mucous membranes remain intact  Description  INTERVENTIONS  - Assess oral mucosa and hygiene practices  - Implement preventative oral hygiene regimen  - Implement oral medicated treatments as ordered  - Initiate Nutrition services referral as needed  Outcome: Progressing

## 2020-03-18 NOTE — PROGRESS NOTES
Progress Note Bertram Elena 1940, 78 y o  female MRN: [id number]    Unit/Bed#: [facility] 529-01 Encounter: [id number]    Primary Care Provider: SOTO Jack   Date and time admitted to hospital: 3/11/2020 11:50 AM        Edema of both lower extremities  Assessment & Plan  Monitor erythema   Check procal  Low threshold for ABx   Acute on chronic combined systolic and diastolic CHF (congestive heart failure) ([facility])  Assessment & Plan  Wt Readings from Last 3 Encounters:   20 89 3 kg (196 lb 13 9 oz)   20 90 3 kg (199 lb)   20 87 5 kg (193 lb)     Patient with evidence for acute on chronic combined diastolic heart failure secondary to AFib with RVR  Echo 2019 EF 45%, will repeat echo this admission when rates better controlled  Appreciate heart failure input  Daily weights, I&O's, fluid restrict, 2g Na restricted diet  Continue diuresis as per Cardiology    * Atrial fibrillation with RVR [facility])  Assessment & Plan  Plan for ICD and node ablation tomorrow as per cardiology  HR in   C/w digoxin and metoprolol         VTE Pharmacologic Prophylaxis:   Pharmacologic: Heparin  Mechanical VTE Prophylaxis in Place: Yes    Patient Centered Rounds: I have performed bedside rounds with nursing staff today  Discussions with Specialists or Other Care Team Provider: Discussed with cardiology  Education and Discussions with Family / Patient: Discussed with patient     Time Spent for Care: 30 minutes  More than 50% of total time spent on counseling and coordination of care as described above  Current Length of Stay: 6 day(s)    Current Patient Status: Inpatient   Certification Statement: The patient will continue to require additional inpatient hospital stay due to IVABs  Discharge Plan: Not medically stable for DC  Code Status: Level 1 - Full Code      Subjective:   Patient seen and examined     Feeling "good"    Objective:     Vitals:   Temp (24hrs), Av 5 03/17/20  0508  03/11/20  1317   SODIUM mmol/L 135*   < > 136   POTASSIUM mmol/L 4 1   < > 4 2   CHLORIDE mmol/L 102   < > 99*   CO2 mmol/L 26   < > 29   BUN mg/dL 26*   < > 25   CREATININE mg/dL 1 08   < > 1 22   ANION GAP mmol/L 7   < > 8   CALCIUM mg/dL 8 6   < > 8 9   ALBUMIN g/dL  --   --  3 1*   TOTAL BILIRUBIN mg/dL  --   --  1 13*   ALK PHOS U/L  --   --  86   ALT U/L  --   --  28   AST U/L  --   --  43   GLUCOSE RANDOM mg/dL 111   < > 128    < > = values in this interval not displayed  Results from last 7 days   Lab Units 03/12/20  0559   HEMOGLOBIN A1C % 6 9*               * I Have Reviewed All Lab Data Listed Above  * Additional Pertinent Lab Tests Reviewed: All Labs For Current Hospital Admission Reviewed    Imaging:    Imaging Reports Reviewed Today Include:   None pertinent to this encounter  Imaging Personally Reviewed by Myself Includes:  As above   Recent Cultures (last 7 days):           Last 24 Hours Medication List:     Current Facility-Administered Medications:  acetaminophen 650 mg Oral Q4H PRN Gautam York MD   ascorbic acid 125 mg Oral Daily Gautam York MD   bumetanide 4 mg Intravenous BID (diuretic) Micki Dudley PA-C   cholecalciferol 1,000 Units Oral Daily Gautam York MD   digoxin 125 mcg Oral Daily Petrona Lewis MD   docusate sodium 100 mg Oral BID Mono Munguia MD   hydrALAZINE 25 mg Oral Q8H [facility] SOTO Junior   levothyroxine 137 mcg Oral Early Morning Gautam York MD   metoprolol succinate 100 mg Oral Q12H [facility] HOME Brooke Mo MD   potassium chloride 20 mEq Oral BID Micki Dudley PA-C   rOPINIRole 2 mg Oral HS Mohini Baker PA-C   [START ON 3/18/2020] vancomycin 1,000 mg Intravenous Once Dosmt Maldonado PA-C   venlafaxine 75 mg Oral Q12H Tea Darling MD        Today, Patient Was Seen By: Gardenia Sever, MD    ** Please Note: Dictation voice to text software may have been used in the creation of this document  **

## 2020-03-18 NOTE — ASSESSMENT & PLAN NOTE
Wt Readings from Last 3 Encounters:   03/18/20 86 5 kg (190 lb 11 2 oz)   03/09/20 90 3 kg (199 lb)   02/27/20 87 5 kg (193 lb)         Her weight is improved from 3/9

## 2020-03-18 NOTE — ASSESSMENT & PLAN NOTE
Wt Readings from Last 3 Encounters:   03/18/20 86 5 kg (190 lb 11 2 oz)   03/09/20 90 3 kg (199 lb)   02/27/20 87 5 kg (193 lb)     Patient with evidence for acute on chronic combined diastolic heart failure secondary to AFib with RVR      Echo 7/2019 EF 45%, will repeat echo this admission when rates better controlled  Appreciate heart failure input  Daily weights, I&O's, fluid restrict, 2g Na restricted diet  Continue diuresis as per Cardiology
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
## 2020-03-18 NOTE — ASSESSMENT & PLAN NOTE
Lab Results   Component Value Date    HGBA1C 6 9 (H) 03/12/2020       No results for input(s): POCGLU in the last 72 hours  Blood Sugar Average: Last 72 hrs:   patient reports she is not a diabetic  Noted blood hemoglobin A1c has been 6 9 is slightly higher  Patient on no agents at this time  Continue diabetic diet   Patient refusing accuchecks

## 2020-03-18 NOTE — ANESTHESIA PREPROCEDURE EVALUATION
Review of Systems/Medical History  Patient summary reviewed  Chart reviewed  No history of anesthetic complications     Cardiovascular  Exercise tolerance (METS): <4,  Hyperlipidemia, Hypertension , No past MI , Dysrhythmias , atrial fibrillation, CHF (non-ischemic CM secondary to A-fib) ,    Pulmonary  Negative pulmonary ROS        GI/Hepatic  Negative GI/hepatic ROS          Negative  ROS        Endo/Other  Diabetes type 2 , History of thyroid disease , hypothyroidism,      GYN    Hysterectomy, Uterine cancer,        Hematology    Coagulation disorder (Eliquis) currently taking oral anticoagulants,    Musculoskeletal    Arthritis     Neurology  Negative neurology ROS   No CVA ,    Psychology   Anxiety, Depression ,     Comment: H/o 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     EKG 3/11/2020:  Atrial fibrillation with rapid ventricular response  Low voltage QRS  Septal infarct (cited on or before 18-JUN-2019)  Lateral infarct , age undetermined  Abnormal ECG  When compared with ECG of 03-JUL-2019 16:54,  Atrial fibrillation has replaced Sinus rhythm    TTE 3/15/2020:  LEFT VENTRICLE:  The ventricle was mildly dilated  Systolic function was markedly reduced by visual assessment  Ejection fraction was estimated to be 30 %  There was severe diffuse hypokinesis with regional variations      RIGHT VENTRICLE:  The ventricle was moderately dilated  Systolic function was moderately to markedly reduced      LEFT ATRIUM:  The atrium was moderately to markedly dilated      RIGHT ATRIUM:  The atrium was moderately dilated      MITRAL VALVE:  There was mild annular calcification  There was moderate regurgitation      TRICUSPID VALVE:  There was mild to moderate regurgitation      Lab Results   Component Value Date    WBC 7 55 03/18/2020    HGB 10 3 (L) 03/18/2020    HCT 33 8 (L) 03/18/2020    MCV 87 03/18/2020     03/18/2020     Lab Results   Component Value Date    SODIUM 142 03/18/2020    K 3 7 03/18/2020     03/18/2020 CO2 33 (H) 03/18/2020    BUN 22 03/18/2020    CREATININE 1 00 03/18/2020    GLUC 100 03/18/2020    CALCIUM 8 5 03/18/2020     Lab Results   Component Value Date    INR 1 70 (H) 03/18/2020    INR 1 60 (H) 07/03/2019    INR 2 16 (H) 07/01/2019    PROTIME 19 5 (H) 03/18/2020    PROTIME 18 6 (H) 07/03/2019    PROTIME 23 6 (H) 07/01/2019           Physical Exam    Airway    Mallampati score: II  TM Distance: >3 FB  Neck ROM: full     Dental   upper dentures and lower dentures,     Cardiovascular  Rhythm: irregular,     Pulmonary  Pulmonary exam normal     Other Findings  A-fib RVR rate 140      Anesthesia Plan  ASA Score- 4     Anesthesia Type- IV sedation with anesthesia with ASA Monitors  Additional Monitors:   Airway Plan:         Plan Factors-    Induction- intravenous  Postoperative Plan-     Informed Consent- Anesthetic plan and risks discussed with patient  I personally reviewed this patient with the CRNA  Discussed and agreed on the Anesthesia Plan with the CRNA  Atul Alexander

## 2020-03-19 ENCOUNTER — APPOINTMENT (INPATIENT)
Dept: RADIOLOGY | Facility: HOSPITAL | Age: 80
DRG: 226 | End: 2020-03-19
Payer: COMMERCIAL

## 2020-03-19 LAB
ANION GAP SERPL CALCULATED.3IONS-SCNC: 4 MMOL/L (ref 4–13)
BUN SERPL-MCNC: 20 MG/DL (ref 5–25)
CALCIUM SERPL-MCNC: 8.5 MG/DL (ref 8.3–10.1)
CHLORIDE SERPL-SCNC: 103 MMOL/L (ref 100–108)
CO2 SERPL-SCNC: 35 MMOL/L (ref 21–32)
CREAT SERPL-MCNC: 0.97 MG/DL (ref 0.6–1.3)
ERYTHROCYTE [DISTWIDTH] IN BLOOD BY AUTOMATED COUNT: 17.4 % (ref 11.6–15.1)
GFR SERPL CREATININE-BSD FRML MDRD: 56 ML/MIN/1.73SQ M
GLUCOSE SERPL-MCNC: 118 MG/DL (ref 65–140)
HCT VFR BLD AUTO: 34.3 % (ref 34.8–46.1)
HGB BLD-MCNC: 10.5 G/DL (ref 11.5–15.4)
MCH RBC QN AUTO: 27.1 PG (ref 26.8–34.3)
MCHC RBC AUTO-ENTMCNC: 30.6 G/DL (ref 31.4–37.4)
MCV RBC AUTO: 89 FL (ref 82–98)
PLATELET # BLD AUTO: 204 THOUSANDS/UL (ref 149–390)
PMV BLD AUTO: 11.7 FL (ref 8.9–12.7)
POTASSIUM SERPL-SCNC: 3.7 MMOL/L (ref 3.5–5.3)
RBC # BLD AUTO: 3.87 MILLION/UL (ref 3.81–5.12)
SODIUM SERPL-SCNC: 142 MMOL/L (ref 136–145)
WBC # BLD AUTO: 6.5 THOUSAND/UL (ref 4.31–10.16)

## 2020-03-19 PROCEDURE — 97163 PT EVAL HIGH COMPLEX 45 MIN: CPT

## 2020-03-19 PROCEDURE — 80048 BASIC METABOLIC PNL TOTAL CA: CPT | Performed by: PHYSICIAN ASSISTANT

## 2020-03-19 PROCEDURE — 99232 SBSQ HOSP IP/OBS MODERATE 35: CPT | Performed by: INTERNAL MEDICINE

## 2020-03-19 PROCEDURE — 99024 POSTOP FOLLOW-UP VISIT: CPT | Performed by: PHYSICIAN ASSISTANT

## 2020-03-19 PROCEDURE — 99232 SBSQ HOSP IP/OBS MODERATE 35: CPT | Performed by: STUDENT IN AN ORGANIZED HEALTH CARE EDUCATION/TRAINING PROGRAM

## 2020-03-19 PROCEDURE — 85027 COMPLETE CBC AUTOMATED: CPT | Performed by: PHYSICIAN ASSISTANT

## 2020-03-19 PROCEDURE — 97167 OT EVAL HIGH COMPLEX 60 MIN: CPT

## 2020-03-19 PROCEDURE — 71046 X-RAY EXAM CHEST 2 VIEWS: CPT

## 2020-03-19 RX ADMIN — DIGOXIN 125 MCG: 125 TABLET ORAL at 08:34

## 2020-03-19 RX ADMIN — DOCUSATE SODIUM 100 MG: 100 CAPSULE, LIQUID FILLED ORAL at 17:09

## 2020-03-19 RX ADMIN — DOCUSATE SODIUM 100 MG: 100 CAPSULE, LIQUID FILLED ORAL at 08:34

## 2020-03-19 RX ADMIN — LEVOTHYROXINE SODIUM 137 MCG: 112 TABLET ORAL at 05:38

## 2020-03-19 RX ADMIN — ASCORBIC ACID TAB 250 MG 125 MG: 250 TAB at 13:38

## 2020-03-19 RX ADMIN — BUMETANIDE 4 MG: 0.25 INJECTION INTRAMUSCULAR; INTRAVENOUS at 08:34

## 2020-03-19 RX ADMIN — VENLAFAXINE 75 MG: 37.5 TABLET ORAL at 21:15

## 2020-03-19 RX ADMIN — BUMETANIDE 4 MG: 0.25 INJECTION INTRAMUSCULAR; INTRAVENOUS at 17:09

## 2020-03-19 RX ADMIN — APIXABAN 5 MG: 5 TABLET, FILM COATED ORAL at 08:34

## 2020-03-19 RX ADMIN — METOPROLOL SUCCINATE 100 MG: 100 TABLET, EXTENDED RELEASE ORAL at 08:34

## 2020-03-19 RX ADMIN — MELATONIN 1000 UNITS: at 08:34

## 2020-03-19 RX ADMIN — METOPROLOL SUCCINATE 100 MG: 100 TABLET, EXTENDED RELEASE ORAL at 21:15

## 2020-03-19 RX ADMIN — APIXABAN 5 MG: 5 TABLET, FILM COATED ORAL at 17:09

## 2020-03-19 RX ADMIN — HYDRALAZINE HYDROCHLORIDE 25 MG: 25 TABLET, FILM COATED ORAL at 05:36

## 2020-03-19 RX ADMIN — POTASSIUM CHLORIDE 20 MEQ: 1500 TABLET, EXTENDED RELEASE ORAL at 08:34

## 2020-03-19 RX ADMIN — ROPINIROLE HYDROCHLORIDE 2 MG: 2 TABLET, FILM COATED ORAL at 21:15

## 2020-03-19 RX ADMIN — POTASSIUM CHLORIDE 20 MEQ: 1500 TABLET, EXTENDED RELEASE ORAL at 17:09

## 2020-03-19 RX ADMIN — VENLAFAXINE 75 MG: 37.5 TABLET ORAL at 08:36

## 2020-03-19 RX ADMIN — HYDRALAZINE HYDROCHLORIDE 25 MG: 25 TABLET, FILM COATED ORAL at 21:15

## 2020-03-19 RX ADMIN — HYDRALAZINE HYDROCHLORIDE 25 MG: 25 TABLET, FILM COATED ORAL at 13:38

## 2020-03-19 NOTE — ASSESSMENT & PLAN NOTE
Wt Readings from Last 3 Encounters:   03/19/20 85 6 kg (188 lb 11 4 oz)   03/09/20 90 3 kg (199 lb)   02/27/20 87 5 kg (193 lb)         Her weight is improved from 3/9

## 2020-03-19 NOTE — PROGRESS NOTES
Advanced Heart Failure / Pulmonary Hypertension Service Progress Note    Luis Pan 78 y o  female   MRN: [id number]  Unit/Bed#: [facility] 529-01; Encounter: [id number]    Assessment:  Principal Problem:    Atrial fibrillation with RVR ([facility] [address] )  Active Problems:    Depression with anxiety    Hyperlipidemia    Hypertension    Hypothyroidism    Obesity (BMI 30-39  9)    Type 2 diabetes mellitus with diabetic cataract ([facility] [address] )    Acute on chronic combined systolic and diastolic CHF (congestive heart failure) (HCC)    Chronic combined systolic (congestive) and diastolic (congestive) heart failure (HCC)    Restless leg syndrome    Edema of both lower extremities    Subjective: Luis Pan is a 70-year-old female with chronic BiV heart failure, atrial fibrillation (AC on Eliquis), HTN, HLD, hypothyroidism, DM2, CORAZON, and history of uterine cancer who presented on 03/11/2020 to [facility] with weight gain and worsening LE edema  Patient seen and examined  No significant events overnight  No current complaints  Continues to report good urinary response to IV Bumex  POD #1 AV node ablation with CRT-D  Wearing sling and is aware of limb and lifting restrictions  Reports some tenderness surrounding ICD incision  Brandon Gunning to get home to her cat despite remaining volume overloaded  Reports she'll "behave at home this time" and call office with weight gain/symptoms  Objective: Intake/ Output: 1170 mL / 2150 mL (net negative 980 mL)  Weight: 188 lbs (down from 190 lbs on 03/18)  Telemetry: Paced rhythm at 70-80 bpm      Plan:  Acute on chronic biventricular heart failure; LVEF 30%, LVIDd 5 44 cm; NYHA III; ACC/AHA Stage C              Etiology: likely tachy-induced, +/- ischemic (nuclear stress was ordered, patient never completed)  Also has history of radioablative iodine  Etiology of exacerbation: Unclear if from AFib with RVR or vice versa  TTE from 06/20/2019: LVEF 25-30%, LVIDd 4 6 cm, mod reduced RVSF  Mod MR and TR  TTE from 06/26/2019: LVEF ~40%, mildly reduced RVSF  Mod MR, otherwise, no significant valvular disease                TTE (limited) from 07/04/2019: LVEF 45%  LVIDd 3 8 cm  Grade 2 DD  TTE completed on 03/16/2020 (not euvolemic): LVEF 30%  LVIDd 5 44 cm  Moderately dilated RV with moderately to markedly reduced RVSF  NAN  Moderate MR  Mild to moderate TR  Dilated IVC  Nuclear stress test ordered in 10/2019; never completed  Strict I/Os with daily weights  CV diet with fluid restriction  Potassium of 3 7 this AM        Neurohormonal Blockade:   --Beta Blocker: metoprolol succinate 100 mg q12 hours  --ACEi, ARB or ARNi: N/A  --Cardiac glycoside: digoxin 125 mcg daily  --SVR Reducing Agents: hydralazine 25 mg q8 hours  (home dose of Isordil held)  --Aldosterone Receptor Blocker: N/A  --Home Diuretic: torsemide 40 mg daily  --Inpatient Diuretic: IV Bumex 4 mg BID with potassium 20 mEq BID        Sudden Cardiac Death Risk Reduction:   --Medtronic CRT-D (BiV ICD) in situ since 03/18/2020       Electrical Resynchronization:   --Interrogation: Narrow QRS      Advanced Therapies (if appropriate): Will continue to monitor       Atrial fibrillation with rapid ventricular response              [id number] = 6 (age, sex, CHF, HTN, DM)              Anticoagulation on Eliquis              S/p unsuccessful cardioversion on 06/21/2019  Initial plan was for EBEN cardioversion and dual-chamber PPM implantation on 06/25  Received propofol and cardizem which likely induced cardiogenic shock due to her borderline low output state at that time                S/p successful cardioversion on 07/03 with amio load  Converted back into Afib on 07/08       Previously on amiodarone as outpatient; was discontinued in July 2019 by EP              Continue on metoprolol succinate 100 mg q12 hours for rate control  Remains on amiodarone drip at 0 5 mg/min  Digoxin added per EP     Electrophysiology consulted; input appreciated  S/p AV node ablation with CRT-D with Dr Corinne Gimenez on 03/18/2020  Chronic kidney disease, stage III   Baseline creatinine of 0 9-1 2  Today, creatinine of 0 97 (down from 1 00 on 03/18)  Will continue to monitor  Obstructive sleep apnea              Noncompliant with CPAP in the past               Repeat outpatient sleep study was ordered; however, never scheduled      Diabetes mellitus, type 2              Most recent hemoglobin A1c from 03/12/2020 of 6 9  Management per primary team     Hypothyroidism              History of radioablation in the past; now on replacement therapy  Most recent TSH of 9 170 with normal free T4 from 06/18/2019      Hypertension, controlled: continue medications as above  Hyperlipidemia    Diagnostic Testing:  Echocardiogram (limited) from 07/04/2019:  LVEF: 45%; mild diffuse hypokinesis  LVIDd: 3 8 cm  MR: No MR  Moderate annular calcification  Other: Dilated LA      Echocardiogram from 06/20/2019:  LVEF: 25%  LVIDd: 4 6 cm  RV: Dilated and hypokinetic  MR: Moderate  PASP: 35 mmHg  Vitals:   Blood pressure 167/74, pulse 79, temperature 97 9 °F (36 6 °C), temperature source Oral, resp  rate 18, height 5' 1" (1 549 m), weight 85 6 kg (188 lb 11 4 oz), SpO2 97 %  Body mass index is 35 66 kg/m²  I/O last 3 completed shifts: In: 1410 [P O :960; I V :450]  Out: 4100 [Urine:4100]     Wt Readings from Last 3 Encounters:   03/19/20 85 6 kg (188 lb 11 4 oz)   03/09/20 90 3 kg (199 lb)   02/27/20 87 5 kg (193 lb)     Vitals:    03/19/20 0443 03/19/20 0444 03/19/20 0536 03/19/20 0748   BP:   130/63 167/74   BP Location:    Right arm   Pulse:    79   Resp:    18   Temp:    97 9 °F (36 6 °C)   TempSrc:    Oral   SpO2:  98%  97%   Weight: 85 6 kg (188 lb 11 4 oz)      Height:         Physical Exam   Constitutional: She is oriented to person, place, and time  She appears well-developed and well-nourished     Obese   HENT: Head: Normocephalic and atraumatic  Eyes: Pupils are equal, round, and reactive to light  Conjunctivae are normal    Neck: Neck supple  JVD present  No tracheal deviation present  Cardiovascular: Normal rate, regular rhythm and intact distal pulses  Murmur heard  Aquacel dressing intact; without ecchymosis or sign of hematoma   Pulmonary/Chest: Effort normal  No respiratory distress  She has no wheezes  Abdominal: Soft  Bowel sounds are normal  She exhibits distension  There is no tenderness  Musculoskeletal: She exhibits edema (2+ LE)  She exhibits no tenderness  Left UE in sling   Neurological: She is alert and oriented to person, place, and time  Skin: Skin is warm and dry  Psychiatric: She has a normal mood and affect  Her behavior is normal    Vitals reviewed  [employer] (day, reason): N/A  Murray Catheter (day, reason): N/A      Current Facility-Administered Medications:     acetaminophen (TYLENOL) tablet 650 mg, 650 mg, Oral, Q4H PRN, Simba Pena MD    [facility]) tablet 5 mg, 5 mg, Oral, BID, Hammad Alvarenga PA-C, 5 mg at 03/19/20 8978    ascorbic acid (VITAMIN C) tablet 125 mg, 125 mg, Oral, Daily, Simba Pena MD, 125 mg at 03/18/20 0843    bumetanide (BUMEX) injection 4 mg, 4 mg, Intravenous, BID (diuretic), Becky Melissa PA-C, 4 mg at 03/19/20 6099    cholecalciferol (VITAMIN D3) tablet 1,000 Units, 1,000 Units, Oral, Daily, Simba Pena MD, 1,000 Units at 03/19/20 0834    digoxin (LANOXIN) tablet 125 mcg, 125 mcg, Oral, Daily, Pauline Hatfield MD, 125 mcg at 03/19/20 0834    docusate sodium (COLACE) capsule 100 mg, 100 mg, Oral, BID, Army Cora MD, 100 mg at 03/19/20 0834    hydrALAZINE (APRESOLINE) tablet 25 mg, 25 mg, Oral, Q8H [address], SOTO Junior, 25 mg at 03/19/20 0536    levothyroxine tablet 137 mcg, 137 mcg, Oral, Early Morning, Simba Pena MD, 137 mcg at 03/19/20 0538    metoprolol succinate (TOPROL-XL) 24 hr tablet 100 mg, 100 mg, Oral, Q12H [address], John Obando MD, 100 mg at 03/19/20 0834    potassium chloride (K-DUR,KLOR-CON) CR tablet 20 mEq, 20 mEq, Oral, BID, Hemalatha Grewal PA-C, 20 mEq at 03/19/20 0874    rOPINIRole (REQUIP) tablet 2 mg, 2 mg, Oral, HS, Jose Manuel Riojas PA-C, 2 mg at 03/18/20 2228    venlafaxine (EFFEXOR) tablet 75 mg, 75 mg, Oral, Q12H [address], Paolo Workman MD, 75 mg at 03/19/20 0836    Labs & Results:      Results from last 7 days   Lab Units 03/19/20  0550 03/18/20  0531   WBC Thousand/uL 6 50 7 55   HEMOGLOBIN g/dL 10 5* 10 3*   HEMATOCRIT % 34 3* 33 8*   PLATELETS Thousands/uL 204 208         Results from last 7 days   Lab Units 03/19/20  0550 03/18/20  0531 03/17/20  0508   POTASSIUM mmol/L 3 7 3 7 4 1   CHLORIDE mmol/L 103 104 102   CO2 mmol/L 35* 33* 26   BUN mg/dL 20 22 26*   CREATININE mg/dL 0 97 1 00 1 08   CALCIUM mg/dL 8 5 8 5 8 6     Results from last 7 days   Lab Units 03/18/20  0531   INR  1 70*     Counseling / Coordination of Care: Total floor / unit time spent today 20 minutes  Greater than 50% of total time was spent with the patient and / or family counseling and / or coordination of care  A description of the counseling / coordination of care: 10 minutes (low sodium diet, fluid restriction, daily weights, and importance of medication compliance)  Thank you for the opportunity to participate in the care of this patient      Sona Quarles PA-C

## 2020-03-19 NOTE — OCCUPATIONAL THERAPY NOTE
Occupational Therapy Evaluation      Desireenika Mandujanowa    3/19/2020    Principal Problem:    Atrial fibrillation with RVR ([facility] [address] )  Active Problems:    Depression with anxiety    Hyperlipidemia    Hypertension    Hypothyroidism    Obesity (BMI 30-39  9)    Type 2 diabetes mellitus with diabetic cataract ([facility] [address] )    Acute on chronic combined systolic and diastolic CHF (congestive heart failure) (HCC)    Chronic combined systolic (congestive) and diastolic (congestive) heart failure (HCC)    Restless leg syndrome    Edema of both lower extremities      Past Medical History:   Diagnosis Date    Cardiogenic shock ([facility] [address] ) 6/26/2019    CHF [facility]congestive heart failure) ([facility])     Eczema     Photosensitivity     abnormal skin sensitivity to sunlight    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       Past Surgical History:   Procedure Laterality Date    CATARACT EXTRACTION Left     HEMORRHOID SURGERY      IR CENTRAL LINE PLACEMENT  7/9/2019    OOPHORECTOMY      unilateral    TOTAL ABDOMINAL HYSTERECTOMY          03/19/20 1153   Note Type   Note type Eval/Treat   Restrictions/Precautions   Weight Bearing Precautions Per Order No   Braces or Orthoses Sling  (LUE s/p ICD placement)   Other Precautions Cardiac/sternal;Telemetry;Multiple lines; Fall Risk;Pain; Impulsive;Cognitive; Chair Alarm; Bed Alarm  (s/p ICD 3/18/2020)   Pain Assessment   Pain Assessment Tool Pain Assessment not indicated - pt denies pain   Pain Score No Pain   Home Living   Type of [address] One level;Stairs to enter with rails; Performs ADLs on one level; Able to live on main level with bedroom/bathroom  ([facility]; 2STE)   Bathroom Shower/Tub Tub/shower unit   Bathroom Toilet Standard   Bathroom Equipment Shower chair  (SC does not fit in shower)   [address]   Additional Comments Pt denies use of DME PTA   Prior Function   Level of [place] Independent with ADLs and functional mobility   Lives With Alone Receives Help From Family   ADL Assistance Independent   IADLs Independent   Falls in the last 6 months 0   Vocational Part time employment   Lifestyle   Autonomy Pt reports being IND w/ all ADLS and IADLS; (+) drives PTA   Reciprocal Relationships Pt lives alone  Pt reports her sister lives 1 block away, however works during the day  Service to Others Pt works part time as a    Intrinsic Gratification Pt reports enjoying staying independent   Psychosocial   Psychosocial ([address]) [address] of bed   [address] 5  [address] [facility] 5  Supervision/Setup   UB [address] 4  Minimal Assistance   LB [address] 3  Moderate Assistance   [address] 4  [address] 3  Moderate [address]  4  Minimal Assistance   Bed Mobility   Supine to Sit Unable to assess   Sit to Supine Unable to assess   Additional Comments Pt seated at EOB upon OT arrival  Pt seated EOB w/ all needs in reach and bed alarm activated s/p OT session  Transfers   Sit to Stand 4  Minimal assistance   Additional items Assist x 1; Increased time required;Verbal cues;Armrests   Stand to Sit 4  Minimal assistance   Additional items Assist x 1; Increased time required;Verbal cues;Armrests   Toilet transfer 4  Minimal assistance   Additional items Assist x 1; Increased time required;Standard toilet;Armrests   Additional Comments Transfers w/o AD  VC and TC required for safety and to maintain LUE pacemaker precautions  Functional Mobility   Functional Mobility 4  Minimal assistance   Additional Comments Pt demonstrated short distance mobility EOB<>bathroom impulsively w/o AD, requiring Min A for balance and VC for safety awareness  Pt de-lined herself from her puls-ox and telemetry and did not call nurse for assistance  Pt ambulted w/ LUE falling out of sling and required A for sling management and pacemaker precautions   Pt demonstrated poor carryover of all education  Additional items   (none)   Balance   Static Sitting Fair +   Dynamic Sitting Fair   Static Standing Fair -   Dynamic Standing Poor +   Ambulatory Poor +   Activity Tolerance   Activity Tolerance Patient limited by fatigue;Treatment limited secondary to medical complications (Comment)   Medical Staff Made Aware PT [facility];  for safe dispo planning   Nurse Made Aware RN cleared Pt for OT sessionl   RUE Assessment   RUE Assessment WFL   LUE Assessment   LUE Assessment X  (pacemaker precautions in sling)   Hand Function   Gross Motor Coordination Functional   Fine Motor Coordination Functional   Sensation   Light Touch No apparent deficits   Cognition   Overall Cognitive Status WFL   Arousal/Participation Alert; Cooperative;Lethargic   Attention Attends with cues to redirect   Orientation Level Oriented X4   Memory Decreased recall of precautions;Decreased recall of recent events   Following Commands Follows one step commands with increased time or repetition   Comments Pt required increased encouragement and education for participation in therapy  Pt presents w/ decreased insight into deficits and decreased safety awareness  Pt presents w/ impulsivity and required Max VC and TC for safety, sequencing, and slow of pace  Pt to benefit from further cognitive assessment (ACLS) to A w/ safe dispo planning   Assessment   Limitation Decreased ADL status; Decreased UE strength;Decreased Safe judgement during ADL;Decreased cognition;Decreased endurance;Decreased high-level ADLs   Prognosis Good   Assessment Pt is a 77 yo female seen for OT eval s/p adm to [facility] on 3/11/2020 w/ worsening LE edema and SOB dx'd w/ A-fib w/ RVR  Pt s/p AV node ablation with CRT-D with Dr Elis Vasquez 03/18/2020  Co-morbidities include a h/o A-fib w/ RVR, depression w/ anxiety, HTN, HLD, obesity, DM 2, CHF, RLS, OA, ambulatory dysfunction, debility   Pt with active OT orders and up with assistance orders  Pt works part time and resides alone in [facility] w/ few VADIM  Pt reports her sister lives 1 block away however works FT during the day  Pt was I w/  ADLS and IADLS, drove, & required no use of DME PTA  Pt is currently demonstrating the following occupational deficits: Min A UB bathing/dressing, Mod A LB bathing/dressing, Min A toileting, Min A functional transfers and mobility w/o AD  These deficits that are impacting pt's baseline areas of occupation are a result of the following impairments: pain, endurance, activity tolerance, functional mobility, forward functional reach, functional mobility, balance, functional standing tolerance, unsupportive home environment, decreased I w/ ADLS/IADLS, cognitive impairments, decreased safety awareness and decreased insight into deficits  The following Occupational Performance Areas to address include: grooming, bathing/shower, toilet hygiene, dressing, health maintenance, functional mobility and clothing management  Based on the aforementioned OT evaluation, functional performance deficits, and assessments, pt has been identified as a high complexity evaluation  Recommend home with family support, home OT and formal cognitive evaluation to assist w/ safe d/c planning upon D/C  Pt to continue to benefit from acute immediate OT services to address the following goals 3-5x/week to  w/in 7-10 days:    Goals   Patient Goals To eat her food   LTG Time Frame 7-10   Long Term Goal #1 Refer to goals below   Plan   Treatment Interventions ADL retraining;Functional transfer training;UE strengthening/ROM; Endurance training;Cognitive reorientation;Patient/family training;Equipment evaluation/education; Compensatory technique education;Cardiac education; Energy conservation; Activityengagement   Goal Expiration Date 20   OT Frequency 3-5x/wk   Recommendation   OT Discharge Recommendation Home OT  (+ increased family support pending ACLS)   Equipment Recommended Bedside commode OT - OK to Discharge Yes  (when medically cleared)   Modified [place] Scale   Modified Luigi Scale 4         GOALS    1) Pt will improve activity tolerance to G for min 30 min txment sessions for increase engagement in functional tasks    2) Pt will complete UB/LB dressing/self care w/ mod I using adaptive device and DME as needed    3) Pt will complete bathing w/ Mod I w/ use of AE and DME as needed    4) Pt will complete toileting w/ mod I w/ G hygiene/thoroughness using DME as needed    5) Pt will improve functional transfers to Mod I on/off all surfaces using DME as needed w/ G balance/safety     6) Pt will improve functional mobility during ADL/IADL/leisure tasks to Mod I using DME as needed w/ G balance/safety     7) Pt will participate in simulated IADL management task to increase independence to Mod I w/ G safety and endurance    8) Pt will be attentive 100% of the time during ongoing cognitive assessment w/ G participation to assist w/ safe d/c planning/recommendations    9) Pt will demonstrate G carryover of pt/caregiver education and training as appropriate w/o cues w/ good tolerance to increase safety during functional tasks    10) Pt will demonstrate 100% carryover of energy conservation techniques t/o functional I/ADL/leisure tasks w/o cues s/p skilled education to increase endurance during functional tasks               David Silva MS, OTR/L
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
## 2020-03-19 NOTE — PROGRESS NOTES
Progress Note - Electrophysiology  Carmen Hutson 78 y o  female MRN: [id number]  Unit/Bed#: [facility] 529-01 Encounter: [id number]      Assessment:  1  Medtronic biventricular ICD in situ status post AV node ablation  2  Persistent atrial fibrillation with difficult to control RVR  - anticoagulated with Eliquis / [id number] score of 6 (age, sex, HTN, CHF, DM)  - EF of 45% per echo 7/4/2019 / LA diameter of 3 6 cm  - rate control: metoprolol succinate 75mg twice daily  - antiarrhythmic therapy: amiodarone used in the past with breakthrough  - prior cardioversion:  6/21/2019 (unsuccessful with junctional rhythm briefly afterwards), 7/4/2019 (held for 5 days with amio on board)  - prior ablation: none  - prior attempt at pacemaker implantation aborted due to cardiogenic shock with propofol / now status post biventricular ICD and AV node ablation by Dr Marjorie Yee on 3/18/2020  3  Chronic biventricular heart failure  - diuretic regimen of torsemide 40 mg daily as an outpatient / current regimen per Heart Failure team  4  Nonischemic cardiomyopathy  - prior tachy mediated cardiomyopathy with EF of 25 to 30% in June of 2019  5  Essential hypertension  6  Hyperlipidemia  7  Diabetes mellitus  8  Hypothyroidism  - history radio ablation now maintained on levothyroxine  9  Obesity with BMI of 37  10  CORAZON  - CPAP noncompliance in the past  11  CKD stage III  12  History of alcohol abuse      Plan:  1  Device - Patient is doing well status post Medtronic biventricular ICD and AV node ablation  Her incision is clean, dry, and intact without evidence of hematoma or ecchymosis or signs of infection  Vital signs and labs were reviewed  Assessment of chest x-rays show proper lead placement without evidence of pneumothorax  Device interrogation showed appropriate device function with lead parameters such as sensing, threshold, and impedance being tested       2  Post care - Discharge instructions and restrictions were discussed with the patient in detail  She will also be provided with written instructions detailing what was discussed  Patient also requires a 2 week device and site check which has already been arranged and is in Epic  3  Medications - In terms of medications, all rate-controlling medications can be stopped - I have discontinued her digoxin  Will discuss with heart failure also if they would like beta-blocker to be decreased as she does not need for rate control now  She has also already been started on her Eliquis again  4  Patient is stable from an EP standpoint for discharge at this time  Subjective/Objective   Subjective: Mary Hinton is a 78y o  year old female with persistent atrial fibrillation anticoagulated with Eliquis, chronic systolic congestive heart failure, essential hypertension, hyperlipidemia, diabetes mellitus, hypothyroidism, obesity with BMI of 37, CORAZON, CKD stage 3, and history of alcohol abuse  She is hospital stay day 9 and is status post CRT D and AV node ablation  She reports no issues overnight, denies chest pain, shortness of breath, palpitations, lightheadedness or dizziness  She reports that even today she is already feeling better as her heart is not racing as it was yesterday  She is hopeful to get out but understands that she needs further diuresis before hand  Telemetry shows ventricular paced rhythm at 80 beats per minute      Objective:  Vitals: /63 (BP Location: Right arm)   Pulse 79   Temp 98 °F (36 7 °C) (Oral)   Resp 16   Ht 5' 1" (1 549 m)   Wt 85 6 kg (188 lb 11 4 oz)   SpO2 99%   BMI 35 66 kg/m²     Vitals:    03/18/20 0600 03/19/20 0443   Weight: 86 5 kg (190 lb 11 2 oz) 85 6 kg (188 lb 11 4 oz)     Orthostatic Blood Pressures      Most Recent Value   Blood Pressure  140/63 filed at 03/19/2020 1049   Patient Position - Orthostatic VS  Sitting filed at 03/19/2020 1049            Intake/Output Summary (Last 24 hours) at 3/19/2020 1430  Last data filed at 3/19/2020 1300  Gross per 24 hour   Intake 1280 ml   Output 1050 ml   Net 230 ml       Invasive Devices     Peripheral Intravenous Line            Peripheral IV Left Antecubital -- days    Peripheral IV 03/18/20 Right;Upper Arm 1 day                          Scheduled Meds:  Current Facility-Administered Medications:  acetaminophen 650 mg Oral Q4H PRN Judy Duque MD   apixaban 5 mg Oral BID Viviana Roth PA-C   ascorbic acid 125 mg Oral Daily Judy Duque MD   bumetanide 4 mg Intravenous BID (diuretic) Mally Mulligan PA-C   cholecalciferol 1,000 Units Oral Daily Judy Duque MD   docusate sodium 100 mg Oral BID Prakash Jorgensen MD   hydrALAZINE 25 mg Oral Q8H [address] SOTO Junior   levothyroxine 137 mcg Oral Early Morning Judy Duque MD   metoprolol succinate 100 mg Oral Q12H [address] Julianne Carty MD   potassium chloride 20 mEq Oral BID Mally Mulligan PA-C   rOPINIRole 2 mg Oral HS Leidy Santiago PA-C   venlafaxine 75 mg Oral Q12H [address] Judy Duque MD     Continuous Infusions:   PRN Meds:   acetaminophen    Review of Systems:  ROS as noted above, otherwise 12 point review of systems was performed and is negative  Physical Exam:   GEN: NAD, alert and oriented, well appearing  SKIN: dry without significant lesions or rashes  HEENT: NCAT, PERRL, EOMs intact  NECK: No JVD or carotid bruits appreciated  CARDIOVASCULAR: RRR, normal S1, S2 without murmurs, rubs, or gallops appreciated  LUNGS: Clear to auscultation bilaterally without wheezes, rhonchi, or rales  ABDOMEN: Soft, nontender, nondistended  EXTREMITIES/VASCULAR: perfused without clubbing, cyanosis; nonpitting edema  PSYCH: Normal mood and affect  NEURO: CN ll-Xll grossly intact              Lab Results: I have personally reviewed pertinent lab results      Results from last 7 days   Lab Units 03/19/20  0550 03/18/20  0531   WBC Thousand/uL 6 50 7 55   HEMOGLOBIN g/dL 10 5* 10 3*   HEMATOCRIT % 34 3* 33 8* PLATELETS Thousands/uL 204 208     Results from last 7 days   Lab Units 20  0550 20  0531 20  0508   POTASSIUM mmol/L 3 7 3 7 4 1   CHLORIDE mmol/L 103 104 102   CO2 mmol/L 35* 33* 26   BUN mg/dL 20 22 26*   CREATININE mg/dL 0 97 1 00 1 08   CALCIUM mg/dL 8 5 8 5 8 6     Results from last 7 days   Lab Units 20  0531   INR  1 70*           Imaging: I have personally reviewed pertinent reports  Results for orders placed during the hospital encounter of 20   Echo complete with contrast if indicated    Narrative [address]  [facility], [address] [facility]  [phone]    Transthoracic Echocardiogram  2D, M-mode, Doppler, and Color Doppler    Study date:  15-Mar-2020    Patient: Dasha Gibbs  MR number: [id number]  Account number: [de-identified]  : 1940  Age: 78 years  Gender: Female  Status: Inpatient  Location: Bedside  Height: 61 in  Weight: 197 6 lb  BP: 120/ 79 mmHg    Indications: A-fib  Diagnoses: I48 0 - Atrial fibrillation    Sonographer:  Kirstie Taveras  Primary Physician:  SOTO Burns  Referring Physician:  Brittany Rhodes PA-C  Group:  [facility]  Interpreting Physician:  Chun Velez MD    SUMMARY    PROCEDURE INFORMATION:  This was a technically difficult study  LEFT VENTRICLE:  The ventricle was mildly dilated  Systolic function was markedly reduced by visual assessment  Ejection fraction was estimated to be 30 %  There was severe diffuse hypokinesis with regional variations  RIGHT VENTRICLE:  The ventricle was moderately dilated  Systolic function was moderately to markedly reduced  LEFT ATRIUM:  The atrium was moderately to markedly dilated  RIGHT ATRIUM:  The atrium was moderately dilated  MITRAL VALVE:  There was mild annular calcification  There was moderate regurgitation  TRICUSPID VALVE:  There was mild to moderate regurgitation      IVC, HEPATIC VEINS:  The inferior vena cava was dilated  HISTORY: PRIOR HISTORY: A-fib;DM2;HTN;HLD;Hypothyroid:Obesity  PROCEDURE: The procedure was performed at the bedside  This was a routine study  The transthoracic approach was used  The study included complete 2D imaging, M-mode, complete spectral Doppler, and color Doppler  The heart rate was 97 bpm,  at the start of the study  Images were obtained from the parasternal, apical, subcostal, and suprasternal notch acoustic windows  This was a technically difficult study  LEFT VENTRICLE: The ventricle was mildly dilated  Systolic function was markedly reduced by visual assessment  Ejection fraction was estimated to be 30 %  There was severe diffuse hypokinesis with regional variations  Wall thickness was  normal  DOPPLER: Transmitral flow pattern: atrial fibrillation  RIGHT VENTRICLE: The ventricle was moderately dilated  Systolic function was moderately to markedly reduced  DOPPLER: Estimated peak pressure was at least 40 mmHg  LEFT ATRIUM: The atrium was moderately to markedly dilated  RIGHT ATRIUM: The atrium was moderately dilated  MITRAL VALVE: There was mild annular calcification  Valve structure was normal  There was normal leaflet separation  DOPPLER: The transmitral velocity was within the normal range  There was no evidence for stenosis  There was moderate  regurgitation  AORTIC VALVE: The valve was trileaflet  Leaflets exhibited normal thickness, mild calcification, and normal cuspal separation  DOPPLER: Transaortic velocity was within the normal range  There was no evidence for stenosis  There was no  regurgitation  TRICUSPID VALVE: The valve structure was normal  There was normal leaflet separation  DOPPLER: The transtricuspid velocity was within the normal range  There was no evidence for stenosis  There was mild to moderate regurgitation  PULMONIC VALVE: Leaflets exhibited normal thickness, no calcification, and normal cuspal separation  DOPPLER: The transpulmonic velocity was within the normal range  There was no regurgitation  PERICARDIUM: There was no pericardial effusion  AORTA: The root exhibited normal size  SYSTEMIC VEINS: IVC: The inferior vena cava was dilated      SYSTEM MEASUREMENT TABLES    2D  %FS: 34 58 %  Ao Diam: 2 77 cm  EDV(Teich): 143 59 ml  EF(Teich): 63 17 %  ESV(Teich): 52 89 ml  IVSd: 0 8 cm  LA Area: 23 29 cm2  LA Diam: 4 24 cm  LVEDV MOD A4C: 91 87 ml  LVEF MOD A4C: 33 04 %  LVESV MOD A4C: 61 52 ml  LVIDd: 5 44 cm  LVIDs: 3 56 cm  LVLd A4C: 6 58 cm  LVLs A4C: 5 96 cm  LVPWd: 0 77 cm  RA Area: 22 49 cm2  RVIDd: 4 38 cm  SV MOD A4C: 30 35 ml  SV(Teich): 90 7 ml    CW  MR VTI: 135 53 cm  MR Vmax: 4 28 m/s  MR Vmean: 3 35 m/s  MR maxP 14 mmHg  MR meanP 63 mmHg  TR Vmax: 2 43 m/s  TR maxP 58 mmHg    MM  TAPSE: 1 02 cm    PW  E': 0 05 m/s    [employer] Accredited Echocardiography Laboratory    Prepared and electronically signed by    Quinn Agosto MD  Signed 15-Mar-2020 12:52:11         VTE Pharmacologic Prophylaxis: Sequential compression device (Venodyne)   VTE Mechanical Prophylaxis: sequential compression device

## 2020-03-19 NOTE — PLAN OF CARE
Problem: OCCUPATIONAL THERAPY ADULT  Goal: Performs self-care activities at highest level of function for planned discharge setting  See evaluation for individualized goals  Description  Treatment Interventions: ADL retraining, Functional transfer training, UE strengthening/ROM, Endurance training, Cognitive reorientation, Patient/family training, Equipment evaluation/education, Compensatory technique education, Cardiac education, Energy conservation, Activityengagement  Equipment Recommended: Bedside commode       See flowsheet documentation for full assessment, interventions and recommendations  Note:   Limitation: Decreased ADL status, Decreased UE strength, Decreased Safe judgement during ADL, Decreased cognition, Decreased endurance, Decreased high-level ADLs  Prognosis: Good  Assessment: Pt is a 79 yo female seen for OT eval s/p adm to [facility] on 3/11/2020 w/ worsening LE edema and SOB dx'd w/ A-fib w/ RVR  Pt s/p AV node ablation with CRT-D with Dr Jaci Moore on 03/18/2020  Co-morbidities include a h/o A-fib w/ RVR, depression w/ anxiety, HTN, HLD, obesity, DM 2, CHF, RLS, OA, ambulatory dysfunction, debility  Pt with active OT orders and up with assistance  orders  Pt works part time and resides alone in [facility] w/ few VADIM  Pt reports her sister lives 1 block away however works FT during the day  Pt was I w/  ADLS and IADLS, drove, & required no use of DME PTA  Pt is currently demonstrating the following occupational deficits: Min A UB bathing/dressing, Mod A LB bathing/dressing, Min A toileting, Min A functional transfers and mobility w/o AD   These deficits that are impacting pt's baseline areas of occupation are a result of the following impairments: pain, endurance, activity tolerance, functional mobility, forward functional reach, functional mobility, balance, functional standing tolerance, unsupportive home environment, decreased I w/ ADLS/IADLS, cognitive impairments, decreased safety awareness and decreased insight into deficits  The following Occupational Performance Areas to address include: grooming, bathing/shower, toilet hygiene, dressing, health maintenance, functional mobility and clothing management  Based on the aforementioned OT evaluation, functional performance deficits, and assessments, pt has been identified as a high complexity evaluation  Recommend home with family support, home OT and formal cognitive evaluation to assist w/ safe d/c planning upon D/C   Pt to continue to benefit from acute immediate OT services to address the following goals 3-5x/week to  w/in 7-10 days:      OT Discharge Recommendation: Home OT(+ increased family support pending ACLS)  OT - OK to Discharge: Yes(when medically cleared)

## 2020-03-19 NOTE — SOCIAL WORK
A post acute care recommendation was made by your care team for Sissy 78  Pt refusing services states she does not feel like she needs it at this time

## 2020-03-19 NOTE — PLAN OF CARE
Problem: PHYSICAL THERAPY ADULT  Goal: Performs mobility at highest level of function for planned discharge setting  See evaluation for individualized goals  Description  Treatment/Interventions: Functional transfer training, LE strengthening/ROM, Therapeutic exercise, Endurance training, Elevations, Patient/family training, Equipment eval/education, Bed mobility, Gait training, Spoke to nursing  Equipment Recommended: (TBD)       See flowsheet documentation for full assessment, interventions and recommendations  Note:   Prognosis: Good  Problem List: Decreased strength, Decreased endurance, Impaired balance, Decreased mobility, Decreased cognition, Decreased safety awareness  Assessment: Pt seen for high complexity PT evaluation due to decrease in functional mobility status compared to baseline  Pt with active PT eval/treat orders at this time  Pt is a 78 y o  yo F who presented to [facility] with LE edema and SOB on 3/11/20  Pt primary dx is a-fib with RVR  Pt  has a past medical history of Cardiogenic shock ([facility] [address] ), CHF (congestive heart failure) ([facility] [address] ), Eczema, Photosensitivity, and 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[facility] [address] )  Pt resides alone in [facility] with 2STE and reports I PTA  Pt presents with decreased strength, balance, endurance, as well as decreased safety awareness that contribute to limitations in bed mobility, functional transfers, functional mobility  Pt requires supervision for STS, Min A for ambulation without AD 10 ft x 1, and CGA for ambulation 10 ft x 1 with SPC at this time  Pt left sitting EOB with bed alarm intact and with all needs in reach  Pt will benefit from skilled therapy in order to address current impairments and functional limitations   PT to follow pt and recommending HHPT pending progress and cog eval   Barriers to Discharge: Decreased caregiver support, Inaccessible home environment     Recommendation: Home with family support, Home PT(pending progress and cog assessment)     PT - OK to Discharge: No(pending progress and cog eval)    See flowsheet documentation for full assessment

## 2020-03-19 NOTE — PHYSICAL THERAPY NOTE
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)    Atrial fibrillation with RVR (HCC)    Acute on chronic combined systolic and diastolic CHF (congestive heart failure) (HCC)    Ambulatory dysfunction    Chronic combined systolic (congestive) and diastolic (congestive) heart failure (HCC)    Debility    Restless leg syndrome    Edema of both lower extremities       Past Medical History  Past Medical History:   Diagnosis Date    Cardiogenic shock ([facility] ) 6/26/2019    CHF (congestive heart failure) (HCC)     Eczema     Photosensitivity     abnormal skin sensitivity to sunlight    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HCC)     staging       Past Surgical History  Past Surgical History:   Procedure Laterality Date    CATARACT EXTRACTION Left     HEMORRHOID SURGERY      IR CENTRAL LINE PLACEMENT  7/9/2019    OOPHORECTOMY      unilateral    TOTAL ABDOMINAL HYSTERECTOMY            03/19/20 1154   Note Type   Note type Eval only   Pain Assessment   Pain Assessment Tool Pain Assessment not indicated - pt denies pain   Pain Score No Pain   Home Living   Type of [facility] One level  (2STE)   Bathroom Shower/Tub Tub/shower unit   Bathroom Toilet Standard   Home Equipment Cane;Walker  (denies use, though person [facility] was in room)   Prior Function   Level of [place] Independent with ADLs and functional mobility   Lives With Alone   ADL Assistance Independent   IADLs Independent   Falls in the last 6 months 0   Comments Pt reports local family able to assist as needed     Restrictions/Precautions   Weight Bearing Precautions Per Order No   Braces or Orthoses Sling  (L UE sling s/p ICD placement)   Other Precautions Fall Risk;Telemetry;Multiple lines; Chair Alarm; Bed Alarm; Impulsive   General   Family/Caregiver Present No   Cognition   Arousal/Participation Alert   Orientation Level Oriented X4   Memory Decreased recall of precautions   Following Commands Follows one step commands with increased time or repetition   Comments Pt presents with impulsivity, decreased safety awareness, and decreased insight into deficits requiring cues for safety throughout  Upon PT arrival, pt noted to be disconnecting telemetery wires in order to take self to bathroom  Pt with difficulty maintaining L arm in sling despite repetative cues  RLE Assessment   RLE Assessment   (functionally 4/5)   LLE Assessment   LLE Assessment   (functionally 4/5)   Bed Mobility   Additional Comments Sitting EOB upon PT arrival   Pt left sitting EOB with bed alarm intact and all needs in reach at end of therapy session  Transfers   Sit to Stand 5  Supervision   Stand to Sit 5  Supervision   Additional Comments Transfers with/without SPC  VC for hand placement and safety  Ambulation/Elevation   Gait pattern Excessively slow; Short stride; Shuffling;Narrow LACIE; Improper Weight shift  (unsteady, increased lateral sway)   Gait Assistance 4  Minimal assist  (Min A without AD, CGA with SPC)   Additional items Assist x 1;Verbal cues   Assistive Device None;SPC  (recommend trial of RW vs SPC during next session)   Distance 10 ft x 1 without AD, 10 ft x 1 with [facility]   Stair Management Assistance Not tested   Balance   Static Sitting Fair +   Dynamic Sitting Fair   Static Standing Fair -   Dynamic Standing Poor +   Ambulatory Poor +   Endurance Deficit   Endurance Deficit Yes   Endurance Deficit Description cog, fatigue, weakness   Activity Tolerance   Activity Tolerance Patient limited by fatigue   Medical Staff Made Aware DELVIN Simeon   Nurse Made Aware ZEE cleared pt to be seen by PT   Assessment   Prognosis Good   Problem List Decreased strength;Decreased endurance; Impaired balance;Decreased mobility; Decreased cognition;Decreased safety awareness   Assessment Pt seen for high complexity PT evaluation due to decrease in functional mobility status compared to baseline  Pt with active PT eval/treat orders at this time  Pt is a 78 y o  yo F who presented to [facility] with LE edema and SOB on 3/11/20  Pt primary dx is a-fib with RVR  Pt  has a past medical history of Cardiogenic shock ([facility] ), CHF (congestive heart failure) ([facility] ), Eczema, Photosensitivity, and 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 )  Pt resides alone in [facility] with 2STE and reports I PTA  Pt presents with decreased strength, balance, endurance, as well as decreased safety awareness that contribute to limitations in bed mobility, functional transfers, functional mobility  Pt requires supervision for STS, Min A for ambulation without AD 10 ft x 1, and CGA for ambulation 10 ft x 1 with SPC at this time  Pt left sitting EOB with bed alarm intact and with all needs in reach  Pt will benefit from skilled therapy in order to address current impairments and functional limitations  PT to follow pt and recommending HHPT pending progress and cog eval    Barriers to Discharge Decreased caregiver support; Inaccessible home environment   Goals   Patient Goals to finish her breakfast   STG Expiration Date 04/02/20   Short Term Goal #1 1  Pt will demonstrate ability to perform all aspects of bed mobility with I in order to increase independence and decrease burden on caregivers  2  Pt will demonstrate ability to perform functional transfers with Mod I in order to increase independence and decrease burden on caregivers  3  Pt will demonstrate ability to ambulate 200 ft with least restrictive AD with Mod I in order to return to mobility safely   4  Pt will demonstrate ability to negotiate 2 steps with/without HR and Mod I in order to return to household/community mobility safely  5  Pt will demonstrate improved balance by one grade order to decrease risk of falls  6  Pt will increase b/l LE strength by 1 grade in order to increase ease of functional mobility and transfers  Plan   Treatment/Interventions Functional transfer training;LE strengthening/ROM; Therapeutic exercise; Endurance training;Elevations; Patient/family training;Equipment eval/education; Bed mobility;Gait training;Spoke to nursing   PT Frequency Other (Comment)  (3-5x/wk)   Recommendation   Recommendation Home with family support;Home PT  (pending progress and cog assessment)   Equipment Recommended   (TBD)   PT - OK to Discharge No  (pending progress and cog eval)   Modified Luigi Scale   Modified Bullitt Scale 4         Brooklyn Queen, PT, DPT

## 2020-03-19 NOTE — PLAN OF CARE
Problem: Potential for Falls  Goal: Patient will remain free of falls  Description  INTERVENTIONS:  - Assess patient frequently for physical needs  -  Identify cognitive and physical deficits and behaviors that affect risk of falls    -  [place] fall precautions as indicated by assessment   - Educate patient/family on patient safety including physical limitations  - Instruct patient to call for assistance with activity based on assessment  - Modify environment to reduce risk of injury  - Consider OT/PT consult to assist with strengthening/mobility  Outcome: Progressing     Problem: PAIN - ADULT  Goal: Verbalizes/displays adequate comfort level or baseline comfort level  Description  Interventions:  - Encourage patient to monitor pain and request assistance  - Assess pain using appropriate pain scale  - Administer analgesics based on type and severity of pain and evaluate response  - Implement non-pharmacological measures as appropriate and evaluate response  - Consider cultural and social influences on pain and pain management  - Notify physician/advanced practitioner if interventions unsuccessful or patient reports new pain  Outcome: Progressing     Problem: INFECTION - ADULT  Goal: Absence or prevention of progression during hospitalization  Description  INTERVENTIONS:  - Assess and monitor for signs and symptoms of infection  - Monitor lab/diagnostic results  - Monitor all insertion sites, i e  indwelling lines, tubes, and drains  - Monitor endotracheal if appropriate and nasal secretions for changes in amount and color  - [place] appropriate cooling/warming therapies per order  - Administer medications as ordered  - Instruct and encourage patient and family to use good hand hygiene technique  - Identify and instruct in appropriate isolation precautions for identified infection/condition  Outcome: Progressing  Goal: Absence of fever/infection during neutropenic period  Description  INTERVENTIONS:  - Monitor WBC    Outcome: Progressing     Problem: SAFETY ADULT  Goal: Maintain or return to baseline ADL function  Description  INTERVENTIONS:  -  Assess patient's ability to carry out ADLs; assess patient's baseline for ADL function and identify physical deficits which impact ability to perform ADLs (bathing, care of mouth/teeth, toileting, grooming, dressing, etc )  - Assess/evaluate cause of self-care deficits   - Assess range of motion  - Assess patient's mobility; develop plan if impaired  - Assess patient's need for assistive devices and provide as appropriate  - Encourage maximum independence but intervene and supervise when necessary  - Involve family in performance of ADLs  - Assess for home care needs following discharge   - Consider OT consult to assist with ADL evaluation and planning for discharge  - Provide patient education as appropriate  Outcome: Progressing  Goal: Maintain or return mobility status to optimal level  Description  INTERVENTIONS:  - Assess patient's baseline mobility status (ambulation, transfers, stairs, etc )    - Identify cognitive and physical deficits and behaviors that affect mobility  - Identify mobility aids required to assist with transfers and/or ambulation (gait belt, sit-to-stand, lift, walker, cane, etc )  - [place] fall precautions as indicated by assessment  - Record patient progress and toleration of activity level on Mobility SBAR; progress patient to next Phase/Stage  - Instruct patient to call for assistance with activity based on assessment  - Consider rehabilitation consult to assist with strengthening/weightbearing, etc   Outcome: Progressing     Problem: DISCHARGE PLANNING  Goal: Discharge to home or other facility with appropriate resources  Description  INTERVENTIONS:  - Identify barriers to discharge w/patient and caregiver  - Arrange for needed discharge resources and transportation as appropriate  - Identify discharge learning needs (meds, wound care, etc )  - Arrange for interpretive services to assist at discharge as needed  - Refer to Case Management Department for coordinating discharge planning if the patient needs post-hospital services based on physician/advanced practitioner order or complex needs related to functional status, cognitive ability, or social support system  Outcome: Progressing     Problem: Knowledge Deficit  Goal: Patient/family/caregiver demonstrates understanding of disease process, treatment plan, medications, and discharge instructions  Description  Complete learning assessment and assess knowledge base    Interventions:  - Provide teaching at level of understanding  - Provide teaching via preferred learning methods  Outcome: Progressing     Problem: CARDIOVASCULAR - ADULT  Goal: Maintains optimal cardiac output and hemodynamic stability  Description  INTERVENTIONS:  - Monitor I/O, vital signs and rhythm  - Monitor for S/S and trends of decreased cardiac output  - Administer and titrate ordered vasoactive medications to optimize hemodynamic stability  - Assess quality of pulses, skin color and temperature  - Assess for signs of decreased coronary artery perfusion  - Instruct patient to report change in severity of symptoms  Outcome: Progressing  Goal: Absence of cardiac dysrhythmias or at baseline rhythm  Description  INTERVENTIONS:  - Continuous cardiac monitoring, vital signs, obtain 12 lead EKG if ordered  - Administer antiarrhythmic and heart rate control medications as ordered  - Monitor electrolytes and administer replacement therapy as ordered  Outcome: Progressing     Problem: GASTROINTESTINAL - ADULT  Goal: Maintains or returns to baseline bowel function  Description  INTERVENTIONS:  - Assess bowel function  - Encourage oral fluids to ensure adequate hydration  - Administer IV fluids if ordered to ensure adequate hydration  - Administer ordered medications as needed  - Encourage mobilization and activity  - Consider nutritional services referral to assist patient with adequate nutrition and appropriate food choices  Outcome: Progressing     Problem: SKIN/TISSUE INTEGRITY - ADULT  Goal: Skin integrity remains intact  Description  INTERVENTIONS  - Identify patients at risk for skin breakdown  - Assess and monitor skin integrity  - Assess and monitor nutrition and hydration status  - Monitor labs (i e  albumin)  - Assess for incontinence   - Turn and reposition patient  - Assist with mobility/ambulation  - Relieve pressure over bony prominences  - Avoid friction and shearing  - Provide appropriate hygiene as needed including keeping skin clean and dry  - Evaluate need for skin moisturizer/barrier cream  - Collaborate with interdisciplinary team (i e  Nutrition, Rehabilitation, etc )   - Patient/family teaching  Outcome: Progressing  Goal: Incision(s), wounds(s) or drain site(s) healing without S/S of infection  Description  INTERVENTIONS  - Assess and document risk factors for skin impairment   - Assess and document dressing, incision, wound bed, drain sites and surrounding tissue  - Consider nutrition services referral as needed  - Oral mucous membranes remain intact  - Provide patient/ family education  Outcome: Progressing  Goal: Oral mucous membranes remain intact  Description  INTERVENTIONS  - Assess oral mucosa and hygiene practices  - Implement preventative oral hygiene regimen  - Implement oral medicated treatments as ordered  - Initiate Nutrition services referral as needed  Outcome: Progressing

## 2020-03-19 NOTE — ASSESSMENT & PLAN NOTE
Wt Readings from Last 3 Encounters:   03/19/20 85 6 kg (188 lb 11 4 oz)   03/09/20 90 3 kg (199 lb)   02/27/20 87 5 kg (193 lb)     Patient with evidence for acute on chronic combined diastolic heart failure secondary to AFib with RVR      Echo 7/2019 EF 45%, will repeat echo this admission when rates better controlled  Appreciate heart failure input  Daily weights, I&O's, fluid restrict, 2g Na restricted diet  Continue diuresis as per Cardiology

## 2020-03-20 LAB
ANION GAP SERPL CALCULATED.3IONS-SCNC: 4 MMOL/L (ref 4–13)
BUN SERPL-MCNC: 14 MG/DL (ref 5–25)
CALCIUM SERPL-MCNC: 8.4 MG/DL (ref 8.3–10.1)
CHLORIDE SERPL-SCNC: 103 MMOL/L (ref 100–108)
CO2 SERPL-SCNC: 34 MMOL/L (ref 21–32)
CREAT SERPL-MCNC: 0.85 MG/DL (ref 0.6–1.3)
GFR SERPL CREATININE-BSD FRML MDRD: 65 ML/MIN/1.73SQ M
GLUCOSE SERPL-MCNC: 100 MG/DL (ref 65–140)
MAGNESIUM SERPL-MCNC: 2 MG/DL (ref 1.6–2.6)
POTASSIUM SERPL-SCNC: 2.8 MMOL/L (ref 3.5–5.3)
POTASSIUM SERPL-SCNC: 4.5 MMOL/L (ref 3.5–5.3)
SODIUM SERPL-SCNC: 141 MMOL/L (ref 136–145)

## 2020-03-20 PROCEDURE — 80048 BASIC METABOLIC PNL TOTAL CA: CPT | Performed by: STUDENT IN AN ORGANIZED HEALTH CARE EDUCATION/TRAINING PROGRAM

## 2020-03-20 PROCEDURE — 83735 ASSAY OF MAGNESIUM: CPT | Performed by: PHYSICIAN ASSISTANT

## 2020-03-20 PROCEDURE — 99232 SBSQ HOSP IP/OBS MODERATE 35: CPT | Performed by: STUDENT IN AN ORGANIZED HEALTH CARE EDUCATION/TRAINING PROGRAM

## 2020-03-20 PROCEDURE — 84132 ASSAY OF SERUM POTASSIUM: CPT | Performed by: PHYSICIAN ASSISTANT

## 2020-03-20 PROCEDURE — 99232 SBSQ HOSP IP/OBS MODERATE 35: CPT | Performed by: INTERNAL MEDICINE

## 2020-03-20 RX ORDER — POTASSIUM CHLORIDE 14.9 MG/ML
20 INJECTION INTRAVENOUS ONCE
Status: COMPLETED | OUTPATIENT
Start: 2020-03-20 | End: 2020-03-20

## 2020-03-20 RX ORDER — HYDRALAZINE HYDROCHLORIDE 10 MG/1
10 TABLET, FILM COATED ORAL EVERY 8 HOURS SCHEDULED
Status: DISCONTINUED | OUTPATIENT
Start: 2020-03-20 | End: 2020-03-24 | Stop reason: HOSPADM

## 2020-03-20 RX ORDER — POTASSIUM CHLORIDE 20 MEQ/1
40 TABLET, EXTENDED RELEASE ORAL 2 TIMES DAILY
Status: DISCONTINUED | OUTPATIENT
Start: 2020-03-20 | End: 2020-03-24 | Stop reason: HOSPADM

## 2020-03-20 RX ORDER — POTASSIUM CHLORIDE 14.9 MG/ML
20 INJECTION INTRAVENOUS ONCE
Status: DISCONTINUED | OUTPATIENT
Start: 2020-03-20 | End: 2020-03-20

## 2020-03-20 RX ORDER — SODIUM CHLORIDE 9 MG/ML
50 INJECTION, SOLUTION INTRAVENOUS CONTINUOUS
Status: DISCONTINUED | OUTPATIENT
Start: 2020-03-20 | End: 2020-03-24 | Stop reason: HOSPADM

## 2020-03-20 RX ORDER — POTASSIUM CHLORIDE 20 MEQ/1
20 TABLET, EXTENDED RELEASE ORAL ONCE
Status: COMPLETED | OUTPATIENT
Start: 2020-03-20 | End: 2020-03-20

## 2020-03-20 RX ADMIN — HYDRALAZINE HYDROCHLORIDE 10 MG: 10 TABLET, FILM COATED ORAL at 15:01

## 2020-03-20 RX ADMIN — LEVOTHYROXINE SODIUM 137 MCG: 112 TABLET ORAL at 06:14

## 2020-03-20 RX ADMIN — METOPROLOL SUCCINATE 100 MG: 100 TABLET, EXTENDED RELEASE ORAL at 08:47

## 2020-03-20 RX ADMIN — DOCUSATE SODIUM 100 MG: 100 CAPSULE, LIQUID FILLED ORAL at 08:47

## 2020-03-20 RX ADMIN — ASCORBIC ACID TAB 250 MG 125 MG: 250 TAB at 08:49

## 2020-03-20 RX ADMIN — POTASSIUM CHLORIDE 40 MEQ: 1500 TABLET, EXTENDED RELEASE ORAL at 17:11

## 2020-03-20 RX ADMIN — VENLAFAXINE 75 MG: 37.5 TABLET ORAL at 08:49

## 2020-03-20 RX ADMIN — HYDRALAZINE HYDROCHLORIDE 25 MG: 25 TABLET, FILM COATED ORAL at 06:14

## 2020-03-20 RX ADMIN — POTASSIUM CHLORIDE 20 MEQ: 1500 TABLET, EXTENDED RELEASE ORAL at 09:59

## 2020-03-20 RX ADMIN — BUMETANIDE 4 MG: 0.25 INJECTION INTRAMUSCULAR; INTRAVENOUS at 16:00

## 2020-03-20 RX ADMIN — APIXABAN 5 MG: 5 TABLET, FILM COATED ORAL at 08:47

## 2020-03-20 RX ADMIN — BUMETANIDE 4 MG: 0.25 INJECTION INTRAMUSCULAR; INTRAVENOUS at 08:47

## 2020-03-20 RX ADMIN — POTASSIUM CHLORIDE 40 MEQ: 1500 TABLET, EXTENDED RELEASE ORAL at 15:01

## 2020-03-20 RX ADMIN — VENLAFAXINE 75 MG: 37.5 TABLET ORAL at 21:39

## 2020-03-20 RX ADMIN — ROPINIROLE HYDROCHLORIDE 2 MG: 2 TABLET, FILM COATED ORAL at 21:39

## 2020-03-20 RX ADMIN — POTASSIUM CHLORIDE 20 MEQ: 1500 TABLET, EXTENDED RELEASE ORAL at 08:47

## 2020-03-20 RX ADMIN — SACUBITRIL AND VALSARTAN 1 TABLET: 24; 26 TABLET, FILM COATED ORAL at 17:08

## 2020-03-20 RX ADMIN — POTASSIUM CHLORIDE 20 MEQ: 14.9 INJECTION, SOLUTION INTRAVENOUS at 10:01

## 2020-03-20 RX ADMIN — MELATONIN 1000 UNITS: at 08:47

## 2020-03-20 RX ADMIN — METOPROLOL SUCCINATE 100 MG: 100 TABLET, EXTENDED RELEASE ORAL at 21:39

## 2020-03-20 RX ADMIN — HYDRALAZINE HYDROCHLORIDE 10 MG: 10 TABLET, FILM COATED ORAL at 21:39

## 2020-03-20 RX ADMIN — APIXABAN 5 MG: 5 TABLET, FILM COATED ORAL at 17:11

## 2020-03-20 RX ADMIN — DOCUSATE SODIUM 100 MG: 100 CAPSULE, LIQUID FILLED ORAL at 17:11

## 2020-03-20 NOTE — PROGRESS NOTES
Advanced Heart Failure / Pulmonary Hypertension Service Progress Note    Erin Antoine 78 y o  female   MRN: [id number]  Unit/Bed#: [facility] 529-01; Encounter: [id number]    Assessment:  Principal Problem:    Atrial fibrillation with RVR ([facility] )  Active Problems:    Depression with anxiety    Hyperlipidemia    Hypertension    Hypothyroidism    Obesity (BMI 30-39  9)    Type 2 diabetes mellitus with diabetic cataract ([facility] )    Acute on chronic combined systolic and diastolic CHF (congestive heart failure) (HCC)    Chronic combined systolic (congestive) and diastolic (congestive) heart failure (HCC)    Restless leg syndrome    Edema of both lower extremities    Subjective: Erin Antoine is a 70-year-old female with chronic BiV heart failure, atrial fibrillation (AC on Eliquis), HTN, HLD, hypothyroidism, DM2, CORAZON, and history of uterine cancer who presented on 03/11/2020 to [facility] with weight gain and worsening LE edema  Patient seen and examined  No significant events overnight  No current complaints  POD #2 AV node ablation with CRT-D  Heavenly Mcdonough to get home to her cat  Objective: Intake/ Output: 590 mL / 1650 mL (net negative 1060 mL; accurate intake?)  Weight: 188 lbs (same as on 03/19)  Telemetry: Paced rhythm; 70-80s  Plan:  Acute on chronic biventricular heart failure; LVEF 30%, LVIDd 5 44 cm; NYHA III; ACC/AHA Stage C              Etiology: likely tachy-induced, +/- ischemic (nuclear stress was ordered, patient never completed)  Also has history of radioablative iodine  Etiology of exacerbation: Unclear if from AFib with RVR or vice versa  TTE from 06/20/2019: LVEF 25-30%, LVIDd 4 6 cm, mod reduced RVSF  Mod MR and TR  TTE from 06/26/2019: LVEF ~40%, mildly reduced RVSF  Mod MR, otherwise, no significant valvular disease                TTE (limited) from 07/04/2019: LVEF 45%  LVIDd 3 8 cm  Grade 2 DD  TTE completed on 03/16/2020 (not euvolemic): LVEF 30%  LVIDd 5 44 cm  Moderately dilated RV with moderately to markedly reduced RVSF  NAN  Moderate MR  Mild to moderate TR  Dilated IVC  Nuclear stress test ordered in 10/2019; never completed  Strict I/Os with daily weights  CV diet with fluid restriction  Potassium of 2 8 this AM  IV potassium ordered  Will check magnesium now and recheck potassium level this afternoon  Renal function stable; consider addition or ACEi/ARB/ARNi  Continue IV Bumex; likely will require a few more days of IV diuresis        Neurohormonal Blockade:   --Beta Blocker: metoprolol succinate 100 mg q12 hours  --ACEi, ARB or ARNi: N/A  --Cardiac glycoside: digoxin 125 mcg daily  --SVR Reducing Agents: hydralazine 25 mg q8 hours  (home dose of Isordil held)  --Aldosterone Receptor Blocker: N/A  --Home Diuretic: torsemide 40 mg daily  --Inpatient Diuretic: IV Bumex 4 mg BID with potassium 20 mEq BID        Sudden Cardiac Death Risk Reduction:   --[employer]onic CRT-D (BiV ICD) in situ since 03/18/2020       Electrical Resynchronization:   --Interrogation: Narrow QRS      Advanced Therapies (if appropriate): Will continue to monitor       Atrial fibrillation with rapid ventricular response              [id number] = 6 (age, sex, CHF, HTN, DM)              Anticoagulation on Eliquis              S/p unsuccessful cardioversion on 06/21/2019  Initial plan was for EBEN cardioversion and dual-chamber PPM implantation on 06/25  Received propofol and cardizem which likely induced cardiogenic shock due to her borderline low output state at that time                S/p successful cardioversion on 07/03 with amio load  Converted back into Afib on 07/08       Previously on amiodarone as outpatient; was discontinued in July 2019 by EP              Continue on metoprolol succinate 100 mg q12 hours for rate control  Remains on amiodarone drip at 0 5 mg/min  Digoxin added per EP  Electrophysiology consulted; input appreciated      S/p AV node ablation with CRT-D with Dr Nicole Madison on 03/18/2020  Chronic kidney disease, stage III   Baseline creatinine of 0 9-1 2  Today, creatinine of 0 85 (down from 0 97 on 03/19)  Will continue to monitor  Obstructive sleep apnea              Noncompliant with CPAP in the past               Repeat outpatient sleep study was ordered; however, never scheduled      Diabetes mellitus, type 2              Most recent hemoglobin A1c from 03/12/2020 of 6 9  Management per primary team     Hypothyroidism              History of radioablation in the past; now on replacement therapy  Most recent TSH of 9 170 with normal free T4 from 06/18/2019      Hypertension, controlled: continue medications as above  Hyperlipidemia    Diagnostic Testing:  Echocardiogram (limited) from 07/04/2019:  LVEF: 45%; mild diffuse hypokinesis  LVIDd: 3 8 cm  MR: No MR  Moderate annular calcification  Other: Dilated LA      Echocardiogram from 06/20/2019:  LVEF: 25%  LVIDd: 4 6 cm  RV: Dilated and hypokinetic  MR: Moderate  PASP: 35 mmHg  Vitals:   Blood pressure 107/54, pulse 79, temperature 98 2 °F (36 8 °C), temperature source Oral, resp  rate 18, height 5' 1" (1 549 m), weight 85 6 kg (188 lb 11 4 oz), SpO2 92 %  Body mass index is 35 66 kg/m²  I/O last 3 completed shifts: In: 1310 [P O :1310]  Out: 2700 [Urine:2700]     Wt Readings from Last 3 Encounters:   03/20/20 85 6 kg (188 lb 11 4 oz)   03/09/20 90 3 kg (199 lb)   02/27/20 87 5 kg (193 lb)     Vitals:    03/20/20 0430 03/20/20 0600 03/20/20 0614 03/20/20 0700   BP:   134/63 107/54   BP Location:    Right arm   Pulse:    79   Resp:    18   Temp:    98 2 °F (36 8 °C)   TempSrc:    Oral   SpO2: 97%   92%   Weight:  85 6 kg (188 lb 11 4 oz)     Height:         Physical Exam   Constitutional: She is oriented to person, place, and time  She appears well-developed and well-nourished  HENT:   Head: Normocephalic     Eyes: Conjunctivae are normal    Neck: Neck supple  JVD present  No tracheal deviation present  Cardiovascular: Normal rate, regular rhythm and intact distal pulses  Murmur heard  Aquacel dressing present and intact; no sign of bleeding or hematoma   Pulmonary/Chest: Effort normal  No tachypnea  No respiratory distress  She has decreased breath sounds  She has no wheezes  Abdominal: Soft  Bowel sounds are normal  She exhibits distension  Musculoskeletal: She exhibits edema (2-3+ LE; hitting mid thigh)  She exhibits no tenderness  Neurological: She is alert and oriented to person, place, and time  Skin: Skin is warm and dry  Psychiatric: She has a normal mood and affect  Her behavior is normal    Vitals reviewed  [employer] (day, reason): N/A  Murray Catheter (day, reason): N/A      Current Facility-Administered Medications:     acetaminophen (TYLENOL) tablet 650 mg, 650 mg, Oral, Q4H PRN, Simba Pena MD    [facility]) tablet 5 mg, 5 mg, Oral, BID, Hammad Alvarenga PA-C, 5 mg at 03/20/20 5278    ascorbic acid (VITAMIN C) tablet 125 mg, 125 mg, Oral, Daily, Simba Pena MD, 125 mg at 03/20/20 0849    bumetanide (BUMEX) injection 4 mg, 4 mg, Intravenous, BID (diuretic), Becky Melissa PA-C, 4 mg at 03/20/20 7339    cholecalciferol (VITAMIN D3) tablet 1,000 Units, 1,000 Units, Oral, Daily, Simba Pena MD, 1,000 Units at 03/20/20 0847    docusate sodium (COLACE) capsule 100 mg, 100 mg, Oral, BID, Army Cora MD, 100 mg at 03/20/20 0847    hydrALAZINE (APRESOLINE) tablet 25 mg, 25 mg, Oral, Q8H [address], SOTO Junior, 25 mg at 03/20/20 5068    levothyroxine tablet 137 mcg, 137 mcg, Oral, Early Morning, Simba Pena MD, 137 mcg at 03/20/20 7599    metoprolol succinate (TOPROL-XL) 24 hr tablet 100 mg, 100 mg, Oral, Q12H [address], Fatimah Cody MD, 100 mg at 03/20/20 0847    potassium chloride (K-DUR,KLOR-CON) CR tablet 20 mEq, 20 mEq, Oral, BID, Becky Melissa PA-C, 20 mEq at 03/20/20 200    potassium chloride 20 mEq IVPB (premix), 20 mEq, Intravenous, Once, DemohourSTEPHEN, Last Rate: 25 mL/hr at 03/20/20 1001, 20 mEq at 03/20/20 1001    rOPINIRole (REQUIP) tablet 2 mg, 2 mg, Oral, HS, Tyrone Handy PA-C, 2 mg at 03/19/20 2115    venlafaxine (EFFEXOR) tablet 75 mg, 75 mg, Oral, Q12H [address], Dasia Pulido MD, 75 mg at 03/20/20 0849    Labs & Results:      Results from last 7 days   Lab Units 03/19/20  0550 03/18/20  0531   WBC Thousand/uL 6 50 7 55   HEMOGLOBIN g/dL 10 5* 10 3*   HEMATOCRIT % 34 3* 33 8*   PLATELETS Thousands/uL 204 208         Results from last 7 days   Lab Units 03/20/20  0709 03/19/20  0550 03/18/20  0531   POTASSIUM mmol/L 2 8* 3 7 3 7   CHLORIDE mmol/L 103 103 104   CO2 mmol/L 34* 35* 33*   BUN mg/dL 14 20 22   CREATININE mg/dL 0 85 0 97 1 00   CALCIUM mg/dL 8 4 8 5 8 5     Results from last 7 days   Lab Units 03/18/20  0531   INR  1 70*     Counseling / Coordination of Care: Total floor / unit time spent today 20 minutes  Greater than 50% of total time was spent with the patient and / or family counseling and / or coordination of care  A description of the counseling / coordination of care: 10 minutes (low sodium diet, fluid restriction, daily weights, and importance of medication compliance)  Thank you for the opportunity to participate in the care of this patient      Gali Meadows PA-C
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
## 2020-03-20 NOTE — PLAN OF CARE
Problem: Potential for Falls  Goal: Patient will remain free of falls  Description  INTERVENTIONS:  - Assess patient frequently for physical needs  -  Identify cognitive and physical deficits and behaviors that affect risk of falls    -  [place] fall precautions as indicated by assessment   - Educate patient/family on patient safety including physical limitations  - Instruct patient to call for assistance with activity based on assessment  - Modify environment to reduce risk of injury  - Consider OT/PT consult to assist with strengthening/mobility  Outcome: Progressing     Problem: PAIN - ADULT  Goal: Verbalizes/displays adequate comfort level or baseline comfort level  Description  Interventions:  - Encourage patient to monitor pain and request assistance  - Assess pain using appropriate pain scale  - Administer analgesics based on type and severity of pain and evaluate response  - Implement non-pharmacological measures as appropriate and evaluate response  - Consider cultural and social influences on pain and pain management  - Notify physician/advanced practitioner if interventions unsuccessful or patient reports new pain  Outcome: Progressing     Problem: INFECTION - ADULT  Goal: Absence or prevention of progression during hospitalization  Description  INTERVENTIONS:  - Assess and monitor for signs and symptoms of infection  - Monitor lab/diagnostic results  - Monitor all insertion sites, i e  indwelling lines, tubes, and drains  - Monitor endotracheal if appropriate and nasal secretions for changes in amount and color  - [place] appropriate cooling/warming therapies per order  - Administer medications as ordered  - Instruct and encourage patient and family to use good hand hygiene technique  - Identify and instruct in appropriate isolation precautions for identified infection/condition  Outcome: Progressing  Goal: Absence of fever/infection during neutropenic period  Description  INTERVENTIONS:  - Monitor WBC    Outcome: Progressing     Problem: SAFETY ADULT  Goal: Maintain or return to baseline ADL function  Description  INTERVENTIONS:  -  Assess patient's ability to carry out ADLs; assess patient's baseline for ADL function and identify physical deficits which impact ability to perform ADLs (bathing, care of mouth/teeth, toileting, grooming, dressing, etc )  - Assess/evaluate cause of self-care deficits   - Assess range of motion  - Assess patient's mobility; develop plan if impaired  - Assess patient's need for assistive devices and provide as appropriate  - Encourage maximum independence but intervene and supervise when necessary  - Involve family in performance of ADLs  - Assess for home care needs following discharge   - Consider OT consult to assist with ADL evaluation and planning for discharge  - Provide patient education as appropriate  Outcome: Progressing  Goal: Maintain or return mobility status to optimal level  Description  INTERVENTIONS:  - Assess patient's baseline mobility status (ambulation, transfers, stairs, etc )    - Identify cognitive and physical deficits and behaviors that affect mobility  - Identify mobility aids required to assist with transfers and/or ambulation (gait belt, sit-to-stand, lift, walker, cane, etc )  - [place] fall precautions as indicated by assessment  - Record patient progress and toleration of activity level on Mobility SBAR; progress patient to next Phase/Stage  - Instruct patient to call for assistance with activity based on assessment  - Consider rehabilitation consult to assist with strengthening/weightbearing, etc   Outcome: Progressing     Problem: DISCHARGE PLANNING  Goal: Discharge to home or other facility with appropriate resources  Description  INTERVENTIONS:  - Identify barriers to discharge w/patient and caregiver  - Arrange for needed discharge resources and transportation as appropriate  - Identify discharge learning needs (meds, wound care, etc )  - Arrange for interpretive services to assist at discharge as needed  - Refer to Case Management Department for coordinating discharge planning if the patient needs post-hospital services based on physician/advanced practitioner order or complex needs related to functional status, cognitive ability, or social support system  Outcome: Progressing     Problem: Knowledge Deficit  Goal: Patient/family/caregiver demonstrates understanding of disease process, treatment plan, medications, and discharge instructions  Description  Complete learning assessment and assess knowledge base    Interventions:  - Provide teaching at level of understanding  - Provide teaching via preferred learning methods  Outcome: Progressing     Problem: CARDIOVASCULAR - ADULT  Goal: Maintains optimal cardiac output and hemodynamic stability  Description  INTERVENTIONS:  - Monitor I/O, vital signs and rhythm  - Monitor for S/S and trends of decreased cardiac output  - Administer and titrate ordered vasoactive medications to optimize hemodynamic stability  - Assess quality of pulses, skin color and temperature  - Assess for signs of decreased coronary artery perfusion  - Instruct patient to report change in severity of symptoms  Outcome: Progressing  Goal: Absence of cardiac dysrhythmias or at baseline rhythm  Description  INTERVENTIONS:  - Continuous cardiac monitoring, vital signs, obtain 12 lead EKG if ordered  - Administer antiarrhythmic and heart rate control medications as ordered  - Monitor electrolytes and administer replacement therapy as ordered  Outcome: Progressing     Problem: GASTROINTESTINAL - ADULT  Goal: Maintains or returns to baseline bowel function  Description  INTERVENTIONS:  - Assess bowel function  - Encourage oral fluids to ensure adequate hydration  - Administer IV fluids if ordered to ensure adequate hydration  - Administer ordered medications as needed  - Encourage mobilization and activity  - Consider nutritional services referral to assist patient with adequate nutrition and appropriate food choices  Outcome: Progressing     Problem: SKIN/TISSUE INTEGRITY - ADULT  Goal: Skin integrity remains intact  Description  INTERVENTIONS  - Identify patients at risk for skin breakdown  - Assess and monitor skin integrity  - Assess and monitor nutrition and hydration status  - Monitor labs (i e  albumin)  - Assess for incontinence   - Turn and reposition patient  - Assist with mobility/ambulation  - Relieve pressure over bony prominences  - Avoid friction and shearing  - Provide appropriate hygiene as needed including keeping skin clean and dry  - Evaluate need for skin moisturizer/barrier cream  - Collaborate with interdisciplinary team (i e  Nutrition, Rehabilitation, etc )   - Patient/family teaching  Outcome: Progressing  Goal: Incision(s), wounds(s) or drain site(s) healing without S/S of infection  Description  INTERVENTIONS  - Assess and document risk factors for skin impairment   - Assess and document dressing, incision, wound bed, drain sites and surrounding tissue  - Consider nutrition services referral as needed  - Oral mucous membranes remain intact  - Provide patient/ family education  Outcome: Progressing  Goal: Oral mucous membranes remain intact  Description  INTERVENTIONS  - Assess oral mucosa and hygiene practices  - Implement preventative oral hygiene regimen  - Implement oral medicated treatments as ordered  - Initiate Nutrition services referral as needed  Outcome: Progressing     Problem: MUSCULOSKELETAL - ADULT  Goal: Maintain or return mobility to safest level of function  Description  INTERVENTIONS:  - Assess patient's ability to carry out ADLs; assess patient's baseline for ADL function and identify physical deficits which impact ability to perform ADLs (bathing, care of mouth/teeth, toileting, grooming, dressing, etc )  - Assess/evaluate cause of self-care deficits   - Assess range of motion  - Assess patient's mobility  - Assess patient's need for assistive devices and provide as appropriate  - Encourage maximum independence but intervene and supervise when necessary  - Involve family in performance of ADLs  - Assess for home care needs following discharge   - Consider OT consult to assist with ADL evaluation and planning for discharge  - Provide patient education as appropriate  Outcome: Progressing

## 2020-03-20 NOTE — WOUND OSTOMY CARE
Progress Note - Wound   Sheldon Mansfield 78 y o  female MRN: [id number]  Unit/Bed#: [facility] 529-01 Encounter: [id number]        Assessment:   Patient is seen for wound care follow up  Patient examined in bed  Findings  1  Left leg partial thickness wound--resolved  2  Right leg partial thickness wound--0 3x0 3 cm open area with scant serous drainage  Patient's edema has improved and wound has improved  Patient prefering to keep this open to air at the moment  She will follow up with the outpatient wound care clinic  Skin care plans:  1-Hydraguard to bilateral sacrum, buttock and heels BID and PRN  2-Elevate heels to offload pressure  3-Ehob cushion in chair when out of bed  4-Moisturize skin daily with skin nourishing cream   5-Turn/reposition q2h or when medically stable for pressure re-distribution on skin  Wound Care will sign off  Call or Tigertext with any questions      Wound 03/13/20 Pretibial Left;Proximal (Active)   Wound Image   3/20/2020  9:53 AM   Wound Description Clean;Dry; Intact 3/20/2020  9:53 AM   Milagros-wound Assessment Clean;Dry; Intact 3/20/2020  9:53 AM   Wound Length (cm) 0 cm 3/20/2020  9:53 AM   Wound Width (cm) 0 cm 3/20/2020  9:53 AM   Wound Depth (cm) 0 3/20/2020  9:53 AM   Calculated Wound Area (cm^2) 0 cm^2 3/20/2020  9:53 AM   Calculated Wound Volume (cm^3) 0 cm^3 3/20/2020  9:53 AM   Change in Wound Size % 100 3/20/2020  9:53 AM   Closure Perclose 3/19/2020  4:00 PM   Drainage Amount None 3/20/2020  8:00 AM   Drainage Description Serous 3/16/2020  7:50 AM   Non-staged Wound Description Partial thickness 3/16/2020  7:50 AM   Treatments Cleansed;Site care 3/16/2020  7:50 AM   Dressing Open to air 3/20/2020  8:00 AM   Wound packed?  No 3/16/2020  7:50 AM   Patient Tolerance Tolerated well 3/18/2020  9:00 AM   Dressing Status Other (Comment) 3/20/2020  8:00 AM       Wound 03/13/20 Pretibial Proximal;Right (Active)   Wound Image   3/20/2020  9:52 AM   Wound Description Edema;Fragile;Pink;Drainage 3/20/2020  9:52 AM   Milagros-wound Assessment Clean;Dry; Intact;Fragile 3/20/2020  9:52 AM   Wound Length (cm) 0 3 cm 3/20/2020  9:52 AM   Wound Width (cm) 0 3 cm 3/20/2020  9:52 AM   Wound Depth (cm) 0 1 3/13/2020 12:35 PM   Calculated Wound Area (cm^2) 0 09 cm^2 3/20/2020  9:52 AM   Calculated Wound Volume (cm^3) 0 02 cm^3 3/13/2020 12:35 PM   Drainage Amount Scant 3/20/2020  9:52 AM   Drainage Description Serous 3/20/2020  9:52 AM   Non-staged Wound Description Partial thickness 3/16/2020  7:49 AM   Treatments Cleansed;Site care 3/16/2020  7:49 AM   Dressing Open to air 3/20/2020  8:00 AM   Wound packed?  No 3/16/2020  7:49 AM   Dressing Changed Changed 3/16/2020  7:49 AM   Patient Tolerance Tolerated well 3/16/2020  8:00 PM   Dressing Status Other (Comment) 3/20/2020  8:00 AM

## 2020-03-21 LAB
ANION GAP SERPL CALCULATED.3IONS-SCNC: 5 MMOL/L (ref 4–13)
BUN SERPL-MCNC: 15 MG/DL (ref 5–25)
CALCIUM SERPL-MCNC: 8.4 MG/DL (ref 8.3–10.1)
CHLORIDE SERPL-SCNC: 104 MMOL/L (ref 100–108)
CO2 SERPL-SCNC: 33 MMOL/L (ref 21–32)
CREAT SERPL-MCNC: 0.85 MG/DL (ref 0.6–1.3)
GFR SERPL CREATININE-BSD FRML MDRD: 65 ML/MIN/1.73SQ M
GLUCOSE SERPL-MCNC: 121 MG/DL (ref 65–140)
POTASSIUM SERPL-SCNC: 3.5 MMOL/L (ref 3.5–5.3)
SODIUM SERPL-SCNC: 142 MMOL/L (ref 136–145)

## 2020-03-21 PROCEDURE — 99232 SBSQ HOSP IP/OBS MODERATE 35: CPT | Performed by: INTERNAL MEDICINE

## 2020-03-21 PROCEDURE — 99232 SBSQ HOSP IP/OBS MODERATE 35: CPT | Performed by: FAMILY MEDICINE

## 2020-03-21 PROCEDURE — 80048 BASIC METABOLIC PNL TOTAL CA: CPT | Performed by: STUDENT IN AN ORGANIZED HEALTH CARE EDUCATION/TRAINING PROGRAM

## 2020-03-21 RX ADMIN — HYDRALAZINE HYDROCHLORIDE 10 MG: 10 TABLET, FILM COATED ORAL at 21:24

## 2020-03-21 RX ADMIN — POTASSIUM CHLORIDE 40 MEQ: 1500 TABLET, EXTENDED RELEASE ORAL at 08:56

## 2020-03-21 RX ADMIN — BUMETANIDE 4 MG: 0.25 INJECTION INTRAMUSCULAR; INTRAVENOUS at 17:24

## 2020-03-21 RX ADMIN — BUMETANIDE 4 MG: 0.25 INJECTION INTRAMUSCULAR; INTRAVENOUS at 08:59

## 2020-03-21 RX ADMIN — HYDRALAZINE HYDROCHLORIDE 10 MG: 10 TABLET, FILM COATED ORAL at 05:02

## 2020-03-21 RX ADMIN — APIXABAN 5 MG: 5 TABLET, FILM COATED ORAL at 17:24

## 2020-03-21 RX ADMIN — SACUBITRIL AND VALSARTAN 1 TABLET: 24; 26 TABLET, FILM COATED ORAL at 08:55

## 2020-03-21 RX ADMIN — METOPROLOL SUCCINATE 100 MG: 100 TABLET, EXTENDED RELEASE ORAL at 21:24

## 2020-03-21 RX ADMIN — VENLAFAXINE 75 MG: 37.5 TABLET ORAL at 08:57

## 2020-03-21 RX ADMIN — HYDRALAZINE HYDROCHLORIDE 10 MG: 10 TABLET, FILM COATED ORAL at 14:35

## 2020-03-21 RX ADMIN — MELATONIN 1000 UNITS: at 08:56

## 2020-03-21 RX ADMIN — LEVOTHYROXINE SODIUM 137 MCG: 112 TABLET ORAL at 05:02

## 2020-03-21 RX ADMIN — DOCUSATE SODIUM 100 MG: 100 CAPSULE, LIQUID FILLED ORAL at 08:55

## 2020-03-21 RX ADMIN — VENLAFAXINE 75 MG: 37.5 TABLET ORAL at 21:24

## 2020-03-21 RX ADMIN — APIXABAN 5 MG: 5 TABLET, FILM COATED ORAL at 08:57

## 2020-03-21 RX ADMIN — ROPINIROLE HYDROCHLORIDE 2 MG: 2 TABLET, FILM COATED ORAL at 21:24

## 2020-03-21 RX ADMIN — POTASSIUM CHLORIDE 40 MEQ: 1500 TABLET, EXTENDED RELEASE ORAL at 17:23

## 2020-03-21 RX ADMIN — METOPROLOL SUCCINATE 100 MG: 100 TABLET, EXTENDED RELEASE ORAL at 08:56

## 2020-03-21 RX ADMIN — ASCORBIC ACID TAB 250 MG 125 MG: 250 TAB at 08:58

## 2020-03-21 RX ADMIN — DOCUSATE SODIUM 100 MG: 100 CAPSULE, LIQUID FILLED ORAL at 17:23

## 2020-03-21 RX ADMIN — SACUBITRIL AND VALSARTAN 1 TABLET: 24; 26 TABLET, FILM COATED ORAL at 17:24

## 2020-03-21 NOTE — PROGRESS NOTES
Cardiology Progress Note - Brit Suazo 78 y o  female MRN: [id number]    Unit/Bed#: [facility] 529-01 Encounter: [id number]      Assessment:  Principal Problem:    Atrial fibrillation with RVR ([facility] )  Active Problems:    Depression with anxiety    Hyperlipidemia    Hypertension    Hypothyroidism    Obesity (BMI 30-39  9)    Type 2 diabetes mellitus with diabetic cataract ([facility] )    Acute on chronic combined systolic and diastolic CHF (congestive heart failure) (HCC)    Chronic combined systolic (congestive) and diastolic (congestive) heart failure (HCC)    Restless leg syndrome    Edema of both lower extremities      Plan:    1  Acute systolic CHF - decompensated in the setting of rapid atrial fibrillation  Remains volume overloaded but is responding to IV diuretic therapy  We will continue the same dose today, hoping to switch to oral diuretic therapy in the next 24-48 hours  Continue to follow urine output, daily labs and weight  2   Nonischemic cardiomyopathy - EF 30% - Likely tachycardic mediated  On good goal-directed medical therapy  Continue metoprolol, hydralazine and Isordil as ordered  Entresto started yesterday  Blood pressure stable but somewhat low  If this worsens, we will continue to back off of hydralazine/Isordil as Roxine Sharper is titrated  Will need an ischemic workup given risk factors  Follow ejection fraction closely into the outpatient setting  3   Atrial fibrillation with rapid ventricular response - S/P AVN ablation with CRT-D this admission  Currently ventricularly paced  Subjective:   Patient seen and examined  No significant events overnight  Patient remains volume overloaded  Clinically is feeling better though  Denies shortness of breath  Objective:     Vitals: Blood pressure 99/52, pulse 78, temperature 97 8 °F (36 6 °C), temperature source Oral, resp   rate 18, height 5' 1" (1 549 m), weight 86 5 kg (190 lb 11 2 oz), SpO2 91 % , Body mass index is 36 03 kg/m² ,   Orthostatic Blood Pressures      Most Recent Value   Blood Pressure  99/52 filed at 03/21/2020 1100   Patient Position - Orthostatic VS  Lying filed at 03/21/2020 0300            Intake/Output Summary (Last 24 hours) at 3/21/2020 1147  Last data filed at 3/21/2020 1100  Gross per 24 hour   Intake 1326 ml   Output 2080 ml   Net -754 ml       No significant arrhythmias seen on telemetry review  V-Paced      Physical Exam:    GEN: Andra Mccormick appears well, alert and oriented x 3, pleasant and cooperative   HEENT: pupils equal, round, and reactive to light; extraocular muscles intact  NECK: supple, no carotid bruits  +JVD   HEART: regular rhythm, normal S1 and S2, no murmurs, clicks, gallops or rubs   LUNGS:  Diminished bilaterally; no wheezes, rales, or rhonchi   ABDOMEN: normal bowel sounds, soft, no tenderness, no distention  EXTREMITIES: peripheral pulses normal; no clubbing, cyanosis   +++ edema bilaterally  NEURO: no focal findings   SKIN: normal without suspicious lesions on exposed skin    Medications:      Current Facility-Administered Medications:     acetaminophen (TYLENOL) tablet 650 mg, 650 mg, Oral, Q4H PRN, Jak Aguero MD    apixaban (ELIQUIS) tablet 5 mg, 5 mg, Oral, BID, Miguel Angel Gee PA-C, 5 mg at 03/21/20 7332    ascorbic acid (VITAMIN C) tablet 125 mg, 125 mg, Oral, Daily, Jak Aguero MD, 125 mg at 03/21/20 0858    bumetanide (BUMEX) injection 4 mg, 4 mg, Intravenous, BID (diuretic), Vanessa Courtney PA-C, 4 mg at 03/21/20 7593    cholecalciferol (VITAMIN D3) tablet 1,000 Units, 1,000 Units, Oral, Daily, Jak Aguero MD, 1,000 Units at 03/21/20 0856    docusate sodium (COLACE) capsule 100 mg, 100 mg, Oral, BID, Christianne Cespedes MD, 100 mg at 03/21/20 0855    hydrALAZINE (APRESOLINE) tablet 10 mg, 10 mg, Oral, Q8H [facility], Mili Alejandra PA-C, 10 mg at 03/21/20 0502    levothyroxine tablet 137 mcg, 137 mcg, Oral, Early Morning, Jak Aguero MD, 232 mcg at 03/21/20 0502    metoprolol succinate (TOPROL-XL) 24 hr tablet 100 mg, 100 mg, Oral, Q12H [address], Gina Santiago MD, 100 mg at 03/21/20 0856    potassium chloride (K-DUR,KLOR-CON) CR tablet 40 mEq, 40 mEq, Oral, BID, Chun HEAD MD, 40 mEq at 03/21/20 0856    rOPINIRole (REQUIP) tablet 2 mg, 2 mg, Oral, HS, Esther De Paz PA-C, 2 mg at 03/20/20 2139    sacubitril-valsartan (ENTRESTO) 24-26 MG per tablet 1 tablet, 1 tablet, Oral, BID, Migue Hines PA-C, 1 tablet at 03/21/20 0855    sodium chloride 0 9 % infusion, 50 mL/hr, Intravenous, Continuous, Harleen Mc CRNA    venlafaxine [facility]) tablet 75 mg, 75 mg, Oral, Q12H [address], Betty Jessica MD, 75 mg at 03/21/20 0857     Labs & Results:        Results from last 7 days   Lab Units 03/19/20  0550 03/18/20  0531   WBC Thousand/uL 6 50 7 55   HEMOGLOBIN g/dL 10 5* 10 3*   HEMATOCRIT % 34 3* 33 8*   PLATELETS Thousands/uL 204 208         Results from last 7 days   Lab Units 03/21/20  0435 03/20/20 2002 03/20/20  0709 03/19/20  0550   POTASSIUM mmol/L 3 5 4 5 2 8* 3 7   CHLORIDE mmol/L 104  --  103 103   CO2 mmol/L 33*  --  34* 35*   BUN mg/dL 15  --  14 20   CREATININE mg/dL 0 85  --  0 85 0 97   CALCIUM mg/dL 8 4  --  8 4 8 5     Results from last 7 days   Lab Units 03/18/20  0531   INR  1 70*     Results from last 7 days   Lab Units 03/20/20  0709   MAGNESIUM mg/dL 2 0         EKG personally reviewed by Tato Posada MD   Initial ECG showed atrial fibrillation with rapid ventricular response  Most recent ECG after AVN ablation shows ventricular paced rhythm  ECHO:  LEFT VENTRICLE:  The ventricle was mildly dilated  Systolic function was markedly reduced by visual assessment  Ejection fraction was estimated to be 30 %  There was severe diffuse hypokinesis with regional variations      RIGHT VENTRICLE:  The ventricle was moderately dilated    Systolic function was moderately to markedly reduced      LEFT ATRIUM:  The atrium was moderately to markedly dilated      RIGHT ATRIUM:  The atrium was moderately dilated      MITRAL VALVE:  There was mild annular calcification  There was moderate regurgitation      TRICUSPID VALVE:  There was mild to moderate regurgitation      IVC, HEPATIC VEINS:  The inferior vena cava was dilated  Counseling / Coordination of Care  Total floor / unit time spent today 25 minutes  Greater than 50% of total time was spent with the patient and / or family counseling and / or coordination of care

## 2020-03-21 NOTE — ASSESSMENT & PLAN NOTE
Wt Readings from Last 3 Encounters:   03/21/20 86 5 kg (190 lb 11 2 oz)   03/09/20 90 3 kg (199 lb)   02/27/20 87 5 kg (193 lb)     Continue IV Bumex  Cardiology recommendation

## 2020-03-21 NOTE — ASSESSMENT & PLAN NOTE
Wt Readings from Last 3 Encounters:   03/21/20 86 5 kg (190 lb 11 2 oz)   03/09/20 90 3 kg (199 lb)   02/27/20 87 5 kg (193 lb)     Patient with evidence for acute on chronic combined diastolic heart failure secondary to AFib with RVR      Echo 7/2019 EF 45%, will repeat echo this admission when rates better controlled  Continue IV Bumex  Daily weights, I&O's, fluid restrict, 2g Na restricted diet  Continue diuresis as per Cardiology

## 2020-03-21 NOTE — PROGRESS NOTES
Progress Note Bertram Elena 1940, 78 y o  female MRN: [id number]    Unit/Bed#: [facility] 529-01 Encounter: [id number]    Primary Care Provider: SOTO Jack   Date and time admitted to hospital: 3/11/2020 11:50 AM        Atrial fibrillation with RVR ([address] )  Assessment & Plan  Continue Metoprolol  Continue Eliquis    * Acute on chronic combined systolic and diastolic CHF (congestive heart failure) (HCC)  Assessment & Plan  Wt Readings from Last 3 Encounters:   03/21/20 86 5 kg (190 lb 11 2 oz)   03/09/20 90 3 kg (199 lb)   02/27/20 87 5 kg (193 lb)     Patient with evidence for acute on chronic combined diastolic heart failure secondary to AFib with RVR  Echo 7/2019 EF 45%, will repeat echo this admission when rates better controlled  Continue IV Bumex  Daily weights, I&O's, fluid restrict, 2g Na restricted diet  Continue diuresis as per Cardiology    Restless leg syndrome  Assessment & Plan  Continue ropinirole    Chronic combined systolic (congestive) and diastolic (congestive) heart failure (HCC)  Assessment & Plan  Wt Readings from Last 3 Encounters:   03/21/20 86 5 kg (190 lb 11 2 oz)   03/09/20 90 3 kg (199 lb)   02/27/20 87 5 kg (193 lb)     Continue IV Bumex  Cardiology recommendation     Type 2 diabetes mellitus with diabetic cataract [facility])  Assessment & Plan  Lab Results   Component Value Date    HGBA1C 6 9 (H) 03/12/2020       No results for input(s): POCGLU in the last 72 hours  Blood Sugar Average: Last 72 hrs:   patient reports she is not a diabetic  Noted blood hemoglobin A1c has been 6 9 is slightly higher  Patient on no agents at this time  Continue diabetic diet  Patient refusing accuchecks    Hypothyroidism  Assessment & Plan  Continue Synthroid    TSH is within normal limits        VTE Pharmacologic Prophylaxis:   Pharmacologic: Apixaban (Eliquis)  Mechanical VTE Prophylaxis in Place: Yes    Patient Centered Rounds: I have performed bedside rounds with nursing staff today     Discussions with Specialists or Other Care Team Provider:     Education and Discussions with Family / Patient: Patient    Time Spent for Care: 20 minutes  More than 50% of total time spent on counseling and coordination of care as described above  Current Length of Stay: 10 day(s)    Current Patient Status: Inpatient   Certification Statement: The patient will continue to require additional inpatient hospital stay due to IV Bumex    Discharge Plan: depend on clinical course    Code Status: Level 1 - Full Code      Subjective:   Patient states she is feeling better hoping IV diuretic discontinue soon    Objective:     Vitals:   Temp (24hrs), Av 1 °F (36 7 °C), Min:97 7 °F (36 5 °C), Max:98 4 °F (36 9 °C)    Temp:  [97 7 °F (36 5 °C)-98 4 °F (36 9 °C)] 97 8 °F (36 6 °C)  HR:  [78-81] 78  Resp:  [18] 18  BP: ()/(50-67) 96/50  SpO2:  [91 %-98 %] 91 %  Body mass index is 36 03 kg/m²  Input and Output Summary (last 24 hours): Intake/Output Summary (Last 24 hours) at 3/21/2020 1444  Last data filed at 3/21/2020 1229  Gross per 24 hour   Intake 1086 ml   Output 2480 ml   Net -1394 ml       Physical Exam:     Physical Exam   Constitutional: She is oriented to person, place, and time  No distress  Pulmonary/Chest: No stridor  No respiratory distress  She has no wheezes  She has no rales  Abdominal: Bowel sounds are normal  She exhibits no distension  There is no tenderness  There is no guarding  Neurological: She is alert and oriented to person, place, and time  Skin: Skin is warm  Capillary refill takes more than 3 seconds  She is not diaphoretic  Psychiatric: She has a normal mood and affect         Additional Data:     Labs:    Results from last 7 days   Lab Units 20  0550   WBC Thousand/uL 6 50   HEMOGLOBIN g/dL 10 5*   HEMATOCRIT % 34 3*   PLATELETS Thousands/uL 204     Results from last 7 days   Lab Units 20  0435   POTASSIUM mmol/L 3 5   CHLORIDE mmol/L 104   CO2 mmol/L 33*   BUN mg/dL 15   CREATININE mg/dL 0 85   CALCIUM mg/dL 8 4     Results from last 7 days   Lab Units 03/18/20  0531   INR  1 70*       * I Have Reviewed All Lab Data Listed Above  * Additional Pertinent Lab Tests Reviewed: All Labs Within Last 24 Hours Reviewed    Imaging:    Imaging Reports Reviewed Today Include:   Imaging Personally Reviewed by Myself Includes:      Recent Cultures (last 7 days):           Last 24 Hours Medication List:     Current Facility-Administered Medications:  acetaminophen 650 mg Oral Q4H PRN Ismael Carter MD   apixaban 5 mg Oral BID Merlin Singh PA-C   ascorbic acid 125 mg Oral Daily Ismael Carter MD   bumetanide 4 mg Intravenous BID (diuretic) Janie Chester PA-C   cholecalciferol 1,000 Units Oral Daily Ismael Carter MD   docusate sodium 100 mg Oral BID Zakia Kong MD   hydrALAZINE 10 mg Oral Q8H [facility] Janie Chester PA-C   levothyroxine 137 mcg Oral Early Morning Ismael Carter MD   metoprolol succinate 100 mg Oral Q12H [facility] Jomar Clements MD   potassium chloride 40 mEq Oral BID Ricco HEAD MD   rOPINIRole 2 mg Oral HS Marsing MinusSTEPHEN   sacubitril-valsartan 1 tablet Oral BID Janie Chester PA-C   sodium chloride 50 mL/hr Intravenous Continuous Jason Zhang CRNA   venlafaxine 75 mg Oral Q12H Mike Ohara MD        Today, Patient Was Seen By: Carmita Da Silva MD    ** Please Note: Dragon 360 Dictation voice to text software may have been used in the creation of this document   **

## 2020-03-22 LAB
ANION GAP SERPL CALCULATED.3IONS-SCNC: 5 MMOL/L (ref 4–13)
BUN SERPL-MCNC: 15 MG/DL (ref 5–25)
CALCIUM SERPL-MCNC: 8.3 MG/DL (ref 8.3–10.1)
CHLORIDE SERPL-SCNC: 102 MMOL/L (ref 100–108)
CO2 SERPL-SCNC: 33 MMOL/L (ref 21–32)
CREAT SERPL-MCNC: 0.82 MG/DL (ref 0.6–1.3)
GFR SERPL CREATININE-BSD FRML MDRD: 68 ML/MIN/1.73SQ M
GLUCOSE SERPL-MCNC: 103 MG/DL (ref 65–140)
POTASSIUM SERPL-SCNC: 3.6 MMOL/L (ref 3.5–5.3)
SODIUM SERPL-SCNC: 140 MMOL/L (ref 136–145)

## 2020-03-22 PROCEDURE — 80048 BASIC METABOLIC PNL TOTAL CA: CPT | Performed by: PHYSICIAN ASSISTANT

## 2020-03-22 PROCEDURE — 99232 SBSQ HOSP IP/OBS MODERATE 35: CPT | Performed by: INTERNAL MEDICINE

## 2020-03-22 PROCEDURE — 99232 SBSQ HOSP IP/OBS MODERATE 35: CPT | Performed by: FAMILY MEDICINE

## 2020-03-22 RX ORDER — BUMETANIDE 0.25 MG/ML
2 INJECTION, SOLUTION INTRAMUSCULAR; INTRAVENOUS ONCE
Status: COMPLETED | OUTPATIENT
Start: 2020-03-22 | End: 2020-03-22

## 2020-03-22 RX ADMIN — APIXABAN 5 MG: 5 TABLET, FILM COATED ORAL at 18:17

## 2020-03-22 RX ADMIN — SACUBITRIL AND VALSARTAN 1 TABLET: 24; 26 TABLET, FILM COATED ORAL at 08:00

## 2020-03-22 RX ADMIN — SACUBITRIL AND VALSARTAN 1 TABLET: 24; 26 TABLET, FILM COATED ORAL at 18:17

## 2020-03-22 RX ADMIN — HYDRALAZINE HYDROCHLORIDE 10 MG: 10 TABLET, FILM COATED ORAL at 21:23

## 2020-03-22 RX ADMIN — METOPROLOL SUCCINATE 100 MG: 100 TABLET, EXTENDED RELEASE ORAL at 08:00

## 2020-03-22 RX ADMIN — POTASSIUM CHLORIDE 40 MEQ: 1500 TABLET, EXTENDED RELEASE ORAL at 18:17

## 2020-03-22 RX ADMIN — BUMETANIDE 4 MG: 0.25 INJECTION INTRAMUSCULAR; INTRAVENOUS at 16:49

## 2020-03-22 RX ADMIN — APIXABAN 5 MG: 5 TABLET, FILM COATED ORAL at 08:00

## 2020-03-22 RX ADMIN — POTASSIUM CHLORIDE 40 MEQ: 1500 TABLET, EXTENDED RELEASE ORAL at 08:00

## 2020-03-22 RX ADMIN — ROPINIROLE HYDROCHLORIDE 2 MG: 2 TABLET, FILM COATED ORAL at 21:23

## 2020-03-22 RX ADMIN — BUMETANIDE 4 MG: 0.25 INJECTION INTRAMUSCULAR; INTRAVENOUS at 07:57

## 2020-03-22 RX ADMIN — HYDRALAZINE HYDROCHLORIDE 10 MG: 10 TABLET, FILM COATED ORAL at 13:05

## 2020-03-22 RX ADMIN — DOCUSATE SODIUM 100 MG: 100 CAPSULE, LIQUID FILLED ORAL at 18:17

## 2020-03-22 RX ADMIN — MELATONIN 1000 UNITS: at 08:00

## 2020-03-22 RX ADMIN — ASCORBIC ACID TAB 250 MG 125 MG: 250 TAB at 08:01

## 2020-03-22 RX ADMIN — DOCUSATE SODIUM 100 MG: 100 CAPSULE, LIQUID FILLED ORAL at 08:00

## 2020-03-22 RX ADMIN — BUMETANIDE 2 MG: 0.25 INJECTION INTRAMUSCULAR; INTRAVENOUS at 13:05

## 2020-03-22 RX ADMIN — LEVOTHYROXINE SODIUM 137 MCG: 112 TABLET ORAL at 05:28

## 2020-03-22 RX ADMIN — METOPROLOL SUCCINATE 100 MG: 100 TABLET, EXTENDED RELEASE ORAL at 21:24

## 2020-03-22 RX ADMIN — HYDRALAZINE HYDROCHLORIDE 10 MG: 10 TABLET, FILM COATED ORAL at 05:28

## 2020-03-22 RX ADMIN — VENLAFAXINE 75 MG: 37.5 TABLET ORAL at 21:24

## 2020-03-22 RX ADMIN — VENLAFAXINE 75 MG: 37.5 TABLET ORAL at 08:00

## 2020-03-22 NOTE — PLAN OF CARE
Problem: Potential for Falls  Goal: Patient will remain free of falls  Description  INTERVENTIONS:  - Assess patient frequently for physical needs  -  Identify cognitive and physical deficits and behaviors that affect risk of falls    -  [place] fall precautions as indicated by assessment   - Educate patient/family on patient safety including physical limitations  - Instruct patient to call for assistance with activity based on assessment  - Modify environment to reduce risk of injury  - Consider OT/PT consult to assist with strengthening/mobility  Outcome: Progressing     Problem: PAIN - ADULT  Goal: Verbalizes/displays adequate comfort level or baseline comfort level  Description  Interventions:  - Encourage patient to monitor pain and request assistance  - Assess pain using appropriate pain scale  - Administer analgesics based on type and severity of pain and evaluate response  - Implement non-pharmacological measures as appropriate and evaluate response  - Consider cultural and social influences on pain and pain management  - Notify physician/advanced practitioner if interventions unsuccessful or patient reports new pain  Outcome: Progressing     Problem: INFECTION - ADULT  Goal: Absence or prevention of progression during hospitalization  Description  INTERVENTIONS:  - Assess and monitor for signs and symptoms of infection  - Monitor lab/diagnostic results  - Monitor all insertion sites, i e  indwelling lines, tubes, and drains  - Monitor endotracheal if appropriate and nasal secretions for changes in amount and color  - [place] appropriate cooling/warming therapies per order  - Administer medications as ordered  - Instruct and encourage patient and family to use good hand hygiene technique  - Identify and instruct in appropriate isolation precautions for identified infection/condition  Outcome: Progressing  Goal: Absence of fever/infection during neutropenic period  Description  INTERVENTIONS:  - Monitor WBC    Outcome: Progressing     Problem: SAFETY ADULT  Goal: Maintain or return to baseline ADL function  Description  INTERVENTIONS:  -  Assess patient's ability to carry out ADLs; assess patient's baseline for ADL function and identify physical deficits which impact ability to perform ADLs (bathing, care of mouth/teeth, toileting, grooming, dressing, etc )  - Assess/evaluate cause of self-care deficits   - Assess range of motion  - Assess patient's mobility; develop plan if impaired  - Assess patient's need for assistive devices and provide as appropriate  - Encourage maximum independence but intervene and supervise when necessary  - Involve family in performance of ADLs  - Assess for home care needs following discharge   - Consider OT consult to assist with ADL evaluation and planning for discharge  - Provide patient education as appropriate  Outcome: Progressing  Goal: Maintain or return mobility status to optimal level  Description  INTERVENTIONS:  - Assess patient's baseline mobility status (ambulation, transfers, stairs, etc )    - Identify cognitive and physical deficits and behaviors that affect mobility  - Identify mobility aids required to assist with transfers and/or ambulation (gait belt, sit-to-stand, lift, walker, cane, etc )  - [place] fall precautions as indicated by assessment  - Record patient progress and toleration of activity level on Mobility SBAR; progress patient to next Phase/Stage  - Instruct patient to call for assistance with activity based on assessment  - Consider rehabilitation consult to assist with strengthening/weightbearing, etc   Outcome: Progressing     Problem: DISCHARGE PLANNING  Goal: Discharge to home or other facility with appropriate resources  Description  INTERVENTIONS:  - Identify barriers to discharge w/patient and caregiver  - Arrange for needed discharge resources and transportation as appropriate  - Identify discharge learning needs (meds, wound care, etc )  - Arrange for interpretive services to assist at discharge as needed  - Refer to Case Management Department for coordinating discharge planning if the patient needs post-hospital services based on physician/advanced practitioner order or complex needs related to functional status, cognitive ability, or social support system  Outcome: Progressing     Problem: Knowledge Deficit  Goal: Patient/family/caregiver demonstrates understanding of disease process, treatment plan, medications, and discharge instructions  Description  Complete learning assessment and assess knowledge base    Interventions:  - Provide teaching at level of understanding  - Provide teaching via preferred learning methods  Outcome: Progressing     Problem: CARDIOVASCULAR - ADULT  Goal: Maintains optimal cardiac output and hemodynamic stability  Description  INTERVENTIONS:  - Monitor I/O, vital signs and rhythm  - Monitor for S/S and trends of decreased cardiac output  - Administer and titrate ordered vasoactive medications to optimize hemodynamic stability  - Assess quality of pulses, skin color and temperature  - Assess for signs of decreased coronary artery perfusion  - Instruct patient to report change in severity of symptoms  Outcome: Progressing  Goal: Absence of cardiac dysrhythmias or at baseline rhythm  Description  INTERVENTIONS:  - Continuous cardiac monitoring, vital signs, obtain 12 lead EKG if ordered  - Administer antiarrhythmic and heart rate control medications as ordered  - Monitor electrolytes and administer replacement therapy as ordered  Outcome: Progressing     Problem: GASTROINTESTINAL - ADULT  Goal: Maintains or returns to baseline bowel function  Description  INTERVENTIONS:  - Assess bowel function  - Encourage oral fluids to ensure adequate hydration  - Administer IV fluids if ordered to ensure adequate hydration  - Administer ordered medications as needed  - Encourage mobilization and activity  - Consider nutritional services referral to assist patient with adequate nutrition and appropriate food choices  Outcome: Progressing     Problem: SKIN/TISSUE INTEGRITY - ADULT  Goal: Skin integrity remains intact  Description  INTERVENTIONS  - Identify patients at risk for skin breakdown  - Assess and monitor skin integrity  - Assess and monitor nutrition and hydration status  - Monitor labs (i e  albumin)  - Assess for incontinence   - Turn and reposition patient  - Assist with mobility/ambulation  - Relieve pressure over bony prominences  - Avoid friction and shearing  - Provide appropriate hygiene as needed including keeping skin clean and dry  - Evaluate need for skin moisturizer/barrier cream  - Collaborate with interdisciplinary team (i e  Nutrition, Rehabilitation, etc )   - Patient/family teaching  Outcome: Progressing  Goal: Incision(s), wounds(s) or drain site(s) healing without S/S of infection  Description  INTERVENTIONS  - Assess and document risk factors for skin impairment   - Assess and document dressing, incision, wound bed, drain sites and surrounding tissue  - Consider nutrition services referral as needed  - Oral mucous membranes remain intact  - Provide patient/ family education  Outcome: Progressing  Goal: Oral mucous membranes remain intact  Description  INTERVENTIONS  - Assess oral mucosa and hygiene practices  - Implement preventative oral hygiene regimen  - Implement oral medicated treatments as ordered  - Initiate Nutrition services referral as needed  Outcome: Progressing     Problem: MUSCULOSKELETAL - ADULT  Goal: Maintain or return mobility to safest level of function  Description  INTERVENTIONS:  - Assess patient's ability to carry out ADLs; assess patient's baseline for ADL function and identify physical deficits which impact ability to perform ADLs (bathing, care of mouth/teeth, toileting, grooming, dressing, etc )  - Assess/evaluate cause of self-care deficits   - Assess range of motion  - Assess patient's mobility  - Assess patient's need for assistive devices and provide as appropriate  - Encourage maximum independence but intervene and supervise when necessary  - Involve family in performance of ADLs  - Assess for home care needs following discharge   - Consider OT consult to assist with ADL evaluation and planning for discharge  - Provide patient education as appropriate  Outcome: Progressing

## 2020-03-22 NOTE — PROGRESS NOTES
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)    Atrial fibrillation with RVR ([facility])    Chronic combined systolic (congestive) and diastolic (congestive) heart failure ([facility])    Restless leg syndrome    Edema of both lower extremities      Plan:    1  Acute systolic CHF - Decompensated in the setting of rapid atrial fibrillation  Remains volume overloaded but is responding to IV diuretic therapy  Overall good urine output, near 3 5 L in the last 24 hours  Going to given extra dose of 2 mg of Bumex mid day, and continue IV today  Possibly switching to oral diuretic therapy tomorrow  Spoke with patient about a low-sodium diet watching her weight daily, which she tells me she had been doing  Follow labs closely  2   Nonischemic cardiomyopathy - EF 30% - Likely tachycardic mediated  On good goal-directed medical therapy  Continue metoprolol, hydralazine and Isordil as ordered  Entresto started yesterday  Blood pressure has remained stable  If if she were to have issues with hypotension again, we would continue to back off of hydralazine/Isordil as Entresto is titrated  Will need an ischemic workup given risk factors  Follow ejection fraction closely into the outpatient setting  3   Atrial fibrillation with rapid ventricular response - S/P AVN ablation with CRT-D this admission  Currently ventricularly paced  Subjective:   Patient seen and examined  No significant events overnight  Clinically improving but remains volume overloaded  Denies shortness of breath      Objective:     Vitals: Blood pressure 124/80, pulse 80, temperature 98 3 °F (36 8 °C), temperature source Oral, resp  rate 18, height 5' 1" (1 549 m), weight 85 5 kg (188 lb 7 9 oz), SpO2 94 %  , Body mass index is 35 62 kg/m² ,   Orthostatic Blood Pressures      Most Recent Value   Blood Pressure  124/80 filed at 03/22/2020 0700   Patient Position - Orthostatic VS  Lying filed at 03/22/2020 0255            Intake/Output Summary (Last 24 hours) at 3/22/2020 0857  Last data filed at 3/22/2020 0818  Gross per 24 hour   Intake 680 ml   Output 3405 ml   Net -2725 ml       No significant arrhythmias seen on telemetry review  V-Paced    Physical Exam:    GEN: Tay Donohue appears well, alert and oriented x 3, pleasant and cooperative   HEENT: pupils equal, round, and reactive to light; extraocular muscles intact  NECK: supple, no carotid bruits  Improved JVP  HEART: regular rhythm, normal S1 and S2, no murmurs, clicks, gallops or rubs   LUNGS:  Diminished at bases bilaterally; no wheezes, rales, or rhonchi   ABDOMEN: normal bowel sounds, soft, no tenderness, no distention  EXTREMITIES: peripheral pulses normal; no clubbing, cyanosis   ++ Edema - marginally improved  NEURO: no focal findings   SKIN: normal without suspicious lesions on exposed skin      Medications:      Current Facility-Administered Medications:     acetaminophen (TYLENOL) tablet 650 mg, 650 mg, Oral, Q4H PRN, Gautam York MD    apixaban (ELIQUIS) tablet 5 mg, 5 mg, Oral, BID, Juan Maldonado PA-C, 5 mg at 03/22/20 0800    ascorbic acid (VITAMIN C) tablet 125 mg, 125 mg, Oral, Daily, Gautam York MD, 125 mg at 03/22/20 0801    bumetanide (BUMEX) injection 4 mg, 4 mg, Intravenous, BID (diuretic), Micki Dudley PA-C, 4 mg at 03/22/20 0757    cholecalciferol (VITAMIN D3) tablet 1,000 Units, 1,000 Units, Oral, Daily, Gautam York MD, 1,000 Units at 03/22/20 0800    docusate sodium (COLACE) capsule 100 mg, 100 mg, Oral, BID, Mono Munguia MD, 100 mg at 03/22/20 0800   hydrALAZINE (APRESOLINE) tablet 10 mg, 10 mg, Oral, Q8H [address], Mili Alejandra PA-C, 10 mg at 03/22/20 5548    levothyroxine tablet 137 mcg, 137 mcg, Oral, Early Morning, Alejandra Liu MD, 137 mcg at 03/22/20 0528    metoprolol succinate (TOPROL-XL) 24 hr tablet 100 mg, 100 mg, Oral, Q12H [address], Rigo Singh MD, 100 mg at 03/22/20 0800    potassium chloride (K-DUR,KLOR-CON) CR tablet 40 mEq, 40 mEq, Oral, BID, Chun HEAD MD, 40 mEq at 03/22/20 0800    rOPINIRole (REQUIP) tablet 2 mg, 2 mg, Oral, HS, Dawson Salas PA-C, 2 mg at 03/21/20 2124    sacubitril-valsartan (ENTRESTO) 24-26 MG per tablet 1 tablet, 1 tablet, Oral, BID, French Mercado PA-C, 1 tablet at 03/22/20 0800    sodium chloride 0 9 % infusion, 50 mL/hr, Intravenous, Continuous, Franco Sanchez CRNA    venlafaxine [facility]) tablet 75 mg, 75 mg, Oral, Q12H [address], Alejandra Liu MD, 75 mg at 03/22/20 0800     Labs & Results:        Results from last 7 days   Lab Units 03/19/20  0550 03/18/20  0531   WBC Thousand/uL 6 50 7 55   HEMOGLOBIN g/dL 10 5* 10 3*   HEMATOCRIT % 34 3* 33 8*   PLATELETS Thousands/uL 204 208         Results from last 7 days   Lab Units 03/22/20  0526 03/21/20  0435 03/20/20 2002 03/20/20  0709   POTASSIUM mmol/L 3 6 3 5 4 5 2 8*   CHLORIDE mmol/L 102 104  --  103   CO2 mmol/L 33* 33*  --  34*   BUN mg/dL 15 15  --  14   CREATININE mg/dL 0 82 0 85  --  0 85   CALCIUM mg/dL 8 3 8 4  --  8 4     Results from last 7 days   Lab Units 03/18/20  0531   INR  1 70*     Results from last 7 days   Lab Units 03/20/20  0709   MAGNESIUM mg/dL 2 0         EKG personally reviewed by Aldo John MD   Initial ECG showed atrial fibrillation with rapid ventricular response  Most recent ECG after AVN ablation shows ventricular paced rhythm  ECHO:  LEFT VENTRICLE:  The ventricle was mildly dilated  Systolic function was markedly reduced by visual assessment  Ejection fraction was estimated to be 30 %    There was severe diffuse hypokinesis with regional variations      RIGHT VENTRICLE:  The ventricle was moderately dilated  Systolic function was moderately to markedly reduced      LEFT ATRIUM:  The atrium was moderately to markedly dilated      RIGHT ATRIUM:  The atrium was moderately dilated      MITRAL VALVE:  There was mild annular calcification  There was moderate regurgitation      TRICUSPID VALVE:  There was mild to moderate regurgitation      IVC, HEPATIC VEINS:  The inferior vena cava was dilated  Counseling / Coordination of Care  Total floor / unit time spent today 25 minutes  Greater than 50% of total time was spent with the patient and / or family counseling and / or coordination of care
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
## 2020-03-22 NOTE — ASSESSMENT & PLAN NOTE
Wt Readings from Last 3 Encounters:   03/22/20 85 5 kg (188 lb 7 9 oz)   03/09/20 90 3 kg (199 lb)   02/27/20 87 5 kg (193 lb)     Patient with evidence for acute on chronic combined diastolic heart failure secondary to AFib with RVR      Echo 7/2019 EF 45%, will repeat echo this admission when rates better controlled  Continue IV Bumex  Daily weights, I&O's, fluid restrict, 2g Na restricted diet  Continue diuresis as per Cardiology  Patient still been having significant bilateral lower extremity edema

## 2020-03-23 LAB
ANION GAP SERPL CALCULATED.3IONS-SCNC: 3 MMOL/L (ref 4–13)
ANION GAP SERPL CALCULATED.3IONS-SCNC: 4 MMOL/L (ref 4–13)
BUN SERPL-MCNC: 16 MG/DL (ref 5–25)
BUN SERPL-MCNC: 17 MG/DL (ref 5–25)
CALCIUM SERPL-MCNC: 8.5 MG/DL (ref 8.3–10.1)
CALCIUM SERPL-MCNC: 8.9 MG/DL (ref 8.3–10.1)
CHLORIDE SERPL-SCNC: 102 MMOL/L (ref 100–108)
CHLORIDE SERPL-SCNC: 106 MMOL/L (ref 100–108)
CO2 SERPL-SCNC: 27 MMOL/L (ref 21–32)
CO2 SERPL-SCNC: 36 MMOL/L (ref 21–32)
CREAT SERPL-MCNC: 0.89 MG/DL (ref 0.6–1.3)
CREAT SERPL-MCNC: 1.04 MG/DL (ref 0.6–1.3)
GFR SERPL CREATININE-BSD FRML MDRD: 51 ML/MIN/1.73SQ M
GFR SERPL CREATININE-BSD FRML MDRD: 62 ML/MIN/1.73SQ M
GLUCOSE SERPL-MCNC: 104 MG/DL (ref 65–140)
GLUCOSE SERPL-MCNC: 181 MG/DL (ref 65–140)
POTASSIUM SERPL-SCNC: 4.7 MMOL/L (ref 3.5–5.3)
POTASSIUM SERPL-SCNC: 6.1 MMOL/L (ref 3.5–5.3)
SODIUM SERPL-SCNC: 137 MMOL/L (ref 136–145)
SODIUM SERPL-SCNC: 141 MMOL/L (ref 136–145)

## 2020-03-23 PROCEDURE — 80048 BASIC METABOLIC PNL TOTAL CA: CPT | Performed by: PHYSICIAN ASSISTANT

## 2020-03-23 PROCEDURE — 80048 BASIC METABOLIC PNL TOTAL CA: CPT | Performed by: NURSE PRACTITIONER

## 2020-03-23 PROCEDURE — 99232 SBSQ HOSP IP/OBS MODERATE 35: CPT | Performed by: INTERNAL MEDICINE

## 2020-03-23 RX ORDER — METOLAZONE 5 MG/1
5 TABLET ORAL ONCE
Status: COMPLETED | OUTPATIENT
Start: 2020-03-23 | End: 2020-03-23

## 2020-03-23 RX ORDER — BUMETANIDE 2 MG/1
4 TABLET ORAL DAILY
Status: DISCONTINUED | OUTPATIENT
Start: 2020-03-23 | End: 2020-03-24 | Stop reason: HOSPADM

## 2020-03-23 RX ADMIN — ASCORBIC ACID TAB 250 MG 125 MG: 250 TAB at 08:24

## 2020-03-23 RX ADMIN — METOLAZONE 5 MG: 5 TABLET ORAL at 15:34

## 2020-03-23 RX ADMIN — VENLAFAXINE 75 MG: 37.5 TABLET ORAL at 08:24

## 2020-03-23 RX ADMIN — HYDRALAZINE HYDROCHLORIDE 10 MG: 10 TABLET, FILM COATED ORAL at 05:30

## 2020-03-23 RX ADMIN — ROPINIROLE HYDROCHLORIDE 2 MG: 2 TABLET, FILM COATED ORAL at 21:31

## 2020-03-23 RX ADMIN — BUMETANIDE 4 MG: 0.25 INJECTION INTRAMUSCULAR; INTRAVENOUS at 08:23

## 2020-03-23 RX ADMIN — HYDRALAZINE HYDROCHLORIDE 10 MG: 10 TABLET, FILM COATED ORAL at 21:30

## 2020-03-23 RX ADMIN — APIXABAN 5 MG: 5 TABLET, FILM COATED ORAL at 08:22

## 2020-03-23 RX ADMIN — APIXABAN 5 MG: 5 TABLET, FILM COATED ORAL at 18:31

## 2020-03-23 RX ADMIN — SACUBITRIL AND VALSARTAN 1 TABLET: 24; 26 TABLET, FILM COATED ORAL at 08:24

## 2020-03-23 RX ADMIN — DOCUSATE SODIUM 100 MG: 100 CAPSULE, LIQUID FILLED ORAL at 18:31

## 2020-03-23 RX ADMIN — METOPROLOL SUCCINATE 100 MG: 100 TABLET, EXTENDED RELEASE ORAL at 08:22

## 2020-03-23 RX ADMIN — POTASSIUM CHLORIDE 40 MEQ: 1500 TABLET, EXTENDED RELEASE ORAL at 18:31

## 2020-03-23 RX ADMIN — METOPROLOL SUCCINATE 100 MG: 100 TABLET, EXTENDED RELEASE ORAL at 21:31

## 2020-03-23 RX ADMIN — MELATONIN 1000 UNITS: at 08:23

## 2020-03-23 RX ADMIN — BUMETANIDE 4 MG: 2 TABLET ORAL at 15:34

## 2020-03-23 RX ADMIN — HYDRALAZINE HYDROCHLORIDE 10 MG: 10 TABLET, FILM COATED ORAL at 15:24

## 2020-03-23 RX ADMIN — POTASSIUM CHLORIDE 40 MEQ: 1500 TABLET, EXTENDED RELEASE ORAL at 08:22

## 2020-03-23 RX ADMIN — LEVOTHYROXINE SODIUM 137 MCG: 112 TABLET ORAL at 05:30

## 2020-03-23 RX ADMIN — SACUBITRIL AND VALSARTAN 1 TABLET: 24; 26 TABLET, FILM COATED ORAL at 18:31

## 2020-03-23 RX ADMIN — VENLAFAXINE 75 MG: 37.5 TABLET ORAL at 21:31

## 2020-03-23 RX ADMIN — DOCUSATE SODIUM 100 MG: 100 CAPSULE, LIQUID FILLED ORAL at 08:22

## 2020-03-23 NOTE — PROGRESS NOTES
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 Weight: 184 lbs down from 198 on admit  Tele: V paced  Assessment/Plan: 1  Acute on chronic partially recovered systolic CHF, LVEF 63-06%, 45% on most recent TTE LVIDd 4 6 cm, NYHA Class III, ACC/AHA Stage C,   Etiology: NICM and likely tachy-mediated with ongoing AF with RVR , +/- ischemia given patient's risk factors for CAD, strong family history, and septal Q waves on EKG with severe septal hypokinesis on echo, less likely viral, toxin induced or infiltrative   S/P cardiogenic shock in past requiring inotropic support 2/2 to arrhythmia  worsened in the setting of attempted cardioversion, use of Cardizem, anesthesia-use of Propofol  Presents this admission volume overloaded and back in an uncontrolled AF  Not in low output state  She is now S/P AVJ ablation with BiV AICD implant  Remains volume overloaded and on IV diuretic with only moderate response over last 24 hours  Wants to be discharged today  Will switch to oral Bumex with dose of Metolazone 30 minutes prior  Assess response in AM   Started on Entresto   BP/Renal function stable  Can likely stop Hydral as outpatient and uptitrate Entresto   Consider ischemic workup as outpatient for completion        Echocardiogram (limited) from 07/04/2019:  LVEF: 45%; mild diffuse hypokinesis  LVIDd: 3 8 cm  MR: No MR  Moderate annular calcification  Other: Dilated LA      Echocardiogram from 06/20/2019:  LVEF: 25%  LVIDd: 4 6 cm  RV: Dilated and hypokinetic  MR: Moderate      Neurohormonal Blockade:  --Beta Blocker: metoprolol succinate 100 mg Q12  --ACEi, ARB or ARNi: Entresto 24/26 mg BID    --SVR reduction: hydralazine 10 mg TID  --Aldosterone Receptor Blocker: Renal function precludes    --Inpatient diuretic: Bumex 4 mg IV BID, switching to PO with one time dose of Metolazone before next dose       Sudden Cardiac Death Risk Reduction:  Since been DC    --ICD: LVEF 45%     Electrical Resynchronization:  --Interrogation: Narrow QRS     Advanced Therapies (if appropriate): --Inotrope: N/A  --LVAD/Transplant Candidacy: Will continue to monitor       2  Atrial Fib with RVR  S/p AV node ablation with CRT-D with Dr Walker Negrete 03/18/2020  Anticoagulation on Eliquis  V paced       3  HTN COntrolled       4  HLD  On statin therapy      5  Hypothyroidism  History of radioablation in the past now on replacement therapy  TSH stable     6  Type II DM        7  CORAZON  Noncompliant with CPAP in the past    Still has not complete sleep study     8  CKD  Renal function stable this admit      9  History of transaminitis in setting of cardiogenic shock     10  Abnormal EKG  Q waves noted in septal leads concerning for possible old infarct  Lexiscan stress to eval for underlying ischemia still not completed      11  H/O tachy-oniel syndrome       12  History of alcohol abuse  Has been abstinent for years      \[facility]\"" Financial (day, reason): Murray catheter (day, reason):    Vitals: Blood pressure 121/58, pulse 80, temperature 97 9 °F (36 6 °C), temperature source Oral, resp   rate 16, height 5' 1" (1 549 m), weight 83 9 kg (184 lb 15 5 oz), SpO2 98 %  , Body mass index is 34 95 kg/m² , I/O last 3 completed shifts: In: 1080 [P O :1080]  Out: 7152 [Urine:3375]  No intake/output data recorded  Wt Readings from Last 3 Encounters:   03/23/20 83 9 kg (184 lb 15 5 oz)   03/09/20 90 3 kg (199 lb)   02/27/20 87 5 kg (193 lb)       Intake/Output Summary (Last 24 hours) at 3/23/2020 0851  Last data filed at 3/23/2020 0510  Gross per 24 hour   Intake 780 ml   Output 1600 ml   Net -820 ml     I/O last 3 completed shifts: In: 1080 [P O :1080]  Out: 4574 [Urine:3375]    No significant arrhythmias seen on telemetry review         Physical Exam:  Vitals:    03/22/20 2309 03/23/20 0530 03/23/20 0532 03/23/20 0730   BP: 103/54 134/60  121/58   BP Location:    Left arm   Pulse: 79   80   Resp: 18   16   Temp: 98 5 °F (36 9 °C)   97 9 °F (36 6 °C)   TempSrc:    Oral   SpO2: 98%   98%   Weight:   83 9 kg (184 lb 15 5 oz)    Height:           GEN: Mayra Meadows appears well, alert and oriented x 3, tearful   HEENT: pupils equal, round, and reactive to light; extraocular muscles intact  NECK: supple, no carotid bruits   HEART: regular rhythm, normal S1 and S2, no murmurs, clicks, gallops or rubs, JVP is up   LUNGS: clear to auscultation bilaterally; no wheezes, rales, or rhonchi   ABDOMEN: normal bowel sounds, soft, no tenderness, no distention  EXTREMITIES: peripheral pulses normal; no clubbing, cyanosis, +2 BLLE edema  NEURO: no focal findings   SKIN: normal without suspicious lesions on exposed skin      Current Facility-Administered Medications:     acetaminophen (TYLENOL) tablet 650 mg, 650 mg, Oral, Q4H PRN, Sonny Garcia MD    apixaban (ELIQUIS) tablet 5 mg, 5 mg, Oral, BID, Sunny Segudno PA-C, 5 mg at 03/23/20 3205    ascorbic acid (VITAMIN C) tablet 125 mg, 125 mg, Oral, Daily, Sonny Garcia MD, 125 mg at 03/23/20 0824    bumetanide (BUMEX) injection 4 mg, 4 mg, Intravenous, BID (diuretic), Oksana Novak STEPHEN Alejandra, 4 mg at 03/23/20 4841    cholecalciferol (VITAMIN D3) tablet 1,000 Units, 1,000 Units, Oral, Daily, Simba Pena MD, 1,000 Units at 03/23/20 6484    docusate sodium (COLACE) capsule 100 mg, 100 mg, Oral, BID, Army Cora MD, 100 mg at 03/23/20 7432    hydrALAZINE (APRESOLINE) tablet 10 mg, 10 mg, Oral, Q8H [address], Becky Melissa PA-C, 10 mg at 03/23/20 0530    levothyroxine tablet 137 mcg, 137 mcg, Oral, Early Morning, Simba Pena MD, 137 mcg at 03/23/20 0530    metoprolol succinate (TOPROL-XL) 24 hr tablet 100 mg, 100 mg, Oral, Q12H [address], Fatimah Cody MD, 100 mg at 03/23/20 6996    potassium chloride (K-DUR,KLOR-CON) CR tablet 40 mEq, 40 mEq, Oral, BID, Chun HEAD MD, 40 mEq at 03/23/20 5774    rOPINIRole (REQUIP) tablet 2 mg, 2 mg, Oral, HS, Kevin Marie PA-C, 2 mg at 03/22/20 2123    sacubitril-valsartan (ENTRESTO) 24-26 MG per tablet 1 tablet, 1 tablet, Oral, BID, Becky Melissa PA-C, 1 tablet at 03/23/20 6889    sodium chloride 0 9 % infusion, 50 mL/hr, Intravenous, Continuous, Emiliano Dexter CRNA    venlafaxine [facility]) tablet 75 mg, 75 mg, Oral, Q12H [address], Simba Pena MD, 75 mg at 03/23/20 0824      Labs & Results:        Results from last 7 days   Lab Units 03/19/20  0550 03/18/20  0531   WBC Thousand/uL 6 50 7 55   HEMOGLOBIN g/dL 10 5* 10 3*   HEMATOCRIT % 34 3* 33 8*   PLATELETS Thousands/uL 204 208         Results from last 7 days   Lab Units 03/23/20  0509 03/22/20  0526 03/21/20  0435   POTASSIUM mmol/L 6 1* 3 6 3 5   CHLORIDE mmol/L 106 102 104   CO2 mmol/L 27 33* 33*   BUN mg/dL 17 15 15   CREATININE mg/dL 0 89 0 82 0 85   CALCIUM mg/dL 8 5 8 3 8 4     Results from last 7 days   Lab Units 03/18/20  0531   INR  1 70*       Counseling / Coordination of Care  Total floor / unit time spent today 20 minutes  Greater than 50% of total time was spent with the patient and / or family counseling and / or coordination of care    A description of the counseling / coordination of care: 20  Manisha Mena

## 2020-03-23 NOTE — PHYSICAL THERAPY NOTE
Physical Therapy Cancellation Note      Pt chart reviewed  PT attempted to see pt in AM, however, pt requested PT return in short time  PT attempted to see pt for second time, pt refusing therapy at this time, tearful, reporting she is "too upset to do anything"  PT offered to take pt for walk, however, pt continues to refuse, expressing frustration with conflicting DC plans  When asked if PT could return later in day to work with pt, pt reports "I honestly just don't know"  PT to continue to follow and see pt as able      Augustine Diaz, PT, DPT

## 2020-03-23 NOTE — UTILIZATION REVIEW
Continued Stay Review    Date: 3/21/20 Saturday                       Current Patient Class: Inpatient   Current Level of Care: Acte Med Surg    HPI:  78year old female with PMHx HTN, HLD, NICM/ Biventricular heart failure 2nd atrial fib on eliquis - s/p unsuccessful ablation attempts x 2, estimated LVEFx recovery from 25 % to 45 %, DM2, CKD, CORAZON, Hypothyroid, uterine cancer, alcohol abuse  Admitted inpatient 3/11/20 2nd Acute on chronic partially recovered systolic CHF, LVEF 20-97%, 45% on most recent TTE LVIDd 4 6 cm, NYHA Class III, ACC/AHA Stage C, uncontrolled atrial fib  Initial Tx:  IV Cardizem drip, IV Loressor prn given x 2  On 3/12 Cardilogy d/c IV Cardizem and started Amiodarone     3/17/20 Card note:  Persistent atrial fibrillation with periods of rapid ventricular response  *Continues to be tachycardic, with rates up to 130 beats per minutes, despite the addition of digoxin yesterday  While we would like her to be optimized prior to her procedure, she likely will not make much progress with her rates being consistently elevated  She will be scheduled for biventricular ICD/AV node ablation tomorrow 3/18/2020       3/18/20 Successful Biventricular ICD Implant  PROCEDURE Ablation was performed    Pacemaker was reprogrammed to VOO 30bpm before the ablation  After the ablation it was reprogrammed to VVIR 80bpm  We observed for 20 minutes after ablation without return of conduction of atrial fibrillation   She was in complete heart block after ablation as expected and BIV paced    Current Assessment/Plan per today's Card note:   1  Acute systolic CHF - decompensated in the setting of rapid atrial fibrillation  Remains volume overloaded but is responding to IV diuretic therapy  We will continue the same dose today, hoping to switch to oral diuretic therapy in the next 24-48 hours  Continue to follow urine output, daily labs and weight        2    Nonischemic cardiomyopathy - EF 30% - Likely tachycardic mediated  On good goal-directed medical therapy  Continue metoprolol, hydralazine and Isordil as ordered  Entresto started yesterday  Blood pressure stable but somewhat low  If this worsens, we will continue to back off of hydralazine/Isordil as Jersey Naik is titrated  Will need an ischemic workup given risk factors  Follow ejection fraction closely into the outpatient setting      3  Atrial fibrillation with rapid ventricular response - S/P AVN ablation with CRT-D this admission  Currently ventricularly paced  Pertinent Labs/Diagnostic Results:   Results from last 7 days   Lab Units 03/19/20  0550 03/18/20  0531   WBC Thousand/uL 6 50 7 55   HEMOGLOBIN g/dL 10 5* 10 3*   HEMATOCRIT % 34 3* 33 8*   PLATELETS Thousands/uL 204 208       Results from last 7 days   Lab Units 03/21/20  0435 03/20/20 2002 03/20/20  0709   SODIUM mmol/L 142  --  141   POTASSIUM mmol/L 3 5 4 5 2 8*   CHLORIDE mmol/L 104  --  103   CO2 mmol/L 33*  --  34*   ANION GAP mmol/L 5  --  4   BUN mg/dL 15  --  14   CREATININE mg/dL 0 85  --  0 85   EGFR ml/min/1 73sq m 65  --  65   CALCIUM mg/dL 8 4  --  8 4   MAGNESIUM mg/dL  --   --  2 0     Results from last 7 days   Lab Units 03/21/20  0435 03/20/20  0709 03/19/20  0550 03/18/20  0531 03/17/20  0508   GLUCOSE RANDOM mg/dL 121 100 118 100 111     Results from last 7 days   Lab Units 03/18/20  0531   PROTIME seconds 19 5*   INR  1 70*     Vital Signs:   Blood pressure 99/52, pulse 78, temperature 97 8 °F (36 6 °C), temperature source Oral, resp  rate 18, height 5' 1" (1 549 m), weight 86 5 kg (190 lb 11 2 oz), SpO2 91 % , Body mass index is 36 03 kg/m² ,     Orthostatic Blood Pressures      Most Recent Value   Blood Pressure  99/52 filed at 03/21/2020 1100   Patient Position - Orthostatic VS  Lying filed at 03/21/2020 0300      TELEMETRY (Per Card):   No significant arrhythmias seen;  V-Paced    Intake/Output Summary (Last 24 hours) at 3/21/2020 1147  Gross per 24 hour Intake 1326 ml   Output 2080 ml   Net -754 ml       Wt Readings from Last 3 Encounters:   03/21/20 86 5 kg (190 lb 11 2 oz)   03/09/20 90 3 kg (199 lb)   02/27/20 87 5 kg (193 lb     Diet Cardiovascular; Sodium 2 GM; Fluid Restriction 1200 ML     Scheduled Medications:  apixaban 5 mg Oral BID   ascorbic acid 125 mg Oral Daily   bumetanide 4 mg Oral Daily   cholecalciferol 1,000 Units Oral Daily   docusate sodium 100 mg Oral BID   hydrALAZINE 10 mg Oral Q8H DAVE   levothyroxine 137 mcg Oral Early Morning   metolazone 5 mg Oral Once   metoprolol succinate 100 mg Oral Q12H DAVE   potassium chloride 40 mEq Oral BID   rOPINIRole 2 mg Oral HS   sacubitril-valsartan 1 tablet Oral BID   venlafaxine 75 mg Oral Q12H [address]     Continuous IV Infusions:  sodium chloride 50 mL/hr Intravenous Continuous     PRN Meds:  acetaminophen 650 mg Oral Q4H PRN     Discharge Plan: To be determined   Inpatient Case Management following for all discharge needs    Network Utilization Review Department  [email or web address]  ATTENTION: Please call with any questions or concerns to [phone] and carefully listen to the prompts so that you are directed to the right person  All voicemails are confidential   Grayson Donato all requests for admission clinical reviews, approved or denied determinations and any other requests to dedicated fax number below belonging to the campus where the patient is receiving treatment   List of dedicated fax numbers for the Facilities:  [address] DENIALS (Administrative/Medical Necessity) [phone]   [address] [place] [address] (Maternity/NICU/Pediatrics) [phone]   Conchita Padgett [phone]   Sebastián Sargent [phone]   Fredis Hogue [phone]   Veronica Fernández [place] [address] [phone] [facility] [phone]   [address] [phone]

## 2020-03-23 NOTE — PROGRESS NOTES
Progress Note Sabina Self 1940, 78 y o  female MRN: [id number]    Unit/Bed#: [facility] 529-01 Encounter: [id number]    Primary Care Provider: SOTO Aguayo   Date and time admitted to hospital: 3/11/2020 11:50 AM        Atrial fibrillation with RVR ([address] )  Assessment & Plan  Continue Metoprolol  Continue Eliquis    * Acute on chronic combined systolic and diastolic CHF (congestive heart failure) ([facility])  Assessment & Plan  Wt Readings from Last 3 Encounters:   20 83 9 kg (184 lb 15 5 oz)   20 90 3 kg (199 lb)   20 87 5 kg (193 lb)     Patient with evidence for acute on chronic combined diastolic heart failure secondary to AFib with RVR  Echo 2019 EF 45%, will repeat echo this admission when rates better controlled  Transitioned to PO today  Daily weights, I&O's, fluid restrict, 2g Na restricted diet  Continue diuresis as per Cardiology  Patient still been having significant bilateral lower extremity edema        VTE Pharmacologic Prophylaxis:   Pharmacologic: Heparin  Mechanical VTE Prophylaxis in Place: Yes    Patient Centered Rounds: I have performed bedside rounds with nursing staff today  Discussions with Specialists or Other Care Team Provider: Discussed with patient and with nursing  Education and Discussions with Family / Patient: Discussed with patient  She will update family  Time Spent for Care: 30 minutes  More than 50% of total time spent on counseling and coordination of care as described above  Current Length of Stay: 12 day(s)    Current Patient Status: Inpatient   Certification Statement: The patient will continue to require additional inpatient hospital stay due to transition to oral meds  Discharge Plan: Not medically stable for DC  Code Status: Level 1 - Full Code      Subjective:   Patient seen and examined     Feeling "good"    Objective:     Vitals:   Temp (24hrs), Av 1 °F (36 7 °C), Min:97 6 °F (36 4 °C), Max:98 5 °F (36 9 °C)    Temp:  [97 6 °F (36 4 °C)-98 5 °F (36 9 °C)] 98 2 °F (36 8 °C)  HR:  [79-80] 80  Resp:  [16-18] 18  BP: ()/(52-60) 116/59  SpO2:  [97 %-98 %] 98 %  Body mass index is 34 95 kg/m²  Input and Output Summary (last 24 hours): Intake/Output Summary (Last 24 hours) at 3/23/2020 1535  Last data filed at 3/23/2020 1056  Gross per 24 hour   Intake 360 ml   Output 1600 ml   Net -1240 ml       Physical Exam:     Physical Exam   Constitutional: She is oriented to person, place, and time  She appears well-developed  No distress  HENT:   Head: Normocephalic  Mouth/Throat: No oropharyngeal exudate  Eyes: Pupils are equal, round, and reactive to light  Right eye exhibits no discharge  Left eye exhibits no discharge  No scleral icterus  Neck: No JVD present  No tracheal deviation present  No thyromegaly present  Cardiovascular: Normal rate  Exam reveals no gallop and no friction rub  No murmur heard  Pulmonary/Chest: Effort normal  No stridor  No respiratory distress  She has no wheezes  She has no rales  She exhibits no tenderness  Abdominal: She exhibits no distension and no mass  There is no tenderness  There is no rebound  No hernia  Musculoskeletal: She exhibits edema  She exhibits no tenderness or deformity  Lymphadenopathy:     She has no cervical adenopathy  Neurological: She is alert and oriented to person, place, and time  She displays normal reflexes  No cranial nerve deficit or sensory deficit  She exhibits normal muscle tone  Skin: Capillary refill takes less than 2 seconds  No rash noted  She is not diaphoretic  No erythema  There is pallor  Psychiatric: She has a normal mood and affect           Additional Data:     Labs:    Results from last 7 days   Lab Units 03/19/20  0550   WBC Thousand/uL 6 50   HEMOGLOBIN g/dL 10 5*   HEMATOCRIT % 34 3*   PLATELETS Thousands/uL 204     Results from last 7 days   Lab Units 03/23/20  0923   SODIUM mmol/L 141   POTASSIUM mmol/L 4 7 CHLORIDE mmol/L 102   CO2 mmol/L 36*   BUN mg/dL 16   CREATININE mg/dL 1 04   ANION GAP mmol/L 3*   CALCIUM mg/dL 8 9   GLUCOSE RANDOM mg/dL 181*     Results from last 7 days   Lab Units 03/18/20  0531   INR  1 70*                       * I Have Reviewed All Lab Data Listed Above  * Additional Pertinent Lab Tests Reviewed: All Labs For Current Hospital Admission Reviewed    Imaging:    Imaging Reports Reviewed Today Include:   No imaging pertinent to this encounter  Imaging Personally Reviewed by Myself Includes:  As above  Recent Cultures (last 7 days):           Last 24 Hours Medication List:     Current Facility-Administered Medications:  acetaminophen 650 mg Oral Q4H PRN Michelle Hong MD   apixaban 5 mg Oral BID Wes Vasquez PA-C   ascorbic acid 125 mg Oral Daily Michelle Hong MD   bumetanide 4 mg Oral Daily SOTO Junior   cholecalciferol 1,000 Units Oral Daily Michelle Hong MD   docusate sodium 100 mg Oral BID Ryan Zhou MD   hydrALAZINE 10 mg Oral Q8H [address] Dino Ohara PA-C   levothyroxine 137 mcg Oral Early Morning Michelle Hong MD   metoprolol succinate 100 mg Oral Q12H [address] Noel Reynolds MD   potassium chloride 40 mEq Oral BID Reilly HEAD MD   rOPINIRole 2 mg Oral HS Aislinn Serrano PA-C   sacubitril-valsartan 1 tablet Oral BID Dino Ohara PA-C   sodium chloride 50 mL/hr Intravenous Continuous Phu Mccoy CRNA   venlafaxine 75 mg Oral Q12H Joseph Barriga MD        Today, Patient Was Seen By: Cristopher Rollins MD    ** Please Note: Dictation voice to text software may have been used in the creation of this document   **

## 2020-03-23 NOTE — ASSESSMENT & PLAN NOTE
Wt Readings from Last 3 Encounters:   03/23/20 83 9 kg (184 lb 15 5 oz)   03/09/20 90 3 kg (199 lb)   02/27/20 87 5 kg (193 lb)     Patient with evidence for acute on chronic combined diastolic heart failure secondary to AFib with RVR  Echo 7/2019 EF 45%, will repeat echo this admission when rates better controlled  Transitioned to PO today     Daily weights, I&O's, fluid restrict, 2g Na restricted diet  Continue diuresis as per Cardiology  Patient still been having significant bilateral lower extremity edema

## 2020-03-23 NOTE — PROGRESS NOTES
Patient resting comfortably  Vitals stable  No complaints of pain  Pulses equal bilaterally, no edema present.

## 2020-03-24 ENCOUNTER — TRANSITIONAL CARE MANAGEMENT (OUTPATIENT)
Dept: FAMILY MEDICINE CLINIC | Facility: CLINIC | Age: 80
End: 2020-03-24

## 2020-03-24 VITALS
HEART RATE: 80 BPM | OXYGEN SATURATION: 95 % | DIASTOLIC BLOOD PRESSURE: 58 MMHG | SYSTOLIC BLOOD PRESSURE: 126 MMHG | RESPIRATION RATE: 18 BRPM | WEIGHT: 176.81 LBS | TEMPERATURE: 98.2 F | HEIGHT: 61 IN | BODY MASS INDEX: 33.38 KG/M2

## 2020-03-24 PROBLEM — I50.43 ACUTE ON CHRONIC COMBINED SYSTOLIC AND DIASTOLIC CHF (CONGESTIVE HEART FAILURE) (HCC): Status: RESOLVED | Noted: 2019-07-03 | Resolved: 2020-03-24

## 2020-03-24 PROBLEM — R60.0 EDEMA OF BOTH LOWER EXTREMITIES: Status: RESOLVED | Noted: 2020-02-21 | Resolved: 2020-03-24

## 2020-03-24 PROBLEM — I48.91 ATRIAL FIBRILLATION WITH RVR (HCC): Status: RESOLVED | Noted: 2019-06-18 | Resolved: 2020-03-24

## 2020-03-24 LAB
ANION GAP SERPL CALCULATED.3IONS-SCNC: 4 MMOL/L (ref 4–13)
BASOPHILS # BLD AUTO: 0.06 THOUSANDS/ΜL (ref 0–0.1)
BASOPHILS NFR BLD AUTO: 1 % (ref 0–1)
BUN SERPL-MCNC: 20 MG/DL (ref 5–25)
CALCIUM SERPL-MCNC: 9.3 MG/DL (ref 8.3–10.1)
CHLORIDE SERPL-SCNC: 100 MMOL/L (ref 100–108)
CO2 SERPL-SCNC: 35 MMOL/L (ref 21–32)
CREAT SERPL-MCNC: 0.86 MG/DL (ref 0.6–1.3)
EOSINOPHIL # BLD AUTO: 0.14 THOUSAND/ΜL (ref 0–0.61)
EOSINOPHIL NFR BLD AUTO: 2 % (ref 0–6)
ERYTHROCYTE [DISTWIDTH] IN BLOOD BY AUTOMATED COUNT: 17.5 % (ref 11.6–15.1)
GFR SERPL CREATININE-BSD FRML MDRD: 64 ML/MIN/1.73SQ M
GLUCOSE SERPL-MCNC: 121 MG/DL (ref 65–140)
HCT VFR BLD AUTO: 36.7 % (ref 34.8–46.1)
HGB BLD-MCNC: 11.2 G/DL (ref 11.5–15.4)
IMM GRANULOCYTES # BLD AUTO: 0.04 THOUSAND/UL (ref 0–0.2)
IMM GRANULOCYTES NFR BLD AUTO: 1 % (ref 0–2)
LYMPHOCYTES # BLD AUTO: 1.51 THOUSANDS/ΜL (ref 0.6–4.47)
LYMPHOCYTES NFR BLD AUTO: 21 % (ref 14–44)
MCH RBC QN AUTO: 26.5 PG (ref 26.8–34.3)
MCHC RBC AUTO-ENTMCNC: 30.5 G/DL (ref 31.4–37.4)
MCV RBC AUTO: 87 FL (ref 82–98)
MONOCYTES # BLD AUTO: 0.67 THOUSAND/ΜL (ref 0.17–1.22)
MONOCYTES NFR BLD AUTO: 9 % (ref 4–12)
NEUTROPHILS # BLD AUTO: 4.68 THOUSANDS/ΜL (ref 1.85–7.62)
NEUTS SEG NFR BLD AUTO: 66 % (ref 43–75)
NRBC BLD AUTO-RTO: 0 /100 WBCS
PLATELET # BLD AUTO: 185 THOUSANDS/UL (ref 149–390)
PMV BLD AUTO: 10.4 FL (ref 8.9–12.7)
POTASSIUM SERPL-SCNC: 4.1 MMOL/L (ref 3.5–5.3)
RBC # BLD AUTO: 4.22 MILLION/UL (ref 3.81–5.12)
SODIUM SERPL-SCNC: 139 MMOL/L (ref 136–145)
WBC # BLD AUTO: 7.1 THOUSAND/UL (ref 4.31–10.16)

## 2020-03-24 PROCEDURE — 99232 SBSQ HOSP IP/OBS MODERATE 35: CPT | Performed by: INTERNAL MEDICINE

## 2020-03-24 PROCEDURE — 97116 GAIT TRAINING THERAPY: CPT

## 2020-03-24 PROCEDURE — 85025 COMPLETE CBC W/AUTO DIFF WBC: CPT | Performed by: INTERNAL MEDICINE

## 2020-03-24 PROCEDURE — 80048 BASIC METABOLIC PNL TOTAL CA: CPT | Performed by: NURSE PRACTITIONER

## 2020-03-24 PROCEDURE — 99239 HOSP IP/OBS DSCHRG MGMT >30: CPT | Performed by: INTERNAL MEDICINE

## 2020-03-24 RX ORDER — BUMETANIDE 2 MG/1
4 TABLET ORAL DAILY
Qty: 30 TABLET | Refills: 0 | Status: SHIPPED | OUTPATIENT
Start: 2020-03-25 | End: 2020-03-26 | Stop reason: SDUPTHER

## 2020-03-24 RX ORDER — METOPROLOL SUCCINATE 100 MG/1
100 TABLET, EXTENDED RELEASE ORAL EVERY 12 HOURS SCHEDULED
Qty: 60 TABLET | Refills: 0 | Status: SHIPPED | OUTPATIENT
Start: 2020-03-24 | End: 2020-04-08 | Stop reason: SDUPTHER

## 2020-03-24 RX ORDER — HYDRALAZINE HYDROCHLORIDE 10 MG/1
10 TABLET, FILM COATED ORAL EVERY 8 HOURS SCHEDULED
Qty: 90 TABLET | Refills: 0 | Status: SHIPPED | OUTPATIENT
Start: 2020-03-24 | End: 2020-03-26 | Stop reason: SDUPTHER

## 2020-03-24 RX ORDER — POTASSIUM CHLORIDE 20 MEQ/1
40 TABLET, EXTENDED RELEASE ORAL 2 TIMES DAILY
Qty: 60 TABLET | Refills: 0 | Status: SHIPPED | OUTPATIENT
Start: 2020-03-24 | End: 2020-03-26 | Stop reason: SDUPTHER

## 2020-03-24 RX ADMIN — VENLAFAXINE 75 MG: 37.5 TABLET ORAL at 08:45

## 2020-03-24 RX ADMIN — LEVOTHYROXINE SODIUM 137 MCG: 112 TABLET ORAL at 05:54

## 2020-03-24 RX ADMIN — HYDRALAZINE HYDROCHLORIDE 10 MG: 10 TABLET, FILM COATED ORAL at 05:54

## 2020-03-24 RX ADMIN — BUMETANIDE 4 MG: 2 TABLET ORAL at 08:46

## 2020-03-24 RX ADMIN — POTASSIUM CHLORIDE 40 MEQ: 1500 TABLET, EXTENDED RELEASE ORAL at 08:45

## 2020-03-24 RX ADMIN — METOPROLOL SUCCINATE 100 MG: 100 TABLET, EXTENDED RELEASE ORAL at 08:45

## 2020-03-24 RX ADMIN — DOCUSATE SODIUM 100 MG: 100 CAPSULE, LIQUID FILLED ORAL at 08:45

## 2020-03-24 RX ADMIN — SACUBITRIL AND VALSARTAN 1 TABLET: 24; 26 TABLET, FILM COATED ORAL at 08:46

## 2020-03-24 RX ADMIN — MELATONIN 1000 UNITS: at 08:46

## 2020-03-24 RX ADMIN — APIXABAN 5 MG: 5 TABLET, FILM COATED ORAL at 08:45

## 2020-03-24 RX ADMIN — ASCORBIC ACID TAB 250 MG 125 MG: 250 TAB at 08:46

## 2020-03-24 NOTE — PROGRESS NOTES
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)    Atrial fibrillation with RVR (HCC)    Chronic combined systolic (congestive) and diastolic (congestive) heart failure (HCC)    Restless leg syndrome    Edema of both lower extremities    Subjective: Bridger Lopez is a 71-year-old female with chronic BiV heart failure, atrial fibrillation, HTN, HLD, hypothyroidism, DM2, CORAZON, and history of uterine cancer who presented on 03/11/2020 to [facility] with weight gain and worsening LE edema  Patient seen and examined  No significant events overnight  No current complaints  Answered questions about post-ICD/ablation care  Reviewed importance of daily weights and contacting office with weight gain  Stable for discharge from cardiology perspective  Objective: Intake/ Output: 450 mL / 3450 mL (net negative 3000 mL; accurate intake?)  Weight: 176 lbs (down from 184 lbs on 03/23)  Telemetry: Paced rhythm; 70-80s  Plan:  Acute on chronic biventricular heart failure; LVEF 30%, LVIDd 5 44 cm; NYHA III; ACC/AHA Stage C              Etiology: likely tachy-induced, +/- ischemic (nuclear stress was ordered, patient never completed)  Also has history of radioablative iodine  Etiology of exacerbation: Unclear if from AFib with RVR or vice versa  TTE from 06/20/2019: LVEF 25-30%, LVIDd 4 6 cm, mod reduced RVSF  Mod MR and TR  TTE from 06/26/2019: LVEF ~40%, mildly reduced RVSF   Mod MR, otherwise, no significant valvular disease                TTE (limited) from 07/04/2019: LVEF 45%  LVIDd 3 8 cm  Grade 2 DD  TTE completed on 03/16/2020 (not euvolemic): LVEF 30%  LVIDd 5 44 cm  Moderately dilated RV with moderately to markedly reduced RVSF  NAN  Moderate MR  Mild to moderate TR  Dilated IVC  Nuclear stress test ordered in 10/2019; never completed  Strict I/Os with daily weights  CV diet with fluid restriction        Neurohormonal Blockade:   --Beta Blocker: metoprolol succinate 100 mg q12 hours  --ACEi, ARB or ARNi: Entresto 24-26 mg BID     --SVR Reducing Agents: hydralazine 10 mg q8 hours  --Aldosterone Receptor Blocker: N/A  --Home Diuretic: torsemide 40 mg daily  --Inpatient Diuretic: PO Bumex 4 mg BID with potassium 40 mEq BID  Plan to send home with PRN metolazone for rapid weight gain        Sudden Cardiac Death Risk Reduction:   --[employer]onic CRT-D (BiV ICD) in situ since 03/18/2020       Electrical Resynchronization:   --Interrogation: Narrow QRS      Advanced Therapies (if appropriate): Will continue to monitor       Atrial fibrillation with rapid ventricular response              [id number] = 6 (age, sex, CHF, HTN, DM)              Anticoagulation on Eliquis  Last dose (of 13 doses) to be given this evening               S/p unsuccessful cardioversion on 06/21/2019  Initial plan was for EBEN cardioversion and dual-chamber PPM implantation on 06/25  Received propofol and cardizem which likely induced cardiogenic shock due to her borderline low output state at that time                S/p successful cardioversion on 07/03 with amio load  Converted back into Afib on 07/08                  Continue on metoprolol succinate 100 mg q12 hours for rate control  Electrophysiology consulted; input appreciated  S/p AV node ablation with CRT-D with Dr Jj Moore on 03/18/2020  Chronic kidney disease, stage III   Baseline creatinine of 0 9-1 2  Today, creatinine of 0 86 (down from 1 04 on 03/23)  Will continue to monitor      Obstructive sleep apnea Noncompliant with CPAP in the past               Repeat outpatient sleep study was ordered; however, never scheduled      Diabetes mellitus, type 2              Most recent hemoglobin A1c from 03/12/2020 of 6 9  Management per primary team     Hypothyroidism              History of radioablation in the past; now on replacement therapy  Most recent TSH of 9 170 with normal free T4 from 06/18/2019      Hypertension, controlled: continue medications as above  Hyperlipidemia    Diagnostic Testing:  Echocardiogram (limited) from 07/04/2019:  LVEF: 45%; mild diffuse hypokinesis  LVIDd: 3 8 cm  MR: No MR  Moderate annular calcification  Other: Dilated LA      Echocardiogram from 06/20/2019:  LVEF: 25%  LVIDd: 4 6 cm  RV: Dilated and hypokinetic  MR: Moderate  PASP: 35 mmHg  Vitals:   Blood pressure 126/58, pulse 80, temperature 98 2 °F (36 8 °C), resp  rate 18, height 5' 1" (1 549 m), weight 80 2 kg (176 lb 12 9 oz), SpO2 95 %  Body mass index is 33 41 kg/m²  I/O last 3 completed shifts: In: 18 [P O :570]  Out: 4200 [Urine:4200]     Wt Readings from Last 3 Encounters:   03/24/20 80 2 kg (176 lb 12 9 oz)   03/09/20 90 3 kg (199 lb)   02/27/20 87 5 kg (193 lb)     Vitals:    03/23/20 2300 03/24/20 0308 03/24/20 0533 03/24/20 0712   BP: 119/57 120/56  126/58   BP Location:       Pulse: 80 80  80   Resp: 20 18  18   Temp: 98 °F (36 7 °C) 97 9 °F (36 6 °C)  98 2 °F (36 8 °C)   TempSrc: Oral      SpO2: 96% 95%  95%   Weight:   80 2 kg (176 lb 12 9 oz)    Height:         Physical Exam   Constitutional: She is oriented to person, place, and time  She appears well-developed and well-nourished  HENT:   Head: Normocephalic and atraumatic  Eyes: Pupils are equal, round, and reactive to light  EOM are normal    Neck: Neck supple  JVD present  No tracheal deviation present  Cardiovascular: Normal rate, regular rhythm and intact distal pulses  Murmur heard    Aquacel dressing present and intact; without signs of hematoma or infections   Pulmonary/Chest: Effort normal and breath sounds normal  No respiratory distress  She has no wheezes  Abdominal: Soft  Bowel sounds are normal  She exhibits no distension  Musculoskeletal: She exhibits edema  She exhibits no tenderness  Neurological: She is alert and oriented to person, place, and time  Skin: Skin is warm and dry  Psychiatric: She has a normal mood and affect  Her behavior is normal    Vitals reviewed  [employer] (day, reason): N/A  Murray Catheter (day, reason): N/A      Current Facility-Administered Medications:     acetaminophen (TYLENOL) tablet 650 mg, 650 mg, Oral, Q4H PRN, Brendan Diggs MD    [facility]) tablet 5 mg, 5 mg, Oral, BID, Isa Roberts PA-C, 5 mg at 03/24/20 0845    ascorbic acid (VITAMIN C) tablet 125 mg, 125 mg, Oral, Daily, Brendan Diggs MD, 125 mg at 03/24/20 0846    bumetanide (BUMEX) tablet 4 mg, 4 mg, Oral, Daily, SOTO Junior, 4 mg at 03/24/20 0846    cholecalciferol (VITAMIN D3) tablet 1,000 Units, 1,000 Units, Oral, Daily, Brendan Diggs MD, 1,000 Units at 03/24/20 0846    docusate sodium (COLACE) capsule 100 mg, 100 mg, Oral, BID, Doretha Robertson MD, 100 mg at 03/24/20 0845    hydrALAZINE (APRESOLINE) tablet 10 mg, 10 mg, Oral, Q8H [facility], Mili Alejandra PA-C, 10 mg at 03/24/20 0554    levothyroxine tablet 137 mcg, 137 mcg, Oral, Early Morning, Brendan Diggs MD, 137 mcg at 03/24/20 0554    metoprolol succinate (TOPROL-XL) 24 hr tablet 100 mg, 100 mg, Oral, Q12H [facility], Fransisca Orantes MD, 100 mg at 03/24/20 0845    potassium chloride (K-DUR,KLOR-CON) CR tablet 40 mEq, 40 mEq, Oral, BID, Chun HEAD MD, 40 mEq at 03/24/20 0845    rOPINIRole (REQUIP) tablet 2 mg, 2 mg, Oral, HS, Raymundo Richards PA-C, 2 mg at 03/23/20 2131    sacubitril-valsartan (ENTRESTO) 24-26 MG per tablet 1 tablet, 1 tablet, Oral, BID, Quinton Hunter PA-C, 1 tablet at 03/24/20 7774    sodium chloride 0 9 % infusion, 50 mL/hr, Intravenous, Continuous, Emiliano Dexter CRNA    venlafaxine [facility]) tablet 75 mg, 75 mg, Oral, Q12H [facility], Simba Pena MD, 75 mg at 03/24/20 0845    Labs & Results:      Results from last 7 days   Lab Units 03/24/20  0523 03/19/20  0550 03/18/20  0531   WBC Thousand/uL 7 10 6 50 7 55   HEMOGLOBIN g/dL 11 2* 10 5* 10 3*   HEMATOCRIT % 36 7 34 3* 33 8*   PLATELETS Thousands/uL 185 204 208         Results from last 7 days   Lab Units 03/24/20  0523 03/23/20  0923 03/23/20  0509   POTASSIUM mmol/L 4 1 4 7 6 1*   CHLORIDE mmol/L 100 102 106   CO2 mmol/L 35* 36* 27   BUN mg/dL 20 16 17   CREATININE mg/dL 0 86 1 04 0 89   CALCIUM mg/dL 9 3 8 9 8 5     Results from last 7 days   Lab Units 03/18/20  0531   INR  1 70*     Counseling / Coordination of Care: Total floor / unit time spent today 20 minutes  Greater than 50% of total time was spent with the patient and / or family counseling and / or coordination of care  A description of the counseling / coordination of care: 10 minutes (low sodium diet, fluid restriction, daily weights, and importance of medication compliance)  Thank you for the opportunity to participate in the care of this patient      Megan Mcdonald PA-C

## 2020-03-24 NOTE — DISCHARGE SUMMARY
Discharge Summary - [facility]    Patient Information: Isai Gates 78 y o  female MRN: [id number]  Unit/Bed#: [facility] 529-01 Encounter: [id number]    Discharging Physician / Practitioner: Zakia Kogn MD  PCP: Patrick Cordova  Admission Date: 3/11/2020  Discharge Date: 03/24/20    Reason for Admission:  Lower extremity edema    Discharge Diagnoses:     Principal Problem (Resolved):    Acute on chronic combined systolic and diastolic CHF (congestive heart failure) ([address] )  Active Problems:    Depression with anxiety    Hyperlipidemia    Hypertension    Hypothyroidism    Obesity (BMI 30-39  9)    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)    Chronic combined systolic (congestive) and diastolic (congestive) heart failure (HCC)    Restless leg syndrome  Resolved Problems:    Atrial fibrillation with RVR (HCC)    Edema of both lower extremities      Consultations During Hospital Stay:  · Cardiology  · Electrophysiology    Procedures Performed:   · Medtronic Bi V ICD placement status post AV arabella ablation 3/18    Significant Findings:   · Persistent atrial fibrillation with difficult to control RVR  · Nonischemic cardiomyopathy  · Acute on chronic heart failure with reduced ejection fraction    Incidental Findings:   · None     Test Results Pending at Discharge (will require follow up): · None     Outpatient Tests Requested:  · None    Complications:  None    Hospital Course: Isai Gates is a 78 y o  female patient who originally presented to the hospital on 3/11/2020 due to lower extremity edema  Cardiology was consulted as patient was found to have decompensated heart failure, likely due to her atrial fibrillation with RVR  Patient was initially on a Cardizem drip then transition to amiodarone drip  Electrophysiology was consulted as patient was difficult to rate control    She was placed on rate control medications initially, but then patient received AV arabella ablation with a Bi V ICD and rate-controlling medications were stopped  Patient was discharged on [facility]  Cardiology diurese the patient with intravenous diuretics then transition to p o  Diuretics on discharge  Patient tolerated well  She was discharged with follow-up appointments for device check and CHF follow-up  Condition at Discharge: fair     Discharge Day Visit / Exam:     Subjective:  No acute complaints  Patient wants to go home  Vitals: Blood Pressure: 126/58 (03/24/20 0712)  Pulse: 80 (03/24/20 0712)  Temperature: 98 2 °F (36 8 °C) (03/24/20 0712)  Temp Source: Oral (03/23/20 2300)  Respirations: 18 (03/24/20 0712)  Height: 5' 1" (154 9 cm) (03/11/20 1527)  Weight - Scale: 80 2 kg (176 lb 12 9 oz) (03/24/20 0533)  SpO2: 95 % (03/24/20 7941)  Exam:   Physical Exam   Constitutional: She is oriented to person, place, and time  She appears well-developed and well-nourished  HENT:   Head: Normocephalic and atraumatic  Mouth/Throat: Oropharynx is clear and moist    Eyes: Pupils are equal, round, and reactive to light  Conjunctivae are normal    Neck: Neck supple  No JVD present  Cardiovascular: Normal rate, regular rhythm, normal heart sounds and intact distal pulses  ICD insertion site without erythema, drainage or tenderness   Pulmonary/Chest: Effort normal and breath sounds normal    Abdominal: Soft  Bowel sounds are normal    Musculoskeletal: She exhibits no edema or tenderness  Neurological: She is alert and oriented to person, place, and time  Skin: Skin is warm and dry  Capillary refill takes less than 2 seconds  Psychiatric: She has a normal mood and affect  Her behavior is normal  Judgment and thought content normal    Nursing note and vitals reviewed  Discharge instructions/Information to patient and family:   See after visit summary for information provided to patient and family        Provisions for Follow-Up Care:  See after visit summary for information related to follow-up care and any pertinent home health orders  Disposition:     Home    For Discharges to [facility] SNF:   · Not Applicable to this Patient - Not Applicable to this Patient    Planned Readmission: no     Discharge Statement:  I spent 39 minutes discharging the patient  This time was spent on the day of discharge  I had direct contact with the patient on the day of discharge  Greater than 50% of the total time was spent examining patient, answering all patient questions, arranging and discussing plan of care with patient as well as directly providing post-discharge instructions  Additional time then spent on discharge activities  Discharge Medications:  See after visit summary for reconciled discharge medications provided to patient and family  ** Please Note: Dragon 360 Dictation voice to text software may have been used in the creation of this document   **

## 2020-03-24 NOTE — DISCHARGE INSTRUCTIONS
Begin taking Bumex 4 mg by mouth twice a day  If your weights begin to increase, call the office for guidance about taking metolazone (the "booster pill")  Please weigh yourself every day, and contact the Heart Failure program at [phone] if you gain 3 lbs overnight or 5 lbs in 5-7 days  Limit daily sodium/salt intake to 8445-4961 mg daily to prevent fluid retention  Avoid canned foods, [place] food, and processed meat (hot dogs, lunch meat, and sausage etc )  Limit daily fluid intake to 2000 mL or 2L (about 60 ounces) daily     ---------------------------------------------------------------------------------------------------------------------------------------------------------------------------------------------------------------------------------------------------------------------------------------------------    Please refer to post device implantation discharge instructions and restrictions below and your device booklet/temporary card  Keep incision dry for one week (your procedure was completed on 03/18/2020)  Do not use lotions/powders/creams on incision  Leave outer bandage in place for 1 week - it is water proof, and as long as it is fully adhered to your skin you may shower with it  If it appears as though the bandage is coming off and/or there is any communication to the area of device incision, please then keep the whole area dry for the remaining week (AKA no showers, sponge baths only)  After 1 week, please remove by pulling all edges away from the center of the bandage  No overhead reaching/pushing/pulling/lifting greater than 5-10lbs with left arm for one month   Please call the office if you notice redness, swelling, bleeding, or drainage from incision or if you develop fevers  ---------------------------------------------------------------------------------------------------------------------------------------------------------------------------------------------------------------------------------------------------------------------------------------------------    Cardiac Resynchronization Therapy (CRT)    If you have any questions, please call [phone] to speak with a nurse (8:30am-4pm, or [phone] after hours)  For appointments, please call [phone]  WHAT YOU SHOULD KNOW:    Your device is a biventricular ICD, which delivers resynchronization therapy and is also a defibrillator (see below)  Cardiac resynchronization therapy (CRT) is a procedure used to treat problems with how your heart contracts  CRT is also called biventricular pacing  Your heart has 2 upper chambers, called atria, and 2 lower chambers, called ventricles  Your heartbeat is synchronized when all areas of your heart contract together properly  When the areas of your heart do not contract as they should, your heart cannot pump enough blood and oxygen to your body  You may have trouble breathing, tire easily, and have swelling in your legs and feet  With a biventricular device, there is one wire in the right atria (in most cases), one wire in the right ventricle, and a third wire that goes through a vein and wraps around to your left ventricle  The two ventricular wires work together to help your heart contract more synchronously and efficiently  AFTER YOU LEAVE:      Medicines:   · Heart medicine may be given to help your heart beat strongly and regularly  Ask your healthcare provider for more information about heart medicines  · Take your medicine as directed  Contact your healthcare provider if you think your medicine is not helping or if you have side effects  Tell him if you are allergic to any medicine  Keep a list of the medicines, vitamins, and herbs you take   Include the amounts, and when and why you take them  Bring the list or the pill bottles to follow-up visits  Carry your medicine list with you in case of an emergency  Follow up with your healthcare provider as directed: You will need to return to have your pacemaker checked  You may also need an EKG, echocardiogram, or chest x-ray to check the leads in your heart, and your doctor will order these tests if necessary  Write down your questions so you remember to ask them during your visits  Device safety: Some electrical devices may interfere with how your pacemaker works  Some examples are cell phones, security systems, power cables, and electric monitors  Ask your healthcare provider how long you can be near these devices, and which devices to avoid  Tell caregivers you have a pacemaker before you have a procedure or surgery  Wound care: Care for your wound as directed  Keep incision dry for one week  Do not use lotions/powders/creams on incision  Leave outer bandage in place for 1 week - it is water proof, and as long as it is fully adhered to your skin you may shower with it  If it appears as though the bandage is coming off and/or there is any communication to the area of device incision, please then keep the whole area dry for the remaining week  After 1 week, please remove by pulling all edges away from the center of the bandage  No overhead reaching/pushing/pulling/lifting greater than 5-10lbs with left arm for one month  Please call the office if you notice redness, swelling, bleeding, or drainage from incision or if you develop fevers      Contact your healthcare provider if:   · You have new or increased swelling in your legs or feet  · You feel more tired than usual    · You have trouble breathing during activity  · Your wound is red, warm, swollen, or draining pus  · You have a fever  · You have questions or concerns about your condition or care      Seek care immediately or call 911 if: · You feel like your heart is fluttering or jumping  · You have any of the following signs of a heart attack:    ¨ Squeezing, pressure, fullness, or pain in your chest that lasts longer than a few minutes or returns   ¨ Discomfort or pain in your back, neck, jaw, stomach, or arm   ¨ Shortness of breath or breathing problems   ¨ A sudden cold sweat, lightheadedness, dizziness, or nausea, especially with chest pain or trouble breathing      Implantable Cardioverter Defibrillator (ICD)    If you have any questions, please call [phone] to speak with a nurse (8:30am-4pm, or [phone] after hours)  For appointments, please call [phone]  WHAT YOU SHOULD KNOW:    Your device is a biventricular ICD, which delivers resynchronization therapy (see above) and is also a defibrillator  An implantable cardioverter defibrillator (ICD) is a small device that monitors your heart rate and rhythm  It is commonly placed inside your upper chest region  It may be used if you have a ventricular arrhythmia, which is an irregular, dangerous rhythm from the bottom chamber of your heart  Some arrhythmias may cause your heart to suddenly stop beating  An ICD can give a shock to your heart to make it start beating again, or it can give pacing therapy (also known as pain-free therapy) to return your heart to normal rhythm  It is also a pacemaker, so it will pace your heart if needed to prevent it from beating too slowly  AFTER YOU LEAVE:      Follow up with your primary healthcare provider or cardiologist as directed: You will need to follow-up to have your ICD checked and make sure you are not having problems  Write down your questions so you remember to ask them during your visits  Self-care:   · Ask about activity: Ask if you need to avoid moving your shoulder or arm, and for how long  Ask if you should avoid lifting heavy objects   Do not play any contact sports, such as football or wrestling, until your primary healthcare provider [facility]) or cardiologist tells you it is okay  You may only be able to drive for a certain amount of time per day, or during certain hours  Ask when you can return to work  · Care for your skin over the ICD: Ask your PHP or cardiologist when it is okay for you to bathe  Do not put any lotion or powder on the incision area  Do not rub or wear tight clothing over the ICD area until your PHP or cardiologist tells you it is okay  When you get a shock from your ICD: A shock may feel like someone has hit you, or you may feel a thump in the chest  If someone is touching you when you get a shock, they may feel a tingling feeling  The first time you feel a shock, it may scare you  Sit or lie down and stay calm  Ask someone to stay with you if possible  Please either call your cardiologist or report to an emergency room  Safety instructions when you have an ICD:   · Carry an ID card for the ICD: This card has important information about your ICD  · Stay away from magnets or machines with electric fields: This includes MRI machines  Avoid leaning into a car engine or doing welding  These things can interfere with how your ICD works  · Tell airport security you have an ICD: You may need to be searched by hand when you go through a security gate  The security gate or handheld wand could harm your ICD  · Keep an ICD diary: Record when you get a shock and what you were doing before you got the shock  Keep track of how you felt before and after the shock, as well as how many shocks you received  Write down the day and time of each shock  Bring the diary with you when you see your PHP or cardiologist    · Carry medical alert identification: Wear medical alert jewelry or carry a card that says you have an ICD  Ask your PHP or cardiologist where to get these items  Contact your primary healthcare provider or cardiologist if:   · You have a fever      · You feel 1 or more shocks from your ICD and feel fine afterwards  · Your feet or ankles swell  · The area around your ICD is painful or tender after surgery  · The skin around your stitches or staples is red, swollen, or draining pus or fluid  · You have chills, a cough, and feel weak or achy  · You are sad or anxious and find it hard to do your usual activities  · You have questions or concerns about your condition or care  Seek care immediately or call 911 if:   · Your stitches or staples come apart  · Blood soaks through your bandage  · You feel more than 3 shocks in a row from your ICD  · You become weak, dizzy, or faint  · You feel your heart skip beats or beat very fast or slow, but you do not feel a shock from your ICD  · You have chest pain that does not go away with rest or medicine  © 2014 [employer] [employer] is for End User's use only and may not be sold, redistributed or otherwise used for commercial purposes  All illustrations and images included in CareNotes® are the copyrighted property of A D A M , Inc  or Lavelle Kumar  The above information is an  only  It is not intended as medical advice for individual conditions or treatments  Talk to your doctor, nurse or pharmacist before following any medical regimen to see if it is safe and effective for you

## 2020-03-24 NOTE — PLAN OF CARE
Problem: PHYSICAL THERAPY ADULT  Goal: Performs mobility at highest level of function for planned discharge setting  See evaluation for individualized goals  Description  Treatment/Interventions: Functional transfer training, LE strengthening/ROM, Therapeutic exercise, Endurance training, Elevations, Patient/family training, Equipment eval/education, Bed mobility, Gait training, Spoke to nursing  Equipment Recommended: (TBD)       See flowsheet documentation for full assessment, interventions and recommendations  Outcome: Progressing  Note:   Prognosis: Good  Problem List: Decreased strength, Decreased endurance, Impaired balance, Decreased mobility  Assessment: Pt seen for session for transfers, gait w/ rest time, repositioning  Pt cooperative, willing to mobilize  In better spirits today, and excited that she is potentially going home  Note improved endurance, but still some fatigue during and p gait  declined use of cane, but did have several small LOB  admvised cane use if pt to d/c home today  If not, continue to focus on progressive mobility training  Barriers to Discharge: Decreased caregiver support, Inaccessible home environment     Recommendation: Home with family support     PT - OK to Discharge: (S) Yes(when stable to home)    See flowsheet documentation for full assessment

## 2020-03-24 NOTE — PHYSICAL THERAPY NOTE
Physical Therapy Treatment Note       03/24/20 1030   Pain Assessment   Pain Assessment Tool 0-10   Pain Score No Pain   Restrictions/Precautions   Weight Bearing Precautions Per Order No   Other Precautions Fall Risk;Multiple lines   General   Chart Reviewed Yes   Family/Caregiver Present No   Cognition   Overall Cognitive Status WFL   Arousal/Participation Responsive   Attention Attends with cues to redirect   Orientation Level Oriented X4   Memory Unable to assess   Following Commands Follows one step commands without difficulty   Subjective   Subjective states she feels much better  encouraged that she may be d/c home today  In much better spirits   Transfers   Sit to Stand 5  Supervision   Additional items Assist x 1   Stand to Sit 5  Supervision   Additional items Assist x 1   Ambulation/Elevation   Gait pattern   (slow, 2 lateral LOB, self corrected)   Gait Assistance   (CGA/S)   Additional items Assist x 1   Assistive Device None  (advised pt to )   Distance 160'x2, 2-3 min standing rest   time spent for reposiitoning p session   Balance   Static Sitting Normal   Dynamic Sitting Good   Static Standing Fair +   Dynamic Standing Fair   Ambulatory Fair   Endurance Deficit   Endurance Deficit Yes   Endurance Deficit Description fatigue, impulsivity   Activity Tolerance   Activity Tolerance Patient limited by fatigue;Treatment limited secondary to medical complications (Comment)   Nurse Made Aware yes   Assessment   Prognosis Good   Problem List Decreased strength;Decreased endurance; Impaired balance;Decreased mobility   Assessment Pt seen for session for transfers, gait w/ rest time, repositioning  Pt cooperative, willing to mobilize  In better spirits today, and excited that she is potentially going home  Note improved endurance, but still some fatigue during and p gait  declined use of cane, but did have several small LOB  admvised cane use if pt to d/c home today    If not, continue to focus on progressive mobility training   Goals   Patient Goals to go home   STG Expiration Date 04/02/20   PT Treatment Day 1   Plan   Treatment/Interventions Functional transfer training;LE strengthening/ROM; Therapeutic exercise; Endurance training;Patient/family training;Equipment eval/education; Bed mobility;Gait training   Progress Progressing toward goals   PT Frequency Other (Comment)  (3-5x/wk)   Recommendation   Recommendation Home with family support   Equipment Recommended Cane  (should use her cane at dc)   PT - OK to Discharge Yes  (when stable to home)   Belkis Winkler PT, DPT CSRS

## 2020-03-24 NOTE — PLAN OF CARE
Problem: Potential for Falls  Goal: Patient will remain free of falls  Description  INTERVENTIONS:  - Assess patient frequently for physical needs  -  Identify cognitive and physical deficits and behaviors that affect risk of falls    -  [place] fall precautions as indicated by assessment   - Educate patient/family on patient safety including physical limitations  - Instruct patient to call for assistance with activity based on assessment  - Modify environment to reduce risk of injury  - Consider OT/PT consult to assist with strengthening/mobility  Outcome: Progressing     Problem: PAIN - ADULT  Goal: Verbalizes/displays adequate comfort level or baseline comfort level  Description  Interventions:  - Encourage patient to monitor pain and request assistance  - Assess pain using appropriate pain scale  - Administer analgesics based on type and severity of pain and evaluate response  - Implement non-pharmacological measures as appropriate and evaluate response  - Consider cultural and social influences on pain and pain management  - Notify physician/advanced practitioner if interventions unsuccessful or patient reports new pain  Outcome: Progressing     Problem: INFECTION - ADULT  Goal: Absence or prevention of progression during hospitalization  Description  INTERVENTIONS:  - Assess and monitor for signs and symptoms of infection  - Monitor lab/diagnostic results  - Monitor all insertion sites, i e  indwelling lines, tubes, and drains  - Monitor endotracheal if appropriate and nasal secretions for changes in amount and color  - [place] appropriate cooling/warming therapies per order  - Administer medications as ordered  - Instruct and encourage patient and family to use good hand hygiene technique  - Identify and instruct in appropriate isolation precautions for identified infection/condition  Outcome: Progressing  Goal: Absence of fever/infection during neutropenic period  Description  INTERVENTIONS:  - Monitor WBC    Outcome: Progressing     Problem: SAFETY ADULT  Goal: Maintain or return to baseline ADL function  Description  INTERVENTIONS:  -  Assess patient's ability to carry out ADLs; assess patient's baseline for ADL function and identify physical deficits which impact ability to perform ADLs (bathing, care of mouth/teeth, toileting, grooming, dressing, etc )  - Assess/evaluate cause of self-care deficits   - Assess range of motion  - Assess patient's mobility; develop plan if impaired  - Assess patient's need for assistive devices and provide as appropriate  - Encourage maximum independence but intervene and supervise when necessary  - Involve family in performance of ADLs  - Assess for home care needs following discharge   - Consider OT consult to assist with ADL evaluation and planning for discharge  - Provide patient education as appropriate  Outcome: Progressing  Goal: Maintain or return mobility status to optimal level  Description  INTERVENTIONS:  - Assess patient's baseline mobility status (ambulation, transfers, stairs, etc )    - Identify cognitive and physical deficits and behaviors that affect mobility  - Identify mobility aids required to assist with transfers and/or ambulation (gait belt, sit-to-stand, lift, walker, cane, etc )  - [place] fall precautions as indicated by assessment  - Record patient progress and toleration of activity level on Mobility SBAR; progress patient to next Phase/Stage  - Instruct patient to call for assistance with activity based on assessment  - Consider rehabilitation consult to assist with strengthening/weightbearing, etc   Outcome: Progressing

## 2020-03-25 DIAGNOSIS — I50.22 CHRONIC HFREF (HEART FAILURE WITH REDUCED EJECTION FRACTION) (HCC): ICD-10-CM

## 2020-03-25 DIAGNOSIS — I50.9 CHF (CONGESTIVE HEART FAILURE) (HCC): ICD-10-CM

## 2020-03-25 NOTE — TELEPHONE ENCOUNTER
Patient discharged from hospital yesterday  Does not use hospital pharmacy  Needs Bumex to go to [facility] in [facility]

## 2020-03-26 ENCOUNTER — TELEMEDICINE (OUTPATIENT)
Dept: FAMILY MEDICINE CLINIC | Facility: CLINIC | Age: 80
End: 2020-03-26
Payer: COMMERCIAL

## 2020-03-26 DIAGNOSIS — I50.22 CHRONIC HFREF (HEART FAILURE WITH REDUCED EJECTION FRACTION) (HCC): ICD-10-CM

## 2020-03-26 DIAGNOSIS — I50.42 CHRONIC COMBINED SYSTOLIC (CONGESTIVE) AND DIASTOLIC (CONGESTIVE) HEART FAILURE (HCC): ICD-10-CM

## 2020-03-26 DIAGNOSIS — E66.9 OBESITY (BMI 30-39.9): ICD-10-CM

## 2020-03-26 DIAGNOSIS — E78.2 MIXED HYPERLIPIDEMIA: ICD-10-CM

## 2020-03-26 DIAGNOSIS — E11.36 TYPE 2 DIABETES MELLITUS WITH DIABETIC CATARACT, WITHOUT LONG-TERM CURRENT USE OF INSULIN (HCC): ICD-10-CM

## 2020-03-26 DIAGNOSIS — I50.9 CHF (CONGESTIVE HEART FAILURE) (HCC): ICD-10-CM

## 2020-03-26 DIAGNOSIS — I10 ESSENTIAL HYPERTENSION: ICD-10-CM

## 2020-03-26 DIAGNOSIS — E03.9 HYPOTHYROIDISM, UNSPECIFIED TYPE: ICD-10-CM

## 2020-03-26 DIAGNOSIS — Z09 HOSPITAL DISCHARGE FOLLOW-UP: Primary | ICD-10-CM

## 2020-03-26 PROCEDURE — 1111F DSCHRG MED/CURRENT MED MERGE: CPT | Performed by: NURSE PRACTITIONER

## 2020-03-26 PROCEDURE — 99496 TRANSJ CARE MGMT HIGH F2F 7D: CPT | Performed by: NURSE PRACTITIONER

## 2020-03-26 RX ORDER — POTASSIUM CHLORIDE 20 MEQ/1
40 TABLET, EXTENDED RELEASE ORAL 2 TIMES DAILY
Qty: 120 TABLET | Refills: 5 | Status: SHIPPED | OUTPATIENT
Start: 2020-03-26 | End: 2020-08-24

## 2020-03-26 RX ORDER — HYDRALAZINE HYDROCHLORIDE 10 MG/1
10 TABLET, FILM COATED ORAL EVERY 8 HOURS SCHEDULED
Qty: 90 TABLET | Refills: 5 | Status: SHIPPED | OUTPATIENT
Start: 2020-03-26 | End: 2020-09-15

## 2020-03-26 RX ORDER — BUMETANIDE 2 MG/1
4 TABLET ORAL DAILY
Qty: 180 TABLET | Refills: 0 | Status: SHIPPED | OUTPATIENT
Start: 2020-03-26 | End: 2020-06-02

## 2020-03-26 NOTE — PROGRESS NOTES
Virtual Regular Visit    Problem List Items Addressed This Visit     None               Reason for visit is ***    Encounter provider SOTO Coto    Provider located at [address] [place] [id number]      Recent Visits  No visits were found meeting these conditions  Showing recent visits within past 7 days and meeting all other requirements     Today's Visits  Date Type Provider Dept   03/26/20 Telemedicine Devi Sena, [address] today's visits and meeting all other requirements     Future Appointments  No visits were found meeting these conditions  Showing future appointments within next 150 days and meeting all other requirements        After connecting through [employer], the patient was identified by name and date of birth  Luis Pan was informed that this is a telemedicine visit and that the visit is being conducted through {AMB CORONAVIRUS VISIT EZSIEB:34686} which may not be secure and therefore, might not be HIPAA-compliant  {Telemedicine confidentiality :28713} {Telemedicine participants:70527}  She acknowledged consent and understanding of privacy and security of the video platform  The patient has agreed to participate and understands they can discontinue the visit at any time  Subjective  Luis Pan is a 78 y o  female ***        Past Medical History:   Diagnosis Date    Cardiogenic shock ([address] ) 6/26/2019    CHF (congestive heart failure) (HCC)     Eczema     Photosensitivity     abnormal skin sensitivity to sunlight    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       Past Surgical History:   Procedure Laterality Date    CATARACT EXTRACTION Left     HEMORRHOID SURGERY      IR CENTRAL LINE PLACEMENT  7/9/2019    OOPHORECTOMY      unilateral    TOTAL ABDOMINAL HYSTERECTOMY         Current Outpatient Medications   Medication Sig Dispense Refill    apixaban (ELIQUIS) 5 mg Take 1 tablet (5 mg total) by mouth 2 (two) times a day 60 tablet 11    Ascorbic Acid (VITAMIN C) 100 MG tablet Take 100 mg by mouth daily      bumetanide (BUMEX) 2 mg tablet Take 2 tablets (4 mg total) by mouth daily 180 tablet 0    cholecalciferol (VITAMIN D3) 1,000 units tablet Take 1,000 Units by mouth daily      hydrALAZINE (APRESOLINE) 10 mg tablet Take 1 tablet (10 mg total) by mouth every 8 (eight) hours 90 tablet 0    levothyroxine 137 mcg tablet TAKE 1 TABLET (137 MCG TOTAL) BY MOUTH DAILY 30 tablet 5    Loratadine (CLARITIN PO) Take by mouth      MAGNESIUM PO Take by mouth      metoprolol succinate (TOPROL-XL) 100 mg 24 hr tablet Take 1 tablet (100 mg total) by mouth every 12 (twelve) hours 60 tablet 0    multivitamin (THERAGRAN) TABS Take 1 tablet by mouth daily      potassium chloride (K-DUR,KLOR-CON) 20 mEq tablet Take 2 tablets (40 mEq total) by mouth 2 (two) times a day 60 tablet 0    rOPINIRole (REQUIP XL) 2 MG 24 hr tablet Take 1 tablet (2 mg total) by mouth daily at bedtime 30 tablet 5    sacubitril-valsartan (ENTRESTO) 24-26 MG TABS Take 1 tablet by mouth 2 (two) times a day 60 tablet 2    venlafaxine (EFFEXOR) 75 mg tablet TAKE 1 TABLET (75 MG TOTAL) BY MOUTH EVERY 12 (TWELVE) HOURS 60 tablet 5     No current facility-administered medications for this visit  Allergies   Allergen Reactions    Penicillins     Wellbutrin Sr  [Bupropion]      Other reaction(s): Suicidal ideations  Category: Adverse Reaction; Review of Systems      I spent *** minutes with the patient during this visit

## 2020-03-26 NOTE — ASSESSMENT & PLAN NOTE
Advised to start checking BP at home on a daily basis and to keep a log  She has not had the Hydralazine for 2 days and is taking Toprol XL 75 mg BID  We reviewed her medications today and I sent Hydralazine to her pharmacy    She knows she needs to take Toprol  mg BID  Follow up with Cardiology tomorrow Interval History:     Kaity says pain is improving, eager to start diet.     Review of Systems   All other systems reviewed and are negative.    Objective:     Vital Signs (Most Recent):  Temp: 98.3 °F (36.8 °C) (04/27/17 1500)  Pulse: 66 (04/27/17 1500)  Resp: 18 (04/27/17 1500)  BP: (!) 127/59 (04/27/17 1500)  SpO2: 97 % (04/27/17 1500) Vital Signs (24h Range):  Temp:  [97.1 °F (36.2 °C)-98.3 °F (36.8 °C)] 98.3 °F (36.8 °C)  Pulse:  [61-71] 66  Resp:  [18] 18  SpO2:  [85 %-98 %] 97 %  BP: (105-135)/(59-70) 127/59     Weight: 83.9 kg (184 lb 15.5 oz)  Body mass index is 36.12 kg/(m^2).    Intake/Output Summary (Last 24 hours) at 04/27/17 1712  Last data filed at 04/27/17 1500   Gross per 24 hour   Intake              335 ml   Output             1475 ml   Net            -1140 ml      Physical Exam   Constitutional: She appears well-nourished.   HENT:   Head: Atraumatic.   Cardiovascular: Normal rate.    Pulmonary/Chest: Effort normal.   Abdominal: Soft. She exhibits no distension. There is no tenderness.   Neurological: She is alert.   Psychiatric: She has a normal mood and affect.   Nursing note and vitals reviewed.      Significant Labs:   BMP:   Recent Labs  Lab 04/27/17  0452         K 4.0      CO2 27   BUN 10   CREATININE 0.6   CALCIUM 8.2*   MG 2.1     CBC:   Recent Labs  Lab 04/26/17 2129 04/27/17  0452   WBC 5.32 6.33   HGB 14.0 13.1   HCT 40.2 39.2    206       Significant Imaging: None

## 2020-03-26 NOTE — PROGRESS NOTES
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)       Lab Results   Component Value Date    HGBA1C 6 9 (H) 03/12/2020     Diet modifications reiterated today  Refuses to start Metformin at this time but knows she needs to work on her diet (enjoys sweets)  Will recheck labs prior to next visit             Cardiovascular and Mediastinum    Hypertension     Advised to start checking BP at home on a daily basis and to keep a log  She has not had the Hydralazine for 2 days and is taking Toprol XL 75 mg BID  We reviewed her medications today and I sent Hydralazine to her pharmacy  She knows she needs to take Toprol  mg BID  Follow up with Cardiology tomorrow          Relevant Medications    hydrALAZINE (APRESOLINE) 10 mg tablet    Chronic combined systolic (congestive) and diastolic (congestive) heart failure (HCC)     Wt Readings from Last 3 Encounters:   03/24/20 80 2 kg (176 lb 12 9 oz)   03/09/20 90 3 kg (199 lb)   02/27/20 87 5 kg (193 lb)     Bumex and Hydralazine sent to pharmacy (hasn't taken these in 2 days)  Continue Toprol, Entresto   Reported weight of 173 lb today  She knows to check this daily   Fluid and sodium restrictions   Follow up with Cardiology tomorrow                  Other    Hyperlipidemia     Diet modifications reiterated today          Obesity (BMI 30-39  9)     Work on following a healthy diet           Other Visit Diagnoses     Hospital discharge follow-up    -  Primary    CHF (congestive heart failure) (HCC)        Relevant Medications    hydrALAZINE (APRESOLINE) 10 mg tablet    Chronic HFrEF (heart failure with reduced ejection fraction) (HCC)        Relevant Medications    hydrALAZINE (APRESOLINE) 10 mg tablet               Reason for visit is TCM      Encounter provider SOTO Espana    Provider located at [facility] [address] [place] [phone]      Recent Visits  No visits were found meeting these conditions  Showing recent visits within past 7 days and meeting all other requirements     Today's Visits  Date Type Provider Dept   03/26/20 Telemedicine Wanda Harrison, [address] today's visits and meeting all other requirements     Future Appointments  No visits were found meeting these conditions  Showing future appointments within next 150 days and meeting all other requirements        After connecting through [employer], the patient was identified by name and date of birth  Sheldon Mansfield was informed that this is a telemedicine visit and that the visit is being conducted through telephone which may not be secure and therefore, might not be HIPAA-compliant  The patient has a flip-phone with no camera  She does not have a computer  Her sister drives her to appointments and prefers not to go out currently with COVID 19 in our community so she is unable to drive Gabbi Stevenson to our office  My office door was closed  No one else was in the room  She acknowledged consent and understanding of privacy and security of the video platform  The patient has agreed to participate and understands they can discontinue the visit at any time  Subjective  Sheldon Mansfield is a 78 y o  female who was admitted to [facility] 3/11/2020 to 3/24/2020 with acute on chronic combined systolic and diastolic HF  Patient presented to the emergency room with worsening lower extremity edema  She was evaluated by Cardiology 2 days prior and that time they urged her to present to the emergency room  Patient was found to be in decompensated heart failure, likely to her atrial fibrillation with are the are  Initially, she was started on a Cardizem drip and then transitioned to an amiodarone drip  EPS was  Consulted to assist with rate control    She eventually received an AV arabella ablation with a Bi V ICD  Rate controlling medications were able to be stopped after this  She was continued on Eliquis  Her IV diuretics were transitioned to p o  Diuretics  She was discharged to home    Today, she tells me she did not  her hydralazine or Bumex as these were sent to home start pharmacy and she uses [employer]  She also tells me that she has only been taking 75 mg of her Toprol-XL b i d  Rather than 100 mg b i d  Which was changed during her hospitalization  She is unsure how much Potassium she is taking but she did not  the new script of Potassium chloride 20 mEq with instructions to take 2 tablets twice daily  She will check on this and let me now  She did  her Misael Tanvi and has been taking this and tolerating it thus far     In regards to her lower extremity edema, she tells me that they are still swollen but much improved  She notes a small amount of oozing from her legs which she also feels is improving  She does complain of some redness to her lower extremities but notes that this is better than when she went into the hospital    She does not check her blood pressure at home  She denies chest pain, palpitations, shortness of breath, wheezing, cough, fever, chills    Appetite has been good, she is sleeping well    Labs prior to discharge:  WBC 7 10, RBC 4 22, hgb 11 2   Na 139, K 4 1, BUN 20, Cr 0 86, glucose 121    Appointment with Cardiology is scheduled for tomorrow, 3/27/2020  She plans to return to work on Monday, 3/30/2020 (works at [email or web address] which remains opened at this time)    Past Medical History:   Diagnosis Date    Cardiogenic shock ([address] ) 6/26/2019    CHF (congestive heart failure) (HCC)     Eczema     Photosensitivity     abnormal skin sensitivity to sunlight    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 [address] )     staging       Past Surgical History:   Procedure Laterality Date    CATARACT EXTRACTION Left     HEMORRHOID SURGERY      IR CENTRAL LINE PLACEMENT  7/9/2019    OOPHORECTOMY      unilateral    TOTAL ABDOMINAL HYSTERECTOMY         Current Outpatient Medications   Medication Sig Dispense Refill    apixaban (ELIQUIS) 5 mg Take 1 tablet (5 mg total) by mouth 2 (two) times a day 60 tablet 11    Ascorbic Acid (VITAMIN C) 100 MG tablet Take 100 mg by mouth daily      bumetanide (BUMEX) 2 mg tablet Take 2 tablets (4 mg total) by mouth daily 180 tablet 0    cholecalciferol (VITAMIN D3) 1,000 units tablet Take 1,000 Units by mouth daily      hydrALAZINE (APRESOLINE) 10 mg tablet Take 1 tablet (10 mg total) by mouth every 8 (eight) hours 90 tablet 5    levothyroxine 137 mcg tablet TAKE 1 TABLET (137 MCG TOTAL) BY MOUTH DAILY 30 tablet 5    Loratadine (CLARITIN PO) Take by mouth      MAGNESIUM PO Take by mouth      metoprolol succinate (TOPROL-XL) 100 mg 24 hr tablet Take 1 tablet (100 mg total) by mouth every 12 (twelve) hours 60 tablet 0    multivitamin (THERAGRAN) TABS Take 1 tablet by mouth daily      potassium chloride (K-DUR,KLOR-CON) 20 mEq tablet Take 2 tablets (40 mEq total) by mouth 2 (two) times a day 60 tablet 0    rOPINIRole (REQUIP XL) 2 MG 24 hr tablet Take 1 tablet (2 mg total) by mouth daily at bedtime 30 tablet 5    sacubitril-valsartan (ENTRESTO) 24-26 MG TABS Take 1 tablet by mouth 2 (two) times a day 60 tablet 2    venlafaxine (EFFEXOR) 75 mg tablet TAKE 1 TABLET (75 MG TOTAL) BY MOUTH EVERY 12 (TWELVE) HOURS 60 tablet 5     No current facility-administered medications for this visit  Allergies   Allergen Reactions    Penicillins     Wellbutrin Sr  [Bupropion]      Other reaction(s): Suicidal ideations  Category: Adverse Reaction; Review of Systems   Constitutional: Positive for fatigue  Negative for chills and fever  Respiratory: Negative for cough, chest tightness, shortness of breath and wheezing  Cardiovascular: Positive for leg swelling  Negative for chest pain and palpitations  Gastrointestinal: Negative for abdominal pain, blood in stool, constipation, diarrhea and nausea  Genitourinary: Negative for dysuria  Musculoskeletal: Negative for arthralgias and myalgias  Skin: Negative for rash and wound  Legs with some redness and scant amount of oozing, no opened wounds per pt   Neurological: Negative for dizziness, weakness, numbness and headaches  Psychiatric/Behavioral: Negative for sleep disturbance and suicidal ideas  I spent 30 minutes with the patient during this visit

## 2020-03-26 NOTE — ASSESSMENT & PLAN NOTE
Wt Readings from Last 3 Encounters:   03/24/20 80 2 kg (176 lb 12 9 oz)   03/09/20 90 3 kg (199 lb)   02/27/20 87 5 kg (193 lb)     Bumex and Hydralazine sent to pharmacy (hasn't taken these in 2 days)  Continue Toprol, Entresto   Reported weight of 173 lb today    She knows to check this daily   Fluid and sodium restrictions   Follow up with Cardiology tomorrow

## 2020-03-26 NOTE — ASSESSMENT & PLAN NOTE
Lab Results   Component Value Date    HGBA1C 6 9 (H) 03/12/2020     Diet modifications reiterated today  Refuses to start Metformin at this time but knows she needs to work on her diet (enjoys sweets)  Will recheck labs prior to next visit

## 2020-03-26 NOTE — PROGRESS NOTES
Virtual Regular Visit    Problem List Items Addressed This Visit     None               Reason for visit is   Chief Complaint   Patient presents with    Virtual Tcm         Encounter provider SOTO Cohn    Provider located at [address] [place] [phone]      Recent Visits  No visits were found meeting these conditions  Showing recent visits within past 7 days and meeting all other requirements     Future Appointments  No visits were found meeting these conditions  Showing future appointments within next 150 days and meeting all other requirements        After connecting through [employer], the patient was identified by name and date of birth  Xochitl Taylor was informed that this is a telemedicine visit and that the visit is being conducted through {AMB CORONAVIRUS VISIT CCTEDB:97190} which may not be secure and therefore, might not be HIPAA-compliant  {Telemedicine confidentiality :07160} {Telemedicine participants:60568}  She acknowledged consent and understanding of privacy and security of the video platform  The patient has agreed to participate and understands they can discontinue the visit at any time  Subjective  Xochitl Taylor is a 78 y o  female ***        Past Medical History:   Diagnosis Date    Cardiogenic [facility] [address] [address] [facility] ) 6/26/2019    CHF (congestive heart failure) (HCC)     Eczema     Photosensitivity     abnormal skin sensitivity to sunlight    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HCC)     staging       Past Surgical History:   Procedure Laterality Date    CATARACT EXTRACTION Left     HEMORRHOID SURGERY      IR CENTRAL LINE PLACEMENT  7/9/2019    OOPHORECTOMY      unilateral    TOTAL ABDOMINAL HYSTERECTOMY         Current Outpatient Medications   Medication Sig Dispense Refill    apixaban (ELIQUIS) 5 mg Take 1 tablet (5 mg total) by mouth 2 (two) times a day 60 tablet 11    Ascorbic Acid (VITAMIN C) 100 MG tablet Take 100 mg by mouth daily      bumetanide (BUMEX) 2 mg tablet Take 2 tablets (4 mg total) by mouth daily 180 tablet 0    cholecalciferol (VITAMIN D3) 1,000 units tablet Take 1,000 Units by mouth daily      hydrALAZINE (APRESOLINE) 10 mg tablet Take 1 tablet (10 mg total) by mouth every 8 (eight) hours 90 tablet 0    levothyroxine 137 mcg tablet TAKE 1 TABLET (137 MCG TOTAL) BY MOUTH DAILY 30 tablet 5    Loratadine (CLARITIN PO) Take by mouth      MAGNESIUM PO Take by mouth      metoprolol succinate (TOPROL-XL) 100 mg 24 hr tablet Take 1 tablet (100 mg total) by mouth every 12 (twelve) hours 60 tablet 0    multivitamin (THERAGRAN) TABS Take 1 tablet by mouth daily      potassium chloride (K-DUR,KLOR-CON) 20 mEq tablet Take 2 tablets (40 mEq total) by mouth 2 (two) times a day 60 tablet 0    rOPINIRole (REQUIP XL) 2 MG 24 hr tablet Take 1 tablet (2 mg total) by mouth daily at bedtime 30 tablet 5    sacubitril-valsartan (ENTRESTO) 24-26 MG TABS Take 1 tablet by mouth 2 (two) times a day 60 tablet 2    venlafaxine (EFFEXOR) 75 mg tablet TAKE 1 TABLET (75 MG TOTAL) BY MOUTH EVERY 12 (TWELVE) HOURS 60 tablet 5     No current facility-administered medications for this visit  Allergies   Allergen Reactions    Penicillins     Wellbutrin Sr  [Bupropion]      Other reaction(s): Suicidal ideations  Category: Adverse Reaction; Review of Systems    Physical Exam     I spent *** minutes with the patient during this visit  Assessment/Plan:     No problem-specific Assessment & Plan notes found for this encounter  {Assess/PlanSmarSouthern [facility] Medical Centers:78312}     Subjective:     Patient ID: Sheldon Mansfield is a 78 y o  female  HPI    Review of Systems      Objective:     Physical Exam      There were no vitals filed for this visit  Transitional Care Management Review: Sheldon Mansfield is a 78 y o  female here for TCM follow up       During the TCM phone call patient stated:    TCM Call (since 2/24/2020)     Date and time call was made  3/24/2020  1:54 PM    Hospital care reviewed  Records reviewed    Patient was hospitialized at  [facility] [place]    Date of Admission  03/11/20    Date of discharge  03/24/20    Diagnosis  Acute on chronic combined systolic and diastolic CHF (congestive heart failure)    Disposition  Home    Were the patients medications reviewed and updated  No    Current Symptoms  None      TCM Call (since 2/24/2020)     Post hospital issues  None    Scheduled for follow up?   Yes    Did you obtain your prescribed medications  Yes    Do you need help managing your prescriptions or medications  No    Is transportation to your appointment needed  No    I have advised the patient to call PCP with any new or worsening symptoms  Monie Carnes[address]    The type of support provided  Physical; Emotional    Are you recieving any outpatient services  No    Are you recieving home care services  No    Are you using any community resources  No    Current waiver services  No    Have you fallen in the last 12 months  No    Interperter language line needed  No    Counseling  Patient          Monie Carnes, [place]

## 2020-03-27 ENCOUNTER — TELEMEDICINE (OUTPATIENT)
Dept: CARDIOLOGY CLINIC | Facility: CLINIC | Age: 80
End: 2020-03-27

## 2020-03-27 DIAGNOSIS — I50.22 CHRONIC HFREF (HEART FAILURE WITH REDUCED EJECTION FRACTION) (HCC): Primary | ICD-10-CM

## 2020-03-27 DIAGNOSIS — E78.5 HYPERLIPIDEMIA, UNSPECIFIED HYPERLIPIDEMIA TYPE: ICD-10-CM

## 2020-03-27 DIAGNOSIS — E11.36 TYPE 2 DIABETES MELLITUS WITH DIABETIC CATARACT, WITHOUT LONG-TERM CURRENT USE OF INSULIN (HCC): ICD-10-CM

## 2020-03-27 DIAGNOSIS — I10 HYPERTENSION, UNSPECIFIED TYPE: ICD-10-CM

## 2020-03-27 DIAGNOSIS — I48.91 ATRIAL FIBRILLATION, UNSPECIFIED TYPE (HCC): ICD-10-CM

## 2020-03-27 PROCEDURE — 99024 POSTOP FOLLOW-UP VISIT: CPT | Performed by: PHYSICIAN ASSISTANT

## 2020-03-27 NOTE — PROGRESS NOTES
Virtual Regular Visit    Problem List Items Addressed This Visit        Endocrine    Type 2 diabetes mellitus with diabetic cataract ([facility] [address] )       Cardiovascular and Mediastinum    Hypertension       Other    Hyperlipidemia      Other Visit Diagnoses     Chronic HFrEF (heart failure with reduced ejection fraction) ([facility] )    -  Primary    Relevant Orders    Basic metabolic panel    Atrial fibrillation, unspecified type [facility])            Reason for visit: Heart failure hospital follow-up  Encounter provider: Katya Ashton PA-C    Provider located at:  [place] [address]  [address] [facility] [address]    Recent Visits  Date Type Provider Dept   03/26/20 Telemedicine [facility], [address] [facility]   Showing recent visits within past 7 days and meeting all other requirements     Today's Visits  Date Type Provider Dept   03/27/20 902 [address] [place] [address] 119 today's visits and meeting all other requirements     Future Appointments  No visits were found meeting these conditions  Showing future appointments within next 150 days and meeting all other requirements      After connecting via telephone, the patient was identified by name and date of birth  Carlos Galvan was informed that this is a telemedicine visit and that the visit is being conducted through telephone which may not be secure and therefore, might not be HIPAA-compliant  My office door was closed  No one else was in the room  She acknowledged consent and understanding of privacy and security of the video platform  The patient has agreed to participate and understands they can discontinue the visit at any time  Plan:  Chronic biventricular heart failure; LVEF 30%, LVIDd 5 44 cm; NYHA III; ACC/AHA Stage C              Etiology: likely tachy-induced, +/- ischemic (nuclear stress was ordered, patient never completed)   Also has history of radioablative iodine  Etiology of exacerbation: Unclear if from AFib with RVR or vice versa  Reviewed importance of low sodium diet and fluid restriction  Weight of 176 lbs on 03/24 (day of discharge)  Today, weighs 166 9 lbs (at home per patient; was 172 4 lbs yesterday)  TTE from 06/20/2019: LVEF 25-30%, LVIDd 4 6 cm, mod reduced RVSF  Mod MR and TR  TTE from 06/26/2019: LVEF ~40%, mildly reduced RVSF  Mod MR, otherwise, no significant valvular disease                TTE (limited) from 07/04/2019: LVEF 45%  LVIDd 3 8 cm  Grade 2 DD  TTE completed on 03/16/2020 (not euvolemic): LVEF 30%  LVIDd 5 44 cm  Moderately dilated RV with moderately to markedly reduced RVSF  NAN  Moderate MR  Mild to moderate TR  Dilated IVC  Nuclear stress test ordered in 10/2019; never completed  Will hold off for now and consider again if LVEF failed to improve s/p ablation  Most recent BMP from 03/24/2020 (inpatient): sodium 139; potassium 4 1; BUN 20; creatinine 0 86                  Neurohormonal Blockade:              --Beta Blocker: metoprolol succinate 100 mg q12 hours  --ACEi, ARB or ARNi: Entresto 24-26 mg BID                         --SVR Reducing Agents: hydralazine 10 mg q8 hours  --Aldosterone Receptor Blocker: N/A  --Diuretic: PO Bumex 4 mg daily with potassium 40 mEq BID and PRN metolazone for rapid weight gain                   Sudden Cardiac Death Risk Reduction:              --[employer] CRT-D (BiV ICD) in situ since 03/18/2020                  Electrical Resynchronization:              --Interrogation: Narrow QRS                 Advanced Therapies (if appropriate): Will continue to monitor       Atrial fibrillation with rapid ventricular response (s/p AV node ablation)              [id number] = 6 (age, sex, CHF, HTN, DM)              Anticoagulation on Eliquis   Last dose (of 13 doses) to be given this evening               S/p unsuccessful cardioversion on 06/21/2019  Initial plan was for EBEN cardioversion and dual-chamber PPM implantation on 06/25  Received propofol and cardizem which likely induced cardiogenic shock due to her borderline low output state at that time                S/p successful cardioversion on 07/03 with amio load  Converted back into Afib on 07/08                  Continue on metoprolol succinate 100 mg q12 hours for rate control  Electrophysiology consulted; input appreciated  S/p AV node ablation with CRT-D with Dr Meka Esteves on 03/18/2020  Chronic kidney disease, stage III              Baseline creatinine of 0 9-1 2  Most recent BMP from 03/24/2020 (inpatient): sodium 139; potassium 4 1; BUN 20; creatinine 0 86  Will order post hospital BMP      Obstructive sleep apnea              Noncompliant with CPAP in the past               Repeat outpatient sleep study was ordered; however, never scheduled      Diabetes mellitus, type 2              Most recent hemoglobin A1c from 03/12/2020 of 6 9      Hypothyroidism              History of radioablation in the past; now on replacement therapy                Most recent TSH of 9 170 with normal free T4 from 06/18/2019      Hypertension, controlled   Patient did not check BP at home today  Continue medications as above  Hyperlipidemia   Most recent lipid panel from 03/12/2020: cholesterol 112; TG 48; HDL 38; direct LDL 64  Not currently on statin      Diagnostic Testing:  Echocardiogram (limited) from 07/04/2019:  LVEF: 45%; mild diffuse hypokinesis  LVIDd: 3 8 cm  MR: No MR  Moderate annular calcification  Other: Dilated LA      Echocardiogram from 06/20/2019:  LVEF: 25%  LVIDd: 4 6 cm  RV: Dilated and hypokinetic  MR: Moderate  PASP: 35 mmHg  Subjective:   Bridger Lopez is a 78 y o  female chronic BiV heart failure, atrial fibrillation, HTN, HLD, hypothyroidism, DM2, CORAZON, and history of uterine cancer who is being seen today for hospital follow-up  From office visit with Elia Woo on 07/15/2019: "71-year-old female admitted to the ED on 6/18/19 with new onset Atrial fib with RVR and decompensated congestive heart failure  Has no prior cardiac history and incidentally found to have an irregular, rapid heart rate in her PCPs office  Patient also reporting new onset lower extremity edema, orthopnea, and nonproductive cough x 3 days  Initially was given several boluses of Cardizem and then started on Cardizem gtt but with minimal response  She was seen by the general cardiology team and had echocardiogram which demonstrated LVEF of 25-30% with LVIDd of 4 6 cm  Then on 6/22 underwent EBEN with guided cardioversion which was initially successful but then reverted back to a rapid Afib the next morning  Amiodarone loading was then initiated  Did have junctional rhythm immediate post conversion and showing signs of tachy oniel syndrome  Given these findings, PPM with possible AICD was suggested with cardioversion thereafter       On 6/25 patient was taken to the cath lab for planned dual chamber AICD but apparently became hypotensive and markedly SOB upon induction with propofol  She was subsequently transferred to the ICU for further management of her heart failure  She continued to be in AFib with RVR upon transfer  Through the night, patient's condition began to worsen and she became cool, clammy with N/V and loss of obtainable BP  Started on pressor support and lined  SVO2 at that point 32% with signs of lactic acidosis, WANG  Then started on milrinone gtt at 0 25 mcg/kg/min          She began to improve on inotropic/pressor support  She is warm and perfusing with MAP of 90  Lactic acid improved, SVO2 up to 68% from 33%  In review of history, patient recalls having SOB with mild exertion up to 3 months ago  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   Never used any illicit drugs  Nonsmoker  Has strong family history of CAD in her mother, father, and sister       Today, 7/15/19, patient presents for post-hospital follow-up  Recent prolonged hospital stay due to the events noted above from 6/18-7/11/19  Her cardiogenic shock improved and she was able to come off pressor and inotropic support  She was started on HF GDMT and loaded with amiodarone  She was then successfully cardioverted on 7/3/19 but then went back into Afib on 7/8,  with plan for possible repeat CV or ablation in the future  WANG and transaminitis continued to improve  She was discharged on 20 mg of torsemide at a weight of 150 lbs  [address]  States she is feeling well overall  Weights are stable  Denies any chest discomfort, SOB, orthopnea or PND  No palpitations, dizziness or syncopal episodes  Tolerating her therapy sessions without difficulty  Her labs are improved  She is anxious to get home " Dis[facility] on 07/19/2019      Was seen by Dr Antwon Ackerman on 07/22/2019 for follow-up; patient's HR was noted to remain elevated, 108-130 bpm  Her amiodarone was discontinued and plan at this time is for simple rate control (Toprol increased to 75 mg BID)  Additionally, during this visit patient admitted to dietary indiscretion (eating leftovers from a taco bar) and noted that she had gained 10 lbs in about 1 week (weight of 164 lbs in office)  Patient stated she was unable to weigh herself at home due to her scale not being calibrated  Patient was advised to take an extra dose of torsemide the following day  One week follow-up appointment was made with HF team       On 07/26/2019, Kaykay Acreo, ZEE talked with patient  Patient did take the extra torsemide dose and increased her Toprol dosage, as advised  Reported weight of 160 lbs and adherence to low sodium diet with 2L fluid restriction       Today, 07/29/2019, patient feels well  Presents with her sister   Has discontinued amiodarone and increased metoprolol dose  She has continued to take 40 mg torsemide daily since seeing Dr Haresh Mei  Has been eating more fresh fruit and staying away from processed foods and high salt foods  Is eager to return to work  Family is hesitant to have her start driving again  Is planning to put in application for an assisted living apartment  Currently staying with family  PT has been seeing her at home, able to walk on various terrain (hills, stairs, etc ) without stopping; reports talking with therapist during walk also  Has been weighing herself daily  Weight of 164 8 lbs today; 164 lbs on 07/22  Weighed 155 lbs in office on 07/15  Appears slightly overloaded on exam  No current complaints  No PND, chest pain, lightheadedness, dizziness, or palpitations  RTC in 8 weeks with Dr Ana Juárez  To have stress test and sleep study completed prior to this appointment  From office visit with Dr Ana Juárez on 10/25/2019: "Patient is here for a follow-up visit  She was most recently seen by me during her hospitalization  She was treated for biventricular heart failure  She was noted to have atrial fibrillation and ultimately had two cardioversions the last of which was July 3rd with an amiodarone load  She converted back to atrial fibrillation and amiodarone was discontinued July 22nd by Dr Haresh Mei, electrophysiology service because of return to atrial fibrillation  She continues on rate control and anticoagulation  EKG at that time demonstrated atrial fibrillation  Most recent echocardiogram done July 4, 2019 demonstrated a mild reduction in overall LV systolic function in the setting of mild diffuse hypokinesis with an ejection fraction of 45%  A prior complete echo done June 26th demonstrated moderate mitral and tricuspid regurgitation with moderate biatrial cavity enlargement   The initial [TTE] June 20, 2019 demonstrated an ejection fraction of 25-30% in the setting of severe diffuse hypokinesis and moderate mitral regurgitation  Her blood pressure is stable today  EKG demonstrates atrial fibrillation with rapid ventricular response but the patient has not taken her medications today  I urged her to do so when she goes home  She is on hydralazine and isosorbide dinitrate as per the heart failure service  Creatinine was 2 54 July 11th but as of August 1st is now 0 93  She has untreated sleep apnea and is reluctant to pursue treatment for this  I did tell her this would be a disadvantage in reference to managing her cardiac situation  She will review this with her family physician  As well as stress test was ordered and she has not had this done yet  I asked her to do so prior to her next visit with me  Overall she has been well with no chest pain or significant dyspnea "    From office visit with Dr Lakshmi Sunshine on 03/09/2020: "Most recent BMP done February 26, 2020 demonstrated potassium of 3 7 and creatinine of 1 23  Crys Console Crys Console Of late she has had issues with lower extremity edema and cellulitis  She has been on antibiotic  She was told by the wound center that she should go to the emergency room to be assessed and treated  She is reluctant to do this  I myself again today and courage her to go to the hospital as she needs diuresis and possible treatment for cellulitis  She has gained about 12 lb in the past month  It is possible given her atrial fibrillation that she has recurrent cardiomyopathy "    Admitted to [facility] from 03/11 to 03/24/2020 after presenting (as the recommendation of many of her providers) with bilateral LE edema and weight gain  In ED was found to have NT-proBNP of 4600 and be in atrial fibrillation with RVR (rates in 150s)  She was then started on IV diltiazem and received IV Lasix  Diltiazem was later stopped and amiodarone drip was started  Switched from IV Lasix to IV Bumex on 03/41  EP was consulted   TTE revealed LVEF 30% and decision was made to proceed with AV node ablation and CRT-D implantation once optimized  Patient continued to diuresis and then underwent ablation and device implant on 03/18  Post-procedure, she remains notably overloaded and remained on IV Bumex  Started on [place] on 03/20  Transitioned to PO Bumex on 03/23 with one time dose of metolazone; had good urinary response with this  HF team recommended her being discharged home on Bumex 4 mg BID with PRN metolazone; however, she was discharged home on PO Bumex 4 mg daily without PRN metolazone prescribed  Lost 18 lbs during this admission  03/27/2020: Patient called for telemedicine visit  Very happy to be home  Her primary complaint is that of right leg pain upon waking, lasting a few minutes  Reports good urinary response to Bumex and reports that weights have continued to come down since getting home (172 lbs yesterday; 166 lbs today?)  Denies any dietary indiscretion or increased fluid intake  Did not check BP this AM  Reviewed and verified dosing of all medications with her  Reports legs continue to weep on occasion  Reports ICD and groin sites are healing well  Denies any SOB at rest or with exertion  Sleeping well in a bed; mostly on her side  Denies PND and orthopnea  Recommended that she schedule her sleep study; patient remains uninterested  Instructed to get BMP done in 1-2 weeks  Review of Systems   Constitutional: Negative for activity change, appetite change, fatigue, fever and unexpected weight change  HENT: Negative for congestion, postnasal drip, rhinorrhea, sneezing and sore throat  Eyes: Negative  Respiratory: Negative for apnea and shortness of breath  Cardiovascular: Negative for chest pain, palpitations and leg swelling  Gastrointestinal: Negative for abdominal distention, constipation, diarrhea, nausea and vomiting  Endocrine: Negative  Genitourinary: Negative for decreased urine volume, difficulty urinating and dysuria  Musculoskeletal: Positive for myalgias (right leg)  Negative for arthralgias  Skin: Negative  Allergic/Immunologic: Negative  Neurological: Negative for dizziness, syncope, light-headedness and numbness  Hematological: Negative  Psychiatric/Behavioral: Negative for agitation and confusion  The patient is not nervous/anxious  14-point ROS completed and negative except as stated above and/or in the HPI      Past Medical History:   Diagnosis Date    Cardiogenic shock ([facility] [address] ) 6/26/2019    CHF (congestive heart failure) (HCC)     Eczema     Photosensitivity     abnormal skin sensitivity to sunlight    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HCC)     staging     Past Surgical History:   Procedure Laterality Date    CATARACT EXTRACTION Left     HEMORRHOID SURGERY      IR CENTRAL LINE PLACEMENT  7/9/2019    OOPHORECTOMY      unilateral    TOTAL ABDOMINAL HYSTERECTOMY       Family History   Problem Relation Age of Onset    Alzheimer's disease Mother     Coronary artery disease Mother     Dementia Mother     Diabetes Mother         mellitus    Other Father         acute myocardial infarction    Coronary artery disease Sister     Other Maternal Grandfather         laryngeal cancer    Dementia Maternal Aunt     Diabetes Maternal Aunt      Social History     Socioeconomic History    Marital status: Single     Spouse name: Not on file    Number of children: Not on file    Years of education: Not on file    Highest education level: Not on file   Occupational History    Not on file   Social Needs    Financial resource strain: Not on file    Food insecurity:     Worry: Not on file     Inability: Not on file    Transportation needs:     Medical: Not on file     Non-medical: Not on file   Tobacco Use    Smoking status: Never Smoker    Smokeless tobacco: Never Used   Substance and Sexual Activity    Alcohol use: Not Currently    Drug use: Never    Sexual activity: Not Currently   Lifestyle    Physical activity:     Days per week: Not on file     Minutes per session: Not on file    Stress: Not on file   Relationships    Social connections:     Talks on phone: Not on file     Gets together: Not on file     Attends Mosque service: Not on file     Active member of club or organization: Not on file     Attends meetings of clubs or organizations: Not on file     Relationship status: Not on file    Intimate partner violence:     Fear of current or ex partner: Not on file     Emotionally abused: Not on file     Physically abused: Not on file     Forced sexual activity: Not on file   Other Topics Concern    Not on file   Social History Narrative    Not on file       Current Outpatient Medications:     apixaban (ELIQUIS) 5 mg, Take 1 tablet (5 mg total) by mouth 2 (two) times a day, Disp: 60 tablet, Rfl: 11    Ascorbic Acid (VITAMIN C) 100 MG tablet, Take 100 mg by mouth daily, Disp: , Rfl:     bumetanide (BUMEX) 2 mg tablet, Take 2 tablets (4 mg total) by mouth daily, Disp: 180 tablet, Rfl: 0    cholecalciferol (VITAMIN D3) 1,000 units tablet, Take 1,000 Units by mouth daily, Disp: , Rfl:     hydrALAZINE (APRESOLINE) 10 mg tablet, Take 1 tablet (10 mg total) by mouth every 8 (eight) hours, Disp: 90 tablet, Rfl: 5    levothyroxine 137 mcg tablet, TAKE 1 TABLET (137 MCG TOTAL) BY MOUTH DAILY, Disp: 30 tablet, Rfl: 5    Loratadine (CLARITIN PO), Take by mouth, Disp: , Rfl:     MAGNESIUM PO, Take 500 mg by mouth daily, Disp: , Rfl:     metoprolol succinate (TOPROL-XL) 100 mg 24 hr tablet, Take 1 tablet (100 mg total) by mouth every 12 (twelve) hours, Disp: 60 tablet, Rfl: 0    multivitamin (THERAGRAN) TABS, Take 1 tablet by mouth daily, Disp: , Rfl:     potassium chloride (K-DUR,KLOR-CON) 20 mEq tablet, Take 2 tablets (40 mEq total) by mouth 2 (two) times a day, Disp: 120 tablet, Rfl: 5    rOPINIRole (REQUIP XL) 2 MG 24 hr tablet, Take 1 tablet (2 mg total) by mouth daily at bedtime, Disp: 30 tablet, Rfl: 5    sacubitril-valsartan (ENTRESTO) 24-26 MG TABS, Take 1 tablet by mouth 2 (two) times a day, Disp: 60 tablet, Rfl: 2    venlafaxine (EFFEXOR) 75 mg tablet, TAKE 1 TABLET (75 MG TOTAL) BY MOUTH EVERY 12 (TWELVE) HOURS, Disp: 60 tablet, Rfl: 5     Allergies   Allergen Reactions    Penicillins     Wellbutrin Sr  [Bupropion]      Other reaction(s): Suicidal ideations  Category: Adverse Reaction;      Vitals: There were no vitals filed for this visit  There is no height or weight on file to calculate BMI  Weight on 03/27: 166 9 lbs (per patient)  Weight on 03/26: 172 4 lbs (per patient)  Wt Readings from Last 3 Encounters:   03/24/20 80 2 kg (176 lb 12 9 oz)   03/09/20 90 3 kg (199 lb)   02/27/20 87 5 kg (193 lb)     Labs and Results:  Lab Results   Component Value Date    SODIUM 139 03/24/2020    K 4 1 03/24/2020     03/24/2020    CO2 35 (H) 03/24/2020    BUN 20 03/24/2020    CREATININE 0 86 03/24/2020    GLUC 121 03/24/2020    CALCIUM 9 3 03/24/2020     Lab Results   Component Value Date    WBC 7 10 03/24/2020    HGB 11 2 (L) 03/24/2020    HCT 36 7 03/24/2020    MCV 87 03/24/2020     03/24/2020     Lab Results   Component Value Date    NTBNP 4,620 (H) 03/11/2020      Today, 40 minutes were spent with patient during which greater than 50% of this time was spent in counseling/coordination of care regarding low sodium diet, fluid restriction, daily weights, and importance of medication compliance       Santa Rangel PA-C

## 2020-03-27 NOTE — LETTER
March 27, 2020     Kitarenetta Bermudezt, [address]    Patient: Heather Pryor   YOB: 1940   Date of Visit: 3/27/2020       Dear Dr Duran Medina:    Thank you for referring Heather Pryor to me for evaluation  Below are my notes for this consultation  If you have questions, please do not hesitate to call me  I look forward to following your patient along with you  Sincerely,    Jim Jacobson PA-C        CC: MD Jim Cabrera PA-C  3/27/2020  3:46 PM  Sign at close encounter  Virtual Regular Visit    Problem List Items Addressed This Visit        Endocrine    Type 2 diabetes mellitus with diabetic cataract ([facility] [address] )       Cardiovascular and Mediastinum    Hypertension       Other    Hyperlipidemia      Other Visit Diagnoses     Chronic HFrEF (heart failure with reduced ejection fraction) ([facility] )    -  Primary    Relevant Orders    Basic metabolic panel    Atrial fibrillation, unspecified type [facility])            Reason for visit: Heart failure hospital follow-up  Encounter provider: Jim Jacobson PA-C    Provider located at:  [address] [place] [address]    Recent Visits  Date Type Provider Dept   03/26/20 Telemedicine Jorge Osorio, [facility]   Showing recent visits within past 7 days and meeting all other requirements     Today's Visits  Date Type Provider Dept   03/27/20 [address] [place] [address] 119 today's visits and meeting all other requirements     Future Appointments  No visits were found meeting these conditions  Showing future appointments within next 150 days and meeting all other requirements      After connecting via telephone, the patient was identified by name and date of birth   Heather Pryor was informed that this is a telemedicine visit and that the visit is being conducted through telephone which may not be secure and therefore, might not be HIPAA-compliant  My office door was closed  No one else was in the room  She acknowledged consent and understanding of privacy and security of the video platform  The patient has agreed to participate and understands they can discontinue the visit at any time  Plan:  Chronic biventricular heart failure; LVEF 30%, LVIDd 5 44 cm; NYHA III; ACC/AHA Stage C              Etiology: likely tachy-induced, +/- ischemic (nuclear stress was ordered, patient never completed)  Also has history of radioablative iodine  Etiology of exacerbation: Unclear if from AFib with RVR or vice versa  Reviewed importance of low sodium diet and fluid restriction  Weight of 176 lbs on 03/24 (day of discharge)  Today, weighs 166 9 lbs (at home per patient; was 172 4 lbs yesterday)  TTE from 06/20/2019: LVEF 25-30%, LVIDd 4 6 cm, mod reduced RVSF  Mod MR and TR  TTE from 06/26/2019: LVEF ~40%, mildly reduced RVSF  Mod MR, otherwise, no significant valvular disease                TTE (limited) from 07/04/2019: LVEF 45%  LVIDd 3 8 cm  Grade 2 DD  TTE completed on 03/16/2020 (not euvolemic): LVEF 30%  LVIDd 5 44 cm  Moderately dilated RV with moderately to markedly reduced RVSF  NAN  Moderate MR  Mild to moderate TR  Dilated IVC  Nuclear stress test ordered in 10/2019; never completed  Will hold off for now and consider again if LVEF failed to improve s/p ablation  Most recent BMP from 03/24/2020 (inpatient): sodium 139; potassium 4 1; BUN 20; creatinine 0 86                  Neurohormonal Blockade:              --Beta Blocker: metoprolol succinate 100 mg q12 hours  --ACEi, ARB or ARNi: Entresto 24-26 mg BID                         --SVR Reducing Agents: hydralazine 10 mg q8 hours                --Aldosterone Receptor Blocker: N/A               -- Diuretic: PO Bumex 4 mg daily with potassium 40 mEq BID and PRN metolazone for rapid weight gain                   Sudden Cardiac Death Risk Reduction:              --Med[employer]onic CRT-D (BiV ICD) in situ since 03/18/2020                  Electrical Resynchronization:              --Interrogation: Narrow QRS                 Advanced Therapies (if appropriate): Will continue to monitor       Atrial fibrillation with rapid ventricular response (s/p AV node ablation)              [id number] = 6 (age, sex, CHF, HTN, DM)              Anticoagulation on Eliquis  Last dose (of 13 doses) to be given this evening               S/p unsuccessful cardioversion on 06/21/2019  Initial plan was for EBEN cardioversion and dual-chamber PPM implantation on 06/25  Received propofol and cardizem which likely induced cardiogenic shock due to her borderline low output state at that time                S/p successful cardioversion on 07/03 with amio load  Converted back into Afib on 07/08                  Continue on metoprolol succinate 100 mg q12 hours for rate control  Electrophysiology consulted; input appreciated  S/p AV node ablation with CRT-D with Dr Rocael Skinner on 03/18/2020  Chronic kidney disease, stage III              Baseline creatinine of 0 9-1 2  Most recent BMP from 03/24/2020 (inpatient): sodium 139; potassium 4 1; BUN 20; creatinine 0 86  Will order post hospital BMP      Obstructive sleep apnea              Noncompliant with CPAP in the past               Repeat outpatient sleep study was ordered; however, never scheduled      Diabetes mellitus, type 2              Most recent hemoglobin A1c from 03/12/2020 of 6 9      Hypothyroidism              History of radioablation in the past; now on replacement therapy                Most recent TSH of 9 170 with normal free T4 from 06/18/2019      Hypertension, controlled   Patient did not check BP at home today      Continue medications as above     Hyperlipidemia   Most recent lipid panel from 03/12/2020: cholesterol 112; TG 48; HDL 38; direct LDL 64  Not currently on statin      Diagnostic Testing:  Echocardiogram (limited) from 07/04/2019:  LVEF: 45%; mild diffuse hypokinesis  LVIDd: 3 8 cm  MR: No MR  Moderate annular calcification  Other: Dilated LA      Echocardiogram from 06/20/2019:  LVEF: 25%  LVIDd: 4 6 cm  RV: Dilated and hypokinetic  MR: Moderate  PASP: 35 mmHg  Subjective: Deonte Noble is a 78 y o  female chronic BiV heart failure, atrial fibrillation, HTN, HLD, hypothyroidism, DM2, CORAZON, and history of uterine cancer who is being seen today for hospital follow-up  From office visit with Jermaine Green on 07/15/2019: "75-year-old female admitted to the ED on 6/18/19 with new onset Atrial fib with RVR and decompensated congestive heart failure  Has no prior cardiac history and incidentally found to have an irregular, rapid heart rate in her PCPs office  Patient also reporting new onset lower extremity edema, orthopnea, and nonproductive cough x 3 days  Initially was given several boluses of Cardizem and then started on Cardizem gtt but with minimal response  She was seen by the general cardiology team and had echocardiogram which demonstrated LVEF of 25-30% with LVIDd of 4 6 cm  Then on 6/22 underwent EBEN with guided cardioversion which was initially successful but then reverted back to a rapid Afib the next morning  Amiodarone loading was then initiated  Did have junctional rhythm immediate post conversion and showing signs of tachy oniel syndrome  Given these findings, PPM with possible AICD was suggested with cardioversion thereafter       On 6/25 patient was taken to the cath lab for planned dual chamber AICD but apparently became hypotensive and markedly SOB upon induction with propofol  She was subsequently transferred to the ICU for further management of her heart failure   She continued to be in AFib with RVR upon transfer  Through the night, patient's condition began to worsen and she became cool, clammy with N/V and loss of obtainable BP  Started on pressor support and lined  SVO2 at that point 32% with signs of lactic acidosis, WANG  Then started on milrinone gtt at 0 25 mcg/kg/min          She began to improve on inotropic/pressor support  She is warm and perfusing with MAP of 90  Lactic acid improved, SVO2 up to 68% from 33%  In review of history, patient recalls having SOB with mild exertion up to 3 months ago  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  Never used any illicit drugs  Nonsmoker  Has strong family history of CAD in her mother, father, and sister       Today, 7/15/19, patient presents for post-hospital follow-up  Recent prolonged hospital stay due to the events noted above from 6/18-7/11/19  Her cardiogenic shock improved and she was able to come off pressor and inotropic support  She was started on HF GDMT and loaded with amiodarone  She was then successfully cardioverted on 7/3/19 but then went back into Afib on 7/8,  with plan for possible repeat CV or ablation in the future  WANG and transaminitis continued to improve  She was discharged on 20 mg of torsemide at a weight of 150 lbs  [address]  States she is feeling well overall  Weights are stable  Denies any chest discomfort, SOB, orthopnea or PND  No palpitations, dizziness or syncopal episodes  Tolerating her therapy sessions without difficulty  Her labs are improved  She is anxious to get home " Dis[facility] from [facility] on 07/[facility]/2019      Was seen by Dr Alin Barney on 07/22/2019 for follow-up; patient's HR was noted to remain elevated, 108-130 bpm  Her amiodarone was discontinued and plan at this time is for simple rate control (Toprol increased to 75 mg BID)   Additionally, during this visit patient admitted to dietary indiscretion (eating leftovers from a taco bar) and noted that she had gained 10 lbs in about 1 week (weight of 164 lbs in office)  Patient stated she was unable to weigh herself at home due to her scale not being calibrated  Patient was advised to take an extra dose of torsemide the following day  One week follow-up appointment was made with HF team       On 07/26/2019, Lisa Castillo RN talked with patient  Patient did take the extra torsemide dose and increased her Toprol dosage, as advised  Reported weight of 160 lbs and adherence to low sodium diet with 2L fluid restriction       Today, 07/29/2019, patient feels well  Presents with her sister  Has discontinued amiodarone and increased metoprolol dose  She has continued to take 40 mg torsemide daily since seeing Dr Trinity Lundberg  Has been eating more fresh fruit and staying away from processed foods and high salt foods  Is eager to return to work  Family is hesitant to have her start driving again  Is planning to put in application for an assisted living apartment  Currently staying with family  PT has been seeing her at home, able to walk on various terrain (hills, stairs, etc ) without stopping; reports talking with therapist during walk also  Has been weighing herself daily  Weight of 164 8 lbs today; 164 lbs on 07/22  Weighed 155 lbs in office on 07/15  Appears slightly overloaded on exam  No current complaints  No PND, chest pain, lightheadedness, dizziness, or palpitations  RTC in 8 weeks with Dr Gerard Caputo  To have stress test and sleep study completed prior to this appointment  From office visit with Dr Gerard Caputo on 10/25/2019: "Patient is here for a follow-up visit  She was most recently seen by me during her hospitalization  She was treated for biventricular heart failure  She was noted to have atrial fibrillation and ultimately had two cardioversions the last of which was July 3rd with an amiodarone load   She converted back to atrial fibrillation and amiodarone was discontinued July 22nd by Dr Trinity Lundberg, electrophysiology service because of return to atrial fibrillation  She continues on rate control and anticoagulation  EKG at that time demonstrated atrial fibrillation  Most recent echocardiogram done July 4, 2019 demonstrated a mild reduction in overall LV systolic function in the setting of mild diffuse hypokinesis with an ejection fraction of 45%  A prior complete echo done June 26th demonstrated moderate mitral and tricuspid regurgitation with moderate biatrial cavity enlargement  The initial [TTE] June 20, 2019 demonstrated an ejection fraction of 25-30% in the setting of severe diffuse hypokinesis and moderate mitral regurgitation  Her blood pressure is stable today  EKG demonstrates atrial fibrillation with rapid ventricular response but the patient has not taken her medications today  I urged her to do so when she goes home  She is on hydralazine and isosorbide dinitrate as per the heart failure service  Creatinine was 2 54 July 11th but as of August 1st is now 0 93  She has untreated sleep apnea and is reluctant to pursue treatment for this  I did tell her this would be a disadvantage in reference to managing her cardiac situation  She will review this with her family physician  As well as stress test was ordered and she has not had this done yet  I asked her to do so prior to her next visit with me  Overall she has been well with no chest pain or significant dyspnea "    From office visit with Dr Jeff Guzman on 03/09/2020: "Most recent BMP done February 26, 2020 demonstrated potassium of 3 7 and creatinine of 1 23  Kyra Padgett Of late she has had issues with lower extremity edema and cellulitis  She has been on antibiotic  She was told by the wound center that she should go to the emergency room to be assessed and treated  She is reluctant to do this  I myself again today and courage her to go to the hospital as she needs diuresis and possible treatment for cellulitis  She has gained about 12 lb in the past month   It is possible given her atrial fibrillation that she has recurrent cardiomyopathy "    Admitted to [facility] from 03/11 to 03/24/2020 after presenting (as the recommendation of many of her providers) with bilateral LE edema and weight gain  In ED was found to have NT-proBNP of 4600 and be in atrial fibrillation with RVR (rates in 150s)  She was then started on IV diltiazem and received IV Lasix  Diltiazem was later stopped and amiodarone drip was started  Switched from IV Lasix to IV Bumex on 03/41  EP was consulted  TTE revealed LVEF 30% and decision was made to proceed with AV node ablation and CRT-D implantation once optimized  Patient continued to diuresis and then underwent ablation and device implant on 03/18  Post-procedure, she remains notably overloaded and remained on IV Bumex  Started on Joe Dux on 03/20  Transitioned to PO Bumex on 03/23 with one time dose of metolazone; had good urinary response with this  HF team recommended her being discharged home on Bumex 4 mg BID with PRN metolazone; however, she was discharged home on PO Bumex 4 mg daily without PRN metolazone prescribed  Lost 18 lbs during this admission  03/27/2020: Patient called for telemedicine visit  Very happy to be home  Her primary complaint is that of right leg pain upon waking, lasting a few minutes  Reports good urinary response to Bumex and reports that weights have continued to come down since getting home (172 lbs yesterday; 166 lbs today?)  Denies any dietary indiscretion or increased fluid intake  Did not check BP this AM  Reviewed and verified dosing of all medications with her  Reports legs continue to weep on occasion  Reports ICD and groin sites are healing well  Denies any SOB at rest or with exertion  Sleeping well in a bed; mostly on her side  Denies PND and orthopnea  Recommended that she schedule her sleep study; patient remains uninterested  Instructed to get BMP done in 1-2 weeks      Review of Systems   Constitutional: Negative for activity change, appetite change, fatigue, fever and unexpected weight change  HENT: Negative for congestion, postnasal drip, rhinorrhea, sneezing and sore throat  Eyes: Negative  Respiratory: Negative for apnea and shortness of breath  Cardiovascular: Negative for chest pain, palpitations and leg swelling  Gastrointestinal: Negative for abdominal distention, constipation, diarrhea, nausea and vomiting  Endocrine: Negative  Genitourinary: Negative for decreased urine volume, difficulty urinating and dysuria  Musculoskeletal: Positive for myalgias (right leg)  Negative for arthralgias  Skin: Negative  Allergic/Immunologic: Negative  Neurological: Negative for dizziness, syncope, light-headedness and numbness  Hematological: Negative  Psychiatric/Behavioral: Negative for agitation and confusion  The patient is not nervous/anxious  14-point ROS completed and negative except as stated above and/or in the HPI      Past Medical History:   Diagnosis Date    Cardiogenic shock ([facility] [address] ) 6/26/2019    CHF (congestive heart failure) (HCC)     Eczema     Photosensitivity     abnormal skin sensitivity to sunlight    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HCC)     staging     Past Surgical History:   Procedure Laterality Date    CATARACT EXTRACTION Left     HEMORRHOID SURGERY      IR CENTRAL LINE PLACEMENT  7/9/2019    OOPHORECTOMY      unilateral    TOTAL ABDOMINAL HYSTERECTOMY       Family History   Problem Relation Age of Onset    Alzheimer's disease Mother     Coronary artery disease Mother     Dementia Mother     Diabetes Mother         mellitus    Other Father         acute myocardial infarction    Coronary artery disease Sister     Other Maternal Grandfather         laryngeal cancer    Dementia Maternal Aunt     Diabetes Maternal Aunt      Social History     Socioeconomic History    Marital status: Single     Spouse name: Not on file    Number of children: Not on file    Years of education: Not on file    Highest education level: Not on file   Occupational History    Not on file   Social Needs    Financial resource strain: Not on file    Food insecurity:     Worry: Not on file     Inability: Not on file    Transportation needs:     Medical: Not on file     Non-medical: Not on file   Tobacco Use    Smoking status: Never Smoker    Smokeless tobacco: Never Used   Substance and Sexual Activity    Alcohol use: Not Currently    Drug use: Never    Sexual activity: Not Currently   Lifestyle    Physical activity:     Days per week: Not on file     Minutes per session: Not on file    Stress: Not on file   Relationships    Social connections:     Talks on phone: Not on file     Gets together: Not on file     Attends Restoration service: Not on file     Active member of club or organization: Not on file     Attends meetings of clubs or organizations: Not on file     Relationship status: Not on file    Intimate partner violence:     Fear of current or ex partner: Not on file     Emotionally abused: Not on file     Physically abused: Not on file     Forced sexual activity: Not on file   Other Topics Concern    Not on file   Social History Narrative    Not on file       Current Outpatient Medications:     apixaban (ELIQUIS) 5 mg, Take 1 tablet (5 mg total) by mouth 2 (two) times a day, Disp: 60 tablet, Rfl: 11    Ascorbic Acid (VITAMIN C) 100 MG tablet, Take 100 mg by mouth daily, Disp: , Rfl:     bumetanide (BUMEX) 2 mg tablet, Take 2 tablets (4 mg total) by mouth daily, Disp: 180 tablet, Rfl: 0    cholecalciferol (VITAMIN D3) 1,000 units tablet, Take 1,000 Units by mouth daily, Disp: , Rfl:     hydrALAZINE (APRESOLINE) 10 mg tablet, Take 1 tablet (10 mg total) by mouth every 8 (eight) hours, Disp: 90 tablet, Rfl: 5    levothyroxine 137 mcg tablet, TAKE 1 TABLET (137 MCG TOTAL) BY MOUTH DAILY, Disp: 30 tablet, Rfl: 5    Loratadine (CLARITIN PO), Take by mouth, Disp: , Rfl:     MAGNESIUM PO, Take 500 mg by mouth daily, Disp: , Rfl:     metoprolol succinate (TOPROL-XL) 100 mg 24 hr tablet, Take 1 tablet (100 mg total) by mouth every 12 (twelve) hours, Disp: 60 tablet, Rfl: 0    multivitamin (THERAGRAN) TABS, Take 1 tablet by mouth daily, Disp: , Rfl:     potassium chloride (K-DUR,KLOR-CON) 20 mEq tablet, Take 2 tablets (40 mEq total) by mouth 2 (two) times a day, Disp: 120 tablet, Rfl: 5    rOPINIRole (REQUIP XL) 2 MG 24 hr tablet, Take 1 tablet (2 mg total) by mouth daily at bedtime, Disp: 30 tablet, Rfl: 5    sacubitril-valsartan (ENTRESTO) 24-26 MG TABS, Take 1 tablet by mouth 2 (two) times a day, Disp: 60 tablet, Rfl: 2    venlafaxine (EFFEXOR) 75 mg tablet, TAKE 1 TABLET (75 MG TOTAL) BY MOUTH EVERY 12 (TWELVE) HOURS, Disp: 60 tablet, Rfl: 5     Allergies   Allergen Reactions    Penicillins     Wellbutrin Sr  [Bupropion]      Other reaction(s): Suicidal ideations  Category: Adverse Reaction;      Vitals: There were no vitals filed for this visit  There is no height or weight on file to calculate BMI  Weight on 03/27: 166 9 lbs (per patient)  Weight on 03/26: 172 4 lbs (per patient)      Wt Readings from Last 3 Encounters:   03/24/20 80 2 kg (176 lb 12 9 oz)   03/09/20 90 3 kg (199 lb)   02/27/20 87 5 kg (193 lb)     Labs and Results:  Lab Results   Component Value Date    SODIUM 139 03/24/2020    K 4 1 03/24/2020     03/24/2020    CO2 35 (H) 03/24/2020    BUN 20 03/24/2020    CREATININE 0 86 03/24/2020    GLUC 121 03/24/2020    CALCIUM 9 3 03/24/2020     Lab Results   Component Value Date    WBC 7 10 03/24/2020    HGB 11 2 (L) 03/24/2020    HCT 36 7 03/24/2020    MCV 87 03/24/2020     03/24/2020     Lab Results   Component Value Date    NTBNP 4,620 (H) 03/11/2020      Today, 40 minutes were spent with patient during which greater than 50% of this time was spent in counseling/coordination of care regarding low sodium diet, fluid restriction, daily weights, and importance of medication compliance       Santa Rangel PA-C

## 2020-03-27 NOTE — PATIENT INSTRUCTIONS
Please weigh yourself every day, and contact the Heart Failure program at [phone] if you gain 3 lbs overnight or 5 lbs in 5-7 days  Limit daily sodium/salt intake to 0535-8301 mg daily to prevent fluid retention  Avoid canned foods, [place] food, and processed meat (hot dogs, lunch meat, and sausage etc )  Limit daily fluid intake to 2000 mL or 2L (about 60 ounces) daily  Bring complete list of medications to your follow-up appointment

## 2020-04-06 ENCOUNTER — TELEMEDICINE (OUTPATIENT)
Dept: CARDIOLOGY CLINIC | Facility: CLINIC | Age: 80
End: 2020-04-06

## 2020-04-06 DIAGNOSIS — Z95.810 PRESENCE OF AUTOMATIC CARDIOVERTER/DEFIBRILLATOR (AICD): Primary | ICD-10-CM

## 2020-04-06 PROCEDURE — 99024 POSTOP FOLLOW-UP VISIT: CPT | Performed by: INTERNAL MEDICINE

## 2020-04-07 DIAGNOSIS — I48.91 ATRIAL FIBRILLATION, UNSPECIFIED TYPE (HCC): ICD-10-CM

## 2020-04-08 ENCOUNTER — TELEMEDICINE (OUTPATIENT)
Dept: CARDIOLOGY CLINIC | Facility: CLINIC | Age: 80
End: 2020-04-08
Payer: COMMERCIAL

## 2020-04-08 VITALS — WEIGHT: 157 LBS | BODY MASS INDEX: 29.66 KG/M2

## 2020-04-08 DIAGNOSIS — N18.30 STAGE 3 CHRONIC KIDNEY DISEASE (HCC): ICD-10-CM

## 2020-04-08 DIAGNOSIS — E78.5 HYPERLIPIDEMIA, UNSPECIFIED HYPERLIPIDEMIA TYPE: ICD-10-CM

## 2020-04-08 DIAGNOSIS — I10 ESSENTIAL HYPERTENSION: ICD-10-CM

## 2020-04-08 DIAGNOSIS — I10 HYPERTENSION, UNSPECIFIED TYPE: ICD-10-CM

## 2020-04-08 DIAGNOSIS — E11.36 TYPE 2 DIABETES MELLITUS WITH DIABETIC CATARACT, WITHOUT LONG-TERM CURRENT USE OF INSULIN (HCC): ICD-10-CM

## 2020-04-08 DIAGNOSIS — I50.22 CHRONIC HFREF (HEART FAILURE WITH REDUCED EJECTION FRACTION) (HCC): Primary | ICD-10-CM

## 2020-04-08 DIAGNOSIS — G47.33 OBSTRUCTIVE SLEEP APNEA: ICD-10-CM

## 2020-04-08 DIAGNOSIS — I50.82 BIVENTRICULAR HEART FAILURE (HCC): ICD-10-CM

## 2020-04-08 DIAGNOSIS — E66.9 OBESITY (BMI 30-39.9): ICD-10-CM

## 2020-04-08 DIAGNOSIS — I48.91 ATRIAL FIBRILLATION, UNSPECIFIED TYPE (HCC): ICD-10-CM

## 2020-04-08 DIAGNOSIS — I50.9 CHF (CONGESTIVE HEART FAILURE) (HCC): ICD-10-CM

## 2020-04-08 PROCEDURE — 99214 OFFICE O/P EST MOD 30 MIN: CPT | Performed by: PHYSICIAN ASSISTANT

## 2020-04-08 PROCEDURE — 1111F DSCHRG MED/CURRENT MED MERGE: CPT | Performed by: PHYSICIAN ASSISTANT

## 2020-04-08 RX ORDER — METOPROLOL SUCCINATE 50 MG/1
100 TABLET, EXTENDED RELEASE ORAL EVERY 12 HOURS SCHEDULED
Qty: 120 TABLET | Refills: 3 | Status: SHIPPED | OUTPATIENT
Start: 2020-04-08 | End: 2020-07-01

## 2020-04-08 RX ORDER — METOPROLOL SUCCINATE 25 MG/1
75 TABLET, EXTENDED RELEASE ORAL 2 TIMES DAILY
Qty: 180 TABLET | Refills: 0 | Status: SHIPPED | OUTPATIENT
Start: 2020-04-08 | End: 2020-04-08

## 2020-04-27 DIAGNOSIS — I50.9 CHF (CONGESTIVE HEART FAILURE) (HCC): ICD-10-CM

## 2020-04-27 DIAGNOSIS — I50.22 CHRONIC HFREF (HEART FAILURE WITH REDUCED EJECTION FRACTION) (HCC): ICD-10-CM

## 2020-04-27 RX ORDER — BUMETANIDE 2 MG/1
4 TABLET ORAL DAILY
Qty: 180 TABLET | Refills: 0 | OUTPATIENT
Start: 2020-04-27

## 2020-05-01 DIAGNOSIS — F41.8 DEPRESSION WITH ANXIETY: ICD-10-CM

## 2020-05-01 DIAGNOSIS — E03.9 HYPOTHYROIDISM, UNSPECIFIED TYPE: ICD-10-CM

## 2020-05-01 RX ORDER — VENLAFAXINE 75 MG/1
75 TABLET ORAL EVERY 12 HOURS
Qty: 60 TABLET | Refills: 5 | Status: SHIPPED | OUTPATIENT
Start: 2020-05-01 | End: 2020-10-19

## 2020-05-01 RX ORDER — LEVOTHYROXINE SODIUM 137 UG/1
137 TABLET ORAL DAILY
Qty: 30 TABLET | Refills: 5 | Status: SHIPPED | OUTPATIENT
Start: 2020-05-01 | End: 2020-10-19

## 2020-05-22 DIAGNOSIS — I50.22 CHRONIC HFREF (HEART FAILURE WITH REDUCED EJECTION FRACTION) (HCC): ICD-10-CM

## 2020-06-01 DIAGNOSIS — I50.22 CHRONIC HFREF (HEART FAILURE WITH REDUCED EJECTION FRACTION) (HCC): ICD-10-CM

## 2020-06-01 DIAGNOSIS — I50.9 CHF (CONGESTIVE HEART FAILURE) (HCC): ICD-10-CM

## 2020-06-02 RX ORDER — BUMETANIDE 2 MG/1
4 TABLET ORAL DAILY
Qty: 180 TABLET | Refills: 0 | Status: SHIPPED | OUTPATIENT
Start: 2020-06-02 | End: 2020-07-12 | Stop reason: SDUPTHER

## 2020-06-04 ENCOUNTER — HOSPITAL ENCOUNTER (EMERGENCY)
Facility: HOSPITAL | Age: 80
Discharge: HOME | End: 2020-06-04
Attending: EMERGENCY MEDICINE | Admitting: EMERGENCY MEDICINE
Payer: COMMERCIAL

## 2020-06-04 ENCOUNTER — TELEPHONE (OUTPATIENT)
Dept: FAMILY MEDICINE CLINIC | Facility: CLINIC | Age: 80
End: 2020-06-04

## 2020-06-04 ENCOUNTER — APPOINTMENT (EMERGENCY)
Dept: RADIOLOGY | Facility: HOSPITAL | Age: 80
End: 2020-06-04
Payer: COMMERCIAL

## 2020-06-04 VITALS
RESPIRATION RATE: 16 BRPM | HEIGHT: 61 IN | OXYGEN SATURATION: 99 % | HEART RATE: 82 BPM | WEIGHT: 162 LBS | SYSTOLIC BLOOD PRESSURE: 145 MMHG | DIASTOLIC BLOOD PRESSURE: 67 MMHG | BODY MASS INDEX: 30.58 KG/M2 | TEMPERATURE: 98.9 F

## 2020-06-04 DIAGNOSIS — R79.89 ELEVATED BRAIN NATRIURETIC PEPTIDE (BNP) LEVEL: ICD-10-CM

## 2020-06-04 DIAGNOSIS — M62.838 MUSCLE SPASM OF LEFT LOWER EXTREMITY: Primary | ICD-10-CM

## 2020-06-04 DIAGNOSIS — R07.89 ATYPICAL CHEST PAIN: ICD-10-CM

## 2020-06-04 DIAGNOSIS — M79.605 LEFT LEG PAIN: ICD-10-CM

## 2020-06-04 LAB
ALBUMIN SERPL BCP-MCNC: 3.4 G/DL (ref 3.5–5)
ALP SERPL-CCNC: 59 U/L (ref 46–116)
ALT SERPL W P-5'-P-CCNC: 19 U/L (ref 12–78)
ANION GAP SERPL CALCULATED.3IONS-SCNC: 5 MMOL/L (ref 4–13)
AST SERPL W P-5'-P-CCNC: 22 U/L (ref 5–45)
ATRIAL RATE: 84 BPM
BASOPHILS # BLD AUTO: 0.04 THOUSANDS/ΜL (ref 0–0.1)
BASOPHILS NFR BLD AUTO: 1 % (ref 0–1)
BILIRUB SERPL-MCNC: 0.22 MG/DL (ref 0.2–1)
BUN SERPL-MCNC: 16 MG/DL (ref 5–25)
CALCIUM SERPL-MCNC: 9.1 MG/DL (ref 8.3–10.1)
CHLORIDE SERPL-SCNC: 107 MMOL/L (ref 100–108)
CO2 SERPL-SCNC: 27 MMOL/L (ref 21–32)
CREAT SERPL-MCNC: 0.8 MG/DL (ref 0.6–1.3)
D DIMER PPP FEU-MCNC: 0.43 UG/ML FEU
EOSINOPHIL # BLD AUTO: 0.13 THOUSAND/ΜL (ref 0–0.61)
EOSINOPHIL NFR BLD AUTO: 2 % (ref 0–6)
ERYTHROCYTE [DISTWIDTH] IN BLOOD BY AUTOMATED COUNT: 18.1 % (ref 11.6–15.1)
GFR SERPL CREATININE-BSD FRML MDRD: 70 ML/MIN/1.73SQ M
GLUCOSE SERPL-MCNC: 92 MG/DL (ref 65–140)
HCT VFR BLD AUTO: 36.2 % (ref 34.8–46.1)
HGB BLD-MCNC: 11.4 G/DL (ref 11.5–15.4)
IMM GRANULOCYTES # BLD AUTO: 0.03 THOUSAND/UL (ref 0–0.2)
IMM GRANULOCYTES NFR BLD AUTO: 0 % (ref 0–2)
LYMPHOCYTES # BLD AUTO: 1.68 THOUSANDS/ΜL (ref 0.6–4.47)
LYMPHOCYTES NFR BLD AUTO: 22 % (ref 14–44)
MCH RBC QN AUTO: 28.9 PG (ref 26.8–34.3)
MCHC RBC AUTO-ENTMCNC: 31.5 G/DL (ref 31.4–37.4)
MCV RBC AUTO: 92 FL (ref 82–98)
MONOCYTES # BLD AUTO: 0.8 THOUSAND/ΜL (ref 0.17–1.22)
MONOCYTES NFR BLD AUTO: 10 % (ref 4–12)
NEUTROPHILS # BLD AUTO: 5.11 THOUSANDS/ΜL (ref 1.85–7.62)
NEUTS SEG NFR BLD AUTO: 65 % (ref 43–75)
NRBC BLD AUTO-RTO: 0 /100 WBCS
NT-PROBNP SERPL-MCNC: 1089 PG/ML
P AXIS: 27 DEGREES
PLATELET # BLD AUTO: 254 THOUSANDS/UL (ref 149–390)
PMV BLD AUTO: 9.9 FL (ref 8.9–12.7)
POTASSIUM SERPL-SCNC: 4.9 MMOL/L (ref 3.5–5.3)
PROT SERPL-MCNC: 7.8 G/DL (ref 6.4–8.2)
QRS AXIS: -68 DEGREES
QRSD INTERVAL: 108 MS
QT INTERVAL: 410 MS
QTC INTERVAL: 484 MS
RBC # BLD AUTO: 3.95 MILLION/UL (ref 3.81–5.12)
SODIUM SERPL-SCNC: 139 MMOL/L (ref 136–145)
T WAVE AXIS: 32 DEGREES
TROPONIN I SERPL-MCNC: 0.02 NG/ML
VENTRICULAR RATE: 84 BPM
WBC # BLD AUTO: 7.79 THOUSAND/UL (ref 4.31–10.16)

## 2020-06-04 PROCEDURE — 84484 ASSAY OF TROPONIN QUANT: CPT | Performed by: PHYSICIAN ASSISTANT

## 2020-06-04 PROCEDURE — 36415 COLL VENOUS BLD VENIPUNCTURE: CPT | Performed by: PHYSICIAN ASSISTANT

## 2020-06-04 PROCEDURE — 85025 COMPLETE CBC W/AUTO DIFF WBC: CPT | Performed by: PHYSICIAN ASSISTANT

## 2020-06-04 PROCEDURE — 99284 EMERGENCY DEPT VISIT MOD MDM: CPT

## 2020-06-04 PROCEDURE — 73552 X-RAY EXAM OF FEMUR 2/>: CPT

## 2020-06-04 PROCEDURE — 99284 EMERGENCY DEPT VISIT MOD MDM: CPT | Performed by: EMERGENCY MEDICINE

## 2020-06-04 PROCEDURE — 85379 FIBRIN DEGRADATION QUANT: CPT | Performed by: PHYSICIAN ASSISTANT

## 2020-06-04 PROCEDURE — 80053 COMPREHEN METABOLIC PANEL: CPT | Performed by: PHYSICIAN ASSISTANT

## 2020-06-04 PROCEDURE — 71046 X-RAY EXAM CHEST 2 VIEWS: CPT

## 2020-06-04 PROCEDURE — 93005 ELECTROCARDIOGRAM TRACING: CPT

## 2020-06-04 PROCEDURE — 93010 ELECTROCARDIOGRAM REPORT: CPT | Performed by: INTERNAL MEDICINE

## 2020-06-04 PROCEDURE — 83880 ASSAY OF NATRIURETIC PEPTIDE: CPT | Performed by: PHYSICIAN ASSISTANT

## 2020-06-04 RX ORDER — METHOCARBAMOL 500 MG/1
500 TABLET, FILM COATED ORAL 2 TIMES DAILY PRN
Qty: 20 TABLET | Refills: 0 | Status: SHIPPED | OUTPATIENT
Start: 2020-06-04 | End: 2020-06-09 | Stop reason: ALTCHOICE

## 2020-06-04 RX ORDER — ACETAMINOPHEN 325 MG/1
650 TABLET ORAL ONCE
Status: COMPLETED | OUTPATIENT
Start: 2020-06-04 | End: 2020-06-04

## 2020-06-04 RX ADMIN — ACETAMINOPHEN 650 MG: 325 TABLET ORAL at 13:50

## 2020-06-08 ENCOUNTER — TELEPHONE (OUTPATIENT)
Dept: FAMILY MEDICINE CLINIC | Facility: CLINIC | Age: 80
End: 2020-06-08

## 2020-06-09 DIAGNOSIS — M79.606 PAIN OF LOWER EXTREMITY, UNSPECIFIED LATERALITY: Primary | ICD-10-CM

## 2020-06-09 RX ORDER — CYCLOBENZAPRINE HCL 5 MG
5 TABLET ORAL 3 TIMES DAILY PRN
Qty: 20 TABLET | Refills: 0 | Status: SHIPPED | OUTPATIENT
Start: 2020-06-09 | End: 2020-06-17 | Stop reason: SDUPTHER

## 2020-06-16 ENCOUNTER — TELEMEDICINE (OUTPATIENT)
Dept: FAMILY MEDICINE CLINIC | Facility: CLINIC | Age: 80
End: 2020-06-16
Payer: COMMERCIAL

## 2020-06-16 VITALS — WEIGHT: 165 LBS | BODY MASS INDEX: 31.18 KG/M2

## 2020-06-16 DIAGNOSIS — M54.31 BILATERAL SCIATICA: Primary | ICD-10-CM

## 2020-06-16 DIAGNOSIS — M54.32 BILATERAL SCIATICA: Primary | ICD-10-CM

## 2020-06-16 PROCEDURE — 1160F RVW MEDS BY RX/DR IN RCRD: CPT | Performed by: NURSE PRACTITIONER

## 2020-06-16 PROCEDURE — 99213 OFFICE O/P EST LOW 20 MIN: CPT | Performed by: NURSE PRACTITIONER

## 2020-06-16 RX ORDER — METHYLPREDNISOLONE 4 MG/1
TABLET ORAL
Qty: 21 EACH | Refills: 0 | Status: SHIPPED | OUTPATIENT
Start: 2020-06-16 | End: 2020-06-23

## 2020-06-17 DIAGNOSIS — M79.606 PAIN OF LOWER EXTREMITY, UNSPECIFIED LATERALITY: ICD-10-CM

## 2020-06-17 RX ORDER — CYCLOBENZAPRINE HCL 5 MG
5 TABLET ORAL 2 TIMES DAILY
Qty: 60 TABLET | Refills: 0 | Status: SHIPPED | OUTPATIENT
Start: 2020-06-17 | End: 2020-07-14

## 2020-06-23 ENCOUNTER — OFFICE VISIT (OUTPATIENT)
Dept: FAMILY MEDICINE CLINIC | Facility: CLINIC | Age: 80
End: 2020-06-23
Payer: COMMERCIAL

## 2020-06-23 VITALS
RESPIRATION RATE: 16 BRPM | SYSTOLIC BLOOD PRESSURE: 132 MMHG | BODY MASS INDEX: 31.91 KG/M2 | DIASTOLIC BLOOD PRESSURE: 82 MMHG | HEART RATE: 80 BPM | HEIGHT: 61 IN | WEIGHT: 169 LBS | TEMPERATURE: 98.2 F

## 2020-06-23 DIAGNOSIS — E66.9 OBESITY (BMI 30-39.9): ICD-10-CM

## 2020-06-23 DIAGNOSIS — I50.42 CHRONIC COMBINED SYSTOLIC (CONGESTIVE) AND DIASTOLIC (CONGESTIVE) HEART FAILURE (HCC): ICD-10-CM

## 2020-06-23 DIAGNOSIS — E03.9 HYPOTHYROIDISM, UNSPECIFIED TYPE: ICD-10-CM

## 2020-06-23 DIAGNOSIS — E11.36 TYPE 2 DIABETES MELLITUS WITH DIABETIC CATARACT, WITHOUT LONG-TERM CURRENT USE OF INSULIN (HCC): Primary | ICD-10-CM

## 2020-06-23 DIAGNOSIS — F41.8 DEPRESSION WITH ANXIETY: ICD-10-CM

## 2020-06-23 DIAGNOSIS — I10 HYPERTENSION, UNSPECIFIED TYPE: ICD-10-CM

## 2020-06-23 DIAGNOSIS — E78.5 HYPERLIPIDEMIA, UNSPECIFIED HYPERLIPIDEMIA TYPE: ICD-10-CM

## 2020-06-23 PROCEDURE — 2022F DILAT RTA XM EVC RTNOPTHY: CPT | Performed by: NURSE PRACTITIONER

## 2020-06-23 PROCEDURE — 1160F RVW MEDS BY RX/DR IN RCRD: CPT | Performed by: NURSE PRACTITIONER

## 2020-06-23 PROCEDURE — 3079F DIAST BP 80-89 MM HG: CPT | Performed by: NURSE PRACTITIONER

## 2020-06-23 PROCEDURE — 99214 OFFICE O/P EST MOD 30 MIN: CPT | Performed by: NURSE PRACTITIONER

## 2020-06-23 PROCEDURE — 3008F BODY MASS INDEX DOCD: CPT | Performed by: NURSE PRACTITIONER

## 2020-06-23 PROCEDURE — 3066F NEPHROPATHY DOC TX: CPT | Performed by: NURSE PRACTITIONER

## 2020-06-23 PROCEDURE — 1036F TOBACCO NON-USER: CPT | Performed by: NURSE PRACTITIONER

## 2020-06-23 PROCEDURE — 3075F SYST BP GE 130 - 139MM HG: CPT | Performed by: NURSE PRACTITIONER

## 2020-06-23 PROCEDURE — 3044F HG A1C LEVEL LT 7.0%: CPT | Performed by: NURSE PRACTITIONER

## 2020-06-23 PROCEDURE — 3060F POS MICROALBUMINURIA REV: CPT | Performed by: NURSE PRACTITIONER

## 2020-06-26 ENCOUNTER — TELEPHONE (OUTPATIENT)
Dept: FAMILY MEDICINE CLINIC | Facility: CLINIC | Age: 80
End: 2020-06-26

## 2020-06-26 DIAGNOSIS — M54.30 SCIATICA, UNSPECIFIED LATERALITY: Primary | ICD-10-CM

## 2020-06-26 RX ORDER — PREDNISONE 10 MG/1
TABLET ORAL
Qty: 20 TABLET | Refills: 0 | Status: SHIPPED | OUTPATIENT
Start: 2020-06-26 | End: 2020-10-05

## 2020-06-30 ENCOUNTER — OFFICE VISIT (OUTPATIENT)
Dept: CARDIOLOGY CLINIC | Facility: CLINIC | Age: 80
End: 2020-06-30
Payer: COMMERCIAL

## 2020-06-30 VITALS
HEIGHT: 61 IN | BODY MASS INDEX: 33.23 KG/M2 | HEART RATE: 88 BPM | SYSTOLIC BLOOD PRESSURE: 132 MMHG | WEIGHT: 176 LBS | DIASTOLIC BLOOD PRESSURE: 84 MMHG | TEMPERATURE: 97.5 F | OXYGEN SATURATION: 99 %

## 2020-06-30 DIAGNOSIS — I10 HYPERTENSION, UNSPECIFIED TYPE: ICD-10-CM

## 2020-06-30 DIAGNOSIS — I10 ESSENTIAL HYPERTENSION: ICD-10-CM

## 2020-06-30 DIAGNOSIS — N18.30 STAGE 3 CHRONIC KIDNEY DISEASE (HCC): ICD-10-CM

## 2020-06-30 DIAGNOSIS — I48.91 ATRIAL FIBRILLATION, UNSPECIFIED TYPE (HCC): Primary | ICD-10-CM

## 2020-06-30 DIAGNOSIS — I50.82 BIVENTRICULAR HEART FAILURE (HCC): ICD-10-CM

## 2020-06-30 PROCEDURE — 3075F SYST BP GE 130 - 139MM HG: CPT | Performed by: INTERNAL MEDICINE

## 2020-06-30 PROCEDURE — 3060F POS MICROALBUMINURIA REV: CPT | Performed by: INTERNAL MEDICINE

## 2020-06-30 PROCEDURE — 3008F BODY MASS INDEX DOCD: CPT | Performed by: INTERNAL MEDICINE

## 2020-06-30 PROCEDURE — 1160F RVW MEDS BY RX/DR IN RCRD: CPT | Performed by: INTERNAL MEDICINE

## 2020-06-30 PROCEDURE — 3044F HG A1C LEVEL LT 7.0%: CPT | Performed by: INTERNAL MEDICINE

## 2020-06-30 PROCEDURE — 3079F DIAST BP 80-89 MM HG: CPT | Performed by: INTERNAL MEDICINE

## 2020-06-30 PROCEDURE — 99214 OFFICE O/P EST MOD 30 MIN: CPT | Performed by: INTERNAL MEDICINE

## 2020-06-30 PROCEDURE — 2022F DILAT RTA XM EVC RTNOPTHY: CPT | Performed by: INTERNAL MEDICINE

## 2020-06-30 PROCEDURE — 1036F TOBACCO NON-USER: CPT | Performed by: INTERNAL MEDICINE

## 2020-06-30 PROCEDURE — 3066F NEPHROPATHY DOC TX: CPT | Performed by: INTERNAL MEDICINE

## 2020-07-01 DIAGNOSIS — I50.22 CHRONIC HFREF (HEART FAILURE WITH REDUCED EJECTION FRACTION) (HCC): ICD-10-CM

## 2020-07-01 DIAGNOSIS — I48.91 ATRIAL FIBRILLATION, UNSPECIFIED TYPE (HCC): ICD-10-CM

## 2020-07-01 RX ORDER — METOPROLOL SUCCINATE 50 MG/1
100 TABLET, EXTENDED RELEASE ORAL EVERY 12 HOURS SCHEDULED
Qty: 120 TABLET | Refills: 5 | Status: SHIPPED | OUTPATIENT
Start: 2020-07-01 | End: 2020-12-28

## 2020-07-02 DIAGNOSIS — G25.81 RESTLESS LEG SYNDROME: ICD-10-CM

## 2020-07-05 RX ORDER — ROPINIROLE 2 MG/1
2 TABLET, FILM COATED, EXTENDED RELEASE ORAL
Qty: 30 TABLET | Refills: 5 | Status: SHIPPED | OUTPATIENT
Start: 2020-07-05 | End: 2020-12-21 | Stop reason: SDUPTHER

## 2020-07-07 ENCOUNTER — REMOTE DEVICE CLINIC VISIT (OUTPATIENT)
Dept: CARDIOLOGY CLINIC | Facility: CLINIC | Age: 80
End: 2020-07-07
Payer: COMMERCIAL

## 2020-07-07 DIAGNOSIS — Z95.810 AICD (AUTOMATIC CARDIOVERTER/DEFIBRILLATOR) PRESENT: Primary | ICD-10-CM

## 2020-07-07 PROCEDURE — 93295 DEV INTERROG REMOTE 1/2/MLT: CPT | Performed by: INTERNAL MEDICINE

## 2020-07-07 PROCEDURE — 93296 REM INTERROG EVL PM/IDS: CPT | Performed by: INTERNAL MEDICINE

## 2020-07-07 NOTE — PROGRESS NOTES
Results for orders placed or performed in visit on 07/07/20   Cardiac EP device report    Narrative    MDT-DUAL CHAMBER "HIS" ICD/VVIR MODEACTIVE SYSTEM IS MRI CONDITIONAL  CARELINK TRANSMISSION: BATTERY STATUS "OK"   99% VSRP 0%  ALL AVAILABLE LEAD PARAMETERS WITHIN NORMAL LIMITS  NO SIGNIFICANT HIGH RATE EPISODES  1 VENT SENSING NOTED, MARKERS ONLY  OPTI-VOL WITHIN NORMAL LIMITS  NORMAL DEVICE FUNCTION   NC       Current Outpatient Medications:     apixaban (ELIQUIS) 5 mg, Take 1 tablet (5 mg total) by mouth 2 (two) times a day, Disp: 60 tablet, Rfl: 11    Ascorbic Acid (VITAMIN C) 100 MG tablet, Take 100 mg by mouth daily, Disp: , Rfl:     bumetanide (BUMEX) 2 mg tablet, TAKE 2 TABLETS (4 MG TOTAL) BY MOUTH DAILY, Disp: 180 tablet, Rfl: 0    cholecalciferol (VITAMIN D3) 1,000 units tablet, Take 1,000 Units by mouth daily, Disp: , Rfl:     cyclobenzaprine (FLEXERIL) 5 mg tablet, Take 1 tablet (5 mg total) by mouth 2 (two) times a day, Disp: 60 tablet, Rfl: 0    hydrALAZINE (APRESOLINE) 10 mg tablet, Take 1 tablet (10 mg total) by mouth every 8 (eight) hours, Disp: 90 tablet, Rfl: 5    levothyroxine 137 mcg tablet, TAKE 1 TABLET (137 MCG TOTAL) BY MOUTH DAILY, Disp: 30 tablet, Rfl: 5    MAGNESIUM PO, Take 500 mg by mouth daily, Disp: , Rfl:     metoprolol succinate (TOPROL-XL) 50 mg 24 hr tablet, TAKE 2 TABLETS (100 MG TOTAL) BY MOUTH EVERY 12 (TWELVE) HOURS, Disp: 120 tablet, Rfl: 5    multivitamin (THERAGRAN) TABS, Take 1 tablet by mouth daily, Disp: , Rfl:     potassium chloride (K-DUR,KLOR-CON) 20 mEq tablet, Take 2 tablets (40 mEq total) by mouth 2 (two) times a day, Disp: 120 tablet, Rfl: 5    predniSONE 10 mg tablet, Take 4 tabs for 2 days, 3 tabs for 2 days, 2 tabs for 2 days and 1 tab for 2 days, Disp: 20 tablet, Rfl: 0    rOPINIRole (REQUIP XL) 2 MG 24 hr tablet, TAKE 1 TABLET (2 MG TOTAL) BY MOUTH DAILY AT BEDTIME, Disp: 30 tablet, Rfl: 5    sacubitril-valsartan (Entresto) 24-26 MG TABS, Take 1 tablet by mouth 2 (two) times a day, Disp: 60 tablet, Rfl: 3    venlafaxine (EFFEXOR) 75 mg tablet, TAKE 1 TABLET (75 MG TOTAL) BY MOUTH EVERY 12 (TWELVE) HOURS, Disp: 60 tablet, Rfl: 5

## 2020-07-12 DIAGNOSIS — I50.22 CHRONIC HFREF (HEART FAILURE WITH REDUCED EJECTION FRACTION) (HCC): ICD-10-CM

## 2020-07-12 DIAGNOSIS — I50.9 CHF (CONGESTIVE HEART FAILURE) (HCC): ICD-10-CM

## 2020-07-13 DIAGNOSIS — M79.605 LEFT LEG PAIN: Primary | ICD-10-CM

## 2020-07-13 RX ORDER — BUMETANIDE 2 MG/1
4 TABLET ORAL DAILY
Qty: 180 TABLET | Refills: 0 | Status: SHIPPED | OUTPATIENT
Start: 2020-07-13 | End: 2020-08-16

## 2020-07-14 DIAGNOSIS — M79.606 PAIN OF LOWER EXTREMITY, UNSPECIFIED LATERALITY: ICD-10-CM

## 2020-07-14 RX ORDER — CYCLOBENZAPRINE HCL 5 MG
5 TABLET ORAL 2 TIMES DAILY
Qty: 60 TABLET | Refills: 0 | Status: SHIPPED | OUTPATIENT
Start: 2020-07-14 | End: 2020-10-05

## 2020-07-20 DIAGNOSIS — I48.91 ATRIAL FIBRILLATION WITH RAPID VENTRICULAR RESPONSE (HCC): ICD-10-CM

## 2020-07-21 RX ORDER — APIXABAN 5 MG/1
TABLET, FILM COATED ORAL
Qty: 60 TABLET | Refills: 11 | Status: SHIPPED | OUTPATIENT
Start: 2020-07-21 | End: 2021-07-12 | Stop reason: SDUPTHER

## 2020-08-14 DIAGNOSIS — I50.9 CHF (CONGESTIVE HEART FAILURE) (HCC): ICD-10-CM

## 2020-08-14 DIAGNOSIS — I50.22 CHRONIC HFREF (HEART FAILURE WITH REDUCED EJECTION FRACTION) (HCC): ICD-10-CM

## 2020-08-16 RX ORDER — BUMETANIDE 2 MG/1
4 TABLET ORAL DAILY
Qty: 180 TABLET | Refills: 0 | Status: SHIPPED | OUTPATIENT
Start: 2020-08-16 | End: 2020-12-21 | Stop reason: SDUPTHER

## 2020-08-24 DIAGNOSIS — I50.22 CHRONIC HFREF (HEART FAILURE WITH REDUCED EJECTION FRACTION) (HCC): ICD-10-CM

## 2020-08-24 DIAGNOSIS — I50.9 CHF (CONGESTIVE HEART FAILURE) (HCC): ICD-10-CM

## 2020-08-24 RX ORDER — POTASSIUM CHLORIDE 20 MEQ/1
40 TABLET, EXTENDED RELEASE ORAL 2 TIMES DAILY
Qty: 120 TABLET | Refills: 5 | Status: SHIPPED | OUTPATIENT
Start: 2020-08-24 | End: 2021-03-17 | Stop reason: SDUPTHER

## 2020-09-14 DIAGNOSIS — I50.9 CHF (CONGESTIVE HEART FAILURE) (HCC): ICD-10-CM

## 2020-09-14 DIAGNOSIS — I10 ESSENTIAL HYPERTENSION: ICD-10-CM

## 2020-09-14 DIAGNOSIS — I50.22 CHRONIC HFREF (HEART FAILURE WITH REDUCED EJECTION FRACTION) (HCC): ICD-10-CM

## 2020-09-15 RX ORDER — HYDRALAZINE HYDROCHLORIDE 10 MG/1
10 TABLET, FILM COATED ORAL EVERY 8 HOURS SCHEDULED
Qty: 90 TABLET | Refills: 5 | Status: SHIPPED | OUTPATIENT
Start: 2020-09-15 | End: 2021-03-17 | Stop reason: SDUPTHER

## 2020-09-18 DIAGNOSIS — I50.22 CHRONIC HFREF (HEART FAILURE WITH REDUCED EJECTION FRACTION) (HCC): ICD-10-CM

## 2020-09-18 RX ORDER — SACUBITRIL AND VALSARTAN 24; 26 MG/1; MG/1
1 TABLET, FILM COATED ORAL 2 TIMES DAILY
Qty: 180 TABLET | Refills: 1 | Status: SHIPPED | OUTPATIENT
Start: 2020-09-18 | End: 2021-03-17

## 2020-10-05 ENCOUNTER — OFFICE VISIT (OUTPATIENT)
Dept: FAMILY MEDICINE CLINIC | Facility: CLINIC | Age: 80
End: 2020-10-05
Payer: COMMERCIAL

## 2020-10-05 VITALS
SYSTOLIC BLOOD PRESSURE: 132 MMHG | HEART RATE: 100 BPM | WEIGHT: 191 LBS | RESPIRATION RATE: 16 BRPM | DIASTOLIC BLOOD PRESSURE: 62 MMHG | HEIGHT: 61 IN | BODY MASS INDEX: 36.06 KG/M2 | OXYGEN SATURATION: 98 % | TEMPERATURE: 98.2 F

## 2020-10-05 DIAGNOSIS — E11.36 TYPE 2 DIABETES MELLITUS WITH DIABETIC CATARACT, WITHOUT LONG-TERM CURRENT USE OF INSULIN (HCC): ICD-10-CM

## 2020-10-05 DIAGNOSIS — Z23 ENCOUNTER FOR IMMUNIZATION: ICD-10-CM

## 2020-10-05 DIAGNOSIS — E66.9 OBESITY (BMI 30-39.9): ICD-10-CM

## 2020-10-05 DIAGNOSIS — E78.5 HYPERLIPIDEMIA, UNSPECIFIED HYPERLIPIDEMIA TYPE: ICD-10-CM

## 2020-10-05 DIAGNOSIS — I50.42 CHRONIC COMBINED SYSTOLIC (CONGESTIVE) AND DIASTOLIC (CONGESTIVE) HEART FAILURE (HCC): Primary | ICD-10-CM

## 2020-10-05 DIAGNOSIS — I10 HYPERTENSION, UNSPECIFIED TYPE: ICD-10-CM

## 2020-10-05 DIAGNOSIS — F41.8 DEPRESSION WITH ANXIETY: ICD-10-CM

## 2020-10-05 PROCEDURE — 99214 OFFICE O/P EST MOD 30 MIN: CPT | Performed by: NURSE PRACTITIONER

## 2020-10-06 ENCOUNTER — REMOTE DEVICE CLINIC VISIT (OUTPATIENT)
Dept: CARDIOLOGY CLINIC | Facility: CLINIC | Age: 80
End: 2020-10-06
Payer: COMMERCIAL

## 2020-10-06 DIAGNOSIS — Z95.810 AICD (AUTOMATIC CARDIOVERTER/DEFIBRILLATOR) PRESENT: Primary | ICD-10-CM

## 2020-10-06 PROCEDURE — 93296 REM INTERROG EVL PM/IDS: CPT | Performed by: INTERNAL MEDICINE

## 2020-10-06 PROCEDURE — 93295 DEV INTERROG REMOTE 1/2/MLT: CPT | Performed by: INTERNAL MEDICINE

## 2020-10-07 ENCOUNTER — EVALUATION (OUTPATIENT)
Dept: PHYSICAL THERAPY | Facility: REHABILITATION | Age: 80
End: 2020-10-07
Payer: COMMERCIAL

## 2020-10-07 ENCOUNTER — OFFICE VISIT (OUTPATIENT)
Dept: CARDIOLOGY CLINIC | Facility: CLINIC | Age: 80
End: 2020-10-07
Payer: COMMERCIAL

## 2020-10-07 VITALS
OXYGEN SATURATION: 98 % | TEMPERATURE: 97.7 F | HEIGHT: 61 IN | BODY MASS INDEX: 36.15 KG/M2 | WEIGHT: 191.5 LBS | HEART RATE: 90 BPM | DIASTOLIC BLOOD PRESSURE: 60 MMHG | SYSTOLIC BLOOD PRESSURE: 140 MMHG

## 2020-10-07 DIAGNOSIS — I50.20 SYSTOLIC CONGESTIVE HEART FAILURE, UNSPECIFIED HF CHRONICITY (HCC): Primary | ICD-10-CM

## 2020-10-07 DIAGNOSIS — M70.61 TROCHANTERIC BURSITIS OF RIGHT HIP: Primary | ICD-10-CM

## 2020-10-07 PROCEDURE — 1160F RVW MEDS BY RX/DR IN RCRD: CPT | Performed by: NURSE PRACTITIONER

## 2020-10-07 PROCEDURE — 1036F TOBACCO NON-USER: CPT | Performed by: NURSE PRACTITIONER

## 2020-10-07 PROCEDURE — 97162 PT EVAL MOD COMPLEX 30 MIN: CPT | Performed by: PHYSICAL THERAPIST

## 2020-10-07 PROCEDURE — 3077F SYST BP >= 140 MM HG: CPT | Performed by: NURSE PRACTITIONER

## 2020-10-07 PROCEDURE — 97112 NEUROMUSCULAR REEDUCATION: CPT | Performed by: PHYSICAL THERAPIST

## 2020-10-07 PROCEDURE — 3078F DIAST BP <80 MM HG: CPT | Performed by: NURSE PRACTITIONER

## 2020-10-07 PROCEDURE — 99215 OFFICE O/P EST HI 40 MIN: CPT | Performed by: NURSE PRACTITIONER

## 2020-10-08 ENCOUNTER — TRANSCRIBE ORDERS (OUTPATIENT)
Dept: PHYSICAL THERAPY | Facility: REHABILITATION | Age: 80
End: 2020-10-08

## 2020-10-08 DIAGNOSIS — M70.61 TROCHANTERIC BURSITIS OF RIGHT HIP: Primary | ICD-10-CM

## 2020-10-14 ENCOUNTER — APPOINTMENT (OUTPATIENT)
Dept: PHYSICAL THERAPY | Facility: REHABILITATION | Age: 80
End: 2020-10-14
Payer: COMMERCIAL

## 2020-10-17 DIAGNOSIS — F41.8 DEPRESSION WITH ANXIETY: ICD-10-CM

## 2020-10-17 DIAGNOSIS — E03.9 HYPOTHYROIDISM, UNSPECIFIED TYPE: ICD-10-CM

## 2020-10-19 ENCOUNTER — OFFICE VISIT (OUTPATIENT)
Dept: PHYSICAL THERAPY | Facility: REHABILITATION | Age: 80
End: 2020-10-19
Payer: COMMERCIAL

## 2020-10-19 DIAGNOSIS — M70.61 TROCHANTERIC BURSITIS OF RIGHT HIP: Primary | ICD-10-CM

## 2020-10-19 PROCEDURE — 97140 MANUAL THERAPY 1/> REGIONS: CPT | Performed by: PHYSICAL THERAPIST

## 2020-10-19 PROCEDURE — 97112 NEUROMUSCULAR REEDUCATION: CPT | Performed by: PHYSICAL THERAPIST

## 2020-10-19 RX ORDER — VENLAFAXINE 75 MG/1
75 TABLET ORAL EVERY 12 HOURS
Qty: 60 TABLET | Refills: 5 | Status: SHIPPED | OUTPATIENT
Start: 2020-10-19 | End: 2021-04-14

## 2020-10-19 RX ORDER — LEVOTHYROXINE SODIUM 137 UG/1
137 TABLET ORAL DAILY
Qty: 30 TABLET | Refills: 5 | Status: SHIPPED | OUTPATIENT
Start: 2020-10-19 | End: 2021-01-15

## 2020-10-21 ENCOUNTER — TRANSCRIBE ORDERS (OUTPATIENT)
Dept: PHYSICAL THERAPY | Facility: REHABILITATION | Age: 80
End: 2020-10-21

## 2020-10-21 ENCOUNTER — APPOINTMENT (OUTPATIENT)
Dept: PHYSICAL THERAPY | Facility: REHABILITATION | Age: 80
End: 2020-10-21
Payer: COMMERCIAL

## 2020-10-21 DIAGNOSIS — M70.61 TROCHANTERIC BURSITIS OF RIGHT HIP: Primary | ICD-10-CM

## 2020-10-26 ENCOUNTER — APPOINTMENT (OUTPATIENT)
Dept: PHYSICAL THERAPY | Facility: REHABILITATION | Age: 80
End: 2020-10-26
Payer: COMMERCIAL

## 2020-10-28 ENCOUNTER — APPOINTMENT (OUTPATIENT)
Dept: PHYSICAL THERAPY | Facility: REHABILITATION | Age: 80
End: 2020-10-28
Payer: COMMERCIAL

## 2020-11-20 ENCOUNTER — HOSPITAL ENCOUNTER (OUTPATIENT)
Dept: NON INVASIVE DIAGNOSTICS | Facility: CLINIC | Age: 80
Discharge: HOME | End: 2020-11-20
Payer: COMMERCIAL

## 2020-11-20 DIAGNOSIS — N18.30 STAGE 3 CHRONIC KIDNEY DISEASE (HCC): ICD-10-CM

## 2020-11-20 DIAGNOSIS — I48.91 ATRIAL FIBRILLATION, UNSPECIFIED TYPE (HCC): ICD-10-CM

## 2020-11-20 DIAGNOSIS — I50.82 BIVENTRICULAR HEART FAILURE (HCC): ICD-10-CM

## 2020-11-20 DIAGNOSIS — I10 HYPERTENSION, UNSPECIFIED TYPE: ICD-10-CM

## 2020-11-20 DIAGNOSIS — I10 ESSENTIAL HYPERTENSION: ICD-10-CM

## 2020-11-20 PROCEDURE — 93306 TTE W/DOPPLER COMPLETE: CPT | Performed by: INTERNAL MEDICINE

## 2020-11-20 PROCEDURE — C8929 TTE W OR WO FOL WCON,DOPPLER: HCPCS

## 2020-11-20 RX ADMIN — PERFLUTREN 0.6 ML/MIN: 6.52 INJECTION, SUSPENSION INTRAVENOUS at 15:17

## 2020-11-23 ENCOUNTER — OFFICE VISIT (OUTPATIENT)
Dept: FAMILY MEDICINE CLINIC | Facility: CLINIC | Age: 80
End: 2020-11-23
Payer: COMMERCIAL

## 2020-11-23 VITALS
RESPIRATION RATE: 16 BRPM | HEART RATE: 92 BPM | TEMPERATURE: 98.7 F | WEIGHT: 195 LBS | SYSTOLIC BLOOD PRESSURE: 140 MMHG | DIASTOLIC BLOOD PRESSURE: 70 MMHG | BODY MASS INDEX: 36.82 KG/M2 | HEIGHT: 61 IN

## 2020-11-23 DIAGNOSIS — I50.42 CHRONIC COMBINED SYSTOLIC (CONGESTIVE) AND DIASTOLIC (CONGESTIVE) HEART FAILURE (HCC): ICD-10-CM

## 2020-11-23 DIAGNOSIS — E66.9 OBESITY (BMI 30-39.9): ICD-10-CM

## 2020-11-23 DIAGNOSIS — E03.9 HYPOTHYROIDISM, UNSPECIFIED TYPE: ICD-10-CM

## 2020-11-23 DIAGNOSIS — E11.36 TYPE 2 DIABETES MELLITUS WITH DIABETIC CATARACT, WITHOUT LONG-TERM CURRENT USE OF INSULIN (HCC): Primary | ICD-10-CM

## 2020-11-23 DIAGNOSIS — E78.5 HYPERLIPIDEMIA, UNSPECIFIED HYPERLIPIDEMIA TYPE: ICD-10-CM

## 2020-11-23 DIAGNOSIS — F41.8 DEPRESSION WITH ANXIETY: ICD-10-CM

## 2020-11-23 DIAGNOSIS — G25.81 RESTLESS LEG SYNDROME: ICD-10-CM

## 2020-11-23 DIAGNOSIS — R26.2 AMBULATORY DYSFUNCTION: ICD-10-CM

## 2020-11-23 PROCEDURE — 1036F TOBACCO NON-USER: CPT | Performed by: NURSE PRACTITIONER

## 2020-11-23 PROCEDURE — T1015 CLINIC SERVICE: HCPCS | Performed by: NURSE PRACTITIONER

## 2020-11-23 PROCEDURE — 3077F SYST BP >= 140 MM HG: CPT | Performed by: NURSE PRACTITIONER

## 2020-11-23 PROCEDURE — 3078F DIAST BP <80 MM HG: CPT | Performed by: NURSE PRACTITIONER

## 2020-11-23 PROCEDURE — 1160F RVW MEDS BY RX/DR IN RCRD: CPT | Performed by: NURSE PRACTITIONER

## 2020-12-09 ENCOUNTER — OFFICE VISIT (OUTPATIENT)
Dept: CARDIOLOGY CLINIC | Facility: CLINIC | Age: 80
End: 2020-12-09
Payer: COMMERCIAL

## 2020-12-09 VITALS
BODY MASS INDEX: 36.35 KG/M2 | WEIGHT: 192.5 LBS | HEIGHT: 61 IN | OXYGEN SATURATION: 99 % | HEART RATE: 83 BPM | SYSTOLIC BLOOD PRESSURE: 120 MMHG | DIASTOLIC BLOOD PRESSURE: 52 MMHG

## 2020-12-09 DIAGNOSIS — I48.19 PERSISTENT ATRIAL FIBRILLATION (HCC): ICD-10-CM

## 2020-12-09 DIAGNOSIS — Z99.89 OSA ON CPAP: ICD-10-CM

## 2020-12-09 DIAGNOSIS — I27.21 PULMONARY ARTERIAL HYPERTENSION (HCC): ICD-10-CM

## 2020-12-09 DIAGNOSIS — I50.42 CHRONIC COMBINED SYSTOLIC (CONGESTIVE) AND DIASTOLIC (CONGESTIVE) HEART FAILURE (HCC): Primary | ICD-10-CM

## 2020-12-09 DIAGNOSIS — I10 HTN (HYPERTENSION), BENIGN: ICD-10-CM

## 2020-12-09 DIAGNOSIS — E78.00 PURE HYPERCHOLESTEROLEMIA: ICD-10-CM

## 2020-12-09 DIAGNOSIS — G47.33 OSA ON CPAP: ICD-10-CM

## 2020-12-09 PROCEDURE — 1036F TOBACCO NON-USER: CPT | Performed by: INTERNAL MEDICINE

## 2020-12-09 PROCEDURE — 1160F RVW MEDS BY RX/DR IN RCRD: CPT | Performed by: INTERNAL MEDICINE

## 2020-12-09 PROCEDURE — 3074F SYST BP LT 130 MM HG: CPT | Performed by: INTERNAL MEDICINE

## 2020-12-09 PROCEDURE — 99214 OFFICE O/P EST MOD 30 MIN: CPT | Performed by: INTERNAL MEDICINE

## 2020-12-09 PROCEDURE — 3078F DIAST BP <80 MM HG: CPT | Performed by: INTERNAL MEDICINE

## 2020-12-17 ENCOUNTER — VBI (OUTPATIENT)
Dept: ADMINISTRATIVE | Facility: OTHER | Age: 80
End: 2020-12-17

## 2020-12-19 DIAGNOSIS — I48.91 ATRIAL FIBRILLATION, UNSPECIFIED TYPE (HCC): ICD-10-CM

## 2020-12-19 DIAGNOSIS — I50.22 CHRONIC HFREF (HEART FAILURE WITH REDUCED EJECTION FRACTION) (HCC): ICD-10-CM

## 2020-12-19 DIAGNOSIS — I50.9 CHF (CONGESTIVE HEART FAILURE) (HCC): ICD-10-CM

## 2020-12-19 DIAGNOSIS — G25.81 RESTLESS LEG SYNDROME: ICD-10-CM

## 2020-12-21 RX ORDER — BUMETANIDE 2 MG/1
4 TABLET ORAL DAILY
Qty: 180 TABLET | Refills: 0 | Status: SHIPPED | OUTPATIENT
Start: 2020-12-21 | End: 2021-04-14

## 2020-12-21 RX ORDER — ROPINIROLE 2 MG/1
2 TABLET, FILM COATED, EXTENDED RELEASE ORAL
Qty: 30 TABLET | Refills: 5 | Status: SHIPPED | OUTPATIENT
Start: 2020-12-21 | End: 2021-07-12

## 2020-12-28 RX ORDER — METOPROLOL SUCCINATE 50 MG/1
100 TABLET, EXTENDED RELEASE ORAL EVERY 12 HOURS SCHEDULED
Qty: 120 TABLET | Refills: 5 | Status: SHIPPED | OUTPATIENT
Start: 2020-12-28 | End: 2021-07-16

## 2021-01-05 ENCOUNTER — REMOTE DEVICE CLINIC VISIT (OUTPATIENT)
Dept: CARDIOLOGY CLINIC | Facility: CLINIC | Age: 81
End: 2021-01-05
Payer: COMMERCIAL

## 2021-01-05 DIAGNOSIS — Z95.810 PRESENCE OF AUTOMATIC CARDIOVERTER/DEFIBRILLATOR (AICD): Primary | ICD-10-CM

## 2021-01-05 PROCEDURE — 93295 DEV INTERROG REMOTE 1/2/MLT: CPT | Performed by: INTERNAL MEDICINE

## 2021-01-05 PROCEDURE — 93296 REM INTERROG EVL PM/IDS: CPT | Performed by: INTERNAL MEDICINE

## 2021-01-05 NOTE — PROGRESS NOTES
Results for orders placed or performed in visit on 01/05/21   Cardiac EP device report    Narrative    MDT-DUAL CHAMBER "HIS" ICD/VVIR MODEACTIVE SYSTEM IS MRI CONDITIONAL  CARELINK TRANSMISSION: BATTERY VOLTAGE ADEQUATE (7 4 YRS)  AP: 0%  : 99 9% + VSRP: <0 1%  ALL AVAILABLE LEAD PARAMETERS WITHIN NORMAL LIMITS  3 VT-NS MONITORED EPISODES W/ EGRMS SHOWING NSVT 7 BEATS @ 186 BPM, 5 BEATS @ 176 BPM, 6 BEATS @ 162 BPM   PT TAKES METOPROLOL SUCC, ELIQUIS  EF: 60% (ECHO 11/20/20)  OPTI-VOL WITHIN NORMAL LIMITS  APPROPRIATELY FUNCTIONING BI-V ICD    [address]

## 2021-01-14 ENCOUNTER — TRANSCRIBE ORDERS (OUTPATIENT)
Dept: LAB | Facility: CLINIC | Age: 81
End: 2021-01-14

## 2021-01-14 ENCOUNTER — LAB (OUTPATIENT)
Dept: LAB | Facility: CLINIC | Age: 81
End: 2021-01-14
Payer: COMMERCIAL

## 2021-01-14 DIAGNOSIS — F41.8 DEPRESSION WITH ANXIETY: ICD-10-CM

## 2021-01-14 DIAGNOSIS — I10 HYPERTENSION, UNSPECIFIED TYPE: ICD-10-CM

## 2021-01-14 DIAGNOSIS — I50.42 CHRONIC COMBINED SYSTOLIC (CONGESTIVE) AND DIASTOLIC (CONGESTIVE) HEART FAILURE (HCC): ICD-10-CM

## 2021-01-14 DIAGNOSIS — E03.9 HYPOTHYROIDISM, UNSPECIFIED TYPE: ICD-10-CM

## 2021-01-14 DIAGNOSIS — I50.20 SYSTOLIC CONGESTIVE HEART FAILURE, UNSPECIFIED HF CHRONICITY (HCC): ICD-10-CM

## 2021-01-14 DIAGNOSIS — E78.5 HYPERLIPIDEMIA, UNSPECIFIED HYPERLIPIDEMIA TYPE: ICD-10-CM

## 2021-01-14 DIAGNOSIS — E11.36 TYPE 2 DIABETES MELLITUS WITH DIABETIC CATARACT, WITHOUT LONG-TERM CURRENT USE OF INSULIN (HCC): ICD-10-CM

## 2021-01-14 DIAGNOSIS — E66.9 OBESITY (BMI 30-39.9): ICD-10-CM

## 2021-01-14 LAB
ALBUMIN SERPL BCP-MCNC: 3.4 G/DL (ref 3.5–5)
ALP SERPL-CCNC: 68 U/L (ref 46–116)
ALT SERPL W P-5'-P-CCNC: 24 U/L (ref 12–78)
ANION GAP SERPL CALCULATED.3IONS-SCNC: 0 MMOL/L (ref 4–13)
AST SERPL W P-5'-P-CCNC: 21 U/L (ref 5–45)
BASOPHILS # BLD AUTO: 0.04 THOUSANDS/ΜL (ref 0–0.1)
BASOPHILS NFR BLD AUTO: 1 % (ref 0–1)
BILIRUB SERPL-MCNC: 0.35 MG/DL (ref 0.2–1)
BUN SERPL-MCNC: 17 MG/DL (ref 5–25)
CALCIUM ALBUM COR SERPL-MCNC: 10.3 MG/DL (ref 8.3–10.1)
CALCIUM SERPL-MCNC: 9.8 MG/DL (ref 8.3–10.1)
CHLORIDE SERPL-SCNC: 106 MMOL/L (ref 100–108)
CO2 SERPL-SCNC: 31 MMOL/L (ref 21–32)
CREAT SERPL-MCNC: 0.82 MG/DL (ref 0.6–1.3)
EOSINOPHIL # BLD AUTO: 0.16 THOUSAND/ΜL (ref 0–0.61)
EOSINOPHIL NFR BLD AUTO: 2 % (ref 0–6)
ERYTHROCYTE [DISTWIDTH] IN BLOOD BY AUTOMATED COUNT: 13 % (ref 11.6–15.1)
EST. AVERAGE GLUCOSE BLD GHB EST-MCNC: 146 MG/DL
GFR SERPL CREATININE-BSD FRML MDRD: 68 ML/MIN/1.73SQ M
GLUCOSE P FAST SERPL-MCNC: 87 MG/DL (ref 65–99)
HBA1C MFR BLD: 6.7 %
HCT VFR BLD AUTO: 39.7 % (ref 34.8–46.1)
HGB BLD-MCNC: 12.8 G/DL (ref 11.5–15.4)
IMM GRANULOCYTES # BLD AUTO: 0.03 THOUSAND/UL (ref 0–0.2)
IMM GRANULOCYTES NFR BLD AUTO: 0 % (ref 0–2)
LYMPHOCYTES # BLD AUTO: 2.37 THOUSANDS/ΜL (ref 0.6–4.47)
LYMPHOCYTES NFR BLD AUTO: 34 % (ref 14–44)
MCH RBC QN AUTO: 31.7 PG (ref 26.8–34.3)
MCHC RBC AUTO-ENTMCNC: 32.2 G/DL (ref 31.4–37.4)
MCV RBC AUTO: 98 FL (ref 82–98)
MONOCYTES # BLD AUTO: 0.57 THOUSAND/ΜL (ref 0.17–1.22)
MONOCYTES NFR BLD AUTO: 8 % (ref 4–12)
NEUTROPHILS # BLD AUTO: 3.88 THOUSANDS/ΜL (ref 1.85–7.62)
NEUTS SEG NFR BLD AUTO: 55 % (ref 43–75)
NRBC BLD AUTO-RTO: 0 /100 WBCS
NT-PROBNP SERPL-MCNC: 716 PG/ML
PLATELET # BLD AUTO: 265 THOUSANDS/UL (ref 149–390)
PMV BLD AUTO: 10.1 FL (ref 8.9–12.7)
POTASSIUM SERPL-SCNC: 4.1 MMOL/L (ref 3.5–5.3)
PROT SERPL-MCNC: 7.7 G/DL (ref 6.4–8.2)
RBC # BLD AUTO: 4.04 MILLION/UL (ref 3.81–5.12)
SODIUM SERPL-SCNC: 137 MMOL/L (ref 136–145)
T4 FREE SERPL-MCNC: 1.17 NG/DL (ref 0.76–1.46)
TSH SERPL DL<=0.05 MIU/L-ACNC: 0.01 UIU/ML (ref 0.36–3.74)
WBC # BLD AUTO: 7.05 THOUSAND/UL (ref 4.31–10.16)

## 2021-01-14 PROCEDURE — 85025 COMPLETE CBC W/AUTO DIFF WBC: CPT

## 2021-01-14 PROCEDURE — 83880 ASSAY OF NATRIURETIC PEPTIDE: CPT

## 2021-01-14 PROCEDURE — 80053 COMPREHEN METABOLIC PANEL: CPT

## 2021-01-14 PROCEDURE — 83036 HEMOGLOBIN GLYCOSYLATED A1C: CPT

## 2021-01-14 PROCEDURE — 84443 ASSAY THYROID STIM HORMONE: CPT

## 2021-01-14 PROCEDURE — 84439 ASSAY OF FREE THYROXINE: CPT

## 2021-01-14 PROCEDURE — 36415 COLL VENOUS BLD VENIPUNCTURE: CPT

## 2021-01-15 DIAGNOSIS — E03.9 HYPOTHYROIDISM, UNSPECIFIED TYPE: Primary | ICD-10-CM

## 2021-01-15 RX ORDER — LEVOTHYROXINE SODIUM 112 UG/1
112 TABLET ORAL
Qty: 30 TABLET | Refills: 5 | Status: SHIPPED | OUTPATIENT
Start: 2021-01-15 | End: 2021-07-12

## 2021-01-24 ENCOUNTER — IMMUNIZATIONS (OUTPATIENT)
Dept: FAMILY MEDICINE CLINIC | Facility: HOSPITAL | Age: 81
End: 2021-01-24

## 2021-01-24 DIAGNOSIS — Z23 ENCOUNTER FOR IMMUNIZATION: Primary | ICD-10-CM

## 2021-01-24 PROCEDURE — 0001A SARS-COV-2 / COVID-19 MRNA VACCINE (PFIZER-BIONTECH) 30 MCG: CPT

## 2021-01-24 PROCEDURE — 91300 SARS-COV-2 / COVID-19 MRNA VACCINE (PFIZER-BIONTECH) 30 MCG: CPT

## 2021-02-10 NOTE — ASSESSMENT & PLAN NOTE
· Continue losartan, hold HCTZ for now  · Blood pressure monitoring per protocol, avoid hypotension yes

## 2021-02-13 ENCOUNTER — IMMUNIZATIONS (OUTPATIENT)
Dept: FAMILY MEDICINE CLINIC | Facility: HOSPITAL | Age: 81
End: 2021-02-13

## 2021-02-13 DIAGNOSIS — Z23 ENCOUNTER FOR IMMUNIZATION: Primary | ICD-10-CM

## 2021-02-13 PROCEDURE — 0002A SARS-COV-2 / COVID-19 MRNA VACCINE (PFIZER-BIONTECH) 30 MCG: CPT

## 2021-02-13 PROCEDURE — 91300 SARS-COV-2 / COVID-19 MRNA VACCINE (PFIZER-BIONTECH) 30 MCG: CPT

## 2021-02-17 ENCOUNTER — TELEPHONE (OUTPATIENT)
Dept: FAMILY MEDICINE CLINIC | Facility: CLINIC | Age: 81
End: 2021-02-17

## 2021-02-17 NOTE — TELEPHONE ENCOUNTER
Pt states that she has a level 10 pain on her leg and would like to know if there is any medication that can be called into the pharmacy for her   Please advise

## 2021-02-18 ENCOUNTER — TELEMEDICINE (OUTPATIENT)
Dept: FAMILY MEDICINE CLINIC | Facility: CLINIC | Age: 81
End: 2021-02-18
Payer: COMMERCIAL

## 2021-02-18 DIAGNOSIS — M54.41 ACUTE RIGHT-SIDED LOW BACK PAIN WITH RIGHT-SIDED SCIATICA: Primary | ICD-10-CM

## 2021-02-18 PROCEDURE — 99213 OFFICE O/P EST LOW 20 MIN: CPT | Performed by: NURSE PRACTITIONER

## 2021-02-18 RX ORDER — TRAMADOL HYDROCHLORIDE 50 MG/1
50 TABLET ORAL
Qty: 15 TABLET | Refills: 0 | Status: SHIPPED | OUTPATIENT
Start: 2021-02-18 | End: 2021-02-22 | Stop reason: SDUPTHER

## 2021-02-18 RX ORDER — METHYLPREDNISOLONE 4 MG/1
TABLET ORAL
Qty: 21 EACH | Refills: 0 | Status: SHIPPED | OUTPATIENT
Start: 2021-02-18 | End: 2021-07-19

## 2021-02-18 NOTE — TELEPHONE ENCOUNTER
This is new pain, pt believes that it is sciatica nerve pain in right leg pain  Pain is in thigh, butt and into lower back   Patient scheduled for this afternoon

## 2021-02-18 NOTE — PROGRESS NOTES
Virtual Brief Visit    Assessment/Plan:    Problem List Items Addressed This Visit     None      Visit Diagnoses     Acute right-sided low back pain with right-sided sciatica    -  Primary    Relevant Medications    methylPREDNISolone 4 MG tablet therapy pack    traMADol (ULTRAM) 50 mg tablet        Acute right lower back pain with sciatica- to start Medrol dose clarence  Side effects reviewed today, take with food  May take Tramadol 50 mg one tab at HS prn for significant pain (she rates pain 10/10 today)  Side effects also reviewed  Moist heat to area with gentle stretching  May continue OTC Tylenol prn, max of 3g/24 hours  If no improvement, consider PT again         Reason for visit is No chief complaint on file  Encounter provider SOTO Cruz    Provider located at [address] [place] [id number]    Recent Visits  Date Type Provider Dept   02/17/21 Telephone S[facility]   Showing recent visits within past 7 days and meeting all other requirements     Future Appointments  No visits were found meeting these conditions  Showing future appointments within next 150 days and meeting all other requirements        After connecting through telephone, the patient was identified by name and date of birth  Michael Hassan was informed that this is a telemedicine visit and that the visit is being conducted through telephone  My office door was closed  No one else was in the room  She acknowledged consent and understanding of privacy and security of the platform  The patient has agreed to participate and understands she can discontinue the visit at any time  Patient is aware this is a billable service  Subjective    Michael Hassan is a [de-identified] y o  female    HPI     Pt presents via telephone today for an acute visit  C/o right lower back pain which radiates into her right buttock and hip for the past 7 days   She lifted a heavy object one week ago and noticed the pain following this  Increased pain with movement and prolonged sitting  No numbness or tingling down her leg  She has had this issue in the past   She was relatively recently following with Ortho for hip bursitis but her current pain feels different than with the bursitis  Denies leg weakness/ numbness or tingling  No improvement with OTC Tylenol or a heating pad    She is on Eliquis for A fib       Past Medical History:   Diagnosis Date    Cardiogenic shock ([address] ) 6/26/2019    CHF (congestive heart failure) (HCC)     Eczema     Photosensitivity     abnormal skin sensitivity to sunlight    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HCC)     staging       Past Surgical History:   Procedure Laterality Date    CATARACT EXTRACTION Left     HEMORRHOID SURGERY      IR NON-TUNNELED CENTRAL LINE PLACEMENT  7/9/2019    OOPHORECTOMY      unilateral    TOTAL ABDOMINAL HYSTERECTOMY         Current Outpatient Medications   Medication Sig Dispense Refill    Ascorbic Acid (vitamin C) 1000 MG tablet Take 1,000 mg by mouth 2 (two) times a day       bumetanide (BUMEX) 2 mg tablet TAKE 2 TABLETS (4 MG TOTAL) BY MOUTH DAILY 180 tablet 0    cholecalciferol (VITAMIN D3) 1,000 units tablet Take 1,000 Units by mouth daily 8,000 dailty      ELIQUIS 5 MG TAKE 1 TABLET (5 MG TOTAL) BY MOUTH 2 (TWO) TIMES A DAY 60 tablet 11    hydrALAZINE (APRESOLINE) 10 mg tablet TAKE 1 TABLET (10 MG TOTAL) BY MOUTH EVERY 8 (EIGHT) HOURS 90 tablet 5    levothyroxine 112 mcg tablet Take 1 tablet (112 mcg total) by mouth daily in the early morning 30 tablet 5    MAGNESIUM PO Take 1,000 mg by mouth daily       methylPREDNISolone 4 MG tablet therapy pack Use as directed on package 21 each 0    metoprolol succinate (TOPROL-XL) 50 mg 24 hr tablet Take 2 tablets (100 mg total) by mouth every 12 (twelve) hours 120 tablet 5    potassium chloride (K-DUR,KLOR-CON) 20 mEq tablet TAKE 2 TABLETS (40 MEQ TOTAL) BY MOUTH 2 (TWO) TIMES A  tablet 5    rOPINIRole (REQUIP XL) 2 MG 24 hr tablet TAKE 1 TABLET (2 MG TOTAL) BY MOUTH DAILY AT BEDTIME 30 tablet 5    sacubitril-valsartan (Entresto) 24-26 MG TABS Take 1 tablet by mouth 2 (two) times a day 180 tablet 1    traMADol (ULTRAM) 50 mg tablet Take 1 tablet (50 mg total) by mouth daily at bedtime as needed for moderate pain 15 tablet 0    venlafaxine (EFFEXOR) 75 mg tablet TAKE 1 TABLET (75 MG TOTAL) BY MOUTH EVERY 12 (TWELVE) HOURS 60 tablet 5    vitamin A 3 MG (83875 UT) capsule Take 10,000 Units by mouth daily       No current facility-administered medications for this visit  Allergies   Allergen Reactions    Penicillins     Wellbutrin Sr  [Bupropion]      Other reaction(s): Suicidal ideations  Category: Adverse Reaction; Review of Systems   Constitutional: Negative for chills, fatigue and fever  Respiratory: Negative for cough, shortness of breath and wheezing  Cardiovascular: Negative for chest pain, palpitations and leg swelling  Gastrointestinal: Negative for abdominal pain, blood in stool, constipation, diarrhea and nausea  Genitourinary: Negative for dysuria  Musculoskeletal: Positive for arthralgias and back pain  Negative for myalgias  Skin: Negative for rash and wound  Neurological: Negative for dizziness, weakness, numbness and headaches  Psychiatric/Behavioral: Negative for sleep disturbance and suicidal ideas  There were no vitals filed for this visit  I spent 15 minutes directly with the patient during this visit    VIRTUAL VISIT DISCLAIMER    Dino Gonzaelz acknowledges that she has consented to an online visit or consultation  She understands that the online visit is based solely on information provided by her, and that, in the absence of a face-to-face physical evaluation by the physician, the diagnosis she receives is both limited and provisional in terms of accuracy and completeness   This is not intended to replace a full medical face-to-face evaluation by the physician  Belen Ray understands and accepts these terms

## 2021-02-22 ENCOUNTER — TELEMEDICINE (OUTPATIENT)
Dept: FAMILY MEDICINE CLINIC | Facility: CLINIC | Age: 81
End: 2021-02-22
Payer: COMMERCIAL

## 2021-02-22 DIAGNOSIS — M54.41 ACUTE RIGHT-SIDED LOW BACK PAIN WITH RIGHT-SIDED SCIATICA: ICD-10-CM

## 2021-02-22 DIAGNOSIS — M70.71 BURSITIS OF RIGHT HIP, UNSPECIFIED BURSA: Primary | ICD-10-CM

## 2021-02-22 PROCEDURE — 99213 OFFICE O/P EST LOW 20 MIN: CPT | Performed by: NURSE PRACTITIONER

## 2021-02-22 RX ORDER — PREDNISONE 10 MG/1
TABLET ORAL
Qty: 20 TABLET | Refills: 0 | Status: SHIPPED | OUTPATIENT
Start: 2021-02-22 | End: 2021-07-19

## 2021-02-22 RX ORDER — TRAMADOL HYDROCHLORIDE 50 MG/1
50 TABLET ORAL EVERY 8 HOURS PRN
Qty: 30 TABLET | Refills: 0 | Status: SHIPPED | OUTPATIENT
Start: 2021-02-22 | End: 2021-03-17 | Stop reason: SDUPTHER

## 2021-02-22 NOTE — PROGRESS NOTES
Virtual Regular Visit      Assessment/Plan:    Problem List Items Addressed This Visit     None      Visit Diagnoses     Bursitis of right hip, unspecified bursa    -  Primary    Relevant Medications    predniSONE 10 mg tablet    Acute right-sided low back pain with right-sided sciatica        Relevant Medications    traMADol (ULTRAM) 50 mg tablet        Right hip bursitis- I agreed to refill her Tramadol until she can be evaluated by Ortho  Side effects reviewed  If pain is mild, take OTC Tylenol, max of 3g/24 hours  I also extended her PO Prednisone taper  Ice to hip  If she needs any assistance with scheduling an Ortho evaluation, she knows to call our office        Reason for visit is No chief complaint on file  Encounter provider SOTO Jack    Provider located at [address] [place] [phone]      Recent Visits  Date Type Provider Dept   02/18/21 Telemedicine Toni Darnell [facility]   02/17/21 Telephone yuri [facility]   Showing recent visits within past 7 days and meeting all other requirements     Future Appointments  No visits were found meeting these conditions  Showing future appointments within next 150 days and meeting all other requirements        The patient was identified by name and date of birth  Deonte Noble was informed that this is a telemedicine visit and that the visit is being conducted through [address] [place] [address] and patient was informed that this is not a secure, HIPAA-compliant platform  She agrees to proceed     My office door was closed  No one else was in the room  She acknowledged consent and understanding of privacy and security of the video platform  The patient has agreed to participate and understands they can discontinue the visit at any time  Patient is aware this is a billable service       Subjective  Deonte Noble is a [de-identified] y o  female Jose Daniel Rhodes HPI     Pt presents virtually today for an acute concern  I virtually saw her last week on 2/18 for right lower back pain with sciatica  She was started on a Medrol dose clarence and Tramadol  She notes some improvement in her sciatica but she is now having a flare up in her right hip bursitis  She typically sees Ortho for this and has received injections in the past   She did have an appt originally scheduled for tomorrow but cancelled this  She is having to use her walker with ambulation  Only taking Tramadol in the evening and OTC Tylenol during the day  Pain is worse during the day now  She has one day left of the Medrol dose clarence  No leg swelling, weakness or numbness      Past Medical History:   Diagnosis Date    Cardiogenic shock ([facility] [address] ) 6/26/2019    CHF (congestive heart failure) (HCC)     Eczema     Photosensitivity     abnormal skin sensitivity to sunlight    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HCC)     staging       Past Surgical History:   Procedure Laterality Date    CATARACT EXTRACTION Left     HEMORRHOID SURGERY      IR NON-TUNNELED CENTRAL LINE PLACEMENT  7/9/2019    OOPHORECTOMY      unilateral    TOTAL ABDOMINAL HYSTERECTOMY         Current Outpatient Medications   Medication Sig Dispense Refill    Ascorbic Acid (vitamin C) 1000 MG tablet Take 1,000 mg by mouth 2 (two) times a day       bumetanide (BUMEX) 2 mg tablet TAKE 2 TABLETS (4 MG TOTAL) BY MOUTH DAILY 180 tablet 0    cholecalciferol (VITAMIN D3) 1,000 units tablet Take 1,000 Units by mouth daily 8,000 dailty      ELIQUIS 5 MG TAKE 1 TABLET (5 MG TOTAL) BY MOUTH 2 (TWO) TIMES A DAY 60 tablet 11    hydrALAZINE (APRESOLINE) 10 mg tablet TAKE 1 TABLET (10 MG TOTAL) BY MOUTH EVERY 8 (EIGHT) HOURS 90 tablet 5    levothyroxine 112 mcg tablet Take 1 tablet (112 mcg total) by mouth daily in the early morning 30 tablet 5    MAGNESIUM PO Take 1,000 mg by mouth daily       methylPREDNISolone 4 MG tablet therapy pack Use as directed on package 21 each 0    metoprolol succinate (TOPROL-XL) 50 mg 24 hr tablet Take 2 tablets (100 mg total) by mouth every 12 (twelve) hours 120 tablet 5    potassium chloride (K-DUR,KLOR-CON) 20 mEq tablet TAKE 2 TABLETS (40 MEQ TOTAL) BY MOUTH 2 (TWO) TIMES A  tablet 5    predniSONE 10 mg tablet Take 4 tabs for 2 days, 3 tabs for 2 days, 2 tabs for 2 days and 1 tab for 2 days 20 tablet 0    rOPINIRole (REQUIP XL) 2 MG 24 hr tablet TAKE 1 TABLET (2 MG TOTAL) BY MOUTH DAILY AT BEDTIME 30 tablet 5    sacubitril-valsartan (Entresto) 24-26 MG TABS Take 1 tablet by mouth 2 (two) times a day 180 tablet 1    traMADol (ULTRAM) 50 mg tablet Take 1 tablet (50 mg total) by mouth every 8 (eight) hours as needed for moderate pain 30 tablet 0    venlafaxine (EFFEXOR) 75 mg tablet TAKE 1 TABLET (75 MG TOTAL) BY MOUTH EVERY 12 (TWELVE) HOURS 60 tablet 5    vitamin A 3 MG (14816 UT) capsule Take 10,000 Units by mouth daily       No current facility-administered medications for this visit  Allergies   Allergen Reactions    Penicillins     Wellbutrin Sr  [Bupropion]      Other reaction(s): Suicidal ideations  Category: Adverse Reaction; Review of Systems   Constitutional: Negative for chills, fatigue and fever  Respiratory: Negative for cough, shortness of breath and wheezing  Cardiovascular: Negative for chest pain, palpitations and leg swelling  Gastrointestinal: Negative for abdominal pain, blood in stool, constipation, diarrhea and nausea  Genitourinary: Negative for dysuria  Musculoskeletal: Positive for arthralgias, back pain and gait problem  Negative for myalgias  Skin: Negative for rash and wound  Neurological: Negative for dizziness, weakness, numbness and headaches  Psychiatric/Behavioral: Negative for sleep disturbance and suicidal ideas  Video Exam    There were no vitals filed for this visit  Physical Exam  Constitutional:       General: She is not in acute distress  Appearance: She is well-developed  She is not diaphoretic  HENT:      Head: Normocephalic and atraumatic  Neck:      Musculoskeletal: Normal range of motion and neck supple  Pulmonary:      Effort: Pulmonary effort is normal    Musculoskeletal: Normal range of motion  Skin:     Coloration: Skin is not pale  Findings: No rash  Neurological:      Mental Status: She is alert and oriented to person, place, and time  Psychiatric:         Behavior: Behavior normal          Thought Content: Thought content normal          Judgment: Judgment normal           I spent 15 minutes directly with the patient during this visit      VIRTUAL VISIT DISCLAIMER    Akash Edmond acknowledges that she has consented to an online visit or consultation  She understands that the online visit is based solely on information provided by her, and that, in the absence of a face-to-face physical evaluation by the physician, the diagnosis she receives is both limited and provisional in terms of accuracy and completeness  This is not intended to replace a full medical face-to-face evaluation by the physician  Akash Edmond understands and accepts these terms

## 2021-02-26 ENCOUNTER — TELEPHONE (OUTPATIENT)
Dept: FAMILY MEDICINE CLINIC | Facility: CLINIC | Age: 81
End: 2021-02-26

## 2021-02-26 NOTE — TELEPHONE ENCOUNTER
Per patient she is taking the medications you discussed/prescribed and feels she should have more relief  Also, she has 4 more days of prednisone to complete  Patient would like a return call at [phone]

## 2021-03-01 NOTE — TELEPHONE ENCOUNTER
At this point, I have treated Janes Harrington with 2 rounds of Prednisone in addition to Tramadol- she really needs to be evaluated by Ortho if she is continuing to have discomfort  I did discuss this with her at our last virtual visit    Please offer any assistance with scheduling an ortho appointment

## 2021-03-12 ENCOUNTER — TELEPHONE (OUTPATIENT)
Dept: FAMILY MEDICINE CLINIC | Facility: CLINIC | Age: 81
End: 2021-03-12

## 2021-03-12 NOTE — TELEPHONE ENCOUNTER
Patient complains of leg and now low back pain  On Tramadol, not helping much  Had injection 3/2/21 not much help  Has upcoming ortho appointment 3/16/21  Is there something else for pain that can be ordered?

## 2021-03-12 NOTE — TELEPHONE ENCOUNTER
Spoke with pt and advised her to call ortho and follow through with what they recommenced as far as ice/heat and resting the best she can

## 2021-03-16 ENCOUNTER — TRANSCRIBE ORDERS (OUTPATIENT)
Dept: ADMINISTRATIVE | Facility: HOSPITAL | Age: 81
End: 2021-03-16

## 2021-03-16 DIAGNOSIS — M54.50 LOW BACK PAIN, UNSPECIFIED BACK PAIN LATERALITY, UNSPECIFIED CHRONICITY, UNSPECIFIED WHETHER SCIATICA PRESENT: Primary | ICD-10-CM

## 2021-03-17 DIAGNOSIS — M54.41 ACUTE RIGHT-SIDED LOW BACK PAIN WITH RIGHT-SIDED SCIATICA: ICD-10-CM

## 2021-03-17 DIAGNOSIS — I50.22 CHRONIC HFREF (HEART FAILURE WITH REDUCED EJECTION FRACTION) (HCC): ICD-10-CM

## 2021-03-17 DIAGNOSIS — I10 ESSENTIAL HYPERTENSION: ICD-10-CM

## 2021-03-17 DIAGNOSIS — I50.9 CHF (CONGESTIVE HEART FAILURE) (HCC): ICD-10-CM

## 2021-03-17 RX ORDER — POTASSIUM CHLORIDE 20 MEQ/1
40 TABLET, EXTENDED RELEASE ORAL 2 TIMES DAILY
Qty: 120 TABLET | Refills: 5 | Status: SHIPPED | OUTPATIENT
Start: 2021-03-17 | End: 2021-09-19

## 2021-03-17 RX ORDER — HYDRALAZINE HYDROCHLORIDE 10 MG/1
10 TABLET, FILM COATED ORAL EVERY 8 HOURS SCHEDULED
Qty: 90 TABLET | Refills: 5 | Status: SHIPPED | OUTPATIENT
Start: 2021-03-17 | End: 2022-01-01 | Stop reason: SDUPTHER

## 2021-03-17 RX ORDER — SACUBITRIL AND VALSARTAN 24; 26 MG/1; MG/1
1 TABLET, FILM COATED ORAL 2 TIMES DAILY
Qty: 180 TABLET | Refills: 1 | Status: SHIPPED | OUTPATIENT
Start: 2021-03-17 | End: 2021-03-29 | Stop reason: DRUGHIGH

## 2021-03-17 RX ORDER — TRAMADOL HYDROCHLORIDE 50 MG/1
50 TABLET ORAL EVERY 8 HOURS PRN
Qty: 30 TABLET | Refills: 0 | Status: SHIPPED | OUTPATIENT
Start: 2021-03-17 | End: 2021-03-28 | Stop reason: SDUPTHER

## 2021-03-28 DIAGNOSIS — M54.41 ACUTE RIGHT-SIDED LOW BACK PAIN WITH RIGHT-SIDED SCIATICA: ICD-10-CM

## 2021-03-29 ENCOUNTER — OFFICE VISIT (OUTPATIENT)
Dept: CARDIOLOGY CLINIC | Facility: CLINIC | Age: 81
End: 2021-03-29
Payer: COMMERCIAL

## 2021-03-29 VITALS
WEIGHT: 192.5 LBS | BODY MASS INDEX: 36.35 KG/M2 | SYSTOLIC BLOOD PRESSURE: 130 MMHG | HEIGHT: 61 IN | OXYGEN SATURATION: 99 % | HEART RATE: 84 BPM | DIASTOLIC BLOOD PRESSURE: 72 MMHG

## 2021-03-29 DIAGNOSIS — I27.20 PULMONARY HYPERTENSION (HCC): ICD-10-CM

## 2021-03-29 DIAGNOSIS — I50.42 CHRONIC COMBINED SYSTOLIC (CONGESTIVE) AND DIASTOLIC (CONGESTIVE) HEART FAILURE (HCC): Primary | ICD-10-CM

## 2021-03-29 DIAGNOSIS — E78.00 PURE HYPERCHOLESTEROLEMIA: ICD-10-CM

## 2021-03-29 DIAGNOSIS — I48.0 PAROXYSMAL ATRIAL FIBRILLATION (HCC): ICD-10-CM

## 2021-03-29 PROCEDURE — 3075F SYST BP GE 130 - 139MM HG: CPT | Performed by: INTERNAL MEDICINE

## 2021-03-29 PROCEDURE — 3078F DIAST BP <80 MM HG: CPT | Performed by: INTERNAL MEDICINE

## 2021-03-29 PROCEDURE — 99214 OFFICE O/P EST MOD 30 MIN: CPT | Performed by: INTERNAL MEDICINE

## 2021-03-29 PROCEDURE — 1160F RVW MEDS BY RX/DR IN RCRD: CPT | Performed by: INTERNAL MEDICINE

## 2021-03-29 PROCEDURE — 1036F TOBACCO NON-USER: CPT | Performed by: INTERNAL MEDICINE

## 2021-03-29 RX ORDER — TRAMADOL HYDROCHLORIDE 50 MG/1
50 TABLET ORAL 2 TIMES DAILY PRN
Qty: 30 TABLET | Refills: 0 | Status: SHIPPED | OUTPATIENT
Start: 2021-03-29 | End: 2021-04-08 | Stop reason: SDUPTHER

## 2021-03-29 RX ORDER — SACUBITRIL AND VALSARTAN 49; 51 MG/1; MG/1
1 TABLET, FILM COATED ORAL 2 TIMES DAILY
Qty: 60 TABLET | Refills: 3 | Status: SHIPPED | OUTPATIENT
Start: 2021-03-29 | End: 2021-07-13

## 2021-03-29 NOTE — PROGRESS NOTES
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[facility]) heart failure ([facility] 07/15/2019    Ambulatory dysfunction 07/12/2019    Type 2 diabetes mellitus with diabetic cataract ([facility] ) 10/07/2015    Depression with anxiety 08/15/2012    Hyperlipidemia 08/15/2012    Hypertension 08/15/2012    Hypothyroidism 08/15/2012    Obesity (BMI 30-39 9) 08/15/2012    Osteoarthritis 08/15/2012       # Chronic HFrEF w/ partial recovery, Stage C, NYHA III  Etiology: NICM/tachy-mediated given h/o AF with RVR , +/- ischemia given patient's risk factors for CAD, strong family history, and septal Q waves on EKG with severe septal hypokinesis on echo, less likely viral, toxin induced or infiltrative   S/P cardiogenic shock in past  Now S/P AVJ ablation with BiV AICD implant with no high rate episodes  Weight: 192 lbs  NT proBNP: 6/2/20: 1089    Studies- personally reviewed by myself  Echo 11/20/20  LVEF: 60%  RV: mildly dilated, mildly reduced  PASP: 60 mmHg  RVOT: no notching    TTE completed on 03/16/2020 (not euvolemic): LVEF 30%  LVIDd 5 44 cm  Moderately dilated RV with moderately to markedly reduced RVSF  NAN  Moderate MR  Mild to moderate TR  Dilated IVC                Echocardiogram (limited) from 07/04/2019:  LVEF: 45%; mild diffuse hypokinesis  LVIDd: 3 8 cm  MR: No MR  Moderate annular calcification  Other: Dilated LA      Echocardiogram from 06/20/2019:  LVEF: 25%  LVIDd: 4 6 cm  RV: Dilated and hypokinetic    MR: Moderate      Neurohormonal Blockade:  --Beta Blocker: metoprolol succinate 100 mg Q12  --ACEi, ARB or ARNi: Entresto 24/26 mg BID    --SVR reduction: hydralazine 10 mg TID  --Aldosterone Receptor Blocker: Renal function precludes  -- Diuretic : Bumex 4 mg once daily     Sudden Cardiac Death Risk Reduction:  Since been DC    --ICD: MDT DC HIS ICD  Interrogation 1/5/21:  99 9%, 3 VT episodes, longest 7 beats 186 bpm, optivol normal  Interrogation 10/6/20: BVP 99%, no high rate episodes  Optivol WNL  I     Electrical Resynchronization:  --Interrogation: Narrow QRS     Advanced Therapies (if appropriate): --Inotrope: N/A  --LVAD/Transplant Candidacy: Will continue to monitor      # Pulmonary Hypertension, WHO group 2 from now HFpEF and likely significant CORAZON, untreated (Group 3)  Stressed polysomnogram, continued volume management    # Atrial Fib with RVR   S/p AV node ablation with CRT-D with Dr Louann Lang 03/18/2020  Anticoagulation on Eliquis  V paced       # HTN  Controlled       # HLD  On statin therapy      # Hypothyroidism  History of radioablation in the past now on replacement therapy   TSH stable     # Type II DM        # CORAZON  Noncompliant with CPAP in the past    Still has not complete sleep study     # CKD   Renal function stable last checked  Repeating BMP now       # History of transaminitis in setting of cardiogenic shock     #  Abnormal EKG  Q waves noted in septal leads concerning for possible old infarct  Lexiscan stress to eval for underlying ischemia still not completed      # H/O tachy-oniel syndrome       #  History of alcohol abuse   Has been abstinent for years    TODAY'S PLAN:  PA pressures high on latest echo with improved EF  Continue current diuretic for combined systolic and diastolic HF  Double entresto to 49/51mg BID  I ordered sleep study as I do suspect hypoxia is contributing to her PH- she has yet to do  Discussed weight management  --2g sodium diet  - Daily weights    HPI:      [de-identified] yo female who follows for chronic BiV heart failure, afib, HTN, HLD, CORAZON, DM2  Back in June 2019 admitted with afib with RVR and decompensated heart failure  Had not had a cardiac hx   Echo showed EF: 25-30%, underwent EBEN/ CV but went back into afib  Started on amiodarone load  Did have junctional rhythm immediate post conversion and showing signs of tachy oniel syndrome  Given these findings, PPM with possible AICD was suggested with cardioversion thereafter          On 6/25 patient was taken to the cath lab for planned dual chamber AICD but apparently became hypotensive and markedly SOB upon induction with propofol  She was subsequently transferred to the ICU for further management of her heart failure  She continued to be in [address] upon transfer  Through the night, patient's condition began to worsen and she became cool, clammy with N/V and loss of obtainable BP  Started on pressor support and lined  SVO2 at that point 32% with signs of lactic acidosis, WANG  Then started on milrinone gtt at 0 25 mcg/kg/min  She was able to be weaned off inotropic support  She saw Dr Emmanuelle Suazo in follow up and her afib rates were high so amio stopped and focused on rate control with increase in Toprol to 75 mg BID  Admitted to [facility] from 03/11 to 03/24/2020 after presenting (as the recommendation of many of her providers) with bilateral LE edema and weight gain  In ED was found to have NT-proBNP of 4600 and be in atrial fibrillation with RVR (rates in 150s)  She was then started on IV diltiazem and received IV Lasix  Diltiazem was later stopped and amiodarone drip was started  Switched from IV Lasix to IV Bumex on 03/41  EP was consulted  TTE revealed LVEF 30% and decision was made to proceed with AV node ablation and CRT-D implantation once optimized  HF team recommended her being discharged home on Bumex 4 mg BID with PRN metolazone; however, she was discharged home on PO Bumex 4 mg daily without PRN metolazone prescribed  Lost 18 lbs during this admission  Last Office visit 10/7/20   PCP felt volume overloaded    Interval History:  Drinking chocolate milk  Interrogation 1/5/21:  99 9%, 3 VT episodes, longest 7 beats 186 bpm, optivol normal  Has not done sleep study  29 years sober today  Review of Systems   Constitutional: Negative for activity change, appetite change, fatigue and unexpected weight change  HENT: Negative for congestion and nosebleeds  Eyes: Negative  Respiratory: Negative for cough, chest tightness and shortness of breath  Cardiovascular: Negative for chest pain, palpitations and leg swelling  Gastrointestinal: Negative for abdominal distention  Endocrine: Negative  Genitourinary: Negative  Musculoskeletal: Negative  Skin: Negative  Neurological: Negative for dizziness, syncope and weakness  Hematological: Negative  Psychiatric/Behavioral: Negative  Past Medical History:   Diagnosis Date    [facility] ) 6/26/2019    CHF (congestive heart failure) (HCC)     Eczema     Photosensitivity     abnormal skin sensitivity to sunlight    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HCC)     staging         Allergies   Allergen Reactions    Penicillins     Wellbutrin Sr  [Bupropion]      Other reaction(s): Suicidal ideations  Category: Adverse Reaction;            Current Outpatient Medications:     Ascorbic Acid (vitamin C) 1000 MG tablet, Take 1,000 mg by mouth 2 (two) times a day , Disp: , Rfl:     bumetanide (BUMEX) 2 mg tablet, TAKE 2 TABLETS (4 MG TOTAL) BY MOUTH DAILY, Disp: 180 tablet, Rfl: 0    cholecalciferol (VITAMIN D3) 1,000 units tablet, Take 1,000 Units by mouth daily 8,000 dailty, Disp: , Rfl:     ELIQUIS 5 MG, TAKE 1 TABLET (5 MG TOTAL) BY MOUTH 2 (TWO) TIMES A DAY, Disp: 60 tablet, Rfl: 11    hydrALAZINE (APRESOLINE) 10 mg tablet, TAKE 1 TABLET (10 MG TOTAL) BY MOUTH EVERY 8 (EIGHT) HOURS, Disp: 90 tablet, Rfl: 5    levothyroxine 112 mcg tablet, Take 1 tablet (112 mcg total) by mouth daily in the early morning, Disp: 30 tablet, Rfl: 5    MAGNESIUM PO, Take 1,000 mg by mouth daily , Disp: , Rfl:     methylPREDNISolone 4 MG tablet therapy pack, Use as directed on package, Disp: 21 each, Rfl: 0    metoprolol succinate (TOPROL-XL) 50 mg 24 hr tablet, Take 2 tablets (100 mg total) by mouth every 12 (twelve) hours, Disp: 120 tablet, Rfl: 5    potassium chloride (K-DUR,KLOR-CON) 20 mEq tablet, TAKE 2 TABLETS (40 MEQ TOTAL) BY MOUTH 2 (TWO) TIMES A DAY, Disp: 120 tablet, Rfl: 5    predniSONE 10 mg tablet, Take 4 tabs for 2 days, 3 tabs for 2 days, 2 tabs for 2 days and 1 tab for 2 days, Disp: 20 tablet, Rfl: 0    rOPINIRole (REQUIP XL) 2 MG 24 hr tablet, TAKE 1 TABLET (2 MG TOTAL) BY MOUTH DAILY AT BEDTIME, Disp: 30 tablet, Rfl: 5    sacubitril-valsartan (Entresto) 24-26 MG TABS, Take 1 tablet by mouth 2 (two) times a day, Disp: 180 tablet, Rfl: 1    traMADol (ULTRAM) 50 mg tablet, Take 1 tablet (50 mg total) by mouth every 8 (eight) hours as needed for moderate pain, Disp: 30 tablet, Rfl: 0    venlafaxine (EFFEXOR) 75 mg tablet, TAKE 1 TABLET (75 MG TOTAL) BY MOUTH EVERY 12 (TWELVE) HOURS, Disp: 60 tablet, Rfl: 5    vitamin A 3 MG (81168 UT) capsule, Take 10,000 Units by mouth daily, Disp: , Rfl:     Social History     Socioeconomic History    Marital status: Single     Spouse name: Not on file    Number of children: Not on file    Years of education: Not on file    Highest education level: Not on file   Occupational History    Not on file   Social Needs    Financial resource strain: Not on file    Food insecurity     Worry: Not on file     Inability: Not on file    Transportation needs     Medical: Not on file     Non-medical: Not on file   Tobacco Use    Smoking status: Never Smoker    Smokeless tobacco: Never Used   Substance and Sexual Activity    Alcohol use: Not Currently    Drug use: Never    Sexual activity: Not Currently   Lifestyle    Physical activity     Days per week: Not on file     Minutes per session: Not on file    Stress: Not on file   Relationships    Social connections     Talks on phone: Not on file     Gets together: Not on file     Attends [employer] service: Not on file     Active member of club or organization: Not on file     Attends meetings of clubs or organizations: Not on file     Relationship status: Not on file    Intimate partner violence     Fear of current or ex partner: Not on file     Emotionally abused: Not on file     Physically abused: Not on file     Forced sexual activity: Not on file   Other Topics Concern    Not on file   Social History Narrative    Not on file       Family History   Problem Relation Age of Onset    Alzheimer's disease Mother     Coronary artery disease Mother     Dementia Mother     Diabetes Mother         mellitus    Other Father         acute myocardial infarction    Coronary artery disease Sister     Other Maternal Grandfather         laryngeal cancer    Dementia Maternal Aunt     Diabetes Maternal Aunt        Physical Exam:    Vitals: There were no vitals filed for this visit  Physical Exam  Constitutional:       Appearance: She is well-developed  HENT:      Head: Normocephalic and atraumatic  Eyes:      Pupils: Pupils are equal, round, and reactive to light  Neck:      Musculoskeletal: Normal range of motion  Vascular: No JVD  Cardiovascular:      Rate and Rhythm: Normal rate and regular rhythm  Heart sounds: No murmur  Pulmonary:      Effort: Pulmonary effort is normal  No respiratory distress  Breath sounds: Normal breath sounds  Abdominal:      General: There is no distension  Palpations: Abdomen is soft  Tenderness: There is no abdominal tenderness  Musculoskeletal: Normal range of motion  Skin:     General: Skin is warm and dry  Findings: No rash  Neurological:      Mental Status: She is alert and oriented to person, place, and time           Labs & Results:    Lab Results   Component Value Date    SODIUM 137 01/14/2021    K 4 1 01/14/2021     01/14/2021    CO2 31 01/14/2021    BUN 17 01/14/2021    CREATININE 0 82 01/14/2021    GLUC 92 06/04/2020    CALCIUM 9 8 01/14/2021     Lab Results   Component Value Date    WBC 7 05 01/14/2021    HGB 12 8 01/14/2021    HCT 39 7 01/14/2021    MCV 98 01/14/2021     01/14/2021     Lab Results   Component Value Date    NTBNP 716 (H) 01/14/2021      Lab Results   Component Value Date    CHOLESTEROL 112 03/12/2020    CHOLESTEROL 177 03/18/2019     Lab Results   Component Value Date    HDL 38 (L) 03/12/2020    HDL 66 03/18/2019    HDL 63 02/07/2017     Lab Results   Component Value Date    TRIG 48 03/12/2020    TRIG 82 03/18/2019    TRIG 106 02/07/2017     Lab Results   Component Value Date    NONHDLC 74 03/12/2020    [place] 106 02/07/2017    [place] 111 11/13/2014       EKG personally reviewed by Aleks Yuen,   Counseling / Coordination of Care  Time spent today 25 minutes  Greater than 50% of total time was spent with the patient and / or family counseling and / or coordination of care  We went over current diagnosis, most recent studies and any changes in treatment  Thank you for the opportunity to participate in the care of this patient      [facility] PULMONARY [facility]YPERTENSION  MEDICAL DIRECTOR OF [place]

## 2021-03-29 NOTE — PATIENT INSTRUCTIONS
I am increasing your entresto to 49/51 mg BID- you can take two of your current pills    Your BP should not drop much but if it does we can consider coming off hydralazine

## 2021-04-06 ENCOUNTER — TELEPHONE (OUTPATIENT)
Dept: FAMILY MEDICINE CLINIC | Facility: CLINIC | Age: 81
End: 2021-04-06

## 2021-04-06 NOTE — TELEPHONE ENCOUNTER
Patient states she has been taking the Tramadol 50 mg for her leg and back pain and she feels she needs something a little stronger as the pain is constant  Patient can be contacted at [phone] and she uses [address] in [place]

## 2021-04-08 DIAGNOSIS — M54.41 ACUTE RIGHT-SIDED LOW BACK PAIN WITH RIGHT-SIDED SCIATICA: ICD-10-CM

## 2021-04-08 RX ORDER — TRAMADOL HYDROCHLORIDE 50 MG/1
TABLET ORAL
Qty: 60 TABLET | Refills: 0 | Status: SHIPPED | OUTPATIENT
Start: 2021-04-08 | End: 2021-08-05

## 2021-04-08 NOTE — TELEPHONE ENCOUNTER
Patient is scheduled for an MRI on 4/13/2021, She states that the pain is unbearable and that she is not able get any relieve  She is trying to see if a chiropractor will help she had 1 appt  Patient needs to reschedule her appt due to the MRI   Rescheduled her for 4/19/2021

## 2021-04-08 NOTE — TELEPHONE ENCOUNTER
We relatively recently increased the frequency of her Tramadol  I do believe she is having significant back and hip/leg pain but I'm not sure if increasing her pain medication is the best option  Has she reached out to Ortho regarding her worsening and persisting pain? She is on my schedule next week, 4/15  Does she feel like she is able to wait until this appointment to discuss this further?     Does she have enough Tramadol to hold her over until that appointment at least?

## 2021-04-08 NOTE — TELEPHONE ENCOUNTER
Patient phoned to follow-up on her request for pain medication  She had her last appointment with the Ortho MD 2-2 5 weeks ago and had a pain injection at that visit but didn't know what was in it but felt it didn't help  She doesn't have any upcoming appointments with Ortho  She has 9 Tramadol pills left-enough for 3 days

## 2021-04-08 NOTE — TELEPHONE ENCOUNTER
I refilled her Tramadol with instructions to take 1-2 tabs BID prn for severe pain  She should be taking the lowest effective dosage of this  Hopefully we have more answers after her MRI    I believe her Orthopedic specialist noted possibly having her see Pain Management based on the MRI results as well

## 2021-04-12 ENCOUNTER — RA CDI HCC (OUTPATIENT)
Dept: OTHER | Facility: HOSPITAL | Age: 81
End: 2021-04-12

## 2021-04-12 NOTE — PROGRESS NOTES
[facility] [employer]  coding opportunities             Chart reviewed, (number of) suggestions sent to provider: 5     Problem listed updated  Provider Accepted, (number of) suggestions accepted: 5        Patients insurance company: [employer] (Medicare Advantage and Commercial)           [facility] [employer]  coding oppertunities             Chart reviewed, (number of) suggestions sent to provider: 5      DX:  I13 0-Hypertensive heart and chronic kidney disease with heart failure and stage 1 through stage 4 chronic kidney disease, or unspecified chronic kidney disease  E11 22-Type 2 diabetes mellitus with diabetic chronic kidney disease  N18 2-Chronic kidney disease, stage 2 (mild)-Gfr-Jan 2021-68  E66 01-Morbid (severe) obesity due to excess qcdprzws-BTZ-12 with DM, Htn    It is noted in the patient record that the patient has anxiety with depression     On Requip and Effexor    Can the depression be further specified as:     F32 0 major depressive disorder, single episode, mild  OR   F32 1 major depressive disorder, single episode, moderate  OR   F32 2 major depressive disorder, single episode, severe without psychotic features OR   F32 4 major depressive disorder, single episode, in partial remission OR   F32 5 major depressive disorder, single episode, in full remission    Depression not specified in response-leaving to provider to determine and input on problem list

## 2021-04-13 ENCOUNTER — HOSPITAL ENCOUNTER (OUTPATIENT)
Dept: RADIOLOGY | Facility: HOSPITAL | Age: 81
Discharge: HOME | End: 2021-04-13
Payer: COMMERCIAL

## 2021-04-13 DIAGNOSIS — M54.50 LOW BACK PAIN, UNSPECIFIED BACK PAIN LATERALITY, UNSPECIFIED CHRONICITY, UNSPECIFIED WHETHER SCIATICA PRESENT: ICD-10-CM

## 2021-04-13 PROCEDURE — 72148 MRI LUMBAR SPINE W/O DYE: CPT

## 2021-04-13 NOTE — NURSING NOTE
Device interrogation for MRI  Normal device function prior to MRI  Leads and device meet all requirements per policy for MRI  Device programmed VOO 85bpm per Cardiology for MRI  Patient has no complaints  Vital signs stable throughout  Normal device function post MRI  Device reprogrammed to prior settings per Cardiology

## 2021-04-14 DIAGNOSIS — I50.9 CHF (CONGESTIVE HEART FAILURE) (HCC): ICD-10-CM

## 2021-04-14 DIAGNOSIS — F41.8 DEPRESSION WITH ANXIETY: ICD-10-CM

## 2021-04-14 DIAGNOSIS — I50.22 CHRONIC HFREF (HEART FAILURE WITH REDUCED EJECTION FRACTION) (HCC): ICD-10-CM

## 2021-04-14 RX ORDER — BUMETANIDE 2 MG/1
4 TABLET ORAL DAILY
Qty: 180 TABLET | Refills: 0 | Status: SHIPPED | OUTPATIENT
Start: 2021-04-14 | End: 2021-12-16

## 2021-04-14 RX ORDER — VENLAFAXINE 75 MG/1
75 TABLET ORAL EVERY 12 HOURS
Qty: 60 TABLET | Refills: 5 | Status: SHIPPED | OUTPATIENT
Start: 2021-04-14 | End: 2021-11-08

## 2021-04-14 NOTE — TELEPHONE ENCOUNTER
Requested medication(s) are due for refill today: yes  Patient has already received a courtesy refill: no  Other reason request has been forwarded to provider:

## 2021-04-15 PROBLEM — I13.0 HYPERTENSIVE HEART AND CHRONIC KIDNEY DISEASE WITH HEART FAILURE AND STAGE 1 THROUGH STAGE 4 CHRONIC KIDNEY DISEASE, OR UNSPECIFIED CHRONIC KIDNEY DISEASE (HCC): Status: ACTIVE | Noted: 2021-04-15

## 2021-04-15 PROBLEM — E66.01 MORBID (SEVERE) OBESITY DUE TO EXCESS CALORIES (HCC): Status: ACTIVE | Noted: 2021-04-15

## 2021-04-15 PROBLEM — N18.2 CHRONIC KIDNEY DISEASE, STAGE 2 (MILD): Status: ACTIVE | Noted: 2021-04-15

## 2021-04-15 PROBLEM — E11.22 TYPE 2 DIABETES MELLITUS WITH DIABETIC CHRONIC KIDNEY DISEASE (HCC): Status: ACTIVE | Noted: 2021-04-15

## 2021-05-19 ENCOUNTER — TRANSCRIBE ORDERS (OUTPATIENT)
Dept: ADMINISTRATIVE | Facility: HOSPITAL | Age: 81
End: 2021-05-19

## 2021-05-19 DIAGNOSIS — M80.08XS AGE-RELATED OSTEOPOROSIS WITH CURRENT PATHOLOGICAL FRACTURE OF VERTEBRA, SEQUELA: Primary | ICD-10-CM

## 2021-07-08 ENCOUNTER — REMOTE DEVICE CLINIC VISIT (OUTPATIENT)
Dept: CARDIOLOGY CLINIC | Facility: CLINIC | Age: 81
End: 2021-07-08
Payer: COMMERCIAL

## 2021-07-08 DIAGNOSIS — Z95.810 AICD (AUTOMATIC CARDIOVERTER/DEFIBRILLATOR) PRESENT: Primary | ICD-10-CM

## 2021-07-08 PROCEDURE — 93295 DEV INTERROG REMOTE 1/2/MLT: CPT | Performed by: INTERNAL MEDICINE

## 2021-07-08 PROCEDURE — 93296 REM INTERROG EVL PM/IDS: CPT | Performed by: INTERNAL MEDICINE

## 2021-07-08 NOTE — PROGRESS NOTES
Results for orders placed or performed in visit on 07/08/21   Cardiac EP device report    Narrative    MDT-DUAL CHAMBER "HIS" ICD/VVIR MODEACTIVE SYSTEM IS MRI CONDITIONAL  CARELINK TRANSMISSION: BATTERY STATUS "4 YRS "  99% VSRP 0%  ALL AVAILABLE LEAD PARAMETERS WITHIN NORMAL LIMITS  NO SIGNIFICANT HIGH RATE EPISODES  OPTI-VOL FLUID THRESHOLD CROSSED & ONGOING SINCE 4/2021  NORMAL DEVICE FUNCTION   NC         Current Outpatient Medications:     Ascorbic Acid (vitamin C) 1000 MG tablet, Take 1,000 mg by mouth 2 (two) times a day , Disp: , Rfl:     bumetanide (BUMEX) 2 mg tablet, TAKE 2 TABLETS (4 MG TOTAL) BY MOUTH DAILY, Disp: 180 tablet, Rfl: 0    cholecalciferol (VITAMIN D3) 1,000 units tablet, Take 1,000 Units by mouth daily 8,000 dailty, Disp: , Rfl:     ELIQUIS 5 MG, TAKE 1 TABLET (5 MG TOTAL) BY MOUTH 2 (TWO) TIMES A DAY, Disp: 60 tablet, Rfl: 11    hydrALAZINE (APRESOLINE) 10 mg tablet, TAKE 1 TABLET (10 MG TOTAL) BY MOUTH EVERY 8 (EIGHT) HOURS, Disp: 90 tablet, Rfl: 5    levothyroxine 112 mcg tablet, Take 1 tablet (112 mcg total) by mouth daily in the early morning, Disp: 30 tablet, Rfl: 5    MAGNESIUM PO, Take 1,000 mg by mouth daily , Disp: , Rfl:     methylPREDNISolone 4 MG tablet therapy pack, Use as directed on package, Disp: 21 each, Rfl: 0    metoprolol succinate (TOPROL-XL) 50 mg 24 hr tablet, Take 2 tablets (100 mg total) by mouth every 12 (twelve) hours, Disp: 120 tablet, Rfl: 5    potassium chloride (K-DUR,KLOR-CON) 20 mEq tablet, TAKE 2 TABLETS (40 MEQ TOTAL) BY MOUTH 2 (TWO) TIMES A DAY, Disp: 120 tablet, Rfl: 5    predniSONE 10 mg tablet, Take 4 tabs for 2 days, 3 tabs for 2 days, 2 tabs for 2 days and 1 tab for 2 days, Disp: 20 tablet, Rfl: 0    rOPINIRole (REQUIP XL) 2 MG 24 hr tablet, TAKE 1 TABLET (2 MG TOTAL) BY MOUTH DAILY AT BEDTIME, Disp: 30 tablet, Rfl: 5    sacubitril-valsartan (Entresto) 49-51 MG TABS, Take 1 tablet by mouth 2 (two) times a day, Disp: 60 tablet, Rfl: 3    traMADol (ULTRAM) 50 mg tablet, Take 1-2 tablets Bid prn, Disp: 60 tablet, Rfl: 0    venlafaxine (EFFEXOR) 75 mg tablet, TAKE 1 TABLET (75 MG TOTAL) BY MOUTH EVERY 12 (TWELVE) HOURS, Disp: 60 tablet, Rfl: 5    vitamin A 3 MG (66231 UT) capsule, Take 10,000 Units by mouth daily, Disp: , Rfl:

## 2021-07-12 ENCOUNTER — RA CDI HCC (OUTPATIENT)
Dept: OTHER | Facility: HOSPITAL | Age: 81
End: 2021-07-12

## 2021-07-12 DIAGNOSIS — I50.42 CHRONIC COMBINED SYSTOLIC (CONGESTIVE) AND DIASTOLIC (CONGESTIVE) HEART FAILURE (HCC): ICD-10-CM

## 2021-07-12 DIAGNOSIS — I48.91 ATRIAL FIBRILLATION WITH RAPID VENTRICULAR RESPONSE (HCC): ICD-10-CM

## 2021-07-12 DIAGNOSIS — G25.81 RESTLESS LEG SYNDROME: ICD-10-CM

## 2021-07-12 DIAGNOSIS — E03.9 HYPOTHYROIDISM, UNSPECIFIED TYPE: ICD-10-CM

## 2021-07-12 RX ORDER — LEVOTHYROXINE SODIUM 112 UG/1
112 TABLET ORAL
Qty: 30 TABLET | Refills: 5 | Status: SHIPPED | OUTPATIENT
Start: 2021-07-12 | End: 2021-10-06 | Stop reason: DRUGHIGH

## 2021-07-12 RX ORDER — ROPINIROLE 2 MG/1
2 TABLET, FILM COATED, EXTENDED RELEASE ORAL
Qty: 30 TABLET | Refills: 5 | Status: SHIPPED | OUTPATIENT
Start: 2021-07-12 | End: 2022-01-01 | Stop reason: SDUPTHER

## 2021-07-12 RX ORDER — APIXABAN 5 MG/1
TABLET, FILM COATED ORAL
Qty: 60 TABLET | Refills: 11 | Status: SHIPPED | OUTPATIENT
Start: 2021-07-12 | End: 2022-07-07

## 2021-07-12 NOTE — PROGRESS NOTES
[place] [id number]  coding opportunities             Chart reviewed, (number of) suggestions sent to provider: 5     Problem listed updated  Provider Accepted, (number of) suggestions accepted: 5         Number of suggestions actually used: 1      Number of suggestions NOT actually used: 4     Patients insurance company: [employer] (Medicare Advantage and Commercial)   dx not on bill: I13 0, E11 22, E66 01-bmi,is 33, depression code  Visit status: Patient arrived for their scheduled appointment        [place] [address]  coding opportunities             Chart reviewed, (number of) suggestions sent to provider: 5      DX:  I13 0-Hypertensive heart and chronic kidney disease with heart failure and stage 1 through stage 4 chronic kidney disease, or unspecified chronic kidney disease  E11 22-Type 2 diabetes mellitus with diabetic chronic kidney disease  N18 2-Chronic kidney disease, stage 2 (mild)-Gfr-Jan 2021-68  E66 01-Morbid (severe) obesity due to excess [id number]-UGV-37 with DM, Htn     It is noted in the patient record that the patient has anxiety with depression     On Effexor     Can the depression be further specified as:      F32 0 major depressive disorder, single episode, mild  OR   F32 1 major depressive disorder, single episode, moderate  OR   F32 2 major depressive disorder, single episode, severe without psychotic features OR   F32 4 major depressive disorder, single episode, in partial remission OR   F32 5 major depressive disorder, single episode, in full remission              Patients insurance company: [employer] (Medicare Advantage and Commercial)

## 2021-07-12 NOTE — PROGRESS NOTES
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 ([place] [address] )  I have evaluated the patient and found the condition to be: Stable  I have evaluated the patient and: Recommended continue with same treatment plan    I10 Hypertension  I have evaluated the patient and found the condition to be: Stable  I have evaluated the patient and: Recommended continue with same treatment plan  The patient should continue treatment and follow-up with: Dr Raven Perdomo, Cardiology    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 ([place] [address] )  I have evaluated the patient and found the condition to be: Stable  I have evaluated the patient and: Recommended continue with same treatment plan  The patient should continue treatment and follow-up with: Dr Raven Perdomo, Cardiology    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 and stage 1 through stage 4 chronic kidney disease, or unspecified chronic kidney disease ([place] [address] )  I have evaluated the patient and found the condition to be: Stable  I have evaluated the patient and: Recommended continue with same treatment plan    F41 8 Depression with anxiety  I have evaluated the patient and found the condition to be: Stable  I have evaluated the patient and: Recommended continue with same treatment plan    E78 5 Hyperlipidemia  I have evaluated the patient and found the condition to be:  Stable  I have evaluated the patient and: Recommended continue with same treatment plan    E66 01 Morbid (severe) obesity due to excess calories ([place] [address] )  I have evaluated the patient and found the condition to be: Resolved  I have evaluated the patient and: Recommended continue with same treatment plan    M19 90 Osteoarthritis  I have evaluated the patient and found the condition to be: Stable  I have evaluated the patient and: Recommended continue with same treatment plan         Subjective:     Patient ID: Shayna Jaquez is a [de-identified] y o  female     No chief complaint on file  HPI    Review of Systems    Objective: There were no vitals taken for this visit  Problem List Items Addressed This Visit     None          Physical Exam  Answers for HPI/ROS submitted by the patient on 7/14/2021  How would you rate your overall health?: fair  Compared to last year, how is your physical health?: slightly better  In general, how satisfied are you with your life?: satisfied  Compared to last year, how is your eyesight?: same  Compared to last year, how is your hearing?: same  Compared to last year, how is your emotional/mental health?: much better  How often is anger a problem for you?: never, rarely  How often do you feel unusually tired/fatigued?: sometimes  In the past 7 days, how much pain have you experienced?: some  If you answered "some" or "a lot", please rate the severity of your pain on a scale of 1 to 10 (1 being the least severe pain and 10 being the most intense pain)  : 3/10  In the past 6 months, have you lost or gained 10 pounds without trying?: No  One or more falls in the last year: No  In the past 6 months, have you accidentally leaked urine?: No  Do you have trouble with the stairs inside or outside your home?: No  Does your home have working smoke alarms?: Yes  Does your home have a carbon monoxide monitor?: Yes  Which safety hazards (if any) have you experienced in your home? Please select all that apply : none  How would you describe your current diet?  Please select all that apply : Regular, No Added Salt  In addition to prescription medications, are you taking any over-the-counter supplements?: Yes  If yes, what supplements are you taking?: Vitamins and calcium  Can you manage your medications?: Yes  Are you currently taking any opioid medications?: No  Can you walk and transfer into and out of your bed and chair?: Yes  Can you dress and groom yourself?: Yes  Can you bathe or shower yourself?: Yes  Can you feed yourself?: Yes  Can you do your laundry/ housekeeping?: Yes  Can you manage your money, pay your bills, and track your expenses?: Yes  Can you make your own meals?: Yes  Can you do your own shopping?: Yes  Within the last 12 months, have you had any hospitalizations or Emergency Department visits?: Yes  If yes, how many times have you been hospitalized within the past year?: 1-2  Additional Comments: not admitted for back and leg pain  Do you have a living will?: No  Do you have a Durable POA (Power of ) for healthcare decisions?: No  Do you have an Advanced Directive for end of life decisions?: No  How often have you used an illegal drug (including marijuana) or a prescription medication for non-medical reasons in the past year?: never  What is the typical number of drinks you consume in a day?: 0  What is the typical number of drinks you consume in a week?: 0  How often did you have a drink containing alcohol in the past year?: never  How many drinks did you have on a typical day  when you were drinking in the past year?: never  How often did you have 6 or more drinks on one occasion in the past year?: never

## 2021-07-13 RX ORDER — SACUBITRIL AND VALSARTAN 49; 51 MG/1; MG/1
1 TABLET, FILM COATED ORAL 2 TIMES DAILY
Qty: 60 TABLET | Refills: 3 | Status: SHIPPED | OUTPATIENT
Start: 2021-07-13 | End: 2021-11-08

## 2021-07-19 ENCOUNTER — OFFICE VISIT (OUTPATIENT)
Dept: FAMILY MEDICINE CLINIC | Facility: CLINIC | Age: 81
End: 2021-07-19
Payer: COMMERCIAL

## 2021-07-19 VITALS
TEMPERATURE: 98.7 F | BODY MASS INDEX: 33.79 KG/M2 | HEART RATE: 92 BPM | WEIGHT: 179 LBS | DIASTOLIC BLOOD PRESSURE: 62 MMHG | RESPIRATION RATE: 16 BRPM | SYSTOLIC BLOOD PRESSURE: 130 MMHG | HEIGHT: 61 IN

## 2021-07-19 DIAGNOSIS — I10 HYPERTENSION, UNSPECIFIED TYPE: ICD-10-CM

## 2021-07-19 DIAGNOSIS — E66.9 OBESITY (BMI 30-39.9): ICD-10-CM

## 2021-07-19 DIAGNOSIS — M54.41 ACUTE RIGHT-SIDED LOW BACK PAIN WITH RIGHT-SIDED SCIATICA: ICD-10-CM

## 2021-07-19 DIAGNOSIS — E78.00 PURE HYPERCHOLESTEROLEMIA: ICD-10-CM

## 2021-07-19 DIAGNOSIS — E11.36 TYPE 2 DIABETES MELLITUS WITH DIABETIC CATARACT, WITHOUT LONG-TERM CURRENT USE OF INSULIN (HCC): ICD-10-CM

## 2021-07-19 DIAGNOSIS — I50.42 CHRONIC COMBINED SYSTOLIC (CONGESTIVE) AND DIASTOLIC (CONGESTIVE) HEART FAILURE (HCC): ICD-10-CM

## 2021-07-19 DIAGNOSIS — N18.2 CHRONIC KIDNEY DISEASE, STAGE 2 (MILD): ICD-10-CM

## 2021-07-19 DIAGNOSIS — Z00.00 MEDICARE ANNUAL WELLNESS VISIT, SUBSEQUENT: ICD-10-CM

## 2021-07-19 DIAGNOSIS — Z00.00 ENCOUNTER FOR ANNUAL WELLNESS EXAM IN MEDICARE PATIENT: Primary | ICD-10-CM

## 2021-07-19 DIAGNOSIS — E03.9 HYPOTHYROIDISM, UNSPECIFIED TYPE: ICD-10-CM

## 2021-07-19 DIAGNOSIS — F41.8 DEPRESSION WITH ANXIETY: ICD-10-CM

## 2021-07-19 LAB — SL AMB POCT HEMOGLOBIN AIC: 5.9 (ref ?–6.5)

## 2021-07-19 PROCEDURE — 99214 OFFICE O/P EST MOD 30 MIN: CPT | Performed by: NURSE PRACTITIONER

## 2021-07-19 PROCEDURE — 1125F AMNT PAIN NOTED PAIN PRSNT: CPT | Performed by: NURSE PRACTITIONER

## 2021-07-19 PROCEDURE — T1015 CLINIC SERVICE: HCPCS | Performed by: NURSE PRACTITIONER

## 2021-07-19 PROCEDURE — G0439 PPPS, SUBSEQ VISIT: HCPCS | Performed by: NURSE PRACTITIONER

## 2021-07-19 PROCEDURE — 1170F FXNL STATUS ASSESSED: CPT | Performed by: NURSE PRACTITIONER

## 2021-07-19 PROCEDURE — 1036F TOBACCO NON-USER: CPT | Performed by: NURSE PRACTITIONER

## 2021-07-19 PROCEDURE — 3725F SCREEN DEPRESSION PERFORMED: CPT | Performed by: NURSE PRACTITIONER

## 2021-07-19 PROCEDURE — 3078F DIAST BP <80 MM HG: CPT | Performed by: NURSE PRACTITIONER

## 2021-07-19 PROCEDURE — 3075F SYST BP GE 130 - 139MM HG: CPT | Performed by: NURSE PRACTITIONER

## 2021-07-19 PROCEDURE — 1160F RVW MEDS BY RX/DR IN RCRD: CPT | Performed by: NURSE PRACTITIONER

## 2021-07-19 PROCEDURE — 3288F FALL RISK ASSESSMENT DOCD: CPT | Performed by: NURSE PRACTITIONER

## 2021-07-19 PROCEDURE — 83036 HEMOGLOBIN GLYCOSYLATED A1C: CPT | Performed by: NURSE PRACTITIONER

## 2021-07-19 RX ORDER — POTASSIUM CHLORIDE 1500 MG/1
TABLET, FILM COATED, EXTENDED RELEASE ORAL
COMMUNITY
Start: 2021-06-08

## 2021-07-19 RX ORDER — PHENOL 1.4 %
600 AEROSOL, SPRAY (ML) MUCOUS MEMBRANE 2 TIMES DAILY WITH MEALS
COMMUNITY

## 2021-07-19 NOTE — PROGRESS NOTES
Chief Complaint   Patient presents with   [facility] Wellness Visit     Enhancement     Health Maintenance   Topic Date Due    Pneumococcal Vaccine: 65+ Years (1 of 2 - PPSV23) Never done    DTaP,Tdap,and Td Vaccines (1 - Tdap) 08[birth year]20/1961    PT PLAN OF CARE  11/20/2020    Medicare Annual Wellness Visit (AWV)  12/03/2020    BMI: Followup Plan  06/23/2021    Diabetic Foot Exam  06/23/2021    HEMOGLOBIN A1C  07/14/2021    DM Eye Exam  08/13/2021    Influenza Vaccine (1) 09/01/2021    Fall Risk  10/07/2021    BMI: Adult  04/13/2022    COVID-19 Vaccine  Completed    HIB Vaccine  Aged Out    Hepatitis B Vaccine  Aged Out    IPV Vaccine  Aged Out    Hepatitis A Vaccine  Aged Out    Meningococcal ACWY Vaccine  Aged Out    HPV Vaccine  Aged Out        Assessment and Plan:     Problem List Items Addressed This Visit        Endocrine    Type 2 diabetes mellitus with diabetic cataract ([address] ) - Primary      Other Visit Diagnoses     Medicare annual wellness visit, subsequent            BMI Counseling: Body mass index is 33 82 kg/m²  The BMI is above normal  Nutrition recommendations include encouraging healthy choices of fruits and vegetables, moderation in carbohydrate intake and increasing intake of lean protein  Exercise recommendations include moderate physical activity 150 minutes/week  Preventive health issues were discussed with patient, and age appropriate screening tests were ordered as noted in patient's After Visit Summary  Personalized health advice and appropriate referrals for health education or preventive services given if needed, as noted in patient's After Visit Summary       History of Present Illness:     Patient presents for Medicare Annual Wellness visit    Patient Care Team:  SOTO Horne as PCP - General (Family Medicine)  SOTO Horen as PCP - PCP-[facility] Attributed-Roster     Problem List:     Patient Active Problem List   Diagnosis    Depression with anxiety    Hyperlipidemia    Hypertension    Hypothyroidism    Obesity (BMI 30-39  9)    Osteoarthritis    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)    Ambulatory dysfunction    Chronic combined systolic (congestive) and diastolic (congestive) heart failure (HCC)    Debility    Restless leg syndrome    Hypertensive heart and chronic kidney disease with heart failure and stage 1 through stage 4 chronic kidney disease, or unspecified chronic kidney disease (HCC)    Type 2 diabetes mellitus with diabetic chronic kidney disease (HCC)    Chronic kidney disease, stage 2 (mild)    Morbid (severe) obesity due to excess calories [facility])      Past Medical and Surgical History:     Past Medical History:   Diagnosis Date    Cardiogenic shock ([facility] [address] ) 6/26/2019    CHF (congestive heart failure) (HCC)     Eczema     Photosensitivity     abnormal skin sensitivity to sunlight    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HCC)     staging     Past Surgical History:   Procedure Laterality Date    CATARACT EXTRACTION Left     HEMORRHOID SURGERY      IR NON-TUNNELED CENTRAL LINE PLACEMENT  7/9/2019    OOPHORECTOMY      unilateral    TOTAL ABDOMINAL HYSTERECTOMY        Family History:     Family History   Problem Relation Age of Onset    Alzheimer's disease Mother     Coronary artery disease Mother     Dementia Mother     Diabetes Mother         mellitus    Other Father         acute myocardial infarction    Coronary artery disease Sister     Other Maternal Grandfather         laryngeal cancer    Dementia Maternal Aunt     Diabetes Maternal Aunt       Social History:     Social History     Socioeconomic History    Marital status: Single     Spouse name: Not on file    Number of children: Not on file    Years of education: Not on file    Highest education level: Not on file   Occupational History    Not on file   Tobacco Use    Smoking status: Never Smoker    Smokeless tobacco: Never Used   Vaping Use  Vaping Use: Never used   Substance and Sexual Activity    Alcohol use: Not Currently    Drug use: Never    Sexual activity: Not Currently   Other Topics Concern    Not on file   Social History Narrative    Not on file     Social Determinants of Health     Financial Resource Strain:     Difficulty of Paying Living Expenses:    Food Insecurity:     Worried About Running Out of Food in the Last Year:     [address] in the Last Year:    Transportation Needs:     Lack of Transportation (Medical):      Lack of Transportation (Non-Medical):    Physical Activity:     Days of Exercise per Week:     Minutes of Exercise per Session:    Stress:     Feeling of Stress :    Social Connections:     Frequency of Communication with Friends and Family:     Frequency of Social Gatherings with Friends and Family:     Attends [employer] Services:     Active Member of Clubs or Organizations:     Attends Club or Organization Meetings:     Marital Status:    Intimate Partner Violence:     Fear of Current or Ex-Partner:     Emotionally Abused:     Physically Abused:     Sexually Abused:       Medications and Allergies:     Current Outpatient Medications   Medication Sig Dispense Refill    Ascorbic Acid (vitamin C) 1000 MG tablet Take 1,000 mg by mouth 2 (two) times a day       bumetanide (BUMEX) 2 mg tablet TAKE 2 TABLETS (4 MG TOTAL) BY MOUTH DAILY 180 tablet 0    cholecalciferol (VITAMIN D3) 1,000 units tablet Take 1,000 Units by mouth daily 8,000 dailty      Eliquis 5 MG TAKE 1 TABLET (5 MG TOTAL) BY MOUTH 2 (TWO) TIMES A DAY 60 tablet 11    Entresto 49-51 MG TABS TAKE 1 TABLET BY MOUTH 2 (TWO) TIMES A DAY 60 tablet 3    hydrALAZINE (APRESOLINE) 10 mg tablet TAKE 1 TABLET (10 MG TOTAL) BY MOUTH EVERY 8 (EIGHT) HOURS 90 tablet 5    levothyroxine 112 mcg tablet TAKE 1 TABLET (112 MCG TOTAL) BY MOUTH DAILY IN THE EARLY MORNING 30 tablet 5    MAGNESIUM PO Take 1,000 mg by mouth daily       metoprolol succinate (TOPROL-XL) 50 mg 24 hr tablet Take 2 tablets (100 mg total) by mouth every 12 (twelve) hours 120 tablet 5    potassium chloride (K-DUR,KLOR-CON) 20 mEq tablet TAKE 2 TABLETS (40 MEQ TOTAL) BY MOUTH 2 (TWO) TIMES A  tablet 5    Potassium Chloride ER 20 MEQ TBCR TAKE 2 TABLETS (40 MEQ TOTAL) BY MOUTH 2 (TWO) TIMES A DAY      rOPINIRole (REQUIP XL) 2 MG 24 hr tablet TAKE 1 TABLET (2 MG TOTAL) BY MOUTH DAILY AT BEDTIME 30 tablet 5    traMADol (ULTRAM) 50 mg tablet Take 1-2 tablets Bid prn 60 tablet 0    venlafaxine (EFFEXOR) 75 mg tablet TAKE 1 TABLET (75 MG TOTAL) BY MOUTH EVERY 12 (TWELVE) HOURS 60 tablet 5     No current facility-administered medications for this visit  Allergies   Allergen Reactions    Penicillins     Wellbutrin Sr  [Bupropion]      Other reaction(s): Suicidal ideations  Category: Adverse Reaction;       Immunizations:     Immunization History   Administered Date(s) Administered    SARS-CoV-2 / COVID-19 mRNA IM (Pfizer-BioNTech) 01/24/2021, 02/13/2021    Td (adult), adsorbed 01/01/2000      Health Maintenance: There are no preventive care reminders to display for this patient  Topic Date Due    Pneumococcal Vaccine: 65+ Years (1 of 2 - PPSV23) Never done    DTaP,Tdap,and Td Vaccines (1 - Tdap) 08/20/1961    Influenza Vaccine (1) 09/01/2021      Medicare Health Risk Assessment:     There were no vitals taken for this visit  Michel Nicole is here for her Subsequent Wellness visit  Last Medicare Wellness visit information reviewed, patient interviewed, no change since last AWV  Health Risk Assessment:   Patient rates overall health as fair  Patient feels that their physical health rating is slightly better  Patient is satisfied with their life  Eyesight was rated as same  Hearing was rated as same  Patient feels that their emotional and mental health rating is much better  Patients states they are never, rarely angry   Patient states they are sometimes unusually tired/fatigued  Pain experienced in the last 7 days has been some  Patient's pain rating has been 3/10  Patient states that she has experienced no weight loss or gain in last 6 months  Depression Screening:   PHQ-2 Score: 0  PHQ-9 Score: 0      Fall Risk Screening: In the past year, patient has experienced: no history of falling in past year      Urinary Incontinence Screening:   Patient has not leaked urine accidently in the last six months  Home Safety:  Patient does not have trouble with stairs inside or outside of their home  Patient has working smoke alarms and has working carbon monoxide detector  Home safety hazards include: none  Nutrition:   Current diet is Regular and No Added Salt  Medications:   Patient is currently taking over-the-counter supplements  OTC medications include: Vitamins and calcium  Patient is able to manage medications  Activities of Daily Living (ADLs)/Instrumental Activities of Daily Living (IADLs):   Walk and transfer into and out of bed and chair?: Yes  Dress and groom yourself?: Yes    Bathe or shower yourself?: Yes    Feed yourself?  Yes  Do your laundry/housekeeping?: Yes  Manage your money, pay your bills and track your expenses?: Yes  Make your own meals?: Yes    Do your own shopping?: Yes    Previous Hospitalizations:   Any hospitalizations or ED visits within the last 12 months?: Yes    How many hospitalizations have you had in the last year?: 1-2    Hospitalization Comments: not admitted for back and leg pain    Advance Care Planning:   Living will: No    Durable POA for healthcare: No    Advanced directive: No    Five wishes given: Yes      Cognitive Screening:   Provider or family/friend/caregiver concerned regarding cognition?: No    PREVENTIVE SCREENINGS      Cardiovascular Screening:    General: History Lipid Disorder, Risks and Benefits Discussed and Patient Declines      Diabetes Screening:     General: History Diabetes and Risks and Benefits Discussed    Due for: Blood Glucose      Colorectal Cancer Screening:     General: Risks and Benefits Discussed and Patient Declines      Breast Cancer Screening:     General: Risks and Benefits Discussed and Patient Declines      Cervical Cancer Screening:    General: Screening Not Indicated      Osteoporosis Screening:    General: History Osteoporosis and Patient Declines      Abdominal Aortic Aneurysm (AAA) Screening:        General: Screening Not Indicated      Lung Cancer Screening:     General: Screening Not Indicated      Hepatitis C Screening:    General: Screening Not Indicated    Screening, Brief Intervention, and Referral to Treatment (SBIRT)    Screening  Typical number of drinks in a day: 0  Typical number of drinks in a week: 0  Interpretation: Low risk drinking behavior      Single Item Drug Screening:  How often have you used an illegal drug (including marijuana) or a prescription medication for non-medical reasons in the past year? never    Single Item Drug Screen Score: 0  Interpretation: Negative screen for possible drug use disorder      SOTO Desir

## 2021-07-19 NOTE — ASSESSMENT & PLAN NOTE
Wt Readings from Last 3 Encounters:   07/19/21 81 2 kg (179 lb)   03/29/21 87 3 kg (192 lb 8 oz)   04/13/21 85 7 kg (189 lb)      managed by Cardiology, Dr Carolynne Dancer   Her Bumex was just increased 2 days last week when her OptiVol revealed increased fluid levels   And notes she did not feel any significant changes with the increased dosage of Bumex  She denies any shortness of breath on exertion, orthopnea or worsening edema  Continue current medication regimen including Bumex, Entresto, hydralazine, Toprol XL, potassium supplement  Follow fluid restriction, weigh self daily, low sodium diet   follow-up with cardiology as scheduled in September or sooner if needed

## 2021-07-19 NOTE — PATIENT INSTRUCTIONS
Medicare Preventive Visit Patient Instructions  Thank you for completing your Welcome to Medicare Visit or Medicare Annual Wellness Visit today  Your next wellness visit will be due in one year (7/20/2022)  The screening/preventive services that you may require over the next 5-10 years are detailed below  Some tests may not apply to you based off risk factors and/or age  Screening tests ordered at today's visit but not completed yet may show as past due  Also, please note that scanned in results may not display below  Preventive Screenings:  Service Recommendations Previous Testing/Comments   Colorectal Cancer Screening  * Colonoscopy    * Fecal Occult Blood Test (FOBT)/Fecal Immunochemical Test (FIT)  * Fecal DNA/Cologuard Test  * Flexible Sigmoidoscopy Age: 54-65 years old   Colonoscopy: every 10 years (may be performed more frequently if at higher risk)  OR  FOBT/FIT: every 1 year  OR  Cologuard: every 3 years  OR  Sigmoidoscopy: every 5 years  Screening may be recommended earlier than age 48 if at higher risk for colorectal cancer  Also, an individualized decision between you and your healthcare provider will decide whether screening between the ages of 74-80 would be appropriate  Colonoscopy: Not on file  FOBT/FIT: Not on file  Cologuard: Not on file  Sigmoidoscopy: Not on file          Breast Cancer Screening Age: 36 years old  Frequency: every 1-2 years  Not required if history of left and right mastectomy Mammogram: Not on file        Cervical Cancer Screening Between the ages of 21-29, pap smear recommended once every 3 years  Between the ages of 33-67, can perform pap smear with HPV co-testing every 5 years     Recommendations may differ for women with a history of total hysterectomy, cervical cancer, or abnormal pap smears in past  Pap Smear: Not on file        Hepatitis C Screening Once for adults born between [facility]  More frequently in patients at high risk for Hepatitis C Hep C Antibody: Not on file        Diabetes Screening 1-2 times per year if you're at risk for diabetes or have pre-diabetes Fasting glucose: 87 mg/dL   A1C: 6 7 %        Cholesterol Screening Once every 5 years if you don't have a lipid disorder  May order more often based on risk factors  Lipid panel: 03/12/2020          Other Preventive Screenings Covered by Medicare:  1  Abdominal Aortic Aneurysm (AAA) Screening: covered once if your at risk  You're considered to be at risk if you have a family history of AAA  2  Lung Cancer Screening: covers low dose CT scan once per year if you meet all of the following conditions: (1) Age 50-69; (2) No signs or symptoms of lung cancer; (3) Current smoker or have quit smoking within the last 15 years; (4) You have a tobacco smoking history of at least 30 pack years (packs per day multiplied by number of years you smoked); (5) You get a written order from a healthcare provider  3  Glaucoma Screening: covered annually if you're considered high risk: (1) You have diabetes OR (2) Family history of glaucoma OR (3)  aged 48 and older OR (3)  American aged 72 and older  3  Osteoporosis Screening: covered every 2 years if you meet one of the following conditions: (1) You're estrogen deficient and at risk for osteoporosis based off medical history and other findings; (2) Have a vertebral abnormality; (3) On glucocorticoid therapy for more than 3 months; (4) Have primary hyperparathyroidism; (5) On osteoporosis medications and need to assess response to drug therapy  · Last bone density test (DXA Scan): Not on file  5  HIV Screening: covered annually if you're between the age of 12-76  Also covered annually if you are younger than 13 and older than 72 with risk factors for HIV infection  For pregnant patients, it is covered up to 3 times per pregnancy      Immunizations:  Immunization Recommendations   Influenza Vaccine Annual influenza vaccination during flu season is recommended for all persons aged >= 6 months who do not have contraindications   Pneumococcal Vaccine (Prevnar and Pneumovax)  * Prevnar = PCV13  * Pneumovax = PPSV23   Adults 25-60 years old: 1-3 doses may be recommended based on certain risk factors  Adults 72 years old: Prevnar (PCV13) vaccine recommended followed by Pneumovax (PPSV23) vaccine  If already received PPSV23 since turning 65, then PCV13 recommended at least one year after PPSV23 dose  Hepatitis B Vaccine 3 dose series if at intermediate or high risk (ex: diabetes, end stage renal disease, liver disease)   Tetanus (Td) Vaccine - COST NOT COVERED BY MEDICARE PART B Following completion of primary series, a booster dose should be given every 10 years to maintain immunity against tetanus  Td may also be given as tetanus wound prophylaxis  Tdap Vaccine - COST NOT COVERED BY MEDICARE PART B Recommended at least once for all adults  For pregnant patients, recommended with each pregnancy  Shingles Vaccine (Shingrix) - COST NOT COVERED BY MEDICARE PART B  2 shot series recommended in those aged 48 and above     Health Maintenance Due:  There are no preventive care reminders to display for this patient  Immunizations Due:      Topic Date Due    Pneumococcal Vaccine: 65+ Years (1 of 2 - PPSV23) Never done    DTaP,Tdap,and Td Vaccines (1 - Tdap) 08/20/1961    Influenza Vaccine (1) 09/01/2021     Advance Directives   What are advance directives? Advance directives are legal documents that state your wishes and plans for medical care  These plans are made ahead of time in case you lose your ability to make decisions for yourself  Advance directives can apply to any medical decision, such as the treatments you want, and if you want to donate organs  What are the types of advance directives? There are many types of advance directives, and each state has rules about how to use them   You may choose a combination of any of the following:  · Living will: This is a written record of the treatment you want  You can also choose which treatments you do not want, which to limit, and which to stop at a certain time  This includes surgery, medicine, IV fluid, and tube feedings  · Durable power of  for healthcare [facility]): This is a written record that states who you want to make healthcare choices for you when you are unable to make them for yourself  This person, called a proxy, is usually a family member or a friend  You may choose more than 1 proxy  · Do not resuscitate (DNR) order:  A DNR order is used in case your heart stops beating or you stop breathing  It is a request not to have certain forms of treatment, such as CPR  A DNR order may be included in other types of advance directives  · Medical directive: This covers the care that you want if you are in a coma, near death, or unable to make decisions for yourself  You can list the treatments you want for each condition  Treatment may include pain medicine, surgery, blood transfusions, dialysis, IV or tube feedings, and a ventilator (breathing machine)  · Values history: This document has questions about your views, beliefs, and how you feel and think about life  This information can help others choose the care that you would choose  Why are advance directives important? An advance directive helps you control your care  Although spoken wishes may be used, it is better to have your wishes written down  Spoken wishes can be misunderstood, or not followed  Treatments may be given even if you do not want them  An advance directive may make it easier for your family to make difficult choices about your care  Weight Management   Why it is important to manage your weight:  Being overweight increases your risk of health conditions such as heart disease, high blood pressure, type 2 diabetes, and certain types of cancer   It can also increase your risk for osteoarthritis, sleep apnea, and other respiratory problems  Aim for a slow, steady weight loss  Even a small amount of weight loss can lower your risk of health problems  How to lose weight safely:  A safe and healthy way to lose weight is to eat fewer calories and get regular exercise  You can lose up about 1 pound a week by decreasing the number of calories you eat by 500 calories each day  Healthy meal plan for weight management:  A healthy meal plan includes a variety of foods, contains fewer calories, and helps you stay healthy  A healthy meal plan includes the following:  · Eat whole-grain foods more often  A healthy meal plan should contain fiber  Fiber is the part of grains, fruits, and vegetables that is not broken down by your body  Whole-grain foods are healthy and provide extra fiber in your diet  Some examples of whole-grain foods are whole-wheat breads and pastas, oatmeal, brown rice, and bulgur  · Eat a variety of vegetables every day  Include dark, leafy greens such as spinach, kale, christina greens, and mustard greens  Eat yellow and orange vegetables such as carrots, sweet potatoes, and winter squash  · Eat a variety of fruits every day  Choose fresh or canned fruit (canned in its own juice or light syrup) instead of juice  Fruit juice has very little or no fiber  · Eat low-fat dairy foods  Drink fat-free (skim) milk or 1% milk  Eat fat-free yogurt and low-fat cottage cheese  Try low-fat cheeses such as mozzarella and other reduced-fat cheeses  · Choose meat and other protein foods that are low in fat  Choose beans or other legumes such as split peas or lentils  Choose fish, skinless poultry (chicken or turkey), or lean cuts of red meat (beef or pork)  Before you cook meat or poultry, cut off any visible fat  · Use less fat and oil  Try baking foods instead of frying them  Add less fat, such as margarine, sour cream, regular salad dressing and mayonnaise to foods  Eat fewer high-fat foods   Some examples of high-fat foods include french fries, doughnuts, ice cream, and cakes  · Eat fewer sweets  Limit foods and drinks that are high in sugar  This includes candy, cookies, regular soda, and sweetened drinks  Exercise:  Exercise at least 30 minutes per day on most days of the week  Some examples of exercise include walking, biking, dancing, and swimming  You can also fit in more physical activity by taking the stairs instead of the elevator or parking farther away from stores  Ask your healthcare provider about the best exercise plan for you  © Copyright [employer] 2018 Information is for End User's use only and may not be sold, redistributed or otherwise used for commercial purposes   All illustrations and images included in CareNotes® are the copyrighted property of A [employer] A M , [employer]  or [address] [place] [address]

## 2021-07-19 NOTE — ASSESSMENT & PLAN NOTE
Lab Results   Component Value Date    EGFR 68 01/14/2021    EGFR 70 06/04/2020    EGFR 64 03/24/2020    CREATININE 0 82 01/14/2021    CREATININE 0 80 06/04/2020    CREATININE 0 86 03/24/2020    most recent kidney function is stable  Updated CMP ordered today   Avoid NSAIDs, avoid nephrotoxic agents

## 2021-07-19 NOTE — PROGRESS NOTES
Answers for HPI/ROS submitted by the patient on 7/14/2021  How would you rate your overall health?: fair  Compared to last year, how is your physical health?: slightly better  In general, how satisfied are you with your life?: satisfied  Compared to last year, how is your eyesight?: same  Compared to last year, how is your hearing?: same  Compared to last year, how is your emotional/mental health?: much better  How often is anger a problem for you?: never, rarely  How often do you feel unusually tired/fatigued?: sometimes  In the past 7 days, how much pain have you experienced?: some  If you answered "some" or "a lot", please rate the severity of your pain on a scale of 1 to 10 (1 being the least severe pain and 10 being the most intense pain)  : 3/10  In the past 6 months, have you lost or gained 10 pounds without trying?: No  One or more falls in the last year: No  In the past 6 months, have you accidentally leaked urine?: No  Do you have trouble with the stairs inside or outside your home?: No  Does your home have working smoke alarms?: Yes  Does your home have a carbon monoxide monitor?: Yes  Which safety hazards (if any) have you experienced in your home? Please select all that apply : none  How would you describe your current diet?  Please select all that apply : Regular, No Added Salt  In addition to prescription medications, are you taking any over-the-counter supplements?: Yes  If yes, what supplements are you taking?: Vitamins and calcium  Can you manage your medications?: Yes  Are you currently taking any opioid medications?: No  Can you walk and transfer into and out of your bed and chair?: Yes  Can you dress and groom yourself?: Yes  Can you bathe or shower yourself?: Yes  Can you feed yourself?: Yes  Can you do your laundry/ housekeeping?: Yes  Can you manage your money, pay your bills, and track your expenses?: Yes  Can you make your own meals?: Yes  Can you do your own shopping?: Yes  Within the last 12 months, have you had any hospitalizations or Emergency Department visits?: Yes  If yes, how many times have you been hospitalized within the past year?: 1-2  Additional Comments: not admitted for back and leg pain  Do you have a living will?: No  Do you have a Durable POA (Power of ) for healthcare decisions?: No  Do you have an Advanced Directive for end of life decisions?: No  How often have you used an illegal drug (including marijuana) or a prescription medication for non-medical reasons in the past year?: never  What is the typical number of drinks you consume in a day?: 0  What is the typical number of drinks you consume in a week?: 0  How often did you have a drink containing alcohol in the past year?: never  How many drinks did you have on a typical day  when you were drinking in the past year?: never  How often did you have 6 or more drinks on one occasion in the past year?: never    Assessment/Plan:      Hypothyroidism  Last TSH low at 0 011, free T4 normal at 1 17  Levothyroxine was decreased from 137 mcg to 112 mcg daily  Repeat TSH with reflex to free T4 is overdue and I reminded Nereyda Tinajero of this today    Type 2 diabetes mellitus with diabetic cataract ([facility] [address] )    Lab Results   Component Value Date    HGBA1C 5 9 07/19/2021     Well controlled via dietary changes  Overdue for a diabetic eye exam and I did remind her of this today     Chronic combined systolic (congestive) and diastolic (congestive) heart failure ([facility] [address] )  Wt Readings from Last 3 Encounters:   07/19/21 81 2 kg (179 lb)   03/29/21 87 3 kg (192 lb 8 oz)   04/13/21 85 7 kg (189 lb)      managed by Cardiology, Dr Deshaun Fink   Her Bumex was just increased 2 days last week when her OptiVol revealed increased fluid levels   And notes she did not feel any significant changes with the increased dosage of Bumex  She denies any shortness of breath on exertion, orthopnea or worsening edema  Continue current medication regimen including Bumex, Entresto, hydralazine, Toprol XL, potassium supplement  Follow fluid restriction, weigh self daily, low sodium diet   follow-up with cardiology as scheduled in September or sooner if needed        Hypertension   Stable, managed by Cardiology   Continue current medication regimen   Follow low sodium diet     Chronic kidney disease, stage 2 (mild)  Lab Results   Component Value Date    EGFR 68 01/14/2021    EGFR 70 06/04/2020    EGFR 64 03/24/2020    CREATININE 0 82 01/14/2021    CREATININE 0 80 06/04/2020    CREATININE 0 86 03/24/2020    most recent kidney function is stable  Updated CMP ordered today   Avoid NSAIDs, avoid nephrotoxic agents    Depression with anxiety   Stable, continue Effexor    Hyperlipidemia  Pt refuses statin therapy  Lifestyle modifications encouraged     Obesity (BMI 30-39  9)   Encouraged lifestyle modifications       Diagnoses and all orders for this visit:    Type 2 diabetes mellitus with diabetic cataract, without long-term current use of insulin (HCC)  -     POCT hemoglobin A1c  -     Comprehensive metabolic panel; Future  -     CBC and differential; Future    Medicare annual wellness visit, subsequent    Acute right-sided low back pain with right-sided sciatica    Hypertension, unspecified type  -     Comprehensive metabolic panel; Future  -     CBC and differential; Future    Pure hypercholesterolemia  -     Comprehensive metabolic panel; Future  -     CBC and differential; Future    Hypothyroidism, unspecified type    Chronic combined systolic (congestive) and diastolic (congestive) heart failure (HCC)    Chronic kidney disease, stage 2 (mild)    Depression with anxiety    Obesity (BMI 30-39  9)    Other orders  -     Potassium Chloride ER 20 MEQ TBCR; TAKE 2 TABLETS (40 MEQ TOTAL) BY MOUTH 2 (TWO) TIMES A DAY  -     calcium carbonate (OS-PAULA) 600 MG tablet; Take 600 mg by mouth 2 (two) times a day with meals          Subjective:      Patient ID: Gavi Rehman is a [de-identified] y o  female      HPI Pt presents by herself today for a routine follow up, AWV and enhanced visit       Pt currently follows with Cardiology for Chronic HFrEF stage C, NYHA III, pulmonary hypertension, HTN, HLD, Afib- last evaluated 3/29/21  At that time, her Donneta Fried was doubled to 49/51 mg BID  A Sleep study was ordered as hypoxia was suspected to be contributing to her [address]  Nereyda Tinajero has no intention of doing this sleep study  She is aware she has CORAZON and refuses CPAP  Of note, she was asked to take an extra dose of Bumex for two days as she was having some increased SOB and her Optivol showed increased fluid levels (7/13/21 via Dr Kin Hudson)  She denies edema  She does note some occasional SOB but feels this is related to stress  Denies SOB on exertion, no orthopnea  She does try to follow a low sodium diet  She does not weigh herself regularly as she once did      Depression and anxiety- continues on Effexor and feels stable on this   She continues to work 5 days per week which she enjoys as it keeps her busy     DMT2- A1C at 5 9, previously at 6 7  Controlled via diet and she has cut back on the amount of pasta she eats  Still drinks chocolate mild regularly     Hypothyroidism- last TSH low at 0 011, free T4 normal at 1 17  Levothyroxine was decreased from 137 mcg to 112 mcg daily  She did not have her repeat TSH that was ordered for 6 weeks later    The following portions of the patient's history were reviewed and updated as appropriate: allergies, current medications, past family history, past medical history, past social history, past surgical history and problem list     Review of Systems   Constitutional: Negative for chills and fever  HENT: Negative for ear pain and sore throat  Eyes: Negative for pain and visual disturbance  Respiratory: Positive for shortness of breath  Negative for cough and stridor  Cardiovascular: Negative for chest pain and palpitations     Gastrointestinal: Negative for abdominal pain and vomiting  Genitourinary: Negative for dysuria and hematuria  Musculoskeletal: Positive for arthralgias  Negative for back pain  Skin: Negative for color change and rash  Neurological: Negative for seizures and syncope  Hematological: Negative for adenopathy  Bruises/bleeds easily  Psychiatric/Behavioral: Positive for dysphoric mood  Negative for self-injury, sleep disturbance and suicidal ideas  The patient is nervous/anxious  All other systems reviewed and are negative  Objective:      /62   Pulse 92   Temp 98 7 °F (37 1 °C) (Tympanic)   Resp 16   Ht 5' 1" (1 549 m)   Wt 81 2 kg (179 lb)   BMI 33 82 kg/m²          Physical Exam  Constitutional:       General: She is not in acute distress  Appearance: She is well-developed  She is obese  She is not ill-appearing, toxic-appearing or diaphoretic  HENT:      Head: Normocephalic and atraumatic  Eyes:      Extraocular Movements: Extraocular movements intact  Conjunctiva/sclera: Conjunctivae normal       Pupils: Pupils are equal, round, and reactive to light  Neck:      Thyroid: No thyromegaly  Vascular: No carotid bruit  Cardiovascular:      Rate and Rhythm: Normal rate and regular rhythm  Pulses: no weak pulses          Dorsalis pedis pulses are 2+ on the right side and 2+ on the left side  Heart sounds: Normal heart sounds  No murmur heard  Pulmonary:      Effort: Pulmonary effort is normal  No respiratory distress  Breath sounds: Normal breath sounds  No wheezing  Abdominal:      General: Bowel sounds are normal  There is no distension  Palpations: Abdomen is soft  Tenderness: There is no abdominal tenderness  Musculoskeletal:         General: Normal range of motion  Cervical back: Normal range of motion and neck supple  No rigidity or tenderness  Right lower leg: No edema  Left lower leg: No edema  Feet:      Right foot:      Skin integrity: Dry skin present  No ulcer, skin breakdown, erythema, warmth or callus  Left foot:      Skin integrity: Dry skin present  No ulcer, skin breakdown, erythema, warmth or callus  Lymphadenopathy:      Cervical: No cervical adenopathy  Skin:     General: Skin is warm and dry  Neurological:      General: No focal deficit present  Mental Status: She is alert and oriented to person, place, and time  Psychiatric:         Mood and Affect: Mood normal          Behavior: Behavior normal          Thought Content: Thought content normal          Judgment: Judgment normal        Patient's shoes and socks removed  Right Foot/Ankle   Right Foot Inspection  Skin Exam: skin normal, skin intact and dry skin no warmth, no callus, no erythema, no maceration, no abnormal color, no pre-ulcer, no ulcer and no callus                          Toe Exam: ROM and strength within normal limits  Sensory   Vibration: intact    Monofilament testing: intact  Vascular  Capillary refills: < 3 seconds  The right DP pulse is 2+  Left Foot/Ankle  Left Foot Inspection  Skin Exam: skin normal, skin intact and dry skinno warmth, no erythema, no maceration, normal color, no pre-ulcer, no ulcer and no callus                         Toe Exam: ROM and strength within normal limits                   Sensory   Vibration: intact    Monofilament: intact  Vascular  Capillary refills: < 3 seconds  The left DP pulse is 2+  Assign Risk Category:  No deformity present; No loss of protective sensation;  No weak pulses       Risk: 0

## 2021-07-19 NOTE — ASSESSMENT & PLAN NOTE
Lab Results   Component Value Date    HGBA1C 5 9 07/19/2021     Well controlled via dietary changes  Overdue for a diabetic eye exam and I did remind her of this today

## 2021-07-19 NOTE — ASSESSMENT & PLAN NOTE
Last TSH low at 0 011, free T4 normal at 1 17  Levothyroxine was decreased from 137 mcg to 112 mcg daily  Repeat TSH with reflex to free T4 is overdue and I reminded Valentín Mccullough of this today

## 2021-08-31 NOTE — PROGRESS NOTES
Heart Failure Outpatient Progress Note - Vega Guzman 80 y o  female MRN: [id number]    @ Encounter: [id number]      Assessment/Plan:    Patient Active Problem List    Diagnosis Date Noted    Hypertensive heart and chronic kidney disease with heart failure and stage 1 through stage 4 chronic kidney disease, or unspecified chronic kidney disease ([place] [address] ) 04/15/2021    Type 2 diabetes mellitus with diabetic chronic kidney disease ([place] [address] ) 04/15/2021    Chronic kidney disease, stage 2 (mild) 04/15/2021    Morbid (severe) obesity due to excess calories ([place] [address] ) 04/15/2021    Restless leg syndrome 01/27/2020    Debility 07/19/2019    Chronic combined systolic (congestive) and diastolic (congestive) heart failure ([place] [address] ) 07/15/2019    Ambulatory dysfunction 07/12/2019    Type 2 diabetes mellitus with diabetic cataract ([place] [address] ) 10/07/2015    Depression with anxiety 08/15/2012    Hyperlipidemia 08/15/2012    Hypertension 08/15/2012    Hypothyroidism 08/15/2012    Obesity (BMI 30-39 9) 08/15/2012    Osteoarthritis 08/15/2012       # Chronic HFimpEF w/ partial recovery, Stage C, NYHA III  Etiology: NICM/tachy-mediated given h/o AF with RVR , +/- ischemia given patient's risk factors for CAD, strong family history, and septal Q waves on EKG with severe septal hypokinesis on echo, less likely viral, toxin induced or infiltrative   S/P cardiogenic shock in past  Now S/P AVJ ablation with BiV AICD implant with no high rate episodes  Weight: 192 lbs, down to 181 lbs  NT proBNP: 1/14/21: 716  6/2/20: 1089    Studies- personally reviewed by myself  Echo 11/20/20  LVEF: 60%  RV: mildly dilated, mildly reduced  PASP: 60 mmHg  RVOT: no notching    TTE completed on 03/16/2020 (not euvolemic): LVEF 30%  LVIDd 5 44 cm  Moderately dilated RV with moderately to markedly reduced RVSF  NAN  Moderate MR  Mild to moderate TR  Dilated IVC                Echocardiogram (limited) from 07/04/2019:  LVEF: 45%; mild diffuse hypokinesis  LVIDd: 3 8 cm  MR: No MR  Moderate annular calcification  Other: Dilated LA      Echocardiogram from 06/20/2019:  LVEF: 25%  LVIDd: 4 6 cm  RV: Dilated and hypokinetic  MR: Moderate      Neurohormonal Blockade:  --Beta Blocker: metoprolol succinate 100 mg Q12  --ACEi, ARB or ARNi: Entresto 49/51 mg BID    --SVR reduction: hydralazine 10 mg TID  --Aldosterone Receptor Blocker: Renal function precludes  -- Diuretic : Bumex 4 mg once daily     Sudden Cardiac Death Risk Reduction:  Since been DC    --ICD: MDT DC HIS ICD  Interrogation 7/8/21:  99%, optivol crossed since 4/2021  Interrogation 1/5/21:  99 9%, 3 VT episodes, longest 7 beats 186 bpm, optivol normal     Electrical Resynchronization:  --Interrogation: Narrow QRS     Advanced Therapies (if appropriate): --Inotrope: N/A  --LVAD/Transplant Candidacy: Will continue to monitor      # Pulmonary Hypertension, WHO group 2 from now HFpEF and likely significant CORAZON, untreated (Group 3)  Stressed polysomnogram, continued volume management    # PAF w/ hx AF with RVR   S/p AV node ablation with CRT-D with Dr Jeremy Carvajal 03/18/2020  Anticoagulation on Eliquis    Rate: metoprolol succinate 100 mg Q12    # HTN  Controlled       # HLD  On statin therapy      # Hypothyroidism  History of radioablation in the past now on replacement therapy   TSH stable     # Type II DM        # CORAZON  Noncompliant with CPAP in the past    Still has not complete sleep study     # CKD   Renal function stable last checked  Repeating BMP now       # History of transaminitis in setting of cardiogenic shock     #  Abnormal EKG  Q waves noted in septal leads concerning for possible old infarct   Lexiscan stress to eval for underlying ischemia still not completed      # H/O tachy-oniel syndrome       #  History of alcohol abuse   Has been abstinent for years    TODAY'S PLAN:  PA pressures high on latest echo with improved EF  Continue current diuretic for combined systolic and diastolic HF  I ordered sleep study as I do suspect hypoxia is contributing to her [place]- she has yet to do  Discussed weight management  --2g sodium diet  - Daily weights    HPI:      81 yo female who follows for chronic BiV heart failure, afib, HTN, HLD, CORAZON, DM2  Back in June 2019 admitted with afib with RVR and decompensated heart failure  Had not had a cardiac hx  Echo showed EF: 25-30%, underwent EBEN/ CV but went back into afib  Started on amiodarone load  Did have junctional rhythm immediate post conversion and showing signs of tachy oniel syndrome  Given these findings, PPM with possible AICD was suggested with cardioversion thereafter          On 6/25 patient was taken to the cath lab for planned dual chamber AICD but apparently became hypotensive and markedly SOB upon induction with propofol  She was subsequently transferred to the ICU for further management of her heart failure  She continued to be in [address] upon transfer  Through the night, patient's condition began to worsen and she became cool, clammy with N/V and loss of obtainable BP  Started on pressor support and lined  SVO2 at that point 32% with signs of lactic acidosis, WANG  Then started on milrinone gtt at 0 25 mcg/kg/min  She was able to be weaned off inotropic support  She saw Dr Bambi Ornelas in follow up and her afib rates were high so amio stopped and focused on rate control with increase in Toprol to 75 mg BID  Admitted to [facility] from 03/11 to 03/24/2020 after presenting (as the recommendation of many of her providers) with bilateral LE edema and weight gain  In ED was found to have NT-proBNP of 4600 and be in atrial fibrillation with RVR (rates in 150s)  She was then started on IV diltiazem and received IV Lasix  Diltiazem was later stopped and amiodarone drip was started  Switched from IV Lasix to IV Bumex on 03/41  EP was consulted   TTE revealed LVEF 30% and decision was made to proceed with AV node ablation and CRT-D implantation once optimized  HF team recommended her being discharged home on Bumex 4 mg BID with PRN metolazone; however, she was discharged home on PO Bumex 4 mg daily without PRN metolazone prescribed  Lost 18 lbs during this admission  Last Office visit 10/7/20  PCP felt volume overloaded    Interval History:  Still has not done home sleep study  Interrogation 7/8/21:  99%, optivol crossed since 4/2021  No new symptoms, breathing is ok, weight is down around 10 lbs    Review of Systems   Constitutional: Negative for activity change, appetite change, fatigue and unexpected weight change  HENT: Negative for congestion and nosebleeds  Eyes: Negative  Respiratory: Negative for cough, chest tightness and shortness of breath  Cardiovascular: Negative for chest pain, palpitations and leg swelling  Gastrointestinal: Negative for abdominal distention  Endocrine: Negative  Genitourinary: Negative  Musculoskeletal: Negative  Skin: Negative  Neurological: Negative for dizziness, syncope and weakness  Hematological: Negative  Psychiatric/Behavioral: Negative  Past Medical History:   Diagnosis Date    Cardiogenic shock ([facility] [address] ) 6/26/2019    CHF (congestive heart failure) (HCC)     Eczema     Photosensitivity     abnormal skin sensitivity to sunlight    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HCC)     staging         Allergies   Allergen Reactions    Penicillins     Wellbutrin Sr  [Bupropion]      Other reaction(s): Suicidal ideations  Category: Adverse Reaction;            Current Outpatient Medications:     Ascorbic Acid (vitamin C) 1000 MG tablet, Take 1,000 mg by mouth 2 (two) times a day , Disp: , Rfl:     bumetanide (BUMEX) 2 mg tablet, TAKE 2 TABLETS (4 MG TOTAL) BY MOUTH DAILY, Disp: 180 tablet, Rfl: 0    calcium carbonate (OS-PAULA) 600 MG tablet, Take 600 mg by mouth 2 (two) times a day with meals, Disp: , Rfl:     cholecalciferol (VITAMIN D3) 1,000 units tablet, Take 1,000 Units by mouth daily 8,000 dailty, Disp: , Rfl:     Eliquis 5 MG, TAKE 1 TABLET (5 MG TOTAL) BY MOUTH 2 (TWO) TIMES A DAY, Disp: 60 tablet, Rfl: 11    Entresto 49-51 MG TABS, TAKE 1 TABLET BY MOUTH 2 (TWO) TIMES A DAY, Disp: 60 tablet, Rfl: 3    hydrALAZINE (APRESOLINE) 10 mg tablet, TAKE 1 TABLET (10 MG TOTAL) BY MOUTH EVERY 8 (EIGHT) HOURS, Disp: 90 tablet, Rfl: 5    levothyroxine 112 mcg tablet, TAKE 1 TABLET (112 MCG TOTAL) BY MOUTH DAILY IN THE EARLY MORNING, Disp: 30 tablet, Rfl: 5    MAGNESIUM PO, Take 1,000 mg by mouth daily , Disp: , Rfl:     metoprolol succinate (TOPROL-XL) 50 mg 24 hr tablet, Take 2 tablets (100 mg total) by mouth every 12 (twelve) hours, Disp: 120 tablet, Rfl: 5    potassium chloride (K-DUR,KLOR-CON) 20 mEq tablet, TAKE 2 TABLETS (40 MEQ TOTAL) BY MOUTH 2 (TWO) TIMES A DAY, Disp: 120 tablet, Rfl: 5    Potassium Chloride ER 20 MEQ TBCR, TAKE 2 TABLETS (40 MEQ TOTAL) BY MOUTH 2 (TWO) TIMES A DAY, Disp: , Rfl:     rOPINIRole (REQUIP XL) 2 MG 24 hr tablet, TAKE 1 TABLET (2 MG TOTAL) BY MOUTH DAILY AT BEDTIME, Disp: 30 tablet, Rfl: 5    traMADol (ULTRAM) 50 mg tablet, TAKE 1 TABLET TWICE A DAY BY ORAL ROUTE AS NEEDED , Disp: 60 tablet, Rfl: 0    venlafaxine (EFFEXOR) 75 mg tablet, TAKE 1 TABLET (75 MG TOTAL) BY MOUTH EVERY 12 (TWELVE) HOURS, Disp: 60 tablet, Rfl: 5    Social History     Socioeconomic History    Marital status: Single     Spouse name: Not on file    Number of children: Not on file    Years of education: Not on file    Highest education level: Not on file   Occupational History    Not on file   Tobacco Use    Smoking status: Never Smoker    Smokeless tobacco: Never Used   Vaping Use    Vaping Use: Never used   Substance and Sexual Activity    Alcohol use: Not Currently    Drug use: Never    Sexual activity: Not Currently   Other Topics Concern    Not on file   Social History Narrative    Not on file     Social Determinants of Health     Financial Resource Strain:     Difficulty of Paying Living Expenses:    Food Insecurity:     Worried About Running Out of Food in the Last Year:     [address] in the Last Year:    Transportation Needs:     Lack of Transportation (Medical):  Lack of Transportation (Non-Medical):    Physical Activity:     Days of Exercise per Week:     Minutes of Exercise per Session:    Stress:     Feeling of Stress :    Social Connections:     Frequency of Communication with Friends and Family:     Frequency of Social Gatherings with Friends and Family:     Attends [employer] Services:     Active Member of Clubs or Organizations:     Attends Club or Organization Meetings:     Marital Status:    Intimate Partner Violence:     Fear of Current or Ex-Partner:     Emotionally Abused:     Physically Abused:     Sexually Abused:        Family History   Problem Relation Age of Onset    Alzheimer's disease Mother     Coronary artery disease Mother     Dementia Mother     Diabetes Mother         mellitus    Other Father         acute myocardial infarction    Coronary artery disease Sister     Other Maternal Grandfather         laryngeal cancer    Dementia Maternal Aunt     Diabetes Maternal Aunt        Physical Exam:    Vitals: There were no vitals filed for this visit  Physical Exam  Constitutional:       Appearance: She is well-developed  HENT:      Head: Normocephalic and atraumatic  Eyes:      Pupils: Pupils are equal, round, and reactive to light  Neck:      Vascular: No JVD  Cardiovascular:      Rate and Rhythm: Normal rate and regular rhythm  Heart sounds: No murmur heard  Pulmonary:      Effort: Pulmonary effort is normal  No respiratory distress  Breath sounds: Normal breath sounds  Abdominal:      General: There is no distension  Palpations: Abdomen is soft  Tenderness: There is no abdominal tenderness  Musculoskeletal:         General: Normal range of motion        Cervical back: Normal range of motion  Skin:     General: Skin is warm and dry  Findings: No rash  Neurological:      Mental Status: She is alert and oriented to person, place, and time  Labs & Results:    Lab Results   Component Value Date    SODIUM 137 01/14/2021    K 4 1 01/14/2021     01/14/2021    CO2 31 01/14/2021    BUN 17 01/14/2021    CREATININE 0 82 01/14/2021    GLUC 92 06/04/2020    CALCIUM 9 8 01/14/2021     Lab Results   Component Value Date    WBC 7 05 01/14/2021    HGB 12 8 01/14/2021    HCT 39 7 01/14/2021    MCV 98 01/14/2021     01/14/2021     Lab Results   Component Value Date    NTBNP 716 (H) 01/14/2021      Lab Results   Component Value Date    CHOLESTEROL 112 03/12/2020    CHOLESTEROL 177 03/18/2019     Lab Results   Component Value Date    HDL 38 (L) 03/12/2020    HDL 66 03/18/2019    HDL 63 02/07/2017     Lab Results   Component Value Date    TRIG 48 03/12/2020    TRIG 82 03/18/2019    TRIG 106 02/07/2017     Lab Results   Component Value Date    [place] 74 03/12/2020    [place] 106 02/07/2017    [place] 111 11/13/2014       EKG personally reviewed by Cyndee Wray DO  Counseling / Coordination of Care  Time spent today 25 minutes  Greater than 50% of total time was spent with the patient and / or family counseling and / or coordination of care  We went over current diagnosis, most recent studies and any changes in treatment  Thank you for the opportunity to participate in the care of this patient      [address] [facility] PULMONARY [facility]YPERTENSION  MEDICAL DIRECTOR OF [place]

## 2021-09-01 ENCOUNTER — OFFICE VISIT (OUTPATIENT)
Dept: CARDIOLOGY CLINIC | Facility: CLINIC | Age: 81
End: 2021-09-01
Payer: COMMERCIAL

## 2021-09-01 VITALS
SYSTOLIC BLOOD PRESSURE: 110 MMHG | WEIGHT: 181.2 LBS | HEART RATE: 83 BPM | HEIGHT: 61 IN | DIASTOLIC BLOOD PRESSURE: 50 MMHG | OXYGEN SATURATION: 98 % | BODY MASS INDEX: 34.21 KG/M2

## 2021-09-01 DIAGNOSIS — I50.42 CHRONIC COMBINED SYSTOLIC (CONGESTIVE) AND DIASTOLIC (CONGESTIVE) HEART FAILURE (HCC): ICD-10-CM

## 2021-09-01 DIAGNOSIS — I13.0 HYPERTENSIVE HEART AND CHRONIC KIDNEY DISEASE WITH HEART FAILURE AND STAGE 1 THROUGH STAGE 4 CHRONIC KIDNEY DISEASE, OR UNSPECIFIED CHRONIC KIDNEY DISEASE (HCC): Primary | ICD-10-CM

## 2021-09-01 DIAGNOSIS — E78.00 PURE HYPERCHOLESTEROLEMIA: ICD-10-CM

## 2021-09-01 PROCEDURE — 3074F SYST BP LT 130 MM HG: CPT | Performed by: INTERNAL MEDICINE

## 2021-09-01 PROCEDURE — 99214 OFFICE O/P EST MOD 30 MIN: CPT | Performed by: INTERNAL MEDICINE

## 2021-09-01 PROCEDURE — 3078F DIAST BP <80 MM HG: CPT | Performed by: INTERNAL MEDICINE

## 2021-09-01 PROCEDURE — 1160F RVW MEDS BY RX/DR IN RCRD: CPT | Performed by: INTERNAL MEDICINE

## 2021-10-06 ENCOUNTER — APPOINTMENT (OUTPATIENT)
Dept: LAB | Facility: CLINIC | Age: 81
End: 2021-10-06
Payer: COMMERCIAL

## 2021-10-06 DIAGNOSIS — I10 HYPERTENSION, UNSPECIFIED TYPE: ICD-10-CM

## 2021-10-06 DIAGNOSIS — E03.9 HYPOTHYROIDISM, UNSPECIFIED TYPE: ICD-10-CM

## 2021-10-06 DIAGNOSIS — E78.00 PURE HYPERCHOLESTEROLEMIA: ICD-10-CM

## 2021-10-06 DIAGNOSIS — E11.36 TYPE 2 DIABETES MELLITUS WITH DIABETIC CATARACT, WITHOUT LONG-TERM CURRENT USE OF INSULIN (HCC): ICD-10-CM

## 2021-10-06 LAB
ALBUMIN SERPL BCP-MCNC: 3.5 G/DL (ref 3.5–5)
ALP SERPL-CCNC: 68 U/L (ref 46–116)
ALT SERPL W P-5'-P-CCNC: 19 U/L (ref 12–78)
ANION GAP SERPL CALCULATED.3IONS-SCNC: 3 MMOL/L (ref 4–13)
AST SERPL W P-5'-P-CCNC: 14 U/L (ref 5–45)
BASOPHILS # BLD AUTO: 0.05 THOUSANDS/ΜL (ref 0–0.1)
BASOPHILS NFR BLD AUTO: 1 % (ref 0–1)
BILIRUB SERPL-MCNC: 0.48 MG/DL (ref 0.2–1)
BUN SERPL-MCNC: 18 MG/DL (ref 5–25)
CALCIUM SERPL-MCNC: 9.6 MG/DL (ref 8.3–10.1)
CHLORIDE SERPL-SCNC: 104 MMOL/L (ref 100–108)
CO2 SERPL-SCNC: 31 MMOL/L (ref 21–32)
CREAT SERPL-MCNC: 0.86 MG/DL (ref 0.6–1.3)
EOSINOPHIL # BLD AUTO: 0.19 THOUSAND/ΜL (ref 0–0.61)
EOSINOPHIL NFR BLD AUTO: 3 % (ref 0–6)
ERYTHROCYTE [DISTWIDTH] IN BLOOD BY AUTOMATED COUNT: 12.5 % (ref 11.6–15.1)
GFR SERPL CREATININE-BSD FRML MDRD: 64 ML/MIN/1.73SQ M
GLUCOSE P FAST SERPL-MCNC: 120 MG/DL (ref 65–99)
HCT VFR BLD AUTO: 41.4 % (ref 34.8–46.1)
HGB BLD-MCNC: 13.3 G/DL (ref 11.5–15.4)
IMM GRANULOCYTES # BLD AUTO: 0.01 THOUSAND/UL (ref 0–0.2)
IMM GRANULOCYTES NFR BLD AUTO: 0 % (ref 0–2)
LYMPHOCYTES # BLD AUTO: 2.48 THOUSANDS/ΜL (ref 0.6–4.47)
LYMPHOCYTES NFR BLD AUTO: 36 % (ref 14–44)
MCH RBC QN AUTO: 32.5 PG (ref 26.8–34.3)
MCHC RBC AUTO-ENTMCNC: 32.1 G/DL (ref 31.4–37.4)
MCV RBC AUTO: 101 FL (ref 82–98)
MONOCYTES # BLD AUTO: 0.44 THOUSAND/ΜL (ref 0.17–1.22)
MONOCYTES NFR BLD AUTO: 6 % (ref 4–12)
NEUTROPHILS # BLD AUTO: 3.72 THOUSANDS/ΜL (ref 1.85–7.62)
NEUTS SEG NFR BLD AUTO: 54 % (ref 43–75)
NRBC BLD AUTO-RTO: 0 /100 WBCS
PLATELET # BLD AUTO: 249 THOUSANDS/UL (ref 149–390)
PMV BLD AUTO: 10 FL (ref 8.9–12.7)
POTASSIUM SERPL-SCNC: 4.3 MMOL/L (ref 3.5–5.3)
PROT SERPL-MCNC: 7.8 G/DL (ref 6.4–8.2)
RBC # BLD AUTO: 4.09 MILLION/UL (ref 3.81–5.12)
SODIUM SERPL-SCNC: 138 MMOL/L (ref 136–145)
T4 FREE SERPL-MCNC: 1.04 NG/DL (ref 0.76–1.46)
TSH SERPL DL<=0.05 MIU/L-ACNC: 0.32 UIU/ML (ref 0.36–3.74)
WBC # BLD AUTO: 6.89 THOUSAND/UL (ref 4.31–10.16)

## 2021-10-06 PROCEDURE — 85025 COMPLETE CBC W/AUTO DIFF WBC: CPT

## 2021-10-06 PROCEDURE — 80053 COMPREHEN METABOLIC PANEL: CPT

## 2021-10-06 PROCEDURE — 84443 ASSAY THYROID STIM HORMONE: CPT

## 2021-10-06 PROCEDURE — 36415 COLL VENOUS BLD VENIPUNCTURE: CPT

## 2021-10-06 PROCEDURE — 84439 ASSAY OF FREE THYROXINE: CPT

## 2021-10-07 ENCOUNTER — TELEPHONE (OUTPATIENT)
Dept: FAMILY MEDICINE CLINIC | Facility: CLINIC | Age: 81
End: 2021-10-07

## 2021-10-07 ENCOUNTER — REMOTE DEVICE CLINIC VISIT (OUTPATIENT)
Dept: CARDIOLOGY CLINIC | Facility: CLINIC | Age: 81
End: 2021-10-07
Payer: COMMERCIAL

## 2021-10-07 DIAGNOSIS — Z95.810 AICD (AUTOMATIC CARDIOVERTER/DEFIBRILLATOR) PRESENT: Primary | ICD-10-CM

## 2021-10-07 PROCEDURE — 93295 DEV INTERROG REMOTE 1/2/MLT: CPT | Performed by: INTERNAL MEDICINE

## 2021-10-07 PROCEDURE — 93296 REM INTERROG EVL PM/IDS: CPT | Performed by: INTERNAL MEDICINE

## 2021-11-08 DIAGNOSIS — I50.42 CHRONIC COMBINED SYSTOLIC (CONGESTIVE) AND DIASTOLIC (CONGESTIVE) HEART FAILURE (HCC): ICD-10-CM

## 2021-11-08 RX ORDER — SACUBITRIL AND VALSARTAN 49; 51 MG/1; MG/1
1 TABLET, FILM COATED ORAL 2 TIMES DAILY
Qty: 60 TABLET | Refills: 3 | Status: SHIPPED | OUTPATIENT
Start: 2021-11-08 | End: 2022-04-18

## 2021-11-13 DIAGNOSIS — I50.9 CHF (CONGESTIVE HEART FAILURE) (HCC): ICD-10-CM

## 2021-11-13 DIAGNOSIS — I50.22 CHRONIC HFREF (HEART FAILURE WITH REDUCED EJECTION FRACTION) (HCC): ICD-10-CM

## 2021-11-15 RX ORDER — POTASSIUM CHLORIDE 1500 MG/1
TABLET, FILM COATED, EXTENDED RELEASE ORAL
Qty: 120 TABLET | Refills: 2 | Status: SHIPPED | OUTPATIENT
Start: 2021-11-15 | End: 2022-02-16

## 2021-12-15 DIAGNOSIS — I50.22 CHRONIC HFREF (HEART FAILURE WITH REDUCED EJECTION FRACTION) (HCC): ICD-10-CM

## 2021-12-15 DIAGNOSIS — I50.9 CHF (CONGESTIVE HEART FAILURE) (HCC): ICD-10-CM

## 2021-12-16 RX ORDER — BUMETANIDE 2 MG/1
4 TABLET ORAL DAILY
Qty: 60 TABLET | Refills: 3 | Status: SHIPPED | OUTPATIENT
Start: 2021-12-16 | End: 2022-07-05

## 2021-12-30 DIAGNOSIS — I50.22 CHRONIC HFREF (HEART FAILURE WITH REDUCED EJECTION FRACTION) (HCC): ICD-10-CM

## 2021-12-30 DIAGNOSIS — I10 ESSENTIAL HYPERTENSION: ICD-10-CM

## 2021-12-30 DIAGNOSIS — G25.81 RESTLESS LEG SYNDROME: ICD-10-CM

## 2021-12-30 DIAGNOSIS — I50.9 CHF (CONGESTIVE HEART FAILURE) (HCC): ICD-10-CM

## 2021-12-30 DIAGNOSIS — I48.91 ATRIAL FIBRILLATION, UNSPECIFIED TYPE (HCC): ICD-10-CM

## 2021-12-30 RX ORDER — METOPROLOL SUCCINATE 50 MG/1
100 TABLET, EXTENDED RELEASE ORAL EVERY 12 HOURS SCHEDULED
Qty: 120 TABLET | Refills: 5 | Status: SHIPPED | OUTPATIENT
Start: 2021-12-30

## 2022-01-01 RX ORDER — ROPINIROLE 2 MG/1
2 TABLET, FILM COATED, EXTENDED RELEASE ORAL
Qty: 30 TABLET | Refills: 5 | Status: SHIPPED | OUTPATIENT
Start: 2022-01-01 | End: 2022-06-15

## 2022-01-01 RX ORDER — HYDRALAZINE HYDROCHLORIDE 10 MG/1
10 TABLET, FILM COATED ORAL EVERY 8 HOURS SCHEDULED
Qty: 90 TABLET | Refills: 5 | Status: SHIPPED | OUTPATIENT
Start: 2022-01-01

## 2022-01-06 ENCOUNTER — REMOTE DEVICE CLINIC VISIT (OUTPATIENT)
Dept: CARDIOLOGY CLINIC | Facility: CLINIC | Age: 82
End: 2022-01-06
Payer: COMMERCIAL

## 2022-01-06 DIAGNOSIS — Z95.810 AICD (AUTOMATIC CARDIOVERTER/DEFIBRILLATOR) PRESENT: Primary | ICD-10-CM

## 2022-01-06 PROCEDURE — 93295 DEV INTERROG REMOTE 1/2/MLT: CPT | Performed by: INTERNAL MEDICINE

## 2022-01-06 PROCEDURE — 93296 REM INTERROG EVL PM/IDS: CPT | Performed by: INTERNAL MEDICINE

## 2022-01-06 NOTE — PROGRESS NOTES
Results for orders placed or performed in visit on 01/06/22   Cardiac EP device report    Narrative    MDT-DUAL CHAMBER "HIS" ICD/VVIR MODEACTIVE SYSTEM IS MRI CONDITIONAL  CARELINK TRANSMISSION: BATTERY VOLTAGE ADEQUATE (15 MOS)  BVP>99% (VSR PACE-0 2%)  HIGH RV CAP THRESHOLD  ALL OTHER AVAILABLE LEAD PARAMETERS WITHIN NORMAL LIMITS  NO SIGNIFICANT HIGH RATE EPISODES  OPTI-VOL WITHIN NORMAL LIMITS  WILL BRING PT IN TO CLINIC TO ASSESS RV LEAD  NORMAL DEVICE FUNCTION   GV

## 2022-01-10 ENCOUNTER — IN-CLINIC DEVICE VISIT (OUTPATIENT)
Dept: CARDIOLOGY CLINIC | Facility: CLINIC | Age: 82
End: 2022-01-10
Payer: COMMERCIAL

## 2022-01-10 DIAGNOSIS — Z95.810 PRESENCE OF AUTOMATIC CARDIOVERTER/DEFIBRILLATOR (AICD): Primary | ICD-10-CM

## 2022-01-10 PROCEDURE — 93283 PRGRMG EVAL IMPLANTABLE DFB: CPT | Performed by: INTERNAL MEDICINE

## 2022-01-10 NOTE — PROGRESS NOTES
Results for orders placed or performed in visit on 01/10/22   Cardiac EP device report    Narrative    MDT-DUAL CHAMBER "HIS" ICD/VVIR [address] INTERROGATED IN THE [place] OFFICE/NB-EVALUATE ELEVATED RV-TRHESHOLD  [address]  BATTERY ADEQUATE (3 5 YRS)  % (DEPENDENT)  ALL AVAILABLE LEAD PARAMETERS WITHIN NORMAL LIMITS/RV-THRESHOLD ELEVATED  INCREASE MADE TO RV-THRESHOLD AMPLITUDE TO PROVIDE ADEQUATE SAFETY MARGIN  INCREASE MADE TO RV-PULSE WIDTH TO PROVIDE ADEQUATE SAFETY MARGIN  WILL CONTINUE TO MONITOR REMOTELY  NO NEW EPISODES  PT  TAKES ELIQUIS & METOPROLOL SUCC  OPTI-VOL WITHIN NORMAL LIMITS  NORMAL DEVICE FUNCTION   PL

## 2022-01-15 ENCOUNTER — IMMUNIZATIONS (OUTPATIENT)
Dept: FAMILY MEDICINE CLINIC | Facility: HOSPITAL | Age: 82
End: 2022-01-15

## 2022-01-15 DIAGNOSIS — Z23 ENCOUNTER FOR IMMUNIZATION: Primary | ICD-10-CM

## 2022-01-15 PROCEDURE — 0001A COVID-19 PFIZER VACC 0.3 ML: CPT

## 2022-01-15 PROCEDURE — 91300 COVID-19 PFIZER VACC 0.3 ML: CPT

## 2022-01-17 ENCOUNTER — TELEPHONE (OUTPATIENT)
Dept: CARDIOLOGY CLINIC | Facility: CLINIC | Age: 82
End: 2022-01-17

## 2022-01-17 DIAGNOSIS — M54.41 ACUTE RIGHT-SIDED LOW BACK PAIN WITH RIGHT-SIDED SCIATICA: ICD-10-CM

## 2022-01-17 RX ORDER — TRAMADOL HYDROCHLORIDE 50 MG/1
TABLET ORAL
Qty: 60 TABLET | Refills: 0 | Status: SHIPPED | OUTPATIENT
Start: 2022-01-17 | End: 2022-02-23

## 2022-01-17 NOTE — TELEPHONE ENCOUNTER
Patient called stating she is experiencing exertional SOB since her device interrogation on 1/10  I spoke with Chey Hanna, she said what was done would not cause the patient SOB  Please advise

## 2022-01-27 ENCOUNTER — OFFICE VISIT (OUTPATIENT)
Dept: FAMILY MEDICINE CLINIC | Facility: CLINIC | Age: 82
End: 2022-01-27
Payer: COMMERCIAL

## 2022-01-27 VITALS
SYSTOLIC BLOOD PRESSURE: 120 MMHG | WEIGHT: 181 LBS | TEMPERATURE: 98.6 F | HEART RATE: 92 BPM | DIASTOLIC BLOOD PRESSURE: 68 MMHG | HEIGHT: 61 IN | BODY MASS INDEX: 34.17 KG/M2 | RESPIRATION RATE: 16 BRPM

## 2022-01-27 DIAGNOSIS — E11.22 TYPE 2 DIABETES MELLITUS WITH CHRONIC KIDNEY DISEASE, WITHOUT LONG-TERM CURRENT USE OF INSULIN, UNSPECIFIED CKD STAGE (HCC): Primary | ICD-10-CM

## 2022-01-27 DIAGNOSIS — E03.9 HYPOTHYROIDISM, UNSPECIFIED TYPE: ICD-10-CM

## 2022-01-27 DIAGNOSIS — E11.36 TYPE 2 DIABETES MELLITUS WITH DIABETIC CATARACT, WITHOUT LONG-TERM CURRENT USE OF INSULIN (HCC): ICD-10-CM

## 2022-01-27 DIAGNOSIS — E78.00 PURE HYPERCHOLESTEROLEMIA: ICD-10-CM

## 2022-01-27 DIAGNOSIS — F41.8 DEPRESSION WITH ANXIETY: ICD-10-CM

## 2022-01-27 DIAGNOSIS — I13.0 HYPERTENSIVE HEART AND CHRONIC KIDNEY DISEASE WITH HEART FAILURE AND STAGE 1 THROUGH STAGE 4 CHRONIC KIDNEY DISEASE, OR UNSPECIFIED CHRONIC KIDNEY DISEASE (HCC): ICD-10-CM

## 2022-01-27 DIAGNOSIS — I50.42 CHRONIC COMBINED SYSTOLIC (CONGESTIVE) AND DIASTOLIC (CONGESTIVE) HEART FAILURE (HCC): ICD-10-CM

## 2022-01-27 PROCEDURE — 1036F TOBACCO NON-USER: CPT | Performed by: NURSE PRACTITIONER

## 2022-01-27 PROCEDURE — 1160F RVW MEDS BY RX/DR IN RCRD: CPT | Performed by: NURSE PRACTITIONER

## 2022-01-27 PROCEDURE — 99214 OFFICE O/P EST MOD 30 MIN: CPT | Performed by: NURSE PRACTITIONER

## 2022-01-27 NOTE — PROGRESS NOTES
Assessment/Plan:    DEXA scan: pt declines  Pt also declines the flu vaccine today     Type 2 diabetes mellitus with diabetic cataract ([place] [address] )    Lab Results   Component Value Date    HGBA1C 5 9 07/19/2021     Well controlled via dietary changes  Overdue for a diabetic eye exam and I did remind her of this today     Type 2 diabetes mellitus with diabetic chronic kidney disease ([facility] )    Lab Results   Component Value Date    HGBA1C 5 9 07/19/2021     Diet controlled  Updated lab work ordered and I reminded pt of this today    Hypothyroidism  Currently taking levothyroxine 100mcg daily   Repeat TSH with reflex to free T4 ordered back in October and David Desir does plan to get this done shortly     Hypertensive heart and chronic kidney disease with heart failure and stage 1 through stage 4 chronic kidney disease, or unspecified chronic kidney disease ([place] [address] )  Lab Results   Component Value Date    EGFR 64 10/06/2021    EGFR 68 01/14/2021    EGFR 70 06/04/2020    CREATININE 0 86 10/06/2021    CREATININE 0 82 01/14/2021    CREATININE 0 80 06/04/2020   Stable and managed by Cardiology  Continue current medication regimen    Chronic combined systolic (congestive) and diastolic (congestive) heart failure ([place] [address] )  Wt Readings from Last 3 Encounters:   01/27/22 82 1 kg (181 lb)   09/01/21 82 2 kg (181 lb 3 2 oz)   07/19/21 81 2 kg (179 lb)     Stable, managed by Cardiology  Continue current medication regimen  Appears euvolemic today  Follow a low sodium diet     Hyperlipidemia  Pt refuses statin therapy  Lifestyle modifications encouraged     Depression with anxiety   Stable, continue Effexor       Diagnoses and all orders for this visit:    Type 2 diabetes mellitus with chronic kidney disease, without long-term current use of insulin, unspecified CKD stage ([facility] )    Type 2 diabetes mellitus with diabetic cataract, without long-term current use of insulin ([facility])    Hypothyroidism, unspecified type    Hypertensive heart and chronic kidney disease with heart failure and stage 1 through stage 4 chronic kidney disease, or unspecified chronic kidney disease (HCC)    Chronic combined systolic (congestive) and diastolic (congestive) heart failure ([address] )    Pure hypercholesterolemia    Depression with anxiety          Subjective:      Patient ID: Bolivar Barone is a 80 y o  female  HPI     Pt presents by herself today for a routine follow up     Pt currently follows with Cardiology for Chronic HFrEF stage C, NYHA III, pulmonary hypertension, HTN, HLD, Afib- last evaluated 9/1//21  At that time, her [facility] was doubled to 49/51 mg BID  A Sleep study was ordered as hypoxia was suspected to be contributing to her [place]  Rae Mills has no intention of doing this sleep study  She is aware she has CORAZON and refuses CPAP  Of note, she was asked to take an extra dose of Bumex for two days as she was having some increased SOB and her Optivol showed increased fluid levels (7/13/21 via Dr Mary Jo Green)  She denies edema  She does note some occasional SOB but feels this is related to stress  Denies SOB on exertion, no orthopnea  She does try to follow a low sodium diet  She does not weigh herself regularly as she once did       Depression and anxiety- continues on Effexor and feels stable on this   She continues to work 5 days per week which she enjoys as it keeps her busy      DMT2- A1C at 5 9, previously at 6 7  Controlled via diet and she has cut back on the amount of pasta she eats  Still drinks chocolate mild regularly      Hypothyroidism- last TSH low at 0 315, free T4 normal at 1 04  Levothyroxine was decreased to 100mcg daily   She did not have her repeat TSH that was ordered for 6 weeks later       The following portions of the patient's history were reviewed and updated as appropriate: allergies, current medications, past family history, past medical history, past social history, past surgical history and problem list     Review of Systems   Constitutional: Negative for activity change, appetite change, chills, diaphoresis, fatigue, fever and unexpected weight change  HENT: Negative for congestion, ear pain, hearing loss, postnasal drip, rhinorrhea, sinus pressure and sore throat  Respiratory: Negative for cough, shortness of breath and wheezing  Cardiovascular: Negative for chest pain, palpitations and leg swelling  Gastrointestinal: Negative for abdominal pain, blood in stool, constipation, diarrhea, nausea and vomiting  Musculoskeletal: Negative for arthralgias and myalgias  Skin: Negative for rash and wound  Neurological: Negative for dizziness, weakness, numbness and headaches  Psychiatric/Behavioral: Negative for sleep disturbance and suicidal ideas  Objective:      /68   Pulse 92   Temp 98 6 °F (37 °C) (Tympanic)   Resp 16   Ht 5' 1" (1 549 m)   Wt 82 1 kg (181 lb)   BMI 34 20 kg/m²          Physical Exam  Constitutional:       General: She is not in acute distress  Appearance: She is well-developed  She is not ill-appearing, toxic-appearing or diaphoretic  HENT:      Head: Normocephalic and atraumatic  Eyes:      Extraocular Movements: Extraocular movements intact  Conjunctiva/sclera: Conjunctivae normal       Pupils: Pupils are equal, round, and reactive to light  Neck:      Thyroid: No thyromegaly  Vascular: No carotid bruit  Cardiovascular:      Rate and Rhythm: Normal rate and regular rhythm  Heart sounds: Normal heart sounds  No murmur heard  Pulmonary:      Effort: Pulmonary effort is normal  No respiratory distress  Breath sounds: Normal breath sounds  No wheezing  Abdominal:      General: Bowel sounds are normal  There is no distension  Palpations: Abdomen is soft  Tenderness: There is no abdominal tenderness  Musculoskeletal:         General: Normal range of motion  Cervical back: Normal range of motion and neck supple  No rigidity or tenderness        Right lower leg: No edema  Left lower leg: No edema  Lymphadenopathy:      Cervical: No cervical adenopathy  Skin:     General: Skin is warm and dry  Neurological:      General: No focal deficit present  Mental Status: She is alert and oriented to person, place, and time  Psychiatric:         Mood and Affect: Mood normal          Behavior: Behavior normal          Thought Content:  Thought content normal          Judgment: Judgment normal

## 2022-01-27 NOTE — ASSESSMENT & PLAN NOTE
Wt Readings from Last 3 Encounters:   01/27/22 82 1 kg (181 lb)   09/01/21 82 2 kg (181 lb 3 2 oz)   07/19/21 81 2 kg (179 lb)     Stable, managed by Cardiology  Continue current medication regimen  Appears euvolemic today  Follow a low sodium diet

## 2022-01-27 NOTE — ASSESSMENT & PLAN NOTE
Lab Results   Component Value Date    EGFR 64 10/06/2021    EGFR 68 01/14/2021    EGFR 70 06/04/2020    CREATININE 0 86 10/06/2021    CREATININE 0 82 01/14/2021    CREATININE 0 80 06/04/2020   Stable and managed by Cardiology  Continue current medication regimen

## 2022-01-27 NOTE — ASSESSMENT & PLAN NOTE
Lab Results   Component Value Date    HGBA1C 5 9 07/19/2021     Diet controlled  Updated lab work ordered and I reminded pt of this today

## 2022-01-27 NOTE — ASSESSMENT & PLAN NOTE
Currently taking levothyroxine 100mcg daily   Repeat TSH with reflex to free T4 ordered back in October and Duran Mark does plan to get this done shortly

## 2022-02-16 DIAGNOSIS — I50.22 CHRONIC HFREF (HEART FAILURE WITH REDUCED EJECTION FRACTION) (HCC): ICD-10-CM

## 2022-02-16 DIAGNOSIS — I50.9 CHF (CONGESTIVE HEART FAILURE) (HCC): ICD-10-CM

## 2022-02-16 RX ORDER — POTASSIUM CHLORIDE 1500 MG/1
TABLET, FILM COATED, EXTENDED RELEASE ORAL
Qty: 120 TABLET | Refills: 2 | Status: SHIPPED | OUTPATIENT
Start: 2022-02-16 | End: 2022-05-14 | Stop reason: SDUPTHER

## 2022-03-02 NOTE — PROGRESS NOTES
Heart Failure Outpatient Progress Note - Abby Leone 80 y o  female MRN: [id number]    @ Encounter: [id number]      Assessment/Plan:    Patient Active Problem List    Diagnosis Date Noted    Hypertensive heart and chronic kidney disease with heart failure and stage 1 through stage 4 chronic kidney disease, or unspecified chronic kidney disease ([place] [address] ) 04/15/2021    Type 2 diabetes mellitus with diabetic chronic kidney disease ([place] [address] ) 04/15/2021    Chronic kidney disease, stage 2 (mild) 04/15/2021    Morbid (severe) obesity due to excess calories ([place] [address] ) 04/15/2021    Restless leg syndrome 01/27/2020    Debility 07/19/2019    Chronic combined systolic (congestive) and diastolic (congestive) heart failure ([place] [address] ) 07/15/2019    Ambulatory dysfunction 07/12/2019    Type 2 diabetes mellitus with diabetic cataract ([place] [address] ) 10/07/2015    Depression with anxiety 08/15/2012    Hyperlipidemia 08/15/2012    Hypertension 08/15/2012    Hypothyroidism 08/15/2012    Obesity (BMI 30-39 9) 08/15/2012    Osteoarthritis 08/15/2012       # Chronic HFimpEF w/ partial recovery, Stage C, NYHA III  Etiology: NICM/tachy-mediated given h/o AF with RVR , +/- ischemia given patient's risk factors for CAD, strong family history, and septal Q waves on EKG with severe septal hypokinesis on echo, less likely viral, toxin induced or infiltrative   S/P cardiogenic shock in past  Now S/P AVJ ablation with BiV AICD implant with no high rate episodes  Weight: 183 lbs  NT proBNP: 1/14/21: 716  6/2/20: 1089    Studies- personally reviewed by myself  Echo 11/20/20  LVEF: 60%  RV: mildly dilated, mildly reduced  PASP: 60 mmHg  RVOT: no notching    TTE completed on 03/16/2020 (not euvolemic): LVEF 30%  LVIDd 5 44 cm  Moderately dilated RV with moderately to markedly reduced RVSF  NAN  Moderate MR  Mild to moderate TR  Dilated IVC                Echocardiogram (limited) from 07/04/2019:  LVEF: 45%; mild diffuse hypokinesis     LVIDd: 3 8 cm   MR: No MR  Moderate annular calcification  Other: Dilated LA      Echocardiogram from 06/20/2019:  LVEF: 25%  LVIDd: 4 6 cm  RV: Dilated and hypokinetic  MR: Moderate      Neurohormonal Blockade:  --Beta Blocker: metoprolol succinate 100 mg Q12  --ACEi, ARB or ARNi: Entresto 49/51 mg BID    --SVR reduction: hydralazine 10 mg TID  --Aldosterone Receptor Blocker: Renal function precludes  -- Diuretic : Bumex 4 mg once daily     Sudden Cardiac Death Risk Reduction:  Since been DC    --ICD: MDT DC HIS ICD  Interrogation 1/10/22: %, optivol normal  Interrogation 7/8/21:  99%, optivol crossed since 4/2021  Interrogation 1/5/21:  99 9%, 3 VT episodes, longest 7 beats 186 bpm, optivol normal     Electrical Resynchronization:  --Interrogation: Narrow QRS     Advanced Therapies (if appropriate): --Inotrope: N/A  --LVAD/Transplant Candidacy: Will continue to monitor      # Pulmonary Hypertension, WHO group 2 from now HFpEF and likely significant CORAZON, untreated (Group 3)  Stressed polysomnogram, continued volume management    # PAF w/ hx AF with RVR   S/p AV node ablation with CRT-D with Dr Edgar Mccudry 03/18/2020  Anticoagulation on Eliquis    Rate: metoprolol succinate 100 mg Q12    # HTN  Controlled       # HLD  On statin therapy      # Hypothyroidism  History of radioablation in the past now on replacement therapy   TSH stable     # Type II DM        # CORAZON  Noncompliant with CPAP in the past    Still has not complete sleep study     # CKD   Renal function stable last checked  Repeating BMP now       # History of transaminitis in setting of cardiogenic shock     #  Abnormal EKG  Q waves noted in septal leads concerning for possible old infarct   Lexiscan stress to eval for underlying ischemia still not completed      # H/O tachy-oniel syndrome       #  History of alcohol abuse   Has been abstinent for years    TODAY'S PLAN:  PA pressures high on latest echo with improved EF  Continue current diuretic for combined systolic and diastolic HF  I ordered sleep study as I do suspect hypoxia is contributing to her PH- she has yet to do and states will not do  Discussed weight management  --2g sodium diet  - Daily weights    HPI:      79 yo female who follows for chronic BiV heart failure, afib, HTN, HLD, CORAZON, DM2  Back in June 2019 admitted with afib with RVR and decompensated heart failure  Had not had a cardiac hx  Echo showed EF: 25-30%, underwent EBEN/ CV but went back into afib  Started on amiodarone load  Did have junctional rhythm immediate post conversion and showing signs of tachy oniel syndrome  Given these findings, PPM with possible AICD was suggested with cardioversion thereafter          On 6/25 patient was taken to the cath lab for planned dual chamber AICD but apparently became hypotensive and markedly SOB upon induction with propofol  She was subsequently transferred to the ICU for further management of her heart failure  She continued to be in [address] upon transfer  Through the night, patient's condition began to worsen and she became cool, clammy with N/V and loss of obtainable BP  Started on pressor support and lined  SVO2 at that point 32% with signs of lactic acidosis, WANG  Then started on milrinone gtt at 0 25 mcg/kg/min  She was able to be weaned off inotropic support  She saw Dr Han García in follow up and her afib rates were high so amio stopped and focused on rate control with increase in Toprol to 75 mg BID  Ad[facility] to [facility] from 03/11 to 03/24/2020 after presenting (as the recommendation of many of her providers) with bilateral LE edema and weight gain  In ED was found to have NT-proBNP of 4600 and be in atrial fibrillation with RVR (rates in 150s)  She was then started on IV diltiazem and received IV Lasix  Diltiazem was later stopped and amiodarone drip was started  Switched from IV Lasix to IV Bumex on 03/41  EP was consulted   TTE revealed LVEF 30% and decision was made to proceed with AV node ablation and CRT-D implantation once optimized  HF team recommended her being discharged home on Bumex 4 mg BID with PRN metolazone; however, she was discharged home on PO Bumex 4 mg daily without PRN metolazone prescribed  Lost 18 lbs during this admission  Last Office visit 10/7/20  PCP felt volume overloaded    Interval History:  Wt stable at 183 lbs  No new symptoms  Will not do sleep study- could not sleep with CPAP on in past    Review of Systems   Constitutional: Negative for activity change, appetite change, fatigue and unexpected weight change  HENT: Negative for congestion and nosebleeds  Eyes: Negative  Respiratory: Negative for cough, chest tightness and shortness of breath  Cardiovascular: Negative for chest pain, palpitations and leg swelling  Gastrointestinal: Negative for abdominal distention  Endocrine: Negative  Genitourinary: Negative  Musculoskeletal: Negative  Skin: Negative  Neurological: Negative for dizziness, syncope and weakness  Hematological: Negative  Psychiatric/Behavioral: Negative  Past Medical History:   Diagnosis Date    [facility] 6/26/2019    CHF (congestive heart failure) (HCC)     Eczema     Photosensitivity     abnormal skin sensitivity to sunlight    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HCC)     staging         Allergies   Allergen Reactions    Penicillins     Wellbutrin Sr  [Bupropion]      Other reaction(s): Suicidal ideations  Category: Adverse Reaction;            Current Outpatient Medications:     Ascorbic Acid (vitamin C) 1000 MG tablet, Take 1,000 mg by mouth 2 (two) times a day , Disp: , Rfl:     bumetanide (BUMEX) 2 mg tablet, TAKE 2 TABLETS (4 MG TOTAL) BY MOUTH DAILY, Disp: 60 tablet, Rfl: 3    calcium carbonate (OS-PAULA) 600 MG tablet, Take 600 mg by mouth 2 (two) times a day with meals, Disp: , Rfl:     cholecalciferol (VITAMIN D3) 1,000 units tablet, Take 1,000 Units by mouth daily 8,000 deanna, Disp: , Rfl:     Eliquis 5 MG, TAKE 1 TABLET (5 MG TOTAL) BY MOUTH 2 (TWO) TIMES A DAY, Disp: 60 tablet, Rfl: 11    Entresto 49-51 MG TABS, TAKE 1 TABLET BY MOUTH 2 (TWO) TIMES A DAY, Disp: 60 tablet, Rfl: 3    hydrALAZINE (APRESOLINE) 10 mg tablet, TAKE 1 TABLET (10 MG TOTAL) BY MOUTH EVERY 8 (EIGHT) HOURS (Patient taking differently: Take 10 mg by mouth 2 (two) times a day  ), Disp: 90 tablet, Rfl: 5    levothyroxine (Euthyrox) 100 mcg tablet, Take 1 tablet (100 mcg total) by mouth daily, Disp: 90 tablet, Rfl: 1    MAGNESIUM PO, Take 1,000 mg by mouth daily , Disp: , Rfl:     metoprolol succinate (TOPROL-XL) 50 mg 24 hr tablet, TAKE 2 TABLETS (100 MG TOTAL) BY MOUTH EVERY 12 (TWELVE) HOURS, Disp: 120 tablet, Rfl: 5    Multiple Vitamin (MULTI-VITAMIN DAILY PO), 1 tablet 2 (two) times a day  , Disp: , Rfl:     Potassium Chloride ER 20 MEQ TBCR, TAKE 2 TABLETS (40 MEQ TOTAL) BY MOUTH 2 (TWO) TIMES A DAY, Disp: , Rfl:     rOPINIRole (REQUIP XL) 2 MG 24 hr tablet, TAKE 1 TABLET (2 MG TOTAL) BY MOUTH DAILY AT BEDTIME, Disp: 30 tablet, Rfl: 5    traMADol (ULTRAM) 50 mg tablet, TAKE 1 TABLET TWICE A DAY BY ORAL ROUTE AS NEEDED , Disp: 60 tablet, Rfl: 0    venlafaxine (EFFEXOR) 75 mg tablet, Take 1 tablet (75 mg total) by mouth every 12 (twelve) hours, Disp: 60 tablet, Rfl: 5    Potassium Chloride ER 20 MEQ TBCR, TAKE 2 TABLETS (40 MEQ TOTAL) BY MOUTH 2 (TWO) TIMES A DAY (Patient not taking: Reported on 3/7/2022), Disp: 120 tablet, Rfl: 2    Social History     Socioeconomic History    Marital status: Single     Spouse name: Not on file    Number of children: Not on file    Years of education: Not on file    Highest education level: Not on file   Occupational History    Not on file   Tobacco Use    Smoking status: Never Smoker    Smokeless tobacco: Never Used   Vaping Use    Vaping Use: Never used   Substance and Sexual Activity    Alcohol use: Not Currently    Drug use: Never    Sexual activity: Not Currently   Other Topics Concern    Not on file   Social History Narrative    Not on file     Social Determinants of Health     Financial Resource Strain: Not on file   Food Insecurity: Not on file   Transportation Needs: Not on file   Physical Activity: Not on file   Stress: Not on file   Social Connections: Not on file   Intimate Partner Violence: Not on file   Housing Stability: Not on file       Family History   Problem Relation Age of Onset    Alzheimer's disease Mother     Coronary artery disease Mother     Dementia Mother     Diabetes Mother         mellitus    Other Father         acute myocardial infarction    Coronary artery disease Sister     Other Maternal Grandfather         laryngeal cancer    Dementia Maternal Aunt     Diabetes Maternal Aunt        Physical Exam:    Vitals:   Vitals:    03/07/22 1442   BP: 118/70   Pulse: 88   SpO2: 99%       Physical Exam  Constitutional:       Appearance: She is well-developed  HENT:      Head: Normocephalic and atraumatic  Eyes:      Pupils: Pupils are equal, round, and reactive to light  Neck:      Vascular: No JVD  Cardiovascular:      Rate and Rhythm: Normal rate and regular rhythm  Heart sounds: No murmur heard  Pulmonary:      Effort: Pulmonary effort is normal  No respiratory distress  Breath sounds: Normal breath sounds  Abdominal:      General: There is no distension  Palpations: Abdomen is soft  Tenderness: There is no abdominal tenderness  Musculoskeletal:         General: Normal range of motion  Cervical back: Normal range of motion  Skin:     General: Skin is warm and dry  Findings: No rash  Neurological:      Mental Status: She is alert and oriented to person, place, and time           Labs & Results:    Lab Results   Component Value Date    SODIUM 138 10/06/2021    K 4 3 10/06/2021     10/06/2021    CO2 31 10/06/2021    BUN 18 10/06/2021    CREATININE 0 86 10/06/2021 GLUC 92 06/04/2020    CALCIUM 9 6 10/06/2021     Lab Results   Component Value Date    WBC 6 89 10/06/2021    HGB 13 3 10/06/2021    HCT 41 4 10/06/2021     (H) 10/06/2021     10/06/2021     Lab Results   Component Value Date    NTBNP 716 (H) 01/14/2021      Lab Results   Component Value Date    CHOLESTEROL 112 03/12/2020    CHOLESTEROL 177 03/18/2019     Lab Results   Component Value Date    HDL 38 (L) 03/12/2020    HDL 66 03/18/2019    HDL 63 02/07/2017     Lab Results   Component Value Date    TRIG 48 03/12/2020    TRIG 82 03/18/2019    TRIG 106 02/07/2017     Lab Results   Component Value Date    [place] 74 03/12/2020    [place] 106 02/07/2017    [place] 111 11/13/2014       EKG personally reviewed by Laure Mcmanus DO  Counseling / Coordination of Care  Time spent today 25 minutes  Greater than 50% of total time was spent with the patient and / or family counseling and / or coordination of care  We went over current diagnosis, most recent studies and any changes in treatment  Thank you for the opportunity to participate in the care of this patient      [address] [facility] PULMONARY HYPERTENSION  MEDICAL DIRECTOR OF [place]

## 2022-03-07 ENCOUNTER — OFFICE VISIT (OUTPATIENT)
Dept: CARDIOLOGY CLINIC | Facility: CLINIC | Age: 82
End: 2022-03-07
Payer: COMMERCIAL

## 2022-03-07 VITALS
BODY MASS INDEX: 34.7 KG/M2 | HEART RATE: 88 BPM | WEIGHT: 183.8 LBS | DIASTOLIC BLOOD PRESSURE: 70 MMHG | OXYGEN SATURATION: 99 % | SYSTOLIC BLOOD PRESSURE: 118 MMHG | HEIGHT: 61 IN

## 2022-03-07 DIAGNOSIS — I48.0 PAROXYSMAL ATRIAL FIBRILLATION (HCC): ICD-10-CM

## 2022-03-07 DIAGNOSIS — E78.5 DYSLIPIDEMIA: Primary | ICD-10-CM

## 2022-03-07 DIAGNOSIS — E66.01 MORBID (SEVERE) OBESITY DUE TO EXCESS CALORIES (HCC): ICD-10-CM

## 2022-03-07 PROCEDURE — 99214 OFFICE O/P EST MOD 30 MIN: CPT | Performed by: INTERNAL MEDICINE

## 2022-03-07 PROCEDURE — 1160F RVW MEDS BY RX/DR IN RCRD: CPT | Performed by: INTERNAL MEDICINE

## 2022-04-07 DIAGNOSIS — F41.8 DEPRESSION WITH ANXIETY: ICD-10-CM

## 2022-04-07 DIAGNOSIS — E03.9 HYPOTHYROIDISM, UNSPECIFIED TYPE: ICD-10-CM

## 2022-04-07 RX ORDER — LEVOTHYROXINE SODIUM 0.1 MG/1
100 TABLET ORAL DAILY
Qty: 30 TABLET | Refills: 0 | Status: SHIPPED | OUTPATIENT
Start: 2022-04-07 | End: 2022-05-07

## 2022-04-07 RX ORDER — VENLAFAXINE 75 MG/1
75 TABLET ORAL EVERY 12 HOURS
Qty: 60 TABLET | Refills: 5 | Status: SHIPPED | OUTPATIENT
Start: 2022-04-07 | End: 2022-05-07

## 2022-04-15 ENCOUNTER — REMOTE DEVICE CLINIC VISIT (OUTPATIENT)
Dept: CARDIOLOGY CLINIC | Facility: CLINIC | Age: 82
End: 2022-04-15
Payer: COMMERCIAL

## 2022-04-15 DIAGNOSIS — I50.42 CHRONIC COMBINED SYSTOLIC (CONGESTIVE) AND DIASTOLIC (CONGESTIVE) HEART FAILURE (HCC): ICD-10-CM

## 2022-04-15 DIAGNOSIS — Z95.810 PRESENCE OF AUTOMATIC CARDIOVERTER/DEFIBRILLATOR (AICD): Primary | ICD-10-CM

## 2022-04-15 PROCEDURE — 93295 DEV INTERROG REMOTE 1/2/MLT: CPT | Performed by: INTERNAL MEDICINE

## 2022-04-15 PROCEDURE — 93296 REM INTERROG EVL PM/IDS: CPT | Performed by: INTERNAL MEDICINE

## 2022-04-15 NOTE — PROGRESS NOTES
Results for orders placed or performed in visit on 04/15/22   Cardiac EP device report    Narrative    MDT-DUAL CHAMBER "HIS" ICD/VVIR MODEACTIVE SYSTEM IS MRI CONDITIONAL  CARELINK TRANSMISSION: BATTERY VOLTAGE ADEQUATE  (16 MONTHS) BVP 99%  ALL AVAILABLE LEAD PARAMETERS WITHIN NORMAL LIMITS HOWEVER RV LEAD THRESHOLD IS HIGH (HX OF SAME, SEEN IN OFFICE 1-10-22)  NO SIGNIFICANT HIGH RATE EPISODES  VENTRICULAR SENSE EPISODES DETECTED  OPTI-VOL WITHIN NORMAL LIMITS  NORMAL DEVICE FUNCTION  ----IN[place]

## 2022-04-15 NOTE — TELEPHONE ENCOUNTER
Requested medication(s) are due for refill today: yes  Patient has already received a courtesy refill: no  Other reason request has been forwarded to provider:  failed for no protocol for this medication

## 2022-04-18 RX ORDER — SACUBITRIL AND VALSARTAN 49; 51 MG/1; MG/1
1 TABLET, FILM COATED ORAL 2 TIMES DAILY
Qty: 60 TABLET | Refills: 3 | Status: SHIPPED | OUTPATIENT
Start: 2022-04-18

## 2022-04-28 DIAGNOSIS — M54.41 ACUTE RIGHT-SIDED LOW BACK PAIN WITH RIGHT-SIDED SCIATICA: ICD-10-CM

## 2022-04-29 RX ORDER — TRAMADOL HYDROCHLORIDE 50 MG/1
TABLET ORAL
Qty: 60 TABLET | Refills: 0 | Status: SHIPPED | OUTPATIENT
Start: 2022-04-29 | End: 2022-06-01

## 2022-05-07 DIAGNOSIS — E03.9 HYPOTHYROIDISM, UNSPECIFIED TYPE: ICD-10-CM

## 2022-05-07 RX ORDER — LEVOTHYROXINE SODIUM 0.1 MG/1
100 TABLET ORAL DAILY
Qty: 30 TABLET | Refills: 0 | Status: SHIPPED | OUTPATIENT
Start: 2022-05-07 | End: 2022-06-06

## 2022-05-12 DIAGNOSIS — I50.9 CHF (CONGESTIVE HEART FAILURE) (HCC): ICD-10-CM

## 2022-05-12 DIAGNOSIS — I50.22 CHRONIC HFREF (HEART FAILURE WITH REDUCED EJECTION FRACTION) (HCC): ICD-10-CM

## 2022-05-14 RX ORDER — POTASSIUM CHLORIDE 1500 MG/1
TABLET, FILM COATED, EXTENDED RELEASE ORAL
Qty: 120 TABLET | Refills: 2 | Status: SHIPPED | OUTPATIENT
Start: 2022-05-14

## 2022-05-18 ENCOUNTER — APPOINTMENT (OUTPATIENT)
Dept: LAB | Facility: CLINIC | Age: 82
End: 2022-05-18
Payer: COMMERCIAL

## 2022-05-18 DIAGNOSIS — N18.30 TYPE 2 DIABETES MELLITUS WITH STAGE 3 CHRONIC KIDNEY DISEASE, WITHOUT LONG-TERM CURRENT USE OF INSULIN, UNSPECIFIED WHETHER STAGE 3A OR 3B CKD (HCC): ICD-10-CM

## 2022-05-18 DIAGNOSIS — E11.22 TYPE 2 DIABETES MELLITUS WITH STAGE 3 CHRONIC KIDNEY DISEASE, WITHOUT LONG-TERM CURRENT USE OF INSULIN, UNSPECIFIED WHETHER STAGE 3A OR 3B CKD (HCC): ICD-10-CM

## 2022-05-18 DIAGNOSIS — E03.9 HYPOTHYROIDISM, UNSPECIFIED TYPE: ICD-10-CM

## 2022-05-18 LAB
CHOLEST SERPL-MCNC: 210 MG/DL
EST. AVERAGE GLUCOSE BLD GHB EST-MCNC: 140 MG/DL
HBA1C MFR BLD: 6.5 %
HDLC SERPL-MCNC: 57 MG/DL
LDLC SERPL CALC-MCNC: 125 MG/DL (ref 0–100)
NONHDLC SERPL-MCNC: 153 MG/DL
TRIGL SERPL-MCNC: 142 MG/DL
TSH SERPL DL<=0.05 MIU/L-ACNC: 3.75 UIU/ML (ref 0.45–4.5)

## 2022-05-18 PROCEDURE — 80061 LIPID PANEL: CPT | Performed by: INTERNAL MEDICINE

## 2022-05-18 PROCEDURE — 36415 COLL VENOUS BLD VENIPUNCTURE: CPT | Performed by: INTERNAL MEDICINE

## 2022-05-18 PROCEDURE — 83036 HEMOGLOBIN GLYCOSYLATED A1C: CPT

## 2022-05-18 PROCEDURE — 84443 ASSAY THYROID STIM HORMONE: CPT

## 2022-06-06 DIAGNOSIS — E03.9 HYPOTHYROIDISM, UNSPECIFIED TYPE: ICD-10-CM

## 2022-06-06 RX ORDER — LEVOTHYROXINE SODIUM 0.1 MG/1
100 TABLET ORAL DAILY
Qty: 30 TABLET | Refills: 0 | Status: SHIPPED | OUTPATIENT
Start: 2022-06-06 | End: 2022-07-05

## 2022-06-15 DIAGNOSIS — G25.81 RESTLESS LEG SYNDROME: ICD-10-CM

## 2022-06-15 RX ORDER — ROPINIROLE 2 MG/1
2 TABLET, FILM COATED, EXTENDED RELEASE ORAL
Qty: 30 TABLET | Refills: 5 | Status: SHIPPED | OUTPATIENT
Start: 2022-06-15

## 2022-07-07 DIAGNOSIS — I48.91 ATRIAL FIBRILLATION WITH RAPID VENTRICULAR RESPONSE (HCC): ICD-10-CM

## 2022-07-07 RX ORDER — APIXABAN 5 MG/1
TABLET, FILM COATED ORAL
Qty: 60 TABLET | Refills: 11 | Status: SHIPPED | OUTPATIENT
Start: 2022-07-07 | End: 2022-08-23 | Stop reason: SDUPTHER

## 2022-07-12 NOTE — PROGRESS NOTES
Progress Note Nichole Latter day 1940, 66 y o  female MRN: [id number]    Unit/Bed#: [facility] 508-01 Encounter: [id number]    Primary Care Provider: Minnie Hall MD   Date and time admitted to hospital: 6/18/2019  4:35 PM        Acute on chronic combined systolic and diastolic CHF (congestive heart failure) ([facility] )  Assessment & Plan  Wt Readings from Last 3 Encounters:   07/03/19 71 9 kg (158 lb 8 2 oz)   06/18/19 81 kg (178 lb 9 6 oz)   03/20/19 80 1 kg (176 lb 9 6 oz)     Cardiogenic shock, currently off Milrinone drip  Initial EF 25 30% improving to 42%  Negative fluid balance  Auto diuresing  Diuretics on hold due to WANG  On hydralazine and Imdur  Continue Fluid restriction, low sodium, renal diet  Heart failure service managing          Atrial fibrillation with RVR ([facility] )  Assessment & Plan  · Per EP plan for cardioversion today  · Continue heparin drip and amiodarone      Acute kidney injury ([facility] )  Assessment & Plan  · Secondary to ischemic ATN/cardiogenic syndrome /shock  · Diuretics on hold  · Nephrology is following  · IV fluid bolus today for intravascular depletion      Shock liver  Assessment & Plan  · Transaminitis and coag study are slowly improving  · Continue to monitor      Type 2 diabetes mellitus with diabetic cataract [facility])  Assessment & Plan  Lab Results   Component Value Date    HGBA1C 5 9 (H) 03/18/2019       Recent Labs     07/02/19  1622 07/02/19  2141 07/03/19  0649 07/03/19  1042   POCGLU 141* 274* 102 113       Blood Sugar Average: Last 72 hrs:  (P) 128 [id number]   · Diet controlled at home  · Glucose is acceptable  · Continue insulin sliding scale    Hypothyroidism  Assessment & Plan  · Continue Synthroid therapy    Hypertension  Assessment & Plan  · Blood pressure  medications on hold due to hypotension  · Continue to monitor       Patient lives alone  Short-term rehab when stable   PT OT    VTE Pharmacologic Prophylaxis:   Pharmacologic: Heparin Drip  Mechanical VTE Pt informed.   Prophylaxis in Place: Yes    Patient Centered Rounds: I have performed bedside rounds with nursing staff today  Discussions with Specialists or Other Care Team Provider:     Education and Discussions with Family / Patient:  Patient    Time Spent for Care: 30 minutes  More than 50% of total time spent on counseling and coordination of care as described above  Current Length of Stay: 15 day(s)    Current Patient Status: Inpatient   Certification Statement: The patient will continue to require additional inpatient hospital stay due to Management of heart failure    Discharge Plan / Estimated Discharge Date: not ready yet    Code Status: Level 1 - Full Code      Subjective:   Patient seen and examined  Comfortable in bed  Complaining of being weak and fatigue  No chest pain    Objective:     Vitals:   Temp (24hrs), Av 1 °F (36 7 °C), Min:97 5 °F (36 4 °C), Max:98 6 °F (37 °C)    Temp:  [97 5 °F (36 4 °C)-98 6 °F (37 °C)] 98 3 °F (36 8 °C)  HR:  [104-136] 110  Resp:  [15-18] 16  BP: (103-162)/(56-89) 142/89  SpO2:  [93 %-97 %] 96 %  Body mass index is 29 95 kg/m²  Input and Output Summary (last 24 hours): Intake/Output Summary (Last 24 hours) at 7/3/2019 1157  Last data filed at 7/3/2019 1001  Gross per 24 hour   Intake 543 68 ml   Output 2950 ml   Net -2406 32 ml       Physical Exam:     Physical Exam  Patient is awake alert-ill-appearing in no acute distress  Lung with decreased breath sounds bilateral  Heart positive S1-S2 no murmur irregular  Abdomen soft nontender  Lower extremities no edema  Additional Data:     Labs:    Results from last 7 days   Lab Units 19  0600  19  0500   WBC Thousand/uL 13 67*   < > 11 33*   HEMOGLOBIN g/dL 13 8   < > 11 6   HEMATOCRIT % 39 3   < > 33 0*   PLATELETS Thousands/uL 188   < > 173   LYMPHO PCT %  --   --  7*   MONO PCT %  --   --  4   EOS PCT %  --   --  0    < > = values in this interval not displayed       Results from last 7 days   Lab Units 07/03/19  0312  07/01/19  0524   POTASSIUM mmol/L 4 1   < > 3 8   CHLORIDE mmol/L 87*   < > 85*   CO2 mmol/L 37*   < > 34*   BUN mg/dL 108*   < > 88*   CREATININE mg/dL 4 48*   < > 4 95*   CALCIUM mg/dL 9 2   < > 8 4   ALK PHOS U/L  --   --  146*   ALT U/L  --   --  1,723*   AST U/L  --   --  345*    < > = values in this interval not displayed  Results from last 7 days   Lab Units 07/01/19  0524   INR  2 16*       * I Have Reviewed All Lab Data Listed Above  * Additional Pertinent Lab Tests Reviewed:  [place] [facility] Admission Reviewed    Imaging:    Imaging Reports Reviewed Today Include:   Imaging Personally Reviewed by Myself Includes:     Recent Cultures (last 7 days):           Last 24 Hours Medication List:     Current Facility-Administered Medications:  acetaminophen 650 mg Oral Q4H PRN Alli Aranda Jr, PA-C    albuterol 2 puff Inhalation Q4H PRN Alli Aranda Jr, PA-C    amiodarone 200 mg Oral TID With Meals Alli Taylor PA-C    bisacodyl 10 mg Rectal Daily PRN Alli Aranda Jr, PA-C    doxycycline hyclate 100 mg Oral Q12H 1131 No  [place] , PA-C    heparin (porcine) 3-20 Units/kg/hr (Order-Specific) Intravenous Titrated Alli Aranda Jr, PA-C Last Rate: 8 Units/kg/hr (07/03/19 0701)   hydrALAZINE 5 mg Oral Q8H [facility] SOTO Junior    insulin lispro 1-5 Units Subcutaneous HS Alli Aranda Jr, PA-C    insulin lispro 1-6 Units Subcutaneous TID AC Alli Aranda Jr, PA-C    isosorbide dinitrate 5 mg Oral TID after meals SOTO Junior    levothyroxine 137 mcg Oral Daily Alli Aranda Jr, PA-C    loratadine 10 mg Oral Daily Alli Aranda Jr, PA-C    melatonin 6 mg Oral HS Shameka Perez PA-C    ofloxacin 1 drop Both Eyes 4x Daily Rafiq Swartz DO    ondansetron 4 mg Intravenous Q6H PRN Alli Aranda Jr, PA-C    phenol 2 spray Mouth/Throat Q2H PRN Alli Aranda Jr, PA-C    polyethylene glycol 17 g Oral Daily Alli Smart Margareth Horta PA-C    senna 1 tablet Oral HS Earma Gwendolyn Aranda Jr, PA-C    sodium chloride 2 spray Each Nare PRN Marissa Guerrero PA-C    sodium chloride 100 mL/hr Intravenous Continuous SOTO [place] Last Rate: 100 mL/hr (07/03/19 0948)   venlafaxine 50 mg Oral Q12H Marissa Guerrero PA-C         Today, Patient Was Seen By: Britton Peacock DO    ** Please Note: This note has been constructed using a voice recognition system   **

## 2022-07-15 ENCOUNTER — REMOTE DEVICE CLINIC VISIT (OUTPATIENT)
Dept: CARDIOLOGY CLINIC | Facility: CLINIC | Age: 82
End: 2022-07-15
Payer: COMMERCIAL

## 2022-07-15 DIAGNOSIS — Z95.810 PRESENCE OF AUTOMATIC CARDIOVERTER/DEFIBRILLATOR (AICD): Primary | ICD-10-CM

## 2022-07-15 PROCEDURE — 93295 DEV INTERROG REMOTE 1/2/MLT: CPT | Performed by: INTERNAL MEDICINE

## 2022-07-15 PROCEDURE — 93296 REM INTERROG EVL PM/IDS: CPT | Performed by: INTERNAL MEDICINE

## 2022-07-15 NOTE — PROGRESS NOTES
Results for orders placed or performed in visit on 07/15/22   Cardiac EP device report    Narrative    MDT-DUAL CHAMBER "HIS" ICD/VVIR MODEACTIVE SYSTEM IS MRI CONDITIONAL  CARELINK TRANSMISSION: BATTERY VOLTAGE ADEQUATE  (~ 16 MOS ) BVP 99 9% (VSR PACE <0 1%)  CHRONICALLY ELEVATED RV CAPTURE THRESHOLD @ MAX OUTPUTS ([address] 1/10/22)  ALL OTHER AVAILABLE LEAD PARAMETERS WITHIN NORMAL LIMITS  2 VT-NS EPISODES, 8 @  BPM, 9 @  BPM  NO THERAPY DELIVERED  PATIENT ON ELIQUIS, METOPROLOL SUCC  NO V-SENSE EPISODES  OPTI-VOL WITHIN NORMAL LIMITS  NORMAL DEVICE FUNCTION     ES

## 2022-07-29 ENCOUNTER — RA CDI HCC (OUTPATIENT)
Dept: OTHER | Facility: HOSPITAL | Age: 82
End: 2022-07-29

## 2022-07-29 DIAGNOSIS — Z23 ENCOUNTER FOR IMMUNIZATION: Primary | ICD-10-CM

## 2022-07-29 NOTE — PROGRESS NOTES
[facility]  coding opportunities       Chart reviewed, no opportunity found: CHART REVIEWED, NO OPPORTUNITY FOUND        Patients Insurance     Medicare Insurance: [employer] [facility] Advantage

## 2022-08-04 ENCOUNTER — OFFICE VISIT (OUTPATIENT)
Dept: FAMILY MEDICINE CLINIC | Facility: CLINIC | Age: 82
End: 2022-08-04
Payer: COMMERCIAL

## 2022-08-04 VITALS
OXYGEN SATURATION: 98 % | SYSTOLIC BLOOD PRESSURE: 122 MMHG | WEIGHT: 187.9 LBS | HEART RATE: 86 BPM | DIASTOLIC BLOOD PRESSURE: 68 MMHG | TEMPERATURE: 98.3 F | BODY MASS INDEX: 35.48 KG/M2 | RESPIRATION RATE: 16 BRPM | HEIGHT: 61 IN

## 2022-08-04 DIAGNOSIS — I10 HYPERTENSION, UNSPECIFIED TYPE: ICD-10-CM

## 2022-08-04 DIAGNOSIS — G25.81 RESTLESS LEG SYNDROME: ICD-10-CM

## 2022-08-04 DIAGNOSIS — Z00.00 ENCOUNTER FOR ANNUAL WELLNESS EXAM IN MEDICARE PATIENT: Primary | ICD-10-CM

## 2022-08-04 DIAGNOSIS — I50.42 CHRONIC COMBINED SYSTOLIC (CONGESTIVE) AND DIASTOLIC (CONGESTIVE) HEART FAILURE (HCC): ICD-10-CM

## 2022-08-04 DIAGNOSIS — E11.22 TYPE 2 DIABETES MELLITUS WITH CHRONIC KIDNEY DISEASE, WITHOUT LONG-TERM CURRENT USE OF INSULIN, UNSPECIFIED CKD STAGE (HCC): ICD-10-CM

## 2022-08-04 DIAGNOSIS — E66.9 OBESITY (BMI 30-39.9): ICD-10-CM

## 2022-08-04 DIAGNOSIS — I48.0 PAROXYSMAL ATRIAL FIBRILLATION (HCC): ICD-10-CM

## 2022-08-04 DIAGNOSIS — E03.9 HYPOTHYROIDISM, UNSPECIFIED TYPE: ICD-10-CM

## 2022-08-04 DIAGNOSIS — F41.8 DEPRESSION WITH ANXIETY: ICD-10-CM

## 2022-08-04 PROCEDURE — 3288F FALL RISK ASSESSMENT DOCD: CPT | Performed by: NURSE PRACTITIONER

## 2022-08-04 PROCEDURE — 1170F FXNL STATUS ASSESSED: CPT | Performed by: NURSE PRACTITIONER

## 2022-08-04 PROCEDURE — 1125F AMNT PAIN NOTED PAIN PRSNT: CPT | Performed by: NURSE PRACTITIONER

## 2022-08-04 PROCEDURE — G0439 PPPS, SUBSEQ VISIT: HCPCS | Performed by: NURSE PRACTITIONER

## 2022-08-04 PROCEDURE — 99214 OFFICE O/P EST MOD 30 MIN: CPT | Performed by: NURSE PRACTITIONER

## 2022-08-04 PROCEDURE — 1160F RVW MEDS BY RX/DR IN RCRD: CPT | Performed by: NURSE PRACTITIONER

## 2022-08-04 RX ORDER — LEVOTHYROXINE SODIUM 0.1 MG/1
100 TABLET ORAL DAILY
Qty: 30 TABLET | Refills: 5 | Status: SHIPPED | OUTPATIENT
Start: 2022-08-04

## 2022-08-04 NOTE — PROGRESS NOTES
Chief Complaint   Patient presents with    Follow-up     6 month     Medicare Wellness Visit     Subsequent       Health Maintenance   Topic Date Due    Pneumococcal Vaccine: 65+ Years (1 - PCV) Never done    Osteoporosis Screening  Never done    PT PLAN OF CARE  11/20/2020    DM Eye Exam  08/13/2021    COVID-19 Vaccine (4 - Booster for behaview series) 05/15/2022    Medicare Annual Wellness Visit (AWV)  07/19/2022    Influenza Vaccine (1) 09/01/2022    HEMOGLOBIN A1C  11/18/2022    Fall Risk  08/04/2023    BMI: Followup Plan  08/04/2023    BMI: Adult  08/04/2023    Diabetic Foot Exam  08/04/2023    HIB Vaccine  Aged Out    Hepatitis B Vaccine  Aged Out    IPV Vaccine  Aged Out    Hepatitis A Vaccine  Aged Out    Meningococcal ACWY Vaccine  Aged Out    HPV Vaccine  Aged Out      Assessment and Plan:     Problem List Items Addressed This Visit        Endocrine    Hypothyroidism     Stable, continue current dosage of Levothyroxine  Repeat lab work ordered today          Relevant Medications    levothyroxine 100 mcg tablet    Other Relevant Orders    TSH, 3rd generation with Free T4 reflex    Type 2 diabetes mellitus with diabetic chronic kidney disease ([facility] [address] )       Lab Results   Component Value Date    HGBA1C 6 5 (H) 05/18/2022     A1C up to 6 5, previously 5 9  Diet has been more poor, more ice cream this Summer  She is aware her diet habits should be altered   Diabetic eye exam is scheduled for 9/13/22  Foot exam completed today   Lab work as ordered         Relevant Orders    Hemoglobin A1C       Cardiovascular and Mediastinum    Hypertension     Lab Results   Component Value Date    EGFR 64 10/06/2021    EGFR 68 01/14/2021    EGFR 70 06/04/2020    CREATININE 0 86 10/06/2021    CREATININE 0 82 01/14/2021    CREATININE 0 80 06/04/2020   Stable and managed by Cardiology  Continue current medication regimen         Relevant Orders    Comprehensive metabolic panel    CBC and differential    Chronic combined systolic (congestive) and diastolic (congestive) heart failure (HCC)     Wt Readings from Last 3 Encounters:   08/04/22 85 2 kg (187 lb 14 4 oz)   03/07/22 83 4 kg (183 lb 12 8 oz)   01/27/22 82 1 kg (181 lb)       Stable, currently asymptomatic  Appears euvolemic  Managed by Cardiology  Continue current medication regimen   Follow a low sodium diet  Monitor weights daily            Paroxysmal atrial fibrillation ([address] )     Stable and managed by Cardiology  Continue current medication regimen             Other    Depression with anxiety      Stable, continue Effexor         Relevant Orders    Comprehensive metabolic panel    Obesity (BMI 30-39  9)     Lifestyle modifications encouraged          Restless leg syndrome     Stable, continue Ropinirole            Other Visit Diagnoses     Encounter for annual wellness exam in Medicare patient    -  Primary        BMI Counseling: Body mass index is 35 5 kg/m²  The BMI is above normal  Nutrition recommendations include encouraging healthy choices of fruits and vegetables  Exercise recommendations include moderate physical activity 150 minutes/week  Rationale for BMI follow-up plan is due to patient being overweight or obese  Preventive health issues were discussed with patient, and age appropriate screening tests were ordered as noted in patient's After Visit Summary  Personalized health advice and appropriate referrals for health education or preventive services given if needed, as noted in patient's After Visit Summary  History of Present Illness:     Patient presents for a Medicare Wellness Visit    HPI   Patient Care Team:  SOTO Martell as PCP - General (Family Medicine)  SOTO Martell as PCP - PCP-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, or unspecified chronic kidney disease (HCC)    Type 2 diabetes mellitus with diabetic chronic kidney disease (HCC)    Chronic kidney disease, stage 2 (mild)    Morbid (severe) obesity due to excess calories (HCC)    Paroxysmal atrial fibrillation (HCC)      Past Medical and Surgical History:     Past Medical History:   Diagnosis Date    [facility] 6/26/2019    CHF (congestive heart failure) (HCC)     Eczema     Photosensitivity     abnormal skin sensitivity to sunlight    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HCC)     staging     Past Surgical History:   Procedure Laterality Date    CATARACT EXTRACTION Left     HEMORRHOID SURGERY      IR NON-TUNNELED CENTRAL LINE PLACEMENT  7/9/2019    OOPHORECTOMY      unilateral    TOTAL ABDOMINAL HYSTERECTOMY        Family History:     Family History   Problem Relation Age of Onset    Alzheimer's disease Mother     Coronary artery disease Mother     Dementia Mother     Diabetes Mother         mellitus    Other Father         acute myocardial infarction    Coronary artery disease Sister     Other Maternal Grandfather         laryngeal cancer    Dementia Maternal Aunt     Diabetes Maternal Aunt       Social History:     Social History     Socioeconomic History    Marital status: Single     Spouse name: None    Number of children: None    Years of education: None    Highest education level: None   Occupational History    None   Tobacco Use    Smoking status: Never Smoker    Smokeless tobacco: Never Used   Vaping Use    Vaping Use: Never used   Substance and Sexual Activity    Alcohol use: Not Currently    Drug use: Never    Sexual activity: Not Currently   Other Topics Concern    None   Social History Narrative    None     Social Determinants of Health     Financial Resource Strain: Not on file   Food Insecurity: Not on file   Transportation Needs: Not on file   Physical Activity: Not on file   Stress: Not on file   Social Connections: Not on file   Intimate Partner Violence: Not on file   Housing Stability: Not on file      Medications and Allergies:     Current Outpatient Medications   Medication Sig Dispense Refill    Ascorbic Acid (vitamin C) 1000 MG tablet Take 1,000 mg by mouth 2 (two) times a day       bumetanide (BUMEX) 2 mg tablet TAKE 2 TABLETS (4 MG TOTAL) BY MOUTH DAILY 60 tablet 3    calcium carbonate (OS-PAULA) 600 MG tablet Take 600 mg by mouth 2 (two) times a day with meals      cholecalciferol (VITAMIN D3) 1,000 units tablet Take 1,000 Units by mouth daily 8,000 dailty      Eliquis 5 MG TAKE 1 TABLET (5 MG TOTAL) BY MOUTH 2 (TWO) TIMES A DAY 60 tablet 11    Entresto 49-51 MG TABS TAKE 1 TABLET BY MOUTH 2 (TWO) TIMES A DAY 60 tablet 3    hydrALAZINE (APRESOLINE) 10 mg tablet TAKE 1 TABLET (10 MG TOTAL) BY MOUTH EVERY 8 (EIGHT) HOURS (Patient taking differently: Take 10 mg by mouth 2 (two) times a day) 90 tablet 5    levothyroxine 100 mcg tablet Take 1 tablet (100 mcg total) by mouth daily 30 tablet 5    MAGNESIUM PO Take 1,000 mg by mouth daily       metoprolol succinate (TOPROL-XL) 50 mg 24 hr tablet TAKE 2 TABLETS (100 MG TOTAL) BY MOUTH EVERY 12 (TWELVE) HOURS 120 tablet 5    Multiple Vitamin (MULTI-VITAMIN DAILY PO) 1 tablet 2 (two) times a day        Potassium Chloride ER 20 MEQ TBCR TAKE 2 TABLETS (40 MEQ TOTAL) BY MOUTH 2 (TWO) TIMES A DAY      Potassium Chloride ER 20 MEQ TBCR TAKE 2 TABLETS (40 MEQ TOTAL) BY MOUTH 2 (TWO) TIMES A  tablet 2    rOPINIRole (REQUIP XL) 2 MG 24 hr tablet TAKE 1 TABLET (2 MG TOTAL) BY MOUTH DAILY AT BEDTIME 30 tablet 5    traMADol (ULTRAM) 50 mg tablet TAKE 1 TABLET TWICE A DAY BY ORAL ROUTE AS NEEDED  60 tablet 0    venlafaxine (EFFEXOR) 75 mg tablet TAKE 1 TABLET (75 MG TOTAL) BY MOUTH EVERY 12 (TWELVE) HOURS 60 tablet 5     No current facility-administered medications for this visit  Allergies   Allergen Reactions    Penicillins     Wellbutrin Sr  [Bupropion]      Other reaction(s): Suicidal ideations  Category: Adverse Reaction;       Immunizations:     Immunization History   Administered Date(s) Administered    COVID-19 PFIZER VACCINE 0 3 ML IM 01/24/2021, 02/13/2021, 01/15/2022    Td (adult), adsorbed 01/01/2000      Health Maintenance: There are no preventive care reminders to display for this patient  Topic Date Due    Pneumococcal Vaccine: 65+ Years (1 - PCV) Never done    COVID-19 Vaccine (4 - Booster for Pfizer series) 05/15/2022    Influenza Vaccine (1) 09/01/2022      Medicare Screening Tests and Risk Assessments: Leia Aguilar is here for her Subsequent Wellness visit  Last Medicare Wellness visit information reviewed, patient interviewed, no change since last AWV  Health Risk Assessment:   Patient rates overall health as good  Patient feels that their physical health rating is slightly better  Patient is satisfied with their life  Eyesight was rated as slightly worse  Hearing was rated as same  Patient feels that their emotional and mental health rating is slightly better  Patients states they are never, rarely angry  Patient states they are sometimes unusually tired/fatigued  Pain experienced in the last 7 days has been some  Patient's pain rating has been 3/10  Patient states that she has experienced weight loss or gain in last 6 months  Fall Risk Screening: In the past year, patient has experienced: no history of falling in past year      Urinary Incontinence Screening:   Patient has not leaked urine accidently in the last six months  Home Safety:  Patient does not have trouble with stairs inside or outside of their home   Patient has working smoke alarms and has working carbon monoxide detector  Home safety hazards include: household clutter  Nutrition:   Current diet is Unhealthy  Medications:   Patient is currently taking over-the-counter supplements  OTC medications include: vitamins  Patient is able to manage medications  Activities of Daily Living (ADLs)/Instrumental Activities of Daily Living (IADLs):   Walk and transfer into and out of bed and chair?: Yes  Dress and groom yourself?: Yes    Bathe or shower yourself?: Yes    Feed yourself?  Yes  Do your laundry/housekeeping?: Yes  Manage your money, pay your bills and track your expenses?: Yes  Make your own meals?: Yes    Do your own shopping?: Yes    Previous Hospitalizations:   Any hospitalizations or ED visits within the last 12 months?: No      Advance Care Planning:   Living will: No    Durable POA for healthcare: No    Advanced directive: No    Five wishes given: Yes      Cognitive Screening:   Provider or family/friend/caregiver concerned regarding cognition?: No    PREVENTIVE SCREENINGS      Cardiovascular Screening:    General: Screening Not Indicated and History Lipid Disorder      Diabetes Screening:     General: Screening Not Indicated and History Diabetes      Colorectal Cancer Screening:     General: Risks and Benefits Discussed and Screening Not Indicated      Breast Cancer Screening:     General: Screening Not Indicated and Risks and Benefits Discussed      Cervical Cancer Screening:    General: Screening Not Indicated      Osteoporosis Screening:    General: Patient Declines and Risks and Benefits Discussed      Abdominal Aortic Aneurysm (AAA) Screening:        General: Screening Not Indicated      Lung Cancer Screening:     General: Screening Not Indicated      Hepatitis C Screening:    General: Screening Not Indicated    Screening, Brief Intervention, and Referral to Treatment (SBIRT)    Screening  Typical number of drinks in a day: 0  Typical number of drinks in a week: 0  Interpretation: Low risk drinking behavior      AUDIT-C Screenin) How often did you have a drink containing alcohol in the past year? never  2) How many drinks did you have on a typical day when you were drinking in the past year? 0  3) How often did you have 6 or more drinks on one occasion in the past year? never    AUDIT-C Score: 0  Interpretation: Score 0-2 (female): Negative screen for alcohol misuse    Single Item Drug Screening:  How often have you used an illegal drug (including marijuana) or a prescription medication for non-medical reasons in the past year? never    Single Item Drug Screen Score: 0  Interpretation: Negative screen for possible drug use disorder    No exam data present     Physical Exam:     /68 (BP Location: Left arm, Patient Position: Sitting)   Pulse 86   Temp 98 3 °F (36 8 °C)   Resp 16   Ht 5' 1" (1 549 m)   Wt 85 2 kg (187 lb 14 4 oz)   SpO2 98%   BMI 35 50 kg/m²         SOTO Lira

## 2022-08-04 NOTE — PATIENT INSTRUCTIONS
Medicare Preventive Visit Patient Instructions  Thank you for completing your Welcome to Medicare Visit or Medicare Annual Wellness Visit today  Your next wellness visit will be due in one year (8/5/2023)  The screening/preventive services that you may require over the next 5-10 years are detailed below  Some tests may not apply to you based off risk factors and/or age  Screening tests ordered at today's visit but not completed yet may show as past due  Also, please note that scanned in results may not display below  Preventive Screenings:  Service Recommendations Previous Testing/Comments   Colorectal Cancer Screening  * Colonoscopy    * Fecal Occult Blood Test (FOBT)/Fecal Immunochemical Test (FIT)  * Fecal DNA/Cologuard Test  * Flexible Sigmoidoscopy Age: 54-65 years old   Colonoscopy: every 10 years (may be performed more frequently if at higher risk)  OR  FOBT/FIT: every 1 year  OR  Cologuard: every 3 years  OR  Sigmoidoscopy: every 5 years  Screening may be recommended earlier than age 48 if at higher risk for colorectal cancer  Also, an individualized decision between you and your healthcare provider will decide whether screening between the ages of 74-80 would be appropriate  Colonoscopy: Not on file  FOBT/FIT: Not on file  Cologuard: Not on file  Sigmoidoscopy: Not on file          Breast Cancer Screening Age: 36 years old  Frequency: every 1-2 years  Not required if history of left and right mastectomy Mammogram: Not on file        Cervical Cancer Screening Between the ages of 21-29, pap smear recommended once every 3 years  Between the ages of 33-67, can perform pap smear with HPV co-testing every 5 years     Recommendations may differ for women with a history of total hysterectomy, cervical cancer, or abnormal pap smears in past  Pap Smear: Not on file        Hepatitis C Screening Once for adults born between [facility]  More frequently in patients at high risk for Hepatitis C Hep C Antibody: Not on file        Diabetes Screening 1-2 times per year if you're at risk for diabetes or have pre-diabetes Fasting glucose: 120 mg/dL   A1C: 6 5 %        Cholesterol Screening Once every 5 years if you don't have a lipid disorder  May order more often based on risk factors  Lipid panel: 05/18/2022          Other Preventive Screenings Covered by Medicare:  1  Abdominal Aortic Aneurysm (AAA) Screening: covered once if your at risk  You're considered to be at risk if you have a family history of AAA  2  Lung Cancer Screening: covers low dose CT scan once per year if you meet all of the following conditions: (1) Age 50-69; (2) No signs or symptoms of lung cancer; (3) Current smoker or have quit smoking within the last 15 years; (4) You have a tobacco smoking history of at least 30 pack years (packs per day multiplied by number of years you smoked); (5) You get a written order from a healthcare provider  3  Glaucoma Screening: covered annually if you're considered high risk: (1) You have diabetes OR (2) Family history of glaucoma OR (3)  aged 48 and older OR (3)  American aged 72 and older  3  Osteoporosis Screening: covered every 2 years if you meet one of the following conditions: (1) You're estrogen deficient and at risk for osteoporosis based off medical history and other findings; (2) Have a vertebral abnormality; (3) On glucocorticoid therapy for more than 3 months; (4) Have primary hyperparathyroidism; (5) On osteoporosis medications and need to assess response to drug therapy  · Last bone density test (DXA Scan): Not on file  5  HIV Screening: covered annually if you're between the age of 12-76  Also covered annually if you are younger than 13 and older than 72 with risk factors for HIV infection  For pregnant patients, it is covered up to 3 times per pregnancy      Immunizations:  Immunization Recommendations   Influenza Vaccine Annual influenza vaccination during flu season is recommended for all persons aged >= 6 months who do not have contraindications   Pneumococcal Vaccine (Prevnar and Pneumovax)  * Prevnar = PCV13  * Pneumovax = PPSV23   Adults 25-60 years old: 1-3 doses may be recommended based on certain risk factors  Adults 72 years old: Prevnar (PCV13) vaccine recommended followed by Pneumovax (PPSV23) vaccine  If already received PPSV23 since turning 65, then PCV13 recommended at least one year after PPSV23 dose  Hepatitis B Vaccine 3 dose series if at intermediate or high risk (ex: diabetes, end stage renal disease, liver disease)   Tetanus (Td) Vaccine - COST NOT COVERED BY MEDICARE PART B Following completion of primary series, a booster dose should be given every 10 years to maintain immunity against tetanus  Td may also be given as tetanus wound prophylaxis  Tdap Vaccine - COST NOT COVERED BY MEDICARE PART B Recommended at least once for all adults  For pregnant patients, recommended with each pregnancy  Shingles Vaccine (Shingrix) - COST NOT COVERED BY MEDICARE PART B  2 shot series recommended in those aged 48 and above     Health Maintenance Due:  There are no preventive care reminders to display for this patient  Immunizations Due:      Topic Date Due    Pneumococcal Vaccine: 65+ Years (1 - PCV) Never done    COVID-19 Vaccine (4 - Booster for Pfizer series) 05/15/2022    Influenza Vaccine (1) 09/01/2022     Advance Directives   What are advance directives? Advance directives are legal documents that state your wishes and plans for medical care  These plans are made ahead of time in case you lose your ability to make decisions for yourself  Advance directives can apply to any medical decision, such as the treatments you want, and if you want to donate organs  What are the types of advance directives? There are many types of advance directives, and each state has rules about how to use them   You may choose a combination of any of the following:  · Living will:  This is a written record of the treatment you want  You can also choose which treatments you do not want, which to limit, and which to stop at a certain time  This includes surgery, medicine, IV fluid, and tube feedings  · Durable power of  for healthcare [place] [facility]): This is a written record that states who you want to make healthcare choices for you when you are unable to make them for yourself  This person, called a proxy, is usually a family member or a friend  You may choose more than 1 proxy  · Do not resuscitate (DNR) order:  A DNR order is used in case your heart stops beating or you stop breathing  It is a request not to have certain forms of treatment, such as CPR  A DNR order may be included in other types of advance directives  · Medical directive: This covers the care that you want if you are in a coma, near death, or unable to make decisions for yourself  You can list the treatments you want for each condition  Treatment may include pain medicine, surgery, blood transfusions, dialysis, IV or tube feedings, and a ventilator (breathing machine)  · Values history: This document has questions about your views, beliefs, and how you feel and think about life  This information can help others choose the care that you would choose  Why are advance directives important? An advance directive helps you control your care  Although spoken wishes may be used, it is better to have your wishes written down  Spoken wishes can be misunderstood, or not followed  Treatments may be given even if you do not want them  An advance directive may make it easier for your family to make difficult choices about your care  Weight Management   Why it is important to manage your weight:  Being overweight increases your risk of health conditions such as heart disease, high blood pressure, type 2 diabetes, and certain types of cancer   It can also increase your risk for osteoarthritis, sleep apnea, and other respiratory problems  Aim for a slow, steady weight loss  Even a small amount of weight loss can lower your risk of health problems  How to lose weight safely:  A safe and healthy way to lose weight is to eat fewer calories and get regular exercise  You can lose up about 1 pound a week by decreasing the number of calories you eat by 500 calories each day  Healthy meal plan for weight management:  A healthy meal plan includes a variety of foods, contains fewer calories, and helps you stay healthy  A healthy meal plan includes the following:  · Eat whole-grain foods more often  A healthy meal plan should contain fiber  Fiber is the part of grains, fruits, and vegetables that is not broken down by your body  Whole-grain foods are healthy and provide extra fiber in your diet  Some examples of whole-grain foods are whole-wheat breads and pastas, oatmeal, brown rice, and bulgur  · Eat a variety of vegetables every day  Include dark, leafy greens such as spinach, kale, christina greens, and mustard greens  Eat yellow and orange vegetables such as carrots, sweet potatoes, and winter squash  · Eat a variety of fruits every day  Choose fresh or canned fruit (canned in its own juice or light syrup) instead of juice  Fruit juice has very little or no fiber  · Eat low-fat dairy foods  Drink fat-free (skim) milk or 1% milk  Eat fat-free yogurt and low-fat cottage cheese  Try low-fat cheeses such as mozzarella and other reduced-fat cheeses  · Choose meat and other protein foods that are low in fat  Choose beans or other legumes such as split peas or lentils  Choose fish, skinless poultry (chicken or turkey), or lean cuts of red meat (beef or pork)  Before you cook meat or poultry, cut off any visible fat  · Use less fat and oil  Try baking foods instead of frying them  Add less fat, such as margarine, sour cream, regular salad dressing and mayonnaise to foods  Eat fewer high-fat foods   Some examples of high-fat foods include french fries, doughnuts, ice cream, and cakes  · Eat fewer sweets  Limit foods and drinks that are high in sugar  This includes candy, cookies, regular soda, and sweetened drinks  Exercise:  Exercise at least 30 minutes per day on most days of the week  Some examples of exercise include walking, biking, dancing, and swimming  You can also fit in more physical activity by taking the stairs instead of the elevator or parking farther away from stores  Ask your healthcare provider about the best exercise plan for you  © Copyright [employer] 2018 Information is for End User's use only and may not be sold, redistributed or otherwise used for commercial purposes  All illustrations and images included in CareNotes® are the copyrighted property of [employer]  or [address]    10% - bad control"> 10% - bad control,Hemoglobin A1c (HbA1c) greater than 10% indicating poor diabetic control,Haemoglobin A1c greater than 10% indicating poor diabetic control">   Diabetes Mellitus Type 2 in Adults, Ambulatory Care   GENERAL INFORMATION:   Diabetes mellitus type 2  is a disease that affects how your body uses glucose (sugar)  Insulin helps move sugar out of the blood so it can be used for energy  Normally, when the blood sugar level increases, the pancreas makes more insulin  Type 2 diabetes develops because either the body cannot make enough insulin, or it cannot use the insulin correctly  After many years, your pancreas may stop making insulin  Common symptoms include the following:   · More hunger or thirst than usual     · Frequent urination     · Weight loss without trying     · Blurred vision  Seek immediate care for the following symptoms:   · Severe abdominal pain, or pain that spreads to your back  You may also be vomiting      · Trouble staying awake or focusing    · Shaking or sweating    · Blurred or double vision    · Breath has a fruity, sweet smell    · Breathing is deep and labored, or rapid and shallow    · Heartbeat is fast and weak  Treatment for diabetes mellitus type 2  includes keeping your blood sugar at a normal level  You must eat the right foods, and exercise regularly  You may also need medicine if you cannot control your blood sugar level with nutrition and exercise  Manage diabetes mellitus type 2:   · Check your blood sugar level  You will be taught how to check a small drop of blood in a glucose monitor  Ask your healthcare provider when and how often to check during the day  Ask your healthcare provider what your blood sugar levels should be when you check them  · Keep track of carbohydrates (sugar and starchy foods)  Your blood sugar level can get too high if you eat too many carbohydrates  Your dietitian will help you plan meals and snacks that have the right amount of carbohydrates  · Eat low-fat foods  Some examples are skinless chicken and low-fat milk  · Eat less sodium (salt)  Some examples of high-sodium foods to limit are soy sauce, potato chips, and soup  Do not add salt to food you cook  Limit your use of table salt  · Eat high-fiber foods  Foods that are a good source of fiber include vegetables, whole grain bread, and beans  · Limit alcohol  Alcohol affects your blood sugar level and can make it harder to manage your diabetes  Women should limit alcohol to 1 drink a day  Men should limit alcohol to 2 drinks a day  A drink of alcohol is 12 ounces of beer, 5 ounces of wine, or 1½ ounces of liquor  · Get regular exercise  Exercise can help keep your blood sugar level steady, decrease your risk of heart disease, and help you lose weight  Exercise for at least 30 minutes, 5 days a week  Include muscle strengthening activities 2 days each week  Work with your healthcare provider to create an exercise plan  · Check your feet each day  for injuries or open sores   Ask your healthcare provider for activities you can do if you have an open sore     · Quit smoking  If you smoke, it is never too late to quit  Smoking can worsen the problems that may occur with diabetes  Ask your healthcare provider for information about how to stop smoking if you are having trouble quitting  · Ask about your weight:  Ask healthcare providers if you need to lose weight, and how much to lose  Ask them to help you with a weight loss program  Even a 10 to 15 pound weight loss can help you manage your blood sugar level  · Carry medical alert identification  Wear medical alert jewelry or carry a card that says you have diabetes  Ask your healthcare provider where to get these items  · Ask about vaccines  Diabetes puts you at risk of serious illness if you get the flu, pneumonia, or hepatitis  Ask your healthcare provider if you should get a flu, pneumonia, or hepatitis B vaccine, and when to get the vaccine  Follow up with your healthcare provider as directed:  Write down your questions so you remember to ask them during your visits  CARE AGREEMENT:   You have the right to help plan your care  Learn about your health condition and how it may be treated  Discuss treatment options with your caregivers to decide what care you want to receive  You always have the right to refuse treatment  The above information is an  only  It is not intended as medical advice for individual conditions or treatments  Talk to your doctor, nurse or pharmacist before following any medical regimen to see if it is safe and effective for you  © 2014 [address] is for End User's use only and may not be sold, redistributed or otherwise used for commercial purposes  All illustrations and images included in CareNotes® are the copyrighted property of [employer]  or Lavelle Kumar

## 2022-08-04 NOTE — PROGRESS NOTES
Assessment/Plan:    DEXA scan: pt declines  Also declines the Prevnar and Shingrix vaccines    Hypothyroidism  Stable, continue current dosage of Levothyroxine  Repeat lab work ordered today     Type 2 diabetes mellitus with diabetic chronic kidney disease ([facility] )    Lab Results   Component Value Date    HGBA1C 6 5 (H) 05/18/2022     A1C up to 6 5, previously 5 9  Diet has been more poor, more ice cream this Summer  She is aware her diet habits should be altered   Diabetic eye exam is scheduled for 9/13/22  Foot exam completed today   Lab work as ordered    Hypertension  Lab Results   Component Value Date    EGFR 64 10/06/2021    EGFR 68 01/14/2021    EGFR 70 06/04/2020    CREATININE 0 86 10/06/2021    CREATININE 0 82 01/14/2021    CREATININE 0 80 06/04/2020   Stable and managed by Cardiology  Continue current medication regimen    Chronic combined systolic (congestive) and diastolic (congestive) heart failure ([address] )  Wt Readings from Last 3 Encounters:   08/04/22 85 2 kg (187 lb 14 4 oz)   03/07/22 83 4 kg (183 lb 12 8 oz)   01/27/22 82 1 kg (181 lb)       Stable, currently asymptomatic  Appears euvolemic  Managed by Cardiology  Continue current medication regimen   Follow a low sodium diet  Monitor weights daily       Paroxysmal atrial fibrillation ([facility] )  Stable and managed by Cardiology  Continue current medication regimen     Depression with anxiety   Stable, continue Effexor    Obesity (BMI 30-39  9)  Lifestyle modifications encouraged     Restless leg syndrome  Stable, continue Ropinirole        Diagnoses and all orders for this visit:    Encounter for annual wellness exam in Medicare patient    Type 2 diabetes mellitus with chronic kidney disease, without long-term current use of insulin, unspecified CKD stage (HCC)  -     Hemoglobin A1C; Future    Chronic combined systolic (congestive) and diastolic (congestive) heart failure (HCC)    Paroxysmal atrial fibrillation ([address] )    Hypertension, unspecified type  - Comprehensive metabolic panel; Future  -     CBC and differential; Future    Hypothyroidism, unspecified type  -     levothyroxine 100 mcg tablet; Take 1 tablet (100 mcg total) by mouth daily  -     TSH, 3rd generation with Free T4 reflex; Future    Depression with anxiety  -     Comprehensive metabolic panel; Future    Obesity (BMI 30-39  9)    Restless leg syndrome          Subjective:      Patient ID: Henry Donovan is a 80 y o  female  HPI    Pt presents by herself today for a routine follow up     Pt currently follows with Cardiology for Chronic HFrEF stage C, NYHA III, pulmonary hypertension, HTN, HLD, Afib  She had to cancel her appointment yesterday but does plan to reschedule this  Leonila Angry her last Cardiology visit, her [place] was doubled to 49/51 mg BID   A Sleep study was ordered as hypoxia was suspected to be contributing to her PH   Madelin has no intention of doing this sleep study  Fatmata Fritz is aware she has CORAZON and refuses CPAP  She denies edema   She does note some occasional SOB but feels this is related to stress   Denies SOB on exertion, no orthopnea   She does try to follow a low sodium diet   She does not weigh herself regularly as she once did       Depression and anxiety- continues on Effexor and feels stable on this   She continues to work 5 days per week which she enjoys as it keeps her busy      DMT2- A1C 6 7--> 5 9--> back up to 6 5  Controlled via diet and she has cut back on the amount of pasta she eats   Still drinks chocolate mild regularly and has bene eating more ice cream this Summer  Eye exam is scheduled at [facility] 9/13/22    Hypothyroidism- TSH low at 0 315, free T4 normal at 1 04  Levothyroxine was decreased to 100mcg daily  Repeat TSH normal at 3 750 on 5/18/22      The following portions of the patient's history were reviewed and updated as appropriate: allergies, current medications, past family history, past medical history, past social history, past surgical history and problem list     Review of Systems   Constitutional: Negative for appetite change, chills, diaphoresis, fatigue, fever and unexpected weight change  HENT: Negative for ear pain and sore throat  Eyes: Negative for pain and visual disturbance  Respiratory: Positive for shortness of breath (on exertion, stable)  Negative for cough and wheezing  Cardiovascular: Negative for chest pain, palpitations and leg swelling  Gastrointestinal: Negative for abdominal pain, blood in stool, constipation, diarrhea, nausea and vomiting  Genitourinary: Negative for dysuria and hematuria  Musculoskeletal: Positive for arthralgias  Negative for back pain  Skin: Negative for color change and rash  Neurological: Negative for seizures and syncope  Hematological: Negative for adenopathy  Does not bruise/bleed easily  Psychiatric/Behavioral: Positive for dysphoric mood  Negative for self-injury, sleep disturbance and suicidal ideas  The patient is not nervous/anxious  All other systems reviewed and are negative  Objective:      /68 (BP Location: Left arm, Patient Position: Sitting)   Pulse 86   Temp 98 3 °F (36 8 °C)   Resp 16   Ht 5' 1" (1 549 m)   Wt 85 2 kg (187 lb 14 4 oz)   SpO2 98%   BMI 35 50 kg/m²          Physical Exam  Constitutional:       General: She is not in acute distress  Appearance: She is well-developed  She is not ill-appearing, toxic-appearing or diaphoretic  HENT:      Head: Normocephalic and atraumatic  Eyes:      Extraocular Movements: Extraocular movements intact  Conjunctiva/sclera: Conjunctivae normal       Pupils: Pupils are equal, round, and reactive to light  Neck:      Thyroid: No thyromegaly  Vascular: No carotid bruit  Cardiovascular:      Rate and Rhythm: Normal rate and regular rhythm  Pulses: no weak pulses          Dorsalis pedis pulses are 2+ on the right side and 2+ on the left side        Heart sounds: Normal heart sounds  No murmur heard  Pulmonary:      Effort: Pulmonary effort is normal  No respiratory distress  Breath sounds: Normal breath sounds  No wheezing  Abdominal:      General: Bowel sounds are normal  There is no distension  Palpations: Abdomen is soft  Tenderness: There is no abdominal tenderness  Musculoskeletal:         General: Normal range of motion  Cervical back: Normal range of motion and neck supple  No rigidity or tenderness  Right lower leg: No edema  Left lower leg: No edema  Feet:      Right foot:      Skin integrity: Dry skin present  No ulcer, skin breakdown, erythema, warmth or callus  Left foot:      Skin integrity: Dry skin present  No ulcer, skin breakdown, erythema, warmth or callus  Lymphadenopathy:      Cervical: No cervical adenopathy  Skin:     General: Skin is warm and dry  Neurological:      General: No focal deficit present  Mental Status: She is alert and oriented to person, place, and time  Psychiatric:         Mood and Affect: Mood normal          Behavior: Behavior normal          Thought Content: Thought content normal          Judgment: Judgment normal          Answers for HPI/ROS submitted by the patient on 7/31/2022  How would you rate your overall health?: good  Compared to last year, how is your physical health?: slightly better  In general, how satisfied are you with your life?: satisfied  Compared to last year, how is your eyesight?: slightly worse  Compared to last year, how is your hearing?: same  Compared to last year, how is your emotional/mental health?: slightly better  How often is anger a problem for you?: never, rarely  How often do you feel unusually tired/fatigued?: sometimes  In the past 7 days, how much pain have you experienced?: some  If you answered "some" or "a lot", please rate the severity of your pain on a scale of 1 to 10 (1 being the least severe pain and 10 being the most intense pain)  : 3/10  In the past 6 months, have you lost or gained 10 pounds without trying?: Yes  One or more falls in the last year: No  In the past 6 months, have you accidentally leaked urine?: No  Do you have trouble with the stairs inside or outside your home?: No  Does your home have working smoke alarms?: Yes  Does your home have a carbon monoxide monitor?: Yes  Which safety hazards (if any) have you experienced in your home? Please select all that apply : household clutter  How would you describe your current diet?  Please select all that apply : Unhealthy  In addition to prescription medications, are you taking any over-the-counter supplements?: Yes  If yes, what supplements are you taking?: vitamins  Can you manage your medications?: Yes  Are you currently taking any opioid medications?: No  Can you walk and transfer into and out of your bed and chair?: Yes  Can you dress and groom yourself?: Yes  Can you bathe or shower yourself?: Yes  Can you feed yourself?: Yes  Can you do your laundry/ housekeeping?: Yes  Can you manage your money, pay your bills, and track your expenses?: Yes  Can you make your own meals?: Yes  Can you do your own shopping?: Yes  Within the last 12 months, have you had any hospitalizations or Emergency Department visits?: No  Do you have a living will?: No  Do you have a Durable POA (Power of ) for healthcare decisions?: No  Do you have an Advanced Directive for end of life decisions?: No  How often have you used an illegal drug (including marijuana) or a prescription medication for non-medical reasons in the past year?: never  What is the typical number of drinks you consume in a day?: 0  What is the typical number of drinks you consume in a week?: 0  How often did you have a drink containing alcohol in the past year?: never  How many drinks did you have on a typical day  when you were drinking in the past year?: 0  How often did you have 6 or more drinks on one occasion in the past year?: never    Patient's shoes and socks removed  Right Foot/Ankle   Right Foot Inspection  Skin Exam: skin normal, skin intact and dry skin  No warmth, no callus, no erythema, no maceration, no abnormal color, no pre-ulcer, no ulcer and no callus  Toe Exam: ROM and strength within normal limits  Sensory   Monofilament testing: intact    Vascular  Capillary refills: < 3 seconds  The right DP pulse is 2+  Left Foot/Ankle  Left Foot Inspection  Skin Exam: skin normal, skin intact and dry skin  No warmth, no erythema, no maceration, normal color, no pre-ulcer, no ulcer and no callus  Toe Exam: ROM and strength within normal limits  Sensory   Monofilament testing: intact    Vascular  Capillary refills: < 3 seconds  The left DP pulse is 2+       Assign Risk Category  No deformity present  No loss of protective sensation  No weak pulses  Risk: 0

## 2022-08-05 NOTE — ASSESSMENT & PLAN NOTE
Lab Results   Component Value Date    HGBA1C 6 5 (H) 05/18/2022     A1C up to 6 5, previously 5 9  Diet has been more poor, more ice cream this Summer  She is aware her diet habits should be altered   Diabetic eye exam is scheduled for 9/13/22  Foot exam completed today   Lab work as ordered

## 2022-08-05 NOTE — ASSESSMENT & PLAN NOTE
Wt Readings from Last 3 Encounters:   08/04/22 85 2 kg (187 lb 14 4 oz)   03/07/22 83 4 kg (183 lb 12 8 oz)   01/27/22 82 1 kg (181 lb)       Stable, currently asymptomatic  Appears euvolemic  Managed by Cardiology  Continue current medication regimen   Follow a low sodium diet  Monitor weights daily

## 2022-08-10 DIAGNOSIS — I50.42 CHRONIC COMBINED SYSTOLIC (CONGESTIVE) AND DIASTOLIC (CONGESTIVE) HEART FAILURE (HCC): ICD-10-CM

## 2022-08-11 RX ORDER — SACUBITRIL AND VALSARTAN 49; 51 MG/1; MG/1
1 TABLET, FILM COATED ORAL 2 TIMES DAILY
Qty: 60 TABLET | Refills: 3 | Status: SHIPPED | OUTPATIENT
Start: 2022-08-11 | End: 2022-08-23

## 2022-08-18 DIAGNOSIS — I50.22 CHRONIC HFREF (HEART FAILURE WITH REDUCED EJECTION FRACTION) (HCC): ICD-10-CM

## 2022-08-18 DIAGNOSIS — I48.91 ATRIAL FIBRILLATION, UNSPECIFIED TYPE (HCC): ICD-10-CM

## 2022-08-18 DIAGNOSIS — I50.9 CHF (CONGESTIVE HEART FAILURE) (HCC): ICD-10-CM

## 2022-08-18 RX ORDER — POTASSIUM CHLORIDE 1500 MG/1
TABLET, FILM COATED, EXTENDED RELEASE ORAL
Qty: 120 TABLET | Refills: 2 | Status: SHIPPED | OUTPATIENT
Start: 2022-08-18

## 2022-08-18 RX ORDER — METOPROLOL SUCCINATE 50 MG/1
100 TABLET, EXTENDED RELEASE ORAL EVERY 12 HOURS SCHEDULED
Qty: 120 TABLET | Refills: 5 | Status: SHIPPED | OUTPATIENT
Start: 2022-08-18

## 2022-08-22 DIAGNOSIS — M54.41 ACUTE RIGHT-SIDED LOW BACK PAIN WITH RIGHT-SIDED SCIATICA: ICD-10-CM

## 2022-08-23 DIAGNOSIS — I50.42 CHRONIC COMBINED SYSTOLIC (CONGESTIVE) AND DIASTOLIC (CONGESTIVE) HEART FAILURE (HCC): ICD-10-CM

## 2022-08-23 DIAGNOSIS — I48.91 ATRIAL FIBRILLATION WITH RAPID VENTRICULAR RESPONSE (HCC): ICD-10-CM

## 2022-08-23 RX ORDER — TRAMADOL HYDROCHLORIDE 50 MG/1
TABLET ORAL
Qty: 60 TABLET | Refills: 0 | Status: SHIPPED | OUTPATIENT
Start: 2022-08-23 | End: 2022-10-05

## 2022-08-24 NOTE — ASSESSMENT & PLAN NOTE
Cataract symptoms i.e., glare, blur discussed. Pt to call if worsening vision or trouble with driving, TV, reading, ADL. UV precautions. Reviewed possibility of future cataract surgery. Continue Synthroid    TSH is within normal limits

## 2022-08-26 RX ORDER — SACUBITRIL AND VALSARTAN 49; 51 MG/1; MG/1
1 TABLET, FILM COATED ORAL 2 TIMES DAILY
Qty: 180 TABLET | Refills: 3 | Status: ON HOLD | OUTPATIENT
Start: 2022-08-26 | End: 2022-10-13 | Stop reason: SDUPTHER

## 2022-09-21 ENCOUNTER — APPOINTMENT (OUTPATIENT)
Dept: LAB | Facility: CLINIC | Age: 82
End: 2022-09-21
Payer: COMMERCIAL

## 2022-09-21 DIAGNOSIS — F41.8 DEPRESSION WITH ANXIETY: ICD-10-CM

## 2022-09-21 DIAGNOSIS — I10 HYPERTENSION, UNSPECIFIED TYPE: ICD-10-CM

## 2022-09-21 DIAGNOSIS — E11.22 TYPE 2 DIABETES MELLITUS WITH CHRONIC KIDNEY DISEASE, WITHOUT LONG-TERM CURRENT USE OF INSULIN, UNSPECIFIED CKD STAGE (HCC): ICD-10-CM

## 2022-09-21 DIAGNOSIS — E03.9 HYPOTHYROIDISM, UNSPECIFIED TYPE: ICD-10-CM

## 2022-09-21 LAB
ALBUMIN SERPL BCP-MCNC: 3.3 G/DL (ref 3.5–5)
ALP SERPL-CCNC: 68 U/L (ref 46–116)
ALT SERPL W P-5'-P-CCNC: 29 U/L (ref 12–78)
ANION GAP SERPL CALCULATED.3IONS-SCNC: 3 MMOL/L (ref 4–13)
AST SERPL W P-5'-P-CCNC: 28 U/L (ref 5–45)
BASOPHILS # BLD AUTO: 0.04 THOUSANDS/ΜL (ref 0–0.1)
BASOPHILS NFR BLD AUTO: 1 % (ref 0–1)
BILIRUB SERPL-MCNC: 0.49 MG/DL (ref 0.2–1)
BUN SERPL-MCNC: 18 MG/DL (ref 5–25)
CALCIUM ALBUM COR SERPL-MCNC: 9.6 MG/DL (ref 8.3–10.1)
CALCIUM SERPL-MCNC: 9 MG/DL (ref 8.3–10.1)
CHLORIDE SERPL-SCNC: 103 MMOL/L (ref 96–108)
CO2 SERPL-SCNC: 31 MMOL/L (ref 21–32)
CREAT SERPL-MCNC: 0.95 MG/DL (ref 0.6–1.3)
EOSINOPHIL # BLD AUTO: 0.16 THOUSAND/ΜL (ref 0–0.61)
EOSINOPHIL NFR BLD AUTO: 2 % (ref 0–6)
ERYTHROCYTE [DISTWIDTH] IN BLOOD BY AUTOMATED COUNT: 12.8 % (ref 11.6–15.1)
EST. AVERAGE GLUCOSE BLD GHB EST-MCNC: 143 MG/DL
GFR SERPL CREATININE-BSD FRML MDRD: 55 ML/MIN/1.73SQ M
GLUCOSE P FAST SERPL-MCNC: 125 MG/DL (ref 65–99)
HBA1C MFR BLD: 6.6 %
HCT VFR BLD AUTO: 36.5 % (ref 34.8–46.1)
HGB BLD-MCNC: 12.2 G/DL (ref 11.5–15.4)
IMM GRANULOCYTES # BLD AUTO: 0.03 THOUSAND/UL (ref 0–0.2)
IMM GRANULOCYTES NFR BLD AUTO: 0 % (ref 0–2)
LYMPHOCYTES # BLD AUTO: 1.71 THOUSANDS/ΜL (ref 0.6–4.47)
LYMPHOCYTES NFR BLD AUTO: 23 % (ref 14–44)
Lab: NEGATIVE
MCH RBC QN AUTO: 33.2 PG (ref 26.8–34.3)
MCHC RBC AUTO-ENTMCNC: 33.4 G/DL (ref 31.4–37.4)
MCV RBC AUTO: 99 FL (ref 82–98)
MONOCYTES # BLD AUTO: 0.49 THOUSAND/ΜL (ref 0.17–1.22)
MONOCYTES NFR BLD AUTO: 7 % (ref 4–12)
NEUTROPHILS # BLD AUTO: 5.06 THOUSANDS/ΜL (ref 1.85–7.62)
NEUTS SEG NFR BLD AUTO: 67 % (ref 43–75)
NRBC BLD AUTO-RTO: 0 /100 WBCS
PLATELET # BLD AUTO: 237 THOUSANDS/UL (ref 149–390)
PMV BLD AUTO: 10.1 FL (ref 8.9–12.7)
POTASSIUM SERPL-SCNC: 4.4 MMOL/L (ref 3.5–5.3)
PROT SERPL-MCNC: 7.4 G/DL (ref 6.4–8.4)
RBC # BLD AUTO: 3.68 MILLION/UL (ref 3.81–5.12)
RESULTS COMPLETE: ABNORMAL
SODIUM SERPL-SCNC: 137 MMOL/L (ref 135–147)
TSH SERPL DL<=0.05 MIU/L-ACNC: 1.14 UIU/ML (ref 0.45–4.5)
WBC # BLD AUTO: 7.49 THOUSAND/UL (ref 4.31–10.16)
WBC NRBC COR # BLD: 7.49 THOUSAND/UL (ref 4.31–10.16)

## 2022-09-21 PROCEDURE — 83036 HEMOGLOBIN GLYCOSYLATED A1C: CPT

## 2022-09-21 PROCEDURE — 36415 COLL VENOUS BLD VENIPUNCTURE: CPT

## 2022-09-21 PROCEDURE — 84443 ASSAY THYROID STIM HORMONE: CPT

## 2022-09-21 PROCEDURE — 80053 COMPREHEN METABOLIC PANEL: CPT

## 2022-10-03 DIAGNOSIS — F41.8 DEPRESSION WITH ANXIETY: ICD-10-CM

## 2022-10-03 DIAGNOSIS — M54.41 ACUTE RIGHT-SIDED LOW BACK PAIN WITH RIGHT-SIDED SCIATICA: ICD-10-CM

## 2022-10-03 RX ORDER — VENLAFAXINE 75 MG/1
75 TABLET ORAL EVERY 12 HOURS
Qty: 60 TABLET | Refills: 5 | Status: SHIPPED | OUTPATIENT
Start: 2022-10-03 | End: 2022-11-02

## 2022-10-05 RX ORDER — TRAMADOL HYDROCHLORIDE 50 MG/1
TABLET ORAL
Qty: 60 TABLET | Refills: 0 | Status: SHIPPED | OUTPATIENT
Start: 2022-10-05

## 2022-10-12 ENCOUNTER — OFFICE VISIT (OUTPATIENT)
Dept: FAMILY MEDICINE CLINIC | Facility: CLINIC | Age: 82
End: 2022-10-12
Payer: COMMERCIAL

## 2022-10-12 ENCOUNTER — APPOINTMENT (OUTPATIENT)
Dept: RADIOLOGY | Facility: HOSPITAL | Age: 82
End: 2022-10-12
Payer: COMMERCIAL

## 2022-10-12 ENCOUNTER — HOSPITAL ENCOUNTER (OUTPATIENT)
Facility: HOSPITAL | Age: 82
Setting detail: OBSERVATION
Discharge: HOME | End: 2022-10-13
Attending: EMERGENCY MEDICINE | Admitting: FAMILY MEDICINE
Payer: COMMERCIAL

## 2022-10-12 VITALS
HEART RATE: 92 BPM | HEIGHT: 61 IN | DIASTOLIC BLOOD PRESSURE: 60 MMHG | OXYGEN SATURATION: 99 % | SYSTOLIC BLOOD PRESSURE: 120 MMHG | BODY MASS INDEX: 34.55 KG/M2 | WEIGHT: 183 LBS | RESPIRATION RATE: 16 BRPM | TEMPERATURE: 97.8 F

## 2022-10-12 DIAGNOSIS — I50.9 CHF (CONGESTIVE HEART FAILURE) (HCC): ICD-10-CM

## 2022-10-12 DIAGNOSIS — E11.22 TYPE 2 DIABETES MELLITUS WITH CHRONIC KIDNEY DISEASE, WITHOUT LONG-TERM CURRENT USE OF INSULIN, UNSPECIFIED CKD STAGE (HCC): ICD-10-CM

## 2022-10-12 DIAGNOSIS — I48.0 PAROXYSMAL ATRIAL FIBRILLATION (HCC): ICD-10-CM

## 2022-10-12 DIAGNOSIS — I50.42 CHRONIC COMBINED SYSTOLIC (CONGESTIVE) AND DIASTOLIC (CONGESTIVE) HEART FAILURE (HCC): ICD-10-CM

## 2022-10-12 DIAGNOSIS — N17.9 AKI (ACUTE KIDNEY INJURY) (HCC): Primary | ICD-10-CM

## 2022-10-12 DIAGNOSIS — R07.89 LEFT-SIDED CHEST WALL PAIN: Primary | ICD-10-CM

## 2022-10-12 DIAGNOSIS — I50.22 CHRONIC HFREF (HEART FAILURE WITH REDUCED EJECTION FRACTION) (HCC): ICD-10-CM

## 2022-10-12 DIAGNOSIS — R07.89 LEFT-SIDED CHEST WALL PAIN: ICD-10-CM

## 2022-10-12 DIAGNOSIS — R07.9 CHEST PAIN: ICD-10-CM

## 2022-10-12 PROBLEM — E66.01 MORBID (SEVERE) OBESITY DUE TO EXCESS CALORIES (HCC): Status: RESOLVED | Noted: 2021-04-15 | Resolved: 2022-10-12

## 2022-10-12 PROBLEM — N18.2 CHRONIC KIDNEY DISEASE, STAGE 2 (MILD): Status: RESOLVED | Noted: 2021-04-15 | Resolved: 2022-10-12

## 2022-10-12 LAB
2HR DELTA HS TROPONIN: -1 NG/L
ANION GAP SERPL CALCULATED.3IONS-SCNC: 8 MMOL/L (ref 4–13)
BACTERIA UR QL AUTO: ABNORMAL /HPF
BASOPHILS # BLD AUTO: 0.08 THOUSANDS/ΜL (ref 0–0.1)
BASOPHILS NFR BLD AUTO: 1 % (ref 0–1)
BILIRUB UR QL STRIP: NEGATIVE
BUN SERPL-MCNC: 46 MG/DL (ref 5–25)
CALCIUM SERPL-MCNC: 9.9 MG/DL (ref 8.3–10.1)
CARDIAC TROPONIN I PNL SERPL HS: 11 NG/L
CARDIAC TROPONIN I PNL SERPL HS: 12 NG/L
CHLORIDE SERPL-SCNC: 98 MMOL/L (ref 96–108)
CLARITY UR: ABNORMAL
CO2 SERPL-SCNC: 28 MMOL/L (ref 21–32)
COLOR UR: YELLOW
CREAT SERPL-MCNC: 2.23 MG/DL (ref 0.6–1.3)
EOSINOPHIL # BLD AUTO: 1.17 THOUSAND/ΜL (ref 0–0.61)
EOSINOPHIL NFR BLD AUTO: 12 % (ref 0–6)
ERYTHROCYTE [DISTWIDTH] IN BLOOD BY AUTOMATED COUNT: 13.4 % (ref 11.6–15.1)
FLUAV RNA RESP QL NAA+PROBE: NEGATIVE
FLUBV RNA RESP QL NAA+PROBE: NEGATIVE
GFR SERPL CREATININE-BSD FRML MDRD: 19 ML/MIN/1.73SQ M
GLUCOSE SERPL-MCNC: 149 MG/DL (ref 65–140)
GLUCOSE UR STRIP-MCNC: NEGATIVE MG/DL
GRAN CASTS #/AREA URNS LPF: ABNORMAL /[LPF]
HCT VFR BLD AUTO: 39 % (ref 34.8–46.1)
HGB BLD-MCNC: 12.9 G/DL (ref 11.5–15.4)
HGB UR QL STRIP.AUTO: NEGATIVE
HYALINE CASTS #/AREA URNS LPF: ABNORMAL /LPF
IMM GRANULOCYTES # BLD AUTO: 0.06 THOUSAND/UL (ref 0–0.2)
IMM GRANULOCYTES NFR BLD AUTO: 1 % (ref 0–2)
KETONES UR STRIP-MCNC: NEGATIVE MG/DL
LEUKOCYTE ESTERASE UR QL STRIP: ABNORMAL
LYMPHOCYTES # BLD AUTO: 1.41 THOUSANDS/ΜL (ref 0.6–4.47)
LYMPHOCYTES NFR BLD AUTO: 15 % (ref 14–44)
MCH RBC QN AUTO: 32.2 PG (ref 26.8–34.3)
MCHC RBC AUTO-ENTMCNC: 33.1 G/DL (ref 31.4–37.4)
MCV RBC AUTO: 97 FL (ref 82–98)
MONOCYTES # BLD AUTO: 0.7 THOUSAND/ΜL (ref 0.17–1.22)
MONOCYTES NFR BLD AUTO: 7 % (ref 4–12)
MUCOUS THREADS UR QL AUTO: ABNORMAL
NEUTROPHILS # BLD AUTO: 6.05 THOUSANDS/ΜL (ref 1.85–7.62)
NEUTS SEG NFR BLD AUTO: 64 % (ref 43–75)
NITRITE UR QL STRIP: NEGATIVE
NON-SQ EPI CELLS URNS QL MICRO: ABNORMAL /HPF
NRBC BLD AUTO-RTO: 0 /100 WBCS
PH UR STRIP.AUTO: 5.5 [PH]
PLATELET # BLD AUTO: 288 THOUSANDS/UL (ref 149–390)
PMV BLD AUTO: 10.7 FL (ref 8.9–12.7)
POTASSIUM SERPL-SCNC: 3.9 MMOL/L (ref 3.5–5.3)
PROT UR STRIP-MCNC: ABNORMAL MG/DL
RBC # BLD AUTO: 4.01 MILLION/UL (ref 3.81–5.12)
RBC #/AREA URNS AUTO: ABNORMAL /HPF
RSV RNA RESP QL NAA+PROBE: NEGATIVE
SARS-COV-2 RNA RESP QL NAA+PROBE: NEGATIVE
SODIUM SERPL-SCNC: 134 MMOL/L (ref 135–147)
SP GR UR STRIP.AUTO: 1.03 (ref 1–1.03)
UROBILINOGEN UR STRIP-ACNC: <2 MG/DL
WBC # BLD AUTO: 9.47 THOUSAND/UL (ref 4.31–10.16)
WBC #/AREA URNS AUTO: ABNORMAL /HPF

## 2022-10-12 PROCEDURE — 93005 ELECTROCARDIOGRAM TRACING: CPT

## 2022-10-12 PROCEDURE — 99285 EMERGENCY DEPT VISIT HI MDM: CPT

## 2022-10-12 PROCEDURE — 71046 X-RAY EXAM CHEST 2 VIEWS: CPT

## 2022-10-12 PROCEDURE — 87086 URINE CULTURE/COLONY COUNT: CPT

## 2022-10-12 PROCEDURE — 80048 BASIC METABOLIC PNL TOTAL CA: CPT

## 2022-10-12 PROCEDURE — 81001 URINALYSIS AUTO W/SCOPE: CPT

## 2022-10-12 PROCEDURE — 85025 COMPLETE CBC W/AUTO DIFF WBC: CPT

## 2022-10-12 PROCEDURE — 0241U HB NFCT DS VIR RESP RNA 4 TRGT: CPT | Performed by: FAMILY MEDICINE

## 2022-10-12 PROCEDURE — 84484 ASSAY OF TROPONIN QUANT: CPT

## 2022-10-12 PROCEDURE — 36415 COLL VENOUS BLD VENIPUNCTURE: CPT

## 2022-10-12 PROCEDURE — 99214 OFFICE O/P EST MOD 30 MIN: CPT | Performed by: NURSE PRACTITIONER

## 2022-10-12 PROCEDURE — 99285 EMERGENCY DEPT VISIT HI MDM: CPT | Performed by: EMERGENCY MEDICINE

## 2022-10-12 PROCEDURE — 99220 PR INITIAL OBSERVATION CARE/DAY 70 MINUTES: CPT | Performed by: FAMILY MEDICINE

## 2022-10-12 RX ORDER — ONDANSETRON 2 MG/ML
4 INJECTION INTRAMUSCULAR; INTRAVENOUS EVERY 6 HOURS PRN
Status: DISCONTINUED | OUTPATIENT
Start: 2022-10-12 | End: 2022-10-13 | Stop reason: HOSPADM

## 2022-10-12 RX ORDER — VENLAFAXINE 37.5 MG/1
75 TABLET ORAL EVERY 12 HOURS
Status: DISCONTINUED | OUTPATIENT
Start: 2022-10-12 | End: 2022-10-13 | Stop reason: HOSPADM

## 2022-10-12 RX ORDER — ACETAMINOPHEN 325 MG/1
650 TABLET ORAL ONCE
Status: COMPLETED | OUTPATIENT
Start: 2022-10-12 | End: 2022-10-12

## 2022-10-12 RX ORDER — ACETAMINOPHEN 325 MG/1
650 TABLET ORAL EVERY 6 HOURS PRN
Status: DISCONTINUED | OUTPATIENT
Start: 2022-10-12 | End: 2022-10-13 | Stop reason: HOSPADM

## 2022-10-12 RX ORDER — HYDRALAZINE HYDROCHLORIDE 10 MG/1
10 TABLET, FILM COATED ORAL 2 TIMES DAILY
Status: DISCONTINUED | OUTPATIENT
Start: 2022-10-12 | End: 2022-10-13 | Stop reason: HOSPADM

## 2022-10-12 RX ORDER — ROPINIROLE 2 MG/1
2 TABLET, FILM COATED, EXTENDED RELEASE ORAL
Status: DISCONTINUED | OUTPATIENT
Start: 2022-10-12 | End: 2022-10-13 | Stop reason: HOSPADM

## 2022-10-12 RX ORDER — LEVOTHYROXINE SODIUM 0.1 MG/1
100 TABLET ORAL
Status: DISCONTINUED | OUTPATIENT
Start: 2022-10-13 | End: 2022-10-13 | Stop reason: HOSPADM

## 2022-10-12 RX ORDER — METOPROLOL SUCCINATE 100 MG/1
100 TABLET, EXTENDED RELEASE ORAL EVERY 12 HOURS SCHEDULED
Status: DISCONTINUED | OUTPATIENT
Start: 2022-10-12 | End: 2022-10-13 | Stop reason: HOSPADM

## 2022-10-12 RX ORDER — SODIUM CHLORIDE, SODIUM GLUCONATE, SODIUM ACETATE, POTASSIUM CHLORIDE, MAGNESIUM CHLORIDE, SODIUM PHOSPHATE, DIBASIC, AND POTASSIUM PHOSPHATE .53; .5; .37; .037; .03; .012; .00082 G/100ML; G/100ML; G/100ML; G/100ML; G/100ML; G/100ML; G/100ML
50 INJECTION, SOLUTION INTRAVENOUS CONTINUOUS
Status: DISCONTINUED | OUTPATIENT
Start: 2022-10-12 | End: 2022-10-13

## 2022-10-12 RX ADMIN — VENLAFAXINE 75 MG: 37.5 TABLET ORAL at 23:57

## 2022-10-12 RX ADMIN — ACETAMINOPHEN 650 MG: 325 TABLET, FILM COATED ORAL at 17:20

## 2022-10-12 RX ADMIN — SODIUM CHLORIDE, SODIUM GLUCONATE, SODIUM ACETATE, POTASSIUM CHLORIDE, MAGNESIUM CHLORIDE, SODIUM PHOSPHATE, DIBASIC, AND POTASSIUM PHOSPHATE 50 ML/HR: .53; .5; .37; .037; .03; .012; .00082 INJECTION, SOLUTION INTRAVENOUS at 21:48

## 2022-10-12 RX ADMIN — APIXABAN 2.5 MG: 2.5 TABLET, FILM COATED ORAL at 21:47

## 2022-10-12 NOTE — ASSESSMENT & PLAN NOTE
Patient with history of CKD, CHF on diuretics, presented to ER with complaint of chest tightness and heaviness for 1 week and overall not feeling well  Patient had poor oral intake  Creatinine is 2 23 today  Will hold Entresto and bumex  Intake and output  Bladder scan, Urinalysis  Urinary retention protocol  Will start gentle hydration  Repeat BMP in AM

## 2022-10-12 NOTE — H&P
[address] [facility] [address] [place] 1940, 80 y o  female MRN: [id number]  Unit/Bed#: ED 06 Encounter: [id number]  Primary Care Provider: SOTO Leary   Date and time admitted to hospital: 10/12/2022  4:53 PM    * WANG (acute kidney injury) [facility])  Assessment & Plan  Patient with history of CKD, CHF on diuretics, presented to ER with complaint of chest tightness and heaviness for 1 week and overall not feeling well  Patient had poor oral intake  Creatinine is 2 23 today  Obtain COVID/flu  Chest x-ray negative  Will hold Entresto  Intake and output  Bladder scan, Urinalysis  Urinary retention protocol  Will start gentle hydration    Paroxysmal atrial fibrillation ([address] )  Assessment & Plan  Rate controlled with metoprolol  Anticoagulated with Eliquis  Has ICD in place    Restless leg syndrome  Assessment & Plan  Continue home medication Requip    Chronic combined systolic (congestive) and diastolic (congestive) heart failure (HCC)  Assessment & Plan  Wt Readings from Last 3 Encounters:   10/12/22 83 kg (183 lb)   08/04/22 85 2 kg (187 lb 14 4 oz)   03/07/22 83 4 kg (183 lb 12 8 oz)     Patient presented with WANG and mild dehydration  Will hold off Entresto  Continue metoprolol  Last echocardiogram 2020 reveals LVEF 60%, no wall motion abnormality      Type 2 diabetes mellitus with diabetic cataract [facility])  Assessment & Plan    Lab Results   Component Value Date    HGBA1C 6 6 (H) 09/21/2022     Last A1c 6 6, currently not on any medication  Sliding scale    Hypothyroidism  Assessment & Plan  Resume home medication levothyroxine  Last TSH 09/21/2022:  1 14    Hypertension  Assessment & Plan  Blood pressure well controlled  Continue hydralazine and metoprolol    VTE Prophylaxis: Apixaban (Eliquis)  / sequential compression device   Code Status:  Full code      Anticipated Length of Stay:  Patient will be admitted on an Observation basis with an anticipated length of stay of  > 2 midnights  Justification for Hospital Stay:  Angel    Total Time for Visit, including Counseling / Coordination of Care: 60 minutes  Greater than 50% of this total time spent on direct patient counseling and coordination of care  Chief Complaint:   Feeling unwell, chest heaviness    History of Present Illness:    Henry Donovan is a 80 y o  female sent from PCP's office due to complaints of chest heaviness worsens with exertion, not feeling well  Patient's past medical history consist of combined systolic and diastolic CHF, AFib, controlled type 2 diabetes, restless legs syndrome, and CKD  In ER, further workup revealed patient has elevated creatinine of 2 2 which is significantly higher than her baseline  Patient however reported her chest heaviness has resolved  She has no other symptoms at this point  EKG nonischemic, troponin negative  Will place patient on telemetry monitor    Review of Systems:    Review of Systems   Constitutional: Positive for appetite change and fatigue  Negative for chills and fever  HENT: Negative for ear pain and sore throat  Eyes: Negative for pain and visual disturbance  Respiratory: Positive for shortness of breath  Negative for cough  Cardiovascular: Negative for chest pain and palpitations  Gastrointestinal: Negative for abdominal pain and vomiting  Genitourinary: Negative for dysuria and hematuria  Musculoskeletal: Negative for arthralgias and back pain  Skin: Negative for color change and rash  Neurological: Negative for seizures and syncope  All other systems reviewed and are negative        Past Medical and Surgical History:     Past Medical History:   Diagnosis Date   • Cardiogenic shock ([address] ) 6/26/2019   • CHF (congestive heart failure) ([facility])    • Eczema    • Photosensitivity     abnormal skin sensitivity to sunlight   • 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  Authorizing Provider   apixaban (Eliquis) 5 mg Take 1 tablet (5 mg total) by mouth 2 (two) times a day 8/26/22   Brittanie Street,    Ascorbic Acid (vitamin C) 1000 MG tablet Take 1,000 mg by mouth 2 (two) times a day     Historical Provider, MD   bumetanide (BUMEX) 2 mg tablet TAKE 2 TABLETS (4 MG TOTAL) BY MOUTH DAILY 7/5/22   SOTO Willams   calcium carbonate (OS-PAULA) 600 MG tablet Take 600 mg by mouth 2 (two) times a day with meals    Historical Provider, MD   cholecalciferol (VITAMIN D3) 1,000 units tablet Take 1,000 Units by mouth daily 8,000 dailty    Historical Provider, MD   hydrALAZINE (APRESOLINE) 10 mg tablet TAKE 1 TABLET (10 MG TOTAL) BY MOUTH EVERY 8 (EIGHT) HOURS  Patient taking differently: Take 10 mg by mouth 2 (two) times a day 1/1/22   SOTO Willams   levothyroxine 100 mcg tablet Take 1 tablet (100 mcg total) by mouth daily 8/4/22   SOTO Willams   MAGNESIUM PO Take 1,000 mg by mouth daily     Historical Provider, MD   metoprolol succinate (TOPROL-XL) 50 mg 24 hr tablet TAKE 2 TABLETS (100 MG TOTAL) BY MOUTH EVERY 12 (TWELVE) HOURS 8/18/22   Brittanie Street,    Multiple Vitamin (MULTI-VITAMIN DAILY PO) 1 tablet 2 (two) times a day   4/5/21   Historical Provider, MD   Potassium Chloride ER 20 MEQ TBCR TAKE 2 TABLETS (40 MEQ TOTAL) BY MOUTH 2 (TWO) TIMES A DAY 6/8/21   Historical Provider, MD   Potassium Chloride ER 20 MEQ TBCR TAKE 2 TABLETS (40 MEQ TOTAL) BY MOUTH 2 (TWO) TIMES A DAY 8/18/22   SOTO Willams   rOPINIRole (REQUIP XL) 2 MG 24 hr tablet TAKE 1 TABLET (2 MG TOTAL) BY MOUTH DAILY AT BEDTIME 6/15/22   SOTO Willams   sacubitril-valsartan (Entresto) 49-51 MG TABS Take 1 tablet by mouth 2 (two) times a day 8/26/22   Shae Early DO   traMADol (ULTRAM) 50 mg tablet TAKE 1 TABLET TWICE A DAY BY ORAL ROUTE AS NEEDED  10/5/22   Levi Mcclain MD   venlafaxine (EFFEXOR) 75 mg tablet TAKE 1 TABLET (75 MG TOTAL) BY MOUTH EVERY 12 (TWELVE) HOURS 10/3/22 11/2/22  SOTO Roland   potassium chloride (K-DUR,KLOR-CON) 20 mEq tablet TAKE 2 TABLETS (40 MEQ TOTAL) BY MOUTH 2 (TWO) TIMES A DAY 3/17/21 9/19/21  SOTO Roland     I have reviewed home medications using allscripts  Allergies: Allergies   Allergen Reactions   • Penicillins    • Wellbutrin Sr  [Bupropion]      Other reaction(s): Suicidal ideations  Category: Adverse Reaction;        Social History:     Marital Status: Single     Substance Use History:   Social History     Substance and Sexual Activity   Alcohol Use Not Currently     Social History     Tobacco Use   Smoking Status Never Smoker   Smokeless Tobacco Never Used     Social History     Substance and Sexual Activity   Drug Use Never       Family History:    Family History   Problem Relation Age of Onset   • Alzheimer's disease Mother    • Coronary artery disease Mother    • Dementia Mother    • Diabetes Mother         mellitus   • Other Father         acute myocardial infarction   • Coronary artery disease Sister    • Other Maternal Grandfather         laryngeal cancer   • Dementia Maternal Aunt    • Diabetes Maternal Aunt        Physical Exam:     Vitals:   Blood Pressure: 100/53 (10/12/22 1830)  Pulse: 80 (10/12/22 1830)  Temperature: 98 1 °F (36 7 °C) (10/12/22 1713)  Temp Source: Oral (10/12/22 1713)  Respirations: 18 (10/12/22 1830)  SpO2: 96 % (10/12/22 1830)    Physical Exam  Vitals and nursing note reviewed  Constitutional:       General: She is not in acute distress  Appearance: She is well-developed  HENT:      Head: Normocephalic and atraumatic        Mouth/Throat:      Comments: Dry oral mucosa noted  Eyes:      Conjunctiva/sclera: Conjunctivae normal  Cardiovascular:      Rate and Rhythm: Normal rate and regular rhythm  Heart sounds: No murmur heard  Pulmonary:      Effort: Pulmonary effort is normal  No respiratory distress  Breath sounds: Normal breath sounds  Abdominal:      Palpations: Abdomen is soft  Tenderness: There is no abdominal tenderness  Musculoskeletal:      Cervical back: Neck supple  Right lower leg: No edema  Left lower leg: No edema  Skin:     General: Skin is warm and dry  Neurological:      Mental Status: She is alert  Additional Data:     Lab Results: I have personally reviewed pertinent reports  Results from last 7 days   Lab Units 10/12/22  1715   WBC Thousand/uL 9 47   HEMOGLOBIN g/dL 12 9   HEMATOCRIT % 39 0   PLATELETS Thousands/uL 288   NEUTROS PCT % 64   LYMPHS PCT % 15   MONOS PCT % 7   EOS PCT % 12*     Results from last 7 days   Lab Units 10/12/22  1715   SODIUM mmol/L 134*   POTASSIUM mmol/L 3 9   CHLORIDE mmol/L 98   CO2 mmol/L 28   BUN mg/dL 46*   CREATININE mg/dL 2 23*   ANION GAP mmol/L 8   CALCIUM mg/dL 9 9   GLUCOSE RANDOM mg/dL 149*                       Imaging: I have personally reviewed pertinent reports  XR chest 2 views   ED Interpretation by Thomasina Opitz, MD (10/12 1757)   No acute cardiopulmonary disease  ** Please Note: This note has been constructed using a voice recognition system   **

## 2022-10-12 NOTE — ED ATTENDING ATTESTATION
10/12/2022  IBenson MD, saw and evaluated the patient  I have discussed the patient with the resident/non-physician practitioner and agree with the resident's/non-physician practitioner's findings, Plan of Care, and MDM as documented in the resident's/non-physician practitioner's note, except where noted  All available labs and Radiology studies were reviewed  I was present for key portions of any procedure(s) performed by the resident/non-physician practitioner and I was immediately available to provide assistance  At this point I agree with the current assessment done in the Emergency Department  I have conducted an independent evaluation of this patient a history and physical is as follows:    ED Course         Critical Care Time  Procedures    81 yo female with hx of htn, hld, dm, chf, afib, pacemaker, c/o one week of chest heaviness, lasting few seconds  No n/v/diaphoresis  Pt with some sob with laying flat  No abdominal pain, no diarrhea, no fever, no cough  Pt sent from urgent care  Pt with weight loss, no leg swelling  Vss, afebrile, lungs cta, rrr, abdomen distended, soft nontender, no pedal edema    Cardiac workup, covid

## 2022-10-12 NOTE — ASSESSMENT & PLAN NOTE
Lab Results   Component Value Date    HGBA1C 6 6 (H) 09/21/2022     Last A1c 6 6, currently not on any medication  Sliding scale

## 2022-10-12 NOTE — ASSESSMENT & PLAN NOTE
Wt Readings from Last 3 Encounters:   10/12/22 83 kg (183 lb)   08/04/22 85 2 kg (187 lb 14 4 oz)   03/07/22 83 4 kg (183 lb 12 8 oz)     Patient presented with WANG and mild dehydration  Will hold off Entresto and bumex  Continue metoprolol  Last echocardiogram 2020 reveals LVEF 60%, no wall motion abnormality

## 2022-10-12 NOTE — ED PROVIDER NOTES
History  Chief Complaint   Patient presents with   • Chest Pain     Pt reports chest pain and intermittent sob     49-year-old woman with relevant past medical history of hypothyroidism, CHF status post pacemaker, and AFib presents with a week of feeling unwell as well as chest pain  Patient reports on off chest pain for the past week  She describes it mostly as a heaviness but has occasional twinges in her chest   She has also been reporting a feeling of generalized unwellness for the last week and is unable to specify what symptoms  Some occasional dyspnea but no nausea, vomiting, diaphoresis, radiation of pain, abdominal pain, dysuria, or diarrhea  She denies any recent weight gain  Prior to Admission Medications   Prescriptions Last Dose Informant Patient Reported? Taking?    Ascorbic Acid (vitamin C) 1000 MG tablet  Self Yes No   Sig: Take 1,000 mg by mouth 2 (two) times a day    MAGNESIUM PO  Self Yes No   Sig: Take 1,000 mg by mouth daily    Multiple Vitamin (MULTI-VITAMIN DAILY PO)  Self Yes No   Si tablet 2 (two) times a day     Potassium Chloride ER 20 MEQ TBCR  Self Yes No   Sig: TAKE 2 TABLETS (40 MEQ TOTAL) BY MOUTH 2 (TWO) TIMES A DAY   Potassium Chloride ER 20 MEQ TBCR   No No   Sig: TAKE 2 TABLETS (40 MEQ TOTAL) BY MOUTH 2 (TWO) TIMES A DAY   apixaban (Eliquis) 5 mg   No No   Sig: Take 1 tablet (5 mg total) by mouth 2 (two) times a day   bumetanide (BUMEX) 2 mg tablet   No No   Sig: TAKE 2 TABLETS (4 MG TOTAL) BY MOUTH DAILY   calcium carbonate (OS-PAULA) 600 MG tablet  Self Yes No   Sig: Take 600 mg by mouth 2 (two) times a day with meals   cholecalciferol (VITAMIN D3) 1,000 units tablet  Self Yes No   Sig: Take 1,000 Units by mouth daily 8,000 dailty   hydrALAZINE (APRESOLINE) 10 mg tablet   No No   Sig: TAKE 1 TABLET (10 MG TOTAL) BY MOUTH EVERY 8 (EIGHT) HOURS   Patient taking differently: Take 10 mg by mouth 2 (two) times a day   levothyroxine 100 mcg tablet   No No   Sig: Take 1 tablet (100 mcg total) by mouth daily   metoprolol succinate (TOPROL-XL) 50 mg 24 hr tablet   No No   Sig: TAKE 2 TABLETS (100 MG TOTAL) BY MOUTH EVERY 12 (TWELVE) HOURS   rOPINIRole (REQUIP XL) 2 MG 24 hr tablet   No No   Sig: TAKE 1 TABLET (2 MG TOTAL) BY MOUTH DAILY AT BEDTIME   sacubitril-valsartan (Entresto) 49-51 MG TABS   No No   Sig: Take 1 tablet by mouth 2 (two) times a day   traMADol (ULTRAM) 50 mg tablet   No No   Sig: TAKE 1 TABLET TWICE A DAY BY ORAL ROUTE AS NEEDED  venlafaxine (EFFEXOR) 75 mg tablet   No No   Sig: TAKE 1 TABLET (75 MG TOTAL) BY MOUTH EVERY 12 (TWELVE) HOURS      Facility-Administered Medications: None       Past Medical History:   Diagnosis Date   • Cardiogenic shock ([facility] [address] ) 6/26/2019   • CHF (congestive heart failure) (HCC)    • Eczema    • Photosensitivity     abnormal skin sensitivity to sunlight   • 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HCC)     staging       Past Surgical History:   Procedure Laterality Date   • CATARACT EXTRACTION Left    • HEMORRHOID SURGERY     • IR NON-TUNNELED CENTRAL LINE PLACEMENT  7/9/2019   • OOPHORECTOMY      unilateral   • TOTAL ABDOMINAL HYSTERECTOMY         Family History   Problem Relation Age of Onset   • Alzheimer's disease Mother    • Coronary artery disease Mother    • Dementia Mother    • Diabetes Mother         mellitus   • Other Father         acute myocardial infarction   • Coronary artery disease Sister    • Other Maternal Grandfather         laryngeal cancer   • Dementia Maternal Aunt    • Diabetes Maternal Aunt      I have reviewed and agree with the history as documented  E-Cigarette/Vaping   • E-Cigarette Use Never User      E-Cigarette/Vaping Substances     Social History     Tobacco Use   • Smoking status: Never Smoker   • Smokeless tobacco: Never Used   Vaping Use   • Vaping Use: Never used   Substance Use Topics   • Alcohol use: Not Currently   • Drug use: Never        Review of Systems   Constitutional: Negative for chills and fever     HENT: Negative for ear pain and sore throat  Eyes: Negative for pain and visual disturbance  Respiratory: Positive for shortness of breath  Negative for cough and wheezing  Cardiovascular: Positive for chest pain  Negative for palpitations  Gastrointestinal: Negative for abdominal pain, constipation, diarrhea and vomiting  Genitourinary: Negative for dysuria, frequency, hematuria and vaginal bleeding  Musculoskeletal: Negative for arthralgias and back pain  Skin: Negative for color change and rash  Neurological: Negative for seizures, syncope and headaches  Psychiatric/Behavioral: Negative for agitation and confusion  Physical Exam  ED Triage Vitals   Temperature Pulse Respirations Blood Pressure SpO2   10/12/22 1713 10/12/22 1706 10/12/22 1706 10/12/22 1706 10/12/22 1706   98 1 °F (36 7 °C) 86 18 112/56 97 %      Temp Source Heart Rate Source Patient Position - Orthostatic VS BP Location FiO2 (%)   10/12/22 1713 -- -- -- --   Oral          Pain Score       10/12/22 1720       1             Orthostatic Vital Signs  Vitals:    10/12/22 1706 10/12/22 1800 10/12/22 1830   BP: 112/56 106/57 100/53   Pulse: 86 80 80       Physical Exam  Vitals and nursing note reviewed  Constitutional:       General: She is not in acute distress  Appearance: Normal appearance  She is well-developed  HENT:      Head: Normocephalic and atraumatic  Right Ear: External ear normal       Left Ear: External ear normal       Nose: Nose normal  No congestion  Cardiovascular:      Rate and Rhythm: Normal rate and regular rhythm  Pulmonary:      Effort: Pulmonary effort is normal  No respiratory distress  Breath sounds: Normal breath sounds  Abdominal:      Palpations: Abdomen is soft  Tenderness: There is no abdominal tenderness  There is no guarding or rebound  Musculoskeletal:         General: Normal range of motion  Cervical back: Normal range of motion and neck supple        Right lower leg: No edema  Left lower leg: No edema  Skin:     General: Skin is warm and dry  Neurological:      General: No focal deficit present  Mental Status: She is alert and oriented to person, place, and time  Sensory: No sensory deficit  Motor: No weakness  Psychiatric:         Mood and Affect: Mood normal          Behavior: Behavior normal          ED Medications  Medications   acetaminophen (TYLENOL) tablet 650 mg (650 mg Oral Given 10/12/22 1720)       Diagnostic Studies  Results Reviewed     Procedure Component Value Units Date/Time    HS Troponin I 4hr [[id number]]     Lab Status: No result Specimen: Blood     COVID/FLU/RSV [[id number]] Collected: 10/12/22 1908    Lab Status: In process Specimen: Nares from Nose Updated: 10/12/22 1913    HS Troponin I 2hr [[id number]] Collected: 10/12/22 1908    Lab Status:  In process Specimen: Blood from Arm, Right Updated: 10/12/22 1913    UA w Reflex to Microscopic w Reflex to Culture [[id number]]     Lab Status: No result Specimen: Urine     HS Troponin 0hr (reflex protocol) [[id number]]  (Normal) Collected: 10/12/22 1715    Lab Status: Final result Specimen: Blood from Arm, Right Updated: 10/12/22 1751     hs TnI 0hr 12 ng/L     Basic metabolic panel [[id number]]  (Abnormal) Collected: 10/12/22 1715    Lab Status: Final result Specimen: Blood from Arm, Right Updated: 10/12/22 1739     Sodium 134 mmol/L      Potassium 3 9 mmol/L      Chloride 98 mmol/L      CO2 28 mmol/L      ANION GAP 8 mmol/L      BUN 46 mg/dL      Creatinine 2 23 mg/dL      Glucose 149 mg/dL      Calcium 9 9 mg/dL      eGFR 19 ml/min/1 73sq m     Narrative:      [facility] guidelines for Chronic Kidney Disease (CKD):   •  Stage 1 with normal or high GFR (GFR > 90 mL/min/1 73 square meters)  •  Stage 2 Mild CKD (GFR = 60-89 mL/min/1 73 square meters)  •  Stage 3A Moderate CKD (GFR = 45-59 mL/min/1 73 square meters)  •  Stage 3B Moderate CKD (GFR = 30-44 mL/min/1 73 square meters)  •  Stage 4 Severe CKD (GFR = 15-29 mL/min/1 73 square meters)  •  Stage 5 End Stage CKD (GFR <15 mL/min/1 73 square meters)  Note: GFR calculation is accurate only with a steady state creatinine    CBC and differential [[id number]]  (Abnormal) Collected: 10/12/22 1715    Lab Status: Final result Specimen: Blood from Arm, Right Updated: 10/12/22 1726     WBC 9 47 Thousand/uL      RBC 4 01 Million/uL      Hemoglobin 12 9 g/dL      Hematocrit 39 0 %      MCV 97 fL      MCH 32 2 pg      MCHC 33 1 g/dL      RDW 13 4 %      MPV 10 7 fL      Platelets 281 Thousands/uL      nRBC 0 /100 WBCs      Neutrophils Relative 64 %      Immat GRANS % 1 %      Lymphocytes Relative 15 %      Monocytes Relative 7 %      Eosinophils Relative 12 %      Basophils Relative 1 %      Neutrophils Absolute 6 05 Thousands/µL      Immature Grans Absolute 0 06 Thousand/uL      Lymphocytes Absolute 1 41 Thousands/µL      Monocytes Absolute 0 70 Thousand/µL      Eosinophils Absolute 1 17 Thousand/µL      Basophils Absolute 0 08 Thousands/µL                  XR chest 2 views   ED Interpretation by Ana Cristina Dubois MD (10/12 1757)   No acute cardiopulmonary disease  Procedures  Procedures      ED Course  ED Course as of 10/12/22 1946   Wed Oct 12, 2022   1757 Creatinine(!): 2 23  Baseline 0 9        MDM  Number of Diagnoses or Management Options  WANG (acute kidney injury) [facility]): new and requires workup  Chest pain: new and requires workup  Diagnosis management comments: Presents with 1 week history of feeling unwell in addition to chest pain and dyspnea  Workup significant for notable WANG from her baseline  Patient has reported decreased p o  Intake in the last few days due to not feeling well  Presumable WANG secondary to dehydration  No evidence of cardiac ischemia with ECG or troponin  Will admit to [facility] for WANG management  Patient in agreement with plan and questions were answered       Portions or all of this note were generated using voice recognition software  Occasional wrong word or "sound a like" substitutions may have occurred due to the inherent limitations of voice recognition software  Please interpret any errors within the intended context of the whole sentence or idea  Disposition  Final diagnoses:   WANG (acute kidney injury) ([facility] )   Chest pain     Time reflects when diagnosis was documented in both MDM as applicable and the Disposition within this note     Time User Action Codes Description Comment    10/12/2022  6:49 PM Patti Kemp Add [N17 9] WANG (acute kidney [facility]) ([facility] )     10/12/2022  6:49 PM Patti Kemp Add [R07 9] Chest pain       ED Disposition     ED Disposition   Admit    Condition   Stable    Date/Time   Wed Oct 12, 2022  6:49 PM    Comment   Case was discussed with DAMIEN and the patient's admission status was agreed to be Admission Status: observation status to the service of Dr Kellen Bryson   Follow-up Information    None         Patient's Medications   Discharge Prescriptions    No medications on file     No discharge procedures on file  PDMP Review       Value Time User    PDMP Reviewed  Yes 10/5/2022  8:13 AM Karla Ng MD           ED Provider  Attending physically available and evaluated Akash Upton I managed the patient along with the ED Attending      Electronically Signed by         Emerita Herrera MD  10/12/22 9059

## 2022-10-12 NOTE — ASSESSMENT & PLAN NOTE
The patient presents to the office today with a 1 week history of not feeling well  She states that she had been having chills off and on, fatigue  She is also complaining of left chest wall pressure with discomfort that radiates to her left arm  She does have a cardiac history  The patient does not look well in the office today  I did recommend to the patient that she be evaluated in the emergency room where testing can be performed urgently

## 2022-10-12 NOTE — PROGRESS NOTES
[facility]    NAME: Debbi Marie  AGE: 80 y o  SEX: female  : 1940     DATE: 10/12/2022     Assessment and Plan:     Problem List Items Addressed This Visit        Endocrine    RESOLVED: Type 2 diabetes mellitus with diabetic chronic kidney disease ([address] )       Other    Left-sided chest wall pain - Primary     The patient presents to the office today with a 1 week history of not feeling well  She states that she had been having chills off and on, fatigue  She is also complaining of left chest wall pressure with discomfort that radiates to her left arm  She does have a cardiac history  The patient does not look well in the office today  I did recommend to the patient that she be evaluated in the emergency room where testing can be performed urgently  Relevant Orders    Transfer to other facility (Completed)              No follow-ups on file  Chief Complaint:     No chief complaint on file  History of Present Illness:   Patient presents to the office today with a 1 week history of not feeling well  She does have a cardiac history  The patient has been experiencing fatigue, chills, shortness of breath with exertion, chest pressure with radiation to the left arm  I recommend to the patient that she be urgently evaluated in the emergency room  Review of Systems:     Review of Systems   Constitutional: Positive for chills and fatigue  Negative for activity change and fever  HENT: Negative for congestion, hearing loss, rhinorrhea, trouble swallowing and voice change  Eyes: Negative for photophobia, pain, discharge and visual disturbance  Respiratory: Positive for chest tightness and shortness of breath  Negative for cough  Cardiovascular: Positive for chest pain (left chest wall with radiation to left arm)  Negative for palpitations and leg swelling     Gastrointestinal: Negative for abdominal pain, blood in stool, constipation, nausea and vomiting  Endocrine: Negative for cold intolerance and heat intolerance  Genitourinary: Negative for difficulty urinating, frequency, hematuria, urgency, vaginal bleeding and vaginal discharge  Musculoskeletal: Negative for arthralgias and myalgias  Skin: Negative  Neurological: Negative for dizziness, weakness, numbness and headaches  Psychiatric/Behavioral: Negative for decreased concentration  The patient is not nervous/anxious  Problem List:     Patient Active Problem List   Diagnosis   • Hyperlipidemia   • Hypertension   • Hypothyroidism   • Obesity (BMI 30-39  9)   • Osteoarthritis   • Type 2 diabetes mellitus with diabetic cataract ([facility] [address] )   • Ambulatory dysfunction   • Chronic combined systolic (congestive) and diastolic (congestive) heart failure (HCC)   • Debility   • Restless leg syndrome   • Hypertensive heart and chronic kidney disease with heart failure and stage 1 through stage 4 chronic kidney disease, or unspecified chronic kidney disease (HCC)   • Paroxysmal atrial fibrillation (HCC)   • Left-sided chest wall pain        Objective:     /60   Pulse 92   Temp 97 8 °F (36 6 °C) (Tympanic)   Resp 16   Ht 5' 1" (1 549 m)   Wt 83 kg (183 lb)   SpO2 99%   BMI 34 58 kg/m²     Current Outpatient Medications   Medication Sig Dispense Refill   • apixaban (Eliquis) 5 mg Take 1 tablet (5 mg total) by mouth 2 (two) times a day 180 tablet 2   • Ascorbic Acid (vitamin C) 1000 MG tablet Take 1,000 mg by mouth 2 (two) times a day      • bumetanide (BUMEX) 2 mg tablet TAKE 2 TABLETS (4 MG TOTAL) BY MOUTH DAILY 60 tablet 3   • calcium carbonate (OS-PAULA) 600 MG tablet Take 600 mg by mouth 2 (two) times a day with meals     • cholecalciferol (VITAMIN D3) 1,000 units tablet Take 1,000 Units by mouth daily 8,000 dailty     • hydrALAZINE (APRESOLINE) 10 mg tablet TAKE 1 TABLET (10 MG TOTAL) BY MOUTH EVERY 8 (EIGHT) HOURS (Patient taking differently: Take 10 mg by mouth 2 (two) times a day) 90 tablet 5   • levothyroxine 100 mcg tablet Take 1 tablet (100 mcg total) by mouth daily 30 tablet 5   • MAGNESIUM PO Take 1,000 mg by mouth daily      • metoprolol succinate (TOPROL-XL) 50 mg 24 hr tablet TAKE 2 TABLETS (100 MG TOTAL) BY MOUTH EVERY 12 (TWELVE) HOURS 120 tablet 5   • Multiple Vitamin (MULTI-VITAMIN DAILY PO) 1 tablet 2 (two) times a day       • Potassium Chloride ER 20 MEQ TBCR TAKE 2 TABLETS (40 MEQ TOTAL) BY MOUTH 2 (TWO) TIMES A DAY     • Potassium Chloride ER 20 MEQ TBCR TAKE 2 TABLETS (40 MEQ TOTAL) BY MOUTH 2 (TWO) TIMES A  tablet 2   • rOPINIRole (REQUIP XL) 2 MG 24 hr tablet TAKE 1 TABLET (2 MG TOTAL) BY MOUTH DAILY AT BEDTIME 30 tablet 5   • sacubitril-valsartan (Entresto) 49-51 MG TABS Take 1 tablet by mouth 2 (two) times a day 180 tablet 3   • traMADol (ULTRAM) 50 mg tablet TAKE 1 TABLET TWICE A DAY BY ORAL ROUTE AS NEEDED  60 tablet 0   • venlafaxine (EFFEXOR) 75 mg tablet TAKE 1 TABLET (75 MG TOTAL) BY MOUTH EVERY 12 (TWELVE) HOURS 60 tablet 5     No current facility-administered medications for this visit  Physical Exam  Vitals reviewed  Constitutional:       Appearance: Normal appearance  She is obese  She is ill-appearing  HENT:      Head: Normocephalic  Nose: Nose normal       Mouth/Throat:      Mouth: Mucous membranes are moist       Pharynx: Oropharynx is clear  Eyes:      Extraocular Movements: Extraocular movements intact  Pupils: Pupils are equal, round, and reactive to light  Cardiovascular:      Rate and Rhythm: Normal rate and regular rhythm  Pulses: Normal pulses  Pulmonary:      Effort: Pulmonary effort is normal       Breath sounds: Normal breath sounds  Musculoskeletal:         General: Normal range of motion  Skin:     General: Skin is warm and dry  Coloration: Skin is pale  Neurological:      General: No focal deficit present        Mental Status: She is alert and oriented to person, place, and time  Psychiatric:         Mood and Affect: Mood normal          Behavior: Behavior normal          Thought Content:  Thought content normal          Judgment: Judgment normal          Berna Ho

## 2022-10-13 ENCOUNTER — TRANSITIONAL CARE MANAGEMENT (OUTPATIENT)
Dept: FAMILY MEDICINE CLINIC | Facility: CLINIC | Age: 82
End: 2022-10-13

## 2022-10-13 ENCOUNTER — TELEPHONE (OUTPATIENT)
Dept: FAMILY MEDICINE CLINIC | Facility: CLINIC | Age: 82
End: 2022-10-13

## 2022-10-13 VITALS
BODY MASS INDEX: 35.41 KG/M2 | SYSTOLIC BLOOD PRESSURE: 111 MMHG | OXYGEN SATURATION: 92 % | DIASTOLIC BLOOD PRESSURE: 58 MMHG | RESPIRATION RATE: 18 BRPM | TEMPERATURE: 97.3 F | WEIGHT: 187.39 LBS | HEART RATE: 84 BPM

## 2022-10-13 LAB
4HR DELTA HS TROPONIN: 1 NG/L
ANION GAP SERPL CALCULATED.3IONS-SCNC: 6 MMOL/L (ref 4–13)
ATRIAL RATE: 87 BPM
BACTERIA UR CULT: NORMAL
BUN SERPL-MCNC: 41 MG/DL (ref 5–25)
CALCIUM SERPL-MCNC: 9.7 MG/DL (ref 8.3–10.1)
CARDIAC TROPONIN I PNL SERPL HS: 13 NG/L
CHLORIDE SERPL-SCNC: 100 MMOL/L (ref 96–108)
CO2 SERPL-SCNC: 29 MMOL/L (ref 21–32)
CREAT SERPL-MCNC: 1.84 MG/DL (ref 0.6–1.3)
ERYTHROCYTE [DISTWIDTH] IN BLOOD BY AUTOMATED COUNT: 13.7 % (ref 11.6–15.1)
GFR SERPL CREATININE-BSD FRML MDRD: 25 ML/MIN/1.73SQ M
GLUCOSE SERPL-MCNC: 171 MG/DL (ref 65–140)
HCT VFR BLD AUTO: 37.7 % (ref 34.8–46.1)
HGB BLD-MCNC: 12.2 G/DL (ref 11.5–15.4)
MAGNESIUM SERPL-MCNC: 2.5 MG/DL (ref 1.6–2.6)
MCH RBC QN AUTO: 32.1 PG (ref 26.8–34.3)
MCHC RBC AUTO-ENTMCNC: 32.4 G/DL (ref 31.4–37.4)
MCV RBC AUTO: 99 FL (ref 82–98)
PLATELET # BLD AUTO: 281 THOUSANDS/UL (ref 149–390)
PMV BLD AUTO: 11 FL (ref 8.9–12.7)
POTASSIUM SERPL-SCNC: 4 MMOL/L (ref 3.5–5.3)
QRS AXIS: -71 DEGREES
QRSD INTERVAL: 110 MS
QT INTERVAL: 392 MS
QTC INTERVAL: 476 MS
RBC # BLD AUTO: 3.8 MILLION/UL (ref 3.81–5.12)
SODIUM SERPL-SCNC: 135 MMOL/L (ref 135–147)
T WAVE AXIS: 66 DEGREES
VENTRICULAR RATE: 89 BPM
WBC # BLD AUTO: 10.74 THOUSAND/UL (ref 4.31–10.16)

## 2022-10-13 PROCEDURE — 93010 ELECTROCARDIOGRAM REPORT: CPT | Performed by: INTERNAL MEDICINE

## 2022-10-13 PROCEDURE — 97163 PT EVAL HIGH COMPLEX 45 MIN: CPT

## 2022-10-13 PROCEDURE — 97166 OT EVAL MOD COMPLEX 45 MIN: CPT

## 2022-10-13 PROCEDURE — 85027 COMPLETE CBC AUTOMATED: CPT | Performed by: FAMILY MEDICINE

## 2022-10-13 PROCEDURE — 83735 ASSAY OF MAGNESIUM: CPT | Performed by: FAMILY MEDICINE

## 2022-10-13 PROCEDURE — 80048 BASIC METABOLIC PNL TOTAL CA: CPT | Performed by: FAMILY MEDICINE

## 2022-10-13 PROCEDURE — 84484 ASSAY OF TROPONIN QUANT: CPT

## 2022-10-13 PROCEDURE — 99217 PR OBSERVATION CARE DISCHARGE MANAGEMENT: CPT | Performed by: PHYSICIAN ASSISTANT

## 2022-10-13 RX ORDER — BUMETANIDE 2 MG/1
4 TABLET ORAL DAILY
Qty: 60 TABLET | Refills: 0 | Status: ON HOLD
Start: 2022-10-13 | End: 2022-10-18 | Stop reason: SDUPTHER

## 2022-10-13 RX ORDER — SACUBITRIL AND VALSARTAN 49; 51 MG/1; MG/1
1 TABLET, FILM COATED ORAL 2 TIMES DAILY
Qty: 180 TABLET | Refills: 0
Start: 2022-10-13

## 2022-10-13 RX ADMIN — HYDRALAZINE HYDROCHLORIDE 10 MG: 10 TABLET, FILM COATED ORAL at 08:33

## 2022-10-13 RX ADMIN — APIXABAN 2.5 MG: 2.5 TABLET, FILM COATED ORAL at 08:34

## 2022-10-13 RX ADMIN — METOPROLOL SUCCINATE 100 MG: 100 TABLET, EXTENDED RELEASE ORAL at 08:34

## 2022-10-13 RX ADMIN — LEVOTHYROXINE SODIUM 100 MCG: 100 TABLET ORAL at 05:19

## 2022-10-13 RX ADMIN — VENLAFAXINE 75 MG: 37.5 TABLET ORAL at 08:34

## 2022-10-13 NOTE — ASSESSMENT & PLAN NOTE
Wt Readings from Last 3 Encounters:   10/13/22 85 kg (187 lb 6 3 oz)   10/12/22 83 kg (183 lb)   08/04/22 85 2 kg (187 lb 14 4 oz)     Patient presented with WANG and mild dehydration  Will hold off Entresto and bumex -- d/w pt could resume in 1-2 days   Continue metoprolol  Last echocardiogram 2020 reveals LVEF 60%, no wall motion abnormality

## 2022-10-13 NOTE — PHYSICAL THERAPY NOTE
Physical Therapy Evaluation     Patient's Name: Asa Magaña    Admitting Diagnosis  Chest pain [R07 9]  WANG (acute kidney injury) ([facility] [address] ) [N17 9]    Problem List  Patient Active Problem List   Diagnosis    Hyperlipidemia    Hypertension    Hypothyroidism    Obesity (BMI 30-39  9)    Osteoarthritis    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)    Ambulatory dysfunction    Chronic combined systolic (congestive) and diastolic (congestive) heart failure (HCC)    Debility    Restless leg syndrome    Hypertensive heart and chronic kidney disease with heart failure and stage 1 through stage 4 chronic kidney disease, or unspecified chronic kidney disease (HCC)    Paroxysmal atrial fibrillation (HCC)    Left-sided chest wall pain    WANG (acute kidney injury) [facility])       Past Medical History  Past Medical History:   Diagnosis Date    Cardiogenic shock ([facility] [address] ) 6/26/2019    CHF (congestive heart failure) ([facility])     Eczema     Photosensitivity     abnormal skin sensitivity to sunlight    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[facility])     staging       Past Surgical History  Past Surgical History:   Procedure Laterality Date    CATARACT EXTRACTION Left     HEMORRHOID SURGERY      IR NON-TUNNELED CENTRAL LINE PLACEMENT  7/9/2019    OOPHORECTOMY      unilateral    TOTAL ABDOMINAL HYSTERECTOMY          10/13/22 1105   PT Last Visit   PT Visit Date 10/13/22   Note Type   Note type Evaluation  (and discharge)   Pain Assessment   Pain Assessment Tool 0-10   Pain Score No Pain   Restrictions/Precautions   Weight Bearing Precautions Per Order No   Braces or Orthoses   (none)   Other Precautions Fall Risk;Multiple lines   Home Living   Type of [address] [place] [address] One level;Stairs to enter with rails  (2 vadim)   Home Equipment Cane   Additional Comments Per patient, prior to this admission she resided in a one level home (2 VADIM) alone  At her baseline she is I with mobility (uses SPC PRN), ADLs, and iADLS  +    + part time employment as alex in hardware store  Reports supportive family  Prior Function   Level of [place] Independent with ADLs   Lives With Alone   Receives Help From Family   IADLs Independent with driving; Independent with meal prep; Independent with medication management   Falls in the last 6 months 0   Vocational Part time employment   General   Additional Pertinent History 80year old female admitted to -B on 10/12/2022 as sent from PCP's office for complaints of chest heaviness and not feeling well  Diagnosis includes WANG     Family/Caregiver Present No   Cognition   Arousal/Participation Alert   Orientation Level Oriented X4   Memory Decreased recall of recent events   Following Commands Follows multistep commands with increased time or repetition   Comments patient mildly unpleasant, expressing desire to see doctor and d/c home   Subjective   Subjective "Can you tell me when the kidney doctor is coming"   RUE Assessment   RUE Assessment WFL   LUE Assessment   LUE Assessment WFL   RLE Assessment   RLE Assessment WFL   LLE Assessment   LLE Assessment WFL   Bed Mobility   Supine to Sit Unable to assess   Sit to Supine Unable to assess   Additional Comments patient sitting EOB pre and post evaluation   Transfers   Sit to Stand 5  Supervision   Stand to Sit 5  Supervision   Additional Comments therapist managing IV fluids; patient stood on scale for weight assessment   Ambulation/Elevation   Gait pattern Excessively slow;Decreased foot clearance   Gait Assistance 5  Supervision   Assistive Device SPC   Distance 40 feet x 2   Ambulation/Elevation Additional Comments gait speed is reduced, patient does not use cane for ambulation all of the time- is recommended for use to improve balance and activity tolerance upon d/c   Balance   Static Sitting Normal   Static Standing Good   Ambulatory Fair   Endurance Deficit   Endurance Deficit Yes   Endurance Deficit Description ongoing fatigue   Activity Tolerance   Activity Tolerance Patient tolerated treatment well   Nurse Made Aware eliseo to see per RN Jaky   Assessment   Prognosis Good   Problem List Decreased endurance; Impaired balance   Assessment PT completed evaluation of 80year old female admitted to [facility] on 10/12/2022 as sent from PCP's office for complaints of chest heaviness and not feeling well  Diagnosis includes WANG  Current status instabilities include IV fluids, I/O, use of AD, and continuous O2/HR monitoring  PMH is significant for CHF, afib, DM, CKD, and restless leg syndrome  Per patient, prior to this admission she resided in a one level home (2 VADIM) alone  At her baseline she is I with mobility (uses SPC PRN), ADLs, and iADLS  +   + part time employment as  in [address] [place] [address] Dr zavala  Reports supportive family  Current impairments include decreased activity tolerance, balance, and gross strength, and gait deviations  During PT evaluation patient was able to perform sit<-->stand transfers and ambulation S level  She utilized [facility] to ambulate 40 feet x 2, presenting with reduced gait speed however no LOB  Patient denies questions/concerns about d/c home and desire to go home today  She presents without inpt PT needs  Will d/c orders  Recommend ongoing mobility with RN and restorative staff     Goals   Patient Goals to go home today   PT Treatment Day 0   Plan   PT Frequency (S)  Other (Comment)  (d/c inpt PT)   Recommendation   PT Discharge Recommendation (S)  No rehabilitation needs   Equipment Recommended Cane  (has [facility])   AM-PAC Basic Mobility Inpatient   Turning in Bed Without Bedrails 4   Lying on Back to Sitting on Edge of Flat Bed 4   Moving Bed to Chair 4   Standing Up From Chair 4   Walk in Room 3   Climb 3-5 Stairs 3   Basic Mobility Inpatient Raw Score 22   Basic Mobility Standardized Score 47 4   Highest Level Of Mobility   -[facility] Goal 7: Walk 25 feet or more     The patient's AM-PAC Basic Mobility Inpatient Standardized Score is greater than 42 9, suggesting this patient may benefit from discharge to home  Please also refer to the recommendation of the Physical Therapist for safe discharge planning        Kaden Donohue PT, DPT

## 2022-10-13 NOTE — ASSESSMENT & PLAN NOTE
Patient with history of CKD, CHF on diuretics, presented to ER with complaint of chest tightness and heaviness for 1 week and overall not feeling well  Patient had poor oral intake  Creatinine is 2 23 on admission, improved to 1 8 today with IV fluids and holding Entresto, Bumex   Baseline Cr around 0 9  Cr is improved but not back to baseline  Patient is very insistent on being discharged today  I told her I do prefer that she stays for continued monitoring with renal failure however she insists on going home, understands risks of kidney failure  I told her to hold diuretic another 1-2 days at least and to get repeat labs no later than Monday to monitor renal function  She should have f/u with PCP within the week    She did agree to this  Return precautions discussed

## 2022-10-13 NOTE — PLAN OF CARE
Problem: PAIN - ADULT  Goal: Verbalizes/displays adequate comfort level or baseline comfort level  Description: Interventions:  - Encourage patient to monitor pain and request assistance  - Assess pain using appropriate pain scale  - Administer analgesics based on type and severity of pain and evaluate response  - Implement non-pharmacological measures as appropriate and evaluate response  - Consider cultural and social influences on pain and pain management  - Notify physician/advanced practitioner if interventions unsuccessful or patient reports new pain  Outcome: Progressing     Problem: INFECTION - ADULT  Goal: Absence or prevention of progression during hospitalization  Description: INTERVENTIONS:  - Assess and monitor for signs and symptoms of infection  - Monitor lab/diagnostic results  - Monitor all insertion sites, i e  indwelling lines, tubes, and drains  - Monitor endotracheal if appropriate and nasal secretions for changes in amount and color  - [place] appropriate cooling/warming therapies per order  - Administer medications as ordered  - Instruct and encourage patient and family to use good hand hygiene technique  - Identify and instruct in appropriate isolation precautions for identified infection/condition  Outcome: Progressing  Goal: Absence of fever/infection during neutropenic period  Description: INTERVENTIONS:  - Monitor WBC    Outcome: Progressing     Problem: SAFETY ADULT  Goal: Patient will remain free of falls  Description: INTERVENTIONS:  - Educate patient/family on patient safety including physical limitations  - Instruct patient to call for assistance with activity   - Consult OT/PT to assist with strengthening/mobility   - Keep Call bell within reach  - Keep bed low and locked with side rails adjusted as appropriate  - Keep care items and personal belongings within reach  - Initiate and maintain comfort rounds  - Make Fall Risk Sign visible to staff  - Offer Toileting every  Hours, in advance of need  - Initiate/Maintain alarm  - Obtain necessary fall risk management equipment:   - Apply yellow socks and bracelet for high fall risk patients  - Consider moving patient to room near nurses station  Outcome: Progressing  Goal: Maintain or return to baseline ADL function  Description: INTERVENTIONS:  -  Assess patient's ability to carry out ADLs; assess patient's baseline for ADL function and identify physical deficits which impact ability to perform ADLs (bathing, care of mouth/teeth, toileting, grooming, dressing, etc )  - Assess/evaluate cause of self-care deficits   - Assess range of motion  - Assess patient's mobility; develop plan if impaired  - Assess patient's need for assistive devices and provide as appropriate  - Encourage maximum independence but intervene and supervise when necessary  - Involve family in performance of ADLs  - Assess for home care needs following discharge   - Consider OT consult to assist with ADL evaluation and planning for discharge  - Provide patient education as appropriate  Outcome: Progressing  Goal: Maintains/Returns to pre admission functional level  Description: INTERVENTIONS:  - Perform BMAT or MOVE assessment daily    - Set and communicate daily mobility goal to care team and patient/family/caregiver  - Collaborate with rehabilitation services on mobility goals if consulted  - Perform Range of Motion  times a day  - Reposition patient every  hours    - Dangle patient  times a day  - Stand patient  times a day  - Ambulate patient  times a day  - Out of bed to chair  times a day   - Out of bed for meals times a day  - Out of bed for toileting  - Record patient progress and toleration of activity level   Outcome: Progressing     Problem: DISCHARGE PLANNING  Goal: Discharge to home or other facility with appropriate resources  Description: INTERVENTIONS:  - Identify barriers to discharge w/patient and caregiver  - Arrange for needed discharge resources and transportation as appropriate  - Identify discharge learning needs (meds, wound care, etc )  - Arrange for interpretive services to assist at discharge as needed  - Refer to Case Management Department for coordinating discharge planning if the patient needs post-hospital services based on physician/advanced practitioner order or complex needs related to functional status, cognitive ability, or social support system  Outcome: Progressing     Problem: Knowledge Deficit  Goal: Patient/family/caregiver demonstrates understanding of disease process, treatment plan, medications, and discharge instructions  Description: Complete learning assessment and assess knowledge base    Interventions:  - Provide teaching at level of understanding  - Provide teaching via preferred learning methods  Outcome: Progressing

## 2022-10-13 NOTE — DISCHARGE INSTRUCTIONS
You were diagnosed with an acute kidney injury (WANG), which is when your kidneys stop working properly  This can happen for many reasons  Suspect poor appetite/dehydration along with diuretic use contributed to this  Your kidney numbers have improved with IV fluids, but are not completely back to normal for you  Recommend to remain hydrated and hold your diuretic (Bumex) for another 1-2 days  Would also recommend to hold [facility] for another day as well  Please get repeat blood work no later than Monday 10/17/22 to check your kidney numbers  Please follow up with your primary care provider within one week of discharge  If you have recurrent symptoms and/or experience intractable nausea/vomiting, shortness of breath, chest pain, changes in urination, please return for evaluation

## 2022-10-13 NOTE — OCCUPATIONAL THERAPY NOTE
Occupational Therapy Evaluation     Patient Name: Tim Arreola  EKJDP'E Date: 10/13/2022  Problem List  Principal Problem:    WANG (acute kidney injury) ([facility] )  Active Problems:    Hypertension    Hypothyroidism    Type 2 diabetes mellitus with diabetic cataract ([facility] )    Ambulatory dysfunction    Chronic combined systolic (congestive) and diastolic (congestive) heart failure (HCC)    Restless leg syndrome    Paroxysmal atrial fibrillation [facility])    Past Medical History  Past Medical History:   Diagnosis Date    Cardiogenic shock ([facility] ) 6/26/2019    CHF (congestive heart failure) (HCC)     Eczema     Photosensitivity     abnormal skin sensitivity to sunlight    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     Past Surgical History  Past Surgical History:   Procedure Laterality Date    CATARACT EXTRACTION Left     HEMORRHOID SURGERY      IR NON-TUNNELED CENTRAL LINE PLACEMENT  7/9/2019    OOPHORECTOMY      unilateral    TOTAL ABDOMINAL HYSTERECTOMY             10/13/22 1055   OT Last Visit   OT Visit Date 10/13/22   Note Type   Note type Evaluation   Pain Assessment   Pain Assessment Tool 0-10   Pain Score No Pain   Restrictions/Precautions   Weight Bearing Precautions Per Order No   Other Precautions Fall Risk;Multiple lines   Home Living   Type of [address] [place] [address] One level;Stairs to enter with rails   Bathroom Shower/Tub Tub/shower unit   [address] [place] [address]  chair   Home Equipment Cane;Reacher;Sock aid   Additional Comments Pt lives alone in a [facility] with 2 VADIM  Prior Function   Level of [place] Independent with ADLs; Independent with IADLS   Lives With Alone   Receives Help From Family   IADLs Independent with driving; Independent with medication management; Independent with meal prep   Falls in the last 6 months 0   Vocational Part time employment  (Hardware store)   Comments PTA, Pt reports being I with ADLs/IADLs/SPC for functional mobility/ +   Pt with supportive local family able to assist as needed  Lifestyle   Autonomy I with ADLs/IADLs/SPC for functional mobility/ +  Reciprocal Relationships Supportive local family   Service to Others PTE- 16 hr/wk   Intrinsic Gratification Enjoys spending time with her cat   ADL   Where Assessed Edge of bed   Eating Assistance 7  Independent   Grooming Assistance 7  Independent   UB Bathing Assistance 5  Supervision/Setup    [address]  [address] Deficit   (Pt declined toileting )   Functional Assistance 5  Supervision/Setup   Additional Comments Pt min A with LB dressing, however, anticipate S with LHAE  Pt educated on energy conservation techniques upon DC home and Pt understanding  Pt declines need for BSC  Bed Mobility   Supine to Sit 5  Supervision   Sit to Supine Unable to assess   Transfers   Sit to Stand 5  Supervision   Stand to Sit 5  Supervision   Additional Comments with SPC- Pt able to stand on scale for weight   Functional Mobility   Functional Mobility 5  Supervision   Additional Comments S with SPC - household distances   Additional items [facility]   Balance   Static Sitting Fair +   Dynamic Sitting Fair   Static Standing Fair   Dynamic Standing Fair -   Ambulatory Fair -   Activity Tolerance   Activity Tolerance Patient tolerated treatment well   Nurse Made Aware RN cleared/updated  RUE Assessment   RUE Assessment WFL   LUE Assessment   LUE Assessment WFL   Hand Function   Gross Motor Coordination Functional   Fine Motor Coordination Functional   Sensation   Light Touch No apparent deficits   Psychosocial   Psychosocial (WDL) WDL   Cognition   Overall Cognitive Status WFL   Arousal/Participation Alert; Cooperative   Attention Within functional limits   Orientation Level Oriented X4   Memory Decreased recall of recent events   Following Commands Follows multistep commands with increased time or repetition   Comments Pt requires encouragement and education in order to participate in OT session  Pt fixiated on seeing a Dr and RN aware  Pt overall cooperative during OT session  Assessment   Limitation Decreased high-level ADLs; Decreased Safe judgement during ADL   Prognosis Fair   Assessment Pt is a 81 yo Female who presented to [facility] on 10/12/2022 with x1 week of fatigue, SOB, chest pain, and KAMARA  Pt with diagnosis of WANG  Pt  has a past medical history of Cardiogenic shock ([facility] [address] ) (6/26/2019), CHF (congestive heart failure) ([facility] [address] ), Eczema, Photosensitivity, and 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[facility] )  Pt greeted bedside for OT evaluation on 10/13/2022  Pt lives [facility] in a [facility] with 2 VADIM  PTA, Pt reports being I with ADLs/IADLs/SPC for functional mobility/ +  Pt with supportive local family able to assist as needed  Pt demonstrating the following occupational deficits: S with UB ADLs, min A with LB ADLs, S with bed mobility, S with functional transfers, and S with functional mobility with SPC  Pt educated on energy conservation techniques upon DC home and Pt understanding  Pt declines need for BSC  Pt encouraged to participate in ADLs/functional mobility with nursing/restorative staff during hospitalization  Pt with no further acute OT concerns  Pt would benefit from returning home with increased social support upon DC to maximize safety and independence with ADLs and functional tasks of choice  DC skilled OT services  Goals   Patient Goals To go home today  Plan   OT Frequency Eval only   Recommendation   OT Discharge Recommendation No rehabilitation needs   Equipment Recommended   (Continue use of SC and LHAE)   Additional Comments  The patient's raw score on the AM-PAC Daily Activity inpatient short form is 20, standardized score is 42 03, greater than 39 4  Patients at this level are likely to benefit from discharge to home   Please refer to the recommendation of the Occupational Therapist for safe discharge planning  AM-PAC Daily Activity Inpatient   Lower Body Dressing 3   Bathing 3   Toileting 3   Upper Body Dressing 3   Grooming 4   Eating 4   Daily Activity Raw Score 20   Daily Activity Standardized Score (Calc for Raw Score >=11) 42 03   AM-PAC Applied Cognition Inpatient   Following a Speech/Presentation 4   Understanding Ordinary Conversation 4   Taking Medications 4   Remembering Where Things Are Placed or Put Away 4   Remembering List of 4-5 Errands 4   Taking Care of Complicated Tasks 3   Applied Cognition Raw Score 23   Applied Cognition Standardized Score 53 08   End of Consult   Education Provided Yes   Patient Position at End of Consult Seated edge of bed; All needs within reach   Nurse Communication Nurse aware of consult       Grace Walls MS, OTR/L

## 2022-10-13 NOTE — TELEPHONE ENCOUNTER
Patient ([phone]) called from hospital with concerns about treatment  Patient states she wants to be discharged and asked if Provider could call hospital I told patient to contact us once discharged to follow up and provider could address any concerns patient may have but that they would not be able to intervene with hospital care  I advised patient to address concerns with staff provider care while admitted

## 2022-10-13 NOTE — DISCHARGE SUMMARY
[address] [facility]  Discharge- Deep Sang 1940, 80 y o  female MRN: [id number]  Unit/Bed#: [facility] 830-01 Encounter: [id number]  Primary Care Provider: Patrick Calderon   Date and time admitted to hospital: 10/12/2022  4:53 PM    * WAGN (acute kidney injury) [facility])  Assessment & Plan  Patient with history of CKD, CHF on diuretics, presented to ER with complaint of chest tightness and heaviness for 1 week and overall not feeling well  Patient had poor oral intake  Creatinine is 2 23 on admission, improved to 1 8 today with IV fluids and holding Entresto, Bumex   Baseline Cr around 0 9  Cr is improved but not back to baseline  Patient is very insistent on being discharged today  I told her I do prefer that she stays for continued monitoring with renal failure however she insists on going home, understands risks of kidney failure  I told her to hold diuretic another 1-2 days at least and to get repeat labs no later than Monday to monitor renal function  She should have f/u with PCP within the week    She did agree to this  Return precautions discussed     Paroxysmal atrial fibrillation (HCC)  Assessment & Plan  Rate controlled with metoprolol  Anticoagulated with Eliquis  Has ICD in place    Restless leg syndrome  Assessment & Plan  Continue home medication Requip    Chronic combined systolic (congestive) and diastolic (congestive) heart failure (HCC)  Assessment & Plan  Wt Readings from Last 3 Encounters:   10/13/22 85 kg (187 lb 6 3 oz)   10/12/22 83 kg (183 lb)   08/04/22 85 2 kg (187 lb 14 4 oz)     Patient presented with WANG and mild dehydration  Will hold off Entresto and bumex -- d/w pt could resume in 1-2 days   Continue metoprolol  Last echocardiogram 2020 reveals LVEF 60%, no wall motion abnormality    Type 2 diabetes mellitus with diabetic cataract [facility])  Assessment & Plan    Lab Results   Component Value Date    HGBA1C 6 6 (H) 09/21/2022     Last A1c 6 6, currently not on any medication  Sliding scale    Hypothyroidism  Assessment & Plan  Resume home medication levothyroxine  Last TSH 09/21/2022:  1 14    Hypertension  Assessment & Plan  Blood pressure well controlled  Continue hydralazine and metoprolol        Medical Problems             Resolved Problems  Date Reviewed: 10/13/2022   None               Discharging Physician / Practitioner: Marleny Hathaway  PCP: Patrick Kurtz  Admission Date:   Admission Orders (From admission, onward)     Ordered        10/12/22 1849  Place in Observation  Once                      Discharge Date: 10/13/22    Consultations During Hospital Stay:  · None    Procedures Performed:   · None    Significant Findings / Test Results:   · WANG  · Negative troponin  · Non ischemic EKG   · CXR negative     Incidental Findings:   · None     Test Results Pending at Discharge (will require follow up): · None     Outpatient Tests Requested:  · BMP     Complications:  None     Reason for Admission: WANG     Hospital Course: Debbi Marie is a 80 y o  female patient who originally presented to the hospital on 10/12/2022 due to chest tightness, dyspnea, feeling generally "unwell" for several days  PCP advised pt to go to ER for evaluation  Cardiac work up with EKG, serial troponin negative  CXR negative  Covid/Flu/RSV negative  She was noted to have WANG on admission likely due to poor oral intake/dehydration with diuretic use  Diuretics held and she was given IV fluids with improvement of renal function  She feels markedly better today  Her Cr is not back to baseline but pt is absolutely adamant about being discharged today, she is not willing to stay for any further treatment/labs/monitoring  I told her I recommended she stay another day at least but she will not agree to stay  Continue to hold diuretic and Entresto for 1-2 more days with repeat labs no later than Monday to monitor renal function    Instructed to remain hydrated and to monitor daily weights/sx for fluid retention  She expressed understanding  Return precautions discussed     Please see above list of diagnoses and related plan for additional information  Condition at Discharge: fair    Discharge Day Visit / Exam:   Subjective:  Pt reports she feels better today, denies further chest pressure, sob  Wants to go home  Vitals: Blood Pressure: 111/58 (10/13/22 1112)  Pulse: 84 (10/13/22 1112)  Temperature: (!) 97 3 °F (36 3 °C) (10/13/22 1112)  Temp Source: Oral (10/12/22 1713)  Respirations: 18 (10/13/22 1112)  Weight - Scale: 85 kg (187 lb 6 3 oz) (10/13/22 1104)  SpO2: 92 % (10/13/22 1112)  Exam:   Physical Exam  Vitals reviewed  Constitutional:       General: She is not in acute distress  Appearance: She is not toxic-appearing  HENT:      Head: Normocephalic and atraumatic  Eyes:      Extraocular Movements: Extraocular movements intact  Cardiovascular:      Rate and Rhythm: Normal rate and regular rhythm  Pulmonary:      Effort: Pulmonary effort is normal  No respiratory distress  Breath sounds: Normal breath sounds  Abdominal:      General: Bowel sounds are normal  There is no distension  Palpations: Abdomen is soft  Tenderness: There is no abdominal tenderness  Musculoskeletal:         General: Normal range of motion  Cervical back: Normal range of motion  Skin:     General: Skin is warm and dry  Neurological:      General: No focal deficit present  Mental Status: She is alert and oriented to person, place, and time  Psychiatric:         Mood and Affect: Mood normal          Behavior: Behavior normal          Thought Content: Thought content normal           Discussion with Family: Patient declined call to   Discharge instructions/Information to patient and family:   See after visit summary for information provided to patient and family        Provisions for Follow-Up Care:  See after visit summary for information related to follow-up care and any pertinent home health orders  Disposition:   Home    Planned Readmission: no     Discharge Statement:  I spent 35 minutes discharging the patient  This time was spent on the day of discharge  I had direct contact with the patient on the day of discharge  Greater than 50% of the total time was spent examining patient, answering all patient questions, arranging and discussing plan of care with patient as well as directly providing post-discharge instructions  Additional time then spent on discharge activities  Discharge Medications:  See after visit summary for reconciled discharge medications provided to patient and/or family        **Please Note: This note may have been constructed using a voice recognition system**

## 2022-10-17 ENCOUNTER — APPOINTMENT (EMERGENCY)
Dept: RADIOLOGY | Facility: HOSPITAL | Age: 82
DRG: 291 | End: 2022-10-17
Payer: COMMERCIAL

## 2022-10-17 ENCOUNTER — HOSPITAL ENCOUNTER (INPATIENT)
Facility: HOSPITAL | Age: 82
LOS: 1 days | Discharge: HOME | DRG: 291 | End: 2022-10-18
Attending: EMERGENCY MEDICINE | Admitting: FAMILY MEDICINE
Payer: COMMERCIAL

## 2022-10-17 ENCOUNTER — TELEPHONE (OUTPATIENT)
Dept: CARDIOLOGY CLINIC | Facility: CLINIC | Age: 82
End: 2022-10-17

## 2022-10-17 DIAGNOSIS — I50.9 CHF (CONGESTIVE HEART FAILURE) (HCC): ICD-10-CM

## 2022-10-17 DIAGNOSIS — I50.9 CHF EXACERBATION (HCC): Primary | ICD-10-CM

## 2022-10-17 DIAGNOSIS — I50.22 CHRONIC HFREF (HEART FAILURE WITH REDUCED EJECTION FRACTION) (HCC): ICD-10-CM

## 2022-10-17 PROBLEM — I50.43 ACUTE ON CHRONIC COMBINED SYSTOLIC (CONGESTIVE) AND DIASTOLIC (CONGESTIVE) HEART FAILURE (HCC): Status: ACTIVE | Noted: 2019-07-15

## 2022-10-17 LAB
2HR DELTA HS TROPONIN: 1 NG/L
4HR DELTA HS TROPONIN: 1 NG/L
ANION GAP SERPL CALCULATED.3IONS-SCNC: 7 MMOL/L (ref 4–13)
ATRIAL RATE: 82 BPM
BASOPHILS # BLD AUTO: 0.06 THOUSANDS/ÂΜL (ref 0–0.1)
BASOPHILS NFR BLD AUTO: 1 % (ref 0–1)
BUN SERPL-MCNC: 18 MG/DL (ref 5–25)
CALCIUM SERPL-MCNC: 9.9 MG/DL (ref 8.3–10.1)
CARDIAC TROPONIN I PNL SERPL HS: 10 NG/L
CARDIAC TROPONIN I PNL SERPL HS: 10 NG/L
CARDIAC TROPONIN I PNL SERPL HS: 9 NG/L
CHLORIDE SERPL-SCNC: 107 MMOL/L (ref 96–108)
CO2 SERPL-SCNC: 23 MMOL/L (ref 21–32)
CREAT SERPL-MCNC: 1.18 MG/DL (ref 0.6–1.3)
EOSINOPHIL # BLD AUTO: 0.31 THOUSAND/ÂΜL (ref 0–0.61)
EOSINOPHIL NFR BLD AUTO: 2 % (ref 0–6)
ERYTHROCYTE [DISTWIDTH] IN BLOOD BY AUTOMATED COUNT: 13.6 % (ref 11.6–15.1)
GFR SERPL CREATININE-BSD FRML MDRD: 43 ML/MIN/1.73SQ M
GLUCOSE SERPL-MCNC: 138 MG/DL (ref 65–140)
GLUCOSE SERPL-MCNC: 189 MG/DL (ref 65–140)
GLUCOSE SERPL-MCNC: 198 MG/DL (ref 65–140)
HCT VFR BLD AUTO: 36.7 % (ref 34.8–46.1)
HGB BLD-MCNC: 11.8 G/DL (ref 11.5–15.4)
IMM GRANULOCYTES # BLD AUTO: 0.09 THOUSAND/UL (ref 0–0.2)
IMM GRANULOCYTES NFR BLD AUTO: 1 % (ref 0–2)
LYMPHOCYTES # BLD AUTO: 1.27 THOUSANDS/ÂΜL (ref 0.6–4.47)
LYMPHOCYTES NFR BLD AUTO: 10 % (ref 14–44)
MCH RBC QN AUTO: 32.1 PG (ref 26.8–34.3)
MCHC RBC AUTO-ENTMCNC: 32.2 G/DL (ref 31.4–37.4)
MCV RBC AUTO: 100 FL (ref 82–98)
MONOCYTES # BLD AUTO: 0.84 THOUSAND/ÂΜL (ref 0.17–1.22)
MONOCYTES NFR BLD AUTO: 6 % (ref 4–12)
NEUTROPHILS # BLD AUTO: 10.76 THOUSANDS/ÂΜL (ref 1.85–7.62)
NEUTS SEG NFR BLD AUTO: 80 % (ref 43–75)
NRBC BLD AUTO-RTO: 0 /100 WBCS
NT-PROBNP SERPL-MCNC: 4761 PG/ML
PLATELET # BLD AUTO: 316 THOUSANDS/UL (ref 149–390)
PMV BLD AUTO: 10.8 FL (ref 8.9–12.7)
POTASSIUM SERPL-SCNC: 5.2 MMOL/L (ref 3.5–5.3)
QRS AXIS: -68 DEGREES
QRSD INTERVAL: 68 MS
QT INTERVAL: 364 MS
QTC INTERVAL: 422 MS
RBC # BLD AUTO: 3.68 MILLION/UL (ref 3.81–5.12)
SODIUM SERPL-SCNC: 137 MMOL/L (ref 135–147)
T WAVE AXIS: -73 DEGREES
VENTRICULAR RATE: 81 BPM
WBC # BLD AUTO: 13.33 THOUSAND/UL (ref 4.31–10.16)

## 2022-10-17 PROCEDURE — 99285 EMERGENCY DEPT VISIT HI MDM: CPT

## 2022-10-17 PROCEDURE — 93010 ELECTROCARDIOGRAM REPORT: CPT | Performed by: INTERNAL MEDICINE

## 2022-10-17 PROCEDURE — 71045 X-RAY EXAM CHEST 1 VIEW: CPT

## 2022-10-17 PROCEDURE — 96374 THER/PROPH/DIAG INJ IV PUSH: CPT

## 2022-10-17 PROCEDURE — 99285 EMERGENCY DEPT VISIT HI MDM: CPT | Performed by: EMERGENCY MEDICINE

## 2022-10-17 PROCEDURE — 93005 ELECTROCARDIOGRAM TRACING: CPT

## 2022-10-17 PROCEDURE — 99222 1ST HOSP IP/OBS MODERATE 55: CPT | Performed by: INTERNAL MEDICINE

## 2022-10-17 PROCEDURE — 84484 ASSAY OF TROPONIN QUANT: CPT

## 2022-10-17 PROCEDURE — 99223 1ST HOSP IP/OBS HIGH 75: CPT | Performed by: FAMILY MEDICINE

## 2022-10-17 PROCEDURE — 85025 COMPLETE CBC W/AUTO DIFF WBC: CPT

## 2022-10-17 PROCEDURE — 80048 BASIC METABOLIC PNL TOTAL CA: CPT

## 2022-10-17 PROCEDURE — 82948 REAGENT STRIP/BLOOD GLUCOSE: CPT

## 2022-10-17 PROCEDURE — 83880 ASSAY OF NATRIURETIC PEPTIDE: CPT

## 2022-10-17 PROCEDURE — 36415 COLL VENOUS BLD VENIPUNCTURE: CPT

## 2022-10-17 RX ORDER — ROPINIROLE 1 MG/1
1 TABLET, FILM COATED ORAL DAILY PRN
Status: DISCONTINUED | OUTPATIENT
Start: 2022-10-17 | End: 2022-10-18 | Stop reason: HOSPADM

## 2022-10-17 RX ORDER — FUROSEMIDE 10 MG/ML
40 INJECTION INTRAMUSCULAR; INTRAVENOUS
Status: DISCONTINUED | OUTPATIENT
Start: 2022-10-17 | End: 2022-10-18 | Stop reason: HOSPADM

## 2022-10-17 RX ORDER — ROPINIROLE 2 MG/1
2 TABLET, FILM COATED, EXTENDED RELEASE ORAL
Status: DISCONTINUED | OUTPATIENT
Start: 2022-10-17 | End: 2022-10-17

## 2022-10-17 RX ORDER — ACETAMINOPHEN 325 MG/1
650 TABLET ORAL EVERY 6 HOURS PRN
Status: DISCONTINUED | OUTPATIENT
Start: 2022-10-17 | End: 2022-10-18 | Stop reason: HOSPADM

## 2022-10-17 RX ORDER — HYDRALAZINE HYDROCHLORIDE 10 MG/1
10 TABLET, FILM COATED ORAL EVERY 8 HOURS SCHEDULED
Status: DISCONTINUED | OUTPATIENT
Start: 2022-10-17 | End: 2022-10-18 | Stop reason: HOSPADM

## 2022-10-17 RX ORDER — VENLAFAXINE 37.5 MG/1
75 TABLET ORAL EVERY 12 HOURS
Status: DISCONTINUED | OUTPATIENT
Start: 2022-10-17 | End: 2022-10-18 | Stop reason: HOSPADM

## 2022-10-17 RX ORDER — LEVOTHYROXINE SODIUM 0.1 MG/1
100 TABLET ORAL
Status: DISCONTINUED | OUTPATIENT
Start: 2022-10-18 | End: 2022-10-18 | Stop reason: HOSPADM

## 2022-10-17 RX ORDER — FUROSEMIDE 10 MG/ML
40 INJECTION INTRAMUSCULAR; INTRAVENOUS
Status: DISCONTINUED | OUTPATIENT
Start: 2022-10-17 | End: 2022-10-17

## 2022-10-17 RX ORDER — METOPROLOL SUCCINATE 100 MG/1
100 TABLET, EXTENDED RELEASE ORAL EVERY 12 HOURS SCHEDULED
Status: DISCONTINUED | OUTPATIENT
Start: 2022-10-17 | End: 2022-10-18 | Stop reason: HOSPADM

## 2022-10-17 RX ORDER — INSULIN LISPRO 100 [IU]/ML
1-5 INJECTION, SOLUTION INTRAVENOUS; SUBCUTANEOUS
Status: DISCONTINUED | OUTPATIENT
Start: 2022-10-17 | End: 2022-10-18 | Stop reason: HOSPADM

## 2022-10-17 RX ORDER — FLUTICASONE PROPIONATE 50 MCG
1 SPRAY, SUSPENSION (ML) NASAL DAILY
Status: DISCONTINUED | OUTPATIENT
Start: 2022-10-17 | End: 2022-10-18 | Stop reason: HOSPADM

## 2022-10-17 RX ORDER — ONDANSETRON 2 MG/ML
4 INJECTION INTRAMUSCULAR; INTRAVENOUS EVERY 6 HOURS PRN
Status: DISCONTINUED | OUTPATIENT
Start: 2022-10-17 | End: 2022-10-18 | Stop reason: HOSPADM

## 2022-10-17 RX ORDER — FUROSEMIDE 10 MG/ML
40 INJECTION INTRAMUSCULAR; INTRAVENOUS ONCE
Status: COMPLETED | OUTPATIENT
Start: 2022-10-17 | End: 2022-10-17

## 2022-10-17 RX ORDER — FUROSEMIDE 10 MG/ML
40 INJECTION INTRAMUSCULAR; INTRAVENOUS
Status: DISCONTINUED | OUTPATIENT
Start: 2022-10-18 | End: 2022-10-17

## 2022-10-17 RX ORDER — INSULIN LISPRO 100 [IU]/ML
1-6 INJECTION, SOLUTION INTRAVENOUS; SUBCUTANEOUS
Status: DISCONTINUED | OUTPATIENT
Start: 2022-10-17 | End: 2022-10-18 | Stop reason: HOSPADM

## 2022-10-17 RX ORDER — SODIUM CHLORIDE 9 MG/ML
3 INJECTION INTRAVENOUS
Status: DISCONTINUED | OUTPATIENT
Start: 2022-10-17 | End: 2022-10-18 | Stop reason: HOSPADM

## 2022-10-17 RX ADMIN — APIXABAN 5 MG: 5 TABLET, FILM COATED ORAL at 18:00

## 2022-10-17 RX ADMIN — METOPROLOL SUCCINATE 100 MG: 100 TABLET, EXTENDED RELEASE ORAL at 14:42

## 2022-10-17 RX ADMIN — HYDRALAZINE HYDROCHLORIDE 10 MG: 10 TABLET, FILM COATED ORAL at 21:52

## 2022-10-17 RX ADMIN — SACUBITRIL AND VALSARTAN 1 TABLET: 49; 51 TABLET, FILM COATED ORAL at 17:59

## 2022-10-17 RX ADMIN — VENLAFAXINE 75 MG: 37.5 TABLET ORAL at 18:00

## 2022-10-17 RX ADMIN — FUROSEMIDE 40 MG: 10 INJECTION, SOLUTION INTRAMUSCULAR; INTRAVENOUS at 12:02

## 2022-10-17 RX ADMIN — FUROSEMIDE 40 MG: 10 INJECTION, SOLUTION INTRAMUSCULAR; INTRAVENOUS at 17:59

## 2022-10-17 RX ADMIN — HYDRALAZINE HYDROCHLORIDE 10 MG: 10 TABLET, FILM COATED ORAL at 14:42

## 2022-10-17 RX ADMIN — INSULIN LISPRO 1 UNITS: 100 INJECTION, SOLUTION INTRAVENOUS; SUBCUTANEOUS at 16:32

## 2022-10-17 RX ADMIN — METOPROLOL SUCCINATE 100 MG: 100 TABLET, EXTENDED RELEASE ORAL at 21:52

## 2022-10-17 NOTE — ASSESSMENT & PLAN NOTE
Lab Results   Component Value Date    HGBA1C 6 6 (H) 09/21/2022       No results for input(s): POCGLU in the last 72 hours      Blood Sugar Average: Last 72 hrs:   Last A1c 6 6, currently not on any medication  Sliding scale

## 2022-10-17 NOTE — H&P
[address] [facility]  H&PAce Sida 1940, 80 y o  female MRN: [id number]  Unit/Bed#: ED 22 Encounter: [id number]  Primary Care Provider: SOTO Liz   Date and time admitted to hospital: 10/17/2022 10:31 AM    * Acute on chronic combined systolic (congestive) and diastolic (congestive) heart failure (HCC)  Assessment & Plan  Wt Readings from Last 3 Encounters:   10/13/22 85 kg (187 lb 6 3 oz)   10/12/22 83 kg (183 lb)   08/04/22 85 2 kg (187 lb 14 4 oz)     Patient presented with orthopnea, dyspnea since discharge from hospital 3 days ago  She was admitted for WANG, and her diuretics were held on discharge  Resume Entresto, hydralazine  Start Lasix IV 40 mg TID  Renal fx at baseline and potassium normal    Continue metoprolol  Last echocardiogram 2020 reveals LVEF 60%, no wall motion abnormality  Strict I/O, daily weights  Patient sees Dr Percy Mike  Consult heart failure team  Currently in room air      Paroxysmal atrial fibrillation (HCC)  Assessment & Plan  Rate controlled with metoprolol  Anticoagulated with Eliquis  Has ICD in place    Restless leg syndrome  Assessment & Plan    Continue home medication Requip    Type 2 diabetes mellitus with diabetic cataract [facility])  Assessment & Plan  Lab Results   Component Value Date    HGBA1C 6 6 (H) 09/21/2022       No results for input(s): POCGLU in the last 72 hours  Blood Sugar Average: Last 72 hrs:   Last A1c 6 6, currently not on any medication  Sliding scale       Hypothyroidism  Assessment & Plan    Resume home medication levothyroxine  Last TSH 09/21/2022:  1 14    Hypertension  Assessment & Plan    Blood pressure well controlled  Continue hydralazine and metoprolol    VTE Prophylaxis: Apixaban (Eliquis)  / sequential compression device   Code Status: full code       Anticipated Length of Stay:  Patient will be admitted on an Inpatient basis with an anticipated length of stay of  > 2 midnights  Justification for Hospital Stay: CHF exacerbation    Total Time for Visit, including Counseling / Coordination of Care: 60 minutes  Greater than 50% of this total time spent on direct patient counseling and coordination of care  Chief Complaint:   Dyspnea, orthopnea  History of Present Illness:    Min Tobin is a 80 y o  female due to complaints of chest heaviness worsens with exertion, dyspnea, orthopnea  Patient's past medical history consist of combined systolic and diastolic CHF, AFib, controlled type 2 diabetes, restless legs syndrome, and CKD  In ER, further workup revealed patient has elevated ProBNP  Renal function at baseline  She has no other symptoms at this point  EKG nonischemic, troponin negative  Patient remains in room air  patient was admitted recently with WANG, and on discharge diuresis were held  Her renal function improved today, and potassium normal  CXR unremarkable  patient will be admitted for CHF exacerbation  Review of Systems:    Review of Systems   Constitutional: Negative for chills and fever  HENT: Negative for ear pain and sore throat  Eyes: Negative for pain and visual disturbance  Respiratory: Positive for shortness of breath  Negative for cough  Cardiovascular: Negative for chest pain and palpitations  Gastrointestinal: Negative for abdominal pain and vomiting  Genitourinary: Negative for dysuria and hematuria  Musculoskeletal: Negative for arthralgias and back pain  Skin: Negative for color change and rash  Neurological: Negative for seizures and syncope  All other systems reviewed and are negative        Past Medical and Surgical History:     Past Medical History:   Diagnosis Date   • Cardiogenic shock ([facility] [address] ) 6/26/2019   • CHF (congestive heart failure) ([facility])    • Eczema    • Photosensitivity     abnormal skin sensitivity to sunlight   • 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  Authorizing Provider   apixaban (Eliquis) 5 mg Take 1 tablet (5 mg total) by mouth 2 (two) times a day 8/26/22   [facility], DO   Ascorbic Acid (vitamin C) 1000 MG tablet Take 1,000 mg by mouth 2 (two) times a day     Historical Provider, MD   bumetanide (BUMEX) 2 mg tablet Take 2 tablets (4 mg total) by mouth daily Please hold for 1-2 more days 10/13/22   Betsy Viveros PA-C   calcium carbonate (OS-PAULA) 600 MG tablet Take 600 mg by mouth 2 (two) times a day with meals    Historical Provider, MD   cholecalciferol (VITAMIN D3) 1,000 units tablet Take 1,000 Units by mouth daily 8,000 dailty    Historical Provider, MD   hydrALAZINE (APRESOLINE) 10 mg tablet TAKE 1 TABLET (10 MG TOTAL) BY MOUTH EVERY 8 (EIGHT) HOURS  Patient taking differently: Take 10 mg by mouth 2 (two) times a day 1/1/22   Pamla Cushing, CRNP   levothyroxine 100 mcg tablet Take 1 tablet (100 mcg total) by mouth daily 8/4/22   Pamla Cushing, CRNP   Magnesium 400 MG CAPS Take 1 capsule (400 mg total) by mouth daily 10/13/22   Betsy Viveros PA-C   metoprolol succinate (TOPROL-XL) 50 mg 24 hr tablet TAKE 2 TABLETS (100 MG TOTAL) BY MOUTH EVERY 12 (TWELVE) HOURS 8/18/22   Ximena East, DO   Multiple Vitamin (MULTI-VITAMIN DAILY PO) 1 tablet 2 (two) times a day   4/5/21   Historical Provider, MD   Potassium Chloride ER 20 MEQ TBCR TAKE 2 TABLETS (40 MEQ TOTAL) BY MOUTH 2 (TWO) TIMES A DAY 8/18/22   Pamla Cushing, CRNP   rOPINIRole (REQUIP XL) 2 MG 24 hr tablet TAKE 1 TABLET (2 MG TOTAL) BY MOUTH DAILY AT BEDTIME 6/15/22   Pamla Cushing, CRNP   sacubitril-valsartan (Entresto) 49-51 MG TABS Take 1 tablet by mouth 2 (two) times a day Please hold for 1-2 more days 10/13/22 Betsy Viveros PA-C   traMADol (ULTRAM) 50 mg tablet TAKE 1 TABLET TWICE A DAY BY ORAL ROUTE AS NEEDED  10/5/22   Shannan Rivas MD   venlafaxine (EFFEXOR) 75 mg tablet TAKE 1 TABLET (75 MG TOTAL) BY MOUTH EVERY 12 (TWELVE) HOURS 10/3/22 11/2/22  David Old, CRNP   potassium chloride (K-DUR,KLOR-CON) 20 mEq tablet TAKE 2 TABLETS (40 MEQ TOTAL) BY MOUTH 2 (TWO) TIMES A DAY 3/17/21 9/19/21  David Old, SOTO     I have reviewed home medications with patient personally  Allergies: Allergies   Allergen Reactions   • Penicillins    • Wellbutrin Sr  [Bupropion]      Other reaction(s): Suicidal ideations  Category: Adverse Reaction;        Social History:     Marital Status: Single      Substance Use History:   Social History     Substance and Sexual Activity   Alcohol Use Not Currently     Social History     Tobacco Use   Smoking Status Never Smoker   Smokeless Tobacco Never Used     Social History     Substance and Sexual Activity   Drug Use Never       Family History:    Family History   Problem Relation Age of Onset   • Alzheimer's disease Mother    • Coronary artery disease Mother    • Dementia Mother    • Diabetes Mother         mellitus   • Other Father         acute myocardial infarction   • Coronary artery disease Sister    • Other Maternal Grandfather         laryngeal cancer   • Dementia Maternal Aunt    • Diabetes Maternal Aunt        Physical Exam:     Vitals:   Blood Pressure: 157/72 (10/17/22 1204)  Pulse: 80 (10/17/22 1204)  Temperature: 97 9 °F (36 6 °C) (10/17/22 1041)  Temp Source: Oral (10/17/22 1041)  Respirations: 20 (10/17/22 1204)  SpO2: 97 % (10/17/22 1204)    Physical Exam  Vitals and nursing note reviewed  Constitutional:       General: She is not in acute distress  Appearance: She is well-developed  HENT:      Head: Normocephalic and atraumatic  Eyes:      Conjunctiva/sclera: Conjunctivae normal    Cardiovascular:      Rate and Rhythm: Normal rate and regular rhythm  Heart sounds: No murmur heard  Pulmonary:      Effort: Pulmonary effort is normal  No respiratory distress  Breath sounds: Normal breath sounds  Abdominal:      Palpations: Abdomen is soft  Tenderness: There is no abdominal tenderness  Musculoskeletal:      Cervical back: Neck supple  Skin:     General: Skin is warm and dry  Neurological:      Mental Status: She is alert  Additional Data:     Lab Results: I have personally reviewed pertinent reports  Results from last 7 days   Lab Units 10/17/22  1107   WBC Thousand/uL 13 33*   HEMOGLOBIN g/dL 11 8   HEMATOCRIT % 36 7   PLATELETS Thousands/uL 316   NEUTROS PCT % 80*   LYMPHS PCT % 10*   MONOS PCT % 6   EOS PCT % 2     Results from last 7 days   Lab Units 10/17/22  1107   SODIUM mmol/L 137   POTASSIUM mmol/L 5 2   CHLORIDE mmol/L 107   CO2 mmol/L 23   BUN mg/dL 18   CREATININE mg/dL 1 18   ANION GAP mmol/L 7   CALCIUM mg/dL 9 9   GLUCOSE RANDOM mg/dL 198*                       Imaging: I have personally reviewed pertinent reports  X-ray chest 1 view portable    (Results Pending)        ** Please Note: This note has been constructed using a voice recognition system   **

## 2022-10-17 NOTE — CONSULTS
Consultation - Cardiology  Deep Boyce 80 y o  female MRN: [id number]  Unit/Bed#: [facility] 403-01 Encounter: [id number]  History of Present Illness   Physician Requesting Consult: Luis Daniel Hollis MD  Reason for Consult / Principal Problem:  Acute on chronic heart failure    Assessment/Plan   HFrEF  -LVEF about 25-30% in 6-2019, slight improvement to 45% in , down to 30% in  and to normal LVEF 60% in   Thought to be tachyarrhythmia related as patient had multiple cardioversions in the past   -volume status:  Hypervolemic  At home on Bumex 4 mg daily  Was held since 10/12 due to WANG in setting of decreased p o  intake  NT proBNP 4761  Will start on Lasix 40 IV b i d  Today also restarted home Entresto 49-51 mg b i d   -hemodynamics:  Elevated maps 100mmhg  Restarted home Entresto 49-51 and hydralazine 10 mg t i d  and metoprolol succinate 100 mg b i d   -has Bi V ICD    History of atrial fibrillation  -status post AVJ ablation with Bi V AICD  -continue home apixaban 5 mg b i d , metoprolol succinate 100 mg b i d  CKD 4  -creatinine elevated up to 2 23 on 10/12 with slight improvement upon holding diuretics to 1 84  Today back to baseline of 1 18  HPI: Deep Boyce is a 80 y o  female with past medical history of HFmEF (LVEF about 25% in , slight improvement to 45% in , down to 30% in ), pulmonary hypertension, paroxysmal atrial fibrillation, hypertension, hyperlipidemia, diabetes mellitus type 2 (hemoglobin A1c 6 6% ), hypothyroidism, CORAZON, CKD was admitted on 10- with WANG which was thought to be secondary to decreased p o  Intake and continued diuretic use  Her home diuretics were held during admission and upon discharge on 10/13  Today she come back with shortness of breath  Denies orthopnea, lower extremity swelling, chest pain, palpitation      Primary Cardiologist: Dr Kalyan Caputo    Review of Systems  ROS as noted above, otherwise 12 point review of systems was performed and is negative  Historical Information   Past Medical History:   Diagnosis Date   • Cardiogenic shock ([facility] [address] ) 6/26/2019   • CHF (congestive heart failure) (HCC)    • Eczema    • Photosensitivity     abnormal skin sensitivity to sunlight   • 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HCC)     staging     Past Surgical History:   Procedure Laterality Date   • CATARACT EXTRACTION Left    • HEMORRHOID SURGERY     • IR NON-TUNNELED CENTRAL LINE PLACEMENT  7/9/2019   • OOPHORECTOMY      unilateral   • TOTAL ABDOMINAL HYSTERECTOMY       Social History     Substance and Sexual Activity   Alcohol Use Not Currently     Social History     Substance and Sexual Activity   Drug Use Never     Social History     Tobacco Use   Smoking Status Never Smoker   Smokeless Tobacco Never Used     Meds/Allergies   Hospital Medications:   Current Facility-Administered Medications   Medication Dose Route Frequency   • fluticasone (FLONASE) 50 mcg/act nasal spray 1 spray  1 spray Each Nare Daily   • sodium chloride (PF) 0 9 % injection 3 mL  3 mL Intravenous Q1H PRN     Home Medications:   Medications Prior to Admission   Medication   • apixaban (Eliquis) 5 mg   • Ascorbic Acid (vitamin C) 1000 MG tablet   • bumetanide (BUMEX) 2 mg tablet   • calcium carbonate (OS-PAULA) 600 MG tablet   • cholecalciferol (VITAMIN D3) 1,000 units tablet   • hydrALAZINE (APRESOLINE) 10 mg tablet   • levothyroxine 100 mcg tablet   • Magnesium 400 MG CAPS   • metoprolol succinate (TOPROL-XL) 50 mg 24 hr tablet   • Multiple Vitamin (MULTI-VITAMIN DAILY PO)   • Potassium Chloride ER 20 MEQ TBCR   • rOPINIRole (REQUIP XL) 2 MG 24 hr tablet   • sacubitril-valsartan (Entresto) 49-51 MG TABS   • traMADol (ULTRAM) 50 mg tablet   • venlafaxine (EFFEXOR) 75 mg tablet     Allergies   Allergen Reactions   • Penicillins    • Wellbutrin Sr  [Bupropion]      Other reaction(s): Suicidal ideations  Category:  Adverse Reaction;      Objective   Vitals: Blood pressure 144/81, pulse 80, temperature 98 4 °F (36 9 °C), resp  rate 20, SpO2 98 %  Orthostatic Blood Pressures    Flowsheet Row Most Recent Value   Blood Pressure 144/81 filed at 10/17/2022 1349   Patient Position - Orthostatic VS Lying filed at 10/17/2022 1308          Intake/Output Summary (Last 24 hours) at 10/17/2022 1401  Last data filed at 10/17/2022 1302  Gross per 24 hour   Intake --   Output 525 ml   Net -525 ml     Invasive Devices  Report    Peripheral Intravenous Line  Duration           Peripheral IV 10/17/22 Right Antecubital <1 day              Physical Exam   GEN: NAD, alert and oriented, well appearing  SKIN: dry without significant lesions or rashes  HEENT: NCAT, PERRL, EOMs intact  NECK: mild JVD  CARDIOVASCULAR: RRR, normal S1, S2 without murmurs, rubs, or gallops appreciated  LUNGS: Clear to auscultation bilaterally without wheezes, rhonchi, or rales  ABDOMEN: Soft, nontender, nondistended  EXTREMITIES/VASCULAR: perfused without clubbing, cyanosis, or edema b/l  PSYCH: Normal mood and affect  NEURO: CN ll-Xll grossly intact    Lab Results: I have personally reviewed pertinent lab results  Results from last 7 days   Lab Units 10/17/22  1107 10/13/22  0614 10/12/22  1715   WBC Thousand/uL 13 33* 10 74* 9 47   HEMOGLOBIN g/dL 11 8 12 2 12 9   HEMATOCRIT % 36 7 37 7 39 0   PLATELETS Thousands/uL 316 281 288     Results from last 7 days   Lab Units 10/17/22  1107 10/13/22  0614 10/12/22  1715   POTASSIUM mmol/L 5 2 4 0 3 9   CHLORIDE mmol/L 107 100 98   CO2 mmol/L 23 29 28   BUN mg/dL 18 41* 46*   CREATININE mg/dL 1 18 1 84* 2 23*   CALCIUM mg/dL 9 9 9 7 9 9         Results from last 7 days   Lab Units 10/13/22  0614   MAGNESIUM mg/dL 2 5     Imaging: I have personally reviewed pertinent reports

## 2022-10-17 NOTE — ED PROVIDER NOTES
History  Chief Complaint   Patient presents with   • Chest Pain     Pt report chest pressure and sob for the past 3 days     Patient is an 70-year-old female with a past medical history of hypothyroidism, CHF status post pacemaker, AFib, WANG, presents to the emergency department with 3 days of shortness of breath and chest tightness  States she feels short of breath when she sitting, when she is lying flat, when she is walking  Nothing makes the shortness of breath better  She states she takes a water pill for her CHF has been taking her medications since she was released from the hospital on 10/13  Patient was admitted to the hospital on 10/12 for WANG  Patient says he does not drink alcohol and she previously smoked cigarettes in her 25s  Prior to Admission Medications   Prescriptions Last Dose Informant Patient Reported? Taking?    Ascorbic Acid (vitamin C) 1000 MG tablet  Self Yes No   Sig: Take 1,000 mg by mouth 2 (two) times a day    Magnesium 400 MG CAPS   No No   Sig: Take 1 capsule (400 mg total) by mouth daily   Multiple Vitamin (MULTI-VITAMIN DAILY PO)  Self Yes No   Si tablet 2 (two) times a day     Potassium Chloride ER 20 MEQ TBCR   No No   Sig: TAKE 2 TABLETS (40 MEQ TOTAL) BY MOUTH 2 (TWO) TIMES A DAY   apixaban (Eliquis) 5 mg   No No   Sig: Take 1 tablet (5 mg total) by mouth 2 (two) times a day   bumetanide (BUMEX) 2 mg tablet   No No   Sig: Take 2 tablets (4 mg total) by mouth daily Please hold for 1-2 more days   calcium carbonate (OS-PAULA) 600 MG tablet  Self Yes No   Sig: Take 600 mg by mouth 2 (two) times a day with meals   cholecalciferol (VITAMIN D3) 1,000 units tablet  Self Yes No   Sig: Take 1,000 Units by mouth daily 8,000 dailty   hydrALAZINE (APRESOLINE) 10 mg tablet   No No   Sig: TAKE 1 TABLET (10 MG TOTAL) BY MOUTH EVERY 8 (EIGHT) HOURS   Patient taking differently: Take 10 mg by mouth 2 (two) times a day   levothyroxine 100 mcg tablet   No No   Sig: Take 1 tablet (100 mcg total) by mouth daily   metoprolol succinate (TOPROL-XL) 50 mg 24 hr tablet   No No   Sig: TAKE 2 TABLETS (100 MG TOTAL) BY MOUTH EVERY 12 (TWELVE) HOURS   rOPINIRole (REQUIP XL) 2 MG 24 hr tablet   No No   Sig: TAKE 1 TABLET (2 MG TOTAL) BY MOUTH DAILY AT BEDTIME   sacubitril-valsartan (Entresto) 49-51 MG TABS   No No   Sig: Take 1 tablet by mouth 2 (two) times a day Please hold for 1-2 more days   traMADol (ULTRAM) 50 mg tablet   No No   Sig: TAKE 1 TABLET TWICE A DAY BY ORAL ROUTE AS NEEDED  venlafaxine (EFFEXOR) 75 mg tablet   No No   Sig: TAKE 1 TABLET (75 MG TOTAL) BY MOUTH EVERY 12 (TWELVE) HOURS      Facility-Administered Medications: None       Past Medical History:   Diagnosis Date   • Cardiogenic shock ([facility] ) 6/26/2019   • CHF (congestive heart failure) (HCC)    • Eczema    • Photosensitivity     abnormal skin sensitivity to sunlight   • 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HCC)     staging       Past Surgical History:   Procedure Laterality Date   • CATARACT EXTRACTION Left    • HEMORRHOID SURGERY     • IR NON-TUNNELED CENTRAL LINE PLACEMENT  7/9/2019   • OOPHORECTOMY      unilateral   • TOTAL ABDOMINAL HYSTERECTOMY         Family History   Problem Relation Age of Onset   • Alzheimer's disease Mother    • Coronary artery disease Mother    • Dementia Mother    • Diabetes Mother         mellitus   • Other Father         acute myocardial infarction   • Coronary artery disease Sister    • Other Maternal Grandfather         laryngeal cancer   • Dementia Maternal Aunt    • Diabetes Maternal Aunt      I have reviewed and agree with the history as documented      E-Cigarette/Vaping   • E-Cigarette Use Never User      E-Cigarette/Vaping Substances     Social History     Tobacco Use   • Smoking status: Never Smoker   • Smokeless tobacco: Never Used   Vaping Use   • Vaping Use: Never used   Substance Use Topics   • Alcohol use: Not Currently   • Drug use: Never        Review of Systems   Constitutional: Negative for chills and fever  HENT: Negative for ear pain and sore throat  Eyes: Negative for pain and visual disturbance  Respiratory: Positive for chest tightness and shortness of breath  Negative for cough  Cardiovascular: Negative for chest pain and palpitations  Gastrointestinal: Negative for abdominal pain, nausea and vomiting  Genitourinary: Negative for dysuria  Musculoskeletal: Negative for arthralgias and back pain  Skin: Negative for color change and rash  Neurological: Negative for seizures and syncope  All other systems reviewed and are negative  Physical Exam  ED Triage Vitals   Temperature Pulse Respirations Blood Pressure SpO2   10/17/22 1041 10/17/22 1038 10/17/22 1038 10/17/22 1038 10/17/22 1038   97 9 °F (36 6 °C) 87 20 (!) 194/91 98 %      Temp Source Heart Rate Source Patient Position - Orthostatic VS BP Location FiO2 (%)   10/17/22 1041 10/17/22 1038 10/17/22 1204 10/17/22 1204 --   Oral Monitor Lying Left arm       Pain Score       10/17/22 1038       No Pain             Orthostatic Vital Signs  Vitals:    10/17/22 1038 10/17/22 1204 10/17/22 1308 10/17/22 1349   BP: (!) 194/91 157/72 (!) 174/79 144/81   Pulse: 87 80 80    Patient Position - Orthostatic VS:  Lying Lying        Physical Exam  Vitals and nursing note reviewed  Constitutional:       General: She is not in acute distress  Appearance: She is well-developed  HENT:      Head: Normocephalic and atraumatic  Eyes:      Extraocular Movements: Extraocular movements intact  Conjunctiva/sclera: Conjunctivae normal       Pupils: Pupils are equal, round, and reactive to light  Cardiovascular:      Rate and Rhythm: Normal rate and regular rhythm  Pulses:           Radial pulses are 2+ on the right side and 2+ on the left side  Popliteal pulses are 2+ on the right side and 2+ on the left side  Heart sounds: No murmur heard  No friction rub  No gallop     Pulmonary:      Effort: Pulmonary effort is normal  No tachypnea, accessory muscle usage or respiratory distress  Breath sounds: Examination of the left-lower field reveals decreased breath sounds  Decreased breath sounds present  Abdominal:      Palpations: Abdomen is soft  Tenderness: There is no abdominal tenderness  Musculoskeletal:      Cervical back: Neck supple  Right lower leg: No edema  Left lower leg: No edema  Skin:     General: Skin is warm and dry  Capillary Refill: Capillary refill takes less than 2 seconds  Neurological:      General: No focal deficit present  Mental Status: She is alert and oriented to person, place, and time           ED Medications  Medications   sodium chloride (PF) 0 9 % injection 3 mL (has no administration in time range)   fluticasone (FLONASE) 50 mcg/act nasal spray 1 spray (has no administration in time range)   furosemide (LASIX) injection 40 mg (40 mg Intravenous Given 10/17/22 1202)       Diagnostic Studies  Results Reviewed     Procedure Component Value Units Date/Time    HS Troponin I 2hr [[id number]]  (Normal) Collected: 10/17/22 1311    Lab Status: Final result Specimen: Blood from Arm, Right Updated: 10/17/22 1352     hs TnI 2hr 10 ng/L      Delta 2hr hsTnI 1 ng/L     HS Troponin I 4hr [[id number]]     Lab Status: No result Specimen: Blood     HS Troponin 0hr (reflex protocol) [[id number]]  (Normal) Collected: 10/17/22 1107    Lab Status: Final result Specimen: Blood from Arm, Right Updated: 10/17/22 1151     hs TnI 0hr 9 ng/L     Basic metabolic panel [[id number]]  (Abnormal) Collected: 10/17/22 1107    Lab Status: Final result Specimen: Blood from Arm, Right Updated: 10/17/22 1142     Sodium 137 mmol/L      Potassium 5 2 mmol/L      Chloride 107 mmol/L      CO2 23 mmol/L      ANION GAP 7 mmol/L      BUN 18 mg/dL      Creatinine 1 18 mg/dL      Glucose 198 mg/dL      Calcium 9 9 mg/dL      eGFR 43 ml/min/1 73sq m     Narrative:      [place] guidelines for Chronic Kidney Disease (CKD):   •  Stage 1 with normal or high GFR (GFR > 90 mL/min/1 73 square meters)  •  Stage 2 Mild CKD (GFR = 60-89 mL/min/1 73 square meters)  •  Stage 3A Moderate CKD (GFR = 45-59 mL/min/1 73 square meters)  •  Stage 3B Moderate CKD (GFR = 30-44 mL/min/1 73 square meters)  •  Stage 4 Severe CKD (GFR = 15-29 mL/min/1 73 square meters)  •  Stage 5 End Stage CKD (GFR <15 mL/min/1 73 square meters)  Note: GFR calculation is accurate only with a steady state creatinine    NT-BNP PRO [[id number]]  (Abnormal) Collected: 10/17/22 1107    Lab Status: Final result Specimen: Blood from Arm, Right Updated: 10/17/22 1142     NT-proBNP 4,761 pg/mL     CBC and differential [[id number]]  (Abnormal) Collected: 10/17/22 1107    Lab Status: Final result Specimen: Blood from Arm, Right Updated: 10/17/22 1116     WBC 13 33 Thousand/uL      RBC 3 68 Million/uL      Hemoglobin 11 8 g/dL      Hematocrit 36 7 %       fL      MCH 32 1 pg      MCHC 32 2 g/dL      RDW 13 6 %      MPV 10 8 fL      Platelets 393 Thousands/uL      nRBC 0 /100 WBCs      Neutrophils Relative 80 %      Immat GRANS % 1 %      Lymphocytes Relative 10 %      Monocytes Relative 6 %      Eosinophils Relative 2 %      Basophils Relative 1 %      Neutrophils Absolute 10 76 Thousands/µL      Immature Grans Absolute 0 09 Thousand/uL      Lymphocytes Absolute 1 27 Thousands/µL      Monocytes Absolute 0 84 Thousand/µL      Eosinophils Absolute 0 31 Thousand/µL      Basophils Absolute 0 06 Thousands/µL                  X-ray chest 1 view portable   Final Result by Laureano Gupta MD (10/17 1248)   No acute cardiopulmonary findings  Workstation performed: [id number]               Procedures  Procedures      ED Course  ED Course as of 10/17/22 1353   Mon Oct 17, 2022   1056 Patient being worked up for CHF exacerbation     1056 Procedure Note: EKG  Date/Time: 10/17/22 10:56 AM   Interpreted by: Donavon Ty / Diagnosis: CP  ECG reviewed by me, the ED Provider: yes   The EKG demonstrates:  Rate: 81 BPM  Rhythm:  Ventricularly paced, junctional  Intervals: normal intervals  Axis: normal axis  QRS/Blocks: normal QRS  ST Changes: No acute ST Changes, no STD/VADIM     1151 NT-proBNP(!): 4,761   1155 hs TnI 0hr: 9  Will trend  [address] text to [facility] for admission for CHF exacerbation  36 Pt admitted to [facility] for CHF exacerbation                HEART Risk Score    Flowsheet Row Most Recent Value   Heart Score Risk Calculator    History 1 Filed at: 10/17/2022 1352   ECG 0 Filed at: 10/17/2022 1352   Age 2 Filed at: 10/17/2022 1352   Risk Factors 2 Filed at: 10/17/2022 1352   Troponin 0 Filed at: 10/17/2022 1352   HEART Score 5 Filed at: 10/17/2022 1352                      SBIRT 22yo+    Flowsheet Row Most Recent Value   SBIRT (25 yo +)    In order to provide better care to our patients, we are screening all of our patients for alcohol and drug use  Would it be okay to ask you these screening questions? Unable to answer at this time Filed at: 10/17/2022 1038                MDM  Number of Diagnoses or Management Options  CHF exacerbation [facility])  Diagnosis management comments: Patient is 49-year-old female with a past medical history of CHF  Patient is complaining of shortness of breath has been ongoing for 3 days  Will perform cardiac workup, BNP, cardiac ultrasound of heart and lungs, chest x-ray  Concern at this time is CHF exacerbation, dysrhythmia, fluid overload  Based on the workup, it appears patient is a CHF exacerbation with small left-sided pleural effusion  Patient will be given 40 mg of IV Lasix  Creatinine has decreased at this time and patient no longer has WANG  Patient will be admitted to [facility] for CHF exacerbation  Troponin will be trended  Patient re-evaluated states workup breathing is about the same as before        Disposition  Final diagnoses:   CHF exacerbation ([address] )     Time reflects when diagnosis was documented in both MDM as applicable and the Disposition within this note     Time User Action Codes Description Comment    10/17/2022 12:41 PM Auther Cam Add [I50 9] CHF exacerbation [facility])       ED Disposition     ED Disposition   Admit    Condition   Stable    Date/Time   Mon Oct 17, 2022 12:46 PM    Comment   Case was discussed with Dr Kirill Liz and the patient's admission status was agreed to be Admission Status: inpatient status to the service of Dr Kirill Liz   Follow-up Information    None         Current Discharge Medication List      CONTINUE these medications which have NOT CHANGED    Details   apixaban (Eliquis) 5 mg Take 1 tablet (5 mg total) by mouth 2 (two) times a day  Qty: 180 tablet, Refills: 2    Associated Diagnoses: Atrial fibrillation with rapid ventricular response (HCC)      Ascorbic Acid (vitamin C) 1000 MG tablet Take 1,000 mg by mouth 2 (two) times a day       bumetanide (BUMEX) 2 mg tablet Take 2 tablets (4 mg total) by mouth daily Please hold for 1-2 more days  Qty: 60 tablet, Refills: 0    Associated Diagnoses: CHF (congestive heart failure) ([address] ); Chronic HFrEF (heart failure with reduced ejection fraction) ([facility])      calcium carbonate (OS-PAULA) 600 MG tablet Take 600 mg by mouth 2 (two) times a day with meals      cholecalciferol (VITAMIN D3) 1,000 units tablet Take 1,000 Units by mouth daily 8,000 dailty      hydrALAZINE (APRESOLINE) 10 mg tablet TAKE 1 TABLET (10 MG TOTAL) BY MOUTH EVERY 8 (EIGHT) HOURS  Qty: 90 tablet, Refills: 5    Associated Diagnoses: CHF (congestive heart failure) ([address] ); Chronic HFrEF (heart failure with reduced ejection fraction) ([address] );  Essential hypertension      levothyroxine 100 mcg tablet Take 1 tablet (100 mcg total) by mouth daily  Qty: 30 tablet, Refills: 5    Associated Diagnoses: Hypothyroidism, unspecified type      Magnesium 400 MG CAPS Take 1 capsule (400 mg total) by mouth daily  Refills: 0    Associated Diagnoses: CHF (congestive heart failure) ([facility] ); Paroxysmal atrial fibrillation (HCC)      metoprolol succinate (TOPROL-XL) 50 mg 24 hr tablet TAKE 2 TABLETS (100 MG TOTAL) BY MOUTH EVERY 12 (TWELVE) HOURS  Qty: 120 tablet, Refills: 5    Associated Diagnoses: Chronic HFrEF (heart failure with reduced ejection fraction) ([facility] ); Atrial fibrillation, unspecified type ([facility])      Multiple Vitamin (MULTI-VITAMIN DAILY PO) 1 tablet 2 (two) times a day        Potassium Chloride ER 20 MEQ TBCR TAKE 2 TABLETS (40 MEQ TOTAL) BY MOUTH 2 (TWO) TIMES A DAY  Qty: 120 tablet, Refills: 2    Associated Diagnoses: CHF (congestive heart failure) ([facility] ); Chronic HFrEF (heart failure with reduced ejection fraction) ([facility])      rOPINIRole (REQUIP XL) 2 MG 24 hr tablet TAKE 1 TABLET (2 MG TOTAL) BY MOUTH DAILY AT BEDTIME  Qty: 30 tablet, Refills: 5    Associated Diagnoses: Restless leg syndrome      sacubitril-valsartan (Entresto) 49-51 MG TABS Take 1 tablet by mouth 2 (two) times a day Please hold for 1-2 more days  Qty: 180 tablet, Refills: 0    Associated Diagnoses: Chronic combined systolic (congestive) and diastolic (congestive) heart failure ([facility])      traMADol (ULTRAM) 50 mg tablet TAKE 1 TABLET TWICE A DAY BY ORAL ROUTE AS NEEDED  Qty: 60 tablet, Refills: 0    Associated Diagnoses: Acute right-sided low back pain with right-sided sciatica      venlafaxine (EFFEXOR) 75 mg tablet TAKE 1 TABLET (75 MG TOTAL) BY MOUTH EVERY 12 (TWELVE) HOURS  Qty: 60 tablet, Refills: 5    Associated Diagnoses: Depression with anxiety           No discharge procedures on file  PDMP Review       Value Time User    PDMP Reviewed  Yes 10/5/2022  8:13 AM Jim Flood MD           ED Provider  Attending physically available and evaluated Shellia Goodell  JOYCE managed the patient along with the ED Attending      Electronically Signed by         Akira Carrera DO  10/17/22 1802

## 2022-10-17 NOTE — ASSESSMENT & PLAN NOTE
Wt Readings from Last 3 Encounters:   10/13/22 85 kg (187 lb 6 3 oz)   10/12/22 83 kg (183 lb)   08/04/22 85 2 kg (187 lb 14 4 oz)     Patient presented with orthopnea, dyspnea since discharge from hospital 3 days ago  She was admitted for WANG, and her diuretics were held on discharge  Resume Entresto, hydralazine  Start Lasix IV 40 mg TID  Renal fx at baseline and potassium normal    Continue metoprolol  Last echocardiogram 2020 reveals LVEF 60%, no wall motion abnormality  Strict I/O, daily weights  Patient sees Dr Taylor   Consult heart failure team  Currently in room air

## 2022-10-17 NOTE — ED PROCEDURE NOTE
Procedure  POC Cardiac US    Date/Time: 10/17/2022 11:03 AM  Performed by: Leny Gu DO  Authorized by:  Leny Gu DO     Patient location:  ED  Other Assisting Provider: Yes (comment) (Dr Cody Talley)    Procedure details:     Exam Type:  Educational    Indications: chest pain and dyspnea      Assessment / Evaluation for: cardiac function and pericardial effusion      Exam Type: initial exam      Image quality: limited diagnostic      Image availability:  Images available in PACS  Patient Details:     Cardiac Rhythm:  Regular    Mechanical ventilation: No    Cardiac findings:     Echo technique: limited 2D      Views obtained: parasternal long axis, parasternal short axis, subcostal and apical      Pericardial effusion: trace      Tamponade physiology: absent      Wall motion: normal      LV systolic function: normal      RV dilation: mild    Pulmonary findings:     Left Lung Findings: left lung sliding      Right lung findings: right lung sliding      B-lines: 1 to 301 KIM Oneal DO  10/17/22 1104

## 2022-10-17 NOTE — ED ATTENDING ATTESTATION
10/17/2022  ILinus, DO, saw and evaluated the patient  I have discussed the patient with the resident/non-physician practitioner and agree with the resident's/non-physician practitioner's findings, Plan of Care, and MDM as documented in the resident's/non-physician practitioner's note, except where noted  All available labs and Radiology studies were reviewed  I was present for key portions of any procedure(s) performed by the resident/non-physician practitioner and I was immediately available to provide assistance  At this point I agree with the current assessment done in the Emergency Department  I have conducted an independent evaluation of this patient a history and physical is as follows:    80-year-old female with history of AFib on Eliquis presents for dyspnea on exertion  Recently admitted for anemia  Had an EGD that was normal   Since her discharge she has had severe exertional dyspnea  Notes continued melena  She did not start her Eliquis again  No other modifying factors or associated symptoms no chest pain    Plan is repeat labs may require admission for further evaluation, she was actually at the cardiologist today who evaluated her and told her to come to the emergency department    ED Course         Critical Care Time  Procedures

## 2022-10-17 NOTE — PLAN OF CARE
Problem: MOBILITY - ADULT  Goal: Maintain or return to baseline ADL function  Description: INTERVENTIONS:  -  Assess patient's ability to carry out ADLs; assess patient's baseline for ADL function and identify physical deficits which impact ability to perform ADLs (bathing, care of mouth/teeth, toileting, grooming, dressing, etc )  - Assess/evaluate cause of self-care deficits   - Assess range of motion  - Assess patient's mobility; develop plan if impaired  - Assess patient's need for assistive devices and provide as appropriate  - Encourage maximum independence but intervene and supervise when necessary  - Involve family in performance of ADLs  - Assess for home care needs following discharge   - Consider OT consult to assist with ADL evaluation and planning for discharge  - Provide patient education as appropriate  Outcome: Progressing  Goal: Maintains/Returns to pre admission functional level  Description: INTERVENTIONS:  - Perform BMAT or MOVE assessment daily    - Set and communicate daily mobility goal to care team and patient/family/caregiver  - Collaborate with rehabilitation services on mobility goals if consulted  - Perform Range of Motion 3 times a day  - Reposition patient every 2 hours    - Dangle patient 3 times a day  - Stand patient 3 times a day  - Ambulate patient 3 times a day  - Out of bed to chair 3 times a day   - Out of bed for meals 3 times a day  - Out of bed for toileting  - Record patient progress and toleration of activity level   Outcome: Progressing     Problem: Potential for Falls  Goal: Patient will remain free of falls  Description: INTERVENTIONS:  - Educate patient/family on patient safety including physical limitations  - Instruct patient to call for assistance with activity   - Consult OT/PT to assist with strengthening/mobility   - Keep Call bell within reach  - Keep bed low and locked with side rails adjusted as appropriate  - Keep care items and personal belongings within reach  - Initiate and maintain comfort rounds  - Make Fall Risk Sign visible to staff  - Offer Toileting every 2 Hours, in advance of need  - Initiate/Maintain alarm  - Obtain necessary fall risk management equipment:   - Apply yellow socks and bracelet for high fall risk patients  - Consider moving patient to room near nurses station  Outcome: Progressing     Problem: PAIN - ADULT  Goal: Verbalizes/displays adequate comfort level or baseline comfort level  Description: Interventions:  - Encourage patient to monitor pain and request assistance  - Assess pain using appropriate pain scale  - Administer analgesics based on type and severity of pain and evaluate response  - Implement non-pharmacological measures as appropriate and evaluate response  - Consider cultural and social influences on pain and pain management  - Notify physician/advanced practitioner if interventions unsuccessful or patient reports new pain  Outcome: Progressing     Problem: INFECTION - ADULT  Goal: Absence or prevention of progression during hospitalization  Description: INTERVENTIONS:  - Assess and monitor for signs and symptoms of infection  - Monitor lab/diagnostic results  - Monitor all insertion sites, i e  indwelling lines, tubes, and drains  - Monitor endotracheal if appropriate and nasal secretions for changes in amount and color  - [place] appropriate cooling/warming therapies per order  - Administer medications as ordered  - Instruct and encourage patient and family to use good hand hygiene technique  - Identify and instruct in appropriate isolation precautions for identified infection/condition  Outcome: Progressing  Goal: Absence of fever/infection during neutropenic period  Description: INTERVENTIONS:  - Monitor WBC    Outcome: Progressing     Problem: SAFETY ADULT  Goal: Maintain or return to baseline ADL function  Description: INTERVENTIONS:  -  Assess patient's ability to carry out ADLs; assess patient's baseline for ADL function and identify physical deficits which impact ability to perform ADLs (bathing, care of mouth/teeth, toileting, grooming, dressing, etc )  - Assess/evaluate cause of self-care deficits   - Assess range of motion  - Assess patient's mobility; develop plan if impaired  - Assess patient's need for assistive devices and provide as appropriate  - Encourage maximum independence but intervene and supervise when necessary  - Involve family in performance of ADLs  - Assess for home care needs following discharge   - Consider OT consult to assist with ADL evaluation and planning for discharge  - Provide patient education as appropriate  Outcome: Progressing  Goal: Maintains/Returns to pre admission functional level  Description: INTERVENTIONS:  - Perform BMAT or MOVE assessment daily    - Set and communicate daily mobility goal to care team and patient/family/caregiver  - Collaborate with rehabilitation services on mobility goals if consulted  - Perform Range of Motion 3 times a day  - Reposition patient every 2 hours    - Dangle patient 3 times a day  - Stand patient 3 times a day  - Ambulate patient 3 times a day  - Out of bed to chair 3 times a day   - Out of bed for meals 3 times a day  - Out of bed for toileting  - Record patient progress and toleration of activity level   Outcome: Progressing  Goal: Patient will remain free of falls  Description: INTERVENTIONS:  - Educate patient/family on patient safety including physical limitations  - Instruct patient to call for assistance with activity   - Consult OT/PT to assist with strengthening/mobility   - Keep Call bell within reach  - Keep bed low and locked with side rails adjusted as appropriate  - Keep care items and personal belongings within reach  - Initiate and maintain comfort rounds  - Make Fall Risk Sign visible to staff  - Offer Toileting every 2 Hours, in advance of need  - Initiate/Maintain alarm  - Obtain necessary fall risk management equipment:   - Apply yellow socks and bracelet for high fall risk patients  - Consider moving patient to room near nurses station  Outcome: Progressing     Problem: DISCHARGE PLANNING  Goal: Discharge to home or other facility with appropriate resources  Description: INTERVENTIONS:  - Identify barriers to discharge w/patient and caregiver  - Arrange for needed discharge resources and transportation as appropriate  - Identify discharge learning needs (meds, wound care, etc )  - Arrange for interpretive services to assist at discharge as needed  - Refer to Case Management Department for coordinating discharge planning if the patient needs post-hospital services based on physician/advanced practitioner order or complex needs related to functional status, cognitive ability, or social support system  Outcome: Progressing     Problem: Knowledge Deficit  Goal: Patient/family/caregiver demonstrates understanding of disease process, treatment plan, medications, and discharge instructions  Description: Complete learning assessment and assess knowledge base    Interventions:  - Provide teaching at level of understanding  - Provide teaching via preferred learning methods  Outcome: Progressing

## 2022-10-18 ENCOUNTER — TRANSITIONAL CARE MANAGEMENT (OUTPATIENT)
Dept: FAMILY MEDICINE CLINIC | Facility: CLINIC | Age: 82
End: 2022-10-18

## 2022-10-18 VITALS
BODY MASS INDEX: 35.17 KG/M2 | HEART RATE: 82 BPM | WEIGHT: 186.29 LBS | RESPIRATION RATE: 18 BRPM | DIASTOLIC BLOOD PRESSURE: 72 MMHG | SYSTOLIC BLOOD PRESSURE: 128 MMHG | TEMPERATURE: 98.2 F | HEIGHT: 61 IN | OXYGEN SATURATION: 97 %

## 2022-10-18 LAB
ANION GAP SERPL CALCULATED.3IONS-SCNC: 7 MMOL/L (ref 4–13)
BUN SERPL-MCNC: 20 MG/DL (ref 5–25)
CALCIUM SERPL-MCNC: 9.6 MG/DL (ref 8.3–10.1)
CHLORIDE SERPL-SCNC: 104 MMOL/L (ref 96–108)
CO2 SERPL-SCNC: 26 MMOL/L (ref 21–32)
CREAT SERPL-MCNC: 1.3 MG/DL (ref 0.6–1.3)
ERYTHROCYTE [DISTWIDTH] IN BLOOD BY AUTOMATED COUNT: 13.5 % (ref 11.6–15.1)
GFR SERPL CREATININE-BSD FRML MDRD: 38 ML/MIN/1.73SQ M
GLUCOSE SERPL-MCNC: 147 MG/DL (ref 65–140)
GLUCOSE SERPL-MCNC: 161 MG/DL (ref 65–140)
GLUCOSE SERPL-MCNC: 167 MG/DL (ref 65–140)
HCT VFR BLD AUTO: 35.2 % (ref 34.8–46.1)
HGB BLD-MCNC: 11.8 G/DL (ref 11.5–15.4)
MCH RBC QN AUTO: 32.4 PG (ref 26.8–34.3)
MCHC RBC AUTO-ENTMCNC: 33.5 G/DL (ref 31.4–37.4)
MCV RBC AUTO: 97 FL (ref 82–98)
PLATELET # BLD AUTO: 326 THOUSANDS/UL (ref 149–390)
PMV BLD AUTO: 10.9 FL (ref 8.9–12.7)
POTASSIUM SERPL-SCNC: 4.1 MMOL/L (ref 3.5–5.3)
RBC # BLD AUTO: 3.64 MILLION/UL (ref 3.81–5.12)
SODIUM SERPL-SCNC: 137 MMOL/L (ref 135–147)
TSH SERPL DL<=0.05 MIU/L-ACNC: 18.6 UIU/ML (ref 0.45–4.5)
WBC # BLD AUTO: 12.27 THOUSAND/UL (ref 4.31–10.16)

## 2022-10-18 PROCEDURE — 82948 REAGENT STRIP/BLOOD GLUCOSE: CPT

## 2022-10-18 PROCEDURE — 85027 COMPLETE CBC AUTOMATED: CPT | Performed by: FAMILY MEDICINE

## 2022-10-18 PROCEDURE — 99232 SBSQ HOSP IP/OBS MODERATE 35: CPT | Performed by: INTERNAL MEDICINE

## 2022-10-18 PROCEDURE — 84443 ASSAY THYROID STIM HORMONE: CPT | Performed by: FAMILY MEDICINE

## 2022-10-18 PROCEDURE — 99239 HOSP IP/OBS DSCHRG MGMT >30: CPT | Performed by: INTERNAL MEDICINE

## 2022-10-18 PROCEDURE — 80048 BASIC METABOLIC PNL TOTAL CA: CPT | Performed by: FAMILY MEDICINE

## 2022-10-18 RX ORDER — BUMETANIDE 2 MG/1
4 TABLET ORAL DAILY
Qty: 60 TABLET | Refills: 0
Start: 2022-10-18

## 2022-10-18 RX ADMIN — LEVOTHYROXINE SODIUM 100 MCG: 100 TABLET ORAL at 05:10

## 2022-10-18 RX ADMIN — VENLAFAXINE 75 MG: 37.5 TABLET ORAL at 02:53

## 2022-10-18 RX ADMIN — SACUBITRIL AND VALSARTAN 1 TABLET: 49; 51 TABLET, FILM COATED ORAL at 08:11

## 2022-10-18 RX ADMIN — HYDRALAZINE HYDROCHLORIDE 10 MG: 10 TABLET, FILM COATED ORAL at 05:10

## 2022-10-18 RX ADMIN — METOPROLOL SUCCINATE 100 MG: 100 TABLET, EXTENDED RELEASE ORAL at 08:11

## 2022-10-18 RX ADMIN — INSULIN LISPRO 1 UNITS: 100 INJECTION, SOLUTION INTRAVENOUS; SUBCUTANEOUS at 07:15

## 2022-10-18 RX ADMIN — APIXABAN 5 MG: 5 TABLET, FILM COATED ORAL at 08:11

## 2022-10-18 RX ADMIN — FUROSEMIDE 40 MG: 10 INJECTION, SOLUTION INTRAMUSCULAR; INTRAVENOUS at 08:11

## 2022-10-18 NOTE — ASSESSMENT & PLAN NOTE
Wt Readings from Last 3 Encounters:   10/18/22 84 5 kg (186 lb 4 6 oz)   10/13/22 85 kg (187 lb 6 3 oz)   10/12/22 83 kg (183 lb)     Patient presented with orthopnea, dyspnea since discharge from hospital 3 days ago  She was admitted for WANG, and her diuretics were held on discharge  Renal fx at baseline and potassium normal    Last echocardiogram 2020 reveals LVEF 60%, no wall motion abnormality  Strict I/O, daily weights  Patient sees Dr Howard Huerta     Heart failure consulted  Patient diuresed with IV Lasix with negative fluid balance an improvement in her symptoms  Cleared by Cardiology to discharge home on home dose of Bumex  Continue Toprol XL and Entresto

## 2022-10-18 NOTE — UTILIZATION REVIEW
NOTIFICATION OF ADMISSION DISCHARGE   This is a Notification of Discharge from [address]  Please be advised that this patient has been discharge from our facility  Below you will find the admission and discharge date and time including the patient’s disposition  UTILIZATION REVIEW CONTACT:  Prem Ha  Utilization   Network Utilization Review Department  Phone: [phone] x carefully listen to the prompts  All voicemails are confidential   Email: [email or web address]     ADMISSION INFORMATION  PRESENTATION DATE: 10/17/2022 10:31 AM  OBERVATION ADMISSION DATE:   INPATIENT ADMISSION DATE: 10/17/22 12:47 PM   DISCHARGE DATE: 10/18/2022 12:48 PM  DISPOSITION: Home/Self Care Home/Self Care      IMPORTANT INFORMATION:  Send all requests for admission clinical reviews, approved or denied determinations and any other requests to dedicated fax number below belonging to the campus where the patient is receiving treatment   List of dedicated fax numbers:  [address] DENIALS (Administrative/Medical Necessity) [phone]   [address] (Maternity/NICU/Pediatrics) [phone]   [facility] [phone]   [facility] [phone]   Zanaesa Gai[address] 134 [phone]   220 [facility] [phone] 90 [facility] [phone]   [address] [place], [place] [address] 119 [phone]   [facility]  [phone]   4050 [facility] [phone] 412 [facility] 850 [address] [facility] [phone]

## 2022-10-18 NOTE — ASSESSMENT & PLAN NOTE
Lab Results   Component Value Date    HGBA1C 6 6 (H) 09/21/2022       Recent Labs     10/17/22  1604 10/17/22  2036 10/18/22  0525 10/18/22  1026   POCGLU 189* 138 161* 147*       Blood Sugar Average: Last 72 hrs:  (P) 158 75 Last A1c 6 6, currently not on any medication  Sliding scale

## 2022-10-18 NOTE — PROGRESS NOTES
Heart Failure/ Pulmonary Hypertension Progress Note - Amparo Ospina 80 y o  female MRN: [id number]    Unit/Bed#: 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)    Restless leg syndrome    Paroxysmal atrial fibrillation (HCC)    Objective  Ins/outs: 240/2575 w/ furosemide 40 mg IV x 2 doses  Weight: 186 lbs- standing    MAPs: 90's    # Acute on Chronic HFimpEF w/ partial recovery, Stage C, NYHA III  Etiology: NICM/tachy-mediated given h/o AF with RVR , +/- ischemia given patient's risk factors for CAD, strong family history, and septal Q waves on EKG with severe septal hypokinesis on echo, less likely viral, toxin induced or infiltrative   S/P cardiogenic shock in past  Now S/P AVJ ablation with BiV AICD implant with no high rate episodes      Weight: 183 lbs  NT proBNP: 1/14/21: 716  6/2/20: 1089     Studies- personally reviewed by myself  Echo 11/20/20  LVEF: 60%  RV: mildly dilated, mildly reduced  PASP: 60 mmHg  RVOT: no notching     TTE completed on 03/16/2020 (not euvolemic): LVEF 30%   LVIDd 5 44 cm  Moderately dilated RV with moderately to markedly reduced RVSF  NAN  Moderate MR  Mild to moderate TR  Dilated IVC                Echocardiogram (limited) from 07/04/2019:  LVEF: 45%; mild diffuse hypokinesis  LVIDd: 3 8 cm  MR: No MR  Moderate annular calcification  Other: Dilated LA      Echocardiogram from 06/20/2019:  LVEF: 25%  LVIDd: 4 6 cm  RV: Dilated and hypokinetic    MR: Moderate      Neurohormonal Blockade:  --Beta Blocker: metoprolol succinate 100 mg Q12  --ACEi, ARB or ARNi: Entresto 49/51 mg BID    --SVR reduction: hydralazine 10 mg TID  --Aldosterone Receptor Blocker: Renal function precludes  --SGLT2i:   -- Diuretic : Bumex 4 mg once daily  Inpt: lasix 40 mg IV BID     Sudden Cardiac Death Risk Reduction:  Since been DC    --ICD: MDT DC HIS ICD  Interrogation 7/15/22: BVP 99 9%, elevated RV capture threshold  2 VT, longest 9 beats at 196 bpm,optivol normal  Interrogation 1/10/22: %, optivol normal  Interrogation 7/8/21:  99%, optivol crossed since 4/2021  Interrogation 1/5/21:  99 9%, 3 VT episodes, longest 7 beats 186 bpm, optivol normal     Electrical Resynchronization:  --Interrogation: Narrow QRS     Advanced Therapies (if appropriate): --Inotrope: N/A  --LVAD/Transplant Candidacy: Will continue to monitor       # Pulmonary Hypertension, WHO group 2 from now HFpEF and likely significant CORAZON, untreated (Group 3)  Stressed polysomnogram, continued volume management     # PAF w/ hx AF with RVR   S/p AV node ablation with CRT-D with Dr August Clarke 03/18/2020  Anticoagulation on Eliquis    Rate: metoprolol succinate 100 mg Q12     # HTN  Controlled       # HLD  On statin therapy  5/18/22: , HDL 57     # Hypothyroidism  History of radioablation in the past now on replacement therapy   TSH stable     # Type II DM        # CORAZON  Noncompliant with CPAP in the past    Still has not complete sleep study     # CKD   Renal function stable last checked  S/p WANG, Cr up to 2 23 on 10/12  Cr today 1 18     # History of transaminitis in setting of cardiogenic shock     #  Abnormal EKG  Q waves noted in septal leads concerning for possible old infarct   Lexiscan stress to eval for underlying ischemia still not completed      # H/O tachy-oniel syndrome       #  History of alcohol abuse   Has been abstinent for years     TODAY'S PLAN:  Discharge back on Bumex 4 mg daily  Toprol  mg BID and Entresto 49/51 mg BID, (did not receive) MAPs currently high so will trend and increase GDMT as tolerated  Decompensation may be due to holding diuretic and entresto- may be MAP driven  Has appt with me next week  --2g sodium diet  - Daily weights       Review of Systems Constitutional: Negative for activity change, appetite change, fatigue and unexpected weight change  HENT: Negative for congestion and nosebleeds  Eyes: Negative  Respiratory: Negative for cough, chest tightness and shortness of breath  Cardiovascular: Negative for chest pain, palpitations and leg swelling  Gastrointestinal: Negative for abdominal distention  Endocrine: Negative  Genitourinary: Negative  Musculoskeletal: Negative  Skin: Negative  Neurological: Negative for dizziness, syncope and weakness  Hematological: Negative  Psychiatric/Behavioral: Negative  [facility] (day, reason): Murray catheter (day, reason):    Vitals: Blood pressure 127/72, pulse 83, temperature 98 2 °F (36 8 °C), resp  rate 20, height 5' 1" (1 549 m), weight 84 5 kg (186 lb 4 6 oz), SpO2 96 %  , Body mass index is 35 2 kg/m² , I/O last 3 completed shifts: In: 240 [P O :240]  Out: 7072 [Urine:2575]  I/O this shift:  In: 180 [P O :180]  Out: 400 [Urine:400]  Wt Readings from Last 3 Encounters:   10/18/22 84 5 kg (186 lb 4 6 oz)   10/13/22 85 kg (187 lb 6 3 oz)   10/12/22 83 kg (183 lb)       Intake/Output Summary (Last 24 hours) at 10/18/2022 1042  Last data filed at 10/18/2022 0900  Gross per 24 hour   Intake 420 ml   Output 2975 ml   Net -2555 ml     I/O last 3 completed shifts: In: 240 [P O :240]  Out: 7326 [Urine:2575]    No significant arrhythmias seen on telemetry review         Physical Exam:  Vitals:    10/18/22 0718 10/18/22 0810 10/18/22 0811 10/18/22 1029   BP: 129/75 128/73  127/72   BP Location:       Pulse:   83    Resp:    20   Temp:    98 2 °F (36 8 °C)   TempSrc:       SpO2:       Weight:       Height:           GEN: Debbi Marie appears well, alert and oriented x 3, pleasant and cooperative   HEENT: pupils equal, round, and reactive to light; extraocular muscles intact  NECK: supple, no carotid bruits   HEART: regular rhythm, normal S1 and S2, no murmurs, clicks, gallops or rubs, JVP is    LUNGS: clear to auscultation bilaterally; no wheezes, rales, or rhonchi   ABDOMEN: normal bowel sounds, soft, no tenderness, no distention  EXTREMITIES: peripheral pulses normal; no clubbing, cyanosis, or edema  NEURO: no focal findings   SKIN: normal without suspicious lesions on exposed skin      Current Facility-Administered Medications:   •  acetaminophen (TYLENOL) tablet 650 mg, 650 mg, Oral, Q6H PRN, Joe Livingston MD  •  apixaban (ELIQUIS) tablet 5 mg, 5 mg, Oral, BID, Joe Livingston MD, 5 mg at 10/18/22 0811  •  fluticasone (FLONASE) 50 mcg/act nasal spray 1 spray, 1 spray, Each Nare, Daily, Tank Franco MD  •  furosemide (LASIX) injection 40 mg, 40 mg, Intravenous, BID (diuretic), Diana Longo DO, 40 mg at 10/18/22 0209  •  hydrALAZINE (APRESOLINE) tablet 10 mg, 10 mg, Oral, Q8H [facility], Tank Franco MD, 10 mg at 10/18/22 0510  •  insulin lispro (HumaLOG) 100 units/mL subcutaneous injection 1-5 Units, 1-5 Units, Subcutaneous, HS, Tank Franco MD  •  insulin lispro (HumaLOG) 100 units/mL subcutaneous injection 1-6 Units, 1-6 Units, Subcutaneous, TID AC, 1 Units at 10/18/22 0715 **AND** Fingerstick Glucose (POCT), , , TID AC, Tank Franco MD  •  levothyroxine tablet 100 mcg, 100 mcg, Oral, Early Morning, Tank Franco MD, 100 mcg at 10/18/22 0510  •  metoprolol succinate (TOPROL-XL) 24 hr tablet 100 mg, 100 mg, Oral, Q12H [facility], Tank Franco MD, 100 mg at 10/18/22 0811  •  ondansetron (ZOFRAN) injection 4 mg, 4 mg, Intravenous, Q6H PRN, Joe Livingston MD  •  rOPINIRole (REQUIP) tablet 1 mg, 1 mg, Oral, Daily PRN, Arianna Ramos PA-C  •  sacubitril-valsartan (ENTRESTO) 49-51 MG per tablet 1 tablet, 1 tablet, Oral, BID, Joe Livingston MD, 1 tablet at 10/18/22 0811  •  Insert peripheral IV, , , Once **AND** sodium chloride (PF) 0 9 % injection 3 mL, 3 mL, Intravenous, Q1H PRN, Wing Shelia DO  •  venlafaxine [facility]) tablet 75 mg, 75 mg, Oral, Q12H, Tank Franco MD, 75 mg at 10/18/22 2301 [facility] Results:        Results from last 7 days   Lab Units 10/18/22  0510 10/17/22  1107 10/13/22  0614   WBC Thousand/uL 12 27* 13 33* 10 74*   HEMOGLOBIN g/dL 11 8 11 8 12 2   HEMATOCRIT % 35 2 36 7 37 7   PLATELETS Thousands/uL 326 316 281         Results from last 7 days   Lab Units 10/18/22  0510 10/17/22  1107 10/13/22  0614   POTASSIUM mmol/L 4 1 5 2 4 0   CHLORIDE mmol/L 104 107 100   CO2 mmol/L 26 23 29   BUN mg/dL 20 18 41*   CREATININE mg/dL 1 30 1 18 1 84*   CALCIUM mg/dL 9 6 9 9 9 7           Counseling / Coordination of Care  Total floor / unit time spent today 25 minutes  Greater than 50% of total time was spent with the patient and / or family counseling and / or coordination of care  A description of the counseling / coordination of care: 15  Thank you for the opportunity to participate in the care of this patient    [address] [facility] PULMONARY HYPERTENSION  MEDICAL DIRECTOR OF [place]

## 2022-10-18 NOTE — CASE MANAGEMENT
Case Management Discharge Planning Note    Patient name Jaskaran Welch  Location [address] [place] [address] [address] 403-01 MRN [id number]  : 1940 Date 10/18/2022       Current Admission Date: 10/17/2022  Current Admission Diagnosis:Acute on chronic combined systolic (congestive) and diastolic (congestive) heart failure [facility])   Patient Active Problem List    Diagnosis Date Noted   • Left-sided chest wall pain 10/12/2022   • WANG (acute kidney injury) ([facility] ) 10/12/2022   • [facility]ox[facility] ) 2022   • Hypertensive heart and chronic kidney disease with heart failure and stage 1 through stage 4 chronic kidney disease, or unspecified chronic kidney disease ([facility] ) 04/15/2021   • Restless leg syndrome 2020   • Debility 2019   • Acute on chronic combined systolic (congestive) and diastolic (congestive) heart failure ([facility] ) 07/15/2019   • Ambulatory dysfunction 2019   • Type 2 diabetes mellitus with diabetic cataract ([facility] ) 10/07/2015   • Hyperlipidemia 08/15/2012   • Hypertension 08/15/2012   • Hypothyroidism 08/15/2012   • Obesity (BMI 30-39 9) 08/15/2012   • Osteoarthritis 08/15/2012      LOS (days): 1  Geometric Mean LOS (GMLOS) (days): 3 90  Days to GMLOS:2 9     OBJECTIVE:  Risk of Unplanned Readmission Score: 10 58         Current admission status: Inpatient   Preferred Pharmacy:   [address] [place] [address]  [address] [address]  Phone: [phone] Fax: [phone]    Primary Care Provider: SOTO Morrison    Primary Insurance: [facility] [place]  Secondary Insurance:     DISCHARGE DETAILS:      79yo pt was admitted yesterday with chest pain and CHF, meds were adjusted and pt ready for discharge home today  No HHC needs per Dr Aster Herbert

## 2022-10-18 NOTE — DISCHARGE SUMMARY
[facility]  Discharge- David Moreira 1940, 80 y o  female MRN: [id number]  Unit/Bed#: [facility] 403-01 Encounter: [id number]  Primary Care Provider: SOTO Scott   Date and time admitted to hospital: 10/17/2022 10:31 AM    * Acute on chronic combined systolic (congestive) and diastolic (congestive) heart failure (HCC)  Assessment & Plan  Wt Readings from Last 3 Encounters:   10/18/22 84 5 kg (186 lb 4 6 oz)   10/13/22 85 kg (187 lb 6 3 oz)   10/12/22 83 kg (183 lb)     Patient presented with orthopnea, dyspnea since discharge from hospital 3 days ago  She was admitted for WANG, and her diuretics were held on discharge  Renal fx at baseline and potassium normal    Last echocardiogram 2020 reveals LVEF 60%, no wall motion abnormality  Strict I/O, daily weights  Patient sees Dr Samantha Patrick     Heart failure consulted  Patient diuresed with IV Lasix with negative fluid balance an improvement in her symptoms  Cleared by Cardiology to discharge home on home dose of Bumex  Continue Toprol XL and Entresto      Hypothyroidism  Assessment & Plan    History of radioablation in the past  TSH 09/21/2022:  1 14  TSH now 18 6  Patient will follow with her PCP for further med adjustment of Synthroid dose    Paroxysmal atrial fibrillation ([address] )  Assessment & Plan  Rate controlled with metoprolol  Anticoagulated with Eliquis  Has ICD in place    Restless leg syndrome  Assessment & Plan    Continue home medication Requip    Type 2 diabetes mellitus with diabetic cataract [facility])  Assessment & Plan  Lab Results   Component Value Date    HGBA1C 6 6 (H) 09/21/2022       Recent Labs     10/17/22  1604 10/17/22  2036 10/18/22  0525 10/18/22  1026   POCGLU 189* 138 161* 147*       Blood Sugar Average: Last 72 hrs:  (P) 158 75 Last A1c 6 6, currently not on any medication  Sliding scale       Hypertension  Assessment & Plan    Blood pressure well controlled  Continue hydralazine and metoprolol    Medical Problems             Resolved Problems  Date Reviewed: 10/18/2022   None               Discharging Physician / Practitioner: Maria Alejandra Peralta  PCP: Patrick Morales  Admission Date:   Admission Orders (From admission, onward)     Ordered        10/17/22 9417  1 [facility] [place],[address]  Once                      Discharge Date: 10/18/22    Consultations During Hospital Stay:  · Heart failure    Procedures Performed:   · Chest x-ray without acute abnormality    Significant Findings / Test Results:   · As above    Incidental Findings:   · Elevated TSH     Test Results Pending at Discharge (will require follow up): · None     Outpatient Tests Requested:  · None    Complications:  None    Reason for Admission:   Acute on chronic CHF exacerbation    Hospital Course: Sharon Szymanski is a 80 y o  female patient who originally presented to the hospital on 10/17/2022 due to orthopnea and dyspnea  Patient was recently hospitalized and her diuretics were held since 10/12 due to acute renal failure in the setting of decreased oral intake  She was found to have CHF exacerbation with volume overload  Responded well to diuretics with IV Lasix with negative fluid balance  TSH was found to be elevated    Currently she is in stable condition, clinically improving, cleared by Cardiology to be discharged home to resume home dose of Bumex and to follow-up with her PCP for further adjustment of Synthroid dose    Please see above list of diagnoses and related plan for additional information       Condition at Discharge: stable    Discharge Day Visit / Exam:   Subjective:    Patient is comfortable in her room  No complain    Vitals: Blood Pressure: 128/72 (10/18/22 1054)  Pulse: 82 (10/18/22 1054)  Temperature: 98 2 °F (36 8 °C) (10/18/22 1054)  Temp Source: Oral (10/18/22 1054)  Respirations: 18 (10/18/22 1054)  Height: 5' 1" (154 9 cm) (10/17/22 1410)  Weight - Scale: 84 5 kg (186 lb 4 6 oz) (10/18/22 0548)  SpO2: 97 % (10/18/22 1054)  Exam:   Physical Exam     Patient is awake alert oriented no acute distress  Comfortable sitting in bed  Lung clear to auscultation bilateral  Heart positive S1-S2 no murmur  Abdomen soft nontender  Lower extremities no edema    Discussion with Family: Patient  Discharge instructions/Information to patient and family:   See after visit summary for information provided to patient and family  Provisions for Follow-Up Care:  See after visit summary for information related to follow-up care and any pertinent home health orders  Disposition:   Home    Planned Readmission: no     Discharge Statement:  I spent 40  minutes discharging the patient  This time was spent on the day of discharge  I had direct contact with the patient on the day of discharge  Greater than 50% of the total time was spent examining patient, answering all patient questions, arranging and discussing plan of care with patient as well as directly providing post-discharge instructions  Additional time then spent on discharge activities  Discharge Medications:  See after visit summary for reconciled discharge medications provided to patient and/or family        **Please Note: This note may have been constructed using a voice recognition system**

## 2022-10-18 NOTE — UTILIZATION REVIEW
Initial Clinical Review    Admission: Date/Time/Statement:   Admission Orders (From admission, onward)     Ordered        10/17/22 1247  [facility] [place],[address]  Once                      Orders Placed This Encounter   Procedures   • INPATIENT ADMISSION     Standing Status:   Standing     Number of Occurrences:   1     Order Specific Question:   Level of Care     Answer:   Med Surg [16]     Order Specific Question:   Estimated length of stay     Answer:   More than 2 Midnights     Order Specific Question:   Certification     Answer:   I certify that inpatient services are medically necessary for this patient for a duration of greater than two midnights  See H&P and MD Progress Notes for additional information about the patient's course of treatment  ED Arrival Information     Expected   -    Arrival   10/17/2022 10:31    Acuity   Emergent            Means of arrival   Ambulance    Escorted by   [facility]    Service   Hospitalist    Admission type   Emergency            Arrival complaint   Chest Pain           Chief Complaint   Patient presents with   • Chest Pain     Pt report chest pressure and sob for the past 3 days       Initial Presentation: 80 y o  female , presented to the ED @ [facility], from home via EMS  Admitted as Inpatient due to  Acute on Chronic Combined Systolic and diastolic CHF / Chest Pain  Date: 10/17/2022      For the past 3 days, reports SOB & Chest tightness  Start IV lsasix 40 mg TID  Continue Metoprolol  Strict I&O  Daily weight  Currently on room Air  10/17/2022  Consult Heart Failure:  # Acute on Chronic HFimpEF w/ partial recovery, Stage C, NYHA III  Lasix 40 mg BID  Toprol  mg BID and Entresto 49/51 mg BID, (did not receive) MAPs currently high so will trend and increase GDMT as tolerated  Decompensation may be due to holding diuretic and entresto- may be MAP driven  --2g sodium diet  - Daily weights        VTE Prophylaxis: Apixaban (Eliquis)  / sequential compression device     ED Triage Vitals   Temperature Pulse Respirations Blood Pressure SpO2   10/17/22 1041 10/17/22 1038 10/17/22 1038 10/17/22 1038 10/17/22 1038   97 9 °F (36 6 °C) 87 20 (!) 194/91 98 %      Temp Source Heart Rate Source Patient Position - Orthostatic VS BP Location FiO2 (%)   10/17/22 1041 10/17/22 1038 10/17/22 1204 10/17/22 1204 --   Oral Monitor Lying Left arm       Pain Score       10/17/22 1038       No Pain          Wt Readings from Last 1 Encounters:   10/18/22 84 5 kg (186 lb 4 6 oz)     Additional Vital Signs:  /Time Temp Pulse Resp BP MAP (mmHg) SpO2 O2 Device Patient Position - Orthostatic VS   10/18/22 10:54:46 98 2 °F (36 8 °C) 82 18 128/72 91 97 % None (Room air) Sitting   10/18/22 10:29:44 98 2 °F (36 8 °C) -- 20 127/72 90 -- -- --   10/18/22 0811 -- 83 -- -- -- -- -- --   10/18/22 08:10:36 -- -- -- 128/73 91 -- -- --   10/18/22 07:18:48 -- -- -- 129/75 93 -- -- --   10/18/22 07:07:11 97 7 °F (36 5 °C) 80 18 129/75 93 96 % None (Room air) Sitting   10/18/22 0502 -- -- -- 130/74 93 -- -- --   10/18/22 02:38:31 98 3 °F (36 8 °C) 80 -- 128/74 92 95 % -- --   10/17/22 22:50:16 99 4 °F (37 4 °C) 79 -- 120/56 77 96 % -- --   10/17/22 21:51:29 -- -- -- 137/72 94 -- -- --   10/17/22 1900 -- -- -- -- -- -- None (Room air) --   10/17/22 18:44:50 99 1 °F (37 3 °C) 83 -- 139/74 96 96 % -- --   10/17/22 15:18:36 98 3 °F (36 8 °C) -- -- 143/80 101 -- -- --   10/17/22 1349 98 4 °F (36 9 °C) 78 18 144/81 102 97 % None (Room air) --   10/17/22 1308 -- 80 20 174/79 Abnormal  -- 98 % None (Room air) Lying   10/17/22 1204 -- 80 20 157/72 -- 97 % None (Room air) Lying     Date and Time Eye Opening Best Verbal Response Best Motor Response Elvira Coma Scale Score   10/18/22 0800 4 5 6 15   10/17/22 2041 4 5 6 15   10/17/22 1900 4 5 6 15   10/17/22 1345 4 5 6 15     10/17/2022 @ 1045  EC, Ventricular-paced rhythm  Underlying Atrial fibrillation    Pertinent Labs/Diagnostic Test Results:   X-ray chest 1 view portable   Final Result by Mukesh Schneider MD (10/17 1248)   No acute cardiopulmonary findings          Results from last 7 days   Lab Units 10/12/22  1908   SARS-COV-2  Negative     Results from last 7 days   Lab Units 10/18/22  0510 10/17/22  1107 10/13/22  0614 10/12/22  1715   WBC Thousand/uL 12 27* 13 33* 10 74* 9 47   HEMOGLOBIN g/dL 11 8 11 8 12 2 12 9   HEMATOCRIT % 35 2 36 7 37 7 39 0   PLATELETS Thousands/uL 326 316 281 288   NEUTROS ABS Thousands/µL  --  10 76*  --  6 05     Results from last 7 days   Lab Units 10/18/22  0510 10/17/22  1107 10/13/22  0614 10/12/22  1715   SODIUM mmol/L 137 137 135 134*   POTASSIUM mmol/L 4 1 5 2 4 0 3 9   CHLORIDE mmol/L 104 107 100 98   CO2 mmol/L 26 23 29 28   ANION GAP mmol/L 7 7 6 8   BUN mg/dL 20 18 41* 46*   CREATININE mg/dL 1 30 1 18 1 84* 2 23*   EGFR ml/min/1 73sq m 38 43 25 19   CALCIUM mg/dL 9 6 9 9 9 7 9 9   MAGNESIUM mg/dL  --   --  2 5  --      Results from last 7 days   Lab Units 10/18/22  1026 10/18/22  0525 10/17/22  2036 10/17/22  1604   POC GLUCOSE mg/dl 147* 161* 138 189*     Results from last 7 days   Lab Units 10/18/22  0510 10/17/22  1107 10/13/22  0614 10/12/22  1715   GLUCOSE RANDOM mg/dL 167* 198* 171* 149*     Results from last 7 days   Lab Units 10/17/22  1523 10/17/22  1311 10/17/22  1107 10/13/22  0011 10/12/22  1908 10/12/22  1715   HS TNI 0HR ng/L  --   --  9  --   --  12   HS TNI 2HR ng/L  --  10  --   --  11  --    HSTNI D2 ng/L  --  1  --   --  -1  --    HS TNI 4HR ng/L 10  --   --  13  --   --    HSTNI D4 ng/L 1  --   --  1  --   --      Results from last 7 days   Lab Units 10/18/22  0510   TSH 3RD GENERATON uIU/mL 18 600*     Results from last 7 days   Lab Units 10/17/22  1107   NT-PRO BNP pg/mL 4,761*     Results from last 7 days   Lab Units 10/12/22  2031   CLARITY UA  Turbid   COLOR UA  Yellow   SPEC GRAV UA  1 026   PH UA  5 5   GLUCOSE UA mg/dl Negative   KETONES UA mg/dl Negative   BLOOD UA Negative   PROTEIN UA mg/dl 50 (1+)*   NITRITE UA  Negative   BILIRUBIN UA  Negative   UROBILINOGEN UA (BE) mg/dl <2 0   LEUKOCYTES UA  Trace*   WBC UA /hpf 10-20*   RBC UA /hpf 2-4*   BACTERIA UA /hpf None Seen   EPITHELIAL CELLS WET PREP /hpf Occasional   MUCUS THREADS  Occasional*     Results from last 7 days   Lab Units 10/12/22  1908   INFLUENZA A PCR  Negative   INFLUENZA B PCR  Negative   RSV PCR  Negative     Results from last 7 days   Lab Units 10/12/22  2031   URINE CULTURE  10,000-19,000 cfu/ml      ED Treatment:   Medication Administration from 10/17/2022 1031 to 10/17/2022 1337       Date/Time Order Dose Route Action     10/17/2022 1202 furosemide (LASIX) injection 40 mg 40 mg Intravenous Given        Past Medical History:   Diagnosis Date   • Cardiogenic shock ([facility] ) 6/26/2019   • CHF (congestive heart failure) (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)  • Type 2 diabetes mellitus with diabetic cataract ([facility])      Admitting Diagnosis: Chest pain [R07 9]  CHF exacerbation ([facility] ) [I50 9]  Age/Sex: 80 y o  female  Admission Orders:  CV diet with fld restriction 1500ml  Up & OOB as tolerated / Up as tolerated  Daily weight  Strict I&O  Telemetry  Loc SCDs    Scheduled Medications:  apixaban, 5 mg, Oral, BID  fluticasone, 1 spray, Each Nare, Daily  furosemide, 40 mg, Intravenous, BID (diuretic)  hydrALAZINE, 10 mg, Oral, Q8H DAVE  insulin lispro, 1-5 Units, Subcutaneous, HS  insulin lispro, 1-6 Units, Subcutaneous, TID AC  levothyroxine, 100 mcg, Oral, Early Morning  metoprolol succinate, 100 mg, Oral, Q12H DAVE  sacubitril-valsartan, 1 tablet, Oral, BID  venlafaxine, 75 mg, Oral, Q12H      Continuous IV Infusions:     PRN Meds:  acetaminophen, 650 mg, Oral, Q6H PRN  ondansetron, 4 mg, Intravenous, Q6H PRN  rOPINIRole, 1 mg, Oral, Daily PRN  sodium chloride (PF), 3 mL, Intravenous, Q1H PRN        IP CONSULT TO HEART FAILURE SERVICE  IP CONSULT TO NUTRITION SERVICES    Network Utilization Review Department  ATTENTION: Please call with any questions or concerns to [phone] and carefully listen to the prompts so that you are directed to the right person  All voicemails are confidential   Sj Finley all requests for admission clinical reviews, approved or denied determinations and any other requests to dedicated fax number below belonging to the campus where the patient is receiving treatment   List of dedicated fax numbers for the Facilities:  [address] DENIALS (Administrative/Medical Necessity) [phone]   [address] (Maternity/NICU/Pediatrics) [phone]   4 Radha Danielle [phone]   Susanna Timmons  [phone]   [address] [place] [address] [address] [phone]   15[address] [facility] [address] [place] [address] 815 [facility] [phone]

## 2022-10-18 NOTE — PLAN OF CARE
Problem: MOBILITY - ADULT  Goal: Maintain or return to baseline ADL function  Description: INTERVENTIONS:  -  Assess patient's ability to carry out ADLs; assess patient's baseline for ADL function and identify physical deficits which impact ability to perform ADLs (bathing, care of mouth/teeth, toileting, grooming, dressing, etc )  - Assess/evaluate cause of self-care deficits   - Assess range of motion  - Assess patient's mobility; develop plan if impaired  - Assess patient's need for assistive devices and provide as appropriate  - Encourage maximum independence but intervene and supervise when necessary  - Involve family in performance of ADLs  - Assess for home care needs following discharge   - Consider OT consult to assist with ADL evaluation and planning for discharge  - Provide patient education as appropriate  Outcome: Progressing  Goal: Maintains/Returns to pre admission functional level  Description: INTERVENTIONS:  - Perform BMAT or MOVE assessment daily    - Set and communicate daily mobility goal to care team and patient/family/caregiver  - Collaborate with rehabilitation services on mobility goals if consulted  - Perform Range of Motion  times a day  - Reposition patient every  hours    - Dangle patient times a day  - Stand patient  times a day  - Ambulate patient times a day  - Out of bed to chair  times a day   - Out of bed for meals times a day  - Out of bed for toileting  - Record patient progress and toleration of activity level   Outcome: Progressing     Problem: Potential for Falls  Goal: Patient will remain free of falls  Description: INTERVENTIONS:  - Educate patient/family on patient safety including physical limitations  - Instruct patient to call for assistance with activity   - Consult OT/PT to assist with strengthening/mobility   - Keep Call bell within reach  - Keep bed low and locked with side rails adjusted as appropriate  - Keep care items and personal belongings within reach  - Initiate and maintain comfort rounds  - Make Fall Risk Sign visible to staff  - Offer Toileting every  Hours, in advance of need  - Initiate/Maintain alarm  - Obtain necessary fall risk management equipment:   - Apply yellow socks and bracelet for high fall risk patients  - Consider moving patient to room near nurses station  Outcome: Progressing     Problem: PAIN - ADULT  Goal: Verbalizes/displays adequate comfort level or baseline comfort level  Description: Interventions:  - Encourage patient to monitor pain and request assistance  - Assess pain using appropriate pain scale  - Administer analgesics based on type and severity of pain and evaluate response  - Implement non-pharmacological measures as appropriate and evaluate response  - Consider cultural and social influences on pain and pain management  - Notify physician/advanced practitioner if interventions unsuccessful or patient reports new pain  Outcome: Progressing     Problem: INFECTION - ADULT  Goal: Absence or prevention of progression during hospitalization  Description: INTERVENTIONS:  - Assess and monitor for signs and symptoms of infection  - Monitor lab/diagnostic results  - Monitor all insertion sites, i e  indwelling lines, tubes, and drains  - Monitor endotracheal if appropriate and nasal secretions for changes in amount and color  - [place] appropriate cooling/warming therapies per order  - Administer medications as ordered  - Instruct and encourage patient and family to use good hand hygiene technique  - Identify and instruct in appropriate isolation precautions for identified infection/condition  Outcome: Progressing  Goal: Absence of fever/infection during neutropenic period  Description: INTERVENTIONS:  - Monitor WBC    Outcome: Progressing     Problem: SAFETY ADULT  Goal: Maintain or return to baseline ADL function  Description: INTERVENTIONS:  -  Assess patient's ability to carry out ADLs; assess patient's baseline for ADL function and identify physical deficits which impact ability to perform ADLs (bathing, care of mouth/teeth, toileting, grooming, dressing, etc )  - Assess/evaluate cause of self-care deficits   - Assess range of motion  - Assess patient's mobility; develop plan if impaired  - Assess patient's need for assistive devices and provide as appropriate  - Encourage maximum independence but intervene and supervise when necessary  - Involve family in performance of ADLs  - Assess for home care needs following discharge   - Consider OT consult to assist with ADL evaluation and planning for discharge  - Provide patient education as appropriate  Outcome: Progressing  Goal: Maintains/Returns to pre admission functional level  Description: INTERVENTIONS:  - Perform BMAT or MOVE assessment daily    - Set and communicate daily mobility goal to care team and patient/family/caregiver  - Collaborate with rehabilitation services on mobility goals if consulted  - Perform Range of Motion times a day  - Reposition patient every  hours    - Dangle patient  times a day  - Stand patient  times a day  - Ambulate patient times a day  - Out of bed to chair  times a day   - Out of bed for meals times a day  - Out of bed for toileting  - Record patient progress and toleration of activity level   Outcome: Progressing  Goal: Patient will remain free of falls  Description: INTERVENTIONS:  - Educate patient/family on patient safety including physical limitations  - Instruct patient to call for assistance with activity   - Consult OT/PT to assist with strengthening/mobility   - Keep Call bell within reach  - Keep bed low and locked with side rails adjusted as appropriate  - Keep care items and personal belongings within reach  - Initiate and maintain comfort rounds  - Make Fall Risk Sign visible to staff  - Offer Toileting every  Hours, in advance of need  - Initiate/Maintainalarm  - Obtain necessary fall risk management equipment:   - Apply yellow socks and bracelet for high fall risk patients  - Consider moving patient to room near nurses station  Outcome: Progressing     Problem: DISCHARGE PLANNING  Goal: Discharge to home or other facility with appropriate resources  Description: INTERVENTIONS:  - Identify barriers to discharge w/patient and caregiver  - Arrange for needed discharge resources and transportation as appropriate  - Identify discharge learning needs (meds, wound care, etc )  - Arrange for interpretive services to assist at discharge as needed  - Refer to Case Management Department for coordinating discharge planning if the patient needs post-hospital services based on physician/advanced practitioner order or complex needs related to functional status, cognitive ability, or social support system  Outcome: Progressing     Problem: Knowledge Deficit  Goal: Patient/family/caregiver demonstrates understanding of disease process, treatment plan, medications, and discharge instructions  Description: Complete learning assessment and assess knowledge base    Interventions:  - Provide teaching at level of understanding  - Provide teaching via preferred learning methods  Outcome: Progressing

## 2022-10-18 NOTE — ASSESSMENT & PLAN NOTE
History of radioablation in the past  TSH 09/21/2022:  1 14  TSH now 18 6  Patient will follow with her PCP for further med adjustment of Synthroid dose

## 2022-10-21 ENCOUNTER — REMOTE DEVICE CLINIC VISIT (OUTPATIENT)
Dept: CARDIOLOGY CLINIC | Facility: CLINIC | Age: 82
End: 2022-10-21
Payer: COMMERCIAL

## 2022-10-21 DIAGNOSIS — Z95.810 PRESENCE OF AUTOMATIC CARDIOVERTER/DEFIBRILLATOR (AICD): Primary | ICD-10-CM

## 2022-10-21 PROCEDURE — 93295 DEV INTERROG REMOTE 1/2/MLT: CPT | Performed by: INTERNAL MEDICINE

## 2022-10-21 PROCEDURE — 93296 REM INTERROG EVL PM/IDS: CPT | Performed by: INTERNAL MEDICINE

## 2022-10-21 NOTE — PROGRESS NOTES
Results for orders placed or performed in visit on 10/21/22   Cardiac EP device report    Narrative    MDT-DUAL CHAMBER "HIS" ICD/VVIR MODEACTIVE SYSTEM IS MRI CONDITIONAL  CARELINK TRANSMISSION: BATTERY VOLTAGE ADEQUATE (14 MOS)  : 99 8% + VSRP: <0 1%  ALL AVAILABLE LEAD PARAMETERS WITHIN NORMAL LIMITS  NO SIGNIFICANT HIGH RATE EPISODES  OPTI-VOL WITHIN NORMAL LIMITS  APPROPRIATELY FUNCTIONING BI-V ICD    [address] [place] [address]

## 2022-10-27 NOTE — UTILIZATION REVIEW
NOTIFICATION OF ADMISSION DISCHARGE   This is a Notification of Discharge from [address]  Please be advised that this patient has been discharge from our facility  Below you will find the admission and discharge date and time including the patient’s disposition  UTILIZATION REVIEW CONTACT:  Dagmar Menjivar  Utilization   Network Utilization Review Department  Phone: [phone] x carefully listen to the prompts  All voicemails are confidential   Email: [email or web address]     ADMISSION INFORMATION  PRESENTATION DATE: 10/17/2022 10:31 AM  OBERVATION ADMISSION DATE:   INPATIENT ADMISSION DATE: 10/17/22 12:47 PM   DISCHARGE DATE: 10/18/2022 12:48 PM  DISPOSITION: Home/Self Care Home/Self Care      IMPORTANT INFORMATION:  Send all requests for admission clinical reviews, approved or denied determinations and any other requests to dedicated fax number below belonging to the campus where the patient is receiving treatment   List of dedicated fax numbers:  [address] DENIALS (Administrative/Medical Necessity) [phone]   [address] (Maternity/NICU/Pediatrics) [phone]   [facility] [phone]   [place] [address] [phone]   [address] [phone]   220 [facility] [phone]   90 [facility] [phone]   146 [facility] 119 [phone]   [facility]  [phone]   4057 [facility] [phone]   412 [facility] [address] [facility] [phone]

## 2022-11-03 DIAGNOSIS — M54.41 ACUTE RIGHT-SIDED LOW BACK PAIN WITH RIGHT-SIDED SCIATICA: ICD-10-CM

## 2022-11-03 RX ORDER — TRAMADOL HYDROCHLORIDE 50 MG/1
TABLET ORAL
Qty: 60 TABLET | Refills: 0 | Status: SHIPPED | OUTPATIENT
Start: 2022-11-03

## 2022-11-07 ENCOUNTER — OFFICE VISIT (OUTPATIENT)
Dept: CARDIOLOGY CLINIC | Facility: CLINIC | Age: 82
End: 2022-11-07

## 2022-11-07 VITALS
BODY MASS INDEX: 35.01 KG/M2 | HEIGHT: 61 IN | OXYGEN SATURATION: 97 % | SYSTOLIC BLOOD PRESSURE: 118 MMHG | WEIGHT: 185.4 LBS | HEART RATE: 82 BPM | DIASTOLIC BLOOD PRESSURE: 70 MMHG

## 2022-11-07 DIAGNOSIS — I50.30 HEART FAILURE WITH LEFT VENTRICULAR EJECTION FRACTION GREATER THAN OR EQUAL TO 50 PERCENT (HCC): ICD-10-CM

## 2022-11-07 DIAGNOSIS — I48.20 CHRONIC ATRIAL FIBRILLATION (HCC): ICD-10-CM

## 2022-11-07 DIAGNOSIS — N18.32 STAGE 3B CHRONIC KIDNEY DISEASE (HCC): ICD-10-CM

## 2022-11-07 DIAGNOSIS — G47.33 OBSTRUCTIVE SLEEP APNEA: ICD-10-CM

## 2022-11-07 DIAGNOSIS — Z09 HOSPITAL DISCHARGE FOLLOW-UP: Primary | ICD-10-CM

## 2022-11-07 DIAGNOSIS — I10 HYPERTENSION, UNSPECIFIED TYPE: ICD-10-CM

## 2022-11-07 NOTE — PATIENT INSTRUCTIONS
Continue current medications  Complete blood work in next 1-2 weeks  No need to fast      Please weigh yourself every day and keep a detailed log of weights  Contact the Heart Failure program at [phone] if you gain 3 lbs overnight or 5 lbs in 5-7 days  Limit daily sodium/salt intake to 4500-8145 mg daily to prevent fluid retention  Avoid canned foods, fast food/Chinese food, and processed meats (hot dogs, lunch meat, and sausage etc )  Limit fluid intake to 2000 mL or 2L (about 60-65 ounces) daily  Bring complete list of medications and log of daily weights to your follow-up appointment

## 2022-11-07 NOTE — PROGRESS NOTES
Advanced Heart Failure / Pulmonary Hypertension Outpatient Progress Note    Michael Hassan 80 y o  female   MRN: [id number]  Encounter: [id number]    Assessment:  Patient Active Problem List    Diagnosis Date Noted   • Restless leg syndrome 01/27/2020   • Debility 07/19/2019   • Acute on chronic combined systolic (congestive) and diastolic (congestive) heart failure ([place] [address] ) 07/15/2019   • Ambulatory dysfunction 07/12/2019   • Type 2 diabetes mellitus with diabetic cataract ([place] [address] ) 10/07/2015   • Hyperlipidemia 08/15/2012   • Hypertension 08/15/2012   • Hypothyroidism 08/15/2012   • Obesity (BMI 30-39 9) 08/15/2012   • Osteoarthritis 08/15/2012   • Left-sided chest wall pain 10/12/2022   • WANG (acute kidney injury) ([place] [address] ) 10/12/2022   • Paroxysmal atrial fibrillation ([place] [address] ) 03/07/2022   • Hypertensive heart and chronic kidney disease with heart failure and stage 1 through stage 4 chronic kidney disease, or unspecified chronic kidney disease ([place] [address] ) 04/15/2021       Today's Plan:  • Continue current medications  • Advised to complete post-hospital BMP in next 1-2 weeks  Plan:  Chronic HFrEF, fully recovered; LVEF 60%; LVIDd 5 2 cm; NYHA II; ACC/AHA Stage B              Etiology: likely tachy-induced, +/- ischemic (nuclear stress was ordered, patient never completed)  Also has history of radioablative iodine                TTE 06/20/2019: LVEF 25-30%, LVIDd 4 6 cm, mod reduced RVSF  Moderate MR and TR                TTE 06/26/2019: LVEF ~40%, mildly reduced RVSF  Moderate MR, otherwise, no significant valvular disease                TTE (limited) 07/04/2019: LVEF 45%  LVIDd 3 8 cm  Grade 2 DD                TTE 03/16/2020 (not euvolemic): LVEF 30%  LVIDd 5 44 cm  Moderately dilated RV with moderately to markedly reduced RVSF  NAN  Moderate MR  Mild to moderate TR  Dilated IVC  TTE 11/20/2020: LVEF 60%  LVIDd 5 16 cm  Mildly dilated RV with mildly reduced function  Mild NAN  Moderate TR  PASP elevated      Weight of 186 lbs on 10/18 (day of discharge)  Today, weighs 185 lbs  Most recent BMP from 10/18/2022: sodium 137; potassium 4 1; BUN 20; creatinine 1 30; eGFR 38  Pharmacotherapies / Neurohormonal Blockade:  --Beta Blocker: metoprolol succinate 100 mg q12 hours  --ARNi / ACEi / ARB: Entresto 49-51 mg BID  --Aldosterone Antagonist: No    --SGLT2 Inhibitor: No    --SVR Reducing Agents: hydralazine 10 mg q12 hours  --Diuretic: Bumex 4 mg daily with potassium 40 mEq BID  Sudden Cardiac Death Risk Reduction:  --Medtronic CRT-D (BiV ICD, MRI conditional) in situ since 03/18/2020  --Interrogation from 10/21/2022:  99 8%  VRSP <0 1%  Lead parameters WNL  OptiVol WNL  Normal device function       Atrial fibrillation, chronic               [id number] = 6 (age, sex, HF, HTN, DM)              S/p DCCV on 07/03/2019 with amio load  Converted back into Afib 5 days later                  S/p AV node ablation with CRT-D by Dr Corinne Gimenez on 03/18/2020  Anticoagulation on Eliquis  Rate control: BB as above  Rhythm control: No      Chronic kidney disease, stage IIIb              Baseline creatinine of 0 9-1 3               Most recent BMP from 10/18/2022: sodium 137; potassium 4 1; BUN 20; creatinine 1 30; eGFR 38       Hypertension   BP of 118/70 mmHg in office today  Continue on medications as above       Hyperlipidemia  Obstructive sleep apnea: noncompliant with CPAP  Diabetes mellitus, type II  Hypothyroidism: history of radioablation in the past; now on replacement therapy  HPI:   Mary Hinton is an 60-year-old woman with a PMH as above who presents to the office for hospital follow-up  Follows with Dr Jomar Chirinos  Admitted to [facility] from 10/12 to 10/13/2022 after presenting with general malaise  Found to have WANG  Home doses of Bumex and Entresto were held and was given IV fluids  Creatinine improved the following day (still above her baseline)   Patient insisted on being discharged that day as she felt much better  Advised to continue to hold Bumex and Entresto for another 1-2 days and to repeat BMP on 10/17  Admitted to [facility] from 10/17 to 10/18/2022 after presenting with worsening SOB/HF exacerbation  Started on IV Lasix, and heart failure team consulted  Entresto resumed at admission  Received 2 total doses of IV Lasix  Transitioned to PO Bumex at discharge  11/07/2022: Patient presents for hospital follow-up  Continues to have chronic; denies SOB at rest  Continues to complete daily weights at home; denies any significant weight gain since discharge  Reports significant urinary response to PO Bumex  Past Medical History:   Diagnosis Date   • Cardiogenic shock ([facility] [address] ) 6/26/2019   • CHF (congestive heart failure) ([facility])    • Eczema    • Photosensitivity     abnormal skin sensitivity to sunlight   • 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       Review of Systems   Constitutional: Negative for activity change, appetite change, fatigue, fever and unexpected weight change  HENT: Negative for congestion, postnasal drip, rhinorrhea, sneezing, sore throat and trouble swallowing  Eyes: Negative  Respiratory: Positive for shortness of breath (with heavy exertion/lifting)  Negative for cough and chest tightness  Cardiovascular: Negative for chest pain, palpitations and leg swelling  Gastrointestinal: Negative for abdominal distention, abdominal pain, diarrhea, nausea and vomiting  Endocrine: Negative  Genitourinary: Negative for decreased urine volume, difficulty urinating, dysuria and urgency  Musculoskeletal: Negative  Skin: Negative  Allergic/Immunologic: Negative  Neurological: Negative for dizziness, tremors, syncope, weakness, light-headedness and headaches  Hematological: Negative  Psychiatric/Behavioral: Negative for agitation, confusion and sleep disturbance  The patient is not nervous/anxious      14-point ROS completed and negative except as stated above and/or in the HPI  Allergies   Allergen Reactions   • Penicillins    • Wellbutrin Sr  [Bupropion]      Other reaction(s): Suicidal ideations  Category:  Adverse Reaction;          Current Outpatient Medications:   •  apixaban (Eliquis) 5 mg, Take 1 tablet (5 mg total) by mouth 2 (two) times a day, Disp: 180 tablet, Rfl: 2  •  Ascorbic Acid (vitamin C) 1000 MG tablet, Take 1,000 mg by mouth 2 (two) times a day , Disp: , Rfl:   •  bumetanide (BUMEX) 2 mg tablet, Take 2 tablets (4 mg total) by mouth daily, Disp: 60 tablet, Rfl: 0  •  calcium carbonate (OS-PAULA) 600 MG tablet, Take 600 mg by mouth 2 (two) times a day with meals, Disp: , Rfl:   •  cholecalciferol (VITAMIN D3) 1,000 units tablet, Take 1,000 Units by mouth daily 8,000 dailty, Disp: , Rfl:   •  hydrALAZINE (APRESOLINE) 10 mg tablet, TAKE 1 TABLET (10 MG TOTAL) BY MOUTH EVERY 8 (EIGHT) HOURS (Patient taking differently: Take 10 mg by mouth 2 (two) times a day), Disp: 90 tablet, Rfl: 5  •  levothyroxine 100 mcg tablet, Take 1 tablet (100 mcg total) by mouth daily, Disp: 30 tablet, Rfl: 5  •  Magnesium 400 MG CAPS, Take 1 capsule (400 mg total) by mouth daily, Disp: , Rfl: 0  •  metoprolol succinate (TOPROL-XL) 50 mg 24 hr tablet, TAKE 2 TABLETS (100 MG TOTAL) BY MOUTH EVERY 12 (TWELVE) HOURS, Disp: 120 tablet, Rfl: 5  •  Multiple Vitamin (MULTI-VITAMIN DAILY PO), 1 tablet 2 (two) times a day  , Disp: , Rfl:   •  Potassium Chloride ER 20 MEQ TBCR, TAKE 2 TABLETS (40 MEQ TOTAL) BY MOUTH 2 (TWO) TIMES A DAY, Disp: 120 tablet, Rfl: 2  •  rOPINIRole (REQUIP XL) 2 MG 24 hr tablet, TAKE 1 TABLET (2 MG TOTAL) BY MOUTH DAILY AT BEDTIME, Disp: 30 tablet, Rfl: 5  •  sacubitril-valsartan (Entresto) 49-51 MG TABS, Take 1 tablet by mouth 2 (two) times a day Please hold for 1-2 more days, Disp: 180 tablet, Rfl: 0  •  traMADol (ULTRAM) 50 mg tablet, TAKE 1 TABLET TWICE A DAY BY ORAL ROUTE AS NEEDED , Disp: 60 tablet, Rfl: 0  •  venlafaxine (EFFEXOR) 75 mg tablet, TAKE 1 TABLET (75 MG TOTAL) BY MOUTH EVERY 12 (TWELVE) HOURS, Disp: 60 tablet, Rfl: 5    Social History     Socioeconomic History   • Marital status: Single     Spouse name: Not on file   • Number of children: Not on file   • Years of education: Not on file   • Highest education level: Not on file   Occupational History   • Not on file   Tobacco Use   • Smoking status: Never Smoker   • Smokeless tobacco: Never Used   Vaping Use   • Vaping Use: Never used   Substance and Sexual Activity   • Alcohol use: Not Currently   • Drug use: Never   • Sexual activity: Not Currently   Other Topics Concern   • Not on file   Social History Narrative   • Not on file     Social Determinants of Health     Financial Resource Strain: Not on file   Food Insecurity: Not on file   Transportation Needs: Not on file   Physical Activity: Not on file   Stress: Not on file   Social Connections: Not on file   Intimate Partner Violence: Not on file   Housing Stability: Not on file     Family History   Problem Relation Age of Onset   • Alzheimer's disease Mother    • Coronary artery disease Mother    • Dementia Mother    • Diabetes Mother         mellitus   • Other Father         acute myocardial infarction   • Coronary artery disease Sister    • Other Maternal Grandfather         laryngeal cancer   • Dementia Maternal Aunt    • Diabetes Maternal Aunt        Vitals:   Blood pressure 118/70, pulse 82, height 5' 1" (1 549 m), weight 84 1 kg (185 lb 6 4 oz), SpO2 97 %  Body mass index is 35 03 kg/m²      Wt Readings from Last 10 Encounters:   11/07/22 84 1 kg (185 lb 6 4 oz)   10/18/22 84 5 kg (186 lb 4 6 oz)   10/13/22 85 kg (187 lb 6 3 oz)   10/12/22 83 kg (183 lb)   08/04/22 85 2 kg (187 lb 14 4 oz)   03/07/22 83 4 kg (183 lb 12 8 oz)   01/27/22 82 1 kg (181 lb)   09/01/21 82 2 kg (181 lb 3 2 oz)   07/19/21 81 2 kg (179 lb)   03/29/21 87 3 kg (192 lb 8 oz)     Vitals:    11/07/22 1523   BP: 118/70   BP Location: Right arm   Patient Position: Sitting Cuff Size: Large   Pulse: 82   SpO2: 97%   Weight: 84 1 kg (185 lb 6 4 oz)   Height: 5' 1" (1 549 m)       Physical Exam  Vitals reviewed  Constitutional:       General: She is awake  She is not in acute distress  Appearance: Normal appearance  She is well-developed and overweight  She is not toxic-appearing or diaphoretic  HENT:      Head: Normocephalic  Nose: Nose normal       Mouth/Throat:      Mouth: Mucous membranes are moist    Eyes:      General: No scleral icterus  Conjunctiva/sclera: Conjunctivae normal    Neck:      Trachea: No tracheal deviation  Cardiovascular:      Rate and Rhythm: Normal rate  Rhythm irregularly irregular  No extrasystoles are present  Pulses: Normal pulses  Heart sounds: Murmur heard  Pulmonary:      Effort: Pulmonary effort is normal  No tachypnea, bradypnea or respiratory distress  Breath sounds: Normal air entry  No decreased breath sounds, wheezing or rhonchi  Abdominal:      General: Bowel sounds are normal  There is distension  Palpations: Abdomen is soft  Tenderness: There is no abdominal tenderness  Musculoskeletal:      Cervical back: Neck supple  Right lower leg: Edema (trace) present  Left lower leg: Edema (trace) present  Skin:     General: Skin is warm and dry  Coloration: Skin is not jaundiced or pale  Neurological:      General: No focal deficit present  Mental Status: She is alert and oriented to person, place, and time  Psychiatric:         Attention and Perception: Attention normal          Mood and Affect: Mood and affect normal          Speech: Speech normal          Behavior: Behavior normal  Behavior is cooperative  Thought Content:  Thought content normal        Labs & Results:  Lab Results   Component Value Date    WBC 12 27 (H) 10/18/2022    HGB 11 8 10/18/2022    HCT 35 2 10/18/2022    MCV 97 10/18/2022     10/18/2022     Lab Results   Component Value Date    SODIUM 137 10/18/2022    K 4 1 10/18/2022     10/18/2022    CO2 26 10/18/2022    BUN 20 10/18/2022    CREATININE 1 30 10/18/2022    GLUC 167 (H) 10/18/2022    CALCIUM 9 6 10/18/2022     Lab Results   Component Value Date    INR 1 70 (H) 03/18/2020    INR 1 60 (H) 07/03/2019    INR 2 16 (H) 07/01/2019    PROTIME 19 5 (H) 03/18/2020    PROTIME 18 6 (H) 07/03/2019    PROTIME 23 6 (H) 07/01/2019     Lab Results   Component Value Date    NTBNP 4,761 (H) 10/17/2022      No results found for: BNP     Wandalee Essex, PA-C

## 2022-11-09 ENCOUNTER — TELEPHONE (OUTPATIENT)
Dept: CARDIOLOGY CLINIC | Facility: CLINIC | Age: 82
End: 2022-11-09

## 2022-11-09 DIAGNOSIS — I50.22 CHRONIC HFREF (HEART FAILURE WITH REDUCED EJECTION FRACTION) (HCC): ICD-10-CM

## 2022-11-09 DIAGNOSIS — I50.9 CHF (CONGESTIVE HEART FAILURE) (HCC): ICD-10-CM

## 2022-11-10 RX ORDER — POTASSIUM CHLORIDE 1500 MG/1
TABLET, FILM COATED, EXTENDED RELEASE ORAL
Qty: 120 TABLET | Refills: 2 | Status: SHIPPED | OUTPATIENT
Start: 2022-11-10

## 2022-11-23 NOTE — PROGRESS NOTES
Heart Failure Outpatient Progress Note - Erin Antoine 80 y o  female MRN: [id number]    @ Encounter: [id number]      Assessment/Plan:    Patient Active Problem List    Diagnosis Date Noted   • Left-sided chest wall pain 10/12/2022   • WANG (acute kidney injury) ([place] [address] ) 10/12/2022   • Hypertensive heart and chronic kidney disease with heart failure and stage 1 through stage 4 chronic kidney disease, or unspecified chronic kidney disease ([place] [address] ) 04/15/2021   • Restless leg syndrome 01/27/2020   • Debility 07/19/2019   • Acute on chronic combined systolic (congestive) and diastolic (congestive) heart failure ([place] [address] ) 07/15/2019   • Ambulatory dysfunction 07/12/2019   • Type 2 diabetes mellitus with diabetic cataract ([place] [address] ) 10/07/2015   • Hyperlipidemia 08/15/2012   • Hypertension 08/15/2012   • Hypothyroidism 08/15/2012   • Obesity (BMI 30-39 9) 08/15/2012   • Osteoarthritis 08/15/2012   • Paroxysmal atrial fibrillation ([place] [address] ) 03/07/2022       # Chronic HFimpEF w/ partial recovery, Stage C, NYHA III  Etiology: NICM/tachy-mediated given h/o AF with RVR , +/- ischemia given patient's risk factors for CAD, strong family history, and septal Q waves on EKG with severe septal hypokinesis on echo, less likely viral, toxin induced or infiltrative   S/P cardiogenic shock in past  Now S/P AVJ ablation with BiV AICD implant with no high rate episodes  Weight: 189 lbs  NT proBNP: 10/17/22: 4761  1/14/21: 716  6/2/20: 1089    Studies- personally reviewed by myself  Echo 11/20/20  LVEF: 60%  RV: mildly dilated, mildly reduced  PASP: 60 mmHg  RVOT: no notching    TTE completed on 03/16/2020 (not euvolemic): LVEF 30%  LVIDd 5 44 cm  Moderately dilated RV with moderately to markedly reduced RVSF  NAN  Moderate MR  Mild to moderate TR  Dilated IVC                Echocardiogram (limited) from 07/04/2019:  LVEF: 45%; mild diffuse hypokinesis  LVIDd: 3 8 cm  MR: No MR  Moderate annular calcification    Other: Dilated LA      Echocardiogram from 06/20/2019:  LVEF: 25%  LVIDd: 4 6 cm  RV: Dilated and hypokinetic  MR: Moderate      Neurohormonal Blockade:  --Beta Blocker: metoprolol succinate 100 mg Q12  --ACEi, ARB or ARNi: Entresto 49/51 mg BID    --SVR reduction: hydralazine 10 mg BID  --Aldosterone Receptor Blocker: Renal function precludes  --SGLT2i:   -- Diuretic : Bumex 4 mg once daily     Sudden Cardiac Death Risk Reduction:  Since been DC    --ICD: MDT DC HIS ICD  Interrogation 10/21/22:  99 8%, no high rate episodes, optivol normal  Interrogation 7/15/22: BVP 99 9%, elevated RV capture threshold  2 VT, longest 9 beats at 196 bpm,optivol normal     Electrical Resynchronization:  --Interrogation: Narrow QRS     Advanced Therapies (if appropriate): --Inotrope: N/A  --LVAD/Transplant Candidacy: Will continue to monitor      # Pulmonary Hypertension, WHO group 2 from now HFpEF and likely significant CORAZON, untreated (Group 3)  Stressed polysomnogram, continued volume management    # PAF w/ hx AF with RVR   S/p AV node ablation with CRT-D with Dr Miko Moss 03/18/2020  Anticoagulation on Eliquis    Rate: metoprolol succinate 100 mg Q12    # HTN  Controlled       # HLD  On statin therapy  5/18/22: , HDL 57     # Hypothyroidism  History of radioablation in the past now on replacement therapy   TSH stable     # Type II DM        # CORAZON  Noncompliant with CPAP in the past    Still has not complete sleep study     # CKD   Renal function stable last checked  , Cr 1 3 on 10/18/22     # History of transaminitis in setting of cardiogenic shock     #  Abnormal EKG  Q waves noted in septal leads concerning for possible old infarct   Lexiscan stress to eval for underlying ischemia still not completed      # H/O tachy-oniel syndrome       #  History of alcohol abuse   Has been abstinent for years    TODAY'S PLAN:  PA pressures high on latest echo with improved EF  F/u echo ordered  Lipids need improvement with DM  Continue current diuretic for combined systolic and diastolic HF  I ordered sleep study as I do suspect hypoxia is contributing to her PH- she has yet to do and states will not do  Discussed weight management  --2g sodium diet  - Daily weights    HPI:      81 yo female who follows for chronic BiV heart failure, afib, HTN, HLD, CORAZON, DM2  Back in June 2019 admitted with afib with RVR and decompensated heart failure  Had not had a cardiac hx  Echo showed EF: 25-30%, underwent EBEN/ CV but went back into afib  Started on amiodarone load  Did have junctional rhythm immediate post conversion and showing signs of tachy oniel syndrome  Given these findings, PPM with possible AICD was suggested with cardioversion thereafter          On 6/25 patient was taken to the cath lab for planned dual chamber AICD but apparently became hypotensive and markedly SOB upon induction with propofol  She was subsequently transferred to the ICU for further management of her heart failure  She continued to be in [address] upon transfer  Through the night, patient's condition began to worsen and she became cool, clammy with N/V and loss of obtainable BP  Started on pressor support and lined  SVO2 at that point 32% with signs of lactic acidosis, WANG  Then started on milrinone gtt at 0 25 mcg/kg/min  She was able to be weaned off inotropic support  She saw Dr Emmanuelle Suazo in follow up and her afib rates were high so amio stopped and focused on rate control with increase in Toprol to 75 mg BID  Admitted to [facility] from 03/11 to 03/24/2020 after presenting (as the recommendation of many of her providers) with bilateral LE edema and weight gain  In ED was found to have NT-proBNP of 4600 and be in atrial fibrillation with RVR (rates in 150s)  She was then started on IV diltiazem and received IV Lasix  Diltiazem was later stopped and amiodarone drip was started  Switched from IV Lasix to IV Bumex on 03/41  EP was consulted   TTE revealed LVEF 30% and decision was made to proceed with AV node ablation and CRT-D implantation once optimized  HF team recommended her being discharged home on Bumex 4 mg BID with PRN metolazone; however, she was discharged home on PO Bumex 4 mg daily without PRN metolazone prescribed  Lost 18 lbs during this admission  Last Office visit 10/7/20  PCP felt volume overloaded    Interval History:  Did not do sleep study  Interrogation 7/15/22: BVP 99 9%, elevated RV capture threshold  2 VT, longest 9 beats at 196 bpm,optivol normal    Was in hospital with acute on chronic HF, diuresed and discharged on Bumex 4 mg daily, Toprol  mg BID and Entresto 49/51 mg BID; decompensation felt to be due to holding diuretic and entresto, maybe MAP driven  Saw AP 30/5- stable    Interrogation 10/21/22:  99 8%, no high rate episodes, optivol normal    11/30- labs ok  Review of Systems   Constitutional: Negative for activity change, appetite change, fatigue and unexpected weight change  HENT: Negative for congestion and nosebleeds  Eyes: Negative  Respiratory: Negative for cough, chest tightness and shortness of breath  Cardiovascular: Negative for chest pain, palpitations and leg swelling  Gastrointestinal: Negative for abdominal distention  Endocrine: Negative  Genitourinary: Negative  Musculoskeletal: Negative  Skin: Negative  Neurological: Negative for dizziness, syncope and weakness  Hematological: Negative  Psychiatric/Behavioral: Negative  Past Medical History:   Diagnosis Date   • Cardiogenic shock ([facility] [address] ) 6/26/2019   • CHF (congestive heart failure) (HCC)    • Eczema    • Photosensitivity     abnormal skin sensitivity to sunlight   • 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HCC)     staging         Allergies   Allergen Reactions   • Penicillins    • Wellbutrin Sr  [Bupropion]      Other reaction(s): Suicidal ideations  Category: Adverse Reaction;            Current Outpatient Medications:   •  apixaban (Eliquis) 5 mg, Take 1 tablet (5 mg total) by mouth 2 (two) times a day, Disp: 180 tablet, Rfl: 2  •  Ascorbic Acid (vitamin C) 1000 MG tablet, Take 1,000 mg by mouth 2 (two) times a day , Disp: , Rfl:   •  bumetanide (BUMEX) 2 mg tablet, Take 2 tablets (4 mg total) by mouth daily, Disp: 60 tablet, Rfl: 0  •  calcium carbonate (OS-PAULA) 600 MG tablet, Take 600 mg by mouth 2 (two) times a day with meals, Disp: , Rfl:   •  cholecalciferol (VITAMIN D3) 1,000 units tablet, Take 1,000 Units by mouth daily 8,000 dailty, Disp: , Rfl:   •  hydrALAZINE (APRESOLINE) 10 mg tablet, TAKE 1 TABLET (10 MG TOTAL) BY MOUTH EVERY 8 (EIGHT) HOURS (Patient taking differently: Take 10 mg by mouth 2 (two) times a day), Disp: 90 tablet, Rfl: 5  •  levothyroxine 100 mcg tablet, Take 1 tablet (100 mcg total) by mouth daily, Disp: 30 tablet, Rfl: 5  •  Magnesium 400 MG CAPS, Take 1 capsule (400 mg total) by mouth daily, Disp: , Rfl: 0  •  metoprolol succinate (TOPROL-XL) 50 mg 24 hr tablet, TAKE 2 TABLETS (100 MG TOTAL) BY MOUTH EVERY 12 (TWELVE) HOURS, Disp: 120 tablet, Rfl: 5  •  Multiple Vitamin (MULTI-VITAMIN DAILY PO), 1 tablet 2 (two) times a day  , Disp: , Rfl:   •  Potassium Chloride ER 20 MEQ TBCR, TAKE 2 TABLETS (40 MEQ TOTAL) BY MOUTH 2 (TWO) TIMES A DAY, Disp: 120 tablet, Rfl: 2  •  rOPINIRole (REQUIP XL) 2 MG 24 hr tablet, TAKE 1 TABLET (2 MG TOTAL) BY MOUTH DAILY AT BEDTIME, Disp: 30 tablet, Rfl: 5  •  sacubitril-valsartan (Entresto) 49-51 MG TABS, Take 1 tablet by mouth 2 (two) times a day Please hold for 1-2 more days, Disp: 180 tablet, Rfl: 0  •  traMADol (ULTRAM) 50 mg tablet, TAKE 1 TABLET TWICE A DAY BY ORAL ROUTE AS NEEDED , Disp: 60 tablet, Rfl: 0  •  venlafaxine (EFFEXOR) 75 mg tablet, TAKE 1 TABLET (75 MG TOTAL) BY MOUTH EVERY 12 (TWELVE) HOURS, Disp: 60 tablet, Rfl: 5    Social History     Socioeconomic History   • Marital status: Single     Spouse name: Not on file   • Number of children: Not on file   • Years of education: Not on file   • Highest education level: Not on file   Occupational History   • Not on file   Tobacco Use   • Smoking status: Never   • Smokeless tobacco: Never   Vaping Use   • Vaping Use: Never used   Substance and Sexual Activity   • Alcohol use: Not Currently   • Drug use: Never   • Sexual activity: Not Currently   Other Topics Concern   • Not on file   Social History Narrative   • Not on file     Social Determinants of Health     Financial Resource Strain: Not on file   Food Insecurity: Not on file   Transportation Needs: Not on file   Physical Activity: Not on file   Stress: Not on file   Social Connections: Not on file   Intimate Partner Violence: Not on file   Housing Stability: Not on file       Family History   Problem Relation Age of Onset   • Alzheimer's disease Mother    • Coronary artery disease Mother    • Dementia Mother    • Diabetes Mother         mellitus   • Other Father         acute myocardial infarction   • Coronary artery disease Sister    • Other Maternal Grandfather         laryngeal cancer   • Dementia Maternal Aunt    • Diabetes Maternal Aunt        Physical Exam:    Vitals: There were no vitals filed for this visit  Physical Exam  Constitutional:       Appearance: She is well-developed  HENT:      Head: Normocephalic and atraumatic  Eyes:      Pupils: Pupils are equal, round, and reactive to light  Neck:      Vascular: No JVD  Cardiovascular:      Rate and Rhythm: Normal rate and regular rhythm  Heart sounds: No murmur heard  Pulmonary:      Effort: Pulmonary effort is normal  No respiratory distress  Breath sounds: Normal breath sounds  Abdominal:      General: There is no distension  Palpations: Abdomen is soft  Tenderness: There is no abdominal tenderness  Musculoskeletal:         General: Normal range of motion  Cervical back: Normal range of motion  Skin:     General: Skin is warm and dry  Findings: No rash     Neurological:      Mental Status: She is alert and oriented to person, place, and time  Labs & Results:    Lab Results   Component Value Date    SODIUM 137 10/18/2022    K 4 1 10/18/2022     10/18/2022    CO2 26 10/18/2022    BUN 20 10/18/2022    CREATININE 1 30 10/18/2022    GLUC 167 (H) 10/18/2022    CALCIUM 9 6 10/18/2022     Lab Results   Component Value Date    WBC 12 27 (H) 10/18/2022    HGB 11 8 10/18/2022    HCT 35 2 10/18/2022    MCV 97 10/18/2022     10/18/2022     Lab Results   Component Value Date    NTBNP 4,761 (H) 10/17/2022      Lab Results   Component Value Date    CHOLESTEROL 210 (H) 05/18/2022    CHOLESTEROL 112 03/12/2020    CHOLESTEROL 177 03/18/2019     Lab Results   Component Value Date    HDL 57 05/18/2022    HDL 38 (L) 03/12/2020    HDL 66 03/18/2019     Lab Results   Component Value Date    TRIG 142 05/18/2022    TRIG 48 03/12/2020    TRIG 82 03/18/2019     Lab Results   Component Value Date    NONHDLC 153 05/18/2022    [place] 74 03/12/2020    NONHDLC 106 02/07/2017       EKG personally reviewed by Stephenie Porter  Counseling / Coordination of Care  Time spent today 25 minutes  Greater than 50% of total time was spent with the patient and / or family counseling and / or coordination of care  We went over current diagnosis, most recent studies and any changes in treatment  Thank you for the opportunity to participate in the care of this patient      [facility] PULMONARY HYPERTENSION  MEDICAL DIRECTOR OF [place]

## 2022-11-30 ENCOUNTER — APPOINTMENT (OUTPATIENT)
Dept: LAB | Facility: CLINIC | Age: 82
End: 2022-11-30

## 2022-11-30 DIAGNOSIS — N17.9 AKI (ACUTE KIDNEY INJURY) (HCC): ICD-10-CM

## 2022-11-30 LAB
ANION GAP SERPL CALCULATED.3IONS-SCNC: 3 MMOL/L (ref 4–13)
BUN SERPL-MCNC: 19 MG/DL (ref 5–25)
CALCIUM SERPL-MCNC: 9.8 MG/DL (ref 8.3–10.1)
CHLORIDE SERPL-SCNC: 104 MMOL/L (ref 96–108)
CO2 SERPL-SCNC: 30 MMOL/L (ref 21–32)
CREAT SERPL-MCNC: 0.96 MG/DL (ref 0.6–1.3)
GFR SERPL CREATININE-BSD FRML MDRD: 55 ML/MIN/1.73SQ M
GLUCOSE P FAST SERPL-MCNC: 154 MG/DL (ref 65–99)
POTASSIUM SERPL-SCNC: 4.9 MMOL/L (ref 3.5–5.3)
SODIUM SERPL-SCNC: 137 MMOL/L (ref 135–147)

## 2022-12-02 ENCOUNTER — OFFICE VISIT (OUTPATIENT)
Dept: CARDIOLOGY CLINIC | Facility: CLINIC | Age: 82
End: 2022-12-02

## 2022-12-02 VITALS
HEART RATE: 83 BPM | HEIGHT: 61 IN | WEIGHT: 189 LBS | SYSTOLIC BLOOD PRESSURE: 106 MMHG | OXYGEN SATURATION: 97 % | BODY MASS INDEX: 35.68 KG/M2 | DIASTOLIC BLOOD PRESSURE: 66 MMHG

## 2022-12-02 DIAGNOSIS — I10 PRIMARY HYPERTENSION: Primary | ICD-10-CM

## 2022-12-02 DIAGNOSIS — I48.0 PAROXYSMAL ATRIAL FIBRILLATION (HCC): ICD-10-CM

## 2022-12-02 DIAGNOSIS — I13.0 HYPERTENSIVE HEART AND CHRONIC KIDNEY DISEASE WITH HEART FAILURE AND STAGE 1 THROUGH STAGE 4 CHRONIC KIDNEY DISEASE, OR UNSPECIFIED CHRONIC KIDNEY DISEASE (HCC): ICD-10-CM

## 2022-12-02 DIAGNOSIS — E78.00 PURE HYPERCHOLESTEROLEMIA: ICD-10-CM

## 2022-12-05 DIAGNOSIS — I50.22 CHRONIC HFREF (HEART FAILURE WITH REDUCED EJECTION FRACTION) (HCC): ICD-10-CM

## 2022-12-05 DIAGNOSIS — I50.9 CHF (CONGESTIVE HEART FAILURE) (HCC): ICD-10-CM

## 2022-12-05 RX ORDER — BUMETANIDE 2 MG/1
4 TABLET ORAL DAILY
Qty: 60 TABLET | Refills: 0 | Status: SHIPPED | OUTPATIENT
Start: 2022-12-05

## 2022-12-09 DIAGNOSIS — M54.41 ACUTE RIGHT-SIDED LOW BACK PAIN WITH RIGHT-SIDED SCIATICA: ICD-10-CM

## 2022-12-09 RX ORDER — TRAMADOL HYDROCHLORIDE 50 MG/1
TABLET ORAL
Qty: 60 TABLET | Refills: 0 | Status: SHIPPED | OUTPATIENT
Start: 2022-12-09

## 2022-12-30 DIAGNOSIS — E03.9 HYPOTHYROIDISM, UNSPECIFIED TYPE: ICD-10-CM

## 2022-12-30 RX ORDER — LEVOTHYROXINE SODIUM 0.1 MG/1
100 TABLET ORAL DAILY
Qty: 30 TABLET | Refills: 5 | Status: SHIPPED | OUTPATIENT
Start: 2022-12-30

## 2023-01-06 DIAGNOSIS — G25.81 RESTLESS LEG SYNDROME: ICD-10-CM

## 2023-01-06 RX ORDER — ROPINIROLE 2 MG/1
2 TABLET, FILM COATED, EXTENDED RELEASE ORAL
Qty: 30 TABLET | Refills: 5 | Status: SHIPPED | OUTPATIENT
Start: 2023-01-06

## 2023-01-09 ENCOUNTER — IN-CLINIC DEVICE VISIT (OUTPATIENT)
Dept: CARDIOLOGY CLINIC | Facility: CLINIC | Age: 83
End: 2023-01-09

## 2023-01-09 DIAGNOSIS — Z95.810 AICD (AUTOMATIC CARDIOVERTER/DEFIBRILLATOR) PRESENT: Primary | ICD-10-CM

## 2023-01-09 NOTE — PROGRESS NOTES
Results for orders placed or performed in visit on 01/09/23   Cardiac EP device report    Narrative    MDT-DUAL CHAMBER "HIS" ICD/VVIR MODEACTIVE SYSTEM IS MRI CONDITIONAL  DEVICE INTERROGATED IN THE [employer] OFFICE  BATTERY VOLTAGE NEARING KHOI (11 MTHS)  WILL SCHEDULE MONTHLY BATTERY CHECKS  BVP 99 9% RV LEAD THRESHOLD CHRONICALLY HIGH  ALL OTHER LEAD PARAMETERS WITHIN NORMAL LIMITS  ONE VT-NS EPISODE WITH EGM SHOWING NSVT (9 BEATS  BPM)  PT TAKES ELIQUIS & METOPROLOL SUCC  NO PROGRAMMING CHANGES MADE TO DEVICE PARAMETERS  OPTI-VOL WITHIN NORMAL LIMITS  NORMAL DEVICE FUNCTION   AM/GV

## 2023-01-10 DIAGNOSIS — M54.41 ACUTE RIGHT-SIDED LOW BACK PAIN WITH RIGHT-SIDED SCIATICA: ICD-10-CM

## 2023-01-10 DIAGNOSIS — I50.22 CHRONIC HFREF (HEART FAILURE WITH REDUCED EJECTION FRACTION) (HCC): ICD-10-CM

## 2023-01-10 DIAGNOSIS — I48.91 ATRIAL FIBRILLATION, UNSPECIFIED TYPE (HCC): ICD-10-CM

## 2023-01-11 RX ORDER — METOPROLOL SUCCINATE 50 MG/1
100 TABLET, EXTENDED RELEASE ORAL EVERY 12 HOURS SCHEDULED
Qty: 120 TABLET | Refills: 5 | Status: SHIPPED | OUTPATIENT
Start: 2023-01-11

## 2023-01-11 RX ORDER — TRAMADOL HYDROCHLORIDE 50 MG/1
TABLET ORAL
Qty: 60 TABLET | Refills: 0 | Status: SHIPPED | OUTPATIENT
Start: 2023-01-11

## 2023-01-20 DIAGNOSIS — I50.42 CHRONIC COMBINED SYSTOLIC (CONGESTIVE) AND DIASTOLIC (CONGESTIVE) HEART FAILURE (HCC): ICD-10-CM

## 2023-01-20 RX ORDER — SACUBITRIL AND VALSARTAN 49; 51 MG/1; MG/1
TABLET, FILM COATED ORAL
Qty: 30 TABLET | Refills: 11 | Status: SHIPPED | OUTPATIENT
Start: 2023-01-20

## 2023-01-26 DIAGNOSIS — E03.9 HYPOTHYROIDISM, UNSPECIFIED TYPE: ICD-10-CM

## 2023-01-26 RX ORDER — LEVOTHYROXINE SODIUM 0.1 MG/1
100 TABLET ORAL DAILY
Qty: 30 TABLET | Refills: 5 | Status: SHIPPED | OUTPATIENT
Start: 2023-01-26

## 2023-02-07 ENCOUNTER — TELEPHONE (OUTPATIENT)
Dept: CARDIOLOGY CLINIC | Facility: CLINIC | Age: 83
End: 2023-02-07

## 2023-02-07 DIAGNOSIS — I50.42 CHRONIC COMBINED SYSTOLIC (CONGESTIVE) AND DIASTOLIC (CONGESTIVE) HEART FAILURE (HCC): ICD-10-CM

## 2023-02-08 RX ORDER — SACUBITRIL AND VALSARTAN 49; 51 MG/1; MG/1
1 TABLET, FILM COATED ORAL 2 TIMES DAILY
Qty: 180 TABLET | Refills: 3 | Status: SHIPPED | OUTPATIENT
Start: 2023-02-08

## 2023-02-09 DIAGNOSIS — I50.22 CHRONIC HFREF (HEART FAILURE WITH REDUCED EJECTION FRACTION) (HCC): ICD-10-CM

## 2023-02-09 DIAGNOSIS — M54.41 ACUTE RIGHT-SIDED LOW BACK PAIN WITH RIGHT-SIDED SCIATICA: ICD-10-CM

## 2023-02-09 DIAGNOSIS — I50.9 CHF (CONGESTIVE HEART FAILURE) (HCC): ICD-10-CM

## 2023-02-09 RX ORDER — BUMETANIDE 2 MG/1
4 TABLET ORAL DAILY
Qty: 60 TABLET | Refills: 0 | Status: SHIPPED | OUTPATIENT
Start: 2023-02-09

## 2023-02-15 DIAGNOSIS — M54.41 ACUTE RIGHT-SIDED LOW BACK PAIN WITH RIGHT-SIDED SCIATICA: ICD-10-CM

## 2023-02-15 RX ORDER — TRAMADOL HYDROCHLORIDE 50 MG/1
TABLET ORAL
Qty: 60 TABLET | Refills: 0 | Status: SHIPPED | OUTPATIENT
Start: 2023-02-15

## 2023-02-15 RX ORDER — POTASSIUM CHLORIDE 1500 MG/1
TABLET, FILM COATED, EXTENDED RELEASE ORAL
Qty: 120 TABLET | Refills: 2 | Status: SHIPPED | OUTPATIENT
Start: 2023-02-15

## 2023-02-15 RX ORDER — TRAMADOL HYDROCHLORIDE 50 MG/1
TABLET ORAL
Qty: 60 TABLET | OUTPATIENT
Start: 2023-02-15

## 2023-03-08 ENCOUNTER — HOSPITAL ENCOUNTER (OUTPATIENT)
Dept: NON INVASIVE DIAGNOSTICS | Facility: HOSPITAL | Age: 83
Discharge: HOME | End: 2023-03-08

## 2023-03-08 VITALS
SYSTOLIC BLOOD PRESSURE: 106 MMHG | HEIGHT: 61 IN | HEART RATE: 83 BPM | WEIGHT: 189 LBS | BODY MASS INDEX: 35.68 KG/M2 | DIASTOLIC BLOOD PRESSURE: 66 MMHG

## 2023-03-08 DIAGNOSIS — I48.0 PAROXYSMAL ATRIAL FIBRILLATION (HCC): ICD-10-CM

## 2023-03-08 DIAGNOSIS — I10 PRIMARY HYPERTENSION: ICD-10-CM

## 2023-03-08 LAB
AORTIC ROOT: 3.2 CM
APICAL FOUR CHAMBER EJECTION FRACTION: 54 %
ASCENDING AORTA: 3.3 CM
FRACTIONAL SHORTENING: 28 % (ref 28–44)
INTERVENTRICULAR SEPTUM IN DIASTOLE (PARASTERNAL SHORT AXIS VIEW): 1 CM
INTERVENTRICULAR SEPTUM: 1 CM (ref 0.6–1.1)
LAAS-AP2: 24.3 CM2
LAAS-AP4: 25.2 CM2
LEFT ATRIUM SIZE: 4.5 CM
LEFT INTERNAL DIMENSION IN SYSTOLE: 2.9 CM (ref 2.1–4)
LEFT VENTRICULAR INTERNAL DIMENSION IN DIASTOLE: 4 CM (ref 3.5–6)
LEFT VENTRICULAR POSTERIOR WALL IN END DIASTOLE: 1.1 CM
LEFT VENTRICULAR STROKE VOLUME: 38 ML
LVSV (TEICH): 38 ML
MV E'TISSUE VEL-SEP: 9 CM/S
RIGHT ATRIUM AREA SYSTOLE A4C: 16.8 CM2
RIGHT VENTRICLE ID DIMENSION: 4 CM
SL CV LEFT ATRIUM LENGTH A2C: 6.3 CM
SL CV LV EF: 65
SL CV PED ECHO LEFT VENTRICLE DIASTOLIC VOLUME (MOD BIPLANE) 2D: 69 ML
SL CV PED ECHO LEFT VENTRICLE SYSTOLIC VOLUME (MOD BIPLANE) 2D: 31 ML
TR MAX PG: 20 MMHG
TR PEAK VELOCITY: 2.2 M/S
TRICUSPID ANNULAR PLANE SYSTOLIC EXCURSION: 2.2 CM
TRICUSPID VALVE PEAK REGURGITATION VELOCITY: 2.23 M/S

## 2023-03-08 RX ADMIN — PERFLUTREN 0.4 ML/MIN: 6.52 INJECTION, SUSPENSION INTRAVENOUS at 11:15

## 2023-03-13 DIAGNOSIS — M54.41 ACUTE RIGHT-SIDED LOW BACK PAIN WITH RIGHT-SIDED SCIATICA: ICD-10-CM

## 2023-03-14 RX ORDER — TRAMADOL HYDROCHLORIDE 50 MG/1
TABLET ORAL
Qty: 60 TABLET | Refills: 0 | Status: SHIPPED | OUTPATIENT
Start: 2023-03-14

## 2023-03-17 ENCOUNTER — OFFICE VISIT (OUTPATIENT)
Dept: CARDIOLOGY CLINIC | Facility: CLINIC | Age: 83
End: 2023-03-17

## 2023-03-17 VITALS
BODY MASS INDEX: 35.5 KG/M2 | HEART RATE: 91 BPM | WEIGHT: 188 LBS | HEIGHT: 61 IN | OXYGEN SATURATION: 100 % | DIASTOLIC BLOOD PRESSURE: 58 MMHG | SYSTOLIC BLOOD PRESSURE: 102 MMHG

## 2023-03-17 DIAGNOSIS — I13.0 HYPERTENSIVE HEART AND CHRONIC KIDNEY DISEASE WITH HEART FAILURE AND STAGE 1 THROUGH STAGE 4 CHRONIC KIDNEY DISEASE, OR UNSPECIFIED CHRONIC KIDNEY DISEASE (HCC): ICD-10-CM

## 2023-03-17 DIAGNOSIS — E66.9 OBESITY (BMI 30-39.9): ICD-10-CM

## 2023-03-17 DIAGNOSIS — I50.32 CHRONIC DIASTOLIC CHF (CONGESTIVE HEART FAILURE), NYHA CLASS 2 (HCC): ICD-10-CM

## 2023-03-17 DIAGNOSIS — E78.00 PURE HYPERCHOLESTEROLEMIA: Primary | ICD-10-CM

## 2023-03-17 RX ORDER — LATANOPROST 50 UG/ML
SOLUTION/ DROPS OPHTHALMIC
COMMUNITY
Start: 2023-01-25

## 2023-03-25 DIAGNOSIS — I50.22 CHRONIC HFREF (HEART FAILURE WITH REDUCED EJECTION FRACTION) (HCC): ICD-10-CM

## 2023-03-25 DIAGNOSIS — I50.9 CHF (CONGESTIVE HEART FAILURE) (HCC): ICD-10-CM

## 2023-03-28 RX ORDER — BUMETANIDE 2 MG/1
4 TABLET ORAL DAILY
Qty: 60 TABLET | Refills: 0 | Status: SHIPPED | OUTPATIENT
Start: 2023-03-28

## 2023-04-05 DIAGNOSIS — F41.8 DEPRESSION WITH ANXIETY: ICD-10-CM

## 2023-04-06 RX ORDER — VENLAFAXINE 75 MG/1
75 TABLET ORAL EVERY 12 HOURS
Qty: 60 TABLET | Refills: 5 | Status: SHIPPED | OUTPATIENT
Start: 2023-04-06 | End: 2023-05-06

## 2023-04-17 PROBLEM — L03.116 CELLULITIS OF LEFT LEG: Status: ACTIVE | Noted: 2023-04-17

## 2023-05-05 ENCOUNTER — RA CDI HCC (OUTPATIENT)
Dept: OTHER | Facility: HOSPITAL | Age: 83
End: 2023-05-05

## 2023-05-05 NOTE — PROGRESS NOTES
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)Noted 10/7/2015  [E11 36]      NOT on the BPA- please assess using MEAT for 2023 billing    [facility] [address]  coding opportunities          Chart Reviewed number of suggestions sent to Provider: 1     Patients Insurance     Medicare Insurance: [employer]

## 2023-05-10 ENCOUNTER — REMOTE DEVICE CLINIC VISIT (OUTPATIENT)
Dept: CARDIOLOGY CLINIC | Facility: CLINIC | Age: 83
End: 2023-05-10

## 2023-05-10 DIAGNOSIS — Z95.810 PRESENCE OF IMPLANTABLE CARDIOVERTER-DEFIBRILLATOR (ICD): Primary | ICD-10-CM

## 2023-05-10 NOTE — PROGRESS NOTES
"MDT-DUAL CHAMBER \"HIS\" ICD/VVIR MODE/ACTIVE SYSTEM IS MRI CONDITIONAL   CARELINK TRANSMISSION:  BATTERY VOLTAGE NEARING KHOI (9 MONTHS)   WILL SCHEDULE MONTHLY BATTERY CHECKS   BP 99 8% VSRP 0 2%    ALL LEAD PARAMETERS WITHIN NORMAL LIMITS, WITH HX OF HIGH RV PACING THRESHOLD   NO SIGNIFICANT HIGH RATE OR V SENSE EPISODES   OPTI-VOL WITHIN NORMAL LIMITS   NORMAL DEVICE FUNCTION   RG   "

## 2023-05-11 ENCOUNTER — OFFICE VISIT (OUTPATIENT)
Dept: FAMILY MEDICINE CLINIC | Facility: CLINIC | Age: 83
End: 2023-05-11

## 2023-05-11 VITALS
HEART RATE: 81 BPM | WEIGHT: 191 LBS | TEMPERATURE: 97.9 F | DIASTOLIC BLOOD PRESSURE: 70 MMHG | BODY MASS INDEX: 36.06 KG/M2 | OXYGEN SATURATION: 95 % | RESPIRATION RATE: 16 BRPM | HEIGHT: 61 IN | SYSTOLIC BLOOD PRESSURE: 118 MMHG

## 2023-05-11 DIAGNOSIS — E11.36 TYPE 2 DIABETES MELLITUS WITH DIABETIC CATARACT, WITHOUT LONG-TERM CURRENT USE OF INSULIN (HCC): ICD-10-CM

## 2023-05-11 DIAGNOSIS — L03.116 CELLULITIS OF LEFT LEG: ICD-10-CM

## 2023-05-11 DIAGNOSIS — G25.81 RESTLESS LEG SYNDROME: Primary | ICD-10-CM

## 2023-05-11 RX ORDER — DOXYCYCLINE HYCLATE 100 MG/1
100 CAPSULE ORAL EVERY 12 HOURS SCHEDULED
Qty: 20 CAPSULE | Refills: 0 | Status: SHIPPED | OUTPATIENT
Start: 2023-05-11 | End: 2023-05-21

## 2023-05-11 RX ORDER — ROPINIROLE 2 MG/1
2 TABLET, FILM COATED, EXTENDED RELEASE ORAL
Qty: 30 TABLET | Refills: 5 | Status: CANCELLED | OUTPATIENT
Start: 2023-05-11

## 2023-05-11 NOTE — PROGRESS NOTES
Name: Sebastian Santos      : 1940      MRN: [id number]  Encounter Provider: SOTO Neal  Encounter Date: 2023   Encounter department: [address] [place] [address]  Restless leg syndrome      BMI Counseling: Body mass index is 36 09 kg/m²  The BMI is above normal  Nutrition recommendations include encouraging healthy choices of fruits and vegetables, moderation in carbohydrate intake and increasing intake of lean protein  Rationale for BMI follow-up plan is due to patient being overweight or obese  Subjective     HPI     Pt presents by herself today for a follow up of cellulitis  Treated for cellulitis to the left lower leg 23, Keflex 500 mg TID x 10 days  She notes the swelling has gone down but her wound continues to drain a clear/yellowish fluid  Area is still warm and tender  No F/C, N/V/D  Pt is diabetic, A1C 6 5 from 23, diet controlled       Pt has tried both Ropinirole immediate release and Pramipexole immediate release years ago with no improvement in symptoms  She has been on Ropinirole XL for years with great improvement and no side effects  Her insurance is now not covering this but she would like us to appeal this      Review of Systems   Constitutional: Negative for chills, fever and unexpected weight change  HENT: Negative for ear pain and sore throat  Eyes: Negative for pain and visual disturbance  Respiratory: Negative for cough and shortness of breath  Cardiovascular: Positive for leg swelling  Negative for chest pain and palpitations  Gastrointestinal: Negative for abdominal pain, constipation, diarrhea, nausea and vomiting  Genitourinary: Negative for dysuria and hematuria  Musculoskeletal: Negative for arthralgias and back pain  Skin: Positive for wound  Negative for color change and rash  Neurological: Negative for seizures and syncope  Hematological: Negative for adenopathy  Psychiatric/Behavioral: Negative for self-injury, sleep disturbance and suicidal ideas  The patient is not nervous/anxious  All other systems reviewed and are negative        Past Medical History:   Diagnosis Date   • Allergic childhood   • Arthritis do not know   • Cancer ([facility] [address] ) 1976   • [facility] 06/26/2019   • CHF (congestive heart failure) ([facility])    • Chronic kidney disease June 2019   • COPD (chronic obstructive pulmonary disease) ([facility]) no   • Coronary artery disease 06/19   • Depression no   • Disease of thyroid gland 1976   • Eczema    • Heart murmur childhood   • Hypertension do not knpw   • Photosensitivity     abnormal skin sensitivity to sunlight   • 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[facility])     staging   • Visual impairment childhood     Past Surgical History:   Procedure Laterality Date   • CARDIAC SURGERY  06/19   • CATARACT EXTRACTION Left    • EYE SURGERY     • HEMORRHOID SURGERY     • IR NON-TUNNELED CENTRAL LINE PLACEMENT  07/09/2019   • OOPHORECTOMY      unilateral   • TOTAL ABDOMINAL HYSTERECTOMY       Family History   Problem Relation Age of Onset   • Alzheimer's disease Mother    • Coronary artery disease Mother    • Dementia Mother    • Diabetes Mother         mellitus   • Heart disease Mother    • Other Father         acute myocardial infarction   • Heart disease Father    • Coronary artery disease Sister    • Other Maternal Grandfather         laryngeal cancer   • Dementia Maternal Aunt    • Diabetes Maternal Aunt    • Heart disease Sister    • Heart disease Brother      Social History     Socioeconomic History   • Marital status: Single     Spouse name: None   • Number of children: None   • Years of education: None   • Highest education level: None   Occupational History   • None   Tobacco Use   • Smoking status: Never   • Smokeless tobacco: Never   Vaping Use   • Vaping Use: Never used   Substance and Sexual Activity   • Alcohol use: Not Currently     Comment: 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   • Drug use: Never   • Sexual activity: Not Currently   Other Topics Concern   • None   Social History Narrative   • None     Social Determinants of Health     Financial Resource Strain: Not on file   Food Insecurity: Not on file   Transportation Needs: Not on file   Physical Activity: Not on file   Stress: Not on file   Social Connections: Not on file   Intimate Partner Violence: Not on file   Housing Stability: Not on file     Current Outpatient Medications on File Prior to Visit   Medication Sig   • apixaban (Eliquis) 5 mg Take 1 tablet (5 mg total) by mouth 2 (two) times a day   • Ascorbic Acid (vitamin C) 1000 MG tablet Take 1,000 mg by mouth 2 (two) times a day    • bumetanide (BUMEX) 2 mg tablet TAKE 2 TABLETS (4 MG TOTAL) BY MOUTH DAILY   • calcium carbonate (OS-PAULA) 600 MG tablet Take 600 mg by mouth 2 (two) times a day with meals   • cholecalciferol (VITAMIN D3) 1,000 units tablet Take 1,000 Units by mouth daily 8,000 dailty   • hydrALAZINE (APRESOLINE) 10 mg tablet TAKE 1 TABLET (10 MG TOTAL) BY MOUTH EVERY 8 (EIGHT) HOURS (Patient taking differently: Take 10 mg by mouth 2 (two) times a day)   • latanoprost (XALATAN) 0 005 % ophthalmic solution INSTILL ONE DROP IN THE RIGHT EYE AT BEDTIME   • levothyroxine 100 mcg tablet TAKE 1 TABLET (100 MCG TOTAL) BY MOUTH DAILY   • Magnesium 400 MG CAPS Take 1 capsule (400 mg total) by mouth daily   • metoprolol succinate (TOPROL-XL) 50 mg 24 hr tablet TAKE 2 TABLETS (100 MG TOTAL) BY MOUTH EVERY 12 (TWELVE) HOURS   • Multiple Vitamin (MULTI-VITAMIN DAILY PO) 1 tablet 2 (two) times a day     • Potassium Chloride ER 20 MEQ TBCR TAKE 2 TABLETS (40 MEQ TOTAL) BY MOUTH 2 (TWO) TIMES A DAY   • rOPINIRole (REQUIP XL) 2 MG 24 hr tablet TAKE 1 TABLET (2 MG TOTAL) BY MOUTH DAILY AT BEDTIME   • sacubitril-valsartan (Entresto) 49-51 MG TABS Take 1 tablet by mouth 2 (two) times a day   • traMADol (ULTRAM) 50 mg tablet ONE TABLET TWICE DAILY   • traMADol "(ULTRAM) 50 mg tablet Take 1 tablet (50 mg total) by mouth 2 (two) times a day   • Turmeric (QC TUMERIC COMPLEX PO) Take by mouth 2 (two) times a day   • venlafaxine (EFFEXOR) 75 mg tablet TAKE 1 TABLET (75 MG TOTAL) BY MOUTH EVERY 12 (TWELVE) HOURS   • [DISCONTINUED] potassium chloride (K-DUR,KLOR-CON) 20 mEq tablet TAKE 2 TABLETS (40 MEQ TOTAL) BY MOUTH 2 (TWO) TIMES A DAY     Allergies   Allergen Reactions   • Penicillins    • Wellbutrin Sr  [Bupropion]      Other reaction(s): Suicidal ideations  Category: Adverse Reaction;      Immunization History   Administered Date(s) Administered   • COVID-19 PFIZER VACCINE 0 3 ML IM 01/24/2021, 02/13/2021, 01/15/2022   • Td (adult), adsorbed 01/01/2000       Objective     /70 (BP Location: Left arm, Patient Position: Sitting, Cuff Size: Large)   Pulse 81   Temp 97 9 °F (36 6 °C)   Resp 16   Ht 5' 1\" (1 549 m)   Wt 86 6 kg (191 lb)   SpO2 95%   BMI 36 09 kg/m²     Physical Exam  Constitutional:       General: She is not in acute distress  Appearance: She is well-developed  She is not ill-appearing, toxic-appearing or diaphoretic  HENT:      Head: Normocephalic and atraumatic  Eyes:      Extraocular Movements: Extraocular movements intact  Conjunctiva/sclera: Conjunctivae normal       Pupils: Pupils are equal, round, and reactive to light  Neck:      Thyroid: No thyromegaly  Cardiovascular:      Rate and Rhythm: Normal rate and regular rhythm  Heart sounds: Normal heart sounds  No murmur heard  Pulmonary:      Effort: Pulmonary effort is normal  No respiratory distress  Breath sounds: Normal breath sounds  No wheezing  Abdominal:      General: Bowel sounds are normal  There is no distension  Palpations: Abdomen is soft  Tenderness: There is no abdominal tenderness  Musculoskeletal:         General: Normal range of motion  Cervical back: Normal range of motion and neck supple  No rigidity or tenderness   " Lymphadenopathy:      Cervical: No cervical adenopathy  Skin:     General: Skin is warm and dry  Neurological:      General: No focal deficit present  Mental Status: She is alert and oriented to person, place, and time  Psychiatric:         Mood and Affect: Mood normal          Behavior: Behavior normal          Thought Content:  Thought content normal          Judgment: Judgment normal        Vernel Matter, CRNP

## 2023-05-11 NOTE — ASSESSMENT & PLAN NOTE
Lab Results   Component Value Date    HGBA1C 6 5 04/17/2023     Stable, continue with dietary modifications
Pt has tried both Ropinirole immediate release and Pramipexole immediate release years ago with no improvement in symptoms    She has been on Ropinirole XL for years with great improvement and no side effects and should continue with such
Some improvement following 10 days of Keflex but still appears infected  Start Doxycyline 100mg BID x 10 days  I'll see her next week in the office for a recheck- if no improvement, would recommend seeing Wound Care for I&D  Call our office with any worsening symptoms, F/C, N/V/D
Pupils equal, round and reactive to light, Extra-ocular movement intact, eyes are clear b/l

## 2023-05-12 DIAGNOSIS — I50.9 CHF (CONGESTIVE HEART FAILURE) (HCC): ICD-10-CM

## 2023-05-12 DIAGNOSIS — I50.22 CHRONIC HFREF (HEART FAILURE WITH REDUCED EJECTION FRACTION) (HCC): ICD-10-CM

## 2023-05-15 ENCOUNTER — TELEPHONE (OUTPATIENT)
Dept: FAMILY MEDICINE CLINIC | Facility: CLINIC | Age: 83
End: 2023-05-15

## 2023-05-15 NOTE — TELEPHONE ENCOUNTER
Received call from [facility] ([phone]) in regards to medication prior auth for rOPINIRole reference number #[id number]  Has questions regarding request  Asks for call back

## 2023-05-16 RX ORDER — POTASSIUM CHLORIDE 1500 MG/1
TABLET, FILM COATED, EXTENDED RELEASE ORAL
Qty: 120 TABLET | Refills: 2 | Status: SHIPPED | OUTPATIENT
Start: 2023-05-16

## 2023-05-18 ENCOUNTER — OFFICE VISIT (OUTPATIENT)
Dept: FAMILY MEDICINE CLINIC | Facility: CLINIC | Age: 83
End: 2023-05-18

## 2023-05-18 VITALS
TEMPERATURE: 97.8 F | BODY MASS INDEX: 36.06 KG/M2 | OXYGEN SATURATION: 98 % | RESPIRATION RATE: 16 BRPM | HEIGHT: 61 IN | SYSTOLIC BLOOD PRESSURE: 124 MMHG | HEART RATE: 86 BPM | DIASTOLIC BLOOD PRESSURE: 70 MMHG | WEIGHT: 191 LBS

## 2023-05-18 DIAGNOSIS — L03.116 CELLULITIS OF LEFT LEG: Primary | ICD-10-CM

## 2023-05-18 NOTE — PROGRESS NOTES
Name: Rajat Click      : 1940      MRN: [id number]  Encounter Provider: SOTO Bonds  Encounter Date: 2023   Encounter department: [address] [place] [address]  Cellulitis of left leg  Assessment & Plan:  Improving with Doxycyline and she has a few more days of this (10 days total)  No initial improvement with 10 days of Keflex  Elevate leg  She knows to call us with any swelling, redness, drainage or pain that returns (if this does occur, I would recommend Wound Care for I&D probably)            Subjective     HPI     Pt presents by herself today for a wound check  She is currently taking Doxycyline 100mg BID x 10 days for a wound to her LLE  Prior to this, she completed a 10 day course of Keflex 500 mg TID with no significant improvement     She notes her wound has improved, less redness and no longer draining  Also feels less warm to the touch  Less swelling  No F/C, N/V/D    Pt is diabetic, type 2, A1C at 6 5, diet controlled     Review of Systems   Constitutional: Negative for chills and fever  HENT: Negative for ear pain and sore throat  Eyes: Negative for pain and visual disturbance  Respiratory: Negative for cough and shortness of breath  Cardiovascular: Negative for chest pain and palpitations  Gastrointestinal: Negative for abdominal pain and vomiting  Genitourinary: Negative for dysuria and hematuria  Musculoskeletal: Negative for arthralgias and back pain  Skin: Positive for wound  Negative for color change and rash  Neurological: Negative for seizures and syncope  All other systems reviewed and are negative        Past Medical History:   Diagnosis Date   • Allergic childhood   • Arthritis do not know   • Cancer ([facility] )    • Cardiogenic shock ([facility] ) 2019   • CHF (congestive heart failure) ([facility])    • Chronic kidney disease 2019   • COPD (chronic obstructive pulmonary disease) ([facility]) no   • Coronary artery disease 06/19   • Depression no   • Disease of thyroid gland 1976   • Eczema    • Heart murmur childhood   • Hypertension do not knpw   • Photosensitivity     abnormal skin sensitivity to sunlight   • 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HCC)     staging   • Visual impairment childhood     Past Surgical History:   Procedure Laterality Date   • CARDIAC SURGERY  06/19   • CATARACT EXTRACTION Left    • EYE SURGERY     • HEMORRHOID SURGERY     • IR NON-TUNNELED CENTRAL LINE PLACEMENT  07/09/2019   • OOPHORECTOMY      unilateral   • TOTAL ABDOMINAL HYSTERECTOMY       Family History   Problem Relation Age of Onset   • Alzheimer's disease Mother    • Coronary artery disease Mother    • Dementia Mother    • Diabetes Mother         mellitus   • Heart disease Mother    • Other Father         acute myocardial infarction   • Heart disease Father    • Coronary artery disease Sister    • Other Maternal Grandfather         laryngeal cancer   • Dementia Maternal Aunt    • Diabetes Maternal Aunt    • Heart disease Sister    • Heart disease Brother      Social History     Socioeconomic History   • Marital status: Single     Spouse name: None   • Number of children: None   • Years of education: None   • Highest education level: None   Occupational History   • None   Tobacco Use   • Smoking status: Never   • Smokeless tobacco: Never   Vaping Use   • Vaping Use: Never used   Substance and Sexual Activity   • Alcohol use: Not Currently     Comment: 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   • Drug use: Never   • Sexual activity: Not Currently   Other Topics Concern   • None   Social History Narrative   • None     Social Determinants of Health     Financial Resource Strain: Not on file   Food Insecurity: Not on file   Transportation Needs: No Transportation Needs   • Lack of Transportation (Medical): No   • Lack of Transportation (Non-Medical):  No   Physical Activity: Not on file   Stress: Not on file   Social Connections: Not on file Intimate Partner Violence: Not on file   Housing Stability: Not on file     Current Outpatient Medications on File Prior to Visit   Medication Sig   • apixaban (Eliquis) 5 mg Take 1 tablet (5 mg total) by mouth 2 (two) times a day   • Ascorbic Acid (vitamin C) 1000 MG tablet Take 1,000 mg by mouth 2 (two) times a day    • bumetanide (BUMEX) 2 mg tablet TAKE 2 TABLETS (4 MG TOTAL) BY MOUTH DAILY   • calcium carbonate (OS-PAULA) 600 MG tablet Take 600 mg by mouth 2 (two) times a day with meals   • cholecalciferol (VITAMIN D3) 1,000 units tablet Take 1,000 Units by mouth daily 8,000 dailty   • doxycycline hyclate (VIBRAMYCIN) 100 mg capsule Take 1 capsule (100 mg total) by mouth every 12 (twelve) hours for 10 days   • hydrALAZINE (APRESOLINE) 10 mg tablet TAKE 1 TABLET (10 MG TOTAL) BY MOUTH EVERY 8 (EIGHT) HOURS (Patient taking differently: Take 10 mg by mouth 2 (two) times a day)   • latanoprost (XALATAN) 0 005 % ophthalmic solution INSTILL ONE DROP IN THE RIGHT EYE AT BEDTIME   • levothyroxine 100 mcg tablet TAKE 1 TABLET (100 MCG TOTAL) BY MOUTH DAILY   • Magnesium 400 MG CAPS Take 1 capsule (400 mg total) by mouth daily   • metoprolol succinate (TOPROL-XL) 50 mg 24 hr tablet TAKE 2 TABLETS (100 MG TOTAL) BY MOUTH EVERY 12 (TWELVE) HOURS   • Multiple Vitamin (MULTI-VITAMIN DAILY PO) 1 tablet 2 (two) times a day     • Potassium Chloride ER 20 MEQ TBCR TAKE 2 TABLETS (40 MEQ TOTAL) BY MOUTH 2 (TWO) TIMES A DAY   • rOPINIRole (REQUIP XL) 2 MG 24 hr tablet TAKE 1 TABLET (2 MG TOTAL) BY MOUTH DAILY AT BEDTIME   • sacubitril-valsartan (Entresto) 49-51 MG TABS Take 1 tablet by mouth 2 (two) times a day   • traMADol (ULTRAM) 50 mg tablet ONE TABLET TWICE DAILY   • traMADol (ULTRAM) 50 mg tablet Take 1 tablet (50 mg total) by mouth 2 (two) times a day   • Turmeric (QC TUMERIC COMPLEX PO) Take by mouth 2 (two) times a day   • venlafaxine (EFFEXOR) 75 mg tablet TAKE 1 TABLET (75 MG TOTAL) BY MOUTH EVERY 12 (TWELVE) HOURS   • "[DISCONTINUED] potassium chloride (K-DUR,KLOR-CON) 20 mEq tablet TAKE 2 TABLETS (40 MEQ TOTAL) BY MOUTH 2 (TWO) TIMES A DAY     Allergies   Allergen Reactions   • Penicillins    • Wellbutrin Sr  [Bupropion]      Other reaction(s): Suicidal ideations  Category: Adverse Reaction;      Immunization History   Administered Date(s) Administered   • COVID-19 PFIZER VACCINE 0 3 ML IM 01/24/2021, 02/13/2021, 01/15/2022   • Td (adult), adsorbed 01/01/2000       Objective     /70 (BP Location: Left arm, Patient Position: Sitting, Cuff Size: Large)   Pulse 86   Temp 97 8 °F (36 6 °C) (Temporal)   Resp 16   Ht 5' 1\" (1 549 m)   Wt 86 6 kg (191 lb)   SpO2 98%   BMI 36 09 kg/m²     Physical Exam  Constitutional:       General: She is not in acute distress  Appearance: She is well-developed  She is not ill-appearing, toxic-appearing or diaphoretic  HENT:      Head: Normocephalic and atraumatic  Eyes:      Extraocular Movements: Extraocular movements intact  Conjunctiva/sclera: Conjunctivae normal       Pupils: Pupils are equal, round, and reactive to light  Neck:      Thyroid: No thyromegaly  Cardiovascular:      Rate and Rhythm: Normal rate and regular rhythm  Heart sounds: Normal heart sounds  No murmur heard  Pulmonary:      Effort: Pulmonary effort is normal  No respiratory distress  Breath sounds: Normal breath sounds  No wheezing  Abdominal:      General: Bowel sounds are normal  There is no distension  Palpations: Abdomen is soft  Tenderness: There is no abdominal tenderness  Musculoskeletal:         General: Normal range of motion  Cervical back: Normal range of motion and neck supple  No rigidity or tenderness  Lymphadenopathy:      Cervical: No cervical adenopathy  Skin:     General: Skin is warm and dry  Neurological:      General: No focal deficit present  Mental Status: She is alert and oriented to person, place, and time     Psychiatric:    " Mood and Affect: Mood normal          Behavior: Behavior normal          Thought Content:  Thought content normal          Judgment: Judgment normal        SOTO Neal

## 2023-05-18 NOTE — ASSESSMENT & PLAN NOTE
Improving with Doxycyline and she has a few more days of this (10 days total)  No initial improvement with 10 days of Keflex  Elevate leg  She knows to call us with any swelling, redness, drainage or pain that returns (if this does occur, I would recommend Wound Care for I&D probably)

## 2023-05-31 DIAGNOSIS — S91.309A NONHEALING WOUND OF HEEL: Primary | ICD-10-CM

## 2023-05-31 DIAGNOSIS — T14.8XXA NONHEALING NONSURGICAL WOUND: ICD-10-CM

## 2023-06-09 ENCOUNTER — REMOTE DEVICE CLINIC VISIT (OUTPATIENT)
Dept: CARDIOLOGY CLINIC | Facility: CLINIC | Age: 83
End: 2023-06-09

## 2023-06-09 DIAGNOSIS — Z95.810 PRESENCE OF AUTOMATIC CARDIOVERTER/DEFIBRILLATOR (AICD): Primary | ICD-10-CM

## 2023-06-09 PROCEDURE — RECHECK: Performed by: INTERNAL MEDICINE

## 2023-06-09 NOTE — PROGRESS NOTES
"Results for orders placed or performed in visit on 06/09/23   Cardiac EP device report    Narrative    MDT-DUAL CHAMBER \"HIS\" ICD/VVIR MODEACTIVE SYSTEM IS MRI CONDITIONAL  NON-BILLABLE CARELINK TRANSMISSION: BATTERY STATUS: BATTERY VOLTAGE NEARING KHOI ( 9 MOS)  WILL SCHEDULE MONTHLY BATTERY CHECKS  AP: 0%  : 99 8% + VSRP: 0 1% ALL AVAILABLE LEAD PARAMETERS WITHIN NORMAL LIMITS  NOTE HX OF HIGH RV THRESHOLD  NO SIGNIFICANT HIGH RATE EPISODES  1 V-SENSE EPISODE W/ EGM SHOWING 12 BEATS @ 143 BPM & FUSION  PT TAKES ENTRESTO, METOPROLOL SUCC, ELIQUIS  EF: 65% (ECHO 3/8/23)  OPTI-VOL WITHIN NORMAL LIMITS  APPROPRIATELY FUNCTIONING BI- V ICD    CH       "

## 2023-06-12 DIAGNOSIS — I48.91 ATRIAL FIBRILLATION WITH RAPID VENTRICULAR RESPONSE (HCC): ICD-10-CM

## 2023-06-12 DIAGNOSIS — M54.41 ACUTE RIGHT-SIDED LOW BACK PAIN WITH RIGHT-SIDED SCIATICA: ICD-10-CM

## 2023-06-12 DIAGNOSIS — E03.9 HYPOTHYROIDISM, UNSPECIFIED TYPE: ICD-10-CM

## 2023-06-12 RX ORDER — LEVOTHYROXINE SODIUM 0.1 MG/1
100 TABLET ORAL DAILY
Qty: 30 TABLET | Refills: 5 | Status: SHIPPED | OUTPATIENT
Start: 2023-06-12

## 2023-06-12 RX ORDER — APIXABAN 5 MG/1
TABLET, FILM COATED ORAL
Qty: 60 TABLET | Refills: 11 | Status: SHIPPED | OUTPATIENT
Start: 2023-06-12

## 2023-06-14 ENCOUNTER — OFFICE VISIT (OUTPATIENT)
Dept: WOUND CARE | Facility: HOSPITAL | Age: 83
End: 2023-06-14
Payer: COMMERCIAL

## 2023-06-14 VITALS
SYSTOLIC BLOOD PRESSURE: 126 MMHG | DIASTOLIC BLOOD PRESSURE: 73 MMHG | RESPIRATION RATE: 20 BRPM | TEMPERATURE: 97.6 F | HEART RATE: 80 BPM

## 2023-06-14 DIAGNOSIS — S81.802A TRAUMATIC OPEN WOUND OF LEFT LOWER LEG, INITIAL ENCOUNTER: Primary | ICD-10-CM

## 2023-06-14 PROCEDURE — 99203 OFFICE O/P NEW LOW 30 MIN: CPT | Performed by: PODIATRIST

## 2023-06-14 PROCEDURE — G0463 HOSPITAL OUTPT CLINIC VISIT: HCPCS | Performed by: PODIATRIST

## 2023-06-14 PROCEDURE — 99213 OFFICE O/P EST LOW 20 MIN: CPT | Performed by: PODIATRIST

## 2023-06-14 NOTE — PATIENT INSTRUCTIONS
Orders Placed This Encounter   Procedures    Wound cleansing and dressings     Wound location:  Left leg  Cleanse the wound with mild soap and water, pat dry  Apply Mepitel to cover the wound  You may remove the dressing and shower as well as applying moist warm compresses to left shin area three times a day  When removing the Mepitel, please hang from a flat clean surface such as a cabinet to avoid the Mepitel bunching up  Cover with gauze as needed if wound is draining  Secure with tape  This was applied today at the [facility]  Standing Status:   Future     Standing Expiration Date:   6/14/2024    Wound miscellaneous orders     Please apply warm compresses to area three times a day       Standing Status:   Future     Standing Expiration Date:   6/14/2024

## 2023-06-14 NOTE — PROGRESS NOTES
Patient ID: Ravi Pierce is a 80 y o  female Date of Birth 1940     My role is Foot, Ankle and Wound Specialist    PLAN:    I feel she has a hematoma under the small wound  The wound is barely open and we can't seen an opening nor demonstrate drainage today  However, she states last night she got scant drainage  I'm reluctant to open this back up to evacuate the hematoma which is asymptomatic  I suggest a simple silicone dressing to the small wound eschar and three times a day moist warm compresses to the hematoma to hasten resorbtion  Follow-up two weeks  Wound Instructions  No orders of the defined types were placed in this encounter  SUBJECTIVE:    Chief Complaint   Patient presents with   • New Patient Visit     Left LE wound  Vaishnavi Case is here with a small traumatic leg wound that occurred at home during a fall  She's been leaving it open to air  She denies pain  She has taken some antibiotics for it  The following portions of the patient's history were reviewed and updated as appropriate:   Patient Active Problem List   Diagnosis   • Hyperlipidemia   • Hypertension   • Hypothyroidism   • Obesity (BMI 30-39  9)   • Osteoarthritis   • Type 2 diabetes mellitus with diabetic cataract ([address] )   • Ambulatory dysfunction   • Acute on chronic combined systolic (congestive) and diastolic (congestive) heart failure (HCC)   • Debility   • Restless leg syndrome   • Hypertensive heart and chronic kidney disease with heart failure and stage 1 through stage 4 chronic kidney disease, or unspecified chronic kidney disease (HCC)   • Paroxysmal atrial fibrillation ([facility])   • Left-sided chest wall pain   • WANG (acute kidney injury) ([address] )   • Traumatic open wound of left lower leg     Past Medical History:   Diagnosis Date   • Allergic childhood   • Arthritis do not know   • Cancer ([address] ) 1976   • Cardiogenic shock ([facility] ) 06/26/2019   • CHF (congestive heart failure) ([facility])    • Chronic kidney disease June 2019   • COPD (chronic obstructive pulmonary disease) (HCC) no   • Coronary artery disease 06/19   • Depression no   • Disease of thyroid gland 1976   • Eczema    • Heart murmur childhood   • Hypertension do not knpw   • Photosensitivity     abnormal skin sensitivity to sunlight   • 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HCC)     staging   • Visual impairment childhood     Past Surgical History:   Procedure Laterality Date   • CARDIAC SURGERY  06/19   • CATARACT EXTRACTION Left    • EYE SURGERY     • HEMORRHOID SURGERY     • IR NON-TUNNELED CENTRAL LINE PLACEMENT  07/09/2019   • OOPHORECTOMY      unilateral   • TOTAL ABDOMINAL HYSTERECTOMY       Social History     Socioeconomic History   • Marital status: Single     Spouse name: None   • Number of children: None   • Years of education: None   • Highest education level: None   Occupational History   • None   Tobacco Use   • Smoking status: Never   • Smokeless tobacco: Never   Vaping Use   • Vaping Use: Never used   Substance and Sexual Activity   • Alcohol use: Not Currently     Comment: 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   • Drug use: Never   • Sexual activity: Not Currently   Other Topics Concern   • None   Social History Narrative   • None     Social Determinants of Health     Financial Resource Strain: Not on file   Food Insecurity: Not on file   Transportation Needs: No Transportation Needs (5/13/2023)    PRAPARE - Transportation    • Lack of Transportation (Medical): No    • Lack of Transportation (Non-Medical):  No   Physical Activity: Not on file   Stress: Not on file   Social Connections: Not on file   Intimate Partner Violence: Not on file   Housing Stability: Not on file        Current Outpatient Medications:   •  levothyroxine 100 mcg tablet, TAKE 1 TABLET (100 MCG TOTAL) BY MOUTH DAILY, Disp: 30 tablet, Rfl: 5  •  Ascorbic Acid (vitamin C) 1000 MG tablet, Take 1,000 mg by mouth 2 (two) times a day , Disp: , Rfl:   •  bumetanide (BUMEX) 2 mg tablet, TAKE 2 TABLETS (4 MG TOTAL) BY MOUTH DAILY, Disp: 60 tablet, Rfl: 5  •  calcium carbonate (OS-PAULA) 600 MG tablet, Take 600 mg by mouth 2 (two) times a day with meals, Disp: , Rfl:   •  cholecalciferol (VITAMIN D3) 1,000 units tablet, Take 1,000 Units by mouth daily 8,000 dailty, Disp: , Rfl:   •  Eliquis 5 MG, TAKE 1 TABLET (5 MG TOTAL) BY MOUTH 2 (TWO) TIMES A DAY, Disp: 60 tablet, Rfl: 11  •  hydrALAZINE (APRESOLINE) 10 mg tablet, TAKE 1 TABLET (10 MG TOTAL) BY MOUTH EVERY 8 (EIGHT) HOURS (Patient taking differently: Take 10 mg by mouth 2 (two) times a day), Disp: 90 tablet, Rfl: 5  •  latanoprost (XALATAN) 0 005 % ophthalmic solution, INSTILL ONE DROP IN THE RIGHT EYE AT BEDTIME, Disp: , Rfl:   •  Magnesium 400 MG CAPS, Take 1 capsule (400 mg total) by mouth daily, Disp: , Rfl: 0  •  metoprolol succinate (TOPROL-XL) 50 mg 24 hr tablet, TAKE 2 TABLETS (100 MG TOTAL) BY MOUTH EVERY 12 (TWELVE) HOURS, Disp: 120 tablet, Rfl: 5  •  Multiple Vitamin (MULTI-VITAMIN DAILY PO), 1 tablet 2 (two) times a day  , Disp: , Rfl:   •  Potassium Chloride ER 20 MEQ TBCR, TAKE 2 TABLETS (40 MEQ TOTAL) BY MOUTH 2 (TWO) TIMES A DAY, Disp: 120 tablet, Rfl: 2  •  rOPINIRole (REQUIP XL) 2 MG 24 hr tablet, TAKE 1 TABLET (2 MG TOTAL) BY MOUTH DAILY AT BEDTIME, Disp: 30 tablet, Rfl: 5  •  sacubitril-valsartan (Entresto) 49-51 MG TABS, Take 1 tablet by mouth 2 (two) times a day, Disp: 180 tablet, Rfl: 3  •  traMADol (ULTRAM) 50 mg tablet, ONE TABLET TWICE DAILY, Disp: 60 tablet, Rfl: 0  •  traMADol (ULTRAM) 50 mg tablet, Take 1 tablet (50 mg total) by mouth 2 (two) times a day, Disp: 60 tablet, Rfl: 0  •  Turmeric (QC TUMERIC COMPLEX PO), Take by mouth 2 (two) times a day, Disp: , Rfl:   •  venlafaxine (EFFEXOR) 75 mg tablet, TAKE 1 TABLET (75 MG TOTAL) BY MOUTH EVERY 12 (TWELVE) HOURS, Disp: 60 tablet, Rfl: 5  Family History   Problem Relation Age of Onset   • Alzheimer's disease Mother    • Coronary artery disease Mother    • Dementia Mother    • Diabetes Mother         mellitus   • Heart disease Mother    • Other Father         acute myocardial infarction   • Heart disease Father    • Coronary artery disease Sister    • Other Maternal Grandfather         laryngeal cancer   • Dementia Maternal Aunt    • Diabetes Maternal Aunt    • Heart disease Sister    • Heart disease Brother       Review of Systems   Constitutional: Negative  Respiratory: Negative  Skin: Positive for color change and wound  Allergies  Penicillins and Wellbutrin sr  [bupropion]    OBJECTIVE:  /73   Pulse 80   Temp 97 6 °F (36 4 °C)   Resp 20     Physical Exam  Vitals and nursing note reviewed  Constitutional:       General: She is not in acute distress  Appearance: She is obese  She is not ill-appearing, toxic-appearing or diaphoretic  HENT:      Head: Normocephalic  Pulmonary:      Effort: Pulmonary effort is normal    Musculoskeletal:         General: Swelling present  Left lower leg: Edema present  Skin:     Capillary Refill: Capillary refill takes less than 2 seconds  Neurological:      Mental Status: She is alert and oriented to person, place, and time  Wound 06/14/23 Other (Comment) Leg Left; Anterior (Active)   Wound Image   06/14/23 1310   Wound Description Brown;Eschar;Pink 06/14/23 1312   Milagros-wound Assessment Dry; Intact;Edema 06/14/23 1312   Wound Length (cm) 1 1 cm 06/14/23 1312   Wound Width (cm) 0 5 cm 06/14/23 1312   Wound Depth (cm) 0 1 cm 06/14/23 1312   Wound Surface Area (cm^2) 0 55 cm^2 06/14/23 1312   Wound Volume (cm^3) 0 055 cm^3 06/14/23 1312   Calculated Wound Volume (cm^3) 0 06 cm^3 06/14/23 1312   Drainage Amount Scant 06/14/23 1312   Drainage Description Serosanguineous 06/14/23 1312   Non-staged Wound Description Partial thickness 06/14/23 1312           Wound 06/14/23 Other (Comment) Leg Left; Anterior (Active)   Wound Image   06/14/23 1310   Wound Description Brown;Eschar;Pink 06/14/23 1312 Milagros-wound Assessment Dry; Intact;Edema 06/14/23 1312   Wound Length (cm) 1 1 cm 06/14/23 1312   Wound Width (cm) 0 5 cm 06/14/23 1312   Wound Depth (cm) 0 1 cm 06/14/23 1312   Wound Surface Area (cm^2) 0 55 cm^2 06/14/23 1312   Wound Volume (cm^3) 0 055 cm^3 06/14/23 1312   Calculated Wound Volume (cm^3) 0 06 cm^3 06/14/23 1312   Drainage Amount Scant 06/14/23 1312   Drainage Description Serosanguineous 06/14/23 1312   Non-staged Wound Description Partial thickness 06/14/23 1312                         Diagnosis:  1  Traumatic open wound of left lower leg, initial encounter        Diagnosis ICD-10-CM Associated Orders   1   Traumatic open wound of left lower leg, initial encounter  [id number] [id number]

## 2023-06-15 RX ORDER — TRAMADOL HYDROCHLORIDE 50 MG/1
TABLET ORAL
Qty: 60 TABLET | Refills: 0 | Status: SHIPPED | OUTPATIENT
Start: 2023-06-15

## 2023-06-28 ENCOUNTER — OFFICE VISIT (OUTPATIENT)
Dept: WOUND CARE | Facility: HOSPITAL | Age: 83
End: 2023-06-28
Payer: COMMERCIAL

## 2023-06-28 VITALS
HEART RATE: 82 BPM | RESPIRATION RATE: 16 BRPM | SYSTOLIC BLOOD PRESSURE: 104 MMHG | TEMPERATURE: 97.9 F | DIASTOLIC BLOOD PRESSURE: 78 MMHG

## 2023-06-28 DIAGNOSIS — S81.802A TRAUMATIC OPEN WOUND OF LEFT LOWER LEG, INITIAL ENCOUNTER: Primary | ICD-10-CM

## 2023-06-28 PROCEDURE — 97598 DBRDMT OPN WND ADDL 20CM/<: CPT | Performed by: PODIATRIST

## 2023-06-28 PROCEDURE — 97597 DBRDMT OPN WND 1ST 20 CM/<: CPT | Performed by: PODIATRIST

## 2023-06-28 RX ORDER — LIDOCAINE 40 MG/G
CREAM TOPICAL ONCE
Status: COMPLETED | OUTPATIENT
Start: 2023-06-28 | End: 2023-06-28

## 2023-06-28 RX ADMIN — LIDOCAINE: 40 CREAM TOPICAL at 14:58

## 2023-06-28 NOTE — PATIENT INSTRUCTIONS
Orders Placed This Encounter   Procedures    Wound cleansing and dressings     Wash your hands with soap and water  Remove old dressing, discard into plastic bag and place in trash  Cleanse the wound with mild soap and water prior to applying a clean dressing  Do not use tissue or cotton balls  Do not scrub the wound  Pat dry using gauze  Left leg wound:  Apply Mepitel to cover the wound  You may remove the dressing and shower as well as applying moist warm compresses to left shin area three times a day  When removing the Mepitel, please hang from a flat clean surface such as a cabinet to avoid the Mepitel bunching up  Follow up at the wound center in 2 weeks  Treatment at the wound center today: Cleansed with NSS, and dressed as above       Standing Status:   Future     Standing Expiration Date:   6/28/2024

## 2023-06-28 NOTE — PROGRESS NOTES
"Patient ID: Carolann Patricio is a 80 y o  female Date of Birth 1940     My role is Foot, Ankle and Wound Specialist    PLAN:    The hematoma appears to be slowly resorbing  I recommend to continue the moist warm compresses  The wound is healing nicely  Selective debridement performed  WIll continue Mepilex  Debridement   Wound 06/14/23 Other (Comment) Leg Left; Anterior    Universal Protocol:  Consent: Verbal consent obtained  Risks and benefits: risks, benefits and alternatives were discussed  Consent given by: patient  Time out: Immediately prior to procedure a \"time out\" was called to verify the correct patient, procedure, equipment, support staff and site/side marked as required  Timeout called at: 6/28/2023 3:44 PM   Patient understanding: patient states understanding of the procedure being performed  Patient identity confirmed: verbally with patient      Performed by: physician  Debridement type: selective  Pain control: lidocaine 4%  Pre-debridement measurements  Length (cm): 0 3  Width (cm): 0 2  Depth (cm): 0 1  Surface Area (cm^2): 0 06  Volume (cm^3): 0 01    Post-debridement measurements  Length (cm): 0 3  Width (cm): 0 2  Depth (cm): 0 1  Percent debrided: 100%  Surface Area (cm^2): 0 06  Area debrided (cm^2): 0 06  Volume (cm^3): 0 01  Devitalized tissue debrided: biofilm and callus  Instrument(s) utilized: blade  Bleeding: small  Hemostasis obtained with: pressure  Procedural pain (0-10): insensate  Post-procedural pain: insensate   Response to treatment: procedure was tolerated well          Wound Instructions  Orders Placed This Encounter   Procedures   • Wound cleansing and dressings     Wash your hands with soap and water  Remove old dressing, discard into plastic bag and place in trash  Cleanse the wound with mild soap and water prior to applying a clean dressing  Do not use tissue or cotton balls  Do not scrub the wound  Pat dry using gauze        Left leg wound:  Apply Mepitel to " cover the wound  You may remove the dressing and shower as well as applying moist warm compresses to left shin area three times a day  When removing the Mepitel, please hang from a flat clean surface such as a cabinet to avoid the Mepitel bunching up  Follow up at the wound center in 2 weeks        Treatment at the wound center today: Cleansed with NSS, and dressed as above  Standing Status:   Future     Standing Expiration Date:   6/28/2024         SUBJECTIVE:    Chief Complaint   Patient presents with   • Follow Up Wound Care Visit     LLE wound         Ailyn Gaffney is here for the traumatic leg wound that occurred at home during a fall  She's been using the warm compresses on the leg  The following portions of the patient's history were reviewed and updated as appropriate:   Patient Active Problem List   Diagnosis   • Hyperlipidemia   • Hypertension   • Hypothyroidism   • Obesity (BMI 30-39  9)   • Osteoarthritis   • Type 2 diabetes mellitus with diabetic cataract ([facility] [address] )   • Ambulatory dysfunction   • Acute on chronic combined systolic (congestive) and diastolic (congestive) heart failure (HCC)   • Debility   • Restless leg syndrome   • Hypertensive heart and chronic kidney disease with heart failure and stage 1 through stage 4 chronic kidney disease, or unspecified chronic kidney disease (HCC)   • Paroxysmal atrial fibrillation ([facility])   • Left-sided chest wall pain   • WANG (acute kidney injury) ([facility] [address] )   • Traumatic open wound of left lower leg     Past Medical History:   Diagnosis Date   • Allergic childhood   • Arthritis do not know   • Cancer ([facility] [address] ) 1976   • [facility] ) 06/26/2019   • CHF (congestive heart failure) ([facility])    • Chronic kidney disease June 2019   • COPD (chronic obstructive pulmonary disease) ([facility]) no   • Coronary artery disease 06/19   • Depression no   • Disease of thyroid gland 1976   • Eczema    • Heart murmur childhood   • Hypertension do not knpw   • Photosensitivity abnormal skin sensitivity to sunlight   • 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   • Visual impairment childhood     Past Surgical History:   Procedure Laterality Date   • CARDIAC SURGERY  06/19   • CATARACT EXTRACTION Left    • EYE SURGERY     • HEMORRHOID SURGERY     • IR NON-TUNNELED CENTRAL LINE PLACEMENT  07/09/2019   • OOPHORECTOMY      unilateral   • TOTAL ABDOMINAL HYSTERECTOMY       Social History     Socioeconomic History   • Marital status: Single     Spouse name: None   • Number of children: None   • Years of education: None   • Highest education level: None   Occupational History   • None   Tobacco Use   • Smoking status: Never   • Smokeless tobacco: Never   Vaping Use   • Vaping Use: Never used   Substance and Sexual Activity   • Alcohol use: Not Currently     Comment: 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   • Drug use: Never   • Sexual activity: Not Currently   Other Topics Concern   • None   Social History Narrative   • None     Social Determinants of Health     Financial Resource Strain: Not on file   Food Insecurity: Not on file   Transportation Needs: No Transportation Needs (5/13/2023)    PRAPARE - Transportation    • Lack of Transportation (Medical): No    • Lack of Transportation (Non-Medical):  No   Physical Activity: Not on file   Stress: Not on file   Social Connections: Not on file   Intimate Partner Violence: Not on file   Housing Stability: Not on file        Current Outpatient Medications:   •  traMADol (ULTRAM) 50 mg tablet, TAKE ONE TABLET TWICE DAILY, Disp: 60 tablet, Rfl: 0  •  Ascorbic Acid (vitamin C) 1000 MG tablet, Take 1,000 mg by mouth 2 (two) times a day , Disp: , Rfl:   •  bumetanide (BUMEX) 2 mg tablet, TAKE 2 TABLETS (4 MG TOTAL) BY MOUTH DAILY, Disp: 60 tablet, Rfl: 5  •  calcium carbonate (OS-PAULA) 600 MG tablet, Take 600 mg by mouth 2 (two) times a day with meals, Disp: , Rfl:   •  cholecalciferol (VITAMIN D3) 1,000 units tablet, Take 1,000 Units by mouth daily 8,000 dailty, Disp: , Rfl:   •  Eliquis 5 MG, TAKE 1 TABLET (5 MG TOTAL) BY MOUTH 2 (TWO) TIMES A DAY, Disp: 60 tablet, Rfl: 11  •  hydrALAZINE (APRESOLINE) 10 mg tablet, TAKE 1 TABLET (10 MG TOTAL) BY MOUTH EVERY 8 (EIGHT) HOURS (Patient taking differently: Take 10 mg by mouth 2 (two) times a day), Disp: 90 tablet, Rfl: 5  •  latanoprost (XALATAN) 0 005 % ophthalmic solution, INSTILL ONE DROP IN THE RIGHT EYE AT BEDTIME, Disp: , Rfl:   •  levothyroxine 100 mcg tablet, TAKE 1 TABLET (100 MCG TOTAL) BY MOUTH DAILY, Disp: 30 tablet, Rfl: 5  •  Magnesium 400 MG CAPS, Take 1 capsule (400 mg total) by mouth daily, Disp: , Rfl: 0  •  metoprolol succinate (TOPROL-XL) 50 mg 24 hr tablet, TAKE 2 TABLETS (100 MG TOTAL) BY MOUTH EVERY 12 (TWELVE) HOURS, Disp: 120 tablet, Rfl: 5  •  Multiple Vitamin (MULTI-VITAMIN DAILY PO), 1 tablet 2 (two) times a day  , Disp: , Rfl:   •  Potassium Chloride ER 20 MEQ TBCR, TAKE 2 TABLETS (40 MEQ TOTAL) BY MOUTH 2 (TWO) TIMES A DAY, Disp: 120 tablet, Rfl: 2  •  rOPINIRole (REQUIP XL) 2 MG 24 hr tablet, TAKE 1 TABLET (2 MG TOTAL) BY MOUTH DAILY AT BEDTIME, Disp: 30 tablet, Rfl: 5  •  sacubitril-valsartan (Entresto) 49-51 MG TABS, Take 1 tablet by mouth 2 (two) times a day, Disp: 180 tablet, Rfl: 3  •  traMADol (ULTRAM) 50 mg tablet, Take 1 tablet (50 mg total) by mouth 2 (two) times a day, Disp: 60 tablet, Rfl: 0  •  Turmeric (QC TUMERIC COMPLEX PO), Take by mouth 2 (two) times a day, Disp: , Rfl:   •  venlafaxine (EFFEXOR) 75 mg tablet, TAKE 1 TABLET (75 MG TOTAL) BY MOUTH EVERY 12 (TWELVE) HOURS, Disp: 60 tablet, Rfl: 5  No current facility-administered medications for this visit    Family History   Problem Relation Age of Onset   • Alzheimer's disease Mother    • Coronary artery disease Mother    • Dementia Mother    • Diabetes Mother         mellitus   • Heart disease Mother    • Other Father         acute myocardial infarction   • Heart disease Father    • Coronary artery disease Sister • Other Maternal Grandfather         laryngeal cancer   • Dementia Maternal Aunt    • Diabetes Maternal Aunt    • Heart disease Sister    • Heart disease Brother       Review of Systems   Constitutional: Negative  Respiratory: Negative  Skin: Positive for color change and wound  Allergies  Penicillins and Wellbutrin sr  [bupropion]    OBJECTIVE:  /78   Pulse 82   Temp 97 9 °F (36 6 °C)   Resp 16     Physical Exam  Vitals and nursing note reviewed  Constitutional:       General: She is not in acute distress  Appearance: She is obese  She is not ill-appearing, toxic-appearing or diaphoretic  HENT:      Head: Normocephalic  Pulmonary:      Effort: Pulmonary effort is normal    Musculoskeletal:         General: Swelling present  Left lower leg: Edema present  Skin:     Capillary Refill: Capillary refill takes less than 2 seconds  Neurological:      Mental Status: She is alert and oriented to person, place, and time  Wound 06/14/23 Other (Comment) Leg Left; Anterior (Active)   Wound Image   06/28/23 1436   Wound Description Pink 06/28/23 1439   Milagros-wound Assessment Hyperpigmented;Swelling; Intact 06/28/23 1439   Wound Length (cm) 0 3 cm 06/28/23 1439   Wound Width (cm) 0 2 cm 06/28/23 1439   Wound Depth (cm) 0 1 cm 06/28/23 1439   Wound Surface Area (cm^2) 0 06 cm^2 06/28/23 1439   Wound Volume (cm^3) 0 006 cm^3 06/28/23 1439   Calculated Wound Volume (cm^3) 0 01 cm^3 06/28/23 1439   Change in Wound Size % 83 33 06/28/23 1439   Drainage Amount Scant 06/28/23 1439   Drainage Description Serosanguineous 06/28/23 1439   Non-staged Wound Description Full thickness 06/28/23 1439   Treatments Irrigation with NSS 06/28/23 1439           Wound 06/14/23 Other (Comment) Leg Left; Anterior (Active)   Wound Image   06/28/23 1436   Wound Description Pink 06/28/23 1439   Milagros-wound Assessment Hyperpigmented;Swelling; Intact 06/28/23 1439   Wound Length (cm) 0 3 cm 06/28/23 1439   Wound Width (cm) 0 2 cm 06/28/23 1439   Wound Depth (cm) 0 1 cm 06/28/23 1439   Wound Surface Area (cm^2) 0 06 cm^2 06/28/23 1439   Wound Volume (cm^3) 0 006 cm^3 06/28/23 1439   Calculated Wound Volume (cm^3) 0 01 cm^3 06/28/23 1439   Change in Wound Size % 83 33 06/28/23 1439   Drainage Amount Scant 06/28/23 1439   Drainage Description Serosanguineous 06/28/23 1439   Non-staged Wound Description Full thickness 06/28/23 1439   Treatments Irrigation with NSS 06/28/23 1439                         Diagnosis:  1  Traumatic open wound of left lower leg, initial encounter  -     lidocaine (LMX) 4 % cream  -     Wound cleansing and dressings; Future        Diagnosis ICD-10-CM Associated Orders   1   Traumatic open wound of left lower leg, initial encounter  S81 802A lidocaine (LMX) 4 % cream     Wound cleansing and dressings

## 2023-07-10 ENCOUNTER — REMOTE DEVICE CLINIC VISIT (OUTPATIENT)
Dept: CARDIOLOGY CLINIC | Facility: CLINIC | Age: 83
End: 2023-07-10

## 2023-07-10 DIAGNOSIS — Z95.810 AICD (AUTOMATIC CARDIOVERTER/DEFIBRILLATOR) PRESENT: Primary | ICD-10-CM

## 2023-07-10 PROCEDURE — RECHECK: Performed by: INTERNAL MEDICINE

## 2023-07-10 NOTE — PROGRESS NOTES
Results for orders placed or performed in visit on 07/10/23   Cardiac EP device report    Narrative    MDT-DUAL CHAMBER "HIS" ICD/VVIR MODEACTIVE SYSTEM IS MRI CONDITIONAL  CARELINK TRANSMISSION: BATTERY VOLTAGE NEARING KHOI-9 MONS. WILL SCHEDULE MONTHLY BATTERY CHECKS. CRT PACING (BIV) 100% LV 0%. HIGH RV THRESHOLDS/CHRONIC PER TRENDS. ALL AVAILABLE LEAD PARAMETERS WITHIN NORMAL LIMITS. NO SIGNIFICANT HIGH RATE EPISODES. OPTI-VOL WITHIN NORMAL LIMITS. NORMAL DEVICE FUNCTION.  NC

## 2023-07-12 DIAGNOSIS — I50.22 CHRONIC HFREF (HEART FAILURE WITH REDUCED EJECTION FRACTION) (HCC): ICD-10-CM

## 2023-07-12 DIAGNOSIS — I10 ESSENTIAL HYPERTENSION: ICD-10-CM

## 2023-07-12 DIAGNOSIS — G25.81 RESTLESS LEG SYNDROME: ICD-10-CM

## 2023-07-12 DIAGNOSIS — I48.91 ATRIAL FIBRILLATION, UNSPECIFIED TYPE (HCC): ICD-10-CM

## 2023-07-12 DIAGNOSIS — I50.9 CHF (CONGESTIVE HEART FAILURE) (HCC): ICD-10-CM

## 2023-07-12 RX ORDER — METOPROLOL SUCCINATE 50 MG/1
100 TABLET, EXTENDED RELEASE ORAL EVERY 12 HOURS SCHEDULED
Qty: 120 TABLET | Refills: 5 | Status: SHIPPED | OUTPATIENT
Start: 2023-07-12

## 2023-07-12 RX ORDER — HYDRALAZINE HYDROCHLORIDE 10 MG/1
10 TABLET, FILM COATED ORAL 2 TIMES DAILY
Qty: 90 TABLET | Refills: 1 | Status: SHIPPED | OUTPATIENT
Start: 2023-07-12

## 2023-07-12 RX ORDER — ROPINIROLE 2 MG/1
2 TABLET, FILM COATED, EXTENDED RELEASE ORAL
Qty: 30 TABLET | Refills: 5 | Status: SHIPPED | OUTPATIENT
Start: 2023-07-12

## 2023-07-25 DIAGNOSIS — M54.41 ACUTE RIGHT-SIDED LOW BACK PAIN WITH RIGHT-SIDED SCIATICA: ICD-10-CM

## 2023-07-27 RX ORDER — TRAMADOL HYDROCHLORIDE 50 MG/1
TABLET ORAL
Qty: 60 TABLET | Refills: 0 | Status: SHIPPED | OUTPATIENT
Start: 2023-07-27

## 2023-08-01 ENCOUNTER — RA CDI HCC (OUTPATIENT)
Dept: OTHER | Facility: HOSPITAL | Age: 83
End: 2023-08-01

## 2023-08-01 NOTE — PROGRESS NOTES
[facility] [address] [facility] coding opportunities       Chart reviewed, no opportunity found: CHART REVIEWED, NO OPPORTUNITY FOUND        Patients Insurance     Medicare Insurance: [employer]

## 2023-08-09 ENCOUNTER — REMOTE DEVICE CLINIC VISIT (OUTPATIENT)
Dept: CARDIOLOGY CLINIC | Facility: CLINIC | Age: 83
End: 2023-08-09

## 2023-08-09 DIAGNOSIS — Z95.810 PRESENCE OF AUTOMATIC CARDIOVERTER/DEFIBRILLATOR (AICD): Primary | ICD-10-CM

## 2023-08-09 PROCEDURE — RECHECK: Performed by: INTERNAL MEDICINE

## 2023-08-09 NOTE — PROGRESS NOTES
Results for orders placed or performed in visit on 08/09/23   Cardiac EP device report    Narrative    MDT-DUAL CHAMBER "HIS" ICD/VVIR MODEACTIVE SYSTEM IS MRI CONDITIONAL  CARELINK TRANSMISSION: N/BBATTERY VOLTAGE NEARING KHOI. (10 MONS)  WILL SCHEDULE MONTHLY BATTERY CHECKS. BVP 99%. ALL LEAD PARAMETERS WITHIN NORMAL LIMITS. NO SIGNIFICANT HIGH RATE EPISODES. OPTI-VOL WITHIN NORMAL LIMITS. NORMAL DEVICE FUNCTION. ---INMAN

## 2023-08-10 DIAGNOSIS — I50.22 CHRONIC HFREF (HEART FAILURE WITH REDUCED EJECTION FRACTION) (HCC): ICD-10-CM

## 2023-08-10 DIAGNOSIS — I50.9 CHF (CONGESTIVE HEART FAILURE) (HCC): ICD-10-CM

## 2023-08-15 RX ORDER — POTASSIUM CHLORIDE 1500 MG/1
TABLET, EXTENDED RELEASE ORAL
Qty: 120 TABLET | Refills: 2 | Status: SHIPPED | OUTPATIENT
Start: 2023-08-15

## 2023-08-22 ENCOUNTER — TELEPHONE (OUTPATIENT)
Dept: FAMILY MEDICINE CLINIC | Facility: CLINIC | Age: 83
End: 2023-08-22

## 2023-08-29 DIAGNOSIS — M54.41 ACUTE RIGHT-SIDED LOW BACK PAIN WITH RIGHT-SIDED SCIATICA: ICD-10-CM

## 2023-08-29 RX ORDER — TRAMADOL HYDROCHLORIDE 50 MG/1
TABLET ORAL
Qty: 60 TABLET | Refills: 0 | Status: SHIPPED | OUTPATIENT
Start: 2023-08-29

## 2023-09-08 ENCOUNTER — TELEPHONE (OUTPATIENT)
Dept: CARDIOLOGY CLINIC | Facility: CLINIC | Age: 83
End: 2023-09-08

## 2023-09-08 ENCOUNTER — REMOTE DEVICE CLINIC VISIT (OUTPATIENT)
Dept: CARDIOLOGY CLINIC | Facility: CLINIC | Age: 83
End: 2023-09-08

## 2023-09-08 DIAGNOSIS — Z95.810 AICD (AUTOMATIC CARDIOVERTER/DEFIBRILLATOR) PRESENT: Primary | ICD-10-CM

## 2023-09-08 PROCEDURE — RECHECK

## 2023-09-08 NOTE — TELEPHONE ENCOUNTER
Opti-vol cross 8/28 & ongoing; Demadex, K+; recheck 10/9/23. Thank you     NB; CARELINK TRANSMISSION (BATTERY): BATTERY VOLTAGE NEARING KHOI (10 MOS).  WILL SCHEDULE MONTHLY BATTERY CHECKS. BVP 99.9% (VSR PACE <0.1%); CHRONIC HIGH RV CAPT THRESHOLDS/STABLE; ALL AVAILABLE OTHER LEAD PARAMETERS WITHIN NORMAL LIMITS. NO SIGNIFICANT HIGH RATE OR V-SENSE EPISODES. OPTI-VOL FLUID THRESHOLD CROSSED 8/28/23 & ONGOING; TASK TO CHF RN; PATIENT TAKES BUMETANIDE, K+; RECHECK WITH NEXT 1MO BATTERY CHECK 10/9/23.  NORMAL DEVICE FUNCTION.  ES

## 2023-09-08 NOTE — PROGRESS NOTES
Results for orders placed or performed in visit on 09/08/23   Cardiac EP device report    Narrative    MDT-DUAL CHAMBER "HIS" ICD/VVIR MODEACTIVE SYSTEM IS MRI CONDITIONAL  NB; CARELINK TRANSMISSION (BATTERY): BATTERY VOLTAGE NEARING KHOI (10 MOS). WILL SCHEDULE MONTHLY BATTERY CHECKS. BVP 99.9% (VSR PACE <0.1%); CHRONIC HIGH RV CAPT THRESHOLDS/STABLE; ALL AVAILABLE OTHER LEAD PARAMETERS WITHIN NORMAL LIMITS. NO SIGNIFICANT HIGH RATE OR V-SENSE EPISODES. OPTI-VOL FLUID THRESHOLD CROSSED 8/28/23 & ONGOING; TASK TO CHF RN; PATIENT TAKES BUMETANIDE, K+; RECHECK WITH NEXT 1MO BATTERY CHECK 10/9/23. NORMAL DEVICE FUNCTION.   ES

## 2023-09-11 NOTE — TELEPHONE ENCOUNTER
P/c'd, returned her call. She states she has missed some doses of her bumex. She is working and moving. So her eating habits are different right now also. As soon as she is done moving she will get back to her normal routine. She denies wt gain or any CHF symptoms presently.

## 2023-09-18 NOTE — PROGRESS NOTES
Heart Failure Outpatient Progress Note - Guy Santos 80 y.o. female MRN: [id number]    @ Encounter: [id number]      Assessment/Plan:    Patient Active Problem List    Diagnosis Date Noted   • Left-sided chest wall pain 10/12/2022   • WANG (acute kidney injury) ([address]) 10/12/2022   • Hypertensive heart and chronic kidney disease with heart failure and stage 1 through stage 4 chronic kidney disease, or unspecified chronic kidney disease ([address]) 04/15/2021   • Restless leg syndrome 01/27/2020   • Debility 07/19/2019   • Acute on chronic combined systolic (congestive) and diastolic (congestive) heart failure ([address]) 07/15/2019   • Ambulatory dysfunction 07/12/2019   • Type 2 diabetes mellitus with diabetic cataract ([address]) 10/07/2015   • Hyperlipidemia 08/15/2012   • Hypertension 08/15/2012   • Hypothyroidism 08/15/2012   • Obesity (BMI 30-39.9) 08/15/2012   • Osteoarthritis 08/15/2012   • Traumatic open wound of left lower leg 04/17/2023   • Paroxysmal atrial fibrillation ([address]) 03/07/2022       # Chronic HFimpEF w/ partial recovery, Stage C, NYHA III  Etiology: NICM/tachy-mediated given h/o AF with RVR , +/- ischemia given patient's risk factors for CAD, strong family history, and septal Q waves on EKG with severe septal hypokinesis on echo, less likely viral, toxin induced or infiltrative.  S/P cardiogenic shock in past  Now S/P AVJ ablation with BiV AICD implant with no high rate episodes. Weight: 191 lbs  NT proBNP: 10/17/22: 4761  1/14/21: 716  6/2/20: 1089    Studies- personally reviewed by myself  Echo 3/8/23:  LVEF: 65%  RV: mildly dilated  LA moderately dilated  PASP: normal    Echo 11/20/20  LVEF: 60%  RV: mildly dilated, mildly reduced  PASP: 60 mmHg  RVOT: no notching    TTE completed on 03/16/2020 (not euvolemic): LVEF 30%. LVIDd 5.44 cm. Moderately dilated RV with moderately to markedly reduced RVSF. NAN. Moderate MR. Mild to moderate TR. Dilated IVC.                 Echocardiogram (limited) from 07/04/2019:  LVEF: 45%; mild diffuse hypokinesis. LVIDd: 3.8 cm. MR: No MR. Moderate annular calcification. Other: Dilated LA.     Echocardiogram from 06/20/2019:  LVEF: 25%  LVIDd: 4.6 cm. RV: Dilated and hypokinetic. MR: Moderate.     Neurohormonal Blockade:  --Beta Blocker: metoprolol succinate 100 mg Q12  --ACEi, ARB or ARNi: Entresto 49/51 mg BID.   --SVR reduction: hydralazine 10 mg BID  --Aldosterone Receptor Blocker: Renal function precludes  --SGLT2i:   -- Diuretic : Bumex 4 mg once daily     Sudden Cardiac Death Risk Reduction:  Since been DC.   --ICD: MDT DC HIS ICD  Interrogation 9/8/23: BVP 99.9%, optivol crossed  Interrogation 10/21/22:  99.8%, no high rate episodes, optivol normal  Interrogation 7/15/22: BVP 99.9%, elevated RV capture threshold. 2 VT, longest 9 beats at 196 bpm,optivol normal     Electrical Resynchronization:  --Interrogation: Narrow QRS.    Advanced Therapies (if appropriate): --Inotrope: N/A  --LVAD/Transplant Candidacy: Will continue to monitor.     # Pulmonary Hypertension, WHO group 2 from now HFpEF and likely significant CORAZON, untreated (Group 3)  Stressed polysomnogram, continued volume management    # PAF w/ hx AF with RVR.  S/p AV node ablation with CRT-D with Dr. Hooper Neighbor 03/18/2020. Anticoagulation on Eliquis.   Rate: metoprolol succinate 100 mg Q12    # HTN. Controlled.      # HLD. On statin therapy. 5/18/22: , HDL 57     # Hypothyroidism. History of radioablation in the past now on replacement therapy.  TSH stable     # Type II DM.  4/17/23: HgA1C 6.5%     # CORAZON. Noncompliant with CPAP in the past .  Still has not complete sleep study, will not     # CKD.  Renal function stable last checked. , Cr 1.3 on 10/18/22     # History of transaminitis in setting of cardiogenic shock     #  Abnormal EKG. Q waves noted in septal leads concerning for possible old infarct.  Lexiscan stress to eval for underlying ischemia still not completed.     # H/O tachy-oniel syndrome.      #  History of alcohol abuse.  Has been abstinent for years    TODAY'S PLAN:  Optivol up on 9/8 check - was not taking diuretic for a little while, back on it now  Indirect PA pressures looked good on 3/8 echo  Lipids need improvement with DM  Continue current diuretic for combined systolic and diastolic HF  I ordered sleep study as I do suspect hypoxia is contributing to her [id number] [address] Tiwari- she has yet to do and states will not do  Discussed weight management  --2g sodium diet  - Daily weights    HPI:      81 yo female who follows for chronic BiV heart failure, afib, HTN, HLD, CORAZON, DM2. Back in June 2019 admitted with afib with RVR and decompensated heart failure. Had not had a cardiac hx. Echo showed EF: 25-30%, underwent EBEN/ CV but went back into afib. Started on amiodarone load. Did have junctional rhythm immediate post conversion and showing signs of tachy oniel syndrome. Given these findings, PPM with possible AICD was suggested with cardioversion thereafter.         On 6/25 patient was taken to the cath lab for planned dual chamber AICD but apparently became hypotensive and markedly SOB upon induction with propofol. She was subsequently transferred to the ICU for further management of her heart failure. She continued to be in [address] [place] [address] upon transfer. Through the night, patient's condition began to worsen and she became cool, clammy with N/V and loss of obtainable BP. Started on pressor support and lined. SVO2 at that point 32% with signs of lactic acidosis, WANG. Then started on milrinone gtt at 0.25 mcg/kg/min. She was able to be weaned off inotropic support. She saw Dr Zita Valdovinos in follow up and her afib rates were high so amio stopped and focused on rate control with increase in Toprol to 75 mg BID. Admitted to [facility] from 03/11 to 03/24/2020 after presenting (as the recommendation of many of her providers) with bilateral LE edema and weight gain.  In ED was found to have NT-proBNP of 4600 and be in atrial fibrillation with RVR (rates in 150s). She was then started on IV diltiazem and received IV Lasix. Diltiazem was later stopped and amiodarone drip was started. Switched from IV Lasix to IV Bumex on 03/41. EP was consulted. TTE revealed LVEF 30% and decision was made to proceed with AV node ablation and CRT-D implantation once optimized. HF team recommended her being discharged home on Bumex 4 mg BID with PRN metolazone; however, she was discharged home on PO Bumex 4 mg daily without PRN metolazone prescribed. Lost 18 lbs during this admission. Was in hospital with acute on chronic HF, diuresed and discharged on Bumex 4 mg daily, Toprol  mg BID and Entresto 49/51 mg BID; decompensation felt to be due to holding diuretic and entresto, maybe MAP driven    Interval History:  Moved [place] [address] [place] to [place] in [employer] 9/8/23: BVP 99.9%, optivol crossed  Had not been taking diuretic  Back down with restart of diuretic    Works at [employer] in [address] [place] [address] [place]: Negative for activity change, appetite change, fatigue and unexpected weight change. HENT: Negative for congestion and nosebleeds. Eyes: Negative. Respiratory: Negative for cough, chest tightness and shortness of breath. Cardiovascular: Negative for chest pain, palpitations and leg swelling. Gastrointestinal: Negative for abdominal distention. Endocrine: Negative. Genitourinary: Negative. Musculoskeletal: Negative. Skin: Negative. Neurological: Negative for dizziness, syncope and weakness. Hematological: Negative. Psychiatric/Behavioral: Negative.         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     Current Outpatient Medications:   •  traMADol (ULTRAM) 50 mg tablet, TAKE ONE TABLET TWICE DAILY, Disp: 60 tablet, Rfl: 0  •  Ascorbic Acid (vitamin C) 1000 MG tablet, Take 1,000 mg by mouth 2 (two) times a day , Disp: , Rfl:   •  bumetanide (BUMEX) 2 mg tablet, TAKE 2 TABLETS (4 MG TOTAL) BY MOUTH DAILY, Disp: 60 tablet, Rfl: 5  •  calcium carbonate (OS-PAULA) 600 MG tablet, Take 600 mg by mouth 2 (two) times a day with meals, Disp: , Rfl:   •  cholecalciferol (VITAMIN D3) 1,000 units tablet, Take 1,000 Units by mouth daily 8,000 dailty, Disp: , Rfl:   •  Eliquis 5 MG, TAKE 1 TABLET (5 MG TOTAL) BY MOUTH 2 (TWO) TIMES A DAY, Disp: 60 tablet, Rfl: 11  •  hydrALAZINE (APRESOLINE) 10 mg tablet, Take 1 tablet (10 mg total) by mouth 2 (two) times a day, Disp: 90 tablet, Rfl: 1  •  latanoprost (XALATAN) 0.005 % ophthalmic solution, INSTILL ONE DROP IN THE RIGHT EYE AT BEDTIME, Disp: , Rfl:   •  levothyroxine 100 mcg tablet, TAKE 1 TABLET (100 MCG TOTAL) BY MOUTH DAILY, Disp: 30 tablet, Rfl: 5  •  Magnesium 400 MG CAPS, Take 1 capsule (400 mg total) by mouth daily, Disp: , Rfl: 0  •  metoprolol succinate (TOPROL-XL) 50 mg 24 hr tablet, TAKE 2 TABLETS (100 MG TOTAL) BY MOUTH EVERY 12 (TWELVE) HOURS, Disp: 120 tablet, Rfl: 5  •  Multiple Vitamin (MULTI-VITAMIN DAILY PO), 1 tablet 2 (two) times a day  , Disp: , Rfl:   •  Potassium Chloride ER 20 MEQ TBCR, TAKE 2 TABLETS (40 MEQ TOTAL) BY MOUTH 2 (TWO) TIMES A DAY, Disp: 120 tablet, Rfl: 2  •  rOPINIRole (REQUIP XL) 2 MG 24 hr tablet, TAKE 1 TABLET (2 MG TOTAL) BY MOUTH DAILY AT BEDTIME, Disp: 30 tablet, Rfl: 5  •  sacubitril-valsartan (Entresto) 49-51 MG TABS, Take 1 tablet by mouth 2 (two) times a day, Disp: 180 tablet, Rfl: 3  •  traMADol (ULTRAM) 50 mg tablet, Take 1 tablet (50 mg total) by mouth 2 (two) times a day, Disp: 60 tablet, Rfl: 0  •  Turmeric (QC TUMERIC COMPLEX PO), Take by mouth 2 (two) times a day, Disp: , Rfl:   •  venlafaxine (EFFEXOR) 75 mg tablet, TAKE 1 TABLET (75 MG TOTAL) BY MOUTH EVERY 12 (TWELVE) HOURS, Disp: 60 tablet, Rfl: 5    Social History     Socioeconomic History   • Marital status: Single     Spouse name: Not on file   • Number of children: Not on file   • Years of education: Not on file   • Highest education level: Not on file   Occupational History   • Not on file   Tobacco Use   • Smoking status: Never   • Smokeless tobacco: Never   Vaping Use   • Vaping Use: Never used   Substance and Sexual Activity   • Alcohol use: Not Currently     Comment: 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   • Drug use: Never   • Sexual activity: Not Currently   Other Topics Concern   • Not on file   Social History Narrative   • Not on file     Social Determinants of Health     Financial Resource Strain: Not on file   Food Insecurity: Not on file   Transportation Needs: No Transportation Needs (5/13/2023)    PRAPARE - Transportation    • Lack of Transportation (Medical): No    • Lack of Transportation (Non-Medical):  No   Physical Activity: Not on file   Stress: Not on file   Social Connections: Not on file   Intimate Partner Violence: Not on file   Housing Stability: Not on file       Family History   Problem Relation Age of Onset   • Alzheimer's disease Mother    • Coronary artery disease Mother    • Dementia Mother    • Diabetes Mother         mellitus   • Heart disease Mother    • Other Father         acute myocardial infarction   • Heart disease Father    • Coronary artery disease Sister    • Other Maternal Grandfather laryngeal cancer   • Dementia Maternal Aunt    • Diabetes Maternal Aunt    • Heart disease Sister    • Heart disease Brother        Physical Exam:    Vitals: There were no vitals filed for this visit. Physical Exam  Constitutional:       Appearance: She is well-developed. HENT:      Head: Normocephalic and atraumatic. Eyes:      Pupils: Pupils are equal, round, and reactive to light. Neck:      Vascular: No JVD. Cardiovascular:      Rate and Rhythm: Normal rate and regular rhythm. Heart sounds: No murmur heard. Pulmonary:      Effort: Pulmonary effort is normal. No respiratory distress. Breath sounds: Normal breath sounds. Abdominal:      General: There is no distension. Palpations: Abdomen is soft. Tenderness: There is no abdominal tenderness. Musculoskeletal:         General: Normal range of motion. Cervical back: Normal range of motion. Skin:     General: Skin is warm and dry. Findings: No rash. Neurological:      Mental Status: She is alert and oriented to person, place, and time.        Labs & Results:    Lab Results   Component Value Date    SODIUM 137 11/30/2022    K 4.9 11/30/2022     11/30/2022    CO2 30 11/30/2022    BUN 19 11/30/2022    CREATININE 0.96 11/30/2022    GLUC 167 (H) 10/18/2022    CALCIUM 9.8 11/30/2022     Lab Results   Component Value Date    WBC 12.27 (H) 10/18/2022    HGB 11.8 10/18/2022    HCT 35.2 10/18/2022    MCV 97 10/18/2022     10/18/2022     Lab Results   Component Value Date    NTBNP 4,761 (H) 10/17/2022      Lab Results   Component Value Date    CHOLESTEROL 210 (H) 05/18/2022    CHOLESTEROL 112 03/12/2020    CHOLESTEROL 177 03/18/2019     Lab Results   Component Value Date    HDL 57 05/18/2022    HDL 38 (L) 03/12/2020    HDL 66 03/18/2019     Lab Results   Component Value Date    TRIG 142 05/18/2022    TRIG 48 03/12/2020    TRIG 82 03/18/2019     Lab Results   Component Value Date    [address] 153 05/18/2022 [address] [facility] [address] [address] 74 03/12/2020    [address] [facility] [address] [address] 106 02/07/2017       EKG personally reviewed by Merline Sours. Counseling / Coordination of Care  Time spent today 25 minutes. Greater than 50% of total time was spent with the patient and / or family counseling and / or coordination of care. We went over current diagnosis, most recent studies and any changes in treatment. Thank you for the opportunity to participate in the care of this patient.     Zak Hernandez PULMONARY HYPERTENSION  MEDICAL DIRECTOR OF [address] [place] [address]

## 2023-09-19 ENCOUNTER — OFFICE VISIT (OUTPATIENT)
Dept: CARDIOLOGY CLINIC | Facility: CLINIC | Age: 83
End: 2023-09-19
Payer: COMMERCIAL

## 2023-09-19 VITALS
OXYGEN SATURATION: 97 % | DIASTOLIC BLOOD PRESSURE: 60 MMHG | HEART RATE: 85 BPM | WEIGHT: 191 LBS | SYSTOLIC BLOOD PRESSURE: 106 MMHG | BODY MASS INDEX: 36.06 KG/M2 | HEIGHT: 61 IN

## 2023-09-19 DIAGNOSIS — I50.32 CHRONIC DIASTOLIC CHF (CONGESTIVE HEART FAILURE), NYHA CLASS 3 (HCC): ICD-10-CM

## 2023-09-19 DIAGNOSIS — I13.0 HYPERTENSIVE HEART AND CHRONIC KIDNEY DISEASE WITH HEART FAILURE AND STAGE 1 THROUGH STAGE 4 CHRONIC KIDNEY DISEASE, OR UNSPECIFIED CHRONIC KIDNEY DISEASE (HCC): Primary | ICD-10-CM

## 2023-09-19 DIAGNOSIS — E78.00 PURE HYPERCHOLESTEROLEMIA: ICD-10-CM

## 2023-09-19 DIAGNOSIS — I48.0 PAROXYSMAL ATRIAL FIBRILLATION (HCC): ICD-10-CM

## 2023-09-19 PROCEDURE — 99214 OFFICE O/P EST MOD 30 MIN: CPT | Performed by: INTERNAL MEDICINE

## 2023-09-28 ENCOUNTER — OFFICE VISIT (OUTPATIENT)
Dept: FAMILY MEDICINE CLINIC | Facility: CLINIC | Age: 83
End: 2023-09-28
Payer: COMMERCIAL

## 2023-09-28 VITALS
WEIGHT: 186.4 LBS | HEART RATE: 92 BPM | DIASTOLIC BLOOD PRESSURE: 80 MMHG | SYSTOLIC BLOOD PRESSURE: 138 MMHG | HEIGHT: 61 IN | RESPIRATION RATE: 16 BRPM | BODY MASS INDEX: 35.19 KG/M2 | TEMPERATURE: 97.5 F | OXYGEN SATURATION: 99 %

## 2023-09-28 DIAGNOSIS — E03.9 HYPOTHYROIDISM, UNSPECIFIED TYPE: ICD-10-CM

## 2023-09-28 DIAGNOSIS — E78.00 PURE HYPERCHOLESTEROLEMIA: ICD-10-CM

## 2023-09-28 DIAGNOSIS — E66.9 OBESITY (BMI 30-39.9): ICD-10-CM

## 2023-09-28 DIAGNOSIS — M54.41 ACUTE RIGHT-SIDED LOW BACK PAIN WITH RIGHT-SIDED SCIATICA: ICD-10-CM

## 2023-09-28 DIAGNOSIS — E11.36 TYPE 2 DIABETES MELLITUS WITH DIABETIC CATARACT, WITHOUT LONG-TERM CURRENT USE OF INSULIN (HCC): ICD-10-CM

## 2023-09-28 DIAGNOSIS — Z00.00 ENCOUNTER FOR ANNUAL WELLNESS EXAM IN MEDICARE PATIENT: Primary | ICD-10-CM

## 2023-09-28 DIAGNOSIS — G25.81 RESTLESS LEG SYNDROME: ICD-10-CM

## 2023-09-28 DIAGNOSIS — I13.0 HYPERTENSIVE HEART AND CHRONIC KIDNEY DISEASE WITH HEART FAILURE AND STAGE 1 THROUGH STAGE 4 CHRONIC KIDNEY DISEASE, OR UNSPECIFIED CHRONIC KIDNEY DISEASE (HCC): ICD-10-CM

## 2023-09-28 DIAGNOSIS — F11.20 CONTINUOUS OPIOID DEPENDENCE (HCC): ICD-10-CM

## 2023-09-28 DIAGNOSIS — N18.4 CHRONIC KIDNEY DISEASE, STAGE 4 (SEVERE) (HCC): ICD-10-CM

## 2023-09-28 DIAGNOSIS — I10 PRIMARY HYPERTENSION: ICD-10-CM

## 2023-09-28 DIAGNOSIS — I48.0 PAROXYSMAL ATRIAL FIBRILLATION (HCC): ICD-10-CM

## 2023-09-28 LAB — SL AMB POCT HEMOGLOBIN AIC: 6.1 (ref ?–6.5)

## 2023-09-28 PROCEDURE — G0439 PPPS, SUBSEQ VISIT: HCPCS | Performed by: NURSE PRACTITIONER

## 2023-09-28 PROCEDURE — 99214 OFFICE O/P EST MOD 30 MIN: CPT | Performed by: NURSE PRACTITIONER

## 2023-09-28 PROCEDURE — 83036 HEMOGLOBIN GLYCOSYLATED A1C: CPT | Performed by: NURSE PRACTITIONER

## 2023-09-28 RX ORDER — TRAMADOL HYDROCHLORIDE 50 MG/1
50 TABLET ORAL 2 TIMES DAILY
Qty: 60 TABLET | Refills: 0 | Status: SHIPPED | OUTPATIENT
Start: 2023-09-28

## 2023-09-28 NOTE — ASSESSMENT & PLAN NOTE
Wt Readings from Last 3 Encounters:   09/28/23 84.6 kg (186 lb 6.4 oz)   09/19/23 86.6 kg (191 lb)   05/18/23 86.6 kg (191 lb)     Stable and managed by Cardiology   Continue current medication regimen  Follow a low sodium diet

## 2023-09-28 NOTE — ASSESSMENT & PLAN NOTE
Lab Results   Component Value Date    EGFR 55 11/30/2022    EGFR 38 10/18/2022    EGFR 43 10/17/2022    CREATININE 0.96 11/30/2022    CREATININE 1.30 10/18/2022    CREATININE 1.18 10/17/2022     Updated kidney function ordered today  May need Nephrology consult based on lab results  Stay well hydrated, avoid nephrotoxic agents

## 2023-09-28 NOTE — PATIENT INSTRUCTIONS
Medicare Preventive Visit Patient Instructions  Thank you for completing your Welcome to Medicare Visit or Medicare Annual Wellness Visit today. Your next wellness visit will be due in one year (9/28/2024). The screening/preventive services that you may require over the next 5-10 years are detailed below. Some tests may not apply to you based off risk factors and/or age. Screening tests ordered at today's visit but not completed yet may show as past due. Also, please note that scanned in results may not display below. Preventive Screenings:  Service Recommendations Previous Testing/Comments   Colorectal Cancer Screening  * Colonoscopy    * Fecal Occult Blood Test (FOBT)/Fecal Immunochemical Test (FIT)  * Fecal DNA/Cologuard Test  * Flexible Sigmoidoscopy Age: 43-73 years old   Colonoscopy: every 10 years (may be performed more frequently if at higher risk)  OR  FOBT/FIT: every 1 year  OR  Cologuard: every 3 years  OR  Sigmoidoscopy: every 5 years  Screening may be recommended earlier than age 39 if at higher risk for colorectal cancer. Also, an individualized decision between you and your healthcare provider will decide whether screening between the ages of 77-80 would be appropriate. Colonoscopy: Not on file  FOBT/FIT: Not on file  Cologuard: Not on file  Sigmoidoscopy: Not on file          Breast Cancer Screening Age: 36 years old  Frequency: every 1-2 years  Not required if history of left and right mastectomy Mammogram: Not on file        Cervical Cancer Screening Between the ages of 21-29, pap smear recommended once every 3 years. Between the ages of 32-69, can perform pap smear with HPV co-testing every 5 years.    Recommendations may differ for women with a history of total hysterectomy, cervical cancer, or abnormal pap smears in past. Pap Smear: Not on file    Screening Not Indicated   Hepatitis C Screening Once for adults born between [address] [place] [address]  More frequently in patients at high risk for Hepatitis C Hep C Antibody: Not on file        Diabetes Screening 1-2 times per year if you're at risk for diabetes or have pre-diabetes Fasting glucose: 154 mg/dL (11/30/2022)  A1C: 6.5 (4/17/2023)  Screening Not Indicated  History Diabetes   Cholesterol Screening Once every 5 years if you don't have a lipid disorder. May order more often based on risk factors. Lipid panel: 05/18/2022    Screening Not Indicated  History Lipid Disorder     Other Preventive Screenings Covered by Medicare:  1. Abdominal Aortic Aneurysm (AAA) Screening: covered once if your at risk. You're considered to be at risk if you have a family history of AAA. 2. Lung Cancer Screening: covers low dose CT scan once per year if you meet all of the following conditions: (1) Age 48-67; (2) No signs or symptoms of lung cancer; (3) Current smoker or have quit smoking within the last 15 years; (4) You have a tobacco smoking history of at least 20 pack years (packs per day multiplied by number of years you smoked); (5) You get a written order from a healthcare provider. 3. Glaucoma Screening: covered annually if you're considered high risk: (1) You have diabetes OR (2) Family history of glaucoma OR (3)  aged 48 and older OR (3)  American aged 72 and older  3. Osteoporosis Screening: covered every 2 years if you meet one of the following conditions: (1) You're estrogen deficient and at risk for osteoporosis based off medical history and other findings; (2) Have a vertebral abnormality; (3) On glucocorticoid therapy for more than 3 months; (4) Have primary hyperparathyroidism; (5) On osteoporosis medications and need to assess response to drug therapy. · Last bone density test (DXA Scan): Not on file. 5. HIV Screening: covered annually if you're between the age of 14-79. Also covered annually if you are younger than 13 and older than 72 with risk factors for HIV infection.  For pregnant patients, it is covered up to 3 times per pregnancy. Immunizations:  Immunization Recommendations   Influenza Vaccine Annual influenza vaccination during flu season is recommended for all persons aged >= 6 months who do not have contraindications   Pneumococcal Vaccine   * Pneumococcal conjugate vaccine = PCV13 (Prevnar 13), PCV15 (Vaxneuvance), PCV20 (Prevnar 20)  * Pneumococcal polysaccharide vaccine = PPSV23 (Pneumovax) Adults 20-63 years old: 1-3 doses may be recommended based on certain risk factors  Adults 72 years old: 1-2 doses may be recommended based off what pneumonia vaccine you previously received   Hepatitis B Vaccine 3 dose series if at intermediate or high risk (ex: diabetes, end stage renal disease, liver disease)   Tetanus (Td) Vaccine - COST NOT COVERED BY MEDICARE PART B Following completion of primary series, a booster dose should be given every 10 years to maintain immunity against tetanus. Td may also be given as tetanus wound prophylaxis. Tdap Vaccine - COST NOT COVERED BY MEDICARE PART B Recommended at least once for all adults. For pregnant patients, recommended with each pregnancy. Shingles Vaccine (Shingrix) - COST NOT COVERED BY MEDICARE PART B  2 shot series recommended in those aged 48 and above     Health Maintenance Due:  There are no preventive care reminders to display for this patient. Immunizations Due:      Topic Date Due   • Pneumococcal Vaccine: 65+ Years (1 - PCV) Never done   • COVID-19 Vaccine (4 - Pfizer series) 03/12/2022   • Influenza Vaccine (1) 09/01/2023     Advance Directives   What are advance directives? Advance directives are legal documents that state your wishes and plans for medical care. These plans are made ahead of time in case you lose your ability to make decisions for yourself. Advance directives can apply to any medical decision, such as the treatments you want, and if you want to donate organs. What are the types of advance directives?   There are many types of advance directives, and each state has rules about how to use them. You may choose a combination of any of the following:  · Living will: This is a written record of the treatment you want. You can also choose which treatments you do not want, which to limit, and which to stop at a certain time. This includes surgery, medicine, IV fluid, and tube feedings. · Durable power of  for healthcare [facility]): This is a written record that states who you want to make healthcare choices for you when you are unable to make them for yourself. This person, called a proxy, is usually a family member or a friend. You may choose more than 1 proxy. · Do not resuscitate (DNR) order:  A DNR order is used in case your heart stops beating or you stop breathing. It is a request not to have certain forms of treatment, such as CPR. A DNR order may be included in other types of advance directives. · Medical directive: This covers the care that you want if you are in a coma, near death, or unable to make decisions for yourself. You can list the treatments you want for each condition. Treatment may include pain medicine, surgery, blood transfusions, dialysis, IV or tube feedings, and a ventilator (breathing machine). · Values history: This document has questions about your views, beliefs, and how you feel and think about life. This information can help others choose the care that you would choose. Why are advance directives important? An advance directive helps you control your care. Although spoken wishes may be used, it is better to have your wishes written down. Spoken wishes can be misunderstood, or not followed. Treatments may be given even if you do not want them. An advance directive may make it easier for your family to make difficult choices about your care. Fall Prevention    Fall prevention  includes ways to make your home and other areas safer. It also includes ways you can move more carefully to prevent a fall.  Health conditions that cause changes in your blood pressure, vision, or muscle strength and coordination may increase your risk for falls. Medicines may also increase your risk for falls if they make you dizzy, weak, or sleepy. Fall prevention tips:   · Stand or sit up slowly. · Use assistive devices as directed. · Wear shoes that fit well and have soles that . · Wear a personal alarm. · Stay active. · Manage your medical conditions. Home Safety Tips:  · Add items to prevent falls in the bathroom. · Keep paths clear. · Install bright lights in your home. · Keep items you use often on shelves within reach. · Paint or place reflective tape on the edges of your stairs. Urinary Incontinence   Urinary incontinence (UI)  is when you lose control of your bladder. UI develops because your bladder cannot store or empty urine properly. The 3 most common types of UI are stress incontinence, urge incontinence, or both. Medicines:   · May be given to help strengthen your bladder control. Report any side effects of medication to your healthcare provider. Do pelvic muscle exercises often:  Your pelvic muscles help you stop urinating. Squeeze these muscles tight for 5 seconds, then relax for 5 seconds. Gradually work up to squeezing for 10 seconds. Do 3 sets of 15 repetitions a day, or as directed. This will help strengthen your pelvic muscles and improve bladder control. Train your bladder:  Go to the bathroom at set times, such as every 2 hours, even if you do not feel the urge to go. You can also try to hold your urine when you feel the urge to go. For example, hold your urine for 5 minutes when you feel the urge to go. As that becomes easier, hold your urine for 10 minutes. Self-care:   · Keep a UI record. Write down how often you leak urine and how much you leak. Make a note of what you were doing when you leaked urine. · Drink liquids as directed.  You may need to limit the amount of liquid you drink to help control your urine leakage. Do not drink any liquid right before you go to bed. Limit or do not have drinks that contain caffeine or alcohol. · Prevent constipation. Eat a variety of high-fiber foods. Good examples are high-fiber cereals, beans, vegetables, and whole-grain breads. Walking is the best way to trigger your intestines to have a bowel movement. · Exercise regularly and maintain a healthy weight. Weight loss and exercise will decrease pressure on your bladder and help you control your leakage. · Use a catheter as directed  to help empty your bladder. A catheter is a tiny, plastic tube that is put into your bladder to drain your urine. · Go to behavior therapy as directed. Behavior therapy may be used to help you learn to control your urge to urinate. Weight Management   Why it is important to manage your weight:  Being overweight increases your risk of health conditions such as heart disease, high blood pressure, type 2 diabetes, and certain types of cancer. It can also increase your risk for osteoarthritis, sleep apnea, and other respiratory problems. Aim for a slow, steady weight loss. Even a small amount of weight loss can lower your risk of health problems. How to lose weight safely:  A safe and healthy way to lose weight is to eat fewer calories and get regular exercise. You can lose up about 1 pound a week by decreasing the number of calories you eat by 500 calories each day. Healthy meal plan for weight management:  A healthy meal plan includes a variety of foods, contains fewer calories, and helps you stay healthy. A healthy meal plan includes the following:  · Eat whole-grain foods more often. A healthy meal plan should contain fiber. Fiber is the part of grains, fruits, and vegetables that is not broken down by your body. Whole-grain foods are healthy and provide extra fiber in your diet.  Some examples of whole-grain foods are whole-wheat breads and pastas, oatmeal, brown rice, and bulgur. · Eat a variety of vegetables every day. Include dark, leafy greens such as spinach, kale, christina greens, and mustard greens. Eat yellow and orange vegetables such as carrots, sweet potatoes, and winter squash. · Eat a variety of fruits every day. Choose fresh or canned fruit (canned in its own juice or light syrup) instead of juice. Fruit juice has very little or no fiber. · Eat low-fat dairy foods. Drink fat-free (skim) milk or 1% milk. Eat fat-free yogurt and low-fat cottage cheese. Try low-fat cheeses such as mozzarella and other reduced-fat cheeses. · Choose meat and other protein foods that are low in fat. Choose beans or other legumes such as split peas or lentils. Choose fish, skinless poultry (chicken or turkey), or lean cuts of red meat (beef or pork). Before you cook meat or poultry, cut off any visible fat. · Use less fat and oil. Try baking foods instead of frying them. Add less fat, such as margarine, sour cream, regular salad dressing and mayonnaise to foods. Eat fewer high-fat foods. Some examples of high-fat foods include french fries, doughnuts, ice cream, and cakes. · Eat fewer sweets. Limit foods and drinks that are high in sugar. This includes candy, cookies, regular soda, and sweetened drinks. Exercise:  Exercise at least 30 minutes per day on most days of the week. Some examples of exercise include walking, biking, dancing, and swimming. You can also fit in more physical activity by taking the stairs instead of the elevator or parking farther away from stores. Ask your healthcare provider about the best exercise plan for you. © Copyright [employer] 2018 Information is for End User's use only and may not be sold, redistributed or otherwise used for commercial purposes.  All illustrations and images included in CareNotes® are the copyrighted property of [employer] or [address] [place] [address]

## 2023-09-28 NOTE — ASSESSMENT & PLAN NOTE
Lab Results   Component Value Date    HGBA1C 6.1 09/28/2023     POC A1C improved to 6.1 today (previously 6.5)  Continue with dietary modifications (limit the chocolate milk and ice cream!! )

## 2023-09-28 NOTE — PROGRESS NOTES
Assessment and Plan:     Problem List Items Addressed This Visit        Endocrine    Hypothyroidism     Updated TSH ordered today  Continue Levothyroxine 100 mcg daily          Relevant Orders    TSH, 3rd generation with Free T4 reflex    Type 2 diabetes mellitus with diabetic cataract ([address])       Lab Results   Component Value Date    HGBA1C 6.1 09/28/2023     POC A1C improved to 6.1 today (previously 6.5)  Continue with dietary modifications (limit the chocolate milk and ice cream!! )         Relevant Orders    POCT hemoglobin A1c (Completed)       Cardiovascular and Mediastinum    Hypertension     BP is well controlled  Continue Hydralazine and Metoprolol per Cardiology   Also on Bumex          Relevant Orders    CBC and differential    Comprehensive metabolic panel    Hypertensive heart and chronic kidney disease with heart failure and stage 1 through stage 4 chronic kidney disease, or unspecified chronic kidney disease ([address])     Wt Readings from Last 3 Encounters:   09/28/23 84.6 kg (186 lb 6.4 oz)   09/19/23 86.6 kg (191 lb)   05/18/23 86.6 kg (191 lb)     Stable and managed by Cardiology   Continue current medication regimen  Follow a low sodium diet              Paroxysmal atrial fibrillation ([address])     Stable, continue Metoprolol and Eliquis  ICD is in place   Managed by Cardiology              Genitourinary    Chronic kidney disease, stage 4 (severe) ([address])     Lab Results   Component Value Date    EGFR 55 11/30/2022    EGFR 38 10/18/2022    EGFR 43 10/17/2022    CREATININE 0.96 11/30/2022    CREATININE 1.30 10/18/2022    CREATININE 1.18 10/17/2022     Updated kidney function ordered today  May need Nephrology consult based on lab results  Stay well hydrated, avoid nephrotoxic agents             Other    Hyperlipidemia     Pt declines statin therapy  Lifestyle modifications encouraged          Obesity (BMI 30-39. 9)     Lifestyle modifications encouraged          Restless leg syndrome     Stable with Requip XL- symptoms are well controlled with this         Continuous opioid dependence ([address])     Stable, PDMP site reviewed and is appropriate   Side effects of Tramadol reviewed         Other Visit Diagnoses     Encounter for annual wellness exam in Medicare patient    -  Primary    Acute right-sided low back pain with right-sided sciatica        Relevant Medications    traMADol (ULTRAM) 50 mg tablet          Urinary Incontinence Plan of Care: counseling topics discussed: limit alcohol, caffeine, spicy foods, and acidic foods, limiting fluid intake 3-4 hours before bed and preventing constipation. Preventive health issues were discussed with patient, and age appropriate screening tests were ordered as noted in patient's After Visit Summary. Personalized health advice and appropriate referrals for health education or preventive services given if needed, as noted in patient's After Visit Summary. History of Present Illness:     Patient presents for a Medicare Wellness Visit    HPI     Pt presents by herself today for an AWV and routine follow up  Doing well overall  Just sold her house and moved to the [facility]- this is an adjustment but is going okay so far  Still working 4 days per week, 4 hours per day    Follows with Dr. Real Carr, Cardiology, for CHF, HTN, HLD-- recent visit on 9/19/23 with no changes  Dr. Real Carr has asked Debi Madsen to have a sleep study again, but she still prefers to not do this     Due for lab work at this time  Also due for a DEXA scan (she also prefers to not do this)    Patient Care Team:  SOTO Ivan as PCP - General (Family Medicine)  SOTO Ivan as PCP - PCP-[facility] Attributed-Roster     Review of Systems:     Review of Systems   Constitutional: Negative for chills and fever. HENT: Negative for ear pain and sore throat. Eyes: Negative for pain and visual disturbance.    Respiratory: Positive for shortness of breath (on occasion, at her baseline ). Negative for cough and wheezing. Cardiovascular: Negative for chest pain, palpitations and leg swelling. Gastrointestinal: Negative for abdominal pain, blood in stool, constipation, diarrhea, nausea and vomiting. Genitourinary: Negative for dysuria and hematuria. Musculoskeletal: Negative for arthralgias and back pain. Skin: Negative for color change and rash. Neurological: Negative for seizures and syncope. Hematological: Negative for adenopathy. Does not bruise/bleed easily. Psychiatric/Behavioral: Negative for dysphoric mood, self-injury, sleep disturbance and suicidal ideas. The patient is not nervous/anxious and is not hyperactive. All other systems reviewed and are negative. Problem List:     Patient Active Problem List   Diagnosis   • Hyperlipidemia   • Hypertension   • Hypothyroidism   • Obesity (BMI 30-39. 9)   • Osteoarthritis   • Type 2 diabetes mellitus with diabetic cataract ([address])   • Ambulatory dysfunction   • Acute on chronic combined systolic (congestive) and diastolic (congestive) heart failure (HCC)   • Debility   • Restless leg syndrome   • Hypertensive heart and chronic kidney disease with heart failure and stage 1 through stage 4 chronic kidney disease, or unspecified chronic kidney disease (HCC)   • Paroxysmal atrial fibrillation (HCC)   • Left-sided chest wall pain   • WANG (acute kidney injury) ([address])   • Traumatic open wound of left lower leg   • Continuous opioid dependence ([address])   • Chronic kidney disease, stage 4 (severe) (HCC)      Past Medical and Surgical History:     Past Medical History:   Diagnosis Date   • Allergic childhood   • Arthritis do not know   • Cancer ([address]) 1976   • Cardiogenic shock ([address]) 06/26/2019   • CHF (congestive heart failure) ([address])    • Chronic kidney disease June 2019   • COPD (chronic obstructive pulmonary disease) (HCC) no   • Coronary artery disease 06/19   • Depression no   • Disease of thyroid gland 1976   • Eczema    • Heart murmur childhood   • Hypertension do not knpw   • Photosensitivity     abnormal skin sensitivity to sunlight   • 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[facility])     staging   • Visual impairment childhood     Past Surgical History:   Procedure Laterality Date   • CARDIAC SURGERY  06/19   • CATARACT EXTRACTION Left    • EYE SURGERY     • HEMORRHOID SURGERY     • IR NON-TUNNELED CENTRAL LINE PLACEMENT  07/09/2019   • OOPHORECTOMY      unilateral   • TOTAL ABDOMINAL HYSTERECTOMY        Family History:     Family History   Problem Relation Age of Onset   • Alzheimer's disease Mother    • Coronary artery disease Mother    • Dementia Mother    • Diabetes Mother         mellitus   • Heart disease Mother    • Other Father         acute myocardial infarction   • Heart disease Father    • Coronary artery disease Sister    • Other Maternal Grandfather         laryngeal cancer   • Dementia Maternal Aunt    • Diabetes Maternal Aunt    • Heart disease Sister    • Heart disease Brother       Social History:     Social History     Socioeconomic History   • Marital status: Single     Spouse name: None   • Number of children: None   • Years of education: None   • Highest education level: None   Occupational History   • None   Tobacco Use   • Smoking status: Never   • Smokeless tobacco: Never   Vaping Use   • Vaping Use: Never used   Substance and Sexual Activity   • Alcohol use: Not Currently     Comment: 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   • Drug use: Never   • Sexual activity: Not Currently   Other Topics Concern   • None   Social History Narrative   • None     Social Determinants of Health     Financial Resource Strain: Medium Risk (9/27/2023)    Overall Financial Resource Strain (CARDIA)    • Difficulty of Paying Living Expenses: Somewhat hard   Food Insecurity: Not on file   Transportation Needs: No Transportation Needs (9/27/2023)    PRAPARE - Transportation    • Lack of Transportation (Medical):  No    • Lack of Transportation (Non-Medical):  No   Physical Activity: Not on file   Stress: Not on file   Social Connections: Not on file   Intimate Partner Violence: Not on file   Housing Stability: Not on file      Medications and Allergies:     Current Outpatient Medications   Medication Sig Dispense Refill   • Ascorbic Acid (vitamin C) 1000 MG tablet Take 1,000 mg by mouth 2 (two) times a day      • bumetanide (BUMEX) 2 mg tablet TAKE 2 TABLETS (4 MG TOTAL) BY MOUTH DAILY 60 tablet 5   • calcium carbonate (OS-PAULA) 600 MG tablet Take 600 mg by mouth 2 (two) times a day with meals     • cholecalciferol (VITAMIN D3) 1,000 units tablet Take 1,000 Units by mouth daily 8,000 dailty     • Eliquis 5 MG TAKE 1 TABLET (5 MG TOTAL) BY MOUTH 2 (TWO) TIMES A DAY 60 tablet 11   • hydrALAZINE (APRESOLINE) 10 mg tablet Take 1 tablet (10 mg total) by mouth 2 (two) times a day 90 tablet 1   • latanoprost (XALATAN) 0.005 % ophthalmic solution      • levothyroxine 100 mcg tablet TAKE 1 TABLET (100 MCG TOTAL) BY MOUTH DAILY 30 tablet 5   • Magnesium 400 MG CAPS Take 1 capsule (400 mg total) by mouth daily  0   • metoprolol succinate (TOPROL-XL) 50 mg 24 hr tablet TAKE 2 TABLETS (100 MG TOTAL) BY MOUTH EVERY 12 (TWELVE) HOURS 120 tablet 5   • Multiple Vitamin (MULTI-VITAMIN DAILY PO) 1 tablet 2 (two) times a day       • Potassium Chloride ER 20 MEQ TBCR TAKE 2 TABLETS (40 MEQ TOTAL) BY MOUTH 2 (TWO) TIMES A  tablet 2   • rOPINIRole (REQUIP XL) 2 MG 24 hr tablet TAKE 1 TABLET (2 MG TOTAL) BY MOUTH DAILY AT BEDTIME 30 tablet 5   • sacubitril-valsartan (Entresto) 49-51 MG TABS Take 1 tablet by mouth 2 (two) times a day 180 tablet 3   • traMADol (ULTRAM) 50 mg tablet Take 1 tablet (50 mg total) by mouth 2 (two) times a day 60 tablet 0   • Turmeric (QC TUMERIC COMPLEX PO) Take by mouth 2 (two) times a day     • venlafaxine (EFFEXOR) 75 mg tablet TAKE 1 TABLET (75 MG TOTAL) BY MOUTH EVERY 12 (TWELVE) HOURS 60 tablet 5     No current facility-administered medications for this visit. Allergies   Allergen Reactions   • Penicillins    • Wellbutrin Sr  [Bupropion]      Other reaction(s): Suicidal ideations  Category: Adverse Reaction;       Immunizations:     Immunization History   Administered Date(s) Administered   • COVID-19 PFIZER VACCINE 0.3 ML IM 01/24/2021, 02/13/2021, 01/15/2022   • Td (adult), adsorbed 01/01/2000      Health Maintenance: There are no preventive care reminders to display for this patient. Topic Date Due   • Pneumococcal Vaccine: 65+ Years (1 - PCV) Never done   • COVID-19 Vaccine (4 - Pfizer series) 03/12/2022   • Influenza Vaccine (1) 09/01/2023      Medicare Screening Tests and Risk Assessments:     Last Medicare Wellness visit information reviewed, patient interviewed, no change since last AWV. Health Risk Assessment:   Patient rates overall health as good. Patient feels that their physical health rating is same. Patient is satisfied with their life. Eyesight was rated as slightly worse. Hearing was rated as same. Patient feels that their emotional and mental health rating is slightly better. Patients states they are sometimes angry. Patient states they are often unusually tired/fatigued. Pain experienced in the last 7 days has been none. Patient states that she has experienced no weight loss or gain in last 6 months. Fall Risk Screening: In the past year, patient has experienced: history of falling in past year      Urinary Incontinence Screening:   Patient has leaked urine accidently in the last six months. Home Safety:  Patient does not have trouble with stairs inside or outside of their home. Patient has working smoke alarms and has working carbon monoxide detector. Home safety hazards include: none. Nutrition:   Current diet is No Added Salt and Limited junk food. Medications:   Patient is currently taking over-the-counter supplements. OTC medications include: Vitamins.  Patient is able to manage medications. Activities of Daily Living (ADLs)/Instrumental Activities of Daily Living (IADLs):   Walk and transfer into and out of bed and chair?: Yes  Dress and groom yourself?: Yes    Bathe or shower yourself?: Yes    Feed yourself? Yes  Do your laundry/housekeeping?: Yes  Manage your money, pay your bills and track your expenses?: Yes  Make your own meals?: Yes    Do your own shopping?: Yes    Previous Hospitalizations:   Any hospitalizations or ED visits within the last 12 months?: Yes    How many hospitalizations have you had in the last year?: 1-2    Advance Care Planning:   Living will: Yes    Durable POA for healthcare: Yes    Advanced directive: Yes      Cognitive Screening:   Provider or family/friend/caregiver concerned regarding cognition?: No    PREVENTIVE SCREENINGS      Cardiovascular Screening:    General: Screening Not Indicated and History Lipid Disorder      Diabetes Screening:     General: Screening Not Indicated and History Diabetes      Colorectal Cancer Screening:     General: Risks and Benefits Discussed and Screening Not Indicated      Breast Cancer Screening:     General: Risks and Benefits Discussed and Patient Declines      Cervical Cancer Screening:    General: Screening Not Indicated      Osteoporosis Screening:    General: Risks and Benefits Discussed and Patient Declines      Abdominal Aortic Aneurysm (AAA) Screening:        General: Screening Not Indicated      Lung Cancer Screening:     General: Screening Not Indicated      Hepatitis C Screening:    General: Screening Not Indicated    Screening, Brief Intervention, and Referral to Treatment (SBIRT)    Screening  Typical number of drinks in a day: 0  Typical number of drinks in a week: 0  Interpretation: Low risk drinking behavior.     AUDIT-C Screenin) How often did you have a drink containing alcohol in the past year? never  2) How many drinks did you have on a typical day when you were drinking in the past year? 0  3) How often did you have 6 or more drinks on one occasion in the past year? never    AUDIT-C Score: 0  Interpretation: Score 0-2 (female): Negative screen for alcohol misuse    Single Item Drug Screening:  How often have you used an illegal drug (including marijuana) or a prescription medication for non-medical reasons in the past year? never    Single Item Drug Screen Score: 0  Interpretation: Negative screen for possible drug use disorder    No results found. Physical Exam:     /80   Pulse 92   Temp 97.5 °F (36.4 °C) (Tympanic)   Resp 16   Ht 5' 1" (1.549 m)   Wt 84.6 kg (186 lb 6.4 oz)   SpO2 99%   BMI 35.22 kg/m²     Physical Exam  Vitals and nursing note reviewed. Constitutional:       General: She is not in acute distress. Appearance: She is well-developed. She is obese. She is not ill-appearing, toxic-appearing or diaphoretic. HENT:      Head: Normocephalic and atraumatic. Eyes:      Extraocular Movements: Extraocular movements intact. Conjunctiva/sclera: Conjunctivae normal.      Pupils: Pupils are equal, round, and reactive to light. Neck:      Vascular: No carotid bruit. Cardiovascular:      Rate and Rhythm: Normal rate and regular rhythm. Heart sounds: Normal heart sounds. No murmur heard. Pulmonary:      Effort: Pulmonary effort is normal. No respiratory distress. Breath sounds: Normal breath sounds. Abdominal:      General: Bowel sounds are normal. There is no distension. Palpations: Abdomen is soft. Tenderness: There is no abdominal tenderness. Musculoskeletal:         General: No swelling. Cervical back: Normal range of motion and neck supple. No rigidity or tenderness. Right lower leg: No edema. Left lower leg: No edema. Lymphadenopathy:      Cervical: No cervical adenopathy. Skin:     General: Skin is warm and dry. Capillary Refill: Capillary refill takes less than 2 seconds.    Neurological:      General: No focal deficit present. Mental Status: She is alert and oriented to person, place, and time. Psychiatric:         Mood and Affect: Mood normal.         Behavior: Behavior normal.         Thought Content:  Thought content normal.         Judgment: Judgment normal.          [employer]

## 2023-10-02 DIAGNOSIS — F41.8 DEPRESSION WITH ANXIETY: ICD-10-CM

## 2023-10-02 RX ORDER — VENLAFAXINE 75 MG/1
75 TABLET ORAL EVERY 12 HOURS
Qty: 60 TABLET | Refills: 5 | Status: SHIPPED | OUTPATIENT
Start: 2023-10-02 | End: 2023-11-01

## 2023-10-09 ENCOUNTER — REMOTE DEVICE CLINIC VISIT (OUTPATIENT)
Dept: CARDIOLOGY CLINIC | Facility: CLINIC | Age: 83
End: 2023-10-09
Payer: COMMERCIAL

## 2023-10-09 DIAGNOSIS — Z95.810 PRESENCE OF AUTOMATIC CARDIOVERTER/DEFIBRILLATOR (AICD): Primary | ICD-10-CM

## 2023-10-09 PROCEDURE — 93296 REM INTERROG EVL PM/IDS: CPT | Performed by: INTERNAL MEDICINE

## 2023-10-09 PROCEDURE — 93295 DEV INTERROG REMOTE 1/2/MLT: CPT | Performed by: INTERNAL MEDICINE

## 2023-10-09 NOTE — PROGRESS NOTES
Results for orders placed or performed in visit on 10/09/23   Cardiac EP device report    Narrative    MDT-DUAL CHAMBER "HIS" ICD/VVIR MODEACTIVE SYSTEM IS MRI CONDITIONAL  CARELINK TRANSMISSION: BATTERY VOLTAGE NEARING KHOI (9 MOS). WILL SCHEDULE MONTHLY BATTERY CHECKS. : 99.9% + VSRP: <0.1%. ALL AVAILABLE LEAD PARAMETERS WITHIN NORMAL LIMITS( HX OF HIGH RV THRESHOLD~HIGH YET STABLE). NO SIGNIFICANT HIGH RATE EPISODES. OPTI-VOL WITHIN NORMAL LIMITS. NORMAL DEVICE FUNCTION.  [id number] [address]

## 2023-10-24 ENCOUNTER — TELEPHONE (OUTPATIENT)
Dept: CARDIOLOGY CLINIC | Facility: CLINIC | Age: 83
End: 2023-10-24

## 2023-10-24 NOTE — TELEPHONE ENCOUNTER
Elizabeth Jamison had called office back in Sept and due to email issues.  Called left a message to call office

## 2023-10-30 DIAGNOSIS — M54.41 ACUTE RIGHT-SIDED LOW BACK PAIN WITH RIGHT-SIDED SCIATICA: ICD-10-CM

## 2023-10-31 RX ORDER — TRAMADOL HYDROCHLORIDE 50 MG/1
50 TABLET ORAL 2 TIMES DAILY
Qty: 60 TABLET | Refills: 0 | Status: SHIPPED | OUTPATIENT
Start: 2023-10-31

## 2023-11-08 ENCOUNTER — REMOTE DEVICE CLINIC VISIT (OUTPATIENT)
Dept: CARDIOLOGY CLINIC | Facility: CLINIC | Age: 83
End: 2023-11-08
Payer: COMMERCIAL

## 2023-11-08 DIAGNOSIS — Z95.810 PRESENCE OF AUTOMATIC CARDIOVERTER/DEFIBRILLATOR (AICD): Primary | ICD-10-CM

## 2023-11-08 PROCEDURE — 93295 DEV INTERROG REMOTE 1/2/MLT: CPT | Performed by: INTERNAL MEDICINE

## 2023-11-08 PROCEDURE — 93296 REM INTERROG EVL PM/IDS: CPT | Performed by: INTERNAL MEDICINE

## 2023-11-08 NOTE — PROGRESS NOTES
Results for orders placed or performed in visit on 11/08/23   Cardiac EP device report    Narrative    MDT-DUAL CHAMBER "HIS" ICD/VVIR MODEACTIVE SYSTEM IS MRI CONDITIONAL  CARELINK TRANSMISSION: BATTERY VOLTAGE NEARING KHOI (8 MOS). WILL SCHEDULE MONTHLY BATTERY CHECKS.  99.9% + VSR PACE <0.1% (VVIR 80); CHRONIC ELEVATED RV CAPTURE THRESHOLD/STABLE; ALL OTHER AVAILABLE LEAD PARAMETERS WITHIN NORMAL LIMITS. NO SIGNIFICANT HIGH RATE EPISODES. S/P AVN ABL (3/18/20); PATIENT ON ELIQUIS, METOPROLOL SUCC; OPTI-VOL WITHIN NORMAL LIMITS. NORMAL DEVICE FUNCTION.   ES

## 2023-11-10 DIAGNOSIS — I50.22 CHRONIC HFREF (HEART FAILURE WITH REDUCED EJECTION FRACTION) (HCC): ICD-10-CM

## 2023-11-10 DIAGNOSIS — I50.9 CHF (CONGESTIVE HEART FAILURE) (HCC): ICD-10-CM

## 2023-11-13 RX ORDER — POTASSIUM CHLORIDE 1500 MG/1
TABLET, EXTENDED RELEASE ORAL
Qty: 120 TABLET | Refills: 2 | Status: SHIPPED | OUTPATIENT
Start: 2023-11-13

## 2023-11-25 ENCOUNTER — APPOINTMENT (OUTPATIENT)
Dept: LAB | Facility: CLINIC | Age: 83
End: 2023-11-25
Payer: COMMERCIAL

## 2023-11-25 DIAGNOSIS — I50.42 CHRONIC COMBINED SYSTOLIC (CONGESTIVE) AND DIASTOLIC (CONGESTIVE) HEART FAILURE (HCC): ICD-10-CM

## 2023-11-25 DIAGNOSIS — E03.9 HYPOTHYROIDISM, UNSPECIFIED TYPE: ICD-10-CM

## 2023-11-25 DIAGNOSIS — I10 PRIMARY HYPERTENSION: ICD-10-CM

## 2023-11-25 LAB
ALBUMIN SERPL BCP-MCNC: 4 G/DL (ref 3.5–5)
ALP SERPL-CCNC: 58 U/L (ref 34–104)
ALT SERPL W P-5'-P-CCNC: 16 U/L (ref 7–52)
ANION GAP SERPL CALCULATED.3IONS-SCNC: 5 MMOL/L
AST SERPL W P-5'-P-CCNC: 19 U/L (ref 13–39)
BASOPHILS # BLD AUTO: 0.04 THOUSANDS/ÂΜL (ref 0–0.1)
BASOPHILS NFR BLD AUTO: 1 % (ref 0–1)
BILIRUB SERPL-MCNC: 0.34 MG/DL (ref 0.2–1)
BUN SERPL-MCNC: 18 MG/DL (ref 5–25)
CALCIUM SERPL-MCNC: 9.4 MG/DL (ref 8.4–10.2)
CHLORIDE SERPL-SCNC: 103 MMOL/L (ref 96–108)
CO2 SERPL-SCNC: 32 MMOL/L (ref 21–32)
CREAT SERPL-MCNC: 0.93 MG/DL (ref 0.6–1.3)
EOSINOPHIL # BLD AUTO: 0.17 THOUSAND/ÂΜL (ref 0–0.61)
EOSINOPHIL NFR BLD AUTO: 2 % (ref 0–6)
ERYTHROCYTE [DISTWIDTH] IN BLOOD BY AUTOMATED COUNT: 12.5 % (ref 11.6–15.1)
GFR SERPL CREATININE-BSD FRML MDRD: 57 ML/MIN/1.73SQ M
GLUCOSE P FAST SERPL-MCNC: 141 MG/DL (ref 65–99)
HCT VFR BLD AUTO: 39.8 % (ref 34.8–46.1)
HGB BLD-MCNC: 13.1 G/DL (ref 11.5–15.4)
IMM GRANULOCYTES # BLD AUTO: 0.03 THOUSAND/UL (ref 0–0.2)
IMM GRANULOCYTES NFR BLD AUTO: 0 % (ref 0–2)
LYMPHOCYTES # BLD AUTO: 1.79 THOUSANDS/ÂΜL (ref 0.6–4.47)
LYMPHOCYTES NFR BLD AUTO: 24 % (ref 14–44)
MCH RBC QN AUTO: 33.3 PG (ref 26.8–34.3)
MCHC RBC AUTO-ENTMCNC: 32.9 G/DL (ref 31.4–37.4)
MCV RBC AUTO: 101 FL (ref 82–98)
MONOCYTES # BLD AUTO: 0.47 THOUSAND/ÂΜL (ref 0.17–1.22)
MONOCYTES NFR BLD AUTO: 6 % (ref 4–12)
NEUTROPHILS # BLD AUTO: 4.94 THOUSANDS/ÂΜL (ref 1.85–7.62)
NEUTS SEG NFR BLD AUTO: 67 % (ref 43–75)
NRBC BLD AUTO-RTO: 0 /100 WBCS
PLATELET # BLD AUTO: 262 THOUSANDS/UL (ref 149–390)
PMV BLD AUTO: 10 FL (ref 8.9–12.7)
POTASSIUM SERPL-SCNC: 4.5 MMOL/L (ref 3.5–5.3)
PROT SERPL-MCNC: 7.7 G/DL (ref 6.4–8.4)
RBC # BLD AUTO: 3.93 MILLION/UL (ref 3.81–5.12)
SODIUM SERPL-SCNC: 140 MMOL/L (ref 135–147)
TSH SERPL DL<=0.05 MIU/L-ACNC: 4.38 UIU/ML (ref 0.45–4.5)
WBC # BLD AUTO: 7.44 THOUSAND/UL (ref 4.31–10.16)

## 2023-11-25 PROCEDURE — 36415 COLL VENOUS BLD VENIPUNCTURE: CPT

## 2023-11-25 PROCEDURE — 84443 ASSAY THYROID STIM HORMONE: CPT

## 2023-11-25 PROCEDURE — 80053 COMPREHEN METABOLIC PANEL: CPT

## 2023-11-25 PROCEDURE — 85025 COMPLETE CBC W/AUTO DIFF WBC: CPT

## 2023-11-27 RX ORDER — SACUBITRIL AND VALSARTAN 49; 51 MG/1; MG/1
1 TABLET, FILM COATED ORAL 2 TIMES DAILY
Qty: 60 TABLET | Refills: 11 | Status: SHIPPED | OUTPATIENT
Start: 2023-11-27

## 2023-12-07 NOTE — ASSESSMENT & PLAN NOTE
72-year-old male with HTN, HLD, CAD s/p PCI, symptomatic right ICA stenosis s/p right CEA by Dr. Estela Romeo '12 and asymptomatic moderate left ICA stenosis, GERD, IBS, osteoarthritis, + IV contrast allergy. Patient returns the office for yearly visit to review carotid duplex 3/20/2023    -CV duplex showed right ICA patent and left ICA less than 50% stenosis with velocities 153/50, ratio 1.54  -Stable left ICA stenosis  -Continue aspirin 81 mg  -Trialed multiple statins with side effects of confusion.   He is currently on pravastatin without adverse effects  -Recommended repeat carotid duplex in 1 year  -Follow-up in the office in 1 year  -Reviewed the signs symptoms of TIA/CVA and that he call 911 immediately should he experience any of the symptoms · EBEN cardioversion as per Cardiology  · Metoprolol  · Monitor heart rate closely  · Amiodarone drip   Patient still very tachycardic  Will discuss with Cardiology may need increase oral metoprolol  Note history of bradyarrhythmias

## 2023-12-08 ENCOUNTER — REMOTE DEVICE CLINIC VISIT (OUTPATIENT)
Dept: CARDIOLOGY CLINIC | Facility: CLINIC | Age: 83
End: 2023-12-08

## 2023-12-08 DIAGNOSIS — Z95.810 PRESENCE OF AUTOMATIC CARDIOVERTER/DEFIBRILLATOR (AICD): Primary | ICD-10-CM

## 2023-12-08 PROCEDURE — RECHECK: Performed by: INTERNAL MEDICINE

## 2023-12-08 NOTE — PROGRESS NOTES
Results for orders placed or performed in visit on 12/08/23   Cardiac EP device report    Narrative    MDT-DUAL CHAMBER "HIS" ICD/VVIR MODEACTIVE SYSTEM IS MRI CONDITIONAL  NON-BILLABLE CARELINK TRANSMISSION: BATTERY STATUS: BATTERY VOLTAGE NEARING KHOI (8 MOS). WILL SCHEDULE MONTHLY BATTERY CHECKS. IN CLINIC APPT SCHEDULED 1/4/24. BP: 99.7% + VSRP: 0.3%. ALL AVAILABLE LEAD PARAMETERS WITHIN NORMAL LIMITS ( WITH EXCEPTION OF RV THRESHOLD HIGH/STABLE (HX OF SAME)). NO SIGNIFICANT HIGH RATE EPISODES. OPTI-VOL WITHIN NORMAL LIMITS. NORMAL DEVICE FUNCTION.  [id number] [id number] [facility]

## 2023-12-09 DIAGNOSIS — I50.22 CHRONIC HFREF (HEART FAILURE WITH REDUCED EJECTION FRACTION) (HCC): ICD-10-CM

## 2023-12-09 DIAGNOSIS — I50.9 CHF (CONGESTIVE HEART FAILURE) (HCC): ICD-10-CM

## 2023-12-11 RX ORDER — POTASSIUM CHLORIDE 1500 MG/1
TABLET, EXTENDED RELEASE ORAL
Qty: 120 TABLET | Refills: 2 | Status: SHIPPED | OUTPATIENT
Start: 2023-12-11

## 2023-12-19 ENCOUNTER — OFFICE VISIT (OUTPATIENT)
Dept: FAMILY MEDICINE CLINIC | Facility: CLINIC | Age: 83
End: 2023-12-19
Payer: COMMERCIAL

## 2023-12-19 ENCOUNTER — HOSPITAL ENCOUNTER (OUTPATIENT)
Dept: RADIOLOGY | Facility: HOSPITAL | Age: 83
Discharge: HOME | End: 2023-12-19
Payer: COMMERCIAL

## 2023-12-19 ENCOUNTER — TELEPHONE (OUTPATIENT)
Dept: FAMILY MEDICINE CLINIC | Facility: CLINIC | Age: 83
End: 2023-12-19

## 2023-12-19 VITALS
DIASTOLIC BLOOD PRESSURE: 80 MMHG | OXYGEN SATURATION: 96 % | WEIGHT: 187 LBS | BODY MASS INDEX: 35.3 KG/M2 | HEIGHT: 61 IN | HEART RATE: 93 BPM | RESPIRATION RATE: 18 BRPM | SYSTOLIC BLOOD PRESSURE: 138 MMHG | TEMPERATURE: 97.1 F

## 2023-12-19 DIAGNOSIS — B34.9 VIRAL ILLNESS: ICD-10-CM

## 2023-12-19 DIAGNOSIS — R05.1 ACUTE COUGH: ICD-10-CM

## 2023-12-19 DIAGNOSIS — J40 BRONCHITIS: Primary | ICD-10-CM

## 2023-12-19 LAB
SARS-COV-2 AG UPPER RESP QL IA: NEGATIVE
VALID CONTROL: NORMAL

## 2023-12-19 PROCEDURE — 87811 SARS-COV-2 COVID19 W/OPTIC: CPT | Performed by: NURSE PRACTITIONER

## 2023-12-19 PROCEDURE — 71046 X-RAY EXAM CHEST 2 VIEWS: CPT

## 2023-12-19 PROCEDURE — 99214 OFFICE O/P EST MOD 30 MIN: CPT | Performed by: NURSE PRACTITIONER

## 2023-12-19 RX ORDER — ALBUTEROL SULFATE 90 UG/1
2 AEROSOL, METERED RESPIRATORY (INHALATION) EVERY 6 HOURS PRN
Qty: 18 G | Refills: 5 | Status: SHIPPED | OUTPATIENT
Start: 2023-12-19

## 2023-12-19 RX ORDER — PREDNISONE 10 MG/1
TABLET ORAL
Qty: 30 TABLET | Refills: 0 | Status: SHIPPED | OUTPATIENT
Start: 2023-12-19

## 2023-12-19 RX ORDER — AZITHROMYCIN 250 MG/1
TABLET, FILM COATED ORAL DAILY
Qty: 6 TABLET | Refills: 0 | Status: SHIPPED | OUTPATIENT
Start: 2023-12-19 | End: 2023-12-24

## 2023-12-19 NOTE — ASSESSMENT & PLAN NOTE
The patient started with symptoms on Saturday of runny nose, cough and some congestion.  She did swab for COVID on Saturday and was negative.  She continues with the above symptoms.  Rapid COVID in the office today was negative.  She denies any fever.  On exam the patient does have both inspiratory and expiratory wheezing as well as rhonchi.  She does have a history of congestive heart failure.  She has some shortness of breath but only with coughing.  I will treat the patient for bronchitis.  Tapering dose of steroids sent to the pharmacy.  Albuterol inhaler sent for the patient to use every 6 hours.  Zithromax to pharmacy.  The patient will have a chest x-ray done today.

## 2023-12-19 NOTE — TELEPHONE ENCOUNTER
Left message----- Message from SOTO Schuler sent at 12/19/2023  3:58 PM EST -----  Please let the patient know her chest x ray is negative for pneumonia

## 2023-12-19 NOTE — PATIENT INSTRUCTIONS
Acute Bronchitis   AMBULATORY CARE:   Acute bronchitis  is swelling and irritation in your lungs. It is usually caused by a virus and most often happens in the winter. Bronchitis may also be caused by bacteria or by a chemical irritant, such as smoke.  Common symptoms:   Cough that lasts up to 3 weeks    Runny or stuffy nose    Hoarseness, sore throat    Fever    Feeling more tired than usual, and body aches    Wheezing or pain when you breathe or cough    Seek care immediately if:   You cough up blood.    Your lips or fingernails turn blue.    You feel like you are not getting enough air when you breathe.    Call your doctor if:   Your symptoms do not go away or get worse, even after treatment.    Your cough does not get better within 4 weeks.    You have questions or concerns about your condition or care.    Medicines:  You may need any of the following:  Cough suppressants  decrease your urge to cough.    Decongestants  help loosen mucus in your lungs and make it easier to cough up. This can help you breathe easier.    Inhalers  may be given. Your healthcare provider may give you one or more inhalers to help you breathe easier and cough less. An inhaler gives you medicine to open your airways. Ask your healthcare provider to show you how to use your inhaler correctly.         Antiviral medicine  treats infections caused by a virus.    Antibiotics  may be given if your bronchitis is caused by bacteria or if you have lung condition.    Acetaminophen  decreases pain and fever. It is available without a doctor's order. Ask how much to take and how often to take it. Follow directions. Read the labels of all other medicines you are using to see if they also contain acetaminophen, or ask your doctor or pharmacist. Acetaminophen can cause liver damage if not taken correctly.    NSAIDs  help decrease swelling and pain or fever. This medicine is available with or without a doctor's order. NSAIDs can cause stomach  bleeding or kidney problems in certain people. If you take blood thinner medicine, always ask your healthcare provider if NSAIDs are safe for you. Always read the medicine label and follow directions.    Self-care:   Drink liquids as directed.  You may need to drink more liquids than usual to stay hydrated. Ask how much liquid to drink each day and which liquids are best for you.    Use a cool mist humidifier.  This increases air moisture in your home. This may make it easier for you to breathe and help decrease your cough.     Get more rest.  Rest helps your body to heal. Slowly start to do more each day. Rest when you feel it is needed.    Prevent acute bronchitis:       Ask about vaccines you may need.  Get a flu vaccine each year as soon as recommended, usually in September or October. Ask your healthcare provider if you should also get a pneumonia or COVID-19 vaccine. Your healthcare provider can tell you if you should also get other vaccines, and when to get them.    Prevent the spread of germs.  You can decrease your risk for acute bronchitis and other illnesses by doing the following:    Wash your hands often with soap and water. Carry germ-killing hand lotion or gel with you. You can use the lotion or gel to clean your hands when soap and water are not available.         Do not touch your eyes, nose, or mouth unless you have washed your hands first.    Always cover your mouth when you cough to prevent the spread of germs. It is best to cough into a tissue or your shirt sleeve instead of into your hand. Ask those around you to cover their mouths when they cough.    Try to avoid people who have a cold or the flu. If you are sick, stay away from others as much as possible.    Avoid irritants in the air.  Avoid chemicals, fumes, and dust. Wear a face mask if you must work around dust or fumes. Stay inside on days when air pollution levels are high. If you have allergies, stay inside when pollen counts are high.  Do not use aerosol products, such as spray-on deodorant, bug spray, and hair spray.    Do not smoke or be around others who are smoking.  Nicotine and other chemicals in cigarettes and cigars can cause lung damage. Ask your healthcare provider for information if you currently smoke and need help to quit. E-cigarettes or smokeless tobacco still contain nicotine. Talk to your healthcare provider before you use these products.  Follow up with your doctor as directed:  Write down questions you have so you will remember to ask them during your follow-up visits.  © Copyright [employer] 2023 Information is for End User's use only and may not be sold, redistributed or otherwise used for commercial purposes.  The above information is an  only. It is not intended as medical advice for individual conditions or treatments. Talk to your doctor, nurse or pharmacist before following any medical regimen to see if it is safe and effective for you.    How to Use a Metered-Dose Inhaler   AMBULATORY CARE:   A metered-dose inhaler  is a handheld device that gives you a dose of medicine as a mist. You breathe the medicine deep into your lungs to open your airways.        Call your local emergency number (911 in the [place]), or have someone call if:   Your lips or nails turn blue or gray.      Seek immediate care if:   You cough up blood.    The skin between your ribs or around your neck pulls in with every breath.    You feel short of breath, even after you use your inhaler.    Call your doctor if:   You feel the medicine spray on your tongue or throat, rather than going into your lungs.    You have to take more puffs from the inhaler than directed, in order to get relief.    You run out of medicine before your next refill is due.    You feel like your medicine is not making your symptoms better.    You have questions or concerns about your condition or care.    How to use a metered-dose inhaler:  Follow the instructions that  come with your inhaler. Your medicine will work best if you use the inhaler correctly. The following steps will help you use your inhaler correctly:  Remove the cap.  Check to make sure nothing is in the mouthpiece that could block the medicine from coming out.    Shake the inhaler to mix the medicine.  Hold the inhaler upright. Prime the inhaler as directed.    Breathe out fully.  Do not breathe out into the mouthpiece.    Place the mouthpiece between your lips.  Close your lips tightly around the mouthpiece to form a seal and prevent a medicine leak.    Breathe in slowly through your mouth as you press down on the canister.  Breathe in for 5 seconds.    Hold your breath for at least 5 seconds.  This helps the medicine get deep into your lungs.    Remove the mouthpiece from your mouth.  Breathe out slowly.    Repeat puffs of medicine as directed by your healthcare provider.  Wait 1 minute between puffs.    Rinse your mouth with water or saline.  Do not swallow the water or saline.    Care for your inhaler properly:  Put the cap back on the inhaler after each use to keep the mouthpiece clean. Clean the inhaler at least 1 time each week, or as directed. Read and follow the cleaning instructions that come with your inhaler.  Follow up with your doctor or specialist as directed:  Bring your inhaler to all of your visits. You may be asked to use your inhaler at these visits so your doctor or specialist can make sure you are using it correctly. Write down your questions so you remember to ask them during your visits.  © Copyright [employer] 2023 Information is for End User's use only and may not be sold, redistributed or otherwise used for commercial purposes.  The above information is an  only. It is not intended as medical advice for individual conditions or treatments. Talk to your doctor, nurse or pharmacist before following any medical regimen to see if it is safe and effective for you.

## 2023-12-19 NOTE — PROGRESS NOTES
[facility]    NAME: Mayra Meadows  AGE: 83 y.o. SEX: female  : 1940     DATE: 2023     Assessment and Plan:     Problem List Items Addressed This Visit        Respiratory    Bronchitis - Primary     The patient started with symptoms on Saturday of runny nose, cough and some congestion.  She did swab for COVID on Saturday and was negative.  She continues with the above symptoms.  Rapid COVID in the office today was negative.  She denies any fever.  On exam the patient does have both inspiratory and expiratory wheezing as well as rhonchi.  She does have a history of congestive heart failure.  She has some shortness of breath but only with coughing.  I will treat the patient for bronchitis.  Tapering dose of steroids sent to the pharmacy.  Albuterol inhaler sent for the patient to use every 6 hours.  Zithromax to pharmacy.  The patient will have a chest x-ray done today.         Relevant Medications    predniSONE 10 mg tablet    albuterol (Ventolin HFA) 90 mcg/act inhaler    azithromycin (Zithromax) 250 mg tablet   Other Visit Diagnoses     Viral illness        Relevant Orders    POCT Rapid Covid Ag (Completed)    Acute cough        Relevant Medications    albuterol (Ventolin HFA) 90 mcg/act inhaler    Other Relevant Orders    XR chest pa & lateral              No follow-ups on file.     Chief Complaint:     Chief Complaint   Patient presents with   • Cough     Since Saturday tested for Covid negative         History of Present Illness:     Runny nose and cough since Saturday. Taking over the counter claritan with some relief.  No fever or ear pain.  Covid negative on Saturday. Exam positive for inspiratory and expiratory wheezes and rhonchi.  Denies shortness of breath. Rapid covid in the office today is negative.  Chest x ray today. Will treat for bronchitis.    Review of Systems:     Review of Systems   Constitutional:  Negative for activity change,  "fatigue and fever.   HENT:  Positive for congestion and rhinorrhea. Negative for hearing loss, trouble swallowing and voice change.    Eyes:  Negative for photophobia, pain, discharge and visual disturbance.   Respiratory:  Positive for cough. Negative for chest tightness and shortness of breath.    Cardiovascular:  Negative for chest pain, palpitations and leg swelling.   Gastrointestinal:  Negative for abdominal pain, blood in stool, constipation, nausea and vomiting.   Endocrine: Negative for cold intolerance and heat intolerance.   Genitourinary:  Negative for difficulty urinating, frequency, hematuria, urgency, vaginal bleeding and vaginal discharge.   Musculoskeletal:  Negative for arthralgias and myalgias.   Skin: Negative.    Neurological:  Negative for dizziness, weakness, numbness and headaches.   Psychiatric/Behavioral:  Negative for decreased concentration. The patient is not nervous/anxious.         Problem List:     Patient Active Problem List   Diagnosis   • Hyperlipidemia   • Hypertension   • Hypothyroidism   • Obesity (BMI 30-39.9)   • Osteoarthritis   • Type 2 diabetes mellitus with diabetic cataract ([facility])   • Ambulatory dysfunction   • Acute on chronic combined systolic (congestive) and diastolic (congestive) heart failure ([facility])   • Debility   • Restless leg syndrome   • Hypertensive heart and chronic kidney disease with heart failure and stage 1 through stage 4 chronic kidney disease, or unspecified chronic kidney disease (HCC)   • Paroxysmal atrial fibrillation ([facility])   • Left-sided chest wall pain   • WANG (acute kidney injury) ([facility])   • Traumatic open wound of left lower leg   • Continuous opioid dependence ([facility])   • Chronic kidney disease, stage 4 (severe) ([facility])   • Bronchitis        Objective:     /80   Pulse 93   Temp (!) 97.1 °F (36.2 °C) (Tympanic)   Resp 18   Ht 5' 1\" (1.549 m)   Wt 84.8 kg (187 lb)   SpO2 96%   BMI 35.33 kg/m²     Current Outpatient Medications   Medication " Sig Dispense Refill   • albuterol (Ventolin HFA) 90 mcg/act inhaler Inhale 2 puffs every 6 (six) hours as needed for wheezing 18 g 5   • Ascorbic Acid (vitamin C) 1000 MG tablet Take 1,000 mg by mouth 2 (two) times a day      • azithromycin (Zithromax) 250 mg tablet Take 2 tablets (500 mg total) by mouth daily for 1 day, THEN 1 tablet (250 mg total) daily for 4 days. 6 tablet 0   • bumetanide (BUMEX) 2 mg tablet TAKE 2 TABLETS (4 MG TOTAL) BY MOUTH DAILY 60 tablet 5   • calcium carbonate (OS-PAULA) 600 MG tablet Take 600 mg by mouth 2 (two) times a day with meals     • cholecalciferol (VITAMIN D3) 1,000 units tablet Take 1,000 Units by mouth daily 8,000 dailty     • Eliquis 5 MG TAKE 1 TABLET (5 MG TOTAL) BY MOUTH 2 (TWO) TIMES A DAY 60 tablet 11   • Entresto 49-51 MG TABS TAKE 1 TABLET BY MOUTH 2 (TWO) TIMES A DAY 60 tablet 11   • hydrALAZINE (APRESOLINE) 10 mg tablet TAKE 1 TABLET (10 MG TOTAL) BY MOUTH 2 (TWO) TIMES A DAY 90 tablet 1   • latanoprost (XALATAN) 0.005 % ophthalmic solution      • levothyroxine 100 mcg tablet TAKE 1 TABLET (100 MCG TOTAL) BY MOUTH DAILY 30 tablet 5   • Magnesium 400 MG CAPS Take 1 capsule (400 mg total) by mouth daily  0   • metoprolol succinate (TOPROL-XL) 50 mg 24 hr tablet TAKE 2 TABLETS (100 MG TOTAL) BY MOUTH EVERY 12 (TWELVE) HOURS 120 tablet 5   • Multiple Vitamin (MULTI-VITAMIN DAILY PO) 1 tablet 2 (two) times a day       • Potassium Chloride ER 20 MEQ TBCR TAKE 2 TABLETS (40 MEQ TOTAL) BY MOUTH 2 (TWO) TIMES A  tablet 2   • predniSONE 10 mg tablet 40 mg for three days, 30 mg for three days, 20 mg for 3 days, 10 mg for 3 days. 30 tablet 0   • rOPINIRole (REQUIP XL) 2 MG 24 hr tablet TAKE 1 TABLET (2 MG TOTAL) BY MOUTH DAILY AT BEDTIME 30 tablet 5   • traMADol (ULTRAM) 50 mg tablet TAKE 1 TABLET (50 MG TOTAL) BY MOUTH 2 (TWO) TIMES A DAY 60 tablet 0   • Turmeric (QC TUMERIC COMPLEX PO) Take by mouth 2 (two) times a day     • venlafaxine (EFFEXOR) 75 mg tablet TAKE 1  TABLET (75 MG TOTAL) BY MOUTH EVERY 12 (TWELVE) HOURS 60 tablet 5     No current facility-administered medications for this visit.       Physical Exam  Vitals reviewed.   Constitutional:       Appearance: Normal appearance. She is obese.   HENT:      Head: Normocephalic.      Right Ear: Tympanic membrane, ear canal and external ear normal.      Left Ear: Tympanic membrane, ear canal and external ear normal.      Nose: Congestion and rhinorrhea present.      Mouth/Throat:      Mouth: Mucous membranes are moist.      Pharynx: Oropharynx is clear.   Eyes:      Extraocular Movements: Extraocular movements intact.      Pupils: Pupils are equal, round, and reactive to light.   Cardiovascular:      Rate and Rhythm: Normal rate and regular rhythm.      Pulses: Normal pulses.   Pulmonary:      Effort: Pulmonary effort is normal.      Breath sounds: Wheezing and rhonchi present.   Musculoskeletal:         General: Normal range of motion.   Skin:     General: Skin is warm and dry.   Neurological:      General: No focal deficit present.      Mental Status: She is alert and oriented to person, place, and time.   Psychiatric:         Mood and Affect: Mood normal.         Behavior: Behavior normal.         Thought Content: Thought content normal.         Judgment: Judgment normal.         SOTO Patel  [facility]

## 2023-12-20 DIAGNOSIS — E03.9 HYPOTHYROIDISM, UNSPECIFIED TYPE: ICD-10-CM

## 2023-12-20 RX ORDER — LEVOTHYROXINE SODIUM 0.1 MG/1
100 TABLET ORAL DAILY
Qty: 30 TABLET | Refills: 5 | Status: SHIPPED | OUTPATIENT
Start: 2023-12-20

## 2024-01-04 ENCOUNTER — TELEPHONE (OUTPATIENT)
Dept: FAMILY MEDICINE CLINIC | Facility: CLINIC | Age: 84
End: 2024-01-04

## 2024-01-04 NOTE — TELEPHONE ENCOUNTER
"I spoke with Madelin regarding a family member of hers who is struggling with alcoholism.  Her family member was actually checked into rehab last evening.  I provided Madelin with a website to look up AA meeting for when her family member is discharged.  All questions answered.         ----- Message from SOTO Schuler sent at 1/3/2024 11:25 AM EST -----  Regarding: FW: Personal  Contact: [phone]    ----- Message -----  From: Maria Victoria Song  Sent: 1/3/2024  10:54 AM EST  To: STOO Schuler  Subject: FW: Personal                                     Please review and advise.     ----- Message -----  From: Mayra Meadows \"Madelin\"  Sent: 1/3/2024  12:18 AM EST  To: [facility]  Subject: Personal                                         Liz please call me at [phone].  I have a family member who needs help with substance addiction. Hopefully u can give me some options where he can go to rehab he doesn't have any insurance.  Hope you n r family r ok.  Thank abdulaziz Meadows      "

## 2024-01-10 ENCOUNTER — IN-CLINIC DEVICE VISIT (OUTPATIENT)
Dept: CARDIOLOGY CLINIC | Facility: CLINIC | Age: 84
End: 2024-01-10
Payer: COMMERCIAL

## 2024-01-10 DIAGNOSIS — Z95.810 AICD (AUTOMATIC CARDIOVERTER/DEFIBRILLATOR) PRESENT: Primary | ICD-10-CM

## 2024-01-10 PROCEDURE — 93283 PRGRMG EVAL IMPLANTABLE DFB: CPT | Performed by: INTERNAL MEDICINE

## 2024-01-10 NOTE — PROGRESS NOTES
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 BATTERY VOLTAGE NEARING KHOI (6 MOS). WILL SCHEDULE MONTHLY BATTERY CHECKS. BVP-100% (VSR PACE-0.2%). CHRONICALLY HIGH RV CAP THRESHOLD. ALL OTHER LEAD PARAMETERS WITHIN NORMAL LIMITS. NO SIGNIFICANT HIGH RATE EPISODES. OPTI-VOL WITHIN NORMAL LIMITS. PROGRAMMED RV CAP MANAGEMENT TO MONITOR. INCREASE MADE TO RV AMPLITUDE TO PROVIDE ADEQUATE SAFETY MARGIN. NORMAL DEVICE FUNCTION. GV        "

## 2024-01-19 DIAGNOSIS — M54.41 ACUTE RIGHT-SIDED LOW BACK PAIN WITH RIGHT-SIDED SCIATICA: ICD-10-CM

## 2024-01-19 RX ORDER — TRAMADOL HYDROCHLORIDE 50 MG/1
50 TABLET ORAL 2 TIMES DAILY
Qty: 60 TABLET | Refills: 0 | Status: SHIPPED | OUTPATIENT
Start: 2024-01-19

## 2024-01-20 DIAGNOSIS — I50.9 CHF (CONGESTIVE HEART FAILURE) (HCC): ICD-10-CM

## 2024-01-20 DIAGNOSIS — I50.22 CHRONIC HFREF (HEART FAILURE WITH REDUCED EJECTION FRACTION) (HCC): ICD-10-CM

## 2024-01-20 DIAGNOSIS — I48.91 ATRIAL FIBRILLATION, UNSPECIFIED TYPE (HCC): ICD-10-CM

## 2024-01-20 DIAGNOSIS — G25.81 RESTLESS LEG SYNDROME: ICD-10-CM

## 2024-01-22 RX ORDER — METOPROLOL SUCCINATE 50 MG/1
100 TABLET, EXTENDED RELEASE ORAL EVERY 12 HOURS SCHEDULED
Qty: 120 TABLET | Refills: 5 | Status: SHIPPED | OUTPATIENT
Start: 2024-01-22

## 2024-01-23 RX ORDER — BUMETANIDE 2 MG/1
4 TABLET ORAL DAILY
Qty: 60 TABLET | Refills: 5 | Status: SHIPPED | OUTPATIENT
Start: 2024-01-23

## 2024-01-23 RX ORDER — ROPINIROLE 2 MG/1
2 TABLET, FILM COATED, EXTENDED RELEASE ORAL
Qty: 30 TABLET | Refills: 5 | Status: SHIPPED | OUTPATIENT
Start: 2024-01-23

## 2024-02-13 ENCOUNTER — REMOTE DEVICE CLINIC VISIT (OUTPATIENT)
Dept: CARDIOLOGY CLINIC | Facility: CLINIC | Age: 84
End: 2024-02-13
Payer: COMMERCIAL

## 2024-02-13 DIAGNOSIS — Z95.810 AICD (AUTOMATIC CARDIOVERTER/DEFIBRILLATOR) PRESENT: Primary | ICD-10-CM

## 2024-02-13 PROCEDURE — 93297 REM INTERROG DEV EVAL ICPMS: CPT | Performed by: INTERNAL MEDICINE

## 2024-02-13 NOTE — PROGRESS NOTES
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 WILL SCHEDULE MONTHLY BATTERY CHECKS. CRT PACING (BIV) 100% (LV) 0%. ALL AVAILABLE LEAD PARAMETERS WITHIN NORMAL LIMITS. NO SIGNIFICANT HIGH RATE EPISODES. OPTI-VOL WITHIN NORMAL LIMITS. NORMAL DEVICE FUNCTION. NC         "

## 2024-02-19 DIAGNOSIS — I50.22 CHRONIC HFREF (HEART FAILURE WITH REDUCED EJECTION FRACTION) (HCC): ICD-10-CM

## 2024-02-19 DIAGNOSIS — I10 ESSENTIAL HYPERTENSION: ICD-10-CM

## 2024-02-19 DIAGNOSIS — I50.9 CHF (CONGESTIVE HEART FAILURE) (HCC): ICD-10-CM

## 2024-02-20 RX ORDER — HYDRALAZINE HYDROCHLORIDE 10 MG/1
10 TABLET, FILM COATED ORAL 2 TIMES DAILY
Qty: 60 TABLET | Refills: 3 | Status: SHIPPED | OUTPATIENT
Start: 2024-02-20

## 2024-02-27 ENCOUNTER — OFFICE VISIT (OUTPATIENT)
Dept: FAMILY MEDICINE CLINIC | Facility: CLINIC | Age: 84
End: 2024-02-27
Payer: COMMERCIAL

## 2024-02-27 VITALS
BODY MASS INDEX: 36.89 KG/M2 | OXYGEN SATURATION: 98 % | WEIGHT: 195.4 LBS | RESPIRATION RATE: 18 BRPM | SYSTOLIC BLOOD PRESSURE: 140 MMHG | DIASTOLIC BLOOD PRESSURE: 72 MMHG | TEMPERATURE: 97.7 F | HEART RATE: 85 BPM | HEIGHT: 61 IN

## 2024-02-27 DIAGNOSIS — M25.562 ACUTE PAIN OF LEFT KNEE: ICD-10-CM

## 2024-02-27 DIAGNOSIS — L03.116 CELLULITIS OF LEFT LOWER EXTREMITY: Primary | ICD-10-CM

## 2024-02-27 DIAGNOSIS — E11.36 TYPE 2 DIABETES MELLITUS WITH DIABETIC CATARACT, WITHOUT LONG-TERM CURRENT USE OF INSULIN (HCC): ICD-10-CM

## 2024-02-27 DIAGNOSIS — Z78.0 POSTMENOPAUSAL: ICD-10-CM

## 2024-02-27 PROBLEM — S81.802A TRAUMATIC OPEN WOUND OF LEFT LOWER LEG: Status: RESOLVED | Noted: 2023-04-17 | Resolved: 2024-02-27

## 2024-02-27 LAB — SL AMB POCT HEMOGLOBIN AIC: 6.8 (ref ?–6.5)

## 2024-02-27 PROCEDURE — 99214 OFFICE O/P EST MOD 30 MIN: CPT | Performed by: NURSE PRACTITIONER

## 2024-02-27 PROCEDURE — 83036 HEMOGLOBIN GLYCOSYLATED A1C: CPT | Performed by: NURSE PRACTITIONER

## 2024-02-27 RX ORDER — DOXYCYCLINE HYCLATE 100 MG
100 TABLET ORAL 2 TIMES DAILY
Qty: 28 TABLET | Refills: 0 | Status: SHIPPED | OUTPATIENT
Start: 2024-02-27 | End: 2024-03-12

## 2024-02-27 NOTE — PROGRESS NOTES
[facility]    NAME: Mayra Meadows  AGE: 83 y.o. 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  Patient does have a history of a wound approximately 1 year ago to the left lower extremity resulting in cellulitis however this is since healed.  She does have 1 small open area on her left lower extremity.  On exam she does have some erythema and warmth to the area of concern.  I will treat her for cellulitis.  Instructions for management discussed.  Patient is allergic to penicillin.  I will see her back in the office in 2 days to ensure improvement.  She has been advised that should the area worsen or she develop a fever she should be seen in the emergency room.         Relevant Medications    doxycycline hyclate (VIBRA-TABS) 100 mg tablet    Acute pain of left knee     Madelin states her left knee pain started approximately 1 week ago.  She denies any trauma, falls or mechanism of injury.  She does ambulate with a cane.  She does have some swelling in the medial aspect of her knee.  The pain is worse when standing or ambulating.  She has no pain at rest.  The x-ray has been ordered.  Ice to the knee.  Elevate when sitting.         Relevant Orders    XR knee 3 vw left non injury   Other Visit Diagnoses     Postmenopausal                  Return in about 2 days (around 2024) for Liz Schuler CRNP, Office Visit.     Chief Complaint:     Chief Complaint   Patient presents with   • Knee Pain     Left knee          History of Present Illness:   Rosana presents to the office today for concerns of  left lower extremity swelling, redness and warmth to her left lower extremity.  In addition she has been experiencing left knee pain for approximately 1 week.  These are both addressed in the assessment and plan.  Will start the patient on antibiotics for cellulitis.  I will see her back in 2 days.  Knee x-ray ordered.       Review of Systems:     Review of Systems   Constitutional:  Negative for activity change, fatigue and fever.   HENT:  Negative for congestion, hearing loss, rhinorrhea, trouble swallowing and voice change.    Eyes:  Negative for photophobia, pain, discharge and visual disturbance.   Respiratory:  Negative for cough, chest tightness and shortness of breath.    Cardiovascular:  Negative for chest pain, palpitations and leg swelling.   Gastrointestinal:  Negative for abdominal pain, blood in stool, constipation, nausea and vomiting.   Endocrine: Negative for cold intolerance and heat intolerance.   Genitourinary:  Negative for difficulty urinating, frequency, hematuria, urgency, vaginal bleeding and vaginal discharge.   Musculoskeletal:  Positive for gait problem (ambulates with cane) and joint swelling (left knee). Negative for arthralgias and myalgias.   Skin:  Positive for color change.   Neurological:  Negative for dizziness, weakness, numbness and headaches.   Psychiatric/Behavioral:  Negative for decreased concentration. The patient is not nervous/anxious.         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)   • Ambulatory dysfunction   • Acute on chronic combined systolic (congestive) and diastolic (congestive) heart failure (HCC)   • Debility   • Restless leg syndrome   • Hypertensive heart and chronic kidney disease with heart failure and stage 1 through stage 4 chronic kidney disease, or unspecified chronic kidney  "disease (HCC)   • Paroxysmal atrial fibrillation (HCC)   • Left-sided chest wall pain   • WANG (acute kidney injury) ([facility])   • Continuous opioid dependence ([facility])   • Chronic kidney disease, stage 4 (severe) ([facility])   • Bronchitis   • Cellulitis of left lower extremity   • Acute pain of left knee        Objective:     /72   Pulse 85   Temp 97.7 °F (36.5 °C) (Temporal)   Resp 18   Ht 5' 1\" (1.549 m)   Wt 88.6 kg (195 lb 6.4 oz)   SpO2 98%   BMI 36.92 kg/m²     Current Outpatient Medications   Medication Sig Dispense Refill   • albuterol (Ventolin HFA) 90 mcg/act inhaler Inhale 2 puffs every 6 (six) hours as needed for wheezing 18 g 5   • Ascorbic Acid (vitamin C) 1000 MG tablet Take 1,000 mg by mouth 2 (two) times a day      • bumetanide (BUMEX) 2 mg tablet TAKE 2 TABLETS (4 MG TOTAL) BY MOUTH DAILY 60 tablet 5   • calcium carbonate (OS-PAULA) 600 MG tablet Take 600 mg by mouth 2 (two) times a day with meals     • cholecalciferol (VITAMIN D3) 1,000 units tablet Take 1,000 Units by mouth daily 8,000 dailty     • doxycycline hyclate (VIBRA-TABS) 100 mg tablet Take 1 tablet (100 mg total) by mouth 2 (two) times a day for 14 days 28 tablet 0   • Eliquis 5 MG TAKE 1 TABLET (5 MG TOTAL) BY MOUTH 2 (TWO) TIMES A DAY 60 tablet 11   • Entresto 49-51 MG TABS TAKE 1 TABLET BY MOUTH 2 (TWO) TIMES A DAY 60 tablet 11   • hydrALAZINE (APRESOLINE) 10 mg tablet TAKE 1 TABLET (10 MG TOTAL) BY MOUTH 2 (TWO) TIMES A DAY 60 tablet 3   • latanoprost (XALATAN) 0.005 % ophthalmic solution      • levothyroxine 100 mcg tablet TAKE 1 TABLET (100 MCG TOTAL) BY MOUTH DAILY 30 tablet 5   • Magnesium 400 MG CAPS Take 1 capsule (400 mg total) by mouth daily  0   • metoprolol succinate (TOPROL-XL) 50 mg 24 hr tablet TAKE 2 TABLETS (100 MG TOTAL) BY MOUTH EVERY 12 (TWELVE) HOURS 120 tablet 5   • Multiple Vitamin (MULTI-VITAMIN DAILY PO) 1 tablet 2 (two) times a day       • Potassium Chloride ER 20 MEQ TBCR TAKE 2 TABLETS (40 MEQ TOTAL) BY " MOUTH 2 (TWO) TIMES A  tablet 2   • rOPINIRole (REQUIP XL) 2 MG 24 hr tablet TAKE 1 TABLET (2 MG TOTAL) BY MOUTH DAILY AT BEDTIME 30 tablet 5   • traMADol (ULTRAM) 50 mg tablet TAKE 1 TABLET (50 MG TOTAL) BY MOUTH 2 (TWO) TIMES A DAY 60 tablet 0   • Turmeric (QC TUMERIC COMPLEX PO) Take by mouth 2 (two) times a day     • venlafaxine (EFFEXOR) 75 mg tablet TAKE 1 TABLET (75 MG TOTAL) BY MOUTH EVERY 12 (TWELVE) HOURS 60 tablet 5     No current facility-administered medications for this visit.       Physical Exam  Vitals reviewed.   Constitutional:       Appearance: Normal appearance. She is obese.   HENT:      Head: Normocephalic.      Nose: Nose normal.      Mouth/Throat:      Mouth: Mucous membranes are moist.      Pharynx: Oropharynx is clear.   Eyes:      Extraocular Movements: Extraocular movements intact.      Pupils: Pupils are equal, round, and reactive to light.   Cardiovascular:      Rate and Rhythm: Normal rate and regular rhythm.   Pulmonary:      Effort: Pulmonary effort is normal.      Breath sounds: Normal breath sounds.   Musculoskeletal:      Left knee: Swelling, effusion and bony tenderness present. Decreased range of motion.   Skin:     General: Skin is warm and dry.      Findings: Erythema and wound present.          Neurological:      General: No focal deficit present.      Mental Status: She is alert and oriented to person, place, and time.   Psychiatric:         Mood and Affect: Mood normal.         Behavior: Behavior normal.         Thought Content: Thought content normal.         Judgment: Judgment normal.         SOTO Patel  [facility]

## 2024-02-27 NOTE — ASSESSMENT & PLAN NOTE
Patient presents with a several day history of left lower extremity redness, warmth and swelling.  Patient does have a history of a wound approximately 1 year ago to the left lower extremity resulting in cellulitis however this is since healed.  She does have 1 small open area on her left lower extremity.  On exam she does have some erythema and warmth to the area of concern.  I will treat her for cellulitis.  Instructions for management discussed.  Patient is allergic to penicillin.  I will see her back in the office in 2 days to ensure improvement.  She has been advised that should the area worsen or she develop a fever she should be seen in the emergency room.

## 2024-02-27 NOTE — ASSESSMENT & PLAN NOTE
Madelin states her left knee pain started approximately 1 week ago.  She denies any trauma, falls or mechanism of injury.  She does ambulate with a cane.  She does have some swelling in the medial aspect of her knee.  The pain is worse when standing or ambulating.  She has no pain at rest.  The x-ray has been ordered.  Ice to the knee.  Elevate when sitting.

## 2024-02-27 NOTE — LETTER
February 27, 2024     Patient: Mayra Meadows  YOB: 1940  Date of Visit: 2/27/2024      To Whom it May Concern:    Mayra Meadows is under my professional care. Mayra was seen in my office on 2/27/2024. Mayra needs assistance to walk with a cane and can only walk short distances without resting. She would benefit from parking close to the entrance of her building.     If you have any questions or concerns, please don't hesitate to call.         Sincerely,          SOTO Patel        CC: No Recipients

## 2024-02-29 ENCOUNTER — OFFICE VISIT (OUTPATIENT)
Dept: FAMILY MEDICINE CLINIC | Facility: CLINIC | Age: 84
End: 2024-02-29
Payer: COMMERCIAL

## 2024-02-29 VITALS
OXYGEN SATURATION: 98 % | DIASTOLIC BLOOD PRESSURE: 62 MMHG | HEIGHT: 61 IN | RESPIRATION RATE: 18 BRPM | BODY MASS INDEX: 36.82 KG/M2 | WEIGHT: 195 LBS | TEMPERATURE: 96.5 F | HEART RATE: 86 BPM | SYSTOLIC BLOOD PRESSURE: 106 MMHG

## 2024-02-29 DIAGNOSIS — F11.20 CONTINUOUS OPIOID DEPENDENCE (HCC): ICD-10-CM

## 2024-02-29 DIAGNOSIS — M54.41 ACUTE RIGHT-SIDED LOW BACK PAIN WITH RIGHT-SIDED SCIATICA: ICD-10-CM

## 2024-02-29 DIAGNOSIS — I48.0 PAROXYSMAL ATRIAL FIBRILLATION (HCC): ICD-10-CM

## 2024-02-29 DIAGNOSIS — L03.116 CELLULITIS OF LEFT LOWER EXTREMITY: ICD-10-CM

## 2024-02-29 DIAGNOSIS — N18.4 CHRONIC KIDNEY DISEASE, STAGE 4 (SEVERE) (HCC): ICD-10-CM

## 2024-02-29 DIAGNOSIS — R60.0 BILATERAL LOWER EXTREMITY EDEMA: Primary | ICD-10-CM

## 2024-02-29 PROCEDURE — G2211 COMPLEX E/M VISIT ADD ON: HCPCS | Performed by: NURSE PRACTITIONER

## 2024-02-29 PROCEDURE — 99214 OFFICE O/P EST MOD 30 MIN: CPT | Performed by: NURSE PRACTITIONER

## 2024-02-29 RX ORDER — TRAMADOL HYDROCHLORIDE 50 MG/1
50 TABLET ORAL 2 TIMES DAILY
Qty: 60 TABLET | Refills: 0 | Status: SHIPPED | OUTPATIENT
Start: 2024-02-29

## 2024-02-29 NOTE — ASSESSMENT & PLAN NOTE
Lab Results   Component Value Date    EGFR 57 11/25/2023    EGFR 55 11/30/2022    EGFR 38 10/18/2022    CREATININE 0.93 11/25/2023    CREATININE 0.96 11/30/2022    CREATININE 1.30 10/18/2022     Stable at this time  Stay well hydrated, avoid nephrotoxic agents

## 2024-02-29 NOTE — ASSESSMENT & PLAN NOTE
Improving   Continue with the Doxycycline 100mg BID x 14 days   She is tolerating this well  Elevate legs throughout the day  Call with any worsening redness, warmth, pain or drainage

## 2024-02-29 NOTE — ASSESSMENT & PLAN NOTE
Encourage pt to try and take her Bumex daily as prescribed   Compression stockings ordered  Elevate legs when possible  Follow a low sodium diet

## 2024-02-29 NOTE — PROGRESS NOTES
Name: Mayra Meadows      : 1940      MRN: [id number]  Encounter Provider: SOTO Ring  Encounter Date: 2024   Encounter department: [facility]    Assessment & Plan     1. Bilateral lower extremity edema  Assessment & Plan:  Encourage pt to try and take her Bumex daily as prescribed   Compression stockings ordered  Elevate legs when possible  Follow a low sodium diet     Orders:  -     Compression Stocking    2. Paroxysmal atrial fibrillation (HCC)  Assessment & Plan:  Stable, continue Metoprolol and Eliquis  ICD is in place   Managed by Cardiology        3. Continuous opioid dependence (HCC)  Assessment & Plan:  Stable, PDMP site reviewed and is appropriate   Side effects of Tramadol reviewed       4. Chronic kidney disease, stage 4 (severe) ([facility])  Assessment & Plan:  Lab Results   Component Value Date    EGFR 57 2023    EGFR 55 2022    EGFR 38 10/18/2022    CREATININE 0.93 2023    CREATININE 0.96 2022    CREATININE 1.30 10/18/2022     Stable at this time  Stay well hydrated, avoid nephrotoxic agents     Orders:  -     Comprehensive metabolic panel; Future; Expected date: 2024    5. Acute right-sided low back pain with right-sided sciatica  -     traMADol (ULTRAM) 50 mg tablet; Take 1 tablet (50 mg total) by mouth 2 (two) times a day    6. Cellulitis of left lower extremity  Assessment & Plan:  Improving   Continue with the Doxycycline 100mg BID x 14 days   She is tolerating this well  Elevate legs throughout the day  Call with any worsening redness, warmth, pain or drainage              Subjective     HPI  Pt presents by herself today for a 2 day follow up of leg cellulitis.  She was seen earlier in the week and started on Doxycycline 100 mg BID x 14 days.  Madelin has had cellulitis in her left LE several times prior.  She is on Bumex 2 mg two tablets daily but she does not take this on days that she works- ends up missing a few days  per week.  She does tend to have LE edema and does not wear compression stockings.  Diet is relatively low in sodium    Pt notes her cellulitis has improved.  Not nearly as red.  No warmth.  Minimal tenderness.  No opened wounds or drainage     Review of Systems   Constitutional:  Negative for activity change, appetite change, chills, diaphoresis, fatigue, fever and unexpected weight change.   Eyes:  Negative for visual disturbance.   Respiratory:  Negative for cough, chest tightness, shortness of breath and wheezing.    Cardiovascular:  Negative for chest pain, palpitations and leg swelling.   Gastrointestinal:  Negative for abdominal pain, blood in stool, constipation, diarrhea and nausea.   Genitourinary:  Negative for dysuria.   Musculoskeletal:  Positive for arthralgias, back pain and gait problem. Negative for myalgias.   Skin:  Positive for wound. Negative for rash.   Neurological:  Negative for dizziness, weakness, numbness and headaches.   Hematological:  Negative for adenopathy. Does not bruise/bleed easily.   Psychiatric/Behavioral:  Negative for dysphoric mood, self-injury, sleep disturbance and suicidal ideas. The patient is not nervous/anxious.        Past Medical History:   Diagnosis Date    Allergic childhood    Arthritis do not know    Cancer ([facility]) 1976    Cardiogenic shock ([facility]) 06/26/2019    CHF (congestive heart failure) ([facility])     Chronic kidney disease June 2019    COPD (chronic obstructive pulmonary disease) ([facility]) no    Coronary artery disease 06/19    Depression no    Disease of thyroid gland 1976    Eczema     Heart murmur childhood    Hypertension do not knpw    Photosensitivity     abnormal skin sensitivity to sunlight    Traumatic open wound of left lower leg     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    Visual impairment childhood     Past Surgical History:   Procedure Laterality Date    CARDIAC SURGERY  06/19    CATARACT EXTRACTION Left     EYE SURGERY      HEMORRHOID SURGERY      IR  NON-TUNNELED CENTRAL LINE PLACEMENT  07/09/2019    OOPHORECTOMY      unilateral    TOTAL ABDOMINAL HYSTERECTOMY       Family History   Problem Relation Age of Onset    Alzheimer's disease Mother     Coronary artery disease Mother     Dementia Mother     Diabetes Mother         mellitus    Heart disease Mother     Other Father         acute myocardial infarction    Heart disease Father     Coronary artery disease Sister     Other Maternal Grandfather         laryngeal cancer    Dementia Maternal Aunt     Diabetes Maternal Aunt     Heart disease Sister     Heart disease Brother      Social History     Socioeconomic History    Marital status: Single     Spouse name: None    Number of children: None    Years of education: None    Highest education level: None   Occupational History    None   Tobacco Use    Smoking status: Never     Passive exposure: Never    Smokeless tobacco: Never   Vaping Use    Vaping status: Never Used   Substance and Sexual Activity    Alcohol use: Not Currently     Comment: 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    Drug use: Never    Sexual activity: Not Currently   Other Topics Concern    None   Social History Narrative    None     Social Determinants of Health     Financial Resource Strain: Medium Risk (9/27/2023)    Overall Financial Resource Strain (CARDIA)     Difficulty of Paying Living Expenses: Somewhat hard   Food Insecurity: Not on file   Transportation Needs: No Transportation Needs (9/27/2023)    PRAPARE - Transportation     Lack of Transportation (Medical): No     Lack of Transportation (Non-Medical): No   Physical Activity: Not on file   Stress: Not on file   Social Connections: Not on file   Intimate Partner Violence: Not on file   Housing Stability: Not on file     Current Outpatient Medications on File Prior to Visit   Medication Sig    albuterol (Ventolin HFA) 90 mcg/act inhaler Inhale 2 puffs every 6 (six) hours as needed for wheezing    Ascorbic Acid (vitamin  C) 1000 MG tablet Take 1,000 mg by mouth 2 (two) times a day     bumetanide (BUMEX) 2 mg tablet TAKE 2 TABLETS (4 MG TOTAL) BY MOUTH DAILY    calcium carbonate (OS-PAULA) 600 MG tablet Take 600 mg by mouth 2 (two) times a day with meals    cholecalciferol (VITAMIN D3) 1,000 units tablet Take 1,000 Units by mouth daily 8,000 dailty    doxycycline hyclate (VIBRA-TABS) 100 mg tablet Take 1 tablet (100 mg total) by mouth 2 (two) times a day for 14 days    Eliquis 5 MG TAKE 1 TABLET (5 MG TOTAL) BY MOUTH 2 (TWO) TIMES A DAY    Entresto 49-51 MG TABS TAKE 1 TABLET BY MOUTH 2 (TWO) TIMES A DAY    hydrALAZINE (APRESOLINE) 10 mg tablet TAKE 1 TABLET (10 MG TOTAL) BY MOUTH 2 (TWO) TIMES A DAY    latanoprost (XALATAN) 0.005 % ophthalmic solution     levothyroxine 100 mcg tablet TAKE 1 TABLET (100 MCG TOTAL) BY MOUTH DAILY    Magnesium 400 MG CAPS Take 1 capsule (400 mg total) by mouth daily    metoprolol succinate (TOPROL-XL) 50 mg 24 hr tablet TAKE 2 TABLETS (100 MG TOTAL) BY MOUTH EVERY 12 (TWELVE) HOURS    Multiple Vitamin (MULTI-VITAMIN DAILY PO) 1 tablet 2 (two) times a day      Potassium Chloride ER 20 MEQ TBCR TAKE 2 TABLETS (40 MEQ TOTAL) BY MOUTH 2 (TWO) TIMES A DAY    rOPINIRole (REQUIP XL) 2 MG 24 hr tablet TAKE 1 TABLET (2 MG TOTAL) BY MOUTH DAILY AT BEDTIME    Turmeric (QC TUMERIC COMPLEX PO) Take by mouth 2 (two) times a day    [DISCONTINUED] traMADol (ULTRAM) 50 mg tablet TAKE 1 TABLET (50 MG TOTAL) BY MOUTH 2 (TWO) TIMES A DAY    venlafaxine (EFFEXOR) 75 mg tablet TAKE 1 TABLET (75 MG TOTAL) BY MOUTH EVERY 12 (TWELVE) HOURS    [DISCONTINUED] potassium chloride (K-DUR,KLOR-CON) 20 mEq tablet TAKE 2 TABLETS (40 MEQ TOTAL) BY MOUTH 2 (TWO) TIMES A DAY     Allergies   Allergen Reactions    Penicillins     Wellbutrin Sr  [Bupropion]      Other reaction(s): Suicidal ideations  Category: Adverse Reaction;      Immunization History   Administered Date(s) Administered    COVID-19 PFIZER VACCINE 0.3 ML IM 01/24/2021,  "02/13/2021, 01/15/2022    Td (adult), adsorbed 01/01/2000       Objective     /62 (BP Location: Left arm, Patient Position: Sitting, Cuff Size: Large)   Pulse 86   Temp (!) 96.5 °F (35.8 °C) (Tympanic)   Resp 18   Ht 5' 1\" (1.549 m)   Wt 88.5 kg (195 lb)   SpO2 98%   BMI 36.84 kg/m²     Physical Exam  Constitutional:       General: She is not in acute distress.     Appearance: She is well-developed. She is not ill-appearing, toxic-appearing or diaphoretic.   HENT:      Head: Normocephalic and atraumatic.   Eyes:      Extraocular Movements: Extraocular movements intact.      Conjunctiva/sclera: Conjunctivae normal.      Pupils: Pupils are equal, round, and reactive to light.   Neck:      Thyroid: No thyromegaly.   Cardiovascular:      Rate and Rhythm: Normal rate and regular rhythm.      Heart sounds: Normal heart sounds. No murmur heard.  Pulmonary:      Effort: Pulmonary effort is normal. No respiratory distress.      Breath sounds: Normal breath sounds. No wheezing.   Abdominal:      General: Bowel sounds are normal. There is no distension.      Palpations: Abdomen is soft.      Tenderness: There is no abdominal tenderness.   Musculoskeletal:         General: Normal range of motion.      Cervical back: Normal range of motion and neck supple. No rigidity or tenderness.      Comments: Using a cane for ambulation    Lymphadenopathy:      Cervical: No cervical adenopathy.   Skin:     General: Skin is warm and dry.          Neurological:      General: No focal deficit present.      Mental Status: She is alert and oriented to person, place, and time.   Psychiatric:         Mood and Affect: Mood normal.         Behavior: Behavior normal.         Thought Content: Thought content normal.         Judgment: Judgment normal.       SOTO Ring    "

## 2024-03-04 ENCOUNTER — TELEPHONE (OUTPATIENT)
Dept: CARDIOLOGY CLINIC | Facility: CLINIC | Age: 84
End: 2024-03-04

## 2024-03-09 DIAGNOSIS — I50.22 CHRONIC HFREF (HEART FAILURE WITH REDUCED EJECTION FRACTION) (HCC): ICD-10-CM

## 2024-03-09 DIAGNOSIS — I50.9 CHF (CONGESTIVE HEART FAILURE) (HCC): ICD-10-CM

## 2024-03-11 RX ORDER — POTASSIUM CHLORIDE 1500 MG/1
TABLET, EXTENDED RELEASE ORAL
Qty: 120 TABLET | Refills: 2 | Status: SHIPPED | OUTPATIENT
Start: 2024-03-11

## 2024-03-14 ENCOUNTER — TELEPHONE (OUTPATIENT)
Dept: FAMILY MEDICINE CLINIC | Facility: CLINIC | Age: 84
End: 2024-03-14

## 2024-03-14 NOTE — TELEPHONE ENCOUNTER
Voicemail: Hi,I was just staring left some kffels. Anyway, I forgot to ask a question. Phone number is [phone]. Question is, and I know it could do it online, but when I was there last to see Liz two weeks 3 weeks ago, she ordered compression socks for me and I have not received them yet. Can somebody check on that or ask Liz? Alrighty. Thank you. Kenneth.    - Please provide update on compression socks.

## 2024-03-15 ENCOUNTER — REMOTE DEVICE CLINIC VISIT (OUTPATIENT)
Dept: CARDIOLOGY CLINIC | Facility: CLINIC | Age: 84
End: 2024-03-15

## 2024-03-15 DIAGNOSIS — Z95.810 AICD (AUTOMATIC CARDIOVERTER/DEFIBRILLATOR) PRESENT: Primary | ICD-10-CM

## 2024-03-15 PROCEDURE — RECHECK: Performed by: INTERNAL MEDICINE

## 2024-03-15 NOTE — PROGRESS NOTES
"Results for orders placed or performed in visit on 03/15/24   Cardiac EP device report    Narrative    MDT-DUAL CHAMBER \"HIS\" ICD/VVIR MODEACTIVE SYSTEM IS MRI CONDITIONAL  CARELINK TRANSMISSION: BATTERY VOLTAGE NEARING KHOI (4 MTHS). WILL SCHEDULE MONTHLY BATTERY CHECKS. BVP 99.7% (VSRP 0.3%) ALL AVAILABLE LEAD PARAMETERS WITHIN NORMAL LIMITS. NO SIGNIFICANT HIGH RATE EPISODES. OPTI-VOL WITHIN NORMAL LIMITS. NORMAL DEVICE FUNCTION. AM        "

## 2024-03-18 NOTE — PROGRESS NOTES
Heart Failure Outpatient Progress Note - Mayra Meadows 83 y.o. female MRN: [id number]    @ Encounter: [id number]      Assessment/Plan:    Patient Active Problem List    Diagnosis Date Noted    Left-sided chest wall pain 10/12/2022    WANG (acute kidney injury) ([facility]) 10/12/2022    Hypertensive heart and chronic kidney disease with heart failure and stage 1 through stage 4 chronic kidney disease, or unspecified chronic kidney disease ([facility]) 04/15/2021    Restless leg syndrome 01/27/2020    Debility 07/19/2019    Acute on chronic combined systolic (congestive) and diastolic (congestive) heart failure ([facility]) 07/15/2019    Ambulatory dysfunction 07/12/2019    Type 2 diabetes mellitus with diabetic cataract ([facility]) 10/07/2015    Hyperlipidemia 08/15/2012    Hypertension 08/15/2012    Hypothyroidism 08/15/2012    Obesity (BMI 30-39.9) 08/15/2012    Osteoarthritis 08/15/2012    Bilateral lower extremity edema 02/29/2024    Cellulitis of left lower extremity 02/27/2024    Acute pain of left knee 02/27/2024    Bronchitis 12/19/2023    Continuous opioid dependence ([facility]) 09/28/2023    Chronic kidney disease, stage 4 (severe) ([facility]) 09/28/2023    Paroxysmal atrial fibrillation ([facility]) 03/07/2022       # Chronic HFimpEF w/ partial recovery, Stage C, NYHA III  Etiology: NICM/tachy-mediated given h/o AF with RVR , +/- ischemia given patient's risk factors for CAD, strong family history, and septal Q waves on EKG with severe septal hypokinesis on echo, less likely viral, toxin induced or infiltrative. S/P cardiogenic shock in past  Now S/P AVJ ablation with BiV AICD implant with no high rate episodes.    Weight: 193 lbs  NT proBNP: 10/17/22: 4761  1/14/21: 716  6/2/20: 1089    Studies- personally reviewed by myself  Echo 3/8/23:  LVEF: 65%  RV: mildly dilated  LA moderately dilated  PASP: normal    Echo 11/20/20  LVEF: 60%  RV: mildly dilated, mildly reduced  PASP: 60 mmHg  RVOT: no notching    TTE completed on 03/16/2020 (not  euvolemic): LVEF 30%. LVIDd 5.44 cm. Moderately dilated RV with moderately to markedly reduced RVSF. NAN. Moderate MR. Mild to moderate TR. Dilated IVC.                Echocardiogram (limited) from 07/04/2019:  LVEF: 45%; mild diffuse hypokinesis.   LVIDd: 3.8 cm.  MR: No MR. Moderate annular calcification.  Other: Dilated LA.     Echocardiogram from 06/20/2019:  LVEF: 25%  LVIDd: 4.6 cm.  RV: Dilated and hypokinetic.  MR: Moderate.     Neurohormonal Blockade:  --Beta Blocker: metoprolol succinate 100 mg Q12  --ACEi, ARB or ARNi: Entresto 49/51 mg BID.   --SVR reduction: hydralazine 10 mg BID  --Aldosterone Receptor Blocker: Renal function precludes  --SGLT2i:   -- Diuretic : Bumex 4 mg once daily     Sudden Cardiac Death Risk Reduction:  Since been DC.   --ICD: MDT DC HIS ICD  Interrogation 3/15/24: BVP 99.7%, no events, optivol normal  Interrogation 9/8/23: BVP 99.9%, optivol crossed     Electrical Resynchronization:  --Interrogation: Narrow QRS.      Advanced Therapies (if appropriate):  --Inotrope: N/A  --LVAD/Transplant Candidacy: Will continue to monitor.     # Pulmonary Hypertension, WHO group 2 from now HFpEF and likely significant CORAZON, untreated (Group 3)  Stressed polysomnogram, continued volume management    # PAF w/ hx AF with RVR. S/p AV node ablation with CRT-D with Dr. Finnegan on 03/18/2020.   Anticoagulation on Eliquis.   Rate: metoprolol succinate 100 mg Q12    # HTN.Controlled.      # HLD. On statin therapy.  5/18/22: , HDL 57     # Hypothyroidism. History of radioablation in the past now on replacement therapy.  TSH stable     # Type II DM.  2/27/24: HgA1C 6.8%     # CORAZON. Noncompliant with CPAP in the past .  Still has not complete sleep study, will not     # CKD.  Renal function stable last checked. ,  CR 0.93 on 11/25     # History of transaminitis in setting of cardiogenic shock     #  Abnormal EKG. Q waves noted in septal leads concerning for possible old infarct. Lexiscan stress to  eval for underlying ischemia still not completed.     # H/O tachy-oniel syndrome.      #  History of alcohol abuse.  Has been abstinent for years    TODAY'S PLAN:  Battery nearing KHOI  Indirect PA pressures looked good on 3/8 echo  Lipids need improvement with DM  Continue current diuretic for combined systolic and diastolic HF  I ordered sleep study as I do suspect hypoxia is contributing to her PH- she has yet to do and states will not do  --2g sodium diet  - Daily weights    HPI:      84 yo female who follows for chronic BiV heart failure, afib, HTN, HLD, CORAZON, DM2.   Back in June 2019 admitted with afib with RVR and decompensated heart failure. Had not had a cardiac hx. Echo showed EF: 25-30%, underwent EBEN/ CV but went back into afib. Started on amiodarone load. Did have junctional rhythm immediate post conversion and showing signs of tachy oniel syndrome. Given these findings, PPM with possible AICD was suggested with cardioversion thereafter.         On 6/25 patient was taken to the cath lab for planned dual chamber AICD but apparently became hypotensive and markedly SOB upon induction with propofol. She was subsequently transferred to the ICU for further management of her heart failure. She continued to be in AFib with RVR upon transfer. Through the night, patient's condition began to worsen and she became cool, clammy with N/V and loss of obtainable BP. Started on pressor support and lined. SVO2 at that point 32% with signs of lactic acidosis, WANG. Then started on milrinone gtt at 0.25 mcg/kg/min.      She was able to be weaned off inotropic support. She saw Dr Nelson in follow up and her afib rates were high so amio stopped and focused on rate control with increase in Toprol to 75 mg BID.         Admitted to [facility] from 03/11 to 03/24/2020 after presenting (as the recommendation of many of her providers) with bilateral LE edema and weight gain. In ED was found to have NT-proBNP of 4600 and be in  atrial fibrillation with RVR (rates in 150s). She was then started on IV diltiazem and received IV Lasix. Diltiazem was later stopped and amiodarone drip was started. Switched from IV Lasix to IV Bumex on 03/41. EP was consulted. TTE revealed LVEF 30% and decision was made to proceed with AV node ablation and CRT-D implantation once optimized.   HF team recommended her being discharged home on Bumex 4 mg BID with PRN metolazone; however, she was discharged home on PO Bumex 4 mg daily without PRN metolazone prescribed. Lost 18 lbs during this admission.     Was in hospital with acute on chronic HF, diuresed and discharged on Bumex 4 mg daily, Toprol  mg BID and Entresto 49/51 mg BID; decompensation felt to be due to holding diuretic and entresto, maybe MAP driven    Interval History:  Interrogation 3/15/24: BVP 99.7%, no events, optivol normal, nearing KHOI  Often does not take bumex on days she works  Looking to retire  Moved into a new place- still in [place]  Breathing is stable  Review of Systems   Constitutional:  Negative for activity change, appetite change, fatigue and unexpected weight change.   HENT:  Negative for congestion and nosebleeds.    Eyes: Negative.    Respiratory:  Negative for cough, chest tightness and shortness of breath.    Cardiovascular:  Negative for chest pain, palpitations and leg swelling.   Gastrointestinal:  Negative for abdominal distention.   Endocrine: Negative.    Genitourinary: Negative.    Musculoskeletal: Negative.    Skin: Negative.    Neurological:  Negative for dizziness, syncope and weakness.   Hematological: Negative.    Psychiatric/Behavioral: Negative.         Past Medical History:   Diagnosis Date    Allergic childhood    Arthritis do not know    Cancer ([facility]) 1976    Cardiogenic shock ([facility]) 06/26/2019    CHF (congestive heart failure) ([facility])     Chronic kidney disease June 2019    COPD (chronic obstructive pulmonary disease) ([facility]) no    Coronary artery disease  06/19    Depression no    Disease of thyroid gland 1976    Eczema     Heart murmur childhood    Hypertension do not knpw    Photosensitivity     abnormal skin sensitivity to sunlight    Traumatic open wound of left lower leg     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    Current Outpatient Medications:     albuterol (Ventolin HFA) 90 mcg/act inhaler, Inhale 2 puffs every 6 (six) hours as needed for wheezing, Disp: 18 g, Rfl: 5    Ascorbic Acid (vitamin C) 1000 MG tablet, Take 1,000 mg by mouth 2 (two) times a day , Disp: , Rfl:     bumetanide (BUMEX) 2 mg tablet, TAKE 2 TABLETS (4 MG TOTAL) BY MOUTH DAILY, Disp: 60 tablet, Rfl: 5    calcium carbonate (OS-PAULA) 600 MG tablet, Take 600 mg by mouth 2 (two) times a day with meals, Disp: , Rfl:     cholecalciferol (VITAMIN D3) 1,000 units tablet, Take 1,000 Units by mouth daily 8,000 dailty, Disp: , Rfl:     Eliquis 5 MG, TAKE 1 TABLET (5 MG TOTAL) BY MOUTH 2 (TWO) TIMES A DAY, Disp: 60 tablet, Rfl: 11    Entresto 49-51 MG TABS, TAKE 1 TABLET BY MOUTH 2 (TWO) TIMES A DAY, Disp: 60 tablet, Rfl: 11    hydrALAZINE (APRESOLINE) 10 mg tablet, TAKE 1 TABLET (10 MG TOTAL) BY MOUTH 2 (TWO) TIMES A DAY, Disp: 60 tablet, Rfl: 3    latanoprost (XALATAN) 0.005 % ophthalmic solution, , Disp: , Rfl:     levothyroxine 100 mcg tablet, TAKE 1 TABLET (100 MCG TOTAL) BY MOUTH DAILY, Disp: 30 tablet, Rfl: 5    Magnesium 400 MG CAPS, Take 1 capsule (400 mg total) by mouth daily, Disp: , Rfl: 0    metoprolol succinate (TOPROL-XL) 50 mg 24 hr tablet, TAKE 2 TABLETS (100 MG TOTAL) BY MOUTH EVERY 12 (TWELVE) HOURS, Disp: 120 tablet, Rfl: 5    Multiple Vitamin (MULTI-VITAMIN DAILY PO), 1 tablet 2 (two) times a day  , Disp: , Rfl:     Potassium Chloride ER 20 MEQ TBCR, TAKE 2 TABLETS (40 MEQ TOTAL) BY MOUTH 2  (TWO) TIMES A DAY, Disp: 120 tablet, Rfl: 2    rOPINIRole (REQUIP XL) 2 MG 24 hr tablet, TAKE 1 TABLET (2 MG TOTAL) BY MOUTH DAILY AT BEDTIME, Disp: 30 tablet, Rfl: 5    traMADol (ULTRAM) 50 mg tablet, Take 1 tablet (50 mg total) by mouth 2 (two) times a day, Disp: 60 tablet, Rfl: 0    Turmeric (QC TUMERIC COMPLEX PO), Take by mouth 2 (two) times a day, Disp: , Rfl:     venlafaxine (EFFEXOR) 75 mg tablet, TAKE 1 TABLET (75 MG TOTAL) BY MOUTH EVERY 12 (TWELVE) HOURS, Disp: 60 tablet, Rfl: 5    Social History     Socioeconomic History    Marital status: Single     Spouse name: Not on file    Number of children: Not on file    Years of education: Not on file    Highest education level: Not on file   Occupational History    Not on file   Tobacco Use    Smoking status: Never     Passive exposure: Never    Smokeless tobacco: Never   Vaping Use    Vaping status: Never Used   Substance and Sexual Activity    Alcohol use: Not Currently     Comment: 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    Drug use: Never    Sexual activity: Not Currently   Other Topics Concern    Not on file   Social History Narrative    Not on file     Social Determinants of Health     Financial Resource Strain: Medium Risk (9/27/2023)    Overall Financial Resource Strain (CARDIA)     Difficulty of Paying Living Expenses: Somewhat hard   Food Insecurity: Not on file   Transportation Needs: No Transportation Needs (9/27/2023)    PRAPARE - Transportation     Lack of Transportation (Medical): No     Lack of Transportation (Non-Medical): No   Physical Activity: Not on file   Stress: Not on file   Social Connections: Not on file   Intimate Partner Violence: Not on file   Housing Stability: Not on file       Family History   Problem Relation Age of Onset    Alzheimer's disease Mother     Coronary artery disease Mother     Dementia Mother     Diabetes Mother         mellitus    Heart disease Mother     Other Father         acute myocardial  infarction    Heart disease Father     Coronary artery disease Sister     Other Maternal Grandfather         laryngeal cancer    Dementia Maternal Aunt     Diabetes Maternal Aunt     Heart disease Sister     Heart disease Brother        Physical Exam:    Vitals:   There were no vitals filed for this visit.    Physical Exam  Constitutional:       Appearance: She is well-developed.   HENT:      Head: Normocephalic and atraumatic.   Eyes:      Pupils: Pupils are equal, round, and reactive to light.   Neck:      Vascular: No JVD.   Cardiovascular:      Rate and Rhythm: Normal rate and regular rhythm.      Heart sounds: No murmur heard.  Pulmonary:      Effort: Pulmonary effort is normal. No respiratory distress.      Breath sounds: Normal breath sounds.   Abdominal:      General: There is no distension.      Palpations: Abdomen is soft.      Tenderness: There is no abdominal tenderness.   Musculoskeletal:         General: Normal range of motion.      Cervical back: Normal range of motion.   Skin:     General: Skin is warm and dry.      Findings: No rash.   Neurological:      Mental Status: She is alert and oriented to person, place, and time.       Labs & Results:    Lab Results   Component Value Date    SODIUM 140 11/25/2023    K 4.5 11/25/2023     11/25/2023    CO2 32 11/25/2023    BUN 18 11/25/2023    CREATININE 0.93 11/25/2023    GLUC 167 (H) 10/18/2022    CALCIUM 9.4 11/25/2023     Lab Results   Component Value Date    WBC 7.44 11/25/2023    HGB 13.1 11/25/2023    HCT 39.8 11/25/2023     (H) 11/25/2023     11/25/2023     Lab Results   Component Value Date    NTBNP 4,761 (H) 10/17/2022      Lab Results   Component Value Date    CHOLESTEROL 210 (H) 05/18/2022    CHOLESTEROL 112 03/12/2020    CHOLESTEROL 177 03/18/2019     Lab Results   Component Value Date    HDL 57 05/18/2022    HDL 38 (L) 03/12/2020    HDL 66 03/18/2019     Lab Results   Component Value Date    TRIG 142 05/18/2022    TRIG 48  03/12/2020    TRIG 82 03/18/2019     Lab Results   Component Value Date    NONHDLC 153 05/18/2022    NONHDLC 74 03/12/2020    NONHDLC 106 02/07/2017       EKG personally reviewed by Imer Bruce.     Counseling / Coordination of Care  Time spent today 25 minutes.  Greater than 50% of total time was spent with the patient and / or family counseling and / or coordination of care. We went over current diagnosis, most recent studies and any changes in treatment.    Thank you for the opportunity to participate in the care of this patient.    IMER BRUCE D.O.  DIRECTOR OF HEART FAILURE/ PULMONARY HYPERTENSION  MEDICAL DIRECTOR OF LVAD PROGRAM  [facility]

## 2024-03-20 ENCOUNTER — OFFICE VISIT (OUTPATIENT)
Dept: CARDIOLOGY CLINIC | Facility: CLINIC | Age: 84
End: 2024-03-20
Payer: COMMERCIAL

## 2024-03-20 VITALS
HEIGHT: 61 IN | HEART RATE: 89 BPM | OXYGEN SATURATION: 97 % | BODY MASS INDEX: 36.44 KG/M2 | SYSTOLIC BLOOD PRESSURE: 120 MMHG | DIASTOLIC BLOOD PRESSURE: 60 MMHG | WEIGHT: 193 LBS

## 2024-03-20 DIAGNOSIS — I50.32 CHRONIC HEART FAILURE WITH PRESERVED EJECTION FRACTION (HFPEF) (HCC): ICD-10-CM

## 2024-03-20 DIAGNOSIS — I10 PRIMARY HYPERTENSION: Primary | ICD-10-CM

## 2024-03-20 DIAGNOSIS — I48.0 PAROXYSMAL ATRIAL FIBRILLATION (HCC): ICD-10-CM

## 2024-03-20 PROCEDURE — 99214 OFFICE O/P EST MOD 30 MIN: CPT | Performed by: INTERNAL MEDICINE

## 2024-03-20 NOTE — PATIENT INSTRUCTIONS
No changes to your cardiovascular meds    You will be getting a new device around 4 months    No new studies needed    Dulcolax - try for your constipation

## 2024-03-28 ENCOUNTER — OFFICE VISIT (OUTPATIENT)
Dept: FAMILY MEDICINE CLINIC | Facility: CLINIC | Age: 84
End: 2024-03-28
Payer: COMMERCIAL

## 2024-03-28 ENCOUNTER — TELEPHONE (OUTPATIENT)
Dept: FAMILY MEDICINE CLINIC | Facility: CLINIC | Age: 84
End: 2024-03-28

## 2024-03-28 VITALS
HEIGHT: 61 IN | WEIGHT: 195.6 LBS | HEART RATE: 91 BPM | DIASTOLIC BLOOD PRESSURE: 82 MMHG | RESPIRATION RATE: 18 BRPM | SYSTOLIC BLOOD PRESSURE: 124 MMHG | OXYGEN SATURATION: 97 % | BODY MASS INDEX: 36.93 KG/M2 | TEMPERATURE: 98.1 F

## 2024-03-28 DIAGNOSIS — E11.36 TYPE 2 DIABETES MELLITUS WITH DIABETIC CATARACT, WITHOUT LONG-TERM CURRENT USE OF INSULIN (HCC): Primary | ICD-10-CM

## 2024-03-28 DIAGNOSIS — N18.31 STAGE 3A CHRONIC KIDNEY DISEASE (CKD) (HCC): ICD-10-CM

## 2024-03-28 DIAGNOSIS — R60.0 BILATERAL LOWER EXTREMITY EDEMA: ICD-10-CM

## 2024-03-28 DIAGNOSIS — L03.116 CELLULITIS OF LEFT LOWER EXTREMITY: ICD-10-CM

## 2024-03-28 PROBLEM — I50.43 ACUTE ON CHRONIC COMBINED SYSTOLIC (CONGESTIVE) AND DIASTOLIC (CONGESTIVE) HEART FAILURE (HCC): Status: RESOLVED | Noted: 2019-07-15 | Resolved: 2024-03-28

## 2024-03-28 LAB
CREAT UR-MCNC: 85.3 MG/DL
MICROALBUMIN UR-MCNC: 14.2 MG/L
MICROALBUMIN/CREAT 24H UR: 17 MG/G CREATININE (ref 0–30)

## 2024-03-28 PROCEDURE — 99214 OFFICE O/P EST MOD 30 MIN: CPT | Performed by: NURSE PRACTITIONER

## 2024-03-28 PROCEDURE — G2211 COMPLEX E/M VISIT ADD ON: HCPCS | Performed by: NURSE PRACTITIONER

## 2024-03-28 PROCEDURE — 82043 UR ALBUMIN QUANTITATIVE: CPT | Performed by: NURSE PRACTITIONER

## 2024-03-28 PROCEDURE — 82570 ASSAY OF URINE CREATININE: CPT | Performed by: NURSE PRACTITIONER

## 2024-03-28 RX ORDER — DOXYCYCLINE HYCLATE 100 MG
100 TABLET ORAL 2 TIMES DAILY
Qty: 20 TABLET | Refills: 0 | Status: SHIPPED | OUTPATIENT
Start: 2024-03-28 | End: 2024-04-07

## 2024-03-28 NOTE — PROGRESS NOTES
Name: Mayra Meadows      : 1940      MRN: [id number]  Encounter Provider: SOTO Ring  Encounter Date: 3/28/2024   Encounter department: [facility]    Assessment & Plan     1. Type 2 diabetes mellitus with diabetic cataract, without long-term current use of insulin ([facility])  Assessment & Plan:    Lab Results   Component Value Date    HGBA1C 6.8 (A) 2024   Stable overall  Continue with dietary modifications (limit the chocolate milk and ice cream!!)    Orders:  -     Albumin / creatinine urine ratio    2. Stage 3a chronic kidney disease (CKD) ([facility])  Assessment & Plan:  Lab Results   Component Value Date    EGFR 57 2023    EGFR 55 2022    EGFR 38 10/18/2022    CREATININE 0.93 2023    CREATININE 0.96 2022    CREATININE 1.30 10/18/2022     Stable, stay well hydrated and avoid nephrotoxic agents       3. Bilateral lower extremity edema  Assessment & Plan:  Encourage pt to try and take her Bumex daily as prescribed- she is retiring tomorrow and she will hopefully be better with this  Compression stockings ordered and we did measure her for this today   Elevate legs when possible  Follow a low sodium diet (which could definitely be better for her)  Given degree of edema despite Bumex, I did refer her to Vascular today for further evaluation     Orders:  -     Ambulatory Referral to Vascular Surgery; Future    4. Cellulitis of left lower extremity  Assessment & Plan:  Symptoms appear very mild today  However, with going into a holiday weekend, I did send Doxycycline to her pharmacy in the event her symptoms worsen   Bumex 4mg total daily, compression stockings and schedule evaluation with Vascular for persistent edema    Orders:  -     doxycycline hyclate (VIBRA-TABS) 100 mg tablet; Take 1 tablet (100 mg total) by mouth 2 (two) times a day for 10 days        Depression Screening and Follow-up Plan: Patient was screened for depression during today's  encounter. They screened negative with a PHQ-2 score of 0.        Subjective     HPI    Madelin presents today for a follow up    I saw Madelin back on 2/29/24 for bilateral low extremity edema and cellulitis to the LLE.  At that time, I encouraged her to take the Bumex 2 mg, two tablets daily.  Madelin tends to skip the Bumex on days she works.  As of tomorrow, she will be retired and can hopefully be more consistent with the Bumex.  I did order her compression stockings but she was unable to measure her legs so she couldn't get these. Over the past several days, she has noticed some cellulitis signs returning to her LLE including redness, tenderness and increased in swelling.  She does follow with Cardiology, Dr. Martin for CHF, PAF and HTN.  Last evaluated last week on 3/20.  She did not mention these symptoms to Dr. Maritn at their visit.  She denies SOB, wheezing, CP, palpitations, orthopnea.  She does not weigh herself daily.  Diet could be better in terms of sodium.  Pt also with CKD stage 3A, most recent kidney function from 11/25/23 with BUN 18, Cr. 0.93, eGFR 57.  Pt has a CMP ordered from 2/29, but has not yet been completed        Review of Systems   Constitutional:  Negative for activity change, appetite change, chills, diaphoresis, fatigue, fever and unexpected weight change.   Eyes:  Negative for visual disturbance.   Respiratory:  Negative for cough, chest tightness, shortness of breath and wheezing.    Cardiovascular:  Positive for leg swelling. Negative for chest pain and palpitations.   Gastrointestinal:  Negative for abdominal pain, blood in stool, constipation, diarrhea and nausea.   Genitourinary:  Negative for dysuria.   Musculoskeletal:  Negative for arthralgias and myalgias.   Skin:  Negative for rash and wound.        As noted in HPI   Neurological:  Negative for dizziness, weakness, numbness and headaches.   Psychiatric/Behavioral:  Negative for dysphoric mood, self-injury, sleep disturbance and  suicidal ideas. The patient is not nervous/anxious.        Past Medical History:   Diagnosis Date    Allergic childhood    Arthritis do not know    Cancer ([facility]) 1976    Cardiogenic shock ([facility]) 06/26/2019    CHF (congestive heart failure) ([facility])     Chronic kidney disease June 2019    COPD (chronic obstructive pulmonary disease) ([facility]) no    Coronary artery disease 06/19    Depression no    Disease of thyroid gland 1976    Eczema     Heart murmur childhood    Hypertension do not knpw    Photosensitivity     abnormal skin sensitivity to sunlight    Traumatic open wound of left lower leg     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[facility])     staging    Visual impairment childhood     Past Surgical History:   Procedure Laterality Date    CARDIAC SURGERY  06/19    CATARACT EXTRACTION Left     EYE SURGERY      HEMORRHOID SURGERY      IR NON-TUNNELED CENTRAL LINE PLACEMENT  07/09/2019    OOPHORECTOMY      unilateral    TOTAL ABDOMINAL HYSTERECTOMY       Family History   Problem Relation Age of Onset    Alzheimer's disease Mother     Coronary artery disease Mother     Dementia Mother     Diabetes Mother         mellitus    Heart disease Mother     Other Father         acute myocardial infarction    Heart disease Father     Coronary artery disease Sister     Other Maternal Grandfather         laryngeal cancer    Dementia Maternal Aunt     Diabetes Maternal Aunt     Heart disease Sister     Heart disease Brother      Social History     Socioeconomic History    Marital status: Single     Spouse name: None    Number of children: None    Years of education: None    Highest education level: None   Occupational History    None   Tobacco Use    Smoking status: Never     Passive exposure: Never    Smokeless tobacco: Never   Vaping Use    Vaping status: Never Used   Substance and Sexual Activity    Alcohol use: Not Currently     Comment: 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    Drug use: Never    Sexual activity: Not Currently   Other Topics  Concern    None   Social History Narrative    None     Social Determinants of Health     Financial Resource Strain: Medium Risk (9/27/2023)    Overall Financial Resource Strain (CARDIA)     Difficulty of Paying Living Expenses: Somewhat hard   Food Insecurity: Not on file   Transportation Needs: No Transportation Needs (9/27/2023)    PRAPARE - Transportation     Lack of Transportation (Medical): No     Lack of Transportation (Non-Medical): No   Physical Activity: Not on file   Stress: Not on file   Social Connections: Not on file   Intimate Partner Violence: Not on file   Housing Stability: Not on file     Current Outpatient Medications on File Prior to Visit   Medication Sig    albuterol (Ventolin HFA) 90 mcg/act inhaler Inhale 2 puffs every 6 (six) hours as needed for wheezing    Ascorbic Acid (vitamin C) 1000 MG tablet Take 1,000 mg by mouth 2 (two) times a day     bumetanide (BUMEX) 2 mg tablet TAKE 2 TABLETS (4 MG TOTAL) BY MOUTH DAILY    calcium carbonate (OS-PAULA) 600 MG tablet Take 600 mg by mouth 2 (two) times a day with meals    cholecalciferol (VITAMIN D3) 1,000 units tablet Take 1,000 Units by mouth daily 8,000 dailty    Eliquis 5 MG TAKE 1 TABLET (5 MG TOTAL) BY MOUTH 2 (TWO) TIMES A DAY    Entresto 49-51 MG TABS TAKE 1 TABLET BY MOUTH 2 (TWO) TIMES A DAY    hydrALAZINE (APRESOLINE) 10 mg tablet TAKE 1 TABLET (10 MG TOTAL) BY MOUTH 2 (TWO) TIMES A DAY    latanoprost (XALATAN) 0.005 % ophthalmic solution     levothyroxine 100 mcg tablet TAKE 1 TABLET (100 MCG TOTAL) BY MOUTH DAILY    Magnesium 400 MG CAPS Take 1 capsule (400 mg total) by mouth daily    metoprolol succinate (TOPROL-XL) 50 mg 24 hr tablet TAKE 2 TABLETS (100 MG TOTAL) BY MOUTH EVERY 12 (TWELVE) HOURS    Multiple Vitamin (MULTI-VITAMIN DAILY PO) 1 tablet 2 (two) times a day      Potassium Chloride ER 20 MEQ TBCR TAKE 2 TABLETS (40 MEQ TOTAL) BY MOUTH 2 (TWO) TIMES A DAY    rOPINIRole (REQUIP XL) 2 MG 24 hr tablet TAKE 1 TABLET (2 MG TOTAL)  "BY MOUTH DAILY AT BEDTIME    traMADol (ULTRAM) 50 mg tablet Take 1 tablet (50 mg total) by mouth 2 (two) times a day    Turmeric (QC TUMERIC COMPLEX PO) Take by mouth 2 (two) times a day    venlafaxine (EFFEXOR) 75 mg tablet TAKE 1 TABLET (75 MG TOTAL) BY MOUTH EVERY 12 (TWELVE) HOURS    [DISCONTINUED] potassium chloride (K-DUR,KLOR-CON) 20 mEq tablet TAKE 2 TABLETS (40 MEQ TOTAL) BY MOUTH 2 (TWO) TIMES A DAY     Allergies   Allergen Reactions    Penicillins     Wellbutrin Sr  [Bupropion]      Other reaction(s): Suicidal ideations  Category: Adverse Reaction;      Immunization History   Administered Date(s) Administered    COVID-19 PFIZER VACCINE 0.3 ML IM 01/24/2021, 02/13/2021, 01/15/2022    Td (adult), adsorbed 01/01/2000       Objective     /82   Pulse 91   Temp 98.1 °F (36.7 °C) (Tympanic)   Resp 18   Ht 5' 1\" (1.549 m)   Wt 88.7 kg (195 lb 9.6 oz)   SpO2 97%   BMI 36.96 kg/m²     Physical Exam  Constitutional:       General: She is not in acute distress.     Appearance: She is well-developed. She is obese. She is not ill-appearing, toxic-appearing or diaphoretic.   HENT:      Head: Normocephalic and atraumatic.   Eyes:      Extraocular Movements: Extraocular movements intact.      Conjunctiva/sclera: Conjunctivae normal.      Pupils: Pupils are equal, round, and reactive to light.   Neck:      Thyroid: No thyromegaly.   Cardiovascular:      Rate and Rhythm: Normal rate and regular rhythm.      Heart sounds: Normal heart sounds. No murmur heard.  Pulmonary:      Effort: Pulmonary effort is normal. No respiratory distress.      Breath sounds: Normal breath sounds. No wheezing.   Abdominal:      General: Bowel sounds are normal. There is no distension.      Palpations: Abdomen is soft.      Tenderness: There is no abdominal tenderness.   Musculoskeletal:         General: Normal range of motion.      Cervical back: Normal range of motion and neck supple. No rigidity or tenderness.      Right lower " le+ Edema present.      Left lower le+ Edema present.   Lymphadenopathy:      Cervical: No cervical adenopathy.   Skin:     General: Skin is warm and dry.             Comments: Are of mild erythema over the left lower anterior shin.  No opened wounds, weeping or drainage.  Area is not warm or tender.  There is 2+ edema to the LLE   Neurological:      General: No focal deficit present.      Mental Status: She is alert and oriented to person, place, and time.   Psychiatric:         Mood and Affect: Mood normal.         Behavior: Behavior normal.         Thought Content: Thought content normal.         Judgment: Judgment normal.       SOTO Ring

## 2024-03-28 NOTE — TELEPHONE ENCOUNTER
Call placed to [facility], instructed to fax order to  Attention Francesca or Bambi. Order faxed as requested.     Measurements attached to script  Right:             All below are circumferences.   1.Ankle 8.5in  2.Calf 13 inches  3.Knee 14.5 inches    Left:  1.Ankle 9 inches  2.Calf 15 inches  3.Knee 15.5 inches    Knee to ankle length 13 inches

## 2024-03-28 NOTE — ASSESSMENT & PLAN NOTE
Lab Results   Component Value Date    EGFR 57 11/25/2023    EGFR 55 11/30/2022    EGFR 38 10/18/2022    CREATININE 0.93 11/25/2023    CREATININE 0.96 11/30/2022    CREATININE 1.30 10/18/2022     Stable, stay well hydrated and avoid nephrotoxic agents

## 2024-03-28 NOTE — ASSESSMENT & PLAN NOTE
Encourage pt to try and take her Bumex daily as prescribed- she is retiring tomorrow and she will hopefully be better with this  Compression stockings ordered and we did measure her for this today   Elevate legs when possible  Follow a low sodium diet (which could definitely be better for her)  Given degree of edema despite Bumex, I did refer her to Vascular today for further evaluation

## 2024-03-28 NOTE — ASSESSMENT & PLAN NOTE
Symptoms appear very mild today  However, with going into a holiday weekend, I did send Doxycycline to her pharmacy in the event her symptoms worsen   Bumex 4mg total daily, compression stockings and schedule evaluation with Vascular for persistent edema

## 2024-03-28 NOTE — ASSESSMENT & PLAN NOTE
Lab Results   Component Value Date    HGBA1C 6.8 (A) 02/27/2024   Stable overall  Continue with dietary modifications (limit the chocolate milk and ice cream!!)

## 2024-04-15 ENCOUNTER — REMOTE DEVICE CLINIC VISIT (OUTPATIENT)
Dept: CARDIOLOGY CLINIC | Facility: CLINIC | Age: 84
End: 2024-04-15
Payer: COMMERCIAL

## 2024-04-15 DIAGNOSIS — Z95.810 PRESENCE OF AUTOMATIC CARDIOVERTER/DEFIBRILLATOR (AICD): Primary | ICD-10-CM

## 2024-04-15 DIAGNOSIS — F41.8 DEPRESSION WITH ANXIETY: ICD-10-CM

## 2024-04-15 PROCEDURE — 93296 REM INTERROG EVL PM/IDS: CPT | Performed by: INTERNAL MEDICINE

## 2024-04-15 PROCEDURE — 93295 DEV INTERROG REMOTE 1/2/MLT: CPT | Performed by: INTERNAL MEDICINE

## 2024-04-15 NOTE — PROGRESS NOTES
"Results for orders placed or performed in visit on 04/15/24   Cardiac EP device report    Narrative    MDT-DUAL CHAMBER \"HIS\" ICD/VVIR MODEACTIVE SYSTEM IS MRI CONDITIONAL  CARELINK TRANSMISSION: BATTERY VOLTAGE NEARING KHOI (3 MOS).  WILL SCHEDULE MONTHLY BATTERY CHECKS. : 99.8% + VSRP: 0.2%. ALL AVAILABLE LEAD PARAMETERS WITHIN NORMAL LIMITS. NO SIGNIFICANT HIGH RATE EPISODES. 1 V-SENSE EPISODE W/ MARKERS SHOWING FUSION. OPTI-VOL WITHIN NORMAL LIMITS. NORMAL DEVICE FUNCTION. CH        "

## 2024-04-16 RX ORDER — VENLAFAXINE 75 MG/1
75 TABLET ORAL EVERY 12 HOURS
Qty: 60 TABLET | Refills: 5 | Status: SHIPPED | OUTPATIENT
Start: 2024-04-16 | End: 2024-10-13

## 2024-04-18 ENCOUNTER — TELEPHONE (OUTPATIENT)
Age: 84
End: 2024-04-18

## 2024-04-18 ENCOUNTER — CONSULT (OUTPATIENT)
Dept: VASCULAR SURGERY | Facility: CLINIC | Age: 84
End: 2024-04-18
Payer: COMMERCIAL

## 2024-04-18 VITALS
SYSTOLIC BLOOD PRESSURE: 118 MMHG | BODY MASS INDEX: 37 KG/M2 | DIASTOLIC BLOOD PRESSURE: 62 MMHG | HEART RATE: 84 BPM | WEIGHT: 196 LBS | HEIGHT: 61 IN | OXYGEN SATURATION: 97 %

## 2024-04-18 DIAGNOSIS — R60.0 BILATERAL LOWER EXTREMITY EDEMA: ICD-10-CM

## 2024-04-18 PROCEDURE — 99203 OFFICE O/P NEW LOW 30 MIN: CPT

## 2024-04-18 NOTE — TELEPHONE ENCOUNTER
Caller: patient    Doctor: Mario    Reason for call: patient wants to verify if parking space information was faxed over. Called the office and was instructed to send this communication to clinical.    Call back#: [phone]

## 2024-04-18 NOTE — PROGRESS NOTES
Assessment/Plan:    Bilateral lower extremity edema  Patient reports intermittent swelling to her bilateral lower extremities that has been worsening over the last 6 months.  She is also reporting sensation of heaviness/fatigue and aching that worsens by the end of the day.  Patient reports that she was recently treated for cellulitis type II at the end of March by her PCP.  She reports occasional nocturnal cramping to her bilateral calves.  Patient is currently denying any claudication, rest pain, tissue loss, or numbness/tingling.  She does note red discoloration of bilateral lower extremities as well as brown discoloration to left shin following a fall last year.  Patient reports that she does take a diuretic and that she was recommended to utilize compression stockings per her PCP, however she has not picked up compression stockings yet.  Patient denies history of varicose veins    Plan:  -We discussed the pathophysiology of venous insufficiency as well as contributing lifestyle factors.  -We discussed initiating conservative measures including compression stockings, elevating legs, increasing physical activity and  weight loss.  -Patient agreeable to compression stockings.  Patient provided script for previous prescription for compression stockings placed by her PCP and a list of pharmacies in the area that will size her.  -Instructed patient to moisturize her lower extremities daily to maintain intact skin integrity and prevent further episodes of cellulitis.  -Patient may follow-up with vascular surgery as needed related to venous insufficiency       Diagnoses and all orders for this visit:    Bilateral lower extremity edema  -     Ambulatory Referral to Vascular Surgery          Subjective:      Patient ID: Mayra Meadows is a 83 y.o. female.    Patient is an 83-year-old female, non-smoker, with PMH HTN, HLD, CHF, A-fib (on Eliquis), hypothyroidism, obesity, type II DM, and CKD 3.  Patient is presenting  to the vascular surgery office upon referral by her PCP for the evaluation of bilateral lower extremity edema.    Patient reports intermittent swelling to her bilateral lower extremities that has been worsening over the last 6 months.  She is also reporting sensation of heaviness/fatigue and aching that worsens by the end of the day.  Patient reports that she was recently treated for cellulitis type II at the end of March by her PCP.  She reports occasional nocturnal cramping to her bilateral calves.  Patient is currently denying any claudication, rest pain, tissue loss, or numbness/tingling.  She does note red discoloration of bilateral lower extremities as well as brown discoloration to left shin following a fall last year.  Patient reports that she does take a diuretic and that she was recommended to utilize compression stockings per her PCP, however she has not picked up compression stockings yet.  Patient denies history of varicose veins    In her family.  She reports that she previously worked as a  her entire life and has worked as a  for the last 13 years.  She has no children.  Patient recently retired and is active and independent with ADLs.  She denies any previous episodes of bleeding or thrombophlebitis.        The following portions of the patient's history were reviewed and updated as appropriate: allergies, current medications, past family history, past medical history, past social history, past surgical history, and problem list.    Review of Systems   Constitutional: Negative.  Negative for activity change.   HENT: Negative.     Eyes: Negative.    Respiratory: Negative.  Negative for shortness of breath.    Cardiovascular:  Positive for leg swelling (VINITA LE). Negative for chest pain.   Gastrointestinal: Negative.    Endocrine: Negative.    Genitourinary: Negative.    Musculoskeletal:  Positive for arthralgias.   Skin:  Positive for color change (LLE). Negative for pallor and wound.  "  Allergic/Immunologic: Negative.    Neurological: Negative.  Negative for numbness.   Hematological: Negative.    Psychiatric/Behavioral: Negative.           Objective:      /62 (BP Location: Left arm, Patient Position: Sitting, Cuff Size: Large)   Pulse 84   Ht 5' 1\" (1.549 m)   Wt 88.9 kg (196 lb)   SpO2 97%   BMI 37.03 kg/m²          Physical Exam  Constitutional:       General: She is not in acute distress.     Appearance: Normal appearance.   HENT:      Head: Normocephalic and atraumatic.   Cardiovascular:      Rate and Rhythm: Normal rate and regular rhythm.      Pulses:           Radial pulses are 2+ on the right side and 2+ on the left side.        Dorsalis pedis pulses are 2+ on the right side and 2+ on the left side.        Posterior tibial pulses are 2+ on the right side and 2+ on the left side.      Heart sounds: Normal heart sounds. No murmur heard.  Pulmonary:      Effort: Pulmonary effort is normal. No respiratory distress.      Breath sounds: Normal breath sounds.   Musculoskeletal:         General: No swelling or tenderness.      Cervical back: Normal range of motion and neck supple.      Right lower le+ Pitting Edema present.      Left lower le+ Pitting Edema present.   Skin:     General: Skin is warm and dry.      Capillary Refill: Capillary refill takes less than 2 seconds.      Findings: No lesion, rash or wound.      Comments: Bulging reticular veins to bilateral ankles.    Venous stasis discoloration to LLE   Neurological:      General: No focal deficit present.      Mental Status: She is alert and oriented to person, place, and time.   Psychiatric:         Mood and Affect: Mood normal.         Behavior: Behavior normal.     I have reviewed and made appropriate changes to the review of systems input by the medical assistant.    Vitals:    24 0910   BP: 118/62   BP Location: Left arm   Patient Position: Sitting   Cuff Size: Large   Pulse: 84   SpO2: 97%   Weight: 88.9 " "kg (196 lb)   Height: 5' 1\" (1.549 m)       Patient Active Problem List   Diagnosis    Hyperlipidemia    Hypertension    Hypothyroidism    Obesity (BMI 30-39.9)    Osteoarthritis    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)    Ambulatory dysfunction    Debility    Restless leg syndrome    Hypertensive heart and chronic kidney disease with heart failure and stage 1 through stage 4 chronic kidney disease, or unspecified chronic kidney disease (HCC)    Paroxysmal atrial fibrillation (HCC)    Left-sided chest wall pain    WANG (acute kidney injury) (HCC)    Continuous opioid dependence (HCC)    Stage 3a chronic kidney disease (CKD) ([facility])    Bronchitis    Cellulitis of left lower extremity    Acute pain of left knee    Bilateral lower extremity edema       Past Surgical History:   Procedure Laterality Date    CARDIAC SURGERY  06/19    CATARACT EXTRACTION Left     EYE SURGERY      HEMORRHOID SURGERY      IR NON-TUNNELED CENTRAL LINE PLACEMENT  07/09/2019    OOPHORECTOMY      unilateral    TOTAL ABDOMINAL HYSTERECTOMY         Family History   Problem Relation Age of Onset    Alzheimer's disease Mother     Coronary artery disease Mother     Dementia Mother     Diabetes Mother         mellitus    Heart disease Mother     Other Father         acute myocardial infarction    Heart disease Father     Coronary artery disease Sister     Other Maternal Grandfather         laryngeal cancer    Dementia Maternal Aunt     Diabetes Maternal Aunt     Heart disease Sister     Heart disease Brother        Social History     Socioeconomic History    Marital status: Single     Spouse name: Not on file    Number of children: Not on file    Years of education: Not on file    Highest education level: Not on file   Occupational History    Not on file   Tobacco Use    Smoking status: Never     Passive exposure: Never    Smokeless tobacco: Never   Vaping Use    Vaping status: Never Used   Substance and Sexual Activity    Alcohol use: " Not Currently     Comment: 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    Drug use: Never    Sexual activity: Not Currently   Other Topics Concern    Not on file   Social History Narrative    Not on file     Social Determinants of Health     Financial Resource Strain: Medium Risk (9/27/2023)    Overall Financial Resource Strain (CARDIA)     Difficulty of Paying Living Expenses: Somewhat hard   Food Insecurity: Not on file   Transportation Needs: No Transportation Needs (9/27/2023)    PRAPARE - Transportation     Lack of Transportation (Medical): No     Lack of Transportation (Non-Medical): No   Physical Activity: Not on file   Stress: Not on file   Social Connections: Not on file   Intimate Partner Violence: Not on file   Housing Stability: Not on file       Allergies   Allergen Reactions    Penicillins     Wellbutrin Sr  [Bupropion]      Other reaction(s): Suicidal ideations  Category: Adverse Reaction;          Current Outpatient Medications:     albuterol (Ventolin HFA) 90 mcg/act inhaler, Inhale 2 puffs every 6 (six) hours as needed for wheezing, Disp: 18 g, Rfl: 5    Ascorbic Acid (vitamin C) 1000 MG tablet, Take 1,000 mg by mouth 2 (two) times a day , Disp: , Rfl:     bumetanide (BUMEX) 2 mg tablet, TAKE 2 TABLETS (4 MG TOTAL) BY MOUTH DAILY, Disp: 60 tablet, Rfl: 5    calcium carbonate (OS-PAULA) 600 MG tablet, Take 600 mg by mouth 2 (two) times a day with meals, Disp: , Rfl:     cholecalciferol (VITAMIN D3) 1,000 units tablet, Take 1,000 Units by mouth daily 8,000 dailty, Disp: , Rfl:     Eliquis 5 MG, TAKE 1 TABLET (5 MG TOTAL) BY MOUTH 2 (TWO) TIMES A DAY, Disp: 60 tablet, Rfl: 11    Entresto 49-51 MG TABS, TAKE 1 TABLET BY MOUTH 2 (TWO) TIMES A DAY, Disp: 60 tablet, Rfl: 11    hydrALAZINE (APRESOLINE) 10 mg tablet, TAKE 1 TABLET (10 MG TOTAL) BY MOUTH 2 (TWO) TIMES A DAY, Disp: 60 tablet, Rfl: 3    latanoprost (XALATAN) 0.005 % ophthalmic solution, , Disp: , Rfl:     levothyroxine 100 mcg tablet,  TAKE 1 TABLET (100 MCG TOTAL) BY MOUTH DAILY, Disp: 30 tablet, Rfl: 5    Magnesium 400 MG CAPS, Take 1 capsule (400 mg total) by mouth daily, Disp: , Rfl: 0    metoprolol succinate (TOPROL-XL) 50 mg 24 hr tablet, TAKE 2 TABLETS (100 MG TOTAL) BY MOUTH EVERY 12 (TWELVE) HOURS, Disp: 120 tablet, Rfl: 5    Multiple Vitamin (MULTI-VITAMIN DAILY PO), 1 tablet 2 (two) times a day  , Disp: , Rfl:     Potassium Chloride ER 20 MEQ TBCR, TAKE 2 TABLETS (40 MEQ TOTAL) BY MOUTH 2 (TWO) TIMES A DAY, Disp: 120 tablet, Rfl: 2    rOPINIRole (REQUIP XL) 2 MG 24 hr tablet, TAKE 1 TABLET (2 MG TOTAL) BY MOUTH DAILY AT BEDTIME, Disp: 30 tablet, Rfl: 5    traMADol (ULTRAM) 50 mg tablet, Take 1 tablet (50 mg total) by mouth 2 (two) times a day, Disp: 60 tablet, Rfl: 0    Turmeric (QC TUMERIC COMPLEX PO), Take by mouth 2 (two) times a day, Disp: , Rfl:     venlafaxine (EFFEXOR) 75 mg tablet, TAKE 1 TABLET (75 MG TOTAL) BY MOUTH EVERY 12 (TWELVE) HOURS, Disp: 60 tablet, Rfl: 5    I have spent a total time of 35 minutes on 04/18/24 in caring for this patient including Prognosis, Risks and benefits of tx options, Instructions for management, Patient and family education, Importance of tx compliance, Risk factor reductions, Impressions, Counseling / Coordination of care, Documenting in the medical record, Reviewing / ordering tests, medicine, procedures  , and Obtaining or reviewing history  .

## 2024-04-18 NOTE — ASSESSMENT & PLAN NOTE
Patient reports intermittent swelling to her bilateral lower extremities that has been worsening over the last 6 months.  She is also reporting sensation of heaviness/fatigue and aching that worsens by the end of the day.  Patient reports that she was recently treated for cellulitis type II at the end of March by her PCP.  She reports occasional nocturnal cramping to her bilateral calves.  Patient is currently denying any claudication, rest pain, tissue loss, or numbness/tingling.  She does note red discoloration of bilateral lower extremities as well as brown discoloration to left shin following a fall last year.  Patient reports that she does take a diuretic and that she was recommended to utilize compression stockings per her PCP, however she has not picked up compression stockings yet.  Patient denies history of varicose veins    Plan:  -We discussed the pathophysiology of venous insufficiency as well as contributing lifestyle factors.  -We discussed initiating conservative measures including compression stockings, elevating legs, increasing physical activity and  weight loss.  -Patient agreeable to compression stockings.  Patient provided script for previous prescription for compression stockings placed by her PCP and a list of pharmacies in the area that will size her.  -Instructed patient to moisturize her lower extremities daily to maintain intact skin integrity and prevent further episodes of cellulitis.  -Patient may follow-up with vascular surgery as needed related to venous insufficiency

## 2024-04-22 DIAGNOSIS — M54.41 ACUTE RIGHT-SIDED LOW BACK PAIN WITH RIGHT-SIDED SCIATICA: ICD-10-CM

## 2024-04-22 RX ORDER — TRAMADOL HYDROCHLORIDE 50 MG/1
50 TABLET ORAL 2 TIMES DAILY
Qty: 60 TABLET | Refills: 0 | Status: SHIPPED | OUTPATIENT
Start: 2024-04-22

## 2024-04-23 ENCOUNTER — TELEPHONE (OUTPATIENT)
Dept: FAMILY MEDICINE CLINIC | Facility: CLINIC | Age: 84
End: 2024-04-23

## 2024-04-23 NOTE — TELEPHONE ENCOUNTER
"Called [employer] and they said they left message for patient that she would need to pay out of pocket. Called patient to let her know. Patient will call to discuss this with [employer] ----- Message from Barbara Ann RN sent at 4/23/2024 10:31 AM EDT -----  Regarding: FW: Compression stockings  Contact: [phone]  Please see message from patient below - reports she has not received her compression stockings and is requesting update. Please advise. Thank you!  ----- Message -----  From: Mayra Meadows \"Madelin\"  Sent: 4/23/2024   1:08 AM EDT  To: [facility]  Subject: Compression stockings                            Arnaldo cintron r well. Wanted to inform u that I have not received the compression stockings u ordered for me. Seems like its taking a long time. Maybe u can have someone check where they r at  Thanks take care  Madelin    "

## 2024-05-14 NOTE — TELEPHONE ENCOUNTER
Patient Madelin [phone] contacting office 2ND TIME regarding recently relocating into a high rise building with resident parking.     Patient is unable to walk long distances to and from her parking spot.     There will be a letter coming from [employer] [place] for Dr. Martin to review and sign stating patient is unable to walk long distances.     Once letter is received please fax back to the following fax number:    [phone]    I also provided Dr. Martin's [place] fax number of [phone].    Patient advised there are 51 residences and only 15 parking spots.

## 2024-05-15 ENCOUNTER — REMOTE DEVICE CLINIC VISIT (OUTPATIENT)
Dept: CARDIOLOGY CLINIC | Facility: CLINIC | Age: 84
End: 2024-05-15

## 2024-05-15 DIAGNOSIS — Z95.810 AICD (AUTOMATIC CARDIOVERTER/DEFIBRILLATOR) PRESENT: Primary | ICD-10-CM

## 2024-05-15 PROCEDURE — RECHECK: Performed by: INTERNAL MEDICINE

## 2024-05-15 NOTE — PROGRESS NOTES
"Results for orders placed or performed in visit on 05/15/24   Cardiac EP device report    Narrative    MDT-DUAL CHAMBER \"HIS\" ICD/VVIR MODEACTIVE SYSTEM IS MRI CONDITIONAL  [employer] TRANSMISSION TO CHECK BATTERY STATUS- NB: BATTERY VOLTAGE NEARING KHOI (2 MOS). WILL SCHEDULE MONTHLY BATTERY CHECKS. BVP-99.9% (VSR PACE<0.1%). ALL AVAILABLE LEAD PARAMETERS WITHIN NORMAL LIMITS. NO SIGNIFICANT HIGH RATE EPISODES. OPTI-VOL WITHIN NORMAL LIMITS. NORMAL DEVICE FUNCTION. GV        "

## 2024-05-24 NOTE — TELEPHONE ENCOUNTER
Patient called for the third time regarding  parking space pass FAX that was sent to office x 2 for Dr Martin to complete.    Reviewed patient's chart & did not find a Fax regarding parking pass.    Patient stated she will have information faxed again to office, josue Kaye.

## 2024-05-26 ENCOUNTER — HOSPITAL ENCOUNTER (EMERGENCY)
Facility: HOSPITAL | Age: 84
Discharge: HOME | End: 2024-05-26
Attending: EMERGENCY MEDICINE
Payer: COMMERCIAL

## 2024-05-26 VITALS
RESPIRATION RATE: 16 BRPM | TEMPERATURE: 97.4 F | BODY MASS INDEX: 35.9 KG/M2 | DIASTOLIC BLOOD PRESSURE: 58 MMHG | OXYGEN SATURATION: 99 % | WEIGHT: 190 LBS | HEART RATE: 78 BPM | SYSTOLIC BLOOD PRESSURE: 112 MMHG

## 2024-05-26 DIAGNOSIS — Z45.010 PACEMAKER AT END OF BATTERY LIFE: Primary | ICD-10-CM

## 2024-05-26 LAB
2HR DELTA HS TROPONIN: 0 NG/L
ANION GAP SERPL CALCULATED.3IONS-SCNC: 6 MMOL/L (ref 4–13)
BASOPHILS # BLD AUTO: 0.03 THOUSANDS/ÂΜL (ref 0–0.1)
BASOPHILS NFR BLD AUTO: 1 % (ref 0–1)
BUN SERPL-MCNC: 19 MG/DL (ref 5–25)
CALCIUM SERPL-MCNC: 9.9 MG/DL (ref 8.4–10.2)
CARDIAC TROPONIN I PNL SERPL HS: 3 NG/L
CARDIAC TROPONIN I PNL SERPL HS: 3 NG/L
CHLORIDE SERPL-SCNC: 100 MMOL/L (ref 96–108)
CO2 SERPL-SCNC: 32 MMOL/L (ref 21–32)
CREAT SERPL-MCNC: 0.94 MG/DL (ref 0.6–1.3)
EOSINOPHIL # BLD AUTO: 0.16 THOUSAND/ÂΜL (ref 0–0.61)
EOSINOPHIL NFR BLD AUTO: 3 % (ref 0–6)
ERYTHROCYTE [DISTWIDTH] IN BLOOD BY AUTOMATED COUNT: 13 % (ref 11.6–15.1)
GFR SERPL CREATININE-BSD FRML MDRD: 56 ML/MIN/1.73SQ M
GLUCOSE SERPL-MCNC: 111 MG/DL (ref 65–140)
HCT VFR BLD AUTO: 36.8 % (ref 34.8–46.1)
HGB BLD-MCNC: 12.1 G/DL (ref 11.5–15.4)
IMM GRANULOCYTES # BLD AUTO: 0.02 THOUSAND/UL (ref 0–0.2)
IMM GRANULOCYTES NFR BLD AUTO: 0 % (ref 0–2)
LYMPHOCYTES # BLD AUTO: 1.88 THOUSANDS/ÂΜL (ref 0.6–4.47)
LYMPHOCYTES NFR BLD AUTO: 31 % (ref 14–44)
MCH RBC QN AUTO: 33.3 PG (ref 26.8–34.3)
MCHC RBC AUTO-ENTMCNC: 32.9 G/DL (ref 31.4–37.4)
MCV RBC AUTO: 101 FL (ref 82–98)
MONOCYTES # BLD AUTO: 0.49 THOUSAND/ÂΜL (ref 0.17–1.22)
MONOCYTES NFR BLD AUTO: 8 % (ref 4–12)
NEUTROPHILS # BLD AUTO: 3.52 THOUSANDS/ÂΜL (ref 1.85–7.62)
NEUTS SEG NFR BLD AUTO: 57 % (ref 43–75)
NRBC BLD AUTO-RTO: 0 /100 WBCS
PLATELET # BLD AUTO: 233 THOUSANDS/UL (ref 149–390)
PMV BLD AUTO: 10 FL (ref 8.9–12.7)
POTASSIUM SERPL-SCNC: 5.1 MMOL/L (ref 3.5–5.3)
RBC # BLD AUTO: 3.63 MILLION/UL (ref 3.81–5.12)
SODIUM SERPL-SCNC: 138 MMOL/L (ref 135–147)
WBC # BLD AUTO: 6.1 THOUSAND/UL (ref 4.31–10.16)

## 2024-05-26 PROCEDURE — 93005 ELECTROCARDIOGRAM TRACING: CPT

## 2024-05-26 PROCEDURE — 85025 COMPLETE CBC W/AUTO DIFF WBC: CPT

## 2024-05-26 PROCEDURE — 84484 ASSAY OF TROPONIN QUANT: CPT

## 2024-05-26 PROCEDURE — 99284 EMERGENCY DEPT VISIT MOD MDM: CPT

## 2024-05-26 PROCEDURE — 80048 BASIC METABOLIC PNL TOTAL CA: CPT

## 2024-05-26 PROCEDURE — 99285 EMERGENCY DEPT VISIT HI MDM: CPT | Performed by: EMERGENCY MEDICINE

## 2024-05-26 PROCEDURE — 36415 COLL VENOUS BLD VENIPUNCTURE: CPT

## 2024-05-26 NOTE — ED ATTENDING ATTESTATION
5/26/2024  I, Randa Chen MD, saw and evaluated the patient. I have discussed the patient with the resident/non-physician practitioner and agree with the resident's/non-physician practitioner's findings, Plan of Care, and MDM as documented in the resident's/non-physician practitioner's note, except where noted. All available labs and Radiology studies were reviewed.  I was present for key portions of any procedure(s) performed by the resident/non-physician practitioner and I was immediately available to provide assistance.       At this point I agree with the current assessment done in the Emergency Department.  I have conducted an independent evaluation of this patient a history and physical is as follows:    83-year-old female with past medical history of CHF status post AV ablation with BiV pacemaker and ICD in place who presents for evaluation of possible defibrillator shock.  Patient states she heard 4 beats defibrillated 2 times today.  She states she did have some lightheadedness around the time.  She states she has a funny feeling in her chest which she describes as a fluttering.  Denies any lightheadedness currently.  Denies recent weight gain or leg swelling.  Denies abdominal pain, nausea, vomiting, or diarrhea.  Denies shortness of breath.    Physical exam:  Vital signs reviewed, within normal limits.  Patient is awake and alert, no acute distress, head normocephalic, atraumatic, mucous membranes moist, neck supple, heart regular rate and rhythm, no murmurs/rubs/gallops, lungs clear to auscultation bilaterally, abdomen soft, non tender, non distended, no rebound or guarding, trace bilateral lower extremity peripheral edema, no skin rashes, no focal neurologic deficits.     Assessment/plan:  83-year-old female with past medical history of CHF status post AV ablation with BiV pacemaker and ICD in place who presents for evaluation of possible defibrillator shock.   Will check labs to evaluate for  anemia, electrolyte abnormalities. Will obtain EKG and troponin to evaluate for acs or arrhythmia. Will interrogate pacemaker/ICD.     ED Course     Interrogated pacemaker, no evidence of ICD shocks or evidence of V. tach or VF episodes requiring intervention.  She did have 1 episode of nonsustained V. tach since January.  Battery life is noted to be less than 2 months.  Reviewed labs, no marked abnormalities.  Reviewed and interpreted EKG, shows atrial and ventricular paced rhythm.  Given no evidence of shocks, will discharge patient with cardiology follow-up.  Discussed return precautions.  Patient expressed understanding was agreeable for discharge.    Critical Care Time  Procedures

## 2024-05-27 LAB
ATRIAL RATE: 113 BPM
QRS AXIS: -19 DEGREES
QRSD INTERVAL: 102 MS
QT INTERVAL: 400 MS
QTC INTERVAL: 467 MS
T WAVE AXIS: 14 DEGREES
VENTRICULAR RATE: 82 BPM

## 2024-05-27 PROCEDURE — 93010 ELECTROCARDIOGRAM REPORT: CPT | Performed by: INTERNAL MEDICINE

## 2024-05-27 NOTE — ED PROVIDER NOTES
"History  Chief Complaint   Patient presents with    Pacemaker Problem     Pt reports that in  had pacemaker and difib checked, was told if she hears four beeps that the device fired and to go get checked. Pt reports being on her tablet today and heard for beeps and felt lightheaded at the time, so came to get the ICD/pacemaker checked.     Patient is an 83-year-old female with PMH of CHF s/p AV ablation with BiV pacemaker and ICD in place who presents for evaluation of possible defibrillator shock.  Patient states she was sitting at her computer today when she heard 4 successive beats coming from her pacemaker.  She was previously told by her electrophysiologist that if she heard beeping from the pacemaker, it may mean that ICD had fired and she should seek care in the emergency department.  Patient states that she feels intermittent palpitations in her chest and her chest feels \"weird \"today.  She also endorses feeling somewhat lightheaded earlier today.  The symptoms have since resolved in the emergency department.  She denies any chest pain, shortness of breath nausea, vomiting, dizziness, lightheadedness, or worsening leg swelling. Patient states that per recent pacemaker check with electrophysiology, battery is near the end of its life.  Patient states she has planned replacement of pacemaker within the next 2 months.          Prior to Admission Medications   Prescriptions Last Dose Informant Patient Reported? Taking?   Ascorbic Acid (vitamin C) 1000 MG tablet  Self Yes No   Sig: Take 1,000 mg by mouth 2 (two) times a day    Eliquis 5 MG   No No   Sig: TAKE 1 TABLET (5 MG TOTAL) BY MOUTH 2 (TWO) TIMES A DAY   Entresto 49-51 MG TABS   No No   Sig: TAKE 1 TABLET BY MOUTH 2 (TWO) TIMES A DAY   Magnesium 400 MG CAPS  Self No No   Sig: Take 1 capsule (400 mg total) by mouth daily   Multiple Vitamin (MULTI-VITAMIN DAILY PO)  Self Yes No   Si tablet 2 (two) times a day     Potassium Chloride ER 20 MEQ TBCR   No " No   Sig: TAKE 2 TABLETS (40 MEQ TOTAL) BY MOUTH 2 (TWO) TIMES A DAY   Turmeric (QC TUMERIC COMPLEX PO)   Yes No   Sig: Take by mouth 2 (two) times a day   albuterol (Ventolin HFA) 90 mcg/act inhaler   No No   Sig: Inhale 2 puffs every 6 (six) hours as needed for wheezing   bumetanide (BUMEX) 2 mg tablet   No No   Sig: TAKE 2 TABLETS (4 MG TOTAL) BY MOUTH DAILY   calcium carbonate (OS-PAULA) 600 MG tablet  Self Yes No   Sig: Take 600 mg by mouth 2 (two) times a day with meals   cholecalciferol (VITAMIN D3) 1,000 units tablet  Self Yes No   Sig: Take 1,000 Units by mouth daily 8,000 dailty   hydrALAZINE (APRESOLINE) 10 mg tablet   No No   Sig: TAKE 1 TABLET (10 MG TOTAL) BY MOUTH 2 (TWO) TIMES A DAY   latanoprost (XALATAN) 0.005 % ophthalmic solution   Yes No   levothyroxine 100 mcg tablet   No No   Sig: TAKE 1 TABLET (100 MCG TOTAL) BY MOUTH DAILY   metoprolol succinate (TOPROL-XL) 50 mg 24 hr tablet   No No   Sig: TAKE 2 TABLETS (100 MG TOTAL) BY MOUTH EVERY 12 (TWELVE) HOURS   rOPINIRole (REQUIP XL) 2 MG 24 hr tablet   No No   Sig: TAKE 1 TABLET (2 MG TOTAL) BY MOUTH DAILY AT BEDTIME   traMADol (ULTRAM) 50 mg tablet   No No   Sig: TAKE 1 TABLET (50 MG TOTAL) BY MOUTH 2 (TWO) TIMES A DAY   venlafaxine (EFFEXOR) 75 mg tablet   No No   Sig: TAKE 1 TABLET (75 MG TOTAL) BY MOUTH EVERY 12 (TWELVE) HOURS      Facility-Administered Medications: None       Past Medical History:   Diagnosis Date    Allergic childhood    Arthritis do not know    Cancer (HCC) 1976    Cardiogenic shock ([facility]) 06/26/2019    CHF (congestive heart failure) ([facility])     Chronic kidney disease June 2019    COPD (chronic obstructive pulmonary disease) ([facility]) no    Coronary artery disease 06/19    Depression no    Disease of thyroid gland 1976    Eczema     Heart murmur childhood    Hypertension do not knpw    Photosensitivity     abnormal skin sensitivity to sunlight    Traumatic open wound of left lower leg     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HCC)     staging    Visual  impairment childhood       Past Surgical History:   Procedure Laterality Date    CARDIAC SURGERY  06/19    CATARACT EXTRACTION Left     EYE SURGERY      HEMORRHOID SURGERY      IR NON-TUNNELED CENTRAL LINE PLACEMENT  07/09/2019    OOPHORECTOMY      unilateral    TOTAL ABDOMINAL HYSTERECTOMY         Family History   Problem Relation Age of Onset    Alzheimer's disease Mother     Coronary artery disease Mother     Dementia Mother     Diabetes Mother         mellitus    Heart disease Mother     Other Father         acute myocardial infarction    Heart disease Father     Coronary artery disease Sister     Other Maternal Grandfather         laryngeal cancer    Dementia Maternal Aunt     Diabetes Maternal Aunt     Heart disease Sister     Heart disease Brother      I have reviewed and agree with the history as documented.    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      Physical Exam  ED Triage Vitals [05/26/24 1533]   Temperature Pulse Respirations Blood Pressure SpO2   (!) 97.4 °F (36.3 °C) 82 20 139/89 99 %      Temp Source Heart Rate Source Patient Position - Orthostatic VS BP Location FiO2 (%)   Temporal Monitor Lying Left arm --      Pain Score       No Pain             Orthostatic Vital Signs  Vitals:    05/26/24 1533 05/26/24 1700   BP: 139/89 112/58   Pulse: 82 78   Patient Position - Orthostatic VS: Lying Lying       Physical Exam  Constitutional:       General: She is not in acute distress.     Appearance: Normal appearance.  She is obese. She is not ill-appearing, toxic-appearing or diaphoretic.   HENT:      Head: Normocephalic and atraumatic.      Mouth/Throat:      Mouth: Mucous membranes are moist.      Pharynx: Oropharynx is clear.   Eyes:      Extraocular Movements: Extraocular movements intact.      Conjunctiva/sclera: Conjunctivae normal.   Cardiovascular:      Rate and Rhythm: Normal rate.      Pulses: Normal pulses.      Heart sounds: Normal heart sounds.   Pulmonary:      Effort: Pulmonary effort is normal.      Breath sounds: Normal breath sounds.   Abdominal:      General: Abdomen is flat.      Palpations: Abdomen is soft.   Musculoskeletal:         General: Normal range of motion.      Cervical back: Normal range of motion and neck supple.      Right lower leg: Edema present.      Left lower leg: Edema present.   Skin:     General: Skin is warm and dry.      Comments: Evidence of venous stasis in BLE   Neurological:      General: No focal deficit present.      Mental Status: She is alert and oriented to person, place, and time.   Psychiatric:         Mood and Affect: Mood normal.         Behavior: Behavior normal.         ED Medications  Medications - No data to display    Diagnostic Studies  Results Reviewed       Procedure Component Value Units Date/Time    HS Troponin I 2hr [[id number]]  (Normal) Collected: 05/26/24 1843    Lab Status: Final result Specimen: Blood from Arm, Left Updated: 05/26/24 1916     hs TnI 2hr 3 ng/L      Delta 2hr hsTnI 0 ng/L     HS Troponin 0hr (reflex protocol) [[id number]]  (Normal) Collected: 05/26/24 1625    Lab Status: Final result Specimen: Blood from Arm, Right Updated: 05/26/24 1657     hs TnI 0hr 3 ng/L     Basic metabolic panel [[id number]] Collected: 05/26/24 1625    Lab Status: Final result Specimen: Blood from Arm, Right Updated: 05/26/24 1653     Sodium 138 mmol/L      Potassium 5.1 mmol/L      Chloride 100 mmol/L      CO2 32 mmol/L      ANION GAP 6 mmol/L      BUN 19 mg/dL       Creatinine 0.94 mg/dL      Glucose 111 mg/dL      Calcium 9.9 mg/dL      eGFR 56 ml/min/1.73sq m     Narrative:      National Kidney Disease Foundation guidelines for Chronic Kidney Disease (CKD):     Stage 1 with normal or high GFR (GFR > 90 mL/min/1.73 square meters)    Stage 2 Mild CKD (GFR = 60-89 mL/min/1.73 square meters)    Stage 3A Moderate CKD (GFR = 45-59 mL/min/1.73 square meters)    Stage 3B Moderate CKD (GFR = 30-44 mL/min/1.73 square meters)    Stage 4 Severe CKD (GFR = 15-29 mL/min/1.73 square meters)    Stage 5 End Stage CKD (GFR <15 mL/min/1.73 square meters)  Note: GFR calculation is accurate only with a steady state creatinine    CBC and differential [[id number]]  (Abnormal) Collected: 05/26/24 1625    Lab Status: Final result Specimen: Blood from Arm, Right Updated: 05/26/24 1635     WBC 6.10 Thousand/uL      RBC 3.63 Million/uL      Hemoglobin 12.1 g/dL      Hematocrit 36.8 %       fL      MCH 33.3 pg      MCHC 32.9 g/dL      RDW 13.0 %      MPV 10.0 fL      Platelets 233 Thousands/uL      nRBC 0 /100 WBCs      Segmented % 57 %      Immature Grans % 0 %      Lymphocytes % 31 %      Monocytes % 8 %      Eosinophils Relative 3 %      Basophils Relative 1 %      Absolute Neutrophils 3.52 Thousands/µL      Absolute Immature Grans 0.02 Thousand/uL      Absolute Lymphocytes 1.88 Thousands/µL      Absolute Monocytes 0.49 Thousand/µL      Eosinophils Absolute 0.16 Thousand/µL      Basophils Absolute 0.03 Thousands/µL                    No orders to display         Procedures  ECG 12 Lead Documentation Only    Date/Time: 5/26/2024 11:03 PM    Performed by: Lucy Peters MD  Authorized by: Lucy Peters MD    Indications / Diagnosis:  Potential firing of ICD  ECG reviewed by me, the ED Provider: yes    Patient location:  ED  Previous ECG:     Previous ECG:  Compared to current    Similarity:  Changes noted  Rate:     ECG rate:  82    ECG rate assessment: normal    Rhythm:     Rhythm: paced     Pacing:     Capture:  Complete    Type of pacing:  Ventricular  Ectopy:     Ectopy: none    QRS:     QRS axis:  Normal    QRS intervals:  Normal  Conduction:     Conduction: normal    ST segments:     ST segments:  Normal  T waves:     T waves: normal          ED Course               Identification of Seniors at Risk      Flowsheet Row Most Recent Value   (ISAR) Identification of Seniors at Risk    Before the illness or injury that brought you to the Emergency, did you need someone to help you on a regular basis? 0 Filed at: 05/26/2024 1535   In the last 24 hours, have you needed more help than usual? --   Have you been hospitalized for one or more nights during the past 6 months? 0 Filed at: 05/26/2024 1535   In general, do you see well? 0 Filed at: 05/26/2024 1535   In general, do you have serious problems with your memory? 0 Filed at: 05/26/2024 1535   Do you take more than three different medications every day? 1 Filed at: 05/26/2024 1535   ISAR Score 1 Filed at: 05/26/2024 1535                                Medical Decision Making  Mayra Yarbroughhaydenmary is a 83 y.o. who presents due to concern that ICD may have fired today due to beeping.  Patient also endorsing intermittent palpitations and lightheadedness earlier that is since resolved since coming to the ED.    Vital signs are stable afebrile  Physical exam significant for mild pitting edema bilateral feet, otherwise within normal limits.    Ddx: Will interrogate pacemaker, assess for arrhythmia or malfunctioning.  Will also check for ACS: Metabolic disturbance.    Plan: Pacemaker interrogation showing no shocks given today and no detection of abnormal rhythm.  Lab work within normal limits  EKG showing complete capture of biventricular pacing  Recommend follow-up with electrophysiologist regarding replacement as pacemaker interrogation shows battery has approximately 2 months left    Disposition: Patient stable for discharge home.  Return precautions  provided.  Patient understands and is agreeable to plan.           Amount and/or Complexity of Data Reviewed  Labs: ordered.          Disposition  Final diagnoses:   Pacemaker at end of battery life     Time reflects when diagnosis was documented in both MDM as applicable and the Disposition within this note       Time User Action Codes Description Comment    5/26/2024  7:41 PM Lucy Peters [Z45.010] Pacemaker at end of battery life           ED Disposition       ED Disposition   Discharge    Condition   Stable    Date/Time   Sun May 26, 2024 1941    Comment   Mayra Meadwos discharge to home/self care.                   Follow-up Information       Follow up With Specialties Details Why Contact Info Additional Information    Roman Finnegan MD Cardiology Go in 2 days for reevaluation of pacemaker battery life [address] [place] [id number]  [phone]       [facility] Emergency Department Emergency Medicine Go to  for chest pain, shortness of breath, ICD events, or other concerning symptoms [address] [place] [id number]  [phone] [facility] Emergency Department, [address] [place] [address], [phone]   [phone]            Discharge Medication List as of 5/26/2024  7:44 PM        CONTINUE these medications which have NOT CHANGED    Details   albuterol (Ventolin HFA) 90 mcg/act inhaler Inhale 2 puffs every 6 (six) hours as needed for wheezing, Starting Tue 12/19/2023, Normal      Ascorbic Acid (vitamin C) 1000 MG tablet Take 1,000 mg by mouth 2 (two) times a day , Historical Med      bumetanide (BUMEX) 2 mg tablet TAKE 2 TABLETS (4 MG TOTAL) BY MOUTH DAILY, Starting Tue 1/23/2024, Normal      calcium carbonate (OS-PAULA) 600 MG tablet Take 600 mg by mouth 2 (two) times a day with meals, Historical Med      cholecalciferol (VITAMIN D3) 1,000 units tablet Take 1,000 Units by mouth daily 8,000 dailty, Historical Med       Eliquis 5 MG TAKE 1 TABLET (5 MG TOTAL) BY MOUTH 2 (TWO) TIMES A DAY, Normal      Entresto 49-51 MG TABS TAKE 1 TABLET BY MOUTH 2 (TWO) TIMES A DAY, Starting Mon 11/27/2023, Normal      hydrALAZINE (APRESOLINE) 10 mg tablet TAKE 1 TABLET (10 MG TOTAL) BY MOUTH 2 (TWO) TIMES A DAY, Starting Tue 2/20/2024, Normal      latanoprost (XALATAN) 0.005 % ophthalmic solution Historical Med      levothyroxine 100 mcg tablet TAKE 1 TABLET (100 MCG TOTAL) BY MOUTH DAILY, Starting Wed 12/20/2023, Normal      Magnesium 400 MG CAPS Take 1 capsule (400 mg total) by mouth daily, Starting Thu 10/13/2022, No Print      metoprolol succinate (TOPROL-XL) 50 mg 24 hr tablet TAKE 2 TABLETS (100 MG TOTAL) BY MOUTH EVERY 12 (TWELVE) HOURS, Starting Mon 1/22/2024, Normal      Multiple Vitamin (MULTI-VITAMIN DAILY PO) 1 tablet 2 (two) times a day  , Starting Mon 4/5/2021, Historical Med      Potassium Chloride ER 20 MEQ TBCR TAKE 2 TABLETS (40 MEQ TOTAL) BY MOUTH 2 (TWO) TIMES A DAY, Normal      rOPINIRole (REQUIP XL) 2 MG 24 hr tablet TAKE 1 TABLET (2 MG TOTAL) BY MOUTH DAILY AT BEDTIME, Starting Tue 1/23/2024, Normal      traMADol (ULTRAM) 50 mg tablet TAKE 1 TABLET (50 MG TOTAL) BY MOUTH 2 (TWO) TIMES A DAY, Starting Mon 4/22/2024, Normal      Turmeric (QC TUMERIC COMPLEX PO) Take by mouth 2 (two) times a day, Historical Med      venlafaxine (EFFEXOR) 75 mg tablet TAKE 1 TABLET (75 MG TOTAL) BY MOUTH EVERY 12 (TWELVE) HOURS, Starting Tue 4/16/2024, Until Sun 10/13/2024, Normal           No discharge procedures on file.    PDMP Review         Value Time User    PDMP Reviewed  Yes 1/19/2024  4:52 PM Wilfred Mane MD             ED Provider  Attending physically available and evaluated Mayra Meadows. I managed the patient along with the ED Attending.    Electronically Signed by           Lucy Peters MD  05/26/24 6933

## 2024-05-30 ENCOUNTER — TELEPHONE (OUTPATIENT)
Dept: CARDIOLOGY CLINIC | Facility: CLINIC | Age: 84
End: 2024-05-30

## 2024-05-30 NOTE — TELEPHONE ENCOUNTER
Patient called to inquire if cardiology received & completed parking pass form that was faxed to our office x 4 from Adela Castillo  @ [facility].    Patient 1st inquired about this form on 4/16/24. (Refer to the telephone notes)    Stated @ her residence, [facility],she has to park on the street & walk a distance which is difficult for her due to CHF symptoms, mainly SOB.    When this form is completed, she can park in the lot, much closer.    Advise patient to have form faxed to   F: [phone], josue Angel[employer] or [employer].    Patient verbalized understanding.

## 2024-05-31 ENCOUNTER — TELEPHONE (OUTPATIENT)
Dept: CARDIOLOGY CLINIC | Facility: CLINIC | Age: 84
End: 2024-05-31

## 2024-05-31 NOTE — TELEPHONE ENCOUNTER
Faxed completed form for parking pass at her facility. # [phone].    Called pt and made aware it was sent.

## 2024-06-01 DIAGNOSIS — I50.9 CHF (CONGESTIVE HEART FAILURE) (HCC): ICD-10-CM

## 2024-06-01 DIAGNOSIS — I50.22 CHRONIC HFREF (HEART FAILURE WITH REDUCED EJECTION FRACTION) (HCC): ICD-10-CM

## 2024-06-01 RX ORDER — POTASSIUM CHLORIDE 1500 MG/1
TABLET, EXTENDED RELEASE ORAL
Qty: 120 TABLET | Refills: 5 | Status: SHIPPED | OUTPATIENT
Start: 2024-06-01 | End: 2024-06-05 | Stop reason: SDUPTHER

## 2024-06-04 ENCOUNTER — DOCUMENTATION (OUTPATIENT)
Dept: CARDIOLOGY CLINIC | Facility: CLINIC | Age: 84
End: 2024-06-04

## 2024-06-04 DIAGNOSIS — I50.22 CHRONIC HFREF (HEART FAILURE WITH REDUCED EJECTION FRACTION) (HCC): ICD-10-CM

## 2024-06-04 DIAGNOSIS — I50.9 CHF (CONGESTIVE HEART FAILURE) (HCC): ICD-10-CM

## 2024-06-04 DIAGNOSIS — M54.41 ACUTE RIGHT-SIDED LOW BACK PAIN WITH RIGHT-SIDED SCIATICA: ICD-10-CM

## 2024-06-04 RX ORDER — POTASSIUM CHLORIDE 1500 MG/1
TABLET, EXTENDED RELEASE ORAL
Qty: 120 TABLET | Refills: 5 | OUTPATIENT
Start: 2024-06-04

## 2024-06-05 RX ORDER — TRAMADOL HYDROCHLORIDE 50 MG/1
50 TABLET ORAL 2 TIMES DAILY
Qty: 60 TABLET | Refills: 0 | Status: SHIPPED | OUTPATIENT
Start: 2024-06-05

## 2024-06-05 RX ORDER — POTASSIUM CHLORIDE 1500 MG/1
40 TABLET, EXTENDED RELEASE ORAL 2 TIMES DAILY
Qty: 120 TABLET | Refills: 5 | Status: SHIPPED | OUTPATIENT
Start: 2024-06-05

## 2024-06-05 NOTE — TELEPHONE ENCOUNTER
Samuel from [facility] calling for an update on the potassium chloride ER 20 meq tbcr, states the patient only has one (1) pill left.

## 2024-06-10 ENCOUNTER — IN-CLINIC DEVICE VISIT (OUTPATIENT)
Dept: CARDIOLOGY CLINIC | Facility: CLINIC | Age: 84
End: 2024-06-10

## 2024-06-10 DIAGNOSIS — Z95.810 AICD (AUTOMATIC CARDIOVERTER/DEFIBRILLATOR) PRESENT: Primary | ICD-10-CM

## 2024-06-10 PROCEDURE — RECHECK: Performed by: INTERNAL MEDICINE

## 2024-06-10 NOTE — PROGRESS NOTES
"Results for orders placed or performed in visit on 06/10/24   Cardiac EP device report    Narrative    MDT-DUAL CHAMBER \"HIS\" ICD/VVIR MODEACTIVE SYSTEM IS MRI CONDITIONAL  NB/IN-OFFICE INTERROGATION ONLY (NO TESTING): BATTERY VOLTAGE NEARING KHOI-1 MON. WILL SCHEDULE MONTHLY BATTERY CHECKS. (BIV) 100% (LV) 0%. ALL AVAILABLE LEAD PARAMETERS WITHIN NORMAL LIMITS. NO SIGNIFICANT HIGH RATE EPISODES. OPTI-VOL WITHIN NORMAL LIMITS. NORMAL DEVICE FUNCTION. NC         "

## 2024-06-18 DIAGNOSIS — I50.9 CHF (CONGESTIVE HEART FAILURE) (HCC): ICD-10-CM

## 2024-06-18 DIAGNOSIS — I10 ESSENTIAL HYPERTENSION: ICD-10-CM

## 2024-06-18 DIAGNOSIS — E03.9 HYPOTHYROIDISM, UNSPECIFIED TYPE: ICD-10-CM

## 2024-06-18 DIAGNOSIS — I48.91 ATRIAL FIBRILLATION WITH RAPID VENTRICULAR RESPONSE (HCC): ICD-10-CM

## 2024-06-18 DIAGNOSIS — I50.22 CHRONIC HFREF (HEART FAILURE WITH REDUCED EJECTION FRACTION) (HCC): ICD-10-CM

## 2024-06-19 RX ORDER — APIXABAN 5 MG/1
TABLET, FILM COATED ORAL
Qty: 60 TABLET | Refills: 5 | Status: SHIPPED | OUTPATIENT
Start: 2024-06-19

## 2024-06-19 RX ORDER — HYDRALAZINE HYDROCHLORIDE 10 MG/1
10 TABLET, FILM COATED ORAL 2 TIMES DAILY
Qty: 180 TABLET | Refills: 1 | Status: SHIPPED | OUTPATIENT
Start: 2024-06-19

## 2024-06-19 RX ORDER — LEVOTHYROXINE SODIUM 0.1 MG/1
100 TABLET ORAL DAILY
Qty: 90 TABLET | Refills: 1 | Status: SHIPPED | OUTPATIENT
Start: 2024-06-19

## 2024-07-20 DIAGNOSIS — I50.22 CHRONIC HFREF (HEART FAILURE WITH REDUCED EJECTION FRACTION) (HCC): ICD-10-CM

## 2024-07-20 DIAGNOSIS — I48.91 ATRIAL FIBRILLATION, UNSPECIFIED TYPE (HCC): ICD-10-CM

## 2024-07-20 RX ORDER — METOPROLOL SUCCINATE 50 MG/1
100 TABLET, EXTENDED RELEASE ORAL EVERY 12 HOURS SCHEDULED
Qty: 120 TABLET | Refills: 5 | Status: SHIPPED | OUTPATIENT
Start: 2024-07-20

## 2024-08-05 DIAGNOSIS — M54.41 ACUTE RIGHT-SIDED LOW BACK PAIN WITH RIGHT-SIDED SCIATICA: ICD-10-CM

## 2024-08-06 RX ORDER — TRAMADOL HYDROCHLORIDE 50 MG/1
50 TABLET ORAL 2 TIMES DAILY
Qty: 60 TABLET | Refills: 0 | Status: SHIPPED | OUTPATIENT
Start: 2024-08-06

## 2024-08-07 ENCOUNTER — PREP FOR PROCEDURE (OUTPATIENT)
Dept: CARDIOLOGY CLINIC | Facility: CLINIC | Age: 84
End: 2024-08-07

## 2024-08-07 ENCOUNTER — OFFICE VISIT (OUTPATIENT)
Dept: CARDIOLOGY CLINIC | Facility: CLINIC | Age: 84
End: 2024-08-07
Payer: COMMERCIAL

## 2024-08-07 ENCOUNTER — TELEPHONE (OUTPATIENT)
Dept: CARDIOLOGY CLINIC | Facility: CLINIC | Age: 84
End: 2024-08-07

## 2024-08-07 VITALS
DIASTOLIC BLOOD PRESSURE: 76 MMHG | WEIGHT: 196.8 LBS | SYSTOLIC BLOOD PRESSURE: 132 MMHG | HEART RATE: 83 BPM | HEIGHT: 61 IN | BODY MASS INDEX: 37.16 KG/M2

## 2024-08-07 DIAGNOSIS — Z95.810 PRESENCE OF AUTOMATIC CARDIOVERTER/DEFIBRILLATOR (AICD): ICD-10-CM

## 2024-08-07 DIAGNOSIS — N18.31 STAGE 3A CHRONIC KIDNEY DISEASE (CKD) (HCC): Primary | ICD-10-CM

## 2024-08-07 DIAGNOSIS — I48.91 ATRIAL FIBRILLATION, UNSPECIFIED TYPE (HCC): Primary | ICD-10-CM

## 2024-08-07 DIAGNOSIS — G25.81 RESTLESS LEG SYNDROME: ICD-10-CM

## 2024-08-07 DIAGNOSIS — I48.91 ATRIAL FIBRILLATION WITH RAPID VENTRICULAR RESPONSE (HCC): ICD-10-CM

## 2024-08-07 DIAGNOSIS — I50.22 CHRONIC HFREF (HEART FAILURE WITH REDUCED EJECTION FRACTION) (HCC): ICD-10-CM

## 2024-08-07 DIAGNOSIS — Z95.810 PRESENCE OF AUTOMATIC CARDIOVERTER/DEFIBRILLATOR (AICD): Primary | ICD-10-CM

## 2024-08-07 PROCEDURE — 93000 ELECTROCARDIOGRAM COMPLETE: CPT | Performed by: INTERNAL MEDICINE

## 2024-08-07 PROCEDURE — 99204 OFFICE O/P NEW MOD 45 MIN: CPT | Performed by: INTERNAL MEDICINE

## 2024-08-07 NOTE — H&P (VIEW-ONLY)
Outpatient Consultation - EP   Mayra Meadows 83 y.o. female   MRN: [id number]  Encounter: [id number]            Physician Requesting Consult: Consults   PCP: SOTO Ring      Reason for Consult / Principal Problem: KHOI of device      Assessment & Plan      Persistent atrial fibrillation s/p AVJ and BiV ICD (placed on 3/18/2020 by )  Rate control: Toprol- mg twice daily  Anticoagulation: Eliquis 5 mg daily    Pacemaker dependent, notification of KHOI on 6/10/2024  Patient's RV lead is set to 6 V/1 ms indicating very high thresholds  On check today, her thresholds are appropriately elevated  Patient's LV lead to 1.75 V/ 0.4ms    History of tachycardia mediated cardiomyopathy  EF improved from 25%-65%.  Follows with advanced heart failure  Noted to have BiV failure when EF was low  Currently on Entresto, metoprolol and hydralazine  Type II DM  Hypertension  Hyperlipidemia    Plan:    Will schedule patient for urgent GEN change and will likely need new RV lead given patient's existing RV lead has high threshold.  Did discuss with patient that this would make her MRI incompatible going forward.  She understands the risks and benefits between this and RV lead extraction with a generator change.    Will require venogram prior to procedure  Patient is allergic to penicillins, received Ancef last time          1. Presence of automatic cardioverter/defibrillator (AICD)    - POCT ECG           History of Present Illness   I had the pleasure of seeing Mayra Meadows who presents after device check showed KHOI.  Patient has a history of persistent atrial fibrillation which was complicated by tachycardia induced cardiomyopathy back in 2019.  Patient was trialed on amiodarone for rhythm control eventually had breakthrough atrial fibrillation and underwent AV node ablation and BiV ICD placement.  Since then patient's EF has improved and she has been doing well.  She does not have any complaints  at this time.  Denies any chest pain, shortness of breath, lightheadedness or palpitations.      On 4/15/2022 patient device was noted to have high RV thresholds, this was confirmed in person device check.  Patient continued to have high RV thresholds from that point onwards.        Past Medical History:   Diagnosis Date   • Allergic childhood   • Arthritis do not know   • Cancer (HCC) 1976   • Cardiogenic shock (HCC) 06/26/2019   • CHF (congestive heart failure) ([facility])    • Chronic kidney disease June 2019   • COPD (chronic obstructive pulmonary disease) (HCC) no   • Coronary artery disease 06/19   • Depression no   • Disease of thyroid gland 1976   • Eczema    • Heart murmur childhood   • Hypertension do not knpw   • Photosensitivity     abnormal skin sensitivity to sunlight   • Traumatic open wound of left lower leg    • 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HCC)     staging   • Visual impairment childhood     Social History     Socioeconomic History   • Marital status: Single     Spouse name: Not on file   • Number of children: Not on file   • Years of education: Not on file   • Highest education level: Not on file   Occupational History   • Not on file   Tobacco Use   • Smoking status: Never     Passive exposure: Never   • Smokeless tobacco: Never   Vaping Use   • Vaping status: Never Used   Substance and Sexual Activity   • Alcohol use: Not Currently     Comment: 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   • Drug use: Never   • Sexual activity: Not Currently   Other Topics Concern   • Not on file   Social History Narrative   • Not on file     Social Determinants of Health     Financial Resource Strain: Medium Risk (9/27/2023)    Overall Financial Resource Strain (CARDIA)    • Difficulty of Paying Living Expenses: Somewhat hard   Food Insecurity: Not on file   Transportation Needs: No Transportation Needs (9/27/2023)    PRAPARE - Transportation    • Lack of Transportation (Medical): No    • Lack of Transportation  (Non-Medical): No   Physical Activity: Not on file   Stress: Not on file   Social Connections: Not on file   Intimate Partner Violence: Not on file   Housing Stability: Not on file      Family History   Problem Relation Age of Onset   • Alzheimer's disease Mother    • Coronary artery disease Mother    • Dementia Mother    • Diabetes Mother         mellitus   • Heart disease Mother    • Other Father         acute myocardial infarction   • Heart disease Father    • Coronary artery disease Sister    • Other Maternal Grandfather         laryngeal cancer   • Dementia Maternal Aunt    • Diabetes Maternal Aunt    • Heart disease Sister    • Heart disease Brother      Past Surgical History:   Procedure Laterality Date   • CARDIAC SURGERY  06/19   • CATARACT EXTRACTION Left    • EYE SURGERY     • HEMORRHOID SURGERY     • IR NON-TUNNELED CENTRAL LINE PLACEMENT  07/09/2019   • OOPHORECTOMY      unilateral   • TOTAL ABDOMINAL HYSTERECTOMY         Current Outpatient Medications:   •  albuterol (Ventolin HFA) 90 mcg/act inhaler, Inhale 2 puffs every 6 (six) hours as needed for wheezing, Disp: 18 g, Rfl: 5  •  apixaban (Eliquis) 5 mg, TAKE 1 TABLET (5 MG TOTAL) BY MOUTH 2 (TWO) TIMES A DAY, Disp: 60 tablet, Rfl: 5  •  Ascorbic Acid (vitamin C) 1000 MG tablet, Take 1,000 mg by mouth 2 (two) times a day , Disp: , Rfl:   •  bumetanide (BUMEX) 2 mg tablet, TAKE 2 TABLETS (4 MG TOTAL) BY MOUTH DAILY, Disp: 60 tablet, Rfl: 5  •  calcium carbonate (OS-PAULA) 600 MG tablet, Take 600 mg by mouth 2 (two) times a day with meals, Disp: , Rfl:   •  cholecalciferol (VITAMIN D3) 1,000 units tablet, Take 1,000 Units by mouth daily 8,000 dailty, Disp: , Rfl:   •  Entresto 49-51 MG TABS, TAKE 1 TABLET BY MOUTH 2 (TWO) TIMES A DAY, Disp: 60 tablet, Rfl: 11  •  hydrALAZINE (APRESOLINE) 10 mg tablet, TAKE 1 TABLET (10 MG TOTAL) BY MOUTH 2 (TWO) TIMES A DAY, Disp: 180 tablet, Rfl: 1  •  latanoprost (XALATAN) 0.005 % ophthalmic solution, , Disp: , Rfl:  "  •  levothyroxine 100 mcg tablet, TAKE 1 TABLET (100 MCG TOTAL) BY MOUTH DAILY, Disp: 90 tablet, Rfl: 1  •  Magnesium 400 MG CAPS, Take 1 capsule (400 mg total) by mouth daily, Disp: , Rfl: 0  •  metoprolol succinate (TOPROL-XL) 50 mg 24 hr tablet, TAKE 2 TABLETS (100 MG TOTAL) BY MOUTH EVERY 12 (TWELVE) HOURS, Disp: 120 tablet, Rfl: 5  •  Multiple Vitamin (MULTI-VITAMIN DAILY PO), 1 tablet 2 (two) times a day  , Disp: , Rfl:   •  Potassium Chloride ER 20 MEQ TBCR, Take 2 tablets (40 mEq total) by mouth 2 (two) times a day, Disp: 120 tablet, Rfl: 5  •  rOPINIRole (REQUIP XL) 2 MG 24 hr tablet, TAKE 1 TABLET (2 MG TOTAL) BY MOUTH DAILY AT BEDTIME, Disp: 30 tablet, Rfl: 5  •  traMADol (ULTRAM) 50 mg tablet, TAKE 1 TABLET (50 MG TOTAL) BY MOUTH 2 (TWO) TIMES A DAY, Disp: 60 tablet, Rfl: 0  •  Turmeric (QC TUMERIC COMPLEX PO), Take by mouth 2 (two) times a day, Disp: , Rfl:   •  venlafaxine (EFFEXOR) 75 mg tablet, TAKE 1 TABLET (75 MG TOTAL) BY MOUTH EVERY 12 (TWELVE) HOURS, Disp: 60 tablet, Rfl: 5  Allergies   Allergen Reactions   • Penicillins    • Wellbutrin Sr  [Bupropion]      Other reaction(s): Suicidal ideations  Category: Adverse Reaction;          Review of Systems  Review of system was conducted and was negative except for as stated in the HPI.        Objective   Vitals: Blood pressure 132/76, pulse 83, height 5' 1\" (1.549 m), weight 89.3 kg (196 lb 12.8 oz).      EKG:   Date: 8/7/2024  Interpretation: BiV paced      Physical Exam  Constitutional:       General: She is not in acute distress.     Appearance: Normal appearance. She is not ill-appearing.   Cardiovascular:      Rate and Rhythm: Normal rate and regular rhythm. No extrasystoles are present.     Chest Wall: PMI is not displaced.      Heart sounds: S1 normal and S2 normal. No murmur heard.     No systolic murmur is present.      No diastolic murmur is present.      No friction rub. No gallop.   Musculoskeletal:      Right lower leg: No edema.      " Left lower leg: No edema.   Neurological:      General: No focal deficit present.      Mental Status: She is oriented to person, place, and time.             Lab Results: I have personally reviewed pertinent lab results.    Lab Results   Component Value Date    HSTNI0 3 05/26/2024    HSTNI2 3 05/26/2024    HSTNI4 10 10/17/2022     Lab Results   Component Value Date    NTBNP 4,761 (H) 10/17/2022    NTBNP 716 (H) 01/14/2021    NTBNP 1,089 (H) 06/04/2020     Lab Results   Component Value Date    CHOL 169 02/07/2017    TRIG 142 05/18/2022    HDL 57 05/18/2022    LDLCALC 125 (H) 05/18/2022     Lab Results   Component Value Date     02/07/2017    K 5.1 05/26/2024    CO2 32 05/26/2024     05/26/2024    BUN 19 05/26/2024    CREATININE 0.94 05/26/2024    ALT 16 11/25/2023    AST 19 11/25/2023     Lab Results   Component Value Date    WBC 6.10 05/26/2024    HGB 12.1 05/26/2024    HCT 36.8 05/26/2024     (H) 05/26/2024     05/26/2024     Lab Results   Component Value Date    INR 1.70 (H) 03/18/2020    INR 1.60 (H) 07/03/2019    INR 2.16 (H) 07/01/2019         Imaging: I have personally reviewed pertinent reports.                  Gabriel Geller MD  Cardiology Fellow   PGY-5      ==========================================================================================    Epic/ Allscripts/Care Everywhere records reviewed: Yes    ** Please Note: Fluency DirectDictation voice to text software may have been used in the creation of this document. **

## 2024-08-07 NOTE — PROGRESS NOTES
Outpatient Consultation - EP   Mayra Meadows 83 y.o. female   MRN: [id number]  Encounter: [id number]            Physician Requesting Consult: Consults   PCP: SOTO Ring      Reason for Consult / Principal Problem: KHOI of device      Assessment & Plan      Persistent atrial fibrillation s/p AVJ and BiV ICD (placed on 3/18/2020 by )  Rate control: Toprol- mg twice daily  Anticoagulation: Eliquis 5 mg daily    Pacemaker dependent, notification of KHOI on 6/10/2024  Patient's RV lead is set to 6 V/1 ms indicating very high thresholds  On check today, her thresholds are appropriately elevated  Patient's LV lead to 1.75 V/ 0.4ms    History of tachycardia mediated cardiomyopathy  EF improved from 25%-65%.  Follows with advanced heart failure  Noted to have BiV failure when EF was low  Currently on Entresto, metoprolol and hydralazine  Type II DM  Hypertension  Hyperlipidemia    Plan:    Will schedule patient for urgent GEN change and will likely need new RV lead given patient's existing RV lead has high threshold.  Did discuss with patient that this would make her MRI incompatible going forward.  She understands the risks and benefits between this and RV lead extraction with a generator change.    Will require venogram prior to procedure  Patient is allergic to penicillins, received Ancef last time          1. Presence of automatic cardioverter/defibrillator (AICD)    - POCT ECG           History of Present Illness   I had the pleasure of seeing Mayra Meadows who presents after device check showed KHOI.  Patient has a history of persistent atrial fibrillation which was complicated by tachycardia induced cardiomyopathy back in 2019.  Patient was trialed on amiodarone for rhythm control eventually had breakthrough atrial fibrillation and underwent AV node ablation and BiV ICD placement.  Since then patient's EF has improved and she has been doing well.  She does not have any complaints  at this time.  Denies any chest pain, shortness of breath, lightheadedness or palpitations.      On 4/15/2022 patient device was noted to have high RV thresholds, this was confirmed in person device check.  Patient continued to have high RV thresholds from that point onwards.        Past Medical History:   Diagnosis Date   • Allergic childhood   • Arthritis do not know   • Cancer (HCC) 1976   • Cardiogenic shock (HCC) 06/26/2019   • CHF (congestive heart failure) ([facility])    • Chronic kidney disease June 2019   • COPD (chronic obstructive pulmonary disease) (HCC) no   • Coronary artery disease 06/19   • Depression no   • Disease of thyroid gland 1976   • Eczema    • Heart murmur childhood   • Hypertension do not knpw   • Photosensitivity     abnormal skin sensitivity to sunlight   • Traumatic open wound of left lower leg    • 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HCC)     staging   • Visual impairment childhood     Social History     Socioeconomic History   • Marital status: Single     Spouse name: Not on file   • Number of children: Not on file   • Years of education: Not on file   • Highest education level: Not on file   Occupational History   • Not on file   Tobacco Use   • Smoking status: Never     Passive exposure: Never   • Smokeless tobacco: Never   Vaping Use   • Vaping status: Never Used   Substance and Sexual Activity   • Alcohol use: Not Currently     Comment: 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   • Drug use: Never   • Sexual activity: Not Currently   Other Topics Concern   • Not on file   Social History Narrative   • Not on file     Social Determinants of Health     Financial Resource Strain: Medium Risk (9/27/2023)    Overall Financial Resource Strain (CARDIA)    • Difficulty of Paying Living Expenses: Somewhat hard   Food Insecurity: Not on file   Transportation Needs: No Transportation Needs (9/27/2023)    PRAPARE - Transportation    • Lack of Transportation (Medical): No    • Lack of Transportation  (Non-Medical): No   Physical Activity: Not on file   Stress: Not on file   Social Connections: Not on file   Intimate Partner Violence: Not on file   Housing Stability: Not on file      Family History   Problem Relation Age of Onset   • Alzheimer's disease Mother    • Coronary artery disease Mother    • Dementia Mother    • Diabetes Mother         mellitus   • Heart disease Mother    • Other Father         acute myocardial infarction   • Heart disease Father    • Coronary artery disease Sister    • Other Maternal Grandfather         laryngeal cancer   • Dementia Maternal Aunt    • Diabetes Maternal Aunt    • Heart disease Sister    • Heart disease Brother      Past Surgical History:   Procedure Laterality Date   • CARDIAC SURGERY  06/19   • CATARACT EXTRACTION Left    • EYE SURGERY     • HEMORRHOID SURGERY     • IR NON-TUNNELED CENTRAL LINE PLACEMENT  07/09/2019   • OOPHORECTOMY      unilateral   • TOTAL ABDOMINAL HYSTERECTOMY         Current Outpatient Medications:   •  albuterol (Ventolin HFA) 90 mcg/act inhaler, Inhale 2 puffs every 6 (six) hours as needed for wheezing, Disp: 18 g, Rfl: 5  •  apixaban (Eliquis) 5 mg, TAKE 1 TABLET (5 MG TOTAL) BY MOUTH 2 (TWO) TIMES A DAY, Disp: 60 tablet, Rfl: 5  •  Ascorbic Acid (vitamin C) 1000 MG tablet, Take 1,000 mg by mouth 2 (two) times a day , Disp: , Rfl:   •  bumetanide (BUMEX) 2 mg tablet, TAKE 2 TABLETS (4 MG TOTAL) BY MOUTH DAILY, Disp: 60 tablet, Rfl: 5  •  calcium carbonate (OS-PAULA) 600 MG tablet, Take 600 mg by mouth 2 (two) times a day with meals, Disp: , Rfl:   •  cholecalciferol (VITAMIN D3) 1,000 units tablet, Take 1,000 Units by mouth daily 8,000 dailty, Disp: , Rfl:   •  Entresto 49-51 MG TABS, TAKE 1 TABLET BY MOUTH 2 (TWO) TIMES A DAY, Disp: 60 tablet, Rfl: 11  •  hydrALAZINE (APRESOLINE) 10 mg tablet, TAKE 1 TABLET (10 MG TOTAL) BY MOUTH 2 (TWO) TIMES A DAY, Disp: 180 tablet, Rfl: 1  •  latanoprost (XALATAN) 0.005 % ophthalmic solution, , Disp: , Rfl:  "  •  levothyroxine 100 mcg tablet, TAKE 1 TABLET (100 MCG TOTAL) BY MOUTH DAILY, Disp: 90 tablet, Rfl: 1  •  Magnesium 400 MG CAPS, Take 1 capsule (400 mg total) by mouth daily, Disp: , Rfl: 0  •  metoprolol succinate (TOPROL-XL) 50 mg 24 hr tablet, TAKE 2 TABLETS (100 MG TOTAL) BY MOUTH EVERY 12 (TWELVE) HOURS, Disp: 120 tablet, Rfl: 5  •  Multiple Vitamin (MULTI-VITAMIN DAILY PO), 1 tablet 2 (two) times a day  , Disp: , Rfl:   •  Potassium Chloride ER 20 MEQ TBCR, Take 2 tablets (40 mEq total) by mouth 2 (two) times a day, Disp: 120 tablet, Rfl: 5  •  rOPINIRole (REQUIP XL) 2 MG 24 hr tablet, TAKE 1 TABLET (2 MG TOTAL) BY MOUTH DAILY AT BEDTIME, Disp: 30 tablet, Rfl: 5  •  traMADol (ULTRAM) 50 mg tablet, TAKE 1 TABLET (50 MG TOTAL) BY MOUTH 2 (TWO) TIMES A DAY, Disp: 60 tablet, Rfl: 0  •  Turmeric (QC TUMERIC COMPLEX PO), Take by mouth 2 (two) times a day, Disp: , Rfl:   •  venlafaxine (EFFEXOR) 75 mg tablet, TAKE 1 TABLET (75 MG TOTAL) BY MOUTH EVERY 12 (TWELVE) HOURS, Disp: 60 tablet, Rfl: 5  Allergies   Allergen Reactions   • Penicillins    • Wellbutrin Sr  [Bupropion]      Other reaction(s): Suicidal ideations  Category: Adverse Reaction;          Review of Systems  Review of system was conducted and was negative except for as stated in the HPI.        Objective   Vitals: Blood pressure 132/76, pulse 83, height 5' 1\" (1.549 m), weight 89.3 kg (196 lb 12.8 oz).      EKG:   Date: 8/7/2024  Interpretation: BiV paced      Physical Exam  Constitutional:       General: She is not in acute distress.     Appearance: Normal appearance. She is not ill-appearing.   Cardiovascular:      Rate and Rhythm: Normal rate and regular rhythm. No extrasystoles are present.     Chest Wall: PMI is not displaced.      Heart sounds: S1 normal and S2 normal. No murmur heard.     No systolic murmur is present.      No diastolic murmur is present.      No friction rub. No gallop.   Musculoskeletal:      Right lower leg: No edema.      " Left lower leg: No edema.   Neurological:      General: No focal deficit present.      Mental Status: She is oriented to person, place, and time.             Lab Results: I have personally reviewed pertinent lab results.    Lab Results   Component Value Date    HSTNI0 3 05/26/2024    HSTNI2 3 05/26/2024    HSTNI4 10 10/17/2022     Lab Results   Component Value Date    NTBNP 4,761 (H) 10/17/2022    NTBNP 716 (H) 01/14/2021    NTBNP 1,089 (H) 06/04/2020     Lab Results   Component Value Date    CHOL 169 02/07/2017    TRIG 142 05/18/2022    HDL 57 05/18/2022    LDLCALC 125 (H) 05/18/2022     Lab Results   Component Value Date     02/07/2017    K 5.1 05/26/2024    CO2 32 05/26/2024     05/26/2024    BUN 19 05/26/2024    CREATININE 0.94 05/26/2024    ALT 16 11/25/2023    AST 19 11/25/2023     Lab Results   Component Value Date    WBC 6.10 05/26/2024    HGB 12.1 05/26/2024    HCT 36.8 05/26/2024     (H) 05/26/2024     05/26/2024     Lab Results   Component Value Date    INR 1.70 (H) 03/18/2020    INR 1.60 (H) 07/03/2019    INR 2.16 (H) 07/01/2019         Imaging: I have personally reviewed pertinent reports.                  Gabriel Geller MD  Cardiology Fellow   PGY-5      ==========================================================================================    Epic/ Allscripts/Care Everywhere records reviewed: Yes    ** Please Note: Fluency DirectDictation voice to text software may have been used in the creation of this document. **

## 2024-08-07 NOTE — LETTER
2024               DEVICE PROCEDURE INSTRUCTIONS    Mayra Meadows   : 1940  MRN: [id number]  [address]  [place] [address]    Procedure: ICD LEAD REVISION      Procedure Date:    Location: [facility]  Address: [place] [address]        Labs to be done on 24: CMP /  CBC (FASTING 8 HOURS)    BLOOD THINNER INSTRUCTIONS (Coumadin / Warfarin / Pradaxa / Eliquis / Xarelto):   Eliquis - Do not take 24 HOURS PRIOR TO YOUR PROCEDURE.     Medication Hold:   Entresto - Do not take night before and morning of procedure. (two (2) doses)       Bumex - Do not take morning of procedure.       Arrival Time: The Hospital will contact you the day prior to your procedure, usually between 4PM - 6PM to instruct you on the time and place to report. If you do not hear from a [facility]  by 6PM the evening prior to your procedure, please contact the campus you are scheduled at.     DO NOT drink or eat ANYTHING after midnight the night prior to your procedure. You may have a minimal amount of water with your AM medications.    Arrange for a responsible adult to drive you to and from the hospital.    You should continue to take your morning medications with a sip of water UNLESS ADVISED OTHERWISE.       DO NOT stop taking Plavix or Aspirin unless advised otherwise.     If you are currently taking Fish Oil, Krill oil and/or Vitamin E please DO NOT TAKE FOR A WEEK PRIOR TO PROCEDURE.     If you are diabetic, DO NOT take any of your diabetic pills the morning of your procedure. Oral diabetic medications may include Glucophage, Prandin, Glyburide, Micronase, Avandia, Glucovance, Precose, Glynase, and Starlix.     Bring a list of daily medications, vitamins, minerals, herbals and nutritional supplements you take. Please include dosages and the times you take them each day.     If you are packing an overnight bag, pack minimal clothing, you will be  given hospital sleepwear.   DO NOT bring money, valuables or jewelry. The wedding band is ok.     If you use CPAP machine, bring it to the hospital.     Bring your Photo ID and Insurance cards with you.     Please notify us if you have been admitted to the hospital within 30 days.    Pre-Operative Showering Instructions. ONLY FOR DEVICE PROCEDURE.    The two nights prior to your procedure, take a shower each night using the special antiseptic body scrub (Chlorhexidine Scrub Brush) you received from the office or hospital. This scrub will replace your soap at home, the sponge is pre-filled with soap.     The scrub brushes are single use only and should be discarded after use.     Shampoo your hair with regular shampoo and rinse completely before using the antiseptic scrub brush.     Using the sponge side of the scrub brush to wash your entire body from your neck down, with special attention to your armpits, chest and groin area. DO NOT USE the scrub on your face and avoid any open wounds. Do not use any other soap or wash your skin.    DO NOT USE any lotion, powder, deodorant or perfume of any kind on your skin after you shower.    AFTER THE FIRST NIGHT of using the scrub, change your bed linen sheets to a fresh clean set and clean pajamas for bedtime.    AFTER THE SECOND NIGHT of using the scrub, use clean pajamas for bedtime.    DO NOT SHOWER THE MORNING OF SURGERY, you will be prepped and cleansed at the hospital prior to your procedure.       PLEASE  THE SPONGES AT YOUR MOST CONVENIENT [facility]'S CARDIOLOGY OFFICE.                                            FAILURE TO FOLLOW ANY OF THESE INSTRUCTIONS COULD RESULT IN THE CANCELLATION OF YOUR PROCEDURE      Please call  [phone] (Nathan) or [phone] (Lynda) if you do not hear from the  by 6:00PM the night before your procedure.    All patients enter through ENTRANCE B.  Parking is available free of charge or park on Parking Deck  [place].        Thank you,   Estrella Torres  Surgery Coordinator  [facility]   [address] [place] [address]  [place] [address]  Teams: [phone]

## 2024-08-07 NOTE — TELEPHONE ENCOUNTER
Patient scheduled for ICD LEAD REVISION  on 8/20/24 at [facility] with Dr. HAND.          Patient aware of all general instructions.    Medication holds:   Eliquis - Do not take 24 HOURS PRIOR TO YOUR PROCEDURE.     Entresto - Do not take night before and morning of procedure. (two (2) doses)       Bumex - Do not take morning of procedure.     Labs to be done on 8/13/24   CMP / CBC (FASTING 8 HOURS)      Thank you,  Estrella Torres

## 2024-08-08 RX ORDER — ROPINIROLE 2 MG/1
2 TABLET, FILM COATED, EXTENDED RELEASE ORAL
Qty: 30 TABLET | Refills: 5 | Status: SHIPPED | OUTPATIENT
Start: 2024-08-08

## 2024-08-12 ENCOUNTER — TELEPHONE (OUTPATIENT)
Dept: INTERVENTIONAL RADIOLOGY/VASCULAR | Facility: HOSPITAL | Age: 84
End: 2024-08-12

## 2024-08-13 ENCOUNTER — APPOINTMENT (OUTPATIENT)
Dept: LAB | Facility: CLINIC | Age: 84
End: 2024-08-13
Payer: COMMERCIAL

## 2024-08-13 DIAGNOSIS — I48.91 ATRIAL FIBRILLATION, UNSPECIFIED TYPE (HCC): ICD-10-CM

## 2024-08-13 DIAGNOSIS — I48.91 ATRIAL FIBRILLATION WITH RAPID VENTRICULAR RESPONSE (HCC): ICD-10-CM

## 2024-08-13 DIAGNOSIS — Z95.810 PRESENCE OF AUTOMATIC CARDIOVERTER/DEFIBRILLATOR (AICD): ICD-10-CM

## 2024-08-13 DIAGNOSIS — I50.22 CHRONIC HFREF (HEART FAILURE WITH REDUCED EJECTION FRACTION) (HCC): ICD-10-CM

## 2024-08-13 LAB
ALBUMIN SERPL BCG-MCNC: 4.1 G/DL (ref 3.5–5)
ALP SERPL-CCNC: 52 U/L (ref 34–104)
ALT SERPL W P-5'-P-CCNC: 15 U/L (ref 7–52)
ANION GAP SERPL CALCULATED.3IONS-SCNC: 10 MMOL/L (ref 4–13)
AST SERPL W P-5'-P-CCNC: 22 U/L (ref 13–39)
BASOPHILS # BLD AUTO: 0.05 THOUSANDS/ÂΜL (ref 0–0.1)
BASOPHILS NFR BLD AUTO: 1 % (ref 0–1)
BILIRUB SERPL-MCNC: 0.36 MG/DL (ref 0.2–1)
BUN SERPL-MCNC: 19 MG/DL (ref 5–25)
CALCIUM SERPL-MCNC: 9.9 MG/DL (ref 8.4–10.2)
CHLORIDE SERPL-SCNC: 101 MMOL/L (ref 96–108)
CO2 SERPL-SCNC: 29 MMOL/L (ref 21–32)
CREAT SERPL-MCNC: 0.93 MG/DL (ref 0.6–1.3)
EOSINOPHIL # BLD AUTO: 0.16 THOUSAND/ÂΜL (ref 0–0.61)
EOSINOPHIL NFR BLD AUTO: 2 % (ref 0–6)
ERYTHROCYTE [DISTWIDTH] IN BLOOD BY AUTOMATED COUNT: 13 % (ref 11.6–15.1)
GFR SERPL CREATININE-BSD FRML MDRD: 57 ML/MIN/1.73SQ M
GLUCOSE P FAST SERPL-MCNC: 131 MG/DL (ref 65–99)
HCT VFR BLD AUTO: 39.3 % (ref 34.8–46.1)
HGB BLD-MCNC: 12.6 G/DL (ref 11.5–15.4)
IMM GRANULOCYTES # BLD AUTO: 0.05 THOUSAND/UL (ref 0–0.2)
IMM GRANULOCYTES NFR BLD AUTO: 1 % (ref 0–2)
LYMPHOCYTES # BLD AUTO: 1.76 THOUSANDS/ÂΜL (ref 0.6–4.47)
LYMPHOCYTES NFR BLD AUTO: 23 % (ref 14–44)
MCH RBC QN AUTO: 33.2 PG (ref 26.8–34.3)
MCHC RBC AUTO-ENTMCNC: 32.1 G/DL (ref 31.4–37.4)
MCV RBC AUTO: 104 FL (ref 82–98)
MONOCYTES # BLD AUTO: 0.49 THOUSAND/ÂΜL (ref 0.17–1.22)
MONOCYTES NFR BLD AUTO: 6 % (ref 4–12)
NEUTROPHILS # BLD AUTO: 5.2 THOUSANDS/ÂΜL (ref 1.85–7.62)
NEUTS SEG NFR BLD AUTO: 67 % (ref 43–75)
NRBC BLD AUTO-RTO: 0 /100 WBCS
PLATELET # BLD AUTO: 258 THOUSANDS/UL (ref 149–390)
PMV BLD AUTO: 10.6 FL (ref 8.9–12.7)
POTASSIUM SERPL-SCNC: 4.5 MMOL/L (ref 3.5–5.3)
PROT SERPL-MCNC: 7.9 G/DL (ref 6.4–8.4)
RBC # BLD AUTO: 3.79 MILLION/UL (ref 3.81–5.12)
SODIUM SERPL-SCNC: 140 MMOL/L (ref 135–147)
WBC # BLD AUTO: 7.71 THOUSAND/UL (ref 4.31–10.16)

## 2024-08-13 PROCEDURE — 80053 COMPREHEN METABOLIC PANEL: CPT

## 2024-08-13 PROCEDURE — 36415 COLL VENOUS BLD VENIPUNCTURE: CPT

## 2024-08-13 PROCEDURE — 85025 COMPLETE CBC W/AUTO DIFF WBC: CPT

## 2024-08-15 ENCOUNTER — HOSPITAL ENCOUNTER (OUTPATIENT)
Dept: INTERVENTIONAL RADIOLOGY/VASCULAR | Facility: HOSPITAL | Age: 84
End: 2024-08-15
Attending: RADIOLOGY
Payer: COMMERCIAL

## 2024-08-15 DIAGNOSIS — N18.31 STAGE 3A CHRONIC KIDNEY DISEASE (CKD) (HCC): ICD-10-CM

## 2024-08-15 PROCEDURE — NC001 PR NO CHARGE: Performed by: RADIOLOGY

## 2024-08-15 PROCEDURE — 36012 PLACE CATHETER IN VEIN: CPT

## 2024-08-15 PROCEDURE — 75820 VEIN X-RAY ARM/LEG: CPT

## 2024-08-15 RX ADMIN — IOHEXOL 30 ML: 350 INJECTION, SOLUTION INTRAVENOUS at 08:59

## 2024-08-15 NOTE — SEDATION DOCUMENTATION
Bilateral upper extremity venogram completed under fluoroscopy with Dr. Ceja. Both IV's removed. Patient tolerated well. Education provided.

## 2024-08-15 NOTE — TELEPHONE ENCOUNTER
Pt had her Venogram done today 8/15/2024 @ 8:30am @ [facility].

## 2024-08-15 NOTE — DISCHARGE INSTRUCTIONS
Venography/ Venogram      What you Need To Know:  Venography is a test that shows blood flow through a vein.  Contast liquid or CO2 gas is used to help the vein show up in x-rays.  Venography can be used to find current blood flow problems or the effects of a past problem.  Blood flow may be blocked or slowed from inflammation, a tumor, blood clot, or wires from a device.  Venography may be used if other tests cannot find the cause of your health problem.  The results will help your healthcare providers make or change treatment plans.    During the test Contrast will be injected through a catheter or IV.  You may Notice a warmth or burning feeling as the contrast is injected.  This is expected and should pass quickly.  Moving x-ray pictures are taken of blood flow through the vein being checked. Liquid will be put through the catheter/IV to flush the line.  Then the catheter/IV will be removed.  The insertion site is covered with a dry dressing or band aid.     After the test:  You may have some pain or bruising at the catheter/IV site.  This is exptected and should get better in a day or two.     Discharge Instructions:  Call your local emergency number or [phone] if  -you have numbness or tingling in an arm or leg.  -You have any signs of an allergic reaction to the contrast liquid:    Chest pain or trouble breathing  Dizziness or fainting  Swelling of your mouth or face  Nausea or vomitting  Sudden decrease in urination  A rash, itching, or swollen skin       Call your doctor with any questions or concerns about your condition or care.    Care for the catheter/IV site:    * keep the bandage on the catheter site for a few hours up to 1 day.  Then you can remove the bandage. If the area starts bleeding, apply firm pressure for 10 minutes. Use gauze or clean towel to apply  pressure.  And reapply clean dressing or band-aid.  Ice the area to treat swelling, tenderness, or pain at catheter/IV site.    Drink liquids as directed:  liquids will help flush the contrast liquid out of your body.  Ask how much liquid to drink after the venogram. Do not drink extra liquid if you are on dialysis.    Resume diet and activity as usual unless instructed otherwise.      Follow up with your primary doctor and/Or your referring Doctor for follow up visits or procedures.

## 2024-08-20 ENCOUNTER — ANESTHESIA (OUTPATIENT)
Dept: NON INVASIVE DIAGNOSTICS | Facility: HOSPITAL | Age: 84
End: 2024-08-20
Payer: COMMERCIAL

## 2024-08-20 ENCOUNTER — APPOINTMENT (OUTPATIENT)
Dept: RADIOLOGY | Facility: HOSPITAL | Age: 84
End: 2024-08-20
Payer: COMMERCIAL

## 2024-08-20 ENCOUNTER — HOSPITAL ENCOUNTER (OUTPATIENT)
Facility: HOSPITAL | Age: 84
Setting detail: OUTPATIENT SURGERY
Discharge: HOME | End: 2024-08-20
Attending: INTERNAL MEDICINE | Admitting: INTERNAL MEDICINE
Payer: COMMERCIAL

## 2024-08-20 ENCOUNTER — ANESTHESIA EVENT (OUTPATIENT)
Dept: NON INVASIVE DIAGNOSTICS | Facility: HOSPITAL | Age: 84
End: 2024-08-20
Payer: COMMERCIAL

## 2024-08-20 VITALS
BODY MASS INDEX: 36.82 KG/M2 | RESPIRATION RATE: 16 BRPM | OXYGEN SATURATION: 97 % | HEIGHT: 61 IN | SYSTOLIC BLOOD PRESSURE: 178 MMHG | DIASTOLIC BLOOD PRESSURE: 65 MMHG | HEART RATE: 80 BPM | TEMPERATURE: 97 F | WEIGHT: 195 LBS

## 2024-08-20 DIAGNOSIS — Z95.810 PRESENCE OF AUTOMATIC CARDIOVERTER/DEFIBRILLATOR (AICD): ICD-10-CM

## 2024-08-20 DIAGNOSIS — I48.91 ATRIAL FIBRILLATION, UNSPECIFIED TYPE (HCC): ICD-10-CM

## 2024-08-20 DIAGNOSIS — I48.91 ATRIAL FIBRILLATION WITH RAPID VENTRICULAR RESPONSE (HCC): ICD-10-CM

## 2024-08-20 DIAGNOSIS — I50.22 CHRONIC HFREF (HEART FAILURE WITH REDUCED EJECTION FRACTION) (HCC): ICD-10-CM

## 2024-08-20 DIAGNOSIS — Z45.02 BIVENTRICULAR IMPLANTABLE CARDIOVERTER-DEFIBRILLATOR (ICD) AT END OF DEVICE LIFE: Primary | ICD-10-CM

## 2024-08-20 LAB
ATRIAL RATE: 81 BPM
ATRIAL RATE: 96 BPM
QRS AXIS: -27 DEGREES
QRS AXIS: -49 DEGREES
QRSD INTERVAL: 104 MS
QRSD INTERVAL: 124 MS
QT INTERVAL: 394 MS
QT INTERVAL: 404 MS
QTC INTERVAL: 462 MS
QTC INTERVAL: 465 MS
T WAVE AXIS: 13 DEGREES
T WAVE AXIS: 39 DEGREES
VENTRICULAR RATE: 80 BPM
VENTRICULAR RATE: 83 BPM

## 2024-08-20 PROCEDURE — 71045 X-RAY EXAM CHEST 1 VIEW: CPT

## 2024-08-20 PROCEDURE — 93005 ELECTROCARDIOGRAM TRACING: CPT

## 2024-08-20 PROCEDURE — C1892 INTRO/SHEATH,FIXED,PEEL-AWAY: HCPCS | Performed by: INTERNAL MEDICINE

## 2024-08-20 PROCEDURE — C1882 AICD, OTHER THAN SING/DUAL: HCPCS | Performed by: INTERNAL MEDICINE

## 2024-08-20 PROCEDURE — 33249 INSJ/RPLCMT DEFIB W/LEAD(S): CPT | Performed by: INTERNAL MEDICINE

## 2024-08-20 PROCEDURE — C1777 LEAD, AICD, ENDO SINGLE COIL: HCPCS | Performed by: INTERNAL MEDICINE

## 2024-08-20 PROCEDURE — 33241 REMOVE PULSE GENERATOR: CPT | Performed by: INTERNAL MEDICINE

## 2024-08-20 PROCEDURE — 93010 ELECTROCARDIOGRAM REPORT: CPT | Performed by: INTERNAL MEDICINE

## 2024-08-20 DEVICE — ENVELOPE CMRM6133 ABSORB LRG MR
Type: IMPLANTABLE DEVICE | Site: CHEST  WALL | Status: FUNCTIONAL
Brand: TYRX™

## 2024-08-20 DEVICE — CRTD DTPA2D4 COBALT XT HF MRI DF4 USA
Type: IMPLANTABLE DEVICE | Site: CHEST  WALL | Status: FUNCTIONAL
Brand: COBALT™ XT HF CRT-D MRI SURESCAN™

## 2024-08-20 DEVICE — LEAD 6935M62 QUATTRO SECURE S MRI US
Type: IMPLANTABLE DEVICE | Site: HEART | Status: FUNCTIONAL
Brand: SPRINT QUATTRO SECURE S MRI™ SURESCAN™

## 2024-08-20 RX ORDER — MIDAZOLAM HYDROCHLORIDE 2 MG/2ML
INJECTION, SOLUTION INTRAMUSCULAR; INTRAVENOUS AS NEEDED
Status: DISCONTINUED | OUTPATIENT
Start: 2024-08-20 | End: 2024-08-20

## 2024-08-20 RX ORDER — LIDOCAINE HYDROCHLORIDE 10 MG/ML
INJECTION, SOLUTION EPIDURAL; INFILTRATION; INTRACAUDAL; PERINEURAL CODE/TRAUMA/SEDATION MEDICATION
Status: DISCONTINUED | OUTPATIENT
Start: 2024-08-20 | End: 2024-08-20 | Stop reason: HOSPADM

## 2024-08-20 RX ORDER — GENTAMICIN 40 MG/ML
INJECTION, SOLUTION INTRAMUSCULAR; INTRAVENOUS CODE/TRAUMA/SEDATION MEDICATION
Status: DISCONTINUED | OUTPATIENT
Start: 2024-08-20 | End: 2024-08-20 | Stop reason: HOSPADM

## 2024-08-20 RX ORDER — VANCOMYCIN HYDROCHLORIDE 1 G/200ML
INJECTION, SOLUTION INTRAVENOUS AS NEEDED
Status: DISCONTINUED | OUTPATIENT
Start: 2024-08-20 | End: 2024-08-20

## 2024-08-20 RX ORDER — PROPOFOL 10 MG/ML
INJECTION, EMULSION INTRAVENOUS CONTINUOUS PRN
Status: DISCONTINUED | OUTPATIENT
Start: 2024-08-20 | End: 2024-08-20

## 2024-08-20 RX ORDER — LIDOCAINE HYDROCHLORIDE AND EPINEPHRINE 10; 10 MG/ML; UG/ML
INJECTION, SOLUTION INFILTRATION; PERINEURAL CODE/TRAUMA/SEDATION MEDICATION
Status: DISCONTINUED | OUTPATIENT
Start: 2024-08-20 | End: 2024-08-20 | Stop reason: HOSPADM

## 2024-08-20 RX ORDER — CEFAZOLIN SODIUM 2 G/50ML
2000 SOLUTION INTRAVENOUS ONCE
Status: DISCONTINUED | OUTPATIENT
Start: 2024-08-20 | End: 2024-08-20 | Stop reason: HOSPADM

## 2024-08-20 RX ORDER — FENTANYL CITRATE 50 UG/ML
INJECTION, SOLUTION INTRAMUSCULAR; INTRAVENOUS AS NEEDED
Status: DISCONTINUED | OUTPATIENT
Start: 2024-08-20 | End: 2024-08-20

## 2024-08-20 RX ORDER — PROPOFOL 10 MG/ML
INJECTION, EMULSION INTRAVENOUS AS NEEDED
Status: DISCONTINUED | OUTPATIENT
Start: 2024-08-20 | End: 2024-08-20

## 2024-08-20 RX ORDER — ACETAMINOPHEN 325 MG/1
650 TABLET ORAL EVERY 4 HOURS PRN
Status: DISCONTINUED | OUTPATIENT
Start: 2024-08-20 | End: 2024-08-20 | Stop reason: HOSPADM

## 2024-08-20 RX ORDER — SODIUM CHLORIDE 9 MG/ML
20 INJECTION, SOLUTION INTRAVENOUS CONTINUOUS
Status: DISCONTINUED | OUTPATIENT
Start: 2024-08-20 | End: 2024-08-20 | Stop reason: HOSPADM

## 2024-08-20 RX ORDER — PHENYLEPHRINE HCL IN 0.9% NACL 1 MG/10 ML
SYRINGE (ML) INTRAVENOUS AS NEEDED
Status: DISCONTINUED | OUTPATIENT
Start: 2024-08-20 | End: 2024-08-20

## 2024-08-20 RX ADMIN — MIDAZOLAM 0.5 MG: 1 INJECTION INTRAMUSCULAR; INTRAVENOUS at 09:44

## 2024-08-20 RX ADMIN — Medication 100 MCG: at 10:38

## 2024-08-20 RX ADMIN — FENTANYL CITRATE 12.5 MCG: 50 INJECTION INTRAMUSCULAR; INTRAVENOUS at 09:17

## 2024-08-20 RX ADMIN — PROPOFOL 30 MG: 10 INJECTION, EMULSION INTRAVENOUS at 09:17

## 2024-08-20 RX ADMIN — MIDAZOLAM 0.5 MG: 1 INJECTION INTRAMUSCULAR; INTRAVENOUS at 09:35

## 2024-08-20 RX ADMIN — MIDAZOLAM 1 MG: 1 INJECTION INTRAMUSCULAR; INTRAVENOUS at 09:17

## 2024-08-20 RX ADMIN — SODIUM CHLORIDE: 0.9 INJECTION, SOLUTION INTRAVENOUS at 08:47

## 2024-08-20 RX ADMIN — FENTANYL CITRATE 12.5 MCG: 50 INJECTION INTRAMUSCULAR; INTRAVENOUS at 09:51

## 2024-08-20 RX ADMIN — Medication 100 MCG: at 10:16

## 2024-08-20 RX ADMIN — FENTANYL CITRATE 12.5 MCG: 50 INJECTION INTRAMUSCULAR; INTRAVENOUS at 09:33

## 2024-08-20 RX ADMIN — PROPOFOL 30 MCG/KG/MIN: 10 INJECTION, EMULSION INTRAVENOUS at 09:17

## 2024-08-20 RX ADMIN — FENTANYL CITRATE 12.5 MCG: 50 INJECTION INTRAMUSCULAR; INTRAVENOUS at 09:47

## 2024-08-20 RX ADMIN — VANCOMYCIN HYDROCHLORIDE 1000 MG: 1 INJECTION, SOLUTION INTRAVENOUS at 09:10

## 2024-08-20 NOTE — DISCHARGE INSTR - AVS FIRST PAGE
Please refer to post device implantation discharge instructions and restrictions below and your device booklet/temporary card.     Keep incision dry for one week. Leave outer bandage in place for 1 week - it is water proof, and as long as it is fully adhered to your skin you may shower with it.  If it appears as though the bandage is coming off and/or there is any communication to the area of device incision, please then keep the whole area dry for the remaining week.  After 1 week, please remove by pulling all edges away from the center of the bandage. After the bandage is removed, you may then shower normally and get the area wet with soap and water, no scrubbing, and pat dry. Do not use lotions/powders/creams on incision.    No overhead reaching/pushing/pulling/lifting greater than 5-10lbs with left arm for six weeks. Please call the office if you notice redness, swelling, bleeding, or drainage from incision or if you develop fevers    Cardiac Resynchronization Therapy (CRT)    If you have any questions, please call [phone] to speak with a nurse (8:30am-4pm, or [phone] after hours). For appointments, please call [phone].    WHAT YOU SHOULD KNOW:    Your device is a biventricular ICD, which delivers resynchronization therapy and is also a defibrillator (see below). Cardiac resynchronization therapy (CRT) is a procedure used to treat problems with how your heart contracts. CRT is also called biventricular pacing. Your heart has 2 upper chambers, called atria, and 2 lower chambers, called ventricles. Your heartbeat is synchronized when all areas of your heart contract together properly. When the areas of your heart do not contract as they should, your heart cannot pump enough blood and oxygen to your body. You may have trouble breathing, tire easily, and have swelling in your legs and feet. With a biventricular device, there is one wire in the right atria (in most cases), one wire in the right  ventricle, and a third wire that goes through a vein and wraps around to your left ventricle. The two ventricular wires work together to help your heart contract more synchronously and efficiently.               AFTER YOU LEAVE:      Medicines:   Heart medicine may be given to help your heart beat strongly and regularly. Ask your healthcare provider for more information about heart medicines.    Take your medicine as directed. Contact your healthcare provider if you think your medicine is not helping or if you have side effects. Tell him if you are allergic to any medicine. Keep a list of the medicines, vitamins, and herbs you take. Include the amounts, and when and why you take them. Bring the list or the pill bottles to follow-up visits. Carry your medicine list with you in case of an emergency.    Driving: you are ok to drive 48 hours after device is implanted     Follow up with your healthcare provider as directed: You will need to return to have your pacemaker checked. You may also need an EKG, echocardiogram, or chest x-ray to check the leads in your heart, and your doctor will order these tests if necessary. Write down your questions so you remember to ask them during your visits.    Device safety: Some electrical devices may interfere with how your pacemaker works. Some examples are cell phones, security systems, power cables, and electric monitors. Ask your healthcare provider how long you can be near these devices, and which devices to avoid. Tell caregivers you have a pacemaker before you have a procedure or surgery.    Wound care: Care for your wound as directed. Keep incision dry for one week. Do not use lotions/powders/creams on incision. Leave outer bandage in place for 1 week - it is water proof, and as long as it is fully adhered to your skin you may shower with it.  If it appears as though the bandage is coming off and/or there is any communication to the area of device incision, please then keep  the whole area dry for the remaining week.  After 1 week, please remove by pulling all edges away from the center of the bandage. No overhead reaching/pushing/pulling/lifting greater than 5-10lbs with left arm for one month. Please call the office if you notice redness, swelling, bleeding, or drainage from incision or if you develop fevers      Contact your healthcare provider if:   You have new or increased swelling in your legs or feet.   You feel more tired than usual.   You have trouble breathing during activity.    Your wound is red, warm, swollen, or draining pus.   You have a fever.   You have questions or concerns about your condition or care.    Seek care immediately or call 911 if:   You feel like your heart is fluttering or jumping.   You have any of the following signs of a heart attack:    Squeezing, pressure, fullness, or pain in your chest that lasts longer than a few minutes or returns   Discomfort or pain in your back, neck, jaw, stomach, or arm   Shortness of breath or breathing problems   A sudden cold sweat, lightheadedness, dizziness, or nausea, especially with chest pain or trouble breathing      Implantable Cardioverter Defibrillator (ICD)    If you have any questions, please call [phone] to speak with a nurse (8:30am-4pm, or [phone] after hours). For appointments, please call [phone].    WHAT YOU SHOULD KNOW:    Your device is a biventricular ICD, which delivers resynchronization therapy (see above) and is also a defibrillator. An implantable cardioverter defibrillator (ICD) is a small device that monitors your heart rate and rhythm. It is commonly placed inside your upper chest region. It may be used if you have a ventricular arrhythmia, which is an irregular, dangerous rhythm from the bottom chamber of your heart. Some arrhythmias may cause your heart to suddenly stop beating. An ICD can give a shock to your heart to make it start beating again, or it can give pacing therapy  (also known as pain-free therapy) to return your heart to normal rhythm. It is also a pacemaker, so it will pace your heart if needed to prevent it from beating too slowly.           AFTER YOU LEAVE:      Follow up with your primary healthcare provider or cardiologist as directed: You will need to follow-up to have your ICD checked and make sure you are not having problems. Write down your questions so you remember to ask them during your visits.    Self-care:   Ask about activity: Ask if you need to avoid moving your shoulder or arm, and for how long. Ask if you should avoid lifting heavy objects. Do not play any contact sports, such as football or wrestling, until your primary healthcare provider (PHP) or cardiologist tells you it is okay. You may only be able to drive for a certain amount of time per day, or during certain hours. Ask when you can return to work.   Care for your skin over the ICD: see answer in instructions above     When you get a shock from your ICD: A shock may feel like someone has hit you, or you may feel a thump in the chest. If someone is touching you when you get a shock, they may feel a tingling feeling. The first time you feel a shock, it may scare you. Sit or lie down and stay calm. Ask someone to stay with you if possible. Please either call your cardiologist or report to an emergency room.    Safety instructions when you have an ICD:   Carry an ID card for the ICD: This card has important information about your ICD.    Stay away from magnets or machines with electric fields: This includes MRI machines. Avoid leaning into a car engine or doing welding. These things can interfere with how your ICD works.    Tell airport security you have an ICD: You may need to be searched by hand when you go through a security gate. The security gate or handheld wand could harm your ICD.    Keep an ICD diary: Record when you get a shock and what you were doing before you got the shock. Keep track of  how you felt before and after the shock, as well as how many shocks you received. Write down the day and time of each shock. Bring the diary with you when you see your PHP or cardiologist.   Carry medical alert identification: Wear medical alert jewelry or carry a card that says you have an ICD. Ask your PHP or cardiologist where to get these items.    Contact your primary healthcare provider or cardiologist if:   You have a fever.    You feel 1 or more shocks from your ICD and feel fine afterwards.   Your feet or ankles swell.   The area around your ICD is painful or tender after surgery.   The skin around your stitches or staples is red, swollen, or draining pus or fluid.    You have chills, a cough, and feel weak or achy.    You are sad or anxious and find it hard to do your usual activities.   You have questions or concerns about your condition or care.    Seek care immediately or call 911 if:   Your stitches or staples come apart.   Blood soaks through your bandage.   You feel more than 3 shocks in a row from your ICD.   You become weak, dizzy, or faint.   You feel your heart skip beats or beat very fast or slow, but you do not feel a shock from your ICD.   You have chest pain that does not go away with rest or medicine.        © 2014 [employer] Information is for End User's use only and may not be sold, redistributed or otherwise used for commercial purposes. All illustrations and images included in CareNotes® are the copyrighted property of [employer] or [employer].  The above information is an  only. It is not intended as medical advice for individual conditions or treatments. Talk to your doctor, nurse or pharmacist before following any medical regimen to see if it is safe and effective for you.

## 2024-08-20 NOTE — ANESTHESIA POSTPROCEDURE EVALUATION
Post-Op Assessment Note    CV Status:  Stable  Pain Score: 0    Pain management: adequate       Mental Status:  Alert and awake   Hydration Status:  Euvolemic   PONV Controlled:  Controlled   Airway Patency:  Patent     Post Op Vitals Reviewed: Yes    No anethesia notable event occurred.    Staff: CRNA               BP   138/64   Temp   Rn note   Pulse  80   Resp   15   SpO2   100% 6Lo2 mask

## 2024-08-20 NOTE — Clinical Note
The ICD COBALT XT HF  CRT-D MRI DF4 - [id number] device was inserted. The leads were placed into the connector and visually verified to be in correct position. Injury current obtained.

## 2024-08-20 NOTE — ANESTHESIA PREPROCEDURE EVALUATION
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      Anesthesia Plan  ASA Score- 3     Anesthesia Type- IV sedation with anesthesia with ASA Monitors.         Additional Monitors:     Airway Plan:     Comment: I, Dr. Diego, the attending physician, have personally seen and evaluated the patient prior to anesthetic care.  I have reviewed the pre-anesthetic record, and other medical records if appropriate to the anesthetic care.  If a CRNA is involved in the case, I have reviewed the CRNA assessment, if present, and agree.  The patient is in a suitable condition to proceed with my formulated anesthetic plan.  .       Plan Factors-    Chart reviewed.                      Induction- intravenous.    Postoperative Plan-     Perioperative Resuscitation Plan - Level 1 - Full Code.       Informed Consent- Anesthetic plan and risks discussed with patient.  I personally reviewed this patient with the CRNA. Discussed and agreed on the Anesthesia Plan with the CRNA..

## 2024-08-20 NOTE — INTERVAL H&P NOTE
Please see recent office visit with Dr. Finnegan/Dr. Geller for full details.  Briefly this patient is a pleasant 84-year-old female with [employer] BiV ICD in situ, permanent atrial fibrillation with prior AV node ablation, prior tachycardia mediated cardiomyopathy with now improved LVEF 65% per echo 3/2023 following AV node ablation/BiV pacing, hypertension, hyperlipidemia, diabetes mellitus type 2, and obesity with BMI 36.  She typically follows with Dr. Martin as an outpatient.  She initially had a device placed 3/2020 at the time of AV node ablation in the setting of ongoing rapid atrial fibrillation which is not amenable to antiarrhythmics or cardioversions.  Following this procedure, she had significant improvement in her LVEF.  Through routine device interrogations she was recently found to have reached KHOI as of 6/25/2024.  Upon review of prior device interrogations, her RV threshold was found to have been significantly elevated as of 12/2021.  Thus, RV lead revision was recommended.  She underwent an outpatient venogram which confirmed venous patency, thus generator change with addition of a new RV lead (with capping of her old RV lead) was recommended and she presents today to undergo that procedure.  No significant changes over the recent past, physical exam unchanged.    She will require 4 hours of bedrest given new ICD lead, followed by portable chest x-ray.  She could potentially be discharged home later today if no issues are noted.    Vitals:    08/20/24 0842   BP: 120/61   Pulse: 80   Resp: 18   Temp: 97.7 °F (36.5 °C)   SpO2: 100%

## 2024-08-23 ENCOUNTER — RA CDI HCC (OUTPATIENT)
Dept: OTHER | Facility: HOSPITAL | Age: 84
End: 2024-08-23

## 2024-09-03 ENCOUNTER — IN-CLINIC DEVICE VISIT (OUTPATIENT)
Dept: CARDIOLOGY CLINIC | Facility: CLINIC | Age: 84
End: 2024-09-03

## 2024-09-03 DIAGNOSIS — I48.0 PAROXYSMAL ATRIAL FIBRILLATION (HCC): ICD-10-CM

## 2024-09-03 DIAGNOSIS — Z95.810 PRESENCE OF IMPLANTABLE CARDIOVERTER-DEFIBRILLATOR (ICD): Primary | ICD-10-CM

## 2024-09-03 PROCEDURE — 99024 POSTOP FOLLOW-UP VISIT: CPT

## 2024-09-03 NOTE — PROGRESS NOTES
"Results for orders placed or performed in visit on 09/03/24   Cardiac EP device report    Narrative    MDT-DUAL CHAMBER \"HIS\" ICD/VVIR MODEACTIVE SYSTEM IS MRI CONDITIONAL  DEVICE INTERROGATED IN THE [place] OFFICE.  WOUND CHECK: INCISION CLEAN AND DRY WITH EDGES APPROXIMATED; WOUND CARE AND RESTRICTIONS REVIEWED WITH PATIENT (SEE PIC IN EPIC-MEDIA).  BATTERY VOLTAGE ADEQUATE  (8.3 YRS).  BVP 99.9%.  ALL LEAD PARAMETERS WITHIN NORMAL LIMITS, W/EXCEPTION OF A LEAD, PROGRAMMED OFF.  NO SIGNIFICANT HIGH RATE EPISODES. PT TAKING ELIQUIS, METOPROLOL SUCC.  OPTI-VOL WITHIN NORMAL LIMITS & INITIALIZING.  NO PROGRAMMING CHANGES MADE TO DEVICE PARAMETERS. BNORMAL DEVICE FUNCTION.  PAS/GV        "

## 2024-09-09 DIAGNOSIS — M54.41 ACUTE RIGHT-SIDED LOW BACK PAIN WITH RIGHT-SIDED SCIATICA: ICD-10-CM

## 2024-09-10 RX ORDER — TRAMADOL HYDROCHLORIDE 50 MG/1
50 TABLET ORAL 2 TIMES DAILY
Qty: 60 TABLET | Refills: 0 | Status: SHIPPED | OUTPATIENT
Start: 2024-09-10

## 2024-09-12 ENCOUNTER — TELEPHONE (OUTPATIENT)
Dept: CARDIOLOGY CLINIC | Facility: CLINIC | Age: 84
End: 2024-09-12

## 2024-09-12 NOTE — TELEPHONE ENCOUNTER
Phone call from patient having palpitations and lightheadedness since her new implant. I had her send in a remote. Per Nancy no arrhythmias at all and current rate <100 bpm. Next step would be to call PCP

## 2024-09-27 NOTE — PROGRESS NOTES
Heart Failure Outpatient Progress Note - Mayra Meadows 84 y.o. female MRN: [id number]    @ Encounter: [id number]      Assessment/Plan:    Patient Active Problem List    Diagnosis Date Noted    Left-sided chest wall pain 10/12/2022    WANG (acute kidney injury) ([facility]) 10/12/2022    Hypertensive heart and chronic kidney disease with heart failure and stage 1 through stage 4 chronic kidney disease, or unspecified chronic kidney disease ([facility]) 04/15/2021    Restless leg syndrome 01/27/2020    Debility 07/19/2019    Ambulatory dysfunction 07/12/2019    Type 2 diabetes mellitus with diabetic cataract ([facility]) 10/07/2015    Hyperlipidemia 08/15/2012    Hypertension 08/15/2012    Hypothyroidism 08/15/2012    Obesity (BMI 30-39.9) 08/15/2012    Osteoarthritis 08/15/2012    Biventricular implantable cardioverter-defibrillator (ICD) at end of device life 08/20/2024    Bilateral lower extremity edema 02/29/2024    Cellulitis of left lower extremity 02/27/2024    Acute pain of left knee 02/27/2024    Bronchitis 12/19/2023    Continuous opioid dependence ([facility]) 09/28/2023    Stage 3a chronic kidney disease (CKD) ([facility]) 09/28/2023    Paroxysmal atrial fibrillation ([facility]) 03/07/2022       # Chronic HFimpEF w/ partial recovery, Stage C, NYHA III  Etiology: NICM/tachy-mediated given h/o AF with RVR , +/- ischemia given patient's risk factors for CAD, strong family history, and septal Q waves on EKG with severe septal hypokinesis on echo, less likely viral, toxin induced or infiltrative. S/P cardiogenic shock in past  Now S/P AVJ ablation with BiV AICD implant with no high rate episodes.    Weight: 193 lbs  NT proBNP: 10/17/22: 4761  1/14/21: 716  6/2/20: 1089    Studies- personally reviewed by myself  Echo 3/8/23:  LVEF: 65%  RV: mildly dilated  LA moderately dilated  PASP: normal    Echo 11/20/20  LVEF: 60%  RV: mildly dilated, mildly reduced  PASP: 60 mmHg  RVOT: no notching    TTE completed on 03/16/2020 (not euvolemic): LVEF 30%.  LVIDd 5.44 cm. Moderately dilated RV with moderately to markedly reduced RVSF. NAN. Moderate MR. Mild to moderate TR. Dilated IVC.                Echocardiogram (limited) from 07/04/2019:  LVEF: 45%; mild diffuse hypokinesis.   LVIDd: 3.8 cm.  MR: No MR. Moderate annular calcification.  Other: Dilated LA.     Echocardiogram from 06/20/2019:  LVEF: 25%  LVIDd: 4.6 cm.  RV: Dilated and hypokinetic.  MR: Moderate.     Neurohormonal Blockade:  --Beta Blocker: metoprolol succinate 100 mg Q12  --ACEi, ARB or ARNi: Entresto 49/51 mg BID.   --SVR reduction: hydralazine 10 mg BID  --Aldosterone Receptor Blocker: Renal function precludes  --SGLT2i:   -- Diuretic : Bumex 4 mg once daily     Sudden Cardiac Death Risk Reduction:  Since been DC.   --ICD: MDT DC HIS ICD, gen change 8/20/24  Interrogation 9/12/24: BiV 100%, optivol normal  Interrogation 3/15/24: BVP 99.7%, no events, optivol normal  Interrogation 9/8/23: BVP 99.9%, optivol crossed     Electrical Resynchronization:  --Interrogation: Narrow QRS.      Advanced Therapies (if appropriate):  --Inotrope: N/A  --LVAD/Transplant Candidacy: Will continue to monitor.     # Pulmonary Hypertension, WHO group 2 from now HFpEF and likely significant CORAZON, untreated (Group 3)  Stressed polysomnogram, continued volume management    # PAF w/ hx AF with RVR. S/p AV node ablation with CRT-D with Dr. Finnegan on 03/18/2020.   Anticoagulation on Eliquis.   Rate: metoprolol succinate 100 mg Q12    # HTN.Controlled.      # HLD. On statin therapy.  5/18/22: , HDL 57     # Hypothyroidism. History of radioablation in the past now on replacement therapy.  TSH stable     # Type II DM.  2/27/24: HgA1C 6.8%     # CORAZON. Noncompliant with CPAP in the past .  Still has not complete sleep study, will not     # CKD.  Renal function stable last checked. ,  CR 0.93 on 11/25     # History of transaminitis in setting of cardiogenic shock     #  Abnormal EKG. Q waves noted in septal leads concerning  for possible old infarct. Lexiscan stress to eval for underlying ischemia still not completed.     # H/O tachy-oniel syndrome.      #  History of alcohol abuse.  Has been abstinent for years    TODAY'S PLAN:  BiV pacing at goal 100%  Indirect PA pressures looked good on 3/8 echo  Lipids need improvement with DM  Continue current diuretic for combined systolic and diastolic HF  I ordered sleep study as I do suspect hypoxia is contributing to her PH- she has yet to do and states will not do- did not do  Add BNP to next labs  --2g sodium diet  - Daily weights    HPI:      83 yo female who follows for chronic BiV heart failure, afib, HTN, HLD, CORAZON, DM2.   Back in June 2019 admitted with afib with RVR and decompensated heart failure. Had not had a cardiac hx. Echo showed EF: 25-30%, underwent EBEN/ CV but went back into afib. Started on amiodarone load. Did have junctional rhythm immediate post conversion and showing signs of tachy oniel syndrome. Given these findings, PPM with possible AICD was suggested with cardioversion thereafter.         On 6/25 patient was taken to the cath lab for planned dual chamber AICD but apparently became hypotensive and markedly SOB upon induction with propofol. She was subsequently transferred to the ICU for further management of her heart failure. She continued to be in AFib with RVR upon transfer. Through the night, patient's condition began to worsen and she became cool, clammy with N/V and loss of obtainable BP. Started on pressor support and lined. SVO2 at that point 32% with signs of lactic acidosis, WANG. Then started on milrinone gtt at 0.25 mcg/kg/min.      She was able to be weaned off inotropic support. She saw Dr Nelson in follow up and her afib rates were high so amio stopped and focused on rate control with increase in Toprol to 75 mg BID.         Ad[facility] from 03/11 to 03/24/2020 after presenting (as the recommendation of many of her providers) with bilateral  LE edema and weight gain. In ED was found to have NT-proBNP of 4600 and be in atrial fibrillation with RVR (rates in 150s). She was then started on IV diltiazem and received IV Lasix. Diltiazem was later stopped and amiodarone drip was started. Switched from IV Lasix to IV Bumex on 03/41. EP was consulted. TTE revealed LVEF 30% and decision was made to proceed with AV node ablation and CRT-D implantation once optimized.   HF team recommended her being discharged home on Bumex 4 mg BID with PRN metolazone; however, she was discharged home on PO Bumex 4 mg daily without PRN metolazone prescribed. Lost 18 lbs during this admission.     Was in hospital with acute on chronic HF, diuresed and discharged on Bumex 4 mg daily, Toprol  mg BID and Entresto 49/51 mg BID; decompensation felt to be due to holding diuretic and entresto, maybe MAP driven    Interval History:  ICD gen change on 8/20/24  Interrogation 9/12/24: BiV 100%, optivol normal  Retired from [employer]  Out of breath on exertion  Review of Systems   Constitutional:  Negative for activity change, appetite change, fatigue and unexpected weight change.   HENT:  Negative for congestion and nosebleeds.    Eyes: Negative.    Respiratory:  Negative for cough, chest tightness and shortness of breath.    Cardiovascular:  Negative for chest pain, palpitations and leg swelling.   Gastrointestinal:  Negative for abdominal distention.   Endocrine: Negative.    Genitourinary: Negative.    Musculoskeletal: Negative.    Skin: Negative.    Neurological:  Negative for dizziness, syncope and weakness.   Hematological: Negative.    Psychiatric/Behavioral: Negative.         Past Medical History:   Diagnosis Date    Allergic childhood    Arthritis do not know    Cancer (HCC) 1976    Cardiogenic shock ([facility]) 06/26/2019    CHF (congestive heart failure) ([facility])     Chronic kidney disease June 2019    COPD (chronic obstructive pulmonary disease) ([facility]) no    Coronary artery  disease 06/19    Depression no    Disease of thyroid gland 1976    Eczema     Heart murmur childhood    Hypertension do not knpw    Photosensitivity     abnormal skin sensitivity to sunlight    Traumatic open wound of left lower leg     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    Current Outpatient Medications:     albuterol (Ventolin HFA) 90 mcg/act inhaler, Inhale 2 puffs every 6 (six) hours as needed for wheezing, Disp: 18 g, Rfl: 5    apixaban (Eliquis) 5 mg, TAKE 1 TABLET (5 MG TOTAL) BY MOUTH 2 (TWO) TIMES A DAY, Disp: 60 tablet, Rfl: 5    Ascorbic Acid (vitamin C) 1000 MG tablet, Take 1,000 mg by mouth 2 (two) times a day , Disp: , Rfl:     bumetanide (BUMEX) 2 mg tablet, TAKE 2 TABLETS (4 MG TOTAL) BY MOUTH DAILY, Disp: 60 tablet, Rfl: 5    calcium carbonate (OS-PAULA) 600 MG tablet, Take 600 mg by mouth 2 (two) times a day with meals, Disp: , Rfl:     cholecalciferol (VITAMIN D3) 1,000 units tablet, Take 1,000 Units by mouth daily 8,000 dailty, Disp: , Rfl:     Entresto 49-51 MG TABS, TAKE 1 TABLET BY MOUTH 2 (TWO) TIMES A DAY, Disp: 60 tablet, Rfl: 11    hydrALAZINE (APRESOLINE) 10 mg tablet, TAKE 1 TABLET (10 MG TOTAL) BY MOUTH 2 (TWO) TIMES A DAY, Disp: 180 tablet, Rfl: 1    latanoprost (XALATAN) 0.005 % ophthalmic solution, , Disp: , Rfl:     levothyroxine 100 mcg tablet, TAKE 1 TABLET (100 MCG TOTAL) BY MOUTH DAILY, Disp: 90 tablet, Rfl: 1    Magnesium 400 MG CAPS, Take 1 capsule (400 mg total) by mouth daily, Disp: , Rfl: 0    metoprolol succinate (TOPROL-XL) 50 mg 24 hr tablet, TAKE 2 TABLETS (100 MG TOTAL) BY MOUTH EVERY 12 (TWELVE) HOURS, Disp: 120 tablet, Rfl: 5    Multiple Vitamin (MULTI-VITAMIN DAILY PO), 1 tablet 2 (two) times a day  , Disp: , Rfl:     Potassium Chloride ER 20 MEQ TBCR, Take 2 tablets (40 mEq  total) by mouth 2 (two) times a day, Disp: 120 tablet, Rfl: 5    rOPINIRole (REQUIP XL) 2 MG 24 hr tablet, TAKE 1 TABLET (2 MG TOTAL) BY MOUTH DAILY AT BEDTIME, Disp: 30 tablet, Rfl: 5    traMADol (ULTRAM) 50 mg tablet, TAKE 1 TABLET (50 MG TOTAL) BY MOUTH 2 (TWO) TIMES A DAY, Disp: 60 tablet, Rfl: 0    Turmeric (QC TUMERIC COMPLEX PO), Take by mouth 2 (two) times a day, Disp: , Rfl:     venlafaxine (EFFEXOR) 75 mg tablet, TAKE 1 TABLET (75 MG TOTAL) BY MOUTH EVERY 12 (TWELVE) HOURS, Disp: 60 tablet, Rfl: 5    Social History     Socioeconomic History    Marital status: Single     Spouse name: Not on file    Number of children: Not on file    Years of education: Not on file    Highest education level: Not on file   Occupational History    Not on file   Tobacco Use    Smoking status: Never     Passive exposure: Never    Smokeless tobacco: Never   Vaping Use    Vaping status: Never Used   Substance and Sexual Activity    Alcohol use: Not Currently     Comment: 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    Drug use: Never    Sexual activity: Not Currently   Other Topics Concern    Not on file   Social History Narrative    Not on file     Social Determinants of Health     Financial Resource Strain: Medium Risk (9/27/2023)    Overall Financial Resource Strain (CARDIA)     Difficulty of Paying Living Expenses: Somewhat hard   Food Insecurity: Not on file   Transportation Needs: No Transportation Needs (9/27/2023)    PRAPARE - Transportation     Lack of Transportation (Medical): No     Lack of Transportation (Non-Medical): No   Physical Activity: Not on file   Stress: Not on file   Social Connections: Not on file   Intimate Partner Violence: Not on file   Housing Stability: Not on file       Family History   Problem Relation Age of Onset    Alzheimer's disease Mother     Coronary artery disease Mother     Dementia Mother     Diabetes Mother         mellitus    Heart disease Mother     Other Father         acute  myocardial infarction    Heart disease Father     Coronary artery disease Sister     Other Maternal Grandfather         laryngeal cancer    Dementia Maternal Aunt     Diabetes Maternal Aunt     Heart disease Sister     Heart disease Brother        Physical Exam:    Vitals:   There were no vitals filed for this visit.    Physical Exam  Constitutional:       Appearance: She is well-developed.   HENT:      Head: Normocephalic and atraumatic.   Eyes:      Pupils: Pupils are equal, round, and reactive to light.   Neck:      Vascular: No JVD.   Cardiovascular:      Rate and Rhythm: Normal rate and regular rhythm.      Heart sounds: No murmur heard.  Pulmonary:      Effort: Pulmonary effort is normal. No respiratory distress.      Breath sounds: Normal breath sounds.   Abdominal:      General: There is no distension.      Palpations: Abdomen is soft.      Tenderness: There is no abdominal tenderness.   Musculoskeletal:         General: Normal range of motion.      Cervical back: Normal range of motion.   Skin:     General: Skin is warm and dry.      Findings: No rash.   Neurological:      Mental Status: She is alert and oriented to person, place, and time.       Labs & Results:    Lab Results   Component Value Date    SODIUM 140 08/13/2024    K 4.5 08/13/2024     08/13/2024    CO2 29 08/13/2024    BUN 19 08/13/2024    CREATININE 0.93 08/13/2024    GLUC 111 05/26/2024    CALCIUM 9.9 08/13/2024     Lab Results   Component Value Date    WBC 7.71 08/13/2024    HGB 12.6 08/13/2024    HCT 39.3 08/13/2024     (H) 08/13/2024     08/13/2024     Lab Results   Component Value Date    NTBNP 4,761 (H) 10/17/2022      Lab Results   Component Value Date    CHOLESTEROL 210 (H) 05/18/2022    CHOLESTEROL 112 03/12/2020    CHOLESTEROL 177 03/18/2019     Lab Results   Component Value Date    HDL 57 05/18/2022    HDL 38 (L) 03/12/2020    HDL 66 03/18/2019     Lab Results   Component Value Date    TRIG 142 05/18/2022    TRIG  48 03/12/2020    TRIG 82 03/18/2019     Lab Results   Component Value Date    NONHDLC 153 05/18/2022    NONHDLC 74 03/12/2020    NONHDLC 106 02/07/2017       EKG personally reviewed by Imer Bruce.     Counseling / Coordination of Care  Time spent today 25 minutes.  Greater than 50% of total time was spent with the patient and / or family counseling and / or coordination of care. We went over current diagnosis, most recent studies and any changes in treatment.    Thank you for the opportunity to participate in the care of this patient.    IMER BRUCE D.O.  DIRECTOR OF HEART FAILURE/ PULMONARY HYPERTENSION  MEDICAL DIRECTOR OF LVAD PROGRAM  [facility]

## 2024-10-07 ENCOUNTER — OFFICE VISIT (OUTPATIENT)
Dept: CARDIOLOGY CLINIC | Facility: CLINIC | Age: 84
End: 2024-10-07

## 2024-10-07 VITALS
SYSTOLIC BLOOD PRESSURE: 110 MMHG | HEART RATE: 83 BPM | HEIGHT: 61 IN | OXYGEN SATURATION: 98 % | BODY MASS INDEX: 36.99 KG/M2 | WEIGHT: 195.9 LBS | DIASTOLIC BLOOD PRESSURE: 62 MMHG

## 2024-10-07 DIAGNOSIS — I50.32 CHRONIC HEART FAILURE WITH PRESERVED EJECTION FRACTION (HCC): ICD-10-CM

## 2024-10-07 DIAGNOSIS — I10 PRIMARY HYPERTENSION: Primary | ICD-10-CM

## 2024-10-07 DIAGNOSIS — N18.31 STAGE 3A CHRONIC KIDNEY DISEASE (CKD) (HCC): ICD-10-CM

## 2024-10-07 DIAGNOSIS — I48.0 PAROXYSMAL ATRIAL FIBRILLATION (HCC): ICD-10-CM

## 2024-10-07 DIAGNOSIS — E66.01 OBESITY, MORBID (HCC): ICD-10-CM

## 2024-10-07 DIAGNOSIS — I13.0 HYPERTENSIVE HEART AND CHRONIC KIDNEY DISEASE WITH HEART FAILURE AND STAGE 1 THROUGH STAGE 4 CHRONIC KIDNEY DISEASE, OR UNSPECIFIED CHRONIC KIDNEY DISEASE (HCC): ICD-10-CM

## 2024-10-07 PROCEDURE — 99024 POSTOP FOLLOW-UP VISIT: CPT | Performed by: INTERNAL MEDICINE

## 2024-10-07 RX ORDER — VITAMIN B COMPLEX
1 CAPSULE ORAL DAILY
COMMUNITY

## 2024-10-10 ENCOUNTER — OFFICE VISIT (OUTPATIENT)
Dept: FAMILY MEDICINE CLINIC | Facility: CLINIC | Age: 84
End: 2024-10-10
Payer: COMMERCIAL

## 2024-10-10 VITALS
SYSTOLIC BLOOD PRESSURE: 132 MMHG | HEIGHT: 61 IN | WEIGHT: 196 LBS | TEMPERATURE: 96.8 F | OXYGEN SATURATION: 98 % | BODY MASS INDEX: 37 KG/M2 | DIASTOLIC BLOOD PRESSURE: 80 MMHG | HEART RATE: 92 BPM | RESPIRATION RATE: 18 BRPM

## 2024-10-10 DIAGNOSIS — Z00.00 ANNUAL WELLNESS VISIT: ICD-10-CM

## 2024-10-10 DIAGNOSIS — I13.0 HYPERTENSIVE HEART AND CHRONIC KIDNEY DISEASE WITH HEART FAILURE AND STAGE 1 THROUGH STAGE 4 CHRONIC KIDNEY DISEASE, OR UNSPECIFIED CHRONIC KIDNEY DISEASE (HCC): ICD-10-CM

## 2024-10-10 DIAGNOSIS — R60.0 BILATERAL LOWER EXTREMITY EDEMA: ICD-10-CM

## 2024-10-10 DIAGNOSIS — N18.31 STAGE 3A CHRONIC KIDNEY DISEASE (CKD) (HCC): ICD-10-CM

## 2024-10-10 DIAGNOSIS — E03.9 HYPOTHYROIDISM, UNSPECIFIED TYPE: ICD-10-CM

## 2024-10-10 DIAGNOSIS — E11.36 TYPE 2 DIABETES MELLITUS WITH DIABETIC CATARACT, WITHOUT LONG-TERM CURRENT USE OF INSULIN (HCC): Primary | ICD-10-CM

## 2024-10-10 DIAGNOSIS — I10 PRIMARY HYPERTENSION: ICD-10-CM

## 2024-10-10 DIAGNOSIS — F11.20 CONTINUOUS OPIOID DEPENDENCE (HCC): ICD-10-CM

## 2024-10-10 DIAGNOSIS — E78.00 PURE HYPERCHOLESTEROLEMIA: ICD-10-CM

## 2024-10-10 DIAGNOSIS — I48.0 PAROXYSMAL ATRIAL FIBRILLATION (HCC): ICD-10-CM

## 2024-10-10 DIAGNOSIS — Z78.0 POSTMENOPAUSAL: ICD-10-CM

## 2024-10-10 LAB — SL AMB POCT HEMOGLOBIN AIC: 6.4 (ref ?–6.5)

## 2024-10-10 PROCEDURE — 83036 HEMOGLOBIN GLYCOSYLATED A1C: CPT | Performed by: NURSE PRACTITIONER

## 2024-10-10 PROCEDURE — 99214 OFFICE O/P EST MOD 30 MIN: CPT | Performed by: NURSE PRACTITIONER

## 2024-10-10 PROCEDURE — G0439 PPPS, SUBSEQ VISIT: HCPCS | Performed by: NURSE PRACTITIONER

## 2024-10-10 NOTE — PATIENT INSTRUCTIONS

## 2024-10-10 NOTE — ASSESSMENT & PLAN NOTE
Lab Results   Component Value Date    EGFR 57 08/13/2024    EGFR 56 05/26/2024    EGFR 57 11/25/2023    CREATININE 0.93 08/13/2024    CREATININE 0.94 05/26/2024    CREATININE 0.93 11/25/2023   Stable, stay well hydrated and avoid nephrotoxic agents

## 2024-10-10 NOTE — PROGRESS NOTES
Ambulatory Visit  Name: Myara Meadows      : 1940      MRN: [id number]  Encounter Provider: SOTO Ring  Encounter Date: 10/10/2024   Encounter department: [facility]  Chief Complaint   Patient presents with    Medicare Wellness Visit     Subsequent Visit      Health Maintenance   Topic Date Due    Pneumococcal Vaccine: 65+ Years (1 of 2 - PCV) Never done    Osteoporosis Screening  Never done    Zoster Vaccine (1 of 2) Never done    RSV Vaccine Age 60+ Years (1 - 1-dose 60+ series) Never done    PT PLAN OF CARE  2020    Influenza Vaccine (1) Never done    COVID-19 Vaccine (2023- season) 2024    Medicare Annual Wellness Visit (AWV)  2024    DM Eye Exam  2042 (Originally 2021)    Diabetic Foot Exam  2042 (Originally 2023)    Kidney Health Evaluation: Albumin/Creatinine Ratio  2025    HEMOGLOBIN A1C  04/10/2025    Kidney Health Evaluation: GFR  2025    Fall Risk  10/10/2025    Depression Screening  10/10/2025    Urinary Incontinence Screening  10/10/2025    RSV Vaccine age 0-20 Months  Aged Out    HIB Vaccine  Aged Out    IPV Vaccine  Aged Out    Hepatitis A Vaccine  Aged Out    Meningococcal ACWY Vaccine  Aged Out    HPV Vaccine  Aged Out     Assessment & Plan  Type 2 diabetes mellitus with diabetic cataract, without long-term current use of insulin (HCC)    Lab Results   Component Value Date    HGBA1C 6.4 10/10/2024   Stable overall  Continue with dietary modifications (limit the chocolate milk and ice cream!!)    Orders:    POCT hemoglobin A1c    Annual wellness visit         Stage 3a chronic kidney disease (CKD) ([facility])  Lab Results   Component Value Date    EGFR 57 2024    EGFR 56 2024    EGFR 57 2023    CREATININE 0.93 2024    CREATININE 0.94 2024    CREATININE 0.93 2023   Stable, stay well hydrated and avoid nephrotoxic agents          Paroxysmal atrial fibrillation  (HCC)  Stable, continue Metoprolol and Eliquis  ICD is in place, new battery just placed 8/2024  Managed by Cardiology           Hypertensive heart and chronic kidney disease with heart failure and stage 1 through stage 4 chronic kidney disease, or unspecified chronic kidney disease (HCC)  Wt Readings from Last 3 Encounters:   10/10/24 88.9 kg (196 lb)   10/07/24 88.9 kg (195 lb 14.4 oz)   08/20/24 88.5 kg (195 lb)     Stable and managed by Cardiology   Continue current medication regimen  Follow a low sodium diet                   Continuous opioid dependence (HCC)  Stable, PDMP site reviewed and is appropriate   Side effects of Tramadol reviewed          Bilateral lower extremity edema  Continue Bumex  Compression stockings ordered and we did measure her for this today   Elevate legs when possible  Follow a low sodium diet (which could definitely be better for her)    Orders:    Compression Stocking    Pure hypercholesterolemia  Pt declines statin therapy  Lifestyle modifications encouraged     Orders:    Lipid panel; Future    Hypothyroidism, unspecified type  Updated TSH ordered today  Continue Levothyroxine 100 mcg daily     Orders:    TSH, 3rd generation with Free T4 reflex; Future    Primary hypertension  BP is well controlled  Continue Hydralazine and Metoprolol per Cardiology   Also on Bumex          Postmenopausal           Depression Screening and Follow-up Plan: Patient was screened for depression during today's encounter. They screened negative with a PHQ-2 score of 0.    Urinary Incontinence Plan of Care: counseling topics discussed: limit alcohol, caffeine, spicy foods, and acidic foods, limiting fluid intake 3-4 hours before bed, preventing constipation, improving blood sugar control and wearing SHEELA stockings for edema control.       Preventive health issues were discussed with patient, and age appropriate screening tests were ordered as noted in patient's After Visit Summary. Personalized health  advice and appropriate referrals for health education or preventive services given if needed, as noted in patient's After Visit Summary.    History of Present Illness     HPI     Madelin presents today for an AWV and routine follow up  Doing well overall, now officially retired  She helps out significantly with her family which keeps her busy    She had a Cardiology follow up 10/7/24 with Dr. Martin for PAF, HTN, HLD.  She did have her ICD battery changed recently on 8/20/24.  She is compliant with her medications.  She does not continued LE edema B/L.  This has improved with better compliance of Bumex.  She notes the compression stockings were not covered by her insurance but is asking if I can reorder these to try again.  Denies SOB, wheezing, CP, palpitations, dizziness, orthopnea     CKD stage 3A- kidney function has been stable for the past 2 years or so     Patient Care Team:  SOTO Ring as PCP - General (Family Medicine)  SOTO Ring as PCP - PCP-[facility] Attributed-Roster    Review of Systems   Constitutional:  Negative for activity change, appetite change, chills, diaphoresis, fatigue, fever and unexpected weight change.   Eyes:  Negative for visual disturbance.   Respiratory:  Negative for cough, chest tightness, shortness of breath and wheezing.    Cardiovascular:  Positive for leg swelling. Negative for chest pain and palpitations.   Gastrointestinal:  Negative for abdominal pain, blood in stool, constipation, diarrhea and nausea.   Genitourinary:  Negative for dysuria.   Musculoskeletal:  Positive for arthralgias. Negative for myalgias.   Skin:  Negative for rash and wound.   Neurological:  Negative for dizziness, weakness, numbness and headaches.   Psychiatric/Behavioral:  Negative for dysphoric mood, self-injury, sleep disturbance and suicidal ideas. The patient is not nervous/anxious.      Medical History Reviewed by provider this encounter:       Annual Wellness  Visit Questionnaire   Last Medicare Wellness visit information reviewed, patient interviewed, no change since last AWV.     Health Risk Assessment:   Patient rates overall health as good. Patient feels that their physical health rating is same. Patient is satisfied with their life. Eyesight was rated as slightly worse. Hearing was rated as same. Patient feels that their emotional and mental health rating is slightly better. Patients states they are sometimes angry. Patient states they are often unusually tired/fatigued. Pain experienced in the last 7 days has been none. Patient states that she has experienced no weight loss or gain in last 6 months.     Depression Screening:   PHQ-2 Score: 0      Fall Risk Screening:   In the past year, patient has experienced: history of falling in past year      Urinary Incontinence Screening:   Patient has leaked urine accidently in the last six months.     Home Safety:  Patient does not have trouble with stairs inside or outside of their home. Patient has working smoke alarms and has working carbon monoxide detector. Home safety hazards include: none.     Nutrition:   Current diet is No Added Salt and Limited junk food.     Medications:   Patient is currently taking over-the-counter supplements. OTC medications include: Vitamins. Patient is able to manage medications.     Activities of Daily Living (ADLs)/Instrumental Activities of Daily Living (IADLs):   Walk and transfer into and out of bed and chair?: Yes  Dress and groom yourself?: Yes    Bathe or shower yourself?: Yes    Feed yourself? Yes  Do your laundry/housekeeping?: Yes  Manage your money, pay your bills and track your expenses?: Yes  Make your own meals?: Yes    Do your own shopping?: Yes    Previous Hospitalizations:   Any hospitalizations or ED visits within the last 12 months?: Yes    How many hospitalizations have you had in the last year?: 1-2    Advance Care Planning:   Living will: Yes    Durable POA for  healthcare: Yes    Advanced directive: Yes      Cognitive Screening:   Provider or family/friend/caregiver concerned regarding cognition?: No    PREVENTIVE SCREENINGS      Cardiovascular Screening:    General: Risks and Benefits Discussed    Due for: Lipid Panel      Diabetes Screening:     General: Risks and Benefits Discussed    Due for: Blood Glucose      Colorectal Cancer Screening:     General: Risks and Benefits Discussed and Screening Not Indicated      Breast Cancer Screening:     General: Risks and Benefits Discussed and Patient Declines      Cervical Cancer Screening:    General: Screening Not Indicated and Risks and Benefits Discussed      Osteoporosis Screening:    General: Risks and Benefits Discussed and Patient Declines      Abdominal Aortic Aneurysm (AAA) Screening:        General: Screening Not Indicated and Risks and Benefits Discussed      Lung Cancer Screening:     General: Screening Not Indicated      Hepatitis C Screening:    General: Screening Not Indicated    Screening, Brief Intervention, and Referral to Treatment (SBIRT)    Screening  Typical number of drinks in a day: 0  Typical number of drinks in a week: 0  Interpretation: Low risk drinking behavior.    AUDIT-C Screenin) How often did you have a drink containing alcohol in the past year? never  2) How many drinks did you have on a typical day when you were drinking in the past year? 0  3) How often did you have 6 or more drinks on one occasion in the past year? never    AUDIT-C Score: 0  Interpretation: Score 0-2 (female): Negative screen for alcohol misuse    Single Item Drug Screening:  How often have you used an illegal drug (including marijuana) or a prescription medication for non-medical reasons in the past year? never    Single Item Drug Screen Score: 0  Interpretation: Negative screen for possible drug use disorder    Social Determinants of Health     Financial Resource Strain: Medium Risk (2023)    Overall Financial  "Resource Strain (CARDIA)     Difficulty of Paying Living Expenses: Somewhat hard   Food Insecurity: No Food Insecurity (10/10/2024)    Hunger Vital Sign     Worried About Running Out of Food in the Last Year: Never true     Ran Out of Food in the Last Year: Never true   Transportation Needs: No Transportation Needs (10/10/2024)    PRAPARE - Transportation     Lack of Transportation (Medical): No     Lack of Transportation (Non-Medical): No   Housing Stability: Low Risk  (10/10/2024)    Housing Stability Vital Sign     Unable to Pay for Housing in the Last Year: No     Number of Times Moved in the Last Year: 0     Homeless in the Last Year: No   Utilities: Not At Risk (10/10/2024)    [facility] Utilities     Threatened with loss of utilities: No     No results found.    Objective     /80   Pulse 92   Temp (!) 96.8 °F (36 °C) (Tympanic)   Resp 18   Ht 5' 1\" (1.549 m)   Wt 88.9 kg (196 lb)   SpO2 98%   BMI 37.03 kg/m²     Physical Exam  Constitutional:       General: She is not in acute distress.     Appearance: She is well-developed. She is obese. She is not ill-appearing, toxic-appearing or diaphoretic.   HENT:      Head: Normocephalic and atraumatic.   Eyes:      Extraocular Movements: Extraocular movements intact.      Conjunctiva/sclera: Conjunctivae normal.      Pupils: Pupils are equal, round, and reactive to light.   Neck:      Thyroid: No thyromegaly.   Cardiovascular:      Rate and Rhythm: Normal rate and regular rhythm.      Heart sounds: Normal heart sounds. No murmur heard.  Pulmonary:      Effort: Pulmonary effort is normal. No respiratory distress.      Breath sounds: Normal breath sounds. No wheezing.   Abdominal:      General: Bowel sounds are normal. There is no distension.      Palpations: Abdomen is soft.      Tenderness: There is no abdominal tenderness.   Musculoskeletal:         General: Normal range of motion.      Cervical back: Normal range of motion and neck supple. No rigidity or " tenderness.      Right lower le+ Edema present.      Left lower le+ Edema present.   Lymphadenopathy:      Cervical: No cervical adenopathy.   Skin:     General: Skin is warm and dry.   Neurological:      General: No focal deficit present.      Mental Status: She is alert and oriented to person, place, and time.   Psychiatric:         Mood and Affect: Mood normal.         Behavior: Behavior normal.         Thought Content: Thought content normal.         Judgment: Judgment normal.

## 2024-10-10 NOTE — ASSESSMENT & PLAN NOTE
Continue Bumex  Compression stockings ordered and we did measure her for this today   Elevate legs when possible  Follow a low sodium diet (which could definitely be better for her)    Orders:    Compression Stocking

## 2024-10-10 NOTE — ASSESSMENT & PLAN NOTE
Stable, continue Metoprolol and Eliquis  ICD is in place, new battery just placed 8/2024  Managed by Cardiology

## 2024-10-10 NOTE — ASSESSMENT & PLAN NOTE
Pt declines statin therapy  Lifestyle modifications encouraged     Orders:    Lipid panel; Future

## 2024-10-10 NOTE — ASSESSMENT & PLAN NOTE
Lab Results   Component Value Date    HGBA1C 6.4 10/10/2024   Stable overall  Continue with dietary modifications (limit the chocolate milk and ice cream!!)    Orders:    POCT hemoglobin A1c     No

## 2024-10-10 NOTE — ASSESSMENT & PLAN NOTE
Wt Readings from Last 3 Encounters:   10/10/24 88.9 kg (196 lb)   10/07/24 88.9 kg (195 lb 14.4 oz)   08/20/24 88.5 kg (195 lb)     Stable and managed by Cardiology   Continue current medication regimen  Follow a low sodium diet

## 2024-10-10 NOTE — ASSESSMENT & PLAN NOTE
Updated TSH ordered today  Continue Levothyroxine 100 mcg daily     Orders:    TSH, 3rd generation with Free T4 reflex; Future

## 2024-10-23 DIAGNOSIS — F41.8 DEPRESSION WITH ANXIETY: ICD-10-CM

## 2024-10-24 RX ORDER — VENLAFAXINE 75 MG/1
75 TABLET ORAL EVERY 12 HOURS
Qty: 60 TABLET | Refills: 5 | Status: SHIPPED | OUTPATIENT
Start: 2024-10-24 | End: 2025-04-22

## 2024-11-05 ENCOUNTER — TELEPHONE (OUTPATIENT)
Dept: FAMILY MEDICINE CLINIC | Facility: CLINIC | Age: 84
End: 2024-11-05

## 2024-11-05 LAB
DME PARACHUTE DELIVERY DATE REQUESTED: NORMAL
DME PARACHUTE ITEM DESCRIPTION: NORMAL
DME PARACHUTE ORDER STATUS: NORMAL
DME PARACHUTE SUPPLIER NAME: NORMAL
DME PARACHUTE SUPPLIER PHONE: NORMAL

## 2024-11-05 NOTE — TELEPHONE ENCOUNTER
Spoke to patient, wanted to see if she was given script to get compression socks. Order was never submitted to DME, Order submitted through parachute to [employer] [facility] today. Made patient aware will let her know when we can an answer from [employer] [facility]

## 2024-11-11 ENCOUNTER — OFFICE VISIT (OUTPATIENT)
Dept: FAMILY MEDICINE CLINIC | Facility: CLINIC | Age: 84
End: 2024-11-11
Payer: COMMERCIAL

## 2024-11-11 VITALS
SYSTOLIC BLOOD PRESSURE: 130 MMHG | WEIGHT: 200.8 LBS | BODY MASS INDEX: 37.91 KG/M2 | HEIGHT: 61 IN | RESPIRATION RATE: 18 BRPM | OXYGEN SATURATION: 99 % | DIASTOLIC BLOOD PRESSURE: 74 MMHG | HEART RATE: 86 BPM | TEMPERATURE: 97.8 F

## 2024-11-11 DIAGNOSIS — B34.9 VIRAL ILLNESS: Primary | ICD-10-CM

## 2024-11-11 LAB
SARS-COV-2 AG UPPER RESP QL IA: NEGATIVE
VALID CONTROL: NORMAL

## 2024-11-11 PROCEDURE — 87811 SARS-COV-2 COVID19 W/OPTIC: CPT | Performed by: NURSE PRACTITIONER

## 2024-11-11 PROCEDURE — 99214 OFFICE O/P EST MOD 30 MIN: CPT | Performed by: NURSE PRACTITIONER

## 2024-11-11 PROCEDURE — G2211 COMPLEX E/M VISIT ADD ON: HCPCS | Performed by: NURSE PRACTITIONER

## 2024-11-11 RX ORDER — DEXTROMETHORPHAN HYDROBROMIDE AND PROMETHAZINE HYDROCHLORIDE 15; 6.25 MG/5ML; MG/5ML
2.5 SYRUP ORAL
Qty: 118 ML | Refills: 0 | Status: SHIPPED | OUTPATIENT
Start: 2024-11-11

## 2024-11-11 NOTE — PROGRESS NOTES
Ambulatory Visit  Name: Mayra Meadows      : 1940      MRN: [id number]  Encounter Provider: SOTO Patel  Encounter Date: 2024   Encounter department: [facility]    Assessment & Plan  Viral illness  Patient with a 2 to 3-day history of cough, congestion, rhinorrhea and sore throat.  She denies any fevers.  COVID-negative in the office today.  She has been using over-the-counter cough medications for her symptoms.  She does have an inhaler at home but she has not been using it.  Her exam was normal in the office today.  Of note the patient does have a left eye subconjunctival hemorrhage due to coughing.  I did recommend the patient continue with over-the-counter cough medications and make sure that they are safe to take for patients with high blood pressure such as Coricidin products.  In addition Phenergan DM was sent to her pharmacy for nighttime.  She has taken this in the past without issues.  Orders:    POCT Rapid Covid Ag    promethazine-dextromethorphan (PHENERGAN-DM) 6.25-15 mg/5 mL oral syrup; Take 2.5 mL by mouth daily at bedtime as needed for cough (cough)       History of Present Illness     The patient with a 2 to 3-day history of slight sore throat, rhinorrhea and cough.  No fevers.  Patient also has a left subconjunctival hemorrhage due to coughing.  Her cough is nonproductive.  She has been taking over-the-counter medications for her cough.          History obtained from : patient  Review of Systems   Constitutional:  Negative for activity change, fatigue and fever.   HENT:  Positive for congestion, rhinorrhea and sore throat. Negative for hearing loss, trouble swallowing and voice change.    Eyes:  Positive for redness. Negative for photophobia, pain, discharge and visual disturbance.   Respiratory:  Positive for cough. Negative for chest tightness and shortness of breath.    Cardiovascular:  Negative for chest pain, palpitations and leg swelling.    Gastrointestinal:  Negative for abdominal pain, blood in stool, constipation, nausea and vomiting.   Endocrine: Negative for cold intolerance and heat intolerance.   Genitourinary:  Negative for difficulty urinating, frequency, hematuria, urgency, vaginal bleeding and vaginal discharge.   Musculoskeletal:  Negative for arthralgias and myalgias.   Skin: Negative.    Neurological:  Negative for dizziness, weakness, numbness and headaches.   Psychiatric/Behavioral:  Negative for decreased concentration. The patient is not nervous/anxious.      Current Outpatient Medications on File Prior to Visit   Medication Sig Dispense Refill    apixaban (Eliquis) 5 mg TAKE 1 TABLET (5 MG TOTAL) BY MOUTH 2 (TWO) TIMES A DAY 60 tablet 5    Ascorbic Acid (vitamin C) 1000 MG tablet Take 1,000 mg by mouth 2 (two) times a day       b complex vitamins capsule Take 1 capsule by mouth daily      bumetanide (BUMEX) 2 mg tablet TAKE 2 TABLETS (4 MG TOTAL) BY MOUTH DAILY 60 tablet 5    calcium carbonate (OS-PAULA) 600 MG tablet Take 600 mg by mouth 2 (two) times a day with meals      cholecalciferol (VITAMIN D3) 1,000 units tablet Take 4,000 Units by mouth daily      Entresto 49-51 MG TABS TAKE 1 TABLET BY MOUTH 2 (TWO) TIMES A DAY 60 tablet 11    hydrALAZINE (APRESOLINE) 10 mg tablet TAKE 1 TABLET (10 MG TOTAL) BY MOUTH 2 (TWO) TIMES A  tablet 1    levothyroxine 100 mcg tablet TAKE 1 TABLET (100 MCG TOTAL) BY MOUTH DAILY 90 tablet 1    Magnesium 400 MG CAPS Take 1 capsule (400 mg total) by mouth daily (Patient taking differently: Take 400 mg by mouth 2 (two) times a day)  0    metoprolol succinate (TOPROL-XL) 50 mg 24 hr tablet TAKE 2 TABLETS (100 MG TOTAL) BY MOUTH EVERY 12 (TWELVE) HOURS 120 tablet 5    Multiple Vitamin (MULTI-VITAMIN DAILY PO) 1 tablet 2 (two) times a day        Potassium Chloride ER 20 MEQ TBCR Take 2 tablets (40 mEq total) by mouth 2 (two) times a day 120 tablet 5    rOPINIRole (REQUIP XL) 2 MG 24 hr tablet TAKE 1  "TABLET (2 MG TOTAL) BY MOUTH DAILY AT BEDTIME 30 tablet 5    traMADol (ULTRAM) 50 mg tablet TAKE 1 TABLET (50 MG TOTAL) BY MOUTH 2 (TWO) TIMES A DAY (Patient taking differently: Take 50 mg by mouth daily at bedtime) 60 tablet 0    Turmeric (QC TUMERIC COMPLEX PO) Take by mouth 2 (two) times a day      venlafaxine (EFFEXOR) 75 mg tablet TAKE 1 TABLET (75 MG TOTAL) BY MOUTH EVERY 12 (TWELVE) HOURS 60 tablet 5    [DISCONTINUED] albuterol (Ventolin HFA) 90 mcg/act inhaler Inhale 2 puffs every 6 (six) hours as needed for wheezing (Patient not taking: Reported on 10/7/2024) 18 g 5    [DISCONTINUED] potassium chloride (K-DUR,KLOR-CON) 20 mEq tablet TAKE 2 TABLETS (40 MEQ TOTAL) BY MOUTH 2 (TWO) TIMES A  tablet 5     No current facility-administered medications on file prior to visit.      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   Constitutional:       Appearance: Normal appearance. She is obese.   HENT:      Head: Normocephalic.      Right Ear: Tympanic membrane, ear canal and external ear normal.      Left Ear: Tympanic membrane, ear canal and external ear normal.      Nose: Congestion and rhinorrhea present.      Mouth/Throat:      Mouth: Mucous membranes are moist.      Pharynx: Oropharynx is clear. No posterior oropharyngeal erythema.   Eyes:      Extraocular Movements: Extraocular movements intact.      Conjunctiva/sclera:      Left eye: Hemorrhage present.      Pupils: Pupils are equal, round, and reactive to " light.   Cardiovascular:      Rate and Rhythm: Normal rate and regular rhythm.      Pulses: Normal pulses.      Heart sounds: Normal heart sounds.   Pulmonary:      Effort: Pulmonary effort is normal. No respiratory distress.      Breath sounds: Normal breath sounds. No stridor. No wheezing, rhonchi or rales.   Musculoskeletal:         General: Normal range of motion.   Skin:     General: Skin is warm and dry.   Neurological:      General: No focal deficit present.      Mental Status: She is alert and oriented to person, place, and time.   Psychiatric:         Mood and Affect: Mood normal.         Behavior: Behavior normal.         Thought Content: Thought content normal.         Judgment: Judgment normal.

## 2024-11-12 DIAGNOSIS — M54.41 ACUTE RIGHT-SIDED LOW BACK PAIN WITH RIGHT-SIDED SCIATICA: ICD-10-CM

## 2024-11-12 RX ORDER — TRAMADOL HYDROCHLORIDE 50 MG/1
50 TABLET ORAL 2 TIMES DAILY
Qty: 60 TABLET | Refills: 0 | Status: SHIPPED | OUTPATIENT
Start: 2024-11-12

## 2024-11-16 DIAGNOSIS — I50.42 CHRONIC COMBINED SYSTOLIC (CONGESTIVE) AND DIASTOLIC (CONGESTIVE) HEART FAILURE (HCC): ICD-10-CM

## 2024-11-18 RX ORDER — SACUBITRIL AND VALSARTAN 49; 51 MG/1; MG/1
1 TABLET, FILM COATED ORAL 2 TIMES DAILY
Qty: 60 TABLET | Refills: 2 | Status: SHIPPED | OUTPATIENT
Start: 2024-11-18

## 2024-11-19 ENCOUNTER — TELEPHONE (OUTPATIENT)
Age: 84
End: 2024-11-19

## 2024-11-19 NOTE — TELEPHONE ENCOUNTER
Patient call about having head congestion in her head and would like to see if she can have something else for it. Patient did inform me her cough is much better and her ear feel like the clog due to head congestion. Please feel free to reach out to the patient thank you.

## 2024-11-20 DIAGNOSIS — J01.10 ACUTE NON-RECURRENT FRONTAL SINUSITIS: Primary | ICD-10-CM

## 2024-11-20 RX ORDER — AZITHROMYCIN 250 MG/1
TABLET, FILM COATED ORAL
Qty: 6 TABLET | Refills: 0 | Status: SHIPPED | OUTPATIENT
Start: 2024-11-20 | End: 2024-11-25

## 2024-11-20 NOTE — TELEPHONE ENCOUNTER
Pt called back to check status of something being called in for her head congestion. She is feeling better but her head is so clogged.    Please advise patient        [facility] [place] [facility] [phone]

## 2024-12-02 DIAGNOSIS — I50.9 CHF (CONGESTIVE HEART FAILURE) (HCC): ICD-10-CM

## 2024-12-02 DIAGNOSIS — I50.22 CHRONIC HFREF (HEART FAILURE WITH REDUCED EJECTION FRACTION) (HCC): ICD-10-CM

## 2024-12-03 RX ORDER — POTASSIUM CHLORIDE 1500 MG/1
40 TABLET, EXTENDED RELEASE ORAL 2 TIMES DAILY
Qty: 120 TABLET | Refills: 5 | Status: SHIPPED | OUTPATIENT
Start: 2024-12-03

## 2024-12-05 ENCOUNTER — RESULTS FOLLOW-UP (OUTPATIENT)
Dept: CARDIOLOGY CLINIC | Facility: CLINIC | Age: 84
End: 2024-12-05

## 2024-12-05 ENCOUNTER — IN-CLINIC DEVICE VISIT (OUTPATIENT)
Dept: CARDIOLOGY CLINIC | Facility: CLINIC | Age: 84
End: 2024-12-05
Payer: COMMERCIAL

## 2024-12-05 DIAGNOSIS — Z95.810 PRESENCE OF IMPLANTABLE CARDIOVERTER-DEFIBRILLATOR (ICD): ICD-10-CM

## 2024-12-05 DIAGNOSIS — I48.0 PAROXYSMAL ATRIAL FIBRILLATION (HCC): Primary | ICD-10-CM

## 2024-12-05 PROCEDURE — 93283 PRGRMG EVAL IMPLANTABLE DFB: CPT | Performed by: INTERNAL MEDICINE

## 2024-12-05 NOTE — PROGRESS NOTES
"Results for orders placed or performed in visit on 12/05/24   Cardiac EP device report    Narrative    MDT-DUAL CHAMBER \"HIS\" ICD/VVIR MODEACTIVE SYSTEM IS MRI CONDITIONAL  DEVICE INTERROGATED IN THE [place] OFFICE.  BATTERY VOLTAGE ADEQUATE (9.2 YRS).   99.7% (VSRP 0.3%).  ALL LEAD PARAMETERS WITHIN NORMAL LIMITS.  3 DEVICE CLASSIFIED VT NS EPISODES, EGM'S SHOW NSVT, 12 BEATS @ 1676 BPM, 7 @ 185 BPM, 8 @ 157 BPM.  PT ASYMPTOMATIC.  PT TAKING ELIQUIS, METOPROLOL SUCC.  OPTI-VOL WITHIN NORMAL LIMITS.  PT C/O SOME DYNSPNEA & FATIGUE, SO RE-PROGRAMMED ACTIVITY THRESHOLD TO LOW, & ACTIVITY ACCELERATION TO 15 SECS.  NORMAL DEVICE FUNCTION.  PAS        "

## 2024-12-16 DIAGNOSIS — I48.91 ATRIAL FIBRILLATION WITH RAPID VENTRICULAR RESPONSE (HCC): ICD-10-CM

## 2024-12-16 DIAGNOSIS — I50.9 CHF (CONGESTIVE HEART FAILURE) (HCC): ICD-10-CM

## 2024-12-16 DIAGNOSIS — I10 ESSENTIAL HYPERTENSION: ICD-10-CM

## 2024-12-16 DIAGNOSIS — I50.22 CHRONIC HFREF (HEART FAILURE WITH REDUCED EJECTION FRACTION) (HCC): ICD-10-CM

## 2024-12-16 DIAGNOSIS — E03.9 HYPOTHYROIDISM, UNSPECIFIED TYPE: ICD-10-CM

## 2024-12-17 RX ORDER — HYDRALAZINE HYDROCHLORIDE 10 MG/1
10 TABLET, FILM COATED ORAL 2 TIMES DAILY
Qty: 60 TABLET | Refills: 5 | Status: SHIPPED | OUTPATIENT
Start: 2024-12-17

## 2024-12-17 RX ORDER — LEVOTHYROXINE SODIUM 100 UG/1
100 TABLET ORAL DAILY
Qty: 30 TABLET | Refills: 5 | Status: SHIPPED | OUTPATIENT
Start: 2024-12-17

## 2024-12-17 RX ORDER — APIXABAN 5 MG/1
5 TABLET, FILM COATED ORAL 2 TIMES DAILY
Qty: 60 TABLET | Refills: 5 | Status: SHIPPED | OUTPATIENT
Start: 2024-12-17

## 2024-12-30 DIAGNOSIS — M54.41 ACUTE RIGHT-SIDED LOW BACK PAIN WITH RIGHT-SIDED SCIATICA: ICD-10-CM

## 2025-01-02 RX ORDER — TRAMADOL HYDROCHLORIDE 50 MG/1
50 TABLET ORAL 2 TIMES DAILY
Qty: 60 TABLET | Refills: 0 | Status: SHIPPED | OUTPATIENT
Start: 2025-01-02

## 2025-01-07 DIAGNOSIS — I50.9 CHF (CONGESTIVE HEART FAILURE) (HCC): ICD-10-CM

## 2025-01-07 DIAGNOSIS — I50.22 CHRONIC HFREF (HEART FAILURE WITH REDUCED EJECTION FRACTION) (HCC): ICD-10-CM

## 2025-01-08 RX ORDER — BUMETANIDE 2 MG/1
4 TABLET ORAL DAILY
Qty: 60 TABLET | Refills: 5 | Status: SHIPPED | OUTPATIENT
Start: 2025-01-08

## 2025-01-25 DIAGNOSIS — I50.22 CHRONIC HFREF (HEART FAILURE WITH REDUCED EJECTION FRACTION) (HCC): ICD-10-CM

## 2025-01-25 DIAGNOSIS — I48.91 ATRIAL FIBRILLATION, UNSPECIFIED TYPE (HCC): ICD-10-CM

## 2025-01-27 RX ORDER — METOPROLOL SUCCINATE 50 MG/1
100 TABLET, EXTENDED RELEASE ORAL EVERY 12 HOURS SCHEDULED
Qty: 180 TABLET | Refills: 1 | Status: SHIPPED | OUTPATIENT
Start: 2025-01-27

## 2025-02-03 DIAGNOSIS — G25.81 RESTLESS LEG SYNDROME: ICD-10-CM

## 2025-02-03 RX ORDER — ROPINIROLE 2 MG/1
2 TABLET, FILM COATED, EXTENDED RELEASE ORAL
Qty: 30 TABLET | Refills: 5 | Status: SHIPPED | OUTPATIENT
Start: 2025-02-03

## 2025-02-13 DIAGNOSIS — I50.42 CHRONIC COMBINED SYSTOLIC (CONGESTIVE) AND DIASTOLIC (CONGESTIVE) HEART FAILURE (HCC): ICD-10-CM

## 2025-02-14 RX ORDER — SACUBITRIL AND VALSARTAN 49; 51 MG/1; MG/1
1 TABLET, FILM COATED ORAL 2 TIMES DAILY
Qty: 60 TABLET | Refills: 5 | Status: SHIPPED | OUTPATIENT
Start: 2025-02-14

## 2025-02-17 ENCOUNTER — TELEPHONE (OUTPATIENT)
Dept: CARDIOLOGY CLINIC | Facility: CLINIC | Age: 85
End: 2025-02-17

## 2025-02-17 NOTE — TELEPHONE ENCOUNTER
02/17/25 pt called stating she felt/heard a busy signal buzz sensation from her device. [facility] tx sent today, no alerts, spoke to pt, if she feels/hears again to call. ~ch

## 2025-02-17 NOTE — TELEPHONE ENCOUNTER
2/17/25 PT CALLED ME 4 TIMES OVER WEEKEND. SAID HER ICD IS BUZZING INSIDE HER CHEST. NOT ALL THE TIME BUT SOMETIMES. CALLED HER BACK. SENDING TRANSMISSION. [facility] WILL CALL HER. CR

## 2025-02-18 ENCOUNTER — RESULTS FOLLOW-UP (OUTPATIENT)
Dept: NON INVASIVE DIAGNOSTICS | Facility: HOSPITAL | Age: 85
End: 2025-02-18

## 2025-03-01 DIAGNOSIS — M54.41 ACUTE RIGHT-SIDED LOW BACK PAIN WITH RIGHT-SIDED SCIATICA: ICD-10-CM

## 2025-03-03 RX ORDER — TRAMADOL HYDROCHLORIDE 50 MG/1
50 TABLET ORAL 2 TIMES DAILY
Qty: 60 TABLET | Refills: 0 | Status: SHIPPED | OUTPATIENT
Start: 2025-03-03

## 2025-03-05 ENCOUNTER — RESULTS FOLLOW-UP (OUTPATIENT)
Dept: CARDIOLOGY CLINIC | Facility: CLINIC | Age: 85
End: 2025-03-05

## 2025-03-05 ENCOUNTER — REMOTE DEVICE CLINIC VISIT (OUTPATIENT)
Dept: CARDIOLOGY CLINIC | Facility: CLINIC | Age: 85
End: 2025-03-05
Payer: COMMERCIAL

## 2025-03-05 DIAGNOSIS — Z95.810 PRESENCE OF IMPLANTABLE CARDIOVERTER-DEFIBRILLATOR (ICD): Primary | ICD-10-CM

## 2025-03-05 PROCEDURE — 93296 REM INTERROG EVL PM/IDS: CPT | Performed by: STUDENT IN AN ORGANIZED HEALTH CARE EDUCATION/TRAINING PROGRAM

## 2025-03-05 PROCEDURE — 93295 DEV INTERROG REMOTE 1/2/MLT: CPT | Performed by: STUDENT IN AN ORGANIZED HEALTH CARE EDUCATION/TRAINING PROGRAM

## 2025-03-05 NOTE — PROGRESS NOTES
"Results for orders placed or performed in visit on 03/05/25   Cardiac EP device report    Narrative    MDT-DUAL CHAMBER \"HIS\" ICD/VVIR MODEACTIVE SYSTEM IS MRI CONDITIONAL  [employer] TRANSMISSION: BATTERY VOLTAGE ADEQUATE (9 YR).  99.9% + VSR PACE <0.1%. ALL AVAILABLE LEAD PARAMETERS WITHIN NORMAL LIMITS. NO SIGNIFICANT HIGH RATE OR V SENSE EPISODES. PATIENT TAKING ELIQUIS, METOPROLOL SUCC. OPTI-VOL WITHIN NORMAL LIMITS. NORMAL DEVICE FUNCTION.   ES        "

## 2025-03-28 DIAGNOSIS — I48.91 ATRIAL FIBRILLATION, UNSPECIFIED TYPE (HCC): ICD-10-CM

## 2025-03-28 DIAGNOSIS — I50.22 CHRONIC HFREF (HEART FAILURE WITH REDUCED EJECTION FRACTION) (HCC): ICD-10-CM

## 2025-03-28 RX ORDER — METOPROLOL SUCCINATE 50 MG/1
100 TABLET, EXTENDED RELEASE ORAL 2 TIMES DAILY
Qty: 360 TABLET | Refills: 1 | Status: SHIPPED | OUTPATIENT
Start: 2025-03-28

## 2025-04-01 DIAGNOSIS — M54.41 ACUTE RIGHT-SIDED LOW BACK PAIN WITH RIGHT-SIDED SCIATICA: ICD-10-CM

## 2025-04-02 RX ORDER — TRAMADOL HYDROCHLORIDE 50 MG/1
50 TABLET ORAL 2 TIMES DAILY
Qty: 60 TABLET | Refills: 0 | Status: SHIPPED | OUTPATIENT
Start: 2025-04-02

## 2025-04-08 ENCOUNTER — RA CDI HCC (OUTPATIENT)
Dept: OTHER | Facility: HOSPITAL | Age: 85
End: 2025-04-08

## 2025-04-09 ENCOUNTER — APPOINTMENT (OUTPATIENT)
Dept: LAB | Facility: CLINIC | Age: 85
End: 2025-04-09
Payer: COMMERCIAL

## 2025-04-09 DIAGNOSIS — I50.32 CHRONIC HEART FAILURE WITH PRESERVED EJECTION FRACTION (HCC): ICD-10-CM

## 2025-04-09 DIAGNOSIS — E78.00 PURE HYPERCHOLESTEROLEMIA: ICD-10-CM

## 2025-04-09 DIAGNOSIS — E03.9 HYPOTHYROIDISM, UNSPECIFIED TYPE: ICD-10-CM

## 2025-04-09 DIAGNOSIS — I13.0 HYPERTENSIVE HEART AND CHRONIC KIDNEY DISEASE WITH HEART FAILURE AND STAGE 1 THROUGH STAGE 4 CHRONIC KIDNEY DISEASE, OR UNSPECIFIED CHRONIC KIDNEY DISEASE (HCC): ICD-10-CM

## 2025-04-09 LAB
BNP SERPL-MCNC: 332 PG/ML (ref 0–100)
CHOLEST SERPL-MCNC: 187 MG/DL (ref ?–200)
HDLC SERPL-MCNC: 56 MG/DL
LDLC SERPL CALC-MCNC: 100 MG/DL (ref 0–100)
NONHDLC SERPL-MCNC: 131 MG/DL
TRIGL SERPL-MCNC: 155 MG/DL (ref ?–150)
TSH SERPL DL<=0.05 MIU/L-ACNC: 6.06 UIU/ML (ref 0.45–4.5)

## 2025-04-09 PROCEDURE — 83880 ASSAY OF NATRIURETIC PEPTIDE: CPT

## 2025-04-09 PROCEDURE — 84443 ASSAY THYROID STIM HORMONE: CPT

## 2025-04-09 PROCEDURE — 36415 COLL VENOUS BLD VENIPUNCTURE: CPT

## 2025-04-09 PROCEDURE — 80061 LIPID PANEL: CPT

## 2025-04-09 PROCEDURE — 84439 ASSAY OF FREE THYROXINE: CPT

## 2025-04-10 ENCOUNTER — OFFICE VISIT (OUTPATIENT)
Dept: FAMILY MEDICINE CLINIC | Facility: CLINIC | Age: 85
End: 2025-04-10
Payer: COMMERCIAL

## 2025-04-10 VITALS
WEIGHT: 199.2 LBS | HEART RATE: 77 BPM | HEIGHT: 61 IN | SYSTOLIC BLOOD PRESSURE: 132 MMHG | OXYGEN SATURATION: 100 % | DIASTOLIC BLOOD PRESSURE: 86 MMHG | BODY MASS INDEX: 37.61 KG/M2 | RESPIRATION RATE: 18 BRPM | TEMPERATURE: 97 F

## 2025-04-10 DIAGNOSIS — N18.31 STAGE 3A CHRONIC KIDNEY DISEASE (CKD) (HCC): Primary | ICD-10-CM

## 2025-04-10 DIAGNOSIS — E66.9 OBESITY (BMI 30-39.9): ICD-10-CM

## 2025-04-10 DIAGNOSIS — F11.20 CONTINUOUS OPIOID DEPENDENCE (HCC): ICD-10-CM

## 2025-04-10 DIAGNOSIS — E55.9 VITAMIN D DEFICIENCY: ICD-10-CM

## 2025-04-10 DIAGNOSIS — E11.36 TYPE 2 DIABETES MELLITUS WITH DIABETIC CATARACT, WITHOUT LONG-TERM CURRENT USE OF INSULIN (HCC): ICD-10-CM

## 2025-04-10 DIAGNOSIS — R60.0 BILATERAL LOWER EXTREMITY EDEMA: ICD-10-CM

## 2025-04-10 DIAGNOSIS — E03.9 HYPOTHYROIDISM, UNSPECIFIED TYPE: ICD-10-CM

## 2025-04-10 DIAGNOSIS — G25.81 RESTLESS LEG SYNDROME: ICD-10-CM

## 2025-04-10 DIAGNOSIS — E78.00 PURE HYPERCHOLESTEROLEMIA: ICD-10-CM

## 2025-04-10 DIAGNOSIS — Z78.0 POSTMENOPAUSAL: ICD-10-CM

## 2025-04-10 DIAGNOSIS — I48.0 PAROXYSMAL ATRIAL FIBRILLATION (HCC): ICD-10-CM

## 2025-04-10 DIAGNOSIS — I13.0 HYPERTENSIVE HEART AND CHRONIC KIDNEY DISEASE WITH HEART FAILURE AND STAGE 1 THROUGH STAGE 4 CHRONIC KIDNEY DISEASE, OR UNSPECIFIED CHRONIC KIDNEY DISEASE (HCC): ICD-10-CM

## 2025-04-10 LAB
CREAT UR-MCNC: 72.2 MG/DL
MICROALBUMIN UR-MCNC: 11.3 MG/L
MICROALBUMIN/CREAT 24H UR: 16 MG/G CREATININE (ref 0–30)
SL AMB POCT HEMOGLOBIN AIC: 6.8 (ref ?–6.5)
T4 FREE SERPL-MCNC: 0.71 NG/DL (ref 0.61–1.12)

## 2025-04-10 PROCEDURE — G2211 COMPLEX E/M VISIT ADD ON: HCPCS | Performed by: NURSE PRACTITIONER

## 2025-04-10 PROCEDURE — 82570 ASSAY OF URINE CREATININE: CPT | Performed by: NURSE PRACTITIONER

## 2025-04-10 PROCEDURE — 99214 OFFICE O/P EST MOD 30 MIN: CPT | Performed by: NURSE PRACTITIONER

## 2025-04-10 PROCEDURE — 82043 UR ALBUMIN QUANTITATIVE: CPT | Performed by: NURSE PRACTITIONER

## 2025-04-10 PROCEDURE — 83036 HEMOGLOBIN GLYCOSYLATED A1C: CPT | Performed by: NURSE PRACTITIONER

## 2025-04-15 PROBLEM — N17.9 AKI (ACUTE KIDNEY INJURY) (HCC): Status: RESOLVED | Noted: 2022-10-12 | Resolved: 2025-04-15

## 2025-04-15 PROBLEM — E66.01 OBESITY, MORBID (HCC): Status: RESOLVED | Noted: 2021-04-15 | Resolved: 2025-04-15

## 2025-04-15 PROBLEM — N18.4 CHRONIC KIDNEY DISEASE, STAGE 4 (SEVERE) (HCC): Status: ACTIVE | Noted: 2025-04-15

## 2025-04-15 NOTE — ASSESSMENT & PLAN NOTE
TSH from 4/9/25 at 6.026, free T4 normal at 0.71.  Continue Levothyroxine 100 mcg for now, repeat labs as ordered    Orders:    TSH, 3rd generation with Free T4 reflex; Future

## 2025-04-15 NOTE — PROGRESS NOTES
Name: Mayra Meadows      : 1940      MRN: [id number]  Encounter Provider: SOTO Ring  Encounter Date: 4/10/2025   Encounter department: [facility]    Assessment & Plan  Stage 3a chronic kidney disease (CKD) (HCC)  Lab Results   Component Value Date    EGFR 57 2024    EGFR 56 2024    EGFR 57 2023    CREATININE 0.93 2024    CREATININE 0.94 2024    CREATININE 0.93 2023   Stable, stay well hydrated and avoid nephrotoxic agents   Updated lab work ordered           Type 2 diabetes mellitus with diabetic cataract, without long-term current use of insulin (HCC)    Lab Results   Component Value Date    HGBA1C 6.8 (A) 04/10/2025   Stable overall  Continue with dietary modifications (limit the chocolate milk and ice cream!!)    Orders:    Albumin / creatinine urine ratio    POCT hemoglobin A1c    Comprehensive metabolic panel; Future      Paroxysmal atrial fibrillation (HCC)  Stable, continue Metoprolol and Eliquis  ICD is in place, new battery just placed 2024  Managed by Cardiology             Continuous opioid dependence (HCC)  Stable, PDMP site reviewed and is appropriate   Side effects of Tramadol reviewed            Hypertensive heart and chronic kidney disease with heart failure and stage 1 through stage 4 chronic kidney disease, or unspecified chronic kidney disease (HCC)  Wt Readings from Last 3 Encounters:   04/10/25 90.4 kg (199 lb 3.2 oz)   24 91.1 kg (200 lb 12.8 oz)   10/10/24 88.9 kg (196 lb)     Stable and managed by Cardiology   Continue current medication regimen  Follow a low sodium diet                     Pure hypercholesterolemia  Pt declines statin therapy  Lifestyle modifications encouraged            Hypothyroidism, unspecified type  TSH from 25 at 6.026, free T4 normal at 0.71.  Continue Levothyroxine 100 mcg for now, repeat labs as ordered    Orders:    TSH, 3rd generation with Free T4 reflex;  Future    Bilateral lower extremity edema  Continue Bumex  Compression stockings ordered and we did measure her for this today   Elevate legs when possible  Follow a low sodium diet (which could definitely be better for her)           Restless leg syndrome  Stable with Requip XL- symptoms are well controlled with this         Obesity (BMI 30-39.9)  Lifestyle modifications encouraged          Vitamin D deficiency    Orders:    Vitamin D 25 hydroxy; Future    Postmenopausal    Orders:    Vitamin D 25 hydroxy; Future      Depression Screening and Follow-up Plan: Patient was screened for depression during today's encounter. They screened negative with a PHQ-2 score of 0.          History of Present Illness     HPI    Madelin presents by herself today for a routine follow up   Doing well overall, no acute concerns today     She had a Cardiology follow up 10/7/24 with Dr. Martin for PAF, HTN, HLD.  She did have her ICD battery changed recently on 8/20/24.  She is compliant with her medications.  Her leg edema has improved with better compliance of Bumex.  Denies SOB, wheezing, CP, palpitations, dizziness, orthopnea     POC A1C today at 6.8, previously 6.4.  Madelin still enjoys her sweets     CKD stage 3A- kidney function has been stable for the past 2 years or so.  Kidney function from 8/13/24: Bun 19, Cr 0.93, eGFR 57    TSH from 4/9/25 at 6.026, free T4 normal at 0.71.  She does note compliance with levothyroxine      Review of Systems   Constitutional:  Negative for activity change, appetite change, chills, diaphoresis, fatigue, fever and unexpected weight change.   Eyes:  Negative for visual disturbance.   Respiratory:  Negative for cough, chest tightness, shortness of breath and wheezing.    Cardiovascular:  Positive for leg swelling. Negative for chest pain and palpitations.   Gastrointestinal:  Negative for abdominal pain, blood in stool, constipation, diarrhea and nausea.   Genitourinary:  Negative for dysuria.    Musculoskeletal:  Positive for arthralgias. Negative for myalgias.   Skin:  Negative for rash and wound.   Neurological:  Negative for dizziness, weakness, numbness and headaches.   Psychiatric/Behavioral:  Negative for dysphoric mood, self-injury, sleep disturbance and suicidal ideas. The patient is not nervous/anxious.      Past Medical History:   Diagnosis Date    WANG (acute kidney injury) ([facility]) 10/12/2022    Allergic childhood    Arthritis do not know    Cancer ([facility]) 1976    Cardiogenic shock ([facility]) 06/26/2019    CHF (congestive heart failure) ([facility])     Chronic kidney disease June 2019    COPD (chronic obstructive pulmonary disease) ([facility]) no    Coronary artery disease 06/19    Depression no    Disease of thyroid gland 1976    Eczema     Heart murmur childhood    Hypertension do not knpw    Photosensitivity     abnormal skin sensitivity to sunlight    Traumatic open wound of left lower leg 04/17/2023    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    Visual impairment childhood     Past Surgical History:   Procedure Laterality Date    CARDIAC ELECTROPHYSIOLOGY PROCEDURE N/A 8/20/2024    Procedure: Cardiac icd generator change;  Surgeon: Roman Finnegan MD;  Location: [facility];  Service: Cardiology    CARDIAC ELECTROPHYSIOLOGY PROCEDURE N/A 8/20/2024    Procedure: Cardiac lead revision;  Surgeon: Roman Finnegan MD;  Location: [facility];  Service: Cardiology    CARDIAC SURGERY  06/19    CATARACT EXTRACTION Left     EYE SURGERY      HEMORRHOID SURGERY      IR NON-TUNNELED CENTRAL LINE PLACEMENT  07/09/2019    IR UPPER EXTREMITY VENOGRAM- DIAGNOSTIC  8/15/2024    OOPHORECTOMY      unilateral    TOTAL ABDOMINAL HYSTERECTOMY       Family History   Problem Relation Age of Onset    Alzheimer's disease Mother     Coronary artery disease Mother     Dementia Mother     Diabetes Mother         mellitus    Heart disease Mother     Other Father         acute myocardial infarction    Heart disease Father      Coronary artery disease Sister     Other Maternal Grandfather         laryngeal cancer    Dementia Maternal Aunt     Diabetes Maternal Aunt     Heart disease Sister     Heart disease Brother      Social History     Tobacco Use    Smoking status: Never     Passive exposure: Never    Smokeless tobacco: Never   Vaping Use    Vaping status: Never Used   Substance and Sexual Activity    Alcohol use: Not Currently     Comment: 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    Drug use: Never    Sexual activity: Not Currently     Current Outpatient Medications on File Prior to Visit   Medication Sig    Ascorbic Acid (vitamin C) 1000 MG tablet Take 1,000 mg by mouth 2 (two) times a day     b complex vitamins capsule Take 1 capsule by mouth daily    bumetanide (BUMEX) 2 mg tablet TAKE 2 TABLETS (4 MG TOTAL) BY MOUTH DAILY    calcium carbonate (OS-PAULA) 600 MG tablet Take 600 mg by mouth 2 (two) times a day with meals    cholecalciferol (VITAMIN D3) 1,000 units tablet Take 4,000 Units by mouth daily    Eliquis 5 MG TAKE 1 TABLET (5 MG TOTAL) BY MOUTH 2 (TWO) TIMES A DAY    hydrALAZINE (APRESOLINE) 10 mg tablet TAKE 1 TABLET (10 MG TOTAL) BY MOUTH 2 (TWO) TIMES A DAY    levothyroxine 100 mcg tablet TAKE 1 TABLET (100 MCG TOTAL) BY MOUTH DAILY    Magnesium 400 MG CAPS Take 1 capsule (400 mg total) by mouth daily (Patient taking differently: Take 400 mg by mouth 2 (two) times a day)    metoprolol succinate (TOPROL-XL) 50 mg 24 hr tablet TAKE 2 TABLETS (100 MG TOTAL) BY MOUTH EVERY 12 (TWELVE) HOURS    Multiple Vitamin (MULTI-VITAMIN DAILY PO) 1 tablet 2 (two) times a day      Potassium Chloride ER 20 MEQ TBCR TAKE 2 TABLETS (40 MEQ TOTAL) BY MOUTH 2 (TWO) TIMES A DAY    rOPINIRole (REQUIP XL) 2 MG 24 hr tablet TAKE 1 TABLET (2 MG TOTAL) BY MOUTH DAILY AT BEDTIME    sacubitril-valsartan (Entresto) 49-51 MG TABS TAKE 1 TABLET BY MOUTH 2 (TWO) TIMES A DAY    traMADol (ULTRAM) 50 mg tablet TAKE 1 TABLET (50 MG TOTAL) BY MOUTH  "2 (TWO) TIMES A DAY    Turmeric (QC TUMERIC COMPLEX PO) Take by mouth 2 (two) times a day    venlafaxine (EFFEXOR) 75 mg tablet TAKE 1 TABLET (75 MG TOTAL) BY MOUTH EVERY 12 (TWELVE) HOURS    [DISCONTINUED] potassium chloride (K-DUR,KLOR-CON) 20 mEq tablet TAKE 2 TABLETS (40 MEQ TOTAL) BY MOUTH 2 (TWO) TIMES A DAY     Allergies   Allergen Reactions    Penicillins     Wellbutrin Sr  [Bupropion]      Other reaction(s): Suicidal ideations  Category: Adverse Reaction;      Immunization History   Administered Date(s) Administered    COVID-19 PFIZER VACCINE 0.3 ML IM 01/24/2021, 02/13/2021, 01/15/2022    Td (adult), adsorbed 01/01/2000     Objective   /86   Pulse 77   Temp (!) 97 °F (36.1 °C) (Tympanic)   Resp 18   Ht 5' 1\" (1.549 m)   Wt 90.4 kg (199 lb 3.2 oz)   SpO2 100%   BMI 37.64 kg/m²     Physical Exam  Constitutional:       General: She is not in acute distress.     Appearance: She is well-developed. She is obese. She is not ill-appearing, toxic-appearing or diaphoretic.   HENT:      Head: Normocephalic and atraumatic.   Eyes:      Extraocular Movements: Extraocular movements intact.      Conjunctiva/sclera: Conjunctivae normal.      Pupils: Pupils are equal, round, and reactive to light.   Neck:      Thyroid: No thyromegaly.   Cardiovascular:      Rate and Rhythm: Normal rate and regular rhythm.      Heart sounds: Normal heart sounds. No murmur heard.  Pulmonary:      Effort: Pulmonary effort is normal. No respiratory distress.      Breath sounds: Normal breath sounds. No wheezing.   Abdominal:      General: Bowel sounds are normal. There is no distension.      Palpations: Abdomen is soft.      Tenderness: There is no abdominal tenderness.   Musculoskeletal:         General: Normal range of motion.      Cervical back: Normal range of motion and neck supple. No rigidity or tenderness.      Right lower leg: No edema.      Left lower leg: No edema.   Lymphadenopathy:      Cervical: No cervical " adenopathy.   Skin:     General: Skin is warm and dry.   Neurological:      General: No focal deficit present.      Mental Status: She is alert and oriented to person, place, and time.   Psychiatric:         Mood and Affect: Mood normal.         Behavior: Behavior normal.         Thought Content: Thought content normal.         Judgment: Judgment normal.

## 2025-04-15 NOTE — ASSESSMENT & PLAN NOTE
Lab Results   Component Value Date    EGFR 57 08/13/2024    EGFR 56 05/26/2024    EGFR 57 11/25/2023    CREATININE 0.93 08/13/2024    CREATININE 0.94 05/26/2024    CREATININE 0.93 11/25/2023   Stable, stay well hydrated and avoid nephrotoxic agents   Updated lab work ordered

## 2025-04-15 NOTE — ASSESSMENT & PLAN NOTE
Wt Readings from Last 3 Encounters:   04/10/25 90.4 kg (199 lb 3.2 oz)   11/11/24 91.1 kg (200 lb 12.8 oz)   10/10/24 88.9 kg (196 lb)     Stable and managed by Cardiology   Continue current medication regimen  Follow a low sodium diet

## 2025-04-15 NOTE — ASSESSMENT & PLAN NOTE
Continue Bumex  Compression stockings ordered and we did measure her for this today   Elevate legs when possible  Follow a low sodium diet (which could definitely be better for her)

## 2025-04-15 NOTE — ASSESSMENT & PLAN NOTE
Lab Results   Component Value Date    HGBA1C 6.8 (A) 04/10/2025   Stable overall  Continue with dietary modifications (limit the chocolate milk and ice cream!!)    Orders:    Albumin / creatinine urine ratio    POCT hemoglobin A1c    Comprehensive metabolic panel; Future

## 2025-04-17 DIAGNOSIS — F41.8 DEPRESSION WITH ANXIETY: ICD-10-CM

## 2025-04-17 RX ORDER — VENLAFAXINE 75 MG/1
75 TABLET ORAL 2 TIMES DAILY
Qty: 60 TABLET | Refills: 5 | Status: SHIPPED | OUTPATIENT
Start: 2025-04-17

## 2025-04-22 ENCOUNTER — TELEPHONE (OUTPATIENT)
Age: 85
End: 2025-04-22

## 2025-04-22 NOTE — TELEPHONE ENCOUNTER
Patient calling as insurance has denied coverage twice now for compression stockings. States she will just buy them herself but would like to confirm size. Relayed information in order.

## 2025-04-24 NOTE — PROGRESS NOTES
Heart Failure Outpatient Progress Note - Mayra Meadows 84 y.o. female MRN: [id number]    @ Encounter: [id number]      Assessment/Plan:    Patient Active Problem List    Diagnosis Date Noted    Left-sided chest wall pain 10/12/2022    Hypertensive heart and chronic kidney disease with heart failure and stage 1 through stage 4 chronic kidney disease, or unspecified chronic kidney disease (HCC) 04/15/2021    Restless leg syndrome 01/27/2020    Debility 07/19/2019    Ambulatory dysfunction 07/12/2019    Type 2 diabetes mellitus with diabetic cataract ([facility]) 10/07/2015    Hyperlipidemia 08/15/2012    Hypertension 08/15/2012    Hypothyroidism 08/15/2012    Obesity (BMI 30-39.9) 08/15/2012    Osteoarthritis 08/15/2012    Biventricular implantable cardioverter-defibrillator (ICD) at end of device life 08/20/2024    Bilateral lower extremity edema 02/29/2024    Cellulitis of left lower extremity 02/27/2024    Acute pain of left knee 02/27/2024    Bronchitis 12/19/2023    Continuous opioid dependence ([facility]) 09/28/2023    Stage 3a chronic kidney disease (CKD) ([facility]) 09/28/2023    Paroxysmal atrial fibrillation ([facility]) 03/07/2022       # Chronic HFimpEF w/ partial recovery, Stage C, NYHA III  Etiology: NICM/tachy-mediated given h/o AF with RVR , +/- ischemia given patient's risk factors for CAD, strong family history, and septal Q waves on EKG with severe septal hypokinesis on echo, less likely viral, toxin induced or infiltrative. S/P cardiogenic shock in past  Now S/P AVJ ablation with BiV AICD implant with no high rate episodes.    Weight: 193 lbs--> 201 lbs  BNP: 4/9/25: 332  NT proBNP: 10/17/22: 4761  1/14/21: 716  6/2/20: 1089    Studies- personally reviewed by myself  Echo 3/8/23:  LVEF: 65%  RV: mildly dilated  LA moderately dilated  PASP: normal    Echo 11/20/20  LVEF: 60%  RV: mildly dilated, mildly reduced  PASP: 60 mmHg  RVOT: no notching    TTE completed on 03/16/2020 (not euvolemic): LVEF 30%. LVIDd 5.44 cm.  Moderately dilated RV with moderately to markedly reduced RVSF. NAN. Moderate MR. Mild to moderate TR. Dilated IVC.                Echocardiogram (limited) from 07/04/2019:  LVEF: 45%; mild diffuse hypokinesis.   LVIDd: 3.8 cm.  MR: No MR. Moderate annular calcification.  Other: Dilated LA.     Echocardiogram from 06/20/2019:  LVEF: 25%  LVIDd: 4.6 cm.  RV: Dilated and hypokinetic.  MR: Moderate.     Neurohormonal Blockade:  --Beta Blocker: metoprolol succinate 100 mg Q12  --ACEi, ARB or ARNi: Entresto 49/51 mg BID.   --SVR reduction: hydralazine 10 mg BID  --Aldosterone Receptor Blocker: Renal function precludes  --SGLT2i:   -- Diuretic : Bumex 4 mg once daily     Sudden Cardiac Death Risk Reduction:  Since been DC.   --ICD: MDT DC HIS ICD, gen change 8/20/24  Interrogation 3/5/25: BVP 99.9%, optivol normal  Interrogation 9/12/24: BiV 100%, optivol normal  Interrogation 3/15/24: BVP 99.7%, no events, optivol normal  Interrogation 9/8/23: BVP 99.9%, optivol crossed     Electrical Resynchronization:  --Interrogation: Narrow QRS.      Advanced Therapies (if appropriate):  --Inotrope: N/A  --LVAD/Transplant Candidacy: Will continue to monitor.     # Pulmonary Hypertension, WHO group 2 from now HFpEF and likely significant CORAZON, untreated (Group 3)  Stressed polysomnogram, continued volume management    # PAF w/ hx AF with RVR. S/p AV node ablation with CRT-D with Dr. Finnegan on 03/18/2020.   Anticoagulation on Eliquis.   Rate: metoprolol succinate 100 mg Q12    # HTN.Controlled.      # HLD. On statin therapy.  4/9/25: , HDL 56  5/18/22: , HDL 57     # Hypothyroidism. History of radioablation in the past now on replacement therapy.  TSH stable     # Type II DM.  4/10/25: HgA1C 6.8%     # CORAZON. Noncompliant with CPAP in the past .  Still has not complete sleep study, will not     # CKD.  Renal function stable last checked. ,  CR 0.93 on 11/25     # History of transaminitis in setting of cardiogenic shock      #  Abnormal EKG. Q waves noted in septal leads concerning for possible old infarct. Lexiscan stress to eval for underlying ischemia still not completed.     # H/O tachy-oniel syndrome.      #  History of alcohol abuse.  Has been abstinent for years- has done great    TODAY'S PLAN:  BiV pacing at goal 100%  Indirect PA pressures looked good on 3/8 echo  Lipids need improvement with DM  Continue current diuretic for combined systolic and diastolic HF  I ordered sleep study as I do suspect hypoxia is contributing to her PH- she has yet to do and states will not do- did not do  May need antibiotics for cellulitis  Echo in Sept, see in October  --2g sodium diet  - Daily weights    HPI:      85 yo female who follows for chronic BiV heart failure, afib, HTN, HLD, CORAZON, DM2.   Back in June 2019 admitted with afib with RVR and decompensated heart failure. Had not had a cardiac hx. Echo showed EF: 25-30%, underwent EBEN/ CV but went back into afib. Started on amiodarone load. Did have junctional rhythm immediate post conversion and showing signs of tachy oniel syndrome. Given these findings, PPM with possible AICD was suggested with cardioversion thereafter.         On 6/25 patient was taken to the cath lab for planned dual chamber AICD but apparently became hypotensive and markedly SOB upon induction with propofol. She was subsequently transferred to the ICU for further management of her heart failure. She continued to be in AFib with RVR upon transfer. Through the night, patient's condition began to worsen and she became cool, clammy with N/V and loss of obtainable BP. Started on pressor support and lined. SVO2 at that point 32% with signs of lactic acidosis, WANG. Then started on milrinone gtt at 0.25 mcg/kg/min.      She was able to be weaned off inotropic support. She saw Dr Nelson in follow up and her afib rates were high so amio stopped and focused on rate control with increase in Toprol to 75 mg BID.         [facility] after presenting (as the recommendation of many of her providers) with bilateral LE edema and weight gain. In ED was found to have NT-proBNP of 4600 and be in atrial fibrillation with RVR (rates in 150s). She was then started on IV diltiazem and received IV Lasix. Diltiazem was later stopped and amiodarone drip was started. Switched from IV Lasix to IV Bumex on 03/41. EP was consulted. TTE revealed LVEF 30% and decision was made to proceed with AV node ablation and CRT-D implantation once optimized.   HF team recommended her being discharged home on Bumex 4 mg BID with PRN metolazone; however, she was discharged home on PO Bumex 4 mg daily without PRN metolazone prescribed. Lost 18 lbs during this admission.     Was in hospital with acute on chronic HF, diuresed and discharged on Bumex 4 mg daily, Toprol  mg BID and Entresto 49/51 mg BID; decompensation felt to be due to holding diuretic and entresto, maybe MAP driven    Interval History:  Interrogation 3/5/25: BVP 99.9%, optivol normal  Up 8 lbs  Review of Systems   Constitutional:  Negative for activity change, appetite change, fatigue and unexpected weight change (up 8 lbs).   HENT:  Negative for congestion and nosebleeds.    Eyes: Negative.    Respiratory:  Negative for cough, chest tightness and shortness of breath.    Cardiovascular:  Negative for chest pain, palpitations and leg swelling.   Gastrointestinal:  Negative for abdominal distention.   Endocrine: Negative.    Genitourinary: Negative.    Musculoskeletal: Negative.    Skin: Negative.    Neurological:  Negative for dizziness, syncope and weakness.   Hematological: Negative.    Psychiatric/Behavioral: Negative.         Past Medical History:   Diagnosis Date    WANG (acute kidney injury) ([facility]) 10/12/2022    Allergic childhood    Arthritis do not know    Cancer (HCC) 1976    Cardiogenic shock ([facility]) 06/26/2019    CHF (congestive heart failure) ([facility])     Chronic  kidney disease June 2019    COPD (chronic obstructive pulmonary disease) ([facility]) no    Coronary artery disease 06/19    Depression no    Disease of thyroid gland 1976    Eczema     Heart murmur childhood    Hypertension do not knpw    Photosensitivity     abnormal skin sensitivity to sunlight    Traumatic open wound of left lower leg 04/17/2023    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    Current Outpatient Medications:     Ascorbic Acid (vitamin C) 1000 MG tablet, Take 1,000 mg by mouth 2 (two) times a day , Disp: , Rfl:     b complex vitamins capsule, Take 1 capsule by mouth daily, Disp: , Rfl:     bumetanide (BUMEX) 2 mg tablet, TAKE 2 TABLETS (4 MG TOTAL) BY MOUTH DAILY, Disp: 60 tablet, Rfl: 5    calcium carbonate (OS-PAULA) 600 MG tablet, Take 600 mg by mouth 2 (two) times a day with meals, Disp: , Rfl:     cholecalciferol (VITAMIN D3) 1,000 units tablet, Take 4,000 Units by mouth daily, Disp: , Rfl:     Eliquis 5 MG, TAKE 1 TABLET (5 MG TOTAL) BY MOUTH 2 (TWO) TIMES A DAY, Disp: 60 tablet, Rfl: 5    hydrALAZINE (APRESOLINE) 10 mg tablet, TAKE 1 TABLET (10 MG TOTAL) BY MOUTH 2 (TWO) TIMES A DAY, Disp: 60 tablet, Rfl: 5    levothyroxine 100 mcg tablet, TAKE 1 TABLET (100 MCG TOTAL) BY MOUTH DAILY, Disp: 30 tablet, Rfl: 5    Magnesium 400 MG CAPS, Take 1 capsule (400 mg total) by mouth daily (Patient taking differently: Take 400 mg by mouth 2 (two) times a day), Disp: , Rfl: 0    metoprolol succinate (TOPROL-XL) 50 mg 24 hr tablet, TAKE 2 TABLETS (100 MG TOTAL) BY MOUTH EVERY 12 (TWELVE) HOURS, Disp: 360 tablet, Rfl: 1    Multiple Vitamin (MULTI-VITAMIN DAILY PO), 1 tablet 2 (two) times a day  , Disp: , Rfl:     Potassium Chloride ER 20 MEQ TBCR, TAKE 2 TABLETS (40 MEQ TOTAL) BY MOUTH 2 (TWO) TIMES A DAY, Disp: 120 tablet, Rfl:  5    rOPINIRole (REQUIP XL) 2 MG 24 hr tablet, TAKE 1 TABLET (2 MG TOTAL) BY MOUTH DAILY AT BEDTIME, Disp: 30 tablet, Rfl: 5    sacubitril-valsartan (Entresto) 49-51 MG TABS, TAKE 1 TABLET BY MOUTH 2 (TWO) TIMES A DAY, Disp: 60 tablet, Rfl: 5    traMADol (ULTRAM) 50 mg tablet, TAKE 1 TABLET (50 MG TOTAL) BY MOUTH 2 (TWO) TIMES A DAY, Disp: 60 tablet, Rfl: 0    Turmeric (QC TUMERIC COMPLEX PO), Take by mouth 2 (two) times a day, Disp: , Rfl:     venlafaxine (EFFEXOR) 75 mg tablet, TAKE 1 TABLET (75 MG TOTAL) BY MOUTH EVERY 12 (TWELVE) HOURS, Disp: 60 tablet, Rfl: 5    Social History     Socioeconomic History    Marital status: Single     Spouse name: Not on file    Number of children: Not on file    Years of education: Not on file    Highest education level: Not on file   Occupational History    Not on file   Tobacco Use    Smoking status: Never     Passive exposure: Never    Smokeless tobacco: Never   Vaping Use    Vaping status: Never Used   Substance and Sexual Activity    Alcohol use: Not Currently     Comment: 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    Drug use: Never    Sexual activity: Not Currently   Other Topics Concern    Not on file   Social History Narrative    Not on file     Social Drivers of Health     Financial Resource Strain: Medium Risk (9/27/2023)    Overall Financial Resource Strain (CARDIA)     Difficulty of Paying Living Expenses: Somewhat hard   Food Insecurity: No Food Insecurity (10/10/2024)    Nursing - Inadequate Food Risk Classification     Worried About Running Out of Food in the Last Year: Never true     Ran Out of Food in the Last Year: Never true     Ran Out of Food in the Last Year: Not on file   Transportation Needs: No Transportation Needs (10/10/2024)    PRAPARE - Transportation     Lack of Transportation (Medical): No     Lack of Transportation (Non-Medical): No   Physical Activity: Not on file   Stress: Not on file   Social Connections: Not on file   Intimate  Partner Violence: Not on file   Housing Stability: Low Risk  (10/10/2024)    Housing Stability Vital Sign     Unable to Pay for Housing in the Last Year: No     Number of Times Moved in the Last Year: 0     Homeless in the Last Year: No       Family History   Problem Relation Age of Onset    Alzheimer's disease Mother     Coronary artery disease Mother     Dementia Mother     Diabetes Mother         mellitus    Heart disease Mother     Other Father         acute myocardial infarction    Heart disease Father     Coronary artery disease Sister     Other Maternal Grandfather         laryngeal cancer    Dementia Maternal Aunt     Diabetes Maternal Aunt     Heart disease Sister     Heart disease Brother        Physical Exam:    Vitals:   There were no vitals filed for this visit.    Physical Exam  Constitutional:       Appearance: She is well-developed.   HENT:      Head: Normocephalic and atraumatic.   Eyes:      Pupils: Pupils are equal, round, and reactive to light.   Neck:      Vascular: No JVD.   Cardiovascular:      Rate and Rhythm: Normal rate and regular rhythm.      Heart sounds: No murmur heard.  Pulmonary:      Effort: Pulmonary effort is normal. No respiratory distress.      Breath sounds: Normal breath sounds.   Abdominal:      General: There is no distension.      Palpations: Abdomen is soft.      Tenderness: There is no abdominal tenderness.   Musculoskeletal:         General: Normal range of motion.      Cervical back: Normal range of motion.   Skin:     General: Skin is warm and dry.      Findings: No rash.   Neurological:      Mental Status: She is alert and oriented to person, place, and time.       Labs & Results:    Lab Results   Component Value Date    SODIUM 140 08/13/2024    K 4.5 08/13/2024     08/13/2024    CO2 29 08/13/2024    BUN 19 08/13/2024    CREATININE 0.93 08/13/2024    GLUC 111 05/26/2024    CALCIUM 9.9 08/13/2024     Lab Results   Component Value Date    WBC 7.71 08/13/2024     HGB 12.6 08/13/2024    HCT 39.3 08/13/2024     (H) 08/13/2024     08/13/2024     Lab Results   Component Value Date    NTBNP 4,761 (H) 10/17/2022      Lab Results   Component Value Date    CHOLESTEROL 187 04/09/2025    CHOLESTEROL 210 (H) 05/18/2022    CHOLESTEROL 112 03/12/2020     Lab Results   Component Value Date    HDL 56 04/09/2025    HDL 57 05/18/2022    HDL 38 (L) 03/12/2020     Lab Results   Component Value Date    TRIG 155 (H) 04/09/2025    TRIG 142 05/18/2022    TRIG 48 03/12/2020     Lab Results   Component Value Date    NONHDLC 131 04/09/2025    NONHDLC 153 05/18/2022    NONHDLC 74 03/12/2020       EKG personally reviewed by Imer Bruce.     Counseling / Coordination of Care  Time spent today 25 minutes.  Greater than 50% of total time was spent with the patient and / or family counseling and / or coordination of care. We went over current diagnosis, most recent studies and any changes in treatment.    Thank you for the opportunity to participate in the care of this patient.    IMER BRUCE D.O.  DIRECTOR OF HEART FAILURE/ PULMONARY HYPERTENSION  MEDICAL DIRECTOR OF LVAD PROGRAM  [facility]

## 2025-04-28 ENCOUNTER — OFFICE VISIT (OUTPATIENT)
Dept: CARDIOLOGY CLINIC | Facility: CLINIC | Age: 85
End: 2025-04-28
Payer: COMMERCIAL

## 2025-04-28 VITALS
OXYGEN SATURATION: 97 % | HEART RATE: 92 BPM | DIASTOLIC BLOOD PRESSURE: 62 MMHG | WEIGHT: 201 LBS | HEIGHT: 61 IN | SYSTOLIC BLOOD PRESSURE: 110 MMHG | BODY MASS INDEX: 37.95 KG/M2

## 2025-04-28 DIAGNOSIS — E78.00 PURE HYPERCHOLESTEROLEMIA: ICD-10-CM

## 2025-04-28 DIAGNOSIS — I13.0 HYPERTENSIVE HEART AND CHRONIC KIDNEY DISEASE WITH HEART FAILURE AND STAGE 1 THROUGH STAGE 4 CHRONIC KIDNEY DISEASE, OR UNSPECIFIED CHRONIC KIDNEY DISEASE (HCC): Primary | ICD-10-CM

## 2025-04-28 DIAGNOSIS — N18.32 STAGE 3B CHRONIC KIDNEY DISEASE (HCC): ICD-10-CM

## 2025-04-28 DIAGNOSIS — I10 PRIMARY HYPERTENSION: ICD-10-CM

## 2025-04-28 DIAGNOSIS — I50.22 CHRONIC HFREF (HEART FAILURE WITH REDUCED EJECTION FRACTION) (HCC): ICD-10-CM

## 2025-04-28 DIAGNOSIS — I48.0 PAROXYSMAL ATRIAL FIBRILLATION (HCC): ICD-10-CM

## 2025-04-28 DIAGNOSIS — I50.9 CHF (CONGESTIVE HEART FAILURE) (HCC): ICD-10-CM

## 2025-04-28 DIAGNOSIS — Z45.02 BIVENTRICULAR IMPLANTABLE CARDIOVERTER-DEFIBRILLATOR (ICD) AT END OF DEVICE LIFE: ICD-10-CM

## 2025-04-28 PROCEDURE — 99214 OFFICE O/P EST MOD 30 MIN: CPT | Performed by: INTERNAL MEDICINE

## 2025-04-28 NOTE — PATIENT INSTRUCTIONS
You can try to take an extra dose of diuretic next couple of days as you likely have about 4 lb extra fluid on you

## 2025-04-29 ENCOUNTER — TELEPHONE (OUTPATIENT)
Age: 85
End: 2025-04-29

## 2025-04-29 ENCOUNTER — OFFICE VISIT (OUTPATIENT)
Dept: FAMILY MEDICINE CLINIC | Facility: CLINIC | Age: 85
End: 2025-04-29
Payer: COMMERCIAL

## 2025-04-29 VITALS
HEART RATE: 84 BPM | BODY MASS INDEX: 36.68 KG/M2 | DIASTOLIC BLOOD PRESSURE: 80 MMHG | TEMPERATURE: 97.4 F | SYSTOLIC BLOOD PRESSURE: 126 MMHG | RESPIRATION RATE: 18 BRPM | WEIGHT: 194.3 LBS | OXYGEN SATURATION: 99 % | HEIGHT: 61 IN

## 2025-04-29 DIAGNOSIS — L03.116 CELLULITIS OF LEFT LOWER EXTREMITY: Primary | ICD-10-CM

## 2025-04-29 PROCEDURE — G2211 COMPLEX E/M VISIT ADD ON: HCPCS | Performed by: NURSE PRACTITIONER

## 2025-04-29 PROCEDURE — 99213 OFFICE O/P EST LOW 20 MIN: CPT | Performed by: NURSE PRACTITIONER

## 2025-04-29 RX ORDER — DOXYCYCLINE HYCLATE 100 MG
100 TABLET ORAL 2 TIMES DAILY
Qty: 20 TABLET | Refills: 0 | Status: SHIPPED | OUTPATIENT
Start: 2025-04-29 | End: 2025-05-09

## 2025-04-29 RX ORDER — POTASSIUM CHLORIDE 1500 MG/1
2 TABLET, EXTENDED RELEASE ORAL 2 TIMES DAILY
Qty: 120 TABLET | Refills: 5 | Status: SHIPPED | OUTPATIENT
Start: 2025-04-29

## 2025-04-29 NOTE — TELEPHONE ENCOUNTER
Pt called, asking if she can get an antibiotics prescribed for her legs. Red and swollen, oozing. Advised pt to come in  and she wanted to check if she can get the antibiotics without having to come in. She stated she had this before and was prescribed antibiotics and that helped. Please advise     [facility] [place] - [address]

## 2025-04-29 NOTE — ASSESSMENT & PLAN NOTE
Patient with an almost 2-week history of left lower extremity erythema.  The patient does have an open wound on her left lower extremity which she believes was caused by the car door.  She denies any fevers.  She does have clear drainage.  She is keeping this wound clean and dry.  She does have a history of cellulitis.  She did see vascular in the recent past and they did recommend compression stockings.  The patient has not yet obtained them as her insurance is not covering them.  She will contact [facility] pharmacy and pay out-of-pocket.  Instructed to elevate her feet while sitting.  Antibiotic sent to pharmacy.  She will contact us if this is not improving.  Orders:    doxycycline hyclate (VIBRA-TABS) 100 mg tablet; Take 1 tablet (100 mg total) by mouth 2 (two) times a day for 10 days

## 2025-04-29 NOTE — PROGRESS NOTES
Name: Mayra Meadows      : 1940      MRN: [id number]  Encounter Provider: SOTO Patel  Encounter Date: 2025   Encounter department: [facility]  :  Assessment & Plan  Cellulitis of left lower extremity      Patient with an almost 2-week history of left lower extremity erythema.  The patient does have an open wound on her left lower extremity which she believes was caused by the car door.  She denies any fevers.  She does have clear drainage.  She is keeping this wound clean and dry.  She does have a history of cellulitis.  She did see vascular in the recent past and they did recommend compression stockings.  The patient has not yet obtained them as her insurance is not covering them.  She will contact [facility] pharmacy and pay out-of-pocket.  Instructed to elevate her feet while sitting.  Antibiotic sent to pharmacy.  She will contact us if this is not improving.  Orders:    doxycycline hyclate (VIBRA-TABS) 100 mg tablet; Take 1 tablet (100 mg total) by mouth 2 (two) times a day for 10 days           History of Present Illness   Started about two weeks ago. Left leg redness and weeping.  No wearing compression stockings.  History of cellulitis.  Does have underlying vascular disease.      Review of Systems   Constitutional:  Negative for activity change, fatigue and fever.   HENT:  Negative for congestion, hearing loss, rhinorrhea, trouble swallowing and voice change.    Eyes:  Negative for photophobia, pain, discharge and visual disturbance.   Respiratory:  Negative for cough, chest tightness and shortness of breath.    Cardiovascular:  Negative for chest pain, palpitations and leg swelling.   Gastrointestinal:  Negative for abdominal pain, blood in stool, constipation, nausea and vomiting.   Endocrine: Negative for cold intolerance and heat intolerance.   Genitourinary:  Negative for difficulty urinating, frequency, hematuria, urgency, vaginal bleeding and vaginal  "discharge.   Musculoskeletal:  Positive for gait problem. Negative for arthralgias and myalgias.   Skin:  Positive for color change and wound.   Neurological:  Negative for dizziness, weakness, numbness and headaches.   Psychiatric/Behavioral:  Negative for decreased concentration. The patient is not nervous/anxious.        Objective   /80   Pulse 84   Temp (!) 97.4 °F (36.3 °C) (Tympanic)   Resp 18   Ht 5' 1\" (1.549 m)   Wt 88.1 kg (194 lb 4.8 oz)   SpO2 99%   BMI 36.71 kg/m²      Physical Exam  Vitals reviewed.   Constitutional:       Appearance: Normal appearance. She is obese.   HENT:      Head: Normocephalic.      Nose: Nose normal.      Mouth/Throat:      Mouth: Mucous membranes are moist.      Pharynx: Oropharynx is clear.   Eyes:      Extraocular Movements: Extraocular movements intact.      Pupils: Pupils are equal, round, and reactive to light.   Cardiovascular:      Rate and Rhythm: Normal rate and regular rhythm.      Pulses: Normal pulses.      Heart sounds: Normal heart sounds.   Pulmonary:      Effort: Pulmonary effort is normal.      Breath sounds: Normal breath sounds.   Musculoskeletal:         General: Normal range of motion.   Skin:     General: Skin is warm and dry.      Findings: Erythema and wound present.          Neurological:      General: No focal deficit present.      Mental Status: She is alert and oriented to person, place, and time. Mental status is at baseline.   Psychiatric:         Mood and Affect: Mood normal.         Behavior: Behavior normal.         Thought Content: Thought content normal.         Judgment: Judgment normal.         "

## 2025-05-14 DIAGNOSIS — I10 ESSENTIAL HYPERTENSION: ICD-10-CM

## 2025-05-14 DIAGNOSIS — I48.91 ATRIAL FIBRILLATION WITH RAPID VENTRICULAR RESPONSE (HCC): ICD-10-CM

## 2025-05-14 DIAGNOSIS — I50.9 CHF (CONGESTIVE HEART FAILURE) (HCC): ICD-10-CM

## 2025-05-14 DIAGNOSIS — I50.22 CHRONIC HFREF (HEART FAILURE WITH REDUCED EJECTION FRACTION) (HCC): ICD-10-CM

## 2025-05-14 DIAGNOSIS — E03.9 HYPOTHYROIDISM, UNSPECIFIED TYPE: ICD-10-CM

## 2025-05-14 DIAGNOSIS — M54.41 ACUTE RIGHT-SIDED LOW BACK PAIN WITH RIGHT-SIDED SCIATICA: ICD-10-CM

## 2025-05-14 RX ORDER — APIXABAN 5 MG/1
5 TABLET, FILM COATED ORAL 2 TIMES DAILY
Qty: 60 TABLET | Refills: 5 | Status: SHIPPED | OUTPATIENT
Start: 2025-05-14

## 2025-05-15 RX ORDER — LEVOTHYROXINE SODIUM 100 UG/1
100 TABLET ORAL DAILY
Qty: 30 TABLET | Refills: 5 | Status: SHIPPED | OUTPATIENT
Start: 2025-05-15

## 2025-05-15 RX ORDER — TRAMADOL HYDROCHLORIDE 50 MG/1
50 TABLET ORAL 2 TIMES DAILY
Qty: 60 TABLET | Refills: 0 | Status: SHIPPED | OUTPATIENT
Start: 2025-05-15

## 2025-05-15 RX ORDER — HYDRALAZINE HYDROCHLORIDE 10 MG/1
10 TABLET, FILM COATED ORAL 2 TIMES DAILY
Qty: 60 TABLET | Refills: 5 | Status: SHIPPED | OUTPATIENT
Start: 2025-05-15

## 2025-05-15 NOTE — ASSESSMENT & PLAN NOTE
· In the setting of volume overload   · Nephrology is following  · Continue fluid and sodium restriction  · Restart diuretics when renal function stabilizes None

## 2025-05-22 ENCOUNTER — TELEPHONE (OUTPATIENT)
Age: 85
End: 2025-05-22

## 2025-05-22 NOTE — TELEPHONE ENCOUNTER
Patient was seen on 4/29 by Louis was given   doxycycline hyclate (VIBRA-TABS) 100 mg tablet   It became better but oozing still did not stop all the way and legs are still little swollen. She is wondering if she needs another set of antibiotics. Please call to advise her accordingly at your earliest convenience.  Thank you!!

## 2025-05-23 DIAGNOSIS — R60.0 BILATERAL LOWER EXTREMITY EDEMA: ICD-10-CM

## 2025-05-23 DIAGNOSIS — L03.116 CELLULITIS OF LEFT LOWER EXTREMITY: ICD-10-CM

## 2025-05-23 DIAGNOSIS — T14.8XXA NONHEALING NONSURGICAL WOUND: Primary | ICD-10-CM

## 2025-05-23 NOTE — TELEPHONE ENCOUNTER
Spoke to patient, aware of provider recommendation. States she doesn't want to be referred to wound care. States she has seen them before and they didn't help her  aware to go to ER if worsens over the weekend

## 2025-06-04 ENCOUNTER — REMOTE DEVICE CLINIC VISIT (OUTPATIENT)
Dept: CARDIOLOGY CLINIC | Facility: CLINIC | Age: 85
End: 2025-06-04
Payer: COMMERCIAL

## 2025-06-04 ENCOUNTER — RESULTS FOLLOW-UP (OUTPATIENT)
Dept: NON INVASIVE DIAGNOSTICS | Facility: HOSPITAL | Age: 85
End: 2025-06-04

## 2025-06-04 DIAGNOSIS — Z95.810 PRESENCE OF IMPLANTABLE CARDIOVERTER-DEFIBRILLATOR (ICD): ICD-10-CM

## 2025-06-04 DIAGNOSIS — I42.8 NON-ISCHEMIC CARDIOMYOPATHY (HCC): Primary | ICD-10-CM

## 2025-06-04 DIAGNOSIS — I48.0 PAROXYSMAL ATRIAL FIBRILLATION (HCC): ICD-10-CM

## 2025-06-04 PROCEDURE — 93296 REM INTERROG EVL PM/IDS: CPT | Performed by: STUDENT IN AN ORGANIZED HEALTH CARE EDUCATION/TRAINING PROGRAM

## 2025-06-04 PROCEDURE — 93295 DEV INTERROG REMOTE 1/2/MLT: CPT | Performed by: STUDENT IN AN ORGANIZED HEALTH CARE EDUCATION/TRAINING PROGRAM

## 2025-06-04 NOTE — PROGRESS NOTES
"MDT-DUAL CHAMBER \"HIS\" ICD/VVIR MODEACTIVE SYSTEM IS MRI CONDITIONAL   CARELINK TRANSMISSION:  BATTERY VOLTAGE ADEQUATE (8.9 YRS).  AP 0%   99.9% + VSRP <0.1%.  ALL AVAILABLE LEAD PARAMETERS WITHIN NORMAL LIMITS (KNOWN HIGH A LEAD IMPEDANCE).  1 VT NS EPISODE, (2 HIGH RATES IN ONE EPISODE) 4 BEATS @ 194 BPM, & 9 @ 161. 2 V SENSE EPISODES, MARKERS FOR NSVT, BVP, FUSION.  PT TAKES ELIQUIS, METOPROLOL SUCC.  EF 60% (ECHO 11/20/2020). PVC COUNT 2/HR (SINGLES).  OPTI-VOL WITHIN NORMAL LIMITS.  NORMAL DEVICE FUNCTION. PAS   "

## 2025-06-06 ENCOUNTER — OFFICE VISIT (OUTPATIENT)
Dept: FAMILY MEDICINE CLINIC | Facility: CLINIC | Age: 85
End: 2025-06-06
Payer: COMMERCIAL

## 2025-06-06 ENCOUNTER — TELEPHONE (OUTPATIENT)
Dept: FAMILY MEDICINE CLINIC | Facility: CLINIC | Age: 85
End: 2025-06-06

## 2025-06-06 VITALS
SYSTOLIC BLOOD PRESSURE: 132 MMHG | OXYGEN SATURATION: 98 % | RESPIRATION RATE: 18 BRPM | WEIGHT: 197.8 LBS | DIASTOLIC BLOOD PRESSURE: 74 MMHG | HEIGHT: 61 IN | HEART RATE: 83 BPM | BODY MASS INDEX: 37.34 KG/M2 | TEMPERATURE: 98 F

## 2025-06-06 DIAGNOSIS — I83.028 VENOUS STASIS ULCER OF LEFT LOWER LEG (HCC): ICD-10-CM

## 2025-06-06 DIAGNOSIS — R26.2 AMBULATORY DYSFUNCTION: Primary | ICD-10-CM

## 2025-06-06 PROCEDURE — G2211 COMPLEX E/M VISIT ADD ON: HCPCS | Performed by: NURSE PRACTITIONER

## 2025-06-06 PROCEDURE — 99213 OFFICE O/P EST LOW 20 MIN: CPT | Performed by: NURSE PRACTITIONER

## 2025-06-06 NOTE — TELEPHONE ENCOUNTER
Left message for them to call me back to see if we can get patient scheduled ASAP----- Message from SOTO Schuler sent at 6/6/2025  8:44 AM EDT -----  Could someone call the wound center to see if Madelin could be seen ASAP?  Referral placed today, she has seen them in the past.  Venous statis ulcer, continuous clear drainage.  Thanks

## 2025-06-06 NOTE — ASSESSMENT & PLAN NOTE
Patient returns to office for follow up of left venous stasis ulcer.  She was seen in the office on 4/29 at which time she had left lower extremity erythema and swelling along with an ulcerated area on left lower leg (photo under media).  Doxycycline prescribed for patient and discussed the need for evaluation by wound center.  Patient declined at that time. Patient called the office last week requested more antibiotics, request declined and referral to wound center again discussed and declined. She presents today with ulcerated area on her left lower leg.She states this continuously is leaking clear/pale yellow fluid. No erythema or swelling. Placed ABD and ace bandage on area. Referred to wound center. Does not appear infected.  Recommend the patient use maxi pads on area pad side towards wound) until seen by wound center due to constant draining.      Orders:    Ambulatory Referral to Wound Care; Future

## 2025-06-06 NOTE — TELEPHONE ENCOUNTER
Spoke with Prerna from Wound Center, [place] location is booked solid until the end of July, was able to get her scheduled for Next Friday at 2 pm in [facility] in [place]

## 2025-06-06 NOTE — PROGRESS NOTES
Name: Mayra Meadows      : 1940      MRN: [id number]  Encounter Provider: SOTO Patel  Encounter Date: 2025   Encounter department: [facility]  :  Assessment & Plan  Ambulatory dysfunction  Ambulates with cane. No falls,.       Venous stasis ulcer of left lower leg (HCC)      Patient returns to office for follow up of left venous stasis ulcer.  She was seen in the office on  at which time she had left lower extremity erythema and swelling along with an ulcerated area on left lower leg (photo under media).  Doxycycline prescribed for patient and discussed the need for evaluation by wound center.  Patient declined at that time. Patient called the office last week requested more antibiotics, request declined and referral to wound center again discussed and declined. She presents today with ulcerated area on her left lower leg.She states this continuously is leaking clear/pale yellow fluid. No erythema or swelling. Placed ABD and ace bandage on area. Referred to wound center. Does not appear infected.  Recommend the patient use maxi pads on area pad side towards wound) until seen by wound center due to constant draining.      Orders:    Ambulatory Referral to Wound Care; Future           History of Present Illness   Patient presents to the office with concerns of oozing of right lower leg ulcer.        Review of Systems   Constitutional:  Negative for activity change, fatigue and fever.   HENT:  Negative for congestion, hearing loss, rhinorrhea, trouble swallowing and voice change.    Eyes:  Negative for photophobia, pain, discharge and visual disturbance.   Respiratory:  Negative for cough, chest tightness and shortness of breath.    Cardiovascular:  Negative for chest pain, palpitations and leg swelling.   Gastrointestinal:  Negative for abdominal pain, blood in stool, constipation, nausea and vomiting.   Endocrine: Negative for cold intolerance and heat intolerance.  "  Genitourinary:  Negative for difficulty urinating, frequency, hematuria, urgency, vaginal bleeding and vaginal discharge.   Musculoskeletal:  Positive for gait problem. Negative for arthralgias and myalgias.   Skin:  Positive for wound.   Neurological:  Negative for dizziness, weakness, numbness and headaches.   Psychiatric/Behavioral:  Negative for decreased concentration. The patient is not nervous/anxious.        Objective   /74   Pulse 83   Temp 98 °F (36.7 °C) (Temporal)   Resp 18   Ht 5' 1\" (1.549 m)   Wt 89.7 kg (197 lb 12.8 oz)   SpO2 98%   BMI 37.37 kg/m²      Physical Exam  Vitals reviewed.   Constitutional:       Appearance: Normal appearance. She is obese.   HENT:      Head: Normocephalic.      Nose: Nose normal.      Mouth/Throat:      Mouth: Mucous membranes are moist.      Pharynx: Oropharynx is clear.     Eyes:      Extraocular Movements: Extraocular movements intact.      Pupils: Pupils are equal, round, and reactive to light.       Cardiovascular:      Rate and Rhythm: Normal rate and regular rhythm.      Pulses: Normal pulses.   Pulmonary:      Effort: Pulmonary effort is normal.      Breath sounds: Normal breath sounds.     Musculoskeletal:         General: Normal range of motion.     Skin:     General: Skin is warm and dry.      Findings: Wound present.          Neurological:      General: No focal deficit present.      Mental Status: She is alert and oriented to person, place, and time. Mental status is at baseline.     Psychiatric:         Mood and Affect: Mood normal.         Behavior: Behavior normal.         Thought Content: Thought content normal.         Judgment: Judgment normal.         "

## 2025-06-14 ENCOUNTER — HOSPITAL ENCOUNTER (EMERGENCY)
Facility: HOSPITAL | Age: 85
Discharge: HOME HEALTH | End: 2025-06-14
Attending: EMERGENCY MEDICINE
Payer: COMMERCIAL

## 2025-06-14 VITALS
SYSTOLIC BLOOD PRESSURE: 140 MMHG | HEART RATE: 83 BPM | DIASTOLIC BLOOD PRESSURE: 77 MMHG | RESPIRATION RATE: 18 BRPM | TEMPERATURE: 98 F | OXYGEN SATURATION: 100 %

## 2025-06-14 DIAGNOSIS — Z51.89 VISIT FOR WOUND CHECK: Primary | ICD-10-CM

## 2025-06-14 DIAGNOSIS — I50.9 CHF (CONGESTIVE HEART FAILURE) (HCC): ICD-10-CM

## 2025-06-14 DIAGNOSIS — I87.2 CHRONIC VENOUS STASIS DERMATITIS OF LEFT LOWER EXTREMITY: ICD-10-CM

## 2025-06-14 DIAGNOSIS — M54.41 ACUTE RIGHT-SIDED LOW BACK PAIN WITH RIGHT-SIDED SCIATICA: ICD-10-CM

## 2025-06-14 DIAGNOSIS — I50.22 CHRONIC HFREF (HEART FAILURE WITH REDUCED EJECTION FRACTION) (HCC): ICD-10-CM

## 2025-06-14 DIAGNOSIS — I48.91 ATRIAL FIBRILLATION WITH RAPID VENTRICULAR RESPONSE (HCC): ICD-10-CM

## 2025-06-14 PROCEDURE — 99284 EMERGENCY DEPT VISIT MOD MDM: CPT | Performed by: EMERGENCY MEDICINE

## 2025-06-14 PROCEDURE — 99283 EMERGENCY DEPT VISIT LOW MDM: CPT

## 2025-06-14 RX ORDER — BUMETANIDE 2 MG/1
4 TABLET ORAL DAILY
Qty: 60 TABLET | Refills: 0 | Status: SHIPPED | OUTPATIENT
Start: 2025-06-14

## 2025-06-14 NOTE — DISCHARGE INSTRUCTIONS
You were seen and evaluated in the emergency department for a wound check.  Your wounds are not concerning for an infection at this time.  They are consistent with chronic venous stasis ulcers/blisters.  The treatment for chronic venous stasis is compression and elevation of the affected areas.  Please wear compression stockings over bilateral lower extremities or use Ace bandages to keep compression on the area.  Please elevate your legs as often as possible.  Please follow-up with wound care and your primary care physician.  Please return to the emergency department if experience any new or worsening symptoms.

## 2025-06-14 NOTE — ED PROVIDER NOTES
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                   Assessment & Plan       Medical Decision Making  Patient is an 84-year-old female who presents today for evaluation of wound on left lower extremity.  Vital signs: Hemodynamically stable.     Pertinent physical exam: Left lower extremity with chronic venous stasis changes including blisters and previous well-healing ulcer.  No concern for infection at this time.     Differential diagnosis and plan:   Differential diagnosis includes but is not limited to chronic venous stasis ulcers/blisters, doubt cellulitis or abscess.  At this time I do not believe imaging or blood work is warranted.  Will wrap leg with abdominal bandage and Ace wrap.     View ED course above for further discussion on patient workup.      All labs reviewed and utilized in the medical decision making process  All radiology studies independently viewed by me and interpreted by the radiologist.  I reviewed all testing with the patient.      ED course:  Wounds were wrapped with abdominal pad and Ace wrap.  I discussed with the patient that these are all chronic venous stasis wounds and there is no concern for infection at this time.  I advised continued compression with compression stockings or Ace wrap.  I discussed following up with primary care physician and wound care. Prior to discharge, I discussed discharge instructions with patient at bedside. Patient was provided with both verbal and written instructions.  "Patient is understanding of the discharge instructions and is agreeable to plan of care. Return precautions were discussed with patient bedside. All questions were answered. Patient was comfortable with the plan of care and discharged to home.      Disposition: Discharge home with follow-up with primary care physician and wound care.     Portions of the record may have been created with voice recognition software. Occasional wrong word or \"sound a like\" substitutions may have occurred due to the inherent limitations of voice recognition software. Read the chart carefully and recognize, using context, where substitutions have occurred.             Medications - No data to display    ED Risk Strat Scores                    No data recorded                            History of Present Illness       Chief Complaint   Patient presents with    Wound Check     Patient reports wound on left lower leg that drains. Patient reports missing her first wound care appointment yesterday. Is concerned for cellulitis.        Past Medical History[1]   Past Surgical History[2]   Family History[3]   Social History[4]   E-Cigarette/Vaping    E-Cigarette Use Never User       E-Cigarette/Vaping Substances    Nicotine No     THC No     CBD No     Flavoring No     Other No     Unknown No       I have reviewed and agree with the history as documented.     Patient is an 84-year-old female with past medical history of hypertension, hyperlipidemia, hypothyroidism, CHF, CKD who presents today for evaluation of left lower extremity wound.  Patient was seen and evaluated by her primary care physician in early May and treated with antibiotics for presumed cellulitis.  She completed the antibiotics as prescribed.  She followed up with her primary care physician last week and was not restarted on antibiotics but a referral was sent to wound care.  She attempted to go to wound care yesterday but had difficulty finding the facility and was unable to be " seen.  Today she noticed some blisters on her lower extremity and was concerned so came to the emergency room for further evaluation.  She denies fever, chills, chest pain, shortness of breath, abdominal pain, nausea, vomiting or difficulty ambulating.          Review of Systems   Constitutional:  Negative for chills and fever.   HENT:  Negative for congestion, rhinorrhea and sore throat.    Respiratory:  Negative for shortness of breath.    Cardiovascular:  Negative for chest pain.   Gastrointestinal:  Negative for abdominal pain, diarrhea, nausea and vomiting.   Genitourinary:  Negative for dysuria, hematuria and urgency.   Musculoskeletal:  Negative for back pain.   Skin:  Positive for color change and wound.   Neurological:  Negative for dizziness, light-headedness and headaches.           Objective       ED Triage Vitals [06/14/25 1551]   Temperature Pulse Blood Pressure Respirations SpO2 Patient Position - Orthostatic VS   98 °F (36.7 °C) 83 140/77 18 100 % Sitting      Temp Source Heart Rate Source BP Location FiO2 (%) Pain Score    Temporal Monitor Left arm -- 3      Vitals      Date and Time Temp Pulse SpO2 Resp BP Pain Score FACES Pain Rating User   06/14/25 1551 98 °F (36.7 °C) 83 100 % 18 140/77 3 -- TW            Physical Exam  Vitals and nursing note reviewed.   Constitutional:       General: She is not in acute distress.     Appearance: Normal appearance. She is well-developed. She is not ill-appearing.   HENT:      Head: Normocephalic and atraumatic.      Nose: Nose normal.      Mouth/Throat:      Mouth: Mucous membranes are moist.     Eyes:      Conjunctiva/sclera: Conjunctivae normal.       Cardiovascular:      Rate and Rhythm: Normal rate and regular rhythm.      Heart sounds: Normal heart sounds. No murmur heard.  Pulmonary:      Effort: Pulmonary effort is normal. No respiratory distress.      Breath sounds: Normal breath sounds.   Abdominal:      General: Abdomen is flat. There is no  distension.      Palpations: Abdomen is soft.      Tenderness: There is no abdominal tenderness.     Musculoskeletal:      Right lower leg: No edema.      Left lower leg: No edema.     Skin:     General: Skin is warm and dry.      Capillary Refill: Capillary refill takes less than 2 seconds.      Comments: Bilateral chronic venous stasis with multiple venous blisters on left lower extremities.  No surrounding erythema or drainage.     Neurological:      General: No focal deficit present.      Mental Status: She is alert and oriented to person, place, and time.     Psychiatric:         Mood and Affect: Mood normal.         Behavior: Behavior normal.               Results Reviewed       None            No orders to display       Procedures    ED Medication and Procedure Management   Prior to Admission Medications   Prescriptions Last Dose Informant Patient Reported? Taking?   Ascorbic Acid (vitamin C) 1000 MG tablet  Self Yes No   Sig: Take 1,000 mg by mouth in the morning and 1,000 mg before bedtime.   Eliquis 5 MG   No No   Sig: TAKE 1 TABLET (5 MG TOTAL) BY MOUTH 2 (TWO) TIMES A DAY   Magnesium 400 MG CAPS  Self No No   Sig: Take 1 capsule (400 mg total) by mouth daily   Multiple Vitamin (MULTI-VITAMIN DAILY PO)  Self Yes No   Si tablet in the morning and 1 tablet before bedtime.   Potassium Chloride ER 20 MEQ TBCR   No No   Sig: TAKE 2 TABLETS (40 MEQ TOTAL) BY MOUTH 2 (TWO) TIMES A DAY   Turmeric (QC TUMERIC COMPLEX PO)  Self Yes No   Sig: Take by mouth in the morning and in the evening.   b complex vitamins capsule  Self Yes No   Sig: Take 1 capsule by mouth in the morning.   bumetanide (BUMEX) 2 mg tablet   No No   Sig: TAKE 2 TABLETS (4 MG TOTAL) BY MOUTH DAILY   calcium carbonate (OS-PAULA) 600 MG tablet  Self Yes No   Sig: Take 600 mg by mouth in the morning and 600 mg in the evening. Take with meals.   cholecalciferol (VITAMIN D3) 1,000 units tablet  Self Yes No   Sig: Take 4,000 Units by mouth daily    Patient taking differently: Take 5,000 Units by mouth in the morning.   hydrALAZINE (APRESOLINE) 10 mg tablet   No No   Sig: TAKE 1 TABLET (10 MG TOTAL) BY MOUTH 2 (TWO) TIMES A DAY   levothyroxine 100 mcg tablet   No No   Sig: TAKE 1 TABLET (100 MCG TOTAL) BY MOUTH DAILY   metoprolol succinate (TOPROL-XL) 50 mg 24 hr tablet   No No   Sig: TAKE 2 TABLETS (100 MG TOTAL) BY MOUTH EVERY 12 (TWELVE) HOURS   rOPINIRole (REQUIP XL) 2 MG 24 hr tablet   No No   Sig: TAKE 1 TABLET (2 MG TOTAL) BY MOUTH DAILY AT BEDTIME   sacubitril-valsartan (Entresto) 49-51 MG TABS   No No   Sig: TAKE 1 TABLET BY MOUTH 2 (TWO) TIMES A DAY   traMADol (ULTRAM) 50 mg tablet   No No   Sig: TAKE 1 TABLET (50 MG TOTAL) BY MOUTH 2 (TWO) TIMES A DAY   venlafaxine (EFFEXOR) 75 mg tablet   No No   Sig: TAKE 1 TABLET (75 MG TOTAL) BY MOUTH EVERY 12 (TWELVE) HOURS      Facility-Administered Medications: None     Discharge Medication List as of 6/14/2025  5:43 PM        START taking these medications    Details   bumetanide (BUMEX) 2 mg tablet TAKE 2 TABLETS (4 MG TOTAL) BY MOUTH DAILY, Starting Sat 6/14/2025, Normal           CONTINUE these medications which have NOT CHANGED    Details   Ascorbic Acid (vitamin C) 1000 MG tablet Take 1,000 mg by mouth in the morning and 1,000 mg before bedtime., Historical Med      b complex vitamins capsule Take 1 capsule by mouth in the morning., Historical Med      calcium carbonate (OS-PAULA) 600 MG tablet Take 600 mg by mouth in the morning and 600 mg in the evening. Take with meals., Historical Med      cholecalciferol (VITAMIN D3) 1,000 units tablet Take 4,000 Units by mouth daily, Historical Med      Eliquis 5 MG TAKE 1 TABLET (5 MG TOTAL) BY MOUTH 2 (TWO) TIMES A DAY, Starting Wed 5/14/2025, Normal      hydrALAZINE (APRESOLINE) 10 mg tablet TAKE 1 TABLET (10 MG TOTAL) BY MOUTH 2 (TWO) TIMES A DAY, Starting Thu 5/15/2025, Normal      levothyroxine 100 mcg tablet TAKE 1 TABLET (100 MCG TOTAL) BY MOUTH DAILY,  Starting Thu 5/15/2025, Normal      Magnesium 400 MG CAPS Take 1 capsule (400 mg total) by mouth daily, Starting Thu 10/13/2022, No Print      metoprolol succinate (TOPROL-XL) 50 mg 24 hr tablet TAKE 2 TABLETS (100 MG TOTAL) BY MOUTH EVERY 12 (TWELVE) HOURS, Starting Fri 3/28/2025, Normal      Multiple Vitamin (MULTI-VITAMIN DAILY PO) 1 tablet in the morning and 1 tablet before bedtime., Starting Mon 4/5/2021, Historical Med      Potassium Chloride ER 20 MEQ TBCR TAKE 2 TABLETS (40 MEQ TOTAL) BY MOUTH 2 (TWO) TIMES A DAY, Starting Tue 4/29/2025, Normal      rOPINIRole (REQUIP XL) 2 MG 24 hr tablet TAKE 1 TABLET (2 MG TOTAL) BY MOUTH DAILY AT BEDTIME, Starting Mon 2/3/2025, Normal      sacubitril-valsartan (Entresto) 49-51 MG TABS TAKE 1 TABLET BY MOUTH 2 (TWO) TIMES A DAY, Starting Fri 2/14/2025, Normal      traMADol (ULTRAM) 50 mg tablet TAKE 1 TABLET (50 MG TOTAL) BY MOUTH 2 (TWO) TIMES A DAY, Starting Thu 5/15/2025, Normal      Turmeric (QC TUMERIC COMPLEX PO) Take by mouth in the morning and in the evening., Historical Med      venlafaxine (EFFEXOR) 75 mg tablet TAKE 1 TABLET (75 MG TOTAL) BY MOUTH EVERY 12 (TWELVE) HOURS, Starting Thu 4/17/2025, Normal           No discharge procedures on file.  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                      [1]   Past Medical History:  Diagnosis Date    WANG (acute kidney injury) ([facility]) 10/12/2022    Allergic childhood    Arthritis do not know    Cancer ([facility]) 1976    Cardiogenic shock ([facility]) 06/26/2019    CHF (congestive heart failure) ([facility])     Chronic kidney disease June 2019    COPD (chronic obstructive pulmonary disease) ([facility]) no     Coronary artery disease 06/19    Depression no    Disease of thyroid gland 1976    Eczema     Heart murmur childhood    Hypertension do not knpw    Photosensitivity     abnormal skin sensitivity to sunlight    Traumatic open wound of left lower leg 04/17/2023    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HCC)     staging    Visual impairment childhood   [2]   Past Surgical History:  Procedure Laterality Date    CARDIAC ELECTROPHYSIOLOGY PROCEDURE N/A 8/20/2024    Procedure: Cardiac icd generator change;  Surgeon: Roman Finnegan MD;  Location: [facility];  Service: Cardiology    CARDIAC ELECTROPHYSIOLOGY PROCEDURE N/A 8/20/2024    Procedure: Cardiac lead revision;  Surgeon: Roman Finnegan MD;  Location: [facility];  Service: Cardiology    CARDIAC SURGERY  06/19    CATARACT EXTRACTION Left     EYE SURGERY      HEMORRHOID SURGERY      IR NON-TUNNELED CENTRAL LINE PLACEMENT  07/09/2019    IR UPPER EXTREMITY VENOGRAM- DIAGNOSTIC  8/15/2024    OOPHORECTOMY      unilateral    TOTAL ABDOMINAL HYSTERECTOMY     [3]   Family History  Problem Relation Name Age of Onset    Alzheimer's disease Mother pj     Coronary artery disease Mother pj     Dementia Mother pj     Diabetes Mother pj         mellitus    Heart disease Mother pj     Other Father Boris Sr         acute myocardial infarction    Heart disease Father Gekatie Sr     Coronary artery disease Sister      Other Maternal Grandfather          laryngeal cancer    Dementia Maternal Aunt      Diabetes Maternal Aunt      Heart disease Sister Elin     Heart disease Brother Gekatie    [4]   Social History  Tobacco Use    Smoking status: Never     Passive exposure: Never    Smokeless tobacco: Never   Vaping Use    Vaping status: Never Used   Substance Use Topics    Alcohol use: Not Currently     Comment: 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

## 2025-06-14 NOTE — ED ATTENDING ATTESTATION
6/14/2025  I, Ramon Inman DO, saw and evaluated the patient. I have discussed the patient with the resident/non-physician practitioner and agree with the resident's/non-physician practitioner's findings, Plan of Care, and MDM as documented in the resident's/non-physician practitioner's note, except where noted. All available labs and Radiology studies were reviewed.  I was present for key portions of any procedure(s) performed by the resident/non-physician practitioner and I was immediately available to provide assistance.       At this point I agree with the current assessment done in the Emergency Department.  I have conducted an independent evaluation of this patient a history and physical is as follows:    83 yo woman w/LLE lymphedema, chronic venous stasis presents for evaluation of two clear fluid filled blisters, and a venous stasis ulcer that is also draining clear fluid. Has hx of CHF, but not volume overloaded at this time. Was concerned about infection. Was treated with a course of doxycycline without change in symptoms. Has compression socks but doesn't use them because they are difficult to put on and take off.    Imp: venous stasis plan: optimize edema management - recommend compression socks or ACE wrap. Abd pad for drainage management, change when soiled to avoid skin maceration. Clean ulcers with soap and water only then pat dry. Elevate legs as often as possible. No abx indicated.      ED Course         Critical Care Time  Procedures

## 2025-06-16 ENCOUNTER — VBI (OUTPATIENT)
Dept: FAMILY MEDICINE CLINIC | Facility: CLINIC | Age: 85
End: 2025-06-16

## 2025-06-16 ENCOUNTER — PATIENT OUTREACH (OUTPATIENT)
Dept: CASE MANAGEMENT | Facility: OTHER | Age: 85
End: 2025-06-16

## 2025-06-16 RX ORDER — TRAMADOL HYDROCHLORIDE 50 MG/1
50 TABLET ORAL 2 TIMES DAILY
Qty: 60 TABLET | Refills: 0 | Status: SHIPPED | OUTPATIENT
Start: 2025-06-16

## 2025-06-16 RX ORDER — APIXABAN 5 MG/1
5 TABLET, FILM COATED ORAL 2 TIMES DAILY
Qty: 60 TABLET | Refills: 5 | Status: SHIPPED | OUTPATIENT
Start: 2025-06-16

## 2025-06-16 NOTE — TELEPHONE ENCOUNTER
06/16/25 11:57 AM    Patient contacted post ED visit, [facility] department spoke with patient/caregiver and outreach was successful.    Thank you.  [facility] Deluna  [facility]

## 2025-06-16 NOTE — PROGRESS NOTES
Contacted patient for f/u post ED visit for wound check.  Patient has chronic venous stasis of LLE.  Message left on vm with contact information.

## 2025-06-16 NOTE — TELEPHONE ENCOUNTER
06/16/25 11:43 AM    Patient contacted post ED visit, first outreach attempt made. Message was left for patient to return a call to the [facility] Department at [facility]: Phone [phone].    Thank you.  [facility] Deluna  [facility]

## 2025-06-23 ENCOUNTER — PATIENT OUTREACH (OUTPATIENT)
Dept: CASE MANAGEMENT | Facility: OTHER | Age: 85
End: 2025-06-23

## 2025-06-23 NOTE — LETTER
Date: 06/23/25    Dear Mayra Meadows,   My name is Bernice; I am a registered nurse care manager working with [facility].   I have not been able to reach you and would like to set a time that I can talk with you over the phone.  My work is to help patients that have complex medical conditions get the care they need. This includes patients who may have been in the hospital or emergency room.     Please call me at [phone] with any questions you may have. I look forward to hearing from you.  Sincerely,  Bernice Xie RN  Outpatient Care Manager

## 2025-06-23 NOTE — PROGRESS NOTES
Second attempt to contact patient to f/u on recent ED visit.  Message left on vm.  Unable to reach letter sent.  Will close referral.

## 2025-06-30 NOTE — PROGRESS NOTES
Patient ID: Mayra Meadows is a 84 y.o. female Date of Birth 1940       Chief Complaint   Patient presents with    New Patient Visit     LLE wound       Allergies:  Penicillins and Wellbutrin sr  [bupropion]    Diagnosis:   Diagnosis ICD-10-CM Associated Orders   1. Non-pressure chronic ulcer of left lower leg with fat layer exposed ([facility])  L97.922 lidocaine (LMX) 4 % cream     Wound cleansing and dressings Left Leg     VAS ARTERIAL DUPLEX- LOWER LIMB BILATERAL     Debridement      2. Type 2 diabetes mellitus with diabetic cataract, without long-term current use of insulin ([facility])  E11.36       3. Hypertensive heart and chronic kidney disease with heart failure and stage 1 through stage 4 chronic kidney disease, or unspecified chronic kidney disease ([facility])  I13.0       4. Hypervolemia, unspecified hypervolemia type  E87.70            Assessment  & Plan:    Initial evaluation of L anterior lower extremity ulceration in setting of fluid overload (CHF and likely component of venous insufficiency as well with legs in dependent position for extended periods of time). Ulceration with slough present overlying granulation tissue. Drainage is copious, is actively weeping heavy amount of serous drainage on exam. No obvious periwound erythema to indicate cellulitis.   Surgical debridement, as below.   Recommend at least once daily dressing changes with Zetuvit plus. Can utilize maxipad or baby diaper if needed.   Would benefit from increased compression however there was an abnormal pressure with ABIs taken in office therefore will order formal arterial testing prior to initiating compression. Would benefit from formal compression pending confirmation of adequate inflow.   Pt would benefit from increased time with legs in elevated position and sleeping in bed at night. Avoid prolonged periods of time with legs in dependent position. Walking will assist with venous return via calf muscle pump, recommend walking within pts  abilities/endurance.   Will discuss case with cardiology as pt may benefit from increased diuretic dosing.   Instructed to monitor for any changes including redness or swelling surrounding the wound, increased drainage or pain as well as fevers or chills.     A1C results reviewed with the patient today. Well controlled at 6.8 as of two months ago.   F/u in 1 weeks. Instructed to call if any questions or concerns arise in meantime.            Subjective:   06/30/25: 83 y/o F with Pmhx of uterine sarcoma, HTN, COPD, CKD, CAD, CHF, presents for evaluation of her LLE. She presented to the ED on 06/14/25 for evaluation of her legs at which time it was noted that she was previously treated with abx for presumed cellulitis and referred to wound care but had missed her appointment d/t difficulty finding the facility therefore she presented to ED for evaluation at which time blisters were noted on her LLE and her wounds were wrapped in abdominal pads with an ACE wrap for compression and she was encouraged to f/u with her PCP and wound care. On evaluation today, pt notes that her leg wound has been draining a significant amount. She does spend a significant amount of time with her legs in a dependent position looking things up online at her computer desk and will occasionally sleep in a recliner at night rather than her bed.                The following portions of the patient's history were reviewed and updated as appropriate:   Problem List[1]  Past Medical History[2]  Past Surgical History[3]  Family History[4]  Social History[5]  Current Medications[6]    Review of Systems      Objective:  /61   Pulse 85   Temp 97.6 °F (36.4 °C)   Resp 16   Ht 5' (1.524 m)   Wt 89.4 kg (197 lb)   BMI 38.47 kg/m²   Pain Score:   1     Physical Exam  Vitals reviewed.   Constitutional:       Appearance: She is obese.     Musculoskeletal:      Right lower leg: Pitting Edema present.      Left lower leg: Pitting Edema present.  "    Neurological:      Mental Status: She is alert.                Wound 07/01/25 Vascular Ulcer Venous Leg Left (Active)   Wound Image   07/01/25 1429   Wound Description Yellow;Slough;Pink;Epithelialization 07/01/25 1431   Non-staged Wound Description Full thickness 07/01/25 1431   Wound Length (cm) 1.4 cm 07/01/25 1431   Wound Width (cm) 0.9 cm 07/01/25 1431   Wound Depth (cm) 0.1 cm 07/01/25 1431   Wound Surface Area (cm^2) 0.99 cm^2 07/01/25 1431   Wound Volume (cm^3) 0.066 cm^3 07/01/25 1431   Calculated Wound Volume (cm^3) 0.13 cm^3 07/01/25 1431   Drainage Amount Large 07/01/25 1431   Drainage Description Serous 07/01/25 1431   Milagros-wound Assessment Intact;Edema;Pink 07/01/25 1431   Dressing Status Intact 07/01/25 1431                     Debridement   Wound 07/01/25 Vascular Ulcer Venous Leg Left     Date/Time: 7/1/2025 3:34 PM    Universal Protocol:  procedure performed by consultantConsent: Verbal consent obtained  Consent given by: patient  Time out: Immediately prior to procedure a \"time out\" was called to verify the correct patient, procedure, equipment, support staff and site/side marked as required.  Patient understanding: patient states understanding of the procedure being performed  Patient identity confirmed: verbally with patient    Debridement Details  Performed by: PA  Debridement type: surgical  Level of debridement: subcutaneous tissue  Pain control: lidocaine 4%    Post-debridement measurements  Length (cm): 1.4  Width (cm): 0.9  Depth (cm): 0.2  Percent debrided: 90%  Surface Area (cm^2): 0.99  Area Debrided (cm^2): 0.89  Volume (cm^3): 0.13    Tissue and other material debrided: dermis and subcutaneous tissue  Devitalized tissue debrided: slough  Instrument(s) utilized: curette  Bleeding: small  Hemostasis obtained with: pressure  Response to treatment: procedure was tolerated well                   Wound Instructions:  Orders Placed This Encounter   Procedures    Wound cleansing and " "dressings Left Leg     Wound location left leg  Change dressing daily and as needed for excessive drainage.   You may remove the dressing and shower. Do not leave wound open to air, apply new dressing immediately.  Cleanse the wound with wound cleanser or mild soap and water, rinse, pat dry.  Cover wound with Zetuvit plus  Secure with kerlix and tape    [facility]) will put in order for Vascular Studies. Please schedule ASAP.  Sherron will also reach out to your Cardiologist regarding diuretic dosing as per your conversation.  Work to elevate your legs at the level of your heart periodically throughout the day to manage swelling.   Do not stand or sit for long periods of time as fluid will accumlate in your legs.   Walk for approximately 30 minutes a day as tolerated  Sleep in bed if able to reduce swelling.     A request for Zetuvit Plus, rolled gauze and tape will be faxed to [facility].     Standing Status:   Future     Expiration Date:   7/8/2025    Debridement     This order was created via procedure documentation       Cally Osborne, PA-C        Portions of the record may have been created with voice recognition software. Occasional wrong word or \"sound alike\" substitutions may have occurred due to the inherent limitations of voice recognition software. Read the chart carefully and recognize, using context, where substitutions have occurred.         [1]   Patient Active Problem List  Diagnosis    Hyperlipidemia    Hypertension    Hypothyroidism    Obesity (BMI 30-39.9)    Osteoarthritis    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)    Ambulatory dysfunction    Debility    Restless leg syndrome    Hypertensive heart and chronic kidney disease with heart failure and stage 1 through stage 4 chronic kidney disease, or unspecified chronic kidney disease (HCC)    Paroxysmal atrial fibrillation (HCC)    Left-sided chest wall pain    Continuous opioid dependence (HCC)    Stage 3a chronic kidney disease (CKD) " (HCC)    Bronchitis    Acute pain of left knee    Bilateral lower extremity edema    Biventricular implantable cardioverter-defibrillator (ICD) at end of device life    Stage 3b chronic kidney disease (HCC)    Venous stasis ulcer of left lower leg (HCC)   [2]   Past Medical History:  Diagnosis Date    WANG (acute kidney injury) ([facility]) 10/12/2022    Allergic childhood    Arthritis do not know    Cancer (HCC) 1976    Cardiogenic shock ([facility]) 06/26/2019    CHF (congestive heart failure) ([facility])     Chronic kidney disease June 2019    COPD (chronic obstructive pulmonary disease) ([facility]) no    Coronary artery disease 06/19    Depression no    Disease of thyroid gland 1976    Eczema     Heart murmur childhood    Hypertension do not knpw    Photosensitivity     abnormal skin sensitivity to sunlight    Traumatic open wound of left lower leg 04/17/2023    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[facility])     staging    Visual impairment childhood   [3]   Past Surgical History:  Procedure Laterality Date    CARDIAC ELECTROPHYSIOLOGY PROCEDURE N/A 8/20/2024    Procedure: Cardiac icd generator change;  Surgeon: Roman Finnegan MD;  Location: [facility];  Service: Cardiology    CARDIAC ELECTROPHYSIOLOGY PROCEDURE N/A 8/20/2024    Procedure: Cardiac lead revision;  Surgeon: Roman Finnegan MD;  Location: [facility];  Service: Cardiology    CARDIAC SURGERY  06/19    CATARACT EXTRACTION Left     EYE SURGERY      HEMORRHOID SURGERY      IR NON-TUNNELED CENTRAL LINE PLACEMENT  07/09/2019    IR UPPER EXTREMITY VENOGRAM- DIAGNOSTIC  8/15/2024    OOPHORECTOMY      unilateral    TOTAL ABDOMINAL HYSTERECTOMY     [4]   Family History  Problem Relation Name Age of Onset    Alzheimer's disease Mother pj     Coronary artery disease Mother pj     Dementia Mother pj     Diabetes Mother pj         mellitus    Heart disease Mother pj     Other Father Boris Sr         acute myocardial infarction    Heart disease Father Boris Sr     Coronary artery disease  Sister      Other Maternal Grandfather          laryngeal cancer    Dementia Maternal Aunt      Diabetes Maternal Aunt      Heart disease Sister Elin     Heart disease Brother Boris    [5]   Social History  Socioeconomic History    Marital status: Single   Tobacco Use    Smoking status: Never     Passive exposure: Never    Smokeless tobacco: Never   Vaping Use    Vaping status: Never Used   Substance and Sexual Activity    Alcohol use: Not Currently     Comment: 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    Drug use: Never    Sexual activity: Not Currently     Social Drivers of Health     Financial Resource Strain: Medium Risk (9/27/2023)    Overall Financial Resource Strain (CARDIA)     Difficulty of Paying Living Expenses: Somewhat hard   Food Insecurity: No Food Insecurity (10/10/2024)    Nursing - Inadequate Food Risk Classification     Worried About Running Out of Food in the Last Year: Never true     Ran Out of Food in the Last Year: Never true   Transportation Needs: No Transportation Needs (10/10/2024)    PRAPARE - Transportation     Lack of Transportation (Medical): No     Lack of Transportation (Non-Medical): No   Housing Stability: Low Risk  (10/10/2024)    Housing Stability Vital Sign     Unable to Pay for Housing in the Last Year: No     Number of Times Moved in the Last Year: 0     Homeless in the Last Year: No   [6]   Current Outpatient Medications:     Ascorbic Acid (vitamin C) 1000 MG tablet, Take 1,000 mg by mouth in the morning and 1,000 mg before bedtime., Disp: , Rfl:     b complex vitamins capsule, Take 1 capsule by mouth in the morning., Disp: , Rfl:     bumetanide (BUMEX) 2 mg tablet, TAKE 2 TABLETS (4 MG TOTAL) BY MOUTH DAILY, Disp: 60 tablet, Rfl: 0    calcium carbonate (OS-PAULA) 600 MG tablet, Take 600 mg by mouth in the morning and 600 mg in the evening. Take with meals., Disp: , Rfl:     cholecalciferol (VITAMIN D3) 1,000 units tablet, Take 4,000 Units by mouth daily  (Patient taking differently: Take 5,000 Units by mouth in the morning.), Disp: , Rfl:     Eliquis 5 MG, TAKE 1 TABLET (5 MG TOTAL) BY MOUTH 2 (TWO) TIMES A DAY, Disp: 60 tablet, Rfl: 5    hydrALAZINE (APRESOLINE) 10 mg tablet, TAKE 1 TABLET (10 MG TOTAL) BY MOUTH 2 (TWO) TIMES A DAY, Disp: 60 tablet, Rfl: 5    levothyroxine 100 mcg tablet, TAKE 1 TABLET (100 MCG TOTAL) BY MOUTH DAILY, Disp: 30 tablet, Rfl: 5    Magnesium 400 MG CAPS, Take 1 capsule (400 mg total) by mouth daily, Disp: , Rfl: 0    metoprolol succinate (TOPROL-XL) 50 mg 24 hr tablet, TAKE 2 TABLETS (100 MG TOTAL) BY MOUTH EVERY 12 (TWELVE) HOURS, Disp: 360 tablet, Rfl: 1    Multiple Vitamin (MULTI-VITAMIN DAILY PO), 1 tablet in the morning and 1 tablet before bedtime., Disp: , Rfl:     Potassium Chloride ER 20 MEQ TBCR, TAKE 2 TABLETS (40 MEQ TOTAL) BY MOUTH 2 (TWO) TIMES A DAY, Disp: 120 tablet, Rfl: 5    rOPINIRole (REQUIP XL) 2 MG 24 hr tablet, TAKE 1 TABLET (2 MG TOTAL) BY MOUTH DAILY AT BEDTIME, Disp: 30 tablet, Rfl: 5    sacubitril-valsartan (Entresto) 49-51 MG TABS, TAKE 1 TABLET BY MOUTH 2 (TWO) TIMES A DAY, Disp: 60 tablet, Rfl: 5    traMADol (ULTRAM) 50 mg tablet, TAKE 1 TABLET (50 MG TOTAL) BY MOUTH 2 (TWO) TIMES A DAY, Disp: 60 tablet, Rfl: 0    Turmeric (QC TUMERIC COMPLEX PO), Take by mouth in the morning and in the evening., Disp: , Rfl:     venlafaxine (EFFEXOR) 75 mg tablet, TAKE 1 TABLET (75 MG TOTAL) BY MOUTH EVERY 12 (TWELVE) HOURS, Disp: 60 tablet, Rfl: 5  No current facility-administered medications for this visit.

## 2025-07-01 ENCOUNTER — OFFICE VISIT (OUTPATIENT)
Dept: WOUND CARE | Facility: HOSPITAL | Age: 85
End: 2025-07-01
Payer: COMMERCIAL

## 2025-07-01 VITALS
HEIGHT: 60 IN | WEIGHT: 197 LBS | HEART RATE: 85 BPM | DIASTOLIC BLOOD PRESSURE: 61 MMHG | SYSTOLIC BLOOD PRESSURE: 125 MMHG | RESPIRATION RATE: 16 BRPM | BODY MASS INDEX: 38.68 KG/M2 | TEMPERATURE: 97.6 F

## 2025-07-01 DIAGNOSIS — L97.922 NON-PRESSURE CHRONIC ULCER OF LEFT LOWER LEG WITH FAT LAYER EXPOSED (HCC): Primary | ICD-10-CM

## 2025-07-01 DIAGNOSIS — E11.36 TYPE 2 DIABETES MELLITUS WITH DIABETIC CATARACT, WITHOUT LONG-TERM CURRENT USE OF INSULIN (HCC): ICD-10-CM

## 2025-07-01 DIAGNOSIS — E87.70 HYPERVOLEMIA, UNSPECIFIED HYPERVOLEMIA TYPE: ICD-10-CM

## 2025-07-01 DIAGNOSIS — I13.0 HYPERTENSIVE HEART AND CHRONIC KIDNEY DISEASE WITH HEART FAILURE AND STAGE 1 THROUGH STAGE 4 CHRONIC KIDNEY DISEASE, OR UNSPECIFIED CHRONIC KIDNEY DISEASE (HCC): ICD-10-CM

## 2025-07-01 PROCEDURE — 11042 DBRDMT SUBQ TIS 1ST 20SQCM/<: CPT | Performed by: STUDENT IN AN ORGANIZED HEALTH CARE EDUCATION/TRAINING PROGRAM

## 2025-07-01 PROCEDURE — G0463 HOSPITAL OUTPT CLINIC VISIT: HCPCS | Performed by: STUDENT IN AN ORGANIZED HEALTH CARE EDUCATION/TRAINING PROGRAM

## 2025-07-01 PROCEDURE — 99204 OFFICE O/P NEW MOD 45 MIN: CPT | Performed by: STUDENT IN AN ORGANIZED HEALTH CARE EDUCATION/TRAINING PROGRAM

## 2025-07-01 PROCEDURE — 99213 OFFICE O/P EST LOW 20 MIN: CPT | Performed by: STUDENT IN AN ORGANIZED HEALTH CARE EDUCATION/TRAINING PROGRAM

## 2025-07-01 RX ORDER — LIDOCAINE 40 MG/G
CREAM TOPICAL ONCE
Status: COMPLETED | OUTPATIENT
Start: 2025-07-01 | End: 2025-07-01

## 2025-07-01 RX ADMIN — LIDOCAINE: 40 CREAM TOPICAL at 14:33

## 2025-07-01 NOTE — PATIENT INSTRUCTIONS
Orders Placed This Encounter   Procedures    Wound cleansing and dressings Left Leg     Wound location left leg  Change dressing daily and as needed for excessive drainage.   You may remove the dressing and shower. Do not leave wound open to air, apply new dressing immediately.  Cleanse the wound with wound cleanser or mild soap and water, rinse, pat dry.  Cover wound with Zetuvit plus  Secure with kerlix and tape    [facility]) will put in order for Vascular Studies. Please schedule ASAP.  Sherron will also reach out to your Cardiologist regarding diuretic dosing as per your conversation.  Work to elevate your legs at the level of your heart periodically throughout the day to manage swelling.   Do not stand or sit for long periods of time as fluid will accumlate in your legs.   Walk for approximately 30 minutes a day as tolerated  Sleep in bed if able to reduce swelling.     A request for Zetuvit Plus, rolled gauze and tape will be faxed to [facility].     Standing Status:   Future     Expiration Date:   7/8/2025

## 2025-07-02 ENCOUNTER — TELEPHONE (OUTPATIENT)
Dept: WOUND CARE | Facility: HOSPITAL | Age: 85
End: 2025-07-02

## 2025-07-02 NOTE — TELEPHONE ENCOUNTER
Left message stating central scheduling phone number ( provided ) in order to schedule Vascular Studies. Informed patient to call [facility] with any questions. [facility] number provided.

## 2025-07-07 ENCOUNTER — OFFICE VISIT (OUTPATIENT)
Dept: WOUND CARE | Facility: HOSPITAL | Age: 85
End: 2025-07-07
Payer: COMMERCIAL

## 2025-07-07 VITALS
RESPIRATION RATE: 16 BRPM | HEART RATE: 84 BPM | SYSTOLIC BLOOD PRESSURE: 126 MMHG | DIASTOLIC BLOOD PRESSURE: 61 MMHG | TEMPERATURE: 97.1 F

## 2025-07-07 DIAGNOSIS — L97.922 NON-PRESSURE CHRONIC ULCER OF LEFT LOWER LEG WITH FAT LAYER EXPOSED (HCC): Primary | ICD-10-CM

## 2025-07-07 DIAGNOSIS — E11.36 TYPE 2 DIABETES MELLITUS WITH DIABETIC CATARACT, WITHOUT LONG-TERM CURRENT USE OF INSULIN (HCC): ICD-10-CM

## 2025-07-07 PROCEDURE — G0463 HOSPITAL OUTPT CLINIC VISIT: HCPCS | Performed by: FAMILY MEDICINE

## 2025-07-07 PROCEDURE — 99212 OFFICE O/P EST SF 10 MIN: CPT | Performed by: FAMILY MEDICINE

## 2025-07-07 PROCEDURE — 99213 OFFICE O/P EST LOW 20 MIN: CPT | Performed by: FAMILY MEDICINE

## 2025-07-07 RX ORDER — LIDOCAINE 40 MG/G
CREAM TOPICAL ONCE
Status: COMPLETED | OUTPATIENT
Start: 2025-07-07 | End: 2025-07-07

## 2025-07-07 RX ADMIN — LIDOCAINE: 40 CREAM TOPICAL at 13:57

## 2025-07-07 NOTE — PROGRESS NOTES
Wound Procedure Treatment Left Leg    Performed by: Ginny Daley RN  Authorized by: Aida Ackerman DO  Associated wounds:   Wound 07/01/25 Vascular Ulcer Venous Leg Left    Wound cleansed with:  NSS   Applied secondary dressing:  Superabsorber   Dressing secured with:  Claudia and Tape

## 2025-07-07 NOTE — ASSESSMENT & PLAN NOTE
A1C results reviewed with the patient today.    Lab Results   Component Value Date    HGBA1C 6.8 (A) 04/10/2025

## 2025-07-07 NOTE — PROGRESS NOTES
Name: Mayra Meadows      : 1940      MRN: [id number]  Encounter Provider: Aida Ackreman DO  Encounter Date: 2025   Encounter department: [facility] WOUND CARE  :  Assessment & Plan  Non-pressure chronic ulcer of left lower leg with fat layer exposed (HCC)  Wound is slightly improved  Continue wound management as below  Hold compression until arterial studies have been obtained and reviewed  Edema control via elevation (intermittent elevation as tolerated)  Tight diabetic control and proper offloading  Follow-up in 2 weeks or call sooner with questions or concerns    Orders:    Wound cleansing and dressings Left Leg; Future    Wound Procedure Treatment Left Leg    lidocaine (LMX) 4 % cream    Type 2 diabetes mellitus with diabetic cataract, without long-term current use of insulin ([facility])   A1C results reviewed with the patient today.    Lab Results   Component Value Date    HGBA1C 6.8 (A) 04/10/2025                History of Present Illness   Chief Complaint   Patient presents with    Follow Up Wound Care Visit     LLE wound   Here for wound follow up.  Patient presents for follow-up of a wound of the left lower extremity.  No increased pain or drainage.  Patient has been using Zetuvit or baby diapers on the wound, changing daily.  Not wearing compression at this time as arterial studies have been ordered but has not scheduled yet.      Objective   /61   Pulse 84   Temp (!) 97.1 °F (36.2 °C)   Resp 16     Physical Exam  Vitals reviewed.   Constitutional:       General: She is not in acute distress.     Appearance: Normal appearance. She is obese. She is not ill-appearing, toxic-appearing or diaphoretic.   HENT:      Head: Normocephalic and atraumatic.      Right Ear: External ear normal.      Left Ear: External ear normal.     Eyes:      Conjunctiva/sclera: Conjunctivae normal.     Pulmonary:      Effort: Pulmonary effort is normal. No respiratory  distress.     Musculoskeletal:      Cervical back: Neck supple.      Right lower leg: Edema present.      Left lower leg: Edema present.     Skin:     Comments: See wound assessment     Neurological:      Mental Status: She is alert.     Psychiatric:         Mood and Affect: Mood normal.         Behavior: Behavior normal.       Wound 07/01/25 Vascular Ulcer Venous Leg Left (Active)   Wound Image   07/01/25 1429   Wound Description Yellow;Slough;Pink 07/07/25 1356   Non-staged Wound Description Full thickness 07/07/25 1356   Wound Length (cm) 1.2 cm 07/07/25 1356   Wound Width (cm) 1 cm 07/07/25 1356   Wound Depth (cm) 0.1 cm 07/07/25 1356   Wound Surface Area (cm^2) 0.94 cm^2 07/07/25 1356   Wound Volume (cm^3) 0.063 cm^3 07/07/25 1356   Calculated Wound Volume (cm^3) 0.12 cm^3 07/07/25 1356   Change in Wound Size % 7.69 07/07/25 1356   Drainage Amount Large 07/07/25 1356   Drainage Description Serous 07/07/25 1356   Milagros-wound Assessment Pink;Hyperpigmented 07/07/25 1356   Dressing Status Intact 07/07/25 1356       Procedures

## 2025-07-12 DIAGNOSIS — I50.42 CHRONIC COMBINED SYSTOLIC (CONGESTIVE) AND DIASTOLIC (CONGESTIVE) HEART FAILURE (HCC): ICD-10-CM

## 2025-07-12 DIAGNOSIS — I50.9 CHF (CONGESTIVE HEART FAILURE) (HCC): ICD-10-CM

## 2025-07-12 DIAGNOSIS — M54.41 ACUTE RIGHT-SIDED LOW BACK PAIN WITH RIGHT-SIDED SCIATICA: ICD-10-CM

## 2025-07-12 DIAGNOSIS — I50.22 CHRONIC HFREF (HEART FAILURE WITH REDUCED EJECTION FRACTION) (HCC): ICD-10-CM

## 2025-07-12 RX ORDER — BUMETANIDE 2 MG/1
4 TABLET ORAL DAILY
Qty: 60 TABLET | Refills: 5 | Status: SHIPPED | OUTPATIENT
Start: 2025-07-12

## 2025-07-14 RX ORDER — TRAMADOL HYDROCHLORIDE 50 MG/1
50 TABLET ORAL 2 TIMES DAILY
Qty: 60 TABLET | Refills: 0 | Status: SHIPPED | OUTPATIENT
Start: 2025-07-14

## 2025-07-14 RX ORDER — SACUBITRIL AND VALSARTAN 49; 51 MG/1; MG/1
1 TABLET, FILM COATED ORAL 2 TIMES DAILY
Qty: 60 TABLET | Refills: 5 | Status: SHIPPED | OUTPATIENT
Start: 2025-07-14

## 2025-07-18 ENCOUNTER — HOSPITAL ENCOUNTER (OUTPATIENT)
Dept: NON INVASIVE DIAGNOSTICS | Facility: HOSPITAL | Age: 85
Discharge: HOME | End: 2025-07-18
Attending: STUDENT IN AN ORGANIZED HEALTH CARE EDUCATION/TRAINING PROGRAM
Payer: COMMERCIAL

## 2025-07-18 DIAGNOSIS — L97.922 NON-PRESSURE CHRONIC ULCER OF LEFT LOWER LEG WITH FAT LAYER EXPOSED (HCC): ICD-10-CM

## 2025-07-18 PROCEDURE — 93923 UPR/LXTR ART STDY 3+ LVLS: CPT

## 2025-07-18 PROCEDURE — 93925 LOWER EXTREMITY STUDY: CPT

## 2025-07-18 PROCEDURE — 93925 LOWER EXTREMITY STUDY: CPT | Performed by: SURGERY

## 2025-07-18 PROCEDURE — 93922 UPR/L XTREMITY ART 2 LEVELS: CPT | Performed by: SURGERY

## 2025-07-19 DIAGNOSIS — G25.81 RESTLESS LEG SYNDROME: ICD-10-CM

## 2025-07-21 ENCOUNTER — OFFICE VISIT (OUTPATIENT)
Dept: WOUND CARE | Facility: HOSPITAL | Age: 85
End: 2025-07-21
Payer: COMMERCIAL

## 2025-07-21 VITALS
HEART RATE: 89 BPM | RESPIRATION RATE: 18 BRPM | DIASTOLIC BLOOD PRESSURE: 79 MMHG | TEMPERATURE: 96.4 F | SYSTOLIC BLOOD PRESSURE: 169 MMHG

## 2025-07-21 DIAGNOSIS — E11.36 TYPE 2 DIABETES MELLITUS WITH DIABETIC CATARACT, WITHOUT LONG-TERM CURRENT USE OF INSULIN (HCC): ICD-10-CM

## 2025-07-21 DIAGNOSIS — L97.922 NON-PRESSURE CHRONIC ULCER OF LEFT LOWER LEG WITH FAT LAYER EXPOSED (HCC): Primary | ICD-10-CM

## 2025-07-21 PROCEDURE — 97597 DBRDMT OPN WND 1ST 20 CM/<: CPT | Performed by: SURGERY

## 2025-07-21 PROCEDURE — 99204 OFFICE O/P NEW MOD 45 MIN: CPT | Performed by: SURGERY

## 2025-07-21 RX ORDER — LIDOCAINE 40 MG/G
CREAM TOPICAL ONCE
Status: COMPLETED | OUTPATIENT
Start: 2025-07-21 | End: 2025-07-21

## 2025-07-21 RX ORDER — ROPINIROLE HYDROCHLORIDE 2 MG/1
2 TABLET, FILM COATED, EXTENDED RELEASE ORAL
Qty: 30 TABLET | Refills: 5 | Status: SHIPPED | OUTPATIENT
Start: 2025-07-21

## 2025-07-21 RX ADMIN — LIDOCAINE: 40 CREAM TOPICAL at 14:22

## 2025-07-21 NOTE — PROGRESS NOTES
Name: Mayra Meadows      : 1940      MRN: [id number]  Encounter Provider: Elmo Torres MD  Encounter Date: 2025   Encounter department: [facility] WOUND CARE  :  Assessment & Plan  Non-pressure chronic ulcer of left lower leg with fat layer exposed (HCC)  Will continue absorptive dressing with Zetuvit  Discussed vascular studies which showed poor blood flow to leg, will have her see vascular  Orders:  •  lidocaine (LMX) 4 % cream  •  Wound cleansing and dressings Left Leg; Future  •  Wound Procedure Treatment Left Leg  •  Debridement Left Leg  •  Ambulatory Referral to Vascular Surgery; Future        History of Present Illness   Chief Complaint   Patient presents with   • Follow Up Wound Care Visit     Left leg wound    Here for wound follow up.  HPI  Objective   /79   Pulse 89   Temp (!) 96.4 °F (35.8 °C)   Resp 18     Physical Exam  Wound 25 Vascular Ulcer Venous Leg Left (Active)   Wound Image   25 1412   Wound Description Yellow;Slough;Pink 25 1415   Non-staged Wound Description Full thickness 25 1415   Wound Length (cm) 1.4 cm 25 1415   Wound Width (cm) 0.5 cm 25 1415   Wound Depth (cm) 0.1 cm 25 1415   Wound Surface Area (cm^2) 0.55 cm^2 25 1415   Wound Volume (cm^3) 0.037 cm^3 25 1415   Calculated Wound Volume (cm^3) 0.07 cm^3 25 1415   Change in Wound Size % 46.15 25 1415   Drainage Amount Large 25 1415   Drainage Description Serosanguineous;Yellow 25 1415   Milagros-wound Assessment Pink;Edema 25 1415   Dressing Status Intact 25 1415   Left anterior lower leg with open wound with small amount of slough, large amount of serous drainage    Debridement   Wound 25 Vascular Ulcer Venous Leg Left     Date/Time: 2025 1:30 PM    Universal Protocol:  Consent: Verbal consent not obtained. Written consent obtained  Risks and benefits: risks, benefits and  "alternatives were discussed  Consent given by: patient  Time out: Immediately prior to procedure a \"time out\" was called to verify the correct patient, procedure, equipment, support staff and site/side marked as required.  Patient identity confirmed: verbally with patient    Debridement Details  Performed by: physician  Debridement type: selective  Pain control: lidocaine 4%    Post-debridement measurements  Length (cm): 1.4  Width (cm): 0.5  Depth (cm): 0.1  Percent debrided: 100%  Surface Area (cm^2): 0.55  Area Debrided (cm^2): 0.55  Volume (cm^3): 0.04    Devitalized tissue debrided: slough  Instrument(s) utilized: curette  Technique utilized: excisional  Bleeding: small  Hemostasis obtained with: pressure  Procedural pain (0-10): 1  Post-procedural pain: 0   Response to treatment: procedure was tolerated well               "

## 2025-07-21 NOTE — PROGRESS NOTES
Wound Procedure Treatment Left Leg    Performed by: Kendra Vazquez RN  Authorized by: Elmo Torres MD  Associated wounds:   Wound 07/01/25 Vascular Ulcer Venous Leg Left    Wound cleansed with:  NSS   Applied secondary dressing:  Superabsorber   Dressing secured with:  Tape, Claudia and Tubifast     Convamax applied to wound

## 2025-07-21 NOTE — PATIENT INSTRUCTIONS
Orders Placed This Encounter   Procedures    Wound cleansing and dressings Left Leg     Wound location left leg   Change dressing daily and as needed for excessive drainage.   You may remove the dressing and shower. Do not leave wound open to air, apply new dressing immediately.   Moisturizer to intact skin on leg  Cover wound with Zetuvit   Secure with rolled gauze and tape        Work to elevate your legs at the level of your heart periodically throughout the day to manage swelling.   Do not stand or sit for long periods of time as fluid will accumlate in your legs.   Walk for approximately 30 minutes a day as tolerated   Sleep in bed if able to reduce swelling.     Standing Status:   Future     Expiration Date:   8/4/2025

## 2025-07-23 ENCOUNTER — OFFICE VISIT (OUTPATIENT)
Dept: VASCULAR SURGERY | Facility: CLINIC | Age: 85
End: 2025-07-23
Attending: SURGERY
Payer: COMMERCIAL

## 2025-07-23 VITALS
WEIGHT: 204 LBS | HEART RATE: 82 BPM | SYSTOLIC BLOOD PRESSURE: 110 MMHG | BODY MASS INDEX: 40.05 KG/M2 | HEIGHT: 60 IN | DIASTOLIC BLOOD PRESSURE: 60 MMHG | OXYGEN SATURATION: 96 % | RESPIRATION RATE: 14 BRPM

## 2025-07-23 DIAGNOSIS — L97.922 NON-PRESSURE CHRONIC ULCER OF LEFT LOWER LEG WITH FAT LAYER EXPOSED (HCC): ICD-10-CM

## 2025-07-23 PROCEDURE — 99214 OFFICE O/P EST MOD 30 MIN: CPT | Performed by: NURSE PRACTITIONER

## 2025-07-23 NOTE — PATIENT INSTRUCTIONS
"- Call the office if you experience any changes to your legs or feet such as new pain, redness or swelling.    - Stay active. Exercise everyday. Walking is the recommended exercise with a goal of 30 min 3-4 times a week. A healthy weight can assist in decreasing varicose vein symptoms.     - Wear Compression. Put them on in the morning, wear all day and take off before bed at night.     -Keep a low sodium diet    -Elevate your legs above the level of your heart. Elevate for 15 minutes 3-4 times a day.     -When looking at buying compression, look for \"gradient compression\" with a weight 20-30mmHg (medium weight), knee high is fine.  -Try \"[employer]\"    - I highly recommend CircAid wraps- however these can be expensive and insurance often does not pay for them.   -A good brand is Sigvaris, knee high.   "

## 2025-07-23 NOTE — PROGRESS NOTES
Name: Mayra Meadows      : 1940      MRN: [id number]  Encounter Provider: SOTO Barroso  Encounter Date: 2025   Encounter department: [facility]    85 y/o F w/ hx of HTN, pA-fib (eliquis), hypothyroidism, T2 DM, OA, venous stasis of lower leg, CKD stage 3a, ambulatory dysfunction, restless leg syndrome, HLD, obesity w/ BMI 39.84 kg/m, HLD. Pt presents to the office with L anterior shin wound and review of LEAD.   Assessment & Plan  Non-pressure chronic ulcer of left lower leg with fat layer exposed (HCC)  Reviewed LEAD 25  Rt:  Diffuse disease noted throughout the femoral-popliteal arteries without significant focal stenosis.   EBONY: 0.84 /  96 mmHg/ 39 mmHg      Lt:  Diffuse disease noted throughout the femoral-popliteal arteries without significant focal stenosis.   EBONY: 1.93 NC/  72 mmHg/  28 mmHg    -Denies claudication, denies rest pain  -Reports left anterior shin wound present since April of this year, unsure of how this occurred.   -She has been going to wound care every two weeks and she reports improvement  -L 1.5 x .5cm open area, superficial open area. No weeping, scant drainage.   -Denies use of compression or elevation    -Educated pt on pathophysiology of venous insufficiency and arterial disease. Discussed that her L shin wound is likely caused by venous insufficiency and leg swelling symptoms. Reviewed LEAD and discussed while she has GTP below healing her wound is located at the mid shin and she has significant swelling surrounding this area. Additionally her study does not demonstrate any treatable disease. Her wound would likely heal with adequate compression and elevation. Recommended conservative measures and return to the office in 3-4 weeks for follow up. Pt verbalized understanding.     Plan  -Return to the office 3-4 weeks for L leg wound check  -Start use of gentle compression to the LLE, provided with Tubigrip size F with ACE wrap in the  office today, instructed to use daily.  -Elevate L leg daily 3-4 times a day for 15-20 minutes  -Increase walking 20-30 minutes daily  -Low sodium diet    Orders:    Ambulatory Referral to Vascular Surgery        History of Present Illness     Patient presents today for a wound check of LLE and to review results of RONNA done 7/18/25. Patient saw wound care on 7/21 for non pressure ulcer on LLE. Patient c/o SOB, swelling, bruising, and weakness bilaterally.       HPI  Mayra Meadows is a 84 y.o. female hx of HTN, pA-fib (eliquis), hypothyroidism, T2 DM, OA, venous stasis of lower leg, CKD stage 3a, ambulatory dysfunction, restless leg syndrome, HLD, obesity w/ BMI 39.84 kg/m, HLD. Pt presents to the office with L anterior shin wound and review of LEAD.   History obtained from: patient    Review of Systems   Constitutional:  Negative for fatigue.   Respiratory:  Positive for shortness of breath.    Cardiovascular:  Positive for leg swelling.   Musculoskeletal:  Positive for gait problem (ambulates with a cane). Negative for back pain.   Skin:  Positive for color change and wound.   Neurological:  Positive for weakness. Negative for numbness.   Psychiatric/Behavioral:  Negative for sleep disturbance.      Medications Ordered Prior to Encounter[1]      Objective   /60 (BP Location: Right arm, Patient Position: Sitting)   Pulse 82   Resp 14   Ht 5' (1.524 m)   Wt 92.5 kg (204 lb)   SpO2 96%   BMI 39.84 kg/m²      Physical Exam  Constitutional:       Appearance: Normal appearance. She is obese. She is not toxic-appearing or diaphoretic.   HENT:      Head: Normocephalic and atraumatic.     Cardiovascular:      Pulses:           Radial pulses are 2+ on the right side and 2+ on the left side.        Femoral pulses are 2+ on the right side and 0 on the left side.       Dorsalis pedis pulses are detected w/ Doppler on the right side and detected w/ Doppler on the left side.        Posterior tibial pulses are  detected w/ Doppler on the right side and detected w/ Doppler on the left side.     Musculoskeletal:      Right lower le+ Edema present.      Left lower le+ Edema present.     Neurological:      Mental Status: She is alert.         Administrative Statements   I have spent a total time of 30 minutes in caring for this patient on the day of the visit/encounter including Diagnostic results, Instructions for management, Patient and family education, Importance of tx compliance, Impressions, Documenting in the medical record, and Obtaining or reviewing history  .         [1]   Current Outpatient Medications on File Prior to Visit   Medication Sig Dispense Refill    Ascorbic Acid (vitamin C) 1000 MG tablet Take 1,000 mg by mouth in the morning and 1,000 mg before bedtime.      b complex vitamins capsule Take 1 capsule by mouth in the morning.      bumetanide (BUMEX) 2 mg tablet TAKE 2 TABLETS (4 MG TOTAL) BY MOUTH DAILY 60 tablet 5    calcium carbonate (OS-PAULA) 600 MG tablet Take 600 mg by mouth in the morning and 600 mg in the evening. Take with meals.      cholecalciferol (VITAMIN D3) 1,000 units tablet Take 4,000 Units by mouth daily (Patient taking differently: Take 5,000 Units by mouth in the morning.)      Eliquis 5 MG TAKE 1 TABLET (5 MG TOTAL) BY MOUTH 2 (TWO) TIMES A DAY 60 tablet 5    Entresto 49-51 MG TABS TAKE 1 TABLET BY MOUTH 2 (TWO) TIMES A DAY 60 tablet 5    hydrALAZINE (APRESOLINE) 10 mg tablet TAKE 1 TABLET (10 MG TOTAL) BY MOUTH 2 (TWO) TIMES A DAY 60 tablet 5    levothyroxine 100 mcg tablet TAKE 1 TABLET (100 MCG TOTAL) BY MOUTH DAILY 30 tablet 5    Magnesium 400 MG CAPS Take 1 capsule (400 mg total) by mouth daily  0    metoprolol succinate (TOPROL-XL) 50 mg 24 hr tablet TAKE 2 TABLETS (100 MG TOTAL) BY MOUTH EVERY 12 (TWELVE) HOURS 360 tablet 1    Multiple Vitamin (MULTI-VITAMIN DAILY PO) 1 tablet in the morning and 1 tablet before bedtime.      Potassium Chloride ER 20 MEQ TBCR TAKE 2  TABLETS (40 MEQ TOTAL) BY MOUTH 2 (TWO) TIMES A  tablet 5    rOPINIRole (REQUIP XL) 2 MG 24 hr tablet TAKE 1 TABLET (2 MG TOTAL) BY MOUTH DAILY AT BEDTIME 30 tablet 5    traMADol (ULTRAM) 50 mg tablet TAKE 1 TABLET (50 MG TOTAL) BY MOUTH 2 (TWO) TIMES A DAY 60 tablet 0    Turmeric (QC TUMERIC COMPLEX PO) Take by mouth in the morning and in the evening.      venlafaxine (EFFEXOR) 75 mg tablet TAKE 1 TABLET (75 MG TOTAL) BY MOUTH EVERY 12 (TWELVE) HOURS 60 tablet 5    [DISCONTINUED] potassium chloride (K-DUR,KLOR-CON) 20 mEq tablet TAKE 2 TABLETS (40 MEQ TOTAL) BY MOUTH 2 (TWO) TIMES A  tablet 5     No current facility-administered medications on file prior to visit.

## 2025-08-04 ENCOUNTER — OFFICE VISIT (OUTPATIENT)
Dept: WOUND CARE | Facility: HOSPITAL | Age: 85
End: 2025-08-04
Payer: COMMERCIAL

## 2025-08-04 VITALS
RESPIRATION RATE: 18 BRPM | TEMPERATURE: 96.1 F | SYSTOLIC BLOOD PRESSURE: 139 MMHG | DIASTOLIC BLOOD PRESSURE: 63 MMHG | HEART RATE: 95 BPM

## 2025-08-04 DIAGNOSIS — L97.922 NON-PRESSURE CHRONIC ULCER OF LEFT LOWER LEG WITH FAT LAYER EXPOSED (HCC): Primary | ICD-10-CM

## 2025-08-04 DIAGNOSIS — I89.8 LYMPHORRHEA: ICD-10-CM

## 2025-08-04 PROCEDURE — 99213 OFFICE O/P EST LOW 20 MIN: CPT | Performed by: FAMILY MEDICINE

## 2025-08-04 PROCEDURE — G0463 HOSPITAL OUTPT CLINIC VISIT: HCPCS | Performed by: FAMILY MEDICINE

## 2025-08-04 RX ORDER — LIDOCAINE 40 MG/G
CREAM TOPICAL ONCE
Status: COMPLETED | OUTPATIENT
Start: 2025-08-04 | End: 2025-08-04

## 2025-08-04 RX ADMIN — LIDOCAINE: 40 CREAM TOPICAL at 15:16

## 2025-08-11 ENCOUNTER — OFFICE VISIT (OUTPATIENT)
Dept: WOUND CARE | Facility: HOSPITAL | Age: 85
End: 2025-08-11
Payer: COMMERCIAL

## 2025-08-18 ENCOUNTER — TELEPHONE (OUTPATIENT)
Age: 85
End: 2025-08-18

## 2025-08-20 ENCOUNTER — OFFICE VISIT (OUTPATIENT)
Dept: VASCULAR SURGERY | Facility: CLINIC | Age: 85
End: 2025-08-20
Payer: COMMERCIAL

## 2025-08-20 VITALS
DIASTOLIC BLOOD PRESSURE: 60 MMHG | BODY MASS INDEX: 40.44 KG/M2 | SYSTOLIC BLOOD PRESSURE: 92 MMHG | HEIGHT: 60 IN | WEIGHT: 206 LBS | HEART RATE: 82 BPM | OXYGEN SATURATION: 99 %

## 2025-08-20 DIAGNOSIS — L03.116 CELLULITIS OF LEFT LOWER EXTREMITY: ICD-10-CM

## 2025-08-20 DIAGNOSIS — I83.028 VENOUS STASIS ULCER OF LEFT LOWER LEG (HCC): Primary | ICD-10-CM

## 2025-08-20 PROCEDURE — 99215 OFFICE O/P EST HI 40 MIN: CPT | Performed by: NURSE PRACTITIONER

## 2025-08-20 RX ORDER — SULFAMETHOXAZOLE AND TRIMETHOPRIM 800; 160 MG/1; MG/1
1 TABLET ORAL EVERY 12 HOURS SCHEDULED
Qty: 14 TABLET | Refills: 0 | Status: SHIPPED | OUTPATIENT
Start: 2025-08-20 | End: 2025-08-27

## (undated) DEVICE — PLASMABLADE PS200-040 4.0: Brand: PLASMABLADE™

## (undated) DEVICE — AVITENE MICROFIBRILLAR COLLAGEN HEMOSTAT FLOUR: Brand: AVITENE FLOUR

## (undated) DEVICE — INTRO SHEATH PEEL AWAY 9 FR